# Patient Record
Sex: FEMALE | Race: WHITE | Employment: UNEMPLOYED | URBAN - NONMETROPOLITAN AREA
[De-identification: names, ages, dates, MRNs, and addresses within clinical notes are randomized per-mention and may not be internally consistent; named-entity substitution may affect disease eponyms.]

---

## 2017-07-19 ENCOUNTER — HOSPITAL ENCOUNTER (OUTPATIENT)
Age: 67
Setting detail: OBSERVATION
Discharge: HOME | End: 2017-07-20
Attending: EMERGENCY MEDICINE | Admitting: FAMILY MEDICINE
Payer: MEDICARE

## 2017-07-19 DIAGNOSIS — E16.2 HYPOGLYCEMIA: ICD-10-CM

## 2017-07-19 DIAGNOSIS — N28.9 ACUTE RENAL INSUFFICIENCY: Primary | ICD-10-CM

## 2017-07-19 LAB
ABSOLUTE EOS #: 0.1 K/UL (ref 0–0.4)
ABSOLUTE LYMPH #: 2.2 K/UL (ref 1–4.8)
ABSOLUTE MONO #: 1 K/UL (ref 0.1–1.2)
ANION GAP SERPL CALCULATED.3IONS-SCNC: 15 MMOL/L (ref 9–17)
BASOPHILS # BLD: 1 %
BASOPHILS ABSOLUTE: 0.1 K/UL (ref 0–0.2)
BUN BLDV-MCNC: 45 MG/DL (ref 8–23)
BUN/CREAT BLD: 24 (ref 9–20)
CALCIUM SERPL-MCNC: 8.5 MG/DL (ref 8.6–10.4)
CHLORIDE BLD-SCNC: 100 MMOL/L (ref 98–107)
CO2: 24 MMOL/L (ref 20–31)
CREAT SERPL-MCNC: 1.86 MG/DL (ref 0.5–0.9)
DIFFERENTIAL TYPE: ABNORMAL
EOSINOPHILS RELATIVE PERCENT: 1 %
GFR AFRICAN AMERICAN: 33 ML/MIN
GFR NON-AFRICAN AMERICAN: 27 ML/MIN
GFR SERPL CREATININE-BSD FRML MDRD: ABNORMAL ML/MIN/{1.73_M2}
GFR SERPL CREATININE-BSD FRML MDRD: ABNORMAL ML/MIN/{1.73_M2}
GLUCOSE BLD-MCNC: 105 MG/DL (ref 65–105)
GLUCOSE BLD-MCNC: 176 MG/DL (ref 70–99)
GLUCOSE BLD-MCNC: 90 MG/DL (ref 65–105)
HCT VFR BLD CALC: 31.3 % (ref 36–46)
HEMOGLOBIN: 10 G/DL (ref 12–16)
LYMPHOCYTES # BLD: 18 %
MCH RBC QN AUTO: 29.9 PG (ref 26–34)
MCHC RBC AUTO-ENTMCNC: 31.9 G/DL (ref 31–37)
MCV RBC AUTO: 93.7 FL (ref 80–100)
MONOCYTES # BLD: 9 %
PDW BLD-RTO: 16 % (ref 11–14.5)
PLATELET # BLD: 266 K/UL (ref 140–450)
PLATELET ESTIMATE: ABNORMAL
PMV BLD AUTO: 8 FL (ref 6–12)
POTASSIUM SERPL-SCNC: 3.9 MMOL/L (ref 3.7–5.3)
RBC # BLD: 3.34 M/UL (ref 4–5.2)
RBC # BLD: ABNORMAL 10*6/UL
SEG NEUTROPHILS: 71 %
SEGMENTED NEUTROPHILS ABSOLUTE COUNT: 8.3 K/UL (ref 1.8–7.7)
SODIUM BLD-SCNC: 139 MMOL/L (ref 135–144)
WBC # BLD: 11.7 K/UL (ref 3.5–11)
WBC # BLD: ABNORMAL 10*3/UL

## 2017-07-19 PROCEDURE — 99285 EMERGENCY DEPT VISIT HI MDM: CPT

## 2017-07-19 PROCEDURE — 80048 BASIC METABOLIC PNL TOTAL CA: CPT

## 2017-07-19 PROCEDURE — 2580000003 HC RX 258: Performed by: EMERGENCY MEDICINE

## 2017-07-19 PROCEDURE — 82947 ASSAY GLUCOSE BLOOD QUANT: CPT

## 2017-07-19 PROCEDURE — 96374 THER/PROPH/DIAG INJ IV PUSH: CPT

## 2017-07-19 PROCEDURE — 36415 COLL VENOUS BLD VENIPUNCTURE: CPT

## 2017-07-19 PROCEDURE — 83036 HEMOGLOBIN GLYCOSYLATED A1C: CPT

## 2017-07-19 PROCEDURE — 85025 COMPLETE CBC W/AUTO DIFF WBC: CPT

## 2017-07-19 RX ORDER — DEXTROSE MONOHYDRATE 25 G/50ML
25 INJECTION, SOLUTION INTRAVENOUS ONCE
Status: COMPLETED | OUTPATIENT
Start: 2017-07-19 | End: 2017-07-20

## 2017-07-19 RX ORDER — 0.9 % SODIUM CHLORIDE 0.9 %
1000 INTRAVENOUS SOLUTION INTRAVENOUS ONCE
Status: DISCONTINUED | OUTPATIENT
Start: 2017-07-19 | End: 2017-07-20 | Stop reason: HOSPADM

## 2017-07-19 RX ADMIN — DEXTROSE MONOHYDRATE 25 G: 25 INJECTION, SOLUTION INTRAVENOUS at 22:41

## 2017-07-19 ASSESSMENT — ENCOUNTER SYMPTOMS
ABDOMINAL DISTENTION: 0
EYE REDNESS: 0
FACIAL SWELLING: 0
SHORTNESS OF BREATH: 0
CHEST TIGHTNESS: 0
EYE DISCHARGE: 0
ABDOMINAL PAIN: 0

## 2017-07-20 VITALS
TEMPERATURE: 97.3 F | BODY MASS INDEX: 35.71 KG/M2 | SYSTOLIC BLOOD PRESSURE: 165 MMHG | OXYGEN SATURATION: 98 % | RESPIRATION RATE: 16 BRPM | WEIGHT: 222.22 LBS | DIASTOLIC BLOOD PRESSURE: 65 MMHG | HEART RATE: 60 BPM | HEIGHT: 66 IN

## 2017-07-20 LAB
ABSOLUTE EOS #: 0.1 K/UL (ref 0–0.4)
ABSOLUTE LYMPH #: 3.04 K/UL (ref 1–4.8)
ABSOLUTE MONO #: 0.7 K/UL (ref 0.1–1.2)
ANION GAP SERPL CALCULATED.3IONS-SCNC: 17 MMOL/L (ref 9–17)
BASOPHILS # BLD: 0 %
BASOPHILS ABSOLUTE: 0.04 K/UL (ref 0–0.2)
BUN BLDV-MCNC: 42 MG/DL (ref 8–23)
BUN/CREAT BLD: 27 (ref 9–20)
CALCIUM SERPL-MCNC: 8.6 MG/DL (ref 8.6–10.4)
CHLORIDE BLD-SCNC: 102 MMOL/L (ref 98–107)
CO2: 20 MMOL/L (ref 20–31)
CREAT SERPL-MCNC: 1.58 MG/DL (ref 0.5–0.9)
DIFFERENTIAL TYPE: ABNORMAL
EOSINOPHILS RELATIVE PERCENT: 1 %
ESTIMATED AVERAGE GLUCOSE: 220 MG/DL
GFR AFRICAN AMERICAN: 40 ML/MIN
GFR NON-AFRICAN AMERICAN: 33 ML/MIN
GFR SERPL CREATININE-BSD FRML MDRD: ABNORMAL ML/MIN/{1.73_M2}
GFR SERPL CREATININE-BSD FRML MDRD: ABNORMAL ML/MIN/{1.73_M2}
GLUCOSE BLD-MCNC: 116 MG/DL (ref 65–105)
GLUCOSE BLD-MCNC: 129 MG/DL (ref 65–105)
GLUCOSE BLD-MCNC: 218 MG/DL (ref 65–105)
GLUCOSE BLD-MCNC: 225 MG/DL (ref 65–105)
GLUCOSE BLD-MCNC: 228 MG/DL (ref 70–99)
GLUCOSE BLD-MCNC: 43 MG/DL (ref 65–105)
GLUCOSE BLD-MCNC: 78 MG/DL (ref 65–105)
HBA1C MFR BLD: 9.3 % (ref 4.8–5.9)
HCT VFR BLD CALC: 31.1 % (ref 36–46)
HEMOGLOBIN: 10.7 G/DL (ref 12–16)
LYMPHOCYTES # BLD: 30 %
MCH RBC QN AUTO: 31.8 PG (ref 26–34)
MCHC RBC AUTO-ENTMCNC: 34.6 G/DL (ref 31–37)
MCV RBC AUTO: 91.7 FL (ref 80–100)
MONOCYTES # BLD: 7 %
PDW BLD-RTO: 13.9 % (ref 11–14.5)
PLATELET # BLD: 244 K/UL (ref 140–450)
PLATELET ESTIMATE: ABNORMAL
PMV BLD AUTO: 7.6 FL (ref 6–12)
POTASSIUM SERPL-SCNC: 4.7 MMOL/L (ref 3.7–5.3)
RBC # BLD: 3.39 M/UL (ref 4–5.2)
RBC # BLD: ABNORMAL 10*6/UL
SEG NEUTROPHILS: 62 %
SEGMENTED NEUTROPHILS ABSOLUTE COUNT: 6.39 K/UL (ref 1.8–7.7)
SODIUM BLD-SCNC: 139 MMOL/L (ref 135–144)
WBC # BLD: 10.2 K/UL (ref 3.5–11)
WBC # BLD: ABNORMAL 10*3/UL

## 2017-07-20 PROCEDURE — 96372 THER/PROPH/DIAG INJ SC/IM: CPT

## 2017-07-20 PROCEDURE — G0378 HOSPITAL OBSERVATION PER HR: HCPCS

## 2017-07-20 PROCEDURE — 2580000003 HC RX 258

## 2017-07-20 PROCEDURE — 80048 BASIC METABOLIC PNL TOTAL CA: CPT

## 2017-07-20 PROCEDURE — 99220 PR INITIAL OBSERVATION CARE/DAY 70 MINUTES: CPT | Performed by: INTERNAL MEDICINE

## 2017-07-20 PROCEDURE — 94760 N-INVAS EAR/PLS OXIMETRY 1: CPT

## 2017-07-20 PROCEDURE — 82947 ASSAY GLUCOSE BLOOD QUANT: CPT

## 2017-07-20 PROCEDURE — 2580000003 HC RX 258: Performed by: EMERGENCY MEDICINE

## 2017-07-20 PROCEDURE — 6370000000 HC RX 637 (ALT 250 FOR IP): Performed by: INTERNAL MEDICINE

## 2017-07-20 PROCEDURE — 6370000000 HC RX 637 (ALT 250 FOR IP): Performed by: FAMILY MEDICINE

## 2017-07-20 PROCEDURE — 2580000003 HC RX 258: Performed by: FAMILY MEDICINE

## 2017-07-20 PROCEDURE — 85025 COMPLETE CBC W/AUTO DIFF WBC: CPT

## 2017-07-20 PROCEDURE — 36415 COLL VENOUS BLD VENIPUNCTURE: CPT

## 2017-07-20 PROCEDURE — 6360000002 HC RX W HCPCS: Performed by: FAMILY MEDICINE

## 2017-07-20 PROCEDURE — 96376 TX/PRO/DX INJ SAME DRUG ADON: CPT

## 2017-07-20 RX ORDER — CLOPIDOGREL BISULFATE 75 MG/1
75 TABLET ORAL DAILY
Status: DISCONTINUED | OUTPATIENT
Start: 2017-07-20 | End: 2017-07-20 | Stop reason: HOSPADM

## 2017-07-20 RX ORDER — ASPIRIN 81 MG/1
81 TABLET ORAL DAILY
Status: DISCONTINUED | OUTPATIENT
Start: 2017-07-20 | End: 2017-07-20 | Stop reason: HOSPADM

## 2017-07-20 RX ORDER — DEXTROSE MONOHYDRATE 25 G/50ML
12.5 INJECTION, SOLUTION INTRAVENOUS PRN
Status: DISCONTINUED | OUTPATIENT
Start: 2017-07-20 | End: 2017-07-20 | Stop reason: HOSPADM

## 2017-07-20 RX ORDER — LOSARTAN POTASSIUM 50 MG/1
100 TABLET ORAL DAILY
Status: DISCONTINUED | OUTPATIENT
Start: 2017-07-20 | End: 2017-07-20 | Stop reason: HOSPADM

## 2017-07-20 RX ORDER — LISINOPRIL 20 MG/1
40 TABLET ORAL DAILY
Status: DISCONTINUED | OUTPATIENT
Start: 2017-07-20 | End: 2017-07-20

## 2017-07-20 RX ORDER — MORPHINE SULFATE 4 MG/ML
4 INJECTION, SOLUTION INTRAMUSCULAR; INTRAVENOUS
Status: DISCONTINUED | OUTPATIENT
Start: 2017-07-20 | End: 2017-07-20 | Stop reason: HOSPADM

## 2017-07-20 RX ORDER — DEXTROSE MONOHYDRATE 50 MG/ML
INJECTION, SOLUTION INTRAVENOUS CONTINUOUS
Status: DISCONTINUED | OUTPATIENT
Start: 2017-07-20 | End: 2017-07-20 | Stop reason: HOSPADM

## 2017-07-20 RX ORDER — DEXTROSE MONOHYDRATE 50 MG/ML
INJECTION, SOLUTION INTRAVENOUS CONTINUOUS
Status: DISCONTINUED | OUTPATIENT
Start: 2017-07-20 | End: 2017-07-20

## 2017-07-20 RX ORDER — CARVEDILOL 12.5 MG/1
25 TABLET ORAL 2 TIMES DAILY WITH MEALS
Status: DISCONTINUED | OUTPATIENT
Start: 2017-07-20 | End: 2017-07-20 | Stop reason: HOSPADM

## 2017-07-20 RX ORDER — ONDANSETRON 2 MG/ML
4 INJECTION INTRAMUSCULAR; INTRAVENOUS EVERY 6 HOURS PRN
Status: DISCONTINUED | OUTPATIENT
Start: 2017-07-20 | End: 2017-07-20 | Stop reason: HOSPADM

## 2017-07-20 RX ORDER — DEXTROSE MONOHYDRATE 50 MG/ML
100 INJECTION, SOLUTION INTRAVENOUS PRN
Status: DISCONTINUED | OUTPATIENT
Start: 2017-07-20 | End: 2017-07-20 | Stop reason: HOSPADM

## 2017-07-20 RX ORDER — CARVEDILOL 25 MG/1
25 TABLET ORAL 2 TIMES DAILY WITH MEALS
Qty: 60 TABLET | Refills: 0
Start: 2017-07-20 | End: 2018-01-31 | Stop reason: SDUPTHER

## 2017-07-20 RX ORDER — FUROSEMIDE 20 MG/1
20 TABLET ORAL 2 TIMES DAILY
Status: DISCONTINUED | OUTPATIENT
Start: 2017-07-20 | End: 2017-07-20 | Stop reason: HOSPADM

## 2017-07-20 RX ORDER — MEMANTINE HYDROCHLORIDE 5 MG/1
10 TABLET ORAL 2 TIMES DAILY
Status: DISCONTINUED | OUTPATIENT
Start: 2017-07-20 | End: 2017-07-20 | Stop reason: HOSPADM

## 2017-07-20 RX ORDER — INSULIN GLARGINE 100 [IU]/ML
70 INJECTION, SOLUTION SUBCUTANEOUS NIGHTLY
Qty: 1 VIAL | Refills: 0
Start: 2017-07-20 | End: 2019-08-13 | Stop reason: SDUPTHER

## 2017-07-20 RX ORDER — DEXTROSE MONOHYDRATE 25 G/50ML
25 INJECTION, SOLUTION INTRAVENOUS ONCE
Status: DISCONTINUED | OUTPATIENT
Start: 2017-07-20 | End: 2017-07-20 | Stop reason: HOSPADM

## 2017-07-20 RX ORDER — ATORVASTATIN CALCIUM 40 MG/1
40 TABLET, FILM COATED ORAL DAILY
Status: DISCONTINUED | OUTPATIENT
Start: 2017-07-20 | End: 2017-07-20 | Stop reason: HOSPADM

## 2017-07-20 RX ORDER — ACETAMINOPHEN 325 MG/1
650 TABLET ORAL EVERY 4 HOURS PRN
Status: DISCONTINUED | OUTPATIENT
Start: 2017-07-20 | End: 2017-07-20 | Stop reason: HOSPADM

## 2017-07-20 RX ORDER — LOSARTAN POTASSIUM 100 MG/1
100 TABLET ORAL DAILY
COMMUNITY
End: 2017-12-15 | Stop reason: SDUPTHER

## 2017-07-20 RX ORDER — AMLODIPINE BESYLATE 5 MG/1
10 TABLET ORAL DAILY
Status: DISCONTINUED | OUTPATIENT
Start: 2017-07-20 | End: 2017-07-20

## 2017-07-20 RX ORDER — OXYBUTYNIN CHLORIDE 5 MG/1
10 TABLET, EXTENDED RELEASE ORAL DAILY
Status: DISCONTINUED | OUTPATIENT
Start: 2017-07-20 | End: 2017-07-20 | Stop reason: HOSPADM

## 2017-07-20 RX ORDER — GABAPENTIN 300 MG/1
300 CAPSULE ORAL 3 TIMES DAILY
Status: DISCONTINUED | OUTPATIENT
Start: 2017-07-20 | End: 2017-07-20 | Stop reason: HOSPADM

## 2017-07-20 RX ORDER — NICOTINE POLACRILEX 4 MG
15 LOZENGE BUCCAL PRN
Status: DISCONTINUED | OUTPATIENT
Start: 2017-07-20 | End: 2017-07-20 | Stop reason: HOSPADM

## 2017-07-20 RX ORDER — RIVASTIGMINE 9.5 MG/24H
1 PATCH, EXTENDED RELEASE TRANSDERMAL DAILY
Status: DISCONTINUED | OUTPATIENT
Start: 2017-07-20 | End: 2017-07-20 | Stop reason: HOSPADM

## 2017-07-20 RX ORDER — CITALOPRAM 20 MG/1
20 TABLET ORAL DAILY
COMMUNITY
End: 2018-01-31 | Stop reason: SDUPTHER

## 2017-07-20 RX ORDER — SODIUM CHLORIDE 0.9 % (FLUSH) 0.9 %
10 SYRINGE (ML) INJECTION EVERY 12 HOURS SCHEDULED
Status: DISCONTINUED | OUTPATIENT
Start: 2017-07-20 | End: 2017-07-20 | Stop reason: HOSPADM

## 2017-07-20 RX ORDER — DEXTROSE MONOHYDRATE 25 G/50ML
INJECTION, SOLUTION INTRAVENOUS
Status: COMPLETED
Start: 2017-07-20 | End: 2017-07-20

## 2017-07-20 RX ORDER — MORPHINE SULFATE 2 MG/ML
2 INJECTION, SOLUTION INTRAMUSCULAR; INTRAVENOUS
Status: DISCONTINUED | OUTPATIENT
Start: 2017-07-20 | End: 2017-07-20 | Stop reason: HOSPADM

## 2017-07-20 RX ORDER — SODIUM CHLORIDE 0.9 % (FLUSH) 0.9 %
10 SYRINGE (ML) INJECTION PRN
Status: DISCONTINUED | OUTPATIENT
Start: 2017-07-20 | End: 2017-07-20 | Stop reason: HOSPADM

## 2017-07-20 RX ADMIN — DEXTROSE MONOHYDRATE 25 G: 25 INJECTION, SOLUTION INTRAVENOUS at 00:15

## 2017-07-20 RX ADMIN — CARVEDILOL 25 MG: 12.5 TABLET, FILM COATED ORAL at 09:14

## 2017-07-20 RX ADMIN — GABAPENTIN 300 MG: 300 CAPSULE ORAL at 08:59

## 2017-07-20 RX ADMIN — CLOPIDOGREL 75 MG: 75 TABLET, FILM COATED ORAL at 09:00

## 2017-07-20 RX ADMIN — FUROSEMIDE 20 MG: 20 TABLET ORAL at 08:57

## 2017-07-20 RX ADMIN — INSULIN LISPRO 4 UNITS: 100 INJECTION, SOLUTION INTRAVENOUS; SUBCUTANEOUS at 09:15

## 2017-07-20 RX ADMIN — DEXTROSE MONOHYDRATE: 50 INJECTION, SOLUTION INTRAVENOUS at 01:17

## 2017-07-20 RX ADMIN — OXYBUTYNIN CHLORIDE 10 MG: 5 TABLET, EXTENDED RELEASE ORAL at 08:57

## 2017-07-20 RX ADMIN — ATORVASTATIN CALCIUM 40 MG: 40 TABLET, FILM COATED ORAL at 08:59

## 2017-07-20 RX ADMIN — MEMANTINE HYDROCHLORIDE 10 MG: 5 TABLET ORAL at 08:58

## 2017-07-20 RX ADMIN — Medication 10 ML: at 09:15

## 2017-07-20 RX ADMIN — LOSARTAN POTASSIUM 100 MG: 50 TABLET ORAL at 09:14

## 2017-07-20 RX ADMIN — ENOXAPARIN SODIUM 40 MG: 40 INJECTION SUBCUTANEOUS at 08:56

## 2017-07-20 RX ADMIN — DEXTROSE MONOHYDRATE: 50 INJECTION, SOLUTION INTRAVENOUS at 00:15

## 2017-07-20 RX ADMIN — MEMANTINE HYDROCHLORIDE 10 MG: 5 TABLET ORAL at 02:28

## 2017-07-20 RX ADMIN — ASPIRIN 81 MG: 81 TABLET, COATED ORAL at 09:01

## 2017-07-20 RX ADMIN — INSULIN LISPRO 2 UNITS: 100 INJECTION, SOLUTION INTRAVENOUS; SUBCUTANEOUS at 13:04

## 2017-12-11 DIAGNOSIS — I25.10 CORONARY ARTERY DISEASE INVOLVING NATIVE HEART WITHOUT ANGINA PECTORIS, UNSPECIFIED VESSEL OR LESION TYPE: Primary | ICD-10-CM

## 2017-12-12 RX ORDER — CLOPIDOGREL BISULFATE 75 MG/1
TABLET ORAL
Qty: 30 TABLET | Refills: 0 | Status: SHIPPED | OUTPATIENT
Start: 2017-12-12 | End: 2018-01-12 | Stop reason: SDUPTHER

## 2017-12-15 RX ORDER — LOSARTAN POTASSIUM 100 MG/1
100 TABLET ORAL DAILY
Qty: 30 TABLET | Refills: 0 | Status: SHIPPED | OUTPATIENT
Start: 2017-12-15 | End: 2018-01-15 | Stop reason: SDUPTHER

## 2017-12-15 NOTE — TELEPHONE ENCOUNTER
Last Appt:  Visit date not found  Next Appt:   1/5/2018  Med verified in [facility]

## 2018-01-12 DIAGNOSIS — I25.10 CORONARY ARTERY DISEASE INVOLVING NATIVE HEART WITHOUT ANGINA PECTORIS, UNSPECIFIED VESSEL OR LESION TYPE: ICD-10-CM

## 2018-01-15 DIAGNOSIS — I10 ESSENTIAL HYPERTENSION: Primary | ICD-10-CM

## 2018-01-15 RX ORDER — LOSARTAN POTASSIUM 100 MG/1
TABLET ORAL
Qty: 30 TABLET | Refills: 0 | Status: SHIPPED | OUTPATIENT
Start: 2018-01-15 | End: 2018-02-19 | Stop reason: SDUPTHER

## 2018-01-15 RX ORDER — CLOPIDOGREL BISULFATE 75 MG/1
TABLET ORAL
Qty: 30 TABLET | Refills: 0 | Status: SHIPPED | OUTPATIENT
Start: 2018-01-15 | End: 2018-01-31 | Stop reason: SDUPTHER

## 2018-01-15 NOTE — TELEPHONE ENCOUNTER
Last Appt:  Visit date not found  Next Appt:   1/31/2018  Med verified in [facility]

## 2018-01-22 RX ORDER — GABAPENTIN 300 MG/1
CAPSULE ORAL
Qty: 270 CAPSULE | Refills: 0 | OUTPATIENT
Start: 2018-01-22

## 2018-01-31 ENCOUNTER — OFFICE VISIT (OUTPATIENT)
Dept: FAMILY MEDICINE CLINIC | Age: 68
End: 2018-01-31
Payer: MEDICARE

## 2018-01-31 ENCOUNTER — HOSPITAL ENCOUNTER (OUTPATIENT)
Dept: LAB | Age: 68
Setting detail: SPECIMEN
Discharge: HOME | End: 2018-01-31
Payer: MEDICARE

## 2018-01-31 VITALS
SYSTOLIC BLOOD PRESSURE: 122 MMHG | HEART RATE: 80 BPM | DIASTOLIC BLOOD PRESSURE: 60 MMHG | BODY MASS INDEX: 34.07 KG/M2 | HEIGHT: 66 IN | WEIGHT: 212 LBS

## 2018-01-31 DIAGNOSIS — I10 ESSENTIAL HYPERTENSION: ICD-10-CM

## 2018-01-31 DIAGNOSIS — Z79.4 TYPE 2 DIABETES MELLITUS WITH HYPERGLYCEMIA, WITH LONG-TERM CURRENT USE OF INSULIN (HCC): ICD-10-CM

## 2018-01-31 DIAGNOSIS — Z11.59 NEED FOR HEPATITIS C SCREENING TEST: ICD-10-CM

## 2018-01-31 DIAGNOSIS — M1A.9XX0 CHRONIC GOUT WITHOUT TOPHUS, UNSPECIFIED CAUSE, UNSPECIFIED SITE: ICD-10-CM

## 2018-01-31 DIAGNOSIS — K21.9 GASTROESOPHAGEAL REFLUX DISEASE, ESOPHAGITIS PRESENCE NOT SPECIFIED: ICD-10-CM

## 2018-01-31 DIAGNOSIS — F03.90 DEMENTIA WITHOUT BEHAVIORAL DISTURBANCE, UNSPECIFIED DEMENTIA TYPE: ICD-10-CM

## 2018-01-31 DIAGNOSIS — Z23 NEEDS FLU SHOT: ICD-10-CM

## 2018-01-31 DIAGNOSIS — E78.5 HYPERLIPIDEMIA, UNSPECIFIED HYPERLIPIDEMIA TYPE: ICD-10-CM

## 2018-01-31 DIAGNOSIS — E11.65 TYPE 2 DIABETES MELLITUS WITH HYPERGLYCEMIA, WITH LONG-TERM CURRENT USE OF INSULIN (HCC): ICD-10-CM

## 2018-01-31 DIAGNOSIS — Z12.11 COLON CANCER SCREENING: ICD-10-CM

## 2018-01-31 DIAGNOSIS — I63.9 CEREBROVASCULAR ACCIDENT (CVA), UNSPECIFIED MECHANISM (HCC): ICD-10-CM

## 2018-01-31 DIAGNOSIS — Z78.0 POSTMENOPAUSAL: ICD-10-CM

## 2018-01-31 DIAGNOSIS — Z79.4 TYPE 2 DIABETES MELLITUS WITH HYPERGLYCEMIA, WITH LONG-TERM CURRENT USE OF INSULIN (HCC): Primary | ICD-10-CM

## 2018-01-31 DIAGNOSIS — E11.65 TYPE 2 DIABETES MELLITUS WITH HYPERGLYCEMIA, WITH LONG-TERM CURRENT USE OF INSULIN (HCC): Primary | ICD-10-CM

## 2018-01-31 DIAGNOSIS — G62.9 PERIPHERAL POLYNEUROPATHY: ICD-10-CM

## 2018-01-31 DIAGNOSIS — F41.9 ANXIETY AND DEPRESSION: ICD-10-CM

## 2018-01-31 DIAGNOSIS — I25.10 CORONARY ARTERY DISEASE INVOLVING NATIVE HEART WITHOUT ANGINA PECTORIS, UNSPECIFIED VESSEL OR LESION TYPE: ICD-10-CM

## 2018-01-31 DIAGNOSIS — Z12.31 SCREENING MAMMOGRAM, ENCOUNTER FOR: ICD-10-CM

## 2018-01-31 DIAGNOSIS — F32.A ANXIETY AND DEPRESSION: ICD-10-CM

## 2018-01-31 LAB
ABSOLUTE EOS #: 0.2 K/UL (ref 0–0.4)
ABSOLUTE IMMATURE GRANULOCYTE: ABNORMAL K/UL (ref 0–0.3)
ABSOLUTE LYMPH #: 2.5 K/UL (ref 1–4.8)
ABSOLUTE MONO #: 0.8 K/UL (ref 0.1–1.2)
ALBUMIN SERPL-MCNC: 4.1 G/DL (ref 3.5–5.2)
ALBUMIN/GLOBULIN RATIO: 1.4 (ref 1–2.5)
ALP BLD-CCNC: 95 U/L (ref 35–104)
ALT SERPL-CCNC: 17 U/L (ref 5–33)
ANION GAP SERPL CALCULATED.3IONS-SCNC: 8 MMOL/L (ref 9–17)
AST SERPL-CCNC: 20 U/L
BASOPHILS # BLD: 1 % (ref 0–1)
BASOPHILS ABSOLUTE: 0.1 K/UL (ref 0–0.2)
BILIRUB SERPL-MCNC: 0.22 MG/DL (ref 0.3–1.2)
BUN BLDV-MCNC: 25 MG/DL (ref 8–23)
BUN/CREAT BLD: 22 (ref 9–20)
CALCIUM SERPL-MCNC: 9.6 MG/DL (ref 8.6–10.4)
CHLORIDE BLD-SCNC: 102 MMOL/L (ref 98–107)
CHOLESTEROL/HDL RATIO: 3.7
CHOLESTEROL: 207 MG/DL
CO2: 32 MMOL/L (ref 20–31)
CREAT SERPL-MCNC: 1.12 MG/DL (ref 0.5–0.9)
CREATININE URINE: 113 MG/DL (ref 28–217)
DIFFERENTIAL TYPE: ABNORMAL
EOSINOPHILS RELATIVE PERCENT: 2 % (ref 1–7)
ESTIMATED AVERAGE GLUCOSE: 189 MG/DL
GFR AFRICAN AMERICAN: 59 ML/MIN
GFR NON-AFRICAN AMERICAN: 49 ML/MIN
GFR SERPL CREATININE-BSD FRML MDRD: ABNORMAL ML/MIN/{1.73_M2}
GFR SERPL CREATININE-BSD FRML MDRD: ABNORMAL ML/MIN/{1.73_M2}
GLUCOSE BLD-MCNC: 149 MG/DL (ref 70–99)
HBA1C MFR BLD: 8.2 % (ref 4.8–5.9)
HCT VFR BLD CALC: 33.5 % (ref 36–46)
HDLC SERPL-MCNC: 56 MG/DL
HEMOGLOBIN: 11.3 G/DL (ref 12–16)
HEPATITIS C ANTIBODY: NONREACTIVE
IMMATURE GRANULOCYTES: ABNORMAL %
LDL CHOLESTEROL: 118 MG/DL (ref 0–130)
LYMPHOCYTES # BLD: 30 % (ref 16–46)
MCH RBC QN AUTO: 30.5 PG (ref 26–34)
MCHC RBC AUTO-ENTMCNC: 33.7 G/DL (ref 31–37)
MCV RBC AUTO: 90.7 FL (ref 80–100)
MICROALBUMIN/CREAT 24H UR: 151 MG/L
MICROALBUMIN/CREAT UR-RTO: 134 MCG/MG CREAT
MONOCYTES # BLD: 9 % (ref 4–11)
NRBC AUTOMATED: ABNORMAL PER 100 WBC
PDW BLD-RTO: 13.8 % (ref 11–14.5)
PLATELET # BLD: 231 K/UL (ref 140–450)
PLATELET ESTIMATE: ABNORMAL
PMV BLD AUTO: 7.7 FL (ref 6–12)
POTASSIUM SERPL-SCNC: 5.4 MMOL/L (ref 3.7–5.3)
RBC # BLD: 3.69 M/UL (ref 4–5.2)
RBC # BLD: ABNORMAL 10*6/UL
SEG NEUTROPHILS: 58 % (ref 43–77)
SEGMENTED NEUTROPHILS ABSOLUTE COUNT: 4.8 K/UL (ref 1.8–7.7)
SODIUM BLD-SCNC: 142 MMOL/L (ref 135–144)
TOTAL PROTEIN: 7 G/DL (ref 6.4–8.3)
TRIGL SERPL-MCNC: 165 MG/DL
TSH SERPL DL<=0.05 MIU/L-ACNC: 1.2 MIU/L (ref 0.3–5)
URIC ACID: 5.8 MG/DL (ref 2.4–5.7)
VLDLC SERPL CALC-MCNC: ABNORMAL MG/DL (ref 1–30)
WBC # BLD: 8.2 K/UL (ref 3.5–11)
WBC # BLD: ABNORMAL 10*3/UL

## 2018-01-31 PROCEDURE — 4040F PNEUMOC VAC/ADMIN/RCVD: CPT | Performed by: PHYSICIAN ASSISTANT

## 2018-01-31 PROCEDURE — 82043 UR ALBUMIN QUANTITATIVE: CPT

## 2018-01-31 PROCEDURE — 80061 LIPID PANEL: CPT

## 2018-01-31 PROCEDURE — 84443 ASSAY THYROID STIM HORMONE: CPT

## 2018-01-31 PROCEDURE — G8427 DOCREV CUR MEDS BY ELIG CLIN: HCPCS | Performed by: PHYSICIAN ASSISTANT

## 2018-01-31 PROCEDURE — 3017F COLORECTAL CA SCREEN DOC REV: CPT | Performed by: PHYSICIAN ASSISTANT

## 2018-01-31 PROCEDURE — 3045F PR MOST RECENT HEMOGLOBIN A1C LEVEL 7.0-9.0%: CPT | Performed by: PHYSICIAN ASSISTANT

## 2018-01-31 PROCEDURE — G8484 FLU IMMUNIZE NO ADMIN: HCPCS | Performed by: PHYSICIAN ASSISTANT

## 2018-01-31 PROCEDURE — G0008 ADMIN INFLUENZA VIRUS VAC: HCPCS | Performed by: PHYSICIAN ASSISTANT

## 2018-01-31 PROCEDURE — 84550 ASSAY OF BLOOD/URIC ACID: CPT

## 2018-01-31 PROCEDURE — 3014F SCREEN MAMMO DOC REV: CPT | Performed by: PHYSICIAN ASSISTANT

## 2018-01-31 PROCEDURE — 83036 HEMOGLOBIN GLYCOSYLATED A1C: CPT

## 2018-01-31 PROCEDURE — 36415 COLL VENOUS BLD VENIPUNCTURE: CPT

## 2018-01-31 PROCEDURE — 99214 OFFICE O/P EST MOD 30 MIN: CPT | Performed by: PHYSICIAN ASSISTANT

## 2018-01-31 PROCEDURE — 85025 COMPLETE CBC W/AUTO DIFF WBC: CPT

## 2018-01-31 PROCEDURE — 1123F ACP DISCUSS/DSCN MKR DOCD: CPT | Performed by: PHYSICIAN ASSISTANT

## 2018-01-31 PROCEDURE — 86803 HEPATITIS C AB TEST: CPT

## 2018-01-31 PROCEDURE — 1090F PRES/ABSN URINE INCON ASSESS: CPT | Performed by: PHYSICIAN ASSISTANT

## 2018-01-31 PROCEDURE — G8400 PT W/DXA NO RESULTS DOC: HCPCS | Performed by: PHYSICIAN ASSISTANT

## 2018-01-31 PROCEDURE — 1036F TOBACCO NON-USER: CPT | Performed by: PHYSICIAN ASSISTANT

## 2018-01-31 PROCEDURE — 80053 COMPREHEN METABOLIC PANEL: CPT

## 2018-01-31 PROCEDURE — G8417 CALC BMI ABV UP PARAM F/U: HCPCS | Performed by: PHYSICIAN ASSISTANT

## 2018-01-31 PROCEDURE — 82570 ASSAY OF URINE CREATININE: CPT

## 2018-01-31 PROCEDURE — 90662 IIV NO PRSV INCREASED AG IM: CPT | Performed by: PHYSICIAN ASSISTANT

## 2018-01-31 PROCEDURE — G8598 ASA/ANTIPLAT THER USED: HCPCS | Performed by: PHYSICIAN ASSISTANT

## 2018-01-31 RX ORDER — MEMANTINE HYDROCHLORIDE 10 MG/1
10 TABLET ORAL 2 TIMES DAILY
Qty: 60 TABLET | Refills: 2 | Status: SHIPPED | OUTPATIENT
Start: 2018-01-31 | End: 2018-04-12 | Stop reason: SDUPTHER

## 2018-01-31 RX ORDER — NYSTATIN 100000 U/G
CREAM TOPICAL
Qty: 30 G | Refills: 1 | Status: SHIPPED | OUTPATIENT
Start: 2018-01-31 | End: 2021-03-10 | Stop reason: SDUPTHER

## 2018-01-31 RX ORDER — CARVEDILOL 12.5 MG/1
12.5 TABLET ORAL 2 TIMES DAILY WITH MEALS
Qty: 180 TABLET | Refills: 1 | Status: SHIPPED | OUTPATIENT
Start: 2018-01-31 | End: 2018-08-21 | Stop reason: SDUPTHER

## 2018-01-31 RX ORDER — RANITIDINE 150 MG/1
150 TABLET ORAL 2 TIMES DAILY
Qty: 180 TABLET | Refills: 1 | Status: SHIPPED | OUTPATIENT
Start: 2018-01-31 | End: 2018-08-21 | Stop reason: SDUPTHER

## 2018-01-31 RX ORDER — CITALOPRAM 20 MG/1
20 TABLET ORAL DAILY
Qty: 90 TABLET | Refills: 1 | Status: SHIPPED | OUTPATIENT
Start: 2018-01-31 | End: 2018-08-21 | Stop reason: SDUPTHER

## 2018-01-31 RX ORDER — GABAPENTIN 300 MG/1
300 CAPSULE ORAL 3 TIMES DAILY
Qty: 270 CAPSULE | Refills: 1 | Status: SHIPPED | OUTPATIENT
Start: 2018-01-31 | End: 2019-05-23 | Stop reason: SDUPTHER

## 2018-01-31 RX ORDER — ATORVASTATIN CALCIUM 40 MG/1
40 TABLET, FILM COATED ORAL DAILY
Qty: 90 TABLET | Refills: 1 | Status: SHIPPED | OUTPATIENT
Start: 2018-01-31 | End: 2018-08-21 | Stop reason: SDUPTHER

## 2018-01-31 RX ORDER — OXYBUTYNIN CHLORIDE 5 MG/1
5 TABLET ORAL 2 TIMES DAILY
Qty: 180 TABLET | Refills: 1 | Status: SHIPPED | OUTPATIENT
Start: 2018-01-31 | End: 2018-11-27 | Stop reason: SDUPTHER

## 2018-01-31 RX ORDER — LANCETS 30 GAUGE
EACH MISCELLANEOUS
Qty: 300 EACH | Refills: 3 | Status: SHIPPED | OUTPATIENT
Start: 2018-01-31 | End: 2020-04-15

## 2018-01-31 RX ORDER — FUROSEMIDE 20 MG/1
20 TABLET ORAL 2 TIMES DAILY
Qty: 180 TABLET | Refills: 1 | Status: SHIPPED | OUTPATIENT
Start: 2018-01-31 | End: 2018-11-27 | Stop reason: SDUPTHER

## 2018-01-31 RX ORDER — RIVASTIGMINE 9.5 MG/24H
PATCH, EXTENDED RELEASE TRANSDERMAL
Qty: 90 PATCH | Refills: 1 | Status: SHIPPED | OUTPATIENT
Start: 2018-01-31 | End: 2020-10-23 | Stop reason: SDUPTHER

## 2018-01-31 RX ORDER — CLOPIDOGREL BISULFATE 75 MG/1
TABLET ORAL
Qty: 90 TABLET | Refills: 1 | Status: SHIPPED | OUTPATIENT
Start: 2018-01-31 | End: 2018-08-21 | Stop reason: SDUPTHER

## 2018-01-31 RX ORDER — GLUCOSAMINE HCL/CHONDROITIN SU 500-400 MG
CAPSULE ORAL
Qty: 300 STRIP | Refills: 0 | Status: ON HOLD | OUTPATIENT
Start: 2018-01-31 | End: 2020-08-24 | Stop reason: HOSPADM

## 2018-01-31 ASSESSMENT — PATIENT HEALTH QUESTIONNAIRE - PHQ9
SUM OF ALL RESPONSES TO PHQ QUESTIONS 1-9: 0
2. FEELING DOWN, DEPRESSED OR HOPELESS: 0
SUM OF ALL RESPONSES TO PHQ9 QUESTIONS 1 & 2: 0
1. LITTLE INTEREST OR PLEASURE IN DOING THINGS: 0

## 2018-02-01 ENCOUNTER — TELEPHONE (OUTPATIENT)
Dept: FAMILY MEDICINE CLINIC | Age: 68
End: 2018-02-01

## 2018-02-01 NOTE — PROGRESS NOTES
Constitutional:  Alert and oriented x 3   HENT:  Normocephalic, Atraumatic, Bilateral external ears normal, Oropharynx moist, No oral exudates, Nose normal. Neck- Normal range of motion, No tenderness, Supple, No stridor. Eyes:  PERRL, Conjunctiva normal, No discharge. Respiratory:  Normal breath sounds, No respiratory distress, No wheezing, No chest tenderness. Cardiovascular:  Normal heart rate, Normal rhythm, No murmurs, No rubs, No gallops. GI:  Bowel sounds normal, Soft, No tenderness, No masses, No pulsatile masses. Musculoskeletal:  Intact distal pulses, No edema, No tenderness, No cyanosis, No clubbing. Good range of motion in all major joints. No tenderness to palpation or major deformities noted. Back- No tenderness. Integument:  Warm, Dry, No erythema, No rash. Sensation intact with monofilament testing at all sites bilaterally. Lymphatic:  No lymphadenopathy noted. Neurologic:  Alert & oriented x 3, Normal motor function, Normal sensory function, No focal deficits noted. Psychiatric:  Affect normal, Mood normal.     RESULTS  Ordered in this encounter. FINAL DIAGNOSIS AND ORDERS  1. Type 2 diabetes mellitus with hyperglycemia, with long-term current use of insulin ([facility])  Hemoglobin A1C    Microalbumin, Ur    HM DIABETES FOOT EXAM    Leoal Neri, [place], [facility] [place]    Ambulatory referral to Podiatry    nystatin (MYCOSTATIN) 556961 UNIT/GM cream    Lancets MISC    Glucose Blood (BLOOD GLUCOSE TEST STRIPS) STRP   2. Dementia without behavioral disturbance, unspecified dementia type  memantine (NAMENDA) 10 MG tablet    rivastigmine (EXELON) 9.5 AC/80ZC    Gutierrez Smart MD, Neurology [place]   3. Hyperlipidemia, unspecified hyperlipidemia type  Comprehensive Metabolic Panel    CBC Auto Differential    TSH With Reflex Ft4    Lipid Panel   4. Chronic gout without tophus, unspecified cause, unspecified site  Uric Acid   5.  Cerebrovascular accident (CVA), unspecified

## 2018-02-05 DIAGNOSIS — E11.3299 BACKGROUND DIABETIC RETINOPATHY (HCC): ICD-10-CM

## 2018-02-05 DIAGNOSIS — F41.9 ANXIETY AND DEPRESSION: ICD-10-CM

## 2018-02-05 DIAGNOSIS — R26.89 BALANCE PROBLEM: ICD-10-CM

## 2018-02-05 DIAGNOSIS — F32.A ANXIETY AND DEPRESSION: ICD-10-CM

## 2018-02-05 DIAGNOSIS — G62.9 PERIPHERAL POLYNEUROPATHY: ICD-10-CM

## 2018-02-05 DIAGNOSIS — M1A.9XX0 CHRONIC GOUT WITHOUT TOPHUS, UNSPECIFIED CAUSE, UNSPECIFIED SITE: Primary | ICD-10-CM

## 2018-02-05 RX ORDER — ALLOPURINOL 300 MG/1
300 TABLET ORAL DAILY
Qty: 30 TABLET | Refills: 3 | Status: SHIPPED | OUTPATIENT
Start: 2018-02-05 | End: 2018-07-24 | Stop reason: SDUPTHER

## 2018-02-07 ENCOUNTER — TELEPHONE (OUTPATIENT)
Dept: FAMILY MEDICINE CLINIC | Age: 68
End: 2018-02-07

## 2018-02-12 ENCOUNTER — TELEPHONE (OUTPATIENT)
Dept: FAMILY MEDICINE CLINIC | Age: 68
End: 2018-02-12

## 2018-02-19 DIAGNOSIS — I10 ESSENTIAL HYPERTENSION: ICD-10-CM

## 2018-02-20 RX ORDER — LOSARTAN POTASSIUM 100 MG/1
TABLET ORAL
Qty: 90 TABLET | Refills: 1 | Status: SHIPPED | OUTPATIENT
Start: 2018-02-20 | End: 2018-08-21 | Stop reason: SDUPTHER

## 2018-03-30 ENCOUNTER — HOSPITAL ENCOUNTER (OUTPATIENT)
Dept: LAB | Age: 68
Setting detail: SPECIMEN
Discharge: HOME | End: 2018-03-30
Payer: MEDICARE

## 2018-03-30 DIAGNOSIS — E11.65 TYPE 2 DIABETES MELLITUS WITH HYPERGLYCEMIA, WITH LONG-TERM CURRENT USE OF INSULIN (HCC): Primary | ICD-10-CM

## 2018-03-30 DIAGNOSIS — Z79.4 TYPE 2 DIABETES MELLITUS WITH HYPERGLYCEMIA, WITH LONG-TERM CURRENT USE OF INSULIN (HCC): ICD-10-CM

## 2018-03-30 DIAGNOSIS — Z79.4 TYPE 2 DIABETES MELLITUS WITH HYPERGLYCEMIA, WITH LONG-TERM CURRENT USE OF INSULIN (HCC): Primary | ICD-10-CM

## 2018-03-30 DIAGNOSIS — E11.65 TYPE 2 DIABETES MELLITUS WITH HYPERGLYCEMIA, WITH LONG-TERM CURRENT USE OF INSULIN (HCC): ICD-10-CM

## 2018-03-30 LAB
ANION GAP SERPL CALCULATED.3IONS-SCNC: 11 MMOL/L (ref 9–17)
BUN BLDV-MCNC: 31 MG/DL (ref 8–23)
BUN/CREAT BLD: 27 (ref 9–20)
CALCIUM SERPL-MCNC: 9.7 MG/DL (ref 8.6–10.4)
CHLORIDE BLD-SCNC: 96 MMOL/L (ref 98–107)
CO2: 31 MMOL/L (ref 20–31)
CREAT SERPL-MCNC: 1.15 MG/DL (ref 0.5–0.9)
ESTIMATED AVERAGE GLUCOSE: 137 MG/DL
GFR AFRICAN AMERICAN: 57 ML/MIN
GFR NON-AFRICAN AMERICAN: 47 ML/MIN
GFR SERPL CREATININE-BSD FRML MDRD: ABNORMAL ML/MIN/{1.73_M2}
GFR SERPL CREATININE-BSD FRML MDRD: ABNORMAL ML/MIN/{1.73_M2}
GLUCOSE BLD-MCNC: 95 MG/DL (ref 70–99)
HBA1C MFR BLD: 6.4 % (ref 4.8–5.9)
POTASSIUM SERPL-SCNC: 4.7 MMOL/L (ref 3.7–5.3)
SODIUM BLD-SCNC: 138 MMOL/L (ref 135–144)

## 2018-03-30 PROCEDURE — 36415 COLL VENOUS BLD VENIPUNCTURE: CPT

## 2018-03-30 PROCEDURE — 80048 BASIC METABOLIC PNL TOTAL CA: CPT

## 2018-03-30 PROCEDURE — 83036 HEMOGLOBIN GLYCOSYLATED A1C: CPT

## 2018-03-30 RX ORDER — BLOOD-GLUCOSE METER
1 KIT MISCELLANEOUS DAILY
Qty: 1 KIT | Refills: 0 | Status: SHIPPED | OUTPATIENT
Start: 2018-03-30 | End: 2018-08-21 | Stop reason: SDUPTHER

## 2018-04-12 DIAGNOSIS — F03.90 DEMENTIA WITHOUT BEHAVIORAL DISTURBANCE, UNSPECIFIED DEMENTIA TYPE: ICD-10-CM

## 2018-04-12 RX ORDER — MEMANTINE HYDROCHLORIDE 10 MG/1
TABLET ORAL
Qty: 180 TABLET | Refills: 0 | Status: SHIPPED | OUTPATIENT
Start: 2018-04-12 | End: 2018-11-01 | Stop reason: SDUPTHER

## 2018-05-07 ENCOUNTER — TELEPHONE (OUTPATIENT)
Dept: PRIMARY CARE CLINIC | Age: 68
End: 2018-05-07

## 2018-07-24 DIAGNOSIS — M1A.9XX0 CHRONIC GOUT WITHOUT TOPHUS, UNSPECIFIED CAUSE, UNSPECIFIED SITE: ICD-10-CM

## 2018-07-24 RX ORDER — ALLOPURINOL 300 MG/1
300 TABLET ORAL DAILY
Qty: 90 TABLET | Refills: 0 | Status: SHIPPED | OUTPATIENT
Start: 2018-07-24 | End: 2018-08-21 | Stop reason: SDUPTHER

## 2018-07-24 NOTE — TELEPHONE ENCOUNTER
Med verified in [facility]     Last OV in January    Patient cancelled last several appts

## 2018-08-15 RX ORDER — INSULIN GLARGINE 100 [IU]/ML
INJECTION, SOLUTION SUBCUTANEOUS
Qty: 120 ML | Refills: 0 | OUTPATIENT
Start: 2018-08-15

## 2018-08-21 ENCOUNTER — HOSPITAL ENCOUNTER (OUTPATIENT)
Dept: LAB | Age: 68
Setting detail: SPECIMEN
Discharge: HOME | End: 2018-08-21
Payer: MEDICARE

## 2018-08-21 ENCOUNTER — OFFICE VISIT (OUTPATIENT)
Dept: FAMILY MEDICINE CLINIC | Age: 68
End: 2018-08-21
Payer: MEDICARE

## 2018-08-21 VITALS
HEART RATE: 64 BPM | DIASTOLIC BLOOD PRESSURE: 72 MMHG | BODY MASS INDEX: 36.15 KG/M2 | SYSTOLIC BLOOD PRESSURE: 158 MMHG | OXYGEN SATURATION: 98 % | WEIGHT: 217 LBS | HEIGHT: 65 IN

## 2018-08-21 DIAGNOSIS — Z79.4 TYPE 2 DIABETES MELLITUS WITH HYPERGLYCEMIA, WITH LONG-TERM CURRENT USE OF INSULIN (HCC): ICD-10-CM

## 2018-08-21 DIAGNOSIS — E78.5 HYPERLIPIDEMIA, UNSPECIFIED HYPERLIPIDEMIA TYPE: ICD-10-CM

## 2018-08-21 DIAGNOSIS — E11.65 TYPE 2 DIABETES MELLITUS WITH HYPERGLYCEMIA, WITH LONG-TERM CURRENT USE OF INSULIN (HCC): ICD-10-CM

## 2018-08-21 DIAGNOSIS — M1A.9XX0 CHRONIC GOUT WITHOUT TOPHUS, UNSPECIFIED CAUSE, UNSPECIFIED SITE: ICD-10-CM

## 2018-08-21 DIAGNOSIS — Z00.00 ROUTINE GENERAL MEDICAL EXAMINATION AT A HEALTH CARE FACILITY: Primary | ICD-10-CM

## 2018-08-21 DIAGNOSIS — F41.9 ANXIETY AND DEPRESSION: ICD-10-CM

## 2018-08-21 DIAGNOSIS — Z91.199 NONCOMPLIANCE: ICD-10-CM

## 2018-08-21 DIAGNOSIS — Z12.11 ENCOUNTER FOR FIT (FECAL IMMUNOCHEMICAL TEST) SCREENING: ICD-10-CM

## 2018-08-21 DIAGNOSIS — K21.9 GASTROESOPHAGEAL REFLUX DISEASE, ESOPHAGITIS PRESENCE NOT SPECIFIED: ICD-10-CM

## 2018-08-21 DIAGNOSIS — I10 ESSENTIAL HYPERTENSION: ICD-10-CM

## 2018-08-21 DIAGNOSIS — Z23 NEED FOR 23-POLYVALENT PNEUMOCOCCAL POLYSACCHARIDE VACCINE: ICD-10-CM

## 2018-08-21 DIAGNOSIS — I25.10 CORONARY ARTERY DISEASE INVOLVING NATIVE HEART WITHOUT ANGINA PECTORIS, UNSPECIFIED VESSEL OR LESION TYPE: ICD-10-CM

## 2018-08-21 DIAGNOSIS — Z23 NEED FOR SHINGLES VACCINE: ICD-10-CM

## 2018-08-21 DIAGNOSIS — I63.9 CEREBROVASCULAR ACCIDENT (CVA), UNSPECIFIED MECHANISM (HCC): ICD-10-CM

## 2018-08-21 DIAGNOSIS — F32.A ANXIETY AND DEPRESSION: ICD-10-CM

## 2018-08-21 LAB
ALBUMIN SERPL-MCNC: 4.2 G/DL (ref 3.5–5.2)
ALBUMIN/GLOBULIN RATIO: 1.2 (ref 1–2.5)
ALP BLD-CCNC: 99 U/L (ref 35–104)
ALT SERPL-CCNC: 22 U/L (ref 5–33)
ANION GAP SERPL CALCULATED.3IONS-SCNC: 8 MMOL/L (ref 9–17)
AST SERPL-CCNC: 24 U/L
BILIRUB SERPL-MCNC: 0.31 MG/DL (ref 0.3–1.2)
BUN BLDV-MCNC: 27 MG/DL (ref 8–23)
BUN/CREAT BLD: 25 (ref 9–20)
CALCIUM SERPL-MCNC: 9.8 MG/DL (ref 8.6–10.4)
CHLORIDE BLD-SCNC: 100 MMOL/L (ref 98–107)
CHOLESTEROL/HDL RATIO: 2.5
CHOLESTEROL: 151 MG/DL
CO2: 32 MMOL/L (ref 20–31)
CREAT SERPL-MCNC: 1.09 MG/DL (ref 0.5–0.9)
ESTIMATED AVERAGE GLUCOSE: 189 MG/DL
GFR AFRICAN AMERICAN: >60 ML/MIN
GFR NON-AFRICAN AMERICAN: 50 ML/MIN
GFR SERPL CREATININE-BSD FRML MDRD: ABNORMAL ML/MIN/{1.73_M2}
GFR SERPL CREATININE-BSD FRML MDRD: ABNORMAL ML/MIN/{1.73_M2}
GLUCOSE BLD-MCNC: 72 MG/DL (ref 70–99)
HBA1C MFR BLD: 8.2 % (ref 4.8–5.9)
HDLC SERPL-MCNC: 61 MG/DL
LDL CHOLESTEROL: 71 MG/DL (ref 0–130)
POTASSIUM SERPL-SCNC: 4.6 MMOL/L (ref 3.7–5.3)
SODIUM BLD-SCNC: 140 MMOL/L (ref 135–144)
TOTAL PROTEIN: 7.6 G/DL (ref 6.4–8.3)
TRIGL SERPL-MCNC: 93 MG/DL
VLDLC SERPL CALC-MCNC: NORMAL MG/DL (ref 1–30)

## 2018-08-21 PROCEDURE — G8417 CALC BMI ABV UP PARAM F/U: HCPCS | Performed by: PHYSICIAN ASSISTANT

## 2018-08-21 PROCEDURE — 2022F DILAT RTA XM EVC RTNOPTHY: CPT | Performed by: PHYSICIAN ASSISTANT

## 2018-08-21 PROCEDURE — 83036 HEMOGLOBIN GLYCOSYLATED A1C: CPT

## 2018-08-21 PROCEDURE — G0438 PPPS, INITIAL VISIT: HCPCS | Performed by: PHYSICIAN ASSISTANT

## 2018-08-21 PROCEDURE — 80053 COMPREHEN METABOLIC PANEL: CPT

## 2018-08-21 PROCEDURE — G8427 DOCREV CUR MEDS BY ELIG CLIN: HCPCS | Performed by: PHYSICIAN ASSISTANT

## 2018-08-21 PROCEDURE — 3017F COLORECTAL CA SCREEN DOC REV: CPT | Performed by: PHYSICIAN ASSISTANT

## 2018-08-21 PROCEDURE — 1123F ACP DISCUSS/DSCN MKR DOCD: CPT | Performed by: PHYSICIAN ASSISTANT

## 2018-08-21 PROCEDURE — 36415 COLL VENOUS BLD VENIPUNCTURE: CPT

## 2018-08-21 PROCEDURE — 4040F PNEUMOC VAC/ADMIN/RCVD: CPT | Performed by: PHYSICIAN ASSISTANT

## 2018-08-21 PROCEDURE — G8400 PT W/DXA NO RESULTS DOC: HCPCS | Performed by: PHYSICIAN ASSISTANT

## 2018-08-21 PROCEDURE — 1036F TOBACCO NON-USER: CPT | Performed by: PHYSICIAN ASSISTANT

## 2018-08-21 PROCEDURE — 99214 OFFICE O/P EST MOD 30 MIN: CPT | Performed by: PHYSICIAN ASSISTANT

## 2018-08-21 PROCEDURE — 1090F PRES/ABSN URINE INCON ASSESS: CPT | Performed by: PHYSICIAN ASSISTANT

## 2018-08-21 PROCEDURE — 3045F PR MOST RECENT HEMOGLOBIN A1C LEVEL 7.0-9.0%: CPT | Performed by: PHYSICIAN ASSISTANT

## 2018-08-21 PROCEDURE — 80061 LIPID PANEL: CPT

## 2018-08-21 PROCEDURE — G8598 ASA/ANTIPLAT THER USED: HCPCS | Performed by: PHYSICIAN ASSISTANT

## 2018-08-21 PROCEDURE — 90732 PPSV23 VACC 2 YRS+ SUBQ/IM: CPT | Performed by: PHYSICIAN ASSISTANT

## 2018-08-21 PROCEDURE — G0009 ADMIN PNEUMOCOCCAL VACCINE: HCPCS | Performed by: PHYSICIAN ASSISTANT

## 2018-08-21 PROCEDURE — 1101F PT FALLS ASSESS-DOCD LE1/YR: CPT | Performed by: PHYSICIAN ASSISTANT

## 2018-08-21 RX ORDER — GLUCOSAMINE HCL/CHONDROITIN SU 500-400 MG
CAPSULE ORAL
Qty: 200 STRIP | Refills: 3 | Status: SHIPPED | OUTPATIENT
Start: 2018-08-21 | End: 2019-09-26 | Stop reason: SDUPTHER

## 2018-08-21 RX ORDER — ALLOPURINOL 300 MG/1
300 TABLET ORAL DAILY
Qty: 90 TABLET | Refills: 1 | Status: SHIPPED | OUTPATIENT
Start: 2018-08-21 | End: 2019-05-23 | Stop reason: SDUPTHER

## 2018-08-21 RX ORDER — LOSARTAN POTASSIUM 100 MG/1
100 TABLET ORAL DAILY
Qty: 90 TABLET | Refills: 1 | Status: SHIPPED | OUTPATIENT
Start: 2018-08-21 | End: 2019-08-05 | Stop reason: SDUPTHER

## 2018-08-21 RX ORDER — LANCETS 30 GAUGE
EACH MISCELLANEOUS
Qty: 200 EACH | Refills: 3 | Status: SHIPPED | OUTPATIENT
Start: 2018-08-21 | End: 2020-01-20 | Stop reason: SDUPTHER

## 2018-08-21 RX ORDER — ATORVASTATIN CALCIUM 40 MG/1
40 TABLET, FILM COATED ORAL DAILY
Qty: 90 TABLET | Refills: 1 | Status: SHIPPED | OUTPATIENT
Start: 2018-08-21 | End: 2019-08-29 | Stop reason: SDUPTHER

## 2018-08-21 RX ORDER — RANITIDINE 150 MG/1
150 TABLET ORAL 2 TIMES DAILY
Qty: 180 TABLET | Refills: 1 | Status: SHIPPED | OUTPATIENT
Start: 2018-08-21 | End: 2019-09-26 | Stop reason: SDUPTHER

## 2018-08-21 RX ORDER — CITALOPRAM 20 MG/1
20 TABLET ORAL DAILY
Qty: 90 TABLET | Refills: 1 | Status: SHIPPED | OUTPATIENT
Start: 2018-08-21 | End: 2019-08-05 | Stop reason: SDUPTHER

## 2018-08-21 RX ORDER — BLOOD-GLUCOSE METER
1 KIT MISCELLANEOUS 2 TIMES DAILY
Qty: 1 KIT | Refills: 0 | Status: SHIPPED | OUTPATIENT
Start: 2018-08-21 | End: 2019-11-22 | Stop reason: ALTCHOICE

## 2018-08-21 RX ORDER — CARVEDILOL 12.5 MG/1
12.5 TABLET ORAL 2 TIMES DAILY WITH MEALS
Qty: 180 TABLET | Refills: 1 | Status: SHIPPED | OUTPATIENT
Start: 2018-08-21 | End: 2019-08-05 | Stop reason: SDUPTHER

## 2018-08-21 RX ORDER — GABAPENTIN 300 MG/1
300 CAPSULE ORAL 3 TIMES DAILY
Status: ON HOLD | COMMUNITY
End: 2020-08-14 | Stop reason: HOSPADM

## 2018-08-21 RX ORDER — CLOPIDOGREL BISULFATE 75 MG/1
TABLET ORAL
Qty: 90 TABLET | Refills: 1 | Status: SHIPPED | OUTPATIENT
Start: 2018-08-21 | End: 2019-04-22 | Stop reason: SDUPTHER

## 2018-08-21 ASSESSMENT — PATIENT HEALTH QUESTIONNAIRE - PHQ9
SUM OF ALL RESPONSES TO PHQ QUESTIONS 1-9: 0
SUM OF ALL RESPONSES TO PHQ QUESTIONS 1-9: 0

## 2018-08-21 ASSESSMENT — LIFESTYLE VARIABLES: HOW OFTEN DO YOU HAVE A DRINK CONTAINING ALCOHOL: 0

## 2018-08-21 ASSESSMENT — ANXIETY QUESTIONNAIRES: GAD7 TOTAL SCORE: 0

## 2018-08-21 NOTE — PATIENT INSTRUCTIONS
Personalized Preventive Plan for Chan Husain - 8/21/2018  Medicare offers a range of preventive health benefits. Some of the tests and screenings are paid in full while other may be subject to a deductible, co-insurance, and/or copay. Some of these benefits include a comprehensive review of your medical history including lifestyle, illnesses that may run in your family, and various assessments and screenings as appropriate. After reviewing your medical record and screening and assessments performed today your provider may have ordered immunizations, labs, imaging, and/or referrals for you. A list of these orders (if applicable) as well as your Preventive Care list are included within your After Visit Summary for your review. Other Preventive Recommendations:    · A preventive eye exam performed by an eye specialist is recommended every 1-2 years to screen for glaucoma; cataracts, macular degeneration, and other eye disorders. · A preventive dental visit is recommended every 6 months. · Try to get at least 150 minutes of exercise per week or 10,000 steps per day on a pedometer . · Order or download the FREE \"Exercise & Physical Activity: Your Everyday Guide\" from [employer]. Call 2-670.697.6381 or search [employer] online. · You need 6868-5033 mg of calcium and 4869-7478 IU of vitamin D per day. It is possible to meet your calcium requirement with diet alone, but a vitamin D supplement is usually necessary to meet this goal.  · When exposed to the sun, use a sunscreen that protects against both UVA and UVB radiation with an SPF of 30 or greater. Reapply every 2 to 3 hours or after sweating, drying off with a towel, or swimming. · Always wear a seat belt when traveling in a car. Always wear a helmet when riding a bicycle or motorcycle. Heart-Healthy Diet   Sodium, Fat, and Cholesterol Controlled Diet       What Is a Heart Healthy Diet?    A heart-healthy example, this would mean 60 grams of fat or less per day. Saturated fat and trans fat in your diet raises your blood cholesterol the most, much more than dietary cholesterol does. For this reason, on a heart-healthy diet, less than 7% of your calories should come from saturated fat and ideally 0% from trans fat. On an 1800-calorie diet, this translates into less than 14 grams of saturated fat per day, leaving 46 grams of fat to come from mono- and polyunsaturated fats.    Food Choices on a Heart Healthy Diet   Food Category   Foods Recommended   Foods to Avoid   Grains   Breads and rolls without salted tops Most dry and cooked cereals Unsalted crackers and breadsticks Low-sodium or homemade breadcrumbs or stuffing All rice and pastas   Breads, rolls, and crackers with salted tops High-fat baked goods (eg, muffins, donuts, pastries) Quick breads, self-rising flour, and biscuit mixes Regular bread crumbs Instant hot cereals Commercially prepared rice, pasta, or stuffing mixes   Vegetables   Most fresh, frozen, and low-sodium canned vegetables Low-sodium and salt-free vegetable juices Canned vegetables if unsalted or rinsed   Regular canned vegetables and juices, including sauerkraut and pickled vegetables Frozen vegetables with sauces Commercially prepared potato and vegetable mixes   Fruits   Most fresh, frozen, and canned fruits All fruit juices   Fruits processed with salt or sodium   Milk   Nonfat or low-fat (1%) milk Nonfat or low-fat yogurt Cottage cheese, low-fat ricotta, cheeses labeled as low-fat and low-sodium   Whole milk Reduced-fat (2%) milk Malted and chocolate milk Full fat yogurt Most cheeses (unless low-fat and low salt) Buttermilk (no more than 1 cup per week)   Meats and Beans   Lean cuts of fresh or frozen beef, veal, lamb, or pork (look for the word loin) Fresh or frozen poultry without the skin Fresh or frozen fish and some shellfish Egg whites and egg substitutes (Limit whole eggs to three per week) Tofu Nuts or seeds (unsalted, dry-roasted), low-sodium peanut butter Dried peas, beans, and lentils   Any smoked, cured, salted, or canned meat, fish, or poultry (including chris, chipped beef, cold cuts, hot dogs, sausages, sardines, and anchovies) Poultry skins Breaded and/or fried fish or meats Canned peas, beans, and lentils Salted nuts   Fats and Oils   Olive oil and canola oil Low-sodium, low-fat salad dressings and mayonnaise   Butter, margarine, coconut and palm oils, chris fat   Snacks, Sweets, and Condiments   Low-sodium or unsalted versions of broths, soups, soy sauce, and condiments Pepper, herbs, and spices; vinegar, lemon, or lime juice Low-fat frozen desserts (yogurt, sherbet, fruit bars) Sugar, cocoa powder, honey, syrup, jam, and preserves Low-fat, trans-fat free cookies, cakes, and pies Nicko and animal crackers, fig bars, porter snaps   High-fat desserts Broth, soups, gravies, and sauces, made from instant mixes or other high-sodium ingredients Salted snack foods Canned olives Meat tenderizers, seasoning salt, and most flavored vinegars   Beverages   Low-sodium carbonated beverages Tea and coffee in moderation Soy milk   Commercially softened water   Suggestions   Make whole grains, fruits, and vegetables the base of your diet. Choose heart-healthy fats such as canola, olive, and flaxseed oil, and foods high in heart-healthy fats, such as nuts, seeds, soybeans, tofu, and fish. Eat fish at least twice per week; the fish highest in omega-3 fatty acids and lowest in mercury include salmon, herring, mackerel, sardines, and canned chunk light tuna. If you eat fish less than twice per week or have high triglycerides, talk to your doctor about taking fish oil supplements. Read food labels.    For products low in fat and cholesterol, look for fat free, low-fat, cholesterol free, saturated fat free, and trans fat freeAlso scan the Nutrition Facts Label, which lists saturated fat, trans fat, and cholesterol amounts. For products low in sodium, look for sodium free, very low sodium, low sodium, no added salt, and unsalted   Skip the salt when cooking or at the table; if food needs more flavor, get creative and try out different herbs and spices. Garlic and onion also add substantial flavor to foods. Trim any visible fat off meat and poultry before cooking, and drain the fat off after wright. Use cooking methods that require little or no added fat, such as grilling, boiling, baking, poaching, broiling, roasting, steaming, stir-frying, and sauting. Avoid fast food and convenience food. They tend to be high in saturated and trans fat and have a lot of added salt. Talk to a registered dietitian for individualized diet advice. Last Reviewed: March 2011 Mati Osorio MS, MPH, RD   Updated: 3/29/2011   ·        Advance Care Planning: Care Instructions  Your Care Instructions    It can be hard to live with an illness that cannot be cured. But if your health is getting worse, you may want to make decisions about end-of-life care. Planning for the end of your life does not mean that you are giving up. It is a way to make sure that your wishes are met. Clearly stating your wishes can make it easier for your loved ones. Making plans while you are still able may also ease your mind and make your final days less stressful and more meaningful. Follow-up care is a key part of your treatment and safety. Be sure to make and go to all appointments, and call your doctor if you are having problems. It's also a good idea to know your test results and keep a list of the medicines you take. What can you do to plan for the end of life? You can bring these issues up with your doctor. You do not need to wait until your doctor starts the conversation. You might start with \"I would not be willing to live with . Jhon Wolfe [place] Wolfe Jhon Wolfe \" When you complete this sentence it helps your doctor understand your wishes.   Talk openly and honestly with your doctor. This is the best way to understand the decisions you will need to make as your health changes. Know that you can always change your mind. Ask your doctor about commonly used life-support measures. These include tube feedings, breathing machines, and fluids given through a vein (IV). Understanding these treatments will help you decide whether you want them. You may choose to have these life-supporting treatments for a limited time. This allows a trial period to see whether they will help you. You may also decide that you want your doctor to take only certain measures to keep you alive. It is important to spell out these conditions so that your doctor and family understand them. Talk to your doctor about how long you are likely to live. He or she may be able to give you an idea of what usually happens with your specific illness. Think about preparing papers that state your wishes. This way there will not be any confusion about what you want. You can change your instructions at any time. Which papers should you prepare? Advance directives are legal papers that tell doctors how you want to be cared for at the end of your life. You do not need a  to write these papers. Ask your doctor or your state [facility] department for information on how to write your advance directives. They may have the forms for each of these types of papers. Make sure your doctor has a copy of these on file, and give a copy to a family member or close friend. Consider a do-not-resuscitate order (DNR). This order asks that no extra treatments be done if your heart stops or you stop breathing. Extra treatments may include cardiopulmonary resuscitation (CPR), electrical shock to restart your heart, or a machine to breathe for you. If you decide to have a DNR order, ask your doctor to explain and write it. Place the order in your home where everyone can easily see it. Consider a living will.  A living will explains your wishes about life support and other treatments at the end of your life if you become unable to speak for yourself. Living lawrence tell doctors to use or not use treatments that would keep you alive. You must have one or two witnesses or a notary present when you sign this form. Consider a durable power of  for health care. This allows you to name a person to make decisions about your care if you are not able to. Most people ask a close friend or family member. Talk to this person about the kinds of treatments you want and those that you do not want. Make sure this person understands your wishes. These legal papers are simple to change. Tell your doctor what you want to change, and ask him or her to make a note in your medical file. Give your family updated copies of the papers. Where can you learn more? Go to [email or web address] and sign in to your [employer] account. Enter P184 in the Reve[employer] box to learn more about \"Advance Care Planning: Care Instructions. \"     If you do not have an account, please click on the \"Sign Up Now\" link. Current as of: October 6, 2017  Content Version: 11.7  © 7160-0290 [employer], [employer]. Care instructions adapted under license by [facility] ([facility]). If you have questions about a medical condition or this instruction, always ask your healthcare professional. [place] [address] any warranty or liability for your use of this information. ·        Learning About Living Celeste Rosario  What is a living will? A living will is a legal form you use to write down the kind of care you want at the end of your life. It is used by the health professionals who will treat you if you aren't able to decide for yourself. If you put your wishes in writing, your loved ones and others will know what kind of care you want. They won't need to guess. This can ease your mind and be helpful to others.   A living will is not the same as an information. ·        Learning About Durable Power of  for Health Care  What is a durable power of  for health care? A durable power of  for health care is one type of the legal forms called advance directives. It lets you decide who you want to make treatment decisions for you if you cannot speak or decide for yourself. The person you choose is called your health care agent. Another type of advance directive is a living will. It lets you write down what kinds of treatment or life support you want or do not want. What should you think about when choosing a health care agent? Choose your health care agent carefully. This person may or may not be a family member. Talk to the person before you make your final decision. Make sure he or she is comfortable with this responsibility. It's a good idea to choose someone who:  Is at least 25years old. Knows you well and understands what makes life meaningful for you. Understands your Adventist and moral values. Will do what you want, not what he or she wants. Will be able to make difficult choices at a stressful time. Will be able to refuse or stop treatment, if that is what you would want, even if you could die. Will be firm and confident with health professionals if needed. Will ask questions to get necessary information. Lives near you or agrees to travel to you if needed. Your family may help you make medical decisions while you can still be part of that process. But it is important to choose one person to be your health care agent in case you are not able to make decisions for yourself. If you don't fill out the legal form and name a health care agent, the decisions your family can make may be limited. Who will make decisions for you if you do not have a health care agent? If you don't have a health care agent or a living will, your family members may disagree about your medical care.  And then some medical professionals who

## 2018-08-21 NOTE — PROGRESS NOTES
atorvastatin (LIPITOR) 40 MG tablet Take 1 tablet by mouth daily Yes ERINN Elizabeth   ranitidine (ZANTAC) 150 MG tablet Take 1 tablet by mouth 2 times daily Yes ERINN Elizabeth   aspirin 81 MG tablet Take 1 tablet by mouth daily Yes ERINN Elizabeth   oxybutynin (DITROPAN) 5 MG tablet Take 1 tablet by mouth 2 times daily Yes ERINN Elizabeth   Glucose Blood (BLOOD GLUCOSE TEST STRIPS) STRP truemetrix test strips check ac and HS Yes ERINN Elizabeth   insulin glargine (LANTUS) 100 UNIT/ML injection vial Inject 70 Units into the skin nightly  Patient taking differently: Inject 50 Units into the skin nightly  Yes Lurdes Sandifer   insulin lispro (HUMALOG) 100 UNIT/ML injection vial Inject 0-6 Units into the skin 3 times daily (with meals) Yes Lurdes Sandifer   insulin lispro (HUMALOG) 100 UNIT/ML injection vial Inject 0-3 Units into the skin nightly Yes [place] Sandifer   docusate sodium (COLACE) 100 MG capsule Take 100 mg by mouth 2 times daily. Yes Historical Provider, MD   oxybutynin (DITROPAN-XL) 10 MG CR tablet Take 10 mg by mouth daily. Yes Historical Provider, MD   memantine (NAMENDA) 10 MG tablet TAKE 1 TABLET BY MOUTH TWICE DAILY  ERINN Elizabeth   carvedilol (COREG) 12.5 MG tablet Take 1 tablet by mouth 2 times daily (with meals)  ERINN Elizabeth   furosemide (LASIX) 20 MG tablet Take 1 tablet by mouth 2 times daily  ERINN Elziabeth   gabapentin (NEURONTIN) 300 MG capsule Take 1 capsule by mouth 3 times daily for 90 days. ERINN Elizabeth   rivastigmine (EXELON) 9.5 MG/24HR APPLY 1 NEW PATCH EVERY 25 HOURS  Fabián Elizabeth   nystatin (MYCOSTATIN) 145903 UNIT/GM cream Apply topically 2 times daily.   ERINN Elizabeth   Lancets MISC Checks blood sugar ac and HS  Fabián Elizabeth       Past Medical History:   Diagnosis Date    Anxiety and depression     Background diabetic retinopathy(362.01) 2008    (Mild)    Balance problem     CAD (coronary artery disease)     (with coronary artery bypass graft x4).  Cancer of uterus [facility])     history of, (probable cure)    Chronic kidney disease     Chronic low back pain     CVA (cerebral vascular accident) ([address]     Dementia     (moderate)    Dry eye syndrome     GERD (gastroesophageal reflux disease)     Glaucoma suspect     Gout     Homonymous hemianopsia     (Right)    Hyperlipidemia     Hypertension     Keratitis     (secondary to dry eye syndrome).  Obesity     Peripheral polyneuropathy     (diabetic)    Pseudophakos     (Right Eye: 2012; Left Eye: 2012) - Dr. Luis Daniel Santos.  Stroke [facility])     (Multiple) MRI of brain 10/2008 shows multiple infarcts involving the temporal and parietal areas.  Trichiasis     (left eye)    Type 2 diabetes mellitus ([address]      Past Surgical History:   Procedure Laterality Date    CATARACT REMOVAL WITH IMPLANT  2012    (Right eye) - Dr. Luis Daniel Santos.  CATARACT REMOVAL WITH IMPLANT  2012    (Left eye) - Dr. Luis Daniel Santos.   SECTION      CHOLECYSTECTOMY      CORONARY ARTERY BYPASS GRAFT  12-    (x4) - LIMA-LAD, SVG-diagnoal 1, SVG-OM1, and SVG-PDA. Exploration of left radial. (Dr. Celia Khoury).  EYE SURGERY Left     as a child     GASTRIC BYPASS SURGERY      HYSTERECTOMY      (abdominal)  with left oophorectomy. Right ovary retained.  TONSILLECTOMY AND ADENOIDECTOMY         Family History   Problem Relation Age of Onset    Diabetes Mother     Cancer Mother     High Blood Pressure Mother    [facility] Stroke Mother     Kidney Disease Mother     Uterine Cancer Mother     Diabetes Father     Heart Disease Father     Glaucoma Father     Emphysema Father     Coronary Art Dis Other         All 4 siblings.  Stroke Other         1 sibling.  Lung Cancer Other         1 sibling - cause of death lung cancer.     Mental Retardation Other        CareTeam (Including outside providers/suppliers regularly involved in providing care):   Patient Care Team:  ERINN Tilley as PCP - General (Physician Assistant)    Wt Readings from Last 3 Encounters:   08/21/18 217 lb (98.4 kg)   01/31/18 212 lb (96.2 kg)   07/20/17 222 lb 3.6 oz (100.8 kg)     Vitals:    08/21/18 0822 08/21/18 0828   BP: (!) 180/80 (!) 184/80   Site: Right Arm Right Arm   Position: Sitting Sitting   Cuff Size: Large Adult Large Adult   Pulse: 64    SpO2: 98%    Weight: 217 lb (98.4 kg)    Height: 5' 5\" (1.651 m)      Body mass index is 36.11 kg/m². General Appearance: alert and oriented to person, place and time, well-developed and well-nourished, in no acute distress  Skin: warm and dry, no rash or erythema  Head: normocephalic and atraumatic  Eyes: pupils equal, round, and reactive to light  ENT: tympanic membrane, external ear and ear canal normal bilaterally, oropharynx clear and moist with normal mucous membranes  Neck: neck supple and non tender without mass   Pulmonary/Chest: clear to auscultation bilaterally- no wheezes, rales or rhonchi, normal air movement, no respiratory distress  Cardiovascular: normal rate and normal S1 and S2  Abdomen: soft, non-tender, non-distended, normal bowel sounds, no masses or organomegaly  Extremities: no cyanosis, no clubbing and monofilament sensory exam normal bilaterally with callous formation noted. Neurologic: gait and coordination normal and speech normal    Patient's complete Health Risk Assessment and screening values have been reviewed and are found in Flowsheets. The following problems were reviewed today and where indicated follow up appointments were made and/or referrals ordered.     Positive Risk Factor Screenings with Interventions:     General Health:  General  In the past 7 days, have you experienced any of the following?: None of These  Do you get the social and emotional support that you need?: (!) No  Do you have a Living Will?: (!) No  General Health Risk Interventions:  · No Living Will: additional information microalbuminuria test  01/31/2019    Lipid screen  01/31/2019    A1C test (Diabetic or Prediabetic)  03/30/2019    Potassium monitoring  03/30/2019    Creatinine monitoring  03/30/2019    DTaP/Tdap/Td vaccine (2 - Td) 09/04/2026    Hepatitis C screen  Completed     1. Routine general medical examination at a health care facility    2. Essential hypertension    3. Hyperlipidemia, unspecified hyperlipidemia type    4. Type 2 diabetes mellitus with hyperglycemia, with long-term current use of insulin (HCC)    5. Need for 23-polyvalent pneumococcal polysaccharide vaccine    6. Need for shingles vaccine    7. Chronic gout without tophus, unspecified cause, unspecified site    8. Cerebrovascular accident (CVA), unspecified mechanism ([facility] [address]    9. Coronary artery disease involving native heart without angina pectoris, unspecified vessel or lesion type    10. Anxiety and depression    11. Gastroesophageal reflux disease, esophagitis presence not specified    12. Encounter for FIT (fecal immunochemical test) screening    13. Noncompliance      Recommendations for Preventive Services Due: see orders and patient instructions/AVS.  . Recommended screening schedule for the next 5-10 years is provided to the patient in written form: see Patient Instructions/AVS.  Update laboratory studies today. Healthy diet and routine exercise. Medication compliance discussed in detail. Schedule appointments with cardiology and neurology. Pneumovax today. Chronic medications refilled. Follow-up in three months/sooner PRN.

## 2018-08-24 DIAGNOSIS — J30.9 ALLERGIC RHINITIS, UNSPECIFIED SEASONALITY, UNSPECIFIED TRIGGER: Primary | ICD-10-CM

## 2018-08-24 NOTE — TELEPHONE ENCOUNTER
Previous script was 07/10/2017.   Last Appt:  8/21/2018  Next Appt:   11/27/2018  Med verified in Epic

## 2018-08-26 RX ORDER — LORATADINE 10 MG/1
TABLET ORAL
Qty: 90 TABLET | Refills: 1 | Status: SHIPPED | OUTPATIENT
Start: 2018-08-26 | End: 2020-07-16 | Stop reason: SDUPTHER

## 2018-08-27 ENCOUNTER — TELEPHONE (OUTPATIENT)
Dept: FAMILY MEDICINE CLINIC | Age: 68
End: 2018-08-27

## 2018-08-28 ENCOUNTER — TELEPHONE (OUTPATIENT)
Dept: FAMILY MEDICINE CLINIC | Age: 68
End: 2018-08-28

## 2018-08-31 ENCOUNTER — TELEPHONE (OUTPATIENT)
Dept: PRIMARY CARE CLINIC | Age: 68
End: 2018-08-31

## 2018-09-19 ENCOUNTER — TELEPHONE (OUTPATIENT)
Dept: FAMILY MEDICINE CLINIC | Age: 68
End: 2018-09-19

## 2018-09-21 ENCOUNTER — TELEPHONE (OUTPATIENT)
Dept: PRIMARY CARE CLINIC | Age: 68
End: 2018-09-21

## 2018-09-21 NOTE — TELEPHONE ENCOUNTER
Spoke with patient and reminded of overdue mammogram, dexascan, and stool sample reminder, order extended.

## 2018-10-30 ENCOUNTER — TELEPHONE (OUTPATIENT)
Dept: FAMILY MEDICINE CLINIC | Age: 68
End: 2018-10-30

## 2018-11-01 DIAGNOSIS — F03.90 DEMENTIA WITHOUT BEHAVIORAL DISTURBANCE, UNSPECIFIED DEMENTIA TYPE: ICD-10-CM

## 2018-11-01 RX ORDER — MEMANTINE HYDROCHLORIDE 10 MG/1
TABLET ORAL
Qty: 180 TABLET | Refills: 0 | Status: SHIPPED | OUTPATIENT
Start: 2018-11-01 | End: 2019-09-03 | Stop reason: SDUPTHER

## 2018-11-27 ENCOUNTER — TELEPHONE (OUTPATIENT)
Dept: OCCUPATIONAL MEDICINE | Age: 68
End: 2018-11-27

## 2018-11-27 DIAGNOSIS — I25.10 CORONARY ARTERY DISEASE INVOLVING NATIVE HEART WITHOUT ANGINA PECTORIS, UNSPECIFIED VESSEL OR LESION TYPE: ICD-10-CM

## 2018-11-27 RX ORDER — FUROSEMIDE 20 MG/1
20 TABLET ORAL 2 TIMES DAILY
Qty: 180 TABLET | Refills: 0 | Status: SHIPPED | OUTPATIENT
Start: 2018-11-27 | End: 2019-08-05 | Stop reason: SDUPTHER

## 2018-11-27 RX ORDER — OXYBUTYNIN CHLORIDE 5 MG/1
5 TABLET ORAL 2 TIMES DAILY
Qty: 180 TABLET | Refills: 0 | Status: SHIPPED | OUTPATIENT
Start: 2018-11-27 | End: 2019-09-03 | Stop reason: SDUPTHER

## 2019-01-18 ENCOUNTER — TELEPHONE (OUTPATIENT)
Dept: PRIMARY CARE CLINIC | Age: 69
End: 2019-01-18

## 2019-02-28 ENCOUNTER — TELEPHONE (OUTPATIENT)
Dept: PRIMARY CARE CLINIC | Age: 69
End: 2019-02-28

## 2019-03-05 ENCOUNTER — TELEPHONE (OUTPATIENT)
Dept: PRIMARY CARE CLINIC | Age: 69
End: 2019-03-05

## 2019-04-05 ENCOUNTER — TELEPHONE (OUTPATIENT)
Dept: PRIMARY CARE CLINIC | Age: 69
End: 2019-04-05

## 2019-04-05 NOTE — TELEPHONE ENCOUNTER
Patient has not responded to phone calls or letter sent regarding overdue stool sample. Verbal ok to cancel orders per Beaulah Sheets.

## 2019-04-22 DIAGNOSIS — I63.9 CEREBROVASCULAR ACCIDENT (CVA), UNSPECIFIED MECHANISM (HCC): ICD-10-CM

## 2019-04-22 DIAGNOSIS — I25.10 CORONARY ARTERY DISEASE INVOLVING NATIVE HEART WITHOUT ANGINA PECTORIS, UNSPECIFIED VESSEL OR LESION TYPE: ICD-10-CM

## 2019-04-22 RX ORDER — CLOPIDOGREL BISULFATE 75 MG/1
TABLET ORAL
Qty: 90 TABLET | Refills: 0 | Status: SHIPPED | OUTPATIENT
Start: 2019-04-22 | End: 2019-08-29 | Stop reason: SDUPTHER

## 2019-05-23 DIAGNOSIS — M1A.9XX0 CHRONIC GOUT WITHOUT TOPHUS, UNSPECIFIED CAUSE, UNSPECIFIED SITE: ICD-10-CM

## 2019-05-23 DIAGNOSIS — G62.9 PERIPHERAL POLYNEUROPATHY: ICD-10-CM

## 2019-05-23 NOTE — TELEPHONE ENCOUNTER
Last Appt:  8/21/2018  Next Appt:   6/6/2019  Med verified in [address] 5/23/19      [place] verified 5/23/19  Gabapentin 300 Mg Capsule    Written: 01/31/2018  Filled: 09/27/2018  270/90

## 2019-05-24 RX ORDER — ALLOPURINOL 300 MG/1
TABLET ORAL
Qty: 90 TABLET | Refills: 0 | Status: SHIPPED | OUTPATIENT
Start: 2019-05-24 | End: 2019-08-29 | Stop reason: SDUPTHER

## 2019-05-24 RX ORDER — GABAPENTIN 300 MG/1
CAPSULE ORAL
Qty: 270 CAPSULE | Refills: 0 | Status: SHIPPED | OUTPATIENT
Start: 2019-05-24 | End: 2020-01-03 | Stop reason: SDUPTHER

## 2019-06-07 DIAGNOSIS — F32.A ANXIETY AND DEPRESSION: ICD-10-CM

## 2019-06-07 DIAGNOSIS — I63.9 CEREBROVASCULAR ACCIDENT (CVA), UNSPECIFIED MECHANISM (HCC): ICD-10-CM

## 2019-06-07 DIAGNOSIS — F03.90 DEMENTIA WITHOUT BEHAVIORAL DISTURBANCE, UNSPECIFIED DEMENTIA TYPE: ICD-10-CM

## 2019-06-07 DIAGNOSIS — K21.9 GASTROESOPHAGEAL REFLUX DISEASE, ESOPHAGITIS PRESENCE NOT SPECIFIED: ICD-10-CM

## 2019-06-07 DIAGNOSIS — F41.9 ANXIETY AND DEPRESSION: ICD-10-CM

## 2019-06-07 DIAGNOSIS — I25.10 CORONARY ARTERY DISEASE INVOLVING NATIVE HEART WITHOUT ANGINA PECTORIS, UNSPECIFIED VESSEL OR LESION TYPE: ICD-10-CM

## 2019-06-07 DIAGNOSIS — I10 ESSENTIAL HYPERTENSION: ICD-10-CM

## 2019-06-10 RX ORDER — OXYBUTYNIN CHLORIDE 5 MG/1
TABLET ORAL
Qty: 180 TABLET | Refills: 0 | OUTPATIENT
Start: 2019-06-10

## 2019-06-10 RX ORDER — RANITIDINE 150 MG/1
TABLET ORAL
Qty: 180 TABLET | Refills: 0 | OUTPATIENT
Start: 2019-06-10

## 2019-06-10 RX ORDER — FUROSEMIDE 20 MG/1
TABLET ORAL
Qty: 180 TABLET | Refills: 0 | OUTPATIENT
Start: 2019-06-10

## 2019-06-10 RX ORDER — MEMANTINE HYDROCHLORIDE 10 MG/1
TABLET ORAL
Qty: 180 TABLET | Refills: 0 | OUTPATIENT
Start: 2019-06-10

## 2019-06-10 RX ORDER — ASPIRIN 81 MG
TABLET, DELAYED RELEASE (ENTERIC COATED) ORAL
Qty: 90 TABLET | Refills: 0 | OUTPATIENT
Start: 2019-06-10

## 2019-06-10 RX ORDER — LOSARTAN POTASSIUM 100 MG/1
TABLET ORAL
Qty: 90 TABLET | Refills: 0 | OUTPATIENT
Start: 2019-06-10

## 2019-06-10 RX ORDER — MEMANTINE HYDROCHLORIDE 10 MG/1
TABLET ORAL
Qty: 60 TABLET | Refills: 0 | OUTPATIENT
Start: 2019-06-10

## 2019-06-10 RX ORDER — CITALOPRAM 20 MG/1
20 TABLET ORAL DAILY
Qty: 90 TABLET | Refills: 0 | OUTPATIENT
Start: 2019-06-10

## 2019-06-10 RX ORDER — ATORVASTATIN CALCIUM 40 MG/1
40 TABLET, FILM COATED ORAL DAILY
Qty: 90 TABLET | Refills: 0 | OUTPATIENT
Start: 2019-06-10

## 2019-08-05 DIAGNOSIS — F41.9 ANXIETY AND DEPRESSION: ICD-10-CM

## 2019-08-05 DIAGNOSIS — I10 ESSENTIAL HYPERTENSION: ICD-10-CM

## 2019-08-05 DIAGNOSIS — F32.A ANXIETY AND DEPRESSION: ICD-10-CM

## 2019-08-05 DIAGNOSIS — I25.10 CORONARY ARTERY DISEASE INVOLVING NATIVE HEART WITHOUT ANGINA PECTORIS, UNSPECIFIED VESSEL OR LESION TYPE: ICD-10-CM

## 2019-08-05 RX ORDER — LOSARTAN POTASSIUM 100 MG/1
100 TABLET ORAL DAILY
Qty: 30 TABLET | Refills: 0 | Status: SHIPPED | OUTPATIENT
Start: 2019-08-05 | End: 2019-08-29 | Stop reason: SDUPTHER

## 2019-08-05 RX ORDER — LOSARTAN POTASSIUM 100 MG/1
100 TABLET ORAL DAILY
Qty: 90 TABLET | Refills: 0 | OUTPATIENT
Start: 2019-08-05

## 2019-08-05 RX ORDER — FUROSEMIDE 20 MG/1
20 TABLET ORAL 2 TIMES DAILY
Qty: 30 TABLET | Refills: 0 | Status: SHIPPED | OUTPATIENT
Start: 2019-08-05 | End: 2019-08-31 | Stop reason: SDUPTHER

## 2019-08-05 RX ORDER — FUROSEMIDE 20 MG/1
TABLET ORAL
Qty: 180 TABLET | Refills: 0 | OUTPATIENT
Start: 2019-08-05

## 2019-08-05 RX ORDER — CITALOPRAM 20 MG/1
20 TABLET ORAL DAILY
Qty: 90 TABLET | Refills: 0 | OUTPATIENT
Start: 2019-08-05

## 2019-08-05 RX ORDER — CARVEDILOL 12.5 MG/1
12.5 TABLET ORAL 2 TIMES DAILY WITH MEALS
Qty: 60 TABLET | Refills: 0 | Status: SHIPPED | OUTPATIENT
Start: 2019-08-05 | End: 2019-08-29 | Stop reason: SDUPTHER

## 2019-08-05 RX ORDER — CITALOPRAM 20 MG/1
20 TABLET ORAL DAILY
Qty: 30 TABLET | Refills: 0 | Status: SHIPPED | OUTPATIENT
Start: 2019-08-05 | End: 2019-08-29 | Stop reason: SDUPTHER

## 2019-08-05 RX ORDER — CARVEDILOL 12.5 MG/1
TABLET ORAL
Qty: 180 TABLET | Refills: 0 | OUTPATIENT
Start: 2019-08-05

## 2019-08-13 DIAGNOSIS — Z79.4 TYPE 2 DIABETES MELLITUS WITH HYPERGLYCEMIA, WITH LONG-TERM CURRENT USE OF INSULIN (HCC): Primary | ICD-10-CM

## 2019-08-13 DIAGNOSIS — E11.65 TYPE 2 DIABETES MELLITUS WITH HYPERGLYCEMIA, WITH LONG-TERM CURRENT USE OF INSULIN (HCC): Primary | ICD-10-CM

## 2019-08-13 RX ORDER — IBUPROFEN 200 MG
1 TABLET ORAL 2 TIMES DAILY
Qty: 100 EACH | Refills: 0 | Status: SHIPPED | OUTPATIENT
Start: 2019-08-13 | End: 2019-09-26 | Stop reason: SDUPTHER

## 2019-08-13 RX ORDER — INSULIN GLARGINE 100 [IU]/ML
50 INJECTION, SOLUTION SUBCUTANEOUS NIGHTLY
Qty: 3 ML | Refills: 0 | Status: SHIPPED | OUTPATIENT
Start: 2019-08-13 | End: 2019-09-26 | Stop reason: SDUPTHER

## 2019-08-29 DIAGNOSIS — F41.9 ANXIETY AND DEPRESSION: ICD-10-CM

## 2019-08-29 DIAGNOSIS — M1A.9XX0 CHRONIC GOUT WITHOUT TOPHUS, UNSPECIFIED CAUSE, UNSPECIFIED SITE: ICD-10-CM

## 2019-08-29 DIAGNOSIS — F32.A ANXIETY AND DEPRESSION: ICD-10-CM

## 2019-08-29 DIAGNOSIS — I63.9 CEREBROVASCULAR ACCIDENT (CVA), UNSPECIFIED MECHANISM (HCC): ICD-10-CM

## 2019-08-29 DIAGNOSIS — I10 ESSENTIAL HYPERTENSION: ICD-10-CM

## 2019-08-29 DIAGNOSIS — I25.10 CORONARY ARTERY DISEASE INVOLVING NATIVE HEART WITHOUT ANGINA PECTORIS, UNSPECIFIED VESSEL OR LESION TYPE: ICD-10-CM

## 2019-08-29 RX ORDER — CARVEDILOL 12.5 MG/1
12.5 TABLET ORAL 2 TIMES DAILY WITH MEALS
Qty: 60 TABLET | Refills: 0 | Status: SHIPPED | OUTPATIENT
Start: 2019-08-29 | End: 2019-09-26 | Stop reason: SDUPTHER

## 2019-08-29 RX ORDER — ATORVASTATIN CALCIUM 40 MG/1
40 TABLET, FILM COATED ORAL DAILY
Qty: 90 TABLET | Refills: 0 | OUTPATIENT
Start: 2019-08-29

## 2019-08-29 RX ORDER — CITALOPRAM 20 MG/1
20 TABLET ORAL DAILY
Qty: 30 TABLET | Refills: 0 | Status: SHIPPED | OUTPATIENT
Start: 2019-08-29 | End: 2019-10-28 | Stop reason: SDUPTHER

## 2019-08-29 RX ORDER — CITALOPRAM 20 MG/1
20 TABLET ORAL DAILY
Qty: 90 TABLET | Refills: 0 | OUTPATIENT
Start: 2019-08-29

## 2019-08-29 RX ORDER — ALLOPURINOL 300 MG/1
TABLET ORAL
Qty: 30 TABLET | Refills: 0 | Status: SHIPPED | OUTPATIENT
Start: 2019-08-29 | End: 2020-01-03 | Stop reason: SDUPTHER

## 2019-08-29 RX ORDER — ATORVASTATIN CALCIUM 40 MG/1
40 TABLET, FILM COATED ORAL DAILY
Qty: 30 TABLET | Refills: 0 | Status: SHIPPED | OUTPATIENT
Start: 2019-08-29 | End: 2019-09-26 | Stop reason: SDUPTHER

## 2019-08-29 RX ORDER — LOSARTAN POTASSIUM 100 MG/1
100 TABLET ORAL DAILY
Qty: 30 TABLET | Refills: 0 | Status: SHIPPED | OUTPATIENT
Start: 2019-08-29 | End: 2019-09-26 | Stop reason: SDUPTHER

## 2019-08-29 RX ORDER — CLOPIDOGREL BISULFATE 75 MG/1
75 TABLET ORAL DAILY
Qty: 30 TABLET | Refills: 0 | Status: SHIPPED | OUTPATIENT
Start: 2019-08-29 | End: 2019-09-26 | Stop reason: SDUPTHER

## 2019-08-29 RX ORDER — CARVEDILOL 12.5 MG/1
TABLET ORAL
Qty: 180 TABLET | Refills: 0 | OUTPATIENT
Start: 2019-08-29

## 2019-08-29 RX ORDER — LOSARTAN POTASSIUM 100 MG/1
100 TABLET ORAL DAILY
Qty: 90 TABLET | Refills: 0 | OUTPATIENT
Start: 2019-08-29

## 2019-08-31 DIAGNOSIS — I25.10 CORONARY ARTERY DISEASE INVOLVING NATIVE HEART WITHOUT ANGINA PECTORIS, UNSPECIFIED VESSEL OR LESION TYPE: ICD-10-CM

## 2019-09-03 DIAGNOSIS — G62.9 PERIPHERAL POLYNEUROPATHY: ICD-10-CM

## 2019-09-03 DIAGNOSIS — F03.90 DEMENTIA WITHOUT BEHAVIORAL DISTURBANCE, UNSPECIFIED DEMENTIA TYPE: ICD-10-CM

## 2019-09-03 DIAGNOSIS — I25.10 CORONARY ARTERY DISEASE INVOLVING NATIVE HEART WITHOUT ANGINA PECTORIS, UNSPECIFIED VESSEL OR LESION TYPE: ICD-10-CM

## 2019-09-03 DIAGNOSIS — N32.81 OVERACTIVE BLADDER: Primary | ICD-10-CM

## 2019-09-03 DIAGNOSIS — I63.9 CEREBROVASCULAR ACCIDENT (CVA), UNSPECIFIED MECHANISM (HCC): ICD-10-CM

## 2019-09-03 RX ORDER — FUROSEMIDE 20 MG/1
TABLET ORAL
Qty: 60 TABLET | Refills: 0 | Status: SHIPPED | OUTPATIENT
Start: 2019-09-03 | End: 2019-11-29 | Stop reason: SDUPTHER

## 2019-09-03 RX ORDER — FUROSEMIDE 20 MG/1
TABLET ORAL
Qty: 180 TABLET | Refills: 0 | OUTPATIENT
Start: 2019-09-03

## 2019-09-04 RX ORDER — GABAPENTIN 300 MG/1
CAPSULE ORAL
Qty: 90 CAPSULE | Refills: 0 | OUTPATIENT
Start: 2019-09-04 | End: 2019-11-03

## 2019-09-04 RX ORDER — OXYBUTYNIN CHLORIDE 5 MG/1
TABLET ORAL
Qty: 60 TABLET | Refills: 0 | Status: SHIPPED | OUTPATIENT
Start: 2019-09-04 | End: 2019-09-26 | Stop reason: SDUPTHER

## 2019-09-04 RX ORDER — ATORVASTATIN CALCIUM 40 MG/1
40 TABLET, FILM COATED ORAL DAILY
Qty: 30 TABLET | Refills: 0 | OUTPATIENT
Start: 2019-09-04

## 2019-09-04 RX ORDER — MEMANTINE HYDROCHLORIDE 10 MG/1
TABLET ORAL
Qty: 60 TABLET | Refills: 0 | Status: SHIPPED | OUTPATIENT
Start: 2019-09-04 | End: 2019-09-26 | Stop reason: SDUPTHER

## 2019-09-26 ENCOUNTER — HOSPITAL ENCOUNTER (OUTPATIENT)
Dept: LAB | Age: 69
Discharge: HOME | End: 2019-09-26
Payer: MEDICARE

## 2019-09-26 ENCOUNTER — OFFICE VISIT (OUTPATIENT)
Dept: FAMILY MEDICINE CLINIC | Age: 69
End: 2019-09-26
Payer: MEDICARE

## 2019-09-26 VITALS
DIASTOLIC BLOOD PRESSURE: 60 MMHG | SYSTOLIC BLOOD PRESSURE: 138 MMHG | BODY MASS INDEX: 36.65 KG/M2 | WEIGHT: 220 LBS | HEIGHT: 65 IN | HEART RATE: 64 BPM

## 2019-09-26 DIAGNOSIS — Z00.00 ROUTINE GENERAL MEDICAL EXAMINATION AT A HEALTH CARE FACILITY: Primary | ICD-10-CM

## 2019-09-26 DIAGNOSIS — E11.42 TYPE 2 DIABETES MELLITUS WITH DIABETIC POLYNEUROPATHY, WITH LONG-TERM CURRENT USE OF INSULIN (HCC): ICD-10-CM

## 2019-09-26 DIAGNOSIS — M1A.9XX0 CHRONIC GOUT WITHOUT TOPHUS, UNSPECIFIED CAUSE, UNSPECIFIED SITE: ICD-10-CM

## 2019-09-26 DIAGNOSIS — Z78.0 MENOPAUSE: ICD-10-CM

## 2019-09-26 DIAGNOSIS — E11.65 TYPE 2 DIABETES MELLITUS WITH HYPERGLYCEMIA, WITH LONG-TERM CURRENT USE OF INSULIN (HCC): ICD-10-CM

## 2019-09-26 DIAGNOSIS — E78.5 HYPERLIPIDEMIA, UNSPECIFIED HYPERLIPIDEMIA TYPE: ICD-10-CM

## 2019-09-26 DIAGNOSIS — Z79.4 TYPE 2 DIABETES MELLITUS WITH HYPERGLYCEMIA, WITH LONG-TERM CURRENT USE OF INSULIN (HCC): ICD-10-CM

## 2019-09-26 DIAGNOSIS — I63.9 CEREBROVASCULAR ACCIDENT (CVA), UNSPECIFIED MECHANISM (HCC): ICD-10-CM

## 2019-09-26 DIAGNOSIS — F03.90 DEMENTIA WITHOUT BEHAVIORAL DISTURBANCE, UNSPECIFIED DEMENTIA TYPE: ICD-10-CM

## 2019-09-26 DIAGNOSIS — I25.10 CORONARY ARTERY DISEASE INVOLVING NATIVE HEART WITHOUT ANGINA PECTORIS, UNSPECIFIED VESSEL OR LESION TYPE: ICD-10-CM

## 2019-09-26 DIAGNOSIS — K21.9 GASTROESOPHAGEAL REFLUX DISEASE, ESOPHAGITIS PRESENCE NOT SPECIFIED: ICD-10-CM

## 2019-09-26 DIAGNOSIS — N32.81 OVERACTIVE BLADDER: ICD-10-CM

## 2019-09-26 DIAGNOSIS — Z79.4 TYPE 2 DIABETES MELLITUS WITH DIABETIC POLYNEUROPATHY, WITH LONG-TERM CURRENT USE OF INSULIN (HCC): ICD-10-CM

## 2019-09-26 DIAGNOSIS — Z12.31 SCREENING MAMMOGRAM, ENCOUNTER FOR: ICD-10-CM

## 2019-09-26 DIAGNOSIS — G62.9 PERIPHERAL POLYNEUROPATHY: ICD-10-CM

## 2019-09-26 DIAGNOSIS — I10 ESSENTIAL HYPERTENSION: ICD-10-CM

## 2019-09-26 DIAGNOSIS — Z12.11 ENCOUNTER FOR SCREENING FECAL OCCULT BLOOD TESTING: ICD-10-CM

## 2019-09-26 DIAGNOSIS — Z23 NEEDS FLU SHOT: ICD-10-CM

## 2019-09-26 DIAGNOSIS — Z23 NEED FOR PROPHYLACTIC VACCINATION AND INOCULATION AGAINST VARICELLA: ICD-10-CM

## 2019-09-26 DIAGNOSIS — F32.A DEPRESSION, UNSPECIFIED DEPRESSION TYPE: ICD-10-CM

## 2019-09-26 LAB
ALBUMIN SERPL-MCNC: 3.9 G/DL (ref 3.5–5.2)
ALBUMIN/GLOBULIN RATIO: 1.3 (ref 1–2.5)
ALP BLD-CCNC: 114 U/L (ref 35–104)
ALT SERPL-CCNC: 53 U/L (ref 5–33)
ANION GAP SERPL CALCULATED.3IONS-SCNC: 7 MMOL/L (ref 9–17)
AST SERPL-CCNC: 31 U/L
BILIRUB SERPL-MCNC: 0.38 MG/DL (ref 0.3–1.2)
BUN BLDV-MCNC: 27 MG/DL (ref 8–23)
BUN/CREAT BLD: 27 (ref 9–20)
CALCIUM SERPL-MCNC: 9.2 MG/DL (ref 8.6–10.4)
CHLORIDE BLD-SCNC: 101 MMOL/L (ref 98–107)
CHOLESTEROL/HDL RATIO: 2
CHOLESTEROL: 114 MG/DL
CO2: 30 MMOL/L (ref 20–31)
CREAT SERPL-MCNC: 1.01 MG/DL (ref 0.5–0.9)
CREATININE URINE: 136.9 MG/DL (ref 28–217)
ESTIMATED AVERAGE GLUCOSE: 258 MG/DL
GFR AFRICAN AMERICAN: >60 ML/MIN
GFR NON-AFRICAN AMERICAN: 55 ML/MIN
GFR SERPL CREATININE-BSD FRML MDRD: ABNORMAL ML/MIN/{1.73_M2}
GFR SERPL CREATININE-BSD FRML MDRD: ABNORMAL ML/MIN/{1.73_M2}
GLUCOSE BLD-MCNC: 65 MG/DL (ref 70–99)
HBA1C MFR BLD: 10.6 % (ref 4.8–5.9)
HDLC SERPL-MCNC: 57 MG/DL
LDL CHOLESTEROL: 47 MG/DL (ref 0–130)
MICROALBUMIN/CREAT 24H UR: 105 MG/L
MICROALBUMIN/CREAT UR-RTO: 77 MCG/MG CREAT
POTASSIUM SERPL-SCNC: 4.6 MMOL/L (ref 3.7–5.3)
SODIUM BLD-SCNC: 138 MMOL/L (ref 135–144)
TOTAL PROTEIN: 7 G/DL (ref 6.4–8.3)
TRIGL SERPL-MCNC: 49 MG/DL
URIC ACID: 3 MG/DL (ref 2.4–5.7)
VLDLC SERPL CALC-MCNC: NORMAL MG/DL (ref 1–30)

## 2019-09-26 PROCEDURE — 1123F ACP DISCUSS/DSCN MKR DOCD: CPT | Performed by: PHYSICIAN ASSISTANT

## 2019-09-26 PROCEDURE — G8598 ASA/ANTIPLAT THER USED: HCPCS | Performed by: PHYSICIAN ASSISTANT

## 2019-09-26 PROCEDURE — 4040F PNEUMOC VAC/ADMIN/RCVD: CPT | Performed by: PHYSICIAN ASSISTANT

## 2019-09-26 PROCEDURE — 36415 COLL VENOUS BLD VENIPUNCTURE: CPT

## 2019-09-26 PROCEDURE — 90653 IIV ADJUVANT VACCINE IM: CPT | Performed by: PHYSICIAN ASSISTANT

## 2019-09-26 PROCEDURE — 3017F COLORECTAL CA SCREEN DOC REV: CPT | Performed by: PHYSICIAN ASSISTANT

## 2019-09-26 PROCEDURE — G0008 ADMIN INFLUENZA VIRUS VAC: HCPCS | Performed by: PHYSICIAN ASSISTANT

## 2019-09-26 PROCEDURE — 1036F TOBACCO NON-USER: CPT | Performed by: PHYSICIAN ASSISTANT

## 2019-09-26 PROCEDURE — 82570 ASSAY OF URINE CREATININE: CPT

## 2019-09-26 PROCEDURE — G8427 DOCREV CUR MEDS BY ELIG CLIN: HCPCS | Performed by: PHYSICIAN ASSISTANT

## 2019-09-26 PROCEDURE — 80053 COMPREHEN METABOLIC PANEL: CPT

## 2019-09-26 PROCEDURE — 99214 OFFICE O/P EST MOD 30 MIN: CPT | Performed by: PHYSICIAN ASSISTANT

## 2019-09-26 PROCEDURE — G8417 CALC BMI ABV UP PARAM F/U: HCPCS | Performed by: PHYSICIAN ASSISTANT

## 2019-09-26 PROCEDURE — 83036 HEMOGLOBIN GLYCOSYLATED A1C: CPT

## 2019-09-26 PROCEDURE — 1090F PRES/ABSN URINE INCON ASSESS: CPT | Performed by: PHYSICIAN ASSISTANT

## 2019-09-26 PROCEDURE — 2022F DILAT RTA XM EVC RTNOPTHY: CPT | Performed by: PHYSICIAN ASSISTANT

## 2019-09-26 PROCEDURE — 3046F HEMOGLOBIN A1C LEVEL >9.0%: CPT | Performed by: PHYSICIAN ASSISTANT

## 2019-09-26 PROCEDURE — 84550 ASSAY OF BLOOD/URIC ACID: CPT

## 2019-09-26 PROCEDURE — 80061 LIPID PANEL: CPT

## 2019-09-26 PROCEDURE — 82043 UR ALBUMIN QUANTITATIVE: CPT

## 2019-09-26 PROCEDURE — G0439 PPPS, SUBSEQ VISIT: HCPCS | Performed by: PHYSICIAN ASSISTANT

## 2019-09-26 PROCEDURE — G8400 PT W/DXA NO RESULTS DOC: HCPCS | Performed by: PHYSICIAN ASSISTANT

## 2019-09-26 RX ORDER — RANITIDINE 150 MG/1
150 TABLET ORAL 2 TIMES DAILY
Qty: 180 TABLET | Refills: 1 | Status: SHIPPED | OUTPATIENT
Start: 2019-09-26 | End: 2020-03-05

## 2019-09-26 RX ORDER — IBUPROFEN 200 MG
1 TABLET ORAL 2 TIMES DAILY
Qty: 100 EACH | Refills: 0 | Status: SHIPPED | OUTPATIENT
Start: 2019-09-26 | End: 2020-01-03 | Stop reason: SDUPTHER

## 2019-09-26 RX ORDER — BLOOD-GLUCOSE METER
1 KIT MISCELLANEOUS DAILY
Qty: 1 KIT | Refills: 0 | Status: SHIPPED | OUTPATIENT
Start: 2019-09-26 | End: 2019-11-22

## 2019-09-26 RX ORDER — GABAPENTIN 300 MG/1
CAPSULE ORAL
Qty: 270 CAPSULE | Refills: 1 | Status: CANCELLED | OUTPATIENT
Start: 2019-09-26

## 2019-09-26 RX ORDER — LOSARTAN POTASSIUM 100 MG/1
100 TABLET ORAL DAILY
Qty: 90 TABLET | Refills: 1 | Status: SHIPPED | OUTPATIENT
Start: 2019-09-26 | End: 2020-01-03 | Stop reason: SDUPTHER

## 2019-09-26 RX ORDER — MEMANTINE HYDROCHLORIDE 10 MG/1
TABLET ORAL
Qty: 180 TABLET | Refills: 1 | Status: SHIPPED | OUTPATIENT
Start: 2019-09-26 | End: 2020-01-03 | Stop reason: SDUPTHER

## 2019-09-26 RX ORDER — INSULIN GLARGINE 100 [IU]/ML
50 INJECTION, SOLUTION SUBCUTANEOUS NIGHTLY
Qty: 15 ML | Refills: 3 | Status: SHIPPED | OUTPATIENT
Start: 2019-09-26 | End: 2020-01-03 | Stop reason: SDUPTHER

## 2019-09-26 RX ORDER — CLOPIDOGREL BISULFATE 75 MG/1
75 TABLET ORAL DAILY
Qty: 90 TABLET | Refills: 1 | Status: SHIPPED | OUTPATIENT
Start: 2019-09-26 | End: 2020-01-03 | Stop reason: SDUPTHER

## 2019-09-26 RX ORDER — ATORVASTATIN CALCIUM 40 MG/1
40 TABLET, FILM COATED ORAL DAILY
Qty: 90 TABLET | Refills: 1 | Status: SHIPPED | OUTPATIENT
Start: 2019-09-26 | End: 2020-01-03 | Stop reason: SDUPTHER

## 2019-09-26 RX ORDER — GLUCOSAMINE HCL/CHONDROITIN SU 500-400 MG
CAPSULE ORAL
Qty: 200 STRIP | Refills: 3 | Status: SHIPPED | OUTPATIENT
Start: 2019-09-26 | End: 2020-01-03 | Stop reason: SDUPTHER

## 2019-09-26 RX ORDER — CARVEDILOL 12.5 MG/1
12.5 TABLET ORAL 2 TIMES DAILY WITH MEALS
Qty: 180 TABLET | Refills: 1 | Status: SHIPPED | OUTPATIENT
Start: 2019-09-26 | End: 2020-01-03 | Stop reason: SDUPTHER

## 2019-09-26 RX ORDER — OXYBUTYNIN CHLORIDE 5 MG/1
TABLET ORAL
Qty: 180 TABLET | Refills: 1 | Status: SHIPPED | OUTPATIENT
Start: 2019-09-26 | End: 2020-01-03 | Stop reason: SDUPTHER

## 2019-09-26 ASSESSMENT — PATIENT HEALTH QUESTIONNAIRE - PHQ9
SUM OF ALL RESPONSES TO PHQ QUESTIONS 1-9: 0
SUM OF ALL RESPONSES TO PHQ QUESTIONS 1-9: 0

## 2019-09-26 ASSESSMENT — LIFESTYLE VARIABLES: HOW OFTEN DO YOU HAVE A DRINK CONTAINING ALCOHOL: 0

## 2019-09-26 NOTE — PATIENT INSTRUCTIONS
and cholesterol amounts. For products low in sodium, look for sodium free, very low sodium, low sodium, no added salt, and unsalted   Skip the salt when cooking or at the table; if food needs more flavor, get creative and try out different herbs and spices. Garlic and onion also add substantial flavor to foods. Trim any visible fat off meat and poultry before cooking, and drain the fat off after wright. Use cooking methods that require little or no added fat, such as grilling, boiling, baking, poaching, broiling, roasting, steaming, stir-frying, and sauting. Avoid fast food and convenience food. They tend to be high in saturated and trans fat and have a lot of added salt. Talk to a registered dietitian for individualized diet advice. Last Reviewed: March 2011 Lisa Pak MS, MPH, RD   Updated: 3/29/2011   ·     Keep Your Memory Sunita Lopez       Many factors can affect your ability to remembera hectic lifestyle, aging, stress, chronic disease, and certain medicines. But, there are steps you can take to sharpen your mind and help preserve your memory. Challenge Your Brain   Regularly challenging your mind may help keeps it in top shape. Good mental exercises include:   Crossword puzzlesUse a dictionary if you need it; you will learn more that way. Brainteasers Try some! Crafts, such as wood working and sewing   Hobbies, such as gardening and building model airplanes   SocializingVisit old friends or join groups to meet new ones. Reading   Learning a new language   Taking a class, whether it be art history or shauna chi   TravelingExperience the food, history, and culture of your destination   Learning to use a computer   Going to museums, the theater, or thought-provoking movies   Changing things in your daily life, such as reversing your pattern in the grocery store or brushing your teeth using your nondominant hand   Use Memory Aids   There is no need to remember every detail on your own.  These memory aids can help:   Calendars and day planners   Electronic organizers to store all sorts of helpful informationThese devices can \"beep\" to remind you of appointments. A book of days to record birthdays, anniversaries, and other occasions that occur on the same date every year   Detailed \"to-do\" lists and strategically placed sticky notes   Quick \"study\" sessionsBefore a gathering, review who will be there so their names will be fresh in your mind. Establish routinesFor example, keep your keys, wallet, and umbrella in the same place all the time or take medicine with your 8:00 AM glass of juice   Live a Healthy Life   Many actions that will keep your body strong will do the same for your mind. For example:   Talk to Your Doctor About Herbs and Supplements    Malnutrition and vitamin deficiencies can impair your mental function. For example, vitamin B12 deficiency can cause a range of symptoms, including confusion. But, what if your nutritional needs are being met? Can herbs and supplements still offer a benefit? Researchers have investigated a range of natural remedies, such as ginkgo , ginseng , and the supplement phosphatidylserine (PS). So far, though, the evidence is inconsistent as to whether these products can improve memory or thinking. If you are interested in taking herbs and supplements, talk to your doctor first because they may interact with other medicines that you are taking. Exercise Regularly    Among the many benefits of regular exercise are increased blood flow to the brain and decreased risk of certain diseases that can interfere with memory function. One study found that even moderate exercise has a beneficial effect. Examples of \"moderate\" exercise include:   Playing 18 holes of golf once a week, without a cart   Playing tennis twice a week   Walking one mile per day   Manage Stress    It can be tough to remember what is important when your mind is cluttered.  Make time for wood floors can be particularly slippery. Stairs should always have handrails and be carpeted or fitted with a non-skid tread. Is your telephone easily reachable. Is the cord safely tucked away? Room by Room   According to the Association of Aging, bathrooms and wes are the two most potentially hazardous rooms in your home. In the Kitchen    Be sure your stove is in proper working order and always make sure burners and the oven are off before you go out or go to sleep. Keep pots on the back burners, turn handles away from the front of the stove, and keep stove clean and free of grease build-up. Kitchen ventilation systems and range exhausts should be working properly. Keep flammable objects such as towels and pot holders away from the cooking area except when in use. Make sure kitchen curtains are tied back. Move cords and appliances away from the sink and hot surfaces. If extension cords are needed, install wiring guides so they do not hang over the sink, range, or working areas. Look for coffee pots, kettles and toaster ovens with automatic shut-offs. Keep a mop handy in the kitchen so you can wipe up spills instantly. You should also have a small fire extinguisher. Arrange your kitchen with frequently used items on lower shelves to avoid the need to stand on a stepstool to reach them. Make sure countertops are well-lit to avoid injuries while cutting and preparing food. In the Bathroom    Use a non-slip mat or decals in the tub and shower, since wet, soapy tile or porcelain surfaces are extremely slippery. Make sure bathroom rugs are non-skid or tape them firmly to the floor. Bathtubs should have at least one, preferably two, grab bars, firmly attached to structural supports in the wall. (Do not use built-in soap holders or glass shower doors as grab bars.)    Tub seats fitted with non-slip material on the legs allow you to wash sitting down.  For people with limited include tube feedings, breathing machines, and fluids given through a vein (IV). Understanding these treatments will help you decide whether you want them. You may choose to have these life-supporting treatments for a limited time. This allows a trial period to see whether they will help you. You may also decide that you want your doctor to take only certain measures to keep you alive. It is important to spell out these conditions so that your doctor and family understand them. Talk to your doctor about how long you are likely to live. He or she may be able to give you an idea of what usually happens with your specific illness. Think about preparing papers that state your wishes. This way there will not be any confusion about what you want. You can change your instructions at any time. Which papers should you prepare? Advance directives are legal papers that tell doctors how you want to be cared for at the end of your life. You do not need a  to write these papers. Ask your doctor or your state health department for information on how to write your advance directives. They may have the forms for each of these types of papers. Make sure your doctor has a copy of these on file, and give a copy to a family member or close friend. Consider a do-not-resuscitate order (DNR). This order asks that no extra treatments be done if your heart stops or you stop breathing. Extra treatments may include cardiopulmonary resuscitation (CPR), electrical shock to restart your heart, or a machine to breathe for you. If you decide to have a DNR order, ask your doctor to explain and write it. Place the order in your home where everyone can easily see it. Consider a living will. A living will explains your wishes about life support and other treatments at the end of your life if you become unable to speak for yourself. Living lawrence tell doctors to use or not use treatments that would keep you alive.  You must have one or two witnesses or a notary present when you sign this form. Consider a durable power of  for health care. This allows you to name a person to make decisions about your care if you are not able to. Most people ask a close friend or family member. Talk to this person about the kinds of treatments you want and those that you do not want. Make sure this person understands your wishes. These legal papers are simple to change. Tell your doctor what you want to change, and ask him or her to make a note in your medical file. Give your family updated copies of the papers. Where can you learn more? Go to [email or web address] org and sign in to your [employer] account. Enter P184 in the mInfo box to learn more about \"Advance Care Planning: Care Instructions. \"     If you do not have an account, please click on the \"Sign Up Now\" link. Current as of: April 1, 2019  Content Version: 12.1  © 5886-9644 [employer]. Care instructions adapted under license by [facility] ([facility]). If you have questions about a medical condition or this instruction, always ask your healthcare professional. [place] [address] any warranty or liability for your use of this information. ·        Learning About Living Paula Bedoya  What is a living will? A living will is a legal form you use to write down the kind of care you want at the end of your life. It is used by the health professionals who will treat you if you aren't able to decide for yourself. If you put your wishes in writing, your loved ones and others will know what kind of care you want. They won't need to guess. This can ease your mind and be helpful to others. A living will is not the same as an estate or property will. An estate will explains what you want to happen with your money and property after you die. Is a living will a legal document? A living will is a legal document. Each state has its own laws about living lawrence.

## 2019-09-26 NOTE — PROGRESS NOTES
Inject 0-6 Units into the skin 3 times daily (with meals) Yes Olivia Edmond   oxybutynin (DITROPAN) 5 MG tablet TAKE 1 TABLET BY MOUTH TWICE DAILY  ERINN Wheeler   furosemide (LASIX) 20 MG tablet TAKE 1 TABLET BY MOUTH TWICE DAILY  ERINN Wheeler   atorvastatin (LIPITOR) 40 MG tablet Take 1 tablet by mouth daily  ERINN Wheeler   carvedilol (COREG) 12.5 MG tablet Take 1 tablet by mouth 2 times daily (with meals)  ERINN Wheeler   citalopram (CELEXA) 20 MG tablet Take 1 tablet by mouth daily  ERINN Wheeler   gabapentin (NEURONTIN) 300 MG capsule TAKE ONE CAPSULE BY MOUTH THREE TIMES DAILY  ERINN Wheeler   ranitidine (ZANTAC) 150 MG tablet Take 1 tablet by mouth 2 times daily  Dudley Wheeler Habnilam Chamorro   aspirin 81 MG tablet Take 1 tablet by mouth daily  ERINN Wheeler   Diabetic Shoe MISC by Does not apply route  ERINN Wheeler   glucose monitoring kit (FREESTYLE) monitoring kit 1 kit by Does not apply route 2 times daily  Dudley Wheeler Habnilam Chamorro   blood glucose monitor strips Check bid  Dudley Wheeler Habnilam Chamorro   rivastigmine (EXELON) 9.5 MG/24HR APPLY 1 NEW PATCH EVERY [address] Habnilam Chamorro   nystatin (MYCOSTATIN) 902201 UNIT/GM cream Apply topically 2 times daily. Dudley Wheeler   Glucose Blood (BLOOD GLUCOSE TEST STRIPS) STRP truemetrix test strips check ac and HS  ERINN Wheeler   docusate sodium (COLACE) 100 MG capsule Take 100 mg by mouth 2 times daily. Historical Provider, MD   oxybutynin (DITROPAN-XL) 10 MG CR tablet Take 10 mg by mouth daily. Historical Provider, MD     Past Medical History:   Diagnosis Date    Anxiety and depression     Background diabetic retinopathy(362.01) 2008    (Mild)    Balance problem     CAD (coronary artery disease)     (with coronary artery bypass graft x4).     Cancer of uterus [facility])     history of, (probable cure)    Chronic kidney disease     Chronic low back pain     [facility]     Dementia     (moderate)  Dry eye syndrome     GERD (gastroesophageal reflux disease)     Glaucoma suspect     Gout     Homonymous hemianopsia     (Right)    Hyperlipidemia     Hypertension     Keratitis     (secondary to dry eye syndrome).  Obesity     Peripheral polyneuropathy     (diabetic)    Pseudophakos     (Right Eye: 2012; Left Eye: 2012) - Dr. Lu Chicas.  Stroke [facility])     (Multiple) MRI of brain 10/2008 shows multiple infarcts involving the temporal and parietal areas.  Trichiasis     (left eye)    Type 2 diabetes mellitus ([address]      Past Surgical History:   Procedure Laterality Date    CATARACT REMOVAL WITH IMPLANT  2012    (Right eye) - Dr. Lu Chicas.  CATARACT REMOVAL WITH IMPLANT  2012    (Left eye) - Dr. Lu Chicas.   SECTION      CHOLECYSTECTOMY      CORONARY ARTERY BYPASS GRAFT  12-    (x4) - LIMA-LAD, SVG-diagnoal 1, SVG-OM1, and SVG-PDA. Exploration of left radial. (Dr. Margo Link).  EYE SURGERY Left     as a child     GASTRIC BYPASS SURGERY      HYSTERECTOMY      (abdominal)  with left oophorectomy. Right ovary retained.  TONSILLECTOMY AND ADENOIDECTOMY       Family History   Problem Relation Age of Onset    Diabetes Mother     Cancer Mother     High Blood Pressure Mother    [facility] Stroke Mother     Kidney Disease Mother     Uterine Cancer Mother     Diabetes Father     Heart Disease Father     Glaucoma Father     Emphysema Father     Coronary Art Dis Other         All 4 siblings.  Stroke Other         1 sibling.  Lung Cancer Other         1 sibling - cause of death lung cancer.     Mental Retardation Other        CareTeam (Including outside providers/suppliers regularly involved in providing care):   Patient Care Team:  Fabián Su as PCP - General (Physician Assistant)  Fabián Su as PCP - [facility] Empaneled Provider    Wt Readings from Last 3 Encounters:   19 220 lb (99.8 kg)   18 217 lb (98.4 kg)

## 2019-09-27 ENCOUNTER — TELEPHONE (OUTPATIENT)
Dept: FAMILY MEDICINE CLINIC | Age: 69
End: 2019-09-27

## 2019-09-27 DIAGNOSIS — E11.65 TYPE 2 DIABETES MELLITUS WITH HYPERGLYCEMIA, WITH LONG-TERM CURRENT USE OF INSULIN (HCC): Primary | ICD-10-CM

## 2019-09-27 DIAGNOSIS — Z12.31 BREAST CANCER SCREENING BY MAMMOGRAM: Primary | ICD-10-CM

## 2019-09-27 DIAGNOSIS — Z79.4 TYPE 2 DIABETES MELLITUS WITH HYPERGLYCEMIA, WITH LONG-TERM CURRENT USE OF INSULIN (HCC): Primary | ICD-10-CM

## 2019-09-29 ASSESSMENT — ENCOUNTER SYMPTOMS
GASTROINTESTINAL NEGATIVE: 1
RESPIRATORY NEGATIVE: 1
HEARTBURN: 0

## 2019-09-29 NOTE — PROGRESS NOTES
Anxiety and depression     Background diabetic retinopathy(362.01)     (Mild)    Balance problem     CAD (coronary artery disease)     (with coronary artery bypass graft x4).  Cancer of uterus [facility])     history of, (probable cure)    Chronic kidney disease     Chronic low back pain     [facility]cere[facility] vascular accident) ([facility] [address]     Dementia     (moderate)    Dry eye syndrome     GERD (gastroesophageal reflux disease)     Glaucoma suspect     Gout     Homonymous hemianopsia     (Right)    Hyperlipidemia     Hypertension     Keratitis     (secondary to dry eye syndrome).  Obesity     Peripheral polyneuropathy     (diabetic)    Pseudophakos     (Right Eye: 2012; Left Eye: 2012) - Dr. Aminta John.  Stroke [facility])     (Multiple) MRI of brain 10/2008 shows multiple infarcts involving the temporal and parietal areas.  Trichiasis     (left eye)    Type 2 diabetes mellitus ([facility] [address]      Past Surgical History:   Procedure Laterality Date    CATARACT REMOVAL WITH IMPLANT  2012    (Right eye) - Dr. Aminta John.  CATARACT REMOVAL WITH IMPLANT  2012    (Left eye) - Dr. Aminta John.   SECTION      CHOLECYSTECTOMY      CORONARY ARTERY BYPASS GRAFT  12-    (x4) - LIMA-LAD, SVG-diagnoal 1, SVG-OM1, and SVG-PDA. Exploration of left radial. (Dr. Iris Watts).  EYE SURGERY Left     as a child     GASTRIC BYPASS SURGERY      HYSTERECTOMY      (abdominal)  with left oophorectomy. Right ovary retained.  TONSILLECTOMY AND ADENOIDECTOMY       Social History     Tobacco Use    Smoking status: Never Smoker    Smokeless tobacco: Never Used    Tobacco comment: never smoker eclamb rrt 17   Substance Use Topics    Alcohol use: No    Drug use: No       Objective:   Physical Exam   Constitutional: She is oriented to person, place, and time. She appears well-developed. HENT:   Head: Normocephalic.    Right Ear: External ear normal.   Left Ear: External ear normal.   Eyes: Pupils are equal, round, and reactive to light. Cardiovascular: Normal rate and regular rhythm. Pulmonary/Chest: Effort normal.   Abdominal: Soft. Feet:   Right Foot:   Protective Sensation: 10 sites tested. Skin Integrity: Positive for callus. Left Foot:   Protective Sensation: 10 sites tested. 3 sites sensed. Skin Integrity: Positive for callus. Neurological: She is alert and oriented to person, place, and time. Skin: Skin is warm. Assessment:      1. Routine general medical examination at a health care facility    2. Type 2 diabetes mellitus with hyperglycemia, with long-term current use of insulin (HCC)    3. Hyperlipidemia, unspecified hyperlipidemia type    4. Essential hypertension    5. Gastroesophageal reflux disease, esophagitis presence not specified    6. Coronary artery disease involving native heart without angina pectoris, unspecified vessel or lesion type    7. Cerebrovascular accident (CVA), unspecified mechanism ([address]    8. Dementia without behavioral disturbance, unspecified dementia type ([address]    9. Encounter for screening fecal occult blood testing    10. Screening mammogram, encounter for    11. Chronic gout without tophus, unspecified cause, unspecified site    12. Overactive bladder    13. Peripheral polyneuropathy    14. Menopause    15. Needs flu shot    16. Need for prophylactic vaccination and inoculation against varicella          Plan:      Update laboratory studies today. Medication compliance discussed in detail. Patient has not been checking her blood sugars stating apple juice was spilled on her glucometer. Discussed diabetic education. Healthy diet encouraged. Schedule diabetic eye examination. Continue to follow with neurology regarding dementia. Influenza vaccination today. Hemoccult testing ordered. Schedule mammogram.  Labs in three months. Follow-up in three months/sooner PRN.         ERINN Yoder

## 2019-10-24 DIAGNOSIS — E11.65 TYPE 2 DIABETES MELLITUS WITH HYPERGLYCEMIA, WITH LONG-TERM CURRENT USE OF INSULIN (HCC): ICD-10-CM

## 2019-10-24 DIAGNOSIS — Z79.4 TYPE 2 DIABETES MELLITUS WITH HYPERGLYCEMIA, WITH LONG-TERM CURRENT USE OF INSULIN (HCC): ICD-10-CM

## 2019-10-24 RX ORDER — IBUPROFEN 200 MG
TABLET ORAL
Qty: 100 EACH | Refills: 1 | Status: SHIPPED | OUTPATIENT
Start: 2019-10-24 | End: 2020-01-03 | Stop reason: SDUPTHER

## 2019-10-28 DIAGNOSIS — M1A.9XX0 CHRONIC GOUT WITHOUT TOPHUS, UNSPECIFIED CAUSE, UNSPECIFIED SITE: ICD-10-CM

## 2019-10-28 DIAGNOSIS — F41.9 ANXIETY AND DEPRESSION: ICD-10-CM

## 2019-10-28 DIAGNOSIS — F32.A ANXIETY AND DEPRESSION: ICD-10-CM

## 2019-10-28 RX ORDER — CITALOPRAM 20 MG/1
20 TABLET ORAL DAILY
Qty: 90 TABLET | Refills: 0 | Status: SHIPPED | OUTPATIENT
Start: 2019-10-28 | End: 2020-01-03 | Stop reason: SDUPTHER

## 2019-10-28 RX ORDER — ALLOPURINOL 300 MG/1
TABLET ORAL
Qty: 90 TABLET | Refills: 0 | Status: SHIPPED | OUTPATIENT
Start: 2019-10-28 | End: 2019-11-22 | Stop reason: SDUPTHER

## 2019-11-21 ENCOUNTER — HOSPITAL ENCOUNTER (OUTPATIENT)
Age: 69
Setting detail: SPECIMEN
Discharge: HOME | End: 2019-11-21
Payer: MEDICARE

## 2019-11-22 ENCOUNTER — OFFICE VISIT (OUTPATIENT)
Dept: PRIMARY CARE CLINIC | Age: 69
End: 2019-11-22
Payer: MEDICARE

## 2019-11-22 VITALS
TEMPERATURE: 97 F | OXYGEN SATURATION: 95 % | SYSTOLIC BLOOD PRESSURE: 138 MMHG | BODY MASS INDEX: 39.82 KG/M2 | WEIGHT: 239 LBS | HEART RATE: 64 BPM | HEIGHT: 65 IN | DIASTOLIC BLOOD PRESSURE: 74 MMHG

## 2019-11-22 DIAGNOSIS — Z12.11 ENCOUNTER FOR SCREENING FECAL OCCULT BLOOD TESTING: ICD-10-CM

## 2019-11-22 DIAGNOSIS — R05.9 COUGH: ICD-10-CM

## 2019-11-22 DIAGNOSIS — J01.00 ACUTE MAXILLARY SINUSITIS, RECURRENCE NOT SPECIFIED: Primary | ICD-10-CM

## 2019-11-22 LAB
DATE, STOOL #1: ABNORMAL
DATE, STOOL #2: ABNORMAL
DATE, STOOL #3: ABNORMAL
HEMOCCULT SP1 STL QL: POSITIVE
HEMOCCULT SP2 STL QL: ABNORMAL
HEMOCCULT SP3 STL QL: ABNORMAL
TIME, STOOL #1: ABNORMAL
TIME, STOOL #2: ABNORMAL
TIME, STOOL #3: ABNORMAL

## 2019-11-22 PROCEDURE — 4040F PNEUMOC VAC/ADMIN/RCVD: CPT | Performed by: PHYSICIAN ASSISTANT

## 2019-11-22 PROCEDURE — G8427 DOCREV CUR MEDS BY ELIG CLIN: HCPCS | Performed by: PHYSICIAN ASSISTANT

## 2019-11-22 PROCEDURE — G8598 ASA/ANTIPLAT THER USED: HCPCS | Performed by: PHYSICIAN ASSISTANT

## 2019-11-22 PROCEDURE — 1123F ACP DISCUSS/DSCN MKR DOCD: CPT | Performed by: PHYSICIAN ASSISTANT

## 2019-11-22 PROCEDURE — 82274 ASSAY TEST FOR BLOOD FECAL: CPT

## 2019-11-22 PROCEDURE — 1036F TOBACCO NON-USER: CPT | Performed by: PHYSICIAN ASSISTANT

## 2019-11-22 PROCEDURE — G8417 CALC BMI ABV UP PARAM F/U: HCPCS | Performed by: PHYSICIAN ASSISTANT

## 2019-11-22 PROCEDURE — 99213 OFFICE O/P EST LOW 20 MIN: CPT | Performed by: PHYSICIAN ASSISTANT

## 2019-11-22 PROCEDURE — G8482 FLU IMMUNIZE ORDER/ADMIN: HCPCS | Performed by: PHYSICIAN ASSISTANT

## 2019-11-22 PROCEDURE — G8400 PT W/DXA NO RESULTS DOC: HCPCS | Performed by: PHYSICIAN ASSISTANT

## 2019-11-22 PROCEDURE — 3017F COLORECTAL CA SCREEN DOC REV: CPT | Performed by: PHYSICIAN ASSISTANT

## 2019-11-22 PROCEDURE — 1090F PRES/ABSN URINE INCON ASSESS: CPT | Performed by: PHYSICIAN ASSISTANT

## 2019-11-22 RX ORDER — HYDROCHLOROTHIAZIDE 25 MG/1
25 TABLET ORAL EVERY MORNING
Qty: 30 TABLET | Refills: 1 | Status: SHIPPED | OUTPATIENT
Start: 2019-11-22 | End: 2019-12-03

## 2019-11-22 RX ORDER — CEFUROXIME AXETIL 250 MG/1
250 TABLET ORAL 2 TIMES DAILY
Qty: 20 TABLET | Refills: 0 | Status: SHIPPED | OUTPATIENT
Start: 2019-11-22 | End: 2019-12-02

## 2019-11-22 RX ORDER — BENZONATATE 200 MG/1
200 CAPSULE ORAL 3 TIMES DAILY PRN
Qty: 15 CAPSULE | Refills: 1 | Status: SHIPPED | OUTPATIENT
Start: 2019-11-22 | End: 2019-11-29

## 2019-11-22 ASSESSMENT — ENCOUNTER SYMPTOMS
RHINORRHEA: 1
COUGH: 1
SHORTNESS OF BREATH: 1

## 2019-11-25 ENCOUNTER — TELEPHONE (OUTPATIENT)
Dept: SURGERY | Age: 69
End: 2019-11-25

## 2019-11-25 ENCOUNTER — TELEPHONE (OUTPATIENT)
Dept: PRIMARY CARE CLINIC | Age: 69
End: 2019-11-25

## 2019-11-25 ENCOUNTER — TELEPHONE (OUTPATIENT)
Dept: FAMILY MEDICINE CLINIC | Age: 69
End: 2019-11-25

## 2019-11-25 DIAGNOSIS — R19.5 OCCULT BLOOD POSITIVE STOOL: Primary | ICD-10-CM

## 2019-11-29 ENCOUNTER — APPOINTMENT (OUTPATIENT)
Dept: GENERAL RADIOLOGY | Age: 69
End: 2019-11-29
Payer: MEDICARE

## 2019-11-29 ENCOUNTER — HOSPITAL ENCOUNTER (EMERGENCY)
Age: 69
Discharge: HOME | End: 2019-11-29
Attending: EMERGENCY MEDICINE
Payer: MEDICARE

## 2019-11-29 VITALS
WEIGHT: 239 LBS | RESPIRATION RATE: 12 BRPM | OXYGEN SATURATION: 92 % | TEMPERATURE: 98 F | SYSTOLIC BLOOD PRESSURE: 199 MMHG | HEART RATE: 69 BPM | DIASTOLIC BLOOD PRESSURE: 149 MMHG | BODY MASS INDEX: 39.77 KG/M2

## 2019-11-29 DIAGNOSIS — E16.2 HYPOGLYCEMIA: Primary | ICD-10-CM

## 2019-11-29 DIAGNOSIS — I50.23 ACUTE ON CHRONIC SYSTOLIC CONGESTIVE HEART FAILURE (HCC): ICD-10-CM

## 2019-11-29 LAB
ABSOLUTE EOS #: 0 K/UL (ref 0–0.4)
ABSOLUTE IMMATURE GRANULOCYTE: ABNORMAL K/UL (ref 0–0.3)
ABSOLUTE LYMPH #: 1.5 K/UL (ref 1–4.8)
ABSOLUTE MONO #: 0.8 K/UL (ref 0.1–1.2)
ALBUMIN SERPL-MCNC: 3.5 G/DL (ref 3.5–5.2)
ALBUMIN/GLOBULIN RATIO: 1 (ref 1–2.5)
ALP BLD-CCNC: 132 U/L (ref 35–104)
ALT SERPL-CCNC: 50 U/L (ref 5–33)
ANION GAP SERPL CALCULATED.3IONS-SCNC: 11 MMOL/L (ref 9–17)
AST SERPL-CCNC: 52 U/L
BASOPHILS # BLD: 1 % (ref 0–1)
BASOPHILS ABSOLUTE: 0.1 K/UL (ref 0–0.2)
BILIRUB SERPL-MCNC: 0.5 MG/DL (ref 0.3–1.2)
BUN BLDV-MCNC: 27 MG/DL (ref 8–23)
BUN/CREAT BLD: 25 (ref 9–20)
CALCIUM SERPL-MCNC: 9.1 MG/DL (ref 8.6–10.4)
CHLORIDE BLD-SCNC: 100 MMOL/L (ref 98–107)
CHP ED QC CHECK: NORMAL
CO2: 26 MMOL/L (ref 20–31)
CREAT SERPL-MCNC: 1.07 MG/DL (ref 0.5–0.9)
DIFFERENTIAL TYPE: ABNORMAL
EOSINOPHILS RELATIVE PERCENT: 1 % (ref 1–7)
GFR AFRICAN AMERICAN: >60 ML/MIN
GFR NON-AFRICAN AMERICAN: 51 ML/MIN
GFR SERPL CREATININE-BSD FRML MDRD: ABNORMAL ML/MIN/{1.73_M2}
GFR SERPL CREATININE-BSD FRML MDRD: ABNORMAL ML/MIN/{1.73_M2}
GLUCOSE BLD-MCNC: 122 MG/DL (ref 65–105)
GLUCOSE BLD-MCNC: 129 MG/DL (ref 70–99)
GLUCOSE BLD-MCNC: 44 MG/DL
GLUCOSE BLD-MCNC: 44 MG/DL (ref 65–105)
HCT VFR BLD CALC: 32.6 % (ref 36–46)
HEMOGLOBIN: 10.7 G/DL (ref 12–16)
IMMATURE GRANULOCYTES: ABNORMAL %
LYMPHOCYTES # BLD: 17 % (ref 16–46)
MCH RBC QN AUTO: 30.6 PG (ref 26–34)
MCHC RBC AUTO-ENTMCNC: 32.8 G/DL (ref 31–37)
MCV RBC AUTO: 93.3 FL (ref 80–100)
MONOCYTES # BLD: 9 % (ref 4–11)
NRBC AUTOMATED: ABNORMAL PER 100 WBC
PDW BLD-RTO: 15.4 % (ref 11–14.5)
PLATELET # BLD: 246 K/UL (ref 140–450)
PLATELET ESTIMATE: ABNORMAL
PMV BLD AUTO: 7.7 FL (ref 6–12)
POTASSIUM SERPL-SCNC: 4.1 MMOL/L (ref 3.7–5.3)
RBC # BLD: 3.5 M/UL (ref 4–5.2)
RBC # BLD: ABNORMAL 10*6/UL
SEG NEUTROPHILS: 72 % (ref 43–77)
SEGMENTED NEUTROPHILS ABSOLUTE COUNT: 6.2 K/UL (ref 1.8–7.7)
SODIUM BLD-SCNC: 137 MMOL/L (ref 135–144)
TOTAL PROTEIN: 6.9 G/DL (ref 6.4–8.3)
TROPONIN INTERP: ABNORMAL
TROPONIN T: ABNORMAL NG/ML
TROPONIN, HIGH SENSITIVITY: 20 NG/L (ref 0–14)
WBC # BLD: 8.5 K/UL (ref 3.5–11)
WBC # BLD: ABNORMAL 10*3/UL

## 2019-11-29 PROCEDURE — 82947 ASSAY GLUCOSE BLOOD QUANT: CPT

## 2019-11-29 PROCEDURE — 36415 COLL VENOUS BLD VENIPUNCTURE: CPT

## 2019-11-29 PROCEDURE — 80053 COMPREHEN METABOLIC PANEL: CPT

## 2019-11-29 PROCEDURE — 99285 EMERGENCY DEPT VISIT HI MDM: CPT

## 2019-11-29 PROCEDURE — 84484 ASSAY OF TROPONIN QUANT: CPT

## 2019-11-29 PROCEDURE — 71045 X-RAY EXAM CHEST 1 VIEW: CPT

## 2019-11-29 PROCEDURE — 93005 ELECTROCARDIOGRAM TRACING: CPT | Performed by: EMERGENCY MEDICINE

## 2019-11-29 PROCEDURE — 2580000003 HC RX 258: Performed by: EMERGENCY MEDICINE

## 2019-11-29 PROCEDURE — 96374 THER/PROPH/DIAG INJ IV PUSH: CPT

## 2019-11-29 PROCEDURE — 85025 COMPLETE CBC W/AUTO DIFF WBC: CPT

## 2019-11-29 RX ORDER — DEXTROSE MONOHYDRATE 25 G/50ML
25 INJECTION, SOLUTION INTRAVENOUS ONCE
Status: COMPLETED | OUTPATIENT
Start: 2019-11-29 | End: 2019-11-29

## 2019-11-29 RX ORDER — DEXTROSE AND SODIUM CHLORIDE 5; .45 G/100ML; G/100ML
INJECTION, SOLUTION INTRAVENOUS CONTINUOUS
Status: DISCONTINUED | OUTPATIENT
Start: 2019-11-29 | End: 2019-11-29 | Stop reason: HOSPADM

## 2019-11-29 RX ORDER — FUROSEMIDE 20 MG/1
20 TABLET ORAL DAILY
Qty: 3 TABLET | Refills: 0 | Status: SHIPPED | OUTPATIENT
Start: 2019-11-29 | End: 2019-12-03 | Stop reason: SDUPTHER

## 2019-11-29 RX ADMIN — DEXTROSE AND SODIUM CHLORIDE: 5; 450 INJECTION, SOLUTION INTRAVENOUS at 13:37

## 2019-11-29 RX ADMIN — DEXTROSE 50 % IN WATER (D50W) INTRAVENOUS SYRINGE 25 G: at 13:29

## 2019-11-30 LAB
EKG ATRIAL RATE: 67 BPM
EKG P AXIS: 95 DEGREES
EKG P-R INTERVAL: 218 MS
EKG Q-T INTERVAL: 500 MS
EKG QRS DURATION: 104 MS
EKG QTC CALCULATION (BAZETT): 528 MS
EKG R AXIS: 166 DEGREES
EKG T AXIS: 12 DEGREES
EKG VENTRICULAR RATE: 67 BPM

## 2019-12-02 RX ORDER — FUROSEMIDE 20 MG/1
20 TABLET ORAL DAILY
OUTPATIENT
Start: 2019-12-02

## 2019-12-03 RX ORDER — FUROSEMIDE 20 MG/1
20 TABLET ORAL DAILY
Qty: 30 TABLET | Refills: 1 | Status: SHIPPED | OUTPATIENT
Start: 2019-12-03 | End: 2020-01-03 | Stop reason: SDUPTHER

## 2019-12-04 ENCOUNTER — TELEPHONE (OUTPATIENT)
Dept: OBGYN | Age: 69
End: 2019-12-04

## 2020-01-03 ENCOUNTER — OFFICE VISIT (OUTPATIENT)
Dept: FAMILY MEDICINE CLINIC | Age: 70
End: 2020-01-03
Payer: MEDICARE

## 2020-01-03 ENCOUNTER — TELEPHONE (OUTPATIENT)
Dept: FAMILY MEDICINE CLINIC | Age: 70
End: 2020-01-03

## 2020-01-03 ENCOUNTER — HOSPITAL ENCOUNTER (OUTPATIENT)
Dept: LAB | Age: 70
Discharge: HOME | End: 2020-01-03
Payer: MEDICARE

## 2020-01-03 VITALS
SYSTOLIC BLOOD PRESSURE: 172 MMHG | HEART RATE: 68 BPM | WEIGHT: 202 LBS | BODY MASS INDEX: 33.66 KG/M2 | HEIGHT: 65 IN | DIASTOLIC BLOOD PRESSURE: 82 MMHG

## 2020-01-03 LAB
ANION GAP SERPL CALCULATED.3IONS-SCNC: 14 MMOL/L (ref 9–17)
BUN BLDV-MCNC: 12 MG/DL (ref 8–23)
BUN/CREAT BLD: 10 (ref 9–20)
CALCIUM SERPL-MCNC: 9.1 MG/DL (ref 8.6–10.4)
CHLORIDE BLD-SCNC: 92 MMOL/L (ref 98–107)
CO2: 32 MMOL/L (ref 20–31)
CREAT SERPL-MCNC: 1.2 MG/DL (ref 0.5–0.9)
ESTIMATED AVERAGE GLUCOSE: 229 MG/DL
GFR AFRICAN AMERICAN: 54 ML/MIN
GFR NON-AFRICAN AMERICAN: 45 ML/MIN
GFR SERPL CREATININE-BSD FRML MDRD: ABNORMAL ML/MIN/{1.73_M2}
GFR SERPL CREATININE-BSD FRML MDRD: ABNORMAL ML/MIN/{1.73_M2}
GLUCOSE BLD-MCNC: 373 MG/DL (ref 70–99)
HBA1C MFR BLD: 9.6 % (ref 4.8–5.9)
POTASSIUM SERPL-SCNC: 3.1 MMOL/L (ref 3.7–5.3)
SODIUM BLD-SCNC: 138 MMOL/L (ref 135–144)

## 2020-01-03 PROCEDURE — 80048 BASIC METABOLIC PNL TOTAL CA: CPT

## 2020-01-03 PROCEDURE — 36415 COLL VENOUS BLD VENIPUNCTURE: CPT

## 2020-01-03 PROCEDURE — 99214 OFFICE O/P EST MOD 30 MIN: CPT | Performed by: PHYSICIAN ASSISTANT

## 2020-01-03 PROCEDURE — 83036 HEMOGLOBIN GLYCOSYLATED A1C: CPT

## 2020-01-03 RX ORDER — GLUCOSAMINE HCL/CHONDROITIN SU 500-400 MG
CAPSULE ORAL
Qty: 200 STRIP | Refills: 3 | Status: SHIPPED | OUTPATIENT
Start: 2020-01-03 | End: 2020-04-27 | Stop reason: SDUPTHER

## 2020-01-03 RX ORDER — GABAPENTIN 300 MG/1
CAPSULE ORAL
Qty: 270 CAPSULE | Refills: 1 | Status: SHIPPED | OUTPATIENT
Start: 2020-01-03 | End: 2020-07-16 | Stop reason: SDUPTHER

## 2020-01-03 RX ORDER — CLOPIDOGREL BISULFATE 75 MG/1
75 TABLET ORAL DAILY
Qty: 90 TABLET | Refills: 1 | Status: SHIPPED | OUTPATIENT
Start: 2020-01-03 | End: 2020-07-16 | Stop reason: SDUPTHER

## 2020-01-03 RX ORDER — CARVEDILOL 25 MG/1
25 TABLET ORAL 2 TIMES DAILY WITH MEALS
Qty: 60 TABLET | Refills: 5 | Status: SHIPPED | OUTPATIENT
Start: 2020-01-03 | End: 2020-07-16 | Stop reason: SDUPTHER

## 2020-01-03 RX ORDER — ALLOPURINOL 300 MG/1
TABLET ORAL
Qty: 30 TABLET | Refills: 0 | Status: SHIPPED | OUTPATIENT
Start: 2020-01-03 | End: 2020-03-05

## 2020-01-03 RX ORDER — IBUPROFEN 200 MG
1 TABLET ORAL 2 TIMES DAILY
Qty: 100 EACH | Refills: 0 | Status: SHIPPED | OUTPATIENT
Start: 2020-01-03 | End: 2020-04-15

## 2020-01-03 RX ORDER — INSULIN GLARGINE 100 [IU]/ML
50 INJECTION, SOLUTION SUBCUTANEOUS NIGHTLY
Qty: 15 ML | Refills: 3 | Status: ON HOLD | OUTPATIENT
Start: 2020-01-03 | End: 2020-08-14 | Stop reason: HOSPADM

## 2020-01-03 RX ORDER — OXYBUTYNIN CHLORIDE 5 MG/1
TABLET ORAL
Qty: 180 TABLET | Refills: 1 | Status: SHIPPED | OUTPATIENT
Start: 2020-01-03 | End: 2020-07-16 | Stop reason: SDUPTHER

## 2020-01-03 RX ORDER — MEMANTINE HYDROCHLORIDE 10 MG/1
TABLET ORAL
Qty: 180 TABLET | Refills: 1 | Status: SHIPPED | OUTPATIENT
Start: 2020-01-03 | End: 2020-07-16 | Stop reason: SDUPTHER

## 2020-01-03 RX ORDER — IBUPROFEN 200 MG
TABLET ORAL
Qty: 100 EACH | Refills: 1 | Status: SHIPPED | OUTPATIENT
Start: 2020-01-03 | End: 2020-04-15

## 2020-01-03 RX ORDER — CITALOPRAM 20 MG/1
20 TABLET ORAL DAILY
Qty: 90 TABLET | Refills: 0 | Status: SHIPPED | OUTPATIENT
Start: 2020-01-03 | End: 2020-04-15 | Stop reason: SDUPTHER

## 2020-01-03 RX ORDER — LOSARTAN POTASSIUM 100 MG/1
100 TABLET ORAL DAILY
Qty: 90 TABLET | Refills: 1 | Status: SHIPPED | OUTPATIENT
Start: 2020-01-03 | End: 2020-04-15 | Stop reason: SDUPTHER

## 2020-01-03 RX ORDER — FUROSEMIDE 20 MG/1
20 TABLET ORAL DAILY
Qty: 90 TABLET | Refills: 1 | Status: SHIPPED | OUTPATIENT
Start: 2020-01-03 | End: 2020-07-16 | Stop reason: SDUPTHER

## 2020-01-03 RX ORDER — ATORVASTATIN CALCIUM 40 MG/1
40 TABLET, FILM COATED ORAL DAILY
Qty: 90 TABLET | Refills: 1 | Status: SHIPPED | OUTPATIENT
Start: 2020-01-03 | End: 2020-07-29

## 2020-01-03 ASSESSMENT — ENCOUNTER SYMPTOMS: BLURRED VISION: 1

## 2020-01-03 NOTE — PROGRESS NOTES
Subjective:      Patient ID: Ursula Armando is a 77 y.o. female. Diabetes   She presents for her follow-up diabetic visit. She has type 2 diabetes mellitus. Her disease course has been worsening. Associated symptoms include blurred vision and fatigue. Pertinent negatives for diabetes include no chest pain, no polydipsia, no polyphagia and no polyuria. Current diabetic treatment includes insulin injections. She is compliant with treatment some of the time. She is following a generally unhealthy diet. Her breakfast blood glucose range is generally >200 mg/dl. Hypertension   This is a chronic problem. The current episode started more than 1 year ago. Associated symptoms include blurred vision. Pertinent negatives include no chest pain, neck pain or palpitations. There are no known risk factors for coronary artery disease. Past treatments include angiotensin blockers. The current treatment provides significant improvement. There are no compliance problems. Coronary Artery Disease   Presents for follow-up visit. Pertinent negatives include no chest pain or palpitations. Compliance with diet is poor. Compliance with exercise is poor. Compliance with medications is poor. Mental Health Problem   The primary symptoms do not include dysphoric mood. The current episode started more than 1 month ago. This is a chronic problem. The onset of the illness is precipitated by emotional stress. The degree of incapacity that she is experiencing as a consequence of her illness is mild. Additional symptoms of the illness include fatigue. Additional symptoms of the illness do not include anhedonia or insomnia. She does not admit to suicidal ideas. She does not have a plan to commit suicide. Urinary Frequency    This is a chronic problem. The current episode started more than 1 year ago. Associated symptoms include frequency. Pertinent negatives include no urgency. Treatments tried: Ditropan. Patient has a history of CVA. She reports taking Plavix daily. She takes Namenda for dementia. Patient had positive hemoccult recently. She did not schedule her appointment with surgery. We discussed the importance of a colonoscopy in detail. Review of Systems   Constitutional: Positive for fatigue. Eyes: Positive for blurred vision. Cardiovascular: Negative for chest pain and palpitations. Endocrine: Negative for polydipsia, polyphagia and polyuria. Genitourinary: Positive for frequency. Negative for urgency. Musculoskeletal: Negative for neck pain. Psychiatric/Behavioral: Negative for dysphoric mood. The patient does not have insomnia. Past Medical History:   Diagnosis Date    Anxiety and depression     Background diabetic retinopathy(362.01)     (Mild)    Balance problem     CAD (coronary artery disease)     (with coronary artery bypass graft x4).  Cancer of uterus [facility])     history of, (probable cure)    Chronic kidney disease     Chronic low back pain     CVA (cerebral vascular accident) ([address]     Dementia ([address]     (moderate)    Dry eye syndrome     GERD (gastroesophageal reflux disease)     Glaucoma suspect     Gout     Homonymous hemianopsia     (Right)    Hyperlipidemia     Hypertension     Keratitis     (secondary to dry eye syndrome).  Obesity     Peripheral polyneuropathy     (diabetic)    Pseudophakos     (Right Eye: 2012; Left Eye: 2012) - Dr. Ishmael Davidson.  Stroke [facility])     (Multiple) MRI of brain 10/2008 shows multiple infarcts involving the temporal and parietal areas.  Trichiasis     (left eye)    Type 2 diabetes mellitus ([address]      Past Surgical History:   Procedure Laterality Date    CATARACT REMOVAL WITH IMPLANT  2012    (Right eye) - Dr. Ishmael Davidson.  CATARACT REMOVAL WITH IMPLANT  2012    (Left eye) - Dr. Ishmael Davidson.      SECTION      CHOLECYSTECTOMY      CORONARY ARTERY BYPASS GRAFT  12-    (x4) - LIMA-JONNY, mL/min/1.73sq m              GFR is a calculated value that has proven clinically to  be a more effective measure of   kidney function when reported with serum creatinine.  GFR Staging 01/03/2020 NOT REPORTED   Final    Hemoglobin A1C 01/03/2020 9.6* 4.8 - 5.9 % Final    Estimated Avg Glucose 01/03/2020 229  mg/dL Final       Assessment:      1. Type 2 diabetes mellitus with hyperglycemia, with long-term current use of insulin ([facility] [address]    2. Essential hypertension    3. Peripheral polyneuropathy    4. Coronary artery disease involving native heart without angina pectoris, unspecified vessel or lesion type    5. Anxiety and depression    6. Dementia without behavioral disturbance, unspecified dementia type ([facility] [address]    7. [facility]vascular accident ([facility] unspecified [facility]    8. Overactive bladder    9. Chronic gout without tophus, unspecified cause, unspecified site    10. Occult blood positive stool          Plan:      Update laboratory studies today. Discussed diabetic education with patient in detail. Increase Coreg 25 mg bid. Monitor blood pressures. Take medications consistently. Healthy diet and routine exercise. Schedule mammogram.  Schedule echocardiogram and cardiology appointment. General surgery evaluation to discuss colonoscopy. Follow-up in three months/sooner PRN.         ERINN Chavez

## 2020-01-03 NOTE — PROGRESS NOTES
Shingrix immunization given IM, R arm, as ordered. Patient tolerated it well, no questions re: VIS information. Patient supplied medication. See attached scan.

## 2020-01-20 RX ORDER — LANCETS 30 GAUGE
EACH MISCELLANEOUS
Qty: 200 EACH | Refills: 3 | Status: SHIPPED | OUTPATIENT
Start: 2020-01-20 | End: 2020-04-15

## 2020-02-03 ENCOUNTER — TELEPHONE (OUTPATIENT)
Dept: GENERAL RADIOLOGY | Age: 70
End: 2020-02-03

## 2020-02-13 RX ORDER — HYDROCHLOROTHIAZIDE 25 MG/1
25 TABLET ORAL EVERY MORNING
Qty: 30 TABLET | Refills: 1 | OUTPATIENT
Start: 2020-02-13

## 2020-03-05 ENCOUNTER — TELEPHONE (OUTPATIENT)
Dept: FAMILY MEDICINE CLINIC | Age: 70
End: 2020-03-05

## 2020-03-05 RX ORDER — OMEPRAZOLE 20 MG/1
20 CAPSULE, DELAYED RELEASE ORAL
Qty: 90 CAPSULE | Refills: 1 | Status: SHIPPED | OUTPATIENT
Start: 2020-03-05 | End: 2020-10-23 | Stop reason: SDUPTHER

## 2020-03-05 RX ORDER — ALLOPURINOL 300 MG/1
TABLET ORAL
Qty: 90 TABLET | Refills: 0 | Status: SHIPPED | OUTPATIENT
Start: 2020-03-05 | End: 2020-04-15 | Stop reason: SDUPTHER

## 2020-03-05 NOTE — TELEPHONE ENCOUNTER
Abbie calling stating pt's Zantac is being discontinued, do you want to prescribe something else, pt uses [facility], please advise at above number.

## 2020-04-15 ENCOUNTER — VIRTUAL VISIT (OUTPATIENT)
Dept: FAMILY MEDICINE CLINIC | Age: 70
End: 2020-04-15
Payer: MEDICARE

## 2020-04-15 VITALS
DIASTOLIC BLOOD PRESSURE: 88 MMHG | HEART RATE: 68 BPM | BODY MASS INDEX: 33.61 KG/M2 | HEIGHT: 65 IN | SYSTOLIC BLOOD PRESSURE: 215 MMHG

## 2020-04-15 PROCEDURE — 99214 OFFICE O/P EST MOD 30 MIN: CPT | Performed by: PHYSICIAN ASSISTANT

## 2020-04-15 RX ORDER — CITALOPRAM 20 MG/1
20 TABLET ORAL DAILY
Qty: 90 TABLET | Refills: 0 | Status: SHIPPED | OUTPATIENT
Start: 2020-04-15 | End: 2020-07-16 | Stop reason: SDUPTHER

## 2020-04-15 RX ORDER — DOCUSATE SODIUM 100 MG/1
100 CAPSULE, LIQUID FILLED ORAL 2 TIMES DAILY
Qty: 60 CAPSULE | Refills: 5 | Status: SHIPPED | OUTPATIENT
Start: 2020-04-15 | End: 2020-07-16 | Stop reason: SDUPTHER

## 2020-04-15 RX ORDER — ALLOPURINOL 300 MG/1
300 TABLET ORAL DAILY
Qty: 90 TABLET | Refills: 1 | Status: SHIPPED | OUTPATIENT
Start: 2020-04-15 | End: 2020-10-23 | Stop reason: SDUPTHER

## 2020-04-15 RX ORDER — LOSARTAN POTASSIUM 100 MG/1
100 TABLET ORAL DAILY
Qty: 90 TABLET | Refills: 1 | Status: ON HOLD | OUTPATIENT
Start: 2020-04-15 | End: 2020-08-24 | Stop reason: HOSPADM

## 2020-04-15 ASSESSMENT — PATIENT HEALTH QUESTIONNAIRE - PHQ9
2. FEELING DOWN, DEPRESSED OR HOPELESS: 0
SUM OF ALL RESPONSES TO PHQ QUESTIONS 1-9: 0
1. LITTLE INTEREST OR PLEASURE IN DOING THINGS: 0
SUM OF ALL RESPONSES TO PHQ9 QUESTIONS 1 & 2: 0
SUM OF ALL RESPONSES TO PHQ QUESTIONS 1-9: 0

## 2020-04-16 ENCOUNTER — TELEPHONE (OUTPATIENT)
Dept: FAMILY MEDICINE CLINIC | Age: 70
End: 2020-04-16

## 2020-04-16 ASSESSMENT — ENCOUNTER SYMPTOMS: RESPIRATORY NEGATIVE: 1

## 2020-04-16 NOTE — TELEPHONE ENCOUNTER
Abbie called /160 today. She said medicine that was given she is taking everyday. Explained to patient that she is missing doses as her script is over 90 days. Abbie requested pink pill she is taking and I told her that is not approiate. .Advised to take losartan everyday and call reading in am

## 2020-04-16 NOTE — PROGRESS NOTES
normal.          ASSESSMENT & PLAN  Sheldon Guevara was seen today for diabetes, hyperlipidemia, hypertension and cough. Diagnoses and all orders for this visit:    Type 2 diabetes mellitus with diabetic polyneuropathy, with long-term current use of insulin ([address]  -     Comprehensive Metabolic Panel; Future  -     Lipid Panel; Future  -     Hemoglobin A1C; Future    Essential hypertension  -     losartan (COZAAR) 100 MG tablet; Take 1 tablet by mouth daily  -     Comprehensive Metabolic Panel; Future  -     Lipid Panel; Future    Anxiety and depression  -     citalopram (CELEXA) 20 MG tablet; Take 1 tablet by mouth daily    Chronic gout without tophus, unspecified cause, unspecified site  -     allopurinol (ZYLOPRIM) 300 MG tablet; Take 1 tablet by mouth daily    Hyperlipidemia, unspecified hyperlipidemia type    Other orders  -     docusate sodium (COLACE) 100 MG capsule; Take 1 capsule by mouth 2 times daily    Medication compliance discussed in detail. Consider home health care to maintain compliance with medical treatments. Return in about 3 months (around 7/15/2020). Sheldon Guevara received counseling on the following healthy behaviors: nutrition and medication adherence  Reviewed prior labs and health maintenance. Continue current medications, diet and exercise. Discussed use, benefit, and side effects of prescribed medications. Barriers to medication compliance addressed. Patient given educational materials - see patient instructions. All patient questions answered. Patient voiced understanding. Rakel Cary is a 77 y.o. female being evaluated by a Virtual Visit (video visit) encounter to address concerns as mentioned above. A caregiver was present when appropriate. Due to this being a TeleHealth encounter (During HOYRP-96 public health emergency), evaluation of the following organ systems was limited: Vitals/Constitutional/EENT/Resp/CV/GI//MS/Neuro/Skin/Heme-Lymph-Imm.   Pursuant to the emergency

## 2020-04-17 NOTE — TELEPHONE ENCOUNTER
Abbie here in office. states Bp still up last night but has not taken today. Again ask for pink pill sates does not \" pee much with current water pill. Agrees to [facility] referall.

## 2020-04-20 ENCOUNTER — TELEPHONE (OUTPATIENT)
Dept: FAMILY MEDICINE CLINIC | Age: 70
End: 2020-04-20

## 2020-04-23 ENCOUNTER — TELEPHONE (OUTPATIENT)
Dept: CARDIOLOGY | Age: 70
End: 2020-04-23

## 2020-04-27 RX ORDER — GLUCOSAMINE HCL/CHONDROITIN SU 500-400 MG
CAPSULE ORAL
Qty: 200 STRIP | Refills: 3 | Status: SHIPPED | OUTPATIENT
Start: 2020-04-27 | End: 2020-05-08 | Stop reason: SDUPTHER

## 2020-05-06 RX ORDER — GLUCOSAMINE HCL/CHONDROITIN SU 500-400 MG
CAPSULE ORAL
Qty: 300 STRIP | Refills: 0 | OUTPATIENT
Start: 2020-05-06

## 2020-05-07 ENCOUNTER — TELEPHONE (OUTPATIENT)
Dept: FAMILY MEDICINE CLINIC | Age: 70
End: 2020-05-07

## 2020-05-07 NOTE — TELEPHONE ENCOUNTER
blood glucose monitor strips 200 strip 3 4/27/2020     Sig: Check bid    Sent to pharmacy as: Blood Glucose Test In Vitro Strip    E-Prescribing Status: Receipt confirmed by pharmacy (4/27/2020  1:25 PM EDT)

## 2020-05-08 RX ORDER — GLUCOSAMINE HCL/CHONDROITIN SU 500-400 MG
CAPSULE ORAL
Qty: 200 STRIP | Refills: 3 | Status: ON HOLD | OUTPATIENT
Start: 2020-05-08 | End: 2020-08-24 | Stop reason: HOSPADM

## 2020-05-15 ENCOUNTER — TELEPHONE (OUTPATIENT)
Dept: FAMILY MEDICINE CLINIC | Age: 70
End: 2020-05-15

## 2020-05-20 RX ORDER — BLOOD-GLUCOSE METER
KIT MISCELLANEOUS
Qty: 1 DEVICE | OUTPATIENT
Start: 2020-05-20

## 2020-06-01 RX ORDER — CLOPIDOGREL BISULFATE 75 MG/1
TABLET ORAL
Qty: 90 TABLET | Refills: 1 | OUTPATIENT
Start: 2020-06-01

## 2020-06-01 RX ORDER — OMEPRAZOLE 20 MG/1
CAPSULE, DELAYED RELEASE ORAL
Qty: 90 CAPSULE | Refills: 1 | OUTPATIENT
Start: 2020-06-01

## 2020-07-15 ENCOUNTER — TELEPHONE (OUTPATIENT)
Dept: FAMILY MEDICINE CLINIC | Age: 70
End: 2020-07-15

## 2020-07-15 NOTE — LETTER
[facility] A department of [place] [address]  Phone: [phone]  Fax: [phone]      July 15, 2020     [address] [place] [address] [address] [address]      Dear Vianca Sandoval:    I am writing you because I have been informed of your missed appointment 7/15/2020. We ask that you please call 24 hours in advance if you are unable to make your appointment so that we can give that time to another patient in need. We care about you and the management of your healthcare and want to make sure that you follow up as recommended. I have to also mention, that in an effort to provide quality care and timely appointments to all my patients, it is our policy that patients be discharged from the practice if they do not show for three scheduled appointments. You would be informed of this termination by mail, and would have 30 days from the date of discharge to locate another physician. We're sorry you were unable to keep your appointment and hope that you are doing well. We would like to continue treating your healthcare needs. Please call the office to let us know your plans for treatment and to reschedule your appointment.      Sincerely,        ERINN Ac/Sandra

## 2020-07-16 RX ORDER — OXYBUTYNIN CHLORIDE 5 MG/1
TABLET ORAL
Qty: 60 TABLET | Refills: 0 | Status: SHIPPED | OUTPATIENT
Start: 2020-07-16 | End: 2020-10-23 | Stop reason: SDUPTHER

## 2020-07-16 RX ORDER — ALLOPURINOL 300 MG/1
300 TABLET ORAL DAILY
Qty: 60 TABLET | Refills: 0 | OUTPATIENT
Start: 2020-07-16

## 2020-07-16 RX ORDER — LOSARTAN POTASSIUM 100 MG/1
100 TABLET ORAL DAILY
Qty: 60 TABLET | Refills: 0 | OUTPATIENT
Start: 2020-07-16

## 2020-07-16 RX ORDER — CITALOPRAM 20 MG/1
20 TABLET ORAL DAILY
Qty: 60 TABLET | Refills: 0 | Status: SHIPPED | OUTPATIENT
Start: 2020-07-16 | End: 2020-10-23 | Stop reason: SDUPTHER

## 2020-07-16 RX ORDER — CARVEDILOL 25 MG/1
25 TABLET ORAL 2 TIMES DAILY WITH MEALS
Qty: 120 TABLET | Refills: 0 | Status: ON HOLD | OUTPATIENT
Start: 2020-07-16 | End: 2020-08-14 | Stop reason: HOSPADM

## 2020-07-16 RX ORDER — DOCUSATE SODIUM 100 MG/1
100 CAPSULE, LIQUID FILLED ORAL 2 TIMES DAILY
Qty: 60 CAPSULE | Refills: 1 | Status: SHIPPED | OUTPATIENT
Start: 2020-07-16 | End: 2020-10-23 | Stop reason: SDUPTHER

## 2020-07-16 RX ORDER — GABAPENTIN 300 MG/1
CAPSULE ORAL
Qty: 90 CAPSULE | Refills: 1 | Status: ON HOLD | OUTPATIENT
Start: 2020-07-16 | End: 2020-08-14 | Stop reason: HOSPADM

## 2020-07-16 RX ORDER — OMEPRAZOLE 20 MG/1
20 CAPSULE, DELAYED RELEASE ORAL
Qty: 60 CAPSULE | Refills: 0 | OUTPATIENT
Start: 2020-07-16

## 2020-07-16 RX ORDER — FUROSEMIDE 20 MG/1
20 TABLET ORAL DAILY
Qty: 60 TABLET | Refills: 1 | Status: ON HOLD | OUTPATIENT
Start: 2020-07-16 | End: 2020-08-14 | Stop reason: HOSPADM

## 2020-07-16 RX ORDER — MEMANTINE HYDROCHLORIDE 10 MG/1
TABLET ORAL
Qty: 60 TABLET | Refills: 0 | Status: SHIPPED | OUTPATIENT
Start: 2020-07-16 | End: 2020-10-23 | Stop reason: SDUPTHER

## 2020-07-16 RX ORDER — CLOPIDOGREL BISULFATE 75 MG/1
75 TABLET ORAL DAILY
Qty: 60 TABLET | Refills: 0 | Status: ON HOLD | OUTPATIENT
Start: 2020-07-16 | End: 2020-08-14 | Stop reason: HOSPADM

## 2020-07-16 RX ORDER — LORATADINE 10 MG/1
TABLET ORAL
Qty: 60 TABLET | Refills: 0 | Status: SHIPPED | OUTPATIENT
Start: 2020-07-16 | End: 2020-11-05 | Stop reason: SDUPTHER

## 2020-07-16 RX ORDER — ASPIRIN 81 MG/1
81 TABLET ORAL DAILY
Qty: 60 TABLET | Refills: 0 | Status: ON HOLD | OUTPATIENT
Start: 2020-07-16 | End: 2020-08-14 | Stop reason: HOSPADM

## 2020-07-29 RX ORDER — ATORVASTATIN CALCIUM 40 MG/1
TABLET, FILM COATED ORAL
Qty: 30 TABLET | Refills: 0 | Status: SHIPPED | OUTPATIENT
Start: 2020-07-29 | End: 2020-10-23 | Stop reason: SDUPTHER

## 2020-08-03 RX ORDER — CITALOPRAM 20 MG/1
20 TABLET ORAL DAILY
Qty: 60 TABLET | Refills: 0 | OUTPATIENT
Start: 2020-08-03

## 2020-08-05 ENCOUNTER — TELEPHONE (OUTPATIENT)
Dept: FAMILY MEDICINE CLINIC | Age: 70
End: 2020-08-05

## 2020-08-05 NOTE — TELEPHONE ENCOUNTER
Abbie called requesting we have someone come to house to draw blood. She is worried about mom. Not getting out of bed and feet are swollen and has blisters on them . Blood sugar 120 refuses to have daughter take blood pressure. Daughter says she is seeing things. No Carrel says she always done that but daughter states  It is more than usual.Should we try to get home health in?  Nicki Carrel agrees

## 2020-08-07 ENCOUNTER — HOSPITAL ENCOUNTER (INPATIENT)
Age: 70
LOS: 7 days | Discharge: NURSING, REHAB OR LONG-TERM CARE | DRG: 270 | End: 2020-08-14
Attending: INTERNAL MEDICINE | Admitting: INTERNAL MEDICINE
Payer: MEDICARE

## 2020-08-07 ENCOUNTER — APPOINTMENT (OUTPATIENT)
Dept: GENERAL RADIOLOGY | Age: 70
End: 2020-08-07
Payer: MEDICARE

## 2020-08-07 ENCOUNTER — TELEPHONE (OUTPATIENT)
Dept: FAMILY MEDICINE CLINIC | Age: 70
End: 2020-08-07

## 2020-08-07 ENCOUNTER — APPOINTMENT (OUTPATIENT)
Dept: CT IMAGING | Age: 70
End: 2020-08-07
Payer: MEDICARE

## 2020-08-07 ENCOUNTER — HOSPITAL ENCOUNTER (EMERGENCY)
Age: 70
Discharge: ANOTHER HOSPITAL | End: 2020-08-07
Attending: EMERGENCY MEDICINE
Payer: MEDICARE

## 2020-08-07 VITALS
SYSTOLIC BLOOD PRESSURE: 182 MMHG | DIASTOLIC BLOOD PRESSURE: 76 MMHG | WEIGHT: 210 LBS | TEMPERATURE: 96.3 F | HEART RATE: 66 BPM | OXYGEN SATURATION: 95 % | BODY MASS INDEX: 34.95 KG/M2 | RESPIRATION RATE: 18 BRPM

## 2020-08-07 DIAGNOSIS — I25.10 CORONARY ARTERY DISEASE INVOLVING NATIVE HEART WITHOUT ANGINA PECTORIS, UNSPECIFIED VESSEL OR LESION TYPE: ICD-10-CM

## 2020-08-07 DIAGNOSIS — N17.9 ACUTE KIDNEY INJURY SUPERIMPOSED ON CKD (HCC): Primary | ICD-10-CM

## 2020-08-07 DIAGNOSIS — N18.9 ACUTE KIDNEY INJURY SUPERIMPOSED ON CKD (HCC): Primary | ICD-10-CM

## 2020-08-07 DIAGNOSIS — I63.9 CEREBROVASCULAR ACCIDENT (CVA), UNSPECIFIED MECHANISM (HCC): ICD-10-CM

## 2020-08-07 PROBLEM — I50.43 CHF (CONGESTIVE HEART FAILURE), NYHA CLASS I, ACUTE ON CHRONIC, COMBINED (HCC): Status: ACTIVE | Noted: 2020-08-07

## 2020-08-07 LAB
-: ABNORMAL
ABSOLUTE EOS #: 0.08 K/UL (ref 0–0.44)
ABSOLUTE IMMATURE GRANULOCYTE: 0.03 K/UL (ref 0–0.3)
ABSOLUTE LYMPH #: 1.35 K/UL (ref 1.1–3.7)
ABSOLUTE MONO #: 0.74 K/UL (ref 0.1–1.2)
ALBUMIN SERPL-MCNC: 2.2 G/DL (ref 3.5–5.2)
ALBUMIN/GLOBULIN RATIO: 0.5 (ref 1–2.5)
ALP BLD-CCNC: 141 U/L (ref 35–104)
ALT SERPL-CCNC: 7 U/L (ref 5–33)
AMORPHOUS: ABNORMAL
AMYLASE: 11 U/L (ref 28–100)
ANION GAP SERPL CALCULATED.3IONS-SCNC: 10 MMOL/L (ref 9–17)
AST SERPL-CCNC: 21 U/L
BACTERIA: ABNORMAL
BASOPHILS # BLD: 0 % (ref 0–2)
BASOPHILS ABSOLUTE: 0.03 K/UL (ref 0–0.2)
BILIRUB SERPL-MCNC: 0.98 MG/DL (ref 0.3–1.2)
BILIRUBIN DIRECT: 0.43 MG/DL
BILIRUBIN URINE: NEGATIVE
BILIRUBIN, INDIRECT: 0.55 MG/DL (ref 0–1)
BNP INTERPRETATION: ABNORMAL
BUN BLDV-MCNC: 20 MG/DL (ref 8–23)
BUN/CREAT BLD: 16 (ref 9–20)
CALCIUM SERPL-MCNC: 8.4 MG/DL (ref 8.6–10.4)
CASTS UA: ABNORMAL /LPF (ref 0–2)
CHLORIDE BLD-SCNC: 96 MMOL/L (ref 98–107)
CHP ED QC CHECK: NORMAL
CO2: 27 MMOL/L (ref 20–31)
COLOR: ABNORMAL
COMMENT UA: ABNORMAL
CREAT SERPL-MCNC: 1.28 MG/DL (ref 0.5–0.9)
CRYSTALS, UA: ABNORMAL /HPF
DIFFERENTIAL TYPE: ABNORMAL
EOSINOPHILS RELATIVE PERCENT: 1 % (ref 1–4)
EPITHELIAL CELLS UA: ABNORMAL /HPF (ref 0–5)
GFR AFRICAN AMERICAN: 50 ML/MIN
GFR NON-AFRICAN AMERICAN: 41 ML/MIN
GFR SERPL CREATININE-BSD FRML MDRD: ABNORMAL ML/MIN/{1.73_M2}
GFR SERPL CREATININE-BSD FRML MDRD: ABNORMAL ML/MIN/{1.73_M2}
GLOBULIN: 4.3 G/DL (ref 1.5–3.8)
GLUCOSE BLD-MCNC: 270 MG/DL (ref 65–105)
GLUCOSE BLD-MCNC: 366 MG/DL (ref 65–105)
GLUCOSE BLD-MCNC: 400 MG/DL (ref 70–99)
GLUCOSE URINE: ABNORMAL
HCT VFR BLD CALC: 35.3 % (ref 36.3–47.1)
HEMOGLOBIN: 11.5 G/DL (ref 11.9–15.1)
IMMATURE GRANULOCYTES: 0 %
KETONES, URINE: NEGATIVE
LACTIC ACID: 1.7 MMOL/L (ref 0.5–2.2)
LEUKOCYTE ESTERASE, URINE: NEGATIVE
LIPASE: 19 U/L (ref 13–60)
LYMPHOCYTES # BLD: 13 % (ref 24–43)
MCH RBC QN AUTO: 29 PG (ref 25.2–33.5)
MCHC RBC AUTO-ENTMCNC: 32.6 G/DL (ref 25.2–33.5)
MCV RBC AUTO: 89.1 FL (ref 82.6–102.9)
MONOCYTES # BLD: 7 % (ref 3–12)
MUCUS: ABNORMAL
MYOGLOBIN: 747 NG/ML (ref 25–58)
NITRITE, URINE: NEGATIVE
NRBC AUTOMATED: 0 PER 100 WBC
OTHER OBSERVATIONS UA: ABNORMAL
PDW BLD-RTO: 15.8 % (ref 11.8–14.4)
PH UA: 6.5 (ref 5–6)
PLATELET # BLD: 396 K/UL (ref 138–453)
PLATELET ESTIMATE: ABNORMAL
PMV BLD AUTO: 10.5 FL (ref 8.1–13.5)
POTASSIUM SERPL-SCNC: 3.8 MMOL/L (ref 3.7–5.3)
PRO-BNP: ABNORMAL PG/ML
PROTEIN UA: ABNORMAL
RBC # BLD: 3.96 M/UL (ref 3.95–5.11)
RBC # BLD: ABNORMAL 10*6/UL
RBC UA: ABNORMAL /HPF (ref 0–4)
RENAL EPITHELIAL, UA: ABNORMAL /HPF
SEG NEUTROPHILS: 79 % (ref 36–65)
SEGMENTED NEUTROPHILS ABSOLUTE COUNT: 7.82 K/UL (ref 1.5–8.1)
SODIUM BLD-SCNC: 133 MMOL/L (ref 135–144)
SPECIFIC GRAVITY UA: 1.02 (ref 1.01–1.02)
TOTAL CK: 413 U/L (ref 26–192)
TOTAL PROTEIN: 6.5 G/DL (ref 6.4–8.3)
TRICHOMONAS: ABNORMAL
TROPONIN INTERP: ABNORMAL
TROPONIN INTERP: ABNORMAL
TROPONIN T: ABNORMAL NG/ML
TROPONIN T: ABNORMAL NG/ML
TROPONIN, HIGH SENSITIVITY: 67 NG/L (ref 0–14)
TROPONIN, HIGH SENSITIVITY: 68 NG/L (ref 0–14)
TURBIDITY: ABNORMAL
URINE HGB: ABNORMAL
UROBILINOGEN, URINE: NORMAL
WBC # BLD: 10.1 K/UL (ref 3.5–11.3)
WBC # BLD: ABNORMAL 10*3/UL
WBC UA: >50 /HPF (ref 0–4)
YEAST: ABNORMAL

## 2020-08-07 PROCEDURE — 99223 1ST HOSP IP/OBS HIGH 75: CPT | Performed by: NURSE PRACTITIONER

## 2020-08-07 PROCEDURE — 96372 THER/PROPH/DIAG INJ SC/IM: CPT

## 2020-08-07 PROCEDURE — 99285 EMERGENCY DEPT VISIT HI MDM: CPT

## 2020-08-07 PROCEDURE — 72125 CT NECK SPINE W/O DYE: CPT

## 2020-08-07 PROCEDURE — 83880 ASSAY OF NATRIURETIC PEPTIDE: CPT

## 2020-08-07 PROCEDURE — 6360000002 HC RX W HCPCS: Performed by: CLINICAL NURSE SPECIALIST

## 2020-08-07 PROCEDURE — 2580000003 HC RX 258: Performed by: EMERGENCY MEDICINE

## 2020-08-07 PROCEDURE — 87040 BLOOD CULTURE FOR BACTERIA: CPT

## 2020-08-07 PROCEDURE — 81001 URINALYSIS AUTO W/SCOPE: CPT

## 2020-08-07 PROCEDURE — 84484 ASSAY OF TROPONIN QUANT: CPT

## 2020-08-07 PROCEDURE — 70450 CT HEAD/BRAIN W/O DYE: CPT

## 2020-08-07 PROCEDURE — 82947 ASSAY GLUCOSE BLOOD QUANT: CPT

## 2020-08-07 PROCEDURE — 87088 URINE BACTERIA CULTURE: CPT

## 2020-08-07 PROCEDURE — 80048 BASIC METABOLIC PNL TOTAL CA: CPT

## 2020-08-07 PROCEDURE — 87086 URINE CULTURE/COLONY COUNT: CPT

## 2020-08-07 PROCEDURE — 80076 HEPATIC FUNCTION PANEL: CPT

## 2020-08-07 PROCEDURE — 83874 ASSAY OF MYOGLOBIN: CPT

## 2020-08-07 PROCEDURE — 85025 COMPLETE CBC W/AUTO DIFF WBC: CPT

## 2020-08-07 PROCEDURE — 82150 ASSAY OF AMYLASE: CPT

## 2020-08-07 PROCEDURE — 6370000000 HC RX 637 (ALT 250 FOR IP): Performed by: EMERGENCY MEDICINE

## 2020-08-07 PROCEDURE — 6360000002 HC RX W HCPCS: Performed by: EMERGENCY MEDICINE

## 2020-08-07 PROCEDURE — 93005 ELECTROCARDIOGRAM TRACING: CPT | Performed by: EMERGENCY MEDICINE

## 2020-08-07 PROCEDURE — 96365 THER/PROPH/DIAG IV INF INIT: CPT

## 2020-08-07 PROCEDURE — 1200000000 HC SEMI PRIVATE

## 2020-08-07 PROCEDURE — 87186 SC STD MICRODIL/AGAR DIL: CPT

## 2020-08-07 PROCEDURE — 83690 ASSAY OF LIPASE: CPT

## 2020-08-07 PROCEDURE — 36415 COLL VENOUS BLD VENIPUNCTURE: CPT

## 2020-08-07 PROCEDURE — 83605 ASSAY OF LACTIC ACID: CPT

## 2020-08-07 PROCEDURE — 82550 ASSAY OF CK (CPK): CPT

## 2020-08-07 PROCEDURE — 71045 X-RAY EXAM CHEST 1 VIEW: CPT

## 2020-08-07 RX ORDER — MEMANTINE HYDROCHLORIDE 5 MG/1
10 TABLET ORAL 2 TIMES DAILY
Status: DISCONTINUED | OUTPATIENT
Start: 2020-08-07 | End: 2020-08-14 | Stop reason: HOSPADM

## 2020-08-07 RX ORDER — HEPARIN SODIUM 5000 [USP'U]/ML
5000 INJECTION, SOLUTION INTRAVENOUS; SUBCUTANEOUS EVERY 8 HOURS SCHEDULED
Status: DISCONTINUED | OUTPATIENT
Start: 2020-08-07 | End: 2020-08-14 | Stop reason: HOSPADM

## 2020-08-07 RX ORDER — RIVASTIGMINE 9.5 MG/24H
1 PATCH, EXTENDED RELEASE TRANSDERMAL DAILY
Status: DISCONTINUED | OUTPATIENT
Start: 2020-08-08 | End: 2020-08-14 | Stop reason: HOSPADM

## 2020-08-07 RX ORDER — ACETAMINOPHEN 650 MG/1
650 SUPPOSITORY RECTAL EVERY 6 HOURS PRN
Status: DISCONTINUED | OUTPATIENT
Start: 2020-08-07 | End: 2020-08-14 | Stop reason: HOSPADM

## 2020-08-07 RX ORDER — PANTOPRAZOLE SODIUM 40 MG/1
40 TABLET, DELAYED RELEASE ORAL
Status: DISCONTINUED | OUTPATIENT
Start: 2020-08-08 | End: 2020-08-14 | Stop reason: HOSPADM

## 2020-08-07 RX ORDER — PROMETHAZINE HYDROCHLORIDE 25 MG/1
12.5 TABLET ORAL EVERY 6 HOURS PRN
Status: DISCONTINUED | OUTPATIENT
Start: 2020-08-07 | End: 2020-08-14 | Stop reason: HOSPADM

## 2020-08-07 RX ORDER — ALLOPURINOL 100 MG/1
100 TABLET ORAL DAILY
Status: DISCONTINUED | OUTPATIENT
Start: 2020-08-08 | End: 2020-08-14 | Stop reason: HOSPADM

## 2020-08-07 RX ORDER — CETIRIZINE HYDROCHLORIDE 10 MG/1
5 TABLET ORAL DAILY
Status: DISCONTINUED | OUTPATIENT
Start: 2020-08-08 | End: 2020-08-14 | Stop reason: HOSPADM

## 2020-08-07 RX ORDER — SODIUM CHLORIDE 0.9 % (FLUSH) 0.9 %
10 SYRINGE (ML) INJECTION PRN
Status: DISCONTINUED | OUTPATIENT
Start: 2020-08-07 | End: 2020-08-14 | Stop reason: HOSPADM

## 2020-08-07 RX ORDER — INSULIN GLARGINE 100 [IU]/ML
50 INJECTION, SOLUTION SUBCUTANEOUS NIGHTLY
Status: DISCONTINUED | OUTPATIENT
Start: 2020-08-07 | End: 2020-08-08

## 2020-08-07 RX ORDER — ASPIRIN 81 MG/1
81 TABLET ORAL DAILY
Status: DISCONTINUED | OUTPATIENT
Start: 2020-08-08 | End: 2020-08-14 | Stop reason: HOSPADM

## 2020-08-07 RX ORDER — SODIUM CHLORIDE 0.9 % (FLUSH) 0.9 %
10 SYRINGE (ML) INJECTION EVERY 12 HOURS SCHEDULED
Status: DISCONTINUED | OUTPATIENT
Start: 2020-08-07 | End: 2020-08-14 | Stop reason: HOSPADM

## 2020-08-07 RX ORDER — POLYETHYLENE GLYCOL 3350 17 G/17G
17 POWDER, FOR SOLUTION ORAL DAILY PRN
Status: DISCONTINUED | OUTPATIENT
Start: 2020-08-07 | End: 2020-08-14 | Stop reason: HOSPADM

## 2020-08-07 RX ORDER — DOCUSATE SODIUM 100 MG/1
100 CAPSULE, LIQUID FILLED ORAL 2 TIMES DAILY
Status: DISCONTINUED | OUTPATIENT
Start: 2020-08-07 | End: 2020-08-14 | Stop reason: HOSPADM

## 2020-08-07 RX ORDER — ONDANSETRON 2 MG/ML
4 INJECTION INTRAMUSCULAR; INTRAVENOUS EVERY 6 HOURS PRN
Status: DISCONTINUED | OUTPATIENT
Start: 2020-08-07 | End: 2020-08-14 | Stop reason: HOSPADM

## 2020-08-07 RX ORDER — NICOTINE 21 MG/24HR
1 PATCH, TRANSDERMAL 24 HOURS TRANSDERMAL DAILY PRN
Status: DISCONTINUED | OUTPATIENT
Start: 2020-08-07 | End: 2020-08-14 | Stop reason: HOSPADM

## 2020-08-07 RX ORDER — ACETAMINOPHEN 325 MG/1
650 TABLET ORAL EVERY 6 HOURS PRN
Status: DISCONTINUED | OUTPATIENT
Start: 2020-08-07 | End: 2020-08-14 | Stop reason: HOSPADM

## 2020-08-07 RX ORDER — CITALOPRAM 20 MG/1
20 TABLET ORAL DAILY
Status: DISCONTINUED | OUTPATIENT
Start: 2020-08-08 | End: 2020-08-14 | Stop reason: HOSPADM

## 2020-08-07 RX ORDER — CLOPIDOGREL BISULFATE 75 MG/1
75 TABLET ORAL DAILY
Status: DISCONTINUED | OUTPATIENT
Start: 2020-08-08 | End: 2020-08-14 | Stop reason: HOSPADM

## 2020-08-07 RX ORDER — LOSARTAN POTASSIUM 50 MG/1
100 TABLET ORAL DAILY
Status: DISCONTINUED | OUTPATIENT
Start: 2020-08-08 | End: 2020-08-14 | Stop reason: HOSPADM

## 2020-08-07 RX ORDER — CARVEDILOL 25 MG/1
25 TABLET ORAL 2 TIMES DAILY WITH MEALS
Status: DISCONTINUED | OUTPATIENT
Start: 2020-08-08 | End: 2020-08-08

## 2020-08-07 RX ORDER — SODIUM CHLORIDE 9 MG/ML
INJECTION, SOLUTION INTRAVENOUS CONTINUOUS
Status: DISCONTINUED | OUTPATIENT
Start: 2020-08-07 | End: 2020-08-12

## 2020-08-07 RX ORDER — GABAPENTIN 100 MG/1
100 CAPSULE ORAL 3 TIMES DAILY
Status: DISCONTINUED | OUTPATIENT
Start: 2020-08-07 | End: 2020-08-14 | Stop reason: HOSPADM

## 2020-08-07 RX ORDER — NYSTATIN 100000 U/G
CREAM TOPICAL 2 TIMES DAILY
Status: DISCONTINUED | OUTPATIENT
Start: 2020-08-07 | End: 2020-08-14 | Stop reason: HOSPADM

## 2020-08-07 RX ORDER — OXYBUTYNIN CHLORIDE 5 MG/1
5 TABLET ORAL 2 TIMES DAILY
Status: DISCONTINUED | OUTPATIENT
Start: 2020-08-07 | End: 2020-08-14 | Stop reason: HOSPADM

## 2020-08-07 RX ADMIN — HEPARIN SODIUM 5000 UNITS: 5000 INJECTION INTRAVENOUS; SUBCUTANEOUS at 23:00

## 2020-08-07 RX ADMIN — INSULIN HUMAN 8 UNITS: 100 INJECTION, SOLUTION PARENTERAL at 17:28

## 2020-08-07 RX ADMIN — CEFTRIAXONE 1 G: 1 INJECTION, POWDER, FOR SOLUTION INTRAMUSCULAR; INTRAVENOUS at 17:59

## 2020-08-07 ASSESSMENT — PAIN SCALES - GENERAL: PAINLEVEL_OUTOF10: 0

## 2020-08-07 NOTE — TELEPHONE ENCOUNTER
Daughter here and states mom fell last night has gash on head. Also states has sore on leg that has \"puss and bugs coming out\". encouraged to call rescue squad when she gets home and agrees. Daughter states contouies to see things and is also incontinent.

## 2020-08-07 NOTE — ED PROVIDER NOTES
[facility] ED  [address]  [place] [address] [place]  Phone: [phone]  Mary      Pt Name: Benito Mosqueda  MRN: [id number]  Ifeoma 1950  Date of evaluation: 8/7/2020    CHIEF COMPLAINT       Chief Complaint   Patient presents with    Hyperglycemia    Other     pt fopund laying on ground and had been there since last night       [phone] Pierce Gibson is a 77 y.o. female who presents to the emergency department brought by ambulance after being found on the ground where she laid all night after falling. She says she tripped while trying to go to the bathroom. Her blood sugar was found to be greater than 400 and she was incontinent of urine. She denies any head injury presents with a c-collar and some facial swelling. She presents unkempt dirty with multiple wounds on her legs and maggots crawling on her along with bedbug bites. Discussion with daughter later in the emergency department she states that last night the patient fell but did not want to have the ambulance called so the daughter did not call. This afternoon her son told the landlord that they needed help because she still was not off of the floor so they called paramedics.     REVIEW OF SYSTEMS       Constitutional: No fevers or chills   HEENT: No sore throat, rhinorrhea, or earache   Eyes: No blurry vision or double vision no drainage   Cardiovascular: No chest pain or tachycardia   Respiratory: No wheezing or shortness of breath no cough   Gastrointestinal: No nausea, vomiting, diarrhea, constipation, or abdominal pain   : No hematuria or dysuria   Musculoskeletal: Positive bilateral lower extremity edema  Skin: Positive lower extremity stasis changes  Neurological: No focal neurologic complaints, paresthesias, weakness, or headache     PAST MEDICAL HISTORY    has a past medical history of Anxiety and depression, Background diabetic retinopathy(362.01), Balance problem, CAD (coronary artery disease), Cancer of uterus ([facility] Chronic kidney disease, Chronic low back pain, CVA (cerebral vascular accident) ([facility] [address] [facility] Dementia ([facility] [address] Dry eye syndrome, GERD (gastroesophageal reflux disease), Glaucoma suspect, Gout, Homonymous hemianopsia, Hyperlipidemia, Hypertension, Keratitis, Obesity, Peripheral polyneuropathy, Pseudophakos, Stroke ([facility] [address] [facility] Trichiasis, and Type 2 diabetes mellitus ([facility] [address] SURGICAL HISTORY      has a past surgical history that includes Gastric bypass surgery; Cataract removal with implant (2012); Cataract removal with implant (2012); Coronary artery bypass graft (12-);  section; Hysterectomy; Cholecystectomy; Tonsillectomy and adenoidectomy; and Eye surgery (Left). CURRENT MEDICATIONS       Previous Medications    ALLOPURINOL (ZYLOPRIM) 300 MG TABLET    Take 1 tablet by mouth daily    ASPIRIN 81 MG TABLET    Take 1 tablet by mouth daily    ASPIRIN EC 81 MG EC TABLET    Take 1 tablet by mouth daily    ATORVASTATIN (LIPITOR) 40 MG TABLET    TAKE 1 TABLET BY MOUTH EVERYDAY    BLOOD GLUCOSE MONITOR STRIPS    Check bid    CARVEDILOL (COREG) 25 MG TABLET    Take 1 tablet by mouth 2 times daily (with meals)    CITALOPRAM (CELEXA) 20 MG TABLET    Take 1 tablet by mouth daily    CLOPIDOGREL (PLAVIX) 75 MG TABLET    Take 1 tablet by mouth daily    COMPRESSION STOCKINGS MISC    by Does not apply route 20-30 mm Hg bilateral knee high    DIABETIC SHOE MISC    by Does not apply route    DOCUSATE SODIUM (COLACE) 100 MG CAPSULE    Take 1 capsule by mouth 2 times daily    FUROSEMIDE (LASIX) 20 MG TABLET    Take 1 tablet by mouth daily    GABAPENTIN (NEURONTIN) 300 MG CAPSULE    Take 300 mg by mouth 3 times daily. Lindsay Vega     GABAPENTIN (NEURONTIN) 300 MG CAPSULE    TAKE ONE CAPSULE BY MOUTH THREE TIMES DAILY    GLUCOSE BLOOD (BLOOD GLUCOSE TEST STRIPS) STRP    truemetrix test strips check ac and HS    INSULIN GLARGINE (LANTUS) 100 UNIT/ML INJECTION VIAL    Inject 50 Respiratory: Clear to auscultation bilaterally no wheezes, rhonchi, rales, no respiratory distress no tachypnea no retractions no hypoxia  Gastrointestinal: Soft, nontender, nondistended, positive bowel sounds. No rebound, rigidity, or guarding. Musculoskeletal: Bilateral lower extremity edema with multiple skin ulcers and erythema no asymmetry. Positive pedal edema no calf tenderness. Neurologic: Moving all 4 extremities  Skin: Warm and dry       DIFFERENTIAL DIAGNOSIS/ MDM:     Unkempt hyperglycemic. IV fluids and labs. CT head and cervical spine. DIAGNOSTIC RESULTS     EKG: All EKG's are interpreted by the Emergency Department Physician who either signs or Co-signs this chart in the absence of a cardiologist.    1555 sinus rhythm first-degree AV block rate 80    no acute ST or T wave changes.     Not indicated unless otherwise documented above    LABS:  Results for orders placed or performed during the hospital encounter of 08/07/20   CBC Auto Differential   Result Value Ref Range    WBC 10.1 3.5 - 11.3 k/uL    RBC 3.96 3.95 - 5.11 m/uL    Hemoglobin 11.5 (L) 11.9 - 15.1 g/dL    Hematocrit 35.3 (L) 36.3 - 47.1 %    MCV 89.1 82.6 - 102.9 fL    MCH 29.0 25.2 - 33.5 pg    MCHC 32.6 25.2 - 33.5 g/dL    RDW 15.8 (H) 11.8 - 14.4 %    Platelets 019 406 - 572 k/uL    MPV 10.5 8.1 - 13.5 fL    NRBC Automated 0.0 0.0 per 100 WBC    Differential Type NOT REPORTED     Seg Neutrophils 79 (H) 36 - 65 %    Lymphocytes 13 (L) 24 - 43 %    Monocytes 7 3 - 12 %    Eosinophils % 1 1 - 4 %    Basophils 0 0 - 2 %    Immature Granulocytes 0 0 %    Segs Absolute 7.82 1.50 - 8.10 k/uL    Absolute Lymph # 1.35 1.10 - 3.70 k/uL    Absolute Mono # 0.74 0.10 - 1.20 k/uL    Absolute Eos # 0.08 0.00 - 0.44 k/uL    Basophils Absolute 0.03 0.00 - 0.20 k/uL    Absolute Immature Granulocyte 0.03 0.00 - 0.30 k/uL    WBC Morphology NOT REPORTED     RBC Morphology ANISOCYTOSIS PRESENT     Platelet Estimate NOT REPORTED Basic Metabolic Panel   Result Value Ref Range    Glucose 400 (H) 70 - 99 mg/dL    BUN 20 8 - 23 mg/dL    CREATININE 1.28 (H) 0.50 - 0.90 mg/dL    Bun/Cre Ratio 16 9 - 20    Calcium 8.4 (L) 8.6 - 10.4 mg/dL    Sodium 133 (L) 135 - 144 mmol/L    Potassium 3.8 3.7 - 5.3 mmol/L    Chloride 96 (L) 98 - 107 mmol/L    CO2 27 20 - 31 mmol/L    Anion Gap 10 9 - 17 mmol/L    GFR Non-African American 41 (L) >60 mL/min    GFR African American 50 (L) >60 mL/min    GFR Comment          GFR Staging NOT REPORTED    Troponin   Result Value Ref Range    Troponin, High Sensitivity 68 (HH) 0 - 14 ng/L    Troponin T NOT REPORTED <0.03 ng/mL    Troponin Interp NOT REPORTED    CK   Result Value Ref Range    Total  (H) 26 - 192 U/L   Myoglobin, Serum   Result Value Ref Range    Myoglobin 747 (H) 25 - 58 ng/mL   Lactic Acid   Result Value Ref Range    Lactic Acid 1.7 0.5 - 2.2 mmol/L   Urinalysis Reflex to Culture    Specimen: Urine, straight catheter   Result Value Ref Range    Color, UA NOT REPORTED YELLOW    Turbidity UA NOT REPORTED CLEAR    Glucose, Ur 3+ (A) NEGATIVE    Bilirubin Urine NEGATIVE NEGATIVE    Ketones, Urine NEGATIVE NEGATIVE    Specific Gravity, UA 1.025 1.010 - 1.025    Urine Hgb 3+ (A) NEGATIVE    pH, UA 6.5 (H) 5.0 - 6.0    Protein, UA 2+ (A) NEGATIVE    Urobilinogen, Urine Normal Normal    Nitrite, Urine NEGATIVE NEGATIVE    Leukocyte Esterase, Urine NEGATIVE NEGATIVE    Urinalysis Comments NOT REPORTED    Brain Natriuretic Peptide   Result Value Ref Range    Pro-BNP 31,859 (H) <300 pg/mL    BNP Interpretation Pro-BNP Reference Range:    Amylase   Result Value Ref Range    Amylase 11 (L) 28 - 100 U/L   Lipase   Result Value Ref Range    Lipase 19 13 - 60 U/L   Hepatic Function Panel   Result Value Ref Range    Alb 2.2 (L) 3.5 - 5.2 g/dL    Alkaline Phosphatase 141 (H) 35 - 104 U/L    ALT 7 5 - 33 U/L    AST 21 <32 U/L    Total Bilirubin 0.98 0.3 - 1.2 mg/dL    Bilirubin, Direct 0.43 (H) <0.31 mg/dL Bilirubin, Indirect 0.55 0.00 - 1.00 mg/dL    Total Protein 6.5 6.4 - 8.3 g/dL    Globulin 4.3 (H) 1.5 - 3.8 g/dL    Albumin/Globulin Ratio 0.5 (L) 1.0 - 2.5   Microscopic Urinalysis   Result Value Ref Range    -          WBC, UA >50 0 - 4 /HPF    RBC, UA 25 TO 50 0 - 4 /HPF    Casts UA NOT REPORTED 0 - 2 /LPF    Crystals, UA NOT REPORTED None /HPF    Epithelial Cells UA 0 TO 4 0 - 5 /HPF    Renal Epithelial, UA NOT REPORTED 0 /HPF    Bacteria, UA 4+ (A) None    Mucus, UA NOT REPORTED None    Trichomonas, UA NOT REPORTED None    Amorphous, UA NOT REPORTED None    Other Observations UA Specimen Cultured (A) NOT REQ. Yeast, UA NOT REPORTED None   POCT Glucose   Result Value Ref Range    QC OK? ok    POC Glucose Fingerstick   Result Value Ref Range    POC Glucose 366 (H) 65 - 105 mg/dL   EKG 12 Lead   Result Value Ref Range    Ventricular Rate 80 BPM    Atrial Rate 80 BPM    P-R Interval 206 ms    QRS Duration 114 ms    Q-T Interval 450 ms    QTc Calculation (Bazett) 519 ms    P Axis 79 degrees    R Axis -78 degrees    T Axis 101 degrees       Not indicated unless otherwise documented above    RADIOLOGY:   I reviewed the radiologist interpretations:    XR CHEST PORTABLE   Final Result   Mild cardiomegaly and central vascular congestion. Basilar nodules are   present, denser than rib favoring granulomas or hamartomas. CT HEAD WO CONTRAST   Final Result   No acute intracranial abnormality. Left sided scalp edema/hematoma without underlying calvarial fracture. Peripheral mucosal thickening of the paranasal sinuses. Remote left PCA territory infarction. CT CERVICAL SPINE WO CONTRAST   Final Result   No acute abnormality of the cervical spine. Mild multilevel degenerative   changes. Interlobular septal thickening apices; correlate for edema or other disease. 4 cm left thyroid nodule. If not already assessed previously, nonemergent   follow-up ultrasound suggested. Not indicated unless otherwise documented above    EMERGENCY DEPARTMENT COURSE:     The patient was given the following medications:  Orders Placed This Encounter   Medications    insulin regular (HUMULIN R;NOVOLIN R) injection 8 Units    cefTRIAXone (ROCEPHIN) 1 g IVPB in 50 mL D5W minibag        Vitals:   -------------------------  BP (!) 209/95   Pulse 82   Temp 96.3 °F (35.7 °C) (Temporal)   Resp 20   Wt 210 lb (95.3 kg)   SpO2 95%   BMI 34.95 kg/m²     5:20 PM CT of the head and cervical spine negative for acute process. Chest x-ray no infiltrate. C-collar cleared. Positive UTI. Blood sugar elevated BUN and creatinine relatively normal.  BNP significantly elevated no chest pain or shortness of breath. She does have bilateral lower extremity stasis changes with erythema and multiple wounds on her legs. 5:40 PM Long discussion with the daughter regarding patient being transferred for cardiology and wound care etc.  Daughter is agreeable. Awaiting callback. 6 PM discussed with Pavan Yang for Dr. Linsey Benoit who agrees to admission awaiting bed assignment and transport. Also awaiting second troponin. I have reviewed the disposition diagnosis with the patient and or their family/guardian. I have answered their questions and given discharge instructions. They voiced understanding of these instructions and did not have any furtherquestions or complaints. CRITICAL CARE:    None    CONSULTS:    None    PROCEDURES:    None      OARRS Report if indicated             FINAL IMPRESSION      1. Fall at home, initial encounter    2. Hyperglycemia    3. Acute congestive heart failure, unspecified heart failure type ([facility] [address]          DISPOSITION/PLAN   DISPOSITION          CONDITION ON DISPOSITION: STABLE       PATIENT REFERRED TO:  No follow-up provider specified.     DISCHARGE MEDICATIONS:  New Prescriptions    No medications on file       (Please note that portions of thisnote were completed with a

## 2020-08-08 ENCOUNTER — APPOINTMENT (OUTPATIENT)
Dept: GENERAL RADIOLOGY | Age: 70
DRG: 270 | End: 2020-08-08
Attending: INTERNAL MEDICINE
Payer: MEDICARE

## 2020-08-08 PROBLEM — R77.8 ELEVATED TROPONIN: Status: ACTIVE | Noted: 2020-08-08

## 2020-08-08 PROBLEM — N30.00 ACUTE CYSTITIS WITHOUT HEMATURIA: Status: ACTIVE | Noted: 2020-08-08

## 2020-08-08 PROBLEM — B88.8 INFESTATION BY BED BUG: Status: ACTIVE | Noted: 2020-08-08

## 2020-08-08 PROBLEM — B87.9 INFESTATION BY MAGGOTS: Status: ACTIVE | Noted: 2020-08-08
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
## 2020-08-08 PROBLEM — N30.00 ACUTE CYSTITIS WITHOUT HEMATURIA: Status: RESOLVED | Noted: 2020-08-08 | Resolved: 2020-08-08

## 2020-08-08 PROBLEM — R79.89 ELEVATED TROPONIN: Status: ACTIVE | Noted: 2020-08-08

## 2020-08-08 PROBLEM — I89.0 LYMPHEDEMA OF BOTH LOWER EXTREMITIES: Status: ACTIVE | Noted: 2020-08-08

## 2020-08-08 LAB
-: ABNORMAL
AMORPHOUS: ABNORMAL
ANION GAP SERPL CALCULATED.3IONS-SCNC: 10 MMOL/L (ref 9–17)
ANION GAP SERPL CALCULATED.3IONS-SCNC: 12 MMOL/L (ref 9–17)
BACTERIA: ABNORMAL
BILIRUBIN URINE: ABNORMAL
BNP INTERPRETATION: ABNORMAL
BUN BLDV-MCNC: 18 MG/DL (ref 8–23)
BUN BLDV-MCNC: 22 MG/DL (ref 8–23)
BUN/CREAT BLD: ABNORMAL (ref 9–20)
BUN/CREAT BLD: ABNORMAL (ref 9–20)
CALCIUM SERPL-MCNC: 7.9 MG/DL (ref 8.6–10.4)
CALCIUM SERPL-MCNC: 8 MG/DL (ref 8.6–10.4)
CASTS UA: ABNORMAL /LPF (ref 0–8)
CHLORIDE BLD-SCNC: 101 MMOL/L (ref 98–107)
CHLORIDE BLD-SCNC: 95 MMOL/L (ref 98–107)
CHOLESTEROL/HDL RATIO: 2.4
CHOLESTEROL: 97 MG/DL
CO2: 25 MMOL/L (ref 20–31)
CO2: 26 MMOL/L (ref 20–31)
COLOR: ABNORMAL
COMMENT UA: ABNORMAL
CREAT SERPL-MCNC: 1.18 MG/DL (ref 0.5–0.9)
CREAT SERPL-MCNC: 1.55 MG/DL (ref 0.5–0.9)
CRYSTALS, UA: ABNORMAL /HPF
EPITHELIAL CELLS UA: ABNORMAL /HPF (ref 0–5)
ESTIMATED AVERAGE GLUCOSE: 263 MG/DL
GFR AFRICAN AMERICAN: 40 ML/MIN
GFR AFRICAN AMERICAN: 55 ML/MIN
GFR NON-AFRICAN AMERICAN: 33 ML/MIN
GFR NON-AFRICAN AMERICAN: 46 ML/MIN
GFR SERPL CREATININE-BSD FRML MDRD: ABNORMAL ML/MIN/{1.73_M2}
GLUCOSE BLD-MCNC: 101 MG/DL (ref 70–99)
GLUCOSE BLD-MCNC: 119 MG/DL (ref 65–105)
GLUCOSE BLD-MCNC: 177 MG/DL (ref 65–105)
GLUCOSE BLD-MCNC: 177 MG/DL (ref 70–99)
GLUCOSE BLD-MCNC: 51 MG/DL (ref 65–105)
GLUCOSE BLD-MCNC: 86 MG/DL (ref 65–105)
GLUCOSE BLD-MCNC: 87 MG/DL (ref 65–105)
GLUCOSE URINE: ABNORMAL
HBA1C MFR BLD: 10.8 % (ref 4–6)
HCT VFR BLD CALC: 31.6 % (ref 36.3–47.1)
HDLC SERPL-MCNC: 41 MG/DL
HEMOGLOBIN: 10.2 G/DL (ref 11.9–15.1)
KETONES, URINE: NEGATIVE
LDL CHOLESTEROL: 45 MG/DL (ref 0–130)
LEUKOCYTE ESTERASE, URINE: ABNORMAL
MAGNESIUM: 1.3 MG/DL (ref 1.6–2.6)
MCH RBC QN AUTO: 29.2 PG (ref 25.2–33.5)
MCHC RBC AUTO-ENTMCNC: 32.3 G/DL (ref 28.4–34.8)
MCV RBC AUTO: 90.5 FL (ref 82.6–102.9)
MUCUS: ABNORMAL
MYOGLOBIN: 299 NG/ML (ref 25–58)
NITRITE, URINE: POSITIVE
NRBC AUTOMATED: 0 PER 100 WBC
OTHER OBSERVATIONS UA: ABNORMAL
PDW BLD-RTO: 16.2 % (ref 11.8–14.4)
PH UA: 5.5 (ref 5–8)
PLATELET # BLD: 364 K/UL (ref 138–453)
PMV BLD AUTO: 10 FL (ref 8.1–13.5)
POTASSIUM SERPL-SCNC: 3.1 MMOL/L (ref 3.7–5.3)
POTASSIUM SERPL-SCNC: 3.9 MMOL/L (ref 3.7–5.3)
PRO-BNP: ABNORMAL PG/ML
PROTEIN UA: ABNORMAL
RBC # BLD: 3.49 M/UL (ref 3.95–5.11)
RBC UA: ABNORMAL /HPF (ref 0–4)
RENAL EPITHELIAL, UA: ABNORMAL /HPF
SODIUM BLD-SCNC: 132 MMOL/L (ref 135–144)
SODIUM BLD-SCNC: 137 MMOL/L (ref 135–144)
SPECIFIC GRAVITY UA: 1.03 (ref 1–1.03)
TOTAL CK: 213 U/L (ref 26–192)
TRICHOMONAS: ABNORMAL
TRIGL SERPL-MCNC: 57 MG/DL
TROPONIN INTERP: ABNORMAL
TROPONIN T: ABNORMAL NG/ML
TROPONIN, HIGH SENSITIVITY: 76 NG/L (ref 0–14)
TROPONIN, HIGH SENSITIVITY: 80 NG/L (ref 0–14)
TROPONIN, HIGH SENSITIVITY: 80 NG/L (ref 0–14)
TSH SERPL DL<=0.05 MIU/L-ACNC: 1.86 MIU/L (ref 0.3–5)
TURBIDITY: ABNORMAL
URINE HGB: ABNORMAL
UROBILINOGEN, URINE: NORMAL
VLDLC SERPL CALC-MCNC: NORMAL MG/DL (ref 1–30)
WBC # BLD: 8.9 K/UL (ref 3.5–11.3)
WBC UA: ABNORMAL /HPF (ref 0–5)
YEAST: ABNORMAL

## 2020-08-08 PROCEDURE — 87086 URINE CULTURE/COLONY COUNT: CPT

## 2020-08-08 PROCEDURE — 84484 ASSAY OF TROPONIN QUANT: CPT

## 2020-08-08 PROCEDURE — 83880 ASSAY OF NATRIURETIC PEPTIDE: CPT

## 2020-08-08 PROCEDURE — 6370000000 HC RX 637 (ALT 250 FOR IP): Performed by: INTERNAL MEDICINE

## 2020-08-08 PROCEDURE — 85027 COMPLETE CBC AUTOMATED: CPT

## 2020-08-08 PROCEDURE — 82550 ASSAY OF CK (CPK): CPT

## 2020-08-08 PROCEDURE — 6360000002 HC RX W HCPCS: Performed by: INTERNAL MEDICINE

## 2020-08-08 PROCEDURE — 81001 URINALYSIS AUTO W/SCOPE: CPT

## 2020-08-08 PROCEDURE — 2580000003 HC RX 258: Performed by: CLINICAL NURSE SPECIALIST

## 2020-08-08 PROCEDURE — 1200000000 HC SEMI PRIVATE

## 2020-08-08 PROCEDURE — 82947 ASSAY GLUCOSE BLOOD QUANT: CPT

## 2020-08-08 PROCEDURE — 83874 ASSAY OF MYOGLOBIN: CPT

## 2020-08-08 PROCEDURE — 83735 ASSAY OF MAGNESIUM: CPT

## 2020-08-08 PROCEDURE — 36415 COLL VENOUS BLD VENIPUNCTURE: CPT

## 2020-08-08 PROCEDURE — 84443 ASSAY THYROID STIM HORMONE: CPT

## 2020-08-08 PROCEDURE — 2580000003 HC RX 258: Performed by: INTERNAL MEDICINE

## 2020-08-08 PROCEDURE — 6370000000 HC RX 637 (ALT 250 FOR IP): Performed by: CLINICAL NURSE SPECIALIST

## 2020-08-08 PROCEDURE — 80048 BASIC METABOLIC PNL TOTAL CA: CPT

## 2020-08-08 PROCEDURE — 6360000002 HC RX W HCPCS: Performed by: STUDENT IN AN ORGANIZED HEALTH CARE EDUCATION/TRAINING PROGRAM

## 2020-08-08 PROCEDURE — 71045 X-RAY EXAM CHEST 1 VIEW: CPT

## 2020-08-08 PROCEDURE — 93005 ELECTROCARDIOGRAM TRACING: CPT | Performed by: EMERGENCY MEDICINE

## 2020-08-08 PROCEDURE — 99233 SBSQ HOSP IP/OBS HIGH 50: CPT | Performed by: INTERNAL MEDICINE

## 2020-08-08 PROCEDURE — 6360000002 HC RX W HCPCS: Performed by: CLINICAL NURSE SPECIALIST

## 2020-08-08 PROCEDURE — 83036 HEMOGLOBIN GLYCOSYLATED A1C: CPT

## 2020-08-08 PROCEDURE — 80061 LIPID PANEL: CPT

## 2020-08-08 RX ORDER — INSULIN GLARGINE 100 [IU]/ML
35 INJECTION, SOLUTION SUBCUTANEOUS NIGHTLY
Status: DISCONTINUED | OUTPATIENT
Start: 2020-08-08 | End: 2020-08-09

## 2020-08-08 RX ORDER — DEXTROSE MONOHYDRATE 50 MG/ML
100 INJECTION, SOLUTION INTRAVENOUS PRN
Status: DISCONTINUED | OUTPATIENT
Start: 2020-08-08 | End: 2020-08-14 | Stop reason: HOSPADM

## 2020-08-08 RX ORDER — NICOTINE POLACRILEX 4 MG
15 LOZENGE BUCCAL PRN
Status: DISCONTINUED | OUTPATIENT
Start: 2020-08-08 | End: 2020-08-14 | Stop reason: HOSPADM

## 2020-08-08 RX ORDER — FUROSEMIDE 10 MG/ML
40 INJECTION INTRAMUSCULAR; INTRAVENOUS ONCE
Status: COMPLETED | OUTPATIENT
Start: 2020-08-08 | End: 2020-08-08

## 2020-08-08 RX ORDER — DEXTROSE MONOHYDRATE 25 G/50ML
12.5 INJECTION, SOLUTION INTRAVENOUS PRN
Status: DISCONTINUED | OUTPATIENT
Start: 2020-08-08 | End: 2020-08-14 | Stop reason: HOSPADM

## 2020-08-08 RX ORDER — CIPROFLOXACIN 2 MG/ML
400 INJECTION, SOLUTION INTRAVENOUS EVERY 12 HOURS
Status: COMPLETED | OUTPATIENT
Start: 2020-08-08 | End: 2020-08-12

## 2020-08-08 RX ORDER — POTASSIUM CHLORIDE 7.45 MG/ML
10 INJECTION INTRAVENOUS PRN
Status: DISCONTINUED | OUTPATIENT
Start: 2020-08-08 | End: 2020-08-14 | Stop reason: HOSPADM

## 2020-08-08 RX ORDER — POTASSIUM CHLORIDE 20 MEQ/1
40 TABLET, EXTENDED RELEASE ORAL PRN
Status: DISCONTINUED | OUTPATIENT
Start: 2020-08-08 | End: 2020-08-14 | Stop reason: HOSPADM

## 2020-08-08 RX ADMIN — MEMANTINE HYDROCHLORIDE 10 MG: 5 TABLET, FILM COATED ORAL at 20:58

## 2020-08-08 RX ADMIN — NYSTATIN: 100000 CREAM TOPICAL at 09:56

## 2020-08-08 RX ADMIN — FUROSEMIDE 40 MG: 10 INJECTION, SOLUTION INTRAMUSCULAR; INTRAVENOUS at 14:38

## 2020-08-08 RX ADMIN — HEPARIN SODIUM 5000 UNITS: 5000 INJECTION INTRAVENOUS; SUBCUTANEOUS at 13:33

## 2020-08-08 RX ADMIN — Medication 10 ML: at 00:08

## 2020-08-08 RX ADMIN — HEPARIN SODIUM 5000 UNITS: 5000 INJECTION INTRAVENOUS; SUBCUTANEOUS at 21:00

## 2020-08-08 RX ADMIN — DOCUSATE SODIUM 100 MG: 100 CAPSULE, LIQUID FILLED ORAL at 08:17

## 2020-08-08 RX ADMIN — SODIUM CHLORIDE: 9 INJECTION, SOLUTION INTRAVENOUS at 17:24

## 2020-08-08 RX ADMIN — INSULIN LISPRO 3 UNITS: 100 INJECTION, SOLUTION INTRAVENOUS; SUBCUTANEOUS at 00:59

## 2020-08-08 RX ADMIN — GABAPENTIN 100 MG: 100 CAPSULE ORAL at 00:09

## 2020-08-08 RX ADMIN — MEMANTINE HYDROCHLORIDE 10 MG: 5 TABLET, FILM COATED ORAL at 08:17

## 2020-08-08 RX ADMIN — NYSTATIN: 100000 CREAM TOPICAL at 20:59

## 2020-08-08 RX ADMIN — MEMANTINE HYDROCHLORIDE 10 MG: 5 TABLET, FILM COATED ORAL at 00:09

## 2020-08-08 RX ADMIN — OXYBUTYNIN CHLORIDE 5 MG: 5 TABLET ORAL at 00:09

## 2020-08-08 RX ADMIN — CIPROFLOXACIN 400 MG: 2 INJECTION, SOLUTION INTRAVENOUS at 10:06

## 2020-08-08 RX ADMIN — INSULIN GLARGINE 35 UNITS: 100 INJECTION, SOLUTION SUBCUTANEOUS at 20:59

## 2020-08-08 RX ADMIN — POTASSIUM CHLORIDE 40 MEQ: 1500 TABLET, EXTENDED RELEASE ORAL at 13:33

## 2020-08-08 RX ADMIN — Medication 81 MG: at 09:02

## 2020-08-08 RX ADMIN — DOCUSATE SODIUM 100 MG: 100 CAPSULE, LIQUID FILLED ORAL at 00:09

## 2020-08-08 RX ADMIN — CIPROFLOXACIN 400 MG: 2 INJECTION, SOLUTION INTRAVENOUS at 20:58

## 2020-08-08 RX ADMIN — Medication 10 ML: at 20:58

## 2020-08-08 RX ADMIN — CLOPIDOGREL 75 MG: 75 TABLET, FILM COATED ORAL at 08:18

## 2020-08-08 RX ADMIN — CITALOPRAM 20 MG: 20 TABLET, FILM COATED ORAL at 08:18

## 2020-08-08 RX ADMIN — CARVEDILOL 25 MG: 25 TABLET, FILM COATED ORAL at 08:18

## 2020-08-08 RX ADMIN — GABAPENTIN 100 MG: 100 CAPSULE ORAL at 09:56

## 2020-08-08 RX ADMIN — CETIRIZINE HYDROCHLORIDE 5 MG: 10 TABLET ORAL at 08:18

## 2020-08-08 RX ADMIN — NYSTATIN: 100000 CREAM TOPICAL at 00:10

## 2020-08-08 RX ADMIN — Medication 10 ML: at 09:58

## 2020-08-08 RX ADMIN — OXYBUTYNIN CHLORIDE 5 MG: 5 TABLET ORAL at 20:58

## 2020-08-08 RX ADMIN — DOCUSATE SODIUM 100 MG: 100 CAPSULE, LIQUID FILLED ORAL at 20:58

## 2020-08-08 RX ADMIN — GABAPENTIN 100 MG: 100 CAPSULE ORAL at 13:33

## 2020-08-08 RX ADMIN — INSULIN LISPRO 1 UNITS: 100 INJECTION, SOLUTION INTRAVENOUS; SUBCUTANEOUS at 20:59

## 2020-08-08 RX ADMIN — LOSARTAN POTASSIUM 100 MG: 50 TABLET, FILM COATED ORAL at 08:17

## 2020-08-08 RX ADMIN — SODIUM CHLORIDE: 9 INJECTION, SOLUTION INTRAVENOUS at 00:19

## 2020-08-08 RX ADMIN — INSULIN GLARGINE 50 UNITS: 100 INJECTION, SOLUTION SUBCUTANEOUS at 00:59

## 2020-08-08 RX ADMIN — PANTOPRAZOLE SODIUM 40 MG: 40 TABLET, DELAYED RELEASE ORAL at 06:50

## 2020-08-08 RX ADMIN — ALLOPURINOL 100 MG: 100 TABLET ORAL at 08:18

## 2020-08-08 RX ADMIN — HEPARIN SODIUM 5000 UNITS: 5000 INJECTION INTRAVENOUS; SUBCUTANEOUS at 06:50

## 2020-08-08 RX ADMIN — OXYBUTYNIN CHLORIDE 5 MG: 5 TABLET ORAL at 08:17

## 2020-08-08 RX ADMIN — GABAPENTIN 100 MG: 100 CAPSULE ORAL at 20:58

## 2020-08-08 RX ADMIN — MAGNESIUM SULFATE HEPTAHYDRATE 3 G: 500 INJECTION, SOLUTION INTRAMUSCULAR; INTRAVENOUS at 08:57

## 2020-08-08 ASSESSMENT — ENCOUNTER SYMPTOMS
PHOTOPHOBIA: 0
TROUBLE SWALLOWING: 0
VOMITING: 0
CHEST TIGHTNESS: 0
SHORTNESS OF BREATH: 0
NAUSEA: 0
WHEEZING: 0
COUGH: 0
ABDOMINAL PAIN: 0
SORE THROAT: 0
DIARRHEA: 0
FACIAL SWELLING: 1
RHINORRHEA: 0

## 2020-08-08 ASSESSMENT — PAIN SCALES - GENERAL: PAINLEVEL_OUTOF10: 0

## 2020-08-08 NOTE — PLAN OF CARE
Nutrition Problem #1: Increased nutrient needs  Intervention: Food and/or Nutrient Delivery: Continue Current Diet, Start Oral Nutrition Supplement  Nutritional Goals: Intake of 50% or greater of meals/ONS

## 2020-08-08 NOTE — CONSULTS
ERINN Montgomery   insulin lispro (HUMALOG) 100 UNIT/ML injection vial Inject 0-3 Units into the skin nightly 7/16/20   ERINN Gallego   gabapentin (NEURONTIN) 300 MG capsule TAKE ONE CAPSULE BY MOUTH THREE TIMES DAILY 7/16/20 10/15/20  ERINN Gallego   furosemide (LASIX) 20 MG tablet Take 1 tablet by mouth daily 7/16/20   ERINN Gallego   memantine (NAMENDA) 10 MG tablet TAKE 1 TABLET BY MOUTH TWICE DAILY 7/16/20   ERINN Gallego   clopidogrel (PLAVIX) 75 MG tablet Take 1 tablet by mouth daily 7/16/20   ERINN Gallego   oxybutynin (DITROPAN) 5 MG tablet TAKE 1 TABLET BY MOUTH TWICE DAILY 7/16/20   ERINN Gallego   carvedilol (COREG) 25 MG tablet Take 1 tablet by mouth 2 times daily (with meals) 7/16/20   ERINN Gallego   loratadine (CLARITIN) 10 MG tablet TAKE 1 TABLET(10 MG) BY MOUTH DAILY 7/16/20   Fabián Gallego   aspirin EC 81 MG EC tablet Take 1 tablet by mouth daily 7/16/20   Fabián Gallego   blood glucose monitor strips Check bid 5/8/20   ERINN Gallego   allopurinol (ZYLOPRIM) 300 MG tablet Take 1 tablet by mouth daily 4/15/20   ERINN Gallego   losartan (COZAAR) 100 MG tablet Take 1 tablet by mouth daily 4/15/20   ERINN Gallego   omeprazole (PRILOSEC) 20 MG delayed release capsule Take 1 capsule by mouth every morning (before breakfast) 3/5/20   ERINN Gallego   insulin glargine (LANTUS) 100 UNIT/ML injection vial Inject 50 Units into the skin nightly 1/3/20   Fabián Gallego   aspirin 81 MG tablet Take 1 tablet by mouth daily 1/3/20   ERINN Gallego   Diabetic Shoe MISC by Does not apply route 9/26/19   ERINN Gallego   Compression Stockings MISC by Does not apply route 20-30 mm Hg bilateral knee high 9/26/19   ERINN Gallego   gabapentin (NEURONTIN) 300 MG capsule Take 300 mg by mouth 3 times daily. Carmen Garcia     Historical Provider, MD   rivastigmine (EXELON) 9.5 MG/24HR APPLY 1 NEW PATCH EVERY 24 HOURS  Patient not taking: Reported on 4/15/2020 1/31/18   ERINN Good   nystatin (MYCOSTATIN) 822298 UNIT/GM cream Apply topically 2 times daily. 1/31/18   ERINN Good   Glucose Blood (BLOOD GLUCOSE TEST STRIPS) STRP truemetrix test strips check ac and HS 1/31/18   ERINN Good       Current Medications: Scheduled Meds:   insulin glargine  35 Units Subcutaneous Nightly    ciprofloxacin  400 mg Intravenous Q12H    allopurinol  100 mg Oral Daily    aspirin  81 mg Oral Daily    insulin lispro  0-12 Units Subcutaneous TID WC    insulin lispro  0-6 Units Subcutaneous Nightly    citalopram  20 mg Oral Daily    clopidogrel  75 mg Oral Daily    docusate sodium  100 mg Oral BID    gabapentin  100 mg Oral TID    cetirizine  5 mg Oral Daily    losartan  100 mg Oral Daily    memantine  10 mg Oral BID    nystatin   Topical BID    pantoprazole  40 mg Oral QAM AC    oxybutynin  5 mg Oral BID    rivastigmine  1 patch Transdermal Daily    sodium chloride flush  10 mL Intravenous 2 times per day    heparin (porcine)  5,000 Units Subcutaneous 3 times per day     Continuous Infusions:   dextrose      sodium chloride 50 mL/hr at 08/08/20 1313     PRN Meds:.glucose, dextrose, glucagon (rDNA), dextrose, potassium chloride **OR** potassium alternative oral replacement **OR** potassium chloride, sodium chloride flush, acetaminophen **OR** acetaminophen, polyethylene glycol, promethazine **OR** ondansetron, nicotine, perflutren lipid microspheres     Allergies:  Bactrim [sulfamethoxazole-trimethoprim] and Amoxicillin-pot clavulanate    Social History:   reports that she has never smoked. She has never used smokeless tobacco. She reports that she does not drink alcohol or use drugs.      Family History: family history includes Cancer in her mother; Coronary Art Dis in an other family member; Diabetes in her father and mother; Emphysema in her father; Glaucoma in her father; Heart Disease in her father; High Blood Pressure in her mother; Kidney (cerebral vascular accident) ([facility] [address]    Type 2 diabetes mellitus with hyperglycemia, with long-term current use of insulin [facility]    CHF (congestive heart failure), NYHA class I, acute on chronic, combined ([facility]    Acute cystitis    Infestation by bed bug    Infestation by maggots    Fall    Elevated troponin    Lymphedema of both lower extremities           RECOMMENDATIONS:  CAD, CABG minimal elevated tropes, will see the trend  Continue heparin for 48 hours. CHF either systolic versus diastolic dysfunction, or compliance with medications  As patient was found covered with urine and feces maggots and bedbugs  Agree with diuretic  Hypertension on the lower side, will wait for the beta-blockers  History of fall we will get orthostatic   milliseconds please watch for any medications that can increase QTC watch electrolytes  Watch for any dysrhythmias on telemetry  We will get echocardiogram for LVEF      Discussed with  nursing.     Wily Joyce [facility]        [phone]

## 2020-08-08 NOTE — PLAN OF CARE
Problem: Falls - Risk of:  Goal: Will remain free from falls  Description: Will remain free from falls  Outcome: Ongoing  Goal: Absence of physical injury  Description: Absence of physical injury  Outcome: Ongoing     Problem: Skin Integrity:  Goal: Will show no infection signs and symptoms  Description: Will show no infection signs and symptoms  Outcome: Ongoing  Goal: Absence of new skin breakdown  Description: Absence of new skin breakdown  Outcome: Ongoing     Problem: Mental Status - Impaired:  Goal: Mental status will improve  Description: Mental status will improve  Outcome: Ongoing     Problem: Infection:  Goal: Will remain free from infection  Description: Will remain free from infection  Outcome: Ongoing     Problem: Safety:  Goal: Free from accidental physical injury  Description: Free from accidental physical injury  Outcome: Ongoing  Goal: Free from intentional harm  Description: Free from intentional harm  Outcome: Ongoing     Problem: Daily Care:  Goal: Daily care needs are met  Description: Daily care needs are met  Outcome: Ongoing     Problem: Pain:  Goal: Patient's pain/discomfort is manageable  Description: Patient's pain/discomfort is manageable  Outcome: Ongoing     Problem: Skin Integrity:  Goal: Skin integrity will stabilize  Description: Skin integrity will stabilize  Outcome: Ongoing     Problem: Discharge Planning:  Goal: Patients continuum of care needs are met  Description: Patients continuum of care needs are met  Outcome: Ongoing

## 2020-08-08 NOTE — PROGRESS NOTES
Tess Lopez 20    Progress Note    8/8/2020    1:00 PM    Name:   Lisa Son  MRN:     [id number]     Acct:      [de-identified]   Room:   2025/2025-01   Day:  1  Admit Date:  8/7/2020 10:29 PM    PCP:   ERINN Kaba  Code Status:  Full Code    Subjective:     C/C: Hyperglycemia, fall    Interval History Status: improved. Patient is sitting up in bed, drowsy. She is attempting to eat breakfast.  Low BS: 50 this morning. She is disheveled but alert and oriented x3. No complaints of pain or shortness of breath. She states that she is compliant at home with her medications and is supposed to wear a pump on her lower extremities for lymphedema. Brief History:     Per my NP;  Claudette Amabile is a 77 y.o. Non-/non  female who presents with No chief complaint on file. and is admitted to the hospital for the management of CHF (congestive heart failure), NYHA class I, acute on chronic, combined ([facility] [address]    This is a 77 yr old female with history of CAD s/p CABG, CKD, CVA, glaucoma, HTN, HLD, DMII, and poly neuropathy who presented to [facility] today via EMS. Patient lives with her daughter and grandson, reportedly fell in the early evening on 8/6 and refused to let her daughter call 911. EMS was finally called yesterday morning after patient was found by family and had laid on the ground overnight, unable to get up. On arrival to ER, patient covered in urine and feces, multiple wounds of various stages with maggot and bed bug infestation. Patient noted to be hyperglycemic with bs of 400, AMY with elevated CK and Myoglobin, pro-bnp> 31,000, elevated troponin, and UTI. CT Head and neck without any acute findings, there is evidence of remote left PCA infarct. Patient denies any LOC or injury/hitting head on falling. Reports she slipped on the floor when she is used to walking on carpet.   She denies any previous or associated dizziness or light headedness. Given the array of findings listed above, patient is transferred to our facility for further care and management.      Labs/Diagnostics: Na: 133, CRT: 1.28 (baseline: 1.0), glucose: 400, CK: 413, Myoglobin: 747, Pro-bnp: 31,859, Trop: 68--67 (baseline: 20s), HGB: 11.5     CXR: central venous congestions  CT Head/Neck: no acute injuries, remote left PCA infarct     On my evaluation, patient is in bed, appears comfortable, is oriented to self and place only, not time. Is generally a poor historian and unable to give specifics on medical history/medication regimen. Extensive wounds scattered across body including ABD, coccyx, and BLE. Fungal rash to bilateral groins, peritoneum, and under breast folds. BLE also noted 1-2+. Denies any CP, SOB, HA/dizziness, ABD pain, n/v/d, myalgias or cough, patient is afebrile with VSS. \"    Review of Systems:     Constitutional:  negative for chills, fevers, sweats  Respiratory:  negative for cough, dyspnea on exertion, shortness of breath, wheezing  Cardiovascular:  negative for chest pain, chest pressure/discomfort, lower extremity edema, palpitations  Gastrointestinal:  negative for abdominal pain, constipation, diarrhea, nausea, vomiting  Neurological:  negative for dizziness, headache    Medications: Allergies:     Allergies   Allergen Reactions    Bactrim [Sulfamethoxazole-Trimethoprim] Hives    Amoxicillin-Pot Clavulanate Diarrhea, Nausea Only and Nausea And Vomiting       Current Meds:   Scheduled Meds:    insulin glargine  35 Units Subcutaneous Nightly    ciprofloxacin  400 mg Intravenous Q12H    allopurinol  100 mg Oral Daily    aspirin  81 mg Oral Daily    insulin lispro  0-12 Units Subcutaneous TID WC    insulin lispro  0-6 Units Subcutaneous Nightly    carvedilol  25 mg Oral BID WC    citalopram  20 mg Oral Daily    clopidogrel  75 mg Oral Daily    docusate sodium  100 mg Oral BID    gabapentin  100 mg Oral TID  cetirizine  5 mg Oral Daily    losartan  100 mg Oral Daily    memantine  10 mg Oral BID    nystatin   Topical BID    pantoprazole  40 mg Oral QAM AC    oxybutynin  5 mg Oral BID    rivastigmine  1 patch Transdermal Daily    sodium chloride flush  10 mL Intravenous 2 times per day    heparin (porcine)  5,000 Units Subcutaneous 3 times per day     Continuous Infusions:    dextrose      sodium chloride 75 mL/hr at 08/08/20 0019     PRN Meds: glucose, dextrose, glucagon (rDNA), dextrose, sodium chloride flush, acetaminophen **OR** acetaminophen, polyethylene glycol, promethazine **OR** ondansetron, nicotine, perflutren lipid microspheres    Data:     Past Medical History:   has a past medical history of Anxiety and depression, Background diabetic retinopathy(362.01), Balance problem, CAD (coronary artery disease), Cancer of uterus ([facility] [address] Chronic kidney disease, Chronic low back pain, CVA (cerebral vascular accident) ([facility] [address] Dementia ([facility] [address] Dry eye syndrome, GERD (gastroesophageal reflux disease), Glaucoma suspect, Gout, Homonymous hemianopsia, Hyperlipidemia, Hypertension, Keratitis, Obesity, Peripheral polyneuropathy, Pseudophakos, Stroke ([facility] [address] Trichiasis, and Type 2 diabetes mellitus ([address] Social History:   reports that she has never smoked. She has never used smokeless tobacco. She reports that she does not drink alcohol or use drugs. Family History:   Family History   Problem Relation Age of Onset    Diabetes Mother     Cancer Mother     High Blood Pressure Mother    [facility] Stroke Mother     Kidney Disease Mother     Uterine Cancer Mother     Diabetes Father     Heart Disease Father     Glaucoma Father     Emphysema Father     Coronary Art Dis Other         All 4 siblings.  Stroke Other         1 sibling.  Lung Cancer Other         1 sibling - cause of death lung cancer.     Mental Retardation Other        Vitals:  BP (!) 103/52   Pulse 55   Temp 98.9 °F (37.2 °C) (Oral)   Resp 22 Wt 236 lb 12.8 oz (107.4 kg)   SpO2 95%   BMI 39.41 kg/m²   Temp (24hrs), Av.6 °F (36.4 °C), Min:96.3 °F (35.7 °C), Max:98.9 °F (37.2 °C)    Recent Labs     20  2346 20  0811 20  1032 20  1137   POCGLU 270* 51* 86 87       I/O (24Hr):     Intake/Output Summary (Last 24 hours) at 2020 1300  Last data filed at 2020 0648  Gross per 24 hour   Intake 540 ml   Output 300 ml   Net 240 ml       Labs:  Hematology:  Recent Labs     08/07/20  15520  0604   WBC 10.1 8.9   RBC 3.96 3.49*   HGB 11.5* 10.2*   HCT 35.3* 31.6*   MCV 89.1 90.5   MCH 29.0 29.2   MCHC 32.6 32.3   RDW 15.8* 16.2*    364   MPV 10.5 10.0     Chemistry:  Recent Labs     08/07/20  15520  1759 20  0604   *  --  137   K 3.8  --  3.1*   CL 96*  --  101   CO2 27  --  26   GLUCOSE 400*  --  101*   BUN 20  --  18   CREATININE 1.28*  --  1.18*   MG  --   --  1.3*   ANIONGAP 10  --  10   LABGLOM 41*  --  46*   GFRAA 50*  --  55*   CALCIUM 8.4*  --  8.0*   PROBNP 31,859*  --  28,916*   TROPHS 68* 67* 76*   CKTOTAL 413*  --  213*   MYOGLOBIN 747*  --  299*     Recent Labs     20  1552 20  1716 20  2346 20  0604 20  0811 20  1032 20  1137   PROT 6.5  --   --   --   --   --   --    LABALBU 2.2*  --   --   --   --   --   --    LABA1C  --   --   --  10.8*  --   --   --    TSH  --   --   --  1.86  --   --   --    AST 21  --   --   --   --   --   --    ALT 7  --   --   --   --   --   --    ALKPHOS 141*  --   --   --   --   --   --    BILITOT 0.98  --   --   --   --   --   --    BILIDIR 0.43*  --   --   --   --   --   --    AMYLASE 11*  --   --   --   --   --   --    LIPASE 19  --   --   --   --   --   --    CHOL  --   --   --  97  --   --   --    HDL  --   --   --  41  --   --   --    LDLCHOLESTEROL  --   --   --  45  --   --   --    CHOLHDLRATIO  --   --   --  2.4  --   --   --    TRIG  --   --   --  57  --   --   --    VLDL  --   --   --  NOT REPORTED  -- --   --    POCGLU  --  366* 270*  --  51* 86 87     ABG:No results found for: POCPH, PHART, PH, POCPCO2, YON9UGE, PCO2, POCPO2, PO2ART, PO2, POCHCO3, JUM0XVO, HCO3, NBEA, PBEA, BEART, BE, THGBART, THB, IYJ8GBD, IECN9SJM, Y2JNFCHO, O2SAT, FIO2  Lab Results   Component Value Date/Time    SPECIAL NOT REPORTED 09/13/2013 11:04 AM     Lab Results   Component Value Date/Time    CULTURE  09/13/2013 11:04 AM     [employer] [id number] [facility] 3 [phone]    CULTURE NO GROWTH 2 DAYS 09/13/2013 11:04 AM       Radiology:  Ct Head Wo Contrast    Result Date: 8/7/2020  No acute intracranial abnormality. Left sided scalp edema/hematoma without underlying calvarial fracture. Peripheral mucosal thickening of the paranasal sinuses. Remote left PCA territory infarction. Ct Cervical Spine Wo Contrast    Result Date: 8/7/2020  No acute abnormality of the cervical spine. Mild multilevel degenerative changes. Interlobular septal thickening apices; correlate for edema or other disease. 4 cm left thyroid nodule. If not already assessed previously, nonemergent follow-up ultrasound suggested. Xr Chest Portable    Result Date: 8/7/2020  Mild cardiomegaly and central vascular congestion. Basilar nodules are present, denser than rib favoring granulomas or hamartomas.        Physical Examination:        General appearance: Drowsy, cooperative and no distress  Mental Status:  oriented to person, place and time and flat affect  Lungs: Reduced BS bilaterally, normal effort, no wheezes/rales/rhonchi  Heart:  regular rate and rhythm, no murmur  Abdomen:  soft, nontender, obese, normal bowel sounds, no masses, hepatomegaly, splenomegaly  Extremities:  + edema bilateral LE, no tenderness in the calves  Skin:  + Chronic venous stasis skin changes bilateral LE and feet, thickened skin, several open wounds on bilateral feet no drainage    Assessment:        Hospital Problems           Last Modified POA    * (Principal) CHF (congestive heart failure), NYHA class I, acute on chronic, combined ([facility] [address] 8/8/2020 Yes    Hypertension 8/8/2020 Yes    Hyperlipidemia 8/8/2020 Yes    Obesity 8/8/2020 Yes    Acute kidney injury superimposed on CKD ([facility] [address] 8/8/2020 Yes    CAD (coronary artery disease) 8/8/2020 Yes    Overview Signed 2/27/2014 10:22 AM by Nate Johnston MD     (with coronary artery bypass graft x4). Type 2 diabetes mellitus with hyperglycemia, with long-term current use of insulin ([facility] 8/8/2020 Yes    Acute cystitis 8/8/2020 Yes    Infestation by bed bug 8/8/2020 Yes    Infestation by maggots 8/8/2020 Yes    Fall 8/8/2020 Yes    Elevated troponin 8/8/2020 Yes    Lymphedema of both lower extremities 8/8/2020 Yes          Plan:        1. Congestive heart failure-elevated BNP, gentle IV hydration given need for fluids for rhabdomyolysis. Await echocardiogram, Consult cardiology  2. Elevated troponin-check troponins every 6 hours, start aspirin, consult cardiology  3. Fall/acute rhabdomyolysis-CPK myoglobin slightly elevated, gentle IV hydration, monitor BUN/creatinine   4. DM2- decrease Lantus nightly, for hypoglycemia in the morning, SSI, check HbA1c  5. Acute UTI-start IV Cipro, check urine culture  6. AMY/CKD-monitor BUN/creatinine  7. Bilateral lymphedema- place Ace wrap, wound care for wounds  8. History of CAD  9. GI/DVT prophylaxis  10. PT/OT  11. Social work consult-likely needs SNF placement, patient needs assistance with medication compliance and strength training  12.   Hypomag- replace    Discussed with nurse    Erlinda Bradley MD  8/8/2020  1:00 PM

## 2020-08-08 NOTE — CARE COORDINATION
Case Management Initial Discharge Plan  Leanne Lopez,             Met with:patient to discuss discharge plans. Information verified: address, contacts, phone number, , insurance Yes    Emergency Contact/Next of Kin name & number: daughter Bharati Jolly [phone]    PCP: ERINN Mike  Date of last visit: call yesterday    Insurance Provider: [employer] medicare/medicaid     Discharge Planning    Living Arrangements:  [address] [place] [address] Avenue:  Children, Family Members    Home has 1 stories      Patient able to perform ADL's:Assisted    Current Services (outpatient & in home) none  DME equipment: none  DME provider: none  Pharmacy: [facility]     Receiving oral anticoagulation therapy? No          Potential Assistance Needed:  Jax Mathews        Prior SNF/Rehab Placement and Facility: none  Agreeable to SNF/Rehab: Yes  [place] of choice provided: yes, referral to [facility]      Evaluation: yes, neglect aps referral nurse said she came in with maggots in her wounds and bed bugs     Expected Discharge date:   20    Patient expects to be discharged to:   snf  Follow Up Appointment: Best Day/ Time:      Transportation provider: [facility]  Transportation arrangements needed for discharge: Yes    Readmission Risk              Risk of Unplanned Readmission:        19             Does patient have a readmission risk score greater than 14?: Yes  If yes, follow-up appointment must be made within 7 days of discharge.      Goals of Care: return to adl without shortness of breath     chf zone: already given    Discharge Plan: referral to [facility]           Electronically signed by Tristin Flower RN on 20 at 3:03 PM EDT

## 2020-08-08 NOTE — PROGRESS NOTES
Comprehensive Nutrition Assessment    Type and Reason for Visit:  Positive Nutrition Screen, Consult(wound/diet education)    Nutrition Recommendations/Plan:   -Continue with current diet, monitor intake/tolerance  -Will provide diet education as appropriate  -Add Glucerna ONS to meal trays    Nutrition Assessment:  Admitted with CHF, also s/p fall. Muliplte wounds noted (sacrum and legs). Discussed with RN-reports pt ate well today but has been lethargic and slightly confused. No significant wt changes per wt in chart. Malnutrition Assessment:  Malnutrition Status:  Insufficient data    Context:  Acute Illness     Findings of the 6 clinical characteristics of malnutrition:  Energy Intake:  Unable to assess  Weight Loss:  No significant weight loss     Body Fat Loss:  Unable to assess     Muscle Mass Loss:  Unable to assess    Fluid Accumulation:  Unable to assess     Strength:  Not Performed    Estimated Daily Nutrient Needs:  Energy (kcal):  0673-9707 kcal; Weight Used for Energy Requirements:  Current     Protein (g):  65-85 gm (1.2-1.5gm/kg);  Weight Used for Protein Requirements:  Ideal             Nutrition Related Findings:  meds reviewed      Wounds:  Open Wounds       Current Nutrition Therapies:    DIET LOW SODIUM 2 GM; Carb Control: 4 carb choices (60 gms)/meal    Anthropometric Measures:  · Height: 5' 5\" (165.1 cm)  · Current Body Weight: 236 lb 12.4 oz (107.4 kg)   · Ideal Body Weight: 125 lbs; % Ideal Body Weight 189.4 %   · BMI: 39.4   · BMI Categories: Obese Class 2 (BMI 35.0 -39.9)       Nutrition Diagnosis:   · Increased nutrient needs related to increase demand for energy/nutrients as evidenced by wounds      Nutrition Interventions:   Food and/or Nutrient Delivery:  Continue Current Diet, Start Oral Nutrition Supplement  Nutrition Education/Counseling:  No recommendation at this time(not appropriate at this tme)   Coordination of Nutrition Care:  Continued Inpatient Monitoring    Goals:  Intake of 50% or greater of meals/ONS       Nutrition Monitoring and Evaluation:   Behavioral-Environmental Outcomes:  Knowledge or Skill   Food/Nutrient Intake Outcomes:  Food and Nutrient Intake, Supplement Intake  Physical Signs/Symptoms Outcomes:  Meal Time Behavior, Skin, Nutrition Focused Physical Findings     Discharge Planning:     Too soon to determine     Electronically signed by Rasta Ibarra RD, LD on 8/8/20 at 3:02 PM EDT    Contact: [phone]

## 2020-08-08 NOTE — PROGRESS NOTES
Assessment completed and documented. Pt alert and oriented to self and place, not time. Upon assessment, various wounds to the coccyx, buttocks, bilateral legs, and bilateral feet. Laceration to nose bridge. Eyes present with tan discharge, left eye swollen and reddened. Multiple areas of non-blanchable patches. Legs wrapped with nonadherent gauze and Kerlix. Groin, abdominal pannus, and breasts very reddened, excoriated and swollen, nystatin cream applied. NP updated. Wound care eval placed.      Electronically signed by Vivienne Adan RN on 8/8/2020 at 7:10 AM

## 2020-08-08 NOTE — CARE COORDINATION
Met with pt after receiving a social work consult for her living conditions. Pt states that she lives with her daughter Rod Mccray and her grandson. She states that she performs all of her own personal care. Pt is agreeable to a SNF, CM made a referral to the [address] [place] [address]. Will make a referral to [facility] in [address] on Monday.

## 2020-08-08 NOTE — PROGRESS NOTES
Pt arrived to room 2025 via EMS from [facility]. Assessment and vitals completed and recorded. Call light within reach. Will continue to monitor.     Electronically signed by Belkis Hu RN on 8/8/2020 at 4:38 AM

## 2020-08-08 NOTE — PROGRESS NOTES
Primary notified of critical troponin of 76. Mg resulted at 1.3 and there is no replacement ordered. No new orders at this time. Information passed to day shift nurse.      Electronically signed by Vipin Yang RN on 8/8/2020 at 7:44 AM

## 2020-08-08 NOTE — PROGRESS NOTES
Physical Therapy   DATE: 2020    NAME: Franco Garza  MRN: [id number]   : 1950    Patient not seen this date for Physical Therapy due to:  [] Blood transfusion in progress  [] Hemodialysis  []  Patient Declined  [] Spine Precautions   [] Strict Bedrest  [] Surgery/ Procedure  [] Testing      [x] Other Hold PT eval for now due to low BP, lethargy. [] PT being discontinued at this time. Patient independent. No further needs. [] PT being discontinued at this time as the patient has been transferred to palliative care. No further needs.     Michael , PT

## 2020-08-09 LAB
CULTURE: NORMAL
EKG ATRIAL RATE: 80 BPM
EKG P AXIS: 79 DEGREES
EKG P-R INTERVAL: 206 MS
EKG Q-T INTERVAL: 450 MS
EKG QRS DURATION: 114 MS
EKG QTC CALCULATION (BAZETT): 519 MS
EKG R AXIS: -78 DEGREES
EKG T AXIS: 101 DEGREES
EKG VENTRICULAR RATE: 80 BPM
GLUCOSE BLD-MCNC: 136 MG/DL (ref 65–105)
GLUCOSE BLD-MCNC: 253 MG/DL (ref 65–105)
GLUCOSE BLD-MCNC: 265 MG/DL (ref 65–105)
GLUCOSE BLD-MCNC: 287 MG/DL (ref 65–105)
GLUCOSE BLD-MCNC: 37 MG/DL (ref 65–105)
GLUCOSE BLD-MCNC: 53 MG/DL (ref 65–105)
Lab: NORMAL
MAGNESIUM: 1.7 MG/DL (ref 1.6–2.6)
SPECIMEN DESCRIPTION: NORMAL
TROPONIN INTERP: ABNORMAL
TROPONIN T: ABNORMAL NG/ML
TROPONIN, HIGH SENSITIVITY: 57 NG/L (ref 0–14)
TROPONIN, HIGH SENSITIVITY: 62 NG/L (ref 0–14)
TROPONIN, HIGH SENSITIVITY: 78 NG/L (ref 0–14)

## 2020-08-09 PROCEDURE — 84484 ASSAY OF TROPONIN QUANT: CPT

## 2020-08-09 PROCEDURE — 6370000000 HC RX 637 (ALT 250 FOR IP): Performed by: CLINICAL NURSE SPECIALIST

## 2020-08-09 PROCEDURE — 6360000002 HC RX W HCPCS: Performed by: CLINICAL NURSE SPECIALIST

## 2020-08-09 PROCEDURE — 6360000002 HC RX W HCPCS: Performed by: INTERNAL MEDICINE

## 2020-08-09 PROCEDURE — 6370000000 HC RX 637 (ALT 250 FOR IP): Performed by: NURSE PRACTITIONER

## 2020-08-09 PROCEDURE — 82947 ASSAY GLUCOSE BLOOD QUANT: CPT

## 2020-08-09 PROCEDURE — 36415 COLL VENOUS BLD VENIPUNCTURE: CPT

## 2020-08-09 PROCEDURE — 2580000003 HC RX 258: Performed by: INTERNAL MEDICINE

## 2020-08-09 PROCEDURE — 1200000000 HC SEMI PRIVATE

## 2020-08-09 PROCEDURE — 6370000000 HC RX 637 (ALT 250 FOR IP): Performed by: INTERNAL MEDICINE

## 2020-08-09 PROCEDURE — 99232 SBSQ HOSP IP/OBS MODERATE 35: CPT | Performed by: INTERNAL MEDICINE

## 2020-08-09 PROCEDURE — 83735 ASSAY OF MAGNESIUM: CPT

## 2020-08-09 PROCEDURE — 6360000002 HC RX W HCPCS: Performed by: STUDENT IN AN ORGANIZED HEALTH CARE EDUCATION/TRAINING PROGRAM

## 2020-08-09 RX ORDER — FUROSEMIDE 10 MG/ML
40 INJECTION INTRAMUSCULAR; INTRAVENOUS ONCE
Status: COMPLETED | OUTPATIENT
Start: 2020-08-09 | End: 2020-08-09

## 2020-08-09 RX ORDER — TRAMADOL HYDROCHLORIDE 50 MG/1
25 TABLET ORAL ONCE
Status: COMPLETED | OUTPATIENT
Start: 2020-08-09 | End: 2020-08-09

## 2020-08-09 RX ORDER — INSULIN GLARGINE 100 [IU]/ML
20 INJECTION, SOLUTION SUBCUTANEOUS NIGHTLY
Status: DISCONTINUED | OUTPATIENT
Start: 2020-08-09 | End: 2020-08-14 | Stop reason: HOSPADM

## 2020-08-09 RX ADMIN — CETIRIZINE HYDROCHLORIDE 5 MG: 10 TABLET ORAL at 10:21

## 2020-08-09 RX ADMIN — CIPROFLOXACIN 400 MG: 2 INJECTION, SOLUTION INTRAVENOUS at 21:30

## 2020-08-09 RX ADMIN — DEXTROSE MONOHYDRATE 12.5 G: 25 INJECTION, SOLUTION INTRAVENOUS at 08:41

## 2020-08-09 RX ADMIN — Medication 81 MG: at 10:21

## 2020-08-09 RX ADMIN — MEMANTINE HYDROCHLORIDE 10 MG: 5 TABLET, FILM COATED ORAL at 10:21

## 2020-08-09 RX ADMIN — INSULIN LISPRO 3 UNITS: 100 INJECTION, SOLUTION INTRAVENOUS; SUBCUTANEOUS at 21:25

## 2020-08-09 RX ADMIN — OXYBUTYNIN CHLORIDE 5 MG: 5 TABLET ORAL at 20:05

## 2020-08-09 RX ADMIN — CIPROFLOXACIN 400 MG: 2 INJECTION, SOLUTION INTRAVENOUS at 12:54

## 2020-08-09 RX ADMIN — HEPARIN SODIUM 5000 UNITS: 5000 INJECTION INTRAVENOUS; SUBCUTANEOUS at 14:35

## 2020-08-09 RX ADMIN — PANTOPRAZOLE SODIUM 40 MG: 40 TABLET, DELAYED RELEASE ORAL at 10:21

## 2020-08-09 RX ADMIN — GABAPENTIN 100 MG: 100 CAPSULE ORAL at 14:35

## 2020-08-09 RX ADMIN — GABAPENTIN 100 MG: 100 CAPSULE ORAL at 20:05

## 2020-08-09 RX ADMIN — ALLOPURINOL 100 MG: 100 TABLET ORAL at 10:22

## 2020-08-09 RX ADMIN — DEXTROSE 15 G: 15 GEL ORAL at 08:30

## 2020-08-09 RX ADMIN — GABAPENTIN 100 MG: 100 CAPSULE ORAL at 10:20

## 2020-08-09 RX ADMIN — CLOPIDOGREL 75 MG: 75 TABLET, FILM COATED ORAL at 10:20

## 2020-08-09 RX ADMIN — HEPARIN SODIUM 5000 UNITS: 5000 INJECTION INTRAVENOUS; SUBCUTANEOUS at 21:24

## 2020-08-09 RX ADMIN — LOSARTAN POTASSIUM 100 MG: 50 TABLET, FILM COATED ORAL at 13:08

## 2020-08-09 RX ADMIN — NYSTATIN: 100000 CREAM TOPICAL at 13:06

## 2020-08-09 RX ADMIN — INSULIN LISPRO 6 UNITS: 100 INJECTION, SOLUTION INTRAVENOUS; SUBCUTANEOUS at 12:47

## 2020-08-09 RX ADMIN — CITALOPRAM 20 MG: 20 TABLET, FILM COATED ORAL at 10:29

## 2020-08-09 RX ADMIN — FUROSEMIDE 40 MG: 10 INJECTION, SOLUTION INTRAMUSCULAR; INTRAVENOUS at 11:30

## 2020-08-09 RX ADMIN — MEMANTINE HYDROCHLORIDE 10 MG: 5 TABLET, FILM COATED ORAL at 20:05

## 2020-08-09 RX ADMIN — TRAMADOL HYDROCHLORIDE 25 MG: 50 TABLET, FILM COATED ORAL at 19:57

## 2020-08-09 RX ADMIN — DOCUSATE SODIUM 100 MG: 100 CAPSULE, LIQUID FILLED ORAL at 10:21

## 2020-08-09 RX ADMIN — OXYBUTYNIN CHLORIDE 5 MG: 5 TABLET ORAL at 10:21

## 2020-08-09 RX ADMIN — INSULIN GLARGINE 20 UNITS: 100 INJECTION, SOLUTION SUBCUTANEOUS at 21:24

## 2020-08-09 RX ADMIN — INSULIN LISPRO 6 UNITS: 100 INJECTION, SOLUTION INTRAVENOUS; SUBCUTANEOUS at 17:29

## 2020-08-09 RX ADMIN — NYSTATIN: 100000 CREAM TOPICAL at 20:14

## 2020-08-09 RX ADMIN — HEPARIN SODIUM 5000 UNITS: 5000 INJECTION INTRAVENOUS; SUBCUTANEOUS at 10:22

## 2020-08-09 RX ADMIN — DOCUSATE SODIUM 100 MG: 100 CAPSULE, LIQUID FILLED ORAL at 20:06

## 2020-08-09 NOTE — PROGRESS NOTES
RN bladder scanned pt. (188 in bladder per scan)  RN changed pts. Brief, brief wet with lg amount. RN checked to see if rodriguez was secure, RN re inflated balloon, urine began to flow. RN gave pt. Bed bath and changed dressings.

## 2020-08-09 NOTE — PLAN OF CARE
Problem: Falls - Risk of:  Goal: Will remain free from falls  Description: Will remain free from falls  Outcome: Ongoing  Goal: Absence of physical injury  Description: Absence of physical injury  Outcome: Ongoing     Problem: Skin Integrity:  Goal: Will show no infection signs and symptoms  Description: Will show no infection signs and symptoms  Outcome: Ongoing  Goal: Absence of new skin breakdown  Description: Absence of new skin breakdown  Outcome: Ongoing     Problem: Mental Status - Impaired:  Goal: Mental status will improve  Description: Mental status will improve  Outcome: Ongoing     Problem: Infection:  Goal: Will remain free from infection  Description: Will remain free from infection  Outcome: Ongoing     Problem: Safety:  Goal: Free from accidental physical injury  Description: Free from accidental physical injury  Outcome: Ongoing  Goal: Free from intentional harm  Description: Free from intentional harm  Outcome: Ongoing     Problem: Daily Care:  Goal: Daily care needs are met  Description: Daily care needs are met  Outcome: Ongoing     Problem: Pain:  Goal: Patient's pain/discomfort is manageable  Description: Patient's pain/discomfort is manageable  Outcome: Ongoing     Problem: Skin Integrity:  Goal: Skin integrity will stabilize  Description: Skin integrity will stabilize  Outcome: Ongoing     Problem: Discharge Planning:  Goal: Patients continuum of care needs are met  Description: Patients continuum of care needs are met  Outcome: Ongoing     Problem: Nutrition  Goal: Optimal nutrition therapy  Outcome: Ongoing

## 2020-08-09 NOTE — PROGRESS NOTES
Tess Lopez 20    Progress Note    8/9/2020    7:59 AM    Name:   Bernadette Christianson  MRN:     [id number]     Acct:      [de-identified]   Room:   2019/2019-01  IP Day:  2  Admit Date:  8/7/2020 10:29 PM    PCP:   ERINN Hargrove  Code Status:  Full Code    Subjective:     C/C: Hyperglycemia, fall    Interval History Status: improved. Patient is sitting up in bed, drowsy. She denies any SOB or chest pain. Brief History:     Per my NP;  Ubaldo Deleon is a 77 y.o. Non-/non  female who presents with No chief complaint on file. and is admitted to the hospital for the management of CHF (congestive heart failure), NYHA class I, acute on chronic, combined ([facility] [address]    This is a 77 yr old female with history of CAD s/p CABG, CKD, CVA, glaucoma, HTN, HLD, DMII, and poly neuropathy who presented to [facility] today via EMS. Patient lives with her daughter and grandson, reportedly fell in the early evening on 8/6 and refused to let her daughter call 911. EMS was finally called yesterday morning after patient was found by family and had laid on the ground overnight, unable to get up. On arrival to ER, patient covered in urine and feces, multiple wounds of various stages with maggot and bed bug infestation. Patient noted to be hyperglycemic with bs of 400, AMY with elevated CK and Myoglobin, pro-bnp> 31,000, elevated troponin, and UTI. CT Head and neck without any acute findings, there is evidence of remote left PCA infarct. Patient denies any LOC or injury/hitting head on falling. Reports she slipped on the floor when she is used to walking on carpet. She denies any previous or associated dizziness or light headedness.   Given the array of findings listed above, patient is transferred to our facility for further care and management.      Labs/Diagnostics: Na: 133, CRT: 1.28 (baseline: 1.0), glucose: 400, CK: 413, Myoglobin: 747, Pro-bnp: 31,859, Trop: 68--67 (baseline: 20s), HGB: 11.5     CXR: central venous congestions  CT Head/Neck: no acute injuries, remote left PCA infarct     On my evaluation, patient is in bed, appears comfortable, is oriented to self and place only, not time. Is generally a poor historian and unable to give specifics on medical history/medication regimen. Extensive wounds scattered across body including ABD, coccyx, and BLE. Fungal rash to bilateral groins, peritoneum, and under breast folds. BLE also noted 1-2+. Denies any CP, SOB, HA/dizziness, ABD pain, n/v/d, myalgias or cough, patient is afebrile with VSS. \"    Review of Systems:     Constitutional:  negative for chills, fevers, sweats  Respiratory:  negative for cough, dyspnea on exertion, shortness of breath, wheezing  Cardiovascular:  negative for chest pain, chest pressure/discomfort, lower extremity edema, palpitations  Gastrointestinal:  negative for abdominal pain, constipation, diarrhea, nausea, vomiting  Neurological:  negative for dizziness, headache    Medications: Allergies:     Allergies   Allergen Reactions    Bactrim [Sulfamethoxazole-Trimethoprim] Hives    Amoxicillin-Pot Clavulanate Diarrhea, Nausea Only and Nausea And Vomiting       Current Meds:   Scheduled Meds:    insulin glargine  35 Units Subcutaneous Nightly    ciprofloxacin  400 mg Intravenous Q12H    allopurinol  100 mg Oral Daily    aspirin  81 mg Oral Daily    insulin lispro  0-12 Units Subcutaneous TID WC    insulin lispro  0-6 Units Subcutaneous Nightly    citalopram  20 mg Oral Daily    clopidogrel  75 mg Oral Daily    docusate sodium  100 mg Oral BID    gabapentin  100 mg Oral TID    cetirizine  5 mg Oral Daily    losartan  100 mg Oral Daily    memantine  10 mg Oral BID    nystatin   Topical BID    pantoprazole  40 mg Oral QAM AC    oxybutynin  5 mg Oral BID    rivastigmine  1 patch Transdermal Daily    sodium chloride flush  10 mL Intravenous 2 times per day    heparin (porcine)  5,000 Units Subcutaneous 3 times per day     Continuous Infusions:    dextrose      sodium chloride 50 mL/hr at 20 1724     PRN Meds: glucose, dextrose, glucagon (rDNA), dextrose, potassium chloride **OR** potassium alternative oral replacement **OR** potassium chloride, sodium chloride flush, acetaminophen **OR** acetaminophen, polyethylene glycol, promethazine **OR** ondansetron, nicotine, perflutren lipid microspheres    Data:     Past Medical History:   has a past medical history of Anxiety and depression, Background diabetic retinopathy(362.01), Balance problem, CAD (coronary artery disease), Cancer of uterus ([facility] [address] Chronic kidney disease, Chronic low back pain, CVA (cerebral vascular accident) ([facility] [address] Dementia ([facility] [address] Dry eye syndrome, GERD (gastroesophageal reflux disease), Glaucoma suspect, Gout, Homonymous hemianopsia, Hyperlipidemia, Hypertension, Keratitis, Obesity, Peripheral polyneuropathy, Pseudophakos, Stroke ([facility] [address] Trichiasis, and Type 2 diabetes mellitus ([facility] [address] Social History:   reports that she has never smoked. She has never used smokeless tobacco. She reports that she does not drink alcohol or use drugs. Family History:   Family History   Problem Relation Age of Onset    Diabetes Mother     Cancer Mother     High Blood Pressure Mother    Shahnaz Garcia Stroke Mother     Kidney Disease Mother     Uterine Cancer Mother     Diabetes Father     Heart Disease Father     Glaucoma Father     Emphysema Father     Coronary Art Dis Other         All 4 siblings.  Stroke Other         1 sibling.  Lung Cancer Other         1 sibling - cause of death lung cancer.     Mental Retardation Other        Vitals:  BP (!) 131/46   Pulse 67   Temp 100.1 °F (37.8 °C) (Oral)   Resp 20   Ht 5' 5\" (1.651 m)   Wt 236 lb 12.8 oz (107.4 kg)   SpO2 96%   BMI 39.41 kg/m²   Temp (24hrs), Av.8 °F (37.1 °C), Min:97.6 °F (36.4 °C), Max:100.1 °F (37.8 °C)    Recent Labs 08/08/20  1032 08/08/20  1137 08/08/20  1603 08/08/20  2048   POCGLU 86 87 119* 177*       I/O (24Hr): Intake/Output Summary (Last 24 hours) at 8/9/2020 0759  Last data filed at 8/8/2020 2235  Gross per 24 hour   Intake 1647.92 ml   Output 225 ml   Net 1422.92 ml       Labs:  Hematology:  Recent Labs     08/07/20  1552 08/08/20  0604   WBC 10.1 8.9   RBC 3.96 3.49*   HGB 11.5* 10.2*   HCT 35.3* 31.6*   MCV 89.1 90.5   MCH 29.0 29.2   MCHC 32.6 32.3   RDW 15.8* 16.2*    364   MPV 10.5 10.0     Chemistry:  Recent Labs     08/07/20  1552  08/08/20  0604 08/08/20  1543 08/08/20 2007 08/09/20  0334   *  --  137  --  132*  --    K 3.8  --  3.1*  --  3.9  --    CL 96*  --  101  --  95*  --    CO2 27  --  26  --  25  --    GLUCOSE 400*  --  101*  --  177*  --    BUN 20  --  18  --  22  --    CREATININE 1.28*  --  1.18*  --  1.55*  --    MG  --   --  1.3*  --   --  1.7   ANIONGAP 10  --  10  --  12  --    LABGLOM 41*  --  46*  --  33*  --    GFRAA 50*  --  55*  --  40*  --    CALCIUM 8.4*  --  8.0*  --  7.9*  --    PROBNP 31,859*  --  28,916*  --   --   --    TROPHS 68*   < > 76* 80* 80* 78*   CKTOTAL 413*  --  213*  --   --   --    MYOGLOBIN 747*  --  299*  --   --   --     < > = values in this interval not displayed.      Recent Labs     08/07/20  1552  08/07/20  2346 08/08/20  0604 08/08/20  0811 08/08/20  1032 08/08/20  1137 08/08/20  1603 08/08/20 2048   PROT 6.5  --   --   --   --   --   --   --   --    LABALBU 2.2*  --   --   --   --   --   --   --   --    LABA1C  --   --   --  10.8*  --   --   --   --   --    TSH  --   --   --  1.86  --   --   --   --   --    AST 21  --   --   --   --   --   --   --   --    ALT 7  --   --   --   --   --   --   --   --    ALKPHOS 141*  --   --   --   --   --   --   --   --    BILITOT 0.98  --   --   --   --   --   --   --   --    BILIDIR 0.43*  --   --   --   --   --   --   --   --    AMYLASE 11*  --   --   --   --   --   --   --   --    LIPASE 19  --   --   --   -- --   --   --   --    CHOL  --   --   --  97  --   --   --   --   --    HDL  --   --   --  41  --   --   --   --   --    LDLCHOLESTEROL  --   --   --  45  --   --   --   --   --    CHOLHDLRATIO  --   --   --  2.4  --   --   --   --   --    TRIG  --   --   --  57  --   --   --   --   --    VLDL  --   --   --  NOT REPORTED  --   --   --   --   --    POCGLU  --    < > 270*  --  51* 86 87 119* 177*    < > = values in this interval not displayed. ABG:No results found for: POCPH, PHART, PH, POCPCO2, NBE3LNH, PCO2, POCPO2, PO2ART, PO2, POCHCO3, HAH2HTT, HCO3, NBEA, PBEA, BEART, BE, THGBART, THB, KPN4GGA, CZIS8BYO, A0TOYGFR, O2SAT, FIO2  Lab Results   Component Value Date/Time    SPECIAL NOT REPORTED 08/07/2020 04:04 PM     Lab Results   Component Value Date/Time    CULTURE (A) 08/07/2020 04:04 PM     LACTOSE FERMENTING GRAM NEGATIVE RODS >[id number] CFU/ML       Radiology:  Ct Head Wo Contrast    Result Date: 8/7/2020  No acute intracranial abnormality. Left sided scalp edema/hematoma without underlying calvarial fracture. Peripheral mucosal thickening of the paranasal sinuses. Remote left PCA territory infarction. Ct Cervical Spine Wo Contrast    Result Date: 8/7/2020  No acute abnormality of the cervical spine. Mild multilevel degenerative changes. Interlobular septal thickening apices; correlate for edema or other disease. 4 cm left thyroid nodule. If not already assessed previously, nonemergent follow-up ultrasound suggested. Xr Chest Portable    Result Date: 8/7/2020  Mild cardiomegaly and central vascular congestion. Basilar nodules are present, denser than rib favoring granulomas or hamartomas.        Physical Examination:        General appearance: Drowsy, cooperative and no distress  Mental Status:  oriented to person, place and time and flat affect  Lungs: Reduced BS bilaterally, normal effort, no wheezes/rales/rhonchi  Heart:  regular rate and rhythm, no murmur  Abdomen:  soft, nontender, obese, normal bowel sounds, no masses, hepatomegaly, splenomegaly  Extremities:  + edema bilateral LE, no tenderness in the calves  Skin:  + Chronic venous stasis skin changes bilateral LE and feet, thickened skin, several open wounds on bilateral feet no drainage    Assessment:        Hospital Problems           Last Modified POA    * (Principal) CHF (congestive heart failure), NYHA class I, acute on chronic, combined ([facility] [address] 8/8/2020 Yes    Hypertension 8/8/2020 Yes    Hyperlipidemia 8/8/2020 Yes    Obesity 8/8/2020 Yes    Acute kidney injury superimposed on CKD ([facility] [address] 8/8/2020 Yes    CAD (coronary artery disease) 8/8/2020 Yes    Overview Signed 2/27/2014 10:22 AM by Darryl Worrell MD     (with coronary artery bypass graft x4). Type 2 diabetes mellitus with hyperglycemia, with long-term current use of insulin ([facility] [address] 8/8/2020 Yes    Acute cystitis 8/8/2020 Yes    Infestation by bed bug 8/8/2020 Yes    Infestation by maggots 8/8/2020 Yes    Fall 8/8/2020 Yes    Elevated troponin 8/8/2020 Yes    Lymphedema of both lower extremities 8/8/2020 Yes          Plan:        1. Congestive heart failure-elevated BNP, gentle IV hydration given need for fluids for rhabdomyolysis. Await echocardiogram, Consult cardiology  2. Elevated troponin-check troponins every 6 hours, start aspirin, consult cardiology  3. Fall/acute rhabdomyolysis-CPK myoglobin slightly elevated, gentle IV hydration, monitor BUN/creatinine   4. DM2- decreased Lantus nightly, for hypoglycemia in the morning, SSI, check HbA1c  5. Acute UTI-IV Cipro, check urine culture  6. AMY/CKD-monitor BUN/creatinine  7. Bilateral lymphedema- place Ace wrap, wound care for wounds  8. History of CAD  9. GI/DVT prophylaxis  10. PT/OT  11. Social work consult-likely needs SNF placement, patient needs assistance with medication compliance and strength training  12.   Hypomag- replace      Julia Carter MD  8/9/2020  7:59 AM

## 2020-08-09 NOTE — PROGRESS NOTES
NP contacted regarding pt only having 100ml out for day shift. Pt is drinking properly (about 500ml PO for day shift), fluids running at 50ml/hr, and was given one dose of IV lasix around 1430. NP states to flush rodriguez catheter with 50cc sterile water, order placed. Writer bladder scanned pt prior to flush, 0ml shown. Writer flushed rodriguez with no complications. 50ml in and right back out. Urine simone in color with sediment. 2200: NP updated with BMP results: Na 132, chloride 95, creatinine 1.55, calcium 7.9. Writer instructed to bladder scan in 2 hours. Gucci Torres RN given report on pt and will be taking over pt care. RN updated on rodriguez and output status and instructed to bladder scan pt around 0000 and update NP.      Electronically signed by Lori Villegas RN on 8/8/2020 at 11:24 PM

## 2020-08-09 NOTE — PROGRESS NOTES
Voicemail left for daughter to call unit for an update on pt's new room and room phone number.     Electronically signed by Jodi Mejia RN on 8/9/2020 at 12:20 AM

## 2020-08-09 NOTE — PROGRESS NOTES
[facility]   Progress Note                    Date:   2020  Patient name:  Leanne Lopez  Date of admission:  2020 10:29 PM  MRN:   [id number]  YOB: 1950  PCP:    ERINN Mike    Reason for Admission:  CHF (congestive heart failure), NYHA class I, acute on chronic, [facility] ([facility] [I50.43]    Subjective:     Clinical Changes / Abnormalities:    Patient seen and examined at bedside. No acute events overnight. No chest pain or shortness of breath. Breathing comfortably on room air. Hemodynamically stable. Systolic (72MED), CESAR:200 , Min:156 , ODE:842     Diastolic (56KFD), XLJ:26, Min:52, Max:73      No arrhythmias or PVC. No fever .  Temp (24hrs), Av.6 °F (37 °C), Min:97.7 °F (36.5 °C), Max:100.1 °F (37.8 °C)        Urine output in the last 24 hours:     Intake/Output Summary (Last 24 hours) at 2020 1523  Last data filed at 2020 1301  Gross per 24 hour   Intake 897.92 ml   Output 1425 ml   Net -527.08 ml       Medications:   Scheduled Meds:   insulin glargine  35 Units Subcutaneous Nightly    ciprofloxacin  400 mg Intravenous Q12H    allopurinol  100 mg Oral Daily    aspirin  81 mg Oral Daily    insulin lispro  0-12 Units Subcutaneous TID WC    insulin lispro  0-6 Units Subcutaneous Nightly    citalopram  20 mg Oral Daily    clopidogrel  75 mg Oral Daily    docusate sodium  100 mg Oral BID    gabapentin  100 mg Oral TID    cetirizine  5 mg Oral Daily    losartan  100 mg Oral Daily    memantine  10 mg Oral BID    nystatin   Topical BID    pantoprazole  40 mg Oral QAM AC    oxybutynin  5 mg Oral BID    rivastigmine  1 patch Transdermal Daily    sodium chloride flush  10 mL Intravenous 2 times per day    heparin (porcine)  5,000 Units Subcutaneous 3 times per day     Continuous Infusions:   dextrose      sodium chloride 50 mL/hr at 20 1724     CBC:   Recent Labs     20  1552 20  0604   WBC 10.1 8.9   HGB 11.5* 10.2*  364     BMP:    Recent Labs     08/07/20  1552 08/08/20  0604 08/08/20 2007   * 137 132*   K 3.8 3.1* 3.9   CL 96* 101 95*   CO2 27 26 25   BUN 20 18 22   CREATININE 1.28* 1.18* 1.55*   GLUCOSE 400* 101* 177*     Hepatic:   Recent Labs     08/07/20  1552   AST 21   ALT 7   BILITOT 0.98   ALKPHOS 141*     Troponin: No results for input(s): TROPONINI in the last 72 hours. Recent Labs     08/08/20  1543 08/08/20 2007 08/09/20  0334   TROPONINT NOT REPORTED NOT REPORTED NOT REPORTED     BNP:   Recent Labs     08/07/20  1552 08/08/20  0604   PROBNP 31,859* 28,916*      No results for input(s): BNP in the last 72 hours. Lipids:   Recent Labs     08/08/20  0604   CHOL 97   HDL 41     INR: No results for input(s): INR in the last 72 hours. Objective:   Vitals: BP (!) 160/73   Pulse 64   Temp 98.1 °F (36.7 °C) (Oral)   Resp 11   Ht 5' 5\" (1.651 m)   Wt 236 lb 12.8 oz (107.4 kg)   SpO2 95%   BMI 39.41 kg/m²    General appearance: Drowsy, cooperative and no distress  Mental Status:  oriented to person, place and time and flat affect  Lungs: Reduced BS bilaterally, normal effort, no wheezes/rales/rhonchi  Heart:  regular rate and rhythm, no murmur  Abdomen:  soft, nontender, obese, normal bowel sounds, no masses, hepatomegaly, splenomegaly  Extremities:  + edema bilateral LE, no tenderness in the calves  Skin:  + Chronic venous stasis skin changes bilateral LE and feet, thickened skin, several open wounds on bilateral feet no drainage      Assessment / Acute Cardiac Problems:   1. CAD s/p CABG  2. CHF Exacerbation  3. HTN  4. HLD  5. CVA  6. Obesity  7. Anxiety  8. Depression    Plan of Treatment:   1. Continue lasix 40 IV once daily  2. Monitor I & O's  3. K >4, Mg >2  4. Cardiac diet with 2 L fluid restriction and 2 gm Na restriction  5. Daily Weights  6. ECHO Ordered  7.  Continue ASA, Plavix, Losartan and Erick Hailstone, MD  Fellow, Jose Luis Patrick

## 2020-08-10 LAB
ANION GAP SERPL CALCULATED.3IONS-SCNC: 11 MMOL/L (ref 9–17)
BNP INTERPRETATION: ABNORMAL
BUN BLDV-MCNC: 25 MG/DL (ref 8–23)
BUN/CREAT BLD: ABNORMAL (ref 9–20)
CALCIUM SERPL-MCNC: 7.6 MG/DL (ref 8.6–10.4)
CHLORIDE BLD-SCNC: 94 MMOL/L (ref 98–107)
CO2: 25 MMOL/L (ref 20–31)
CREAT SERPL-MCNC: 1.48 MG/DL (ref 0.5–0.9)
CULTURE: ABNORMAL
EKG ATRIAL RATE: 53 BPM
EKG P AXIS: -21 DEGREES
EKG P-R INTERVAL: 264 MS
EKG Q-T INTERVAL: 528 MS
EKG QRS DURATION: 108 MS
EKG QTC CALCULATION (BAZETT): 495 MS
EKG T AXIS: 172 DEGREES
EKG VENTRICULAR RATE: 53 BPM
GFR AFRICAN AMERICAN: 43 ML/MIN
GFR NON-AFRICAN AMERICAN: 35 ML/MIN
GFR SERPL CREATININE-BSD FRML MDRD: ABNORMAL ML/MIN/{1.73_M2}
GFR SERPL CREATININE-BSD FRML MDRD: ABNORMAL ML/MIN/{1.73_M2}
GLUCOSE BLD-MCNC: 125 MG/DL (ref 65–105)
GLUCOSE BLD-MCNC: 151 MG/DL (ref 65–105)
GLUCOSE BLD-MCNC: 186 MG/DL (ref 70–99)
HCT VFR BLD CALC: 30.6 % (ref 36.3–47.1)
HEMOGLOBIN: 9.4 G/DL (ref 11.9–15.1)
Lab: ABNORMAL
MAGNESIUM: 1.6 MG/DL (ref 1.6–2.6)
MCH RBC QN AUTO: 29.2 PG (ref 25.2–33.5)
MCHC RBC AUTO-ENTMCNC: 30.7 G/DL (ref 28.4–34.8)
MCV RBC AUTO: 95 FL (ref 82.6–102.9)
NRBC AUTOMATED: 0 PER 100 WBC
PDW BLD-RTO: 16.7 % (ref 11.8–14.4)
PLATELET # BLD: 390 K/UL (ref 138–453)
PMV BLD AUTO: 10.4 FL (ref 8.1–13.5)
POTASSIUM SERPL-SCNC: 4.2 MMOL/L (ref 3.7–5.3)
PRO-BNP: ABNORMAL PG/ML
RBC # BLD: 3.22 M/UL (ref 3.95–5.11)
SODIUM BLD-SCNC: 130 MMOL/L (ref 135–144)
SPECIMEN DESCRIPTION: ABNORMAL
TROPONIN INTERP: ABNORMAL
TROPONIN T: ABNORMAL NG/ML
TROPONIN, HIGH SENSITIVITY: 59 NG/L (ref 0–14)
TROPONIN, HIGH SENSITIVITY: 61 NG/L (ref 0–14)
TROPONIN, HIGH SENSITIVITY: 61 NG/L (ref 0–14)
WBC # BLD: 9.8 K/UL (ref 3.5–11.3)

## 2020-08-10 PROCEDURE — 97535 SELF CARE MNGMENT TRAINING: CPT

## 2020-08-10 PROCEDURE — 6360000002 HC RX W HCPCS: Performed by: CLINICAL NURSE SPECIALIST

## 2020-08-10 PROCEDURE — 99213 OFFICE O/P EST LOW 20 MIN: CPT

## 2020-08-10 PROCEDURE — 36415 COLL VENOUS BLD VENIPUNCTURE: CPT

## 2020-08-10 PROCEDURE — 99232 SBSQ HOSP IP/OBS MODERATE 35: CPT | Performed by: INTERNAL MEDICINE

## 2020-08-10 PROCEDURE — 83036 HEMOGLOBIN GLYCOSYLATED A1C: CPT

## 2020-08-10 PROCEDURE — 84484 ASSAY OF TROPONIN QUANT: CPT

## 2020-08-10 PROCEDURE — 6370000000 HC RX 637 (ALT 250 FOR IP): Performed by: CLINICAL NURSE SPECIALIST

## 2020-08-10 PROCEDURE — 97162 PT EVAL MOD COMPLEX 30 MIN: CPT

## 2020-08-10 PROCEDURE — 94760 N-INVAS EAR/PLS OXIMETRY 1: CPT

## 2020-08-10 PROCEDURE — 93010 ELECTROCARDIOGRAM REPORT: CPT | Performed by: INTERNAL MEDICINE

## 2020-08-10 PROCEDURE — 85027 COMPLETE CBC AUTOMATED: CPT

## 2020-08-10 PROCEDURE — 83735 ASSAY OF MAGNESIUM: CPT

## 2020-08-10 PROCEDURE — 97166 OT EVAL MOD COMPLEX 45 MIN: CPT

## 2020-08-10 PROCEDURE — 97530 THERAPEUTIC ACTIVITIES: CPT

## 2020-08-10 PROCEDURE — 82947 ASSAY GLUCOSE BLOOD QUANT: CPT

## 2020-08-10 PROCEDURE — 6370000000 HC RX 637 (ALT 250 FOR IP): Performed by: NURSE PRACTITIONER

## 2020-08-10 PROCEDURE — 83880 ASSAY OF NATRIURETIC PEPTIDE: CPT

## 2020-08-10 PROCEDURE — 80048 BASIC METABOLIC PNL TOTAL CA: CPT

## 2020-08-10 PROCEDURE — 6360000002 HC RX W HCPCS: Performed by: INTERNAL MEDICINE

## 2020-08-10 PROCEDURE — 1200000000 HC SEMI PRIVATE

## 2020-08-10 PROCEDURE — 2580000003 HC RX 258: Performed by: CLINICAL NURSE SPECIALIST

## 2020-08-10 RX ORDER — CARVEDILOL 6.25 MG/1
6.25 TABLET ORAL 2 TIMES DAILY WITH MEALS
Status: DISCONTINUED | OUTPATIENT
Start: 2020-08-10 | End: 2020-08-11

## 2020-08-10 RX ORDER — ATORVASTATIN CALCIUM 40 MG/1
40 TABLET, FILM COATED ORAL NIGHTLY
Status: DISCONTINUED | OUTPATIENT
Start: 2020-08-10 | End: 2020-08-14 | Stop reason: HOSPADM

## 2020-08-10 RX ADMIN — CITALOPRAM 20 MG: 20 TABLET, FILM COATED ORAL at 09:30

## 2020-08-10 RX ADMIN — CLOPIDOGREL 75 MG: 75 TABLET, FILM COATED ORAL at 09:30

## 2020-08-10 RX ADMIN — GABAPENTIN 100 MG: 100 CAPSULE ORAL at 14:41

## 2020-08-10 RX ADMIN — CARVEDILOL 6.25 MG: 6.25 TABLET, FILM COATED ORAL at 18:30

## 2020-08-10 RX ADMIN — LOSARTAN POTASSIUM 100 MG: 50 TABLET, FILM COATED ORAL at 09:31

## 2020-08-10 RX ADMIN — ALLOPURINOL 100 MG: 100 TABLET ORAL at 09:31

## 2020-08-10 RX ADMIN — Medication 10 ML: at 20:48

## 2020-08-10 RX ADMIN — CIPROFLOXACIN 400 MG: 2 INJECTION, SOLUTION INTRAVENOUS at 20:47

## 2020-08-10 RX ADMIN — INSULIN LISPRO 3 UNITS: 100 INJECTION, SOLUTION INTRAVENOUS; SUBCUTANEOUS at 22:14

## 2020-08-10 RX ADMIN — NYSTATIN: 100000 CREAM TOPICAL at 22:13

## 2020-08-10 RX ADMIN — DOCUSATE SODIUM 100 MG: 100 CAPSULE, LIQUID FILLED ORAL at 20:47

## 2020-08-10 RX ADMIN — HEPARIN SODIUM 5000 UNITS: 5000 INJECTION INTRAVENOUS; SUBCUTANEOUS at 14:42

## 2020-08-10 RX ADMIN — PANTOPRAZOLE SODIUM 40 MG: 40 TABLET, DELAYED RELEASE ORAL at 05:24

## 2020-08-10 RX ADMIN — GABAPENTIN 100 MG: 100 CAPSULE ORAL at 09:29

## 2020-08-10 RX ADMIN — HEPARIN SODIUM 5000 UNITS: 5000 INJECTION INTRAVENOUS; SUBCUTANEOUS at 09:34

## 2020-08-10 RX ADMIN — CIPROFLOXACIN 400 MG: 2 INJECTION, SOLUTION INTRAVENOUS at 09:29

## 2020-08-10 RX ADMIN — DESMOPRESSIN ACETATE 40 MG: 0.2 TABLET ORAL at 21:00

## 2020-08-10 RX ADMIN — CETIRIZINE HYDROCHLORIDE 5 MG: 10 TABLET ORAL at 09:31

## 2020-08-10 RX ADMIN — Medication 81 MG: at 09:31

## 2020-08-10 RX ADMIN — GABAPENTIN 100 MG: 100 CAPSULE ORAL at 20:47

## 2020-08-10 RX ADMIN — DOCUSATE SODIUM 100 MG: 100 CAPSULE, LIQUID FILLED ORAL at 09:30

## 2020-08-10 RX ADMIN — MEMANTINE HYDROCHLORIDE 10 MG: 5 TABLET, FILM COATED ORAL at 20:47

## 2020-08-10 RX ADMIN — Medication 10 ML: at 09:29

## 2020-08-10 RX ADMIN — MEMANTINE HYDROCHLORIDE 10 MG: 5 TABLET, FILM COATED ORAL at 09:30

## 2020-08-10 RX ADMIN — HEPARIN SODIUM 5000 UNITS: 5000 INJECTION INTRAVENOUS; SUBCUTANEOUS at 22:14

## 2020-08-10 RX ADMIN — NYSTATIN: 100000 CREAM TOPICAL at 11:48

## 2020-08-10 RX ADMIN — OXYBUTYNIN CHLORIDE 5 MG: 5 TABLET ORAL at 20:48

## 2020-08-10 RX ADMIN — INSULIN LISPRO 2 UNITS: 100 INJECTION, SOLUTION INTRAVENOUS; SUBCUTANEOUS at 09:49

## 2020-08-10 RX ADMIN — INSULIN LISPRO 4 UNITS: 100 INJECTION, SOLUTION INTRAVENOUS; SUBCUTANEOUS at 17:29

## 2020-08-10 RX ADMIN — OXYBUTYNIN CHLORIDE 5 MG: 5 TABLET ORAL at 09:29

## 2020-08-10 ASSESSMENT — PAIN DESCRIPTION - ORIENTATION: ORIENTATION: RIGHT

## 2020-08-10 ASSESSMENT — PAIN DESCRIPTION - ONSET: ONSET: ON-GOING

## 2020-08-10 ASSESSMENT — PAIN - FUNCTIONAL ASSESSMENT: PAIN_FUNCTIONAL_ASSESSMENT: PREVENTS OR INTERFERES SOME ACTIVE ACTIVITIES AND ADLS

## 2020-08-10 NOTE — PROGRESS NOTES
Physical Therapy    Facility/Department: [facility] CAR 2  Initial Assessment    NAME: Chrissy Valencia  : 1950  MRN: [id number]    Date of Service: 8/10/2020  This is a 77 yr old female with history of CAD s/p CABG, CKD, CVA, glaucoma, HTN, HLD, DMII, and poly neuropathy who presented to [facility] [place] [facility] today via EMS. Patient lives with her daughter and grandson, reportedly fell in the early evening on  and refused to let her daughter call 911. EMS was finally called the day prior to pt's admission after patient was found by family and had laid on the ground overnight, unable to get up. On arrival to ER, patient covered in urine and feces, multiple wounds of various stages with maggot and bed bug infestation. Patient noted to be hyperglycemic with bs of 400, AMY with elevated CK and Myoglobin, pro-bnp> 31,000, elevated troponin, and UTI. CT Head and neck without any acute findings, there is evidence of remote left PCA infarct. Patient denies any LOC or injury/hitting head on falling. Reports she slipped on the floor when she is used to walking on carpet. She denies any previous or associated dizziness or light headedness. Given the array of findings listed above, patient is transferred to our facility for further care and management. Discharge Recommendations:  Further therapy recommended at discharge. PT Equipment Recommendations  Equipment Needed: (TBD)    Assessment    Pt cooperative, fearful of falling, moves very slowly, cautiously. She was able to take steps, but is unsafe using the rwalker, rocking it backwards towards her so that the wheels are off the floor. She states she doesn't use her walker at home for the same reason. Body structures, Functions, Activity limitations: Decreased functional mobility ; Decreased strength;Decreased cognition;Decreased endurance;Decreased sensation;Decreased balance;Decreased vision/visual deficit; Increased pain  Prognosis: Fair  Decision Making: Medium Complexity  PT Education: Goals;PT Role;Plan of Care;Transfer Training;General Safety;Orientation;Equipment; Adaptive Device Training;Gait Training;Functional Mobility Training  Barriers to Learning: cognition  REQUIRES PT FOLLOW UP: Yes  Activity Tolerance  Activity Tolerance: Patient limited by pain; Other  Activity Tolerance: pt very fearful of falling       Patient Diagnosis(es): There were no encounter diagnoses. has a past medical history of Anxiety and depression, Background diabetic retinopathy(362.01), Balance problem, CAD (coronary artery disease), Cancer of uterus ([address] Chronic kidney disease, Chronic low back pain, CVA (cerebral vascular accident) ([address] Dementia ([address] Dry eye syndrome, GERD (gastroesophageal reflux disease), Glaucoma suspect, Gout, Homonymous hemianopsia, Hyperlipidemia, Hypertension, Keratitis, Obesity, Peripheral polyneuropathy, Pseudophakos, Stroke ([address] Trichiasis, and Type 2 diabetes mellitus ([facility] has a past surgical history that includes Gastric bypass surgery; Cataract removal with implant (2012); Cataract removal with implant (2012); Coronary artery bypass graft (12-);  section; Hysterectomy; Cholecystectomy; Tonsillectomy and adenoidectomy; and Eye surgery (Left). Restrictions  Restrictions/Precautions  Restrictions/Precautions: General Precautions, Fall Risk, Contact Precautions, Up as Tolerated  Required Braces or Orthoses?: No  Vision/Hearing  Vision: Impaired  Vision Exceptions: (pt states \"I stopped wearing glasses 10 yrs ago.   I see OK\")  Hearing: Within functional limits     Subjective  General  Patient assessed for rehabilitation services?: Yes  Response To Previous Treatment: Not applicable  Family / Caregiver Present: No  Follows Commands: Impaired(occasionally needed visual/tactile cues to respond to instructions)  Pain Screening  Patient Currently in Pain: Yes  Pain Assessment  Pain Assessment: 0-10  Pain Level: 5  Patient's Stated Pain Goal: No pain  Pain Type: Acute pain  Pain Location: Foot  Pain Orientation: Right  Pain Descriptors: Aching; Sore  Pain Frequency: Continuous  Pain Onset: On-going  Clinical Progression: Not changed  Functional Pain Assessment: Prevents or interferes some active activities and ADLs  Vital Signs  Patient Currently in Pain: Yes  Pre Treatment Pain Screening  Intervention List: Patient able to continue with treatment    Orientation  Orientation  Overall Orientation Status: Impaired  Orientation Level: Oriented to person;Oriented to place; Disoriented to time;Disoriented to situation  Social/Functional History  Social/Functional History  Lives With: Family(dtr and grandson)  Type of Home: House  Home Layout: One level(there is a basement per pt, but she states the laundry is on the first floor; she normally does her own with her dtr helping prn)  Home Access: Stairs to enter without rails(pt unable to remember if there is a handrail)  Entrance Stairs - Number of Steps: 2  Home Equipment: Rolling walker  Receives Help From: Family  ADL Assistance: Independent  Ambulation Assistance: Independent(pt states she normally ambulates independently)  Transfer Assistance: Independent  Active : No(pt states she's never driven)  Mode of Transportation: Car, Family  Additional Comments: pt is an unreliable historian, unable to confirm info given    Objective     Observation/Palpation  Posture: Fair    PROM RLE (degrees)  RLE PROM: WFL  PROM LLE (degrees)  LLE PROM: WFL  PROM RUE (degrees)  RUE PROM: WFL  PROM LUE (degrees)  LUE PROM: WFL  Strength RLE  Strength RLE: Exception--hip grossly 2/5; knee 3/5; ankle 3/5  Strength LLE  Strength LLE: Exception--hip 3/5; knee 3/5; ankle 3/5  Strength RUE  Strength RUE: WFL  Strength LUE  Strength LUE: WFL  Tone RLE  RLE Tone: Normotonic  Tone LLE  LLE Tone: Normotonic  Motor Control  Gross Motor?: WFL  Sensation  Overall Sensation Status: Impaired  Bed mobility  Rolling to Left: Minimal assistance(pt is very slow and moves with difficulty)  Supine to Sit: Moderate assistance(pt is very slow and moves with difficulty)  Scooting: Minimal assistance  Transfers  Sit to Stand: Minimal Assistance(from bed)  Stand to sit: Minimal Assistance  Bed to Chair: Minimal assistance  Stand Pivot Transfers: Minimal Assistance  Comment: pt using rw, tends to rock it backwards towards herself  Ambulation  Ambulation?: Yes  Ambulation 1  Surface: level tile  Device: Rolling Walker  Assistance: Minimal assistance  Quality of Gait: pt takes very small steps, tends to rock the walker posteriorly  Gait Deviations: Slow Vicenta; Increased RACIEL; Decreased step length;Decreased step height;Decreased arm swing;Decreased head and trunk rotation; Shuffles  Distance: ~8 very small steps bed to chair  Stairs/Curb  Stairs?: No     Balance  Posture: Fair  Sitting - Static: Good  Sitting - Dynamic: Good  Standing - Static: Fair  Standing - Dynamic: Fair;-  Other exercises  Other exercises 1: AROM BUEs  Other exercises 2: AAROM BLEs     Plan   Plan  Times per week: 5-6 visits weekly  Times per day: Daily  Current Treatment Recommendations: Strengthening, ROM, Balance Training, Functional Mobility Training, Transfer Training, Endurance Training, Gait Training, Stair training, Pain Management, Home Exercise Program, Safety Education & Training, Patient/Caregiver Education & Training, Positioning  Safety Devices  Type of devices: Call light within reach, Chair alarm in place, Gait belt, Patient at risk for falls, Left in chair, Nurse notified  Restraints  Initially in place: No    AM-PAC Score  AM-PAC Inpatient Mobility Raw Score : 13 (08/10/20 1043)  AM-PAC Inpatient T-Scale Score : 36.74 (08/10/20 1043)  Mobility Inpatient CMS 0-100% Score: 64.91 (08/10/20 1043)  Mobility Inpatient CMS G-Code Modifier : CL (08/10/20 1043)          Goals  Short term goals  Time Frame for Short term goals: 12 visits  Short term goal 1: independent bed mobility  Short term goal 2: independent transfers  Short term goal 3: independent gait with appropriate device x 50'  Short term goal 4: stair ambulation x 2 steps without HR, min A+1  Patient Goals   Patient goals : return home       Therapy Time   Individual Concurrent Group Co-treatment   Time In 0945         Time Out [facility]                 Ricardo Amaral, [address]

## 2020-08-10 NOTE — PROGRESS NOTES
Assumed care of patient and received nursing report. Patient is sleeping and presents with no s./s of distress. Patient is on room air with normal respirations and is sinus rhythm on the tele monitor. Patient has a rodriguez in place. Bed in l/l position with call light within reach.

## 2020-08-10 NOTE — PLAN OF CARE
Problem: Skin Integrity:  Goal: Will show no infection signs and symptoms  Description: Will show no infection signs and symptoms  Outcome: Ongoing   Educated patient on s/s of infection and when to notify the doctor

## 2020-08-10 NOTE — PROGRESS NOTES
[facility]  Progress Note                   Date:   8/10/2020  Patient name: Lanie Anguiano  Date of admission:  8/7/2020 10:29 PM  MRN:   [id number]  YOB: 1950  PCP: ERINN Louis    Reason for Admission: CHF (congestive heart failure), NYHA class I, acute on chronic, combined ([facility] [I50.43]    Subjective:       Clinical Changes /Abnormalities:  Patient seen and examined sitting up in chair in room after discussion with RN. Denies chest pain or SOB. SR on tele  HR 67  Awaiting ECHO for further recommendations.       I/O -8003 yesterday    Review of Systems    Medications:   Scheduled Meds:   insulin glargine  20 Units Subcutaneous Nightly    ciprofloxacin  400 mg Intravenous Q12H    allopurinol  100 mg Oral Daily    aspirin  81 mg Oral Daily    insulin lispro  0-12 Units Subcutaneous TID WC    insulin lispro  0-6 Units Subcutaneous Nightly    citalopram  20 mg Oral Daily    clopidogrel  75 mg Oral Daily    docusate sodium  100 mg Oral BID    gabapentin  100 mg Oral TID    cetirizine  5 mg Oral Daily    losartan  100 mg Oral Daily    memantine  10 mg Oral BID    nystatin   Topical BID    pantoprazole  40 mg Oral QAM AC    oxybutynin  5 mg Oral BID    rivastigmine  1 patch Transdermal Daily    sodium chloride flush  10 mL Intravenous 2 times per day    heparin (porcine)  5,000 Units Subcutaneous 3 times per day     Continuous Infusions:   dextrose      sodium chloride 50 mL/hr at 08/08/20 1724     CBC:   Recent Labs     08/07/20  1552 08/08/20  0604 08/10/20  0455   WBC 10.1 8.9 9.8   HGB 11.5* 10.2* 9.4*    364 390     BMP:    Recent Labs     08/08/20  0604 08/08/20  2007 08/10/20  0455    132* 130*   K 3.1* 3.9 4.2    95* 94*   CO2 26 25 25   BUN 18 22 25*   CREATININE 1.18* 1.55* 1.48*   GLUCOSE 101* 177* 186*     Hepatic:  Recent Labs     08/07/20  1552   AST 21   ALT 7   BILITOT 0.98   ALKPHOS 141*     Troponin:   Recent Labs 08/09/20  2303 08/10/20  0455 08/10/20  0948   TROPHS 62* 61* 59*     BNP: No results for input(s): BNP in the last 72 hours. Lipids:   Recent Labs     08/08/20  0604   CHOL 97   HDL 41     INR: No results for input(s): INR in the last 72 hours. DIAGNOSTIC DATA  CABG 2005  (x4) - LIMA-LAD, SVG-diagnoal 1, SVG-OM1, and SVG-PDA. Exploration of left radial. (Dr. Kenisha Harper). Objective:   Vitals: /61   Pulse 63   Temp 98.4 °F (36.9 °C) (Oral)   Resp 12   Ht 5' 5\" (1.651 m)   Wt 236 lb 12.8 oz (107.4 kg)   SpO2 95%   BMI 39.41 kg/m²   General appearance: alert and cooperative with exam  HEENT: Head: Normocephalic, no lesions, without obvious abnormality. Neck:no JVD, trachea midline, no adenopathy  Lungs: Clear to auscultation  Heart: Regular rate and rhythm, s1/s2 auscultated, no murmurs SR on tele HR 67  Abdomen: soft, non-tender, bowel sounds active  Extremities:  Legs currently wrapped d/t wound care. Neurologic: not done        Assessment / Acute Cardiac Problems:   1. CAD s/p CABG  2. CHF Exacerbation ProBNP elevated  3. HTN  4. HLD  5. CVA  6. Obesity  7. Anxiety  8. Depression    Patient Active Problem List:     Dementia ([facility] [address]     Hypertension     Hyperlipidemia     Obesity     Gout     Peripheral polyneuropathy     Anxiety and depression     Acute kidney injury superimposed on CKD (HCC)     CAD (coronary artery disease)     Balance problem     CVA (cerebral vascular accident) ([facility] [address]     Type 2 diabetes mellitus with hyperglycemia, with long-term current use of insulin ([facility])     CHF (congestive heart failure), NYHA class I, acute on chronic, combined ([facility] [address]     Acute cystitis     Infestation by bed bug     Infestation by maggots     Fall     Elevated troponin     Lymphedema of both lower extremities      Plan of Treatment:   1. CAD s/p CABG 2005  Continue ASA and plavix. Will add statin and BB. Will hold ACE d/t elevated creatinine.   2. CHF exacerbation  Pro BNP 25,069  Awaiting ECHO results for further recommendations. 3. HTN Controlled. Continue ARB.   Will add BB  4. Keep K >4.0 and Mag >2.0    Electronically signed by SHIRA Yan NP on 8/10/2020 at 11:41 AM  [address]  [phone]

## 2020-08-10 NOTE — CARE COORDINATION
Care Transition  [address] at [facility] and ask if she could qualify for Medicaid. Macho Skinner 12 call from Elza Maria at [place] [facility] [place]. She will start precert.

## 2020-08-10 NOTE — CARE COORDINATION
Called [place] [employer] ([phone]) and spoke with Camila Query  Ref made  Will notify [employer] when pt is discharged - noted plans for [facility]

## 2020-08-10 NOTE — PROGRESS NOTES
Occupational Therapy   Occupational Therapy Initial Assessment  Date: 8/10/2020   Patient Name: Char Johnson  MRN: [id number]     : 1950    Date of Service: 8/10/2020    Discharge Recommendations:  Patient would benefit from continued therapy after discharge. Pt requires assistance for ADL performance and functional mobility. Pt not safe to return home at this time. OT Equipment Recommendations  Other: CTA    Assessment   Performance deficits / Impairments: Decreased functional mobility ; Decreased ADL status; Decreased endurance;Decreased high-level IADLs;Decreased safe awareness  Assessment: Pt would benefit from further skilled OT services to address decreased functional mobility, ADL performance, and endurance. Prognosis: Good  Decision Making: Medium Complexity  OT Education: OT Role;Plan of Care  REQUIRES OT FOLLOW UP: Yes  Activity Tolerance  Activity Tolerance: Patient Tolerated treatment well;Patient limited by fatigue  Safety Devices  Safety Devices in place: Yes  Type of devices: All fall risk precautions in place; Left in chair;Call light within reach;Nurse notified; Chair alarm in place  Restraints  Initially in place: No           Patient Diagnosis(es): There were no encounter diagnoses. has a past medical history of Anxiety and depression, Background diabetic retinopathy(362.01), Balance problem, CAD (coronary artery disease), Cancer of uterus ([address] Chronic kidney disease, Chronic low back pain, CVA (cerebral vascular accident) ([address] Dementia ([address] Dry eye syndrome, GERD (gastroesophageal reflux disease), Glaucoma suspect, Gout, Homonymous hemianopsia, Hyperlipidemia, Hypertension, Keratitis, Obesity, Peripheral polyneuropathy, Pseudophakos, Stroke ([address] Trichiasis, and Type 2 diabetes mellitus ([address] has a past surgical history that includes Gastric bypass surgery; Cataract removal with implant (2012); Cataract removal with implant (2012);  Coronary artery bypass graft (12-);  section; Hysterectomy; Cholecystectomy; Tonsillectomy and adenoidectomy; and Eye surgery (Left). Restrictions  Restrictions/Precautions  Restrictions/Precautions: General Precautions, Fall Risk, Contact Precautions  Required Braces or Orthoses?: No  Position Activity Restriction  Other position/activity restrictions: Up with assistance    Subjective   General  Patient assessed for rehabilitation services?: Yes  Family / Caregiver Present: No  Diagnosis: CHF  General Comment  Comments: RN ok'd for OT eval. Pt agreeable and pleasant throughout session. Patient Currently in Pain: Yes  Pain Assessment  Pain Assessment: 0-10  Pain Level: 0    Social/Functional History  Social/Functional History  Lives With: Family(daughter and grandson)  Type of Home: House  Home Layout: One level(laundry on main level)  Home Access: Stairs to enter without rails(Pt unable to remember if handrails are present to get inside home)  Entrance Stairs - Number of Steps: 2  Bathroom Shower/Tub: Tub/Shower unit  Bathroom Equipment: Shower chair  Home Equipment: Rolling walker(Pt reports having 2 RWs, one standard and one RW potentially, pt confused about the difference. Pt reports not using walkers much d/t walkers tipping when ambulating.)  Receives Help From: Family  ADL Assistance: Independent(pt reports daughter checks on her when showering for safety.)  Homemaking Assistance: Needs assistance  Ambulation Assistance: Independent  Transfer Assistance: Independent  Active : No  Patient's  Info: Pt reports grandson drives pt when necessary. Mode of Transportation: Family  Occupation: Retired  Leisure & Hobbies: Spending time with grandkids  Additional Comments: Pt questionable historian.        Objective   Vision: Within Functional Limits  Hearing: Exceptions to [facility]  Hearing Exceptions: Hard of hearing/hearing concerns(Pt reports some hearing problems, but has no hearing aids) Orientation  Overall Orientation Status: Within Functional Limits  Orientation Level: Oriented to place;Oriented to time;Oriented to person;Oriented to situation(Pt reported year as 2021, then corrected self and said 2020. Pt aware in hospital, however reported being at [facility] than [facility])  Observation/Palpation  Posture: Fair  Balance  Sitting Balance: Stand by assistance  Standing Balance: Maximum assistance  Standing Balance  Activity: Pt attempted standing chairside, however full extension not achieved during session with max A given. ADL  Feeding: Setup; Increased time to complete;Stand by assistance  Grooming: Setup; Increased time to complete;Stand by assistance  UE Bathing: Setup; Increased time to complete;Minimal assistance  LE Bathing: Setup; Increased time to complete;Maximum assistance  UE Dressing: Setup; Increased time to complete;Minimal assistance  LE Dressing: Setup; Increased time to complete;Maximum assistance  Toileting: Setup; Increased time to complete;Maximum assistance  Additional Comments: Pt sitting in recliner upon arrival and exit. Pt completed face washing, washing of UEs, and hair combing sitting in chair with SBA and with setup and increased time noted. Pt attempted to stand chairside with max A, however pt unable to fully extend LEs for standing with max verbal cues and physical assist.  Tone RUE  RUE Tone: Normotonic  Tone LUE  LUE Tone: Normotonic  Coordination  Movements Are Fluid And Coordinated: Yes     Bed mobility  Comment: Bed mobility NT this date, pt in chair upon arrival and exit. Transfers  Sit to stand: Maximum assistance;2 Person assistance(pt unable to complete full sit to stand transfer with max Ax1 this date.)     Cognition  Overall Cognitive Status: Exceptions  Arousal/Alertness: Delayed responses to stimuli  Following Commands: Follows multistep commands with increased time; Follows multistep commands with repitition  Memory: Decreased recall of biographical Information  Insights: Decreased awareness of deficits  Initiation: Requires cues for some  Sequencing: Requires cues for some        Sensation  Overall Sensation Status: Impaired(Pt reports having numbness in fingers for many years.)        LUE AROM (degrees)  LUE AROM : WFL  Left Hand AROM (degrees)  Left Hand AROM: WFL  RUE AROM (degrees)  RUE AROM : WFL  Right Hand AROM (degrees)  Right Hand AROM: WFL  LUE Strength  Gross LUE Strength: WFL  L Hand General: 5/5  RUE Strength  Gross RUE Strength: WFL  R Hand General: 5/5     Plan   Plan  Times per week: 3-5x/wk  Current Treatment Recommendations: Patient/Caregiver Education & Training, Endurance Training, Functional Mobility Training, Safety Education & Training, Self-Care / ADL    AM-PAC Score  AM-[facility] Inpatient Daily Activity Raw Score: 15 (08/10/20 1201)  AM-PAC Inpatient ADL T-Scale Score : 34.69 (08/10/20 1201)  ADL Inpatient CMS 0-100% Score: 56.46 (08/10/20 1201)  ADL Inpatient CMS G-Code Modifier : CK (08/10/20 1201)    Goals  Short term goals  Time Frame for Short term goals: Pt will, by discharge  Short term goal 1: demo UB ADLs with SBA  Short term goal 2: demo LB ADLs with min A  Short term goal 3: demo functional mobility/transfer with min A using LRD and good safety awareness  Short term goal 4: demo dynamic standing balance for 7 mins+ with mod A and using LRD during ADL activity  Short term goal 5: demo activity tolerance for 35 mins+ during ADL task       Therapy Time   Individual Concurrent Group Co-treatment   Time In 1103         Time Out 1137         Minutes 34         Timed Code Treatment Minutes: 2080 Len Tristan, OT/S

## 2020-08-10 NOTE — PROGRESS NOTES
[facility]  Nurse  Consult Note       NAME:  Milana Gibson RECORD NUMBER:  [id number]  AGE: 77 y.o. GENDER: female  : 1950  TODAY'S DATE:  8/10/2020    Subjective   Reason for [id number] Nurse Evaluation and Assessment:   Multiple wounds present on admission. Lower extremity venous disease with edema and venous ulcers bilaterally  Pressure injuries to the right hip and left ischium complicated bu widespread Incontinence Associated Dermatitis (IAD) to buttocks and posterior thighs. Evidence of brief injures to the posterior thighs. Black necrotic wound to the right plantar heel with maceration and loose wet callus surrounding the wound with undermining of the heel skin. Malodorous. Kenisha Washington is a 77 y.o. female referred by:   [x] Physician  [] Nursing  [] Other:     Wound Identification:  Wound Type: venous and pressure  Contributing Factors: edema, venous stasis, poor glucose control, chronic pressure, decreased mobility, shear force, obesity, decreased tissue oxygenation, malnutrition, incontinence of stool, incontinence of urine and poor hygiene          PAST MEDICAL HISTORY        Diagnosis Date    Anxiety and depression     Background diabetic retinopathy(362.01)     (Mild)    Balance problem     CAD (coronary artery disease)     (with coronary artery bypass graft x4).  Cancer of uterus [facility])     history of, (probable cure)    Chronic kidney disease     Chronic low back pain     CVA (cerebral vascular accident) ([address]     Dementia ([address]     (moderate)    Dry eye syndrome     GERD (gastroesophageal reflux disease)     Glaucoma suspect     Gout     Homonymous hemianopsia     (Right)    Hyperlipidemia     Hypertension     Keratitis     (secondary to dry eye syndrome).  Obesity     Peripheral polyneuropathy     (diabetic)    Pseudophakos     (Right Eye: 2012; Left Eye: 2012) - Dr. Phyllis Retana.     Stroke [facility])     (Multiple) MRI of brain 10/2008 shows multiple infarcts involving the temporal and parietal areas.  Trichiasis     (left eye)    Type 2 diabetes mellitus ([facility]        PAST SURGICAL HISTORY    Past Surgical History:   Procedure Laterality Date    CATARACT REMOVAL WITH IMPLANT  2012    (Right eye) - Dr. Mirta Wylie.  CATARACT REMOVAL WITH IMPLANT  2012    (Left eye) - Dr. Mirta Wylie.   SECTION      CHOLECYSTECTOMY      CORONARY ARTERY BYPASS GRAFT  12-    (x4) - LIMA-LAD, SVG-diagnoal 1, SVG-OM1, and SVG-PDA. Exploration of left radial. (Dr. Joo Goodman).  EYE SURGERY Left     as a child     GASTRIC BYPASS SURGERY      HYSTERECTOMY      (abdominal)  with left oophorectomy. Right ovary retained.  TONSILLECTOMY AND ADENOIDECTOMY         FAMILY HISTORY    Family History   Problem Relation Age of Onset    Diabetes Mother     Cancer Mother     High Blood Pressure Mother    [facility] Stroke Mother     Kidney Disease Mother     Uterine Cancer Mother     Diabetes Father     Heart Disease Father     Glaucoma Father     Emphysema Father     Coronary Art Dis Other         All 4 siblings.  Stroke Other         1 sibling.  Lung Cancer Other         1 sibling - cause of death lung cancer.  Mental Retardation Other        SOCIAL HISTORY    Social History     Tobacco Use    Smoking status: Never Smoker    Smokeless tobacco: Never Used    Tobacco comment: never smoker eclamb rrt 17   Substance Use Topics    Alcohol use: No    Drug use: No       ALLERGIES    Allergies   Allergen Reactions    Bactrim [Sulfamethoxazole-Trimethoprim] Hives    Amoxicillin-Pot Clavulanate Diarrhea, Nausea Only and Nausea And Vomiting       MEDICATIONS    No current facility-administered medications on file prior to encounter.       Current Outpatient Medications on File Prior to Encounter   Medication Sig Dispense Refill    atorvastatin (LIPITOR) 40 MG tablet TAKE 1 TABLET BY MOUTH EVERYDAY 30 tablet 0    docusate sodium (COLACE) 100 MG capsule Take 1 capsule by mouth 2 times daily 60 capsule 1    citalopram (CELEXA) 20 MG tablet Take 1 tablet by mouth daily 60 tablet 0    insulin lispro (HUMALOG) 100 UNIT/ML injection vial Inject 0-3 Units into the skin nightly 1 vial 0    gabapentin (NEURONTIN) 300 MG capsule TAKE ONE CAPSULE BY MOUTH THREE TIMES DAILY 90 capsule 1    furosemide (LASIX) 20 MG tablet Take 1 tablet by mouth daily 60 tablet 1    memantine (NAMENDA) 10 MG tablet TAKE 1 TABLET BY MOUTH TWICE DAILY 60 tablet 0    clopidogrel (PLAVIX) 75 MG tablet Take 1 tablet by mouth daily 60 tablet 0    oxybutynin (DITROPAN) 5 MG tablet TAKE 1 TABLET BY MOUTH TWICE DAILY 60 tablet 0    carvedilol (COREG) 25 MG tablet Take 1 tablet by mouth 2 times daily (with meals) 120 tablet 0    loratadine (CLARITIN) 10 MG tablet TAKE 1 TABLET(10 MG) BY MOUTH DAILY 60 tablet 0    aspirin EC 81 MG EC tablet Take 1 tablet by mouth daily 60 tablet 0    blood glucose monitor strips Check bid 200 strip 3    allopurinol (ZYLOPRIM) 300 MG tablet Take 1 tablet by mouth daily 90 tablet 1    losartan (COZAAR) 100 MG tablet Take 1 tablet by mouth daily 90 tablet 1    omeprazole (PRILOSEC) 20 MG delayed release capsule Take 1 capsule by mouth every morning (before breakfast) 90 capsule 1    insulin glargine (LANTUS) 100 UNIT/ML injection vial Inject 50 Units into the skin nightly 15 mL 3    aspirin 81 MG tablet Take 1 tablet by mouth daily 90 tablet 3    Diabetic Shoe MISC by Does not apply route 1 each 0    Compression Stockings MISC by Does not apply route 20-30 mm Hg bilateral knee high 5 each 0    gabapentin (NEURONTIN) 300 MG capsule Take 300 mg by mouth 3 times daily. Umesh Bell rivastigmine (EXELON) 9.5 MG/24HR APPLY 1 NEW PATCH EVERY 24 HOURS (Patient not taking: Reported on 4/15/2020) 90 patch 1    nystatin (MYCOSTATIN) 876523 UNIT/GM cream Apply topically 2 times daily.  30 g 1    Glucose Blood (BLOOD GLUCOSE TEST STRIPS) STRP truemetrix test strips check ac and  strip 0       Objective    BP (!) 137/50   Pulse 66   Temp 98 °F (36.7 °C) (Oral)   Resp 16   Ht 5' 5\" (1.651 m)   Wt 236 lb 12.8 oz (107.4 kg)   SpO2 95%   BMI 39.41 kg/m²     LABS:  WBC:    Lab Results   Component Value Date    WBC 9.8 08/10/2020     H/H:    Lab Results   Component Value Date    HGB 9.4 08/10/2020    HCT 30.6 08/10/2020     PTT:  No results found for: APTT, PTT[APTT}  PT/INR:  No results found for: PROTIME, INR  HgBA1c:    Lab Results   Component Value Date    LABA1C 10.8 08/08/2020       Assessment   Kory Risk Score: Kory Scale Score: 14    Patient Active Problem List   Diagnosis Code    Dementia ([facility] F03.90    Hypertension I10    Hyperlipidemia E78.5    Obesity E66.9    Gout M10.9    Peripheral polyneuropathy G62.9    Anxiety and depression F41.9, F32.9    Acute kidney injury superimposed on CKD ([facility]) N17.9, N18.9    CAD (coronary artery disease) I25.10    Balance problem R26.89    CVA (cerebral vascular accident) ([facility] I63.9    Type 2 diabetes mellitus with hyperglycemia, with long-term current use of insulin ([facility]) E11.65, Z79.4    CHF (congestive heart failure), NYHA class I, acute on chronic, combined ([facility] I50.43    Acute cystitis N30.00    Infestation by bed bug B88.8    Infestation by maggots B87.9    Fall W19. XXXA    Elevated troponin R79.89    Lymphedema of both lower extremities I89.0       Measurements:     08/10/20 1700   Wound 08/07/20 Buttocks Left;Right Incontinence Associated Dermatitis   Date First Assessed/Time First Assessed: 08/07/20 2330   Present on Hospital Admission: Yes  Location: Buttocks  Wound Location Orientation: Left;Right  Wound Description (Comments):  Incontinence Associated Dermatitis   Wound Image    Dressing/Treatment Moisture barrier  (zinc oxide paste)   Wound Cleansed Soap and water   Wound Assessment Non-blanchable erythema;Red;Denuded   Wound 08/07/20 Heel Right   Date First Assessed/Time First Assessed: 08/07/20 2330   Present on Hospital Admission: Yes  Primary Wound Type: Pressure Injury  Location: Heel  Wound Location Orientation: Right   Wound Image    Wound Pressure Unstageable   Dressing Status Changed   Dressing Changed Changed/New   Dressing/Treatment Moist to moist;Moisten with saline;ABD; Ace wrap  (off loading heel boot)   Wound Cleansed Rinsed/Irrigated with saline   Dressing Change Due 08/11/20   Wound Length (cm) 5.5 cm   Wound Width (cm) 6 cm   Wound Depth (cm) 0.4 cm   Wound Surface Area (cm^2) 33 cm^2   Wound Volume (cm^3) 13.2 cm^3   Wound Assessment Black; White;Yellow   Odor Strong   Reshma-wound Assessment Maceration  (loose skin medially)   Wound 08/07/20 Tibial Left multiple scattered wounds   Date First Assessed/Time First Assessed: 08/07/20 2330   Present on Hospital Admission: Yes  Primary Wound Type: Venous Ulcer  Location: Tibial  Wound Location Orientation: Left  Wound Description (Comments): multiple scattered wounds   Wound Image     Wound Venous   Dressing Changed Changed/New   Dressing/Treatment Alginate with Ag;ABD;Roll gauze; Ace wrap   Wound Cleansed Soap and water   Dressing Change Due 08/11/20   Wound Length (cm)   (multiple scattered wound)   Wound Assessment Red;Edema;Drainage   Drainage Amount Moderate   Drainage Description Serous   Reshma-wound Assessment Hyperpigmented;Edema   Non-staged Wound Description Partial thickness   Wound 08/07/20 Pretibial Right cluster of 2 wounds   Date First Assessed/Time First Assessed: 08/07/20 2330   Present on Hospital Admission: Yes  Location: Pretibial  Wound Location Orientation: Right  Wound Description (Comments): cluster of 2 wounds   Wound Image    Wound Venous   Dressing Changed Changed/New   Dressing/Treatment Alginate with Ag;ABD;Roll gauze; Ace wrap   Wound Cleansed Soap and water   Dressing Change Due 08/11/20   Wound Length (cm) 2.1 cm  (proximal wound: 0.6 x 0.4)   Wound Width (cm) 0.5 cm   Wound Surface Area (cm^2) 1.05 cm^2   Wound Assessment Red;Drainage   Drainage Amount Moderate   Drainage Description Serous   Reshma-wound Assessment Hyperpigmented;Edema   Wound 08/10/20 Ischium Left   Date First Assessed/Time First Assessed: 08/10/20 1700   Present on Hospital Admission: Yes  Primary Wound Type: Pressure Injury  Location: Ischium  Wound Location Orientation: Left   Wound Image    Wound Pressure Stage  3   Dressing/Treatment Moisture barrier  (zinc oxide)   Wound Cleansed Soap and water   Wound Length (cm) 1.7 cm   Wound Width (cm) 2 cm   Wound Surface Area (cm^2) 3.4 cm^2   Wound Assessment Pink;Yellow;Slough   Reshma-wound Assessment Denuded; Hyperpigmented  (IAD)   Non-staged Wound Description Full thickness   Wound 08/10/20 Hip Right Trochanter   Date First Assessed/Time First Assessed: 08/10/20 1700   Present on Hospital Admission: Yes  Primary Wound Type: Pressure Injury  Location: Hip  Wound Location Orientation: Right  Wound Description (Comments): Trochanter   Wound Image    Wound Deep tissue/Injury   Dressing Changed Changed/New   Dressing/Treatment Silicone border; Foam   Wound Cleansed Soap and water   Dressing Change Due 08/13/20   Wound Length (cm) 3.5 cm   Wound Width (cm) 3.5 cm   Wound Surface Area (cm^2) 12.25 cm^2   Wound Assessment Purple;Maroon;Yellow;Fibrin   Drainage Amount Small   Drainage Description Serosanguinous   Wound 08/10/20 Hip Right   Date First Assessed/Time First Assessed: 08/10/20 1700   Present on Hospital Admission: Yes  Primary Wound Type: Pressure Injury  Location: Hip  Wound Location Orientation: Right   Wound Image    Wound Pressure Stage  2   Dressing Changed Changed/New   Dressing/Treatment Silicone border; Foam   Wound Cleansed Soap and water   Wound Length (cm) 4 cm   Wound Width (cm) 5.5 cm   Wound Depth (cm) 0.1 cm   Wound Surface Area (cm^2) 22 cm^2   Wound Volume (cm^3) 2.2 cm^3   Wound Assessment Pink;Denuded     Hygiene provided with soap and water.   Zinc oxide ointment applied to buttocks and left ischial wound and bordered foam dressings used to the right hip. [employer] silver hydrofiber applied to open wounds to lower legs. Covered with ADB pads. Secured with roll gauze and ACE wrpas toes to knees. Moist saline gauze applied to the right heel pressure ulcer. Dr. Cooper Sneed to request Dakins solution for daily Moist to Moist dressing changes and suggest Podiatry consult for possible wound debridement.       Plan   Plan of Care: Wound 08/07/20 Buttocks Left;Right Incontinence Associated Dermatitis-Dressing/Treatment: Moisture barrier(zinc oxide paste)  Wound 08/07/20 Heel Right-Dressing/Treatment: Moist to moist, Moisten with saline, ABD, Ace wrap(off loading heel boot)  Wound 08/07/20 Tibial Left multiple scattered wounds-Dressing/Treatment: Alginate with Ag, ABD, Roll gauze, Ace wrap  Wound 08/07/20 Pretibial Right cluster of 2 wounds-Dressing/Treatment: Alginate with Ag, ABD, Roll gauze, Ace wrap  Wound 08/10/20 Ischium Left-Dressing/Treatment: Moisture barrier(zinc oxide)  Wound 08/10/20 Hip Right Trochanter-Dressing/Treatment: Silicone border, Foam  Wound 08/10/20 Hip Right-Dressing/Treatment: Silicone border, Foam    Specialty Bed Required : Yes   [] Low Air Loss   [x] Pressure Redistribution   Static air overlay  [] Fluid Immersion  [] Bariatric  [] Total Pressure Relief  [] Other:     Current Diet: DIET LOW SODIUM 2 GM; Carb Control: 4 carb choices (60 gms)/meal  Dietary Nutrition Supplements: Diabetic Oral Supplement    Discharge Plan:  Placement for patient upon discharge: skilled nursing    Patient appropriate for Outpatient [address]: Yes      Patient/Caregiver Teaching:  Level of patient/caregiver understanding able to:   [] Indicates understanding       [x] Needs reinforcement  [] Unsuccessful      [] Verbal Understanding  [] Demonstrated understanding       [] No evidence of learning  [] Refused teaching         [] N/A     NATIVIDAD

## 2020-08-11 ENCOUNTER — APPOINTMENT (OUTPATIENT)
Dept: GENERAL RADIOLOGY | Age: 70
DRG: 270 | End: 2020-08-11
Attending: INTERNAL MEDICINE
Payer: MEDICARE

## 2020-08-11 LAB
ANION GAP SERPL CALCULATED.3IONS-SCNC: 10 MMOL/L (ref 9–17)
BUN BLDV-MCNC: 24 MG/DL (ref 8–23)
BUN/CREAT BLD: ABNORMAL (ref 9–20)
C-REACTIVE PROTEIN: 32.6 MG/L (ref 0–5)
CALCIUM SERPL-MCNC: 7.7 MG/DL (ref 8.6–10.4)
CHLORIDE BLD-SCNC: 95 MMOL/L (ref 98–107)
CO2: 25 MMOL/L (ref 20–31)
CREAT SERPL-MCNC: 1.44 MG/DL (ref 0.5–0.9)
ESTIMATED AVERAGE GLUCOSE: 260 MG/DL
GFR AFRICAN AMERICAN: 44 ML/MIN
GFR NON-AFRICAN AMERICAN: 36 ML/MIN
GFR SERPL CREATININE-BSD FRML MDRD: ABNORMAL ML/MIN/{1.73_M2}
GFR SERPL CREATININE-BSD FRML MDRD: ABNORMAL ML/MIN/{1.73_M2}
GLUCOSE BLD-MCNC: 209 MG/DL (ref 65–105)
GLUCOSE BLD-MCNC: 234 MG/DL (ref 65–105)
GLUCOSE BLD-MCNC: 254 MG/DL (ref 65–105)
GLUCOSE BLD-MCNC: 259 MG/DL (ref 70–99)
GLUCOSE BLD-MCNC: 262 MG/DL (ref 65–105)
GLUCOSE BLD-MCNC: 263 MG/DL (ref 65–105)
GLUCOSE BLD-MCNC: 333 MG/DL (ref 65–105)
HBA1C MFR BLD: 10.7 % (ref 4–6)
LV EF: 33 %
LVEF MODALITY: NORMAL
POTASSIUM SERPL-SCNC: 4.5 MMOL/L (ref 3.7–5.3)
SEDIMENTATION RATE, ERYTHROCYTE: 59 MM (ref 0–30)
SODIUM BLD-SCNC: 130 MMOL/L (ref 135–144)

## 2020-08-11 PROCEDURE — 6370000000 HC RX 637 (ALT 250 FOR IP): Performed by: NURSE PRACTITIONER

## 2020-08-11 PROCEDURE — 6360000002 HC RX W HCPCS: Performed by: CLINICAL NURSE SPECIALIST

## 2020-08-11 PROCEDURE — 97535 SELF CARE MNGMENT TRAINING: CPT

## 2020-08-11 PROCEDURE — 6360000002 HC RX W HCPCS: Performed by: STUDENT IN AN ORGANIZED HEALTH CARE EDUCATION/TRAINING PROGRAM

## 2020-08-11 PROCEDURE — 6370000000 HC RX 637 (ALT 250 FOR IP): Performed by: CLINICAL NURSE SPECIALIST

## 2020-08-11 PROCEDURE — 85652 RBC SED RATE AUTOMATED: CPT

## 2020-08-11 PROCEDURE — 97116 GAIT TRAINING THERAPY: CPT

## 2020-08-11 PROCEDURE — 1200000000 HC SEMI PRIVATE

## 2020-08-11 PROCEDURE — 97530 THERAPEUTIC ACTIVITIES: CPT

## 2020-08-11 PROCEDURE — 93923 UPR/LXTR ART STDY 3+ LVLS: CPT

## 2020-08-11 PROCEDURE — 0HBMXZZ EXCISION OF RIGHT FOOT SKIN, EXTERNAL APPROACH: ICD-10-PCS | Performed by: PODIATRIST

## 2020-08-11 PROCEDURE — 80048 BASIC METABOLIC PNL TOTAL CA: CPT

## 2020-08-11 PROCEDURE — 99232 SBSQ HOSP IP/OBS MODERATE 35: CPT | Performed by: STUDENT IN AN ORGANIZED HEALTH CARE EDUCATION/TRAINING PROGRAM

## 2020-08-11 PROCEDURE — 93306 TTE W/DOPPLER COMPLETE: CPT

## 2020-08-11 PROCEDURE — 86140 C-REACTIVE PROTEIN: CPT

## 2020-08-11 PROCEDURE — 36415 COLL VENOUS BLD VENIPUNCTURE: CPT

## 2020-08-11 PROCEDURE — 6360000002 HC RX W HCPCS: Performed by: INTERNAL MEDICINE

## 2020-08-11 PROCEDURE — 2580000003 HC RX 258: Performed by: CLINICAL NURSE SPECIALIST

## 2020-08-11 PROCEDURE — 73630 X-RAY EXAM OF FOOT: CPT

## 2020-08-11 RX ORDER — CARVEDILOL 6.25 MG/1
6.25 TABLET ORAL ONCE
Status: COMPLETED | OUTPATIENT
Start: 2020-08-11 | End: 2020-08-11

## 2020-08-11 RX ORDER — CARVEDILOL 12.5 MG/1
12.5 TABLET ORAL 2 TIMES DAILY WITH MEALS
Status: DISCONTINUED | OUTPATIENT
Start: 2020-08-11 | End: 2020-08-14 | Stop reason: HOSPADM

## 2020-08-11 RX ADMIN — INSULIN LISPRO 4 UNITS: 100 INJECTION, SOLUTION INTRAVENOUS; SUBCUTANEOUS at 18:55

## 2020-08-11 RX ADMIN — GABAPENTIN 100 MG: 100 CAPSULE ORAL at 20:39

## 2020-08-11 RX ADMIN — MEMANTINE HYDROCHLORIDE 10 MG: 5 TABLET, FILM COATED ORAL at 09:11

## 2020-08-11 RX ADMIN — Medication 81 MG: at 09:12

## 2020-08-11 RX ADMIN — OXYBUTYNIN CHLORIDE 5 MG: 5 TABLET ORAL at 09:14

## 2020-08-11 RX ADMIN — INSULIN LISPRO 3 UNITS: 100 INJECTION, SOLUTION INTRAVENOUS; SUBCUTANEOUS at 21:05

## 2020-08-11 RX ADMIN — CARVEDILOL 6.25 MG: 6.25 TABLET, FILM COATED ORAL at 09:12

## 2020-08-11 RX ADMIN — DOCUSATE SODIUM 100 MG: 100 CAPSULE, LIQUID FILLED ORAL at 20:39

## 2020-08-11 RX ADMIN — HEPARIN SODIUM 5000 UNITS: 5000 INJECTION INTRAVENOUS; SUBCUTANEOUS at 06:31

## 2020-08-11 RX ADMIN — INSULIN LISPRO 8 UNITS: 100 INJECTION, SOLUTION INTRAVENOUS; SUBCUTANEOUS at 13:19

## 2020-08-11 RX ADMIN — DOCUSATE SODIUM 100 MG: 100 CAPSULE, LIQUID FILLED ORAL at 09:11

## 2020-08-11 RX ADMIN — HEPARIN SODIUM 5000 UNITS: 5000 INJECTION INTRAVENOUS; SUBCUTANEOUS at 21:05

## 2020-08-11 RX ADMIN — CLOPIDOGREL 75 MG: 75 TABLET, FILM COATED ORAL at 09:11

## 2020-08-11 RX ADMIN — CITALOPRAM 20 MG: 20 TABLET, FILM COATED ORAL at 09:11

## 2020-08-11 RX ADMIN — GABAPENTIN 100 MG: 100 CAPSULE ORAL at 15:26

## 2020-08-11 RX ADMIN — INSULIN LISPRO 6 UNITS: 100 INJECTION, SOLUTION INTRAVENOUS; SUBCUTANEOUS at 09:12

## 2020-08-11 RX ADMIN — Medication 10 ML: at 09:11

## 2020-08-11 RX ADMIN — ALLOPURINOL 100 MG: 100 TABLET ORAL at 09:12

## 2020-08-11 RX ADMIN — CARVEDILOL 6.25 MG: 6.25 TABLET, FILM COATED ORAL at 13:17

## 2020-08-11 RX ADMIN — CIPROFLOXACIN 400 MG: 2 INJECTION, SOLUTION INTRAVENOUS at 21:04

## 2020-08-11 RX ADMIN — GABAPENTIN 100 MG: 100 CAPSULE ORAL at 09:11

## 2020-08-11 RX ADMIN — CIPROFLOXACIN 400 MG: 2 INJECTION, SOLUTION INTRAVENOUS at 09:20

## 2020-08-11 RX ADMIN — PANTOPRAZOLE SODIUM 40 MG: 40 TABLET, DELAYED RELEASE ORAL at 06:33

## 2020-08-11 RX ADMIN — DESMOPRESSIN ACETATE 40 MG: 0.2 TABLET ORAL at 20:39

## 2020-08-11 RX ADMIN — NYSTATIN: 100000 CREAM TOPICAL at 09:12

## 2020-08-11 RX ADMIN — Medication 10 ML: at 20:39

## 2020-08-11 RX ADMIN — MEMANTINE HYDROCHLORIDE 10 MG: 5 TABLET, FILM COATED ORAL at 20:39

## 2020-08-11 RX ADMIN — HEPARIN SODIUM 5000 UNITS: 5000 INJECTION INTRAVENOUS; SUBCUTANEOUS at 15:26

## 2020-08-11 RX ADMIN — LOSARTAN POTASSIUM 100 MG: 50 TABLET, FILM COATED ORAL at 09:11

## 2020-08-11 RX ADMIN — CETIRIZINE HYDROCHLORIDE 5 MG: 10 TABLET ORAL at 09:12

## 2020-08-11 RX ADMIN — OXYBUTYNIN CHLORIDE 5 MG: 5 TABLET ORAL at 20:39

## 2020-08-11 RX ADMIN — CARVEDILOL 12.5 MG: 12.5 TABLET, FILM COATED ORAL at 18:56

## 2020-08-11 RX ADMIN — INSULIN GLARGINE 20 UNITS: 100 INJECTION, SOLUTION SUBCUTANEOUS at 21:04

## 2020-08-11 ASSESSMENT — ENCOUNTER SYMPTOMS
SHORTNESS OF BREATH: 0
COLOR CHANGE: 1
COUGH: 0
SORE THROAT: 0
DIARRHEA: 0
NAUSEA: 0
WHEEZING: 0
VOMITING: 0

## 2020-08-11 NOTE — CONSULTS
Consultation Note  Podiatric Medicine and Surgery     Subjective     Chief Complaint: Right heel wound    HPI:  Harleen Espinoza is a 77 y.o. female seen at [facility] for a heel wound on the right foot. The patient states the wound has been getting progressively worse with increasing drainage, edema, erythema and pain. The patient has multiple other lower extremity wounds. 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 Her last HgbA1c was 10.8% as of August 2020. The patient currently admitted for CHF exacerbation. The patient denies any n/v/f/c/SOB upon my examination. PCP is ERINN ORTEGA    ROS:   Review of Systems   Constitutional: Negative for chills, fatigue and fever. HENT: Negative for congestion and sore throat. Respiratory: Negative for cough, shortness of breath and wheezing. Cardiovascular: Negative for chest pain and leg swelling. Gastrointestinal: Negative for diarrhea, nausea and vomiting. Endocrine: Negative for cold intolerance and heat intolerance. Genitourinary: Negative for difficulty urinating and dysuria. Musculoskeletal: Positive for arthralgias, gait problem and myalgias. Negative for neck stiffness. Skin: Positive for color change and wound. Negative for rash. Neurological: Negative for dizziness, weakness, numbness and headaches. Psychiatric/Behavioral: Negative for agitation and behavioral problems.        Past Medical History   has a past medical history of Anxiety and depression, Background diabetic retinopathy(362.01), Balance problem, CAD (coronary artery disease), Cancer of uterus ([address] Chronic kidney disease, Chronic low back pain, CVA (cerebral vascular accident) ([address] Dementia ([address] Dry eye syndrome, GERD (gastroesophageal reflux disease), Glaucoma suspect, Gout, Homonymous hemianopsia, Hyperlipidemia, Hypertension, Keratitis, Obesity, Peripheral polyneuropathy, Pseudophakos, Stroke ([address] Trichiasis, and Type 2 diabetes mellitus ([facility] Past Surgical History   has a past surgical history that includes Gastric bypass surgery; Cataract removal with implant (2012); Cataract removal with implant (2012); Coronary artery bypass graft (12-);  section; Hysterectomy; Cholecystectomy; Tonsillectomy and adenoidectomy; and Eye surgery (Left). Medications  Prior to Admission medications    Medication Sig Start Date End Date Taking?  Authorizing Provider   atorvastatin (LIPITOR) 40 MG tablet TAKE 1 TABLET BY MOUTH EVERYDAY 20   ERINN Khan   docusate sodium (COLACE) 100 MG capsule Take 1 capsule by mouth 2 times daily 20   ERINN Khan   citalopram (CELEXA) 20 MG tablet Take 1 tablet by mouth daily 20   Dudley Khan   insulin lispro (HUMALOG) 100 UNIT/ML injection vial Inject 0-3 Units into the skin nightly 20   ERINN Khan   gabapentin (NEURONTIN) 300 MG capsule TAKE ONE CAPSULE BY MOUTH THREE TIMES DAILY 7/16/20 10/15/20  ERINN Khan   furosemide (LASIX) 20 MG tablet Take 1 tablet by mouth daily 20   ERINN Khan   memantine (NAMENDA) 10 MG tablet TAKE 1 TABLET BY MOUTH TWICE DAILY 20   ERINN Khan   clopidogrel (PLAVIX) 75 MG tablet Take 1 tablet by mouth daily 20   ERINN Khan   oxybutynin (DITROPAN) 5 MG tablet TAKE 1 TABLET BY MOUTH TWICE DAILY 20   ERINN Khan   carvedilol (COREG) 25 MG tablet Take 1 tablet by mouth 2 times daily (with meals) 20   ERINN Khan   loratadine (CLARITIN) 10 MG tablet TAKE 1 TABLET(10 MG) BY MOUTH DAILY 20   Dudley Khan   aspirin EC 81 MG EC tablet Take 1 tablet by mouth daily 20   Dudley Khan   blood glucose monitor strips Check bid 20   ERINN Khan   allopurinol (ZYLOPRIM) 300 MG tablet Take 1 tablet by mouth daily 4/15/20   ERINN Khan   losartan (COZAAR) 100 MG tablet Take 1 tablet by mouth daily 4/15/20   Tonia Winters, Dudley Chamorro omeprazole (PRILOSEC) 20 MG delayed release capsule Take 1 capsule by mouth every morning (before breakfast) 3/5/20   ERINN Gallego   insulin glargine (LANTUS) 100 UNIT/ML injection vial Inject 50 Units into the skin nightly 1/3/20   Shawna Marvin [place]   aspirin 81 MG tablet Take 1 tablet by mouth daily 1/3/20   ERINN Gallego   Diabetic Shoe MISC by Does not apply route 9/26/19   ERINN Gallego   Compression Stockings MISC by Does not apply route 20-30 mm Hg bilateral knee high 9/26/19   ERINN Gallego   gabapentin (NEURONTIN) 300 MG capsule Take 300 mg by mouth 3 times daily. Carmen Garcia Historical Provider, MD   rivastigmine (EXELON) 9.5 MG/24HR APPLY 1 NEW PATCH EVERY 24 HOURS  Patient not taking: Reported on 4/15/2020 1/31/18   ERINN Gallego   nystatin (MYCOSTATIN) 433867 UNIT/GM cream Apply topically 2 times daily.  1/31/18   ERINN Gallego   Glucose Blood (BLOOD GLUCOSE TEST STRIPS) STRP truemetrix test strips check ac and HS 1/31/18   ERINN Gallego    Scheduled Meds:   carvedilol  12.5 mg Oral BID WC    atorvastatin  40 mg Oral Nightly    Sodium Hypochlorite   Irrigation Daily    insulin glargine  20 Units Subcutaneous Nightly    ciprofloxacin  400 mg Intravenous Q12H    allopurinol  100 mg Oral Daily    aspirin  81 mg Oral Daily    insulin lispro  0-12 Units Subcutaneous TID WC    insulin lispro  0-6 Units Subcutaneous Nightly    citalopram  20 mg Oral Daily    clopidogrel  75 mg Oral Daily    docusate sodium  100 mg Oral BID    gabapentin  100 mg Oral TID    cetirizine  5 mg Oral Daily    losartan  100 mg Oral Daily    memantine  10 mg Oral BID    nystatin   Topical BID    pantoprazole  40 mg Oral QAM AC    oxybutynin  5 mg Oral BID    rivastigmine  1 patch Transdermal Daily    sodium chloride flush  10 mL Intravenous 2 times per day    heparin (porcine)  5,000 Units Subcutaneous 3 times per day     Continuous Infusions:   dextrose      sodium chloride 50 mL/hr at 20 1724     PRN Meds:.glucose, dextrose, glucagon (rDNA), dextrose, potassium chloride **OR** potassium alternative oral replacement **OR** potassium chloride, sodium chloride flush, acetaminophen **OR** acetaminophen, polyethylene glycol, promethazine **OR** ondansetron, nicotine, perflutren lipid microspheres    Allergies  is allergic to bactrim [sulfamethoxazole-trimethoprim] and amoxicillin-pot clavulanate. Family History  family history includes Cancer in her mother; Coronary Art Dis in an other family member; Diabetes in her father and mother; Emphysema in her father; Glaucoma in her father; Heart Disease in her father; High Blood Pressure in her mother; Kidney Disease in her mother; P[place]ual Shackleton in an other family member; Mental Retardation in an other family member; Stroke in her mother and another family member; Uterine Cancer in her mother. Social History   reports that she has never smoked. She has never used smokeless tobacco.   reports no history of alcohol use. reports no history of drug use. Objective     Vitals:  Patient Vitals for the past 8 hrs:   BP Temp Temp src Pulse Resp SpO2 Height Weight   20 1319 (!) 158/51 -- -- 60 17 99 % -- --   20 1022 -- -- -- -- -- -- 5' 5\" (1.651 m) --   20 0856 (!) 185/65 98.6 °F (37 °C) Oral 65 21 100 % -- --   20 0630 -- -- -- -- -- -- -- 235 lb 3.2 oz (106.7 kg)     Average, Min, and Max for last 24 hours Vitals:  TEMPERATURE:  Temp  Av.6 °F (37 °C)  Min: 98.6 °F (37 °C)  Max: 98.6 °F (37 °C)    RESPIRATIONS RANGE: Resp  Av  Min: 17  Max: 21    PULSE RANGE: Pulse  Av.5  Min: 60  Max: 65    BLOOD PRESSURE RANGE:  Systolic (31KWK), UER:717 , Min:158 , NCR:435   ; Diastolic (94CTT), HJS:62, Min:51, Max:65      PULSE OXIMETRY RANGE: SpO2  Av.5 %  Min: 99 %  Max: 100 %  I&O:  I/O last 3 completed shifts: In: 3578 [P.O.:720;  I.V.:2482]  Out: 1850 [Urine:1850]    CBC:  Recent Labs     08/10/20  0455 WBC 9.8   HGB 9.4*   HCT 30.6*           BMP:  Recent Labs     08/08/20  2007 08/10/20  0455 08/11/20  0511   * 130* 130*   K 3.9 4.2 4.5   CL 95* 94* 95*   CO2 25 25 25   BUN 22 25* 24*   CREATININE 1.55* 1.48* 1.44*   GLUCOSE 177* 186* 259*   CALCIUM 7.9* 7.6* 7.7*        Coags:  No results for input(s): APTT, PROT, INR in the last 72 hours. Lab Results   Component Value Date    LABA1C 10.8 (H) 08/08/2020     No results found for: SEDRATE  No results found for: CRP      Lower Extremity Physical Exam:  Vascular: DP and PT pulses are non Palpable . CFT <3 seconds to all digits. Hair growth is absent to the level of the digits. Moderate pitting edema noted to the bilateral lower extremities. Neuro: Saph/sural/SP/DP/plantar sensation diminished to light touch. Musculoskeletal: Muscle strength is 4/5 to all lower extremity muscle groups. Gross deformity is absent. Dermatologic: Full thickness ulcer #1 located to the right plantar heel and measures approximately 7.0cm x 6.0cm x 0.5cm. The wound base is granular with central area of fibro-necrotic tissue. serous drainage noted with mild associated mal odor. Erythema localized to the wound without streaking lymphangitis with minimal associated increase in warmth. Does not probe to bone, sinus track. Wound undermines approxmately 2cm circumferentially. No fluctuance, crepitus, or induration. Interdigital maceration absent. Multiple chronic appearing wounds to the left lower extremity as well as right posterior calf secondary to chronic lymphedema/venous stasis. Clinical:  See media panel. Imaging:   XR CHEST PORTABLE   Final Result   Increased bibasilar lung opacities which are nonspecific and may represent   atelectasis and pleural effusions versus pneumonia. XR FOOT RIGHT (MIN 3 VIEWS)    (Results Pending)       Cultures:  None    Assessment     Madi Novak is a 77 y.o. female with   1.  Unstagable pressure ulcer, right heel  2. Multiple lower extremities wounds  3. Chronic lower extremity edema  4. Type II DM with peripheral neuropathy  5. PAD  6. CHF    Principal Problem:    CHF (congestive heart failure), NYHA class I, acute on chronic, combined (HCC)  Active Problems:    Hypertension    Hyperlipidemia    Obesity    Acute kidney injury superimposed on CKD (HCC)    CAD (coronary artery disease)    Type 2 diabetes mellitus with hyperglycemia, with long-term current use of insulin ([facility])    Acute cystitis    Infestation by bed bug    Infestation by maggots    Fall    Elevated troponin    Lymphedema of both lower extremities  Resolved Problems:    * No resolved hospital problems. *        Plan     · Patient examined and evaluated at bedside with Dr. Carey De Dios. · Treatment options discussed in detail with the patient. · Plain film radiographs ordered. · NIVS ordered. · Follow up acute inflammatory labs. · Sharp excisional debridement performed of the right plantar heel wound down to and including dermis via scissors and pickup. No anesthesia required. Hemostasis obtained with direct pressure. Patient reports 0/10 post procedure pain. · Medical management per primary. · Dressing applied to Right foot: Betadine, Aquacell AG, DSD, ACE  · All other wounds managed per wound care team.  · Elevate heels off bed at all times with ROOKE/PRAFO boot to the right lower extremity. · NWB to Right heel, ok for forefoot touch for transfers. · Discussed with  Dr. Carey De Dios. Electronically signed by Marcus Hays DPM on 8/11/2020 at 1:43 PM     I performed a history and physical examination of the patient and discussed management with the patient and the resident. I reviewed the residents note and agree with the documented findings and plan of care. Any areas of disagreement are noted below. I was personally present for the key portions of any procedures. Additional findings are as noted below.     Christina Almaraz DPM 8/12/2020 at 9:47 PM

## 2020-08-11 NOTE — PROGRESS NOTES
[address] [facility]    Progress Note    8/11/2020    9:51 AM    Name:   Tommy Nash  MRN:     [id number]     Acct:      [de-identified]   Room:   2019/2019-01  IP Day:  4  Admit Date:  8/7/2020 10:29 PM    PCP:   ERINN Schmidt  Code Status:  Full Code    Subjective:     C/C: fall, hyperglycemia  Interval History Status: slightly improved     Patient seen and examined at bedside currently not complaining of anything, legs wrapped in Ace bandages. Patient is alert and oriented to person place and time. Echo is still pending upon evaluation of patient. Overall stable 158/51, saturating wellBlood pressure on 2 L nasal cannula. Glucose still slightly elevated. Hyponatremia hypochloremia BUN slightly trending up creatinine staying stable 1.44  Brief History:     Briefly this is a 58-year-old female who presented to [facility] ER via EMS after having a fall. Patient was found to have symptomatic UTI as well as elevated sugars up to 400 and.  proBNP elevation of 31,000. Patient is currently being treated with Cipro for her UTI. Hsitory of CABG in 2005. Review of Systems:     Constitutional:  negative for chills, fevers, sweats  Respiratory:  negative for cough, dyspnea on exertion, shortness of breath, wheezing  Cardiovascular:  negative for chest pain, chest pressure/discomfort, lower extremity edema, palpitations  Gastrointestinal:  negative for abdominal pain, constipation, diarrhea, nausea, vomiting  Neurological:  negative for dizziness, headache    Medications: Allergies:     Allergies   Allergen Reactions    Bactrim [Sulfamethoxazole-Trimethoprim] Hives    Amoxicillin-Pot Clavulanate Diarrhea, Nausea Only and Nausea And Vomiting       Current Meds:   Scheduled Meds:    atorvastatin  40 mg Oral Nightly    carvedilol  6.25 mg Oral BID WC    Sodium Hypochlorite   Irrigation Daily    insulin glargine  20 Units Subcutaneous Nightly    ciprofloxacin  400 mg Intravenous Q12H    allopurinol  100 mg Oral Daily    aspirin  81 mg Oral Daily    insulin lispro  0-12 Units Subcutaneous TID WC    insulin lispro  0-6 Units Subcutaneous Nightly    citalopram  20 mg Oral Daily    clopidogrel  75 mg Oral Daily    docusate sodium  100 mg Oral BID    gabapentin  100 mg Oral TID    cetirizine  5 mg Oral Daily    losartan  100 mg Oral Daily    memantine  10 mg Oral BID    nystatin   Topical BID    pantoprazole  40 mg Oral QAM AC    oxybutynin  5 mg Oral BID    rivastigmine  1 patch Transdermal Daily    sodium chloride flush  10 mL Intravenous 2 times per day    heparin (porcine)  5,000 Units Subcutaneous 3 times per day     Continuous Infusions:    dextrose      sodium chloride 50 mL/hr at 08/08/20 1724     PRN Meds: glucose, dextrose, glucagon (rDNA), dextrose, potassium chloride **OR** potassium alternative oral replacement **OR** potassium chloride, sodium chloride flush, acetaminophen **OR** acetaminophen, polyethylene glycol, promethazine **OR** ondansetron, nicotine, perflutren lipid microspheres    Data:     Past Medical History:   has a past medical history of Anxiety and depression, Background diabetic retinopathy(362.01), Balance problem, CAD (coronary artery disease), Cancer of uterus ([facility] Chronic kidney disease, Chronic low back pain, CVA (cerebral vascular accident) ([facility] Dementia ([facility] Dry eye syndrome, GERD (gastroesophageal reflux disease), Glaucoma suspect, Gout, Homonymous hemianopsia, Hyperlipidemia, Hypertension, Keratitis, Obesity, Peripheral polyneuropathy, Pseudophakos, Stroke ([facility] [address] Trichiasis, and Type 2 diabetes mellitus ([facility] [address] Social History:   reports that she has never smoked. She has never used smokeless tobacco. She reports that she does not drink alcohol or use drugs.      Family History:   Family History   Problem Relation Age of Onset    Diabetes Mother     Cancer Mother     High Blood Pressure Mother ABG:No results found for: POCPH, PHART, PH, POCPCO2, AQU3VFU, PCO2, POCPO2, PO2ART, PO2, POCHCO3, AJS4LMY, HCO3, NBEA, PBEA, BEART, BE, THGBART, THB, EWC0VMV, HMGP5CGV, K8ITSAZQ, O2SAT, FIO2  Lab Results   Component Value Date/Time    SPECIAL NOT REPORTED 08/08/2020 01:52 PM     Lab Results   Component Value Date/Time    CULTURE NO SIGNIFICANT GROWTH 08/08/2020 01:52 PM       Radiology:  Ct Head Wo Contrast    Result Date: 8/7/2020  No acute intracranial abnormality. Left sided scalp edema/hematoma without underlying calvarial fracture. Peripheral mucosal thickening of the paranasal sinuses. Remote left PCA territory infarction. Ct Cervical Spine Wo Contrast    Result Date: 8/7/2020  No acute abnormality of the cervical spine. Mild multilevel degenerative changes. Interlobular septal thickening apices; correlate for edema or other disease. 4 cm left thyroid nodule. If not already assessed previously, nonemergent follow-up ultrasound suggested. Xr Chest Portable    Result Date: 8/8/2020  Increased bibasilar lung opacities which are nonspecific and may represent atelectasis and pleural effusions versus pneumonia. Xr Chest Portable    Result Date: 8/7/2020  Mild cardiomegaly and central vascular congestion. Basilar nodules are present, denser than rib favoring granulomas or hamartomas.        Physical Examination:        General appearance:  alert, cooperative and no distress  Mental Status:  oriented to person, place and time and normal affect  Lungs:  clear to auscultation bilaterally, normal effort  Heart:  regular rate and rhythm, no murmur  Abdomen:  soft, nontender, nondistended, normal bowel sounds, no masses, hepatomegaly, splenomegaly  Extremities:  no edema, redness, tenderness in the calves  Skin:  no gross lesions, rashes, induration    Assessment:        Hospital Problems           Last Modified POA    * (Principal) CHF (congestive heart failure), NYHA class I, acute on chronic, combined ([facility] 8/8/2020 Yes    Hypertension 8/8/2020 Yes    Hyperlipidemia 8/8/2020 Yes    Obesity 8/8/2020 Yes    Acute kidney injury superimposed on CKD ([facility] 8/8/2020 Yes    CAD (coronary artery disease) 8/8/2020 Yes    Overview Signed 2/27/2014 10:22 AM by Evelin Rollins MD     (with coronary artery bypass graft x4). Type 2 diabetes mellitus with hyperglycemia, with long-term current use of insulin ([facility] 8/8/2020 Yes    Acute cystitis 8/8/2020 Yes    Infestation by bed bug 8/8/2020 Yes    Infestation by maggots 8/8/2020 Yes    Fall 8/8/2020 Yes    Elevated troponin 8/8/2020 Yes    Lymphedema of both lower extremities 8/8/2020 Yes          Plan:        1. Echo pending  2. Appreiciate cardiac recs  3. ASA 81, Lipitor 40, coreg incresad to 12.5, plabic 75  4. DM2 with neuropathy. Gabapentin, insulin 20 units ISS hypoglycemia protocol  5. Celexa continued  6. Continue Home meds of allopurinol and zyrtec  7. Symptomatic uti on cipro  8. Apprieviate podiatry input  9.  Continue with wound care      Dain Hamilton MD  8/11/2020  9:51 AM

## 2020-08-11 NOTE — PLAN OF CARE
Problem: Falls - Risk of:  Goal: Will remain free from falls  Description: Will remain free from falls  Outcome: Ongoing  Goal: Absence of physical injury  Description: Absence of physical injury  Outcome: Ongoing     Problem: Skin Integrity:  Goal: Will show no infection signs and symptoms  Description: Will show no infection signs and symptoms  Outcome: Ongoing  Goal: Absence of new skin breakdown  Description: Absence of new skin breakdown  Outcome: Ongoing     Problem: Mental Status - Impaired:  Goal: Mental status will improve  Description: Mental status will improve  Outcome: Ongoing     Problem: Infection:  Goal: Will remain free from infection  Description: Will remain free from infection  Outcome: Ongoing     Problem: Safety:  Goal: Free from accidental physical injury  Description: Free from accidental physical injury  Outcome: Ongoing  Goal: Free from intentional harm  Description: Free from intentional harm  Outcome: Ongoing     Problem: Daily Care:  Goal: Daily care needs are met  Description: Daily care needs are met  Outcome: Ongoing     Problem: Pain:  Goal: Patient's pain/discomfort is manageable  Description: Patient's pain/discomfort is manageable  Outcome: Ongoing  Goal: Pain level will decrease  Description: Pain level will decrease  Outcome: Ongoing  Goal: Control of acute pain  Description: Control of acute pain  Outcome: Ongoing  Goal: Control of chronic pain  Description: Control of chronic pain  Outcome: Ongoing     Problem: Skin Integrity:  Goal: Skin integrity will stabilize  Description: Skin integrity will stabilize  Outcome: Ongoing     Problem: Discharge Planning:  Goal: Patients continuum of care needs are met  Description: Patients continuum of care needs are met  Outcome: Ongoing     Problem: Nutrition  Goal: Optimal nutrition therapy  8/11/2020 1922 by Sara Caro RN  Outcome: Ongoing  8/11/2020 1027 by Juan Carlos Wheeler RD, LD  Outcome: Ongoing  Note: Nutrition Problem #1: Increased nutrient needs  Intervention: Food and/or Nutrient Delivery: Continue Current Diet, Continue Oral Nutrition Supplement  Nutritional Goals: Intake of 50% or greater of meals/ONS      Problem: Musculor/Skeletal Functional Status  Goal: Highest potential functional level  Outcome: Ongoing  Goal: Absence of falls  Outcome: Ongoing

## 2020-08-11 NOTE — PROGRESS NOTES
Received call from echo lab- informed that qualitative assessment of echo showed ~20% reduce in function.

## 2020-08-11 NOTE — PROGRESS NOTES
Occupational Therapy  Facility/Department: [facility] CAR 2  Daily Treatment Note  NAME: Radha Yang  : 1950  MRN: [id number]    Date of Service: 2020    Discharge Recommendations:  Patient would benefit from continued therapy after discharge       Assessment   Performance deficits / Impairments: Decreased functional mobility ; Decreased ADL status; Decreased endurance;Decreased high-level IADLs;Decreased safe awareness  Assessment: Pt would benefit from further skilled OT services to address decreased functional mobility, ADL performance, and endurance. Treatment Diagnosis: CHF  Prognosis: Good  REQUIRES OT FOLLOW UP: Yes  Activity Tolerance  Activity Tolerance: Patient Tolerated treatment well;Patient limited by fatigue  Safety Devices  Safety Devices in place: Yes  Type of devices: All fall risk precautions in place;Call light within reach; Chair alarm in place;Gait belt;Left in chair;Nurse notified         Patient Diagnosis(es): There were no encounter diagnoses. has a past medical history of Anxiety and depression, Background diabetic retinopathy(362.01), Balance problem, CAD (coronary artery disease), Cancer of uterus ([address] Chronic kidney disease, Chronic low back pain, CVA (cerebral vascular accident) ([address] Dementia ([address] Dry eye syndrome, GERD (gastroesophageal reflux disease), Glaucoma suspect, Gout, Homonymous hemianopsia, Hyperlipidemia, Hypertension, Keratitis, Obesity, Peripheral polyneuropathy, Pseudophakos, Stroke ([address] Trichiasis, and Type 2 diabetes mellitus ([address] has a past surgical history that includes Gastric bypass surgery; Cataract removal with implant (2012); Cataract removal with implant (2012); Coronary artery bypass graft (12-);  section; Hysterectomy; Cholecystectomy; Tonsillectomy and adenoidectomy; and Eye surgery (Left).     Restrictions  Restrictions/Precautions  Restrictions/Precautions: General Precautions, Fall Risk, Contact Precautions, Up as Tolerated  Required Braces or Orthoses?: No  Position Activity Restriction  Other position/activity restrictions: Up with assistance  Subjective   General  Patient assessed for rehabilitation services?: Yes  Family / Caregiver Present: No  Diagnosis: CHF  General Comment  Comments: RN ok'd for OT tx. Pt agreeable and pleasant throughout session. Vital Signs  Patient Currently in Pain: Denies      Objective    ADL  Feeding: Stand by assistance;Setup; Increased time to complete(pt able to feed self without difficulty)  Grooming: Stand by assistance;Minimal assistance;Setup; Increased time to complete(SBA-washed face, brushed teeth/hair, Min-wash/dry hair)  UE Bathing: Minimal assistance;Setup; Increased time to complete(would need assist w/back)  LE Bathing: Maximum assistance;Setup; Increased time to complete(would need assist w/lower legs, feet and bottom)  UE Dressing: Minimal assistance;Setup; Increased time to complete(w/gown)  LE Dressing: Maximum assistance;Setup; Increased time to complete(w/footies)  Toileting: Setup; Increased time to complete;Maximum assistance(rodriguez cath)      Pt in bed upon arrival. Pt states that \"the nurse washed me up\" but did not do grooming. Setup for grooming at tray table (see above for LOF). Needed gown changed d/t ties not on back. Donned footies for pt prior to getting oob. Pt completed bed mob, transfers and func mob w/RW w/chair to follow. Pt is unsteady but had no gross LOB. Pt needed rest breaks throughout tx d/t fatigue. Pt is motivated to get back to being independent. Retired to recliner, BLE elevated, call light and phone in reach. Maxi  lift pad placed under pt prior to getting pt up. RN notified. Balance  Sitting Balance: Stand by assistance  Standing Balance:  Moderate assistance  Standing Balance  Time: Pt stood for approx 10 min for transfers and func mob w/RW with chair follow  Comment: Unsteady, but no gross LOB  Functional Mobility  Functional - Mobility Device: Rolling Walker  Assist Level: Moderate assistance  Functional Mobility Comments: unsteady but no gross LOB  Bed mobility  Rolling to Right: Minimal assistance  Supine to Sit: Moderate assistance  Sit to Supine: Unable to assess(left up in chair)  Scooting: Minimal assistance  Transfers  Stand Step Transfers: Moderate assistance  Sit to stand:  Moderate assistance;2 Person assistance  Stand to sit: Moderate assistance;2 Person assistance     Plan   Plan  Times per week: 3-5x/wk  Current Treatment Recommendations: Patient/Caregiver Education & Training, Endurance Training, Functional Mobility Training, Safety Education & Training, Self-Care / ADL     Goals  Short term goals  Time Frame for Short term goals: Pt will, by discharge  Short term goal 1: demo UB ADLs with SBA  Short term goal 2: demo LB ADLs with min A  Short term goal 3: demo functional mobility/transfer with min A using LRD and good safety awareness  Short term goal 4: demo dynamic standing balance for 7 mins+ with mod A and using LRD during ADL activity  Short term goal 5: demo activity tolerance for 35 mins+ during ADL task     Therapy Time   Individual Concurrent Group Co-treatment   Time In  1500         Time Out 1600         Minutes 50 total tx time      10 total co-tx time           ERIKA DALEY/PERLA

## 2020-08-11 NOTE — PROGRESS NOTES
[facility]    Progress Note    8/10/2020    11:36 PM    Name:   Lexis Morrison  MRN:     [id number]     [place]:      [de-identified]   Room:   2019/2019-01  IP Day:  3  Admit Date:  8/7/2020 10:29 PM    PCP:   ERINN Alejandor  Code Status:  Full Code    Subjective:     C/C: Hyperglycemia, fall    Interval History Status: improved. Patient is sitting up in bed, drowsy. She denies any SOB or chest pain. Brief History:     Per my NP;  Graeme Blankenship is a 77 y.o. Non-/non  female who presents with No chief complaint on file. and is admitted to the hospital for the management of CHF (congestive heart failure), NYHA class I, acute on chronic, combined ([facility]    This is a 77 yr old female with history of CAD s/p CABG, CKD, CVA, glaucoma, HTN, HLD, DMII, and poly neuropathy who presented to [facility] today via EMS. Patient lives with her daughter and grandson, reportedly fell in the early evening on 8/6 and refused to let her daughter call 911. EMS was finally called yesterday morning after patient was found by family and had laid on the ground overnight, unable to get up. On arrival to ER, patient covered in urine and feces, multiple wounds of various stages with maggot and bed bug infestation. Patient noted to be hyperglycemic with bs of 400, AMY with elevated CK and Myoglobin, pro-bnp> 31,000, elevated troponin, and UTI. CT Head and neck without any acute findings, there is evidence of remote left PCA infarct. Patient denies any LOC or injury/hitting head on falling. Reports she slipped on the floor when she is used to walking on carpet. She denies any previous or associated dizziness or light headedness.   Given the array of findings listed above, patient is transferred to our facility for further care and management.      Labs/Diagnostics: Na: 133, CRT: 1.28 (baseline: 1.0), glucose: 400, CK: 413, Myoglobin: 747, Pro-bnp: 31,859, Trop: 68--67 (baseline: 20s), HGB: 11.5     CXR: central venous congestions  CT Head/Neck: no acute injuries, remote left PCA infarct     On my evaluation, patient is in bed, appears comfortable, is oriented to self and place only, not time. Is generally a poor historian and unable to give specifics on medical history/medication regimen. Extensive wounds scattered across body including ABD, coccyx, and BLE. Fungal rash to bilateral groins, peritoneum, and under breast folds. BLE also noted 1-2+. Denies any CP, SOB, HA/dizziness, ABD pain, n/v/d, myalgias or cough, patient is afebrile with VSS. \"    Review of Systems:     Constitutional:  negative for chills, fevers, sweats  Respiratory:  negative for cough, dyspnea on exertion, shortness of breath, wheezing  Cardiovascular:  negative for chest pain, chest pressure/discomfort, lower extremity edema, palpitations  Gastrointestinal:  negative for abdominal pain, constipation, diarrhea, nausea, vomiting  Neurological:  negative for dizziness, headache    Medications: Allergies:     Allergies   Allergen Reactions    Bactrim [Sulfamethoxazole-Trimethoprim] Hives    Amoxicillin-Pot Clavulanate Diarrhea, Nausea Only and Nausea And Vomiting       Current Meds:   Scheduled Meds:    atorvastatin  40 mg Oral Nightly    carvedilol  6.25 mg Oral BID WC    insulin glargine  20 Units Subcutaneous Nightly    ciprofloxacin  400 mg Intravenous Q12H    allopurinol  100 mg Oral Daily    aspirin  81 mg Oral Daily    insulin lispro  0-12 Units Subcutaneous TID WC    insulin lispro  0-6 Units Subcutaneous Nightly    citalopram  20 mg Oral Daily    clopidogrel  75 mg Oral Daily    docusate sodium  100 mg Oral BID    gabapentin  100 mg Oral TID    cetirizine  5 mg Oral Daily    losartan  100 mg Oral Daily    memantine  10 mg Oral BID    nystatin   Topical BID    pantoprazole  40 mg Oral QAM AC    oxybutynin  5 mg Oral BID    rivastigmine  1 patch Transdermal Daily    sodium chloride flush  10 mL Intravenous 2 times per day    heparin (porcine)  5,000 Units Subcutaneous 3 times per day     Continuous Infusions:    dextrose      sodium chloride 50 mL/hr at 20 1724     PRN Meds: glucose, dextrose, glucagon (rDNA), dextrose, potassium chloride **OR** potassium alternative oral replacement **OR** potassium chloride, sodium chloride flush, acetaminophen **OR** acetaminophen, polyethylene glycol, promethazine **OR** ondansetron, nicotine, perflutren lipid microspheres    Data:     Past Medical History:   has a past medical history of Anxiety and depression, Background diabetic retinopathy(362.01), Balance problem, CAD (coronary artery disease), Cancer of uterus ([facility] Chronic kidney disease, Chronic low back pain, CVA (cerebral vascular accident) ([facility] Dementia ([facility] Dry eye syndrome, GERD (gastroesophageal reflux disease), Glaucoma suspect, Gout, Homonymous hemianopsia, Hyperlipidemia, Hypertension, Keratitis, Obesity, Peripheral polyneuropathy, Pseudophakos, Stroke ([facility] Trichiasis, and Type 2 diabetes mellitus ([facility] Social History:   reports that she has never smoked. She has never used smokeless tobacco. She reports that she does not drink alcohol or use drugs. Family History:   Family History   Problem Relation Age of Onset    Diabetes Mother     Cancer Mother     High Blood Pressure Mother    Nicole Horn Stroke Mother     Kidney Disease Mother     Uterine Cancer Mother     Diabetes Father     Heart Disease Father     Glaucoma Father     Emphysema Father     Coronary Art Dis Other         All 4 siblings.  Stroke Other         1 sibling.  Lung Cancer Other         1 sibling - cause of death lung cancer.     Mental Retardation Other        Vitals:  BP (!) 137/50   Pulse 66   Temp 98 °F (36.7 °C) (Oral)   Resp 16   Ht 5' 5\" (1.651 m)   Wt 236 lb 12.8 oz (107.4 kg)   SpO2 95%   BMI 39.41 kg/m²   Temp (24hrs), Av.2 °F (36.8 °C), Min:98 °F (36.7 °C), Max:98.4 °F (36.9 °C)    Recent Labs     08/09/20  1704 08/09/20  2048 08/10/20  0751 08/10/20  1158   POCGLU 287* 253* 151* 125*       I/O (24Hr): Intake/Output Summary (Last 24 hours) at 8/10/2020 2336  Last data filed at 8/10/2020 1811  Gross per 24 hour   Intake 3202 ml   Output 1350 ml   Net 1852 ml       Labs:  Hematology:  Recent Labs     08/08/20  0604 08/10/20  0455   WBC 8.9 9.8   RBC 3.49* 3.22*   HGB 10.2* 9.4*   HCT 31.6* 30.6*   MCV 90.5 95.0   MCH 29.2 29.2   MCHC 32.3 30.7   RDW 16.2* 16.7*    390   MPV 10.0 10.4     Chemistry:  Recent Labs     08/08/20  0604 08/08/20 2007 08/09/20  0334  08/10/20  0455 08/10/20  0948 08/10/20  1538     --  132*  --   --  130*  --   --    K 3.1*  --  3.9  --   --  4.2  --   --      --  95*  --   --  94*  --   --    CO2 26  --  25  --   --  25  --   --    GLUCOSE 101*  --  177*  --   --  186*  --   --    BUN 18  --  22  --   --  25*  --   --    CREATININE 1.18*  --  1.55*  --   --  1.48*  --   --    MG 1.3*  --   --  1.7  --  1.6  --   --    ANIONGAP 10  --  12  --   --  11  --   --    LABGLOM 46*  --  33*  --   --  35*  --   --    GFRAA 55*  --  40*  --   --  43*  --   --    CALCIUM 8.0*  --  7.9*  --   --  7.6*  --   --    PROBNP 28,916*  --   --   --   --  25,069*  --   --    TROPHS 76*   < > 80* 78*   < > 61* 59* 61*   CKTOTAL 213*  --   --   --   --   --   --   --    MYOGLOBIN 299*  --   --   --   --   --   --   --     < > = values in this interval not displayed.      Recent Labs     08/08/20  0604  08/09/20  0853 08/09/20  1243 08/09/20  1704 08/09/20  2048 08/10/20  0751 08/10/20  1158   LABA1C 10.8*  --   --   --   --   --   --   --    TSH 1.86  --   --   --   --   --   --   --    CHOL 97  --   --   --   --   --   --   --    HDL 41  --   --   --   --   --   --   --    LDLCHOLESTEROL 45  --   --   --   --   --   --   --    CHOLHDLRATIO 2.4  --   --   --   --   --   --   --    TRIG 57  --   --   --   --   --   -- --    VLDL NOT REPORTED  --   --   --   --   --   --   --    POCGLU  --    < > 136* 265* 287* 253* 151* 125*    < > = values in this interval not displayed. ABG:No results found for: POCPH, PHART, PH, POCPCO2, FEV0TMP, PCO2, POCPO2, PO2ART, PO2, POCHCO3, RPH7PIH, HCO3, NBEA, PBEA, BEART, BE, THGBART, THB, YFD3LLX, QADG6LOS, N2ZHHZHU, O2SAT, FIO2  Lab Results   Component Value Date/Time    SPECIAL NOT REPORTED 08/08/2020 01:52 PM     Lab Results   Component Value Date/Time    CULTURE NO SIGNIFICANT GROWTH 08/08/2020 01:52 PM       Radiology:  Ct Head Wo Contrast    Result Date: 8/7/2020  No acute intracranial abnormality. Left sided scalp edema/hematoma without underlying calvarial fracture. Peripheral mucosal thickening of the paranasal sinuses. Remote left PCA territory infarction. Ct Cervical Spine Wo Contrast    Result Date: 8/7/2020  No acute abnormality of the cervical spine. Mild multilevel degenerative changes. Interlobular septal thickening apices; correlate for edema or other disease. 4 cm left thyroid nodule. If not already assessed previously, nonemergent follow-up ultrasound suggested. Xr Chest Portable    Result Date: 8/7/2020  Mild cardiomegaly and central vascular congestion. Basilar nodules are present, denser than rib favoring granulomas or hamartomas.        Physical Examination:        General appearance: Drowsy, cooperative and no distress  Mental Status:  oriented to person, place and time and flat affect  Lungs: Reduced BS bilaterally, normal effort, no wheezes/rales/rhonchi  Heart:  regular rate and rhythm, no murmur  Abdomen:  soft, nontender, obese, normal bowel sounds, no masses, hepatomegaly, splenomegaly  Extremities:  + edema bilateral LE, no tenderness in the calves  Skin:  + Chronic venous stasis skin changes bilateral LE and feet, thickened skin, several open wounds on bilateral feet no drainage    Assessment:        Hospital Problems           Last Modified POA    * (Principal) CHF (congestive heart failure), NYHA class I, acute on chronic, combined ([facility] 8/8/2020 Yes    Hypertension 8/8/2020 Yes    Hyperlipidemia 8/8/2020 Yes    Obesity 8/8/2020 Yes    Acute kidney injury superimposed on CKD ([facility] 8/8/2020 Yes    CAD (coronary artery disease) 8/8/2020 Yes    Overview Signed 2/27/2014 10:22 AM by Daniel Andres MD     (with coronary artery bypass graft x4). Type 2 diabetes mellitus with hyperglycemia, with long-term current use of insulin ([facility] 8/8/2020 Yes    Acute cystitis 8/8/2020 Yes    Infestation by bed bug 8/8/2020 Yes    Infestation by maggots 8/8/2020 Yes    Fall 8/8/2020 Yes    Elevated troponin 8/8/2020 Yes    Lymphedema of both lower extremities 8/8/2020 Yes          Plan:        1. Congestive heart failure-elevated BNP, gentle IV hydration given need for fluids for rhabdomyolysis. Await echocardiogram, Consult cardiology  2. Elevated troponin-check troponins every 6 hours, start aspirin, appreciate cardiology consult, f/u on Echo  3. Fall/acute rhabdomyolysis-CPK myoglobin slightly elevated, gentle IV hydration, monitor BUN/creatinine   4. DM2- decreased Lantus nightly, for hypoglycemia in the morning, SSI, check HbA1c  5. Acute UTI-IV Cipro, check urine culture  6. AMY/CKD-monitor BUN/creatinine  7. Bilateral lymphedema- place Ace wrap, wound care for wounds, Dakins recommended by nurse  8. History of CAD  9. GI/DVT prophylaxis  10. PT/OT  11. Social work consult-likely needs SNF placement, patient needs assistance with medication compliance and strength training  12.   Foot ulcer- consult [address] Anette Harry MD  8/10/2020  11:36 PM

## 2020-08-11 NOTE — CARE COORDINATION
Transitional planning-call from [facility] at [facility]-approved thru tomorrow 8-12 1025 talked with Dr. Elli Crawley on Echo and cardiology

## 2020-08-11 NOTE — PROGRESS NOTES
Occupational Therapy    Occupational Therapy Not Seen Note    DATE: 2020  Name: Vianca Zaidi  : 1950  MRN: [id number]    Patient not available for Occupational Therapy due to: Other: ECHO about to start and Podiatry came in to see pt.     Next Scheduled Treatment: Later this date or 20    Electronically signed by Liston Dance, COTA/L on 2020 at 1:55 PM

## 2020-08-11 NOTE — PROGRESS NOTES
Comprehensive Nutrition Assessment    Type and Reason for Visit:  Reassess    Nutrition Recommendations/Plan:   -Continue 4 CHO, 2 gm Na diet   -Continue glucerna supplements TID   -Will continue to monitor po intake, weights and wound healing     Nutrition Assessment:  Pt improving from a nutritional standpoint aeb pt reports that she has a pretty good appetite and is consuming % of her meals and and 100% of her glucerna supplements. No significant wt loss noted- wt has flux from 202-239 lbs over 9 mo per EMR. Pt noted w/ multiple wounds. Will continue nutritional supplements to aide in wound healing progression. Malnutrition Assessment:  Malnutrition Status:   At risk for malnutrition (Comment)    Context:  Acute Illness     Findings of the 6 clinical characteristics of malnutrition:  Energy Intake:  No significant decrease in energy intake  Weight Loss:  No significant weight loss     Body Fat Loss:  No significant body fat loss     Muscle Mass Loss:  No significant muscle mass loss    Fluid Accumulation:  1 - Mild Extremities   Strength:  Not Performed    Estimated Daily Nutrient Needs:  Energy (kcal):  1.2-1.3 ~> 5037-0607 kcals/d; Weight Used for Energy Requirements:  Current     Protein (g):  1.5 gm/kg ~> 85 gms/d; Weight Used for Protein Requirements:  Ideal          Nutrition Related Findings:  Na 130, Glucose 254      Wounds:  Pressure Ulcer, Unstageable, Stage II, Venous Stasis, Multiple, Open Wounds       Current Nutrition Therapies:    DIET LOW SODIUM 2 GM; Carb Control: 4 carb choices (60 gms)/meal  Dietary Nutrition Supplements: Diabetic Oral Supplement    Anthropometric Measures:  · Height: 5' 5\" (165.1 cm)  · Current Body Weight: 235 lb (106.6 kg)   · Admission Body Weight: 235 lb (106.6 kg)    · Usual Body Weight: (unknown per pt)     · Ideal Body Weight: 125 lbs; % Ideal Body Weight 188 %   · BMI: 39.1  · BMI Categories: Obese Class 2 (BMI 35.0 -39.9)       Nutrition Diagnosis: · Increased nutrient needs related to increase demand for energy/nutrients(wound healing) as evidenced by wounds(Need for ONS)      Nutrition Interventions:   Food and/or Nutrient Delivery:  Continue Current Diet, Continue Oral Nutrition Supplement  Nutrition Education/Counseling:  Education not indicated   Coordination of Nutrition Care:  Continued Inpatient Monitoring    Goals:  Intake of 50% or greater of meals/ONS     Achieved     Nutrition Monitoring and Evaluation:   Behavioral-Environmental Outcomes:  Knowledge or Skill   Food/Nutrient Intake Outcomes:  Food and Nutrient Intake, Supplement Intake  Physical Signs/Symptoms Outcomes:  Meal Time Behavior, Skin, Nutrition Focused Physical Findings     Discharge Planning:     Too soon to determine     Electronically signed by Ellen Grubbs RD, LD on 8/11/20 at 10:25 AM EDT    Contact: [phone]

## 2020-08-11 NOTE — PROGRESS NOTES
hours.  Troponin:   Recent Labs     08/10/20  0455 08/10/20  0948 08/10/20  1538   TROPHS 61* 59* 61*     BNP: No results for input(s): BNP in the last 72 hours. Lipids:   No results for input(s): CHOL, HDL in the last 72 hours. Invalid input(s): LDLCALCU  INR: No results for input(s): INR in the last 72 hours. DIAGNOSTIC DATA  CABG 2005  (x4) - LIMA-LAD, SVG-diagnoal 1, SVG-OM1, and SVG-PDA. Exploration of left radial. (Dr. Tianna Henson). Objective:   Vitals: BP (!) 185/65   Pulse 65   Temp 98.6 °F (37 °C) (Oral)   Resp 21   Ht 5' 5\" (1.651 m)   Wt 235 lb 3.2 oz (106.7 kg)   SpO2 100%   BMI 39.14 kg/m²   General appearance: alert and cooperative with exam  HEENT: Head: Normocephalic, no lesions, without obvious abnormality. Neck:no JVD, trachea midline, no adenopathy  Lungs: Clear to auscultation  Heart: Regular rate and rhythm, s1/s2 auscultated, no murmurs SR on tele HR 67  Abdomen: soft, non-tender, bowel sounds active  Extremities:  Legs currently wrapped d/t wound care. Neurologic: not done        Assessment / Acute Cardiac Problems:   1. CAD s/p CABG  2. CHF Exacerbation ProBNP elevated  3. HTN  4. HLD  5. CVA  6. Obesity  7. Anxiety  8. Depression    Patient Active Problem List:     Dementia ([facility]     Hypertension     Hyperlipidemia     Obesity     Gout     Peripheral polyneuropathy     Anxiety and depression     Acute kidney injury superimposed on CKD (HCC)     CAD (coronary artery disease)     Balance problem     CVA (cerebral vascular accident) ([facility]     Type 2 diabetes mellitus with hyperglycemia, with long-term current use of insulin ([facility])     CHF (congestive heart failure), NYHA class I, acute on chronic, combined ([facility]     Acute cystitis     Infestation by bed bug     Infestation by maggots     Fall     Elevated troponin     Lymphedema of both lower extremities      Plan of Treatment:   1. CAD s/p CABG 2005  Continue ASA, plavix, BB, ARB and statin.   2. CHF exacerbation Remains slightly fluid overload. Awaiting ECHO results for further recommendations. ECHO to be completed today   3. HTN Elevated this am  Will increase coreg. Continue ARB.    4. Keep K >4.0 and Mag >2.0      Electronically signed by SHIRA Cuevas CNP on 8/11/2020 at 11:46 AM  [address]  [phone]

## 2020-08-11 NOTE — DISCHARGE INSTR - COC
Continuity of Care Form    Patient Name: Jayjay Morales   :  1950  MRN:  [id number]    Admit date:  2020  Discharge date: 20    Code Status Order: Full Code   Advance Directives:     Admitting Physician:  Julia Carter MD  PCP: ERINN Rodgers    Discharging Nurse: VIA [facility]/Room#:   Discharging Unit Phone Number: [phone]    Emergency Contact:   Extended Emergency Contact Information  Primary Emergency Contact: Erendira Byrd  Address: [address] [place] [address] Phone: [phone]  Mobile Phone: [phone]  Relation: Child    Past Surgical History:  Past Surgical History:   Procedure Laterality Date    CATARACT REMOVAL WITH IMPLANT  2012    (Right eye) - Dr. Alyssa Alvarez.  CATARACT REMOVAL WITH IMPLANT  2012    (Left eye) - Dr. Alyssa Alvarez.   SECTION      CHOLECYSTECTOMY      CORONARY ARTERY BYPASS GRAFT  12-    (x4) - LIMA-LAD, SVG-diagnoal 1, SVG-OM1, and SVG-PDA. Exploration of left radial. (Dr. An Todd).  EYE SURGERY Left     as a child     GASTRIC BYPASS SURGERY      HYSTERECTOMY      (abdominal)  with left oophorectomy. Right ovary retained.     TONSILLECTOMY AND ADENOIDECTOMY         Immunization History:   Immunization History   Administered Date(s) Administered    Influenza Virus Vaccine 10/20/1998, 10/12/2013, 10/27/2014, 10/12/2016    Influenza, High Dose (Fluzone 65 yrs and older) 2018    Influenza, Triv, inactivated, subunit, adjuvanted, IM (Fluad 65 yrs and older) 2019    Pneumococcal Conjugate 13-valent (Wyikzvc32) 2017    Pneumococcal Polysaccharide (Rejhxlnyt39) 2004, 2018    Tdap (Boostrix, Adacel) 2016    Zoster Recombinant (Shingrix) 2020       Active Problems:  Patient Active Problem List   Diagnosis Code    Dementia ([facility] F03.90    Hypertension I10    Hyperlipidemia E78.5    Obesity E66.9    Gout M10.9    Peripheral polyneuropathy G62.9    Anxiety and depression F41.9, F32.9    Acute kidney injury superimposed on CKD (HCC) N17.9, N18.9    CAD (coronary artery disease) I25.10    Balance problem R26.89    CVA (cerebral vascular accident) ([facility] I63.9    Type 2 diabetes mellitus with hyperglycemia, with long-term current use of insulin ([facility]) E11.65, Z79.4    CHF (congestive heart failure), NYHA class I, acute on chronic, combined ([facility] I50.43    Acute cystitis N30.00    Infestation by bed bug B88.8    Infestation by maggots B87.9    Fall W19. Anjali Chime Elevated troponin R79.89    Lymphedema of both lower extremities I89.0       Isolation/Infection:   Isolation          No Isolation        Patient Infection Status     None to display          Nurse Assessment:  Last Vital Signs: BP (!) 185/65   Pulse 65   Temp 98.6 °F (37 °C) (Oral)   Resp 21   Ht 5' 5\" (1.651 m)   Wt 235 lb 3.2 oz (106.7 kg)   SpO2 100%   BMI 39.14 kg/m²     Last documented pain score (0-10 scale): Pain Level: 0  Last Weight:   Wt Readings from Last 1 Encounters:   08/11/20 235 lb 3.2 oz (106.7 kg)     Mental Status:  oriented, alert and coherent    IV Access:  - None    Nursing Mobility/ADLs:  Walking   Assisted  Transfer  Assisted  Bathing  Assisted  Dressing  Assisted  Toileting  Assisted  Feeding  Independent  Med Admin  Assisted  Med Delivery   whole    Wound Care Documentation and Therapy:  Wound 08/07/20 Buttocks Left;Right Incontinence Associated Dermatitis (Active)   Wound Image   08/10/20 1700   Dressing/Treatment Moisture barrier 08/10/20 1700   Wound Cleansed Soap and water 08/10/20 1700   Wound Assessment Non-blanchable erythema;Red;Denuded 08/10/20 1700   Drainage Amount Scant 08/10/20 1600   Drainage Description Tan 08/10/20 1600   Odor Strong 08/10/20 1600   Reshma-wound Assessment Swelling;Red;Painful;Fragile; Excoriated;Edema 08/10/20 1600   Culture Taken No 08/09/20 2000   Number of days: 3       Wound 08/07/20 Heel Right (Active)   Wound Image   08/10/20 1700   Wound Pressure Unstageable 08/10/20 1700   Dressing Status Changed 08/10/20 1700   Dressing Changed Changed/New 08/10/20 1700   Dressing/Treatment Moist to moist;Moisten with saline;ABD; Ace wrap 08/10/20 1700   Wound Cleansed Rinsed/Irrigated with saline 08/10/20 1700   Dressing Change Due 08/11/20 08/10/20 1700   Wound Length (cm) 5.5 cm 08/10/20 1700   Wound Width (cm) 6 cm 08/10/20 1700   Wound Depth (cm) 0.4 cm 08/10/20 1700   Wound Surface Area (cm^2) 33 cm^2 08/10/20 1700   Wound Volume (cm^3) 13.2 cm^3 08/10/20 1700   Wound Assessment Black; White;Yellow 08/10/20 1700   Odor Strong 08/10/20 1700   Reshma-wound Assessment Maceration 08/10/20 1700   Number of days: 3       Wound 08/07/20 Tibial Left multiple scattered wounds (Active)   Wound Image    08/10/20 1700   Wound Venous 08/10/20 1700   Dressing Status Intact;Dry 08/10/20 1600   Dressing Changed Changed/New 08/10/20 1700   Dressing/Treatment Alginate with Ag;ABD;Roll gauze; Ace wrap 08/10/20 1700   Wound Cleansed Soap and water 08/10/20 1700   Dressing Change Due 08/11/20 08/10/20 1700   Wound Assessment Red;Edema;Drainage 08/10/20 1700   Drainage Amount Moderate 08/10/20 1700   Drainage Description Serous 08/10/20 1700   Odor Strong 08/10/20 1600   Reshma-wound Assessment Hyperpigmented;Edema 08/10/20 1700   Non-staged Wound Description Partial thickness 08/10/20 1700   Number of days: 3       Wound 08/07/20 Pretibial Right cluster of 2 wounds (Active)   Wound Image   08/10/20 1700   Wound Venous 08/10/20 1700   Dressing Status Dry; Intact 08/10/20 1600   Dressing Changed Changed/New 08/10/20 1700   Dressing/Treatment Alginate with Ag;ABD;Roll gauze; Ace wrap 08/10/20 1700   Wound Cleansed Soap and water 08/10/20 1700   Dressing Change Due 08/11/20 08/10/20 1700   Wound Length (cm) 2.1 cm 08/10/20 1700   Wound Width (cm) 0.5 cm 08/10/20 1700   Wound Surface Area (cm^2) 1.05 cm^2 08/10/20 1700   Wound Assessment Red;Drainage 08/10/20 1700   Drainage Amount Moderate 08/10/20 1700   Drainage Description Serous 08/10/20 1700   Odor Strong 08/10/20 1600   Reshma-wound Assessment Hyperpigmented;Edema 08/10/20 1700   Number of days: 3       Wound 08/10/20 Ischium Left (Active)   Wound Image   08/10/20 1700   Wound Pressure Stage  3 08/10/20 1700   Dressing/Treatment Moisture barrier 08/10/20 1700   Wound Cleansed Soap and water 08/10/20 1700   Wound Length (cm) 1.7 cm 08/10/20 1700   Wound Width (cm) 2 cm 08/10/20 1700   Wound Surface Area (cm^2) 3.4 cm^2 08/10/20 1700   Wound Assessment Pink;Yellow;Slough 08/10/20 1700   Reshma-wound Assessment Denuded; Hyperpigmented 08/10/20 1700   Non-staged Wound Description Full thickness 08/10/20 1700   Number of days: 0       Wound 08/10/20 Hip Right Trochanter (Active)   Wound Image   08/10/20 1700   Wound Deep tissue/Injury 08/10/20 1700   Dressing Changed Changed/New 08/10/20 1700   Dressing/Treatment Silicone border; Foam 08/10/20 1700   Wound Cleansed Soap and water 08/10/20 1700   Dressing Change Due 08/13/20 08/10/20 1700   Wound Length (cm) 3.5 cm 08/10/20 1700   Wound Width (cm) 3.5 cm 08/10/20 1700   Wound Surface Area (cm^2) 12.25 cm^2 08/10/20 1700   Wound Assessment Purple;Maroon;Yellow;Fibrin 08/10/20 1700   Drainage Amount Small 08/10/20 1700   Drainage Description Serosanguinous 08/10/20 1700   Number of days: 0       Wound 08/10/20 Hip Right (Active)   Wound Image   08/10/20 1700   Wound Pressure Stage  2 08/10/20 1700   Dressing Changed Changed/New 08/10/20 1700   Dressing/Treatment Silicone border; Foam 08/10/20 1700   Wound Cleansed Soap and water 08/10/20 1700   Wound Length (cm) 4 cm 08/10/20 1700   Wound Width (cm) 5.5 cm 08/10/20 1700   Wound Depth (cm) 0.1 cm 08/10/20 1700   Wound Surface Area (cm^2) 22 cm^2 08/10/20 1700   Wound Volume (cm^3) 2.2 cm^3 08/10/20 1700   Wound Assessment Pink;Denuded 08/10/20 1700   Number of days: 0 Elimination:  Continence:   · Bowel: Yes  · Bladder: Yes  Urinary Catheter: Removal Date 8/14/20   Colostomy/Ileostomy/Ileal Conduit: No       Date of Last BM: ***    Intake/Output Summary (Last 24 hours) at 8/11/2020 1111  Last data filed at 8/11/2020 0640  Gross per 24 hour   Intake 3202 ml   Output 1850 ml   Net 1352 ml     I/O last 3 completed shifts: In: 3991 [P.O.:720; I.V.:2482]  Out: 1850 [Urine:1850]    Safety Concerns:     History of Falls (last 30 days)    Impairments/Disabilities:      None    Nutrition Therapy:  Current Nutrition Therapy:   - Oral Diet:  Carb Control 4 carbs/meal (1800kcals/day) and Cardiac    Routes of Feeding: Oral  Liquids: No Restrictions  Daily Fluid Restriction: no  Last Modified Barium Swallow with Video (Video Swallowing Test): not done    Treatments at the Time of Hospital Discharge:   Respiratory Treatments: ***  Oxygen Therapy:  is on oxygen at 2 L/min per nasal cannula. Ventilator:    - No ventilator support    Rehab Therapies: Physical Therapy and Occupational Therapy  Weight Bearing Status/Restrictions: No weight bearing restirctions  Other Medical Equipment (for information only, NOT a DME order): Other Treatments: RIGHT HEEL:  Dakins 0.125% moist to moist dressing daily. Offload heel in heel boot,    BILATERAL LOWER LEGS:  Opticell AG>ABD>Roll gauze>ACE wraps toes to knees. Wash legs with Remedy foam cleanser with dressing changes.  RIGHT HIP AND TROCHANTER PRESSURE ULCERS:  Bordered foam Meiplex    Patient's personal belongings (please select all that are sent with patient):  None    RN SIGNATURE:  Electronically signed by Hilda Mccarthy RN on 8/14/20 at 4:44 PM EDT    CASE MANAGEMENT/SOCIAL WORK SECTION    Inpatient Status Date: 8-7-2020    Readmission Risk Assessment Score:  Readmission Risk              Risk of Unplanned Readmission:        25           Discharging to Facility/ Agency   · Name: [facility]  · Address:  · Phone:  · Fax:    Dialysis Facility (if applicable)   · Name:  · Address:  · Dialysis Schedule:  · Phone:  · Fax:    / signature: Electronically signed by Angella Ribera RN on 8/14/20 at 3:50 PM EDT    PHYSICIAN SECTION    Prognosis: Fair    Condition at Discharge: Stable    Rehab Potential (if transferring to Rehab): Fair    Recommended Labs or Other Treatments After Discharge: BMP every Mon and Friday for 3 weeks fax to [phone], Continue wound care for heel wound. New prescription for plavix given, per Vascular ok to hold if needing any intervention    Physician Certification: I certify the above information and transfer of Benoit Cheung  is necessary for the continuing treatment of the diagnosis listed and that she requires [facility] for less 30 days.      Update Admission H&P: No change in H&P    PHYSICIAN SIGNATURE:  Electronically signed by Silvia Kovacs MD on 8/14/20 at 3:28 PM EDT

## 2020-08-11 NOTE — PROGRESS NOTES
Physical Therapy  Facility/Department: [facility] CAR 2  Daily Treatment Note  NAME: Lexis Morrison  : 1950  MRN: [id number]    Date of Service: 2020    Discharge Recommendations:    Further therapy recommended at discharge. PT Equipment Recommendations  Equipment Needed: (TBD)     Assessment     Pt cooperative, fearful of falling, moves very slowly, cautiously. She was able to take steps, using the rwalker, pt needs to find her balance first before taking steps. Pt feels her Left leg is her strongest one. She states she doesn't use her walker at home for the same reason. Body structures, Functions, Activity limitations: Decreased functional mobility ; Decreased strength;Decreased cognition;Decreased endurance;Decreased sensation;Decreased balance;Decreased vision/visual deficit; Increased pain  Prognosis: Fair  Decision Making: Medium Complexity  PT Education: Goals;PT Role;Plan of Care;Transfer Training;General Safety;Orientation;Equipment; Adaptive Device Training;Gait Training;Functional Mobility Training  Barriers to Learning: cognition  REQUIRES PT FOLLOW UP: Yes  Activity Tolerance  Activity Tolerance: Patient limited by pain; Other        Patient Diagnosis(es): There were no encounter diagnoses. has a past medical history of Anxiety and depression, Background diabetic retinopathy(362.01), Balance problem, CAD (coronary artery disease), Cancer of uterus ([address] Chronic kidney disease, Chronic low back pain, CVA (cerebral vascular accident) ([address] Dementia ([address] Dry eye syndrome, GERD (gastroesophageal reflux disease), Glaucoma suspect, Gout, Homonymous hemianopsia, Hyperlipidemia, Hypertension, Keratitis, Obesity, Peripheral polyneuropathy, Pseudophakos, Stroke ([address] Trichiasis, and Type 2 diabetes mellitus ([address] has a past surgical history that includes Gastric bypass surgery; Cataract removal with implant (2012); Cataract removal with implant (2012);  Coronary artery bypass graft (12-);  section; Hysterectomy; Cholecystectomy; Tonsillectomy and adenoidectomy; and Eye surgery (Left). Restrictions  Restrictions/Precautions  Restrictions/Precautions: General Precautions, Fall Risk, Contact Precautions  Required Braces or Orthoses?: No  Position Activity Restriction  Other position/activity restrictions: Up with assistance  Subjective   Pt sitting up in bed. Pt has no c/o pain. Orientation   WFL  Objective     Bed mobility  Rolling to Right: Minimal assistance(pt is very slow and moves with difficulty)  Supine to Sit: Moderate assistance(pt is very slow and moves with difficulty)  Scooting: Mod. assistance  Transfers  Sit to Stand: Mod. Assistance(from low chair)  Stand to sit: Minimal Assistance  Bed to Chair: Minimal assistance  Stand Pivot Transfers: Minimal Assistance  Comment: pt using rw, pt needs to find her balance before taking a step. Ambulation  Ambulation?: Yes  Ambulation 1  Surface: level tile  Device: Rolling Walker  Assistance: Minimal assistance  Quality of Gait: pt takes very small steps, tends to rock the walker posteriorly  Gait Deviations: Slow Vicenta; Increased RACIEL; Decreased step length;Decreased step height;Decreased arm swing;Decreased head and trunk rotation; Shuffles  Distance: 48' x 1 verbal cues for sequencing Leah Hals foot,L foot) chair to follow.   Stairs/Curb  Stairs?: No     Balance  Posture: Fair  Sitting - Static: Good  Sitting - Dynamic: Good  Standing - Static: Fair  Standing - Dynamic: Fair;-   Goals  Short term goals  Time Frame for Short term goals: 12 visits  Short term goal 1: independent bed mobility  Short term goal 2: independent transfers  Short term goal 3: independent gait with appropriate device x 50'  Short term goal 4: stair ambulation x 2 steps without HR, min A+1  Patient Goals   Patient goals : return home    Plan    Plan  Times per week: 5-6 visits weekly  Times per day: Daily  Current Treatment Recommendations: Strengthening, ROM, Balance Training, Functional Mobility Training, Transfer Training, Endurance Training, Gait Training, Stair training, Pain Management, Home Exercise Program, Safety Education & Training, Patient/Caregiver Education & Training, Positioning  Safety Devices  Type of devices: Call light within reach, Chair alarm in place, Gait belt, Patient at risk for falls, Left in alarmed  chair, Nurse notified  Restraints  Initially in place: No     Therapy Time   Individual Concurrent Group Co-treatment   Time In  345         Time Out  415         Minutes  [address] [place] [address], [place]

## 2020-08-12 LAB
ANION GAP SERPL CALCULATED.3IONS-SCNC: 10 MMOL/L (ref 9–17)
BNP INTERPRETATION: ABNORMAL
BUN BLDV-MCNC: 25 MG/DL (ref 8–23)
BUN/CREAT BLD: ABNORMAL (ref 9–20)
CALCIUM SERPL-MCNC: 8.2 MG/DL (ref 8.6–10.4)
CHLORIDE BLD-SCNC: 96 MMOL/L (ref 98–107)
CO2: 25 MMOL/L (ref 20–31)
CREAT SERPL-MCNC: 1.28 MG/DL (ref 0.5–0.9)
GFR AFRICAN AMERICAN: 50 ML/MIN
GFR NON-AFRICAN AMERICAN: 41 ML/MIN
GFR SERPL CREATININE-BSD FRML MDRD: ABNORMAL ML/MIN/{1.73_M2}
GFR SERPL CREATININE-BSD FRML MDRD: ABNORMAL ML/MIN/{1.73_M2}
GLUCOSE BLD-MCNC: 215 MG/DL (ref 65–105)
GLUCOSE BLD-MCNC: 222 MG/DL (ref 65–105)
GLUCOSE BLD-MCNC: 234 MG/DL (ref 70–99)
GLUCOSE BLD-MCNC: 251 MG/DL (ref 65–105)
GLUCOSE BLD-MCNC: 290 MG/DL (ref 65–105)
POTASSIUM SERPL-SCNC: 4.6 MMOL/L (ref 3.7–5.3)
PRO-BNP: ABNORMAL PG/ML
SODIUM BLD-SCNC: 131 MMOL/L (ref 135–144)

## 2020-08-12 PROCEDURE — 6360000002 HC RX W HCPCS: Performed by: CLINICAL NURSE SPECIALIST

## 2020-08-12 PROCEDURE — 1200000000 HC SEMI PRIVATE

## 2020-08-12 PROCEDURE — 6360000002 HC RX W HCPCS: Performed by: NURSE PRACTITIONER

## 2020-08-12 PROCEDURE — 82947 ASSAY GLUCOSE BLOOD QUANT: CPT

## 2020-08-12 PROCEDURE — 6370000000 HC RX 637 (ALT 250 FOR IP): Performed by: INTERNAL MEDICINE

## 2020-08-12 PROCEDURE — 6370000000 HC RX 637 (ALT 250 FOR IP): Performed by: STUDENT IN AN ORGANIZED HEALTH CARE EDUCATION/TRAINING PROGRAM

## 2020-08-12 PROCEDURE — 6370000000 HC RX 637 (ALT 250 FOR IP): Performed by: NURSE PRACTITIONER

## 2020-08-12 PROCEDURE — 6370000000 HC RX 637 (ALT 250 FOR IP): Performed by: CLINICAL NURSE SPECIALIST

## 2020-08-12 PROCEDURE — 99222 1ST HOSP IP/OBS MODERATE 55: CPT | Performed by: SURGERY

## 2020-08-12 PROCEDURE — 6360000002 HC RX W HCPCS: Performed by: STUDENT IN AN ORGANIZED HEALTH CARE EDUCATION/TRAINING PROGRAM

## 2020-08-12 PROCEDURE — 99232 SBSQ HOSP IP/OBS MODERATE 35: CPT | Performed by: STUDENT IN AN ORGANIZED HEALTH CARE EDUCATION/TRAINING PROGRAM

## 2020-08-12 PROCEDURE — 99212 OFFICE O/P EST SF 10 MIN: CPT

## 2020-08-12 PROCEDURE — 36415 COLL VENOUS BLD VENIPUNCTURE: CPT

## 2020-08-12 PROCEDURE — 80048 BASIC METABOLIC PNL TOTAL CA: CPT

## 2020-08-12 PROCEDURE — 2580000003 HC RX 258: Performed by: CLINICAL NURSE SPECIALIST

## 2020-08-12 PROCEDURE — 83880 ASSAY OF NATRIURETIC PEPTIDE: CPT

## 2020-08-12 RX ORDER — CLONIDINE HYDROCHLORIDE 0.1 MG/1
0.1 TABLET ORAL ONCE
Status: COMPLETED | OUTPATIENT
Start: 2020-08-12 | End: 2020-08-12

## 2020-08-12 RX ORDER — HYDRALAZINE HYDROCHLORIDE 20 MG/ML
5 INJECTION INTRAMUSCULAR; INTRAVENOUS ONCE
Status: COMPLETED | OUTPATIENT
Start: 2020-08-12 | End: 2020-08-12

## 2020-08-12 RX ADMIN — HEPARIN SODIUM 5000 UNITS: 5000 INJECTION INTRAVENOUS; SUBCUTANEOUS at 05:51

## 2020-08-12 RX ADMIN — INSULIN LISPRO 3 UNITS: 100 INJECTION, SOLUTION INTRAVENOUS; SUBCUTANEOUS at 21:47

## 2020-08-12 RX ADMIN — MEMANTINE HYDROCHLORIDE 10 MG: 5 TABLET, FILM COATED ORAL at 10:00

## 2020-08-12 RX ADMIN — CITALOPRAM 20 MG: 20 TABLET, FILM COATED ORAL at 10:00

## 2020-08-12 RX ADMIN — HYDRALAZINE HYDROCHLORIDE 5 MG: 20 INJECTION INTRAMUSCULAR; INTRAVENOUS at 19:21

## 2020-08-12 RX ADMIN — DAKIN'S SOLUTION 0.125% (QUARTER STRENGTH): 0.12 SOLUTION at 10:03

## 2020-08-12 RX ADMIN — LOSARTAN POTASSIUM 100 MG: 50 TABLET, FILM COATED ORAL at 09:58

## 2020-08-12 RX ADMIN — INSULIN LISPRO 4 UNITS: 100 INJECTION, SOLUTION INTRAVENOUS; SUBCUTANEOUS at 08:22

## 2020-08-12 RX ADMIN — HEPARIN SODIUM 5000 UNITS: 5000 INJECTION INTRAVENOUS; SUBCUTANEOUS at 16:43

## 2020-08-12 RX ADMIN — ALLOPURINOL 100 MG: 100 TABLET ORAL at 10:04

## 2020-08-12 RX ADMIN — Medication 81 MG: at 10:00

## 2020-08-12 RX ADMIN — CARVEDILOL 12.5 MG: 12.5 TABLET, FILM COATED ORAL at 16:42

## 2020-08-12 RX ADMIN — INSULIN LISPRO 6 UNITS: 100 INJECTION, SOLUTION INTRAVENOUS; SUBCUTANEOUS at 18:48

## 2020-08-12 RX ADMIN — NYSTATIN: 100000 CREAM TOPICAL at 22:03

## 2020-08-12 RX ADMIN — GABAPENTIN 100 MG: 100 CAPSULE ORAL at 16:42

## 2020-08-12 RX ADMIN — GABAPENTIN 100 MG: 100 CAPSULE ORAL at 21:46

## 2020-08-12 RX ADMIN — NYSTATIN: 100000 CREAM TOPICAL at 10:02

## 2020-08-12 RX ADMIN — OXYBUTYNIN CHLORIDE 5 MG: 5 TABLET ORAL at 10:00

## 2020-08-12 RX ADMIN — CLONIDINE HYDROCHLORIDE 0.1 MG: 0.1 TABLET ORAL at 22:20

## 2020-08-12 RX ADMIN — CIPROFLOXACIN 400 MG: 2 INJECTION, SOLUTION INTRAVENOUS at 21:52

## 2020-08-12 RX ADMIN — DOCUSATE SODIUM 100 MG: 100 CAPSULE, LIQUID FILLED ORAL at 21:46

## 2020-08-12 RX ADMIN — CLOPIDOGREL 75 MG: 75 TABLET, FILM COATED ORAL at 10:00

## 2020-08-12 RX ADMIN — PANTOPRAZOLE SODIUM 40 MG: 40 TABLET, DELAYED RELEASE ORAL at 08:22

## 2020-08-12 RX ADMIN — OXYBUTYNIN CHLORIDE 5 MG: 5 TABLET ORAL at 21:46

## 2020-08-12 RX ADMIN — INSULIN GLARGINE 20 UNITS: 100 INJECTION, SOLUTION SUBCUTANEOUS at 21:47

## 2020-08-12 RX ADMIN — MEMANTINE HYDROCHLORIDE 10 MG: 5 TABLET, FILM COATED ORAL at 21:46

## 2020-08-12 RX ADMIN — GABAPENTIN 100 MG: 100 CAPSULE ORAL at 10:01

## 2020-08-12 RX ADMIN — COLLAGENASE SANTYL: 250 OINTMENT TOPICAL at 16:22

## 2020-08-12 RX ADMIN — INSULIN LISPRO 4 UNITS: 100 INJECTION, SOLUTION INTRAVENOUS; SUBCUTANEOUS at 13:26

## 2020-08-12 RX ADMIN — CETIRIZINE HYDROCHLORIDE 5 MG: 10 TABLET ORAL at 09:59

## 2020-08-12 RX ADMIN — Medication 10 ML: at 21:47

## 2020-08-12 RX ADMIN — HEPARIN SODIUM 5000 UNITS: 5000 INJECTION INTRAVENOUS; SUBCUTANEOUS at 21:47

## 2020-08-12 RX ADMIN — CARVEDILOL 12.5 MG: 12.5 TABLET, FILM COATED ORAL at 08:22

## 2020-08-12 RX ADMIN — CIPROFLOXACIN 400 MG: 2 INJECTION, SOLUTION INTRAVENOUS at 09:58

## 2020-08-12 RX ADMIN — DESMOPRESSIN ACETATE 40 MG: 0.2 TABLET ORAL at 21:46

## 2020-08-12 RX ADMIN — DOCUSATE SODIUM 100 MG: 100 CAPSULE, LIQUID FILLED ORAL at 09:59

## 2020-08-12 ASSESSMENT — ENCOUNTER SYMPTOMS
COLOR CHANGE: 1
SORE THROAT: 0
SHORTNESS OF BREATH: 0
WHEEZING: 0
COUGH: 0
DIARRHEA: 0
NAUSEA: 0
VOMITING: 0

## 2020-08-12 ASSESSMENT — PAIN DESCRIPTION - PROGRESSION
CLINICAL_PROGRESSION: NOT CHANGED
CLINICAL_PROGRESSION: NOT CHANGED

## 2020-08-12 ASSESSMENT — PAIN SCALES - GENERAL: PAINLEVEL_OUTOF10: 0

## 2020-08-12 NOTE — CARE COORDINATION
Transitional planning- talked with patient-agreed to go to [facility] [place]. Talked with Dr. Lobo Cruz on Cardiology. Approved for placement thru 8-.

## 2020-08-12 NOTE — PROGRESS NOTES
Agency/Facility Name: [facility]  Spoke To: Indu  Outcome: Declined.     T[address] [place] [address]    Progress Note    8/12/2020    11:31 AM    Name:   Gopal Villafana  MRN:     [id number]     Acct:      [de-identified]   Room:   2019/2019-01  IP Day:  5  Admit Date:  8/7/2020 10:29 PM    PCP:   Thea Litten, PA  Code Status:  Full Code    Subjective:     C/C: hyperglycemia  Interval History Status: improved. Patient seen and examined at bedside. Feels better aside from heal pain after podiatry incision. Otherwise in good spirits Creatinine stable, slight hyponatremia. BUN stable at 25. Glucose improved control today. Hba1c 10.7. Brief History:     Briefly this is a 70-year-old female who presented to [facility] ER via EMS after having a fall. Patient was found to have symptomatic UTI as well as elevated sugars up to 400 and.  proBNP elevation of 31,000. Patient is currently being treated with Cipro for her UTI. Hsitory of CABG in 2005.        Review of Systems:     Constitutional:  negative for chills, fevers, sweats  Respiratory:  negative for cough, dyspnea on exertion, shortness of breath, wheezing  Cardiovascular:  negative for chest pain, chest pressure/discomfort, lower extremity edema, palpitations  Gastrointestinal:  negative for abdominal pain, constipation, diarrhea, nausea, vomiting  Neurological:  negative for dizziness, headache    Medications: Allergies:     Allergies   Allergen Reactions    Bactrim [Sulfamethoxazole-Trimethoprim] Hives    Amoxicillin-Pot Clavulanate Diarrhea, Nausea Only and Nausea And Vomiting       Current Meds:   Scheduled Meds:    collagenase   Topical Daily    carvedilol  12.5 mg Oral BID WC    atorvastatin  40 mg Oral Nightly    Sodium Hypochlorite   Irrigation Daily    insulin glargine  20 Units Subcutaneous Nightly    ciprofloxacin  400 mg Intravenous Q12H    allopurinol  100 mg Oral Daily    aspirin  81 mg Oral Daily    insulin lispro  0-12 Units Subcutaneous TID WC    siblings.  Stroke Other         1 sibling.  Lung Cancer Other         1 sibling - cause of death lung cancer.  Mental Retardation Other        Vitals:  BP (!) 155/51   Pulse 65   Temp 98.2 °F (36.8 °C) (Oral)   Resp 14   Ht 5' 5\" (1.651 m)   Wt 240 lb (108.9 kg)   SpO2 100%   BMI 39.94 kg/m²   Temp (24hrs), Av.4 °F (36.9 °C), Min:98.2 °F (36.8 °C), Max:98.6 °F (37 °C)    Recent Labs     20  1205 20  1709 20  2038 20  0821   POCGLU 333* 234* 262* 215*       I/O (24Hr): Intake/Output Summary (Last 24 hours) at 2020 1131  Last data filed at 2020 0556  Gross per 24 hour   Intake --   Output 500 ml   Net -500 ml       Labs:  Hematology:  Recent Labs     08/10/20  04520  0511   WBC 9.8  --    RBC 3.22*  --    HGB 9.4*  --    HCT 30.6*  --    MCV 95.0  --    MCH 29.2  --    MCHC 30.7  --    RDW 16.7*  --      --    MPV 10.4  --    SEDRATE  --  59*   CRP  --  32.6*     Chemistry:  Recent Labs     08/10/20  0455 08/10/20  0948 08/10/20  1538 20  0511 20  0515   *  --   --  130* 131*   K 4.2  --   --  4.5 4.6   CL 94*  --   --  95* 96*   CO2 25  --   --  25 25   GLUCOSE 186*  --   --  259* 234*   BUN 25*  --   --  24* 25*   CREATININE 1.48*  --   --  1.44* 1.28*   MG 1.6  --   --   --   --    ANIONGAP 11  --   --  10 10   LABGLOM 35*  --   --  36* 41*   GFRAA 43*  --   --  44* 50*   CALCIUM 7.6*  --   --  7.7* 8.2*   PROBNP 25,069*  --   --   --  23,730*   TROPHS 61* 59* 61*  --   --      Recent Labs     08/10/20  1538  08/10/20  2212 20  0853 20  1205 20  1709 20  2038 20  0821   LABA1C 10.7*  --   --   --   --   --   --   --    POCGLU  --    < > 263* 254* 333* 234* 262* 215*    < > = values in this interval not displayed.      ABG:No results found for: POCPH, PHART, PH, POCPCO2, MWX7CMD, PCO2, POCPO2, PO2ART, PO2, POCHCO3, YSV0RXA, HCO3, NBEA, PBEA, BEART, BE, THGBART, THB, RQK3FGC, IMLQ5HOC, P9MQCRXO, O2SAT, FIO2  Lab Results   Component Value Date/Time    SPECIAL NOT REPORTED 08/08/2020 01:52 PM     Lab Results   Component Value Date/Time    CULTURE NO SIGNIFICANT GROWTH 08/08/2020 01:52 PM       Radiology:  Raymond Push Foot Right (min 3 Views)    Result Date: 8/11/2020  Significant soft tissue swelling over the forefoot. Degenerative changes. No acute fracture, dislocation or cortical destruction. Soft tissue ulcer over the heel without underlying osseous abnormality. RECOMMENDATION: Radionuclide bone imaging or MRI imaging may be utilized for further assessment. Ct Head Wo Contrast    Result Date: 8/7/2020  No acute intracranial abnormality. Left sided scalp edema/hematoma without underlying calvarial fracture. Peripheral mucosal thickening of the paranasal sinuses. Remote left PCA territory infarction. Ct Cervical Spine Wo Contrast    Result Date: 8/7/2020  No acute abnormality of the cervical spine. Mild multilevel degenerative changes. Interlobular septal thickening apices; correlate for edema or other disease. 4 cm left thyroid nodule. If not already assessed previously, nonemergent follow-up ultrasound suggested. Xr Chest Portable    Result Date: 8/8/2020  Increased bibasilar lung opacities which are nonspecific and may represent atelectasis and pleural effusions versus pneumonia. Xr Chest Portable    Result Date: 8/7/2020  Mild cardiomegaly and central vascular congestion. Basilar nodules are present, denser than rib favoring granulomas or hamartomas.        Physical Examination:        General appearance:  alert, cooperative and no distress  Mental Status:  oriented to person, place and time and normal affect  Lungs:  clear to auscultation bilaterally, normal effort  Heart:  regular rate and rhythm, no murmur  Abdomen:  soft, nontender, nondistended, normal bowel sounds, no masses, hepatomegaly, splenomegaly  Extremities:  no edema, redness, tenderness in the calves  Skin:  no gross lesions, rashes, induration    Assessment:        Hospital Problems           Last Modified POA    * (Principal) CHF (congestive heart failure), NYHA class I, acute on chronic, combined ([facility] 8/8/2020 Yes    Hypertension 8/8/2020 Yes    Hyperlipidemia 8/8/2020 Yes    Obesity 8/8/2020 Yes    Acute kidney injury superimposed on CKD ([facility] 8/8/2020 Yes    CAD (coronary artery disease) 8/8/2020 Yes    Overview Signed 2/27/2014 10:22 AM by Haseeb Jolley MD     (with coronary artery bypass graft x4). Type 2 diabetes mellitus with hyperglycemia, with long-term current use of insulin ([facility] 8/8/2020 Yes    Acute cystitis 8/8/2020 Yes    Infestation by bed bug 8/8/2020 Yes    Infestation by maggots 8/8/2020 Yes    Fall 8/8/2020 Yes    Elevated troponin 8/8/2020 Yes    Lymphedema of both lower extremities 8/8/2020 Yes          Plan:        1. Plan for cardiac cath after clearance from nephrology  2. Gentle IV hydration  3. ASA 81, lipitor, coreg 12.5 plavix  4. DM2 with neuropathy. Gabapentin, ISS, Lantus 20 units  5. Celexa  6. Cipro for UTI  7.  Wound care, podiatry, and vascular recs appreciated      Luna Dubois MD  8/12/2020  11:31 AM

## 2020-08-12 NOTE — PROGRESS NOTES
[facility] Wound Ostomy  Nurse  Follow up  Note       NAME:  Milana Gibson RECORD NUMBER:  [id number]  AGE: 77 y.o. GENDER: female  : 1950  TODAY'S DATE:  2020    Subjective   Reason for [id number] Nurse Evaluation and Assessment:  Dressing charge and wound assessment to lower legs. Hygiene provided to lower legs with dressing change. Much improvement from admission. Wound re-dressed to the right heel using Santyl per Podiatry recommendation. BP (!) 155/51   Pulse 65   Temp 98.7 °F (37.1 °C) (Oral)   Resp 14   Ht 5' 5\" (1.651 m)   Wt 240 lb (108.9 kg)   SpO2 100%   BMI 39.94 kg/m²     LABS:  WBC:    Lab Results   Component Value Date    WBC 9.8 08/10/2020     H/H:    Lab Results   Component Value Date    HGB 9.4 08/10/2020    HCT 30.6 08/10/2020     PTT:  No results found for: APTT, PTT[APTT}  PT/INR:  No results found for: PROTIME, INR  HgBA1c:    Lab Results   Component Value Date    LABA1C 10.7 08/10/2020       Assessment   Kory Risk Score: Kory Scale Score: 14    Patient Active Problem List   Diagnosis Code    Dementia ([facility] F03.90    Hypertension I10    Hyperlipidemia E78.5    Obesity E66.9    Gout M10.9    Peripheral polyneuropathy G62.9    Anxiety and depression F41.9, F32.9    Acute kidney injury superimposed on CKD (HCC) N17.9, N18.9    CAD (coronary artery disease) I25.10    Balance problem R26.89    CVA (cerebral vascular accident) ([facility] I63.9    Type 2 diabetes mellitus with hyperglycemia, with long-term current use of insulin ([facility]) E11.65, Z79.4    CHF (congestive heart failure), NYHA class I, acute on chronic, combined ([facility] I50.43    Acute cystitis N30.00    Infestation by bed bug B88.8    Infestation by maggots B87.9    Fall W19. XXXA    Elevated troponin R79.89    Lymphedema of both lower extremities I89.0       Measurements:     20 1724   Wound 20 Heel Right   Date First Assessed/Time First Assessed: 20 0338   Present on Hospital Admission: Yes  Primary Wound Type: Pressure Injury  Location: Heel  Wound Location Orientation: Right   Wound Image    Wound Pressure Unstageable  (S/P debridement by Podiatry 8/11)   Offloading for Diabetic Foot Ulcers Offloading boot   Dressing Status Changed   Dressing Changed Changed/New   Dressing/Treatment Santyl ointment withMoist to moist saline;ABD;Roll gauze; Ace wrap   Wound Cleansed Rinsed/Irrigated with saline   Dressing Change Due 08/06/20   Wound Assessment Black;Pink;Yellow   Drainage Amount Moderate   Drainage Description Serosanguinous   Odor Strong   Reshma-wound Assessment Calloused; Maceration   Wound 08/07/20 Tibial Left multiple scattered wounds   Date First Assessed/Time First Assessed: 08/07/20 2330   Present on Hospital Admission: Yes  Primary Wound Type: Venous Ulcer  Location: Tibial  Wound Location Orientation: Left  Wound Description (Comments): multiple scattered wounds   Wound Image    Wound Venous   Dressing Changed Changed/New   Dressing/Treatment Alginate with Ag;ABD;Roll gauze; Ace wrap   Wound Cleansed Rinsed/Irrigated with saline; Soap and water   Dressing Change Due 08/13/20   Wound Assessment Clean;Red   Drainage Description Serosanguinous   Reshma-wound Assessment Hyperpigmented;Edema   Non-staged Wound Description Partial thickness   Wound 08/07/20 Pretibial Right cluster of 2 wounds   Date First Assessed/Time First Assessed: 08/07/20 2330   Present on Hospital Admission: Yes  Location: Pretibial  Wound Location Orientation: Right  Wound Description (Comments): cluster of 2 wounds   Wound Image     Wound Venous   Dressing Status Changed   Dressing Changed Changed/New   Dressing/Treatment Alginate with Ag;ABD;Roll gauze; Ace wrap   Wound Cleansed Rinsed/Irrigated with saline; Soap and water   Dressing Change Due 08/13/20   Wound Assessment Clean;Red   Drainage Description Serosanguinous   Reshma-wound Assessment Hyperpigmented;Edema         Plan   Plan of Care:   RIGHT HEEL: Follow Podiatry recommendations for Santyl. KEEP HEEL OFF-LOADED WITH BOOT    BILATERAL LOWER LEGS:  Silver hydrofiber to any open areas. Secure with roll gauze and ACE wraps toes to knees. Change dry layers every 2 days. Re-warp ACE daily for slippage.     Specialty Bed Required : Yes   [] Low Air Loss   [x] Pressure Redistribution  Static air overlay  [] Fluid Immersion  [] Bariatric  [] Total Pressure Relief  [] Other:     Current Diet: DIET LOW SODIUM 2 GM; Carb Control: 4 carb choices (60 gms)/meal  Dietary Nutrition Supplements: Diabetic Oral Supplement  Diet NPO, After Midnight       NATIVIDAD ARRINGTONN, RN, [employer]

## 2020-08-12 NOTE — CONSULTS
Division of Vascular Surgery        New Consult      Physician Requesting Consult:  Dr. Jennie Means    Reason for Consult:   PAD    Chief Complaint:      \"leg weakness, wound on my heel \"    History of Present Illness:      Lexis Morrison is a 77 y.o. woman with a history of CAD, CKD, diabetes , hyperlipidemia, CVA, CABG and peripheral polyneuropathy presents to [facility] ER after she had fallen at home and her daughter had called EMS. Patient was then transferred and admited to the hospital with a diagnosis of CHF and UTI. Patient has a right plantar heel wound that currently managed by podiatry. Pt alert oriented responding to questions appropriately. Pt stating that was up walking on her own till the last couple weeks and her legs were giving up on her and had multiple falls. States that the wound on her legs developed recently after she has been walking on a worn out slippers. Patient had an abnormal PVRs that was  ordered by podiatry and we were consulted for the management of PAD. Medical History:     Past Medical History:   Diagnosis Date    Anxiety and depression     Background diabetic retinopathy(362.01) 2008    (Mild)    Balance problem     CAD (coronary artery disease)     (with coronary artery bypass graft x4).  Cancer of uterus [facility])     history of, (probable cure)    Chronic kidney disease     Chronic low back pain     CVA (cerebral vascular accident) ([address]     Dementia ([address]     (moderate)    Dry eye syndrome     GERD (gastroesophageal reflux disease)     Glaucoma suspect 2005    Gout     Homonymous hemianopsia 2008    (Right)    Hyperlipidemia     Hypertension     Keratitis     (secondary to dry eye syndrome).  Obesity     Peripheral polyneuropathy     (diabetic)    Pseudophakos     (Right Eye: 05-; Left Eye: 04-) - Dr. Preethi Drake.  Stroke [facility])     (Multiple) MRI of brain 10/2008 shows multiple infarcts involving the temporal and parietal areas.  Trichiasis     (left eye)    Type 2 diabetes mellitus ([address] [facility]        Surgical History:     Past Surgical History:   Procedure Laterality Date    CATARACT REMOVAL WITH IMPLANT  2012    (Right eye) - Dr. Chris Root.  CATARACT REMOVAL WITH IMPLANT  2012    (Left eye) - Dr. Chris Root.   SECTION      CHOLECYSTECTOMY      CORONARY ARTERY BYPASS GRAFT  12-    (x4) - LIMA-LAD, SVG-diagnoal 1, SVG-OM1, and SVG-PDA. Exploration of left radial. (Dr. Balaji Pino).  EYE SURGERY Left     as a child     GASTRIC BYPASS SURGERY      HYSTERECTOMY      (abdominal)  with left oophorectomy. Right ovary retained.  TONSILLECTOMY AND ADENOIDECTOMY         Family History:     Family History   Problem Relation Age of Onset    Diabetes Mother     Cancer Mother     High Blood Pressure Mother    [employer] Stroke Mother     Kidney Disease Mother     Uterine Cancer Mother     Diabetes Father     Heart Disease Father     Glaucoma Father     Emphysema Father     Coronary Art Dis Other         All 4 siblings.  Stroke Other         1 sibling.  Lung Cancer Other         1 sibling - cause of death lung cancer.     Mental Retardation Other        Allergies:       Bactrim [sulfamethoxazole-trimethoprim] and Amoxicillin-pot clavulanate    Medications:      Current Facility-Administered Medications   Medication Dose Route Frequency Provider Last Rate Last Dose    collagenase ointment   Topical Daily Juana Gonzalez DPM        carvedilol (COREG) tablet 12.5 mg  12.5 mg Oral BID  SHIRA Ramires - CNP   12.5 mg at 20 8420    atorvastatin (LIPITOR) tablet 40 mg  40 mg Oral Nightly Neva Mistry APRN - NP   40 mg at 20    Sodium Hypochlorite (DAKINS) 0.125 % external solution   Irrigation Daily Josh Smith MD        insulin glargine (LANTUS) injection vial 20 Units  20 Units Subcutaneous Nightly Alan Crawley APRN - CNP   20 Units at 20    glucose (GLUTOSE) 40 % oral gel 15 g  15 g Oral PRN Trinidad Ching MD   15 g at 08/09/20 0830    dextrose 50 % IV solution  12.5 g Intravenous PRN Trinidad Ching MD   12.5 g at 08/09/20 0841    glucagon (rDNA) injection 1 mg  1 mg Intramuscular PRN Trinidad Ching MD        dextrose 5 % solution  100 mL/hr Intravenous PRN Trinidad Ching MD        ciprofloxacin (CIPRO) IVPB 400 mg  400 mg Intravenous Q12H Dain Hamilton MD   Stopped at 08/12/20 1040    potassium chloride (KLOR-CON M) extended release tablet 40 mEq  40 mEq Oral PRN Trinidad Ching MD   40 mEq at 08/08/20 1333    Or    potassium bicarb-citric acid (EFFER-K) effervescent tablet 40 mEq  40 mEq Oral PRN Trinidad Ching MD        Or    potassium chloride 10 mEq/100 mL IVPB (Peripheral Line)  10 mEq Intravenous PRN Trinidad Ching MD        allopurinol (ZYLOPRIM) tablet 100 mg  100 mg Oral Daily Soraida Beets, APRN - CNS   100 mg at 08/12/20 1004    aspirin EC tablet 81 mg  81 mg Oral Daily Soraida Beets, APRN - CNS   81 mg at 08/12/20 1000    insulin lispro (HUMALOG) injection vial 0-12 Units  0-12 Units Subcutaneous TID WC Soraida Beets, APRN - CNS   4 Units at 08/12/20 1326    insulin lispro (HUMALOG) injection vial 0-6 Units  0-6 Units Subcutaneous Nightly Soraida Beets, APRN - CNS   3 Units at 08/11/20 2105    citalopram (CELEXA) tablet 20 mg  20 mg Oral Daily Soraida Beets, APRN - CNS   20 mg at 08/12/20 1000    clopidogrel (PLAVIX) tablet 75 mg  75 mg Oral Daily Soraida Beets, APRN - CNS   75 mg at 08/12/20 1000    docusate sodium (COLACE) capsule 100 mg  100 mg Oral BID Soraida Beets, APRN - CNS   100 mg at 08/12/20 0544    gabapentin (NEURONTIN) capsule 100 mg  100 mg Oral TID Soraida Beets, APRN - CNS   100 mg at 08/12/20 1001    cetirizine (ZYRTEC) tablet 5 mg  5 mg Oral Daily Soraida Beets, APRN - CNS   5 mg at 08/12/20 0959    losartan (COZAAR) tablet 100 mg  100 mg Oral Daily Soraida Beets, APRN - CNS   100 mg at 08/12/20 0958    memantine (NAMENDA) tablet 10 mg  10 mg Oral BID Nacho Brewer, APRN - CNS   10 mg at 08/12/20 1000    nystatin (MYCOSTATIN) cream   Topical BID Nacho Brewer, APRN - CNS        pantoprazole (PROTONIX) tablet 40 mg  40 mg Oral QAM AC Nacho Brewer, APRN - CNS   40 mg at 08/12/20 4151    oxybutynin (DITROPAN) tablet 5 mg  5 mg Oral BID Nacho Brewer, APRN - CNS   5 mg at 08/12/20 1000    rivastigmine (EXELON) 9.5 MG/24HR 1 patch  1 patch Transdermal Daily Ancho Brewer, APRN - CNS   1 patch at 08/12/20 0359    sodium chloride flush 0.9 % injection 10 mL  10 mL Intravenous 2 times per day Nacho Brewer, APRN - CNS   10 mL at 08/11/20 2039    sodium chloride flush 0.9 % injection 10 mL  10 mL Intravenous PRN Nacho Brewer, APRN - CNS        acetaminophen (TYLENOL) tablet 650 mg  650 mg Oral Q6H PRN Nacho Brewer, APRN - CNS        Or    acetaminophen (TYLENOL) suppository 650 mg  650 mg Rectal Q6H PRN Nacho Brewer, APRN - CNS        polyethylene glycol (GLYCOLAX) packet 17 g  17 g Oral Daily PRN Nacho Brewer, APRN - CNS        promethazine (PHENERGAN) tablet 12.5 mg  12.5 mg Oral Q6H PRN Nacho Brewer, APRN - CNS        Or    ondansetron (ZOFRAN) injection 4 mg  4 mg Intravenous Q6H PRN [facility], APRN - CNS        nicotine (NICODERM CQ) 21 MG/24HR 1 patch  1 patch Transdermal Daily PRN Nacho Brewer, APRN - CNS        perflutren lipid microspheres (DEFINITY) injection 1.65 mg  1.5 mL Intravenous ONCE PRN Nacho Brewer, APRN - CNS        heparin (porcine) injection 5,000 Units  5,000 Units Subcutaneous 3 times per day Nacho Brewer, APRN - CNS   5,000 Units at 08/12/20 9599       Social History:     Tobacco:    reports that she has never smoked. She has never used smokeless tobacco.  Alcohol:      reports no history of alcohol use. Drug Use:  reports no history of drug use.       Review of Systems:     Review of Systems   Constitutional: Negative for chills, fatigue and fever. HENT: Negative for congestion and sore throat. Respiratory: Negative for cough, shortness of breath and wheezing. Cardiovascular: Positive for leg swelling. Negative for chest pain. Gastrointestinal: Negative for diarrhea, nausea and vomiting. Endocrine: Negative for cold intolerance and heat intolerance. Genitourinary: Negative for difficulty urinating and dysuria. Musculoskeletal: Positive for arthralgias, gait problem and myalgias. Negative for neck stiffness. Skin: Positive for color change and wound. Negative for rash. Neurological: Positive for weakness and numbness. Negative for dizziness and headaches. Psychiatric/Behavioral: Negative for agitation and behavioral problems. Physical Exam:     Vitals:  BP (!) 155/51   Pulse 65   Temp 98.2 °F (36.8 °C) (Oral)   Resp 14   Ht 5' 5\" (1.651 m)   Wt 240 lb (108.9 kg)   SpO2 100%   BMI 39.94 kg/m²     Physical Exam  Vitals signs and nursing note reviewed. Constitutional:       General: She is not in acute distress. Appearance: She is not ill-appearing. HENT:      Head: Normocephalic. Nose: No congestion or rhinorrhea. Eyes:      General:         Right eye: No discharge. Left eye: No discharge. Neck:      Musculoskeletal: No muscular tenderness. Cardiovascular:      Rate and Rhythm: Normal rate and regular rhythm. Pulses:           Dorsalis pedis pulses are detected w/ Doppler on the right side and detected w/ Doppler on the left side. Posterior tibial pulses are detected w/ Doppler on the left side. Pulmonary:      Effort: No respiratory distress. Abdominal:      Palpations: Abdomen is soft. Musculoskeletal:         General: Swelling present. Right lower leg: Edema present. Left lower leg: Edema present. Feet:    Feet:      Right foot:      Skin integrity: Skin breakdown present.       Comments: Rt plantar heel wound -Dressing dry and intact-managed per podiatry   Skin:     General: Skin is dry. Capillary Refill: Capillary refill takes 2 to 3 seconds. Findings: Wound present. Neurological:      Mental Status: She is alert and oriented to person, place, and time. Psychiatric:         Mood and Affect: Mood normal.            Imaging/Labs:     Lower Arterial Plethysmography, PVR Lower with PPG 8/11/2020    KEN:Right: 1.25. Left: 1.25. Assessment and Plan:     PAD/ Vascular insufficiency/nonhealing wound    1) Continue wound care per podiatry recommendation    2)   Glucose control per primary for wound healing    3) plan for Rt lower angiogram in am     4)Risk and benefit of the procedure explained. Consent obtained     Thank you kindly for the consult   Electronically signed by SHIRA Saul NP on 8/12/20 at 2:19 PM EDT      [address]  Office: [phone]  Cell: [phone]  Email: [email or web address]

## 2020-08-12 NOTE — PROGRESS NOTES
[facility]  Progress Note                   Date:   8/12/2020  Patient name: Tigist Bustillo  Date of admission:  8/7/2020 10:29 PM  MRN:   [id number]  YOB: 1950  PCP: ERINN Varela    Reason for Admission: CHF (congestive heart failure), NYHA class I, acute on chronic, combined ([facility] [I50.43]    Subjective:       Clinical Changes /Abnormalities:  Patient seen and examined in the room with RN. Pt remains somewhat drowsy but responsive and A&Ox3. Denies chest pain or SOB. SR 60s. No acute CV issues/concerns overnight    -1.1 L since admission    Review of Systems    Medications:   Scheduled Meds:   carvedilol  12.5 mg Oral BID WC    atorvastatin  40 mg Oral Nightly    Sodium Hypochlorite   Irrigation Daily    insulin glargine  20 Units Subcutaneous Nightly    ciprofloxacin  400 mg Intravenous Q12H    allopurinol  100 mg Oral Daily    aspirin  81 mg Oral Daily    insulin lispro  0-12 Units Subcutaneous TID WC    insulin lispro  0-6 Units Subcutaneous Nightly    citalopram  20 mg Oral Daily    clopidogrel  75 mg Oral Daily    docusate sodium  100 mg Oral BID    gabapentin  100 mg Oral TID    cetirizine  5 mg Oral Daily    losartan  100 mg Oral Daily    memantine  10 mg Oral BID    nystatin   Topical BID    pantoprazole  40 mg Oral QAM AC    oxybutynin  5 mg Oral BID    rivastigmine  1 patch Transdermal Daily    sodium chloride flush  10 mL Intravenous 2 times per day    heparin (porcine)  5,000 Units Subcutaneous 3 times per day     Continuous Infusions:   dextrose      sodium chloride 50 mL/hr at 08/08/20 1724     CBC:   Recent Labs     08/10/20  0455   WBC 9.8   HGB 9.4*        BMP:    Recent Labs     08/10/20  0455 08/11/20  0511   * 130*   K 4.2 4.5   CL 94* 95*   CO2 25 25   BUN 25* 24*   CREATININE 1.48* 1.44*   GLUCOSE 186* 259*     Hepatic:  No results for input(s): AST, ALT, ALB, BILITOT, ALKPHOS in the last 72 hours.   Troponin: Recent Labs     08/10/20  0455 08/10/20  0948 08/10/20  1538   TROPHS 61* 59* 61*     BNP: No results for input(s): BNP in the last 72 hours. Lipids:   No results for input(s): CHOL, HDL in the last 72 hours. Invalid input(s): LDLCALCU  INR: No results for input(s): INR in the last 72 hours. DIAGNOSTIC DATA  CABG 2005  (x4) - LIMA-LAD, SVG-diagnoal 1, SVG-OM1, and SVG-PDA. Exploration of left radial. (Dr. Austyn Medina). Objective:   Vitals: BP (!) 155/51   Pulse 65   Temp 98.2 °F (36.8 °C) (Oral)   Resp 14   Ht 5' 5\" (1.651 m)   Wt 240 lb (108.9 kg)   SpO2 100%   BMI 39.94 kg/m²   General appearance: alert and cooperative with exam  HEENT: Head: Normocephalic, no lesions, without obvious abnormality. Neck:no JVD, trachea midline, no adenopathy  Lungs: Diminished throughout with significant rhonchi b/l upper lobes. Fine rales noted LLL. On NC oxygen  Heart: Regular rate and rhythm, s1/s2 auscultated, no murmurs SR on tele HR 67  Abdomen: soft, non-tender, bowel sounds active, Obese  Extremities:  Legs currently wrapped and recently assessed by podiatry. Neurologic: not done    Echo 8/11/20  Summary  Global left ventricular systolic function is severely reduced. Estimated  ejection fraction is 30-35% . There is severe Hypokinesis of anterior, anteroseptal, inferoseptal and  anterolateral walls. [place] is severely hypokinetic. Grade II (moderate) left ventricular diastolic dysfunction. Right ventricular dilatation with reduced systolic function. Left atrium is mildly dilated. Moderate to severe tricuspid regurgitation. Moderate pulmonary hypertension. Estimated right ventricular systolic pressure is 01WCQI. No significant pericardial effusion is seen. Prior echo from [place] 2014   LVEF 55%  Trace MR, TR, and AR, Grade I DD    Assessment / Acute Cardiac Problems:   1. CAD s/p CABG 2007 @ [facility] per patient  2.  CHF Exacerbation, acute systolic & diastolic  3. HTN  4. AMY  5. HLD  6. CVA  7. Obesity  8. Anxiety  9. Depression    Patient Active Problem List:     Dementia ([facility]     Hypertension     Hyperlipidemia     Obesity     Gout     Peripheral polyneuropathy     Anxiety and depression     Acute kidney injury superimposed on CKD (HCC)     CAD (coronary artery disease)     Balance problem     CVA (cerebral vascular accident) ([facility]     Type 2 diabetes mellitus with hyperglycemia, with long-term current use of insulin ([facility])     CHF (congestive heart failure), NYHA class I, acute on chronic, combined ([facility]     Acute cystitis     Infestation by bed bug     Infestation by maggots     Fall     Elevated troponin     Lymphedema of both lower extremities      Plan of Treatment:   1. CAD s/p CABG 2005  Continue ASA, plavix, BB, ARB and statin. 2. CHF exacerbation - improving clinically. Continue BB & ARB. No diuretic at this time  3. Echo reviewed with Dr. Kim Chavez. Will need cardiac cath to evaluate ischemic cause of CMP. Discussed in detail with patient and she is agreeable. Will need nephrology  clearance    4. HTN- continue BB & ARB  5. Repeat BMP   6.  Keep K >4.0 and Mag >2.0      Electronically signed by SHIRA Pratt - CNP on 8/12/2020 at 9:09 [address] [facility].  [phone]

## 2020-08-12 NOTE — PLAN OF CARE
Problem: Falls - Risk of:  Goal: Will remain free from falls  Description: Will remain free from falls  Outcome: Ongoing  Goal: Absence of physical injury  Description: Absence of physical injury  Outcome: Ongoing     Problem: Skin Integrity:  Goal: Will show no infection signs and symptoms  Description: Will show no infection signs and symptoms  Outcome: Ongoing  Goal: Absence of new skin breakdown  Description: Absence of new skin breakdown  Outcome: Ongoing     Problem: Mental Status - Impaired:  Goal: Mental status will improve  Description: Mental status will improve  Outcome: Ongoing     Problem: Infection:  Goal: Will remain free from infection  Description: Will remain free from infection  Outcome: Ongoing     Problem: Safety:  Goal: Free from accidental physical injury  Description: Free from accidental physical injury  Outcome: Ongoing  Goal: Free from intentional harm  Description: Free from intentional harm  Outcome: Ongoing     Problem: Daily Care:  Goal: Daily care needs are met  Description: Daily care needs are met  Outcome: Ongoing     Problem: Pain:  Goal: Patient's pain/discomfort is manageable  Description: Patient's pain/discomfort is manageable  Outcome: Ongoing  Goal: Pain level will decrease  Description: Pain level will decrease  Outcome: Ongoing  Goal: Control of acute pain  Description: Control of acute pain  Outcome: Ongoing  Goal: Control of chronic pain  Description: Control of chronic pain  Outcome: Ongoing     Problem: Skin Integrity:  Goal: Skin integrity will stabilize  Description: Skin integrity will stabilize  Outcome: Ongoing     Problem: Discharge Planning:  Goal: Patients continuum of care needs are met  Description: Patients continuum of care needs are met  Outcome: Ongoing     Problem: Nutrition  Goal: Optimal nutrition therapy  Outcome: Ongoing     Problem: Musculor/Skeletal Functional Status  Goal: Highest potential functional level  Outcome: Ongoing  Goal: Absence of falls  Outcome: Ongoing

## 2020-08-12 NOTE — PROGRESS NOTES
Progress Note  Podiatric Medicine and Surgery     Subjective     CC: R heel wound    Patient seen and examined at bedside. No acute events overnight. Afebrile, vital signs stable   Pain controlled. Denies any n/v/f/c/SOB    HPI :  Harleen Espinoza is a 77 y.o. female seen at [facility]. [facility] for a heel wound on the right foot. The patient states the wound has been getting progressively worse with increasing drainage, edema, erythema and pain. The patient has multiple other lower extremity wounds secondary to chronic lymphedema. 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 Their last HgbA1c was 10.8% as of August 2020. The patient currently admitted for CHF exacerbation. The patient denies any n/v/f/c/SOB upon my examination.     PCP is ERINN ORTEGA    ROS: Denies N/V/F/C/SOB/CP. Otherwise negative except at stated in the HPI.      Medications:  Scheduled Meds:   carvedilol  12.5 mg Oral BID WC    atorvastatin  40 mg Oral Nightly    Sodium Hypochlorite   Irrigation Daily    insulin glargine  20 Units Subcutaneous Nightly    ciprofloxacin  400 mg Intravenous Q12H    allopurinol  100 mg Oral Daily    aspirin  81 mg Oral Daily    insulin lispro  0-12 Units Subcutaneous TID WC    insulin lispro  0-6 Units Subcutaneous Nightly    citalopram  20 mg Oral Daily    clopidogrel  75 mg Oral Daily    docusate sodium  100 mg Oral BID    gabapentin  100 mg Oral TID    cetirizine  5 mg Oral Daily    losartan  100 mg Oral Daily    memantine  10 mg Oral BID    nystatin   Topical BID    pantoprazole  40 mg Oral QAM AC    oxybutynin  5 mg Oral BID    rivastigmine  1 patch Transdermal Daily    sodium chloride flush  10 mL Intravenous 2 times per day    heparin (porcine)  5,000 Units Subcutaneous 3 times per day       Continuous Infusions:   dextrose      sodium chloride 50 mL/hr at 08/08/20 1724       PRN Meds:glucose, dextrose, glucagon (rDNA), dextrose, potassium chloride **OR** potassium alternative oral replacement **OR** potassium chloride, sodium chloride flush, acetaminophen **OR** acetaminophen, polyethylene glycol, promethazine **OR** ondansetron, nicotine, perflutren lipid microspheres    Objective     Vitals:  Patient Vitals for the past 8 hrs:   BP Temp Temp src Pulse Resp SpO2 Weight   20 0556 -- -- -- 65 14 100 % --   20 0555 -- -- -- -- -- -- 240 lb (108.9 kg)   20 0353 (!) 155/51 98.2 °F (36.8 °C) Oral 69 20 98 % --     Average, Min, and Max for last 24 hours Vitals:  TEMPERATURE:  Temp  Av.5 °F (36.9 °C)  Min: 98.2 °F (36.8 °C)  Max: 98.6 °F (37 °C)    RESPIRATIONS RANGE: Resp  Av.2  Min: 14  Max: 21    PULSE RANGE: Pulse  Av.2  Min: 60  Max: 69    BLOOD PRESSURE RANGE:  Systolic (84JPC), CJV:201 , Min:155 , HBY:393   ; Diastolic (18RJF), XNB:00, Min:51, Max:65      PULSE OXIMETRY RANGE: SpO2  Av.2 %  Min: 98 %  Max: 100 %    I/O last 3 completed shifts: In: 400 [P.O.:400]  Out: 2000 [Urine:2000]    CBC:  Recent Labs     08/10/20  0455 08/11/20  0511   WBC 9.8  --    HGB 9.4*  --    HCT 30.6*  --      --    CRP  --  32.6*        BMP:  Recent Labs     08/10/20  0455 08/11/20  05   * 130*   K 4.2 4.5   CL 94* 95*   CO2 25 25   BUN 25* 24*   CREATININE 1.48* 1.44*   GLUCOSE 186* 259*   CALCIUM 7.6* 7.7*        Coags:  No results for input(s): APTT, PROT, INR in the last 72 hours. Lab Results   Component Value Date    SEDRATE 59 (H) 2020     Recent Labs     20   CRP 32.6*       Lower Extremity Physical Exam:  Vascular: DP and PT pulses are non-Palpable . CFT <3 seconds to all digits. Hair growth is absent to the level of the digits. Moderate pitting edema noted to the bilateral lower extremities.       Neuro: Saph/sural/SP/DP/plantar sensation diminished to light touch.     Musculoskeletal: Muscle strength is 4/5 to all lower extremity muscle groups.  Gross deformity is absent.     Dermatologic: Full thickness ulcer #1 located to the right plantar heel and measures approximately 7.0cm x 6.0cm x 0.5cm. The wound base is fibro-necrotic centrally. Serous drainage noted with mild associated mal odor. Erythema localized to the wound without streaking lymphangitis with minimal associated increase in warmth. Does not probe to bone, sinus track, or undermine. No fluctuance, crepitus, or induration. Interdigital maceration absent.      Multiple chronic appearing wounds to the left lower extremity as well as right posterior calf secondary to chronic lymphedema/venous stasis.     Clinical:          Imaging:   VL LOWER EXTREMITY ARTERIAL SEGMENTAL PRESSURES W PPG   Final Result      XR FOOT RIGHT (MIN 3 VIEWS)   Final Result   Significant soft tissue swelling over the forefoot. Degenerative changes. No acute fracture, dislocation or cortical destruction. Soft tissue ulcer   over the heel without underlying osseous abnormality. RECOMMENDATION:   Radionuclide bone imaging or MRI imaging may be utilized for further   assessment. XR CHEST PORTABLE   Final Result   Increased bibasilar lung opacities which are nonspecific and may represent   atelectasis and pleural effusions versus pneumonia. NIVS: 8/11/2020  Non-diagnostic ABIs secondary to arteriosclerosis. Dampened PVRs at the level of the calf, ankle and toe on the right and left. Cultures: None    Assessment   Radha Yang is a 77 y.o. female with   1. Unstagable pressure ulcer, right heel  2. Multiple lower extremities wounds  3. Chronic lower extremity edema  4. Type II DM with secondary peripheral neuropathy  5. CHF  6.  PAD    Principal Problem:    CHF (congestive heart failure), NYHA class I, acute on chronic, combined ([facility])  Active Problems:    Hypertension    Hyperlipidemia    Obesity    Acute kidney injury superimposed on CKD ([facility])    CAD (coronary artery disease)    Type 2 diabetes mellitus with hyperglycemia, with long-term current use of insulin (HCC)    Acute cystitis    Infestation by bed bug    Infestation by maggots    Fall    Elevated troponin    Lymphedema of both lower extremities  Resolved Problems:    * No resolved hospital problems. *       Plan     · Patient examined and evaluated at bedside with Dr. Sandy Jacobs. · Treatment options discussed in detail with the patient. · Plain film radiographs reviewed in detail and discussed with patient. · NIVS ordered and non-diagnostic given arteriosclerosis with falsely elevated ABIs. Dampened PVRs suggestive of PAD. · Vascular surgery consulted. · Medical management per primary. · Dressing applied to Right foot: Betadine, Aquacell AG, DSD, ACE. · Santyl ordered  · All other wounds managed per wound care team.  · Elevate heels off bed at all times with ROOKE/PRAFO boot to the right lower extremity. · NWB to Right heel, ok for forefoot touch for transfers. · Discussed with  Dr. aSndy Jacobs. Electronically signed by Orin Clifton DPM on 8/12/2020 at 7:01 AM    I performed a history and physical examination of the patient and discussed management with the patient and the resident. I reviewed the residents note and agree with the documented findings and plan of care. Any areas of disagreement are noted below. I was personally present for the key portions of any procedures. Additional findings are as noted below.     Augusto Sheriff DPM 8/12/2020 at 9:48 PM

## 2020-08-13 ENCOUNTER — APPOINTMENT (OUTPATIENT)
Dept: CARDIAC CATH/INVASIVE PROCEDURES | Age: 70
DRG: 270 | End: 2020-08-13
Attending: INTERNAL MEDICINE
Payer: MEDICARE

## 2020-08-13 ENCOUNTER — APPOINTMENT (OUTPATIENT)
Dept: ULTRASOUND IMAGING | Age: 70
DRG: 270 | End: 2020-08-13
Attending: INTERNAL MEDICINE
Payer: MEDICARE

## 2020-08-13 LAB
ABSOLUTE EOS #: 0.11 K/UL (ref 0–0.44)
ABSOLUTE IMMATURE GRANULOCYTE: 0.06 K/UL (ref 0–0.3)
ABSOLUTE LYMPH #: 1.99 K/UL (ref 1.1–3.7)
ABSOLUTE MONO #: 0.87 K/UL (ref 0.1–1.2)
ACTIVATED CLOTTING TIME: 260 SEC (ref 79–149)
ANION GAP SERPL CALCULATED.3IONS-SCNC: 10 MMOL/L (ref 9–17)
BASOPHILS # BLD: 0 % (ref 0–2)
BASOPHILS ABSOLUTE: 0.04 K/UL (ref 0–0.2)
BUN BLDV-MCNC: 27 MG/DL (ref 8–23)
BUN/CREAT BLD: ABNORMAL (ref 9–20)
CALCIUM SERPL-MCNC: 8.2 MG/DL (ref 8.6–10.4)
CHLORIDE BLD-SCNC: 98 MMOL/L (ref 98–107)
CO2: 25 MMOL/L (ref 20–31)
COMPLEMENT C3: 114 MG/DL (ref 90–180)
COMPLEMENT C4: 39 MG/DL (ref 10–40)
CREAT SERPL-MCNC: 1.36 MG/DL (ref 0.5–0.9)
DIFFERENTIAL TYPE: ABNORMAL
EOSINOPHILS RELATIVE PERCENT: 1 % (ref 1–4)
FREE KAPPA/LAMBDA RATIO: 1.35 (ref 0.26–1.65)
GFR AFRICAN AMERICAN: 47 ML/MIN
GFR NON-AFRICAN AMERICAN: 39 ML/MIN
GFR SERPL CREATININE-BSD FRML MDRD: ABNORMAL ML/MIN/{1.73_M2}
GFR SERPL CREATININE-BSD FRML MDRD: ABNORMAL ML/MIN/{1.73_M2}
GLUCOSE BLD-MCNC: 116 MG/DL (ref 65–105)
GLUCOSE BLD-MCNC: 130 MG/DL (ref 65–105)
GLUCOSE BLD-MCNC: 185 MG/DL (ref 70–99)
GLUCOSE BLD-MCNC: 263 MG/DL (ref 65–105)
GLUCOSE BLD-MCNC: 99 MG/DL (ref 65–105)
HCT VFR BLD CALC: 29.6 % (ref 36.3–47.1)
HEMOGLOBIN: 8.8 G/DL (ref 11.9–15.1)
IMMATURE GRANULOCYTES: 1 %
KAPPA FREE LIGHT CHAINS QNT: 14.77 MG/DL (ref 0.37–1.94)
LAMBDA FREE LIGHT CHAINS QNT: 10.97 MG/DL (ref 0.57–2.63)
LYMPHOCYTES # BLD: 21 % (ref 24–43)
MCH RBC QN AUTO: 28.3 PG (ref 25.2–33.5)
MCHC RBC AUTO-ENTMCNC: 29.7 G/DL (ref 28.4–34.8)
MCV RBC AUTO: 95.2 FL (ref 82.6–102.9)
MONOCYTES # BLD: 9 % (ref 3–12)
NRBC AUTOMATED: 0 PER 100 WBC
PARTIAL THROMBOPLASTIN TIME: 27.2 SEC (ref 20.5–30.5)
PDW BLD-RTO: 16.2 % (ref 11.8–14.4)
PLATELET # BLD: 290 K/UL (ref 138–453)
PLATELET ESTIMATE: ABNORMAL
PMV BLD AUTO: 9.7 FL (ref 8.1–13.5)
POTASSIUM SERPL-SCNC: 4.9 MMOL/L (ref 3.7–5.3)
RBC # BLD: 3.11 M/UL (ref 3.95–5.11)
RBC # BLD: ABNORMAL 10*6/UL
SEG NEUTROPHILS: 68 % (ref 36–65)
SEGMENTED NEUTROPHILS ABSOLUTE COUNT: 6.36 K/UL (ref 1.5–8.1)
SODIUM BLD-SCNC: 133 MMOL/L (ref 135–144)
WBC # BLD: 9.4 K/UL (ref 3.5–11.3)
WBC # BLD: ABNORMAL 10*3/UL

## 2020-08-13 PROCEDURE — 86160 COMPLEMENT ANTIGEN: CPT

## 2020-08-13 PROCEDURE — 93459 L HRT ART/GRFT ANGIO: CPT | Performed by: INTERNAL MEDICINE

## 2020-08-13 PROCEDURE — 047R3ZZ DILATION OF RIGHT POSTERIOR TIBIAL ARTERY, PERCUTANEOUS APPROACH: ICD-10-PCS | Performed by: SURGERY

## 2020-08-13 PROCEDURE — C1887 CATHETER, GUIDING: HCPCS

## 2020-08-13 PROCEDURE — 85347 COAGULATION TIME ACTIVATED: CPT

## 2020-08-13 PROCEDURE — 84165 PROTEIN E-PHORESIS SERUM: CPT

## 2020-08-13 PROCEDURE — 6370000000 HC RX 637 (ALT 250 FOR IP): Performed by: CLINICAL NURSE SPECIALIST

## 2020-08-13 PROCEDURE — 6360000004 HC RX CONTRAST MEDICATION

## 2020-08-13 PROCEDURE — 99232 SBSQ HOSP IP/OBS MODERATE 35: CPT | Performed by: SURGERY

## 2020-08-13 PROCEDURE — 6360000002 HC RX W HCPCS: Performed by: INTERNAL MEDICINE

## 2020-08-13 PROCEDURE — 1200000000 HC SEMI PRIVATE

## 2020-08-13 PROCEDURE — 6360000002 HC RX W HCPCS: Performed by: STUDENT IN AN ORGANIZED HEALTH CARE EDUCATION/TRAINING PROGRAM

## 2020-08-13 PROCEDURE — 6370000000 HC RX 637 (ALT 250 FOR IP): Performed by: INTERNAL MEDICINE

## 2020-08-13 PROCEDURE — C1769 GUIDE WIRE: HCPCS

## 2020-08-13 PROCEDURE — 75710 ARTERY X-RAYS ARM/LEG: CPT | Performed by: SURGERY

## 2020-08-13 PROCEDURE — 83883 ASSAY NEPHELOMETRY NOT SPEC: CPT

## 2020-08-13 PROCEDURE — C1894 INTRO/SHEATH, NON-LASER: HCPCS

## 2020-08-13 PROCEDURE — 76770 US EXAM ABDO BACK WALL COMP: CPT

## 2020-08-13 PROCEDURE — C1725 CATH, TRANSLUMIN NON-LASER: HCPCS

## 2020-08-13 PROCEDURE — 2709999900 HC NON-CHARGEABLE SUPPLY

## 2020-08-13 PROCEDURE — 99222 1ST HOSP IP/OBS MODERATE 55: CPT | Performed by: INTERNAL MEDICINE

## 2020-08-13 PROCEDURE — 85025 COMPLETE CBC W/AUTO DIFF WBC: CPT

## 2020-08-13 PROCEDURE — 36247 INS CATH ABD/L-EXT ART 3RD: CPT | Performed by: SURGERY

## 2020-08-13 PROCEDURE — 04CR3ZZ EXTIRPATION OF MATTER FROM RIGHT POSTERIOR TIBIAL ARTERY, PERCUTANEOUS APPROACH: ICD-10-PCS | Performed by: SURGERY

## 2020-08-13 PROCEDURE — 37186 SEC ART THROMBECTOMY ADD-ON: CPT | Performed by: SURGERY

## 2020-08-13 PROCEDURE — B2131ZZ FLUOROSCOPY OF MULTIPLE CORONARY ARTERY BYPASS GRAFTS USING LOW OSMOLAR CONTRAST: ICD-10-PCS | Performed by: INTERNAL MEDICINE

## 2020-08-13 PROCEDURE — B2181ZZ FLUOROSCOPY OF LEFT INTERNAL MAMMARY BYPASS GRAFT USING LOW OSMOLAR CONTRAST: ICD-10-PCS | Performed by: INTERNAL MEDICINE

## 2020-08-13 PROCEDURE — 2500000003 HC RX 250 WO HCPCS

## 2020-08-13 PROCEDURE — 84156 ASSAY OF PROTEIN URINE: CPT

## 2020-08-13 PROCEDURE — 75625 CONTRAST EXAM ABDOMINL AORTA: CPT | Performed by: SURGERY

## 2020-08-13 PROCEDURE — 04CY3ZZ EXTIRPATION OF MATTER FROM LOWER ARTERY, PERCUTANEOUS APPROACH: ICD-10-PCS | Performed by: SURGERY

## 2020-08-13 PROCEDURE — 37228 PR REVSC OPN/PRQ TIB/PERO W/ANGIOPLASTY UNI: CPT | Performed by: SURGERY

## 2020-08-13 PROCEDURE — 83516 IMMUNOASSAY NONANTIBODY: CPT

## 2020-08-13 PROCEDURE — 37228 HC TIB PER TERRITORY PLASTY: CPT | Performed by: SURGERY

## 2020-08-13 PROCEDURE — 82947 ASSAY GLUCOSE BLOOD QUANT: CPT

## 2020-08-13 PROCEDURE — 82570 ASSAY OF URINE CREATININE: CPT

## 2020-08-13 PROCEDURE — 86334 IMMUNOFIX E-PHORESIS SERUM: CPT

## 2020-08-13 PROCEDURE — B2111ZZ FLUOROSCOPY OF MULTIPLE CORONARY ARTERIES USING LOW OSMOLAR CONTRAST: ICD-10-PCS | Performed by: INTERNAL MEDICINE

## 2020-08-13 PROCEDURE — 6360000002 HC RX W HCPCS

## 2020-08-13 PROCEDURE — 85730 THROMBOPLASTIN TIME PARTIAL: CPT

## 2020-08-13 PROCEDURE — 87205 SMEAR GRAM STAIN: CPT

## 2020-08-13 PROCEDURE — 6370000000 HC RX 637 (ALT 250 FOR IP): Performed by: NURSE PRACTITIONER

## 2020-08-13 PROCEDURE — 76937 US GUIDE VASCULAR ACCESS: CPT | Performed by: SURGERY

## 2020-08-13 PROCEDURE — 3E05317 INTRODUCTION OF OTHER THROMBOLYTIC INTO PERIPHERAL ARTERY, PERCUTANEOUS APPROACH: ICD-10-PCS | Performed by: SURGERY

## 2020-08-13 PROCEDURE — 84155 ASSAY OF PROTEIN SERUM: CPT

## 2020-08-13 PROCEDURE — B41D1ZZ FLUOROSCOPY OF AORTA AND BILATERAL LOWER EXTREMITY ARTERIES USING LOW OSMOLAR CONTRAST: ICD-10-PCS | Performed by: SURGERY

## 2020-08-13 PROCEDURE — 2580000003 HC RX 258: Performed by: INTERNAL MEDICINE

## 2020-08-13 PROCEDURE — 6360000002 HC RX W HCPCS: Performed by: CLINICAL NURSE SPECIALIST

## 2020-08-13 PROCEDURE — 4A023N7 MEASUREMENT OF CARDIAC SAMPLING AND PRESSURE, LEFT HEART, PERCUTANEOUS APPROACH: ICD-10-PCS | Performed by: INTERNAL MEDICINE

## 2020-08-13 PROCEDURE — 04CT3ZZ EXTIRPATION OF MATTER FROM RIGHT PERONEAL ARTERY, PERCUTANEOUS APPROACH: ICD-10-PCS | Performed by: SURGERY

## 2020-08-13 PROCEDURE — 80048 BASIC METABOLIC PNL TOTAL CA: CPT

## 2020-08-13 PROCEDURE — 37232 PR REVSC OPN/PRQ TIB/PERO W/ANGIOPLASTY UNI EA VSL: CPT | Performed by: SURGERY

## 2020-08-13 PROCEDURE — B2151ZZ FLUOROSCOPY OF LEFT HEART USING LOW OSMOLAR CONTRAST: ICD-10-PCS | Performed by: INTERNAL MEDICINE

## 2020-08-13 PROCEDURE — 99232 SBSQ HOSP IP/OBS MODERATE 35: CPT | Performed by: STUDENT IN AN ORGANIZED HEALTH CARE EDUCATION/TRAINING PROGRAM

## 2020-08-13 PROCEDURE — C1757 CATH, THROMBECTOMY/EMBOLECT: HCPCS

## 2020-08-13 PROCEDURE — 36415 COLL VENOUS BLD VENIPUNCTURE: CPT

## 2020-08-13 RX ORDER — HYDRALAZINE HYDROCHLORIDE 20 MG/ML
10 INJECTION INTRAMUSCULAR; INTRAVENOUS EVERY 6 HOURS PRN
Status: DISCONTINUED | OUTPATIENT
Start: 2020-08-13 | End: 2020-08-14 | Stop reason: HOSPADM

## 2020-08-13 RX ORDER — SODIUM CHLORIDE 9 MG/ML
INJECTION, SOLUTION INTRAVENOUS CONTINUOUS
Status: DISCONTINUED | OUTPATIENT
Start: 2020-08-13 | End: 2020-08-14

## 2020-08-13 RX ORDER — SODIUM CHLORIDE 0.9 % (FLUSH) 0.9 %
10 SYRINGE (ML) INJECTION EVERY 12 HOURS SCHEDULED
Status: DISCONTINUED | OUTPATIENT
Start: 2020-08-13 | End: 2020-08-14 | Stop reason: HOSPADM

## 2020-08-13 RX ORDER — CLONIDINE HYDROCHLORIDE 0.1 MG/1
0.1 TABLET ORAL 2 TIMES DAILY
Status: DISCONTINUED | OUTPATIENT
Start: 2020-08-13 | End: 2020-08-13

## 2020-08-13 RX ORDER — SODIUM CHLORIDE 0.9 % (FLUSH) 0.9 %
10 SYRINGE (ML) INJECTION PRN
Status: DISCONTINUED | OUTPATIENT
Start: 2020-08-13 | End: 2020-08-14 | Stop reason: HOSPADM

## 2020-08-13 RX ORDER — ACETYLCYSTEINE 200 MG/ML
600 SOLUTION ORAL; RESPIRATORY (INHALATION) 2 TIMES DAILY
Status: DISCONTINUED | OUTPATIENT
Start: 2020-08-13 | End: 2020-08-14 | Stop reason: HOSPADM

## 2020-08-13 RX ORDER — ACETAMINOPHEN 325 MG/1
650 TABLET ORAL EVERY 4 HOURS PRN
Status: DISCONTINUED | OUTPATIENT
Start: 2020-08-13 | End: 2020-08-14 | Stop reason: HOSPADM

## 2020-08-13 RX ORDER — CLONIDINE HYDROCHLORIDE 0.1 MG/1
0.1 TABLET ORAL 3 TIMES DAILY
Status: DISCONTINUED | OUTPATIENT
Start: 2020-08-13 | End: 2020-08-14 | Stop reason: HOSPADM

## 2020-08-13 RX ADMIN — DESMOPRESSIN ACETATE 40 MG: 0.2 TABLET ORAL at 20:16

## 2020-08-13 RX ADMIN — PANTOPRAZOLE SODIUM 40 MG: 40 TABLET, DELAYED RELEASE ORAL at 19:00

## 2020-08-13 RX ADMIN — CETIRIZINE HYDROCHLORIDE 5 MG: 10 TABLET ORAL at 19:00

## 2020-08-13 RX ADMIN — HEPARIN SODIUM 5000 UNITS: 5000 INJECTION INTRAVENOUS; SUBCUTANEOUS at 06:16

## 2020-08-13 RX ADMIN — HYDRALAZINE HYDROCHLORIDE 10 MG: 20 INJECTION INTRAMUSCULAR; INTRAVENOUS at 21:11

## 2020-08-13 RX ADMIN — CARVEDILOL 12.5 MG: 12.5 TABLET, FILM COATED ORAL at 19:03

## 2020-08-13 RX ADMIN — SODIUM CHLORIDE: 9 INJECTION, SOLUTION INTRAVENOUS at 18:59

## 2020-08-13 RX ADMIN — GABAPENTIN 100 MG: 100 CAPSULE ORAL at 19:03

## 2020-08-13 RX ADMIN — DOCUSATE SODIUM 100 MG: 100 CAPSULE, LIQUID FILLED ORAL at 19:00

## 2020-08-13 RX ADMIN — LOSARTAN POTASSIUM 100 MG: 50 TABLET, FILM COATED ORAL at 19:00

## 2020-08-13 RX ADMIN — ALLOPURINOL 100 MG: 100 TABLET ORAL at 19:00

## 2020-08-13 RX ADMIN — MEMANTINE HYDROCHLORIDE 10 MG: 5 TABLET, FILM COATED ORAL at 20:16

## 2020-08-13 RX ADMIN — MEMANTINE HYDROCHLORIDE 10 MG: 5 TABLET, FILM COATED ORAL at 19:00

## 2020-08-13 RX ADMIN — CLONIDINE HYDROCHLORIDE 0.1 MG: 0.1 TABLET ORAL at 18:59

## 2020-08-13 RX ADMIN — CLONIDINE HYDROCHLORIDE 0.1 MG: 0.1 TABLET ORAL at 20:16

## 2020-08-13 RX ADMIN — COLLAGENASE SANTYL: 250 OINTMENT TOPICAL at 10:10

## 2020-08-13 RX ADMIN — Medication 600 MG: at 22:07

## 2020-08-13 RX ADMIN — OXYBUTYNIN CHLORIDE 5 MG: 5 TABLET ORAL at 19:00

## 2020-08-13 RX ADMIN — CITALOPRAM 20 MG: 20 TABLET, FILM COATED ORAL at 19:00

## 2020-08-13 RX ADMIN — NYSTATIN: 100000 CREAM TOPICAL at 10:16

## 2020-08-13 NOTE — PROGRESS NOTES
Progress Note  Podiatric Medicine and Surgery     Subjective     CC: R heel wound    Patient seen and examined at bedside. Afebrile, hypertensive  Patient is very drowsy this morning  Denies any n/v/f/c/SOB  Plan for angio this AM    HPI :  Jayjay Morales is a 77 y.o. female seen at [facility]. [facility] for a heel wound on the right foot. The patient states the wound has been getting progressively worse with increasing drainage, edema, erythema and pain. The patient has multiple other lower extremity wounds secondary to chronic lymphedema. 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 Their last HgbA1c was 10.8% as of August 2020. The patient currently admitted for CHF exacerbation. The patient denies any n/v/f/c/SOB upon my examination.     PCP is ERINN ORTEGA    ROS: Denies N/V/F/C/SOB/CP. Otherwise negative except at stated in the HPI.      Medications:  Scheduled Meds:   cloNIDine  0.1 mg Oral BID    collagenase   Topical Daily    carvedilol  12.5 mg Oral BID WC    atorvastatin  40 mg Oral Nightly    Sodium Hypochlorite   Irrigation Daily    insulin glargine  20 Units Subcutaneous Nightly    allopurinol  100 mg Oral Daily    aspirin  81 mg Oral Daily    insulin lispro  0-12 Units Subcutaneous TID WC    insulin lispro  0-6 Units Subcutaneous Nightly    citalopram  20 mg Oral Daily    clopidogrel  75 mg Oral Daily    docusate sodium  100 mg Oral BID    gabapentin  100 mg Oral TID    cetirizine  5 mg Oral Daily    losartan  100 mg Oral Daily    memantine  10 mg Oral BID    nystatin   Topical BID    pantoprazole  40 mg Oral QAM AC    oxybutynin  5 mg Oral BID    rivastigmine  1 patch Transdermal Daily    sodium chloride flush  10 mL Intravenous 2 times per day    heparin (porcine)  5,000 Units Subcutaneous 3 times per day       Continuous Infusions:   dextrose         PRN Meds:glucose, dextrose, glucagon (rDNA), dextrose, potassium chloride **OR** potassium alternative oral replacement **OR** potassium chloride, sodium chloride flush, acetaminophen **OR** acetaminophen, polyethylene glycol, promethazine **OR** ondansetron, nicotine, perflutren lipid microspheres    Objective     Vitals:  Patient Vitals for the past 8 hrs:   BP Temp Temp src Pulse Resp SpO2 Weight   20 0851 (!) 151/54 99.1 °F (37.3 °C) Oral 64 15 98 % --   20 0600 -- -- -- -- -- -- 251 lb 1.6 oz (113.9 kg)     Average, Min, and Max for last 24 hours Vitals:  TEMPERATURE:  Temp  Av.6 °F (37 °C)  Min: 98 °F (36.7 °C)  Max: 99.1 °F (37.3 °C)    RESPIRATIONS RANGE: Resp  Av  Min: 15  Max: 27    PULSE RANGE: Pulse  Av.7  Min: 64  Max: 77    BLOOD PRESSURE RANGE:  Systolic (61VTS), TWA:180 , Min:151 , BIV:004   ; Diastolic (30GZD), CDK:35, Min:54, Max:76      PULSE OXIMETRY RANGE: SpO2  Av.7 %  Min: 94 %  Max: 98 %    I/O last 3 completed shifts: In: 6514 [P.O.:120; I.V.:1667]  Out: 1100 [Urine:1100]    CBC:  Recent Labs     20  0517   WBC  --  9.4   HGB  --  8.8*   HCT  --  29.6*   PLT  --  290   CRP 32.6*  --         BMP:  Recent Labs     20  0511 20  0515 20  0517   * 131* 133*   K 4.5 4.6 4.9   CL 95* 96* 98   CO2 25 25 25   BUN 24* 25* 27*   CREATININE 1.44* 1.28* 1.36*   GLUCOSE 259* 234* 185*   CALCIUM 7.7* 8.2* 8.2*        Coags:  No results for input(s): APTT, PROT, INR in the last 72 hours. Lab Results   Component Value Date    SEDRATE 59 (H) 2020     Recent Labs     20  0511   CRP 32.6*       Lower Extremity Physical Exam:  Vascular: DP and PT pulses are non-Palpable . CFT <3 seconds to all digits. Hair growth is absent to the level of the digits. Moderate pitting edema noted to the bilateral lower extremities.       Neuro: Saph/sural/SP/DP/plantar sensation diminished to light touch.     Musculoskeletal: Muscle strength is 4/5 to all lower extremity muscle groups.  Gross deformity is absent.     Dermatologic: Full thickness ulcer #1 located to the right plantar heel and measures approximately 7.0cm x 6.0cm x 0.5cm. The wound base is fibro-necrotic centrally. Minimal serous drainage noted with mild associated mal odor. Erythema localized to the wound without streaking lymphangitis with minimal associated increase in warmth. Does not probe to bone, sinus track, or undermine. No fluctuance, crepitus, or induration. Interdigital maceration absent.      Multiple chronic appearing wounds to the left lower extremity as well as right posterior calf secondary to chronic lymphedema/venous stasis.     Clinical:              Imaging:   VL LOWER EXTREMITY ARTERIAL SEGMENTAL PRESSURES W PPG   Final Result      XR FOOT RIGHT (MIN 3 VIEWS)   Final Result   Significant soft tissue swelling over the forefoot. Degenerative changes. No acute fracture, dislocation or cortical destruction. Soft tissue ulcer   over the heel without underlying osseous abnormality. RECOMMENDATION:   Radionuclide bone imaging or MRI imaging may be utilized for further   assessment. XR CHEST PORTABLE   Final Result   Increased bibasilar lung opacities which are nonspecific and may represent   atelectasis and pleural effusions versus pneumonia. NIVS: 8/11/2020  Non-diagnostic ABIs secondary to arteriosclerosis. Dampened PVRs at the level of the calf, ankle and toe on the right and left. Cultures: None    Assessment   Elodia Collado is a 77 y.o. female with   1. Unstagable pressure ulcer, right heel  2. Multiple lower extremities wounds  3. Chronic lower extremity edema  4. Type II DM with secondary peripheral neuropathy  5. CHF  6.  PAD    Principal Problem:    CHF (congestive heart failure), NYHA class I, acute on chronic, combined ([facility])  Active Problems:    Hypertension    Hyperlipidemia    Obesity    Acute kidney injury superimposed on CKD ([facility])    CAD (coronary artery disease)    Type 2 diabetes mellitus with hyperglycemia, with

## 2020-08-13 NOTE — OP NOTE
Operative Note      Patient: Char Johnson  YOB: 1950  MRN: [id number]    Date of Procedure: 8/13/2020    Pre-Op Diagnosis: PAD, non-healing right heel wound    Post-Op Diagnosis: Same       Procedure:  1) US guided vascular access of left common femoral artery  2) Aortogram  3) Right lower extremity angiogram  4) Selective 3rd order catheterization of posterior tibial and peroneal arteries  5) Angioplasty of posterior tibial and peroneal arteries    Surgeon: Dr. Perez Coop    Anesthesia: Local, IV sedation    Estimated Blood Loss (mL): 33 ml    Complications: None    Specimens: none    Implants: none      Drains: none    Radiographic Findings: Patent aorta, mild atherosclerotic disease, patent renal arteries bilaterally. Bilateral common, internal and external iliac arteries are patent. Right common femoral, profunda and superficial femoral arteries are patent. Popliteal is patent, Anterior tibial is main runoff to the foot crossing the ankle. Severe tibial disease with short segment occlusion of distal posterior tibial artery, reconstitution from collaterals off of the anterior tibial artery. Peroneal artery has multi segmental areas of stenosis. During angioplasty, thrombus formed and after giving tpa and performing suction thrombectomy, there is in line flow to her foot via the anterior and posterior tibial arteries, no flow limiting dissection, embolism or extravasation. Detailed Description of Procedure:     Mrs. Candelario Moy comes in to the cath lab for a right lower extremity angiogram, after appropriate timeout was performed the bilateral groin sites were prepped and draped in standard sterile fashion. I began by evaluating the left common femoral artery under ultrasound, it was patent and compressible. Local anesthesia was delivered and under ultrasound guidance the left common femoral artery was accessed with a micropuncture needle, permanent ultrasound images were saved.   The 4-Belizean micropuncture sheath was exchanged for a short 5-Omani sheath. RBI catheter was brought into the abdominal aorta over a wire and an aortogram was performed. The catheter was then brought up and over the aortic bifurcation and placed in the right common femoral artery. A right lower extremity angiogram was then completed. At this point I exchanged the 5-Icelandic sheath for a 6-Omani 45 cm Ancell sheath and parked it in the right common femoral artery. Using a glidewire and glidecath I selectively catheterize the popliteal artery and performed an angiogram to heladio out the tibial arteries. The posterior tibial artery was selected and then I exchanged to an 0-14 platform with a ChoicePT wire and 014 quick cross catheter, a 0-14 grand slam wire was also utilized. I was able to get thru the occluded segment of the distal posterior tibial artery and with the catheter in the medial plantar artery performed a hand injection angiogram to confirm intraluminal cannulation. Patient was given 10,000 units of heparin and allowed to circulate. Angioplasty was performed of the posterior tibial artery for 2 insufflations with a 2.5 x 220 mm [place] balloon for about 1 minute each. Repeat angiogram with the catheter in the distal popliteal artery revealed thrombosis of posterior tibial and peroneal arteries with filling defects suggestive of thrombus. Patient was given another 5000 units of heparin and with the catheter down into the posterior tibial artery I delivered 10 mg TPA slowly walking the catheter back into the popliteal artery as delivering the TPA. I also gave 100 mcg of nitro directly into the tibial arteries. Angiogram showed significant improvement in the flow, and the thrombus was better defined, stuck in the proximal peroneal and posterior tibial arteries with some up into the tibial peroneal trunk.       A 6-Omani Pronto thrombectomy catheter was then prepped and brought down into the peroneal artery, and thrombectomy performed of the peroneal artery and tibial peroneal trunk. Angioplasty was performed with a 2 x 150 mm [place] balloon for 2 insufflations. The Pronto Thrombectomy catheter was then brought into the posterior tibial artery and suction thrombectomy performed. Repeat angiogram showed improved flow down the peroneal arteries, however there continued to be short segment occlusion to the distal posterior tibial artery with reconstitution of the medial plantar artery. So the posterior tibial artery was selected again and the occlusion crossed. Angioplasty performed with the 2 mm balloon. Completion angiogram reveals inline flow now to the foot from the anterior and posterior tibial artery, improvement of flow to her heel. At this point I was satisfied with the intervention. The wires and catheter removed. The 6-Hungarian sheath was then brought back into the abdominal aorta. The cardiology team then took over to perform a cardiac catheterization. Upon completion the sheath was removed and manual pressure held for hemostasis. She tolerated the procedure well and was brought to her room for recovery.     Electronically signed by Jeremiah Loving MD on 8/13/2020 at 12:36 PM

## 2020-08-13 NOTE — PROGRESS NOTES
[facility]  Progress Note                   Date:   8/13/2020  Patient name: Vianca Zaidi  Date of admission:  8/7/2020 10:29 PM  MRN:   [id number]  YOB: 1950  PCP: ERINN Rodrigues    Reason for Admission: CHF (congestive heart failure), NYHA class I, acute on chronic, combined ([facility] [address] [I50.43]    Subjective:       Clinical Changes /Abnormalities:  Patient seen and examined in the room with RN. Pt remains somewhat drowsy but responsive and A&Ox3. NSR     -473m L since admission    Review of Systems    Medications:   Scheduled Meds:   collagenase   Topical Daily    carvedilol  12.5 mg Oral BID WC    atorvastatin  40 mg Oral Nightly    Sodium Hypochlorite   Irrigation Daily    insulin glargine  20 Units Subcutaneous Nightly    allopurinol  100 mg Oral Daily    aspirin  81 mg Oral Daily    insulin lispro  0-12 Units Subcutaneous TID WC    insulin lispro  0-6 Units Subcutaneous Nightly    citalopram  20 mg Oral Daily    clopidogrel  75 mg Oral Daily    docusate sodium  100 mg Oral BID    gabapentin  100 mg Oral TID    cetirizine  5 mg Oral Daily    losartan  100 mg Oral Daily    memantine  10 mg Oral BID    nystatin   Topical BID    pantoprazole  40 mg Oral QAM AC    oxybutynin  5 mg Oral BID    rivastigmine  1 patch Transdermal Daily    sodium chloride flush  10 mL Intravenous 2 times per day    heparin (porcine)  5,000 Units Subcutaneous 3 times per day     Continuous Infusions:   dextrose       CBC:   Recent Labs     08/13/20  0517   WBC 9.4   HGB 8.8*        BMP:    Recent Labs     08/11/20  0511 08/12/20  0515 08/13/20  0517   * 131* 133*   K 4.5 4.6 4.9   CL 95* 96* 98   CO2 25 25 25   BUN 24* 25* 27*   CREATININE 1.44* 1.28* 1.36*   GLUCOSE 259* 234* 185*     Hepatic:  No results for input(s): AST, ALT, ALB, BILITOT, ALKPHOS in the last 72 hours.   Troponin:   Recent Labs     08/10/20  0948 08/10/20  1538   TROPHS 59* 61*     BNP: No results for input(s): BNP in the last 72 hours. Lipids:   No results for input(s): CHOL, HDL in the last 72 hours. Invalid input(s): LDLCALCU  INR: No results for input(s): INR in the last 72 hours. DIAGNOSTIC DATA  CABG 2005  (x4) - LIMA-LAD, SVG-diagnoal 1, SVG-OM1, and SVG-PDA. Exploration of left radial. (Dr. Danilo Marie). Objective:   Vitals: BP (!) 151/54   Pulse 64   Temp 99.1 °F (37.3 °C) (Oral)   Resp 15   Ht 5' 5\" (1.651 m)   Wt 251 lb 1.6 oz (113.9 kg)   SpO2 98%   BMI 41.79 kg/m²   General appearance: alert and cooperative with exam  HEENT: Head: Normocephalic, no lesions, without obvious abnormality. Neck:no JVD, trachea midline, no adenopathy  Lungs: Diminished throughout with significant rhonchi b/l upper lobes. On NC oxygen  Heart: Regular rate and rhythm, s1/s2 auscultated, no murmurs SR on tele HR 67  Abdomen: soft, non-tender, bowel sounds active, Obese  Extremities:  Legs currently wrapped and recently assessed by podiatry. Neurologic: not done    Echo 8/11/20  Summary  Global left ventricular systolic function is severely reduced. Estimated  ejection fraction is 30-35% . There is severe Hypokinesis of anterior, anteroseptal, inferoseptal and  anterolateral walls. [place] is severely hypokinetic. Grade II (moderate) left ventricular diastolic dysfunction. Right ventricular dilatation with reduced systolic function. Left atrium is mildly dilated. Moderate to severe tricuspid regurgitation. Moderate pulmonary hypertension. Estimated right ventricular systolic pressure is [id number]. No significant pericardial effusion is seen. Prior echo from [place] 2014   LVEF 55%  Trace MR, TR, and AR, Grade I DD    Assessment / Acute Cardiac Problems:   1. CAD s/p CABG 2007 @ [facility] per patient  2. CHF Exacerbation, acute systolic & diastolic  3. HTN  4. AMY  5. HLD  6. CVA  7. Obesity  8. Anxiety  9.  Depression    Patient Active Problem List:     Dementia [facility])     Hypertension Hyperlipidemia     Obesity     Gout     Peripheral polyneuropathy     Anxiety and depression     Acute kidney injury superimposed on CKD (HCC)     CAD (coronary artery disease)     Balance problem     CVA (cerebral vascular accident) ([facility] [address]     Type 2 diabetes mellitus with hyperglycemia, with long-term current use of insulin (HCC)     CHF (congestive heart failure), NYHA class I, acute on chronic, combined ([facility] [address]     Acute cystitis     Infestation by bed bug     Infestation by maggots     Fall     Elevated troponin     Lymphedema of both lower extremities      Plan of Treatment:   1. CAD s/p CABG 2005  Continue ASA, plavix, BB, ARB and statin. 2. CHF exacerbation - improving clinically. Continue BB & ARB. No diuretic at this time  3. Echo reviewed with Dr. Simón Dee. Will need cardiac cath to evaluate ischemic cause of CMP. Discussed in detail with patient and she is agreeable. Pt having right leg angiongram today with Dr. Reilly Brian. Discussed with Cath lab and do both cases today. 4. HTN- Remains elevated. Will start on Clonidine. Continue BB & ARB  5. AMY stable. Continue fluids. Watch for fluid overload    6.  Keep K >4.0 and Mag >2.0      Electronically signed by SHIRA Vasquez CNP on 8/13/2020 at 9:19 AM  [address]  [phone]

## 2020-08-13 NOTE — CONSULTS
Renal Consult Note    Patient :  Soraida Phipps; 77 y.o. MRN# [id number]  Location:  2019/2019-01  Attending:  Dain Hamilton MD  Admit Date:  8/7/2020   Hospital Day: 6    Reason for Consult:     Asked by Dr Dain Hamilton MD to see for AMY/Elevated Creatinine. History Obtained From:     patient, non-family caregiver - nursing staff, electronic medical record    History of Present Illness:     Soraida Phipps; 77 y.o. female with past medical history as mentioned below. Known history of type 2 diabetes, hypertension, previous history of coronary artery disease having undergone coronary bypass graft, background diabetic retinopathy, chronic kidney disease stage III baseline 1.1-1.2 does not follow-up with nephrology. She also has chronic nonhealing ulcers on her feet likely from peripheral vascular disease. She presented to [facility] on the day of admission after having had a fall in the early evening a day before. She refused to call 911 at that stage. EMS was finally called and patient was found on the ground overnight unable to get up. She was covered in urine and feces. Multiple wounds of various stages were seen. She also noted to be hyperglycemic with a blood sugar of more than 400. It was unclear if she had had a mechanical fall or had loss consciousness. Creatinine was at baseline at that stage. Cardiology was then consulted. Echocardiogram was done which showed an ejection fraction of 30 to 35% with severe hypokinesis of the anterior and anteroseptal wall. Previous echo about 3 to 4 years earlier showed ejection fraction of 55%. Based on this it was recommended that she should undergo cardiac cathetrization. In addition she also has nonhealing ulcers and vascular recommended an angiogram.  Nephrology was consulted for clearance prior to cardiac cath and angiogram.  However before consult was done patient had completed her lower extremity angiogram and cardiac cath.   She underwent on file    Years of education: Not on file    Highest education level: Not on file   Occupational History    Not on file   Social Needs    Financial resource strain: Not on file    Food insecurity     Worry: Not on file     Inability: Not on file    Transportation needs     Medical: Not on file     Non-medical: Not on file   Tobacco Use    Smoking status: Never Smoker    Smokeless tobacco: Never Used    Tobacco comment: never smoker eclamb rrt 7/20/17   Substance and Sexual Activity    Alcohol use: No    Drug use: No    Sexual activity: Not on file   Lifestyle    Physical activity     Days per week: Not on file     Minutes per session: Not on file    Stress: Not on file   Relationships    Social connections     Talks on phone: Not on file     Gets together: Not on file     Attends Ca[employer]ism service: Not on file     Active member of club or organization: Not on file     Attends meetings of clubs or organizations: Not on file     Relationship status: Not on file    Intimate partner violence     Fear of current or ex partner: Not on file     Emotionally abused: Not on file     Physically abused: Not on file     Forced sexual activity: Not on file   Other Topics Concern    Not on file   Social History Narrative    Not on file       Family History:        Family History   Problem Relation Age of Onset    Diabetes Mother     Cancer Mother     High Blood Pressure Mother     Stroke Mother     Kidney Disease Mother     Uterine Cancer Mother     Diabetes Father     Heart Disease Father     Glaucoma Father     Emphysema Father     Coronary Art Dis Other         All 4 siblings.  Stroke Other         1 sibling.  Lung Cancer Other         1 sibling - cause of death lung cancer.     Mental Retardation Other        Outpatient Medications:     Medications Prior to Admission: atorvastatin (LIPITOR) 40 MG tablet, TAKE 1 TABLET BY MOUTH EVERYDAY  docusate sodium (COLACE) 100 MG capsule, Take 1 capsule insulin glargine  20 Units Subcutaneous Nightly    allopurinol  100 mg Oral Daily    aspirin  81 mg Oral Daily    insulin lispro  0-12 Units Subcutaneous TID WC    insulin lispro  0-6 Units Subcutaneous Nightly    citalopram  20 mg Oral Daily    clopidogrel  75 mg Oral Daily    docusate sodium  100 mg Oral BID    gabapentin  100 mg Oral TID    cetirizine  5 mg Oral Daily    losartan  100 mg Oral Daily    memantine  10 mg Oral BID    nystatin   Topical BID    pantoprazole  40 mg Oral QAM AC    oxybutynin  5 mg Oral BID    rivastigmine  1 patch Transdermal Daily    sodium chloride flush  10 mL Intravenous 2 times per day    heparin (porcine)  5,000 Units Subcutaneous 3 times per day     Continuous Infusions:    dextrose       PRN Meds:  sodium chloride flush, acetaminophen, glucose, dextrose, glucagon (rDNA), dextrose, potassium chloride **OR** potassium alternative oral replacement **OR** potassium chloride, sodium chloride flush, acetaminophen **OR** acetaminophen, polyethylene glycol, promethazine **OR** ondansetron, nicotine, perflutren lipid microspheres    Review of Systems:     Constitutional: No fever, no chills, no lethargy, no weakness. HEENT:  No headache, otalgia, itchy eyes, nasal discharge or sore throat. Cardiac:  No chest pain, dyspnea, orthopnea or PND. Chest:  No cough, phlegm or wheezing. Abdomen:  No abdominal pain, nausea or vomiting. Neuro:  No focal weakness, abnormal movements orseizure like activity. Skin:   No rashes, no itching. :   No hematuria, no pyuria, no dysuria, no flank pain. Extremities:  No swelling or joint pains. ROS was otherwise negative except as mentioned in the [address] Input/Output:       I/O last 3 completed shifts: In: 9933 [P.O.:120;  I.V.:1667]  Out: 1100 [Urine:1100]  Patient Vitals for the past 96 hrs (Last 3 readings):   Weight   08/13/20 0600 251 lb 1.6 oz (113.9 kg)   08/12/20 0555 240 lb (108.9 kg)   08/11/20 0630 235 lb 3.2 oz (106.7 kg)     Vital Signs:   Temperature:  Temp: 99.1 °F (37.3 °C)  TMax:   Temp (24hrs), Av.6 °F (37 °C), Min:98 °F (36.7 °C), Max:99.1 °F (37.3 °C)    Respirations:  Resp: 15  Pulse:   Pulse: 64  BP:    BP: (!) 151/54  BP Range: Systolic (68UWF), ZOQ:385 , Min:151 , XFZ:875       Diastolic (29JGH), FDP:06, Min:54, Max:76      Physical Examination:     General:  AAO x 3, speaking in full sentences, no accessory muscle use. HEENT: Atraumatic, normocephalic, no throat congestion, moist mucosa. Eyes:   Pupils equal, round and reactive to light, EOMI. Neck:   No JVD, no thyromegaly, no lymphadenopathy. Chest:  Bilateral vesicular breath sounds, no rales or wheezes. Cardiac:  S1 S2 RR, no murmurs, gallops or rubs, JVP not raised. Abdomen: Soft, non-tender, no masses or organomegaly, BS audible. :   No suprapubic or flank tenderness. Neuro:  AAO x 3, No FND. SKIN:  No rashes, good skin turgor. Extremities:  ++ edema, palpable peripheral pulses, no calf tenderness. Labs:       Recent Labs     20  0517   WBC 9.4   RBC 3.11*   HGB 8.8*   HCT 29.6*   MCV 95.2   MCH 28.3   MCHC 29.7   RDW 16.2*      MPV 9.7      BMP:   Recent Labs     20  0511 20  0515 20  0517   * 131* 133*   K 4.5 4.6 4.9   CL 95* 96* 98   CO2 25 25 25   BUN 24* 25* 27*   CREATININE 1.44* 1.28* 1.36*   GLUCOSE 259* 234* 185*   CALCIUM 7.7* 8.2* 8.2*      Phosphorus:   No results for input(s): PHOS in the last 72 hours. Magnesium:  No results for input(s): MG in the last 72 hours. Albumin:  No results for input(s): LABALBU in the last 72 hours.   BNP:    No results found for: BNP  JIMBO:    No results found for: JIMBO  SPEP:  Lab Results   Component Value Date    PROT 6.5 2020     UPEP:   No results found for: LABPE  C3:   No results found for: C3  C4:   No results found for: C4  MPO ANCA:   No results found for: MPO  PR3 ANCA:   No results found for: PR3  Anti-GBM:   No results found for: GBMABIGG  Hep BsAg:       No results found for: HEPBSAG  Hep C AB:          Lab Results   Component Value Date    HEPCAB NONREACTIVE 01/31/2018       Urinalysis/Chemistries:      Lab Results   Component Value Date    NITRU POSITIVE 08/08/2020    COLORU DARK YELLOW 08/08/2020    PHUR 5.5 08/08/2020    WBCUA 20 TO 50 08/08/2020    RBCUA 5 TO 10 08/08/2020    MUCUS NOT REPORTED 08/08/2020    TRICHOMONAS NOT REPORTED 08/08/2020    YEAST NOT REPORTED 08/08/2020    BACTERIA FEW 08/08/2020    SPECGRAV 1.026 08/08/2020    LEUKOCYTESUR MODERATE 08/08/2020    UROBILINOGEN Normal 08/08/2020    BILIRUBINUR NEGATIVE  Verified by ictotest. 08/08/2020    GLUCOSEU 1+ 08/08/2020    KETUA NEGATIVE 08/08/2020    AMORPHOUS NOT REPORTED 08/08/2020     Urine Sodium:   No results found for: LUKE  Urine Potassium:  No results found for: KUR  Urine Chloride:  No results found for: CLUR  Urine Osmolarity: No results found for: OSMOU  Urine Protein:   No results found for: TPU  Urine Creatinine:     Lab Results   Component Value Date    LABCREA 136.9 09/26/2019     UPC:     Urine Eosinophils:  No components found for: UEOS    Radiology:     CXR:     Assessment:     1. Chronic kidney disease stage III from diabetic nephrosclerosis baseline 1.2-1.3  2. 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 Ischemic cardiomyopathy ejection fraction 35%  4. Peripheral vascular disease with lower extremity ulcers  5. Status post carotid catheterization with no intervention  6. Status post lower extremity angiogram and angioplasty today 8/13/2020  7. Hypertension with poor control    Plan:   1. Continue normal saline 100-hour for 6 hours post cath  2.  Resume home medications  3. Will Check Renal Ultrasound to r/o element of obstruction and to assess the kidney size/echotexture. 4. Comprehensive urine testing including Urinalysis, Urine sodium, potassium, chloride, Urine protein and creatinine to quantify the proteinuria if any at all.  Will check urinary eosinophils as well. 5. Will Order serum and urine protein electrophoresis to r/o element to occult paraprotein disease. 6. Will order Hepatitis B and C, JIMBO, ANCA, Complement levels. 7, Mucomyst 600 twice a day for 4 doses  8. Watch for contrast nephropathy    Nutrition   Please ensure that patient is on a renal diet/TF. Avoid nephrotoxic drugs/contrast exposure. Thank you for the consultation. Please do not hesitate to contact us for any further questions/concerns. We will continue to follow along with you.

## 2020-08-13 NOTE — PROGRESS NOTES
Vascular Surgery Progress Note            PATIENT NAME: Soraida Phipps     TODAY'S DATE: 8/13/2020, 7:23 AM    SUBJECTIVE:    Pt seen and examined. No acute events overnight. Patient with no complaints of pain at present. Remains NPO. ACE wrap dressings intact to bilateral lower extremities. Soft boot to RLE. Patient aware of procedure being performed today. OBJECTIVE:   Vitals:  BP (!) 203/76   Pulse 77   Temp 98.7 °F (37.1 °C) (Oral)   Resp 27   Ht 5' 5\" (1.651 m)   Wt 251 lb 1.6 oz (113.9 kg)   SpO2 94%   BMI 41.79 kg/m²      INTAKE/OUTPUT:      Intake/Output Summary (Last 24 hours) at 8/13/2020 0723  Last data filed at 8/13/2020 9904  Gross per 24 hour   Intake 1787 ml   Output 1100 ml   Net 687 ml                 General: AOx3, NAD  Lungs: CTAB  Heart: S1S2  Abdomen: Soft, ND  Extremity: moves all extremities x4. ACE wraps to bilateral lower extremities. Soft heel boot to RLE.      Data:  CBC with Differential:    Lab Results   Component Value Date    WBC 9.4 08/13/2020    RBC 3.11 08/13/2020    HGB 8.8 08/13/2020    HCT 29.6 08/13/2020     08/13/2020    MCV 95.2 08/13/2020    MCH 28.3 08/13/2020    MCHC 29.7 08/13/2020    RDW 16.2 08/13/2020    LYMPHOPCT 21 08/13/2020    MONOPCT 9 08/13/2020    BASOPCT 0 08/13/2020    MONOSABS 0.87 08/13/2020    LYMPHSABS 1.99 08/13/2020    EOSABS 0.11 08/13/2020    BASOSABS 0.04 08/13/2020    DIFFTYPE NOT REPORTED 08/13/2020     BMP:    Lab Results   Component Value Date     08/13/2020    K 4.9 08/13/2020    CL 98 08/13/2020    CO2 25 08/13/2020    BUN 27 08/13/2020    LABALBU 2.2 08/07/2020    CREATININE 1.36 08/13/2020    CALCIUM 8.2 08/13/2020    GFRAA 47 08/13/2020    LABGLOM 39 08/13/2020    GLUCOSE 185 08/13/2020         ASSESSMENT   Patient Active Problem List   Diagnosis    D[facility] [address]    Hypertension    Hyperlipidemia    Obesity    Gout    Peripheral polyneuropathy    Anxiety and depression    [facility] injury superimposed on CKD [facility] [address]    CAD (coronary artery disease)    Balance problem    CVA (cerebral vascular accident) ([facility] [address]    Type 2 diabetes mellitus with hyperglycemia, with long-term current use of insulin ([facility] [address]    CHF (congestive heart failure), NYHA class I, acute on chronic, combined ([facility] [address]    Acute cystitis    Infestation by bed bug    Infestation by maggots    Fall    Elevated troponin    Lymphedema of both lower extremities       3 77year old female with PAD and vascular insufficiency with a non-healing RLE wound    PLAN    1. Right lower extremity angiogram today  2.  Continue wound care per podiatry        Electronically signed by Tip Mackenzie DO  on 8/13/2020 at 7:23 AM

## 2020-08-13 NOTE — PLAN OF CARE
Problem: Falls - Risk of:  Goal: Will remain free from falls  Description: Will remain free from falls  8/12/2020 2145 by Bhavani Matta RN  Outcome: Ongoing  8/12/2020 1132 by Audie Last RN  Outcome: Ongoing  Goal: Absence of physical injury  Description: Absence of physical injury  8/12/2020 2145 by Bhavani Matta RN  Outcome: Ongoing  8/12/2020 1132 by Audie Last RN  Outcome: Ongoing     Problem: Skin Integrity:  Goal: Will show no infection signs and symptoms  Description: Will show no infection signs and symptoms  8/12/2020 2145 by Bhavani Matta RN  Outcome: Ongoing  8/12/2020 1132 by Audie Last RN  Outcome: Ongoing  Goal: Absence of new skin breakdown  Description: Absence of new skin breakdown  8/12/2020 2145 by Bhavani Matta RN  Outcome: Ongoing  8/12/2020 1132 by Audie Last RN  Outcome: Ongoing     Problem: Mental Status - Impaired:  Goal: Mental status will improve  Description: Mental status will improve  8/12/2020 2145 by Bhavani Matta RN  Outcome: Ongoing  8/12/2020 1132 by Audie Last RN  Outcome: Ongoing     Problem: Infection:  Goal: Will remain free from infection  Description: Will remain free from infection  8/12/2020 2145 by Bhavani Matta RN  Outcome: Ongoing  8/12/2020 1132 by Audie Last RN  Outcome: Ongoing     Problem: Safety:  Goal: Free from accidental physical injury  Description: Free from accidental physical injury  8/12/2020 2145 by Bhavani Matta RN  Outcome: Ongoing  8/12/2020 1132 by Audie Last RN  Outcome: Ongoing  Goal: Free from intentional harm  Description: Free from intentional harm  8/12/2020 2145 by Bhavani Matta RN  Outcome: Ongoing  8/12/2020 1132 by Audie Last RN  Outcome: Ongoing     Problem: Daily Care:  Goal: Daily care needs are met  Description: Daily care needs are met  8/12/2020 2145 by Bhavani Matta RN  Outcome: Ongoing  8/12/2020 1132 by Audie Last RN  Outcome: Ongoing     Problem: Pain:  Goal: Patient's pain/discomfort is manageable  Description: Patient's pain/discomfort is manageable  8/12/2020 2145 by Diego Muhammad RN  Outcome: Ongoing  8/12/2020 1132 by Timbo Smallwood RN  Outcome: Ongoing  Goal: Pain level will decrease  Description: Pain level will decrease  8/12/2020 2145 by Diego Muhammad RN  Outcome: Ongoing  8/12/2020 1132 by Timbo Smallwood RN  Outcome: Ongoing  Goal: Control of acute pain  Description: Control of acute pain  8/12/2020 2145 by Diego Muhammad RN  Outcome: Ongoing  8/12/2020 1132 by Timbo Smallwood RN  Outcome: Ongoing  Goal: Control of chronic pain  Description: Control of chronic pain  8/12/2020 2145 by Diego Muhammad RN  Outcome: Ongoing  8/12/2020 1132 by Timbo Smallwood RN  Outcome: Ongoing     Problem: Skin Integrity:  Goal: Skin integrity will stabilize  Description: Skin integrity will stabilize  8/12/2020 2145 by Diego Muhammad RN  Outcome: Ongoing  8/12/2020 1132 by Timbo Smallwood RN  Outcome: Ongoing     Problem: Discharge Planning:  Goal: Patients continuum of care needs are met  Description: Patients continuum of care needs are met  8/12/2020 2145 by Diego Muhammad RN  Outcome: Ongoing  8/12/2020 1132 by Timbo Smallwood RN  Outcome: Ongoing     Problem: Nutrition  Goal: Optimal nutrition therapy  8/12/2020 2145 by Diego Muhammad RN  Outcome: Ongoing  8/12/2020 1132 by Timbo Smallwood RN  Outcome: Ongoing     Problem: Musculor/Skeletal Functional Status  Goal: Highest potential functional level  8/12/2020 2145 by Diego Muhammad RN  Outcome: Ongoing  8/12/2020 1132 by Timbo Smallwood RN  Outcome: Ongoing  Goal: Absence of falls  8/12/2020 2145 by Diego Muhammad RN  Outcome: Ongoing  8/12/2020 1132 by Timbo Smallwood RN  Outcome: Ongoing

## 2020-08-13 NOTE — PROGRESS NOTES
[facility]    Progress Note    8/13/2020    10:43 AM    Name:   Radha Yang  MRN:     [id number]     Acct:      [de-identified]   Room:   2019/2019-01  IP Day:  6  Admit Date:  8/7/2020 10:29 PM    PCP:   ERINN Webb  Code Status:  Full Code    Subjective:     C/C: hyperglycemia  Interval History Status: improved. Patient seen and examined at bedside. Tired but answering all questions appropriately. No compolaints about right heel wound today. Otherwise doing well. Plan for angio and cath today. Brief History:        Briefly this is a 41-year-old female who presented to [facility] via EMS after having a fall.  Patient was found to have symptomatic UTI as well as elevated sugars up to 400 and.  proBNP elevation of 31,000. Joseph Barkley is currently being treated with Cipro for her UTI. Hsitory of CABG in 2005.     Review of Systems:     Constitutional:  negative for chills, fevers, sweats  Respiratory:  negative for cough, dyspnea on exertion, shortness of breath, wheezing  Cardiovascular:  negative for chest pain, chest pressure/discomfort, lower extremity edema, palpitations  Gastrointestinal:  negative for abdominal pain, constipation, diarrhea, nausea, vomiting  Neurological:  negative for dizziness, headache    Medications: Allergies:     Allergies   Allergen Reactions    Bactrim [Sulfamethoxazole-Trimethoprim] Hives    Amoxicillin-Pot Clavulanate Diarrhea, Nausea Only and Nausea And Vomiting       Current Meds:   Scheduled Meds:    cloNIDine  0.1 mg Oral BID    collagenase   Topical Daily    carvedilol  12.5 mg Oral BID WC    atorvastatin  40 mg Oral Nightly    Sodium Hypochlorite   Irrigation Daily    insulin glargine  20 Units Subcutaneous Nightly    allopurinol  100 mg Oral Daily    aspirin  81 mg Oral Daily    insulin lispro  0-12 Units Subcutaneous TID WC    insulin lispro  0-6 Units Subcutaneous Nightly    Lung Cancer Other         1 sibling - cause of death lung cancer.  Mental Retardation Other        Vitals:  BP (!) 151/54   Pulse 64   Temp 99.1 °F (37.3 °C) (Oral)   Resp 15   Ht 5' 5\" (1.651 m)   Wt 251 lb 1.6 oz (113.9 kg)   SpO2 98%   BMI 41.79 kg/m²   Temp (24hrs), Av.6 °F (37 °C), Min:98 °F (36.7 °C), Max:99.1 °F (37.3 °C)    Recent Labs     20  1155 20  1846 20  2143 20  1001   POCGLU 222* 290* 263* 130*       I/O (24Hr): Intake/Output Summary (Last 24 hours) at 2020 1043  Last data filed at 2020 5056  Gross per 24 hour   Intake 1787 ml   Output 1100 ml   Net 687 ml       Labs:  Hematology:  Recent Labs     20  0520  0517   WBC  --  9.4   RBC  --  3.11*   HGB  --  8.8*   HCT  --  29.6*   MCV  --  95.2   MCH  --  28.3   MCHC  --  29.7   RDW  --  16.2*   PLT  --  290   MPV  --  9.7   SEDRATE 59*  --    CRP 32.6*  --      Chemistry:  Recent Labs     08/10/20  1538 20  0511 20  0515 20  0517   NA  --  130* 131* 133*   K  --  4.5 4.6 4.9   CL  --  95* 96* 98   CO2  --  25 25 25   GLUCOSE  --  259* 234* 185*   BUN  --  24* 25* 27*   CREATININE  --  1.44* 1.28* 1.36*   ANIONGAP  --  10 10 10   LABGLOM  --  36* 41* 39*   GFRAA  --  44* 50* 47*   CALCIUM  --  7.7* 8.2* 8.2*   PROBNP  --   --  23,730*  --    TROPHS 61*  --   --   --      Recent Labs     08/10/20  1538  20  0821 20  1143 20  1155 20  1846 20  2143 20  1001   LABA1C 10.7*  --   --   --   --   --   --   --    POCGLU  --    < > 215* 251* 222* 290* 263* 130*    < > = values in this interval not displayed.      ABG:No results found for: POCPH, PHART, PH, POCPCO2, QHK4UUE, PCO2, POCPO2, PO2ART, PO2, POCHCO3, ALI3HJG, HCO3, NBEA, PBEA, BEART, BE, THGBART, THB, FUC5ATA, AFVY5UES, F2ANMKAN, O2SAT, FIO2  Lab Results   Component Value Date/Time    SPECIAL NOT REPORTED 2020 01:52 PM     Lab Results   Component Value Date/Time    CULTURE NO class I, acute on chronic, combined ([facility] [address] 8/8/2020 Yes    Hypertension 8/8/2020 Yes    Hyperlipidemia 8/8/2020 Yes    Obesity 8/8/2020 Yes    Acute kidney injury superimposed on CKD ([facility] [address] 8/8/2020 Yes    CAD (coronary artery disease) 8/8/2020 Yes    Overview Signed 2/27/2014 10:22 AM by Nicole France MD     (with coronary artery bypass graft x4). Type 2 diabetes mellitus with hyperglycemia, with long-term current use of insulin ([facility] [address] 8/8/2020 Yes    Acute cystitis 8/8/2020 Yes    Infestation by bed bug 8/8/2020 Yes    Infestation by maggots 8/8/2020 Yes    Fall 8/8/2020 Yes    Elevated troponin 8/8/2020 Yes    Lymphedema of both lower extremities 8/8/2020 Yes    PAD (peripheral artery disease) ([facility] [address] 8/13/2020 Yes          Plan:        1. Plan for Angio and Cardiac catheterization  2. Gentle IV hydration. Appreciate nephrology recs  3. Mucomyst ordered per nephrology  4. ASA 81 mg, Lipitor 40, Coreg 12.5,   5. Completed 5 day course   6. Clonidine 0.1 was started  7.  Will try to keep patient euvolemic    Alida Ribera MD  8/13/2020  10:43 AM

## 2020-08-13 NOTE — PROGRESS NOTES
Physical Therapy  DATE: 2020    NAME: Madi Popr  MRN: [id number]   : 1950    Patient not seen this date for Physical Therapy due to:  [] Blood transfusion in progress  [] Hemodialysis  []  Patient Declined  [] Spine Precautions   [] Strict Bedrest  [] Procedure  -  Cath Lab. Will check on pt tomorrow. [] Testing      [] Other        [] PT being discontinued at this time. Patient independent. No further needs. [] PT being discontinued at this time as the patient has been transferred to palliative care. No further needs.     Karen Escobedo, PTA

## 2020-08-13 NOTE — OP NOTE
- Cherie Mash is a Vein graft that originates at the Aorta Left and attaches to       the 1st Diag. Patent with 0% stenosis        - Cherie Mash is a Vein graft that originates at the Aorta Left and attaches to       the 2nd Ob Sushma. Patent with 0% stenosis        - Cherie Mash is a Vein graft that originates at the Aorta Right and attaches       to the R PDA. 100% occluded at proximal anastomosis       Cardiac collaterals   +-----------------------------------------+------------+---------+---------+   ! Origin                                   ! Destination ! Flow     !Comments ! +-----------------------------------------+------------+---------+---------+   ! 3rd Ob Sushma                              !R PDA       !Moderate !         !   +-----------------------------------------+------------+---------+---------+      Coronary Tree        Dominance: Mixed     Estimated blood loss: 5 ml    Conclusions:   Multi-vessel Coronary Artery Disease. 3 out of 4 grafts are patent. Patent LIMA-LAD, SVG-Diagonal and SVG-OM. Occluded SVG to RCA with left to right collaterals. Borderline LV systolic function. Recommendations:  1. Medical Therapy. 2. Risk Factors Modification. 3. Post Cath Protocol    History and Risk Factors    [x] Hypertension     [] Family history of CAD  [x] Hyperlipidemia     [] Cerebrovascular Disease   [] Prior MI       [] Peripheral Vascular disease   [] Prior PCI              [] Diabetes Mellitus    [] Left Main PCI. [] Currently on Dialysis. [x] Prior CABG. [] Currently smoker. [] Cardiac Arrest outside of healthcare facility. [] Yes    [x] No        Witnessed     [] Yes   [] No     Arrest after arrival of EMS  [] Yes   [] No     [] Cardiac Arrest at other Facility. [] Yes   [x] No    Pre-Procedure Information. Heart Failure       [x] Yes    [x] No        Class  [] I      [] II  [x] III    [] IV.      New Diagnosis    [x] Yes  [] No    HF Type      [x] Systolic   [] Diastolic          []

## 2020-08-13 NOTE — ADT AUTH CERT
Heart Failure - Care Day 5 (8/11/2020) by Fadumo Dye RN         Review Status  Review Entered    Completed  8/12/2020 13:17        Criteria Review       Care Day: 5 Care Date: 8/11/2020 Level of Care:    Guideline Day 2    Clinical Status    (X) * Hemodynamic stability    ( ) * Mental status at baseline    ( ) * MI excluded    ( ) * Cardiac rate and rhythm acceptable    ( ) * Oxygenation at baseline or improved    ( ) * Pulmonary edema absent or improved    Routes    (X) Oral or parenteral medications    8/12/2020 1:17 PM EDT by Naima Denise      see attached for med list    Interventions    ( ) * Pulmonary catheter absent    (X) Possible electrolytes [J]    (X) Oxygen    8/12/2020 1:17 PM EDT by Naima Denise      2 L NC    Medications    (X) Neprilysin inhibitor with ARB, or ACE inhibitor or ARB    8/12/2020 1:17 PM EDT by Naima Denise      Cozaar 100 mg po qd    (X) Beta-blocker    8/12/2020 1:17 PM EDT by Naima Denise      Coreg 12.5 mg bid po    * Milestone    Additional Notes    8/11/2020       Internal Medicine    C/C: fall, hyperglycemia    Interval History Status: slightly improved         Patient seen and examined at bedside currently not complaining of anything, legs wrapped in Ace bandages.  Patient is alert and oriented to person place and time. Ene Book is still pending upon evaluation of patient.     Overall stable 158/51, saturating wellBlood pressure on 2 L nasal cannula.  Glucose still slightly elevated.  Hyponatremia hypochloremia BUN slightly trending up creatinine staying stable 1.44    Current Meds:        · atorvastatin  40 mg Oral Nightly    · carvedilol  6.25 mg Oral BID WC    · Sodium Hypochlorite   Irrigation Daily    · insulin glargine  20 Units Subcutaneous Nightly    · ciprofloxacin  400 mg Intravenous Q12H    · allopurinol  100 mg Oral Daily    · aspirin  81 mg Oral Daily    · insulin lispro  0-12 Units Subcutaneous TID WC    · insulin lispro  0-6 Units Subcutaneous Nightly    · citalopram  20 mg Oral Daily    · clopidogrel  75 mg Oral Daily    · docusate sodium  100 mg Oral BID    · gabapentin  100 mg Oral TID    · cetirizine  5 mg Oral Daily    · losartan  100 mg Oral Daily    · memantine  10 mg Oral BID    · nystatin   Topical BID    · pantoprazole  40 mg Oral QAM AC    · oxybutynin  5 mg Oral BID    · rivastigmine  1 patch Transdermal Daily    · sodium chloride flush  10 mL Intravenous 2 times per day    · heparin (porcine)  5,000 Units Subcutaneous 3 times per day          Continuous Infusions:        sodium chloride    50 mL/hr            Vitals:    BP (!) 185/65   Pulse 65   Temp 98.6 °F (37 °C) (Oral)   Resp 21   Ht 5' 5\" (1.651 m)   Wt 235 lb 3.2 oz (106.7 kg)   SpO2 100%   BMI 39.14 kg/m²    Temp (24hrs), Av.3 °F (36.8 °C), Min:98 °F (36.7 °C), Max:98.6 °F (37 °C)          Recent Labs      08/10/20    0751 08/10/20    1158 08/10/20    2212 20    0853    POCGLU 151* 125* 263* 254*            I/O (24Hr): Intake/Output Summary (Last 24 hours) at 2020 0951    Last data filed at 2020 0640       Gross per 24 hour    Intake 3202 ml    Output 1850 ml    Net 1352 ml          Plan:            1. Echo pending    2. Appreiciate cardiac recs    3. ASA 81, Lipitor 40, coreg incresad to 12.5, plabic 75    4. DM2 with neuropathy. Gabapentin, insulin 20 units ISS hypoglycemia protocol    5. Celexa continued    6. Continue Home meds of allopurinol and zyrtec    7. Symptomatic uti on cipro    8. Apprieviate podiatry input    9. Continue with wound care    10. Podiatry       Lower Extremity Physical Exam:    Vascular: DP and PT pulses are non Palpable . CFT <3 seconds to all digits.  Hair growth is absent to the level of the digits.  Moderate pitting edema noted to the bilateral lower extremities.           Neuro: Saph/sural/SP/DP/plantar sensation diminished to light touch.         Musculoskeletal: Muscle strength is 4/5 to all lower extremity muscle groups.  Gross deformity is absent.         Dermatologic: Full thickness ulcer #1 located to the right plantar heel and measures approximately 7.0cm x 6.0cm x 0.5cm. The wound base is fibro-necrotic. Periwound skin is macerated.  serous drainage noted with mild associated mal odor. Erythema localized to the wound without streaking lymphangitis with minimal associated increase in warmth. Does not probe to bone, sinus track, or undermine. No fluctuance, crepitus, or induration.   Interdigital maceration absent.         Multiple chronic appearing wounds to the left lower extremity as well as right posterior calf secondary to chronic lymphangitis/venous stasis.            Plan         ? Patient examined and evaluated at [address] [facility] [address] Ge Mention    ? Treatment options discussed in detail with the patient. ? Plain film radiographs ordered. ? NIVS ordered. ? Follow up acute inflammatory labs. ? Sharp excisional debridement performed of the right plantar heel wound down to and including dermis via scissors. 100% of wound debrided. No anesthesia required. Hemostasis obtained with direct pressure. Patient reports 0/10 post procedure pain. ? Medical management per primary.        ? Dressing applied to Right foot: Betadine, Aquacell AG, DSD, ACE    ? All other wounds managed per wound care team.    ? Elevate heels off bed at all times with ROOKE/PRAFO boot to the right lower extremity. ? NWB to Right heel, ok for forefoot touch for transfers. ?    Physical Therapy         Pt cooperative, fearful of falling, moves very slowly, cautiously.  She was able to take steps, using the rwalker, pt needs to find her balance first before taking steps. Pt feels her Left leg is her strongest one.  She states she doesn't use her walker at home for the same reason.           8/11/2020 05:11    Sodium: 130 (L)    Potassium: 4.5    Chloride: 95 (L)    CO2: 25    BUN: 24 (H)    Creatinine: 1.44 (H)    Bun/Cre Ratio: NOT REPORTED    Anion Gap: 10    GFR Non-: 36 (L)    GFR : 44 (L)    Glucose: 259 (H)    Calcium: 7.7 (L)    GFR Comment: Pend    GFR Staging: NOT REPORTED    CRP: 32.6 (H)    Sed Rate: 59 (H)       8/11/2020 08:53    POC Glucose: 254 (H)       8/11/2020 12:05    POC Glucose: 333 (H)       8/11/2020 13:51    ECHOCARDIOGRAM COMPLETE 2D W DOPPLER W COLOR: Rpt       8/11/2020 17:00    XR FOOT RIGHT (MIN 3 VIEWS): Rpt       8/11/2020 17:09    POC Glucose: 234 (H)       8/11/2020 18:36    VL LOWER EXTREMITY ARTERIAL SEGMENTAL PRESSURES W PPG: Rpt       8/11/2020 20:38    POC Glucose: 262 (H)

## 2020-08-13 NOTE — PROGRESS NOTES
Multiple attempts to remove gauze from right bleeding nostril. Still continues to have small amt. Of oozing. Will cont. To monitor.

## 2020-08-14 VITALS
TEMPERATURE: 95.8 F | HEART RATE: 63 BPM | SYSTOLIC BLOOD PRESSURE: 161 MMHG | RESPIRATION RATE: 15 BRPM | HEIGHT: 65 IN | OXYGEN SATURATION: 99 % | DIASTOLIC BLOOD PRESSURE: 45 MMHG | WEIGHT: 250 LBS | BODY MASS INDEX: 41.65 KG/M2

## 2020-08-14 LAB
ABSOLUTE EOS #: 0.15 K/UL (ref 0–0.44)
ABSOLUTE IMMATURE GRANULOCYTE: 0.04 K/UL (ref 0–0.3)
ABSOLUTE LYMPH #: 1.5 K/UL (ref 1.1–3.7)
ABSOLUTE MONO #: 0.79 K/UL (ref 0.1–1.2)
ALBUMIN (CALCULATED): 1.8 G/DL (ref 3.2–5.2)
ALBUMIN PERCENT: 35 % (ref 45–65)
ALPHA 1 PERCENT: 5 % (ref 3–6)
ALPHA 2 PERCENT: 15 % (ref 6–13)
ALPHA-1-GLOBULIN: 0.2 G/DL (ref 0.1–0.4)
ALPHA-2-GLOBULIN: 0.7 G/DL (ref 0.5–0.9)
ANION GAP SERPL CALCULATED.3IONS-SCNC: 12 MMOL/L (ref 9–17)
BASOPHILS # BLD: 1 % (ref 0–2)
BASOPHILS ABSOLUTE: 0.07 K/UL (ref 0–0.2)
BETA GLOBULIN: 0.9 G/DL (ref 0.5–1.1)
BETA PERCENT: 17 % (ref 11–19)
BUN BLDV-MCNC: 30 MG/DL (ref 8–23)
BUN/CREAT BLD: ABNORMAL (ref 9–20)
CALCIUM SERPL-MCNC: 8.1 MG/DL (ref 8.6–10.4)
CHLORIDE BLD-SCNC: 99 MMOL/L (ref 98–107)
CO2: 23 MMOL/L (ref 20–31)
CREAT SERPL-MCNC: 1.37 MG/DL (ref 0.5–0.9)
CREATININE URINE: 86.3 MG/DL (ref 28–217)
CULTURE: NORMAL
CULTURE: NORMAL
DIFFERENTIAL TYPE: ABNORMAL
EOSINOPHIL,URINE: NORMAL
EOSINOPHILS RELATIVE PERCENT: 2 % (ref 1–4)
GAMMA GLOBULIN %: 28 % (ref 9–20)
GAMMA GLOBULIN: 1.4 G/DL (ref 0.5–1.5)
GFR AFRICAN AMERICAN: 46 ML/MIN
GFR NON-AFRICAN AMERICAN: 38 ML/MIN
GFR SERPL CREATININE-BSD FRML MDRD: ABNORMAL ML/MIN/{1.73_M2}
GFR SERPL CREATININE-BSD FRML MDRD: ABNORMAL ML/MIN/{1.73_M2}
GLUCOSE BLD-MCNC: 118 MG/DL (ref 70–99)
GLUCOSE BLD-MCNC: 124 MG/DL (ref 65–105)
GLUCOSE BLD-MCNC: 263 MG/DL (ref 65–105)
HCT VFR BLD CALC: 27.9 % (ref 36.3–47.1)
HEMOGLOBIN: 8.2 G/DL (ref 11.9–15.1)
IMMATURE GRANULOCYTES: 1 %
LYMPHOCYTES # BLD: 19 % (ref 24–43)
Lab: NORMAL
Lab: NORMAL
MCH RBC QN AUTO: 28.5 PG (ref 25.2–33.5)
MCHC RBC AUTO-ENTMCNC: 29.4 G/DL (ref 28.4–34.8)
MCV RBC AUTO: 96.9 FL (ref 82.6–102.9)
MONOCYTES # BLD: 10 % (ref 3–12)
NRBC AUTOMATED: 0 PER 100 WBC
PATHOLOGIST: ABNORMAL
PATHOLOGIST: NORMAL
PDW BLD-RTO: 16.4 % (ref 11.8–14.4)
PLATELET # BLD: 283 K/UL (ref 138–453)
PLATELET ESTIMATE: ABNORMAL
PMV BLD AUTO: 10.3 FL (ref 8.1–13.5)
POTASSIUM SERPL-SCNC: 5 MMOL/L (ref 3.7–5.3)
PROTEIN ELECTROPHORESIS, SERUM: ABNORMAL
RBC # BLD: 2.88 M/UL (ref 3.95–5.11)
RBC # BLD: ABNORMAL 10*6/UL
SEG NEUTROPHILS: 67 % (ref 36–65)
SEGMENTED NEUTROPHILS ABSOLUTE COUNT: 5.38 K/UL (ref 1.5–8.1)
SERUM IFX INTERP: NORMAL
SODIUM BLD-SCNC: 134 MMOL/L (ref 135–144)
SPECIMEN DESCRIPTION: NORMAL
SPECIMEN DESCRIPTION: NORMAL
TOTAL PROT. SUM,%: 100 % (ref 98–102)
TOTAL PROT. SUM: 5 G/DL (ref 6.3–8.2)
TOTAL PROTEIN, URINE: 277 MG/DL
TOTAL PROTEIN: 5 G/DL (ref 6.4–8.3)
WBC # BLD: 7.9 K/UL (ref 3.5–11.3)
WBC # BLD: ABNORMAL 10*3/UL

## 2020-08-14 PROCEDURE — 6370000000 HC RX 637 (ALT 250 FOR IP): Performed by: INTERNAL MEDICINE

## 2020-08-14 PROCEDURE — 82570 ASSAY OF URINE CREATININE: CPT

## 2020-08-14 PROCEDURE — 84156 ASSAY OF PROTEIN URINE: CPT

## 2020-08-14 PROCEDURE — 85025 COMPLETE CBC W/AUTO DIFF WBC: CPT

## 2020-08-14 PROCEDURE — 87205 SMEAR GRAM STAIN: CPT

## 2020-08-14 PROCEDURE — 36415 COLL VENOUS BLD VENIPUNCTURE: CPT

## 2020-08-14 PROCEDURE — 99232 SBSQ HOSP IP/OBS MODERATE 35: CPT | Performed by: INTERNAL MEDICINE

## 2020-08-14 PROCEDURE — 99239 HOSP IP/OBS DSCHRG MGMT >30: CPT | Performed by: STUDENT IN AN ORGANIZED HEALTH CARE EDUCATION/TRAINING PROGRAM

## 2020-08-14 PROCEDURE — 97535 SELF CARE MNGMENT TRAINING: CPT

## 2020-08-14 PROCEDURE — 82947 ASSAY GLUCOSE BLOOD QUANT: CPT

## 2020-08-14 PROCEDURE — 6360000002 HC RX W HCPCS: Performed by: INTERNAL MEDICINE

## 2020-08-14 PROCEDURE — 80048 BASIC METABOLIC PNL TOTAL CA: CPT

## 2020-08-14 PROCEDURE — 99232 SBSQ HOSP IP/OBS MODERATE 35: CPT | Performed by: SURGERY

## 2020-08-14 RX ORDER — GABAPENTIN 100 MG/1
100 CAPSULE ORAL 3 TIMES DAILY
Qty: 45 CAPSULE | Refills: 0 | Status: ON HOLD | OUTPATIENT
Start: 2020-08-14 | End: 2020-08-24 | Stop reason: HOSPADM

## 2020-08-14 RX ORDER — INSULIN GLARGINE 100 [IU]/ML
20 INJECTION, SOLUTION SUBCUTANEOUS NIGHTLY
Qty: 1 VIAL | Refills: 3 | Status: SHIPPED | OUTPATIENT
Start: 2020-08-14 | End: 2020-10-23 | Stop reason: SDUPTHER

## 2020-08-14 RX ORDER — CLONIDINE HYDROCHLORIDE 0.1 MG/1
0.1 TABLET ORAL 3 TIMES DAILY
Qty: 90 TABLET | Refills: 0 | Status: ON HOLD | OUTPATIENT
Start: 2020-08-14 | End: 2020-08-24 | Stop reason: HOSPADM

## 2020-08-14 RX ORDER — CLOPIDOGREL BISULFATE 75 MG/1
75 TABLET ORAL DAILY
Qty: 30 TABLET | Refills: 3 | Status: SHIPPED | OUTPATIENT
Start: 2020-08-14 | End: 2020-10-23 | Stop reason: SDUPTHER

## 2020-08-14 RX ORDER — CARVEDILOL 12.5 MG/1
12.5 TABLET ORAL 2 TIMES DAILY WITH MEALS
Qty: 60 TABLET | Refills: 0 | Status: ON HOLD | OUTPATIENT
Start: 2020-08-14 | End: 2020-08-24 | Stop reason: HOSPADM

## 2020-08-14 RX ADMIN — CARVEDILOL 12.5 MG: 12.5 TABLET, FILM COATED ORAL at 10:48

## 2020-08-14 RX ADMIN — CLONIDINE HYDROCHLORIDE 0.1 MG: 0.1 TABLET ORAL at 15:47

## 2020-08-14 RX ADMIN — GABAPENTIN 100 MG: 100 CAPSULE ORAL at 15:47

## 2020-08-14 RX ADMIN — LOSARTAN POTASSIUM 100 MG: 50 TABLET, FILM COATED ORAL at 10:48

## 2020-08-14 RX ADMIN — Medication 600 MG: at 10:48

## 2020-08-14 RX ADMIN — Medication 81 MG: at 10:48

## 2020-08-14 RX ADMIN — NYSTATIN: 100000 CREAM TOPICAL at 10:53

## 2020-08-14 RX ADMIN — OXYBUTYNIN CHLORIDE 5 MG: 5 TABLET ORAL at 10:47

## 2020-08-14 RX ADMIN — CLOPIDOGREL 75 MG: 75 TABLET, FILM COATED ORAL at 10:47

## 2020-08-14 RX ADMIN — CETIRIZINE HYDROCHLORIDE 5 MG: 10 TABLET ORAL at 10:47

## 2020-08-14 RX ADMIN — DOCUSATE SODIUM 100 MG: 100 CAPSULE, LIQUID FILLED ORAL at 10:47

## 2020-08-14 RX ADMIN — GABAPENTIN 100 MG: 100 CAPSULE ORAL at 10:46

## 2020-08-14 RX ADMIN — HEPARIN SODIUM 5000 UNITS: 5000 INJECTION INTRAVENOUS; SUBCUTANEOUS at 06:24

## 2020-08-14 RX ADMIN — CITALOPRAM 20 MG: 20 TABLET, FILM COATED ORAL at 10:48

## 2020-08-14 RX ADMIN — COLLAGENASE SANTYL: 250 OINTMENT TOPICAL at 10:49

## 2020-08-14 RX ADMIN — PANTOPRAZOLE SODIUM 40 MG: 40 TABLET, DELAYED RELEASE ORAL at 10:46

## 2020-08-14 RX ADMIN — MEMANTINE HYDROCHLORIDE 10 MG: 5 TABLET, FILM COATED ORAL at 10:48

## 2020-08-14 RX ADMIN — CLONIDINE HYDROCHLORIDE 0.1 MG: 0.1 TABLET ORAL at 10:46

## 2020-08-14 RX ADMIN — HEPARIN SODIUM 5000 UNITS: 5000 INJECTION INTRAVENOUS; SUBCUTANEOUS at 15:48

## 2020-08-14 RX ADMIN — ALLOPURINOL 100 MG: 100 TABLET ORAL at 10:47

## 2020-08-14 NOTE — PROGRESS NOTES
Renal Progress Note    Patient :  Anna Reese; 77 y.o. MRN# [id number]  Location:  2019/2019-01  Attending:  Judie Saab MD  Admit Date:  8/7/2020   Hospital Day: 7      Subjective: Lynn still in place. Urine output good. Hemodynamically stable. Creatinine at 1.3 which is close to her baseline. Results of cardiac catheterization and lower extremity angiogram noted. Ultrasound scan could not visualize left kidney right appears to be normal.  Work-up so far so far shows normal complements normal kappa lambda free light chain ratio, proteinuria about 2.5 g.  Plan for discharge to [facility] today. No shortness of breath orthopnea. Oral intake is good. No chest pain dizziness lightheadedness falls. Cozaar continues. Blood pressures fluctuate although overall better.     Outpatient Medications:     Medications Prior to Admission: atorvastatin (LIPITOR) 40 MG tablet, TAKE 1 TABLET BY MOUTH EVERYDAY  docusate sodium (COLACE) 100 MG capsule, Take 1 capsule by mouth 2 times daily  citalopram (CELEXA) 20 MG tablet, Take 1 tablet by mouth daily  insulin lispro (HUMALOG) 100 UNIT/ML injection vial, Inject 0-3 Units into the skin nightly  gabapentin (NEURONTIN) 300 MG capsule, TAKE ONE CAPSULE BY MOUTH THREE TIMES DAILY  furosemide (LASIX) 20 MG tablet, Take 1 tablet by mouth daily  memantine (NAMENDA) 10 MG tablet, TAKE 1 TABLET BY MOUTH TWICE DAILY  clopidogrel (PLAVIX) 75 MG tablet, Take 1 tablet by mouth daily  oxybutynin (DITROPAN) 5 MG tablet, TAKE 1 TABLET BY MOUTH TWICE DAILY  carvedilol (COREG) 25 MG tablet, Take 1 tablet by mouth 2 times daily (with meals)  loratadine (CLARITIN) 10 MG tablet, TAKE 1 TABLET(10 MG) BY MOUTH DAILY  aspirin EC 81 MG EC tablet, Take 1 tablet by mouth daily  blood glucose monitor strips, Check bid  allopurinol (ZYLOPRIM) 300 MG tablet, Take 1 tablet by mouth daily  losartan (COZAAR) 100 MG tablet, Take 1 tablet by mouth daily  omeprazole (PRILOSEC) 20 MG delayed release capsule, Take 1 capsule by mouth every morning (before breakfast)  insulin glargine (LANTUS) 100 UNIT/ML injection vial, Inject 50 Units into the skin nightly  aspirin 81 MG tablet, Take 1 tablet by mouth daily  Diabetic Shoe MISC, by Does not apply route  Compression Stockings MISC, by Does not apply route 20-30 mm Hg bilateral knee high  gabapentin (NEURONTIN) 300 MG capsule, Take 300 mg by mouth 3 times daily. .  rivastigmine (EXELON) 9.5 MG/24HR, APPLY 1 NEW PATCH EVERY 24 HOURS (Patient not taking: Reported on 4/15/2020)  nystatin (MYCOSTATIN) 984056 UNIT/GM cream, Apply topically 2 times daily.   Glucose Blood (BLOOD GLUCOSE TEST STRIPS) STRP, truemetrix test strips check ac and HS    Current Medications:     Scheduled Meds:    sodium chloride flush  10 mL Intravenous 2 times per day    acetylcysteine  600 mg Oral BID    cloNIDine  0.1 mg Oral TID    collagenase   Topical Daily    carvedilol  12.5 mg Oral BID WC    atorvastatin  40 mg Oral Nightly    Sodium Hypochlorite   Irrigation Daily    insulin glargine  20 Units Subcutaneous Nightly    allopurinol  100 mg Oral Daily    aspirin  81 mg Oral Daily    insulin lispro  0-12 Units Subcutaneous TID WC    insulin lispro  0-6 Units Subcutaneous Nightly    citalopram  20 mg Oral Daily    clopidogrel  75 mg Oral Daily    docusate sodium  100 mg Oral BID    gabapentin  100 mg Oral TID    cetirizine  5 mg Oral Daily    losartan  100 mg Oral Daily    memantine  10 mg Oral BID    nystatin   Topical BID    pantoprazole  40 mg Oral QAM AC    oxybutynin  5 mg Oral BID    rivastigmine  1 patch Transdermal Daily    sodium chloride flush  10 mL Intravenous 2 times per day    heparin (porcine)  5,000 Units Subcutaneous 3 times per day     Continuous Infusions:    dextrose       PRN Meds:  sodium chloride flush, acetaminophen, hydrALAZINE, glucose, dextrose, glucagon (rDNA), dextrose, potassium chloride **OR** potassium alternative oral replacement **OR** potassium chloride, sodium chloride flush, acetaminophen **OR** acetaminophen, polyethylene glycol, promethazine **OR** ondansetron, nicotine, perflutren lipid microspheres    Input/Output:       I/O last 3 completed shifts: In: 4819 [P.O.:350; I.V.:724]  Out: 675 [Urine:675]. Patient Vitals for the past 96 hrs (Last 3 readings):   Weight   20 0600 250 lb (113.4 kg)   20 0600 251 lb 1.6 oz (113.9 kg)   20 0555 240 lb (108.9 kg)       Vital Signs:   Temperature:  Temp: 95.8 °F (35.4 °C)  TMax:   Temp (24hrs), Av.2 °F (36.2 °C), Min:95.8 °F (35.4 °C), Max:98.4 °F (36.9 °C)    Respirations:  Resp: 13  Pulse:   Pulse: 57  BP:    BP: (!) 158/49  BP Range: Systolic (18PEJ), EVX:487 , Min:131 , HX       Diastolic (37GVA), PTD:02, Min:49, Max:56      Physical Examination:     General:  AAO x 3, speaking in full sentences, no accessory muscle use. HEENT: Atraumatic, normocephalic, no throat congestion, moist mucosa. Eyes:   Pupils equal, round and reactive to light, EOMI. Neck:   No JVD, no thyromegaly, no lymphadenopathy. Chest:  Bilateral vesicular breath sounds, no rales or wheezes. Cardiac:  S1 S2 RR, no murmurs, gallops or rubs, JVP not raised. Abdomen: Soft, non-tender, no masses or organomegaly, BS audible. :   No suprapubic or flank tenderness. Neuro:  Awake, follows commands no deficits. SKIN:  No rashes, good skin turgor. Extremities:  + edema, absent pulses no calf tenderness.   Ulceration lower extremity which are bandaged    Labs:       Recent Labs     20  0517 20  1054   WBC 9.4 7.9   RBC 3.11* 2.88*   HGB 8.8* 8.2*   HCT 29.6* 27.9*   MCV 95.2 96.9   MCH 28.3 28.5   MCHC 29.7 29.4   RDW 16.2* 16.4*    283   MPV 9.7 10.3      BMP:   Recent Labs     20  0515 20  0517 20  1054   * 133* 134*   K 4.6 4.9 5.0   CL 96* 98 99   CO2 25 25 23   BUN 25* 27* 30*   CREATININE 1.28* 1.36* 1.37*   GLUCOSE 234* 185* 118*   CALCIUM 8.2* 8.2* 8.1*      Phosphorus:   No results for input(s): PHOS in the last 72 hours. Magnesium:  No results for input(s): MG in the last 72 hours. Albumin:  No results for input(s): LABALBU in the last 72 hours.   BNP:    No results found for: BNP  JIMBO:    No results found for: JIMBO  SPEP:  Lab Results   Component Value Date    PROT 5.0 08/13/2020    ALBCAL PENDING 08/13/2020    ALBPCT PENDING 08/13/2020    LABALPH PENDING 08/13/2020    LABALPH PENDING 08/13/2020    A1PCT PENDING 08/13/2020    A2PCT PENDING 08/13/2020    LABBETA PENDING 08/13/2020    BETAPCT PENDING 08/13/2020    GAMGLOB PENDING 08/13/2020    GGPCT PENDING 08/13/2020    PATH PENDING 08/13/2020     UPEP:   No results found for: LABPE  C3:     Lab Results   Component Value Date    C3 114 08/13/2020     C4:     Lab Results   Component Value Date    C4 39 08/13/2020     MPO ANCA:   No results found for: MPO  PR3 ANCA:   No results found for: PR3  Anti-GBM:   No results found for: GBMABIGG  Hep BsAg:       No results found for: HEPBSAG  Hep C AB:          Lab Results   Component Value Date    HEPCAB NONREACTIVE 01/31/2018       Urinalysis/Chemistries:      Lab Results   Component Value Date    NITRU POSITIVE 08/08/2020    COLORU DARK YELLOW 08/08/2020    PHUR 5.5 08/08/2020    WBCUA 20 TO 50 08/08/2020    RBCUA 5 TO 10 08/08/2020    MUCUS NOT REPORTED 08/08/2020    TRICHOMONAS NOT REPORTED 08/08/2020    YEAST NOT REPORTED 08/08/2020    BACTERIA FEW 08/08/2020    SPECGRAV 1.026 08/08/2020    LEUKOCYTESUR MODERATE 08/08/2020    UROBILINOGEN Normal 08/08/2020    BILIRUBINUR NEGATIVE  Verified by ictotest. 08/08/2020    GLUCOSEU 1+ 08/08/2020    KETUA NEGATIVE 08/08/2020    AMORPHOUS NOT REPORTED 08/08/2020     Urine Sodium:   No results found for: LUKE  Urine Potassium:  No results found for: KUR  Urine Chloride:  No results found for: CLUR  Urine Osmolarity: No results found for: OSMOU  Urine Protein:   No components found for: TOTALPROTEIN, URINE   Urine Creatinine:     Lab Results   Component Value Date    LABCREA 86.3 08/14/2020     Urine Eosinophils:  No components found for: UEOS    Radiology:     CXR:     Assessment:     1. Chronic kidney disease stage III from diabetic nephrosclerosis baseline 1.2-1.3, proteinuria about 2.5 g has not seen a nephrologist before: No evidence of contrast nephropathy  2. Ischemic cardiomyopathy ejection fraction 35%  3. Peripheral vascular disease with lower extremity ulcers status post lower extremity angiogram and angioplasty  4. Coronary artery disease previous history of coronary bypass graft, cardiac catheterization this admission showed patent grafts and collaterals no intervention done  5. Type 2 diabetes with nephropathy  6. hypertension with poor control    Plan:   1. Discontinue Lynn  2. Discontinue IV fluids  3. Continue home dose of Cozaar  4. Can resume home dose of Lasix  5.,  BMP Monday and Friday next week  6. Outpatient follow-up with nephrology in 3 to 4 weeks and [place]  7. Nephrology signing off please call for questions    Nutrition   Please ensure that patient is on a renal diet/TF. Avoid nephrotoxic drugs/contrast exposure. We will continue to follow along with you.

## 2020-08-14 NOTE — PROGRESS NOTES
Occupational Therapy  Fac[facility]Department[facility]  Daily Treatment Note  NAME: Carin Baez  : 1950  MRN: [id number]    Date of Service: 2020    Discharge Recommendations:  Patient would benefit from continued therapy after discharge     Copied from Internal Medicine: Carin Baez is a 77 y.o. Non-/non  female who presents with No chief complaint on file. and is admitted to the hospital for the management of CHF (congestive heart failure), NYHA class I, acute on chronic, combined ([facility] [address]    This is a 77 yr old female with history of CAD s/p CABG, CKD, CVA, glaucoma, HTN, HLD, DMII, and poly neuropathy who presented to [place] ER today via EMS. Patient lives with her daughter and grandson, reportedly fell in the early evening on  and refused to let her daughter call 911. EMS was finally called yesterday morning after patient was found by family and had laid on the ground overnight, unable to get up. On arrival to ER, patient covered in urine and feces, multiple wounds of various stages with maggot and bed bug infestation. Patient noted to be hyperglycemic with bs of 400, AMY with elevated CK and Myoglobin, pro-bnp> 31,000, elevated troponin, and UTI. CT Head and neck without any acute findings, there is evidence of remote left PCA infarct. Patient denies any LOC or injury/hitting head on falling. Reports she slipped on the floor when she is used to walking on carpet. She denies any previous or associated dizziness or light headedness. Given the array of findings listed above, patient is transferred to our facility for further care and management  Assessment   Performance deficits / Impairments: Decreased functional mobility ; Decreased ADL status; Decreased endurance;Decreased high-level IADLs;Decreased safe awareness  Assessment: Pt would benefit from further skilled OT services to address decreased functional mobility, ADL performance, and endurance.   Treatment Diagnosis: CHF  Prognosis: Fair  REQUIRES OT FOLLOW UP: Yes  Activity Tolerance  Activity Tolerance: Patient limited by fatigue  Safety Devices  Safety Devices in place: Yes  Type of devices: Call light within reach; Left in bed;Bed alarm in place  Restraints  Initially in place: No       Patient Diagnosis(es): The encounter diagnosis was Acute kidney injury superimposed on CKD ([facility] [address] has a past medical history of Anxiety and depression, Background diabetic retinopathy(362.01), Balance problem, CAD (coronary artery disease), Cancer of uterus ([address] Chronic kidney disease, Chronic low back pain, CVA (cerebral vascular accident) ([address] Dementia ([facility] [address] Dry eye syndrome, GERD (gastroesophageal reflux disease), Glaucoma suspect, Gout, Homonymous hemianopsia, Hyperlipidemia, Hypertension, Keratitis, Obesity, Peripheral polyneuropathy, Pseudophakos, Stroke ([facility] [address] Trichiasis, and Type 2 diabetes mellitus ([facility] [address] has a past surgical history that includes Gastric bypass surgery; Cataract removal with implant (2012); Cataract removal with implant (2012); Coronary artery bypass graft (12-);  section; Hysterectomy; Cholecystectomy; Tonsillectomy and adenoidectomy; and Eye surgery (Left). Restrictions  Restrictions/Precautions  Restrictions/Precautions: Fall Risk, Weight Bearing  Required Braces or Orthoses?: Yes  Lower Extremity Weight Bearing Restrictions  Partial Weight Bearing Percentage Or Pounds: NWB R heel, okay for forefoot touch for transfers per Dr Negrete Care from [address] or Orthoses  Right Lower Extremity Brace: Boot  RLE Brace Type: [id number]  Position Activity Restriction  Other position/activity restrictions: Up with assistance  Subjective   General  Patient assessed for rehabilitation services?: Yes  Family / Caregiver Present: No  Diagnosis: CHF  General Comment  Comments: RN ok'd for OT eval. Pt agreeable and pleasant throughout session.   Pain Assessment  Pain Assessment: 0-10  Pain Level: 0  Vital Signs  Patient Currently in Pain: Denies  Oxygen Therapy  O2 Device: Nasal cannula  O2 Flow Rate (L/min): 2 L/min   Orientation  Orientation  Overall Orientation Status: Impaired  Orientation Level: Oriented to place;Oriented to person;Disoriented to time  Objective    ADL  Feeding: Setup; Increased time to complete; Independent(pt is I for hand to mouth feeding. Pt with increased difficulty and requiring an overly extended period of time to complete cutting of sausage and pancakes)  Grooming: Minimal assistance;Setup; Increased time to complete  Additional Comments: Pt lying supine in bed upon arrival with breakfast tray in front of pt ar 11am in the morning. Offered to reheat tray and pt reports it was just reheated. Encouraged pt to sit eob to eat, with mod encouragement pt agreed. Pt mod a for supine to sit eob with cga for sitting balance. Pt requires an extended time to feed self and verbal cuing to continue with task. pt has a flat affect, reports having little sleep last night, not able to fall asleep until 4am due to lots of interuptions. Balance  Sitting Balance: Contact guard assistance  Standing Balance  Comment: Pt attempted to stand at EOB to side step to [facility]. Pt with increased difficulty standing following NWB R Heel. Pt with increased lethargy and difficulty following commands to follow weight bearing precautions, therefore, returned to EOB. Pt requires verbal cuing for follow through of all tasks and requires an extended time to complete all tasks     Cognition  Overall Cognitive Status: Exceptions  Arousal/Alertness: Delayed responses to stimuli  Following Commands: Follows multistep commands with increased time; Follows one step commands with increased time  Attention Span: Attends with cues to redirect  Safety Judgement: Decreased awareness of need for assistance;Decreased awareness of need for safety  Problem Solving: Decreased awareness of errors  Insights: Decreased awareness of deficits  Initiation: Requires cues for some  Sequencing: Requires cues for some      Plan   Plan  Times per week: 3-5x/wk  Current Treatment Recommendations: Patient/Caregiver Education & Training, Endurance Training, Functional Mobility Training, Safety Education & Training, Self-Care / ADL    Goals  Short term goals  Time Frame for Short term goals: Pt will, by discharge  Short term goal 1: demo UB ADLs with SBA  Short term goal 2: demo LB ADLs with min A  Short term goal 3: demo functional mobility/transfer with min A using LRD and good safety awareness  Short term goal 4: demo dynamic standing balance for 7 mins+ with mod A and using LRD during ADL activity  Short term goal 5: demo activity tolerance for 35 mins+ during ADL task       Therapy Time   Individual Concurrent Group Co-treatment   Time In 1108         Time Out 1150         Minutes 42         Timed Code Treatment Minutes: 1406 [facility], OTR/L

## 2020-08-14 NOTE — CARE COORDINATION
Transitional planning-talked with patient about going to [facility]-she stated she would go. Called [place] at [employer] of [address] [facility] she got auth for placment-good until 8- until 5PM. Still wants updated PT/OT notes. Called OT to see patient. If discharged this weekend call Janneth Mathew at [phone] Set up transportation with [facility] for 530PM. Notified daughter Rod Mccray, Notified Ayesha Covarrubias at [address] [facility].     [phone] faxed BRIELLE and TAISHA to [facility]

## 2020-08-14 NOTE — PROGRESS NOTES
times per day       Continuous Infusions:   sodium chloride 100 mL/hr at 20 1859    dextrose         PRN Meds:sodium chloride flush, acetaminophen, hydrALAZINE, glucose, dextrose, glucagon (rDNA), dextrose, potassium chloride **OR** potassium alternative oral replacement **OR** potassium chloride, sodium chloride flush, acetaminophen **OR** acetaminophen, polyethylene glycol, promethazine **OR** ondansetron, nicotine, perflutren lipid microspheres    Objective     Vitals:  Patient Vitals for the past 8 hrs:   BP Temp Temp src Weight   20 0831 (!) 158/49 95.8 °F (35.4 °C) Axillary --   20 0600 -- -- -- 250 lb (113.4 kg)     Average, Min, and Max for last 24 hours Vitals:  TEMPERATURE:  Temp  Av.2 °F (36.2 °C)  Min: 95.8 °F (35.4 °C)  Max: 98.4 °F (36.9 °C)    RESPIRATIONS RANGE: Resp  Av.3  Min: 5  Max: 19    PULSE RANGE: Pulse  Av.7  Min: 57  Max: 72    BLOOD PRESSURE RANGE:  Systolic (94TLG), PWT:290 , Min:131 , VXC:437   ; Diastolic (40XOJ), AAK:05, Min:49, Max:56      PULSE OXIMETRY RANGE: SpO2  Av.3 %  Min: 88 %  Max: 99 %    I/O last 3 completed shifts: In: 9032 [P.O.:350; I.V.:724]  Out: 675 [Urine:675]    CBC:  Recent Labs     20   WBC 9.4   HGB 8.8*   HCT 29.6*           BMP:  Recent Labs     2015 20   * 133*   K 4.6 4.9   CL 96* 98   CO2 25 25   BUN 25* 27*   CREATININE 1.28* 1.36*   GLUCOSE 234* 185*   CALCIUM 8.2* 8.2*        Coags:  Recent Labs     20   APTT 27.2   PROT 5.0*       Lab Results   Component Value Date    SEDRATE 59 (H) 2020     No results for input(s): CRP in the last 72 hours. Lower Extremity Physical Exam:  Vascular: DP and PT pulses are non-Palpable . CFT <3 seconds to all digits. Hair growth is absent to the level of the digits.   Moderate pitting edema noted to the bilateral lower extremities.       Neuro: Saph/sural/SP/DP/plantar sensation diminished to light touch.     Musculoskeletal: Muscle strength is 4/5 to all lower extremity muscle groups. Gross deformity is absent.     Dermatologic: Full thickness ulcer #1 located to the right plantar heel and measures approximately 7.0cm x 6.0cm x 0.5cm. The wound base is fibro-necrotic centrally. Minimal serous drainage noted with mild associated mal odor. Erythema localized to the wound without streaking lymphangitis with minimal associated increase in warmth. Does not probe to bone, sinus track, or undermine. No fluctuance, crepitus, or induration. Interdigital maceration absent.      Multiple chronic appearing wounds to the left lower extremity as well as right posterior calf secondary to chronic lymphedema/venous stasis.     Clinical:                  Imaging:   US RENAL COMPLETE   Final Result   Possible right nephrolithiasis. Otherwise grossly unremarkable appearance of the right kidney, difficult to   visualized sonographically. The left kidney is not visualized. Unable to visualize urinary bladder. VL LOWER EXTREMITY ARTERIAL SEGMENTAL PRESSURES W PPG   Final Result      XR FOOT RIGHT (MIN 3 VIEWS)   Final Result   Significant soft tissue swelling over the forefoot. Degenerative changes. No acute fracture, dislocation or cortical destruction. Soft tissue ulcer   over the heel without underlying osseous abnormality. RECOMMENDATION:   Radionuclide bone imaging or MRI imaging may be utilized for further   assessment. XR CHEST PORTABLE   Final Result   Increased bibasilar lung opacities which are nonspecific and may represent   atelectasis and pleural effusions versus pneumonia. NIVS: 8/11/2020  Non-diagnostic ABIs secondary to arteriosclerosis. Dampened PVRs at the level of the calf, ankle and toe on the right and left. Cultures: None    Assessment   Tommy Nash is a 77 y.o. female with   1. Unstagable pressure ulcer, right heel  2.  Multiple lower extremities

## 2020-08-14 NOTE — PROGRESS NOTES
Vascular Surgery Progress Note         PATIENT NAME: Severo Schaumann     TODAY'S DATE: 8/14/2020, 5:56 AM      SUBJECTIVE:    Pt seen and examined. Patient with some bleeding to left groin overnight. Pressure dressing applied and remains intact. No active bleeding, hematoma, or ecchymosis noted. Patient remains hemodynamically stable. She has no complaints this morning. OBJECTIVE:   Vitals:  BP (!) 131/55   Pulse 57   Temp 97.5 °F (36.4 °C) (Axillary)   Resp 13   Ht 5' 5\" (1.651 m)   Wt 251 lb 1.6 oz (113.9 kg)   SpO2 99%   BMI 41.79 kg/m²      INTAKE/OUTPUT:      Intake/Output Summary (Last 24 hours) at 8/14/2020 0556  Last data filed at 8/13/2020 1859  Gross per 24 hour   Intake 2861 ml   Output 1775 ml   Net 1086 ml                 GENERAL: AOx3, NAD  HEENT: EOMI bilaterally  CARDIAC: Regular rate and rhythm. ABDOMEN: Soft, ND  EXTREMITY: moves all extremities, ace wraps to bilateral lower extremities for chronic lymphedema. NEURO: Gross motor intact. INCISION: Dressing clean, dry, intact. ASSESSMENT   3 77year old female s/p aortogram, RLE angiogram, and angioplasty of posterior tibial and peroneal arteries. PLAN  1. Revascularization performed yesterday. Chronic wound care per podiatry.    Recommend plavix and asa for 3 months and then Asa lifelong  Vascular to sign off at this time, please call with any questions        Electronically signed by Mariam Terry DO  on 8/14/2020 at 5:56 AM

## 2020-08-14 NOTE — CARE COORDINATION
Discharge [facility] Case Management Department  Written by: Hannah Roberson RN    Patient Name: Richy Mckeon  Attending Provider: Dougie Bojorquez MD  Admit Date: 2020 10:29 PM  MRN: [id number]  Account: [de-identified]                     : 1950  Discharge Date: 2020      Disposition: SNF[facility]    Hannah Roberson RN

## 2020-08-14 NOTE — DISCHARGE SUMMARY
Tess Lopez [facility]    Discharge Summary     Patient ID: Lilliam Rome  :  1950   MRN: [id number]     ACCOUNT:  [de-identified]   Patient's PCP: ERINN Vivar  Admit Date: 2020   Discharge Date: 2020     Length of Stay: 7  Code Status:  Full Code  Admitting Physician: Ron Liriano MD  Discharge Physician: Aixa Thornton MD     Active Discharge Diagnoses:     Hospital Problem Lists:  Principal Problem:    CHF (congestive heart failure), NYHA class I, acute on chronic, combined ([address]  Active Problems:    Hypertension    Hyperlipidemia    Obesity    Acute kidney injury superimposed on CKD ([address]    CAD (coronary artery disease)    Type 2 diabetes mellitus with hyperglycemia, with long-term current use of insulin ([address]    Acute cystitis    Infestation by bed bug    Infestation by maggots    Fall    Elevated troponin    Lymphedema of both lower extremities    PAD (peripheral artery disease) ([address]  Resolved Problems:    * No resolved hospital problems. *      Admission Condition:  poor     Discharged Condition: fair    Hospital Stay:     Hospital Course:  Lilliam Rome is a 77 y.o. female who was admitted for the management of   CHF (congestive heart failure), NYHA class I, acute on chronic, combined ([address] , presented to ER with Hyperglycemia. Patient was found to have symptomatic UTI and elevated sugars as well as elevated proBNP. Patient was treated for the UTI for 5 days. Patient was also seen by podiatry due to right heel wound which subsequently was due to vascular disease and patient underwent angiogram as well as cardiac catheterization. Patient had angioplasty of the posterior tibial and peroneal arteries. Patient to continue on aspirin and Plavix. Per vascular surgery recommend Plavix at least for 3 months. Can discontinue if intervention is required.       Significant therapeutic interventions:   Cardiac catheterization 8/13/2020:  Conclusions:   Multi-vessel Coronary Artery Disease.   3 out of 4 grafts are patent.   Patent LIMA-LAD, SVG-Diagonal and SVG-OM.   Occluded SVG to RCA with left to right collaterals.   Borderline LV systolic function. Right lower extremity angiography:  1) US guided vascular access of left common femoral artery  2) Aortogram  3) Right lower extremity angiogram  4) Selective 3rd order catheterization of posterior tibial and peroneal arteries  5) Angioplasty of posterior tibial and peroneal arteries    Significant Diagnostic Studies:   Labs / Micro:  CBC:   Lab Results   Component Value Date    WBC 7.9 08/14/2020    RBC 2.88 08/14/2020    HGB 8.2 08/14/2020    HCT 27.9 08/14/2020    MCV 96.9 08/14/2020    MCH 28.5 08/14/2020    MCHC 29.4 08/14/2020    RDW 16.4 08/14/2020     08/14/2020     BMP:    Lab Results   Component Value Date    GLUCOSE 118 08/14/2020     08/14/2020    K 5.0 08/14/2020    CL 99 08/14/2020    CO2 23 08/14/2020    ANIONGAP 12 08/14/2020    BUN 30 08/14/2020    CREATININE 1.37 08/14/2020    BUNCRER NOT REPORTED 08/14/2020    CALCIUM 8.1 08/14/2020    LABGLOM 38 08/14/2020    GFRAA 46 08/14/2020    GFR      08/14/2020    GFR NOT REPORTED 08/14/2020        Radiology:  Di Curio Foot Right (min 3 Views)    Result Date: 8/11/2020  Significant soft tissue swelling over the forefoot. Degenerative changes. No acute fracture, dislocation or cortical destruction. Soft tissue ulcer over the heel without underlying osseous abnormality. RECOMMENDATION: Radionuclide bone imaging or MRI imaging may be utilized for further assessment. Us Renal Complete    Result Date: 8/13/2020  Possible right nephrolithiasis. Otherwise grossly unremarkable appearance of the right kidney, difficult to visualized sonographically. The left kidney is not visualized. Unable to visualize urinary bladder.      Xr Chest Portable    Result Date: 8/8/2020  Increased bibasilar lung opacities which are nonspecific and may represent atelectasis and pleural effusions versus pneumonia. Consultations:    Consults:     Final Specialist Recommendations/Findings:   IP CONSULT TO DIETITIAN  IP CONSULT TO SOCIAL WORK  IP CONSULT TO CARDIOLOGY  IP CONSULT TO SOCIAL WORK  IP CONSULT TO SOCIAL WORK  IP CONSULT TO PODIATRY  IP CONSULT TO NEPHROLOGY      The patient was seen and examined on day of discharge and this discharge summary is in conjunction with any daily progress note from day of discharge. Discharge plan:     Disposition: To a non-[facility] facility    Physician Follow Up:     Fabián Genao  [address] [facility]  [phone]    Schedule an appointment as soon as possible for a visit in 2 weeks      Daniele Quiñones, [facility]  [facility] [address]  [phone]      follow-up s/p angioplasty to right leg    Diallo Pham MD  [address] [place] [address]  [phone]    Schedule an appointment as soon as possible for a visit in 3 weeks  follow-up       Requiring Further Evaluation/Follow Up POST HOSPITALIZATION/Incidental Findings: Follow up with PCP, vascular surgeon, Podiatrist, and Nephrologist    Diet: cardiac diet    Activity: As tolerated    Instructions to Patient: Please continue plavix for at least 3 months    Discharge Medications:      Medication List      START taking these medications    cloNIDine 0.1 MG tablet  Commonly known as:  CATAPRES  Take 1 tablet by mouth 3 times daily     collagenase 250 UNIT/GM ointment  Apply topically daily. Start taking on:  August 15, 2020     Sodium Hypochlorite 0.125 % Soln  Commonly known as:  DAKINS  Irrigate with 473 mLs as directed daily  Start taking on:  August 15, 2020        CHANGE how you take these medications    aspirin 81 MG tablet  Take 1 tablet by mouth daily  What changed:  Another medication with the same name was removed.  Continue taking this medication, and follow the directions you see here. carvedilol 12.5 MG tablet  Commonly known as:  COREG  Take 1 tablet by mouth 2 times daily (with meals)  What changed:    · medication strength  · how much to take     gabapentin 100 MG capsule  Commonly known as:  NEURONTIN  Take 1 capsule by mouth 3 times daily for 15 days. What changed:    · medication strength  · how much to take  · Another medication with the same name was removed. Continue taking this medication, and follow the directions you see here. insulin glargine 100 UNIT/ML injection vial  Commonly known as:  Lantus  Inject 20 Units into the skin nightly  What changed:  how much to take        CONTINUE taking these medications    allopurinol 300 MG tablet  Commonly known as:  ZYLOPRIM  Take 1 tablet by mouth daily     atorvastatin 40 MG tablet  Commonly known as:  LIPITOR  TAKE 1 TABLET BY MOUTH EVERYDAY     * blood glucose test strips  truemetrix test strips check ac and HS     * blood glucose test strips  Check bid     citalopram 20 MG tablet  Commonly known as:  CELEXA  Take 1 tablet by mouth daily     clopidogrel 75 MG tablet  Commonly known as:  Plavix  Take 1 tablet by mouth daily     Compression Stockings Misc  by Does not apply route 20-30 mm Hg bilateral knee high     Diabetic Shoe Misc  by Does not apply route     docusate sodium 100 MG capsule  Commonly known as:  Colace  Take 1 capsule by mouth 2 times daily     loratadine 10 MG tablet  Commonly known as:  CLARITIN  TAKE 1 TABLET(10 MG) BY MOUTH DAILY     losartan 100 MG tablet  Commonly known as:  COZAAR  Take 1 tablet by mouth daily     memantine 10 MG tablet  Commonly known as:  NAMENDA  TAKE 1 TABLET BY MOUTH TWICE DAILY     nystatin 984070 UNIT/GM cream  Commonly known as:  MYCOSTATIN  Apply topically 2 times daily.      omeprazole 20 MG delayed release capsule  Commonly known as:  PRILOSEC  Take 1 capsule by mouth every morning (before breakfast)     oxybutynin 5 MG tablet  Commonly known as:  DITROPAN  TAKE 1 TABLET BY MOUTH TWICE DAILY     rivastigmine 9.5 MG/24HR  Commonly known as:  Exelon  APPLY 1 NEW PATCH EVERY 24 HOURS         * This list has 2 medication(s) that are the same as other medications prescribed for you. Read the directions carefully, and ask your doctor or other care provider to review them with you. STOP taking these medications    furosemide 20 MG tablet  Commonly known as:  Lasix     insulin lispro 100 UNIT/ML injection vial  Commonly known as:  HUMALOG           Where to Get Your Medications      These medications were sent to [facility] [address] [place] [address] [address]  [address]    Phone:  [phone]   · carvedilol 12.5 MG tablet  · cloNIDine 0.1 MG tablet  · clopidogrel 75 MG tablet  · collagenase 250 UNIT/GM ointment  · gabapentin 100 MG capsule  · insulin glargine 100 UNIT/ML injection vial  · Sodium Hypochlorite 0.125 % Soln         Discharge Procedure Orders   Basic Metabolic Panel   Standing Status: Standing Number of Occurrences: 3 Standing Exp. Date: 08/14/21   Order Comments: Mom and Friday. Fax to [phone]       Time Spent on discharge is  32 mins in patient examination, evaluation, counseling as well as medication reconciliation, prescriptions for required medications, discharge plan and follow up. Electronically signed by   Major Barahona MD  8/14/2020  5:52 PM      Thank you ERINN Dorado for the opportunity to be involved in this patient's care.

## 2020-08-14 NOTE — PROGRESS NOTES
Report given to Peg at [facility]. All questions answered. Callback number provided. Pt. To facility per transportation, pt. States she has no belongings here.

## 2020-08-14 NOTE — PROGRESS NOTES
Tess Lopez 20    Progress Note    8/14/2020    8:46 AM    Name:   Aki Esparza  MRN:     [id number]     Acct:      [de-identified]   Room:   2019/2019-01  IP Day:  7  Admit Date:  8/7/2020 10:29 PM    PCP:   ERINN Hilton  Code Status:  Full Code    Subjective:     C/C: hyperglycemia  Interval History Status: improved    Patient seen and examined. Patient seen and examined. Drowsy but follows commands. Has some pain due to changing of dressing on right heel. Patient underwent Angio and cardiac catheterization yesterday. Had angiplasty of posterior tibial and peroneal arteries. Brief History:     Briefly this is a 81-year-old female who presented to [facility] ER via EMS after having a fall.  Patient was found to have symptomatic UTI as well as elevated sugars up to 400 and.  proBNP elevation of 31,000. Stephanie Hudson is currently being treated with Cipro for her UTI. Hsitory of CABG in 2005.     Review of Systems:     Constitutional:  negative for chills, fevers, sweats  Respiratory:  negative for cough, dyspnea on exertion, shortness of breath, wheezing  Cardiovascular:  negative for chest pain, chest pressure/discomfort, lower extremity edema, palpitations  Gastrointestinal:  negative for abdominal pain, constipation, diarrhea, nausea, vomiting  Neurological:  negative for dizziness, headache    Medications: Allergies:     Allergies   Allergen Reactions    Bactrim [Sulfamethoxazole-Trimethoprim] Hives    Amoxicillin-Pot Clavulanate Diarrhea, Nausea Only and Nausea And Vomiting       Current Meds:   Scheduled Meds:    sodium chloride flush  10 mL Intravenous 2 times per day    acetylcysteine  600 mg Oral BID    cloNIDine  0.1 mg Oral TID    collagenase   Topical Daily    carvedilol  12.5 mg Oral BID WC    atorvastatin  40 mg Oral Nightly    Sodium Hypochlorite   Irrigation Daily    insulin glargine  20 Units Subcutaneous Nightly    allopurinol  100 mg Oral Daily    aspirin  81 mg Oral Daily    insulin lispro  0-12 Units Subcutaneous TID WC    insulin lispro  0-6 Units Subcutaneous Nightly    citalopram  20 mg Oral Daily    clopidogrel  75 mg Oral Daily    docusate sodium  100 mg Oral BID    gabapentin  100 mg Oral TID    cetirizine  5 mg Oral Daily    losartan  100 mg Oral Daily    memantine  10 mg Oral BID    nystatin   Topical BID    pantoprazole  40 mg Oral QAM AC    oxybutynin  5 mg Oral BID    rivastigmine  1 patch Transdermal Daily    sodium chloride flush  10 mL Intravenous 2 times per day    heparin (porcine)  5,000 Units Subcutaneous 3 times per day     Continuous Infusions:    sodium chloride 100 mL/hr at 08/13/20 1859    dextrose       PRN Meds: sodium chloride flush, acetaminophen, hydrALAZINE, glucose, dextrose, glucagon (rDNA), dextrose, potassium chloride **OR** potassium alternative oral replacement **OR** potassium chloride, sodium chloride flush, acetaminophen **OR** acetaminophen, polyethylene glycol, promethazine **OR** ondansetron, nicotine, perflutren lipid microspheres    Data:     Past Medical History:   has a past medical history of Anxiety and depression, Background diabetic retinopathy(362.01), Balance problem, CAD (coronary artery disease), Cancer of uterus ([facility] Chronic kidney disease, Chronic low back pain, CVA (cerebral vascular accident) ([facility] [address] Dementia ([facility] Dry eye syndrome, GERD (gastroesophageal reflux disease), Glaucoma suspect, Gout, Homonymous hemianopsia, Hyperlipidemia, Hypertension, Keratitis, Obesity, Peripheral polyneuropathy, Pseudophakos, Stroke ([facility] [address] Trichiasis, and Type 2 diabetes mellitus ([facility] Social History:   reports that she has never smoked. She has never used smokeless tobacco. She reports that she does not drink alcohol or use drugs.      Family History:   Family History   Problem Relation Age of Onset    Diabetes Mother     Cancer Mother     High Blood Pressure Mother     Stroke Mother     Kidney Disease Mother     Uterine Cancer Mother     Diabetes Father     Heart Disease Father     Glaucoma Father     Emphysema Father     Coronary Art Dis Other         All 4 siblings.  Stroke Other         1 sibling.  Lung Cancer Other         1 sibling - cause of death lung cancer.  Mental Retardation Other        Vitals:  BP (!) 158/49   Pulse 57   Temp 95.8 °F (35.4 °C) (Axillary)   Resp 13   Ht 5' 5\" (1.651 m)   Wt 250 lb (113.4 kg)   SpO2 99%   BMI 41.60 kg/m²   Temp (24hrs), Av.7 °F (36.5 °C), Min:95.8 °F (35.4 °C), Max:99.1 °F (37.3 °C)    Recent Labs     20  1001 20  1634 20  0754   POCGLU 130* 99 116* 124*       I/O (24Hr):     Intake/Output Summary (Last 24 hours) at 2020 0846  Last data filed at 2020 1859  Gross per 24 hour   Intake 1074 ml   Output 675 ml   Net 399 ml       Labs:  Hematology:  Recent Labs     20  0517   WBC 9.4   RBC 3.11*   HGB 8.8*   HCT 29.6*   MCV 95.2   MCH 28.3   MCHC 29.7   RDW 16.2*      MPV 9.7     Chemistry:  Recent Labs     20  0515 20  0517   * 133*   K 4.6 4.9   CL 96* 98   CO2 25 25   GLUCOSE 234* 185*   BUN 25* 27*   CREATININE 1.28* 1.36*   ANIONGAP 10 10   LABGLOM 41* 39*   GFRAA 50* 47*   CALCIUM 8.2* 8.2*   PROBNP 23,730*  --      Recent Labs     20  1846 20  2143 20  1001 20  1634 20  0754   PROT  --   --   --   --  5.0*  --   --    POCGLU 290* 263* 130* 99  --  116* 124*     ABG:No results found for: POCPH, PHART, PH, POCPCO2, NEW2BCU, PCO2, POCPO2, PO2ART, PO2, POCHCO3, YQQ3FLS, HCO3, NBEA, PBEA, BEART, BE, THGBART, THB, FMA1WYQ, SXYI0BAT, Y2ERHISY, O2SAT, FIO2  Lab Results   Component Value Date/Time    SPECIAL NOT REPORTED 2020 01:52 PM     Lab Results   Component Value Date/Time    CULTURE NO SIGNIFICANT GROWTH 2020 01:52 PM       Radiology:  Huy Hurley Foot Right (min 3 Views)    Result Date: 8/11/2020  Significant soft tissue swelling over the forefoot. Degenerative changes. No acute fracture, dislocation or cortical destruction. Soft tissue ulcer over the heel without underlying osseous abnormality. RECOMMENDATION: Radionuclide bone imaging or MRI imaging may be utilized for further assessment. Ct Head Wo Contrast    Result Date: 8/7/2020  No acute intracranial abnormality. Left sided scalp edema/hematoma without underlying calvarial fracture. Peripheral mucosal thickening of the paranasal sinuses. Remote left PCA territory infarction. Ct Cervical Spine Wo Contrast    Result Date: 8/7/2020  No acute abnormality of the cervical spine. Mild multilevel degenerative changes. Interlobular septal thickening apices; correlate for edema or other disease. 4 cm left thyroid nodule. If not already assessed previously, nonemergent follow-up ultrasound suggested. Us Renal Complete    Result Date: 8/13/2020  Possible right nephrolithiasis. Otherwise grossly unremarkable appearance of the right kidney, difficult to visualized sonographically. The left kidney is not visualized. Unable to visualize urinary bladder. Xr Chest Portable    Result Date: 8/8/2020  Increased bibasilar lung opacities which are nonspecific and may represent atelectasis and pleural effusions versus pneumonia. Xr Chest Portable    Result Date: 8/7/2020  Mild cardiomegaly and central vascular congestion. Basilar nodules are present, denser than rib favoring granulomas or hamartomas.        Physical Examination:        General appearance:  alert, cooperative and no distress  Mental Status:  oriented to person, place and time and normal affect  Lungs:  clear to auscultation bilaterally, normal effort  Heart:  regular rate and rhythm, no murmur  Abdomen:  soft, nontender, nondistended, normal bowel sounds, no masses, hepatomegaly, splenomegaly  Extremities:  no edema, redness, tenderness in the calves  Skin:  Chronic venous staisis, right heel looks dry and healing well    Assessment:        Hospital Problems           Last Modified POA    * (Principal) CHF (congestive heart failure), NYHA class I, acute on chronic, combined ([facility] 8/8/2020 Yes    Hypertension 8/8/2020 Yes    Hyperlipidemia 8/8/2020 Yes    Obesity 8/8/2020 Yes    Acute kidney injury superimposed on CKD ([facility] [address] 8/8/2020 Yes    CAD (coronary artery disease) 8/8/2020 Yes    Overview Signed 2/27/2014 10:22 AM by Jeet Reynolds MD     (with coronary artery bypass graft x4). Type 2 diabetes mellitus with hyperglycemia, with long-term current use of insulin ([facility] [address] 8/8/2020 Yes    Acute cystitis 8/8/2020 Yes    Infestation by bed bug 8/8/2020 Yes    Infestation by maggots 8/8/2020 Yes    Fall 8/8/2020 Yes    Elevated troponin 8/8/2020 Yes    Lymphedema of both lower extremities 8/8/2020 Yes    PAD (peripheral artery disease) ([facility] [address] 8/13/2020 Yes          Plan:        1. Follow up Renal function  2. ASA and plavix per vascular recommendations  3. Lipiutor coreg, and clionidine  4. Has not required insulin in the past 24 hours, suspect due to NPO, expect to increase as she feels better. 5. Monitor H/H after post angio and catheterization  6.  Will plan on discharging if Renal function and hemoglobin are stable and ok with other services    Irene Chavez MD  8/14/2020  8:46 AM

## 2020-08-14 NOTE — PROGRESS NOTES
Pt ptt resulted as 27.9, Car 1 called to pull art line at 2300. Car 1 nurse came up at aprox 2015 pulled art line from pt, pt began bleeding profusley even through applied preassure, at request of car 1 rn called car 1 charge to come assist as well as calling car 2 charge to room for assistance. Car 1 charge arrived at aprox 2322 took over holding preassure to site bleeding began to slow at 2337, continued to hold pressure till 2355 about 5 minutes after bleeding had stopped. Placed preassure dressing over site. Vitals where stable during entire event, vascular was notified of bleeding no new orders reccived. resident came up at about 0002 to asses pt site was clean and no bleeding was noted. Writer will stay with pt frequently checking site for development of hematoma, or resumption of bleeding.

## 2020-08-17 ENCOUNTER — TELEPHONE (OUTPATIENT)
Dept: VASCULAR SURGERY | Age: 70
End: 2020-08-17

## 2020-08-17 ENCOUNTER — TELEPHONE (OUTPATIENT)
Dept: INTERNAL MEDICINE | Age: 70
End: 2020-08-17

## 2020-08-17 ENCOUNTER — OUTSIDE SERVICES (OUTPATIENT)
Dept: INTERNAL MEDICINE | Age: 70
End: 2020-08-17
Payer: MEDICARE

## 2020-08-17 PROCEDURE — 99309 SBSQ NF CARE MODERATE MDM 30: CPT | Performed by: NURSE PRACTITIONER

## 2020-08-17 NOTE — PROGRESS NOTES
Lantus was changed to 20 units nightly remainder of medicines stayed the same. Due to inability to care for herself in the home setting she was sent to [facility] for rehabilitation. Vitals have remained stable. No further problems with bladder or bowel. Denies any pain. Is following with the wound care at [address] [facility]. Nursing staff deny any acute concerns at present patient denies any pain shortness of breath      Allergies   Allergen Reactions    Bactrim [Sulfamethoxazole-Trimethoprim] Hives    Amoxicillin-Pot Clavulanate Diarrhea, Nausea Only and Nausea And Vomiting       Past Medical History:   Diagnosis Date    Anxiety and depression     Background diabetic retinopathy(362.01)     (Mild)    Balance problem     CAD (coronary artery disease)     (with coronary artery bypass graft x4).  Cancer of uterus [facility])     history of, (probable cure)    Chronic kidney disease     Chronic low back pain     CVA (cerebral vascular accident) ([address]     Dementia ([address]     (moderate)    Dry eye syndrome     GERD (gastroesophageal reflux disease)     Glaucoma suspect     Gout     Homonymous hemianopsia     (Right)    Hyperlipidemia     Hypertension     Keratitis     (secondary to dry eye syndrome).  Obesity     Peripheral polyneuropathy     (diabetic)    Pseudophakos     (Right Eye: 2012; Left Eye: 2012) - Dr. Emani Hernandez.  Stroke [facility])     (Multiple) MRI of brain 10/2008 shows multiple infarcts involving the temporal and parietal areas.  Trichiasis     (left eye)    Type 2 diabetes mellitus ([address]        Past Surgical History:   Procedure Laterality Date    CATARACT REMOVAL WITH IMPLANT  2012    (Right eye) - Dr. Emani Hernandez.  CATARACT REMOVAL WITH IMPLANT  2012    (Left eye) - Dr. Emani Hernandez.   SECTION      CHOLECYSTECTOMY      CORONARY ARTERY BYPASS GRAFT  12-    (x4) - LIMA-LAD, SVG-diagnoal 1, SVG-OM1, and SVG-PDA. Exploration of left radial. (Dr. Enid Gonzalez).  EYE SURGERY Left     as a child     GASTRIC BYPASS SURGERY      HYSTERECTOMY      (abdominal)  with left oophorectomy. Right ovary retained.  TONSILLECTOMY AND ADENOIDECTOMY         Medications as per [facility] /reviewed     Social History     Socioeconomic History    Marital status:      Spouse name: Not on file    Number of children: Not on file    Years of education: Not on file    Highest education level: Not on file   Occupational History    Not on file   Social Needs    Financial resource strain: Not on file    Food insecurity     Worry: Not on file     Inability: Not on file    Transportation needs     Medical: Not on file     Non-medical: Not on file   Tobacco Use    Smoking status: Never Smoker    Smokeless tobacco: Never Used    Tobacco comment: never smoker eclamb rrt 7/20/17   Substance and Sexual Activity    Alcohol use: No    Drug use: No    Sexual activity: Not on file   Lifestyle    Physical activity     Days per week: Not on file     Minutes per session: Not on file    Stress: Not on file   Relationships    Social connections     Talks on phone: Not on file     Gets together: Not on file     Attends Synagogue service: Not on file     Active member of club or organization: Not on file     Attends meetings of clubs or organizations: Not on file     Relationship status: Not on file    Intimate partner violence     Fear of current or ex partner: Not on file     Emotionally abused: Not on file     Physically abused: Not on file     Forced sexual activity: Not on file   Other Topics Concern    Not on file   Social History Narrative    Not on file       Review of Systems   Constitutional: Negative for activity change, appetite change, chills, fatigue, fever and unexpected weight change.    HENT: Negative for congestion, dental problem, ear discharge, ear pain, facial swelling, hearing loss, postnasal drip, rhinorrhea, sinus pressure, sore throat and trouble swallowing. Eyes: Negative for pain and visual disturbance. Respiratory: Negative for cough, chest tightness, shortness of breath and wheezing. Cardiovascular: Negative for chest pain, palpitations and leg swelling. Gastrointestinal: Negative for abdominal pain, blood in stool, constipation, diarrhea, nausea and vomiting. Endocrine: Negative for cold intolerance, heat intolerance and polyuria. Genitourinary: Negative for difficulty urinating. Musculoskeletal: Negative for arthralgias, gait problem, myalgias, neck pain and neck stiffness. Skin: Negative for color change, rash and wound. Neurological: Negative for dizziness, tremors, seizures, weakness, light-headedness, numbness and headaches. Psychiatric/Behavioral: Negative for confusion and hallucinations. The patient is not nervous/anxious. Physical Exam  Vitals signs and nursing note reviewed. Constitutional:       General: She is not in acute distress. Appearance: Normal appearance. She is well-developed. She is not diaphoretic. HENT:      Head: Normocephalic and atraumatic. Right Ear: External ear normal.      Left Ear: External ear normal.   Eyes:      General:         Right eye: No discharge. Left eye: No discharge. Neck:      Trachea: No tracheal deviation. Cardiovascular:      Rate and Rhythm: Normal rate and regular rhythm. Pulses: Normal pulses. Heart sounds: Normal heart sounds. No murmur. No friction rub. No gallop. Pulmonary:      Effort: Pulmonary effort is normal. No respiratory distress. Breath sounds: Normal breath sounds. No stridor. No wheezing, rhonchi or rales. Chest:      Chest wall: No tenderness. Abdominal:      General: Bowel sounds are normal. There is no distension. Palpations: Abdomen is soft. Tenderness: There is no abdominal tenderness.       Comments: Morbidly obese   Musculoskeletal: General: No swelling. Right lower leg: Edema present. Left lower leg: Edema (+1 bilateral lower extremity edema) present. Skin:     General: Skin is warm and dry. Capillary Refill: Capillary refill takes less than 2 seconds. Coloration: Skin is not pale. Findings: No rash. Comments: Multiple wounds to bilateral shins right heel   Neurological:      General: No focal deficit present. Mental Status: She is alert. Mental status is at baseline. Cranial Nerves: No cranial nerve deficit. Sensory: No sensory deficit. Motor: Weakness present. Coordination: Coordination normal.      Gait: Gait abnormal.   Psychiatric:         Mood and Affect: Mood normal.         Behavior: Behavior normal.         Vital Signs: Temperature 96.9 °F, blood pressure 140/84, pulse 63, respirations 16, SPO2 96% on supplemental oxygen at 2 L    Assessment:  1. CHF (congestive heart failure), NYHA class I, acute on chronic, combined (HCC)  Stable at present, continue on Coreg, losartan. Daily weight    2. Morbidly obese ([address]  Work on diet, increase activity    3. Dementia without behavioral disturbance, unspecified dementia type (HCC)  Stable at present, continue on Exelon patch    4. Acute kidney injury superimposed on CKD [facility])  Serial lab follow-ups is scheduled for [facility] every Monday and Friday    5. Essential hypertension  Stable at present, continue to monitor closely    6. Hyperlipidemia, unspecified hyperlipidemia type  Stable on Lipitor    7. Type 2 diabetes mellitus with hyperglycemia, with long-term current use of insulin ([facility])  Continues on Lantus 20 units nightly. Continue to monitor blood sugars report with significant elevations    8. Balance problem  Work with PT OT    9. Fall, subsequent encounter  Continue to work with PT OT    10. Infestation by bed bug  11. Infestation by maggots  No further infestation noted on arrival to [facility] continue to monitor    12.  Lymphedema of both lower extremities  Recent angiogram, ongoing follow-up with vascular surgery      Plan:  As noted above. Hospital records reviewed  Follow up for routine visit. Call sooner with concerns prior.     Electronically signed by SHIRA Smith CNP on 8/17/2020 at 4:48 PM

## 2020-08-17 NOTE — TELEPHONE ENCOUNTER
----- Message from Marisol Ochoa MD sent at 8/14/2020  9:50 AM EDT -----  6 week follow up s/p angioplasty of right leg *No testing*

## 2020-08-18 PROBLEM — L89.610 PRESSURE INJURY OF RIGHT HEEL, UNSTAGEABLE (HCC): Status: ACTIVE | Noted: 2020-08-18

## 2020-08-18 LAB
ANCA MYELOPEROXIDASE: 14 AU/ML
ANCA PROTEINASE 3: 8 AU/ML

## 2020-08-18 ASSESSMENT — ENCOUNTER SYMPTOMS
EYE PAIN: 0
VOMITING: 0
NAUSEA: 0
WHEEZING: 0
SINUS PRESSURE: 0
SHORTNESS OF BREATH: 0
CHEST TIGHTNESS: 0
RHINORRHEA: 0
FACIAL SWELLING: 0
COLOR CHANGE: 0
ABDOMINAL PAIN: 0
DIARRHEA: 0
CONSTIPATION: 0
SORE THROAT: 0
COUGH: 0
TROUBLE SWALLOWING: 0
BLOOD IN STOOL: 0

## 2020-08-18 NOTE — ADT AUTH CERT
Heart Failure - Care Day 7 (8/13/2020) by Di Rasmussen RN         Review Status  Review Entered    Completed  8/17/2020 11:32        Criteria Review       Care Day: 7 Care Date: 8/13/2020 Level of Care:    Guideline Day 2    Clinical Status    ( ) * Hemodynamic stability    8/17/2020 11:32 AM EDT by Néstor Rajput      SpO2 88, 88, 89, 89  RR 7, 5, 10, 12, 10, 11  /54  149/56  154/55    ( ) * Mental status at baseline    ( ) * MI excluded    ( ) * Cardiac rate and rhythm acceptable    ( ) * Oxygenation at baseline or improved    ( ) * Pulmonary edema absent or improved    Routes    (X) Oral or parenteral medications    Interventions    ( ) * Pulmonary catheter absent    (X) Possible electrolytes [J]    (X) Oxygen    8/17/2020 11:32 AM EDT by Néstor Rajput      2 L    Medications    (X) Neprilysin inhibitor with ARB, or ACE inhibitor or ARB    8/17/2020 11:32 AM EDT by Néstor Rajput      cozaar 100 mg po qd    (X) Beta-blocker    8/17/2020 11:32 AM EDT by Néstor Rajput      Coreg 12.5 mg bid po    (X) Possible nitrates, digoxin, hydralazine    8/17/2020 11:32 AM EDT by Néstor Rajput      Apresoline 10 mg prn IV x 1    * Milestone    Additional Notes    8/13/2020    Cardiac Cath       Date:                            8/13/2020    Patient name:              Clare Swift    Date of admission:      8/7/2020 10:29 PM    MRN:                           [id number]    Date of Birth:               1950         CARDIAC CATHETERIZATION       Findings:         LMCA: Normal 0% stenosis. LAD: Chronic occlusion 100 % . with patent LIMA-LAD and SVG to [address] [place] [address] on Mid LAD: 100% stenosis.       Lesion on 1st Diag: Ostial.90% stenosis.       LCx: Single stenosis. with patent SVG-OM         Lesion on Mid CX: 90% stenosis.     RCA: Chronic occlusion 100 % . with left to right collaterals         Lesion on Dist RCA: 100% stenosis.     Graft Lesions         Lesion on Aorta Right to R PDA: Proximal anastomosis. 100% stenosis.     Cardiac Grafts        - Jessica Padillae is a CALI graft that originates at the LIMA and attaches to the       Mid LAD. Patent with 0% stenosis        - Jessica Padillae is a Vein graft that originates at the Aorta Left and attaches to       the 1st Diag. Patent with 0% stenosis        - Jessica Jensen is a Vein graft that originates at the Aorta Left and attaches to       the 2nd Ob Sushma. Patent with 0% stenosis        - Jessica Jensen is a Vein graft that originates at the Aorta Right and attaches       to the R PDA. 100% occluded at proximal anastomosis       Cardiac collaterals   +-----------------------------------------+------------+---------+---------+   ! Origin                                   ! Destination ! Flow     ! Comments ! +-----------------------------------------+------------+---------+---------+   ! [address]                              !C PDA       !Moderate !         !   +-----------------------------------------+------------+---------+---------+      Coronary Tree        Dominance: Mixed          Estimated blood loss: 5 ml         Conclusions:     Multi-vessel Coronary Artery Disease.     3 out of 4 grafts are patent.     Patent LIMA-LAD, SVG-Diagonal and SVG-OM.     Occluded SVG to RCA with left to right collaterals.     Borderline LV systolic function.         Recommendations:    1. Medical Therapy. 2. Risk Factors Modification.     3. Post Cath Protocol            Operative Note              Patient: Helen L Eligha Boast    Date of Birth: 1950    TMT: [id number]         Date of Procedure: 8/13/2020         Pre-Op Diagnosis: PAD, non-healing right heel wound         Post-Op Diagnosis: Same           Procedure:    1) US guided vascular access of left common femoral artery    2) Aortogram    3) Right lower extremity angiogram    4) Selective 3rd order catheterization of posterior tibial and peroneal arteries    5) Angioplasty of posterior tibial and peroneal arteries         Surgeon: Dr. Ezequiel Schroeder      Anesthesia: Local, IV sedation       Podiatry            CC: R heel wound         Patient seen and examined at bedside. Afebrile, hypertensive    Patient is very drowsy this morning    Denies any n/v/f/c/SOB    Plan for angio this AM       Assessment    Orin haji 77 y. o. female with    1. Unstagable pressure ulcer, right heel    2. Multiple lower extremities wounds    3. Chronic lower extremity edema    4. Type II DM with secondary peripheral neuropathy    5. CHF    6. 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 *            Plan         ? Patient examined and evaluated at [address] [facility] Dr. Gaby Moseley    ? Treatment options discussed in detail with the patient. ? Plain film radiographs reviewed in detail and discussed with patient. ? NIVS ordered and non-diagnostic given arteriosclerosis with falsely elevated ABIs. Dampened PVRs suggestive of PAD. o Vascular surgery: angio this morning. ? Medical management per primary.        ? Dressing applied to Right foot: santyl, DSD, ACE.    ? All other wounds managed per wound care team.    ? Further treatment pending vascular intervention    ? Elevate heels off bed at all times with ROOKE/PRAFO boot to the right lower extremity. ? NWB to Right heel, ok for forefoot touch for transfers.        Internal Medicine            C/C: hyperglycemia    Interval History Status: improved.       Patient seen and examined at bedside. Tired but answering all questions appropriately. No compolaints about right heel wound today. Otherwise doing well. Plan for angio and cath today. · cloNIDine  0.1 mg Oral BID    · collagenase   Topical Daily    · carvedilol  12.5 mg Oral BID WC    · atorvastatin  40 mg Oral Nightly    · Sodium Hypochlorite   Irrigation Daily    · insulin glargine  20 Units Subcutaneous Nightly    · allopurinol  100 mg Oral Daily    · aspirin  81 mg Oral Daily    · insulin lispro  0-12 Units Subcutaneous TID WC    · insulin lispro  0-6 Units Subcutaneous Nightly    · citalopram  20 mg Oral Daily    · clopidogrel  75 mg Oral Daily    · docusate sodium  100 mg Oral BID    · gabapentin  100 mg Oral TID    · cetirizine  5 mg Oral Daily    · losartan  100 mg Oral Daily    · memantine  10 mg Oral BID    · nystatin   Topical BID    · pantoprazole  40 mg Oral QAM AC    · oxybutynin  5 mg Oral BID    · rivastigmine  1 patch Transdermal Daily    · sodium chloride flush  10 mL Intravenous 2 times per day    · heparin (porcine)  5,000 Units Subcutaneous 3 times per day          Continuous Infusions:      ml/hr          Plan:            1. Plan for Angio and Cardiac catheterization    2. Gentle IV hydration. Appreciate nephrology recs    3. Mucomyst ordered per nephrology    4. ASA 81 mg, Lipitor 40, Coreg 12.5,    5. Completed 5 day course    6. Clonidine 0.1 was started    7. Will try to keep patient euvolemic    8. Vascular       SUBJECTIVE:      Pt seen and examined. No acute events overnight. Patient with no complaints of pain at present. Remains NPO. ACE wrap dressings intact to bilateral lower extremities. Soft boot to RLE. Patient aware of procedure being performed today.        PLAN         1. Right lower extremity angiogram today    2.  Continue wound care per podiatry               8/13/2020 05:17    Sodium: 133 (L)    Potassium: 4.9    Chloride: 98    CO2: 25    BUN: 27 (H) Creatinine: 1.36 (H)    Bun/Cre Ratio: NOT REPORTED    Anion Gap: 10    GFR Non-: 39 (L)    GFR : 47 (L)    Glucose: 185 (H)    Calcium: 8.2 (L)    GFR Comment: Pend    GFR Staging: NOT REPORTED    WBC: 9.4    RBC: 3.11 (L)    Hemoglobin Quant: 8.8 (L)    Hematocrit: 29.6 (L)    MCV: 95.2    MCH: 28.3    MCHC: 29.7    MPV: 9.7    RDW: 16.2 (H)    Platelet Count: 046    Platelet Estimate: NOT REPORTED    Absolute Mono #: 0.87    Eosinophils %: 1    Basophils Absolute: 0.04    Differential Type: NOT REPORTED    Seg Neutrophils: 68 (H)    Segs Absolute: 6.36    Lymphocytes: 21 (L)    Absolute Lymph #: 1.99    Monocytes: 9    Absolute Eos #: 0.11    Basophils: 0    Immature Granulocytes: 1 (H)    WBC Morphology: NOT REPORTED    RBC Morphology: ANISOCYTOSIS PRESENT    Absolute Immature Granulocyte: 0.06    NRBC Automated: 0.0       8/13/2020 10:01    POC Glucose: 130 (H)       8/13/2020 12:42    CATH LAB REPORT: [facility]       8/13/2020 12:43    DIAGNOSTIC CARDIAC CATH LAB PROCEDURE: Rpt       8/13/2020 13:11    Activated Clotting Time: 260 (H)       8/13/2020 13:29    CATH LAB REPORT: [facility]       8/13/2020 13:59    CATH LAB REPORT: [facility]       8/13/2020 16:34    POC Glucose: 99       8/13/2020 20:11    US RENAL COMPLETE: Rpt       8/13/2020 20:39    Total Protein: 5.0 (L)    Total Prot. Sum,%: 100    Total Prot. Sum: 5.0 (L)    Protein Electrophoresis, Serum: ALBUMIN IS DECREASED.  MAY BE OBSERVED WITH HEPATIC DISEASE, PROTEINURIA, MALNUTRITION, ACUTE PHA. ..     Albumin (calculated): 1.8 (L)    Albumin %: 35 (L)    PTT: 27.2    Alpha 1 %: 5    Alpha 2 %: 15 (H)    Alpha-1-Globulin: 0.2    Alpha-2-Globulin: 0.7    Beta Globulin: 0.9    Beta Percent: 17    Gamma Globulin: 1.4    Gamma Globulin %: 28 (H)    Kappa Free Light Chains QNT: 14.77 (H)    Lambda Free Light Chains QNT: 10.97 (H)    Free Kappa/Lambda Ratio: 1.35    KAPPA/LAMBDA QUANT FREE LIGHT CHAINS SERUM: Rpt (A)    Complement C3: 114    Complement C4: 44    Pathologist: ELECTRONICALLY SIGNED. Sary Siu M.D.       8/13/2020 20:39    Serum IFX Interp: IMMUNOFIXATION IS NEGATIVE FOR MONOCLONAL IMMUNOGLOBULIN. IMMUNOFIXATION SERUM PROFILE: Rpt    Pathologist: ELECTRONICALLY SIGNED.  Sary Siu M.D.       8/13/2020 20:49    POC Glucose: 116 (H)

## 2020-08-19 ENCOUNTER — HOSPITAL ENCOUNTER (INPATIENT)
Age: 70
LOS: 5 days | Discharge: HOME | DRG: 682 | End: 2020-08-24
Attending: INTERNAL MEDICINE | Admitting: INTERNAL MEDICINE
Payer: MEDICARE

## 2020-08-19 ENCOUNTER — APPOINTMENT (OUTPATIENT)
Dept: GENERAL RADIOLOGY | Age: 70
End: 2020-08-19
Payer: MEDICARE

## 2020-08-19 ENCOUNTER — HOSPITAL ENCOUNTER (EMERGENCY)
Age: 70
Discharge: ANOTHER HOSPITAL | End: 2020-08-19
Attending: EMERGENCY MEDICINE
Payer: MEDICARE

## 2020-08-19 ENCOUNTER — OUTSIDE SERVICES (OUTPATIENT)
Dept: INTERNAL MEDICINE | Age: 70
End: 2020-08-19
Payer: MEDICARE

## 2020-08-19 ENCOUNTER — TELEPHONE (OUTPATIENT)
Dept: OTHER | Facility: CLINIC | Age: 70
End: 2020-08-19

## 2020-08-19 VITALS
HEIGHT: 69 IN | OXYGEN SATURATION: 100 % | SYSTOLIC BLOOD PRESSURE: 97 MMHG | RESPIRATION RATE: 18 BRPM | WEIGHT: 252 LBS | TEMPERATURE: 97.3 F | HEART RATE: 38 BPM | DIASTOLIC BLOOD PRESSURE: 54 MMHG | BODY MASS INDEX: 37.33 KG/M2

## 2020-08-19 PROBLEM — E87.5 HYPERKALEMIA: Status: ACTIVE | Noted: 2020-08-19

## 2020-08-19 PROBLEM — I44.0 AV BLOCK, 1ST DEGREE: Status: ACTIVE | Noted: 2020-08-19

## 2020-08-19 PROBLEM — R00.1 BRADYCARDIA: Status: ACTIVE | Noted: 2020-08-19

## 2020-08-19 PROBLEM — N17.9 ACUTE KIDNEY INJURY (HCC): Status: ACTIVE | Noted: 2020-08-19

## 2020-08-19 LAB
ABSOLUTE EOS #: 0.21 K/UL (ref 0–0.44)
ABSOLUTE IMMATURE GRANULOCYTE: 0.04 K/UL (ref 0–0.3)
ABSOLUTE LYMPH #: 1.45 K/UL (ref 1.1–3.7)
ABSOLUTE MONO #: 0.49 K/UL (ref 0.1–1.2)
ALBUMIN SERPL-MCNC: 2.4 G/DL (ref 3.5–5.2)
ALBUMIN/GLOBULIN RATIO: 0.6 (ref 1–2.5)
ALP BLD-CCNC: 139 U/L (ref 35–104)
ALT SERPL-CCNC: 8 U/L (ref 5–33)
ANION GAP SERPL CALCULATED.3IONS-SCNC: 12 MMOL/L (ref 9–17)
ANION GAP SERPL CALCULATED.3IONS-SCNC: 9 MMOL/L (ref 9–17)
AST SERPL-CCNC: 11 U/L
BASOPHILS # BLD: 1 % (ref 0–2)
BASOPHILS ABSOLUTE: 0.05 K/UL (ref 0–0.2)
BILIRUB SERPL-MCNC: 0.18 MG/DL (ref 0.3–1.2)
BNP INTERPRETATION: ABNORMAL
BUN BLDV-MCNC: 45 MG/DL (ref 8–23)
BUN BLDV-MCNC: 45 MG/DL (ref 8–23)
BUN/CREAT BLD: 12 (ref 9–20)
BUN/CREAT BLD: ABNORMAL (ref 9–20)
CALCIUM IONIZED: 0.93 MMOL/L (ref 1.13–1.33)
CALCIUM SERPL-MCNC: 8.1 MG/DL (ref 8.6–10.4)
CALCIUM SERPL-MCNC: 8.3 MG/DL (ref 8.6–10.4)
CHLORIDE BLD-SCNC: 100 MMOL/L (ref 98–107)
CHLORIDE BLD-SCNC: 98 MMOL/L (ref 98–107)
CO2: 23 MMOL/L (ref 20–31)
CO2: 25 MMOL/L (ref 20–31)
CREAT SERPL-MCNC: 3.4 MG/DL (ref 0.5–0.9)
CREAT SERPL-MCNC: 3.74 MG/DL (ref 0.5–0.9)
DIFFERENTIAL TYPE: ABNORMAL
DIRECT EXAM: NORMAL
EKG ATRIAL RATE: 36 BPM
EKG ATRIAL RATE: 86 BPM
EKG P AXIS: 74 DEGREES
EKG P AXIS: 95 DEGREES
EKG P-R INTERVAL: 202 MS
EKG P-R INTERVAL: 288 MS
EKG Q-T INTERVAL: 420 MS
EKG Q-T INTERVAL: 562 MS
EKG QRS DURATION: 104 MS
EKG QRS DURATION: 112 MS
EKG QTC CALCULATION (BAZETT): 434 MS
EKG QTC CALCULATION (BAZETT): 502 MS
EKG R AXIS: 62 DEGREES
EKG R AXIS: 92 DEGREES
EKG T AXIS: 93 DEGREES
EKG T AXIS: 97 DEGREES
EKG VENTRICULAR RATE: 36 BPM
EKG VENTRICULAR RATE: 86 BPM
EOSINOPHILS RELATIVE PERCENT: 3 % (ref 1–4)
GFR AFRICAN AMERICAN: 15 ML/MIN
GFR AFRICAN AMERICAN: 16 ML/MIN
GFR NON-AFRICAN AMERICAN: 12 ML/MIN
GFR NON-AFRICAN AMERICAN: 13 ML/MIN
GFR SERPL CREATININE-BSD FRML MDRD: ABNORMAL ML/MIN/{1.73_M2}
GLUCOSE BLD-MCNC: 107 MG/DL (ref 65–105)
GLUCOSE BLD-MCNC: 108 MG/DL (ref 65–105)
GLUCOSE BLD-MCNC: 117 MG/DL (ref 65–105)
GLUCOSE BLD-MCNC: 121 MG/DL (ref 70–99)
GLUCOSE BLD-MCNC: 163 MG/DL (ref 70–99)
GLUCOSE BLD-MCNC: 89 MG/DL (ref 65–105)
GLUCOSE BLD-MCNC: 94 MG/DL (ref 65–105)
HCT VFR BLD CALC: 26.1 % (ref 36.3–47.1)
HEMOGLOBIN: 8.1 G/DL (ref 11.9–15.1)
IMMATURE GRANULOCYTES: 1 %
INR BLD: 1.1
LACTIC ACID, WHOLE BLOOD: 1.5 MMOL/L (ref 0.7–2.1)
LACTIC ACID: NORMAL MMOL/L
LIPASE: 20 U/L (ref 13–60)
LYMPHOCYTES # BLD: 17 % (ref 24–43)
Lab: NORMAL
MAGNESIUM: 1.9 MG/DL (ref 1.6–2.6)
MCH RBC QN AUTO: 28.6 PG (ref 25.2–33.5)
MCHC RBC AUTO-ENTMCNC: 31 G/DL (ref 25.2–33.5)
MCV RBC AUTO: 92.2 FL (ref 82.6–102.9)
MONOCYTES # BLD: 6 % (ref 3–12)
MYOGLOBIN: 79 NG/ML (ref 25–58)
NRBC AUTOMATED: 0 PER 100 WBC
PDW BLD-RTO: 16.4 % (ref 11.8–14.4)
PHOSPHORUS: 5.8 MG/DL (ref 2.6–4.5)
PLATELET # BLD: 333 K/UL (ref 138–453)
PLATELET ESTIMATE: ABNORMAL
PMV BLD AUTO: 9.3 FL (ref 8.1–13.5)
POTASSIUM SERPL-SCNC: 5.9 MMOL/L (ref 3.7–5.3)
POTASSIUM SERPL-SCNC: 6.1 MMOL/L (ref 3.7–5.3)
PRO-BNP: ABNORMAL PG/ML
PROTHROMBIN TIME: 13.3 SEC (ref 11.5–14.2)
RBC # BLD: 2.83 M/UL (ref 3.95–5.11)
RBC # BLD: ABNORMAL 10*6/UL
SEG NEUTROPHILS: 72 % (ref 36–65)
SEGMENTED NEUTROPHILS ABSOLUTE COUNT: 6.11 K/UL (ref 1.5–8.1)
SODIUM BLD-SCNC: 133 MMOL/L (ref 135–144)
SODIUM BLD-SCNC: 134 MMOL/L (ref 135–144)
SPECIMEN DESCRIPTION: NORMAL
TOTAL CK: 27 U/L (ref 26–192)
TOTAL PROTEIN: 6.5 G/DL (ref 6.4–8.3)
TROPONIN INTERP: ABNORMAL
TROPONIN INTERP: ABNORMAL
TROPONIN T: ABNORMAL NG/ML
TROPONIN T: ABNORMAL NG/ML
TROPONIN, HIGH SENSITIVITY: 73 NG/L (ref 0–14)
TROPONIN, HIGH SENSITIVITY: 86 NG/L (ref 0–14)
WBC # BLD: 8.4 K/UL (ref 3.5–11.3)
WBC # BLD: ABNORMAL 10*3/UL

## 2020-08-19 PROCEDURE — 36415 COLL VENOUS BLD VENIPUNCTURE: CPT

## 2020-08-19 PROCEDURE — 2000000000 HC ICU R&B

## 2020-08-19 PROCEDURE — 83605 ASSAY OF LACTIC ACID: CPT

## 2020-08-19 PROCEDURE — 99285 EMERGENCY DEPT VISIT HI MDM: CPT

## 2020-08-19 PROCEDURE — 83880 ASSAY OF NATRIURETIC PEPTIDE: CPT

## 2020-08-19 PROCEDURE — 96374 THER/PROPH/DIAG INJ IV PUSH: CPT

## 2020-08-19 PROCEDURE — 6360000002 HC RX W HCPCS: Performed by: STUDENT IN AN ORGANIZED HEALTH CARE EDUCATION/TRAINING PROGRAM

## 2020-08-19 PROCEDURE — 80053 COMPREHEN METABOLIC PANEL: CPT

## 2020-08-19 PROCEDURE — 6370000000 HC RX 637 (ALT 250 FOR IP): Performed by: STUDENT IN AN ORGANIZED HEALTH CARE EDUCATION/TRAINING PROGRAM

## 2020-08-19 PROCEDURE — 93005 ELECTROCARDIOGRAM TRACING: CPT | Performed by: EMERGENCY MEDICINE

## 2020-08-19 PROCEDURE — 82947 ASSAY GLUCOSE BLOOD QUANT: CPT

## 2020-08-19 PROCEDURE — 82550 ASSAY OF CK (CPK): CPT

## 2020-08-19 PROCEDURE — 2580000003 HC RX 258: Performed by: EMERGENCY MEDICINE

## 2020-08-19 PROCEDURE — 83874 ASSAY OF MYOGLOBIN: CPT

## 2020-08-19 PROCEDURE — 93005 ELECTROCARDIOGRAM TRACING: CPT | Performed by: STUDENT IN AN ORGANIZED HEALTH CARE EDUCATION/TRAINING PROGRAM

## 2020-08-19 PROCEDURE — 83690 ASSAY OF LIPASE: CPT

## 2020-08-19 PROCEDURE — 85610 PROTHROMBIN TIME: CPT

## 2020-08-19 PROCEDURE — 6360000002 HC RX W HCPCS: Performed by: EMERGENCY MEDICINE

## 2020-08-19 PROCEDURE — 80048 BASIC METABOLIC PNL TOTAL CA: CPT

## 2020-08-19 PROCEDURE — 83735 ASSAY OF MAGNESIUM: CPT

## 2020-08-19 PROCEDURE — 84484 ASSAY OF TROPONIN QUANT: CPT

## 2020-08-19 PROCEDURE — 71045 X-RAY EXAM CHEST 1 VIEW: CPT

## 2020-08-19 PROCEDURE — 96375 TX/PRO/DX INJ NEW DRUG ADDON: CPT

## 2020-08-19 PROCEDURE — 84300 ASSAY OF URINE SODIUM: CPT

## 2020-08-19 PROCEDURE — 84100 ASSAY OF PHOSPHORUS: CPT

## 2020-08-19 PROCEDURE — 99309 SBSQ NF CARE MODERATE MDM 30: CPT | Performed by: NURSE PRACTITIONER

## 2020-08-19 PROCEDURE — 87641 MR-STAPH DNA AMP PROBE: CPT

## 2020-08-19 PROCEDURE — 2580000003 HC RX 258

## 2020-08-19 PROCEDURE — 82330 ASSAY OF CALCIUM: CPT

## 2020-08-19 PROCEDURE — 6360000002 HC RX W HCPCS

## 2020-08-19 PROCEDURE — 6370000000 HC RX 637 (ALT 250 FOR IP): Performed by: EMERGENCY MEDICINE

## 2020-08-19 PROCEDURE — 85025 COMPLETE CBC W/AUTO DIFF WBC: CPT

## 2020-08-19 RX ORDER — 0.9 % SODIUM CHLORIDE 0.9 %
1000 INTRAVENOUS SOLUTION INTRAVENOUS ONCE
Status: COMPLETED | OUTPATIENT
Start: 2020-08-19 | End: 2020-08-19

## 2020-08-19 RX ORDER — ACETAMINOPHEN 650 MG/1
650 SUPPOSITORY RECTAL EVERY 6 HOURS PRN
Status: DISCONTINUED | OUTPATIENT
Start: 2020-08-19 | End: 2020-08-24 | Stop reason: HOSPADM

## 2020-08-19 RX ORDER — CALCIUM GLUCONATE 94 MG/ML
1 INJECTION, SOLUTION INTRAVENOUS ONCE
Status: COMPLETED | OUTPATIENT
Start: 2020-08-19 | End: 2020-08-19

## 2020-08-19 RX ORDER — DEXTROSE MONOHYDRATE 50 MG/ML
100 INJECTION, SOLUTION INTRAVENOUS PRN
Status: DISCONTINUED | OUTPATIENT
Start: 2020-08-19 | End: 2020-08-24 | Stop reason: HOSPADM

## 2020-08-19 RX ORDER — DEXTROSE MONOHYDRATE 25 G/50ML
25 INJECTION, SOLUTION INTRAVENOUS ONCE
Status: COMPLETED | OUTPATIENT
Start: 2020-08-19 | End: 2020-08-19

## 2020-08-19 RX ORDER — INSULIN GLARGINE 100 [IU]/ML
20 INJECTION, SOLUTION SUBCUTANEOUS NIGHTLY
Status: DISCONTINUED | OUTPATIENT
Start: 2020-08-19 | End: 2020-08-21

## 2020-08-19 RX ORDER — SODIUM CHLORIDE 0.9 % (FLUSH) 0.9 %
10 SYRINGE (ML) INJECTION EVERY 12 HOURS SCHEDULED
Status: DISCONTINUED | OUTPATIENT
Start: 2020-08-19 | End: 2020-08-24 | Stop reason: HOSPADM

## 2020-08-19 RX ORDER — RIVASTIGMINE 9.5 MG/24H
1 PATCH, EXTENDED RELEASE TRANSDERMAL DAILY
Status: DISCONTINUED | OUTPATIENT
Start: 2020-08-19 | End: 2020-08-24 | Stop reason: HOSPADM

## 2020-08-19 RX ORDER — ACETAMINOPHEN 325 MG/1
650 TABLET ORAL EVERY 6 HOURS PRN
Status: DISCONTINUED | OUTPATIENT
Start: 2020-08-19 | End: 2020-08-24 | Stop reason: HOSPADM

## 2020-08-19 RX ORDER — NICOTINE POLACRILEX 4 MG
15 LOZENGE BUCCAL PRN
Status: DISCONTINUED | OUTPATIENT
Start: 2020-08-19 | End: 2020-08-24 | Stop reason: HOSPADM

## 2020-08-19 RX ORDER — ONDANSETRON 2 MG/ML
4 INJECTION INTRAMUSCULAR; INTRAVENOUS EVERY 6 HOURS PRN
Status: DISCONTINUED | OUTPATIENT
Start: 2020-08-19 | End: 2020-08-24 | Stop reason: HOSPADM

## 2020-08-19 RX ORDER — ASPIRIN 81 MG/1
81 TABLET, CHEWABLE ORAL DAILY
Status: DISCONTINUED | OUTPATIENT
Start: 2020-08-19 | End: 2020-08-24 | Stop reason: HOSPADM

## 2020-08-19 RX ORDER — SODIUM CHLORIDE 0.9 % (FLUSH) 0.9 %
10 SYRINGE (ML) INJECTION PRN
Status: DISCONTINUED | OUTPATIENT
Start: 2020-08-19 | End: 2020-08-24 | Stop reason: HOSPADM

## 2020-08-19 RX ORDER — DEXTROSE MONOHYDRATE 25 G/50ML
12.5 INJECTION, SOLUTION INTRAVENOUS PRN
Status: DISCONTINUED | OUTPATIENT
Start: 2020-08-19 | End: 2020-08-24 | Stop reason: HOSPADM

## 2020-08-19 RX ORDER — POLYETHYLENE GLYCOL 3350 17 G/17G
17 POWDER, FOR SOLUTION ORAL DAILY PRN
Status: DISCONTINUED | OUTPATIENT
Start: 2020-08-19 | End: 2020-08-24 | Stop reason: HOSPADM

## 2020-08-19 RX ORDER — CLOPIDOGREL BISULFATE 75 MG/1
75 TABLET ORAL DAILY
Status: DISCONTINUED | OUTPATIENT
Start: 2020-08-19 | End: 2020-08-24 | Stop reason: HOSPADM

## 2020-08-19 RX ORDER — ATORVASTATIN CALCIUM 40 MG/1
40 TABLET, FILM COATED ORAL NIGHTLY
Status: DISCONTINUED | OUTPATIENT
Start: 2020-08-19 | End: 2020-08-24 | Stop reason: HOSPADM

## 2020-08-19 RX ORDER — OXYBUTYNIN CHLORIDE 5 MG/1
5 TABLET ORAL 2 TIMES DAILY
Status: DISCONTINUED | OUTPATIENT
Start: 2020-08-19 | End: 2020-08-24 | Stop reason: HOSPADM

## 2020-08-19 RX ORDER — HEPARIN SODIUM 5000 [USP'U]/ML
5000 INJECTION, SOLUTION INTRAVENOUS; SUBCUTANEOUS EVERY 8 HOURS SCHEDULED
Status: DISCONTINUED | OUTPATIENT
Start: 2020-08-19 | End: 2020-08-24 | Stop reason: HOSPADM

## 2020-08-19 RX ORDER — DEXTROSE MONOHYDRATE 25 G/50ML
INJECTION, SOLUTION INTRAVENOUS
Status: COMPLETED
Start: 2020-08-19 | End: 2020-08-19

## 2020-08-19 RX ORDER — ATROPINE SULFATE 0.1 MG/ML
0.5 INJECTION INTRAVENOUS ONCE
Status: COMPLETED | OUTPATIENT
Start: 2020-08-19 | End: 2020-08-19

## 2020-08-19 RX ORDER — PROMETHAZINE HYDROCHLORIDE 25 MG/1
12.5 TABLET ORAL EVERY 6 HOURS PRN
Status: DISCONTINUED | OUTPATIENT
Start: 2020-08-19 | End: 2020-08-24 | Stop reason: HOSPADM

## 2020-08-19 RX ORDER — ATROPINE SULFATE 0.1 MG/ML
INJECTION INTRAVENOUS
Status: COMPLETED
Start: 2020-08-19 | End: 2020-08-19

## 2020-08-19 RX ORDER — CITALOPRAM 20 MG/1
20 TABLET ORAL DAILY
Status: DISCONTINUED | OUTPATIENT
Start: 2020-08-19 | End: 2020-08-24 | Stop reason: HOSPADM

## 2020-08-19 RX ORDER — FUROSEMIDE 10 MG/ML
20 INJECTION INTRAMUSCULAR; INTRAVENOUS ONCE
Status: COMPLETED | OUTPATIENT
Start: 2020-08-19 | End: 2020-08-19

## 2020-08-19 RX ADMIN — INSULIN HUMAN 10 UNITS: 100 INJECTION, SOLUTION PARENTERAL at 10:27

## 2020-08-19 RX ADMIN — CALCIUM GLUCONATE 1 G: 98 INJECTION, SOLUTION INTRAVENOUS at 10:24

## 2020-08-19 RX ADMIN — OXYBUTYNIN CHLORIDE 5 MG: 5 TABLET ORAL at 20:44

## 2020-08-19 RX ADMIN — DESMOPRESSIN ACETATE 40 MG: 0.2 TABLET ORAL at 20:44

## 2020-08-19 RX ADMIN — ATROPINE SULFATE 0.5 MG: 0.1 INJECTION INTRAVENOUS at 14:32

## 2020-08-19 RX ADMIN — COLLAGENASE SANTYL: 250 OINTMENT TOPICAL at 20:44

## 2020-08-19 RX ADMIN — CLOPIDOGREL 75 MG: 75 TABLET, FILM COATED ORAL at 20:44

## 2020-08-19 RX ADMIN — HEPARIN SODIUM 5000 UNITS: 5000 INJECTION INTRAVENOUS; SUBCUTANEOUS at 20:44

## 2020-08-19 RX ADMIN — DEXTROSE MONOHYDRATE 25 ML: 25 INJECTION, SOLUTION INTRAVENOUS at 15:38

## 2020-08-19 RX ADMIN — SODIUM CHLORIDE 1000 ML: 9 INJECTION, SOLUTION INTRAVENOUS at 10:23

## 2020-08-19 RX ADMIN — FUROSEMIDE 20 MG: 10 INJECTION, SOLUTION INTRAMUSCULAR; INTRAVENOUS at 20:44

## 2020-08-19 RX ADMIN — ATROPINE SULFATE 0.5 MG: 0.1 INJECTION, SOLUTION INTRAVENOUS at 14:32

## 2020-08-19 RX ADMIN — DEXTROSE MONOHYDRATE 25 G: 25 INJECTION, SOLUTION INTRAVENOUS at 10:23

## 2020-08-19 RX ADMIN — CITALOPRAM 20 MG: 20 TABLET, FILM COATED ORAL at 20:44

## 2020-08-19 ASSESSMENT — ENCOUNTER SYMPTOMS
COUGH: 0
SORE THROAT: 0
EYE PAIN: 0
CHEST TIGHTNESS: 0
WHEEZING: 0
DIARRHEA: 0
BACK PAIN: 0
EYE PAIN: 0
NAUSEA: 0
FACIAL SWELLING: 0
RHINORRHEA: 0
SHORTNESS OF BREATH: 0
SINUS PRESSURE: 0
SHORTNESS OF BREATH: 0
ABDOMINAL PAIN: 0
NAUSEA: 0
VOMITING: 0
TROUBLE SWALLOWING: 0
BLOOD IN STOOL: 0
ABDOMINAL PAIN: 0
CONSTIPATION: 0
VOMITING: 0
COLOR CHANGE: 0
DIARRHEA: 0
CONSTIPATION: 0
COUGH: 0
BLOOD IN STOOL: 0

## 2020-08-19 ASSESSMENT — PAIN SCALES - GENERAL
PAINLEVEL_OUTOF10: 0
PAINLEVEL_OUTOF10: 0

## 2020-08-19 NOTE — PROGRESS NOTES
08/19/20  Lavada Plaster  1950    Patient Resident of [facility]    Chief Complaint  1. Acute kidney injury ([address]    2. CHF (congestive heart failure), NYHA class I, acute on chronic, combined ([address]    3. Dementia without behavioral disturbance, unspecified dementia type ([address]    4. Essential hypertension        HPI:  81-year-old patient with recent hospitalization for acute on chronic kidney disease, CHF, cystitis being seen today for abnormal labs at [facility]. Labs completed on 8/17/2020 faxed to wrong office and have not been addressed. Creatinine noted to be 3.5 with a BUN of 43 and GFR of 16. Hospital records reviewed at discharge patient creat was running about 1.5. Patient denies any headache blurred vision double vision. States she pees all the time denies any dysuria. Mildly elevated blood pressure otherwise vitals have remained stable. She continues on Lantus for her diabetes mellitus. No hypoglycemic episodes. She is on her clonidine losartan and Coreg for her hypertension. No chest pain shortness of breath dizziness or lightheadedness. Allergies   Allergen Reactions    Bactrim [Sulfamethoxazole-Trimethoprim] Hives    Amoxicillin-Pot Clavulanate Diarrhea, Nausea Only and Nausea And Vomiting       Past Medical History:   Diagnosis Date    Anxiety and depression     Background diabetic retinopathy(362.01) 2008    (Mild)    Balance problem     CAD (coronary artery disease)     (with coronary artery bypass graft x4).  Cancer of uterus [facility])     history of, (probable cure)    Chronic kidney disease     Chronic low back pain     CVA (cerebral vascular accident) ([address]     Dementia ([address]     (moderate)    Dry eye syndrome     GERD (gastroesophageal reflux disease)     Glaucoma suspect 2005    Gout     Homonymous hemianopsia 2008    (Right)    Hyperlipidemia     Hypertension     Keratitis     (secondary to dry eye syndrome).     Obesity     Peripheral polyneuropathy (diabetic)    Pseudophakos     (Right Eye: 2012; Left Eye: 2012) - Dr. Mellissa Godfrey.  Stroke [facility])     (Multiple) MRI of brain 10/2008 shows multiple infarcts involving the temporal and parietal areas.  Trichiasis     (left eye)    Type 2 diabetes mellitus ([facility] [address]        Past Surgical History:   Procedure Laterality Date    CATARACT REMOVAL WITH IMPLANT  2012    (Right eye) - Dr. Mellissa Godfrey.  CATARACT REMOVAL WITH IMPLANT  2012    (Left eye) - Dr. Mellissa Godfrey.   SECTION      CHOLECYSTECTOMY      CORONARY ARTERY BYPASS GRAFT  12-    (x4) - LIMA-LAD, SVG-diagnoal 1, SVG-OM1, and SVG-PDA. Exploration of left radial. (Dr. Roberta Jeffers).  EYE SURGERY Left     as a child     GASTRIC BYPASS SURGERY      HYSTERECTOMY      (abdominal)  with left oophorectomy. Right ovary retained.     TONSILLECTOMY AND ADENOIDECTOMY         [facility]     Social History     Socioeconomic History    Marital status:      Spouse name: Not on file    Number of children: Not on file    Years of education: Not on file    Highest education level: Not on file   Occupational History    Not on file   Social Needs    Financial resource strain: Not on file    Food insecurity     Worry: Not on file     Inability: Not on file    Transportation needs     Medical: Not on file     Non-medical: Not on file   Tobacco Use    Smoking status: Never Smoker    Smokeless tobacco: Never Used    Tobacco comment: never smoker eclamb rrt 17   Substance and Sexual Activity    Alcohol use: No    Drug use: No    Sexual activity: Not on file   Lifestyle    Physical activity     Days per week: Not on file     Minutes per session: Not on file    Stress: Not on file   Relationships    Social connections     Talks on phone: Not on file     Gets together: Not on file     Attends Shinto service: Not on file     Active member of club or organization: Not on file     Attends meetings of clubs or organizations: Not on file     Relationship status: Not on file    Intimate partner violence     Fear of current or ex partner: Not on file     Emotionally abused: Not on file     Physically abused: Not on file     Forced sexual activity: Not on file   Other Topics Concern    Not on file   Social History Narrative    Not on file       Review of Systems   Constitutional: Negative for activity change, appetite change, chills, fatigue, fever and unexpected weight change. HENT: Negative for congestion, dental problem, ear discharge, ear pain, facial swelling, hearing loss, postnasal drip, rhinorrhea, sinus pressure, sore throat and trouble swallowing. Eyes: Negative for pain and visual disturbance. Respiratory: Negative for cough, chest tightness, shortness of breath and wheezing. Cardiovascular: Negative for chest pain, palpitations and leg swelling. Gastrointestinal: Negative for abdominal pain, blood in stool, constipation, diarrhea, nausea and vomiting. Endocrine: Negative for cold intolerance, heat intolerance and polyuria. Genitourinary: Negative for difficulty urinating. Musculoskeletal: Negative for arthralgias, gait problem, myalgias, neck pain and neck stiffness. Skin: Negative for color change, rash and wound. Neurological: Negative for dizziness, tremors, seizures, weakness, light-headedness, numbness and headaches. Psychiatric/Behavioral: Negative for confusion and hallucinations. The patient is not nervous/anxious. Physical Exam  Vitals signs and nursing note reviewed. Constitutional:       General: She is not in acute distress. Appearance: Normal appearance. She is well-developed. She is not diaphoretic. HENT:      Head: Normocephalic and atraumatic. Right Ear: External ear normal.      Left Ear: External ear normal.   Eyes:      General:         Right eye: No discharge. Left eye: No discharge.    Neck: Trachea: No tracheal deviation. Cardiovascular:      Rate and Rhythm: Normal rate and regular rhythm. Pulses: Normal pulses. Heart sounds: Normal heart sounds. No murmur. No friction rub. No gallop. Pulmonary:      Effort: Pulmonary effort is normal. No respiratory distress. Breath sounds: Normal breath sounds. No stridor. No wheezing, rhonchi or rales. Chest:      Chest wall: No tenderness. Abdominal:      General: Bowel sounds are normal. There is no distension. Palpations: Abdomen is soft. Tenderness: There is no abdominal tenderness. Comments: Morbidly obese   Musculoskeletal:         General: No swelling. Skin:     General: Skin is warm and dry. Capillary Refill: Capillary refill takes less than 2 seconds. Coloration: Skin is not pale. Findings: No rash. Comments: Curlex wraps to bilateral lower extremities for wounds   Neurological:      General: No focal deficit present. Mental Status: She is alert. Mental status is at baseline. Cranial Nerves: No cranial nerve deficit. Sensory: No sensory deficit. Coordination: Coordination normal.   Psychiatric:         Mood and Affect: Mood normal.         Behavior: Behavior normal.         Vital Signs: Temperature 96.9 °F, blood pressure 150/84, pulse 81, respirations 16, SPO2 98% on room air    Assessment:  1. Acute kidney injury ([address]  With creatinine 35 and BUN 43 we will send patient into ER for probable admission of acute on chronic kidney disease. This is noted to be significant change from previous hospitalization. 2. CHF (congestive heart failure), NYHA class I, acute on chronic, combined ([address]  Stable at present. No signs of significant fluid overload    3. Dementia without behavioral disturbance, unspecified dementia type (HCC)  Mentation stable    4. Essential hypertension  Blood pressure mildly elevated.   Will be reviewed in hospital      Plan:  Patient sent into the ER for probable admission for acute kidney failure on chronic kidney disease    Electronically signed by SHIRA Hagan CNP on 8/19/2020 at 3:48 PM

## 2020-08-19 NOTE — FLOWSHEET NOTE
Assessment: Patient is minimally responsive. Patient is a nursing facility resident but had not executed power of . She does not appear to be competent at this time. Daughter is concerned with financial problems and able to pay bills with everything in her mothers name.  advised daughter to consult her bank and an  to attempt to resolve. Asked social work to check with her and introduced them. Intervention: Engaged in conversation. Patient expressed appreciation for visit and offer of continued prayer. Plan: Chaplains are available on site or on call 24/7 for spiritual and emotional support. 08/19/20 1235   Encounter Summary   Services provided to: Patient and family together   Referral/Consult From: [facility] Family members   Continue Visiting   (8/19/20)   Complexity of Encounter High   Length of Encounter 45 minutes   Spiritual/[employer]   Type Spiritual support   Assessment Approachable   Intervention Active listening;Explored feelings, thoughts, concerns   Outcome Expressed gratitude;Engaged in conversation; Less anxious, less agitated;Venting emotion        08/19/20 1235   Encounter Summary   Services provided to: Patient and family together   Referral/Consult From: [facility] Family members   Continue Visiting   (8/19/20)   Complexity of Encounter High   Length of Encounter 45 minutes   Spiritual/[employer]ist   Type Spiritual support   Assessment Approachable   Intervention Active listening;Explored feelings, thoughts, concerns   Outcome Expressed gratitude;Engaged in conversation; Less anxious, less agitated;Venting emotion

## 2020-08-19 NOTE — ED PROVIDER NOTES
CVA (cerebral vascular accident) ([facility] Dementia ([facility] Dry eye syndrome, GERD (gastroesophageal reflux disease), Glaucoma suspect, Gout, Homonymous hemianopsia, Hyperlipidemia, Hypertension, Keratitis, Obesity, Peripheral polyneuropathy, Pseudophakos, Stroke ([facility] [facility] Trichiasis, and Type 2 diabetes mellitus ([facility] [address] SURGICAL HISTORY      has a past surgical history that includes Gastric bypass surgery; Cataract removal with implant (2012); Cataract removal with implant (2012); Coronary artery bypass graft (12-);  section; Hysterectomy; Cholecystectomy; Tonsillectomy and adenoidectomy; and Eye surgery (Left). CURRENT MEDICATIONS       Discharge Medication List as of 2020  4:24 PM      CONTINUE these medications which have NOT CHANGED    Details   carvedilol (COREG) 12.5 MG tablet Take 1 tablet by mouth 2 times daily (with meals), Disp-60 tablet,R-0Normal      cloNIDine (CATAPRES) 0.1 MG tablet Take 1 tablet by mouth 3 times daily, Disp-90 tablet,R-0Normal      collagenase 250 UNIT/GM ointment Apply topically daily. , Disp-1 Tube,R-0, Normal      gabapentin (NEURONTIN) 100 MG capsule Take 1 capsule by mouth 3 times daily for 15 days. , Disp-45 capsule,R-0Normal      insulin glargine (LANTUS) 100 UNIT/ML injection vial Inject 20 Units into the skin nightly, Disp-1 vial,R-3Normal      Sodium Hypochlorite (DAKINS) 0.125 % SOLN Irrigate with 473 mLs as directed daily, Disp-1 Bottle,R-0Normal      clopidogrel (PLAVIX) 75 MG tablet Take 1 tablet by mouth daily, Disp-30 tablet,R-3Normal      atorvastatin (LIPITOR) 40 MG tablet TAKE 1 TABLET BY MOUTH EVERYDAY, Disp-30 tablet,R-0Normal      docusate sodium (COLACE) 100 MG capsule Take 1 capsule by mouth 2 times daily, Disp-60 capsule,R-1Normal      citalopram (CELEXA) 20 MG tablet Take 1 tablet by mouth daily, Disp-60 tablet,R-0Normal      memantine (NAMENDA) 10 MG tablet TAKE 1 TABLET BY MOUTH TWICE DAILY, Disp-60 tablet,R-0Normal oxybutynin (DITROPAN) 5 MG tablet TAKE 1 TABLET BY MOUTH TWICE DAILY, Disp-60 tablet,R-0Normal      loratadine (CLARITIN) 10 MG tablet TAKE 1 TABLET(10 MG) BY MOUTH DAILY, Disp-60 tablet,R-0Normal      !! blood glucose monitor strips Check bid, Disp-200 strip, R-3, Normal      allopurinol (ZYLOPRIM) 300 MG tablet Take 1 tablet by mouth daily, Disp-90 tablet, R-1Normal      losartan (COZAAR) 100 MG tablet Take 1 tablet by mouth daily, Disp-90 tablet, R-1Normal      omeprazole (PRILOSEC) 20 MG delayed release capsule Take 1 capsule by mouth every morning (before breakfast), Disp-90 capsule, R-1Normal      aspirin 81 MG tablet Take 1 tablet by mouth daily, Disp-90 tablet, R-3Normal      Diabetic Shoe MISC Starting Thu 2019, Disp-1 each, R-0, Print      Compression Stockings MISC Starting Thu 2019, Disp-5 each, R-0, Skisn35-74 mm Hg bilateral knee high      rivastigmine (EXELON) 9.5 MG/24HR APPLY 1 NEW PATCH EVERY 24 HOURS, Disp-90 patch, R-1Normal      nystatin (MYCOSTATIN) 560244 UNIT/GM cream Apply topically 2 times daily. , Disp-30 g, R-1, Normal      !! Glucose Blood (BLOOD GLUCOSE TEST STRIPS) STRP truemetrix test strips check ac and HS, Disp-300 strip, R-0Normal       !! - Potential duplicate medications found. Please discuss with provider. ALLERGIES     is allergic to bactrim [sulfamethoxazole-trimethoprim]; clonidine derivatives; and amoxicillin-pot clavulanate. FAMILY HISTORY     She indicated that her mother is . She indicated that her father is .      family history includes Cancer in her mother; Coronary Art Dis in an other family member; Diabetes in her father and mother; Emphysema in her father; Glaucoma in her father; Heart Disease in her father; High Blood Pressure in her mother; Kidney Disease in her mother; Linda Isela in an other family member; Mental Retardation in an other family member; Stroke in her mother and another family member; Uterine Cancer in her mother. SOCIAL HISTORY      reports that she has never smoked. She has never used smokeless tobacco. She reports that she does not drink alcohol or use drugs. PHYSICAL EXAM     INITIAL VITALS:  height is 5' 9\" (1.753 m) and weight is 114.3 kg (252 lb). Her tympanic temperature is 97.3 °F (36.3 °C). Her blood pressure is 97/54 (abnormal) and her pulse is 38 (abnormal). Her respiration is 18 and oxygen saturation is 100%. Physical Exam  Constitutional:       Appearance: She is well-developed. HENT:      Head: Normocephalic and atraumatic. Mouth/Throat:      Mouth: Mucous membranes are dry. Eyes:      Conjunctiva/sclera: Conjunctivae normal.      Pupils: Pupils are equal, round, and reactive to light. Neck:      Musculoskeletal: Normal range of motion. Cardiovascular:      Rate and Rhythm: Normal rate and regular rhythm. Heart sounds: Murmur present. Pulmonary:      Effort: Pulmonary effort is normal.      Breath sounds: Normal breath sounds. Abdominal:      General: Bowel sounds are normal.      Palpations: Abdomen is soft. Musculoskeletal: Normal range of motion. General: No tenderness. Right lower leg: Edema present. Left lower leg: Edema present. Comments: Patient has bilateral lower extremity swelling with redness peeling to the skin from the midcalf down bilaterally   Skin:     General: Skin is warm and dry. Neurological:      Mental Status: She is alert and oriented to person, place, and time.    Psychiatric:         Behavior: Behavior normal.           DIFFERENTIAL DIAGNOSIS/ MDM:     New onset kidney failure will do work-up    DIAGNOSTIC RESULTS     EKG: All EKG's are interpreted by the Emergency Department Physician who either signs or Co-signs this chart in the absence of a cardiologist.    Sinus rhythm rate 86 bpm [place] interval is 288 ms, giving a first-degree AV block QRS durations 112 ms, QT is 420 QRS axis is 92 degrees there is no acute ST or T wave changes seen she does have poor R wave progression across precordium suggestive of an old anterior lateral MI    RADIOLOGY:   I directly visualized the following  images and reviewed the radiologist interpretations:       [address]         8/19/2020 9:27 am         COMPARISON:    Chest radiograph performed 08/08/2020.         HISTORY:    ORDERING SYSTEM PROVIDED HISTORY: Weakness    TECHNOLOGIST PROVIDED HISTORY:    Weakness    Reason for Exam: Weakness.  Patient denies chest complaints. Acuity: Acute    Type of Exam: Initial         FINDINGS:    There is bilateral congestion with underlying chronic pulmonary change.     There are bibasilar effusions. [place] is no pneumothorax. [place] is a stable    right midlung nodule laterally.  The heart is enlarged.  The upper abdomen is    unremarkable.  The extrathoracic soft tissues are unremarkable.              Impression    Cardiomegaly and chronic pulmonary change with bilateral congestion and    bibasilar effusions.         Stable right midlung nodule.                 ED BEDSIDE ULTRASOUND:       LABS:  Labs Reviewed   CBC WITH AUTO DIFFERENTIAL - Abnormal; Notable for the following components:       Result Value    RBC 2.83 (*)     Hemoglobin 8.1 (*)     Hematocrit 26.1 (*)     RDW 16.4 (*)     Seg Neutrophils 72 (*)     Lymphocytes 17 (*)     Immature Granulocytes 1 (*)     All other components within normal limits   COMPREHENSIVE METABOLIC PANEL - Abnormal; Notable for the following components:    Glucose 163 (*)     BUN 45 (*)     CREATININE 3.74 (*)     Calcium 8.3 (*)     Sodium 134 (*)     Potassium 6.1 (*)     Alkaline Phosphatase 139 (*)     Total Bilirubin 0.18 (*)     Alb 2.4 (*)     Albumin/Globulin Ratio 0.6 (*)     GFR Non- 12 (*)     GFR  15 (*)     All other components within normal limits   MYOGLOBIN, SERUM - Abnormal; Notable for the following components:    Myoglobin 79 (*)     All other components within normal limits   TROPONIN - Abnormal; Notable for the following components:    Troponin, High Sensitivity 86 (*)     All other components within normal limits   POC GLUCOSE FINGERSTICK - Abnormal; Notable for the following components:    POC Glucose 117 (*)     All other components within normal limits   POC GLUCOSE FINGERSTICK - Abnormal; Notable for the following components:    POC Glucose 107 (*)     All other components within normal limits   LIPASE   PROTIME-INR   POC GLUCOSE FINGERSTICK           EMERGENCY DEPARTMENT COURSE:   Vitals:    Vitals:    08/19/20 1500 08/19/20 1515 08/19/20 1529 08/19/20 1531   BP: (!) 115/53 (!) 110/55 (!) 106/52 (!) 97/54   Pulse: (!) 36 (!) 39 (!) 45 (!) 38   Resp:       Temp:       TempSrc:       SpO2: 100% 100% 100% 100%   Weight:       Height:         -------------------------  BP: (!) 97/54, Temp: 97.3 °F (36.3 °C), Pulse: (!) 38, Resp: 18        Re-evaluation Notes    On reevaluation we had a change of rhythm going from sinus to a marked sinus bradycardia Masood EKG shows sinus bradycardia 36 bpm MS interval 202 ms QRS durations 104 ms QT corrected 434 ms axis is 62 there is no acute ST or T wave changes. CRITICAL CARE:   Patient was treated for hyperkalemia with insulin glucose and calcium gluconate as well as fluids, before this was given she did have change to bradycardia as noted she maintained that she was feeling fine had no chest pain or shortness of breath her blood pressure with exception of 1 lower at 90 were in the 359 range systolic. CONSULTS: Dr. Fatimah Barba was consulted his group will follow at [facility] he agreed with transferring. I discussed the case with Dr. Irwin Waters intensivist at [facility] he agreed except the patient in transfer      PROCEDURES:  None    FINAL IMPRESSION      1. Acute kidney injury (nontraumatic) ([facility] [address]    2.  Bradycardia          DISPOSITION/PLAN   DISPOSITION transferred    Condition on Disposition    Stable    PATIENT REFERRED TO:  No follow-up provider specified. DISCHARGE MEDICATIONS:  Discharge Medication List as of 8/19/2020  4:24 PM          (Please note that portions of this note were completed with a voice recognition program.  Efforts were made to edit the dictations but occasionally words are mis-transcribed.)    Swanson MD, F.A.A.E.M.   Attending Emergency Physician                          Marietta Mistry MD  08/23/20 7894

## 2020-08-19 NOTE — ED TRIAGE NOTES
Pt presents to the ED by EMS, sent by [facility]. Pt NP wants pt seen due to increase of BUN and creatine. Pt denies any pain.

## 2020-08-19 NOTE — TELEPHONE ENCOUNTER
Writer contacted Dr. Marsha Neves,  ED provider to inform of 30 day readmission risk. ED provider informed writer of readmission.

## 2020-08-19 NOTE — ED NOTES
Spoke with Lisa Ramirez at the [employer] and updated her on pt condition and being transferred to [address] [place] [address] CORNEL Macias RN  08/19/20 6234

## 2020-08-19 NOTE — ED NOTES
Pt with a long pause and then became bradycardic again. Pt was moving around in bed and states that she feels fine. BP stable.       Robel Kwan RN  08/19/20 8531

## 2020-08-20 ENCOUNTER — TELEPHONE (OUTPATIENT)
Dept: PODIATRY | Age: 70
End: 2020-08-20

## 2020-08-20 ENCOUNTER — APPOINTMENT (OUTPATIENT)
Dept: GENERAL RADIOLOGY | Age: 70
DRG: 682 | End: 2020-08-20
Attending: INTERNAL MEDICINE
Payer: MEDICARE

## 2020-08-20 PROBLEM — T07.XXXA WOUNDS, MULTIPLE: Status: ACTIVE | Noted: 2020-08-20

## 2020-08-20 PROBLEM — S31.809A BUTTOCK WOUND: Status: ACTIVE | Noted: 2020-08-20

## 2020-08-20 LAB
-: ABNORMAL
ABSOLUTE EOS #: 0.2 K/UL (ref 0–0.44)
ABSOLUTE IMMATURE GRANULOCYTE: <0.03 K/UL (ref 0–0.3)
ABSOLUTE LYMPH #: 2.24 K/UL (ref 1.1–3.7)
ABSOLUTE MONO #: 0.58 K/UL (ref 0.1–1.2)
ALBUMIN SERPL-MCNC: 2 G/DL (ref 3.5–5.2)
ALBUMIN/GLOBULIN RATIO: 0.5 (ref 1–2.5)
ALP BLD-CCNC: 112 U/L (ref 35–104)
ALT SERPL-CCNC: 7 U/L (ref 5–33)
AMORPHOUS: ABNORMAL
ANION GAP SERPL CALCULATED.3IONS-SCNC: 12 MMOL/L (ref 9–17)
AST SERPL-CCNC: 10 U/L
BACTERIA: ABNORMAL
BASOPHILS # BLD: 1 % (ref 0–2)
BASOPHILS ABSOLUTE: 0.09 K/UL (ref 0–0.2)
BILIRUB SERPL-MCNC: 0.22 MG/DL (ref 0.3–1.2)
BILIRUBIN DIRECT: 0.1 MG/DL
BILIRUBIN URINE: NEGATIVE
BILIRUBIN, INDIRECT: 0.12 MG/DL (ref 0–1)
BUN BLDV-MCNC: 42 MG/DL (ref 8–23)
BUN/CREAT BLD: ABNORMAL (ref 9–20)
CALCIUM IONIZED: 1.12 MMOL/L (ref 1.13–1.33)
CALCIUM SERPL-MCNC: 8.4 MG/DL (ref 8.6–10.4)
CASTS UA: ABNORMAL /LPF (ref 0–8)
CHLORIDE BLD-SCNC: 99 MMOL/L (ref 98–107)
CO2: 23 MMOL/L (ref 20–31)
COLOR: YELLOW
CREAT SERPL-MCNC: 3.34 MG/DL (ref 0.5–0.9)
CREATININE URINE: 18.4 MG/DL (ref 28–217)
CRYSTALS, UA: ABNORMAL /HPF
DIFFERENTIAL TYPE: ABNORMAL
EKG ATRIAL RATE: 45 BPM
EKG P AXIS: 73 DEGREES
EKG P-R INTERVAL: 218 MS
EKG Q-T INTERVAL: 552 MS
EKG QRS DURATION: 102 MS
EKG QTC CALCULATION (BAZETT): 477 MS
EKG R AXIS: 90 DEGREES
EKG T AXIS: 80 DEGREES
EKG VENTRICULAR RATE: 45 BPM
EOSINOPHILS RELATIVE PERCENT: 3 % (ref 1–4)
EPITHELIAL CELLS UA: ABNORMAL /HPF (ref 0–5)
GFR AFRICAN AMERICAN: 17 ML/MIN
GFR NON-AFRICAN AMERICAN: 14 ML/MIN
GFR SERPL CREATININE-BSD FRML MDRD: ABNORMAL ML/MIN/{1.73_M2}
GFR SERPL CREATININE-BSD FRML MDRD: ABNORMAL ML/MIN/{1.73_M2}
GLOBULIN: ABNORMAL G/DL (ref 1.5–3.8)
GLUCOSE BLD-MCNC: 104 MG/DL (ref 70–99)
GLUCOSE BLD-MCNC: 234 MG/DL (ref 65–105)
GLUCOSE BLD-MCNC: 89 MG/DL (ref 65–105)
GLUCOSE BLD-MCNC: 90 MG/DL (ref 65–105)
GLUCOSE URINE: NEGATIVE
HCT VFR BLD CALC: 26.9 % (ref 36.3–47.1)
HEMOGLOBIN: 7.9 G/DL (ref 11.9–15.1)
IMMATURE GRANULOCYTES: 0 %
KETONES, URINE: NEGATIVE
LEUKOCYTE ESTERASE, URINE: NEGATIVE
LYMPHOCYTES # BLD: 31 % (ref 24–43)
MAGNESIUM: 1.9 MG/DL (ref 1.6–2.6)
MCH RBC QN AUTO: 28.3 PG (ref 25.2–33.5)
MCHC RBC AUTO-ENTMCNC: 29.4 G/DL (ref 28.4–34.8)
MCV RBC AUTO: 96.4 FL (ref 82.6–102.9)
MONOCYTES # BLD: 8 % (ref 3–12)
MUCUS: ABNORMAL
NITRITE, URINE: NEGATIVE
NRBC AUTOMATED: 0 PER 100 WBC
OTHER OBSERVATIONS UA: ABNORMAL
PDW BLD-RTO: 16.5 % (ref 11.8–14.4)
PH UA: 6.5 (ref 5–8)
PHOSPHORUS: 5.5 MG/DL (ref 2.6–4.5)
PLATELET # BLD: 353 K/UL (ref 138–453)
PLATELET ESTIMATE: ABNORMAL
PMV BLD AUTO: 9.8 FL (ref 8.1–13.5)
POTASSIUM SERPL-SCNC: 5.1 MMOL/L (ref 3.7–5.3)
POTASSIUM SERPL-SCNC: 5.1 MMOL/L (ref 3.7–5.3)
POTASSIUM SERPL-SCNC: 5.3 MMOL/L (ref 3.7–5.3)
PROTEIN UA: ABNORMAL
RBC # BLD: 2.79 M/UL (ref 3.95–5.11)
RBC # BLD: ABNORMAL 10*6/UL
RBC UA: ABNORMAL /HPF (ref 0–4)
RENAL EPITHELIAL, UA: ABNORMAL /HPF
SEG NEUTROPHILS: 57 % (ref 36–65)
SEGMENTED NEUTROPHILS ABSOLUTE COUNT: 4.15 K/UL (ref 1.5–8.1)
SODIUM BLD-SCNC: 134 MMOL/L (ref 135–144)
SODIUM,UR: 88 MMOL/L
SPECIFIC GRAVITY UA: 1.01 (ref 1–1.03)
TOTAL PROTEIN, URINE: 51 MG/DL
TOTAL PROTEIN: 5.8 G/DL (ref 6.4–8.3)
TRICHOMONAS: ABNORMAL
TURBIDITY: CLEAR
URINE HGB: NEGATIVE
URINE TOTAL PROTEIN CREATININE RATIO: 2.77 (ref 0–0.2)
UROBILINOGEN, URINE: NORMAL
WBC # BLD: 7.3 K/UL (ref 3.5–11.3)
WBC # BLD: ABNORMAL 10*3/UL
WBC UA: ABNORMAL /HPF (ref 0–5)
YEAST: ABNORMAL

## 2020-08-20 PROCEDURE — 71045 X-RAY EXAM CHEST 1 VIEW: CPT

## 2020-08-20 PROCEDURE — 84132 ASSAY OF SERUM POTASSIUM: CPT

## 2020-08-20 PROCEDURE — 6360000002 HC RX W HCPCS: Performed by: STUDENT IN AN ORGANIZED HEALTH CARE EDUCATION/TRAINING PROGRAM

## 2020-08-20 PROCEDURE — 6370000000 HC RX 637 (ALT 250 FOR IP): Performed by: STUDENT IN AN ORGANIZED HEALTH CARE EDUCATION/TRAINING PROGRAM

## 2020-08-20 PROCEDURE — 99222 1ST HOSP IP/OBS MODERATE 55: CPT | Performed by: INTERNAL MEDICINE

## 2020-08-20 PROCEDURE — 82947 ASSAY GLUCOSE BLOOD QUANT: CPT

## 2020-08-20 PROCEDURE — 87086 URINE CULTURE/COLONY COUNT: CPT

## 2020-08-20 PROCEDURE — 99212 OFFICE O/P EST SF 10 MIN: CPT

## 2020-08-20 PROCEDURE — 99291 CRITICAL CARE FIRST HOUR: CPT | Performed by: INTERNAL MEDICINE

## 2020-08-20 PROCEDURE — 93005 ELECTROCARDIOGRAM TRACING: CPT | Performed by: STUDENT IN AN ORGANIZED HEALTH CARE EDUCATION/TRAINING PROGRAM

## 2020-08-20 PROCEDURE — 82330 ASSAY OF CALCIUM: CPT

## 2020-08-20 PROCEDURE — 80076 HEPATIC FUNCTION PANEL: CPT

## 2020-08-20 PROCEDURE — 2060000000 HC ICU INTERMEDIATE R&B

## 2020-08-20 PROCEDURE — 2580000003 HC RX 258: Performed by: STUDENT IN AN ORGANIZED HEALTH CARE EDUCATION/TRAINING PROGRAM

## 2020-08-20 PROCEDURE — 76937 US GUIDE VASCULAR ACCESS: CPT

## 2020-08-20 PROCEDURE — 83735 ASSAY OF MAGNESIUM: CPT

## 2020-08-20 PROCEDURE — 93010 ELECTROCARDIOGRAM REPORT: CPT | Performed by: INTERNAL MEDICINE

## 2020-08-20 PROCEDURE — 81001 URINALYSIS AUTO W/SCOPE: CPT

## 2020-08-20 PROCEDURE — 85025 COMPLETE CBC W/AUTO DIFF WBC: CPT

## 2020-08-20 PROCEDURE — 36415 COLL VENOUS BLD VENIPUNCTURE: CPT

## 2020-08-20 PROCEDURE — 84156 ASSAY OF PROTEIN URINE: CPT

## 2020-08-20 PROCEDURE — 80048 BASIC METABOLIC PNL TOTAL CA: CPT

## 2020-08-20 PROCEDURE — 82570 ASSAY OF URINE CREATININE: CPT

## 2020-08-20 PROCEDURE — 84100 ASSAY OF PHOSPHORUS: CPT

## 2020-08-20 RX ORDER — DEXTROSE MONOHYDRATE 25 G/50ML
12.5 INJECTION, SOLUTION INTRAVENOUS PRN
Status: DISCONTINUED | OUTPATIENT
Start: 2020-08-20 | End: 2020-08-20

## 2020-08-20 RX ORDER — SODIUM POLYSTYRENE SULFONATE 15 G/60ML
15 SUSPENSION ORAL; RECTAL ONCE
Status: COMPLETED | OUTPATIENT
Start: 2020-08-20 | End: 2020-08-20

## 2020-08-20 RX ORDER — CALCIUM GLUCONATE 20 MG/ML
2 INJECTION, SOLUTION INTRAVENOUS ONCE
Status: COMPLETED | OUTPATIENT
Start: 2020-08-20 | End: 2020-08-20

## 2020-08-20 RX ORDER — MINERAL OIL/I-PROP MYR/WATER
LOTION (ML) TOPICAL 2 TIMES DAILY PRN
Status: DISCONTINUED | OUTPATIENT
Start: 2020-08-20 | End: 2020-08-24 | Stop reason: HOSPADM

## 2020-08-20 RX ORDER — FUROSEMIDE 10 MG/ML
20 INJECTION INTRAMUSCULAR; INTRAVENOUS ONCE
Status: COMPLETED | OUTPATIENT
Start: 2020-08-20 | End: 2020-08-20

## 2020-08-20 RX ORDER — DEXTROSE MONOHYDRATE 25 G/50ML
12.5 INJECTION, SOLUTION INTRAVENOUS ONCE
Status: COMPLETED | OUTPATIENT
Start: 2020-08-20 | End: 2020-08-20

## 2020-08-20 RX ORDER — PANTOPRAZOLE SODIUM 40 MG/1
40 TABLET, DELAYED RELEASE ORAL
Status: DISCONTINUED | OUTPATIENT
Start: 2020-08-20 | End: 2020-08-24 | Stop reason: HOSPADM

## 2020-08-20 RX ADMIN — HEPARIN SODIUM 5000 UNITS: 5000 INJECTION INTRAVENOUS; SUBCUTANEOUS at 06:27

## 2020-08-20 RX ADMIN — OXYBUTYNIN CHLORIDE 5 MG: 5 TABLET ORAL at 23:07

## 2020-08-20 RX ADMIN — INSULIN LISPRO 1 UNITS: 100 INJECTION, SOLUTION INTRAVENOUS; SUBCUTANEOUS at 17:50

## 2020-08-20 RX ADMIN — HEPARIN SODIUM 5000 UNITS: 5000 INJECTION INTRAVENOUS; SUBCUTANEOUS at 16:01

## 2020-08-20 RX ADMIN — CALCIUM GLUCONATE 2 G: 20 INJECTION, SOLUTION INTRAVENOUS at 01:27

## 2020-08-20 RX ADMIN — CITALOPRAM 20 MG: 20 TABLET, FILM COATED ORAL at 10:44

## 2020-08-20 RX ADMIN — DESMOPRESSIN ACETATE 40 MG: 0.2 TABLET ORAL at 23:07

## 2020-08-20 RX ADMIN — OXYBUTYNIN CHLORIDE 5 MG: 5 TABLET ORAL at 10:44

## 2020-08-20 RX ADMIN — INSULIN GLARGINE 20 UNITS: 100 INJECTION, SOLUTION SUBCUTANEOUS at 23:12

## 2020-08-20 RX ADMIN — FUROSEMIDE 20 MG: 10 INJECTION, SOLUTION INTRAMUSCULAR; INTRAVENOUS at 06:27

## 2020-08-20 RX ADMIN — SODIUM CHLORIDE, PRESERVATIVE FREE 10 ML: 5 INJECTION INTRAVENOUS at 10:45

## 2020-08-20 RX ADMIN — COLLAGENASE SANTYL: 250 OINTMENT TOPICAL at 11:40

## 2020-08-20 RX ADMIN — SODIUM POLYSTYRENE SULFONATE 15 G: 15 SUSPENSION ORAL; RECTAL at 01:27

## 2020-08-20 RX ADMIN — SODIUM CHLORIDE, PRESERVATIVE FREE 10 ML: 5 INJECTION INTRAVENOUS at 23:08

## 2020-08-20 RX ADMIN — CLOPIDOGREL 75 MG: 75 TABLET, FILM COATED ORAL at 09:50

## 2020-08-20 RX ADMIN — ASPIRIN 81 MG: 81 TABLET, CHEWABLE ORAL at 09:50

## 2020-08-20 RX ADMIN — DEXTROSE MONOHYDRATE 12.5 G: 25 INJECTION, SOLUTION INTRAVENOUS at 01:50

## 2020-08-20 RX ADMIN — HEPARIN SODIUM 5000 UNITS: 5000 INJECTION INTRAVENOUS; SUBCUTANEOUS at 23:07

## 2020-08-20 RX ADMIN — INSULIN HUMAN 10 UNITS: 100 INJECTION, SOLUTION PARENTERAL at 01:27

## 2020-08-20 RX ADMIN — ASPIRIN 81 MG: 81 TABLET, CHEWABLE ORAL at 01:27

## 2020-08-20 RX ADMIN — INSULIN LISPRO 1 UNITS: 100 INJECTION, SOLUTION INTRAVENOUS; SUBCUTANEOUS at 23:07

## 2020-08-20 RX ADMIN — PANTOPRAZOLE SODIUM 40 MG: 40 TABLET, DELAYED RELEASE ORAL at 10:45

## 2020-08-20 ASSESSMENT — PAIN SCALES - GENERAL
PAINLEVEL_OUTOF10: 0

## 2020-08-20 NOTE — PROGRESS NOTES
CRITICAL CARE SENIOR RESIDENT NOTE    Patient is a 31-year-old female  PMH: CAD s/p CABG. ICM Echo 8/11/2020 EF 30 to 35%, G2 LV DD, pulmonary hypertension, tricuspid regurgitation  Hypertension, obesity, diabetes mellitus, bilateral lower extremity lymphedema, PAD, CKD with baseline creatinine 1.2    Patient had recent admission for hyperglycemia, E. coli UTI and right heel wound. She had angioplasty of posterior tibial and peroneal arteries by vascular and cardiac cath done which showed 3 out of 4 patent CABG grafts. She was resumed on Plavix and aspirin. Lasix was discontinued on discharge to [place]    Patient was sent to [place] ER after she was found to have bradycardia. Patient was asymptomatic. No fevers. She was able to eat and drink well. No altered mentation  She was found to have sinus bradycardia with first-degree heart block  Potassium 6.1 for which she received insulin dextrose and calcium gluconate. Creatinine 3.74. WBC 8.4  Chest x-ray shows bilateral pulmonary vascular congestion      Assessment and Plan:    Principal Problem:    Bradycardia  Active Problems:    Hypertension    Morbidly obese ([facility])    Acute kidney injury superimposed on CKD ([address]    CAD (coronary artery disease)    Type 2 diabetes mellitus with hyperglycemia, with long-term current use of insulin ([facility])    CHF (congestive heart failure), NYHA class I, acute on chronic, combined ([address]    Lymphedema of both lower extremities    Pressure injury of right heel, unstageable ([facility])    Hyperkalemia    AV block, 1st degree  Resolved Problems:    * No resolved hospital problems. *      - Patient alert and oriented. Asymptomatic. Start p.o. aspirin, statin and Plavix  - Monitor HR currently in 50s sinus bradycardia with first-degree heart block on EKG  - Pacers and atropine bedside. Hold beta-blockers. Cardiology informed  - Follow repeat BMP and urine labs. Will correct hyperkalemia if persistent  - Lasix 20 mg IV once.   External catheter. Monitor strict I/O's.   Nephrology consulted  - Continue Lantus 20 units nightly and low-dose ISS      Alvina Burgos MD  PGY-3 Internal Medicine Resident / [facility] [address]  8/19/2020 8:25 PM

## 2020-08-20 NOTE — PROGRESS NOTES
Critical Care Team - Daily Progress Note      Date and time: 2020 8:29 AM  Patient's name:  [id number] [facility] Record Number: [id number]  Patient's account/billing number: [de-identified]  Patient's YOB: 1950  Age: 77 y.o. Date of Admission: 2020  5:08 PM  Length of stay during current admission: 1      Primary Care Physician: Jennifer Marie [place]  ICU Attending Physician: Dr. Juan Whittington    Code Status: Full Code    Reason for ICU admission: No chief complaint on file.         SUBJECTIVE:     OVERNIGHT EVENTS:       Margy cute events overnight  She received another dose of Insulin dextrose and calcium for K 5.9 overnight  UO 1550 mL since admission with Lasix    Hemodynamically stable  HR in 50s, sinus guevara with 1st degree AV block  Saturating well on room air  No symptoms  Tolerating diet  Self voiding     AWAKE & FOLLOWING COMMANDS:  [] No   [x] Yes    CURRENT VENTILATION STATUS:     [] Ventilator  [] BIPAP  [x] Nasal Cannula [] Room Air      VASOPRESSORS:  [x] No    [] Yes    If yes -   [] Levophed       [] Dopamine     [] Vasopressin       [] Dobutamine  [] Phenylephrine         [] Epinephrine    CENTRAL LINES:     [x] No   [] Yes   (Date of Insertion:   )           If yes -     [] Right IJ     [] Left IJ [] Right Femoral [] Left Femoral                   [] Right Subclavian [] Left Subclavian       BROWNE'S CATHETER:   [x] No   [] Yes  (Date of Insertion:   )     URINE OUTPUT:            [x] Good   [] Low              [] Anuric      OBJECTIVE:     VITAL SIGNS:  BP (!) 150/50   Pulse 55   Temp 97.7 °F (36.5 °C) (Oral)   Resp 12   Ht 5' 9\" (1.753 m)   Wt 254 lb 3.1 oz (115.3 kg)   SpO2 94%   BMI 37.54 kg/m²   Tmax over 24 hours:  Temp (24hrs), Av.5 °F (36.4 °C), Min:97.3 °F (36.3 °C), Max:97.7 °F (36.5 °C)      Patient Vitals for the past 8 hrs:   BP Temp Temp src Pulse Resp SpO2 Weight   20 0600 (!) 150/50 -- -- 55 12 94 % 254 lb 3.1 oz (115.3 kg)   20 0500 (!) 154/54 -- -- 55 22 -- --   08/20/20 0400 (!) 144/44 97.7 °F (36.5 °C) Oral 51 15 98 % --   08/20/20 0300 (!) 124/43 -- -- (!) 49 8 98 % --   08/20/20 0200 (!) 145/76 -- -- 55 11 97 % --   08/20/20 0100 (!) 151/49 -- -- 53 9 97 % --         Intake/Output Summary (Last 24 hours) at 8/20/2020 0829  Last data filed at 8/20/2020 0813  Gross per 24 hour   Intake --   Output 2560 ml   Net -2560 ml     Date 08/20/20 0000 - 08/20/20 2359   Shift 3230-1192 5686-9461 8533-7162 24 Hour Total   INTAKE   Shift Total(mL/kg)       OUTPUT   Urine(mL/kg/hr) 1600(1.7) 360  1960   Shift Total(mL/kg) 1600(13.9) 360(3.1)  1960(17)   Weight (kg) 115.3 115.3 115.3 115.3     Wt Readings from Last 3 Encounters:   08/20/20 254 lb 3.1 oz (115.3 kg)   08/19/20 252 lb (114.3 kg)   08/14/20 250 lb (113.4 kg)     Body mass index is 37.54 kg/m². PHYSICAL EXAM:  Constitutional: Appears well, no distress. Comfortable. Obese  EENT: PERRLA, EOMI, sclera clear, anicteric, oropharynx clear  Neck: Supple, no JVD  Respiratory: bilateral air entry equal. No wheezes. Mild basal crackles present  Cardiovascular: Sinus bradycardia. No murmurs. S1 and S2 normal  Abdomen: soft, nontender, nondistended  NEUROLOGIC: Awake, alert, oriented to name, place and time. No gross sensory or motor deficit  Extremities:  B/l lower extremities with chronic lymphedematous changes.  Multiple wounds on right heel, b/l shin, b/l hip and buttocks      MEDICATIONS:  Scheduled Meds:   aspirin  81 mg Oral Daily    atorvastatin  40 mg Oral Nightly    citalopram  20 mg Oral Daily    clopidogrel  75 mg Oral Daily    collagenase   Topical Daily    insulin glargine  20 Units Subcutaneous Nightly    oxybutynin  5 mg Oral BID    rivastigmine  1 patch Transdermal Daily    sodium chloride flush  10 mL Intravenous 2 times per day    heparin (porcine)  5,000 Units Subcutaneous 3 times per day    insulin lispro  0-6 Units Subcutaneous TID     insulin lispro  0-3 Units Subcutaneous Nightly     Continuous Infusions:   dextrose       PRN Meds:   sodium chloride flush, 10 mL, PRN  acetaminophen, 650 mg, Q6H PRN    Or  acetaminophen, 650 mg, Q6H PRN  polyethylene glycol, 17 g, Daily PRN  promethazine, 12.5 mg, Q6H PRN    Or  ondansetron, 4 mg, Q6H PRN  glucose, 15 g, PRN  dextrose, 12.5 g, PRN  glucagon (rDNA), 1 mg, PRN  dextrose, 100 mL/hr, PRN        SUPPORT DEVICES: [] Ventilator [] BIPAP  [] Nasal Cannula [x] Room Air    DATA:  Complete Blood Count:   Recent Labs     08/19/20  0921 08/20/20 0422   WBC 8.4 7.3   HGB 8.1* 7.9*   MCV 92.2 96.4    353   RBC 2.83* 2.79*   HCT 26.1* 26.9*   MCH 28.6 28.3   MCHC 31.0 29.4   RDW 16.4* 16.5*   MPV 9.3 9.8        PT/INR:    Lab Results   Component Value Date    PROTIME 13.3 08/19/2020    INR 1.1 08/19/2020     PTT:    Lab Results   Component Value Date    APTT 27.2 08/13/2020       Basal Metabolic Profile:   Recent Labs     08/19/20  0921 08/19/20 2208 08/20/20  0422   * 133* 134*   K 6.1* 5.9* 5.1   BUN 45* 45* 42*   CREATININE 3.74* 3.40* 3.34*    98 99   CO2 25 23 23      Magnesium:   Lab Results   Component Value Date    MG 1.9 08/20/2020    MG 1.9 08/19/2020    MG 1.6 08/10/2020     Phosphorus:   Lab Results   Component Value Date    PHOS 5.5 08/20/2020    PHOS 5.8 08/19/2020     S. Calcium:  Recent Labs     08/20/20 0422   CALCIUM 8.4*     S. Ionized Calcium:No results for input(s): IONCA in the last 72 hours.       Urinalysis:   Lab Results   Component Value Date    NITRU POSITIVE 08/08/2020    COLORU DARK YELLOW 08/08/2020    PHUR 5.5 08/08/2020    WBCUA 20 TO 50 08/08/2020    RBCUA 5 TO 10 08/08/2020    MUCUS NOT REPORTED 08/08/2020    TRICHOMONAS NOT REPORTED 08/08/2020    YEAST NOT REPORTED 08/08/2020    BACTERIA FEW 08/08/2020    SPECGRAV 1.026 08/08/2020    LEUKOCYTESUR MODERATE 08/08/2020    UROBILINOGEN Normal 08/08/2020    BILIRUBINUR NEGATIVE  Verified by ictotest. 08/08/2020    GLUCOSEU 1+ 08/08/2020    [address] NEGATIVE 08/08/2020    AMORPHOUS NOT REPORTED 08/08/2020       CARDIAC ENZYMES: No results for input(s): CKMB, CKMBINDEX, TROPONINI in the last 72 hours. Invalid input(s): CKTOTAL;3  BNP: No results for input(s): BNP in the last 72 hours. LFTS  Recent Labs     08/19/20  0921 08/20/20  0422   ALKPHOS 139* 112*   ALT 8 7   AST 11 10   BILITOT 0.18* 0.22*   BILIDIR  --  0.10   LABALBU 2.4* 2.0*       AMYLASE/LIPASE/AMMONIA  Recent Labs     08/19/20  0921   LIPASE 20       Last 3 Blood Glucose:   Recent Labs     08/19/20  0921 08/19/20  2208 08/20/20  0422   GLUCOSE 163* 121* 104*      HgBA1c:    Lab Results   Component Value Date    LABA1C 10.7 08/10/2020         TSH:    Lab Results   Component Value Date    TSH 1.86 08/08/2020     ANEMIA STUDIES  No results for input(s): LABIRON, TIBC, FERRITIN, [id number], FOLATE, OCCULTBLD in the last 72 hours. Cultures during this admission:     Blood cultures:                 [] None drawn      [] Negative             []  Positive (Details:  )  Urine Culture:                   [] None drawn      [] Negative             []  Positive (Details:  )  Sputum Culture:               [] None drawn       [] Negative             []  Positive (Details:  )   Endotracheal aspirate:     [] None drawn       [] Negative             []  Positive (Details:  )     Chest Xray (8/20/2020):  B/L vascular congestion and b/l mild pleural effusions    ASSESSMENT:     Principal Problem:    Bradycardia  Active Problems:    Hypertension    Morbidly obese ([facility])    Acute kidney injury superimposed on CKD ([facility])    CAD (coronary artery disease)    Type 2 diabetes mellitus with hyperglycemia, with long-term current use of insulin ([facility])    CHF (congestive heart failure), NYHA class I, acute on chronic, combined ([facility] [address]    Lymphedema of both lower extremities    Pressure injury of right heel, unstageable ([facility])    Hyperkalemia    AV block, 1st degree    Wounds, multiple    Buttock wound  Resolved Problems: * No resolved hospital problems. *      PLAN:     1. Asymptomatic Bradycardia with 1st Degree AV Block. Initial sinus pauses on EKG  Likely secondary to AMY and HyperK. No pressors required. BP stable and asymptomatic  Cardiology consulted    2. AMY on CKD. Likely Cardiorenal. Improving. Baseline  Received 40 mg Lasix IV since admission. UOP 1550 mL. Pt will likely need more diuresis  Nephrology consulted. Lasix as per nephro and cardiology  Follow urine labs. Monitor strict U/Os and follow BUN/creat    3. Hyperkalemia. Resolved. Received Insulin dextrose and calcium x 2. Monitor K Q8H  4. 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 Pulmonary Congestion on CXR. BB and ACEI on hold. Will need diuresis     5. Hypertension. Home meds held due to Bradycardia. PRN Hydralazine if BP > 160  6. B/L Chronic Lymphedema with multiple wounds. Wound care. Monitor off antibiotics  7. PAD s/p Angioplasty to posterior tibial and peroneal arteries. H/O CAD s/p CABG in 2007. Continue ASA/Plavix and Lipitor  8. H/O Recent UTI. Follow UA and Urine culture. Will start antibiotics if needed  9. Type-2 Diabetes Mellitus. Continue Lantus 20U HS and low dose ISS. POC glucose checks  10. Dementia. Continue Rivastigmine. Cardiac and carb control diet. Fluid restriction 1500 mL/day. Low salt < 2g/d  NPO now, can start diet if no plan from cardiology  DVT prophylaxis - Heparin  Protonix for GERD  External urinary catheter      Hollie Irizarry M.D.              Department of Internal Medicine/ Critical care  [address] [facility] ([place]             8/20/2020, 8:29 AM

## 2020-08-20 NOTE — PLAN OF CARE
Problem: Skin Integrity:  Goal: Will show no infection signs and symptoms  Description: Will show no infection signs and symptoms  8/20/2020 0556 by Lucie Escobedo RN  Outcome: Ongoing  Goal: Absence of new skin breakdown  Description: Absence of new skin breakdown  8/20/2020 0556 by Lucie Escobedo RN  Outcome: Ongoing     Problem: Falls - Risk of:  Goal: Will remain free from falls  Description: Will remain free from falls  8/20/2020 0556 by Lucie Escobedo RN  Outcome: Ongoing  Goal: Absence of physical injury  Description: Absence of physical injury  8/20/2020 0556 by Lucie Escobedo RN  Outcome: Ongoing

## 2020-08-20 NOTE — PROGRESS NOTES
Critical care team - Resident sign-out to medicine service      Date and time: 8/20/2020 5:43 PM  Patient's name:  Yann Swift Record Number: [id number]  Patient's account/billing number: [de-identified]  Patient's YOB: 1950  Age: 77 y.o. Date of Admission: 8/19/2020  5:08 PM  Length of stay during current admission: 1    Primary Care Physician: ERINN Perry    Code Status: Full Code    Mode of physician to physician communication:        [x] Via telephone   [] In person     Date and time of sign-out: 8/20/2020 5:43 PM    Accepting Internal Medicine\" intermed NP bull     Accepting Medicine team: intermed    Accepting team's attending: Ritu Riggins  Patient's current ICU Bed: 121    Patient's assigned bed on floor: 94 20 56        [x] Med-Surg Monitored [] Step-down       [] Psychiatry ICU       [] Psych floor     Reason for ICU admission:     No chief complaint on file. bradycardia, hyperkalemia    ICU course summary:     Patient was admitted to the medical ICU after being transferred from [facility] where she was found to have bradycardia patient is a resident of [facility] presented the emergency department there with acute renal failure and hyperkalemia as well as said bradycardia she was given insulin and calcium gluconate for hyperkalemia and transferred to [facility] for further management. At outside emergency department patient was found to be infested with maggots the lower limbs  Patient was seen and evaluated cardiology service. She was diuresed with Lasix. Patient's Coreg and clonidine have been stopped as well as her ACE inhibitor. Patient will be evaluated by the electrophysiology service for evaluation of PPM  Chronic right heel unstagable pressure injury, left ischium, right trochanter and right hip pressure injuries; bilateral lower extremity venous stasis with ulcers to the left lower leg.  Seen and evaluated wound ostomy nurse  Procedures during patient's ICU stay: none    Current Vitals:     BP (!) 155/48   Pulse 64   Temp 98.8 °F (37.1 °C) (Oral)   Resp 13   Ht 5' 9\" (1.753 m)   Wt 254 lb 3.1 oz (115.3 kg)   SpO2 99%   BMI 37.54 kg/m²         Consults:     IP CONSULT TO CARDIOLOGY  IP CONSULT TO NEPHROLOGY  IP CONSULT TO IV TEAM    Assessment:     Patient Active Problem List    Diagnosis Date Noted    Wounds, multiple 08/20/2020    Buttock wound 08/20/2020    Acute kidney injury ([address] 08/19/2020    Bradycardia 08/19/2020    Hyperkalemia 08/19/2020    AV block, 1st degree 08/19/2020    Pressure injury of right heel, unstageable ([address] 08/18/2020    PAD (peripheral artery disease) ([address]     Acute cystitis 08/08/2020    Infestation by bed bug 08/08/2020    Infestation by maggots 08/08/2020    Fall 08/08/2020    Elevated troponin 08/08/2020    Lymphedema of both lower extremities 08/08/2020    CHF (congestive heart failure), NYHA class I, acute on chronic, combined ([address] 08/07/2020    CVA (cerebral vascular accident) ([address]     Type 2 diabetes mellitus with hyperglycemia, with long-term current use of insulin ([address]     Dementia ([address]     Hypertension     Hyperlipidemia     Morbidly obese ([address]     Gout     Peripheral polyneuropathy     Anxiety and depression     Acute kidney injury superimposed on CKD ([address]     CAD (coronary artery disease)     Balance problem        Recommended Follow-up:     1. Follow-up EP recommendations  2. Follow-up wound ostomy recommendations  3. Continue to hold clonidine and beta-blockers per cardiology      Above mentioned assessment and plan was discussed by me with the admitting medicine resident. The medicine team assigned to the patient by medicine admitting resident will be following up the patient from now onwards on the floor.        [address] [place] [address], MS, RD  PGY-2 Emergency Medicine  Critical Care

## 2020-08-20 NOTE — TELEPHONE ENCOUNTER
----- Message from Jamison Rinaldi DPM sent at 8/14/2020  7:33 PM EDT -----  Regarding: [facility] schedule her for 8/21  Please call her SNF for an appointment on 8/21.  [id number] [address]

## 2020-08-20 NOTE — FLOWSHEET NOTE
Multiple family members have called, they have been updated and also had long conversations with David Arango, the son has been informed of plans to transfer to [facility] [address]

## 2020-08-20 NOTE — CONSULTS
Nephrology Consult Note    Reason for Consult: Hyperkalemia and acute renal failure  Requesting Physician: Dr. Ricardo Chan  Chief Complaint: Sent to ER due to bradycardia  History Obtained From: Chart review  History of Present Illness: This is a 77 y.o. female who has a past medical history significant for longstanding type 2 diabetes, longstanding hypertension, previous history of coronary artery disease and underwent coronary artery bypass surgery. Patient also has a history of diabetic retinopathy and chronic kidney disease with a baseline creatinine of 1.1 to 1.2 mg/dL. Patient was seen by my partner  on her previous admission. Her renal work-up shows, negative immunofixation with free light chain ratio, negative ANCA profile, her complements level was within normal limits. Patient has near nephrotic range proteinuria. And her renal ultrasound shows left kidney 10.5 cm and they had a difficulty in visualizing left kidney. Patient was at [address] where it was noticed that patient is significantly bradycardic. She was sent to local ER. Patient was having very long pauses and her heart rate was in 30s. Her potassium was also 6.1 with a creatinine of 3.7 mg/dL. Patient received insulin glucose and bicarb. Her chest x-ray shows mild pulmonary congestion. She was started on IV fluids and she was transferred to [facility] for further management. Her medication at nursing home include Coreg 12.5 mg twice a day, clonidine 0.1 mg 3 times a day with losartan 100 mg once a day. In addition to her other diabetic and anticholesterol medication. Her beta-blocker was put on hold. With conservative management her heart rate and hyperkalemia both improved. Her creatinine is also trending down. The pattern of her creatinine is as follows:  Results for Mikki Henson (MRN [id number]) as of 8/20/2020 11:49   Ref.  Range 8/13/2020 05:17 8/14/2020 10:54 8/19/2020 09:21 2020 22:08 2020 04:22   Creatinine Latest Ref Range: 0.50 - 0.90 mg/dL 1.36 (H) 1.37 (H) 3.74 (H) 3.40 (H) 3.34 (H)     Patient is making decent amount of urine and close to 1200 mL since admission. Her beta-blocker and clonidine is on hold. Patient is saturating fairly well by nasal cannula. Past Medical History:        Diagnosis Date    Anxiety and depression     Background diabetic retinopathy(362.01)     (Mild)    Balance problem     CAD (coronary artery disease)     (with coronary artery bypass graft x4).  Cancer of uterus [facility])     history of, (probable cure)    Chronic kidney disease     Chronic low back pain     CVA (cerebral vascular accident) ([address]     Dementia ([address]     (moderate)    Dry eye syndrome     GERD (gastroesophageal reflux disease)     Glaucoma suspect     Gout     Homonymous hemianopsia     (Right)    Hyperlipidemia     Hypertension     Keratitis     (secondary to dry eye syndrome).  Obesity     Peripheral polyneuropathy     (diabetic)    Pseudophakos     (Right Eye: 2012; Left Eye: 2012) - Dr. Milla Velázquez.  Stroke [facility])     (Multiple) MRI of brain 10/2008 shows multiple infarcts involving the temporal and parietal areas.  Trichiasis     (left eye)    Type 2 diabetes mellitus ([address]        Past Surgical History:        Procedure Laterality Date    CATARACT REMOVAL WITH IMPLANT  2012    (Right eye) - Dr. Milla Velázquez.  CATARACT REMOVAL WITH IMPLANT  2012    (Left eye) - Dr. Milla Velázquez.   SECTION      CHOLECYSTECTOMY      CORONARY ARTERY BYPASS GRAFT  12-    (x4) - LIMA-LAD, SVG-diagnoal 1, SVG-OM1, and SVG-PDA. Exploration of left radial. (Dr. [facility]).  EYE SURGERY Left     as a child     GASTRIC BYPASS SURGERY      HYSTERECTOMY      (abdominal)  with left oophorectomy. Right ovary retained.     TONSILLECTOMY AND ADENOIDECTOMY         Current Medications:    pantoprazole (PROTONIX) tablet 40 mg, QAM AC  hydrocerin (EUCERIN) lotion, BID PRN  aspirin chewable tablet 81 mg, Daily  atorvastatin (LIPITOR) tablet 40 mg, Nightly  citalopram (CELEXA) tablet 20 mg, Daily  clopidogrel (PLAVIX) tablet 75 mg, Daily  collagenase ointment, Daily  insulin glargine (LANTUS) injection vial 20 Units, Nightly  oxybutynin (DITROPAN) tablet 5 mg, BID  rivastigmine (EXELON) 9.5 MG/24HR 1 patch, Daily  sodium chloride flush 0.9 % injection 10 mL, 2 times per day  sodium chloride flush 0.9 % injection 10 mL, PRN  acetaminophen (TYLENOL) tablet 650 mg, Q6H PRN    Or  acetaminophen (TYLENOL) suppository 650 mg, Q6H PRN  polyethylene glycol (GLYCOLAX) packet 17 g, Daily PRN  promethazine (PHENERGAN) tablet 12.5 mg, Q6H PRN    Or  ondansetron (ZOFRAN) injection 4 mg, Q6H PRN  heparin (porcine) injection 5,000 Units, 3 times per day  glucose (GLUTOSE) 40 % oral gel 15 g, PRN  dextrose 50 % IV solution, PRN  glucagon (rDNA) injection 1 mg, PRN  dextrose 5 % solution, PRN  insulin lispro (HUMALOG) injection vial 0-6 Units, TID WC  insulin lispro (HUMALOG) injection vial 0-3 Units, Nightly        Allergies:  Bactrim [sulfamethoxazole-trimethoprim] and Amoxicillin-pot clavulanate    Social History:   Social History     Socioeconomic History    Marital status:      Spouse name: Not on file    Number of children: Not on file    Years of education: Not on file    Highest education level: Not on file   Occupational History    Not on file   Social Needs    Financial resource strain: Not on file    Food insecurity     Worry: Not on file     Inability: Not on file    Transportation needs     Medical: Not on file     Non-medical: Not on file   Tobacco Use    Smoking status: Never Smoker    Smokeless tobacco: Never Used    Tobacco comment: never smoker eclam rrt 7/20/17   Substance and Sexual Activity    Alcohol use: No    Drug use: No    Sexual activity: Not on file   Lifestyle    Physical activity     Days per week: Not on file     Minutes per session: Not on file    Stress: Not on file   Relationships    Social connections     Talks on phone: Not on file     Gets together: Not on file     Attends Mu-ism service: Not on file     Active member of club or organization: Not on file     Attends meetings of clubs or organizations: Not on file     Relationship status: Not on file    Intimate partner violence     Fear of current or ex partner: Not on file     Emotionally abused: Not on file     Physically abused: Not on file     Forced sexual activity: Not on file   Other Topics Concern    Not on file   Social History Narrative    Not on file       Family History:   Family History   Problem Relation Age of Onset    Diabetes Mother     Cancer Mother     High Blood Pressure Mother     Stroke Mother     Kidney Disease Mother     Uterine Cancer Mother     Diabetes Father     Heart Disease Father     Glaucoma Father     Emphysema Father     Coronary Art Dis Other         All 4 siblings.  Stroke Other         1 sibling.  Lung Cancer Other         1 sibling - cause of death lung cancer.  Mental Retardation Other        Review of Systems:    Constitutional: Patient is slightly confused. She denies any fever before coming to the hospital  HEENT:  No headache or blurring of vision  Cardiac:  No chest pain or dyspnea  Chest:   No cough or wheezing. Abdomen:  Denies any abdominal pain. Neuro:  No focal weakness, abnormal movements orseizure like activity. Skin:   No rashes, no itching. :   No hematuria, no pyuria, no dysuria, no flank pain. Extremities:  Chronic leg swelling.       Objective:  CURRENT TEMPERATURE:  Temp: 97.5 °F (36.4 °C)  MAXIMUM TEMPERATURE OVER 24HRS:  Temp (24hrs), Av.5 °F (36.4 °C), Min:97.5 °F (36.4 °C), Max:97.7 °F (36.5 °C)    CURRENT RESPIRATORY RATE:  Resp: 12  CURRENT PULSE:  Pulse: 55  CURRENT BLOOD PRESSURE:  BP: (!) 150/50  24HR BLOOD PRESSURE RANGE:  Systolic (77STL), GAMGLOB 1.4 08/13/2020    GGPCT 28 08/13/2020    PATH ELECTRONICALLY SIGNED. Mraianela Martínez M.D. 08/13/2020    PATH ELECTRONICALLY SIGNED. Marianela Martínez M.D. 08/13/2020     UPEP: No results found for: TPU     C3:   Lab Results   Component Value Date    C3 114 08/13/2020     C4:   Lab Results   Component Value Date    C4 39 08/13/2020     MPO ANCA:    Lab Results   Component Value Date    MPO 14 08/13/2020    . PR3 ANCA:    Lab Results   Component Value Date    PR3 8 08/13/2020     Urine Sodium:    Lab Results   Component Value Date    LUKE 88 08/19/2020      Lab Results   Component Value Date    LABCREA 86.3 08/14/2020   Urinalysis:  U/A:   Lab Results   Component Value Date    NITRU NEGATIVE 08/20/2020    COLORU YELLOW 08/20/2020    PHUR 6.5 08/20/2020    WBCUA None 08/20/2020    RBCUA 2 TO 5 08/20/2020    MUCUS NOT REPORTED 08/20/2020    TRICHOMONAS NOT REPORTED 08/20/2020    YEAST NOT REPORTED 08/20/2020    BACTERIA NOT REPORTED 08/20/2020    SPECGRAV 1.008 08/20/2020    LEUKOCYTESUR NEGATIVE 08/20/2020    UROBILINOGEN Normal 08/20/2020    BILIRUBINUR NEGATIVE 08/20/2020    GLUCOSEU NEGATIVE 08/20/2020    KETUA NEGATIVE 08/20/2020    AMORPHOUS NOT REPORTED 08/20/2020         Radiology:  Reviewed as available. Assessment:  1. Acute kidney injury most likely due to prerenal azotemia and hypoperfusion state caused by severe bradycardia leading to ATN. With correction of heart rate her renal functions are slowly improving  2. Hyperkalemia due to acute renal failure further complicated by the use of losartan. 3.  Chronic kidney disease stage III baseline creatinine of 1.1 to 1.2 mg/dL  4. Cardiomyopathy and will be needing a cardiac catheterization in the future as per cardiology note  5. Mild CHF  6. Mild hyponatremia  7. Nephrotic syndrome most likely due to diabetic renal disease  Plan:  1. Agree to hold losartan, clonidine and Coreg  2.   Avoid Kayexalate and if needed for hyperkalemia consider to use Lokelma  3. Check potassium every 8 hours x3  4. BMP and CBC in a.m.  5.  Thank you very much and will follow with  Thank you for the consultation. Please do not hesitate to call with questions.   This note is created with the assistance of a speech-recognition program. While intending to generate a document that actually reflects the content of the visit, no guarantees can be provided that every mistake has been identified and corrected by editing  Electronically signed by Maira River MD on 8/20/2020 at 12:05 PM      Electronically signed by Maira River MD on 8/20/2020 at 11:50 AM

## 2020-08-20 NOTE — PROGRESS NOTES
[address] Wound Ostomy  Nurse  Consult Note       NAME:  Milana Gibson RECORD NUMBER:  [id number]  AGE: 77 y.o. GENDER: female  : 1950  TODAY'S DATE:  2020    Subjective   Reason for [address] Nurse Evaluation and Assessment: Chronic right heel unstagable pressure injury, left ischium, right trochanter and right hip pressure injuries; bilateral lower extremity venous stasis with ulcers to the left lower leg. Known from recent admission last week. Right heel was debrided by Podiatry service and Jenni initated. Vascular consulted for angioplasty to the right tibial artery       Raymond Finch is a 77 y.o. female referred by:   [x] Physician  [] Nursing  [] Other:         PAST MEDICAL HISTORY        Diagnosis Date    Anxiety and depression     Background diabetic retinopathy(362.01)     (Mild)    Balance problem     CAD (coronary artery disease)     (with coronary artery bypass graft x4).  Cancer of uterus [facility])     history of, (probable cure)    Chronic kidney disease     Chronic low back pain     CVA (cerebral vascular accident) ([address]     Dementia ([address]     (moderate)    Dry eye syndrome     GERD (gastroesophageal reflux disease)     Glaucoma suspect     Gout     Homonymous hemianopsia     (Right)    Hyperlipidemia     Hypertension     Keratitis     (secondary to dry eye syndrome).  Obesity     Peripheral polyneuropathy     (diabetic)    Pseudophakos     (Right Eye: 2012; Left Eye: 2012) - Dr. Shaina Chun.  Stroke [facility])     (Multiple) MRI of brain 10/2008 shows multiple infarcts involving the temporal and parietal areas.  Trichiasis     (left eye)    Type 2 diabetes mellitus ([address]        PAST SURGICAL HISTORY    Past Surgical History:   Procedure Laterality Date    CATARACT REMOVAL WITH IMPLANT  2012    (Right eye) - Dr. Shaina Chun.  CATARACT REMOVAL WITH IMPLANT  2012    (Left eye) - Dr. Shaina Chun.    [address] [place] [address] (DAKINS) 0.125 % SOLN Irrigate with 473 mLs as directed daily 1 Bottle 0    clopidogrel (PLAVIX) 75 MG tablet Take 1 tablet by mouth daily 30 tablet 3    atorvastatin (LIPITOR) 40 MG tablet TAKE 1 TABLET BY MOUTH EVERYDAY 30 tablet 0    docusate sodium (COLACE) 100 MG capsule Take 1 capsule by mouth 2 times daily 60 capsule 1    citalopram (CELEXA) 20 MG tablet Take 1 tablet by mouth daily 60 tablet 0    memantine (NAMENDA) 10 MG tablet TAKE 1 TABLET BY MOUTH TWICE DAILY 60 tablet 0    oxybutynin (DITROPAN) 5 MG tablet TAKE 1 TABLET BY MOUTH TWICE DAILY 60 tablet 0    loratadine (CLARITIN) 10 MG tablet TAKE 1 TABLET(10 MG) BY MOUTH DAILY 60 tablet 0    blood glucose monitor strips Check bid 200 strip 3    allopurinol (ZYLOPRIM) 300 MG tablet Take 1 tablet by mouth daily 90 tablet 1    losartan (COZAAR) 100 MG tablet Take 1 tablet by mouth daily 90 tablet 1    omeprazole (PRILOSEC) 20 MG delayed release capsule Take 1 capsule by mouth every morning (before breakfast) 90 capsule 1    aspirin 81 MG tablet Take 1 tablet by mouth daily 90 tablet 3    Diabetic Shoe MISC by Does not apply route 1 each 0    Compression Stockings MISC by Does not apply route 20-30 mm Hg bilateral knee high 5 each 0    rivastigmine (EXELON) 9.5 MG/24HR APPLY 1 NEW PATCH EVERY 24 HOURS (Patient not taking: Reported on 4/15/2020) 90 patch 1    nystatin (MYCOSTATIN) 434262 UNIT/GM cream Apply topically 2 times daily.  30 g 1    Glucose Blood (BLOOD GLUCOSE TEST STRIPS) STRP truemetrix test strips check ac and  strip 0       Objective    BP (!) 150/50   Pulse 55   Temp 97.9 °F (36.6 °C) (Axillary)   Resp 12   Ht 5' 9\" (1.753 m)   Wt 254 lb 3.1 oz (115.3 kg)   SpO2 94%   BMI 37.54 kg/m²     LABS:  WBC:    Lab Results   Component Value Date    WBC 7.3 08/20/2020     H/H:    Lab Results   Component Value Date    HGB 7.9 08/20/2020    HCT 26.9 08/20/2020     PTT:    Lab Results   Component Value Date    APTT 27.2 08/13/2020   [APTT}  PT/INR:    Lab Results   Component Value Date    PROTIME 13.3 08/19/2020    INR 1.1 08/19/2020     HgBA1c:    Lab Results   Component Value Date    LABA1C 10.7 08/10/2020       Assessment   Kory Risk Score: Kory Scale Score: 15    Patient Active Problem List   Diagnosis Code    Dementia ([facility] F03.90    Hypertension I10    Hyperlipidemia E78.5    Morbidly obese ([facility] E66.01    Gout M10.9    Peripheral polyneuropathy G62.9    Anxiety and depression F41.9, F32.9    Acute kidney injury superimposed on CKD ([facility]) N17.9, N18.9    CAD (coronary artery disease) I25.10    Balance problem R26.89    CVA (cerebral vascular accident) ([facility] I63.9    Type 2 diabetes mellitus with hyperglycemia, with long-term current use of insulin ([facility]) E11.65, Z79.4    CHF (congestive heart failure), NYHA class I, acute on chronic, combined ([facility] I50.43    Acute cystitis N30.00    Infestation by bed bug B88.8    Infestation by maggots B87.9    Fall W19. XXXA    Elevated troponin R79.89    Lymphedema of both lower extremities I89.0    PAD (peripheral artery disease) ([facility]) I73.9    Pressure injury of right heel, unstageable ([facility]) L89.610    Acute kidney injury ([facility]) N17.9    Bradycardia R00.1    Hyperkalemia E87.5    AV block, 1st degree I44.0    Wounds, multiple T07. Garnett Needle    Buttock wound S31.809A       Measurements:     08/20/20 1147   Wound 08/07/20 Heel Right   Date First Assessed/Time First Assessed: 08/07/20 2330   Present on Hospital Admission: Yes  Primary Wound Type: Pressure Injury  Location: Heel  Wound Location Orientation: Right   Wound Image    Wound Pressure Unstageable   Dressing Changed Changed/New   Dressing/Treatment Pharmaceutical agent (see MAR); Moist to moist;Moisten with saline;ABD;Roll gauze; Ace wrap   Wound Cleansed Rinsed/Irrigated with saline   Dressing Change Due 08/21/20   Wound Length (cm) 5 cm   Wound Width (cm) 5 cm   Wound Surface Area (cm^2) 25 cm^2   Change in Wound Size % (l*w) 24.24   Wound Assessment Dry;Black; Pink   Drainage Amount Moderate   Drainage Description Serosanguinous   Reshma-wound Assessment Calloused   Wound 08/10/20 Hip Right   Date First Assessed/Time First Assessed: 08/10/20 1700   Present on Hospital Admission: Yes  Primary Wound Type: Pressure Injury  Location: Hip  Wound Location Orientation: Right   Wound Image    Wound Pressure Stage  2   Wound Cleansed Rinsed/Irrigated with saline   Dressing Change Due 08/22/20   Wound Length (cm) 4 cm   Wound Width (cm) 4 cm   Wound Depth (cm) 0.1 cm   Wound Surface Area (cm^2) 16 cm^2   Change in Wound Size % (l*w) 27.27   Wound Volume (cm^3) 1.6 cm^3   Wound Healing % 27   Wound Assessment Fragile;Pink;Drainage   Drainage Description Serous   Reshma-wound Assessment Edema   Wound 08/07/20 Tibial Left multiple scattered wounds   Date First Assessed/Time First Assessed: 08/07/20 2330   Present on Hospital Admission: Yes  Primary Wound Type: Venous Ulcer  Location: Tibial  Wound Location Orientation: Left  Wound Description (Comments): multiple scattered wounds   Wound Image     Wound Venous   Dressing Status Changed   Dressing Changed Changed/New   Dressing/Treatment Non adherent;ABD;Roll gauze; Ace wrap   Wound Cleansed Soap and water   Dressing Change Due 08/21/20   Wound Assessment Pink;Drainage   Drainage Amount Moderate   Drainage Description Serous   Reshma-wound Assessment Edema; Hyperpigmented   Non-staged Wound Description Partial thickness   Wound 08/10/20 Ischium Left   Date First Assessed/Time First Assessed: 08/10/20 1700   Present on Hospital Admission: Yes  Primary Wound Type: Pressure Injury  Location: Ischium  Wound Location Orientation: Left   Wound Image    Wound Pressure Stage  3   Dressing Changed Changed/New   Dressing/Treatment Silicone border; Foam   Wound Cleansed Soap and water   Dressing Change Due 08/23/20   Wound Length (cm) 1 cm   Wound Width (cm) 1 cm   Wound Depth (cm) 0.2 cm   Wound Surface Area (cm^2) 1 cm^2   Change in Wound Size % (l*w) 70.59   Wound Volume (cm^3) 0.2 cm^3   Wound Assessment Pink;Yellow   Drainage Amount Moderate   Drainage Description Serosanguinous   Reshma-wound Assessment Intact   Wound 08/10/20 Hip Right Trochanter; CLUSTER OF WOUNDS   Date First Assessed/Time First Assessed: 08/10/20 1700   Present on Hospital Admission: Yes  Primary Wound Type: Pressure Injury  Location: Hip  Wound Location Orientation: Right  Wound Description (Comments): Trochanter; CLUSTER OF WOUNDS   Wound Image    Wound Deep tissue/Injury   Dressing Changed Changed/New   Dressing/Treatment Honey gel/honey paste; Hydrating gel;Silicone border; Foam   Wound Cleansed Rinsed/Irrigated with saline   Dressing Change Due 08/22/20   Wound Length (cm) 3 cm   Wound Width (cm) 6 cm   Wound Surface Area (cm^2) 18 cm^2   Change in Wound Size % (l*w) -46.94   Wound Assessment Yellow;Fibrin;Purple;Maroon; Intact   Reshma-wound Assessment Intact;Edema       Response to treatment:  Well tolerated by patient. Plan   Plan of Care:   LOWER LEGS:  Provide hygiene daily with Remedy foam cleanser and apply Eucerin lotion. Use Adaptic to superficial wounds on the left lower leg,ABD, roll gauze and secure with ACE toes to knees. LEFT ISCHIUM / BUTTOCK:  Silicone bordered foam. Change every 72 hours,    RIGHT TROCHANTER:  Medihoney hydrogel and foam drresing, change every 48 hours    Specialty Bed Required : Yes   [] Low Air Loss   [x] Pressure Redistribution  [] Fluid Immersion  [] Bariatric  [] Total Pressure Relief  [] Other:     Current Diet: DIET CARDIAC; Carb Control: 3 carb choices (45 gms)/meal; Low Sodium (2 GM);  Daily Fluid Restriction: 1500 ml     NATIVIDAD HE, RN, [employer]

## 2020-08-20 NOTE — H&P
Critical Care - History and Physical Examination    Patient's name:  Karmen Drummond Record Number: [id number]  Patient's account/billing number: [de-identified]  Patient's YOB: 1950  Age: 77 y.o. Date of Admission: 8/19/2020  5:08 PM  Reason of ICU admission:   Date of History and Physical Examination: 8/19/2020      Primary Care Physician: ERINN Carroll  Attending Physician:    Code Status: Full Code    Chief complaint:  Patient was referred to the hospital with Asymptomatic bradycardia    Reason for ICU admission:   Acute on chronic renal failure with Hyperkalemia and EKG changes   Pauses on EKG from [facility]    History Of Present Illness:   Daniel Spear is a 77 years lady with background history of congestive heart failure, chronic kidney disease, Type 2 diabetes Mellitus, peripheral vascular disease and wounds on bilateral lower limbs was found by the staff at [address] [place] [address] to have bradycardia and was sent to the emergency department for that. She was  found to have  acute on chronic renal failure and hyperkalemia. Patient was given insulin and calcium gluconate for hyperkalemia. Patient reports that she is passing more urine recently with no burning or pain. Patient was admitted to the [facility] recently with unwitnessed fall when she was  found on floor soiled in urine and feces. She had acutely deranged renal functions at that time with raised CK and myoglobins. Patient was treated with IV fluids and antibiotics as she also had wounds on the lower limbs with maggots infestations. Past Medical History:        Diagnosis Date    Anxiety and depression     Background diabetic retinopathy(362.01) 2008    (Mild)    Balance problem     CAD (coronary artery disease)     (with coronary artery bypass graft x4).     [facility] uterus [facility])     history of, (probable cure)    Chronic kidney disease     Chronic low back pain     [facility]) [facility]     Dementia ([facility]     (moderate)    Dry eye syndrome     GERD (gastroesophageal reflux disease)     Glaucoma suspect     Gout     Homonymous hemianopsia     (Right)    Hyperlipidemia     Hypertension     Keratitis     (secondary to dry eye syndrome).  Obesity     Peripheral polyneuropathy     (diabetic)    Pseudophakos     (Right Eye: 2012; Left Eye: 2012) - Dr. Geeta Bazan.  Stroke [facility])     (Multiple) MRI of brain 10/2008 shows multiple infarcts involving the temporal and parietal areas.  Trichiasis     (left eye)    Type 2 diabetes mellitus ([facility]        Past Surgical History:        Procedure Laterality Date    CATARACT REMOVAL WITH IMPLANT  2012    (Right eye) - Dr. Geeta Bazan.  CATARACT REMOVAL WITH IMPLANT  2012    (Left eye) - Dr. Geeta Bazan.   SECTION      CHOLECYSTECTOMY      CORONARY ARTERY BYPASS GRAFT  12-    (x4) - LIMA-LAD, SVG-diagnoal 1, SVG-OM1, and SVG-PDA. Exploration of left radial. (Dr. Corey Olivera).  EYE SURGERY Left     as a child     GASTRIC BYPASS SURGERY      HYSTERECTOMY      (abdominal)  with left oophorectomy. Right ovary retained.  TONSILLECTOMY AND ADENOIDECTOMY         Allergies: Allergies   Allergen Reactions    Bactrim [Sulfamethoxazole-Trimethoprim] Hives    Amoxicillin-Pot Clavulanate Diarrhea, Nausea Only and Nausea And Vomiting         Home Medications:   Prior to Admission medications    Medication Sig Start Date End Date Taking? Authorizing Provider   carvedilol (COREG) 12.5 MG tablet Take 1 tablet by mouth 2 times daily (with meals) 20  Cristin Haskins MD   cloNIDine (CATAPRES) 0.1 MG tablet Take 1 tablet by mouth 3 times daily 20  Cristin Haskins MD   collagenase 250 UNIT/GM ointment Apply topically daily. 8/15/20 8/24/20  Cristin Haskins MD   gabapentin (NEURONTIN) 100 MG capsule Take 1 capsule by mouth 3 times daily for 15 days. 8/14/20 8/29/20  Titus Watkins MD   insulin glargine (LANTUS) 100 UNIT/ML injection vial Inject 20 Units into the skin nightly 8/14/20   Titus Watkins MD   Sodium Hypochlorite (DAKINS) 0.125 % SOLN Irrigate with 473 mLs as directed daily 8/15/20   Titus Watkins MD   clopidogrel (PLAVIX) 75 MG tablet Take 1 tablet by mouth daily 8/14/20   Titus Watkins MD   atorvastatin (LIPITOR) 40 MG tablet TAKE 1 TABLET BY MOUTH EVERYDAY 7/29/20   ERINN Fam   docusate sodium (COLACE) 100 MG capsule Take 1 capsule by mouth 2 times daily 7/16/20   ERINN Fam   citalopram (CELEXA) 20 MG tablet Take 1 tablet by mouth daily 7/16/20   ERINN Fam   memantine (NAMENDA) 10 MG tablet TAKE 1 TABLET BY MOUTH TWICE DAILY 7/16/20   ERINN Fam   oxybutynin (DITROPAN) 5 MG tablet TAKE 1 TABLET BY MOUTH TWICE DAILY 7/16/20   ERINN Fam   loratadine (CLARITIN) 10 MG tablet TAKE 1 TABLET(10 MG) BY MOUTH DAILY 7/16/20   Tavo [place]   blood glucose monitor strips Check bid 5/8/20   ERINN Fam   allopurinol (ZYLOPRIM) 300 MG tablet Take 1 tablet by mouth daily 4/15/20   ERINN Fam   losartan (COZAAR) 100 MG tablet Take 1 tablet by mouth daily 4/15/20   ERINN Fam   omeprazole (PRILOSEC) 20 MG delayed release capsule Take 1 capsule by mouth every morning (before breakfast) 3/5/20   ERINN Fam   aspirin 81 MG tablet Take 1 tablet by mouth daily 1/3/20   ERINN Fam   Diabetic Shoe MISC by Does not apply route 9/26/19   ERINN Fam   Compression Stockings MISC by Does not apply route 20-30 mm Hg bilateral knee high 9/26/19   ERINN Fam   rivastigmine (EXELON) 9.5 MG/24HR APPLY 1 NEW PATCH EVERY 24 HOURS  Patient not taking: Reported on 4/15/2020 1/31/18   ERINN Fam   nystatin (MYCOSTATIN) 761990 UNIT/GM cream Apply topically 2 times daily.  1/31/18   ERINN Fam   Glucose Blood (BLOOD GLUCOSE TEST STRIPS) STRP anicteric, oropharynx clear, no lesions, neck supple with midline trachea. Neck: Supple, symmetrical, trachea midline, no adenopathy, thyroid symmetric, no jvd skin normal  Respiratory: clear to auscultation, no wheezes or rales and unlabored breathing. No intercostal tenderness  Cardiovascular: regular rate and rhythm, normal S1, S2, no murmur noted and 2+ pulses throughout  Abdomen: soft, nontender, nondistended, no masses or organomegaly  Neurological:  Extremities:  Both lower limbs are swollen with bilateral erythema and scaling and wound dressing     Laboratory findings:-    CBC:   Recent Labs     08/19/20 0921   WBC 8.4   HGB 8.1*        BMP:    Recent Labs     08/19/20 0921   *   K 6.1*      CO2 25   BUN 45*   CREATININE 3.74*   GLUCOSE 163*     S. Calcium:  Recent Labs     08/19/20 0921   CALCIUM 8.3*     S. Ionized Calcium:No results for input(s): IONCA in the last 72 hours. S. Magnesium:No results for input(s): MG in the last 72 hours. S. Phosphorus:No results for input(s): PHOS in the last 72 hours. S. Glucose:  Recent Labs     08/19/20  1530 08/19/20  1827 08/19/20  2046   POCGLU 89 94 108*     Glycosylated hemoglobin A1C: No results for input(s): LABA1C in the last 72 hours. INR:   Recent Labs     08/19/20 0921   INR 1.1     Hepatic functions:   Recent Labs     08/19/20 0921   ALKPHOS 139*   ALT 8   AST 11   PROT 6.5   BILITOT 0.18*   LABALBU 2.4*     Pancreatic functions:  Recent Labs     08/19/20 1947   LACTA NOT REPORTED     S. Lactic Acid:   Recent Labs     08/19/20 1947   LACTA NOT REPORTED     Cardiac enzymes:No results for input(s): CKTOTAL, CKMB, CKMBINDEX, TROPONINI in the last 72 hours. BNP:No results for input(s): BNP in the last 72 hours. Lipid profile: No results for input(s): CHOL, TRIG, HDL, LDLCALC in the last 72 hours.     Invalid input(s): LDL  Blood Gases: No results found for: PH, PCO2, PO2, HCO3, O2SAT  Thyroid functions:   Lab Results   Component (congestive heart failure), NYHA class I, acute on chronic, combined ([facility]    Acute cystitis    Infestation by bed bug    Infestation by maggots    Fall    Elevated troponin    Lymphedema of both lower extremities    PAD (peripheral artery disease) (HCC)    Pressure injury of right heel, unstageable (HCC)    Acute kidney injury (HCC)    Bradycardia    Hyperkalemia    AV block, 1st degree         Additional assessment:  ·       Plan: 1. Acute On Chronic Renal Failure:  Patient have raised creatinine above the baseline. Nephrology consult, renal ultrasound. Lasix 20 mg Stat. Strict I/O Record. 2.Hyperkalemia:      Patient have acute on chronic renal failure       Potasium was 6.1      Already got the insulin and calcium gluconate for it      Continuous telemetry      Monitor K 6 hourly     3. Bradycardia:     Patient have bradycardia with first degree heart block on EKG     Continuous telemetry      troponins     Cardiology consult      4. Congestive Heart Failure Acute On Chronic:       NYHA Class1 heart failure acute on chronic combined       Intake/ out put record       Continue lasix 20 mg       Continue clopidogril       Echo.            5. Type 2 Diabetes Mellitus with Hyperglycemia With Long Term Current Use of Insulin:        Continue insulin glargine , lispro injections        Monitor blood glucose level        Hypoglycemia protocol in case of low blood sugars        HBA1C                      DVT Prophylaxis with heparin and ECP cuffs     Diet( cardiac and diabetic)     NPO from midnight     Wound dressing             Chencho Yanes MD   Internal Medicine - PGY-1  Intensive Care Unit  8/19/2020, 9:22 PM

## 2020-08-20 NOTE — CONSULTS
Attestation signed by      Attending Physician Statement:    I have discussed the care of  Daniel Spear , including pertinent history and exam findings, with the Cardiology fellow/resident. I have seen and examined the patient and the key elements of all parts of the encounter have been performed by me. I agree with the assessment, plan and orders as documented by the fellow/resident, after I modified exam findings and plan of treatments, and the final version is my approved version of the assessment. Additional Comments: Patient presented to [facility] with weakness from SNF and transferred to [facility] for bradycardia, sinus pause up to 5. 4seconds. Coreg and clonidine stopped. ACE also stopped. She is also on celexa, Namenda and exelon which were held. Currently in SR with rate of 60. K was 5.9 and now normal. Will get EP consult for evaluation of MD Jennifer Rankin [facility] Card[facility]    Consult / H&P               Today's Date: 8/20/2020  Patient Name: Daniel Spear  Date of admission: 8/19/2020  5:08 PM  Patient's age: 77 y. o., 1950  Admission Dx: Acute kidney injury ([facility] [address] [N17.9]    Reason for Consult:  Cardiac evaluation    Requesting Physician: Loi Soriano DO    CHIEF COMPLAINT: Weakness, bradycardia    History Obtained From:  patient, electronic medical record    HISTORY OF PRESENT ILLNESS:      The patient is a 77 y.o.  female who is admitted to the hospital as a transfer from SNF because of bradycardia. Patient has known history of congestive heart failure, chronic kidney disease and type 2 diabetes. She was found to be bradycardic in the SNF and that is why transferred to the hospital.  Patient was seen to have acute on chronic renal failure with elevated potassium of 6.1, baseline creatinine of 1.2, elevated up to 3.74.     Patient has poor living conditions, she was recently admitted to [address] [place] [address] with unwitnessed fall with soiling in urine and feces, wound the lower limbs covered with maggots. She was discharged to a SNF from [facility].    History of coronary artery disease status post CABG, recent heart cath 2020 showed 3 out of 4 grafts patent, occluded SVG to RCA with left-to-right collaterals. History of PAD with recent angiogram, on aspirin Plavix at home. Patient is on Coreg 12.5 twice daily at home, also seem to be on clonidine 0.1, 3 times daily Cozaar 100. She is diabetic on insulin. Past Medical History:   has a past medical history of Anxiety and depression, Background diabetic retinopathy(362.01), Balance problem, CAD (coronary artery disease), Cancer of uterus ([address] Chronic kidney disease, Chronic low back pain, CVA (cerebral vascular accident) ([address] Dementia ([address] Dry eye syndrome, GERD (gastroesophageal reflux disease), Glaucoma suspect, Gout, Homonymous hemianopsia, Hyperlipidemia, Hypertension, Keratitis, Obesity, Peripheral polyneuropathy, Pseudophakos, Stroke ([address] Trichiasis, and Type 2 diabetes mellitus ([address] Past Surgical History:   has a past surgical history that includes Gastric bypass surgery; Cataract removal with implant (2012); Cataract removal with implant (2012); Coronary artery bypass graft (12-);  section; Hysterectomy; Cholecystectomy; Tonsillectomy and adenoidectomy; and Eye surgery (Left). Home Medications:    Prior to Admission medications    Medication Sig Start Date End Date Taking? Authorizing Provider   carvedilol (COREG) 12.5 MG tablet Take 1 tablet by mouth 2 times daily (with meals) 20  Oliver Schilder, MD   cloNIDine (CATAPRES) 0.1 MG tablet Take 1 tablet by mouth 3 times daily 20  Oliver Schilder, MD   collagenase 250 UNIT/GM ointment Apply topically daily. 8/15/20 8/24/20  Oliver Schilder, MD   gabapentin (NEURONTIN) 100 MG capsule Take 1 capsule by mouth 3 times daily for 15 days.  20  Zainulabedin HS 1/31/18   [facility]      Current Facility-Administered Medications: pantoprazole (PROTONIX) tablet 40 mg, 40 mg, Oral, QAM AC  aspirin chewable tablet 81 mg, 81 mg, Oral, Daily  atorvastatin (LIPITOR) tablet 40 mg, 40 mg, Oral, Nightly  citalopram (CELEXA) tablet 20 mg, 20 mg, Oral, Daily  clopidogrel (PLAVIX) tablet 75 mg, 75 mg, Oral, Daily  collagenase ointment, , Topical, Daily  insulin glargine (LANTUS) injection vial 20 Units, 20 Units, Subcutaneous, Nightly  oxybutynin (DITROPAN) tablet 5 mg, 5 mg, Oral, BID  rivastigmine (EXELON) 9.5 MG/24HR 1 patch, 1 patch, Transdermal, Daily  sodium chloride flush 0.9 % injection 10 mL, 10 mL, Intravenous, 2 times per day  sodium chloride flush 0.9 % injection 10 mL, 10 mL, Intravenous, PRN  acetaminophen (TYLENOL) tablet 650 mg, 650 mg, Oral, Q6H PRN **OR** acetaminophen (TYLENOL) suppository 650 mg, 650 mg, Rectal, Q6H PRN  polyethylene glycol (GLYCOLAX) packet 17 g, 17 g, Oral, Daily PRN  promethazine (PHENERGAN) tablet 12.5 mg, 12.5 mg, Oral, Q6H PRN **OR** ondansetron (ZOFRAN) injection 4 mg, 4 mg, Intravenous, Q6H PRN  heparin (porcine) injection 5,000 Units, 5,000 Units, Subcutaneous, 3 times per day  glucose (GLUTOSE) 40 % oral gel 15 g, 15 g, Oral, PRN  dextrose 50 % IV solution, 12.5 g, Intravenous, PRN  glucagon (rDNA) injection 1 mg, 1 mg, Intramuscular, PRN  dextrose 5 % solution, 100 mL/hr, Intravenous, PRN  insulin lispro (HUMALOG) injection vial 0-6 Units, 0-6 Units, Subcutaneous, TID WC  insulin lispro (HUMALOG) injection vial 0-3 Units, 0-3 Units, Subcutaneous, Nightly    Allergies:  Bactrim [sulfamethoxazole-trimethoprim] and Amoxicillin-pot clavulanate    Social History:   reports that she has never smoked. She has never used smokeless tobacco. She reports that she does not drink alcohol or use drugs.      Family History: family history includes Cancer in her mother; Coronary Art Dis in an other family member; Diabetes in her father and mother; Emphysema in her father; Glaucoma in her father; Heart Disease in her father; High Blood Pressure in her mother; Kidney Disease in her mother; [place] Cross in an other family member; Mental Retardation in an other family member; Stroke in her mother and another family member; Uterine Cancer in her mother. No h/o sudden cardiac death. No for premature CAD    REVIEW OF SYSTEMS:    · Constitutional: there has been no unanticipated weight loss. There's been No change in energy level, No change in activity level. · Eyes: No visual changes or diplopia. No scleral icterus. · ENT: No Headaches  · Cardiovascular:+cardiac history  · Respiratory: No previous pulmonary problems, No cough  · Gastrointestinal: No abdominal pain. No change in bowel or bladder habits. · Genitourinary: No dysuria, trouble voiding, or hematuria. · Musculoskeletal:  No gait disturbance, No weakness or joint complaints. · Integumentary: No rash or pruritis. · Neurological: No headache, diplopia, change in muscle strength, numbness or tingling. No change in gait, balance, coordination, mood, affect, memory, mentation, behavior. · Psychiatric: No anxiety, or depression. · Endocrine: No temperature intolerance. No excessive thirst, fluid intake, or urination. No tremor. · Hematologic/Lymphatic: No abnormal bruising or bleeding, blood clots or swollen lymph nodes. · Allergic/Immunologic: No nasal congestion or hives. PHYSICAL EXAM:      BP (!) 150/50   Pulse 55   Temp 97.7 °F (36.5 °C) (Oral)   Resp 12   Ht 5' 9\" (1.753 m)   Wt 254 lb 3.1 oz (115.3 kg)   SpO2 94%   BMI 37.54 kg/m²    Constitutional and General Appearance: alert, cooperative, no distress and appears stated age  HEENT: PERRL, no cervical lymphadenopathy. No masses palpable. Normal oral mucosa  Respiratory:  · Normal excursion and expansion without use of accessory muscles  · Resp Auscultation: Good respiratory effort. No for increased work of breathing.  On auscultation: clear to auscultation bilaterally  Cardiovascular:  · The apical impulse is not displaced  · Heart tones are crisp and normal. regular S1 and S2.  · Jugular venous pulsation Normal  · The carotid upstroke is normal in amplitude and contour without delay or bruit  · Peripheral pulses are symmetrical and full   Abdomen:   · No masses or tenderness  · Bowel sounds present  Extremities:  ·  lower extremity edema with chronic changes  Neurological:  · Alert and oriented. · Moves all extremities well  · No abnormalities of mood, affect, memory, mentation, or behavior are noted    DATA:    Diagnostics:      Cardiac catheterization 8/13/2020:  Conclusions:   Multi-vessel Coronary Artery Disease.   3 out of 4 grafts are patent.   Patent LIMA-LAD, SVG-Diagonal and SVG-OM.   Occluded SVG to RCA with left to right collaterals.   Borderline LV systolic function.     Right lower extremity angiography:  1) US guided vascular access of left common femoral artery  2) Aortogram  3) Right lower extremity angiogram  4) Selective 3rd order catheterization of posterior tibial and peroneal arteries  5) Angioplasty of posterior tibial and peroneal arteries     ECHO 8/11/2020: EF 30-35%, apex is severely hpokinetic, grade II DD, moderate-severe TR, moderate PHTN, RVSP is 60 mmHg.      CABG 2007: [email or web address]]    Labs:     CBC:   Recent Labs     08/19/20 0921 08/20/20 0422   WBC 8.4 7.3   HGB 8.1* 7.9*   HCT 26.1* 26.9*    353     BMP:   Recent Labs     08/19/20 2208 08/20/20 0422   * 134*   K 5.9* 5.1   CO2 23 23   BUN 45* 42*   CREATININE 3.40* 3.34*   LABGLOM 13* 14*   GLUCOSE 121* 104*     BNP: No results for input(s): BNP in the last 72 hours. PT/INR:   Recent Labs     08/19/20 0921   PROTIME 13.3   INR 1.1     APTT:No results for input(s): APTT in the last 72 hours.   CARDIAC ENZYMES:  Recent Labs     08/19/20 2208   CKTOTAL 27     FASTING LIPID PANEL:  Lab Results   Component Value Date    HDL 41 08/08/2020 TRIG 57 08/08/2020     LIVER PROFILE:  Recent Labs     08/19/20  0921 08/20/20  0422   AST 11 10   ALT 8 7   LABALBU 2.4* 2.0*       IMPRESSION:      Bradycardia with first-degree AV block, sinus pause  AMY on CKD, cardiorenal  History of CAD status post CABG in 2005, recent cath 8/13  Ischemic cardiomyopathy with recent echo showing EF of 30 to 35%  Hypertension  Obesity  Diabetes mellitus, on insulin  Bilateral lower extremity lymphedema  Peripheral arterial disease with recent angiogram  Pulmonary hypertension with RVSP of 60  Severe tricuspid regurgitation  Anemia    RECOMMENDATIONS:-  -Continue aspirin and Plavix  -Continue to watch bradycardia, asymptomatic with first-degree AV block, hold beta-blocker until heart rate stabilizes  - Continue to treat AMY and hyperkalemia  - patient does have sinus pause on admission , around 3 sec but completely asymptomatic, will watch for now. Discussed with patient and Nurse.     Electronically signed by Alka Stock MD on 8/20/2020 at 8:54 [address] [place] [address] [facility]      [phone]

## 2020-08-20 NOTE — PROGRESS NOTES
Comprehensive Nutrition Assessment    Type and Reason for Visit:  Initial, Positive Nutrition Screen(wounds)    Nutrition Recommendations/Plan: Continue current diet, Start ONS to help meet protein needs for healing. Will monitor PO intake/adequacy. Nutrition Assessment:  Pt admitted with bradycardia and AMY on CKD. Pt with h/o DM, CHF,CAD,CKD. Pt with multiple wounds present upon admission. Unable to obtain nutrition/wt hx from patient at time of visit- wound RN with pt. Noted pt has been NPO, but diet just advanced to cardiac/carb control. Meds/labs reviewed. Malnutrition Assessment:  Malnutrition Status:  Insufficient data    Context:  Chronic Illness     Findings of the 6 clinical characteristics of malnutrition:  Energy Intake:  Unable to assess  Weight Loss:  Unable to assess     Body Fat Loss:  Unable to assess     Muscle Mass Loss:  Unable to assess    Fluid Accumulation:  Unable to assess     Strength:  Not Performed    Estimated Daily Nutrient Needs:  Energy (kcal):  2100 kcal/day; Weight Used for Energy Requirements:  Current     Protein (g):  100-130 g pro/day; Weight Used for Protein Requirements:  Ideal(1.5-2)          Wounds:  Pressure Ulcer(right hip, right heel, right and left buttocks. Venous Stasis wounds on  bilateral LE        Current Nutrition Therapies:    DIET CARDIAC; Carb Control: 3 carb choices (45 gms)/meal; Low Sodium (2 GM);  Daily Fluid Restriction: 1500 ml    Anthropometric Measures:  · Height: 5' 9\" (175.3 cm)  · Current Body Weight: 254 lb 3.1 oz (115.3 kg)   · Ideal Body Weight: 145 lbs; % Ideal Body Weight  175%   · BMI: 37.5  · BMI Categories: Obese Class 2 (BMI 35.0 -39.9)       Nutrition Diagnosis:   · Increased protein needs related to healing as evidenced by multiple wounds    Nutrition Interventions:   Food and/or Nutrient Delivery:  Continue Current Diet, Start Oral Nutrition Supplement  Nutrition Education/Counseling:  No recommendation at this time Coordination of Nutrition Care:  Continued Inpatient Monitoring    Goals:  meet % of estimated nutrition needs       Nutrition Monitoring and Evaluation:   Food/Nutrient Intake Outcomes:  Food and Nutrient Intake, Supplement Intake  Physical Signs/Symptoms Outcomes:  Biochemical Data, Nutrition Focused Physical Findings, Skin, Weight, Fluid Status or Edema         Electronically signed by Arik Estrada RD, LD on 8/20/20 at 2:47 PM EDT    Contact: [phone]

## 2020-08-20 NOTE — PLAN OF CARE
Problem: Skin Integrity:  Goal: Will show no infection signs and symptoms  Description: Will show no infection signs and symptoms  8/20/2020 0556 by Lydia Li RN  Outcome: Ongoing  Goal: Absence of new skin breakdown  Description: Absence of new skin breakdown  8/20/2020 0556 by Lydia Li RN  Outcome: Ongoing     Problem: Falls - Risk of:  Goal: Will remain free from falls  Description: Will remain free from falls  8/20/2020 0556 by Lydia Li RN  Outcome: Ongoing  Goal: Absence of physical injury  Description: Absence of physical injury  8/20/2020 0556 by Lydia Li RN  Outcome: Ongoing

## 2020-08-20 NOTE — PROGRESS NOTES
Joel Albrecht, RCPPatient Assessment complete. Acute kidney injury ([facility] [address] [N17.9] . Vitals:    08/19/20 1900   BP: (!) 166/50   Pulse: (!) 44   Resp: 12   Temp:    SpO2: 100%   . Patients home meds are   Prior to Admission medications    Medication Sig Start Date End Date Taking? Authorizing Provider   carvedilol (COREG) 12.5 MG tablet Take 1 tablet by mouth 2 times daily (with meals) 8/14/20 9/13/20  Niles Alvarez MD   cloNIDine (CATAPRES) 0.1 MG tablet Take 1 tablet by mouth 3 times daily 8/14/20 9/13/20  Niles Alvarez MD   collagenase 250 UNIT/GM ointment Apply topically daily. 8/15/20 8/24/20  Niles Alvarez MD   gabapentin (NEURONTIN) 100 MG capsule Take 1 capsule by mouth 3 times daily for 15 days.  8/14/20 8/29/20  Niles Alvarez MD   insulin glargine (LANTUS) 100 UNIT/ML injection vial Inject 20 Units into the skin nightly 8/14/20   Niles Alvarez MD   Sodium Hypochlorite (DAKINS) 0.125 % SOLN Irrigate with 473 mLs as directed daily 8/15/20   Niles Alvarez MD   clopidogrel (PLAVIX) 75 MG tablet Take 1 tablet by mouth daily 8/14/20   Niles Alvarez MD   atorvastatin (LIPITOR) 40 MG tablet TAKE 1 TABLET BY MOUTH EVERYDAY 7/29/20   ERINN Rich   docusate sodium (COLACE) 100 MG capsule Take 1 capsule by mouth 2 times daily 7/16/20   ERINN Rich   citalopram (CELEXA) 20 MG tablet Take 1 tablet by mouth daily 7/16/20   ERINN Rich   memantine (NAMENDA) 10 MG tablet TAKE 1 TABLET BY MOUTH TWICE DAILY 7/16/20   ERINN Rich   oxybutynin (DITROPAN) 5 MG tablet TAKE 1 TABLET BY MOUTH TWICE DAILY 7/16/20   ERINN Rich   loratadine (CLARITIN) 10 MG tablet TAKE 1 TABLET(10 MG) BY MOUTH DAILY 7/16/20   You Ivan [place]   blood glucose monitor strips Check bid 5/8/20   ERINN Rich   allopurinol (ZYLOPRIM) 300 MG tablet Take 1 tablet by mouth daily 4/15/20   ERINN Rich   losartan (COZAAR) 100 MG tablet Take 1 tablet by mouth daily 4/15/20   ERINN Tucker   omeprazole (PRILOSEC) 20 MG delayed release capsule Take 1 capsule by mouth every morning (before breakfast) 3/5/20   Fabián Tucker   aspirin 81 MG tablet Take 1 tablet by mouth daily 1/3/20   ERINN Tucker   Diabetic Shoe MISC by Does not apply route 9/26/19   ERINN Tucker   Compression Stockings MISC by Does not apply route 20-30 mm Hg bilateral knee high 9/26/19   ERINN Tucker   rivastigmine (EXELON) 9.5 MG/24HR APPLY 1 NEW PATCH EVERY 24 HOURS  Patient not taking: Reported on 4/15/2020 1/31/18   ERINN Tucker   nystatin (MYCOSTATIN) 573991 UNIT/GM cream Apply topically 2 times daily. 1/31/18   ERINN Tucker   Glucose Blood (BLOOD GLUCOSE TEST STRIPS) STRP truemetrix test strips check ac and HS 1/31/18   Fabián Tucker   . Assessment: Patient is resting comfortably on 1L NC. Patient does not have a pulmonary history and does not take any pulmonary medications at home. Will try to wean oxygen off as tolerated.          RR 11  Breath Sounds: clear      · Bronchodilator assessment at level  1  · Hyperinflation assessment at level   · Secretion Management assessment at level    ·   · [x]    Bronchodilator Assessment  BRONCHODILATOR ASSESSMENT SCORE  Score 0 1 2 3 4 5   Breath Sounds   []  Patient Baseline [x]  No Wheeze good aeration []  Faint, scattered wheezing, good aeration []  Expiratory Wheezing and or moderately diminished []  Insp/Exp wheeze and/or very diminished []  Insp/Exp and/ or marked distress   Respiratory Rate   []  Patient Baseline [x]  Less than 20 []  Less than 20 []  20-25 []  Greater than 25 []  Greater than 25   Peak flow % of Pred or PB [x]  NA   []  Greater than 90%  []  81-90% []  71-80% []  Less than or equal to 70%  or unable to perform []  Unable due to Respiratory Distress   Dyspnea re []  Patient Baseline [x]  No SOB []  No SOB []  SOB on exertion []  SOB min activity []  At rest/acute   e FEV% Predicted [x]  NA []  Above 69%  []  Unable []  Above 60-69%  []  Unable []  Above 50-59%  []  Unable []  Above 35-49%  []  Unable []  Less than 35%  []  Unable                 []  Hyperinflation Assessment  Score 1 2 3   CXR and Breath Sounds   []  Clear []  No atelectasis  Basilar aeration []  Atelectasis or absent basilar breath sounds   Incentive Spirometry Volume  (Per IBW)   []  Greater than or equal to 15ml/Kg []  less than 15ml/Kg []  less than 15ml/Kg   Surgery within last 2 weeks []  None or general   []  Abdominal or thoracic surgery  []  Abdominal or thoracic   Chronic Pulmonary Historyre []  No []  Yes []  Yes     []  Secretion Management Assessment  Score 1 2 3   Bilateral Breath Sounds   []  Occasional Rhonchi []  Scattered Rhonchi []  Course Rhonchi and/or poor aeration   Sputum    []  Small amount of thin secretions []  Moderate amount of viscous secretions []  Copius, Viscious Yellow/ Secretions   CXR as reported by physician []  clear  []  Unavailable []  Infiltrates and/or consolidation  []  Unavailable []  Mucus Plugging and or lobar consolidation  []  Unavailable   Cough []  Strong, productive cough []  Weak productive cough []  No cough or weak non-productive cough   Alkamatias Mullen  9:21 PM                            FEMALE                                  MALE                            FEV1 Predicted Normal Values                        FEV1 Predicted Normal Values          Age                                     Height in Feet and Inches       Age                                     Height in Feet and Inches       4' 11\" 5' 1\" 5' 3\" 5' 5\" 5' 7\" 5' 9\" 5' 11\" 6' 1\"  4' 11\" 5' 1\" 5' 3\" 5' 5\" 5' 7\" 5' 9\" 5' 11\" 6' 1\"   42 - 45 2.49 2.66 2.84 3.03 3.22 3.42 3.62 3.83 42 - 45 2.82 3.03 3.26 3.49 3.72 3.96 4.22 4.47   46 - 49 2.40 2.57 2.76 2.94 3.14 3.33 3.54 3.75 46 - 49 2.70 2.92 3.14 3.37 3.61 3.85 4.10 4.36   50 - 53 2.31 2.48 2.66 2.85 3.04 3.24 3.45 3.66 50 - 53 2.58 2.80 3.02 3.25 3.49 3.73 3.98 4.24 54 - 57 2.21 2.38 2.57 2.75 2.95 3.14 3.35 3.56 54 - 57 2.46 2.67 2.89 3.12 3.36 3.60 3.85 4.11   58 - 61 2.10 2.28 2.46 2.65 2.84 3.04 3.24 3.45 58 - 61 2.32 2.54 2.76 2.99 3.23 3.47 3.72 3.98   62 - 65 1.99 2.17 2.35 2.54 2.73 2.93 3.13 3.34 62 - 65 2.19 2.40 2.62 2.85 3.09 3.33 3.58 3.84   66 - 69 1.88 2.05 2.23 2.42 2.61 2.81 3.02 3.23 66 - 69 2.04 2.26 2.48 2.71 2.95 3.19 3.44 3.70   70+ 1.82 1.99 2.17 2.36 2.55 2.75 2.95 3.16 70+ 1.97 2.19 2.41 2.64 2.87 3.12 3.37 3.62             Predicted Peak Expiratory Flow Rate                                       Height (in)  Female       Height (in) Male           Age 64 63 56 61 58 73 78 74 Age            21 344 357 372 387 402 417 432 446  60 62 64 66 68 70 72 74 76   25 337 352 366 381 396 411 426 441 25 447 476 505 533 [phone] 677   30 329 344 359 374 389 404 419 434 30 437 466 494 523 552 580 609 638 667   35 322 337 351 366 381 396 411 426 35 426 455 484 512 541 570 598 627 657   40 314 329 344 359 374 389 404 419 40 416 445 473 502 531 559 588 617 647   45 307 322 336 351 366 381 396 411 45 405 434 463 491 520 549 577 606 636   50 299 314 329 344 359 374 389 404 50 395 424 452 481 510 538 567 596 625   55 292 307 321 336 351 366 381 396 55 384 413 442 470 499 528 556 585 615   60 284 299 314 329 344 359 374 389 60 374 403 431 460 489 517 546 575 605   65 277 292 306 321 336 351 366 381 65 363 392 421 449 478 507 535 564 594   70 269 284 299 314 329 344 359 374 70 353 382 410 439 468 496 525 554 583   75 261 274 289 305 319 334 348 364 75 344 372 400 429 458 487 515 544 573   80 253 266 282 296 312 327 342 356 80 335 362 390 419 448 476 [phone]

## 2020-08-20 NOTE — PROGRESS NOTES
[facility]  Documentation Note                Admission Dx: Acute kidney injury ([facility] [address] [N17.9]    Past Medical History:   has a past medical history of Anxiety and depression, Background diabetic retinopathy(362.01), Balance problem, CAD (coronary artery disease), Cancer of uterus ([facility] [address] Chronic kidney disease, Chronic low back pain, CVA (cerebral vascular accident) ([facility] [address] Dementia ([facility] [address] Dry eye syndrome, GERD (gastroesophageal reflux disease), Glaucoma suspect, Gout, Homonymous hemianopsia, Hyperlipidemia, Hypertension, Keratitis, Obesity, Peripheral polyneuropathy, Pseudophakos, Stroke ([facility] [address] Trichiasis, and Type 2 diabetes mellitus ([facility] [address] Previous Testing:     ECHO 8/11/2020: EF 30-35%, apex is severely hpokinetic, grade II DD, moderate-severe TR, moderate PHTN, RVSP is 60 mmHg. CABG 2007: [facility]    Previous office/hospital visit:   Yemi Llamas NP 8/13/2020:   1. CAD s/p CABG 2007 @ [facility] per patient  2. CHF Exacerbation, acute systolic & diastolic  3. HTN  4. AMY  5. HLD  6. CVA  7. Obesity  8. Anxiety  9. Depression    Plan --  1. CAD s/p CABG 2005  Continue ASA, plavix, BB, ARB and statin. 2. CHF exacerbation - improving clinically. Continue BB & ARB. No diuretic at this time  3. Echo reviewed with Dr. Samra Prado. Will need cardiac cath to evaluate ischemic cause of CMP. Discussed in detail with patient and she is agreeable. Pt having right leg angiongram today with Dr. Jo Villarreal. Discussed with Cath lab and do both cases today. 4. HTN- Remains elevated. Will start on Clonidine. Continue BB & ARB  5. AMY stable. Continue fluids. Watch for fluid overload    6.  Keep K >4.0 and Mag >2.0    Manju Alamo [facility]

## 2020-08-21 LAB
ABSOLUTE EOS #: 0.19 K/UL (ref 0–0.44)
ABSOLUTE IMMATURE GRANULOCYTE: <0.03 K/UL (ref 0–0.3)
ABSOLUTE LYMPH #: 2.55 K/UL (ref 1.1–3.7)
ABSOLUTE MONO #: 0.59 K/UL (ref 0.1–1.2)
ANION GAP SERPL CALCULATED.3IONS-SCNC: 14 MMOL/L (ref 9–17)
BASOPHILS # BLD: 1 % (ref 0–2)
BASOPHILS ABSOLUTE: 0.05 K/UL (ref 0–0.2)
BUN BLDV-MCNC: 37 MG/DL (ref 8–23)
BUN/CREAT BLD: ABNORMAL (ref 9–20)
CALCIUM IONIZED: 1.05 MMOL/L (ref 1.13–1.33)
CALCIUM SERPL-MCNC: 8.2 MG/DL (ref 8.6–10.4)
CHLORIDE BLD-SCNC: 99 MMOL/L (ref 98–107)
CO2: 24 MMOL/L (ref 20–31)
CREAT SERPL-MCNC: 2.77 MG/DL (ref 0.5–0.9)
CULTURE: NORMAL
DIFFERENTIAL TYPE: ABNORMAL
EKG ATRIAL RATE: 60 BPM
EKG P AXIS: 80 DEGREES
EKG P-R INTERVAL: 190 MS
EKG Q-T INTERVAL: 480 MS
EKG QRS DURATION: 102 MS
EKG QTC CALCULATION (BAZETT): 480 MS
EKG R AXIS: 83 DEGREES
EKG T AXIS: 117 DEGREES
EKG VENTRICULAR RATE: 60 BPM
EOSINOPHILS RELATIVE PERCENT: 2 % (ref 1–4)
ESTIMATED AVERAGE GLUCOSE: 258 MG/DL
GFR AFRICAN AMERICAN: 21 ML/MIN
GFR NON-AFRICAN AMERICAN: 17 ML/MIN
GFR SERPL CREATININE-BSD FRML MDRD: ABNORMAL ML/MIN/{1.73_M2}
GFR SERPL CREATININE-BSD FRML MDRD: ABNORMAL ML/MIN/{1.73_M2}
GLUCOSE BLD-MCNC: 147 MG/DL (ref 65–105)
GLUCOSE BLD-MCNC: 165 MG/DL (ref 65–105)
GLUCOSE BLD-MCNC: 178 MG/DL (ref 65–105)
GLUCOSE BLD-MCNC: 189 MG/DL (ref 70–99)
GLUCOSE BLD-MCNC: 92 MG/DL (ref 65–105)
HBA1C MFR BLD: 10.6 % (ref 4–6)
HCT VFR BLD CALC: 26.5 % (ref 36.3–47.1)
HEMOGLOBIN: 7.9 G/DL (ref 11.9–15.1)
IMMATURE GRANULOCYTES: 0 %
LYMPHOCYTES # BLD: 32 % (ref 24–43)
Lab: NORMAL
MAGNESIUM: 1.6 MG/DL (ref 1.6–2.6)
MCH RBC QN AUTO: 29 PG (ref 25.2–33.5)
MCHC RBC AUTO-ENTMCNC: 29.8 G/DL (ref 28.4–34.8)
MCV RBC AUTO: 97.4 FL (ref 82.6–102.9)
MONOCYTES # BLD: 7 % (ref 3–12)
NRBC AUTOMATED: 0 PER 100 WBC
PDW BLD-RTO: 16.6 % (ref 11.8–14.4)
PHOSPHORUS: 4.4 MG/DL (ref 2.6–4.5)
PLATELET # BLD: 387 K/UL (ref 138–453)
PLATELET ESTIMATE: ABNORMAL
PMV BLD AUTO: 9.7 FL (ref 8.1–13.5)
POTASSIUM SERPL-SCNC: 4.2 MMOL/L (ref 3.7–5.3)
POTASSIUM SERPL-SCNC: 4.3 MMOL/L (ref 3.7–5.3)
POTASSIUM SERPL-SCNC: 4.6 MMOL/L (ref 3.7–5.3)
RBC # BLD: 2.72 M/UL (ref 3.95–5.11)
RBC # BLD: ABNORMAL 10*6/UL
SARS-COV-2, PCR: NORMAL
SARS-COV-2, RAPID: NOT DETECTED
SARS-COV-2: NORMAL
SEG NEUTROPHILS: 58 % (ref 36–65)
SEGMENTED NEUTROPHILS ABSOLUTE COUNT: 4.61 K/UL (ref 1.5–8.1)
SODIUM BLD-SCNC: 137 MMOL/L (ref 135–144)
SOURCE: NORMAL
SPECIMEN DESCRIPTION: NORMAL
WBC # BLD: 8 K/UL (ref 3.5–11.3)
WBC # BLD: ABNORMAL 10*3/UL

## 2020-08-21 PROCEDURE — 6370000000 HC RX 637 (ALT 250 FOR IP): Performed by: INTERNAL MEDICINE

## 2020-08-21 PROCEDURE — U0002 COVID-19 LAB TEST NON-CDC: HCPCS

## 2020-08-21 PROCEDURE — 36415 COLL VENOUS BLD VENIPUNCTURE: CPT

## 2020-08-21 PROCEDURE — 6360000002 HC RX W HCPCS: Performed by: STUDENT IN AN ORGANIZED HEALTH CARE EDUCATION/TRAINING PROGRAM

## 2020-08-21 PROCEDURE — 82947 ASSAY GLUCOSE BLOOD QUANT: CPT

## 2020-08-21 PROCEDURE — 99232 SBSQ HOSP IP/OBS MODERATE 35: CPT | Performed by: INTERNAL MEDICINE

## 2020-08-21 PROCEDURE — 6370000000 HC RX 637 (ALT 250 FOR IP): Performed by: STUDENT IN AN ORGANIZED HEALTH CARE EDUCATION/TRAINING PROGRAM

## 2020-08-21 PROCEDURE — 97535 SELF CARE MNGMENT TRAINING: CPT

## 2020-08-21 PROCEDURE — 97166 OT EVAL MOD COMPLEX 45 MIN: CPT

## 2020-08-21 PROCEDURE — 2060000000 HC ICU INTERMEDIATE R&B

## 2020-08-21 PROCEDURE — 83735 ASSAY OF MAGNESIUM: CPT

## 2020-08-21 PROCEDURE — 6370000000 HC RX 637 (ALT 250 FOR IP): Performed by: NURSE PRACTITIONER

## 2020-08-21 PROCEDURE — 99223 1ST HOSP IP/OBS HIGH 75: CPT | Performed by: INTERNAL MEDICINE

## 2020-08-21 PROCEDURE — 84132 ASSAY OF SERUM POTASSIUM: CPT

## 2020-08-21 PROCEDURE — 2580000003 HC RX 258: Performed by: STUDENT IN AN ORGANIZED HEALTH CARE EDUCATION/TRAINING PROGRAM

## 2020-08-21 PROCEDURE — 82330 ASSAY OF CALCIUM: CPT

## 2020-08-21 PROCEDURE — 84100 ASSAY OF PHOSPHORUS: CPT

## 2020-08-21 PROCEDURE — 85025 COMPLETE CBC W/AUTO DIFF WBC: CPT

## 2020-08-21 PROCEDURE — 83036 HEMOGLOBIN GLYCOSYLATED A1C: CPT

## 2020-08-21 PROCEDURE — 97162 PT EVAL MOD COMPLEX 30 MIN: CPT

## 2020-08-21 PROCEDURE — 80048 BASIC METABOLIC PNL TOTAL CA: CPT

## 2020-08-21 PROCEDURE — 97530 THERAPEUTIC ACTIVITIES: CPT

## 2020-08-21 RX ORDER — LOSARTAN POTASSIUM 50 MG/1
100 TABLET ORAL DAILY
Status: DISCONTINUED | OUTPATIENT
Start: 2020-08-21 | End: 2020-08-22

## 2020-08-21 RX ORDER — BUMETANIDE 1 MG/1
1 TABLET ORAL DAILY
Status: DISCONTINUED | OUTPATIENT
Start: 2020-08-21 | End: 2020-08-24 | Stop reason: HOSPADM

## 2020-08-21 RX ORDER — AMLODIPINE BESYLATE 5 MG/1
5 TABLET ORAL DAILY
Status: DISCONTINUED | OUTPATIENT
Start: 2020-08-21 | End: 2020-08-22

## 2020-08-21 RX ORDER — INSULIN GLARGINE 100 [IU]/ML
15 INJECTION, SOLUTION SUBCUTANEOUS 2 TIMES DAILY
Status: DISCONTINUED | OUTPATIENT
Start: 2020-08-21 | End: 2020-08-24 | Stop reason: HOSPADM

## 2020-08-21 RX ORDER — MAGNESIUM SULFATE 1 G/100ML
1 INJECTION INTRAVENOUS
Status: COMPLETED | OUTPATIENT
Start: 2020-08-21 | End: 2020-08-21

## 2020-08-21 RX ADMIN — INSULIN LISPRO 1 UNITS: 100 INJECTION, SOLUTION INTRAVENOUS; SUBCUTANEOUS at 10:12

## 2020-08-21 RX ADMIN — LOSARTAN POTASSIUM 100 MG: 50 TABLET ORAL at 03:34

## 2020-08-21 RX ADMIN — COLLAGENASE SANTYL: 250 OINTMENT TOPICAL at 16:32

## 2020-08-21 RX ADMIN — DESMOPRESSIN ACETATE 40 MG: 0.2 TABLET ORAL at 21:02

## 2020-08-21 RX ADMIN — ASPIRIN 81 MG: 81 TABLET, CHEWABLE ORAL at 09:33

## 2020-08-21 RX ADMIN — PANTOPRAZOLE SODIUM 40 MG: 40 TABLET, DELAYED RELEASE ORAL at 09:33

## 2020-08-21 RX ADMIN — MAGNESIUM SULFATE HEPTAHYDRATE 1 G: 1 INJECTION, SOLUTION INTRAVENOUS at 11:39

## 2020-08-21 RX ADMIN — INSULIN LISPRO 3 UNITS: 100 INJECTION, SOLUTION INTRAVENOUS; SUBCUTANEOUS at 18:04

## 2020-08-21 RX ADMIN — INSULIN GLARGINE 15 UNITS: 100 INJECTION, SOLUTION SUBCUTANEOUS at 10:10

## 2020-08-21 RX ADMIN — INSULIN LISPRO 3 UNITS: 100 INJECTION, SOLUTION INTRAVENOUS; SUBCUTANEOUS at 12:54

## 2020-08-21 RX ADMIN — HEPARIN SODIUM 5000 UNITS: 5000 INJECTION INTRAVENOUS; SUBCUTANEOUS at 14:41

## 2020-08-21 RX ADMIN — OXYBUTYNIN CHLORIDE 5 MG: 5 TABLET ORAL at 09:33

## 2020-08-21 RX ADMIN — CLOPIDOGREL 75 MG: 75 TABLET, FILM COATED ORAL at 09:33

## 2020-08-21 RX ADMIN — MAGNESIUM SULFATE HEPTAHYDRATE 1 G: 1 INJECTION, SOLUTION INTRAVENOUS at 10:10

## 2020-08-21 RX ADMIN — BUMETANIDE 1 MG: 1 TABLET ORAL at 18:04

## 2020-08-21 RX ADMIN — HEPARIN SODIUM 5000 UNITS: 5000 INJECTION INTRAVENOUS; SUBCUTANEOUS at 21:02

## 2020-08-21 RX ADMIN — CITALOPRAM 20 MG: 20 TABLET, FILM COATED ORAL at 09:33

## 2020-08-21 RX ADMIN — SODIUM CHLORIDE, PRESERVATIVE FREE 10 ML: 5 INJECTION INTRAVENOUS at 21:03

## 2020-08-21 RX ADMIN — OXYBUTYNIN CHLORIDE 5 MG: 5 TABLET ORAL at 21:02

## 2020-08-21 RX ADMIN — AMLODIPINE BESYLATE 5 MG: 5 TABLET ORAL at 12:51

## 2020-08-21 RX ADMIN — INSULIN GLARGINE 15 UNITS: 100 INJECTION, SOLUTION SUBCUTANEOUS at 21:02

## 2020-08-21 RX ADMIN — SODIUM CHLORIDE, PRESERVATIVE FREE 10 ML: 5 INJECTION INTRAVENOUS at 09:34

## 2020-08-21 ASSESSMENT — PAIN SCALES - GENERAL: PAINLEVEL_OUTOF10: 0

## 2020-08-21 NOTE — PROGRESS NOTES
Hyperlipidemia, Hypertension, Keratitis, Obesity, Peripheral polyneuropathy, Pseudophakos, Stroke ([facility] Trichiasis, and Type 2 diabetes mellitus ([facility] has a past surgical history that includes Gastric bypass surgery; Cataract removal with implant (2012); Cataract removal with implant (2012); Coronary artery bypass graft (12-);  section; Hysterectomy; Cholecystectomy; Tonsillectomy and adenoidectomy; and Eye surgery (Left). Restrictions  Restrictions/Precautions  Restrictions/Precautions: Fall Risk  Required Braces or Orthoses?: No  Position Activity Restriction  Other position/activity restrictions: up w/ A; BLE in ace wraps d/t wounds    Subjective   General  Patient assessed for rehabilitation services?: Yes  Family / Caregiver Present: No  General Comment  Comments: RN ok'd pt for OT/PT eval this date. Pt agreeable to session and cooperative throughout  Patient Currently in Pain: Denies  Vital Signs  Patient Currently in Pain: Denies  Oxygen Therapy  O2 Device: Nasal cannula  O2 Flow Rate (L/min): 3 L/min  Patient Observation  Observations: Pt desats to ~86% when O2 is removed     Social/Functional History  Social/Functional History  Type of Home: Facility  Home Equipment: Rolling walker  ADL Assistance: Needs assistance  Homemaking Responsibilities: No  Ambulation Assistance: Needs assistance  Transfer Assistance: Needs assistance  Active : No  Patient's  Info: Pt reports grandson drives pt when necessary. Occupation: On disability  Additional Comments: Pt has been in the hospital or SNF for \"months\"       Objective   Vision: Within Functional Limits  Hearing: Within functional limits         Balance  Sitting Balance: Contact guard assistance  Standing Balance: Minimal assistance  Functional Mobility  Functional - Mobility Device: Rolling Walker  Activity: Other  Assist Level:  Moderate assistance  Functional Mobility Comments: Pt completed functional mobility from bed > chair with Mod A and RW use; pt with 1 posterior LOB this date requiring Mod A to correct     ADL  Feeding: Independent;Setup  Grooming: Independent;Setup  UE Bathing: Minimal assistance;Setup; Increased time to complete  LE Bathing: Maximum assistance;Setup; Increased time to complete  UE Dressing: Minimal assistance;Setup; Increased time to complete(Min a to doff dirty and don clean gown)  LE Dressing: Maximum assistance;Setup; Increased time to complete(Max a to don socks)  Toileting: Maximum assistance;Setup; Increased time to complete  Tone RUE  RUE Tone: Normotonic  Tone LUE  LUE Tone: Normotonic  Coordination  Movements Are Fluid And Coordinated: Yes    Bed mobility  Supine to Sit: Minimal assistance  Scooting: Minimal assistance  Comment: Pt retired to chair at end of session  Transfers  Sit to stand: Minimal assistance  Stand to sit: Minimal assistance  Transfer Comments: with RW    Cognition  Overall Cognitive Status: Exceptions  Arousal/Alertness: Delayed responses to stimuli  Following Commands: Follows one step commands with increased time; Follows one step commands with repetition  Safety Judgement: Decreased awareness of need for assistance;Decreased awareness of need for safety  Problem Solving: Assistance required to correct errors made;Assistance required to identify errors made  Insights: Decreased awareness of deficits  Initiation: Requires cues for some  Sequencing: Requires cues for some       Sensation  Overall Sensation Status: Impaired(Decreased BLE)        LUE AROM (degrees)  LUE AROM : WFL  Left Hand AROM (degrees)  Left Hand AROM: WFL  RUE AROM (degrees)  RUE AROM : WFL  Right Hand AROM (degrees)  Right Hand AROM: WFL  LUE Strength  Gross LUE Strength: WFL  L Hand General: 4/5  RUE Strength  Gross RUE Strength: WFL  R Hand General: 4/5                   Plan   Plan  Times per week: 3-5 x/wk  Current Treatment Recommendations: Balance Training, Functional Mobility Training, Endurance Training, Cognitive Reorientation, Safety Education & Training, Patient/Caregiver Education & Training, Equipment Evaluation, Education, & procurement, Self-Care / ADL    AM-PAC Score        AM-PAC Inpatient Daily Activity Raw Score: 17 (08/21/20 1548)  AM-PAC Inpatient ADL T-Scale Score : 37.26 (08/21/20 1548)  ADL Inpatient CMS 0-100% Score: 50.11 (08/21/20 1548)  ADL Inpatient CMS G-Code Modifier : CK (08/21/20 1548)    Goals  Short term goals  Time Frame for Short term goals: By discharge, pt will:  Short term goal 1: Demo CGA for functional transfers and functional mobility with use of LRD  Short term goal 2: Demo SBA with setup for UB ADLs and grooming tasks  Short term goal 3: Demo Min A with use of AE PRN and setup for LB ADLs and toileting tasks  Short term goal 4: Demo 10+ minutes dynamic standing task to increase safety and independence with ADL performance  Short term goal 5: Demo I with V/D 2+ EC/WS techniques to incorporate throughout daily routine       Therapy Time   Individual Concurrent Group Co-treatment   Time In 1108         Time Out 1134         Minutes 26         Timed Code Treatment Minutes: 8 Minutes       Sridhar Shafer OTR/L

## 2020-08-21 NOTE — PROGRESS NOTES
Physical Therapy    Facility/Department: [facility] NEURO  Initial Assessment    NAME: Mahendra Carvajal  : 1950  MRN: [id number]    Date of Service: 2020  History Of Present Illness:   Mahendra Carvajal is a 77 years lady with background history of congestive heart failure, chronic kidney disease, Type 2 diabetes Mellitus, peripheral vascular disease and wounds on bilateral lower limbs was found by the staff at [address] [place] [address] to have bradycardia and was sent to the emergency department for that. She was  found to have  acute on chronic renal failure and hyperkalemia. Patient was given insulin and calcium gluconate for hyperkalemia. Patient reports that she is passing more urine recently with no burning or pain. Patient was admitted to the [facility] recently with unwitnessed fall when she was  found on floor soiled in urine and feces. She had acutely deranged renal functions at that time with raised CK and myoglobins. Patient was treated with IV fluids and antibiotics as she also had wounds on the lower limbs with maggots infestations  Discharge Recommendations:  Patient would benefit from continued therapy after discharge   PT Equipment Recommendations  Equipment Needed: No    Assessment   Body structures, Functions, Activity limitations: Decreased functional mobility ; Decreased balance;Decreased endurance;Decreased strength  Assessment: Pt required Mod A 1-2 for mobility. Pt would benefit from continued PT following discharge. Prognosis: Good  Decision Making: Medium Complexity  PT Education: Plan of Care;General Safety  REQUIRES PT FOLLOW UP: Yes  Activity Tolerance  Activity Tolerance: Patient limited by fatigue;Patient limited by endurance       Patient Diagnosis(es): There were no encounter diagnoses.      has a past medical history of Anxiety and depression, Background diabetic retinopathy(362.01), Balance problem, CAD (coronary artery disease), Cancer of uterus ([facility] Chronic kidney disease, for assistance    Objective          AROM RLE (degrees)  RLE AROM: WFL  AROM LLE (degrees)  LLE AROM : WFL  AROM RUE (degrees)  RUE AROM : WFL  AROM LUE (degrees)  LUE AROM : WFL  Strength Other  Other: Pt moves all extremities antigravity. Motor Control  Gross Motor?: WFL  Sensation  Overall Sensation Status: Impaired(Decreased BLE)  Bed mobility  Rolling to Right: Minimal assistance  Supine to Sit: Minimal assistance  Transfers  Sit to Stand: Moderate Assistance;2 Person Assistance  Stand to sit: Moderate Assistance;2 Person Assistance  Ambulation  Ambulation?: Yes  Ambulation 1  Surface: level tile  Device: Rolling Walker  Assistance: Moderate assistance  Gait Deviations: Slow Vicenta  Distance: Pt took a few steps from bed to chair. In chair: Ankle pumps x 20 reps, SLR x 5 reps. Balance  Sitting - Static: Good  Sitting - Dynamic: Fair;+  Standing - Static: Fair;+  Standing - Dynamic: Fair  Comments: Standing blance with rolling walker        Plan   Plan  Times per week: 5-6x/week  Current Treatment Recommendations: Strengthening, Transfer Training, Endurance Training, Gait Training, Functional Mobility Training  Safety Devices  Type of devices: Call light within reach, Nurse notified, Left in chair, Chair alarm in place    AM-PAC Score     AM-PAC Inpatient Mobility without Stair Climbing Raw Score : 12 (08/21/20 1211)  AM-PAC Inpatient without Stair Climbing T-Scale Score : 37.26 (08/21/20 1211)  Mobility Inpatient CMS 0-100% Score: 63.03 (08/21/20 1211)  Mobility Inpatient without Stair CMS G-Code Modifier : CL (08/21/20 1211)       Goals  Short term goals  Time Frame for Short term goals: 14 visits  Short term goal 1: Pt to perform bed mobility SBA  Short term goal 2: Pt to transfer CGA  Short term goal 3: Pt to ambulate with rolling walker 100 ft  Short term goal 4: Pt to tolerate 30 minutes of activity to improve strength and endurance. Patient Goals   Patient goals : Get stronger.  Be able to go home Therapy Time   Individual Concurrent Group Co-treatment   Time In 1110         Time Out 1134         Minutes 24         Timed Code Treatment Minutes: 2612 [facility]  MEREDITH IVORY

## 2020-08-21 NOTE — FLOWSHEET NOTE
08/21/20 1355   Encounter Summary   Services provided to: Patient   Support System Children;Family members   Place of [address] [facility] [place]   Contact Evangelical No   Continue Visiting   (8/21/20)   Complexity of Encounter Low   Length of Encounter 15 minutes   Spiritual Assessment Completed Yes   Routine   Type Initial   Assessment Approachable;Passive;Coping   Intervention Active listening;Explored feelings, thoughts, concerns;Nurtured hope;Prayer;Sustaining presence/ Ministry of presence; Discussed belief system/Judaism practices/abbi   Outcome Acceptance;Comfort;Expressed gratitude

## 2020-08-21 NOTE — PROGRESS NOTES
Mucosa renal Progress Note    Patient :  Ayesha Benitez; 77 y.o. MRN# [id number]  Location:  3998/5479-81  Attending:  Pancho Wagner MD  Admit Date:  8/19/2020   Hospital Day: 2      Subjective:     Patient was seen and examined. No new issues reported overnight. Serum creatinine improved to 2.77 mg/DL today. Electrolytes okay. Made about 3.7 L of urine in the last 24 hours. No indication for PPM per EP. Outpatient Medications:     Medications Prior to Admission: carvedilol (COREG) 12.5 MG tablet, Take 1 tablet by mouth 2 times daily (with meals)  cloNIDine (CATAPRES) 0.1 MG tablet, Take 1 tablet by mouth 3 times daily  collagenase 250 UNIT/GM ointment, Apply topically daily. gabapentin (NEURONTIN) 100 MG capsule, Take 1 capsule by mouth 3 times daily for 15 days.   insulin glargine (LANTUS) 100 UNIT/ML injection vial, Inject 20 Units into the skin nightly  Sodium Hypochlorite (DAKINS) 0.125 % SOLN, Irrigate with 473 mLs as directed daily  clopidogrel (PLAVIX) 75 MG tablet, Take 1 tablet by mouth daily  atorvastatin (LIPITOR) 40 MG tablet, TAKE 1 TABLET BY MOUTH EVERYDAY  docusate sodium (COLACE) 100 MG capsule, Take 1 capsule by mouth 2 times daily  citalopram (CELEXA) 20 MG tablet, Take 1 tablet by mouth daily  memantine (NAMENDA) 10 MG tablet, TAKE 1 TABLET BY MOUTH TWICE DAILY  oxybutynin (DITROPAN) 5 MG tablet, TAKE 1 TABLET BY MOUTH TWICE DAILY  loratadine (CLARITIN) 10 MG tablet, TAKE 1 TABLET(10 MG) BY MOUTH DAILY  blood glucose monitor strips, Check bid  allopurinol (ZYLOPRIM) 300 MG tablet, Take 1 tablet by mouth daily  losartan (COZAAR) 100 MG tablet, Take 1 tablet by mouth daily  omeprazole (PRILOSEC) 20 MG delayed release capsule, Take 1 capsule by mouth every morning (before breakfast)  aspirin 81 MG tablet, Take 1 tablet by mouth daily  Diabetic Shoe MISC, by Does not apply route  Compression Stockings MISC, by Does not apply route 20-30 mm Hg bilateral knee high  rivastigmine (EXELON) 9.5 MG/24HR, APPLY 1 NEW PATCH EVERY 24 HOURS (Patient not taking: Reported on 4/15/2020)  nystatin (MYCOSTATIN) 111271 UNIT/GM cream, Apply topically 2 times daily. Glucose Blood (BLOOD GLUCOSE TEST STRIPS) STRP, truemetrix test strips check ac and HS    Current Medications:     Scheduled Meds:    losartan  100 mg Oral Daily    insulin lispro  0-18 Units Subcutaneous TID WC    insulin lispro  0-9 Units Subcutaneous Nightly    insulin glargine  15 Units Subcutaneous BID    amLODIPine  5 mg Oral Daily    pantoprazole  40 mg Oral QAM AC    aspirin  81 mg Oral Daily    atorvastatin  40 mg Oral Nightly    citalopram  20 mg Oral Daily    clopidogrel  75 mg Oral Daily    collagenase   Topical Daily    oxybutynin  5 mg Oral BID    rivastigmine  1 patch Transdermal Daily    sodium chloride flush  10 mL Intravenous 2 times per day    heparin (porcine)  5,000 Units Subcutaneous 3 times per day    insulin lispro  0-6 Units Subcutaneous TID WC     Continuous Infusions:    dextrose       PRN Meds:  hydrocerin, sodium chloride flush, acetaminophen **OR** acetaminophen, polyethylene glycol, promethazine **OR** ondansetron, glucose, dextrose, glucagon (rDNA), dextrose    Input/Output:       I/O last 3 completed shifts: In: 840 [P.O.:840]  Out: 3510 [Urine:3510]. Patient Vitals for the past 96 hrs (Last 3 readings):   Weight   20 0600 254 lb 3.1 oz (115.3 kg)   20 1843 253 lb 15.5 oz (115.2 kg)       Vital Signs:   Temperature:  Temp: 97.5 °F (36.4 °C)  TMax:   Temp (24hrs), Av.3 °F (36.8 °C), Min:97.5 °F (36.4 °C), Max:98.8 °F (37.1 °C)    Respirations:  Resp: 13  Pulse:   Pulse: 60  BP:    BP: (!) 165/56  BP Range: Systolic (56CDS), YQW:723 , Min:155 , LQJ:556       Diastolic (10XYH), GFF:56, Min:48, Max:70      Physical Examination:     General:  AAO x 3, speaking in full sentences, no accessory muscle use. HEENT: Atraumatic, normocephalic, no throat congestion, moist mucosa.   Eyes:   Pupils Component Value Date    C4 39 08/13/2020     MPO ANCA:     Lab Results   Component Value Date    MPO 14 08/13/2020     PR3 ANCA:     Lab Results   Component Value Date    PR3 8 08/13/2020     Hep C AB:          Lab Results   Component Value Date    HEPCAB NONREACTIVE 01/31/2018       Urinalysis/Chemistries:      Lab Results   Component Value Date    NITRU NEGATIVE 08/20/2020    COLORU YELLOW 08/20/2020    PHUR 6.5 08/20/2020    WBCUA None 08/20/2020    RBCUA 2 TO 5 08/20/2020    MUCUS NOT REPORTED 08/20/2020    TRICHOMONAS NOT REPORTED 08/20/2020    YEAST NOT REPORTED 08/20/2020    BACTERIA NOT REPORTED 08/20/2020    SPECGRAV 1.008 08/20/2020    LEUKOCYTESUR NEGATIVE 08/20/2020    UROBILINOGEN Normal 08/20/2020    BILIRUBINUR NEGATIVE 08/20/2020    GLUCOSEU NEGATIVE 08/20/2020    KETUA NEGATIVE 08/20/2020    AMORPHOUS NOT REPORTED 08/20/2020     Urine Sodium:     Lab Results   Component Value Date    LUKE 88 08/19/2020      Urine Creatinine:     Lab Results   Component Value Date    LABCREA 18.4 08/20/2020     Radiology:     Reviewed. Assessment:     1. Acute kidney injury most likely due to prerenal azotemia and hypoperfusion state caused by severe bradycardia leading to ATN. With correction of heart rate her renal functions are slowly improving. 2.  Hyperkalemia due to acute renal failure further complicated by the use of losartan. Now improved. 3.  Chronic kidney disease stage III baseline creatinine of 1.1 to 1.2 mg/dL  4. Sinus bradycardia -no indication for PPM per EP. 5.  Cardiomyopathy with EF of 30 to 35% and grade 2 diastolic dysfunction per 2D echo done on 8/11/2020.  6.  Mild CHF  7. Mild hyponatremia improved. 8. Non-nephrotic proteinuria most likely due to diabetic renal disease    Plan:   1. Cozaar has been started by primary, continue monitor potassium currently it seems to be stable. 2.  Bumex 1mg daily. 3.  Monitor strict I's and O's and renal function.   4.  BMP in a.m.  5. Will follow. Patient's nurse was updated. Nutrition   Please ensure that patient is on a renal diet/TF. Avoid nephrotoxic drugs/contrast exposure. We will continue to follow along with you. Pedro Cantu MD  [facility]     This note is created with the assistance of a speech-recognition program. While intending to generate a document that actually reflects the content of the visit, no guarantees can be provided that every mistake has been identified and corrected by editing.

## 2020-08-21 NOTE — CONSULTS
ERINN Jamil   rivastigmine (EXELON) 9.5 MG/24HR APPLY 1 NEW PATCH EVERY 24 HOURS  Patient not taking: Reported on 4/15/2020 1/31/18   ERINN Lr   nystatin (MYCOSTATIN) 101719 UNIT/GM cream Apply topically 2 times daily. 1/31/18   ERINN Lr   Glucose Blood (BLOOD GLUCOSE TEST STRIPS) STRP truemetrix test strips check ac and HS 1/31/18   ERINN Lr       Allergies:  Bactrim [sulfamethoxazole-trimethoprim]; Clonidine derivatives; and Amoxicillin-pot clavulanate    Social History:   reports that she has never smoked. She has never used smokeless tobacco. She reports that she does not drink alcohol or use drugs. Family History:   Positive for early CAD    REVIEW OF SYSTEMS:    · Constitutional: there has been no unanticipated weight loss. There's been No change in energy level, No change in activity level. · Eyes: No visual changes or diplopia. No scleral icterus. · ENT: No Headaches, hearing loss or vertigo. No mouth sores or sore throat. · Cardiovascular: No problem  · Respiratory: No previous reported problems  · Gastrointestinal: No abdominal pain, appetite loss, blood in stools. No change in bowel or bladder habits. · Genitourinary: No dysuria, trouble voiding, or hematuria. · Musculoskeletal:  No gait disturbance, No weakness or joint complaints. · Integumentary: No rash or pruritis. · Neurological: No headache, diplopia, change in muscle strength, numbness or tingling. No change in gait, balance, coordination, mood, affect, memory, mentation, behavior. · Psychiatric: No anxiety, or depression. · Endocrine: No temperature intolerance. No excessive thirst, fluid intake, or urination. No tremor. · Hematologic/Lymphatic: No abnormal bruising or bleeding, blood clots or swollen lymph nodes. · Allergic/Immunologic: No nasal congestion or hives.     PHYSICAL EXAM:    Physical Examination:    BP (!) 181/69   Pulse 66   Temp 98.3 °F (36.8 °C)   Resp 14   Ht 5' 9\" (1.753 m) SVG-OM. Occluded SVG to RCA with left to right collaterals. Borderline LV systolic function. Labs:     CBC:   Recent Labs     08/20/20  0422 08/21/20  0355   WBC 7.3 8.0   HGB 7.9* 7.9*   HCT 26.9* 26.5*    387     BMP:   Recent Labs     08/20/20  0422  08/20/20  2141 08/21/20  0355   *  --   --  137   K 5.1   < > 5.3 4.6   CO2 23  --   --  24   BUN 42*  --   --  37*   CREATININE 3.34*  --   --  2.77*   LABGLOM 14*  --   --  17*   GLUCOSE 104*  --   --  189*    < > = values in this interval not displayed. BNP: No results for input(s): BNP in the last 72 hours. PT/INR:   Recent Labs     08/19/20  0921   PROTIME 13.3   INR 1.1     APTT:No results for input(s): APTT in the last 72 hours. CARDIAC ENZYMES:  Recent Labs     08/19/20  2208   CKTOTAL 27     FASTING LIPID PANEL:  Lab Results   Component Value Date    HDL 41 08/08/2020    TRIG 57 08/08/2020     LIVER PROFILE:  Recent Labs     08/19/20  0921 08/20/20  0422   AST 11 10   ALT 8 7   LABALBU 2.4* 2.0*         IMPRESSION:    1. Sinus bradycardia and pauses, now resolved, with likely underlying sinus node dysfunction aggravated by hyperkalemia, clonidine and beta blocker  2. Stable CAD, h/o CABG 2007 with 3/4 bypass grafts patent on catheterization 8/13/20  3. Cardiomyopathy, LVEF 30-35%  4. Essential HTN  5. Type II DM   6. AMY with underlying CKD, improving. 7. Chronic overweight  8.  Failure-to-care    Patient Active Problem List   Diagnosis    Dementia ([facility]    Hypertension    Hyperlipidemia    Morbidly obese ([facility]    Gout    Peripheral polyneuropathy    Anxiety and depression    Acute kidney injury superimposed on CKD ([facility]    CAD (coronary artery disease)    Balance problem    CVA (cerebral vascular accident) ([facility]    Type 2 diabetes mellitus with hyperglycemia, with long-term current use of insulin ([facility]    CHF (congestive heart failure), NYHA class I, acute on chronic, combined [facility]    Acute cystitis    Infestation by bed bug    Infestation by maggots    Fall    Elevated troponin    Lymphedema of both lower extremities    PAD (peripheral artery disease) (HCC)    Pressure injury of right heel, unstageable (HCC)    Acute kidney injury (HCC)    Bradycardia    Hyperkalemia    AV block, 1st degree    Wounds, multiple    Buttock wound       RECOMMENDATIONS:  1. No indication for permanent pacing at the present time. 2. Can carefully re-introduce carvedilol and discontinue clonidine, using amlodipine and/or hydralazine for additional BP control. 3. Continue following on telemetry. Discussed with patient and nursing.     Electronically signed by Martha Turner MD on 8/21/2020 at 7:48 AM.  [facility] cardiology Consultant

## 2020-08-21 NOTE — H&P
[facility]    HISTORY AND PHYSICAL EXAMINATION            Date:   8/21/2020  Patient name:  Celia Vásquez  Date of admission:  8/19/2020  5:08 PM  MRN:   [id number]  Account:  [de-identified]  YOB: 1950  PCP:    ERINN Mueller  Room:   9719/9437-53  Code Status:    Full Code    Chief Complaint:     Bradycardia    History Obtained From:     Electronic medical record    History of Present Illness:     Celia Vásquez is a 77 y.o. Female has been transferred out of ICU with following information;    Patient was admitted to the medical ICU after being transferred from [facility] [place] where she was found to have bradycardia patient is a resident of [address] [place] [address] presented the emergency department there with acute renal failure and hyperkalemia as well as said bradycardia she was given insulin and calcium gluconate for hyperkalemia and transferred to [facility] for further management. At outside emergency department patient was found to be infested with maggots the lower limbs  Patient was seen and evaluated cardiology service. She was diuresed with Lasix. Patient's Coreg and clonidine have been stopped as well as her ACE inhibitor. Patient will be evaluated by the electrophysiology service for evaluation of PPM  Chronic right heel unstagable pressure injury, left ischium, right trochanter and right hip pressure injuries; bilateral lower extremity venous stasis with ulcers to the left lower leg. Seen and evaluated wound ostomy nurse    Today's creatinine 2.77  Magnesium 1.6  Glucose 189-234  Blood pressure 181/69, heart rate 66/min  Past Medical History:     Past Medical History:   Diagnosis Date    Anxiety and depression     Background diabetic retinopathy(362.01) 2008    (Mild)    Balance problem     CAD (coronary artery disease)     (with coronary artery bypass graft x4).     Cancer of uterus [facility])     history of, (probable cure)    Chronic kidney disease     Chronic low back pain     [facility]     Dementia ([address]     (moderate)    Dry eye syndrome     GERD (gastroesophageal reflux disease)     Glaucoma suspect     Gout     Homonymous hemianopsia     (Right)    Hyperlipidemia     Hypertension     Keratitis     (secondary to dry eye syndrome).  Obesity     Peripheral polyneuropathy     (diabetic)    Pseudophakos     (Right Eye: 2012; Left Eye: 2012) - Dr. Sean Marcus.  Stroke [facility])     (Multiple) MRI of brain 10/2008 shows multiple infarcts involving the temporal and parietal areas.  Trichiasis     (left eye)    Type 2 diabetes mellitus ([facility]         Past Surgical History:     Past Surgical History:   Procedure Laterality Date    CATARACT REMOVAL WITH IMPLANT  2012    (Right eye) - Dr. Sean Marcus.  CATARACT REMOVAL WITH IMPLANT  2012    (Left eye) - Dr. Sean Marcus.   SECTION      CHOLECYSTECTOMY      CORONARY ARTERY BYPASS GRAFT  12-    (x4) - LIMA-LAD, SVG-diagnoal 1, SVG-OM1, and SVG-PDA. Exploration of left radial. (Dr. Judge Garcia).  EYE SURGERY Left     as a child     GASTRIC BYPASS SURGERY      HYSTERECTOMY      (abdominal)  with left oophorectomy. Right ovary retained.  TONSILLECTOMY AND ADENOIDECTOMY          Medications Prior to Admission:     Prior to Admission medications    Medication Sig Start Date End Date Taking? Authorizing Provider   carvedilol (COREG) 12.5 MG tablet Take 1 tablet by mouth 2 times daily (with meals) 20  Rosie Bedolla MD   cloNIDine (CATAPRES) 0.1 MG tablet Take 1 tablet by mouth 3 times daily 20  Rosie Bedolla MD   collagenase 250 UNIT/GM ointment Apply topically daily. 8/15/20 8/24/20  Rosei Bedolla MD   gabapentin (NEURONTIN) 100 MG capsule Take 1 capsule by mouth 3 times daily for 15 days.  20  Rosie Bedolla MD   insulin glargine (LANTUS) 100 UNIT/ML injection vial Inject 20 Units into the skin nightly 8/14/20   Waldo Wylie MD   Sodium Hypochlorite (DAKINS) 0.125 % SOLN Irrigate with 473 mLs as directed daily 8/15/20   Waldo Wylie MD   clopidogrel (PLAVIX) 75 MG tablet Take 1 tablet by mouth daily 8/14/20   Waldo Wylie MD   atorvastatin (LIPITOR) 40 MG tablet TAKE 1 TABLET BY MOUTH EVERYDAY 7/29/20   ERINN Bello   docusate sodium (COLACE) 100 MG capsule Take 1 capsule by mouth 2 times daily 7/16/20   ERINN Bello   citalopram (CELEXA) 20 MG tablet Take 1 tablet by mouth daily 7/16/20   ERINN Bello   memantine (NAMENDA) 10 MG tablet TAKE 1 TABLET BY MOUTH TWICE DAILY 7/16/20   ERINN Bello   oxybutynin (DITROPAN) 5 MG tablet TAKE 1 TABLET BY MOUTH TWICE DAILY 7/16/20   ERINN Bello   loratadine (CLARITIN) 10 MG tablet TAKE 1 TABLET(10 MG) BY MOUTH DAILY 7/16/20   Shakeel Nava [place]   blood glucose monitor strips Check bid 5/8/20   ERINN Bello   allopurinol (ZYLOPRIM) 300 MG tablet Take 1 tablet by mouth daily 4/15/20   ERINN Bello   losartan (COZAAR) 100 MG tablet Take 1 tablet by mouth daily 4/15/20   ERINN Bello   omeprazole (PRILOSEC) 20 MG delayed release capsule Take 1 capsule by mouth every morning (before breakfast) 3/5/20   ERINN Bello   aspirin 81 MG tablet Take 1 tablet by mouth daily 1/3/20   ERINN Bello   Diabetic Shoe MISC by Does not apply route 9/26/19   ERINN Bello   Compression Stockings MISC by Does not apply route 20-30 mm Hg bilateral knee high 9/26/19   ERINN Bello   rivastigmine (EXELON) 9.5 MG/24HR APPLY 1 NEW PATCH EVERY 24 HOURS  Patient not taking: Reported on 4/15/2020 1/31/18   ERINN Bello   nystatin (MYCOSTATIN) 505357 UNIT/GM cream Apply topically 2 times daily.  1/31/18   ERINN Bello   Glucose Blood (BLOOD GLUCOSE TEST STRIPS) STRP truemetrix test strips check ac and HS 1/31/18   Fabián Bello Allergies:     Bactrim [sulfamethoxazole-trimethoprim]; Clonidine derivatives; and Amoxicillin-pot clavulanate    Social History:     Tobacco:    reports that she has never smoked. She has never used smokeless tobacco.  Alcohol:      reports no history of alcohol use. Drug Use:  reports no history of drug use. Family History:     Family History   Problem Relation Age of Onset    Diabetes Mother     Cancer Mother     High Blood Pressure Mother    Camille Dodd Stroke Mother     Kidney Disease Mother     Uterine Cancer Mother     Diabetes Father     Heart Disease Father     Glaucoma Father     Emphysema Father     Coronary Art Dis Other         All 4 siblings.  Stroke Other         1 sibling.  Lung Cancer Other         1 sibling - cause of death lung cancer.  Mental Retardation Other        Review of Systems:   Not very reliable historian  Positive and Negative as described in HPI.     CONSTITUTIONAL:  negative for fevers, chills, sweats, + fatigue, denies weight loss  HEENT:  negative for vision, hearing changes, runny nose, throat pain  RESPIRATORY:  negative for shortness of breath, cough, congestion, wheezing  CARDIOVASCULAR:  negative for chest pain, palpitations  GASTROINTESTINAL:  negative for nausea, vomiting, diarrhea, constipation, change in bowel habits, abdominal pain   GENITOURINARY:  negative for difficulty of urination, burning with urination, frequency   INTEGUMENT: + Wounds both lower extremities  HEMATOLOGIC/LYMPHATIC:  negative for swelling/edema   ALLERGIC/IMMUNOLOGIC:  negative for urticaria , itching  ENDOCRINE:  negative increase in drinking, increase in urination, hot or cold intolerance  MUSCULOSKELETAL:  negative joint pains, muscle aches, swelling of joints  NEUROLOGICAL:  negative for headaches, dizziness, lightheadedness, numbness, pain, tingling extremities  BEHAVIOR/PSYCH:  negative for depression, anxiety    Physical Exam:   BP (!) 181/69   Pulse 66   Temp 98.3 °F (36.8 °C)   Resp 14   Ht 5' 9\" (1.753 m)   Wt 254 lb 3.1 oz (115.3 kg)   SpO2 100%   BMI 37.54 kg/m²   Temp (24hrs), Av.2 °F (36.8 °C), Min:97.5 °F (36.4 °C), Max:98.8 °F (37.1 °C)    Recent Labs     20  2046 20  0754 20  1151 20  2304   POCGLU 108* 90 89 234*       Intake/Output Summary (Last 24 hours) at 2020 0843  Last data filed at 2020 7954  Gross per 24 hour   Intake 760 ml   Output 2735 ml   Net -1975 ml       General Appearance: alert, well appearing, and in no acute distress  Mental status: oriented to person, place, and time  Head: normocephalic, atraumatic  Eye: no icterus, redness, pupils equal and reactive, extraocular eye movements intact, conjunctiva clear  Ear: normal external ear, no discharge, hearing intact  Nose: no drainage noted  Mouth: mucous membranes moist  Neck: supple, no carotid bruits, thyroid not palpable  Lungs: Bilateral equal air entry, clear to ausculation, no wheezing, rales or rhonchi, normal effort  Cardiovascular: normal rate, regular rhythm,+ murmur, no gallop, rub  Abdomen: Soft, nontender, nondistended, normal bowel sounds, no hepatomegaly or splenomegaly  Neurologic: There are no new focal motor or sensory deficits, normal muscle tone and bulk, no abnormal sensation, normal speech, cranial nerves II through XII grossly intact  Skin: No gross lesions, rashes, bruising or bleeding on exposed skin area  Extremities: + Dressing of both lower extremity wounds   psych: normal affect    Investigations:      Laboratory Testing:  Recent Results (from the past 24 hour(s))   EKG 12 Lead    Collection Time: 20  9:54 AM   Result Value Ref Range    Ventricular Rate 60 BPM    Atrial Rate 60 BPM    P-R Interval 190 ms    QRS Duration 102 ms    Q-T Interval 480 ms    QTc Calculation (Bazett) 480 ms    P Axis 80 degrees    R Axis 83 degrees    T Axis 117 degrees   POC Glucose Fingerstick    Collection Time: 20 11:51 AM   Result Value Ref Range POC Glucose 89 65 - 105 mg/dL   POTASSIUM    Collection Time: 08/20/20 12:29 PM   Result Value Ref Range    Potassium 5.1 3.7 - 5.3 mmol/L   POTASSIUM    Collection Time: 08/20/20  9:41 PM   Result Value Ref Range    Potassium 5.3 3.7 - 5.3 mmol/L   POC Glucose Fingerstick    Collection Time: 08/20/20 11:04 PM   Result Value Ref Range    POC Glucose 234 (H) 65 - 105 mg/dL   COVID-19    Collection Time: 08/21/20  3:32 AM    Specimen: Other   Result Value Ref Range    SARS-CoV-2          SARS-CoV-2, Rapid Not Detected Not Detected    Source . NASOPHARYNGEAL SWAB     SARS-CoV-2, PCR         Basic Metabolic Panel w/ Reflex to MG    Collection Time: 08/21/20  3:55 AM   Result Value Ref Range    Glucose 189 (H) 70 - 99 mg/dL    BUN 37 (H) 8 - 23 mg/dL    CREATININE 2.77 (H) 0.50 - 0.90 mg/dL    Bun/Cre Ratio NOT REPORTED 9 - 20    Calcium 8.2 (L) 8.6 - 10.4 mg/dL    Sodium 137 135 - 144 mmol/L    Potassium 4.6 3.7 - 5.3 mmol/L    Chloride 99 98 - 107 mmol/L    CO2 24 20 - 31 mmol/L    Anion Gap 14 9 - 17 mmol/L    GFR Non-African American 17 (L) >60 mL/min    GFR  21 (L) >60 mL/min    GFR Comment          GFR Staging NOT REPORTED    CBC auto differential    Collection Time: 08/21/20  3:55 AM   Result Value Ref Range    WBC 8.0 3.5 - 11.3 k/uL    RBC 2.72 (L) 3.95 - 5.11 m/uL    Hemoglobin 7.9 (L) 11.9 - 15.1 g/dL    Hematocrit 26.5 (L) 36.3 - 47.1 %    MCV 97.4 82.6 - 102.9 fL    MCH 29.0 25.2 - 33.5 pg    MCHC 29.8 28.4 - 34.8 g/dL    RDW 16.6 (H) 11.8 - 14.4 %    Platelets 567 482 - 249 k/uL    MPV 9.7 8.1 - 13.5 fL    NRBC Automated 0.0 0.0 per 100 WBC    Differential Type NOT REPORTED     WBC Morphology NOT REPORTED     RBC Morphology ANISOCYTOSIS PRESENT     Platelet Estimate NOT REPORTED     Seg Neutrophils 58 36 - 65 %    Lymphocytes 32 24 - 43 %    Monocytes 7 3 - 12 %    Eosinophils % 2 1 - 4 %    Basophils 1 0 - 2 %    Immature Granulocytes 0 0 %    Segs Absolute 4.61 1.50 - 8.10 k/uL Absolute Lymph # 2.55 1.10 - 3.70 k/uL    Absolute Mono # 0.59 0.10 - 1.20 k/uL    Absolute Eos # 0.19 0.00 - 0.44 k/uL    Basophils Absolute 0.05 0.00 - 0.20 k/uL    Absolute Immature Granulocyte <0.03 0.00 - 0.30 k/uL   Ionized Calcium    Collection Time: 08/21/20  3:55 AM   Result Value Ref Range    Calcium, Ion 1.05 (L) 1.13 - 1.33 mmol/L   Phosphorus    Collection Time: 08/21/20  3:55 AM   Result Value Ref Range    Phosphorus 4.4 2.6 - 4.5 mg/dL   Magnesium    Collection Time: 08/21/20  3:55 AM   Result Value Ref Range    Magnesium 1.6 1.6 - 2.6 mg/dL       Imaging/Diagnostics:  Xr Chest Portable    Result Date: 8/20/2020  Findings of mild interstitial edema with basilar subsegmental atelectasis and small pleural effusions again demonstrated without significant change. No new airspace disease identified. Unchanged nodule in the right lower lung field since at least 2019, presumably a calcified hamartoma or granuloma. Xr Chest Portable    Result Date: 8/19/2020  Cardiomegaly and chronic pulmonary change with bilateral congestion and bibasilar effusions. Stable right midlung nodule. Assessment :      Hospital Problems           Last Modified POA    * (Principal) Bradycardia 8/19/2020 Yes    Hypertension 8/19/2020 Yes    Morbidly obese ([address] 8/19/2020 Yes    Acute kidney injury superimposed on CKD ([address] 8/19/2020 Yes    CAD (coronary artery disease) 8/19/2020 Yes    Overview Signed 2/27/2014 10:22 AM by Jeronimo Thompson MD     (with coronary artery bypass graft x4).          Type 2 diabetes mellitus with hyperglycemia, with long-term current use of insulin ([address] 8/19/2020 Yes    CHF (congestive heart failure), NYHA class I, acute on chronic, combined ([address] 8/19/2020 Yes    Lymphedema of both lower extremities 8/19/2020 Yes    Pressure injury of right heel, unstageable ([address] 8/19/2020 Yes    Hyperkalemia 8/19/2020 Yes    AV block, 1st degree 8/19/2020 Yes    Wounds, multiple 8/20/2020 Yes    Buttock wound 8/20/2020 Yes      History of maggots on wounds both lower extremities    Plan:     Patient status inpatient in the Med/Surge on 5 cc    1. Waiting for precertification for going to [facility]  2. PT OT  3. Start Norvasc 5 mg daily for blood pressure control  4. Change Lantus insulin to 15 mg mg twice daily and check A1c  5. Change insulin sliding scale to high intensity  6. Replace electrolytes as needed  7. EP not planning any intervention, recommended to restart blood pressure medicines, carvedilol, Norvasc slowly and avoid clonidine    Consultations:   IP CONSULT TO CARDIOLOGY  IP CONSULT TO NEPHROLOGY  IP CONSULT TO IV TEAM     Patient is admitted as inpatient status because of co-morbidities listed above, severity of signs and symptoms as outlined, requirement for current medical therapies and most importantly because of direct risk to patient if care not provided in a hospital setting. Expected length of stay > 48 hours.     [address] MD  8/21/2020  8:43 AM    Copy sent to ERINN Conley

## 2020-08-21 NOTE — PROGRESS NOTES
Physician Progress Note      Kenisha Mac  CSN #:                  [id number]  :                       1950  ADMIT DATE:       2020 5:08 PM  [address] [place] [facility] DATE:  RESPONDING  PROVIDER #:        Franci LARKIN DO          QUERY TEXT:    Patient admitted with bradycardia and froy . Noted documentation of acute on   chronic combined chf per H&P and chronic combined chf per progress note on   . If possible, please document in progress notes and discharge summary if   you are evaluating and /or treating any of the following: The medical record reflects the following:  Risk Factors: hx of chf, ckd, htn  Clinical Indicators: per H&P acute on chronic combined chf documented per   progress note on  chronic combined chf is documented. cxr on admission   states stable cardiomegaly with bilateral congestion and bibasilar effusions. bnp on admission was 16,178  Treatment: iv Lasix times one dose  Options provided:  -- Acute on chronic combined chf  -- chronic combined chf  -- Other - I will add my own diagnosis  -- Disagree - Not applicable / Not valid  -- Disagree - Clinically unable to determine / Unknown  -- Refer to Clinical Documentation Reviewer    PROVIDER RESPONSE TEXT:    After study, this patient has acute on chronic combined chf .    Query created by:  Ariel Christianson on 2020 10:34 AM      Electronically signed by:  Katie Rayo DO 2020 8:25 PM

## 2020-08-21 NOTE — PLAN OF CARE
The patient is alert and orientated. She denies any needs or complaints. She has bilateral leg wounds which are covered and have secure ace wraps. She is able to feed herself and use the call light appropriately while sitting in the chair. She has a flat affect when communicating with her but she does participate when asking for details. She keeps eyes closed the majority of the time but will arouse easily when approached. Call light in reach, chair alarm on, and frequent rounds to keep the patient safe from falls.

## 2020-08-22 LAB
ABSOLUTE EOS #: 0.24 K/UL (ref 0–0.44)
ABSOLUTE IMMATURE GRANULOCYTE: 0.03 K/UL (ref 0–0.3)
ABSOLUTE LYMPH #: 2.41 K/UL (ref 1.1–3.7)
ABSOLUTE MONO #: 0.65 K/UL (ref 0.1–1.2)
ANION GAP SERPL CALCULATED.3IONS-SCNC: 9 MMOL/L (ref 9–17)
BASOPHILS # BLD: 1 % (ref 0–2)
BASOPHILS ABSOLUTE: 0.07 K/UL (ref 0–0.2)
BUN BLDV-MCNC: 28 MG/DL (ref 8–23)
BUN/CREAT BLD: ABNORMAL (ref 9–20)
CALCIUM IONIZED: 1.09 MMOL/L (ref 1.13–1.33)
CALCIUM SERPL-MCNC: 8.1 MG/DL (ref 8.6–10.4)
CHLORIDE BLD-SCNC: 100 MMOL/L (ref 98–107)
CO2: 29 MMOL/L (ref 20–31)
CREAT SERPL-MCNC: 2.07 MG/DL (ref 0.5–0.9)
DIFFERENTIAL TYPE: ABNORMAL
EOSINOPHILS RELATIVE PERCENT: 2 % (ref 1–4)
GFR AFRICAN AMERICAN: 29 ML/MIN
GFR NON-AFRICAN AMERICAN: 24 ML/MIN
GFR SERPL CREATININE-BSD FRML MDRD: ABNORMAL ML/MIN/{1.73_M2}
GFR SERPL CREATININE-BSD FRML MDRD: ABNORMAL ML/MIN/{1.73_M2}
GLUCOSE BLD-MCNC: 122 MG/DL (ref 65–105)
GLUCOSE BLD-MCNC: 173 MG/DL (ref 65–105)
GLUCOSE BLD-MCNC: 178 MG/DL (ref 65–105)
GLUCOSE BLD-MCNC: 179 MG/DL (ref 65–105)
GLUCOSE BLD-MCNC: 73 MG/DL (ref 65–105)
GLUCOSE BLD-MCNC: 97 MG/DL (ref 70–99)
HCT VFR BLD CALC: 27.3 % (ref 36.3–47.1)
HEMOGLOBIN: 8.2 G/DL (ref 11.9–15.1)
IMMATURE GRANULOCYTES: 0 %
LYMPHOCYTES # BLD: 24 % (ref 24–43)
MAGNESIUM: 1.5 MG/DL (ref 1.6–2.6)
MCH RBC QN AUTO: 28.5 PG (ref 25.2–33.5)
MCHC RBC AUTO-ENTMCNC: 30 G/DL (ref 28.4–34.8)
MCV RBC AUTO: 94.8 FL (ref 82.6–102.9)
MONOCYTES # BLD: 7 % (ref 3–12)
NRBC AUTOMATED: 0 PER 100 WBC
PDW BLD-RTO: 16.1 % (ref 11.8–14.4)
PHOSPHORUS: 3.3 MG/DL (ref 2.6–4.5)
PLATELET # BLD: 437 K/UL (ref 138–453)
PLATELET ESTIMATE: ABNORMAL
PMV BLD AUTO: 9.6 FL (ref 8.1–13.5)
POTASSIUM SERPL-SCNC: 3.9 MMOL/L (ref 3.7–5.3)
POTASSIUM SERPL-SCNC: 3.9 MMOL/L (ref 3.7–5.3)
POTASSIUM SERPL-SCNC: 4.1 MMOL/L (ref 3.7–5.3)
RBC # BLD: 2.88 M/UL (ref 3.95–5.11)
RBC # BLD: ABNORMAL 10*6/UL
SEG NEUTROPHILS: 66 % (ref 36–65)
SEGMENTED NEUTROPHILS ABSOLUTE COUNT: 6.59 K/UL (ref 1.5–8.1)
SODIUM BLD-SCNC: 138 MMOL/L (ref 135–144)
WBC # BLD: 10 K/UL (ref 3.5–11.3)
WBC # BLD: ABNORMAL 10*3/UL

## 2020-08-22 PROCEDURE — 6370000000 HC RX 637 (ALT 250 FOR IP): Performed by: STUDENT IN AN ORGANIZED HEALTH CARE EDUCATION/TRAINING PROGRAM

## 2020-08-22 PROCEDURE — 6360000002 HC RX W HCPCS: Performed by: INTERNAL MEDICINE

## 2020-08-22 PROCEDURE — 6370000000 HC RX 637 (ALT 250 FOR IP): Performed by: INTERNAL MEDICINE

## 2020-08-22 PROCEDURE — 82330 ASSAY OF CALCIUM: CPT

## 2020-08-22 PROCEDURE — 85025 COMPLETE CBC W/AUTO DIFF WBC: CPT

## 2020-08-22 PROCEDURE — 84100 ASSAY OF PHOSPHORUS: CPT

## 2020-08-22 PROCEDURE — 2060000000 HC ICU INTERMEDIATE R&B

## 2020-08-22 PROCEDURE — 2580000003 HC RX 258: Performed by: STUDENT IN AN ORGANIZED HEALTH CARE EDUCATION/TRAINING PROGRAM

## 2020-08-22 PROCEDURE — 99232 SBSQ HOSP IP/OBS MODERATE 35: CPT | Performed by: INTERNAL MEDICINE

## 2020-08-22 PROCEDURE — 6360000002 HC RX W HCPCS: Performed by: STUDENT IN AN ORGANIZED HEALTH CARE EDUCATION/TRAINING PROGRAM

## 2020-08-22 PROCEDURE — 83735 ASSAY OF MAGNESIUM: CPT

## 2020-08-22 PROCEDURE — 82947 ASSAY GLUCOSE BLOOD QUANT: CPT

## 2020-08-22 PROCEDURE — 80048 BASIC METABOLIC PNL TOTAL CA: CPT

## 2020-08-22 PROCEDURE — 36415 COLL VENOUS BLD VENIPUNCTURE: CPT

## 2020-08-22 PROCEDURE — 2580000003 HC RX 258: Performed by: INTERNAL MEDICINE

## 2020-08-22 PROCEDURE — 84132 ASSAY OF SERUM POTASSIUM: CPT

## 2020-08-22 RX ORDER — AMLODIPINE BESYLATE 10 MG/1
10 TABLET ORAL DAILY
Status: DISCONTINUED | OUTPATIENT
Start: 2020-08-23 | End: 2020-08-24 | Stop reason: HOSPADM

## 2020-08-22 RX ADMIN — INSULIN LISPRO 3 UNITS: 100 INJECTION, SOLUTION INTRAVENOUS; SUBCUTANEOUS at 18:35

## 2020-08-22 RX ADMIN — SODIUM CHLORIDE, PRESERVATIVE FREE 10 ML: 5 INJECTION INTRAVENOUS at 21:14

## 2020-08-22 RX ADMIN — SODIUM CHLORIDE, PRESERVATIVE FREE 10 ML: 5 INJECTION INTRAVENOUS at 09:39

## 2020-08-22 RX ADMIN — COLLAGENASE SANTYL: 250 OINTMENT TOPICAL at 09:30

## 2020-08-22 RX ADMIN — OXYBUTYNIN CHLORIDE 5 MG: 5 TABLET ORAL at 21:14

## 2020-08-22 RX ADMIN — ASPIRIN 81 MG: 81 TABLET, CHEWABLE ORAL at 09:30

## 2020-08-22 RX ADMIN — PANTOPRAZOLE SODIUM 40 MG: 40 TABLET, DELAYED RELEASE ORAL at 09:29

## 2020-08-22 RX ADMIN — CITALOPRAM 20 MG: 20 TABLET, FILM COATED ORAL at 09:29

## 2020-08-22 RX ADMIN — OXYBUTYNIN CHLORIDE 5 MG: 5 TABLET ORAL at 09:29

## 2020-08-22 RX ADMIN — AMLODIPINE BESYLATE 5 MG: 5 TABLET ORAL at 09:30

## 2020-08-22 RX ADMIN — CLOPIDOGREL 75 MG: 75 TABLET, FILM COATED ORAL at 09:29

## 2020-08-22 RX ADMIN — INSULIN GLARGINE 15 UNITS: 100 INJECTION, SOLUTION SUBCUTANEOUS at 21:14

## 2020-08-22 RX ADMIN — HEPARIN SODIUM 5000 UNITS: 5000 INJECTION INTRAVENOUS; SUBCUTANEOUS at 14:41

## 2020-08-22 RX ADMIN — BUMETANIDE 1 MG: 1 TABLET ORAL at 09:29

## 2020-08-22 RX ADMIN — INSULIN GLARGINE 15 UNITS: 100 INJECTION, SOLUTION SUBCUTANEOUS at 09:31

## 2020-08-22 RX ADMIN — DESMOPRESSIN ACETATE 40 MG: 0.2 TABLET ORAL at 21:14

## 2020-08-22 NOTE — PROGRESS NOTES
[facility]    Progress Note    8/22/2020    8:58 AM    Name:   Aparna Eli  MRN:     [id number]     Acct:      [de-identified]   Room:   [address] [place] [address] Day:  3  Admit Date:  8/19/2020  5:08 PM    PCP:   ERINN Haji  Code Status:  Full Code    Subjective:     C/C: Bradycardia    Interval History Status:   States she is feeling better  Current heart rate 74, blood pressure 172/69  Creatinine has improved to 2.07, potassium 4.1, glucose 122  Brief History:   Aparna Eli is a 77 y.o.  Female has been transferred out of ICU with following information;     Patient was admitted to the medical ICU after being transferred from [facility] [place] where she was found to have bradycardia patient is a resident of [address] [place] [address] presented the emergency department there with acute renal failure and hyperkalemia as well as said bradycardia she was given insulin and calcium gluconate for hyperkalemia and transferred to [facility] for further [address] outside emergency department patient was found to be infested with maggots the lower limbs  Patient was seen and evaluated cardiology service.  She was diuresed with Lasix. Patient's Coreg and clonidine have been stopped as well as her ACE inhibitor.  Patient will be evaluated by the electrophysiology service for evaluation of PPM  Chronic right heel unstagable pressure injury, left ischium, right trochanter and right hip pressure injuries; bilateral lower extremity venous stasis with ulcers to the left lower leg. Seen and evaluated wound ostomy nurse     Today's creatinine 2.77  Magnesium 1.6  Glucose 189-234  Blood pressure 181/69, heart rate 66/min      Review of Systems:     Constitutional:  negative for chills, fevers, sweats,+ fatigue  Respiratory:  negative for cough, dyspnea on exertion, shortness of breath, wheezing  Cardiovascular:  negative for chest pain, chest pressure/discomfort, lower she has never smoked. She has never used smokeless tobacco. She reports that she does not drink alcohol or use drugs. Family History:   Family History   Problem Relation Age of Onset    Diabetes Mother     Cancer Mother     High Blood Pressure Mother    Srikanth Ni Stroke Mother     Kidney Disease Mother     Uterine Cancer Mother     Diabetes Father     Heart Disease Father     Glaucoma Father     Emphysema Father     Coronary Art Dis Other         All 4 siblings.  Stroke Other         1 sibling.  Lung Cancer Other         1 sibling - cause of death lung cancer.  Mental Retardation Other        Vitals:  BP (!) 172/69   Pulse 74   Temp 98.8 °F (37.1 °C) (Oral)   Resp 11   Ht 5' 9\" (1.753 m)   Wt 254 lb 3.1 oz (115.3 kg)   SpO2 99%   BMI 37.54 kg/m²   Temp (24hrs), Av.1 °F (36.7 °C), Min:97.5 °F (36.4 °C), Max:98.8 °F (37.1 °C)    Recent Labs     20  1252 20  1804 20  1952 20  0757   POCGLU 178* 165* 92 73       I/O (24Hr):     Intake/Output Summary (Last 24 hours) at 2020 0858  Last data filed at 2020 0507  Gross per 24 hour   Intake 480 ml   Output 4300 ml   Net -3820 ml       Labs:  Hematology:  Recent Labs     20  0921 20  0422 20  0355 20  0440   WBC 8.4 7.3 8.0 10.0   RBC 2.83* 2.79* 2.72* 2.88*   HGB 8.1* 7.9* 7.9* 8.2*   HCT 26.1* 26.9* 26.5* 27.3*   MCV 92.2 96.4 97.4 94.8   MCH 28.6 28.3 29.0 28.5   MCHC 31.0 29.4 29.8 30.0   RDW 16.4* 16.5* 16.6* 16.1*    353 387 437   MPV 9.3 9.8 9.7 9.6   INR 1.1  --   --   --      Chemistry:  Recent Labs     20  0921  08/19/20  0422  20  0355 20  1104 20  0440   *  --   --   --  133* 134*  --  137  --   --  138   K 6.1*  --   --   --  5.9* 5.1   < > 4.6 4.3 4.2 3.9     --   --   --  98 99  --  99  --   --  100   CO2 25  --   --   --  23 23  --  24  --   --  29   GLUCOSE 163*  --   --   --  121* 104*  -- [id number], O2SAT, FIO2  Lab Results   Component Value Date/Time    SPECIAL NOT REPORTED 08/20/2020 08:18 AM     Lab Results   Component Value Date/Time    CULTURE NO SIGNIFICANT GROWTH 08/20/2020 08:18 AM       Radiology:  Debby Rosado Chest Portable    Result Date: 8/20/2020  Findings of mild interstitial edema with basilar subsegmental atelectasis and small pleural effusions again demonstrated without significant change. No new airspace disease identified. Unchanged nodule in the right lower lung field since at least 2019, presumably a calcified hamartoma or granuloma. Xr Chest Portable    Result Date: 8/19/2020  Cardiomegaly and chronic pulmonary change with bilateral congestion and bibasilar effusions. Stable right midlung nodule. Physical Examination:        General appearance:  alert, cooperative and no distress  Mental Status:  oriented to person, place and time and normal affect  Lungs:  clear to auscultation bilaterally, normal effort  Heart:  regular rate and rhythm,+ murmur  Abdomen:  soft, nontender, nondistended, normal bowel sounds, no masses, hepatomegaly, splenomegaly  Extremities:  + Dressing of both lower extremity wounds  Skin:  no gross lesions, rashes, induration    Assessment:        Hospital Problems           Last Modified POA    * (Principal) Bradycardia 8/19/2020 Yes    Hypertension 8/19/2020 Yes    Morbidly obese ([address] 8/19/2020 Yes    Acute kidney injury superimposed on CKD ([address] 8/19/2020 Yes    CAD (coronary artery disease) 8/19/2020 Yes    Overview Signed 2/27/2014 10:22 AM by Wen Kan MD     (with coronary artery bypass graft x4).          Type 2 diabetes mellitus with hyperglycemia, with long-term current use of insulin ([address] 8/19/2020 Yes    CHF (congestive heart failure), NYHA class I, acute on chronic, combined ([address] 8/19/2020 Yes    Lymphedema of both lower extremities 8/19/2020 Yes    Pressure injury of right heel, unstageable ([address] 8/19/2020 Yes    Hyperkalemia 8/19/2020 Yes    AV block, 1st degree 8/19/2020 Yes    Wounds, multiple 8/20/2020 Yes    Buttock wound 8/20/2020 Yes          Plan:        1. Increase Norvasc to 10 mg daily  2. Coreg and clonidine were stopped due to bradycardia  3. ACE/ARB were stopped due to renal dysfunction- do not start for now  4. Continue diabetes control as already planned  5.  Waiting for precertification for going to [facility] E Mac Reyes MD  8/22/2020  8:58 AM

## 2020-08-22 NOTE — PROGRESS NOTES
Renal Progress Note    Patient :  Chantal Llamas; 77 y.o. MRN# [id number]  [facility]  Attending:  Jeffery Gale MD  Admit Date:  2020   Hospital Day: 3    Subjective   Patient was seen and examined. She is alert and oriented although lethargic. No acute events overnight per RN  Urine output through external catheter has been about 4 L over the past 24 hours. Creatinine is down to 2.07 from 2.77 yesterday. Electrolytes are within normal limits other than magnesium at 1.5.   Vital signs stable     Objective     VS: BP (!) 165/56   Pulse 74   Temp 98.1 °F (36.7 °C) (Oral)   Resp 14   Ht 5' 9\" (1.753 m)   Wt 254 lb 3.1 oz (115.3 kg)   SpO2 100%   BMI 37.54 kg/m²   MAXIMUM TEMPERATURE OVER 24HRS:  Temp (24hrs), Av.3 °F (36.8 °C), Min:98 °F (36.7 °C), Max:98.8 °F (37.1 °C)    24HR BLOOD PRESSURE RANGE:  Systolic (70LFW), SQD:452 , Min:165 , DAB:718   ; Diastolic (55EQA), QJT:06, Min:53, Max:69    24HR INTAKE/OUTPUT:      Intake/Output Summary (Last 24 hours) at 2020 1406  Last data filed at 2020 1030  Gross per 24 hour   Intake --   Output 3900 ml   Net -3900 ml     WEIGHT :  Patient Vitals for the past 96 hrs (Last 3 readings):   Weight   20 0600 254 lb 3.1 oz (115.3 kg)   20 1843 253 lb 15.5 oz (115.2 kg)       Current Medications:     Scheduled Meds:    [START ON 2020] amLODIPine  10 mg Oral Daily    insulin lispro  0-18 Units Subcutaneous TID WC    insulin lispro  0-9 Units Subcutaneous Nightly    insulin glargine  15 Units Subcutaneous BID    bumetanide  1 mg Oral Daily    pantoprazole  40 mg Oral QAM AC    aspirin  81 mg Oral Daily    atorvastatin  40 mg Oral Nightly    citalopram  20 mg Oral Daily    clopidogrel  75 mg Oral Daily    collagenase   Topical Daily    oxybutynin  5 mg Oral BID    rivastigmine  1 patch Transdermal Daily    sodium chloride flush  10 mL Intravenous 2 times per day    heparin (porcine)  5,000 Units Subcutaneous 3 times per day    insulin lispro  0-6 Units Subcutaneous TID WC     Continuous Infusions:    dextrose         Physical Examination:     General:  AAO x 3, speaking in full sentences, no accessory muscle use. Chest:   Bilateral vesicular breath sounds, no rales or wheezes. Cardiac:  S1 S2 RR, no murmurs, gallops or rubs, JVP not raised. Abdomen: Soft, non-tender, non distended, BS audible. SKIN:  Wounds to BLE covered with dressings  Extremities:  2+ edema BLE, no clubbing, No cyanosis  Neuro:  AAO x 3, No FND. PERRLA  :                  No suprapubic or flank tenderness. Labs:       Recent Labs     08/20/20 0422 08/21/20 0355 08/22/20 0440   WBC 7.3 8.0 10.0   RBC 2.79* 2.72* 2.88*   HGB 7.9* 7.9* 8.2*   HCT 26.9* 26.5* 27.3*   MCV 96.4 97.4 94.8   MCH 28.3 29.0 28.5   MCHC 29.4 29.8 30.0   RDW 16.5* 16.6* 16.1*    387 437   MPV 9.8 9.7 9.6      BMP:   Recent Labs     08/20/20 0422 08/21/20 0355 08/21/20 2052 08/22/20 0440 08/22/20  1227   *  --  137  --   --  138  --    K 5.1   < > 4.6   < > 4.2 3.9 4.1   CL 99  --  99  --   --  100  --    CO2 23  --  24  --   --  29  --    BUN 42*  --  37*  --   --  28*  --    CREATININE 3.34*  --  2.77*  --   --  2.07*  --    GLUCOSE 104*  --  189*  --   --  97  --    CALCIUM 8.4*  --  8.2*  --   --  8.1*  --     < > = values in this interval not displayed.       Phosphorus:     Recent Labs     08/20/20 0422 08/21/20 0355 08/22/20 0440   PHOS 5.5* 4.4 3.3     Magnesium:    Recent Labs     08/20/20 0422 08/21/20 0355 08/22/20  0440   MG 1.9 1.6 1.5*     Albumin:    Recent Labs     08/20/20  0422   LABALBU 2.0*     BNP:    No results found for: BNP  PTH:   No results found for: IPTH  Blood cultures:  No results found for: [facility]    Urinalysis/Chemistries:      Lab Results   Component Value Date    NITRU NEGATIVE 08/20/2020    COLORU YELLOW 08/20/2020    PHUR 6.5 08/20/2020    WBCUA None 08/20/2020    RBCUA 2 TO 5 08/20/2020    MUCUS NOT REPORTED 08/20/2020    TRICHOMONAS NOT REPORTED 08/20/2020    YEAST NOT REPORTED 08/20/2020    BACTERIA NOT REPORTED 08/20/2020    SPECGRAV 1.008 08/20/2020    LEUKOCYTESUR NEGATIVE 08/20/2020    UROBILINOGEN Normal 08/20/2020    BILIRUBINUR NEGATIVE 08/20/2020    GLUCOSEU NEGATIVE 08/20/2020    KETUA NEGATIVE 08/20/2020    AMORPHOUS NOT REPORTED 08/20/2020       Radiology:     Reviewed as available    Assessment:     1. Acute kidney injury most likely due to prerenal azotemia and hypoperfusion state caused by severe bradycardia leading to ATN.  With correction of heart rate her renal functions are slowly improving. 2. Hyperkalemia-resolved  3. Chronic kidney disease stage III baseline creatinine of 1.1 to 1.2 mg/dL  4. Diastolic dysfunction with EF 30-35%  5. Sinus bradycardia -no indication for PPM per EP  6. Hypomagnesemia  Plan:   1. Continue Bumex 1 mg oral daily  2. Strict Input and Output, Daily weigh and document in the chart. 3. BMP daily  4. Pre-certification for SNF pending  5. Magnesium 3 grams IV  6. No need for renal replacement therapy     Nutrition   Renal Diet/TF    Thank you. Please call with any questions. Lindsey Cisse. Manav SILVA CNP    [facility]. Attending Physician Statement  I have discussed the care of Daniel Spear, including pertinent history and exam findings with the CNP. I have reviewed the key elements of all parts of the encounter with the CNP. I have seen and examined the patient with the CNP. I agree with the assessment and plan and status of the problem list as documented.      Michaelle Becerra MD  Nephrology Attending Physician  [facility]

## 2020-08-22 NOTE — PLAN OF CARE
Skin assessed for breakdown and redness, patient turned regularly, and heels elevated off of bed on pillows. Pt assessed as a fall risk this shift. Remains free from falls and accidental injury. Fall precautions in place, including falling star sign and fall risk band on pt. Floor free from obstacles, and bed is locked and in lowest position. Adequate lighting provided. Pt encouraged to call before getting out of bed for any need. Bed alarm activated. Will continue to monitor needs during hourly rounding, and reinforce education on use of call light.

## 2020-08-23 PROBLEM — L89.212: Status: ACTIVE | Noted: 2020-08-23

## 2020-08-23 LAB
ABSOLUTE EOS #: 0.28 K/UL (ref 0–0.44)
ABSOLUTE IMMATURE GRANULOCYTE: 0.03 K/UL (ref 0–0.3)
ABSOLUTE LYMPH #: 2.24 K/UL (ref 1.1–3.7)
ABSOLUTE MONO #: 0.59 K/UL (ref 0.1–1.2)
ANION GAP SERPL CALCULATED.3IONS-SCNC: 9 MMOL/L (ref 9–17)
BASOPHILS # BLD: 1 % (ref 0–2)
BASOPHILS ABSOLUTE: 0.07 K/UL (ref 0–0.2)
BUN BLDV-MCNC: 20 MG/DL (ref 8–23)
BUN/CREAT BLD: ABNORMAL (ref 9–20)
CALCIUM IONIZED: 1.1 MMOL/L (ref 1.13–1.33)
CALCIUM SERPL-MCNC: 8 MG/DL (ref 8.6–10.4)
CHLORIDE BLD-SCNC: 99 MMOL/L (ref 98–107)
CO2: 29 MMOL/L (ref 20–31)
CREAT SERPL-MCNC: 1.5 MG/DL (ref 0.5–0.9)
DIFFERENTIAL TYPE: ABNORMAL
EOSINOPHILS RELATIVE PERCENT: 4 % (ref 1–4)
GFR AFRICAN AMERICAN: 42 ML/MIN
GFR NON-AFRICAN AMERICAN: 35 ML/MIN
GFR SERPL CREATININE-BSD FRML MDRD: ABNORMAL ML/MIN/{1.73_M2}
GFR SERPL CREATININE-BSD FRML MDRD: ABNORMAL ML/MIN/{1.73_M2}
GLUCOSE BLD-MCNC: 106 MG/DL (ref 70–99)
GLUCOSE BLD-MCNC: 120 MG/DL (ref 65–105)
GLUCOSE BLD-MCNC: 92 MG/DL (ref 65–105)
HCT VFR BLD CALC: 27.9 % (ref 36.3–47.1)
HEMOGLOBIN: 8.3 G/DL (ref 11.9–15.1)
IMMATURE GRANULOCYTES: 0 %
LYMPHOCYTES # BLD: 28 % (ref 24–43)
MAGNESIUM: 1.8 MG/DL (ref 1.6–2.6)
MCH RBC QN AUTO: 28.4 PG (ref 25.2–33.5)
MCHC RBC AUTO-ENTMCNC: 29.7 G/DL (ref 28.4–34.8)
MCV RBC AUTO: 95.5 FL (ref 82.6–102.9)
MONOCYTES # BLD: 7 % (ref 3–12)
NRBC AUTOMATED: 0 PER 100 WBC
PDW BLD-RTO: 15.9 % (ref 11.8–14.4)
PHOSPHORUS: 2.3 MG/DL (ref 2.6–4.5)
PLATELET # BLD: 458 K/UL (ref 138–453)
PLATELET ESTIMATE: ABNORMAL
PMV BLD AUTO: 9.4 FL (ref 8.1–13.5)
POTASSIUM SERPL-SCNC: 3.3 MMOL/L (ref 3.7–5.3)
POTASSIUM SERPL-SCNC: 3.8 MMOL/L (ref 3.7–5.3)
POTASSIUM SERPL-SCNC: 4.1 MMOL/L (ref 3.7–5.3)
RBC # BLD: 2.92 M/UL (ref 3.95–5.11)
RBC # BLD: ABNORMAL 10*6/UL
SEG NEUTROPHILS: 60 % (ref 36–65)
SEGMENTED NEUTROPHILS ABSOLUTE COUNT: 4.79 K/UL (ref 1.5–8.1)
SODIUM BLD-SCNC: 137 MMOL/L (ref 135–144)
WBC # BLD: 8 K/UL (ref 3.5–11.3)
WBC # BLD: ABNORMAL 10*3/UL

## 2020-08-23 PROCEDURE — 6370000000 HC RX 637 (ALT 250 FOR IP): Performed by: NURSE PRACTITIONER

## 2020-08-23 PROCEDURE — 80048 BASIC METABOLIC PNL TOTAL CA: CPT

## 2020-08-23 PROCEDURE — 97110 THERAPEUTIC EXERCISES: CPT

## 2020-08-23 PROCEDURE — 6370000000 HC RX 637 (ALT 250 FOR IP): Performed by: STUDENT IN AN ORGANIZED HEALTH CARE EDUCATION/TRAINING PROGRAM

## 2020-08-23 PROCEDURE — 6370000000 HC RX 637 (ALT 250 FOR IP): Performed by: INTERNAL MEDICINE

## 2020-08-23 PROCEDURE — 99232 SBSQ HOSP IP/OBS MODERATE 35: CPT | Performed by: INTERNAL MEDICINE

## 2020-08-23 PROCEDURE — 84100 ASSAY OF PHOSPHORUS: CPT

## 2020-08-23 PROCEDURE — 36415 COLL VENOUS BLD VENIPUNCTURE: CPT

## 2020-08-23 PROCEDURE — 82330 ASSAY OF CALCIUM: CPT

## 2020-08-23 PROCEDURE — 6360000002 HC RX W HCPCS: Performed by: STUDENT IN AN ORGANIZED HEALTH CARE EDUCATION/TRAINING PROGRAM

## 2020-08-23 PROCEDURE — 82947 ASSAY GLUCOSE BLOOD QUANT: CPT

## 2020-08-23 PROCEDURE — 97530 THERAPEUTIC ACTIVITIES: CPT

## 2020-08-23 PROCEDURE — 83735 ASSAY OF MAGNESIUM: CPT

## 2020-08-23 PROCEDURE — 99231 SBSQ HOSP IP/OBS SF/LOW 25: CPT | Performed by: INTERNAL MEDICINE

## 2020-08-23 PROCEDURE — 85025 COMPLETE CBC W/AUTO DIFF WBC: CPT

## 2020-08-23 PROCEDURE — 2580000003 HC RX 258: Performed by: STUDENT IN AN ORGANIZED HEALTH CARE EDUCATION/TRAINING PROGRAM

## 2020-08-23 PROCEDURE — 2060000000 HC ICU INTERMEDIATE R&B

## 2020-08-23 PROCEDURE — 84132 ASSAY OF SERUM POTASSIUM: CPT

## 2020-08-23 RX ORDER — HYDRALAZINE HYDROCHLORIDE 10 MG/1
10 TABLET, FILM COATED ORAL ONCE
Status: COMPLETED | OUTPATIENT
Start: 2020-08-23 | End: 2020-08-23

## 2020-08-23 RX ORDER — HYDRALAZINE HYDROCHLORIDE 25 MG/1
25 TABLET, FILM COATED ORAL EVERY 8 HOURS SCHEDULED
Status: DISCONTINUED | OUTPATIENT
Start: 2020-08-23 | End: 2020-08-24

## 2020-08-23 RX ORDER — MAGNESIUM SULFATE 1 G/100ML
1 INJECTION INTRAVENOUS
Status: COMPLETED | OUTPATIENT
Start: 2020-08-23 | End: 2020-08-23

## 2020-08-23 RX ORDER — POTASSIUM CHLORIDE 20 MEQ/1
40 TABLET, EXTENDED RELEASE ORAL ONCE
Status: COMPLETED | OUTPATIENT
Start: 2020-08-23 | End: 2020-08-23

## 2020-08-23 RX ADMIN — OXYBUTYNIN CHLORIDE 5 MG: 5 TABLET ORAL at 20:00

## 2020-08-23 RX ADMIN — PANTOPRAZOLE SODIUM 40 MG: 40 TABLET, DELAYED RELEASE ORAL at 09:06

## 2020-08-23 RX ADMIN — HYDRALAZINE HYDROCHLORIDE 25 MG: 25 TABLET, FILM COATED ORAL at 11:16

## 2020-08-23 RX ADMIN — CITALOPRAM 20 MG: 20 TABLET, FILM COATED ORAL at 09:07

## 2020-08-23 RX ADMIN — HEPARIN SODIUM 5000 UNITS: 5000 INJECTION INTRAVENOUS; SUBCUTANEOUS at 07:30

## 2020-08-23 RX ADMIN — SODIUM CHLORIDE, PRESERVATIVE FREE 10 ML: 5 INJECTION INTRAVENOUS at 20:00

## 2020-08-23 RX ADMIN — DESMOPRESSIN ACETATE 40 MG: 0.2 TABLET ORAL at 20:00

## 2020-08-23 RX ADMIN — AMLODIPINE BESYLATE 10 MG: 10 TABLET ORAL at 09:07

## 2020-08-23 RX ADMIN — OXYBUTYNIN CHLORIDE 5 MG: 5 TABLET ORAL at 09:06

## 2020-08-23 RX ADMIN — MAGNESIUM SULFATE HEPTAHYDRATE 1 G: 1 INJECTION, SOLUTION INTRAVENOUS at 14:05

## 2020-08-23 RX ADMIN — HEPARIN SODIUM 5000 UNITS: 5000 INJECTION INTRAVENOUS; SUBCUTANEOUS at 23:38

## 2020-08-23 RX ADMIN — HEPARIN SODIUM 5000 UNITS: 5000 INJECTION INTRAVENOUS; SUBCUTANEOUS at 14:13

## 2020-08-23 RX ADMIN — SODIUM CHLORIDE, PRESERVATIVE FREE 10 ML: 5 INJECTION INTRAVENOUS at 09:06

## 2020-08-23 RX ADMIN — ASPIRIN 81 MG: 81 TABLET, CHEWABLE ORAL at 09:07

## 2020-08-23 RX ADMIN — POTASSIUM CHLORIDE 40 MEQ: 1500 TABLET, EXTENDED RELEASE ORAL at 12:11

## 2020-08-23 RX ADMIN — CLOPIDOGREL 75 MG: 75 TABLET, FILM COATED ORAL at 09:07

## 2020-08-23 RX ADMIN — HYDRALAZINE HYDROCHLORIDE 25 MG: 25 TABLET, FILM COATED ORAL at 14:09

## 2020-08-23 RX ADMIN — HYDRALAZINE HYDROCHLORIDE 10 MG: 10 TABLET, FILM COATED ORAL at 02:21

## 2020-08-23 RX ADMIN — MAGNESIUM SULFATE HEPTAHYDRATE 1 G: 1 INJECTION, SOLUTION INTRAVENOUS at 12:12

## 2020-08-23 RX ADMIN — INSULIN GLARGINE 15 UNITS: 100 INJECTION, SOLUTION SUBCUTANEOUS at 09:07

## 2020-08-23 RX ADMIN — HYDRALAZINE HYDROCHLORIDE 25 MG: 25 TABLET, FILM COATED ORAL at 23:54

## 2020-08-23 RX ADMIN — BUMETANIDE 1 MG: 1 TABLET ORAL at 09:07

## 2020-08-23 ASSESSMENT — PAIN DESCRIPTION - PROGRESSION
CLINICAL_PROGRESSION: NOT CHANGED

## 2020-08-23 ASSESSMENT — PAIN SCALES - GENERAL: PAINLEVEL_OUTOF10: 0

## 2020-08-23 NOTE — PROGRESS NOTES
Renal Progress Note    Patient :  Juan Hicks; 77 y.o. MRN# [id number]  Location:    Attending:  Madie Mancera MD  Admit Date:  2020   Hospital Day: 4    Subjective   Patient was seen and examined. Upon examination she was sitting up in a chair eating and much more alert than yesterday. She denies any SOB, pain, nausea, or any other acute issues. Lynn catheter has been removed and she has been saturating her briefs per RN. Lower limb edema is less than yesterday. Creatinine continues to improve at 1.50 today. She is receiving electrolyte replacement for her potassium and magnesium. No acute events overnight.   Vital signs stable     Objective     VS: BP (!) 173/64   Pulse 77   Temp 98.6 °F (37 °C) (Oral)   Resp 15   Ht 5' 9\" (1.753 m)   Wt 235 lb 7.2 oz (106.8 kg)   SpO2 99%   BMI 34.77 kg/m²   MAXIMUM TEMPERATURE OVER 24HRS:  Temp (24hrs), Av.2 °F (36.8 °C), Min:97.7 °F (36.5 °C), Max:98.9 °F (37.2 °C)    24HR BLOOD PRESSURE RANGE:  Systolic (82MGD), WKY:374 , Min:161 , KIQ:470   ; Diastolic (66NFE), ELIAS:22, Min:55, Max:78    24HR INTAKE/OUTPUT:      Intake/Output Summary (Last 24 hours) at 2020 1257  Last data filed at 2020 0630  Gross per 24 hour   Intake 120 ml   Output 300 ml   Net -180 ml     WEIGHT :  Patient Vitals for the past 96 hrs (Last 3 readings):   Weight   20 0715 235 lb 7.2 oz (106.8 kg)   20 0600 254 lb 3.1 oz (115.3 kg)   20 1843 253 lb 15.5 oz (115.2 kg)       Current Medications:     Scheduled Meds:    hydrALAZINE  25 mg Oral 3 times per day    magnesium sulfate  1 g Intravenous Q1H    amLODIPine  10 mg Oral Daily    magnesium sulfate  3 g Intravenous Once    insulin lispro  0-18 Units Subcutaneous TID WC    insulin lispro  0-9 Units Subcutaneous Nightly    insulin glargine  15 Units Subcutaneous BID    bumetanide  1 mg Oral Daily    pantoprazole  40 mg Oral QAM AC    aspirin  81 mg Oral Daily    atorvastatin  40 mg Oral Nightly    citalopram  20 mg Oral Daily    clopidogrel  75 mg Oral Daily    collagenase   Topical Daily    oxybutynin  5 mg Oral BID    rivastigmine  1 patch Transdermal Daily    sodium chloride flush  10 mL Intravenous 2 times per day    heparin (porcine)  5,000 Units Subcutaneous 3 times per day     Continuous Infusions:    dextrose         Physical Examination:     General:  AAO x 3, speaking in full sentences, no accessory muscle use. Chest:   Bilateral vesicular breath sounds, no rales or wheezes. Cardiac:  S1 S2 RR, no murmurs, gallops or rubs, JVP not raised. Abdomen: Obese, Soft, non-tender, non distended, BS audible. SKIN:  Wounds to BLE covered with dressings  Extremities:  2+ edema BLE, no clubbing, No cyanosis  Neuro:  AAO x 3, No FND. :                  No suprapubic or flank tenderness. Good urine output    Labs:       Recent Labs     08/21/20 0355 08/22/20 0440 08/23/20 0352   WBC 8.0 10.0 8.0   RBC 2.72* 2.88* 2.92*   HGB 7.9* 8.2* 8.3*   HCT 26.5* 27.3* 27.9*   MCV 97.4 94.8 95.5   MCH 29.0 28.5 28.4   MCHC 29.8 30.0 29.7   RDW 16.6* 16.1* 15.9*    437 458*   MPV 9.7 9.6 9.4      BMP:   Recent Labs     08/21/20 0355 08/22/20 0440 08/22/20  1227 08/22/20 2015 08/23/20 0352     --  138  --   --  137   K 4.6   < > 3.9 4.1 3.9 3.3*   CL 99  --  100  --   --  99   CO2 24  --  29  --   --  29   BUN 37*  --  28*  --   --  20   CREATININE 2.77*  --  2.07*  --   --  1.50*   GLUCOSE 189*  --  97  --   --  106*   CALCIUM 8.2*  --  8.1*  --   --  8.0*    < > = values in this interval not displayed. Phosphorus:     Recent Labs     08/21/20  0355 08/22/20  0440 08/23/20  0352   PHOS 4.4 3.3 2.3*     Magnesium:    Recent Labs     08/21/20  0355 08/22/20  0440 08/23/20  0352   MG 1.6 1.5* 1.8     Albumin:  No results for input(s): LABALBU in the last 72 hours.   BNP:    No results found for: BNP  PTH:   No results found for: IPTH  Blood cultures:  No results found for: BC    Urinalysis/Chemistries:      Lab Results   Component Value Date    NITRU NEGATIVE 08/20/2020    COLORU YELLOW 08/20/2020    PHUR 6.5 08/20/2020    WBCUA None 08/20/2020    RBCUA 2 TO 5 08/20/2020    MUCUS NOT REPORTED 08/20/2020    TRICHOMONAS NOT REPORTED 08/20/2020    YEAST NOT REPORTED 08/20/2020    BACTERIA NOT REPORTED 08/20/2020    SPECGRAV 1.008 08/20/2020    LEUKOCYTESUR NEGATIVE 08/20/2020    UROBILINOGEN Normal 08/20/2020    BILIRUBINUR NEGATIVE 08/20/2020    GLUCOSEU NEGATIVE 08/20/2020    KETUA NEGATIVE 08/20/2020    AMORPHOUS NOT REPORTED 08/20/2020       Radiology:     Reviewed as available    Assessment:     1. Acute kidney injury most likely due to prerenal azotemia and hypoperfusion state caused by severe        bradycardia leading to ATN.  With correction of heart rate her renal function is nicely improving. Creatinine today is 1.5 mg/dl  2. Hypokalemia-replaced  3. Chronic kidney disease stage III baseline creatinine of 1.1 to 1.2 mg/dL  4. Diastolic dysfunction with EF 30-35%  5. Sinus bradycardia -no indication for PPM per EP  6. Hypomagnesemia-replaced       Plan:   1. Continue Bumex 1 mg oral daily-will discuss with Dr. Julian Shrestha  2. Strict Input and Output, Daily weigh and document in the chart. 3. BMP daily  4. Pre-certification for SNF pending  5. Continue monitoring electrolytes and replace per protocol  6. No need for renal replacement therapy        Nutrition   Renal Diet/TF    Thank you. Please call with any questions. Anamaria Pereira. Manav SILVA CNP    [facility]     Attending Physician Statement  I have discussed the care of Noa Disla, including pertinent history and exam findings with the CNP. I have reviewed the key elements of all parts of the encounter with the CNP. I have seen and examined the patient with the CNP. I agree with the assessment and plan and status of the problem list as documented.      Aroldo Lou MD  Nephrology Attending Physician  [facility]

## 2020-08-23 NOTE — PROGRESS NOTES
[facility]   Progress Note                 Date:   8/23/2020  Patient name:  Vivian Brown  Date of admission:  8/19/2020  5:08 PM  MRN:   [id number]  YOB: 1950  PCP:    ERINN Mckay    Reason for Admission: Acute kidney injury ([facility] [address] [N17.9]      Subjective:       Clinical Changes / Abnormalities:     Patient seen and examined. No acute events overnight. Hypertensive. No bradycardia episodes. No CP/SOB. I/O last 3 completed shifts: In: 120 [P.O.:120]  Out: 1100 [Urine:1100]  No intake/output data recorded. Medications:   Scheduled Meds:    hydrALAZINE  25 mg Oral 3 times per day    amLODIPine  10 mg Oral Daily    insulin lispro  0-18 Units Subcutaneous TID WC    insulin lispro  0-9 Units Subcutaneous Nightly    insulin glargine  15 Units Subcutaneous BID    bumetanide  1 mg Oral Daily    pantoprazole  40 mg Oral QAM AC    aspirin  81 mg Oral Daily    atorvastatin  40 mg Oral Nightly    citalopram  20 mg Oral Daily    clopidogrel  75 mg Oral Daily    collagenase   Topical Daily    oxybutynin  5 mg Oral BID    rivastigmine  1 patch Transdermal Daily    sodium chloride flush  10 mL Intravenous 2 times per day    heparin (porcine)  5,000 Units Subcutaneous 3 times per day     Continuous Infusions:    dextrose       CBC:   Recent Labs     08/21/20  0355 08/22/20  0440 08/23/20  0352   WBC 8.0 10.0 8.0   HGB 7.9* 8.2* 8.3*    437 458*     BMP:    Recent Labs     08/21/20  0355  08/22/20  0440 08/22/20  1227 08/22/20 2015 08/23/20  0352     --  138  --   --  137   K 4.6   < > 3.9 4.1 3.9 3.3*   CL 99  --  100  --   --  99   CO2 24  --  29  --   --  29   BUN 37*  --  28*  --   --  20   CREATININE 2.77*  --  2.07*  --   --  1.50*   GLUCOSE 189*  --  97  --   --  106*    < > = values in this interval not displayed. Hepatic: No results for input(s): AST, ALT, ALB, BILITOT, ALKPHOS in the last 72 hours.   Troponin: No results for input(s): TROPHS in the last 72 hours. BNP: No results for input(s): BNP in the last 72 hours. Lipids: No results for input(s): CHOL, HDL in the last 72 hours. Invalid input(s): LDLCALCU  INR: No results for input(s): INR in the last 72 hours. Objective:   Vitals: BP (!) 173/64   Pulse 74   Temp 98.1 °F (36.7 °C) (Oral)   Resp 14   Ht 5' 9\" (1.753 m)   Wt 235 lb 7.2 oz (106.8 kg)   SpO2 99%   BMI 34.77 kg/m²   General appearance: Alert. No acute distress. HEENT: Head: Normal, normocephalic, atraumatic. Neck: no JVD, trachea midline  Lungs: Clear to auscultation bilaterally, no wheeze or crackles  Heart: Regular rate and rhythm, S1, S2 normal, no significant murmur  Abdomen: Soft, non-tender  Extremities: No edema  Neurologic: No focal neurologic deficits    EKG: Sinus rhythm, 60 bpm;  msec; old anterior infarction and non-specific ST and T wave abnormality     2D ECHO ( 8/11/20)  Global left ventricular systolic function is severely reduced. Estimated  ejection fraction is 30-35% . There is severe Hypokinesis of anterior, anteroseptal, inferoseptal and  anterolateral walls. [place] is severely hypokinetic. Grade II (moderate) left ventricular diastolic dysfunction. Right ventricular dilatation with reduced systolic function. Left atrium is mildly dilated. Moderate to severe tricuspid regurgitation. Moderate pulmonary hypertension. Estimated right ventricular systolic pressure is 15KLWG. No significant pericardial effusion is seen.     CARDIAC CATHETERIZATION ( 8/13/20)  Multi-vessel Coronary Artery Disease.   3 out of 4 grafts are patent.   Patent LIMA-LAD, SVG-Diagonal and SVG-OM.   Occluded SVG to RCA with left to right collaterals.   Borderline LV systolic function. Assessment:   1. Sinus bradycardia and pauses - resolved  2. Stable CAD, h/o CABG 2007 with 3/4 bypass grafts patent on catheterization 8/13/20  3. Cardiomyopathy, LVEF 30-35%  4. Essential HTN - uncontrolled  5.  Type

## 2020-08-23 NOTE — PROGRESS NOTES
Physician Progress Note      Estrellita Crowe  CSN #:                  [id number]  :                       1950  ADMIT DATE:       2020 5:08 PM  [address] [place] DATE:  RESPONDING  PROVIDER #:        Katelyn Almodovar MD          QUERY TEXT:    Patient admitted with AMY  Per wound care notes , noted to also have rt hip   pressure ulcer. If possible, please document in progress notes and discharge   summary the type of ulcer, site of the ulcer and POA status of the ulcer: The medical record reflects the following:  Risk Factors: age,  Clinical Indicators: per progress note buttock wound documented. per wound   care consult note rt hip pressure ulcer stage 2 documented as present on   admission. Treatment: wound care, monitoring  Options provided:  -- Decubitus Pressure Ulcer to rt hip  POA  -- Decubitus Pressure Ulcer to rt hip not POA  -- Other - I will add my own diagnosis  -- Disagree - Not applicable / Not valid  -- Disagree - Clinically unable to determine / Unknown  -- Refer to Clinical Documentation Reviewer    PROVIDER RESPONSE TEXT:    Entered in problem list?Right hip trochanteric region decub stage II    Query created by:  Jamison Elizabeth on 2020 11:22 AM      Electronically signed by:  Katelyn Almodovar MD 2020 2:20 PM

## 2020-08-23 NOTE — PROGRESS NOTES
Physical Therapy  Facility/Department: [facility] CAR 2  Daily Treatment Note  NAME: Julissa Rocha  : 1950  MRN: [id number]    Date of Service: 2020    Discharge Recommendations:  Further therapy recommended at discharge. PT Equipment Recommendations  Other: RW, pt may have access to one    Assessment   Body structures, Functions, Activity limitations: Decreased functional mobility ; Decreased balance;Decreased endurance;Decreased strength  Assessment: SBA for bed mobility. Nora for sit<>stand with RW. Pt stood for several minutes to get changed and cleaned up. Pt AMB between her bed, chair, and toilet, with a RW and CGA. Pt performed BLE Ex's, in her chair. Pt would benefit from continued PT following discharge. Prognosis: Good  PT Education: Goals;PT Role;Plan of Care;General Safety;Gait Training;Home Exercise Program;Injury Prevention;Pressure Relief; Functional Mobility Training;Transfer Training; Adaptive Device Training  Patient Education: safe use of RW  REQUIRES PT FOLLOW UP: Yes  Activity Tolerance  Activity Tolerance: Patient limited by fatigue;Patient limited by endurance     Patient Diagnosis(es): There were no encounter diagnoses. has a past medical history of Anxiety and depression, Background diabetic retinopathy(362.01), Balance problem, CAD (coronary artery disease), Cancer of uterus ([address] Chronic kidney disease, Chronic low back pain, CVA (cerebral vascular accident) ([address] Dementia ([address] Dry eye syndrome, GERD (gastroesophageal reflux disease), Glaucoma suspect, Gout, Homonymous hemianopsia, Hyperlipidemia, Hypertension, Keratitis, Obesity, Peripheral polyneuropathy, Pseudophakos, Stroke ([address] Trichiasis, and Type 2 diabetes mellitus ([address] has a past surgical history that includes Gastric bypass surgery; Cataract removal with implant (2012); Cataract removal with implant (2012); Coronary artery bypass graft (12-);  section; Hysterectomy;  Cholecystectomy; devices: Call light within reach, Nurse notified, Left in chair, All fall risk precautions in place, Gait belt, Patient at risk for falls     Therapy Time   Individual Concurrent Group Co-treatment   Time In 1104         Time Out 1154         Minutes 50         Timed Code Treatment Minutes: Pierce Wang PTA

## 2020-08-23 NOTE — PROGRESS NOTES
pressure/discomfort, lower extremity edema, palpitations  Gastrointestinal:  negative for abdominal pain, constipation, diarrhea, nausea, vomiting  Neurological:  negative for dizziness, headache    Medications: Allergies:     Allergies   Allergen Reactions    Bactrim [Sulfamethoxazole-Trimethoprim] Hives    Clonidine Derivatives     Amoxicillin-Pot Clavulanate Diarrhea, Nausea Only and Nausea And Vomiting       Current Meds:   Scheduled Meds:    hydrALAZINE  25 mg Oral 3 times per day    magnesium sulfate  1 g Intravenous Q1H    amLODIPine  10 mg Oral Daily    magnesium sulfate  3 g Intravenous Once    insulin lispro  0-18 Units Subcutaneous TID WC    insulin lispro  0-9 Units Subcutaneous Nightly    insulin glargine  15 Units Subcutaneous BID    bumetanide  1 mg Oral Daily    pantoprazole  40 mg Oral QAM AC    aspirin  81 mg Oral Daily    atorvastatin  40 mg Oral Nightly    citalopram  20 mg Oral Daily    clopidogrel  75 mg Oral Daily    collagenase   Topical Daily    oxybutynin  5 mg Oral BID    rivastigmine  1 patch Transdermal Daily    sodium chloride flush  10 mL Intravenous 2 times per day    heparin (porcine)  5,000 Units Subcutaneous 3 times per day     Continuous Infusions:    dextrose       PRN Meds: sodium phosphate IVPB **OR** sodium phosphate IVPB, hydrocerin, sodium chloride flush, acetaminophen **OR** acetaminophen, polyethylene glycol, promethazine **OR** ondansetron, glucose, dextrose, glucagon (rDNA), dextrose    Data:     Past Medical History:   has a past medical history of Anxiety and depression, Background diabetic retinopathy(362.01), Balance problem, CAD (coronary artery disease), Cancer of uterus ([facility] Chronic kidney disease, Chronic low back pain, CVA (cerebral vascular accident) ([facility] Dry eye syndrome, GERD (gastroesophageal reflux disease), Glaucoma suspect, Gout, Homonymous hemianopsia, Hyperlipidemia, Hypertension, Keratitis, Obesity, Peripheral polyneuropathy, Pseudophakos, Stroke ([facility] Trichiasis, and Type 2 diabetes mellitus ([facility] Social History:   reports that she has never smoked. She has never used smokeless tobacco. She reports that she does not drink alcohol or use drugs. Family History:   Family History   Problem Relation Age of Onset    Diabetes Mother     Cancer Mother     High Blood Pressure Mother    [facility] Stroke Mother     Kidney Disease Mother     Uterine Cancer Mother     Diabetes Father     Heart Disease Father     Glaucoma Father     Emphysema Father     Coronary Art Dis Other         All 4 siblings.  Stroke Other         1 sibling.  Lung Cancer Other         1 sibling - cause of death lung cancer.  Mental Retardation Other        Vitals:  BP (!) 156/52   Pulse 77   Temp 98.6 °F (37 °C) (Oral)   Resp 15   Ht 5' 9\" (1.753 m)   Wt 235 lb 7.2 oz (106.8 kg)   SpO2 99%   BMI 34.77 kg/m²   Temp (24hrs), Av.2 °F (36.8 °C), Min:97.7 °F (36.5 °C), Max:98.9 °F (37.2 °C)    Recent Labs     20  1755 20  2128 20  2212 20  1210   POCGLU 179* 173* 178* 92       I/O (24Hr):     Intake/Output Summary (Last 24 hours) at 2020 1417  Last data filed at 2020 0630  Gross per 24 hour   Intake 120 ml   Output 300 ml   Net -180 ml       Labs:  Hematology:  Recent Labs     20  0355 20  0440 20  0352   WBC 8.0 10.0 8.0   RBC 2.72* 2.88* 2.92*   HGB 7.9* 8.2* 8.3*   HCT 26.5* 27.3* 27.9*   MCV 97.4 94.8 95.5   MCH 29.0 28.5 28.4   MCHC 29.8 30.0 29.7   RDW 16.6* 16.1* 15.9*    437 458*   MPV 9.7 9.6 9.4     Chemistry:  Recent Labs     20  0355  20  0440 20  1227 20  0352     --  138  --   --  137   K 4.6   < > 3.9 4.1 3.9 3.3*   CL 99  --  100  --   --  99   CO2 24  --  29  --   --  29   GLUCOSE 189*  --  97  --   --  106*   BUN 37*  --  28*  --   --  20   CREATININE 2.77*  --  2.07*  --   --  1.50*   MG 1.6  --  1.5*  -- --  1.8   ANIONGAP 14  --  9  --   --  9   LABGLOM 17*  --  24*  --   --  35*   GFRAA 21*  --  29*  --   --  42*   CALCIUM 8.2*  --  8.1*  --   --  8.0*   CAION 1.05*  --  1.09*  --   --  1.10*   PHOS 4.4  --  3.3  --   --  2.3*    < > = values in this interval not displayed. Recent Labs     08/21/20  0355  08/22/20  0757 08/22/20  1137 08/22/20  1755 08/22/20  2128 08/22/20  2212 08/23/20  1210   LABA1C 10.6*  --   --   --   --   --   --   --    POCGLU  --    < > 73 122* 179* 173* 178* 92    < > = values in this interval not displayed. ABG:No results found for: POCPH, PHART, PH, POCPCO2, ZXX7YUE, PCO2, POCPO2, PO2ART, PO2, POCHCO3, FZP9JWV, HCO3, NBEA, PBEA, BEART, BE, THGBART, THB, TIJ4FVY, HJII0NFB, M9VGBAUY, O2SAT, FIO2  Lab Results   Component Value Date/Time    SPECIAL NOT REPORTED 08/20/2020 08:18 AM     Lab Results   Component Value Date/Time    CULTURE NO SIGNIFICANT GROWTH 08/20/2020 08:18 AM       Radiology:  [facility] Chest Portable    Result Date: 8/20/2020  Findings of mild interstitial edema with basilar subsegmental atelectasis and small pleural effusions again demonstrated without significant change. No new airspace disease identified. Unchanged nodule in the right lower lung field since at least 2019, presumably a calcified hamartoma or granuloma. Xr Chest Portable    Result Date: 8/19/2020  Cardiomegaly and chronic pulmonary change with bilateral congestion and bibasilar effusions. Stable right midlung nodule.        Physical Examination:        General appearance:  alert, cooperative and no distress  Mental Status:  oriented to person, place and time and normal affect  Lungs:  clear to auscultation bilaterally, normal effort  Heart:  regular rate and rhythm,+ murmur  Abdomen:  soft, nontender, nondistended, normal bowel sounds, no masses, hepatomegaly, splenomegaly  Extremities:  + Dressing of both lower extremity wounds,+ unstageable ulcer right heel  Skin:  + Decub stage II right right

## 2020-08-23 NOTE — PLAN OF CARE
Problem: Skin Integrity:  Goal: Will show no infection signs and symptoms  Description: Will show no infection signs and symptoms  Outcome: Ongoing  Goal: Absence of new skin breakdown  Description: Absence of new skin breakdown  Outcome: Ongoing     Problem: Falls - Risk of:  Goal: Will remain free from falls  Description: Will remain free from falls  Outcome: Ongoing  Goal: Absence of physical injury  Description: Absence of physical injury  Outcome: Ongoing     Problem: Nutrition  Goal: Optimal nutrition therapy  Description: Nutrition Problem #1: (increased protein needs)  Intervention: Food and/or Nutrient Delivery: Continue Current Diet, Start Oral Nutrition Supplement  Nutritional Goals: meet % of estimated nutrition needs     Outcome: Ongoing

## 2020-08-24 VITALS
SYSTOLIC BLOOD PRESSURE: 161 MMHG | OXYGEN SATURATION: 94 % | HEIGHT: 69 IN | DIASTOLIC BLOOD PRESSURE: 74 MMHG | TEMPERATURE: 98.4 F | HEART RATE: 85 BPM | RESPIRATION RATE: 17 BRPM | WEIGHT: 227.29 LBS | BODY MASS INDEX: 33.67 KG/M2

## 2020-08-24 LAB
ABSOLUTE EOS #: 0.22 K/UL (ref 0–0.44)
ABSOLUTE IMMATURE GRANULOCYTE: <0.03 K/UL (ref 0–0.3)
ABSOLUTE LYMPH #: 2.23 K/UL (ref 1.1–3.7)
ABSOLUTE MONO #: 0.62 K/UL (ref 0.1–1.2)
ANION GAP SERPL CALCULATED.3IONS-SCNC: 9 MMOL/L (ref 9–17)
BASOPHILS # BLD: 1 % (ref 0–2)
BASOPHILS ABSOLUTE: 0.06 K/UL (ref 0–0.2)
BUN BLDV-MCNC: 15 MG/DL (ref 8–23)
BUN/CREAT BLD: ABNORMAL (ref 9–20)
CALCIUM IONIZED: 1.06 MMOL/L (ref 1.13–1.33)
CALCIUM SERPL-MCNC: 8.4 MG/DL (ref 8.6–10.4)
CHLORIDE BLD-SCNC: 101 MMOL/L (ref 98–107)
CO2: 29 MMOL/L (ref 20–31)
CREAT SERPL-MCNC: 1.2 MG/DL (ref 0.5–0.9)
DIFFERENTIAL TYPE: ABNORMAL
EOSINOPHILS RELATIVE PERCENT: 3 % (ref 1–4)
GFR AFRICAN AMERICAN: 54 ML/MIN
GFR NON-AFRICAN AMERICAN: 45 ML/MIN
GFR SERPL CREATININE-BSD FRML MDRD: ABNORMAL ML/MIN/{1.73_M2}
GFR SERPL CREATININE-BSD FRML MDRD: ABNORMAL ML/MIN/{1.73_M2}
GLUCOSE BLD-MCNC: 102 MG/DL (ref 65–105)
GLUCOSE BLD-MCNC: 134 MG/DL (ref 65–105)
GLUCOSE BLD-MCNC: 187 MG/DL (ref 65–105)
GLUCOSE BLD-MCNC: 93 MG/DL (ref 65–105)
GLUCOSE BLD-MCNC: 93 MG/DL (ref 70–99)
HCT VFR BLD CALC: 28.6 % (ref 36.3–47.1)
HEMOGLOBIN: 8.7 G/DL (ref 11.9–15.1)
IMMATURE GRANULOCYTES: 0 %
LYMPHOCYTES # BLD: 34 % (ref 24–43)
MAGNESIUM: 1.9 MG/DL (ref 1.6–2.6)
MCH RBC QN AUTO: 29.2 PG (ref 25.2–33.5)
MCHC RBC AUTO-ENTMCNC: 30.4 G/DL (ref 28.4–34.8)
MCV RBC AUTO: 96 FL (ref 82.6–102.9)
MONOCYTES # BLD: 10 % (ref 3–12)
NRBC AUTOMATED: 0 PER 100 WBC
PDW BLD-RTO: 16.1 % (ref 11.8–14.4)
PHOSPHORUS: 2.7 MG/DL (ref 2.6–4.5)
PLATELET # BLD: 447 K/UL (ref 138–453)
PLATELET ESTIMATE: ABNORMAL
PMV BLD AUTO: 9.5 FL (ref 8.1–13.5)
POTASSIUM SERPL-SCNC: 3.7 MMOL/L (ref 3.7–5.3)
POTASSIUM SERPL-SCNC: 3.8 MMOL/L (ref 3.7–5.3)
RBC # BLD: 2.98 M/UL (ref 3.95–5.11)
RBC # BLD: ABNORMAL 10*6/UL
SEG NEUTROPHILS: 52 % (ref 36–65)
SEGMENTED NEUTROPHILS ABSOLUTE COUNT: 3.37 K/UL (ref 1.5–8.1)
SODIUM BLD-SCNC: 139 MMOL/L (ref 135–144)
WBC # BLD: 6.5 K/UL (ref 3.5–11.3)
WBC # BLD: ABNORMAL 10*3/UL

## 2020-08-24 PROCEDURE — 6370000000 HC RX 637 (ALT 250 FOR IP): Performed by: INTERNAL MEDICINE

## 2020-08-24 PROCEDURE — 6370000000 HC RX 637 (ALT 250 FOR IP): Performed by: STUDENT IN AN ORGANIZED HEALTH CARE EDUCATION/TRAINING PROGRAM

## 2020-08-24 PROCEDURE — 6370000000 HC RX 637 (ALT 250 FOR IP): Performed by: NURSE PRACTITIONER

## 2020-08-24 PROCEDURE — 6360000002 HC RX W HCPCS: Performed by: STUDENT IN AN ORGANIZED HEALTH CARE EDUCATION/TRAINING PROGRAM

## 2020-08-24 PROCEDURE — 82947 ASSAY GLUCOSE BLOOD QUANT: CPT

## 2020-08-24 PROCEDURE — 2580000003 HC RX 258: Performed by: STUDENT IN AN ORGANIZED HEALTH CARE EDUCATION/TRAINING PROGRAM

## 2020-08-24 PROCEDURE — 84132 ASSAY OF SERUM POTASSIUM: CPT

## 2020-08-24 PROCEDURE — 99232 SBSQ HOSP IP/OBS MODERATE 35: CPT | Performed by: INTERNAL MEDICINE

## 2020-08-24 PROCEDURE — 84100 ASSAY OF PHOSPHORUS: CPT

## 2020-08-24 PROCEDURE — 99239 HOSP IP/OBS DSCHRG MGMT >30: CPT | Performed by: INTERNAL MEDICINE

## 2020-08-24 PROCEDURE — 80048 BASIC METABOLIC PNL TOTAL CA: CPT

## 2020-08-24 PROCEDURE — 83735 ASSAY OF MAGNESIUM: CPT

## 2020-08-24 PROCEDURE — 85025 COMPLETE CBC W/AUTO DIFF WBC: CPT

## 2020-08-24 PROCEDURE — 97110 THERAPEUTIC EXERCISES: CPT

## 2020-08-24 PROCEDURE — 36415 COLL VENOUS BLD VENIPUNCTURE: CPT

## 2020-08-24 PROCEDURE — 82330 ASSAY OF CALCIUM: CPT

## 2020-08-24 PROCEDURE — 97116 GAIT TRAINING THERAPY: CPT

## 2020-08-24 PROCEDURE — 97535 SELF CARE MNGMENT TRAINING: CPT

## 2020-08-24 RX ORDER — HYDRALAZINE HYDROCHLORIDE 50 MG/1
50 TABLET, FILM COATED ORAL EVERY 8 HOURS SCHEDULED
Qty: 90 TABLET | Refills: 3 | Status: SHIPPED | OUTPATIENT
Start: 2020-08-24 | End: 2020-09-15

## 2020-08-24 RX ORDER — LOSARTAN POTASSIUM 25 MG/1
25 TABLET ORAL DAILY
Qty: 30 TABLET | Refills: 3 | Status: SHIPPED | OUTPATIENT
Start: 2020-08-24 | End: 2020-09-15

## 2020-08-24 RX ORDER — HYDRALAZINE HYDROCHLORIDE 50 MG/1
50 TABLET, FILM COATED ORAL EVERY 8 HOURS SCHEDULED
Status: DISCONTINUED | OUTPATIENT
Start: 2020-08-24 | End: 2020-08-24 | Stop reason: HOSPADM

## 2020-08-24 RX ORDER — LOSARTAN POTASSIUM 25 MG/1
25 TABLET ORAL DAILY
Status: DISCONTINUED | OUTPATIENT
Start: 2020-08-24 | End: 2020-08-24 | Stop reason: HOSPADM

## 2020-08-24 RX ORDER — BUMETANIDE 1 MG/1
1 TABLET ORAL DAILY
Qty: 30 TABLET | Refills: 3 | Status: SHIPPED | OUTPATIENT
Start: 2020-08-25 | End: 2020-10-23 | Stop reason: SDUPTHER

## 2020-08-24 RX ORDER — AMLODIPINE BESYLATE 10 MG/1
10 TABLET ORAL DAILY
Qty: 30 TABLET | Refills: 3 | Status: SHIPPED | OUTPATIENT
Start: 2020-08-25 | End: 2020-10-23 | Stop reason: SDUPTHER

## 2020-08-24 RX ADMIN — INSULIN GLARGINE 15 UNITS: 100 INJECTION, SOLUTION SUBCUTANEOUS at 08:51

## 2020-08-24 RX ADMIN — SODIUM CHLORIDE, PRESERVATIVE FREE 10 ML: 5 INJECTION INTRAVENOUS at 08:36

## 2020-08-24 RX ADMIN — CITALOPRAM 20 MG: 20 TABLET, FILM COATED ORAL at 08:35

## 2020-08-24 RX ADMIN — CLOPIDOGREL 75 MG: 75 TABLET, FILM COATED ORAL at 08:35

## 2020-08-24 RX ADMIN — BUMETANIDE 1 MG: 1 TABLET ORAL at 08:35

## 2020-08-24 RX ADMIN — LOSARTAN POTASSIUM 25 MG: 25 TABLET, FILM COATED ORAL at 17:05

## 2020-08-24 RX ADMIN — HYDRALAZINE HYDROCHLORIDE 50 MG: 50 TABLET, FILM COATED ORAL at 17:05

## 2020-08-24 RX ADMIN — AMLODIPINE BESYLATE 10 MG: 10 TABLET ORAL at 08:35

## 2020-08-24 RX ADMIN — INSULIN LISPRO 3 UNITS: 100 INJECTION, SOLUTION INTRAVENOUS; SUBCUTANEOUS at 17:06

## 2020-08-24 RX ADMIN — ASPIRIN 81 MG: 81 TABLET, CHEWABLE ORAL at 08:35

## 2020-08-24 RX ADMIN — PANTOPRAZOLE SODIUM 40 MG: 40 TABLET, DELAYED RELEASE ORAL at 06:28

## 2020-08-24 RX ADMIN — HYDRALAZINE HYDROCHLORIDE 25 MG: 25 TABLET, FILM COATED ORAL at 06:28

## 2020-08-24 RX ADMIN — OXYBUTYNIN CHLORIDE 5 MG: 5 TABLET ORAL at 08:35

## 2020-08-24 RX ADMIN — COLLAGENASE SANTYL: 250 OINTMENT TOPICAL at 09:00

## 2020-08-24 RX ADMIN — HEPARIN SODIUM 5000 UNITS: 5000 INJECTION INTRAVENOUS; SUBCUTANEOUS at 06:28

## 2020-08-24 ASSESSMENT — PAIN SCALES - GENERAL: PAINLEVEL_OUTOF10: 0

## 2020-08-24 NOTE — PROGRESS NOTES
Physical Therapy  Facility/Department: [facility] CAR 2  Daily Treatment Note  NAME: Flex Ponce  : 1950  MRN: [id number]    Date of Service: 2020    Discharge Recommendations:  Further therapy recommended at discharge. PT Equipment Recommendations  Other: RW, pt may have access to one    Assessment     Body structures, Functions, Activity limitations: Decreased functional mobility ; Decreased balance;Decreased endurance;Decreased strength  Assessment: CGA for sit<>stand with RW. Pt ' x 1, with a RW and CGA. Pt performed BLE Ex's, in her chair. Pt would benefit from continued PT following discharge. Prognosis: Good  PT Education: Goals;PT Role;Plan of Care;General Safety;Gait Training;Home Exercise Program;Injury Prevention;Pressure Relief; Functional Mobility Training;Transfer Training; Adaptive Device Training  Patient Education: safe use of RW  REQUIRES PT FOLLOW UP: Yes  Activity Tolerance  Activity Tolerance: Patient limited by fatigue;Patient limited by endurance            Patient Diagnosis(es): There were no encounter diagnoses. has a past medical history of Anxiety and depression, Background diabetic retinopathy(362.01), Balance problem, CAD (coronary artery disease), Cancer of uterus ([address] Chronic kidney disease, Chronic low back pain, CVA (cerebral vascular accident) ([address] Dementia ([address] Dry eye syndrome, GERD (gastroesophageal reflux disease), Glaucoma suspect, Gout, Homonymous hemianopsia, Hyperlipidemia, Hypertension, Keratitis, Obesity, Peripheral polyneuropathy, Pseudophakos, Stroke ([address] Trichiasis, and Type 2 diabetes mellitus ([address] has a past surgical history that includes Gastric bypass surgery; Cataract removal with implant (2012); Cataract removal with implant (2012); Coronary artery bypass graft (12-);  section; Hysterectomy; Cholecystectomy;  Tonsillectomy and adenoidectomy; and Eye surgery

## 2020-08-24 NOTE — PROGRESS NOTES
Comprehensive Nutrition Assessment    Type and Reason for Visit:  Reassess    Nutrition Recommendations/Plan: Will increase carbs to 4 per meal and continue Low kelly, high protein supplements on trays. Nutrition Assessment:  Pt is consuming more than 75% of food provided. Glu  She states she likes the supplements and requests Chocolate flavor. Malnutrition Assessment:  Malnutrition Status:  Insufficient data    Context:  Chronic Illness       Estimated Daily Nutrient Needs:  Energy (kcal):  2100 kcal/day; Weight Used for Energy Requirements:  Current     Protein (g):  100-130 g pro/day; Weight Used for Protein Requirements:  Ideal(1.5-2)          Nutrition Related Findings:  Obese      Wounds:  Pressure Ulcer, Venous Stasis(right hip, right heel, right and left buttocks, bilateral LE (venous))       Current Nutrition Therapies:    Dietary Nutrition Supplements: Low Calorie High Protein Supplement  DIET CARDIAC; Carb Control: 4 carb choices (60 gms)/meal; Low Sodium (2 GM); Daily Fluid Restriction: 1500 ml    Anthropometric Measures:  · Height: 5' 9\" (175.3 cm)  · Current Body Weight: 227 lb (103 kg)   · Ideal Body Weight: 145 lbs;   · BMI: 33.5  · BMI Categories: Obese Class 2 (BMI 35.0 -39.9)       Nutrition Diagnosis:   · (increased protein needs) related to (healing) as evidenced by (multiple wounds)    Nutrition Interventions:   Food and/or Nutrient Delivery:  Modify Current Diet, Continue Oral Nutrition Supplement  Nutrition Education/Counseling:  No recommendation at this time   Coordination of Nutrition Care:  Continued Inpatient Monitoring    Goals:  meet % of estimated nutrition needs       Nutrition Monitoring and Evaluation:   Food/Nutrient Intake Outcomes:  Food and Nutrient Intake, Supplement Intake  Physical Signs/Symptoms Outcomes:  Biochemical Data, Skin, GI Status     Discharge Planning:     Too soon to determine     Electronically signed by Mita Mcmahan RD, LD on 8/24/20 at 1:41 PM EDT    Contact: [phone]

## 2020-08-24 NOTE — DISCHARGE SUMMARY
T[facility]    Discharge Summary     Patient ID: Mahendra Carvajal  :  1950   MRN: [id number]     ACCOUNT:  [de-identified]   Patient's PCP: ERINN Mcmahon  Admit Date: 2020   Discharge Date: 2020    Length of Stay: 5  Code Status:  Full Code  Admitting Physician: Parag Gomez MD  Discharge Physician: Parag Gomez MD     Active Discharge Diagnoses:     Hospital Problem Lists:  Principal Problem:    Bradycardia  Active Problems:    Hypertension    Morbidly obese ([facility]    Acute kidney injury superimposed on CKD ([facility]    CAD (coronary artery disease)    Type 2 diabetes mellitus with hyperglycemia, with long-term current use of insulin (HCC)    CHF (congestive heart failure), NYHA class I, acute on chronic, combined ([facility]    Lymphedema of both lower extremities    Pressure injury of right heel, unstageable ([facility])    Hyperkalemia    AV block, 1st degree    Wounds, multiple    Buttock wound    Decubitus ulcer of trochanteric region of right hip, stage 2 ([facility]  Resolved Problems:    * No resolved hospital problems.  *      Admission Condition:  poor     Discharged Condition: stable    Hospital Stay:     Hospital Course:    Jodie Workman a 77 y.o. Female has been transferred out of ICU with following information;     Patient was admitted to the medical ICU after being transferred from [place] where she was found to have bradycardia patient is a resident of [address] [place] [address] presented the emergency department there with acute renal failure and hyperkalemia as well as said bradycardia she was given insulin and calcium gluconate for hyperkalemia and transferred to [facility] for further [address] [place] [address] outside emergency department patient was found to be infested with maggots the lower limbs  Patient was seen and evaluated cardiology service.  She was diuresed with Lasix. Patient's Coreg and clonidine have been stopped as well as her ACE inhibitor.  Patient will be evaluated by the electrophysiology service for evaluation of PPM  Chronic right heel unstagable pressure injury, left ischium, right trochanter and right hip pressure injuries; bilateral lower extremity venous stasis with ulcers to the left lower leg. Seen and evaluated wound ostomy nurse     Today's creatinine 2.77  Magnesium 1.6  Glucose 189-234  Blood pressure 181/69, heart rate 66/min    8/24/2020  States she is feeling better  Current heart rate 77, blood pressure 168/72  Creatinine has improved to 1.50, potassium 3.3, glucose   Phosphorus 2.7  Plan:         1. Continue Norvasc to 10 mg daily, hydralazine , Cozaar 25 mg daily has been restarted by nephrology  2. Coreg and clonidine were stopped due to bradycardia  3. Continue diabetes control as already planned  4. Wound care as before  5.  Got precertified for going to [facility]      Significant therapeutic interventions: See above notes    Significant Diagnostic Studies:   Labs / Micro:  CBC:   Lab Results   Component Value Date    WBC 6.5 08/24/2020    RBC 2.98 08/24/2020    HGB 8.7 08/24/2020    HCT 28.6 08/24/2020    MCV 96.0 08/24/2020    MCH 29.2 08/24/2020    MCHC 30.4 08/24/2020    RDW 16.1 08/24/2020     08/24/2020     BMP:    Lab Results   Component Value Date    GLUCOSE 93 08/24/2020     08/24/2020    K 3.8 08/24/2020     08/24/2020    CO2 29 08/24/2020    ANIONGAP 9 08/24/2020    BUN 15 08/24/2020    CREATININE 1.20 08/24/2020    BUNCRER NOT REPORTED 08/24/2020    CALCIUM 8.4 08/24/2020    LABGLOM 45 08/24/2020    GFRAA 54 08/24/2020    GFR      08/24/2020    GFR NOT REPORTED 08/24/2020     HFP:    Lab Results   Component Value Date    PROT 5.8 08/20/2020     CMP:    Lab Results   Component Value Date    GLUCOSE 93 08/24/2020     08/24/2020    K 3.8 08/24/2020     08/24/2020    CO2 29 08/24/2020    BUN 15 08/24/2020    CREATININE 1.20 08/24/2020    ANIONGAP 9 08/24/2020    ALKPHOS Follow Up:     ERINN Tucker  [address], [facility] [address] [place]  [phone]    On 9/15/2020  9:30 with DR. Garret Winchester 104        Requiring Further Evaluation/Follow Up POST HOSPITALIZATION/Incidental Findings: Follow-up with cardiology and nephrology as scheduled    Diet: cardiac diet and diabetic diet    Activity: As tolerated    Instructions to Patient: Take blood pressure medicines as directed    Discharge Medications:      Medication List      START taking these medications    amLODIPine 10 MG tablet  Commonly known as:  NORVASC  Take 1 tablet by mouth daily  Start taking on:  August 25, 2020     bumetanide 1 MG tablet  Commonly known as:  BUMEX  Take 1 tablet by mouth daily  Start taking on:  August 25, 2020     hydrALAZINE 50 MG tablet  Commonly known as:  APRESOLINE  Take 1 tablet by mouth every 8 hours        CHANGE how you take these medications    losartan 25 MG tablet  Commonly known as:  COZAAR  Take 1 tablet by mouth daily  What changed:    · medication strength  · how much to take        CONTINUE taking these medications    allopurinol 300 MG tablet  Commonly known as:  ZYLOPRIM  Take 1 tablet by mouth daily     aspirin 81 MG tablet  Take 1 tablet by mouth daily     atorvastatin 40 MG tablet  Commonly known as:  LIPITOR  TAKE 1 TABLET BY MOUTH EVERYDAY     citalopram 20 MG tablet  Commonly known as:  CELEXA  Take 1 tablet by mouth daily     clopidogrel 75 MG tablet  Commonly known as:  Plavix  Take 1 tablet by mouth daily     collagenase 250 UNIT/GM ointment  Apply topically daily.      Compression Stockings Misc  by Does not apply route 20-30 mm Hg bilateral knee high     Diabetic Shoe Misc  by Does not apply route     docusate sodium 100 MG capsule  Commonly known as:  Colace  Take 1 capsule by mouth 2 times daily     insulin glargine 100 UNIT/ML injection vial  Commonly known as:  Lantus  Inject 20 Units into the skin nightly     loratadine 10 MG tablet  Commonly known as:  CLARITIN  TAKE 1 TABLET(10 MG) BY MOUTH DAILY     memantine 10 MG tablet  Commonly known as:  NAMENDA  TAKE 1 TABLET BY MOUTH TWICE DAILY     nystatin 825418 UNIT/GM cream  Commonly known as:  MYCOSTATIN  Apply topically 2 times daily. omeprazole 20 MG delayed release capsule  Commonly known as:  PRILOSEC  Take 1 capsule by mouth every morning (before breakfast)     oxybutynin 5 MG tablet  Commonly known as:  DITROPAN  TAKE 1 TABLET BY MOUTH TWICE DAILY     rivastigmine 9.5 MG/24HR  Commonly known as:  Exelon  APPLY 1 NEW PATCH EVERY 24 HOURS     Sodium Hypochlorite 0.125 % Soln  Commonly known as:  DAKINS  Irrigate with 473 mLs as directed daily        STOP taking these medications    blood glucose test strips     carvedilol 12.5 MG tablet  Commonly known as:  COREG     cloNIDine 0.1 MG tablet  Commonly known as:  CATAPRES     gabapentin 100 MG capsule  Commonly known as:  NEURONTIN           Where to Get Your Medications      These medications were sent to [place] [address]    Phone:  [phone]   · amLODIPine 10 MG tablet  · bumetanide 1 MG tablet  · hydrALAZINE 50 MG tablet  · losartan 25 MG tablet         No discharge procedures on file. Time Spent on discharge is  35 mins in patient examination, evaluation, counseling as well as medication reconciliation, prescriptions for required medications, discharge plan and follow up. Electronically signed by   Loly William MD  8/24/2020  5:18 PM      Thank you ERINN Flores for the opportunity to be involved in this patient's care.

## 2020-08-24 NOTE — PROGRESS NOTES
input(s): CHOL, HDL in the last 72 hours. Invalid input(s): LDLCALCU  INR: No results for input(s): INR in the last 72 hours. EKG: Sinus rhythm, 60 bpm;  msec; old anterior infarction and non-specific ST and T wave abnormality     2D ECHO ( 8/11/20)  Global left ventricular systolic function is severely reduced. Estimated  ejection fraction is 30-35% . There is severe Hypokinesis of anterior, anteroseptal, inferoseptal and  anterolateral walls. Logan is severely hypokinetic. Grade II (moderate) left ventricular diastolic dysfunction. Right ventricular dilatation with reduced systolic function. Left atrium is mildly dilated. Moderate to severe tricuspid regurgitation. Moderate pulmonary hypertension. Estimated right ventricular systolic pressure is 23UCME. No significant pericardial effusion is seen.     CARDIAC CATHETERIZATION ( 8/13/20)  Multi-vessel Coronary Artery Disease.   3 out of 4 grafts are patent.   Patent LIMA-LAD, SVG-Diagonal and SVG-OM.   Occluded SVG to RCA with left to right collaterals.   Borderline LV systolic function. Objective:   Vitals: BP (!) 168/72   Pulse 77   Temp 98.6 °F (37 °C) (Oral)   Resp 20   Ht 5' 9\" (1.753 m)   Wt 227 lb 4.7 oz (103.1 kg)   SpO2 100%   BMI 33.57 kg/m²   General appearance: alert and cooperative with exam  HEENT: Head: Normocephalic, no lesions, without obvious abnormality. Neck: no JVD, trachea midline, no adenopathy  Lungs: Clear to auscultation  Heart: Regular rate and rhythm, s1/s2 auscultated, no murmurs  Abdomen: soft, non-tender, bowel sounds active  Extremities: no edema  Neurologic: not done        Assessment / Acute Cardiac Problems:   1. Sinus bradycardia and pauses - resolved  2. Stable CAD, h/o CABG 2007 with 3/4 bypass grafts patent on catheterization 8/13/20  3. Cardiomyopathy, LVEF 30-35%  4. Essential HTN - uncontrolled  5. Type II DM   6. AMY with underlying CKD, improving.     Patient Active Problem List:     Dementia ([facility]     Hypertension     Hyperlipidemia     Morbidly obese ([facility])     Gout     Peripheral polyneuropathy     Anxiety and depression     Acute kidney injury superimposed on CKD ([facility]     CAD (coronary artery disease)     Balance problem     CVA (cerebral vascular accident) ([facility]     Type 2 diabetes mellitus with hyperglycemia, with long-term current use of insulin ([facility])     CHF (congestive heart failure), NYHA class I, acute on chronic, combined ([facility]     Acute cystitis     Infestation by bed bug     Infestation by maggots     Fall     Elevated troponin     Lymphedema of both lower extremities     PAD (peripheral artery disease) ([facility])     Pressure injury of right heel, unstageable ([facility]     Acute kidney injury ([facility]     Bradycardia     Hyperkalemia     AV block, 1st degree     Wounds, multiple     Buttock wound     Decubitus ulcer of trochanteric region of right hip, stage 2 ([facility]      Plan of Treatment:   1. HTN. Remains elevated. Continue CCB and hydralazine. Will increase to 50mg TID   2. Bradycardia with pauses. Resolved. Evaluated by EP and no indication for PPM at this time. 3. Keep K > 4 and Mag > 2   4. Cardiomyopathy. Stable. NO BB due to bradycardia, no ace due to CKD. Diuretics per Nephrology recs. 5. CAD, stable. - Continue ASA, statin, CCB. SL nitro PRN   6.  Will plan to follow up as outpt in 2 weeks     Electronically signed by SHIRA Weathers CNP on 8/24/2020 at 10:41 AM  [address]  [phone]

## 2020-08-24 NOTE — PROGRESS NOTES
Renal Progress Note    Patient :  Flex Ponce; 77 y.o. MRN# [id number]  Location:  2007/2007-01  Attending:  Rose Porter MD  Admit Date:  8/19/2020   Hospital Day: 5      Subjective:     Oral intake good. Urine output good. Lynn catheter has been removed. Patient is incontinent however wet briefs noted. Weights are stable. No shortness of breath orthopnea. Possible discharge today. Blood pressures running slightly high. Heart rates are now more stable. It is assumed that her bradycardia was related to hyperkalemia, aggravating sinus node dysfunction. No cardiac intervention performed this admission. Patient is good spirits overall. Creatinine down to 1.2 which is close to her baseline. ARB is on hold. Patient does have proteinuria about 2.8-3.0 g also has cardiomyopathy with an ejection fraction of 30%. Since Cozaar was discontinued patient is requiring potassium supplements. She was 100 mg of Cozaar prior to admission. Outpatient Medications:     Medications Prior to Admission: carvedilol (COREG) 12.5 MG tablet, Take 1 tablet by mouth 2 times daily (with meals)  cloNIDine (CATAPRES) 0.1 MG tablet, Take 1 tablet by mouth 3 times daily  collagenase 250 UNIT/GM ointment, Apply topically daily. gabapentin (NEURONTIN) 100 MG capsule, Take 1 capsule by mouth 3 times daily for 15 days.   insulin glargine (LANTUS) 100 UNIT/ML injection vial, Inject 20 Units into the skin nightly  Sodium Hypochlorite (DAKINS) 0.125 % SOLN, Irrigate with 473 mLs as directed daily  clopidogrel (PLAVIX) 75 MG tablet, Take 1 tablet by mouth daily  atorvastatin (LIPITOR) 40 MG tablet, TAKE 1 TABLET BY MOUTH EVERYDAY  docusate sodium (COLACE) 100 MG capsule, Take 1 capsule by mouth 2 times daily  citalopram (CELEXA) 20 MG tablet, Take 1 tablet by mouth daily  memantine (NAMENDA) 10 MG tablet, TAKE 1 TABLET BY MOUTH TWICE DAILY  oxybutynin (DITROPAN) 5 MG tablet, TAKE 1 TABLET BY MOUTH TWICE DAILY  loratadine (CLARITIN) Vitals for the past 96 hrs (Last 3 readings):   Weight   20 0600 227 lb 4.7 oz (103.1 kg)   20 0715 235 lb 7.2 oz (106.8 kg)       Vital Signs:   Temperature:  Temp: 98.6 °F (37 °C)  TMax:   Temp (24hrs), Av.7 °F (37.1 °C), Min:98.4 °F (36.9 °C), Max:99 °F (37.2 °C)    Respirations:  Resp: 20  Pulse:   Pulse: 77  BP:    BP: (!) 168/72  BP Range: Systolic (23LOV), GXE:896 , Min:148 , ZLO:906       Diastolic (18MDV), UDH:08, Min:52, Max:72      Physical Examination:     General:  AAO x 3, speaking in full sentences, no accessory muscle use. HEENT: Atraumatic, normocephalic, no throat congestion, moist mucosa. Eyes:   Pupils equal, round and reactive to light, EOMI. Neck:   No JVD, no thyromegaly, no lymphadenopathy. Chest:  Bilateral vesicular breath sounds, occasional basal crackles cardiac:  S1 S2 RR, no murmurs, gallops or rubs, JVP not raised. Abdomen: Soft, non-tender, no masses or organomegaly, BS audible. :   No suprapubic or flank tenderness. Neuro:  AAO x 3, No FND. SKIN:  No rashes, good skin turgor. Extremities:  1+ edema, palpable peripheral pulses, no calf tenderness. Labs:       Recent Labs     20  0440 20  0352 20  0547   WBC 10.0 8.0 6.5   RBC 2.88* 2.92* 2.98*   HGB 8.2* 8.3* 8.7*   HCT 27.3* 27.9* 28.6*   MCV 94.8 95.5 96.0   MCH 28.5 28.4 29.2   MCHC 30.0 29.7 30.4   RDW 16.1* 15.9* 16.1*    458* 447   MPV 9.6 9.4 9.5      BMP:   Recent Labs     20  0440  20  03520  1419 20  0547     --  137  --   --  139   K 3.9   < > 3.3* 3.8 4.1 3.7     --  99  --   --  101   CO2 29  --  29  --   --  29   BUN 28*  --  20  --   --  15   CREATININE 2.07*  --  1.50*  --   --  1.20*   GLUCOSE 97  --  106*  --   --  93   CALCIUM 8.1*  --  8.0*  --   --  8.4*    < > = values in this interval not displayed.       Phosphorus:     Recent Labs     20  0440 20  0547   PHOS 3.3 2.3* 2.7

## 2020-08-24 NOTE — CARE COORDINATION
Spoke with Mojgan Hernández at the [address] [facility], precert is anticipated today    3067 Precert obtained, BERTHA, discharge order, med reconcilliation all completed, (BERTHA, MAR, face sheet, HENs) all completed and included in pack for discharge. RN to call report.  SNF notified of transport time along with patient and family

## 2020-08-24 NOTE — PROGRESS NOTES
Occupational Therapy  Facility/Department: [facility] CAR 2  Daily Treatment Note  NAME: Chantal Llamas  : 1950  MRN: [id number]    Date of Service: 2020    Discharge Recommendations:  Patient would benefit from continued therapy after discharge   Ed on OT services, transfer safety, walker safety/technique, ADLs, bed mob, fall prevention tips, DME/AE- good return       Assessment   Performance deficits / Impairments: Decreased functional mobility ; Decreased ADL status; Decreased strength;Decreased endurance;Decreased balance;Decreased high-level IADLs  Prognosis: Good  REQUIRES OT FOLLOW UP: Yes  Activity Tolerance  Activity Tolerance: Patient Tolerated treatment well  Safety Devices  Safety Devices in place: Yes  Type of devices: All fall risk precautions in place;Call light within reach; Left in chair;Nurse notified         Patient Diagnosis(es): There were no encounter diagnoses. has a past medical history of Anxiety and depression, Background diabetic retinopathy(362.01), Balance problem, CAD (coronary artery disease), Cancer of uterus ([address] Chronic kidney disease, Chronic low back pain, CVA (cerebral vascular accident) ([address] Dementia ([address] Dry eye syndrome, GERD (gastroesophageal reflux disease), Glaucoma suspect, Gout, Homonymous hemianopsia, Hyperlipidemia, Hypertension, Keratitis, Obesity, Peripheral polyneuropathy, Pseudophakos, Stroke ([address] Trichiasis, and Type 2 diabetes mellitus ([address] has a past surgical history that includes Gastric bypass surgery; Cataract removal with implant (2012); Cataract removal with implant (2012); Coronary artery bypass graft (12-);  section; Hysterectomy; Cholecystectomy; Tonsillectomy and adenoidectomy; and Eye surgery (Left).     Restrictions  Restrictions/Precautions  Restrictions/Precautions: Fall Risk  Required Braces or Orthoses?: No  Position Activity Restriction  Other position/activity restrictions: up w/ A; BLE in ace wraps d/t wounds  Subjective   General  Patient assessed for rehabilitation services?: Yes  Family / Caregiver Present: No  \  Vital Signs  Patient Currently in Pain: No   Orientation  Orientation  Overall Orientation Status: Within Normal Limits  Objective    Pt in bed upon arrival sleeping. Pt motivated to go to the bathroom and complete ADLs. Pt receptive to ed and retired to recliner at end of session to begin self feeding task. ADL  Feeding: Setup;Minimal assistance; Increased time to complete(seated in recliner)  Grooming: Setup;Supervision; Increased time to complete  UE Bathing: Setup;Minimal assistance  LE Bathing: Setup;Minimal assistance  UE Dressing: Setup;Minimal assistance  LE Dressing: Setup;Maximum assistance  Toileting: Setup; Moderate assistance;Maximum assistance; Increased time to complete  Additional Comments: Pt completed ADLs seated in recliner. Balance  Sitting Balance: Independent(seated on EOB and recliner, SBA seated on toilet)  Standing Balance  Time: ~ 8-10 min  Activity: brief mgt, backside/darron care mgt, stood at sink to wash hands(bed < bathroom<sink<recliner w/ SW)  Bed mobility  Rolling to Right: Stand by assistance  Supine to Sit: Minimal assistance  Scooting: Minimal assistance  Comment: HOB elevated and pt used bed rails   Transfers  Stand Step Transfers: Contact guard assistance(w/ SW)  Sit to stand: Minimal assistance; Moderate assistance  Stand to sit: Minimal assistance  Attendance  Participation: Active participation         Plan   Plan  Times per week: 3-5 x/wk  Current Treatment Recommendations: Balance Training, Functional Mobility Training, Endurance Training, Cognitive Reorientation, Safety Education & Training, Patient/Caregiver Education & Training, Equipment Evaluation, Education, & procurement, Self-Care / ADL    Goals  Short term goals  Time Frame for Short term goals: By discharge, pt will:  Short term goal 1: Demo CGA for functional transfers and functional mobility with use of LRD  Short term goal 2: Demo SBA with setup for UB ADLs and grooming tasks  Short term goal 3: Demo Min A with use of AE PRN and setup for LB ADLs and toileting tasks  Short term goal 4: Demo 10+ minutes dynamic standing task to increase safety and independence with ADL performance  Short term goal 5: Demo I with V/D 2+ EC/WS techniques to incorporate throughout daily routine       Therapy Time   Individual Concurrent Group Co-treatment   Time In  8:17         Time Out  9:15         Minutes  58              Time code min: [address] [facility], JAMES/L

## 2020-08-24 NOTE — PROGRESS NOTES
[address] [address]    Progress Note    8/24/2020    9:29 AM    Name:   Terrell Ricci  MRN:     [id number]     [place]:      [de-identified]   Room:   2007/2007-01  IP Day:  5  Admit Date:  8/19/2020  5:08 PM    PCP:   ERINN Stokes  Code Status:  Full Code    Subjective:     C/C: Bradycardia    Interval History Status:   States she is feeling better  Current heart rate 77, blood pressure 168/72  Creatinine has improved to 1.50, potassium 3.3, glucose   Phosphorus 2.7  Brief History:   Terrell Ricci is a 77 y.o.  Female has been transferred out of ICU with following information;     Patient was admitted to the medical ICU after being transferred from [place] where she was found to have bradycardia patient is a resident of [address] [place] [address] presented the emergency department there with acute renal failure and hyperkalemia as well as said bradycardia she was given insulin and calcium gluconate for hyperkalemia and transferred to [facility] for further [facility] outside emergency department patient was found to be infested with maggots the lower limbs  Patient was seen and evaluated cardiology service.  She was diuresed with Lasix. Patient's Coreg and clonidine have been stopped as well as her ACE inhibitor.  Patient will be evaluated by the electrophysiology service for evaluation of PPM  Chronic right heel unstagable pressure injury, left ischium, right trochanter and right hip pressure injuries; bilateral lower extremity venous stasis with ulcers to the left lower leg. Seen and evaluated wound ostomy nurse     Today's creatinine 2.77  Magnesium 1.6  Glucose 189-234  Blood pressure 181/69, heart rate 66/min      Review of Systems:     Constitutional:  negative for chills, fevers, sweats,+ fatigue  Respiratory:  negative for cough, dyspnea on exertion, shortness of breath, wheezing  Cardiovascular:  negative for chest pain, chest pressure/discomfort, lower extremity edema, palpitations  Gastrointestinal:  negative for abdominal pain, constipation, diarrhea, nausea, vomiting  Neurological:  negative for dizziness, headache    Medications: Allergies:     Allergies   Allergen Reactions    Bactrim [Sulfamethoxazole-Trimethoprim] Hives    Clonidine Derivatives     Amoxicillin-Pot Clavulanate Diarrhea, Nausea Only and Nausea And Vomiting       Current Meds:   Scheduled Meds:    hydrALAZINE  25 mg Oral 3 times per day    amLODIPine  10 mg Oral Daily    magnesium sulfate  3 g Intravenous Once    insulin lispro  0-18 Units Subcutaneous TID WC    insulin lispro  0-9 Units Subcutaneous Nightly    insulin glargine  15 Units Subcutaneous BID    bumetanide  1 mg Oral Daily    pantoprazole  40 mg Oral QAM AC    aspirin  81 mg Oral Daily    atorvastatin  40 mg Oral Nightly    citalopram  20 mg Oral Daily    clopidogrel  75 mg Oral Daily    collagenase   Topical Daily    oxybutynin  5 mg Oral BID    rivastigmine  1 patch Transdermal Daily    sodium chloride flush  10 mL Intravenous 2 times per day    heparin (porcine)  5,000 Units Subcutaneous 3 times per day     Continuous Infusions:    dextrose       PRN Meds: sodium phosphate IVPB **OR** sodium phosphate IVPB, hydrocerin, sodium chloride flush, acetaminophen **OR** acetaminophen, polyethylene glycol, promethazine **OR** ondansetron, glucose, dextrose, glucagon (rDNA), dextrose    Data:     Past Medical History:   has a past medical history of Anxiety and depression, Background diabetic retinopathy(362.01), Balance problem, CAD (coronary artery disease), Cancer of uterus ([facility] Chronic kidney disease, Chronic low back pain, CVA (cerebral vascular accident) ([facility] Dementia [facility] Dry eye syndrome, GERD (gastroesophageal reflux disease), Glaucoma suspect, Gout, Homonymous hemianopsia, Hyperlipidemia, Hypertension, Keratitis, Obesity, Peripheral polyneuropathy, Pseudophakos, Stroke ([facility] Trichiasis, and Type 2 diabetes mellitus ([facility] Social History:   reports that she has never smoked. She has never used smokeless tobacco. She reports that she does not drink alcohol or use drugs. Family History:   Family History   Problem Relation Age of Onset    Diabetes Mother     Cancer Mother     High Blood Pressure Mother    Adonay Filter Stroke Mother     Kidney Disease Mother     Uterine Cancer Mother     Diabetes Father     Heart Disease Father     Glaucoma Father     Emphysema Father     Coronary Art Dis Other         All 4 siblings.  Stroke Other         1 sibling.  Lung Cancer Other         1 sibling - cause of death lung cancer.  Mental Retardation Other        Vitals:  BP (!) 168/72   Pulse 77   Temp 98.6 °F (37 °C) (Oral)   Resp 20   Ht 5' 9\" (1.753 m)   Wt 227 lb 4.7 oz (103.1 kg)   SpO2 100%   BMI 33.57 kg/m²   Temp (24hrs), Av.7 °F (37.1 °C), Min:98.4 °F (36.9 °C), Max:99 °F (37.2 °C)    Recent Labs     20  1210 20  1248 20  0828   POCGLU 92 102 120* 93       I/O (24Hr):     Intake/Output Summary (Last 24 hours) at 2020 0929  Last data filed at 2020 1930  Gross per 24 hour   Intake 700 ml   Output --   Net 700 ml       Labs:  Hematology:  Recent Labs     20  0440 20  0352 20  0547   WBC 10.0 8.0 6.5   RBC 2.88* 2.92* 2.98*   HGB 8.2* 8.3* 8.7*   HCT 27.3* 27.9* 28.6*   MCV 94.8 95.5 96.0   MCH 28.5 28.4 29.2   MCHC 30.0 29.7 30.4   RDW 16.1* 15.9* 16.1*    458* 447   MPV 9.6 9.4 9.5     Chemistry:  Recent Labs     20  0440  20  0352 20  1419 20  0547     --  137  --   --  139   K 3.9   < > 3.3* 3.8 4.1 3.7     --  99  --   --  101   CO2 29  --  29  --   --  29   GLUCOSE 97  --  106*  --   --  93   BUN 28*  --  20  --   --  15   CREATININE 2.07*  --  1.50*  --   --  1.20*   MG 1.5*  --  1.8  --   --  1.9   ANIONGAP 9  --  9  --   --  9   LABGLOM 24* --  35*  --   --  45*   GFRAA 29*  --  42*  --   --  54*   CALCIUM 8.1*  --  8.0*  --   --  8.4*   CAION 1.09*  --  1.10*  --   --  1.06*   PHOS 3.3  --  2.3*  --   --  2.7    < > = values in this interval not displayed. Recent Labs     08/22/20 2128 08/22/20  2212 08/23/20  1210 08/23/20  1248 08/23/20  1955 08/24/20  0828   POCGLU 173* 178* 92 102 120* 93     ABG:No results found for: POCPH, PHART, PH, POCPCO2, UMD5FPC, PCO2, POCPO2, PO2ART, PO2, POCHCO3, IUJ7OEC, HCO3, NBEA, PBEA, BEART, BE, THGBART, THB, HRR2WEE, LUER5VQK, I6WUKLXJ, O2SAT, FIO2  Lab Results   Component Value Date/Time    SPECIAL NOT REPORTED 08/20/2020 08:18 AM     Lab Results   Component Value Date/Time    CULTURE NO SIGNIFICANT GROWTH 08/20/2020 08:18 AM       Radiology:  Zajai Bundy Chest Portable    Result Date: 8/20/2020  Findings of mild interstitial edema with basilar subsegmental atelectasis and small pleural effusions again demonstrated without significant change. No new airspace disease identified. Unchanged nodule in the right lower lung field since at least 2019, presumably a calcified hamartoma or granuloma. Xr Chest Portable    Result Date: 8/19/2020  Cardiomegaly and chronic pulmonary change with bilateral congestion and bibasilar effusions. Stable right midlung nodule.        Physical Examination:        General appearance:  alert, cooperative and no distress  Mental Status:  oriented to person, place and time and normal affect  Lungs:  clear to auscultation bilaterally, normal effort  Heart:  regular rate and rhythm,+ murmur  Abdomen:  soft, nontender, nondistended, normal bowel sounds, no masses, hepatomegaly, splenomegaly  Extremities:  + Dressing of both lower extremity wounds,+ unstageable ulcer right heel  Skin:  + Decub stage II right right hip trochanteric region    Assessment:        Hospital Problems           Last Modified POA    * (Principal) Bradycardia 8/19/2020 Yes    Hypertension 8/19/2020 Yes    Morbidly obese ([address] 8/19/2020 Yes    Acute kidney injury superimposed on CKD ([address] 8/19/2020 Yes    CAD (coronary artery disease) 8/19/2020 Yes    Overview Signed 2/27/2014 10:22 AM by Fatuma Posey MD     (with coronary artery bypass graft x4). Type 2 diabetes mellitus with hyperglycemia, with long-term current use of insulin ([address] 8/19/2020 Yes    CHF (congestive heart failure), NYHA class I, acute on chronic, combined ([address] 8/19/2020 Yes    Lymphedema of both lower extremities 8/19/2020 Yes    Pressure injury of right heel, unstageable ([address] 8/19/2020 Yes    Hyperkalemia 8/19/2020 Yes    AV block, 1st degree 8/19/2020 Yes    Wounds, multiple 8/20/2020 Yes    Buttock wound 8/20/2020 Yes    Decubitus ulcer of trochanteric region of right hip, stage 2 ([address] 8/23/2020 Yes          Plan:        1. Continue Norvasc to 10 mg daily, hydralazine , Cozaar 25 mg daily has been restarted by nephrology  2. Coreg and clonidine were stopped due to bradycardia  3. Continue diabetes control as already planned  4. Wound care as before  5.  Got precertified for going to [facility]    Izabella Gallardo MD  8/24/2020  9:29 AM

## 2020-08-24 NOTE — ADT AUTH CERT
Renal Failure, Acute - Care Day 3 (8/21/2020) by Colin Rivera RN         Review Status  Review Entered    Completed  8/22/2020 07:15        Criteria Review       Care Day: 3 Care Date: 8/21/2020 Level of Care:    Guideline Day 2    Level Of Care    (X) Floor    8/22/2020 7:15 AM EDT by [employer]      acute    Clinical Status    ( ) * Electrolyte abnormalities absent or improved    ( ) * Acid-base abnormalities absent or improved    (X) * Hypotension absent    8/22/2020 7:15 AM EDT by [employer]      bp 172/58    Activity    (X) Activity as tolerated    8/22/2020 7:15 AM EDT by [employer]      up with assist    Routes    (X) Parenteral or oral hydration    8/22/2020 7:15 AM EDT by [employer]      mag 1 gm iv x2    (X) Renal diet as tolerated    8/22/2020 7:15 AM EDT by [employer]      carb control diet  low na  1500 ml fr    Interventions    (X) Monitor electrolytes, renal function tests, acid-base, and volume status    8/22/2020 7:15 AM EDT by [facility]      labs daily    * Milestone    Additional Notes    8/21/20       EP    Acute kidney injury; bradycardia    Floriana.Kil y.o woman with h/o HTN, DM, CKD, CAD and CABG, admitted from [place] [facility] with bradycardia, AMY and failure-to-care.  Her serum creatinine was elevated to 3.74 mg/dl from a baseline of 1.3, with serum K+ 6.1 mEq/L.  She initially demonstrated bradycardia and sinus pauses up to 4.5 seconds, responding to correction of hyperkalemia and holding of her clonidine and carvedilol.  EKG yesterday showed stable sinus rhythm at 60 bpm.       Respiratory:    · Normal excursion and expansion without use of accessory muscles    · Resp Auscultation: Good respiratory effort. No for increased work of breathing.  On auscultation: clear to auscultation bilaterally    Cardiovascular:    · The apical impulse is not displaced    · Heart tones are crisp and normal. regular S1 and S2. Murmurs: 1/6 systolic murmur at LLSB    · Jugular venous pulsation Normal    · The carotid upstroke is normal in amplitude and contour without delay or bruit    · Peripheral pulses are symmetrical and full       IMPRESSION:      1. Sinus bradycardia and pauses, now resolved, with likely underlying sinus node dysfunction aggravated by hyperkalemia, clonidine and beta blocker    2. Stable CAD, h/o CABG 2007 with 3/4 bypass grafts patent on catheterization 8/13/20    3. Cardiomyopathy, LVEF 30-35%    4. Essential HTN    5. Type II DM    6. AMY with underlying CKD, improving. 7. Chronic overweight    8. Failure-to-care       RECOMMENDATIONS:    1. No indication for permanent pacing at the present time. 2. Can carefully re-introduce carvedilol and discontinue clonidine, using amlodipine and/or hydralazine for additional BP control. 3. Continue following on telemetry.        Medicine    transferred from [place] where she was found to have bradycardia patient is a resident of [facility] [address] presented the emergency department there with acute renal failure and hyperkalemia as well as said bradycardia she was given insulin and calcium gluconate for hyperkalemia and transferred to [facility] for further [address] [facility] outside emergency department patient was found to be infested with maggots the lower limbs    Patient was seen and evaluated cardiology service.  She was diuresed with Lasix. Patient's Coreg and clonidine have been stopped as well as her ACE inhibitor.  Patient will be evaluated by the electrophysiology service for evaluation of PPM    Chronic right heel unstagable pressure injury, left ischium, right trochanter and right hip pressure injuries; bilateral lower extremity venous stasis with ulcers to the left lower leg. Seen and evaluated wound ostomy nurse       Today's creatinine 2.77    Magnesium 1.6    Glucose 189-234    Blood pressure 181/69, heart rate 66/min       General Appearance: alert, well appearing, and in no acute distress    Mental status: oriented to person, place, and time    Head: normocephalic, atraumatic    Eye: no icterus, redness, pupils equal and reactive, extraocular eye movements intact, conjunctiva clear    Ear: normal external ear, no discharge, hearing intact    Nose: no drainage noted    Mouth: mucous membranes moist    Neck: supple, no carotid bruits, thyroid not palpable    Lungs: Bilateral equal air entry, clear to ausculation, no wheezing, rales or rhonchi, normal effort    Cardiovascular: normal rate, regular rhythm,+ murmur, no gallop, rub    Abdomen: Soft, nontender, nondistended, normal bowel sounds, no hepatomegaly or splenomegaly    Neurologic: There are no new focal motor or sensory deficits, normal muscle tone and bulk, no abnormal sensation, normal speech, cranial nerves II through XII grossly intact    Skin: No gross lesions, rashes, bruising or bleeding on exposed skin area    Extremities: + Dressing of both lower extremity wounds    psych: normal affect       1. PT OT    2. Start Norvasc 5 mg daily for blood pressure control    3. Change Lantus insulin to 15 mg mg twice daily and check A1c    4. Change insulin sliding scale to high intensity    5. Replace electrolytes as needed    6. EP not planning any intervention, recommended to restart blood pressure medicines, carvedilol, Norvasc slowly and avoid clonidine       PT    Body structures, Functions, Activity limitations: Decreased functional mobility ; Decreased balance;Decreased endurance;Decreased strength    Assessment: Pt required Mod A 1-2 for mobility. Pt would benefit from continued PT following discharge. Prognosis: Good    Decision Making: Medium Complexity       Nephrology    No new issues reported overnight. Serum creatinine improved to 2.77 mg/DL today.  Electrolytes okay. Made about 3.7 L of urine in the last 24 hours.         No indication for PPM per EP. Chest:              Bilateral vesicular breath sounds, no rales or wheezes.     Cardiac:           Nasal cannula       BUN: 37 (H)    Creatinine: 2.77 (H)    Calcium, Ion: 1.05 (L)    GFR Non-: 17 (L)    GFR African American: 21 (L)    Magnesium: 1.6    Glucose: 189 (H)    Calcium: 8.2 (L)    Phosphorus: 4.4    Hemoglobin A1C: 10.6 (H)    eAG (mg/dL): 258    WBC: 8.0    RBC: 2.72 (L)    Hemoglobin Quant: 7.9 (L)    Hematocrit: 26.5 (L)    RDW: 16.6 (H)       Labs daily, fsbs 4xd, protein supplements daily, bipap, daiy wt, hob 30, elevate lower extremitys, I and o q8h, epc, telemetry, up with assist, vs, wound dressing: bilat lower extremitys    Norvasc 5 mg daily, asa 81 mg daily, lipitor 40 mg daily, bumex 1 mg daily, celexa daily  plavix 75 mg daily, collagenase oint daily, heparin 5000units 3xd, lantus 15 units 2xd, humalog ss x2, cozaar 100mg daily, ditropan 5mg daily, protonix 40 mg daily, exelon patch daily,       Dc plan   snf

## 2020-08-26 NOTE — ADT AUTH CERT
Renal Failure, Acute - Care Day 5 (8/23/2020) by Reyes Merry, RN         Review Status  Review Entered    Completed  8/26/2020 07:35        Criteria Review       Care Day: 5 Care Date: 8/23/2020 Level of Care:    Guideline Day 2    Level Of Care    (X) Floor    8/26/2020 7:35 AM EDT by Chantal Anderson      acute    Clinical Status    ( ) * Electrolyte abnormalities absent or improved    ( ) * Acid-base abnormalities absent or improved    (X) * Hypotension absent    8/26/2020 7:35 AM EDT by Chantal Anderson      bp 196/78    Activity    (X) Activity as tolerated    8/26/2020 7:35 AM EDT by Chantal Anderson      up with assist    Routes    (X) Parenteral or oral hydration    8/26/2020 7:35 AM EDT by Chantal Anderson      po    (X) Parenteral or oral medications    8/26/2020 7:35 AM EDT by Chantal Anderson      mag 1 gm iv x2    (X) Renal diet as tolerated    8/26/2020 7:35 AM EDT by Chantal Anderson      cardiac / Derl Rushing control    Interventions    (X) Monitor electrolytes, renal function tests, acid-base, and volume status    8/26/2020 7:35 AM EDT by Chantal Anderson      labs    * Milestone    Additional Notes    8/23/20       Medicine    Current heart rate 74, blood pressure 156/52    Creatinine has improved to 1.50, potassium 3.3, glucose 92    Phosphorus 2.3       General appearance:  alert, cooperative and no distress    Mental Status:  oriented to person, place and time and normal affect    Lungs:  clear to auscultation bilaterally, normal effort    Heart:  regular rate and rhythm,+ murmur    Abdomen:  soft, nontender, nondistended, normal bowel sounds, no masses, hepatomegaly, splenomegaly    Extremities:  + Dressing of both lower extremity wounds,+ unstageable ulcer right heel    Skin:  + Decub stage II right right hip trochanteric region       1. Continue Norvasc to 10 mg daily, hydralazine oral has been added overnight    2. Coreg and clonidine were stopped due to bradycardia    3.  ACE/ARB were stopped due to renal wheezes. Cardiac:           S1 S2 RR, no murmurs, gallops or rubs, JVP not raised. Abdomen:        Obese, Soft, non-tender, non distended, BS audible. SKIN:               Wounds to BLE covered with dressings    Extremities:     2+ edema BLE, no clubbing, No cyanosis    Neuro:             AAO x 3, No FND. :                  No suprapubic or flank tenderness. Good urine output       1.  Acute kidney injury most likely due to prerenal azotemia and hypoperfusion state caused by severe        bradycardia leading to ATN.  With correction of heart rate her renal function is nicely improving.  Creatinine today is 1.5 mg/dl    2. Hypokalemia-replaced    3. Chronic kidney disease stage III baseline creatinine of 1.1 to 1.2 mg/dL    4. Diastolic dysfunction with EF 30-35%    5. Sinus bradycardia -no indication for PPM per EP    6. Hypomagnesemia-replaced              Plan:     Continue Bumex 1 mg oral daily-will discuss with Dr. Marcus Song . BMP daily    Strict Input and Output, Daily weigh and document in the chart     Continue monitoring electrolytes and replace per protocol    . No need for renal replacement therapy       97.7 (36.5) 16 84 196/78 - Semi fowlers - 2 L 97% Nasal cannula                Potassium: 3.3 (L)    Chloride: 99    CO2: 29    BUN: 20    Creatinine: 1.50 (H)    Anion Gap: 9    Calcium, Ion: 1.10 (L)    GFR Non-: 35 (L)    GFR : 42 (L)    Magnesium: 1.8    Glucose: 106 (H)    Calcium: 8.0 (L)    Phosphorus: 2.3 (L)    WBC: 8.0    RBC: 2.92 (L)    Hemoglobin Quant: 8.3 (L)    Hematocrit: 27.9 (L)    RDW: 15.9 (H)    Platelet Count: 409 (H)       Fsbs 4xd, pt/ot, elevate lower extremities, dressing changes, I and o, epc, vs, telemetry, sp02    Exelon patch daily, klor con  40meq, protonix 40 mg daily, ditropan 2xd, apresoline 10 mg x1, apresoline 25mg 3xd, heparin 5000units sc 3xd, plavix 75 mg daily, celexa daily, bumex 1 mg daily, lipitor 40 mg daily, asa 81 mg daily, norvasc 10 mg daily       Dc plan  snf

## 2020-08-27 ENCOUNTER — TELEPHONE (OUTPATIENT)
Dept: INTERNAL MEDICINE | Age: 70
End: 2020-08-27

## 2020-09-04 ENCOUNTER — OUTSIDE SERVICES (OUTPATIENT)
Dept: INTERNAL MEDICINE | Age: 70
End: 2020-09-04
Payer: MEDICARE

## 2020-09-04 PROCEDURE — 99308 SBSQ NF CARE LOW MDM 20: CPT | Performed by: NURSE PRACTITIONER

## 2020-09-04 NOTE — PROGRESS NOTES
09/1/2020  Chandan Marie  1950    Patient Resident of [facility]    Chief Complaint  1. Type 2 diabetes mellitus with hyperglycemia, with long-term current use of insulin ([facility] [address]    2. Essential hypertension    3. Dementia without behavioral disturbance, unspecified dementia type ([facility] [address]    4. CKD (chronic kidney disease) stage 3, GFR 30-59 ml/min ([facility])        HPI:  67-year-old patient being seen for increased blood pressures and blood sugars patient recent hospitalization with acute on chronic kidney disease creat up to 3.5. Blood pressure meds adjusted. Currently on hydralazine 25 mg every 8 hours Norvasc 10 mg daily and Bumex 1 mg daily. Also recently started on losartan 50 mg daily she denies any chest pain lightheadedness dizziness. Blood pressures consistently running over 155. Of most concern to patient is her blood sugars have been high. States she has not been getting any insulin here but long-acting at home she was on a sliding scale of NovoLog. Blood sugars consistently ranging from 200-300. Allergies   Allergen Reactions    Bactrim [Sulfamethoxazole-Trimethoprim] Hives    Clonidine Derivatives     Amoxicillin-Pot Clavulanate Diarrhea, Nausea Only and Nausea And Vomiting       Past Medical History:   Diagnosis Date    Anxiety and depression     Background diabetic retinopathy(362.01) 2008    (Mild)    Balance problem     CAD (coronary artery disease)     (with coronary artery bypass graft x4).  Cancer of uterus [facility])     history of, (probable cure)    Chronic kidney disease     Chronic low back pain     CVA (cerebral vascular accident) ([address]     Dementia ([address]     (moderate)    Dry eye syndrome     GERD (gastroesophageal reflux disease)     Glaucoma suspect 2005    Gout     Homonymous hemianopsia 2008    (Right)    Hyperlipidemia     Hypertension     Keratitis     (secondary to dry eye syndrome).     Obesity     Peripheral polyneuropathy     (diabetic)    Pseudophakos     (Right Eye: 2012; Left Eye: 2012) - Dr. Lisa Escobedo.  Stroke [facility])     (Multiple) MRI of brain 10/2008 shows multiple infarcts involving the temporal and parietal areas.  Trichiasis     (left eye)    Type 2 diabetes mellitus ([facility] [address]        Past Surgical History:   Procedure Laterality Date    CATARACT REMOVAL WITH IMPLANT  2012    (Right eye) - Dr. Lisa Escobedo.  CATARACT REMOVAL WITH IMPLANT  2012    (Left eye) - Dr. Lisa Escobedo.   SECTION      CHOLECYSTECTOMY      CORONARY ARTERY BYPASS GRAFT  12-    (x4) - LIMA-LAD, SVG-diagnoal 1, SVG-OM1, and SVG-PDA. Exploration of left radial. (Dr. Cristin Plummer).  EYE SURGERY Left     as a child     GASTRIC BYPASS SURGERY      HYSTERECTOMY      (abdominal)  with left oophorectomy. Right ovary retained.     TONSILLECTOMY AND ADENOIDECTOMY         [facility]     Social History     Socioeconomic History    Marital status:      Spouse name: Not on file    Number of children: Not on file    Years of education: Not on file    Highest education level: Not on file   Occupational History    Not on file   Social Needs    Financial resource strain: Not on file    Food insecurity     Worry: Not on file     Inability: Not on file    Transportation needs     Medical: Not on file     Non-medical: Not on file   Tobacco Use    Smoking status: Never Smoker    Smokeless tobacco: Never Used    Tobacco comment: never smoker eclamb rrt 17   Substance and Sexual Activity    Alcohol use: No    Drug use: No    Sexual activity: Not on file   Lifestyle    Physical activity     Days per week: Not on file     Minutes per session: Not on file    Stress: Not on file   Relationships    Social connections     Talks on phone: Not on file     Gets together: Not on file     Attends [employer] service: Not on file     Active member of club or organization: Not on file     Attends Neurological:      General: No focal deficit present. Mental Status: She is alert. Mental status is at baseline. Cranial Nerves: No cranial nerve deficit. Sensory: No sensory deficit. Coordination: Coordination normal.   Psychiatric:         Mood and Affect: Mood normal.         Behavior: Behavior normal.         Vital Signs: Temperature 98.4 °F, blood pressure 162/76, pulse 74, respirations 18, SPO2 95% on room air    Assessment:  1. Type 2 diabetes mellitus with hyperglycemia, with long-term current use of insulin ([facility])  We will have them start her back on her long-acting insulin at home along with her sliding scale report blood sugars in 5 days sooner with significant elevations    2. Essential hypertension  We will have him increase her hydralazine to 50 3 times daily report in 5 days sooner with concerns prior also with the recent addition of the losartan that we will watch her closely along with her kidney functions to make sure no reoccurrence of acute kidney injury    3. Dementia without behavioral disturbance, unspecified dementia type ([facility])  Stable, no significant confusion on exam today    4. CKD (chronic kidney disease) stage 3, GFR 30-59 ml/min ([facility])  Avoid nephrotoxic drugs, monitor BMP closely      Plan:  As noted above. Follow up for routine visit. Call sooner with concerns prior.     Electronically signed by SHIRA Logan CNP on 9/4/2020 at 9:36 AM
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
## 2020-09-07 PROBLEM — R79.89 ELEVATED TROPONIN: Status: RESOLVED | Noted: 2020-08-08 | Resolved: 2020-09-07

## 2020-09-07 PROBLEM — R77.8 ELEVATED TROPONIN: Status: RESOLVED | Noted: 2020-08-08 | Resolved: 2020-09-07

## 2020-09-15 ENCOUNTER — OFFICE VISIT (OUTPATIENT)
Dept: CARDIOLOGY | Age: 70
End: 2020-09-15
Payer: MEDICARE

## 2020-09-15 ENCOUNTER — OFFICE VISIT (OUTPATIENT)
Dept: NEPHROLOGY | Age: 70
End: 2020-09-15
Payer: MEDICARE

## 2020-09-15 ENCOUNTER — HOSPITAL ENCOUNTER (OUTPATIENT)
Dept: INTERVENTIONAL RADIOLOGY/VASCULAR | Age: 70
Discharge: HOME | End: 2020-09-17
Payer: MEDICARE

## 2020-09-15 VITALS
HEIGHT: 69 IN | BODY MASS INDEX: 31.55 KG/M2 | TEMPERATURE: 97.3 F | WEIGHT: 213 LBS | SYSTOLIC BLOOD PRESSURE: 152 MMHG | DIASTOLIC BLOOD PRESSURE: 68 MMHG | OXYGEN SATURATION: 97 % | HEART RATE: 88 BPM

## 2020-09-15 VITALS
WEIGHT: 227 LBS | HEIGHT: 69 IN | HEART RATE: 90 BPM | BODY MASS INDEX: 33.62 KG/M2 | DIASTOLIC BLOOD PRESSURE: 70 MMHG | SYSTOLIC BLOOD PRESSURE: 172 MMHG

## 2020-09-15 PROCEDURE — 99213 OFFICE O/P EST LOW 20 MIN: CPT | Performed by: INTERNAL MEDICINE

## 2020-09-15 PROCEDURE — 99214 OFFICE O/P EST MOD 30 MIN: CPT | Performed by: INTERNAL MEDICINE

## 2020-09-15 PROCEDURE — 93005 ELECTROCARDIOGRAM TRACING: CPT | Performed by: INTERNAL MEDICINE

## 2020-09-15 PROCEDURE — 93880 EXTRACRANIAL BILAT STUDY: CPT

## 2020-09-15 PROCEDURE — 99214 OFFICE O/P EST MOD 30 MIN: CPT

## 2020-09-15 PROCEDURE — 99306 1ST NF CARE HIGH MDM 50: CPT | Performed by: INTERNAL MEDICINE

## 2020-09-15 PROCEDURE — 93010 ELECTROCARDIOGRAM REPORT: CPT | Performed by: INTERNAL MEDICINE

## 2020-09-15 RX ORDER — HYDRALAZINE HYDROCHLORIDE 50 MG/1
75 TABLET, FILM COATED ORAL EVERY 8 HOURS SCHEDULED
Qty: 135 TABLET | Refills: 3 | Status: SHIPPED | OUTPATIENT
Start: 2020-09-15 | End: 2020-10-23 | Stop reason: SDUPTHER

## 2020-09-15 RX ORDER — LOSARTAN POTASSIUM 50 MG/1
50 TABLET ORAL DAILY
COMMUNITY
End: 2020-10-23 | Stop reason: SDUPTHER

## 2020-09-15 NOTE — PROGRESS NOTES
Nephrology Progress Note    Alvarezrafa Soares, [address] [facility]      Chief Complaint   Patient presents with    Follow-up     1 month follow up from hospital for acute kidney injury     Patient comes back in 1 month return visit after an acute kidney injury episode with creatinine peaking at 3.74 on 8/19/2020. Previous to that in early August 2020 the patient had an E. coli UTI. It is noteworthy when the patient came in on 8/19/2020 the patient had bradycardia and an the acute kidney injury was thought prerenal secondary to the bradycardia. The patient did have a serum protein immunofixation that was negative for monoclonal immunoglobulin on that admission. Creatinine continued to improve and was down to 1.2 mg/dL on 8/24/2020, the day of discharge. The patient went to [address] [place] [address] for rehabilitation. She cannot walk at this time but is getting rehab. The patient did have significant anemia during the admission with last hemoglobin of 8.7. She also has essential hypertension and went to see cardiology this morning and had her hydralazine increased to 75 mg oral every 8 hours by Dr. Jerry Sorto. Also, the patient has scheduled carotid ultrasound and transthoracic echocardiogram in about 3 months. Renal ultrasound during the last hospital admission showed a right kidney 10.5 cm with left kidney unable to be visualized. There was also noted the patient had a cardiac catheterization in early August 2020 that showed no significant disease needing treatment. She does have a history of prior CABG. She also has a history of diabetes mellitus with retinopathy and significant proteinuria as a result.     Thus, her current problems include chronic kidney disease stage III secondary to diabetic nephropathy with proteinuria, essential hypertension, anemia in stage III chronic kidney disease, hyperuricemia with a history of gout and some chronic lymphedema for which she uses wraps and pumps when she is at home. She states she is using some supplemental oxygen by nasal cannula while she does her rehab and also utilizes it at nighttime as well. No current chest pain or shortness of breath or nausea or vomiting today in the office. No dysuria urgency or frequency or flank pain or suprapubic discomfort. Patient does not have any significant history of heavy or prolonged NSAID use and there is no history of OTC herbal medications . No history of recurrent kidney stones although did have an E. coli UTI in early August 2020. No unusual skin rashes . No tea coloured urine she does note a good response urine output-wise to her oral Bumex.     Patient Active Problem List   Diagnosis    Dementia ([facility]    Hypertension    Hyperlipidemia    Morbidly obese ([facility]    Gout    Peripheral polyneuropathy    Anxiety and depression    Acute kidney injury superimposed on CKD ([facility]    CAD (coronary artery disease)    Balance problem    CVA (cerebral vascular accident) ([facility]    Type 2 diabetes mellitus with hyperglycemia, with long-term current use of insulin ([facility]    CHF (congestive heart failure), NYHA class I, acute on chronic, combined ([facility]    Acute cystitis    Infestation by bed bug    Infestation by maggots    Lymphedema of both lower extremities    PAD (peripheral artery disease) (HCC)    Pressure injury of right heel, unstageable (HCC)    Acute kidney injury ([facility]    Bradycardia    Hyperkalemia    AV block, 1st degree    Wounds, multiple    Buttock wound    Decubitus ulcer of trochanteric region of right hip, stage 2 ([facility])       CURRENT MEDICATIONS      Current Outpatient Medications   Medication Sig Dispense Refill    losartan (COZAAR) 50 MG tablet Take 50 mg by mouth daily      hydrALAZINE (APRESOLINE) 50 MG tablet Take 1.5 tablets by mouth every 8 hours 135 tablet 3    amLODIPine (NORVASC) 10 MG tablet Take 1 tablet by mouth daily 30 tablet 3    bumetanide (BUMEX) 1 wraps  ROS was otherwise negative except as mentioned in the [address] OBJECTIVE      Vitals:    09/15/20 1021   BP: (!) 152/68   Pulse: 88   Temp: 97.3 °F (36.3 °C)   SpO2: 97%     Wt Readings from Last 2 Encounters:   09/15/20 213 lb (96.6 kg)   09/15/20 227 lb (103 kg)     Body mass index is 31.45 kg/m². PHYSICAL EXAM      GENERAL APPEARANCE:Awake, alert, in no acute distress  SKIN: warm and dry, no rash or erythema  EYES: conjunctivae pale and sclera anicteric  ENT: no thrush, mildly dry mucous membranes  NECK: No accessory muscle use, midline trachea  PULMONARY: clear to auscultation bilaterally and no wheezing, rales or rhonchi heard. No pleural rub. CARDIOVASCULAR: Regular rate and rhythm with positive S1 and S2 and no rubs or gallops.   Patient does have somewhat bounding pulses radially  ABDOMEN: soft nontender, bowel sounds, present, not distended, no ascites   EXTREMITIES: Severe, chronic lymphedema with wraps in place and some chronic cellulitic change over the distal lower extremities  LABS    CBC:   Lab Results   Component Value Date    WBC 6.5 08/24/2020    RBC 2.98 08/24/2020    HGB 8.7 08/24/2020    HCT 28.6 08/24/2020    MCV 96.0 08/24/2020    MCH 29.2 08/24/2020    MCHC 30.4 08/24/2020    RDW 16.1 08/24/2020     08/24/2020    MPV 9.5 08/24/2020      BMP:   Lab Results   Component Value Date     08/24/2020    K 3.8 08/24/2020     08/24/2020    CO2 29 08/24/2020    BUN 15 08/24/2020    CREATININE 1.20 08/24/2020    GLUCOSE 93 08/24/2020    CALCIUM 8.4 08/24/2020      PHOSPHORUS:    Lab Results   Component Value Date    PHOS 2.7 08/24/2020     MAGNESIUM:   Lab Results   Component Value Date    MG 1.9 08/24/2020     ALBUMIN:   Lab Results   Component Value Date    LABALBU 2.0 08/20/2020     IRON:  No results found for: IRON  IRON SATURATION:  No results found for: LABIRON  TIBC:  No results found for: TIBC  FERRITIN:  No results found for: FERRITIN  JIMBO: No results found for: JIMBO SPEP:   Lab Results   Component Value Date    PROT 5.8 08/20/2020    ALBCAL 1.8 08/13/2020    ALBPCT 35 08/13/2020    LABALPH 0.2 08/13/2020    LABALPH 0.7 08/13/2020    A1PCT 5 08/13/2020    A2PCT 15 08/13/2020    LABBETA 0.9 08/13/2020    BETAPCT 17 08/13/2020    GAMGLOB 1.4 08/13/2020    GGPCT 28 08/13/2020    PATH ELECTRONICALLY SIGNED. Sav Harris M.D. 08/13/2020    PATH ELECTRONICALLY SIGNED. Sav Harris M.D. 08/13/2020     UPEP: No results found for: TPU   C3:   Lab Results   Component Value Date    C3 114 08/13/2020     C4:   Lab Results   Component Value Date    C4 39 08/13/2020     MPO ANCA:   Lab Results   Component Value Date    MPO 14 08/13/2020    .   PR3 ANCA:    Lab Results   Component Value Date    PR3 8 08/13/2020     PTH: No results found for: PTH  VIT D3,25 HYDROXY: No results found for: VITD3                           URINALYSIS/URINE CHEMISTRIES    URINE SODIUM:    Lab Results   Component Value Date    LUKE 88 08/19/2020      URINE CREATININE:    Lab Results   Component Value Date    LABCREA 18.4 08/20/2020     URINE PROTEIN:  No results found for: TPU  URINALYSIS:  U/A:   Lab Results   Component Value Date    NITRU NEGATIVE 08/20/2020    COLORU YELLOW 08/20/2020    PHUR 6.5 08/20/2020    WBCUA None 08/20/2020    RBCUA 2 TO 5 08/20/2020    MUCUS NOT REPORTED 08/20/2020    TRICHOMONAS NOT REPORTED 08/20/2020    YEAST NOT REPORTED 08/20/2020    BACTERIA NOT REPORTED 08/20/2020    SPECGRAV 1.008 08/20/2020    LEUKOCYTESUR NEGATIVE 08/20/2020    UROBILINOGEN Normal 08/20/2020    BILIRUBINUR NEGATIVE 08/20/2020    GLUCOSEU NEGATIVE 08/20/2020    KETUA NEGATIVE 08/20/2020    AMORPHOUS NOT REPORTED 08/20/2020             RADIOLOGY      Results for orders placed during the hospital encounter of 08/07/20   US RENAL COMPLETE    Narrative EXAMINATION:  RETROPERITONEAL ULTRASOUND OF THE KIDNEYS AND URINARY BLADDER    8/13/2020    COMPARISON:  None    HISTORY:  ORDERING SYSTEM PROVIDED HISTORY: increased creatinine  TECHNOLOGIST PROVIDED HISTORY:  increased creatinine    FINDINGS:    Kidneys: The right kidney measures 10.5 cm in length. The left kidney cannot be  visualized. Right kidney is difficult to visualize, grossly normal cortical echogenicity. No gross evidence of hydronephrosis. Echogenic foci suggesting intrarenal  stones. Possible right pleural effusion. Bladder:    Not imaged      Impression Possible right nephrolithiasis. Otherwise grossly unremarkable appearance of the right kidney, difficult to  visualized sonographically. The left kidney is not visualized. Unable to visualize urinary bladder. ASSESSMENT     1. Chronic Kidney Disease : Stage III likely secondary to diabetic nephropathy  2. Hypertension with suboptimal control with cardiology increasing her hydralazine this morning to 75 mg oral every 8 hours  BP Readings from Last 3 Encounters:   09/15/20 (!) 152/68   09/15/20 (!) 172/70   08/24/20 (!) 161/74    :  3. Hyperuricemia with a history of gouty arthritis  4. Anemia of chronic kidney disease stage III with last hemoglobin during the hospitalization of 8.7 from August 2020  5. Chronic lymphedema  6. Diabetic nephropathy with proteinuria with a history of diabetic retinopathy, associated with type 2 diabetes mellitus  7. Recent acute kidney injury, likely prerenal in nature, with a negative serum protein immunofixation noted on the last hospitalization       PLAN     1. No other changes in medications today  2. Random urinalysis with microscopy and random urine protein to creatinine ratio  3. CBC, BMP, phosphorus, intact PTH, albumin and uric acid level prior to the next visit  4. Return to see me in the office in 2 months     Patient is to avoid NSAIDS. Please do not hesitate to call with questions.     This note is created with the assistance of a speech-recognition program. While intending to generate a document that actually reflects the content of the visit, no guarantees can be provided that every mistake has been identified and corrected by editing.     Electronically signed by Layne Heller MD on 9/15/2020 at 10:31 AM

## 2020-09-15 NOTE — PROGRESS NOTES
Today's Date: 9/15/2020  Patient's Name: Mirta Lo  Patient's age: 77 y. o., 1950    Subjective:  Mitra Lo  is here for follow up. She was recently hospitalized at [facility] and now in nursing home. No chest pain, no dyspnea, no PND, no syncope or pre-syncope, no orthopnea. Past Medical History:   has a past medical history of Anxiety and depression, Background diabetic retinopathy(362.01), Balance problem, CAD (coronary artery disease), Cancer of uterus ([address] Chronic kidney disease, Chronic low back pain, CVA (cerebral vascular accident) ([address] Dementia ([address] Dry eye syndrome, GERD (gastroesophageal reflux disease), Glaucoma suspect, Gout, Homonymous hemianopsia, Hyperlipidemia, Hypertension, Keratitis, Obesity, Peripheral polyneuropathy, Pseudophakos, Stroke ([address] Trichiasis, and Type 2 diabetes mellitus ([address] Past Surgical History:   has a past surgical history that includes Gastric bypass surgery; Cataract removal with implant (2012); Cataract removal with implant (2012); Coronary artery bypass graft (12-);  section; Hysterectomy; Cholecystectomy; Tonsillectomy and adenoidectomy; and Eye surgery (Left). Home Medications:  Prior to Admission medications    Medication Sig Start Date End Date Taking?  Authorizing Provider   hydrALAZINE (APRESOLINE) 50 MG tablet Take 1 tablet by mouth every 8 hours 20   Amberly Hatch MD   losartan (COZAAR) 25 MG tablet Take 1 tablet by mouth daily 20   Amberly Hatch MD   amLODIPine (NORVASC) 10 MG tablet Take 1 tablet by mouth daily 20   Amberly Hatch MD   bumetanide (BUMEX) 1 MG tablet Take 1 tablet by mouth daily 20   Amberly Hatch MD   insulin glargine (LANTUS) 100 UNIT/ML injection vial Inject 20 Units into the skin nightly 20   Arabella Calvert MD   Sodium Hypochlorite (DAKINS) 0.125 % SOLN Irrigate with 473 mLs as directed daily 8/15/20   Arabella Calvert MD   clopidogrel her mother; Kidney Disease in her mother; Bennie Countess in an other family member; Mental Retardation in an other family member; Stroke in her mother and another family member; Uterine Cancer in her mother. No h/o sudden cardiac death. No for premature CAD    REVIEW OF SYSTEMS:    · Constitutional: there has been no unanticipated weight loss. There's been No change in energy level, No change in activity level. · Eyes: No visual changes or diplopia. No scleral icterus. · ENT: No Headaches, hearing loss or vertigo. No mouth sores or sore throat. · Cardiovascular: see above  · Respiratory: see above  · Gastrointestinal: No abdominal pain, appetite loss, blood in stools. · Genitourinary: No dysuria, trouble voiding, or hematuria. · Musculoskeletal:  No gait disturbance, No weakness or joint complaints. · Integumentary: No rash or pruritis. · Neurological: No headache or diplopia. No tingling  · Psychiatric: No anxiety, or depression. · Endocrine: No temperature intolerance. · Hematologic/Lymphatic: No abnormal bruising or bleeding, blood clots or swollen lymph nodes. · Allergic/Immunologic: No nasal congestion or hives. PHYSICAL EXAM:      Ht 5' 9\" (1.753 m)   Wt 227 lb (103 kg)   BMI 33.52 kg/m²    Vitals:    09/15/20 0956   BP: (!) 172/70   Pulse:        Constitutional and General Appearance: alert, cooperative, no distress and appears stated age  HEENT: PERRL, no cervical lymphadenopathy. No masses palpable. Normal oral mucosa  Respiratory:  · Normal excursion and expansion without use of accessory muscles  · Resp Auscultation: Good respiratory effort. No for increased work of breathing. On auscultation: clear to auscultation bilaterally  Cardiovascular:  · Regular S1 and S2. 3/6 systolic murmur  · Jugular venous pulsation Normal  · Carotid bruit noted  Abdomen:   · soft  · Bowel sounds present  Extremities:  ·  ACE wraps both lower ext  Neurological:  · Alert and oriented.     Cardiac Data:  EKG: SR, first degree AV block, Poor R wave progression    Labs:     CBC: No results for input(s): WBC, HGB, HCT, PLT in the last 72 hours. BMP: No results for input(s): NA, K, CO2, BUN, CREATININE, LABGLOM, GLUCOSE in the last 72 hours. PT/INR: No results for input(s): PROTIME, INR in the last 72 hours. FASTING LIPID PANEL:  Lab Results   Component Value Date    HDL 41 08/08/2020    TRIG 57 08/08/2020     LIVER PROFILE:No results for input(s): AST, ALT, LABALBU in the last 72 hours. 2D ECHO ( 8/11/20)  Global left ventricular systolic function is severely reduced. Estimated  ejection fraction is 30-35% . There is severe Hypokinesis of anterior, anteroseptal, inferoseptal and  anterolateral walls. [place] is severely hypokinetic. Grade II (moderate) left ventricular diastolic dysfunction. Right ventricular dilatation with reduced systolic function. Left atrium is mildly dilated. Moderate to severe tricuspid regurgitation. Moderate pulmonary hypertension. Estimated right ventricular systolic pressure is 40SZXB. No significant pericardial effusion is seen. Cardiac cath 8/13/2020  Conclusions:   Multi-vessel Coronary Artery Disease.   3 out of 4 grafts are patent.   Patent LIMA-LAD, SVG-Diagonal and SVG-OM.   Occluded SVG to RCA with left to right collaterals.   Borderline LV systolic function.     Recommendations:  1. Medical Therapy. 2. Risk Factors Modification. 3. Post Cath Protocol    Assessment and plan:    -Sinus bradycardia with pauses. Metoprolol and clonidine stopped. EP did not think PPM is warranted. Avoid BB and centrally acting CCB. -CAD- 3/4 bypass graft patent on cardiac cath 8/13/2020. Continue ASA  -ICM. Not on BB due to pauses/bradycardia. On ARB  -HTN- increase hydralazine to 75mg po TID. -Obesity - encouraged diet, exercise, and discussed weight loss extensively. -Hyperlipidemia- continue statin.   -CVA- on ASA and plavix  -Dementia- on memantine  -CKD  -Carotid bruits- obtain carotid u/s  -Repeat TTE in 3 months for ICD evaluation. -RTC 6 months.     Lovett Collet, [id number] [facility]           [phone]

## 2020-09-29 ENCOUNTER — OUTSIDE SERVICES (OUTPATIENT)
Dept: INTERNAL MEDICINE | Age: 70
End: 2020-09-29
Payer: MEDICARE

## 2020-09-29 NOTE — PROGRESS NOTES
[Sulfamethoxazole-Trimethoprim] Hives    Clonidine Derivatives     Amoxicillin-Pot Clavulanate Diarrhea, Nausea Only and Nausea And Vomiting       MEDICATIONS: As noted on the [facility] MAR, referenced and incorporated herein. PAST MEDICAL HISTORY:   Past Medical History:   Diagnosis Date    Anxiety and depression     Background diabetic retinopathy(362.01)     (Mild)    Balance problem     CAD (coronary artery disease)     (with coronary artery bypass graft x4).  Cancer of uterus [facility])     history of, (probable cure)    Chronic kidney disease     Chronic low back pain     CVA (cerebral vascular accident) ([address]     Dementia ([address]     (moderate)    Dry eye syndrome     GERD (gastroesophageal reflux disease)     Glaucoma suspect     Gout     Homonymous hemianopsia     (Right)    Hyperlipidemia     Hypertension     Keratitis     (secondary to dry eye syndrome).  Obesity     Peripheral polyneuropathy     (diabetic)    Pseudophakos     (Right Eye: 2012; Left Eye: 2012) - Dr. Adam Hopkins.  Stroke [facility])     (Multiple) MRI of brain 10/2008 shows multiple infarcts involving the temporal and parietal areas.  Trichiasis     (left eye)    Type 2 diabetes mellitus ([address]        PAST SURGICAL HISTORY:   Past Surgical History:   Procedure Laterality Date    CATARACT REMOVAL WITH IMPLANT  2012    (Right eye) - Dr. Adam Hopkins.  CATARACT REMOVAL WITH IMPLANT  2012    (Left eye) - Dr. Adam Hopkins.   SECTION      CHOLECYSTECTOMY      CORONARY ARTERY BYPASS GRAFT  12-    (x4) - LIMA-LAD, SVG-diagnoal 1, SVG-OM1, and SVG-PDA. Exploration of left radial. (Dr. Barrie Brunner).  EYE SURGERY Left     as a child     GASTRIC BYPASS SURGERY      HYSTERECTOMY      (abdominal)  with left oophorectomy. Right ovary retained.     TONSILLECTOMY AND ADENOIDECTOMY         SOCIAL HISTORY:     Tobacco:   Social History     Tobacco Use   Smoking Status Never Smoker   Smokeless Tobacco Never Used   Tobacco Comment    never smoker eclamb rrt 7/20/17     Alcohol:   Social History     Substance and Sexual Activity   Alcohol Use No     Drugs:   Social History     Substance and Sexual Activity   Drug Use No       FAMILY HISTORY: family history includes Cancer in her mother; Coronary Art Dis in an other family member; Diabetes in her father and mother; Emphysema in her father; Glaucoma in her father; Heart Disease in her father; High Blood Pressure in her mother; Kidney Disease in her mother; Rico Nones in an other family member; Mental Retardation in an other family member; Stroke in her mother and another family member; Uterine Cancer in her mother. REVIEW OF SYSTEMS:     Please see history of chief complaint above; otherwise no new problems with respect to General, HEENT, Cardiovascular, Respiratory, Gastrointestinal, Genitourinary, Endocrinologic, Musculoskeletal, or Neuropsychiatric complaints. PHYSICAL EXAMINATION:    Due to this being a telehealth visit, the physical examination was performed by the nurse at the facility. Vitals: Temp: 96.9 deg F. Pulse: 80. Resp: 16. BP: 149/67. General: This is a 77 y.o.  female who is alert and oriented to person, place and time. She appears to be her stated age and does not appear to be in any acute distress. Skin: Skin color, texture, turgor normal. No rashes or lesions. HEENT/Neck: Head: Normal, normocephalic, atraumatic. Eye: Normal external eye, conjunctiva, lids cornea, LETY. Ears: Normal TM's bilaterally. Normal auditory canals and external ears. Non-tender. Nose: Normal external nose, mucus membranes and septum. Pharynx: Dental Hygiene adequate. Normal buccal mucosa. Normal pharynx. Neck / Thyroid: Supple, no masses, nodes, nodules or enlargement. Chest: Rises and falls symmetrically. No use of accessory muscles. No retractions.   Lungs: Normal - CTA without rales, rhonchi, or wheezing. Heart: regular rate and rhythm, S1, S2 normal, no murmur, click, rub or gallop No S3 or S4. Abdomen: Obese soft, non-distended, non-tender, normal active bowel sounds, no masses palpated and no hepatosplenomegaly  Extremities: Strength is 4/5 bilaterally. Radial pulses are +2/4 bilaterally. Dorsalis pedis pulses are +2/4 bilaterally. There is no clubbing or cyanosis in any of the extremities, but there was a grade 1+ and pitting edema bilaterally in the lower extremities. There was a right heel wound present, covered with a bandage. Neurologic: Deep tendon reflexes are 2+/4+ bilaterally. cranial nerves II-XII are grossly intact    ASSESSMENT:     Diagnosis Orders   1. Chronic combined systolic and diastolic CHF (congestive heart failure) ([facility]     2. Infestation by bed bug     3. Infestation by maggots     4. Uncontrolled type 2 diabetes mellitus with stage 3 chronic kidney disease, with long-term current use of insulin ([address]     5. Essential hypertension     6. Mixed hyperlipidemia     7. CKD (chronic kidney disease) stage 3, GFR 30-59 ml/min     8. Dementia without behavioral disturbance, unspecified dementia type ([address]     9. Decubitus ulcer of trochanteric region of right hip, stage 2 ([address]     10. Class 1 obesity with serious comorbidity and body mass index (BMI) of 31.0 to 31.9 in adult, unspecified obesity type           PLAN:    1. Continue current treatment  2. Nursing home record reviewed and updates summarized and entered into electronic record  3. Nursing home blood sugar logs and diabetic medication administration reviewed. No changes  4. See nursing home orders and MAR.       Pursuant to the emergency declaration under the [facility] waiver authority and the [employer] Act, this TelehHealth visit was conducted, with patient's consent, to reduce the patient's risk of exposure to COVID-19 and provide continuity of care for an established patient. Services were provided through a video synchronous discussion virtually (using [email or web address] me) to substitute for in-person clinic visit. The originating site was the nursing home and the distant site was the provider's home. Patient's identity was verified via name and date of birth.          Electronically signed by Gisella Mattson DO on 9/29/2020 at 1:18 AM  Internal Medicine

## 2020-10-05 ENCOUNTER — OFFICE VISIT (OUTPATIENT)
Dept: VASCULAR SURGERY | Age: 70
End: 2020-10-05
Payer: MEDICARE

## 2020-10-05 VITALS
BODY MASS INDEX: 34.37 KG/M2 | WEIGHT: 219 LBS | HEART RATE: 73 BPM | OXYGEN SATURATION: 96 % | TEMPERATURE: 97.2 F | DIASTOLIC BLOOD PRESSURE: 64 MMHG | HEIGHT: 67 IN | RESPIRATION RATE: 16 BRPM | SYSTOLIC BLOOD PRESSURE: 176 MMHG

## 2020-10-05 PROCEDURE — 99214 OFFICE O/P EST MOD 30 MIN: CPT | Performed by: SURGERY

## 2020-10-06 ASSESSMENT — ENCOUNTER SYMPTOMS
ALLERGIC/IMMUNOLOGIC NEGATIVE: 1
CHEST TIGHTNESS: 0
ABDOMINAL PAIN: 0
COLOR CHANGE: 0
SHORTNESS OF BREATH: 0

## 2020-10-06 NOTE — PROGRESS NOTES
Division of Vascular Surgery        Follow Up      RLE angiogram, angioplasty posterior tibial and peroneal arteries (2020)    Chief Complaint:      PAD, right heel wound    History of Present Illness:      Iveth Barba is a 77 y.o. woman who presents for follow up after undergoing percutaneous RLE revascularization for non-healing heel wound. The wound overall is getting much smaller and almost completely healed. She has chronic swelling and lymphedema as well which she manages with ACE wraps and does own lymphedema pumps which she would like to start using again. Medical History:     Past Medical History:   Diagnosis Date    Anxiety and depression     Background diabetic retinopathy(362.01)     (Mild)    Balance problem     CAD (coronary artery disease)     (with coronary artery bypass graft x4).  Cancer of uterus [facility])     history of, (probable cure)    Chronic kidney disease     Chronic low back pain     CVA (cerebral vascular accident) ([address]     Dementia ([address]     (moderate)    Dry eye syndrome     GERD (gastroesophageal reflux disease)     Glaucoma suspect     Gout     Homonymous hemianopsia     (Right)    Hyperlipidemia     Hypertension     Keratitis     (secondary to dry eye syndrome).  Obesity     Peripheral polyneuropathy     (diabetic)    Pseudophakos     (Right Eye: 2012; Left Eye: 2012) - Dr. Tammy Turner.  Stroke [facility])     (Multiple) MRI of brain 10/2008 shows multiple infarcts involving the temporal and parietal areas.  Trichiasis     (left eye)    Type 2 diabetes mellitus ([address]        Surgical History:     Past Surgical History:   Procedure Laterality Date    CATARACT REMOVAL WITH IMPLANT  2012    (Right eye) - Dr. Tammy Turner.  CATARACT REMOVAL WITH IMPLANT  2012    (Left eye) - Dr. Tammy Turner.      SECTION      CHOLECYSTECTOMY      CORONARY ARTERY BYPASS GRAFT  12-    (x4) - LIMA-LAD, SVG-diagnoal DAILY 60 tablet 0    allopurinol (ZYLOPRIM) 300 MG tablet Take 1 tablet by mouth daily 90 tablet 1    omeprazole (PRILOSEC) 20 MG delayed release capsule Take 1 capsule by mouth every morning (before breakfast) 90 capsule 1    aspirin 81 MG tablet Take 1 tablet by mouth daily 90 tablet 3    Diabetic Shoe MISC by Does not apply route 1 each 0    Compression Stockings MISC by Does not apply route 20-30 mm Hg bilateral knee high 5 each 0    rivastigmine (EXELON) 9.5 MG/24HR APPLY 1 NEW PATCH EVERY 24 HOURS 90 patch 1    nystatin (MYCOSTATIN) 710502 UNIT/GM cream Apply topically 2 times daily. 30 g 1     No current facility-administered medications for this visit. Social History:     Tobacco:    reports that she has never smoked. She has never used smokeless tobacco.  Alcohol:      reports no history of alcohol use. Drug Use:  reports no history of drug use. Review of Systems:     Review of Systems   Constitutional: Negative for chills and fever. HENT: Negative for congestion. Eyes: Negative for visual disturbance. Respiratory: Negative for chest tightness and shortness of breath. Cardiovascular: Positive for leg swelling. Negative for chest pain. Gastrointestinal: Negative for abdominal pain. Endocrine: Negative. Genitourinary: Negative. Musculoskeletal: Positive for arthralgias. Skin: Positive for wound. Negative for color change. Allergic/Immunologic: Negative. Neurological: Positive for numbness. Negative for facial asymmetry, speech difficulty and weakness. Hematological: Negative. Psychiatric/Behavioral: Negative. Physical Exam:     Vitals:  BP (!) 176/64 (Site: Left Upper Arm, Position: Sitting, Cuff Size: Large Adult)   Pulse 73   Temp 97.2 °F (36.2 °C) (Temporal)   Resp 16   Ht 5' 7\" (1.702 m)   Wt 219 lb (99.3 kg)   SpO2 96%   BMI 34.30 kg/m²     Physical Exam  Constitutional:       Appearance: She is well-developed and well-groomed.    Eyes: Extraocular Movements: Extraocular movements intact. Conjunctiva/sclera: Conjunctivae normal.   Neck:      Musculoskeletal: Full passive range of motion without pain. Vascular: No carotid bruit. Cardiovascular:      Rate and Rhythm: Normal rate and regular rhythm. Pulses:           Radial pulses are 2+ on the right side and 2+ on the left side. Dorsalis pedis pulses are 2+ on the right side. Posterior tibial pulses are 1+ on the right side. Pulmonary:      Effort: Pulmonary effort is normal. No respiratory distress. Abdominal:      Palpations: Abdomen is soft. Tenderness: There is no abdominal tenderness. Musculoskeletal:      Right lower leg: She exhibits swelling. She exhibits no tenderness. Edema present. Left lower leg: She exhibits swelling. She exhibits no tenderness. Edema present. Comments: Chronic venous stasis skin changes to lower extremities, lymphedema   Feet:      Right foot:      Skin integrity: Ulcer present. Comments: Right heel wound nearly completely healed  Skin:     General: Skin is warm. Capillary Refill: Capillary refill takes less than 2 seconds. Neurological:      Mental Status: She is alert and oriented to person, place, and time. GCS: GCS eye subscore is 4. GCS verbal subscore is 5. GCS motor subscore is 6. Sensory: Sensation is intact. Motor: Motor function is intact. Psychiatric:         Mood and Affect: Mood normal.         Speech: Speech normal.         Behavior: Behavior normal.         Thought Content:  Thought content normal.       August 2020               Today        Imaging/Labs:     None performed     Assessment and Plan:     Diabetic foot ulcer, PAD, s/p RLE angiogram and angioplasty of tibial vessels, chronic venous insufficiency and lymphedema   · Contiue daily dressing changes and washing of feet  · ACE wraps or compression stockings along with using her lymphedema pumps to help maintain and reduce overall swelling  · Follow up as needed    Electronically signed by Fransisca Andrews MD on 10/6/20 at 3:07 PM EDT      [address]: [phone]  C: [phone]  Email: [email or web address]

## 2020-10-19 ENCOUNTER — OUTSIDE SERVICES (OUTPATIENT)
Dept: INTERNAL MEDICINE | Age: 70
End: 2020-10-19
Payer: MEDICARE

## 2020-10-19 PROCEDURE — 99308 SBSQ NF CARE LOW MDM 20: CPT | Performed by: NURSE PRACTITIONER

## 2020-10-19 ASSESSMENT — ENCOUNTER SYMPTOMS: SHORTNESS OF BREATH: 0

## 2020-10-19 NOTE — PROGRESS NOTES
10/19/20  Layla Henry  1950    Patient Resident of [facility]    Chief Complaint  1. Chronic combined systolic and diastolic CHF (congestive heart failure) ([address]    2. Essential hypertension    3. Stage 3 chronic kidney disease, unspecified whether stage 3a or 3b CKD    4. Dementia without behavioral disturbance, unspecified dementia type (HCC)        HPI:  70-year-old patient with history of CHF hypertension chronic kidney disease, hypertension, hyperlipidemia being seen for follow-up from yesterday. Was on call yesterday nursing staff: States patient was short of breath increased swelling and increased weight. Additional 2 mg of Bumex was given yesterday. Patient state less short of breath today. No wheezing. No cough. No orthopnea. Weights continue to be elevated. States urinating well. Continues to have dyspnea on exertion and swelling to bilateral lower extremities. Allergies   Allergen Reactions    Bactrim [Sulfamethoxazole-Trimethoprim] Hives    Clonidine Derivatives     Amoxicillin-Pot Clavulanate Diarrhea, Nausea Only and Nausea And Vomiting       Past Medical History:   Diagnosis Date    Anxiety and depression     Background diabetic retinopathy(362.01) 2008    (Mild)    Balance problem     CAD (coronary artery disease)     (with coronary artery bypass graft x4).  Cancer of uterus [facility])     history of, (probable cure)    Chronic kidney disease     Chronic low back pain     CVA (cerebral vascular accident) ([address]     Dementia ([address]     (moderate)    Dry eye syndrome     GERD (gastroesophageal reflux disease)     Glaucoma suspect 2005    Gout     Homonymous hemianopsia 2008    (Right)    Hyperlipidemia     Hypertension     Keratitis     (secondary to dry eye syndrome).  Obesity     Peripheral polyneuropathy     (diabetic)    Pseudophakos     (Right Eye: 05-; Left Eye: 04-) - Dr. Car Shafer.     Stroke [facility])     (Multiple) MRI of brain 10/2008 shows multiple infarcts involving the temporal and parietal areas.  Trichiasis     (left eye)    Type 2 diabetes mellitus ([facility]        Past Surgical History:   Procedure Laterality Date    CATARACT REMOVAL WITH IMPLANT  2012    (Right eye) - Dr. Canelo Malone.  CATARACT REMOVAL WITH IMPLANT  2012    (Left eye) - Dr. Canelo Malone.   SECTION      CHOLECYSTECTOMY      CORONARY ARTERY BYPASS GRAFT  12-    (x4) - LIMA-LAD, SVG-diagnoal 1, SVG-OM1, and SVG-PDA. Exploration of left radial. (Dr. Macarena Judd).  EYE SURGERY Left     as a child     GASTRIC BYPASS SURGERY      HYSTERECTOMY      (abdominal)  with left oophorectomy. Right ovary retained.     TONSILLECTOMY AND ADENOIDECTOMY         [facility]     Social History     Socioeconomic History    Marital status:      Spouse name: Not on file    Number of children: Not on file    Years of education: Not on file    Highest education level: Not on file   Occupational History    Not on file   Social Needs    Financial resource strain: Not on file    Food insecurity     Worry: Not on file     Inability: Not on file    Transportation needs     Medical: Not on file     Non-medical: Not on file   Tobacco Use    Smoking status: Never Smoker    Smokeless tobacco: Never Used    Tobacco comment: never smoker eclamb rrt 17   Substance and Sexual Activity    Alcohol use: No    Drug use: No    Sexual activity: Not on file   Lifestyle    Physical activity     Days per week: Not on file     Minutes per session: Not on file    Stress: Not on file   Relationships    Social connections     Talks on phone: Not on file     Gets together: Not on file     Attends Confucianist service: Not on file     Active member of club or organization: Not on file     Attends meetings of clubs or organizations: Not on file     Relationship status: Not on file    Intimate partner violence     Fear of current or ex partner: Not on file     Emotionally abused: Not on file     Physically abused: Not on file     Forced sexual activity: Not on file   Other Topics Concern    Not on file   Social History Narrative    Not on file       Review of Systems   Constitutional: Positive for activity change. Respiratory: Negative for shortness of breath (+CUTLER ). Cardiovascular: Positive for leg swelling. Musculoskeletal: Positive for arthralgias. Neurological: Positive for weakness. All other systems reviewed and are negative. Physical Exam  Vitals signs and nursing note reviewed. Constitutional:       General: She is not in acute distress. Appearance: Normal appearance. She is well-developed. She is not diaphoretic. HENT:      Head: Normocephalic and atraumatic. Right Ear: External ear normal.      Left Ear: External ear normal.   Eyes:      General:         Right eye: No discharge. Left eye: No discharge. Neck:      Trachea: No tracheal deviation. Cardiovascular:      Rate and Rhythm: Normal rate and regular rhythm. Pulses: Normal pulses. Heart sounds: Normal heart sounds. No murmur. No friction rub. No gallop. Pulmonary:      Effort: Pulmonary effort is normal. No respiratory distress. Breath sounds: Normal breath sounds. No stridor. No wheezing, rhonchi or rales. Chest:      Chest wall: No tenderness. Abdominal:      General: Bowel sounds are normal. There is no distension. Palpations: Abdomen is soft. Tenderness: There is no abdominal tenderness. Comments: Obese   Musculoskeletal:         General: No swelling. Right lower leg: Edema present. Left lower leg: Edema (+2 bilat lower extremity edema) present. Skin:     General: Skin is warm and dry. Capillary Refill: Capillary refill takes less than 2 seconds. Coloration: Skin is not pale. Findings: No rash. Neurological:      General: No focal deficit present. Mental Status: She is alert. Mental status is at baseline. Cranial Nerves: No cranial nerve deficit. Sensory: No sensory deficit. Coordination: Coordination normal.   Psychiatric:         Mood and Affect: Mood normal.         Behavior: Behavior normal.         Vital Signs: Temperature 97.8 °F, blood pressure 162/74, pulse 76, respirations 18, SPO2 93% on room air    Assessment:  1. Chronic combined systolic and diastolic CHF (congestive heart failure) (HCC)  Increase Bumex to 1 mg twice a day for the next 4 days. Monitor weight daily, diet    2. Essential hypertension  Mildly elevated today, increase Bumex, continue to monitor blood pressure    3. Stage 3 chronic kidney disease, unspecified whether stage 3a or 3b CKD  Labs stable at present, continue to monitor    4. Dementia without behavioral disturbance, unspecified dementia type (HCC)  Stable at present, no significant behavioral outbursts      Plan:  As noted above. Follow up for routine visit. Call sooner with concerns prior.     Electronically signed by SHIRA Herrera CNP on 10/19/2020 at 1:35 PM

## 2020-10-21 ENCOUNTER — TELEPHONE (OUTPATIENT)
Dept: INTERNAL MEDICINE | Age: 70
End: 2020-10-21

## 2020-10-23 ENCOUNTER — TELEPHONE (OUTPATIENT)
Dept: INTERNAL MEDICINE | Age: 70
End: 2020-10-23

## 2020-10-23 ENCOUNTER — TELEPHONE (OUTPATIENT)
Dept: FAMILY MEDICINE CLINIC | Age: 70
End: 2020-10-23

## 2020-10-23 RX ORDER — HYDRALAZINE HYDROCHLORIDE 50 MG/1
75 TABLET, FILM COATED ORAL EVERY 8 HOURS SCHEDULED
Qty: 90 TABLET | Refills: 0 | Status: SHIPPED | OUTPATIENT
Start: 2020-10-23 | End: 2021-03-10 | Stop reason: SDUPTHER

## 2020-10-23 RX ORDER — CLOPIDOGREL BISULFATE 75 MG/1
75 TABLET ORAL DAILY
Qty: 30 TABLET | Refills: 0 | Status: SHIPPED | OUTPATIENT
Start: 2020-10-23 | End: 2021-03-10 | Stop reason: SDUPTHER

## 2020-10-23 RX ORDER — CITALOPRAM 20 MG/1
20 TABLET ORAL DAILY
Qty: 30 TABLET | Refills: 0 | Status: SHIPPED | OUTPATIENT
Start: 2020-10-23 | End: 2021-03-10 | Stop reason: SDUPTHER

## 2020-10-23 RX ORDER — DOCUSATE SODIUM 100 MG/1
100 CAPSULE, LIQUID FILLED ORAL 2 TIMES DAILY
Qty: 60 CAPSULE | Refills: 0 | Status: SHIPPED | OUTPATIENT
Start: 2020-10-23 | End: 2020-11-25 | Stop reason: ALTCHOICE

## 2020-10-23 RX ORDER — RIVASTIGMINE 9.5 MG/24H
PATCH, EXTENDED RELEASE TRANSDERMAL
Qty: 30 PATCH | Refills: 0 | Status: SHIPPED | OUTPATIENT
Start: 2020-10-23 | End: 2021-03-10 | Stop reason: SDUPTHER

## 2020-10-23 RX ORDER — ATORVASTATIN CALCIUM 40 MG/1
TABLET, FILM COATED ORAL
Qty: 30 TABLET | Refills: 0 | Status: SHIPPED | OUTPATIENT
Start: 2020-10-23 | End: 2021-03-10 | Stop reason: SDUPTHER

## 2020-10-23 RX ORDER — ALLOPURINOL 300 MG/1
300 TABLET ORAL DAILY
Qty: 30 TABLET | Refills: 0 | Status: ON HOLD | OUTPATIENT
Start: 2020-10-23 | End: 2020-12-04 | Stop reason: ALTCHOICE

## 2020-10-23 RX ORDER — LOSARTAN POTASSIUM 50 MG/1
50 TABLET ORAL DAILY
Qty: 30 TABLET | Refills: 0 | Status: SHIPPED | OUTPATIENT
Start: 2020-10-23 | End: 2020-11-22 | Stop reason: SDUPTHER

## 2020-10-23 RX ORDER — OMEPRAZOLE 20 MG/1
20 CAPSULE, DELAYED RELEASE ORAL
Qty: 30 CAPSULE | Refills: 0 | Status: ON HOLD | OUTPATIENT
Start: 2020-10-23 | End: 2021-01-11 | Stop reason: HOSPADM

## 2020-10-23 RX ORDER — AMLODIPINE BESYLATE 10 MG/1
10 TABLET ORAL DAILY
Qty: 30 TABLET | Refills: 0 | Status: ON HOLD | OUTPATIENT
Start: 2020-10-23 | End: 2020-12-04 | Stop reason: ALTCHOICE

## 2020-10-23 RX ORDER — OXYBUTYNIN CHLORIDE 5 MG/1
TABLET ORAL
Qty: 60 TABLET | Refills: 0 | Status: SHIPPED | OUTPATIENT
Start: 2020-10-23 | End: 2020-11-22 | Stop reason: SDUPTHER

## 2020-10-23 RX ORDER — BUMETANIDE 1 MG/1
1 TABLET ORAL DAILY
Qty: 30 TABLET | Refills: 0 | Status: ON HOLD | OUTPATIENT
Start: 2020-10-23 | End: 2020-12-23 | Stop reason: HOSPADM

## 2020-10-23 RX ORDER — MEMANTINE HYDROCHLORIDE 10 MG/1
TABLET ORAL
Qty: 60 TABLET | Refills: 0 | Status: SHIPPED | OUTPATIENT
Start: 2020-10-23 | End: 2021-01-01

## 2020-10-23 RX ORDER — INSULIN GLARGINE 100 [IU]/ML
20 INJECTION, SOLUTION SUBCUTANEOUS NIGHTLY
Qty: 1 VIAL | Refills: 0 | Status: ON HOLD | OUTPATIENT
Start: 2020-10-23 | End: 2021-01-11 | Stop reason: HOSPADM

## 2020-10-23 NOTE — TELEPHONE ENCOUNTER
Ramiro requesting a refill of the below medication which has been pended for you:     Requested Prescriptions     Pending Prescriptions Disp Refills    allopurinol (ZYLOPRIM) 300 MG tablet 30 tablet 0     Sig: Take 1 tablet by mouth daily    amLODIPine (NORVASC) 10 MG tablet 30 tablet 0     Sig: Take 1 tablet by mouth daily    atorvastatin (LIPITOR) 40 MG tablet 30 tablet 0     Sig: TAKE 1 TABLET BY MOUTH EVERYDAY    bumetanide (BUMEX) 1 MG tablet 30 tablet 0     Sig: Take 1 tablet by mouth daily    citalopram (CELEXA) 20 MG tablet 30 tablet 0     Sig: Take 1 tablet by mouth daily    clopidogrel (PLAVIX) 75 MG tablet 30 tablet 0     Sig: Take 1 tablet by mouth daily    docusate sodium (COLACE) 100 MG capsule 60 capsule 0     Sig: Take 1 capsule by mouth 2 times daily    insulin lispro (HUMALOG) 100 UNIT/ML injection vial 3 vial 0     Sig: Use 4 times a day per sliding scale    hydrALAZINE (APRESOLINE) 50 MG tablet 90 tablet 0     Sig: Take 1.5 tablets by mouth every 8 hours    insulin glargine (LANTUS) 100 UNIT/ML injection vial 1 vial 0     Sig: Inject 20 Units into the skin nightly    losartan (COZAAR) 50 MG tablet 30 tablet 0     Sig: Take 1 tablet by mouth daily    memantine (NAMENDA) 10 MG tablet 60 tablet 0     Sig: TAKE 1 TABLET BY MOUTH TWICE DAILY    omeprazole (PRILOSEC) 20 MG delayed release capsule 30 capsule 0     Sig: Take 1 capsule by mouth every morning (before breakfast)    oxybutynin (DITROPAN) 5 MG tablet 60 tablet 0     Sig: TAKE 1 TABLET BY MOUTH TWICE DAILY    rivastigmine (EXELON) 9.5 MG/24HR 30 patch 0     Sig: APPLY 1 NEW PATCH EVERY 24 HOURS       Last Appointment Date: Visit date not found  Next Appointment Date: Visit date not found    Allergies   Allergen Reactions    Bactrim [Sulfamethoxazole-Trimethoprim] Hives    Clonidine Derivatives     Amoxicillin-Pot Clavulanate Diarrhea, Nausea Only and Nausea And Vomiting

## 2020-10-25 PROCEDURE — 99309 SBSQ NF CARE MODERATE MDM 30: CPT | Performed by: INTERNAL MEDICINE

## 2020-10-27 ENCOUNTER — TELEPHONE (OUTPATIENT)
Dept: INTERNAL MEDICINE | Age: 70
End: 2020-10-27

## 2020-10-29 ENCOUNTER — OUTSIDE SERVICES (OUTPATIENT)
Dept: INTERNAL MEDICINE | Age: 70
End: 2020-10-29
Payer: MEDICARE

## 2020-10-29 PROCEDURE — 99315 NF DSCHRG MGMT 30 MIN/LESS: CPT | Performed by: NURSE PRACTITIONER

## 2020-10-29 ASSESSMENT — ENCOUNTER SYMPTOMS
RHINORRHEA: 0
COLOR CHANGE: 0
SHORTNESS OF BREATH: 0
TROUBLE SWALLOWING: 0
SINUS PRESSURE: 0
SORE THROAT: 0
CONSTIPATION: 0
COUGH: 0
ABDOMINAL PAIN: 0
VOMITING: 0
FACIAL SWELLING: 0
EYE PAIN: 0
WHEEZING: 0
BLOOD IN STOOL: 0
CHEST TIGHTNESS: 0
DIARRHEA: 0
NAUSEA: 0

## 2020-10-29 NOTE — PROGRESS NOTES
10/29/20  Francisco Javier Cox Branson  1950    Patient Resident of [facility]    Chief Complaint  1. Dementia without behavioral disturbance, unspecified dementia type ([facility]    2. Chronic combined systolic and diastolic CHF (congestive heart failure) ([facility]    3. Essential hypertension    4. Stage 3 chronic kidney disease, unspecified whether stage 3a or 3b CKD    5. Uncontrolled type 2 diabetes mellitus with stage 3 chronic kidney disease, with long-term current use of insulin ([facility]    6. Multiple pulmonary nodules    7. Anxiety and depression    8. Coronary artery disease involving native heart without angina pectoris, unspecified vessel or lesion type    9. Morbidly obese ([facility]    10. Peripheral polyneuropathy        HPI:  71-year-old patient with above chronic health conditions being seen for discharge needs from [facility]. Patient initially admitted to [address] [place] [address] after a short hospitalization at [facility] 8/7/2020 through 8/14/2020 for acute CHF, hypertension, uncontrolled diabetes mellitus, acute cystitis, infestation by bedbugs and maggots in wounds to lower extremities with lymphedema to lower extremities. Patient has been working with physical therapy and gaining strength. Recommended long-term care however family insisting they bring her home. Wounds healing to lower extremities. No further evidence of infestation to lower extremities. Blood sugars have been more stable. Denies any lightheadedness. No dizziness no chest discomfort. Is noted she had one episode of rehospitalization for acute on chronic kidney disease. Most recent labs have been stable. Has been eating and drinking without difficulty. Denies any concerns at present. Nursing staff state she needs walker prescription which she has demonstrated uses a file here at the facility otherwise to decline any further needs at present.     Allergies   Allergen Reactions    Bactrim [Sulfamethoxazole-Trimethoprim] Hives    Clonidine Derivatives     Amoxicillin-Pot Clavulanate Diarrhea, Nausea Only and Nausea And Vomiting       Past Medical History:   Diagnosis Date    Anxiety and depression     Background diabetic retinopathy(362.01)     (Mild)    Balance problem     CAD (coronary artery disease)     (with coronary artery bypass graft x4).  Cancer of uterus [facility])     history of, (probable cure)    Chronic kidney disease     Chronic low back pain     CVA (cerebral vascular accident) ([address]     Dementia ([address]     (moderate)    Dry eye syndrome     GERD (gastroesophageal reflux disease)     Glaucoma suspect     Gout     Homonymous hemianopsia     (Right)    Hyperlipidemia     Hypertension     Keratitis     (secondary to dry eye syndrome).  Obesity     Peripheral polyneuropathy     (diabetic)    Pseudophakos     (Right Eye: 2012; Left Eye: 2012) - Dr. Candida Coats.  Stroke [facility])     (Multiple) MRI of brain 10/2008 shows multiple infarcts involving the temporal and parietal areas.  Trichiasis     (left eye)    Type 2 diabetes mellitus ([address]        Past Surgical History:   Procedure Laterality Date    CATARACT REMOVAL WITH IMPLANT  2012    (Right eye) - Dr. Candida Coats.  CATARACT REMOVAL WITH IMPLANT  2012    (Left eye) - Dr. Candida Coats.   SECTION      CHOLECYSTECTOMY      CORONARY ARTERY BYPASS GRAFT  12-    (x4) - LIMA-LAD, SVG-diagnoal 1, SVG-OM1, and SVG-PDA. Exploration of left radial. (Dr. Jorge Luis Gutierrez).  EYE SURGERY Left     as a child     GASTRIC BYPASS SURGERY      HYSTERECTOMY      (abdominal)  with left oophorectomy. Right ovary retained.     TONSILLECTOMY AND ADENOIDECTOMY         Medications as per [facility] Chart /reviewed     Social History     Socioeconomic History    Marital status:      Spouse name: Not on file    Number of children: Not on file    Years of education: Not on file    Highest education level: difficulty urinating. Musculoskeletal: Negative for arthralgias, gait problem, myalgias, neck pain and neck stiffness. Skin: Negative for color change, rash and wound. Neurological: Negative for dizziness, tremors, seizures, weakness, light-headedness, numbness and headaches. Psychiatric/Behavioral: Negative for confusion and hallucinations. The patient is not nervous/anxious. Physical Exam  Vitals signs and nursing note reviewed. Constitutional:       General: She is not in acute distress. Appearance: Normal appearance. She is well-developed. She is not diaphoretic. HENT:      Head: Normocephalic and atraumatic. Right Ear: External ear normal.      Left Ear: External ear normal.   Eyes:      General:         Right eye: No discharge. Left eye: No discharge. Neck:      Trachea: No tracheal deviation. Cardiovascular:      Rate and Rhythm: Normal rate and regular rhythm. Pulses: Normal pulses. Heart sounds: Normal heart sounds. No murmur. No friction rub. No gallop. Pulmonary:      Effort: Pulmonary effort is normal. No respiratory distress. Breath sounds: Normal breath sounds. No stridor. No wheezing, rhonchi or rales. Chest:      Chest wall: No tenderness. Abdominal:      General: Bowel sounds are normal. There is no distension. Palpations: Abdomen is soft. Tenderness: There is no abdominal tenderness. Comments: Morbidly obese   Musculoskeletal:         General: No swelling. Skin:     General: Skin is warm and dry. Capillary Refill: Capillary refill takes less than 2 seconds. Coloration: Skin is not pale. Findings: No rash. Neurological:      General: No focal deficit present. Mental Status: She is alert. Mental status is at baseline. Cranial Nerves: No cranial nerve deficit. Sensory: No sensory deficit.       Coordination: Coordination normal.   Psychiatric:         Mood and Affect: Mood normal. concerns prior.     Electronically signed by SHIRA Dixon CNP on 10/29/2020 at 10:41 AM

## 2020-11-02 ENCOUNTER — TELEPHONE (OUTPATIENT)
Dept: FAMILY MEDICINE CLINIC | Age: 70
End: 2020-11-02

## 2020-11-02 NOTE — TELEPHONE ENCOUNTER
[facility] Transitions Initial Follow Up Call    Outreach made within 2 business days of discharge: Yes    Patient: Mumtaz Winter Patient : 1950   MRN: [id number]  Reason for Admission: CHF Diabetes  Discharge Date: 20       Spoke with: Abbie  Discharge department/facility: [facility] Interactive Patient Contact:  Was patient able to fill all prescriptions: Yes  Was patient instructed to bring all medications to the follow-up visit: Yes  Is patient taking all medications as directed in the discharge summary?  Yes  Does patient understand their discharge instructions: Yes  Does patient have questions or concerns that need addressed prior to 7-14 day follow up office visit: yes - states dyspnea at night    Scheduled appointment with PCP within 7-14 days    Follow Up  Future Appointments   Date Time Provider Jennifer Hoff   2020  3:30 PM [facility] CT ROOM [facility] CT SCAN [facility] [place]   2020 10:00 AM Fabián Purcell [facility]   12/15/2020 10:00 AM SCHEDULE, [facility] CARDIOLOGY [facility] [place]   12/15/2020 11:10 AM Aris Martinez MD [facility]   3/16/2021 10:30 AM [facility]       Charlie Toro LPN

## 2020-11-02 NOTE — TELEPHONE ENCOUNTER
[facility] called to inform [facility] that patient wants Oxygen at night because she feels SOB. Per Christina Donohue with [facility] Oxygen levels were fine in Nursing home and is running 94-97 when she has been checking it. She is aware this can be discussed at ov.

## 2020-11-03 ENCOUNTER — TELEPHONE (OUTPATIENT)
Dept: FAMILY MEDICINE CLINIC | Age: 70
End: 2020-11-03

## 2020-11-04 ENCOUNTER — HOSPITAL ENCOUNTER (OUTPATIENT)
Dept: LAB | Age: 70
Discharge: HOME | End: 2020-11-04
Payer: MEDICARE

## 2020-11-04 ENCOUNTER — OFFICE VISIT (OUTPATIENT)
Dept: FAMILY MEDICINE CLINIC | Age: 70
End: 2020-11-04
Payer: MEDICARE

## 2020-11-04 ENCOUNTER — HOSPITAL ENCOUNTER (OUTPATIENT)
Dept: CT IMAGING | Age: 70
Discharge: HOME | End: 2020-11-06
Payer: MEDICARE

## 2020-11-04 VITALS
WEIGHT: 238 LBS | BODY MASS INDEX: 37.28 KG/M2 | DIASTOLIC BLOOD PRESSURE: 78 MMHG | OXYGEN SATURATION: 94 % | SYSTOLIC BLOOD PRESSURE: 144 MMHG | HEART RATE: 88 BPM

## 2020-11-04 LAB
ABSOLUTE EOS #: 0.05 K/UL (ref 0–0.44)
ABSOLUTE IMMATURE GRANULOCYTE: 0.04 K/UL (ref 0–0.3)
ABSOLUTE LYMPH #: 1.06 K/UL (ref 1.1–3.7)
ABSOLUTE MONO #: 0.63 K/UL (ref 0.1–1.2)
ALBUMIN SERPL-MCNC: 3.8 G/DL (ref 3.5–5.2)
ALBUMIN/GLOBULIN RATIO: 1 (ref 1–2.5)
ALP BLD-CCNC: 154 U/L (ref 35–104)
ALT SERPL-CCNC: 29 U/L (ref 5–33)
ANION GAP SERPL CALCULATED.3IONS-SCNC: 13 MMOL/L (ref 9–17)
AST SERPL-CCNC: 21 U/L
BASOPHILS # BLD: 1 % (ref 0–2)
BASOPHILS ABSOLUTE: 0.04 K/UL (ref 0–0.2)
BILIRUB SERPL-MCNC: 0.55 MG/DL (ref 0.3–1.2)
BUN BLDV-MCNC: 25 MG/DL (ref 8–23)
BUN/CREAT BLD: 19 (ref 9–20)
CALCIUM SERPL-MCNC: 9.6 MG/DL (ref 8.6–10.4)
CHLORIDE BLD-SCNC: 101 MMOL/L (ref 98–107)
CHOLESTEROL/HDL RATIO: 1.8
CHOLESTEROL: 104 MG/DL
CO2: 24 MMOL/L (ref 20–31)
CREAT SERPL-MCNC: 1.3 MG/DL (ref 0.5–0.9)
DIFFERENTIAL TYPE: ABNORMAL
EOSINOPHILS RELATIVE PERCENT: 1 % (ref 1–4)
ESTIMATED AVERAGE GLUCOSE: 146 MG/DL
GFR AFRICAN AMERICAN: 49 ML/MIN
GFR NON-AFRICAN AMERICAN: 41 ML/MIN
GFR SERPL CREATININE-BSD FRML MDRD: ABNORMAL ML/MIN/{1.73_M2}
GFR SERPL CREATININE-BSD FRML MDRD: ABNORMAL ML/MIN/{1.73_M2}
GLUCOSE BLD-MCNC: 194 MG/DL (ref 70–99)
HBA1C MFR BLD: 6.7 % (ref 4.8–5.9)
HCT VFR BLD CALC: 24.5 % (ref 36.3–47.1)
HDLC SERPL-MCNC: 59 MG/DL
HEMOGLOBIN: 7.5 G/DL (ref 11.9–15.1)
IMMATURE GRANULOCYTES: 1 %
LDL CHOLESTEROL: 34 MG/DL (ref 0–130)
LYMPHOCYTES # BLD: 12 % (ref 24–43)
MCH RBC QN AUTO: 29.9 PG (ref 25.2–33.5)
MCHC RBC AUTO-ENTMCNC: 30.6 G/DL (ref 25.2–33.5)
MCV RBC AUTO: 97.6 FL (ref 82.6–102.9)
MONOCYTES # BLD: 7 % (ref 3–12)
NRBC AUTOMATED: 0 PER 100 WBC
PDW BLD-RTO: 16.8 % (ref 11.8–14.4)
PLATELET # BLD: 259 K/UL (ref 138–453)
PLATELET ESTIMATE: ABNORMAL
PMV BLD AUTO: 9.5 FL (ref 8.1–13.5)
POTASSIUM SERPL-SCNC: 3.8 MMOL/L (ref 3.7–5.3)
RBC # BLD: 2.51 M/UL (ref 3.95–5.11)
RBC # BLD: ABNORMAL 10*6/UL
SEG NEUTROPHILS: 78 % (ref 36–65)
SEGMENTED NEUTROPHILS ABSOLUTE COUNT: 6.92 K/UL (ref 1.5–8.1)
SODIUM BLD-SCNC: 138 MMOL/L (ref 135–144)
TOTAL PROTEIN: 7.5 G/DL (ref 6.4–8.3)
TRIGL SERPL-MCNC: 55 MG/DL
VLDLC SERPL CALC-MCNC: NORMAL MG/DL (ref 1–30)
WBC # BLD: 8.7 K/UL (ref 3.5–11.3)
WBC # BLD: ABNORMAL 10*3/UL

## 2020-11-04 PROCEDURE — 36415 COLL VENOUS BLD VENIPUNCTURE: CPT

## 2020-11-04 PROCEDURE — 83036 HEMOGLOBIN GLYCOSYLATED A1C: CPT

## 2020-11-04 PROCEDURE — 80053 COMPREHEN METABOLIC PANEL: CPT

## 2020-11-04 PROCEDURE — 1111F DSCHRG MED/CURRENT MED MERGE: CPT | Performed by: PHYSICIAN ASSISTANT

## 2020-11-04 PROCEDURE — 80061 LIPID PANEL: CPT

## 2020-11-04 PROCEDURE — 85025 COMPLETE CBC W/AUTO DIFF WBC: CPT

## 2020-11-04 PROCEDURE — 71250 CT THORAX DX C-: CPT

## 2020-11-04 PROCEDURE — 99495 TRANSJ CARE MGMT MOD F2F 14D: CPT | Performed by: PHYSICIAN ASSISTANT

## 2020-11-04 RX ORDER — PEN NEEDLE, DIABETIC 31 GX5/16"
NEEDLE, DISPOSABLE MISCELLANEOUS
Qty: 300 EACH | Refills: 0 | Status: SHIPPED | OUTPATIENT
Start: 2020-11-04

## 2020-11-04 RX ORDER — LANCETS 30 GAUGE
EACH MISCELLANEOUS
Qty: 300 EACH | Refills: 3 | Status: ON HOLD | OUTPATIENT
Start: 2020-11-04 | End: 2022-01-01

## 2020-11-04 RX ORDER — GLUCOSAMINE HCL/CHONDROITIN SU 500-400 MG
CAPSULE ORAL
Qty: 200 STRIP | Refills: 3 | Status: ON HOLD | OUTPATIENT
Start: 2020-11-04 | End: 2022-01-01

## 2020-11-04 NOTE — PROGRESS NOTES
Post-Discharge Transitional Care Management Services or Hospital Follow Up    Oralia Ruvalcaba   YOB: 1950    Date of Office Visit:  11/4/2020  Date of Hospital Admission: 8/19/2020  Date of Hospital Discharge: 8/24/2020  Date of ECF Discharge: 10/31/2020     Care management risk score Rising risk (score 2-5) and Complex Care (Scores >=6): 11     Non face to face  following discharge, date last encounter closed (first attempt may have been earlier): 11/2/2020 12:41 PM     Call initiated 2 business days of discharge: Yes    Patient Active Problem List   Diagnosis    Dementia ([address]    Hypertension    Hyperlipidemia    Morbidly obese ([address]    Gout    Peripheral polyneuropathy    Anxiety and depression    Acute kidney injury superimposed on CKD ([address]    CAD (coronary artery disease)    Balance problem    CVA (cerebral vascular accident) ([address]    Type 2 diabetes mellitus with hyperglycemia, with long-term current use of insulin ([address]    CHF (congestive heart failure), NYHA class I, acute on chronic, combined ([address]    Acute cystitis    Infestation by bed bug    Infestation by maggots    Lymphedema of both lower extremities    PAD (peripheral artery disease) ([address]    Pressure injury of right heel, unstageable ([address]    Acute kidney injury ([address]    Bradycardia    Hyperkalemia    AV block, 1st degree    Wounds, multiple    Buttock wound    Decubitus ulcer of trochanteric region of right hip, stage 2 (HCC)       Allergies   Allergen Reactions    Bactrim [Sulfamethoxazole-Trimethoprim] Hives    Clonidine Derivatives     Amoxicillin-Pot Clavulanate Diarrhea, Nausea Only and Nausea And Vomiting       Medications listed as ordered at the time of discharge from hospital:  Reviewed     Medications marked \"taking\" at this time  Outpatient Medications Marked as Taking for the 11/4/20 encounter (Office Visit) with ERINN Swenson   Medication Sig Dispense Refill    allopurinol (Jackquline Days) 300 MG tablet Take 1 tablet by mouth daily 30 tablet 0    amLODIPine (NORVASC) 10 MG tablet Take 1 tablet by mouth daily 30 tablet 0    atorvastatin (LIPITOR) 40 MG tablet TAKE 1 TABLET BY MOUTH EVERYDAY 30 tablet 0    bumetanide (BUMEX) 1 MG tablet Take 1 tablet by mouth daily 30 tablet 0    citalopram (CELEXA) 20 MG tablet Take 1 tablet by mouth daily 30 tablet 0    clopidogrel (PLAVIX) 75 MG tablet Take 1 tablet by mouth daily 30 tablet 0    docusate sodium (COLACE) 100 MG capsule Take 1 capsule by mouth 2 times daily 60 capsule 0    insulin lispro (HUMALOG) 100 UNIT/ML injection vial Use 4 times a day per sliding scale 3 vial 0    hydrALAZINE (APRESOLINE) 50 MG tablet Take 1.5 tablets by mouth every 8 hours 90 tablet 0    insulin glargine (LANTUS) 100 UNIT/ML injection vial Inject 20 Units into the skin nightly 1 vial 0    losartan (COZAAR) 50 MG tablet Take 1 tablet by mouth daily 30 tablet 0    memantine (NAMENDA) 10 MG tablet TAKE 1 TABLET BY MOUTH TWICE DAILY 60 tablet 0    omeprazole (PRILOSEC) 20 MG delayed release capsule Take 1 capsule by mouth every morning (before breakfast) 30 capsule 0    oxybutynin (DITROPAN) 5 MG tablet TAKE 1 TABLET BY MOUTH TWICE DAILY 60 tablet 0    rivastigmine (EXELON) 9.5 MG/24HR APPLY 1 NEW PATCH EVERY 24 HOURS 30 patch 0    Diabetic Shoe MISC by Does not apply route 1 each 0    Compression Stockings MISC by Does not apply route 20-30 mm Hg bilateral knee high 5 each 0        Medications patient taking as of now reconciled against medications ordered at time of hospital discharge: Yes    Chief Complaint   Patient presents with    Follow-Up from [address] [place] [address]       Patient is seen in the office accompanied by her daughter and grandson for ECF follow-up and home health face to face evaluation. Patient discharged from [address] 10/31/2020.   Patient and her family are currently residing in a hotel after eviction from their apartment. Daughter reports she is finding new low income housing. Siena Corral has a history of noncompliance with medical treatment. Patient was admitted for nonhealing ulcer secondary to PAD and acute kidney injury. Patient has self-adjusted insulin dosages since discharge home. Patient expresses concerns of hypoglycemia. Of note, family is eating fast food or frozen meals cooked in the microwave of their hotel room. Patient has follow-up visits scheduled with cardiology and nephrology. Patient has a history of lymphedema and wraps legs with Ace bandages. She also has a lymphedema pump for use. She expresses concerns of dyspnea. She was on oxygen initially during her ECF stay but was not discharged with oxygen therapy. Inpatient course: Discharge summary reviewed- see chart. Interval history/Current status: reviewed    Vitals:    11/04/20 1007 11/04/20 1024   BP: (!) 150/60 (!) 154/74   Site: Left Upper Arm Left Upper Arm   Position: Sitting Sitting   Cuff Size: Large Adult Large Adult   Pulse: 88    SpO2: 94%    Weight: 238 lb (108 kg)      Body mass index is 37.28 kg/m². Wt Readings from Last 3 Encounters:   11/04/20 238 lb (108 kg)   10/05/20 219 lb (99.3 kg)   09/15/20 213 lb (96.6 kg)     BP Readings from Last 3 Encounters:   11/04/20 (!) 144/78   10/05/20 (!) 176/64   09/15/20 (!) 152/68       Review of Systems   Constitutional: Negative. HENT: Negative. Respiratory: Positive for shortness of breath. Cardiovascular: Positive for leg swelling. Physical Exam  HENT:      Head: Normocephalic. Right Ear: Tympanic membrane normal.      Left Ear: Tympanic membrane normal.      Mouth/Throat:      Mouth: Mucous membranes are moist.   Eyes:      Pupils: Pupils are equal, round, and reactive to light. Cardiovascular:      Rate and Rhythm: Normal rate. Pulmonary:      Effort: Pulmonary effort is normal.   Musculoskeletal:      Right lower leg: Edema present.       Left lower leg: Edema present. Comments: Ace bandages bilaterally   Neurological:      Mental Status: She is alert. Psychiatric:         Mood and Affect: Mood normal.               Assessment/Plan:  1. Type 2 diabetes mellitus with hyperglycemia, with long-term current use of insulin (HCC)  - [place] DISCHARGE MEDS RECONCILED W/ CURRENT OUTPATIENT MED LIST  - Comprehensive Metabolic Panel; Future  - CBC Auto Differential; Future  - Hemoglobin A1C; Future  - Lipid Panel; Future  - Lancets MISC; Checks blood sugar ac and HS  Dispense: 300 each; Refill: 3  - blood glucose monitor strips; Test 3  times a day & as needed for symptoms of irregular blood glucose. Dispense sufficient amount for indicated testing frequency plus additional to accommodate PRN testing needs. Dispense: 200 strip; Refill: 3  - Alcohol Swabs (ALCOHOL PREP) PADS; Checks blood sugar 3 times a day  Dispense: 300 each; Refill: 0    2. Acute kidney injury ([facility]  - [place] DISCHARGE MEDS RECONCILED W/ CURRENT OUTPATIENT MED LIST  - Comprehensive Metabolic Panel; Future  - CBC Auto Differential; Future  - Hemoglobin A1C; Future  - Lipid Panel; Future    3. Coronary artery disease involving native heart without angina pectoris, unspecified vessel or lesion type  - [place] DISCHARGE MEDS RECONCILED W/ CURRENT OUTPATIENT MED LIST  - Comprehensive Metabolic Panel; Future  - CBC Auto Differential; Future  - Hemoglobin A1C; Future  - Lipid Panel; Future    4. Dementia without behavioral disturbance, unspecified dementia type ([facility]    - FL DISCHARGE MEDS RECONCILED W/ CURRENT OUTPATIENT MED LIST  - Comprehensive Metabolic Panel; Future  - CBC Auto Differential; Future  - Hemoglobin A1C; Future  - Lipid Panel; Future    5. Hypoxia    - OXYGEN; Oxgen at 2 liters per nasal cannula  Dispense: 1 Device; Refill: 0    6. At high risk for falls  On the basis of positive falls risk screening, assessment and plan is as follows: Patient has PT ordered through home health.   - [place] DISCHARGE MEDS RECONCILED W/ CURRENT OUTPATIENT MED LIST  - Comprehensive Metabolic Panel; Future  - CBC Auto Differential; Future  - Hemoglobin A1C; Future  - Lipid Panel; Future    7.  Pressure injury of right heel, unstageable ([facility]        Medical Decision Making: moderate complexity

## 2020-11-04 NOTE — PROGRESS NOTES
5Patient O at room air. Walked 5 minutes and O2 87% very short of breath.  O2 applied at 2 liters and oxygen level now 94%

## 2020-11-04 NOTE — PATIENT INSTRUCTIONS
Patient Education        Preventing Falls: Care Instructions  Your Care Instructions     Getting around your home safely can be a challenge if you have injuries or health problems that make it easy for you to fall. Loose rugs and furniture in walkways are among the dangers for many older people who have problems walking or who have poor eyesight. People who have conditions such as arthritis, osteoporosis, or dementia also have to be careful not to fall. You can make your home safer with a few simple measures. Follow-up care is a key part of your treatment and safety. Be sure to make and go to all appointments, and call your doctor if you are having problems. It's also a good idea to know your test results and keep a list of the medicines you take. How can you care for yourself at home? Taking care of yourself  · You may get dizzy if you do not drink enough water. To prevent dehydration, drink plenty of fluids, enough so that your urine is light yellow or clear like water. Choose water and other caffeine-free clear liquids. If you have kidney, heart, or liver disease and have to limit fluids, talk with your doctor before you increase the amount of fluids you drink. · Exercise regularly to improve your strength, muscle tone, and balance. Walk if you can. Swimming may be a good choice if you cannot walk easily. · Have your vision and hearing checked each year or any time you notice a change. If you have trouble seeing and hearing, you might not be able to avoid objects and could lose your balance. · Know the side effects of the medicines you take. Ask your doctor or pharmacist whether the medicines you take can affect your balance. Sleeping pills or sedatives can affect your balance. · Limit the amount of alcohol you drink. Alcohol can impair your balance and other senses. · Ask your doctor whether calluses or corns on your feet need to be removed.  If you wear loose-fitting shoes because of calluses or corns, you can lose your balance and fall. · Talk to your doctor if you have numbness in your feet. Preventing falls at home  · Remove raised doorway thresholds, throw rugs, and clutter. Repair loose carpet or raised areas in the floor. · Move furniture and electrical cords to keep them out of walking paths. · Use nonskid floor wax, and wipe up spills right away, especially on ceramic tile floors. · If you use a walker or cane, put rubber tips on it. If you use crutches, clean the bottoms of them regularly with an abrasive pad, such as steel wool. · Keep your house well lit, especially Kahlil Redo, and outside walkways. Use night-lights in areas such as hallways and bathrooms. Add extra light switches or use remote switches (such as switches that go on or off when you clap your hands) to make it easier to turn lights on if you have to get up during the night. · Install sturdy handrails on stairways. · Move items in your cabinets so that the things you use a lot are on the lower shelves (about waist level). · Keep a cordless phone and a flashlight with new batteries by your bed. If possible, put a phone in each of the main rooms of your house, or carry a cell phone in case you fall and cannot reach a phone. Or, you can wear a device around your neck or wrist. You push a button that sends a signal for help. · Wear low-heeled shoes that fit well and give your feet good support. Use footwear with nonskid soles. Check the heels and soles of your shoes for wear. Repair or replace worn heels or soles. · Do not wear socks without shoes on wood floors. · Walk on the grass when the sidewalks are slippery. If you live in an area that gets snow and ice in the winter, sprinkle salt on slippery steps and sidewalks. Preventing falls in the bath  · Install grab bars and nonskid mats inside and outside your shower or tub and near the toilet and sinks. · Use shower chairs and bath benches.   · Use a hand-held shower head that will allow you to sit while showering. · Get into a tub or shower by putting the weaker leg in first. Get out of a tub or shower with your strong side first.  · Repair loose toilet seats and consider installing a raised toilet seat to make getting on and off the toilet easier. · Keep your bathroom door unlocked while you are in the shower. Where can you learn more? Go to https://[employer]. org and sign in to your [employer] account. Enter [phone] in the [employer] box to learn more about \"Preventing Falls: Care Instructions. \"     If you do not have an account, please click on the \"Sign Up Now\" link. Current as of: April 15, 2020               Content Version: 12.6  © 0817-1491 [employer]. Care instructions adapted under license by [facility] ([facility]). If you have questions about a medical condition or this instruction, always ask your healthcare professional. [address] any warranty or liability for your use of this information.

## 2020-11-05 RX ORDER — LORATADINE 10 MG/1
TABLET ORAL
Qty: 90 TABLET | Refills: 3 | Status: ON HOLD | OUTPATIENT
Start: 2020-11-05 | End: 2022-01-01

## 2020-11-05 RX ORDER — ASPIRIN 81 MG/1
81 TABLET ORAL DAILY
Qty: 90 TABLET | Refills: 3 | Status: ON HOLD | OUTPATIENT
Start: 2020-11-05 | End: 2020-12-04

## 2020-11-05 ASSESSMENT — ENCOUNTER SYMPTOMS: SHORTNESS OF BREATH: 1

## 2020-11-09 ENCOUNTER — TELEPHONE (OUTPATIENT)
Dept: FAMILY MEDICINE CLINIC | Age: 70
End: 2020-11-09

## 2020-11-09 NOTE — TELEPHONE ENCOUNTER
----- Message from Cody [place] sent at 11/6/2020  5:22 PM EST -----  Repeat CBC early next week (home health nurse to draw) and forward results to nephrology.

## 2020-11-09 NOTE — TELEPHONE ENCOUNTER
----- Message from Chela Dailey [place] sent at 11/6/2020  5:22 PM EST -----  Repeat CBC early next week (home health nurse to draw) and forward results to nephrology.

## 2020-11-10 DIAGNOSIS — D50.9 IRON DEFICIENCY ANEMIA, UNSPECIFIED IRON DEFICIENCY ANEMIA TYPE: ICD-10-CM

## 2020-11-10 LAB
BASOPHILS ABSOLUTE: 0.04 /ΜL
BASOPHILS RELATIVE PERCENT: 0.6 %
EOSINOPHILS ABSOLUTE: 0.09 /ΜL
EOSINOPHILS RELATIVE PERCENT: 1.4 %
HCT VFR BLD CALC: 23.5 % (ref 36–46)
HEMOGLOBIN: 7.6 G/DL (ref 12–16)
LYMPHOCYTES ABSOLUTE: 0.99 /ΜL
LYMPHOCYTES RELATIVE PERCENT: 15.4 %
MCH RBC QN AUTO: 30.3 PG
MCHC RBC AUTO-ENTMCNC: 32.3 G/DL
MCV RBC AUTO: 93.6 FL
MONOCYTES ABSOLUTE: 0.41 /ΜL
MONOCYTES RELATIVE PERCENT: 6.4 %
NEUTROPHILS ABSOLUTE: 4.86 /ΜL
NEUTROPHILS RELATIVE PERCENT: 75.9 %
PDW BLD-RTO: ABNORMAL %
PLATELET # BLD: 255 K/ΜL
PMV BLD AUTO: 9.8 FL
RBC # BLD: 2.51 10^6/ΜL
WBC # BLD: 6.4 10^3/ML

## 2020-11-20 ENCOUNTER — OUTSIDE SERVICES (OUTPATIENT)
Dept: INTERNAL MEDICINE | Age: 70
End: 2020-11-20
Payer: MEDICARE

## 2020-11-20 NOTE — PROGRESS NOTES
DR. Thakkar [address] VISIT    DATE OF SERVICE: 10/25/20    NURSING HOME: [facility]    CHIEF COMPLAINT/HISTORY OF CHIEF COMPLAINT: This patient is being seen for ongoing evaluation and management of her congestive heart failure, diabetes mellitus type 2, hypertension, hyperlipidemia, chronic kidney disease, dementia, and obesity. She reports being short of breath all the time. Otherwise, she is doing well with physical therapy. There are no other complaints. ALLERGIES:   Allergies   Allergen Reactions    Bactrim [Sulfamethoxazole-Trimethoprim] Hives    Clonidine Derivatives     Amoxicillin-Pot Clavulanate Diarrhea, Nausea Only and Nausea And Vomiting       MEDICATIONS: As noted on the [facility] MAR, referenced and incorporated herein. PAST MEDICAL HISTORY:   Past Medical History:   Diagnosis Date    Anxiety and depression     Background diabetic retinopathy(362.01) 2008    (Mild)    Balance problem     CAD (coronary artery disease)     (with coronary artery bypass graft x4).  Cancer of uterus [facility])     history of, (probable cure)    Chronic kidney disease     Chronic low back pain     CVA (cerebral vascular accident) ([address]     Dementia ([address]     (moderate)    Dry eye syndrome     GERD (gastroesophageal reflux disease)     Glaucoma suspect 2005    Gout     Homonymous hemianopsia 2008    (Right)    Hyperlipidemia     Hypertension     Keratitis     (secondary to dry eye syndrome).  Obesity     Peripheral polyneuropathy     (diabetic)    Pseudophakos     (Right Eye: 05-; Left Eye: 04-) - Dr. Ishmael Rene.  Stroke [facility])     (Multiple) MRI of brain 10/2008 shows multiple infarcts involving the temporal and parietal areas.     Trichiasis 2010    (left eye)    Type 2 diabetes mellitus ([address]        PAST SURGICAL HISTORY:   Past Surgical History:   Procedure Laterality Date    CATARACT REMOVAL WITH IMPLANT  05-    (Right eye) - Dr. Alicia Hooper.  CATARACT REMOVAL WITH IMPLANT  2012    (Left eye) - Dr. Alicia Hooper.   SECTION      CHOLECYSTECTOMY      CORONARY ARTERY BYPASS GRAFT  12-    (x4) - LIMA-LAD, SVG-diagnoal 1, SVG-OM1, and SVG-PDA. Exploration of left radial. (Dr. Kandace Camara).  EYE SURGERY Left     as a child     GASTRIC BYPASS SURGERY      HYSTERECTOMY      (abdominal)  with left oophorectomy. Right ovary retained.  TONSILLECTOMY AND ADENOIDECTOMY         SOCIAL HISTORY:     Tobacco:   Social History     Tobacco Use   Smoking Status Never Smoker   Smokeless Tobacco Never Used   Tobacco Comment    never smoker eclamb rrt 18     Alcohol:   Social History     Substance and Sexual Activity   Alcohol Use No     Drugs:   Social History     Substance and Sexual Activity   Drug Use No       FAMILY HISTORY: family history includes Cancer in her mother; Coronary Art Dis in an other family member; Diabetes in her father and mother; Emphysema in her father; Glaucoma in her father; Heart Disease in her father; High Blood Pressure in her mother; Kidney Disease in her mother; Berneda Civatte in an other family member; Mental Retardation in an other family member; Stroke in her mother and another family member; Uterine Cancer in her mother. REVIEW OF SYSTEMS:     Please see history of chief complaint above; otherwise no new problems with respect to General, HEENT, Cardiovascular, Respiratory, Gastrointestinal, Genitourinary, Endocrinologic, Musculoskeletal, or Neuropsychiatric complaints. PHYSICAL EXAMINATION:    Due to this being a telehealth visit, the physical examination was performed by the nurse at the facility. Vitals: Temp: 97.8 deg F. Pulse: 80. Resp: 18. BP: 165/86. General: A 77 y.o.  female. Alert and oriented to person, place and time. She  does not appear to be in any acute distress. Skin: Skin color, texture, turgor normal. No rashes or lesions.   HEENT/Neck: Essentially unremarkable  Lungs: diminished breath sound in the bases bilaterally  Heart: regular rate and rhythm, S1, S2 normal, no murmur, click, rub or gallop No S3 or S4. Abdomen: Obese soft, non-distended, non-tender, normal active bowel sounds, no masses palpated and no hepatosplenomegaly  Extremities: No clubbing, cyanosis, or edema in any of the extremities. Neurologic: cranial nerves II-XII are grossly intact    ASSESSMENT:     Diagnosis Orders   1. Shortness of breath     2. Chronic combined systolic and diastolic CHF (congestive heart failure) ([address]     3. Uncontrolled type 2 diabetes mellitus with stage 3 chronic kidney disease, with long-term current use of insulin ([address]     4. Essential hypertension     5. Mixed hyperlipidemia     6. Stage 3 chronic kidney disease, unspecified whether stage 3a or 3b CKD     7. Dementia without behavioral disturbance, unspecified dementia type ([address]     8. Anxiety and depression     9. Class 2 severe obesity with serious comorbidity and body mass index (BMI) of 37.0 to 37.9 in adult, unspecified obesity type ([address]           PLAN:    1. Continue current treatment  2. Nursing home record reviewed and updates summarized and entered into electronic record  3. Nursing home blood sugar logs and diabetic medication administration reviewed. No changes. 4. Will order a chest x-ray for the shortness of breath  5. See nursing home orders and MAR. Pursuant to the emergency declaration under the [address] [place] [address] and the [employer] and [employer] Act, this TeleHealth visit was conducted, with patient's consent, to reduce the patient's risk of exposure to COVID-19 and provide continuity of care for an established patient. Services were provided through a video synchronous discussion virtually (using [email or web address]. me) to substitute for in-person clinic visit.  The originating site was the nursing home and the distant site was the provider's home. Patient's identity was verified via name and date of birth.         Electronically signed by Ulisses Villanueva DO on 11/20/2020 at [facility]  Internal Medicine

## 2020-11-20 NOTE — TELEPHONE ENCOUNTER
Abbie called requesting a refill for mother  Looks like patient is in nursing home.   Requested Prescriptions     Pending Prescriptions Disp Refills    oxybutynin (DITROPAN) 5 MG tablet 60 tablet 0     Sig: TAKE 1 TABLET BY MOUTH TWICE DAILY    losartan (COZAAR) 50 MG tablet 30 tablet 0     Sig: Take 1 tablet by mouth daily     Last Appt:  11/4/2020 (hosp f/u)  Next Appt:   12/30/2020 (Hanna Clamp)  Med verified in [facility] [address]    Dr. Nicola Moura saw in Nursing home on 11/20/20

## 2020-11-22 RX ORDER — LOSARTAN POTASSIUM 50 MG/1
50 TABLET ORAL DAILY
Qty: 30 TABLET | Refills: 5 | Status: ON HOLD | OUTPATIENT
Start: 2020-11-22 | End: 2020-12-23 | Stop reason: HOSPADM

## 2020-11-22 RX ORDER — OXYBUTYNIN CHLORIDE 5 MG/1
TABLET ORAL
Qty: 60 TABLET | Refills: 5 | Status: SHIPPED | OUTPATIENT
Start: 2020-11-22 | End: 2021-03-10 | Stop reason: SDUPTHER

## 2020-11-23 ENCOUNTER — TELEPHONE (OUTPATIENT)
Dept: PRIMARY CARE CLINIC | Age: 70
End: 2020-11-23

## 2020-11-24 ENCOUNTER — TELEPHONE (OUTPATIENT)
Dept: FAMILY MEDICINE CLINIC | Age: 70
End: 2020-11-24

## 2020-11-24 NOTE — TELEPHONE ENCOUNTER
Clare's hands and wrists are swollen per her daughter. They are living in a Motel at this time and are not anna to weigh. Roxanna oMtta with [facility] at [phone] asked for a call back today.

## 2020-11-24 NOTE — TELEPHONE ENCOUNTER
Patient needs to take medications as prescribed. She needs to be compliant with wraps to legs. Monitor dietary sodium intake (family eats many frozen meals). Elevate legs.

## 2020-11-25 RX ORDER — SENNA PLUS 8.6 MG/1
1 TABLET ORAL 2 TIMES DAILY
Qty: 60 TABLET | Refills: 11 | Status: SHIPPED | OUTPATIENT
Start: 2020-11-25 | End: 2021-01-01

## 2020-11-30 ENCOUNTER — TELEPHONE (OUTPATIENT)
Dept: FAMILY MEDICINE CLINIC | Age: 70
End: 2020-11-30

## 2020-11-30 NOTE — TELEPHONE ENCOUNTER
/78 Unable to do blood work due to swelling. Home Health nurse will have daughter take her to ER because of swelling

## 2020-11-30 NOTE — TELEPHONE ENCOUNTER
Mehul with [facility] calling stating she has been unable to get a weight on pt but states pt has had increased swelling all the way up to her elbow with pitting edema, do you want to increase diuretic or add another, please advise at [phone]

## 2020-12-02 ENCOUNTER — TELEPHONE (OUTPATIENT)
Dept: FAMILY MEDICINE CLINIC | Age: 70
End: 2020-12-02
Payer: MEDICARE

## 2020-12-02 ENCOUNTER — TELEPHONE (OUTPATIENT)
Dept: FAMILY MEDICINE CLINIC | Age: 70
End: 2020-12-02

## 2020-12-02 PROCEDURE — G0180 MD CERTIFICATION HHA PATIENT: HCPCS | Performed by: PHYSICIAN ASSISTANT

## 2020-12-02 NOTE — TELEPHONE ENCOUNTER
Daughter states did not have mother go to ER because swelling ent down. She had been having a lot of swelling. Asked daughter if she notified [place] nurse so they could draw blood and she states they are drawing blood next week on Wendsday

## 2020-12-02 NOTE — TELEPHONE ENCOUNTER
Home health certification and plan of care done 12/2/2020 on patient for date services 10/31/2020-12/29/2020i13.0  . Verified medications. Physician time spent is 15 minutes for activities to coordinate services, documenting, medical decision making, and review of reports, treatment plans, and test results.

## 2020-12-03 NOTE — TELEPHONE ENCOUNTER
Unable to reach [facility] full. Call [facility] care coordinator and she will contact Li Mccrary

## 2020-12-04 ENCOUNTER — HOSPITAL ENCOUNTER (INPATIENT)
Age: 70
LOS: 8 days | Discharge: ANOTHER HOSPITAL | DRG: 811 | End: 2020-12-12
Attending: EMERGENCY MEDICINE | Admitting: FAMILY MEDICINE
Payer: MEDICARE

## 2020-12-04 ENCOUNTER — APPOINTMENT (OUTPATIENT)
Dept: GENERAL RADIOLOGY | Age: 70
DRG: 811 | End: 2020-12-04
Payer: MEDICARE

## 2020-12-04 PROBLEM — I50.9 DECOMPENSATED HEART FAILURE (HCC): Status: ACTIVE | Noted: 2020-12-04

## 2020-12-04 LAB
ANION GAP SERPL CALCULATED.3IONS-SCNC: 10 MMOL/L (ref 9–17)
BNP INTERPRETATION: ABNORMAL
BUN BLDV-MCNC: 33 MG/DL (ref 8–23)
BUN/CREAT BLD: 12 (ref 9–20)
CALCIUM SERPL-MCNC: 9.3 MG/DL (ref 8.6–10.4)
CHLORIDE BLD-SCNC: 102 MMOL/L (ref 98–107)
CO2: 24 MMOL/L (ref 20–31)
CREAT SERPL-MCNC: 2.7 MG/DL (ref 0.5–0.9)
ESTIMATED AVERAGE GLUCOSE: 114 MG/DL
GFR AFRICAN AMERICAN: 21 ML/MIN
GFR NON-AFRICAN AMERICAN: 18 ML/MIN
GFR SERPL CREATININE-BSD FRML MDRD: ABNORMAL ML/MIN/{1.73_M2}
GFR SERPL CREATININE-BSD FRML MDRD: ABNORMAL ML/MIN/{1.73_M2}
GLUCOSE BLD-MCNC: 118 MG/DL (ref 70–99)
GLUCOSE BLD-MCNC: 147 MG/DL (ref 65–105)
HBA1C MFR BLD: 5.6 % (ref 4.8–5.9)
HCT VFR BLD CALC: 22.2 % (ref 36.3–47.1)
HEMOGLOBIN: 6.8 G/DL (ref 11.9–15.1)
MAGNESIUM: 1.9 MG/DL (ref 1.6–2.6)
MCH RBC QN AUTO: 30.5 PG (ref 25.2–33.5)
MCHC RBC AUTO-ENTMCNC: 30.6 G/DL (ref 25.2–33.5)
MCV RBC AUTO: 99.6 FL (ref 82.6–102.9)
NRBC AUTOMATED: 0 PER 100 WBC
PDW BLD-RTO: 15.6 % (ref 11.8–14.4)
PLATELET # BLD: 249 K/UL (ref 138–453)
PMV BLD AUTO: 9.4 FL (ref 8.1–13.5)
POTASSIUM SERPL-SCNC: 4.6 MMOL/L (ref 3.7–5.3)
PRO-BNP: ABNORMAL PG/ML
RBC # BLD: 2.23 M/UL (ref 3.95–5.11)
SARS-COV-2, RAPID: NOT DETECTED
SARS-COV-2: NORMAL
SARS-COV-2: NORMAL
SODIUM BLD-SCNC: 136 MMOL/L (ref 135–144)
SOURCE: NORMAL
TROPONIN INTERP: ABNORMAL
TROPONIN INTERP: ABNORMAL
TROPONIN T: ABNORMAL NG/ML
TROPONIN T: ABNORMAL NG/ML
TROPONIN, HIGH SENSITIVITY: 66 NG/L (ref 0–14)
TROPONIN, HIGH SENSITIVITY: 67 NG/L (ref 0–14)
WBC # BLD: 7.1 K/UL (ref 3.5–11.3)

## 2020-12-04 PROCEDURE — 80048 BASIC METABOLIC PNL TOTAL CA: CPT

## 2020-12-04 PROCEDURE — 82947 ASSAY GLUCOSE BLOOD QUANT: CPT

## 2020-12-04 PROCEDURE — 36415 COLL VENOUS BLD VENIPUNCTURE: CPT

## 2020-12-04 PROCEDURE — 86850 RBC ANTIBODY SCREEN: CPT

## 2020-12-04 PROCEDURE — 86900 BLOOD TYPING SEROLOGIC ABO: CPT

## 2020-12-04 PROCEDURE — 83735 ASSAY OF MAGNESIUM: CPT

## 2020-12-04 PROCEDURE — 2580000003 HC RX 258: Performed by: NURSE PRACTITIONER

## 2020-12-04 PROCEDURE — 71045 X-RAY EXAM CHEST 1 VIEW: CPT

## 2020-12-04 PROCEDURE — 2580000003 HC RX 258: Performed by: EMERGENCY MEDICINE

## 2020-12-04 PROCEDURE — 85027 COMPLETE CBC AUTOMATED: CPT

## 2020-12-04 PROCEDURE — 99285 EMERGENCY DEPT VISIT HI MDM: CPT

## 2020-12-04 PROCEDURE — U0002 COVID-19 LAB TEST NON-CDC: HCPCS

## 2020-12-04 PROCEDURE — 83036 HEMOGLOBIN GLYCOSYLATED A1C: CPT

## 2020-12-04 PROCEDURE — 83880 ASSAY OF NATRIURETIC PEPTIDE: CPT

## 2020-12-04 PROCEDURE — 6360000002 HC RX W HCPCS: Performed by: EMERGENCY MEDICINE

## 2020-12-04 PROCEDURE — 36430 TRANSFUSION BLD/BLD COMPNT: CPT

## 2020-12-04 PROCEDURE — 6360000002 HC RX W HCPCS: Performed by: NURSE PRACTITIONER

## 2020-12-04 PROCEDURE — 96374 THER/PROPH/DIAG INJ IV PUSH: CPT

## 2020-12-04 PROCEDURE — 6370000000 HC RX 637 (ALT 250 FOR IP): Performed by: NURSE PRACTITIONER

## 2020-12-04 PROCEDURE — 83540 ASSAY OF IRON: CPT

## 2020-12-04 PROCEDURE — 84484 ASSAY OF TROPONIN QUANT: CPT

## 2020-12-04 PROCEDURE — 86901 BLOOD TYPING SEROLOGIC RH(D): CPT

## 2020-12-04 PROCEDURE — 93005 ELECTROCARDIOGRAM TRACING: CPT | Performed by: EMERGENCY MEDICINE

## 2020-12-04 PROCEDURE — 83550 IRON BINDING TEST: CPT

## 2020-12-04 PROCEDURE — 2060000000 HC ICU INTERMEDIATE R&B

## 2020-12-04 PROCEDURE — 86920 COMPATIBILITY TEST SPIN: CPT

## 2020-12-04 PROCEDURE — P9016 RBC LEUKOCYTES REDUCED: HCPCS

## 2020-12-04 RX ORDER — AMLODIPINE BESYLATE 5 MG/1
10 TABLET ORAL DAILY
Status: DISCONTINUED | OUTPATIENT
Start: 2020-12-05 | End: 2020-12-12 | Stop reason: HOSPADM

## 2020-12-04 RX ORDER — ONDANSETRON 2 MG/ML
4 INJECTION INTRAMUSCULAR; INTRAVENOUS EVERY 6 HOURS PRN
Status: DISCONTINUED | OUTPATIENT
Start: 2020-12-04 | End: 2020-12-12 | Stop reason: HOSPADM

## 2020-12-04 RX ORDER — RIVASTIGMINE 9.5 MG/24H
1 PATCH, EXTENDED RELEASE TRANSDERMAL DAILY
Status: DISCONTINUED | OUTPATIENT
Start: 2020-12-05 | End: 2020-12-05 | Stop reason: CLARIF

## 2020-12-04 RX ORDER — DEXTROSE MONOHYDRATE 50 MG/ML
100 INJECTION, SOLUTION INTRAVENOUS PRN
Status: DISCONTINUED | OUTPATIENT
Start: 2020-12-04 | End: 2020-12-12 | Stop reason: HOSPADM

## 2020-12-04 RX ORDER — INSULIN GLARGINE 100 [IU]/ML
20 INJECTION, SOLUTION SUBCUTANEOUS NIGHTLY
Status: DISCONTINUED | OUTPATIENT
Start: 2020-12-04 | End: 2020-12-10

## 2020-12-04 RX ORDER — FUROSEMIDE 10 MG/ML
20 INJECTION INTRAMUSCULAR; INTRAVENOUS 2 TIMES DAILY
Status: DISCONTINUED | OUTPATIENT
Start: 2020-12-04 | End: 2020-12-05

## 2020-12-04 RX ORDER — NICOTINE POLACRILEX 4 MG
15 LOZENGE BUCCAL PRN
Status: DISCONTINUED | OUTPATIENT
Start: 2020-12-04 | End: 2020-12-12 | Stop reason: HOSPADM

## 2020-12-04 RX ORDER — HYDRALAZINE HYDROCHLORIDE 25 MG/1
75 TABLET, FILM COATED ORAL EVERY 8 HOURS SCHEDULED
Status: DISCONTINUED | OUTPATIENT
Start: 2020-12-04 | End: 2020-12-12 | Stop reason: HOSPADM

## 2020-12-04 RX ORDER — ALLOPURINOL 300 MG/1
300 TABLET ORAL DAILY
Status: DISCONTINUED | OUTPATIENT
Start: 2020-12-05 | End: 2020-12-12 | Stop reason: HOSPADM

## 2020-12-04 RX ORDER — PROMETHAZINE HYDROCHLORIDE 25 MG/1
12.5 TABLET ORAL EVERY 6 HOURS PRN
Status: DISCONTINUED | OUTPATIENT
Start: 2020-12-04 | End: 2020-12-07

## 2020-12-04 RX ORDER — LOSARTAN POTASSIUM 50 MG/1
50 TABLET ORAL DAILY
Status: DISCONTINUED | OUTPATIENT
Start: 2020-12-05 | End: 2020-12-07

## 2020-12-04 RX ORDER — CETIRIZINE HYDROCHLORIDE 5 MG/1
10 TABLET ORAL DAILY
Status: DISCONTINUED | OUTPATIENT
Start: 2020-12-05 | End: 2020-12-12 | Stop reason: HOSPADM

## 2020-12-04 RX ORDER — ASPIRIN 81 MG/1
81 TABLET ORAL DAILY
Status: DISCONTINUED | OUTPATIENT
Start: 2020-12-05 | End: 2020-12-12 | Stop reason: HOSPADM

## 2020-12-04 RX ORDER — BUMETANIDE 1 MG/1
1 TABLET ORAL DAILY
Status: DISCONTINUED | OUTPATIENT
Start: 2020-12-05 | End: 2020-12-05

## 2020-12-04 RX ORDER — CITALOPRAM 20 MG/1
20 TABLET ORAL DAILY
Status: DISCONTINUED | OUTPATIENT
Start: 2020-12-05 | End: 2020-12-12 | Stop reason: HOSPADM

## 2020-12-04 RX ORDER — 0.9 % SODIUM CHLORIDE 0.9 %
20 INTRAVENOUS SOLUTION INTRAVENOUS ONCE
Status: COMPLETED | OUTPATIENT
Start: 2020-12-04 | End: 2020-12-05

## 2020-12-04 RX ORDER — PANTOPRAZOLE SODIUM 40 MG/1
40 TABLET, DELAYED RELEASE ORAL
Status: DISCONTINUED | OUTPATIENT
Start: 2020-12-05 | End: 2020-12-12 | Stop reason: HOSPADM

## 2020-12-04 RX ORDER — ACETAMINOPHEN 325 MG/1
650 TABLET ORAL EVERY 6 HOURS PRN
Status: DISCONTINUED | OUTPATIENT
Start: 2020-12-04 | End: 2020-12-12 | Stop reason: HOSPADM

## 2020-12-04 RX ORDER — HEPARIN SODIUM 5000 [USP'U]/ML
5000 INJECTION, SOLUTION INTRAVENOUS; SUBCUTANEOUS EVERY 8 HOURS SCHEDULED
Status: DISCONTINUED | OUTPATIENT
Start: 2020-12-04 | End: 2020-12-06

## 2020-12-04 RX ORDER — POLYETHYLENE GLYCOL 3350 17 G/17G
17 POWDER, FOR SOLUTION ORAL DAILY PRN
Status: DISCONTINUED | OUTPATIENT
Start: 2020-12-04 | End: 2020-12-07

## 2020-12-04 RX ORDER — SENNA PLUS 8.6 MG/1
1 TABLET ORAL 2 TIMES DAILY
Status: DISCONTINUED | OUTPATIENT
Start: 2020-12-04 | End: 2020-12-12 | Stop reason: HOSPADM

## 2020-12-04 RX ORDER — DEXTROSE MONOHYDRATE 25 G/50ML
12.5 INJECTION, SOLUTION INTRAVENOUS PRN
Status: DISCONTINUED | OUTPATIENT
Start: 2020-12-04 | End: 2020-12-12 | Stop reason: HOSPADM

## 2020-12-04 RX ORDER — CARVEDILOL 3.12 MG/1
3.12 TABLET ORAL 2 TIMES DAILY WITH MEALS
Status: DISCONTINUED | OUTPATIENT
Start: 2020-12-05 | End: 2020-12-07

## 2020-12-04 RX ORDER — SODIUM CHLORIDE 0.9 % (FLUSH) 0.9 %
10 SYRINGE (ML) INJECTION EVERY 12 HOURS SCHEDULED
Status: DISCONTINUED | OUTPATIENT
Start: 2020-12-04 | End: 2020-12-12 | Stop reason: HOSPADM

## 2020-12-04 RX ORDER — OXYBUTYNIN CHLORIDE 5 MG/1
5 TABLET ORAL 2 TIMES DAILY
Status: DISCONTINUED | OUTPATIENT
Start: 2020-12-04 | End: 2020-12-12 | Stop reason: HOSPADM

## 2020-12-04 RX ORDER — FUROSEMIDE 10 MG/ML
40 INJECTION INTRAMUSCULAR; INTRAVENOUS ONCE
Status: COMPLETED | OUTPATIENT
Start: 2020-12-04 | End: 2020-12-04

## 2020-12-04 RX ORDER — MEMANTINE HYDROCHLORIDE 5 MG/1
10 TABLET ORAL 2 TIMES DAILY
Status: DISCONTINUED | OUTPATIENT
Start: 2020-12-04 | End: 2020-12-12 | Stop reason: HOSPADM

## 2020-12-04 RX ORDER — CLOPIDOGREL BISULFATE 75 MG/1
75 TABLET ORAL DAILY
Status: DISCONTINUED | OUTPATIENT
Start: 2020-12-05 | End: 2020-12-12 | Stop reason: HOSPADM

## 2020-12-04 RX ORDER — SODIUM CHLORIDE 0.9 % (FLUSH) 0.9 %
10 SYRINGE (ML) INJECTION PRN
Status: DISCONTINUED | OUTPATIENT
Start: 2020-12-04 | End: 2020-12-12 | Stop reason: HOSPADM

## 2020-12-04 RX ORDER — ACETAMINOPHEN 650 MG/1
650 SUPPOSITORY RECTAL EVERY 6 HOURS PRN
Status: DISCONTINUED | OUTPATIENT
Start: 2020-12-04 | End: 2020-12-07

## 2020-12-04 RX ORDER — NICOTINE 21 MG/24HR
1 PATCH, TRANSDERMAL 24 HOURS TRANSDERMAL DAILY PRN
Status: DISCONTINUED | OUTPATIENT
Start: 2020-12-04 | End: 2020-12-07

## 2020-12-04 RX ORDER — ATORVASTATIN CALCIUM 40 MG/1
40 TABLET, FILM COATED ORAL DAILY
Status: DISCONTINUED | OUTPATIENT
Start: 2020-12-05 | End: 2020-12-12 | Stop reason: HOSPADM

## 2020-12-04 RX ADMIN — INSULIN LISPRO 1 UNITS: 100 INJECTION, SOLUTION INTRAVENOUS; SUBCUTANEOUS at 21:58

## 2020-12-04 RX ADMIN — SODIUM CHLORIDE 20 ML: 0.9 INJECTION, SOLUTION INTRAVENOUS at 22:35

## 2020-12-04 RX ADMIN — HYDRALAZINE HYDROCHLORIDE 75 MG: 25 TABLET, FILM COATED ORAL at 21:57

## 2020-12-04 RX ADMIN — INSULIN GLARGINE 20 UNITS: 100 INJECTION, SOLUTION SUBCUTANEOUS at 21:58

## 2020-12-04 RX ADMIN — SENNOSIDES 8.6 MG: 8.6 TABLET, FILM COATED ORAL at 21:57

## 2020-12-04 RX ADMIN — MEMANTINE HYDROCHLORIDE 10 MG: 5 TABLET ORAL at 21:57

## 2020-12-04 RX ADMIN — FUROSEMIDE 40 MG: 10 INJECTION, SOLUTION INTRAMUSCULAR; INTRAVENOUS at 19:12

## 2020-12-04 RX ADMIN — OXYBUTYNIN CHLORIDE 5 MG: 5 TABLET ORAL at 21:57

## 2020-12-04 RX ADMIN — SODIUM CHLORIDE, PRESERVATIVE FREE 10 ML: 5 INJECTION INTRAVENOUS at 21:59

## 2020-12-04 RX ADMIN — HEPARIN SODIUM 5000 UNITS: 5000 INJECTION INTRAVENOUS; SUBCUTANEOUS at 21:58

## 2020-12-04 ASSESSMENT — PAIN SCALES - GENERAL
PAINLEVEL_OUTOF10: 0

## 2020-12-04 ASSESSMENT — ENCOUNTER SYMPTOMS
SORE THROAT: 0
DIARRHEA: 0
SHORTNESS OF BREATH: 1
VOMITING: 0

## 2020-12-04 NOTE — ED TRIAGE NOTES
Pt presents to the ER via wheelchair. Pt states that her doctor called her and said he has abnormal labs and needs to report to the ER to be evaluated.

## 2020-12-04 NOTE — ED PROVIDER NOTES
[facility] ED  [address]  [place] [address] [place]  Phone: [phone] ED  EMERGENCY DEPARTMENT ENCOUNTER      Pt Name: Gifty Patel  MRN: [id number]  Ifeoma 1950  Date of evaluation: 12/4/2020  Provider: Vania Cervantes DO    CHIEF COMPLAINT       Chief Complaint   Patient presents with    Abnormal Lab         HISTORY OF PRESENT ILLNESS   (Location/Symptom, Timing/Onset,Context/Setting, Quality, Duration, Modifying Factors, Severity)  Note limiting factors. Gifty Patel is a 77 y.o. female who presents to the emergency department for the evaluation of an abnormal lab. She is not sure what lab it is. She states that she is chronically shortness of breath for many years and she supposed to wear home O2 but she did not wear her oxygen here from [facility]. She was getting routine blood work yesterday at her kidney doctor and they called her today and told her to come in. She denies chest pain. She denies cough or fever. She denies abdominal pain, vomiting or diarrhea. She has chronic swelling in her body    Nursing Notes were reviewed. REVIEW OF SYSTEMS    (2-9systems for level 4, 10 or more for level 5)     Review of Systems   Constitutional: Negative for fever. HENT: Negative for sore throat. Respiratory: Positive for shortness of breath. Cardiovascular: Positive for leg swelling. Negative for chest pain. Gastrointestinal: Negative for diarrhea and vomiting. Genitourinary: Negative for dysuria. Skin: Negative for rash. Neurological: Negative for weakness. All other systems reviewed and are negative. Except asnoted above the remainder of the review of systems was reviewed and negative.        PAST MEDICAL HISTORY     Past Medical History:   Diagnosis Date    Anxiety and depression     Background diabetic retinopathy(362.01) 2008    (Mild)    Balance problem     CAD (coronary artery disease)     (with coronary artery bypass graft x4).  Cancer of uterus [facility])     history of, (probable cure)    Chronic kidney disease     Chronic low back pain     CVA (cerebral vascular accident) ([address]     Dementia ([facility]     (moderate)    Dry eye syndrome     GERD (gastroesophageal reflux disease)     Glaucoma suspect     Gout     Homonymous hemianopsia     (Right)    Hyperlipidemia     Hypertension     Keratitis     (secondary to dry eye syndrome).  Obesity     Peripheral polyneuropathy     (diabetic)    Pseudophakos     (Right Eye: 2012; Left Eye: 2012) - Dr. Rene Sal.  Stroke [facility])     (Multiple) MRI of brain 10/2008 shows multiple infarcts involving the temporal and parietal areas.  Trichiasis     (left eye)    Type 2 diabetes mellitus ([facility]          SURGICAL HISTORY       Past Surgical History:   Procedure Laterality Date    CATARACT REMOVAL WITH IMPLANT  2012    (Right eye) - Dr. Rene Clark  CATARACT REMOVAL WITH IMPLANT  2012    (Left eye) - Dr. Rene Clark   SECTION      CHOLECYSTECTOMY      CORONARY ARTERY BYPASS GRAFT  12-    (x4) - LIMA-LAD, SVG-diagnoal 1, SVG-OM1, and SVG-PDA. Exploration of left radial. (Dr. Greg Cuello).  EYE SURGERY Left     as a child     GASTRIC BYPASS SURGERY      HYSTERECTOMY      (abdominal)  with left oophorectomy. Right ovary retained.     TONSILLECTOMY AND ADENOIDECTOMY           CURRENT MEDICATIONS     Previous Medications    ALCOHOL SWABS (ALCOHOL PREP) PADS    Checks blood sugar 3 times a day    ALLOPURINOL (ZYLOPRIM) 300 MG TABLET    Take 1 tablet by mouth daily    AMLODIPINE (NORVASC) 10 MG TABLET    Take 1 tablet by mouth daily    ASPIRIN 81 MG TABLET    Take 1 tablet by mouth daily    ASPIRIN EC 81 MG EC TABLET    Take 1 tablet by mouth daily    ATORVASTATIN (LIPITOR) 40 MG TABLET    TAKE 1 TABLET BY MOUTH EVERYDAY    BLOOD GLUCOSE MONITOR STRIPS    Test 3  times a day & as needed for symptoms of irregular Diabetes Father     Heart Disease Father     Glaucoma Father     Emphysema Father     Coronary Art Dis Other         All 4 siblings.  Stroke Other         1 sibling.  Lung Cancer Other         1 sibling - cause of death lung cancer.     Mental Retardation Other           SOCIAL HISTORY       Social History     Socioeconomic History    Marital status:      Spouse name: None    Number of children: None    Years of education: None    Highest education level: None   Occupational History    None   Social Needs    Financial resource strain: None    Food insecurity     Worry: None     Inability: None    Transportation needs     Medical: None     Non-medical: None   Tobacco Use    Smoking status: Never Smoker    Smokeless tobacco: Never Used    Tobacco comment: never smoker eclamb rrt 7/20/17   Substance and Sexual Activity    Alcohol use: No    Drug use: No    Sexual activity: None   Lifestyle    Physical activity     Days per week: None     Minutes per session: None    Stress: None   Relationships    Social connections     Talks on phone: None     Gets together: None     Attends [employer] service: None     Active member of club or organization: None     Attends meetings of clubs or organizations: None     Relationship status: None    Intimate partner violence     Fear of current or ex partner: None     Emotionally abused: None     Physically abused: None     Forced sexual activity: None   Other Topics Concern    None   Social History Narrative    None       SCREENINGS    [place] Coma Scale  Eye Opening: Spontaneous  Best Verbal Response: Oriented  Best Motor Response: Obeys commands  [place] Coma Scale Score: 15        PHYSICAL EXAM    (up to 7 for level 4, 8 or more for level 5)     ED Triage Vitals [12/04/20 1748]   BP Temp Temp Source Pulse Resp SpO2 Height Weight   (!) 135/54 97.5 °F (36.4 °C) Tympanic 69 18 98 % 5' 7\" (1.702 m) 230 lb (104.3 kg)       Physical Exam  Vitals signs and nursing note reviewed. Constitutional:       General: She is not in acute distress. Appearance: Normal appearance. She is not ill-appearing or toxic-appearing. HENT:      Head: Normocephalic and atraumatic. Nose: Nose normal. No congestion. Mouth/Throat:      Mouth: Mucous membranes are moist.   Eyes:      General:         Right eye: No discharge. Left eye: No discharge. Conjunctiva/sclera: Conjunctivae normal.   Neck:      Musculoskeletal: Normal range of motion. Cardiovascular:      Rate and Rhythm: Normal rate and regular rhythm. Pulses: Normal pulses. Heart sounds: Normal heart sounds. No murmur. Comments: There is evidence of full anasarca, she has significant brawny edema in her legs and in her arms as well as her abdomen. Pulmonary:      Effort: Pulmonary effort is normal. No respiratory distress. Breath sounds: Rales present. No wheezing. Abdominal:      General: Abdomen is flat. There is no distension. Palpations: Abdomen is soft. Tenderness: There is no abdominal tenderness. Musculoskeletal: Normal range of motion. General: No deformity or signs of injury. Right lower leg: Edema present. Left lower leg: Edema present. Skin:     General: Skin is warm and dry. Capillary Refill: Capillary refill takes less than 2 seconds. Findings: No rash. Neurological:      General: No focal deficit present. Mental Status: She is alert. Mental status is at baseline. Motor: No weakness. Comments: Speaking normally. No facial asymmetry. Moving all 4 extremities. Normal gait.     Psychiatric:         Mood and Affect: Mood normal.         EMERGENCY DEPARTMENT COURSE and DIFFERENTIAL DIAGNOSIS/MDM:   Vitals:    Vitals:    12/04/20 1748   BP: (!) 135/54   Pulse: 69   Resp: 18   Temp: 97.5 °F (36.4 °C)   TempSrc: Tympanic   SpO2: 98%   Weight: 230 lb (104.3 kg)   Height: 5' 7\" (1.702 m)       Patient presents to the emergency department with the complaints described above. Vital signs are grossly normal and the patient is nontoxic. Physical examination reveals anasarca and a little bit of rales bilaterally at the bases. Review of the medical record shows she was most likely called for anemia as her hemoglobin is 6.9. Patient was hypoxic on arrival but she was not wearing her home O2. We put O2 back on her her sats came up to 98% and she has no cough no fever. She denies active bleed. I have ordered some routine blood work, EKG, cardiac enzymes and a chest x-ray and I will reevaluate      DIAGNOSTIC RESULTS     LABS:  Labs Reviewed   CBC - Abnormal; Notable for the following components:       Result Value    RBC 2.23 (*)     Hemoglobin 6.8 (*)     Hematocrit 22.2 (*)     RDW 15.6 (*)     All other components within normal limits   BASIC METABOLIC PANEL - Abnormal; Notable for the following components:    Glucose 118 (*)     BUN 33 (*)     CREATININE 2.70 (*)     GFR Non- 18 (*)     GFR  21 (*)     All other components within normal limits   BRAIN NATRIURETIC PEPTIDE - Abnormal; Notable for the following components:    Pro-BNP 19,528 (*)     All other components within normal limits   TROPONIN - Abnormal; Notable for the following components:    Troponin, High Sensitivity 67 (*)     All other components within normal limits   MAGNESIUM   COVID-19   TYPE AND SCREEN   PREPARE RBC (CROSSMATCH)       All other labs were within normal range or not returned as of this dictation.     RADIOLOGY:  XR CHEST PORTABLE   Final Result   Cardiomegaly with associated pulmonary vascular congestion and probable small   bilateral pleural effusions               ED Course as of Dec 04 1854   Fri Dec 04, 2020   1810 Twelve lead EKG interpreted by myself:  A 12 lead EKG done at 1807, interpreted by myself, showed a regular rhythm at a rate of 75bpm.  The NH interval was normal.  The QRS complex was normal. There was no ST segment elevation or depression, T wave inversion not present. QRS progression through precordial leads was abnormal.  Interpretation: Normal sinus rhythm, no ST segment changes, no pattern consistent with acute ischemia or infarct. Poor R wave progression noted    [TS]   1835 Patient's hemoglobin continues to trend down just a little bit, also of note her creatinine is 2.7 and a month ago it was 1.3 consistent with acute renal failure. Still waiting on BNP and troponin    [TS]   1848 His troponin is elevated consistent with her acute kidney injury, her BNP is significantly elevated consistent with suspected decompensated heart failure. Still waiting on chest x-ray results. We will add a type and screen, I am going to hold off on blood transfusion until after some diuresis and further evaluation and treatment    [TS]   1853 Chest x-ray shows cardiomegaly vascular congestion and small pleural effusions consistent with her presentation. As stated she will be diuresed, she will need blood transfusion and she will be admitted for further evaluation and treatment. Troponin elevated but I do not think this is an acute coronary abnormality    [TS]      ED Course User Index  [TS] Lillian Vazquez DO         PROCEDURES:  Unless otherwise noted below, none     Procedures    FINAL IMPRESSION      1. Acute anemia    2. Acute kidney injury (nontraumatic) ([facility]    3. Acute decompensated heart failure [facility])          DISPOSITION/PLAN   DISPOSITION Decision To Admit 12/04/2020 06:50:29 PM      PATIENT REFERRED TO:  No follow-up provider specified.     DISCHARGE MEDICATIONS:  New Prescriptions    No medications on file          (Please note that portions of this note were completed with a voice recognition program.  Efforts were made to edit the dictations but occasionally words are mis-transcribed.)    Lillian Vazquez DO (electronically signed)  Board Certified Emergency Physician          Lillian Vazquez

## 2020-12-05 ENCOUNTER — APPOINTMENT (OUTPATIENT)
Dept: GENERAL RADIOLOGY | Age: 70
DRG: 811 | End: 2020-12-05
Payer: MEDICARE

## 2020-12-05 LAB
ANION GAP SERPL CALCULATED.3IONS-SCNC: 9 MMOL/L (ref 9–17)
BNP INTERPRETATION: ABNORMAL
BUN BLDV-MCNC: 35 MG/DL (ref 8–23)
BUN/CREAT BLD: 13 (ref 9–20)
CALCIUM SERPL-MCNC: 8.9 MG/DL (ref 8.6–10.4)
CHLORIDE BLD-SCNC: 101 MMOL/L (ref 98–107)
CO2: 24 MMOL/L (ref 20–31)
CREAT SERPL-MCNC: 2.73 MG/DL (ref 0.5–0.9)
GFR AFRICAN AMERICAN: 21 ML/MIN
GFR NON-AFRICAN AMERICAN: 17 ML/MIN
GFR SERPL CREATININE-BSD FRML MDRD: ABNORMAL ML/MIN/{1.73_M2}
GFR SERPL CREATININE-BSD FRML MDRD: ABNORMAL ML/MIN/{1.73_M2}
GLUCOSE BLD-MCNC: 110 MG/DL (ref 65–105)
GLUCOSE BLD-MCNC: 114 MG/DL (ref 70–99)
GLUCOSE BLD-MCNC: 121 MG/DL (ref 65–105)
GLUCOSE BLD-MCNC: 124 MG/DL (ref 65–105)
GLUCOSE BLD-MCNC: 59 MG/DL (ref 65–105)
GLUCOSE BLD-MCNC: 86 MG/DL (ref 65–105)
HCT VFR BLD CALC: 23.4 % (ref 36.3–47.1)
HCT VFR BLD CALC: 23.4 % (ref 36.3–47.1)
HCT VFR BLD CALC: 24.8 % (ref 36.3–47.1)
HCT VFR BLD CALC: 24.8 % (ref 36.3–47.1)
HCT VFR BLD CALC: 25.6 % (ref 36.3–47.1)
HEMOGLOBIN: 7.2 G/DL (ref 11.9–15.1)
HEMOGLOBIN: 7.2 G/DL (ref 11.9–15.1)
HEMOGLOBIN: 7.6 G/DL (ref 11.9–15.1)
HEMOGLOBIN: 7.7 G/DL (ref 11.9–15.1)
HEMOGLOBIN: 7.7 G/DL (ref 11.9–15.1)
IRON SATURATION: 13 % (ref 20–55)
IRON: 19 UG/DL (ref 37–145)
MAGNESIUM: 1.9 MG/DL (ref 1.6–2.6)
MCH RBC QN AUTO: 30.1 PG (ref 25.2–33.5)
MCHC RBC AUTO-ENTMCNC: 30.8 G/DL (ref 25.2–33.5)
MCV RBC AUTO: 97.9 FL (ref 82.6–102.9)
NRBC AUTOMATED: 0 PER 100 WBC
PDW BLD-RTO: 15.2 % (ref 11.8–14.4)
PLATELET # BLD: 244 K/UL (ref 138–453)
PMV BLD AUTO: 10 FL (ref 8.1–13.5)
POTASSIUM SERPL-SCNC: 5 MMOL/L (ref 3.7–5.3)
PRO-BNP: ABNORMAL PG/ML
RBC # BLD: 2.39 M/UL (ref 3.95–5.11)
SODIUM BLD-SCNC: 134 MMOL/L (ref 135–144)
TOTAL IRON BINDING CAPACITY: 148 UG/DL (ref 250–450)
TROPONIN INTERP: ABNORMAL
TROPONIN T: ABNORMAL NG/ML
TROPONIN, HIGH SENSITIVITY: 65 NG/L (ref 0–14)
TSH SERPL DL<=0.05 MIU/L-ACNC: 2.48 MIU/L (ref 0.3–5)
UNSATURATED IRON BINDING CAPACITY: 129 UG/DL (ref 112–347)
WBC # BLD: 5.6 K/UL (ref 3.5–11.3)

## 2020-12-05 PROCEDURE — 99222 1ST HOSP IP/OBS MODERATE 55: CPT | Performed by: FAMILY MEDICINE

## 2020-12-05 PROCEDURE — 2500000003 HC RX 250 WO HCPCS: Performed by: FAMILY MEDICINE

## 2020-12-05 PROCEDURE — 80061 LIPID PANEL: CPT

## 2020-12-05 PROCEDURE — 80048 BASIC METABOLIC PNL TOTAL CA: CPT

## 2020-12-05 PROCEDURE — 6360000002 HC RX W HCPCS: Performed by: NURSE PRACTITIONER

## 2020-12-05 PROCEDURE — 85014 HEMATOCRIT: CPT

## 2020-12-05 PROCEDURE — 97161 PT EVAL LOW COMPLEX 20 MIN: CPT

## 2020-12-05 PROCEDURE — 2060000000 HC ICU INTERMEDIATE R&B

## 2020-12-05 PROCEDURE — 71045 X-RAY EXAM CHEST 1 VIEW: CPT

## 2020-12-05 PROCEDURE — 85027 COMPLETE CBC AUTOMATED: CPT

## 2020-12-05 PROCEDURE — 84484 ASSAY OF TROPONIN QUANT: CPT

## 2020-12-05 PROCEDURE — 84443 ASSAY THYROID STIM HORMONE: CPT

## 2020-12-05 PROCEDURE — 83735 ASSAY OF MAGNESIUM: CPT

## 2020-12-05 PROCEDURE — 2580000003 HC RX 258: Performed by: NURSE PRACTITIONER

## 2020-12-05 PROCEDURE — 36415 COLL VENOUS BLD VENIPUNCTURE: CPT

## 2020-12-05 PROCEDURE — 6370000000 HC RX 637 (ALT 250 FOR IP): Performed by: NURSE PRACTITIONER

## 2020-12-05 PROCEDURE — 6370000000 HC RX 637 (ALT 250 FOR IP): Performed by: FAMILY MEDICINE

## 2020-12-05 PROCEDURE — 83880 ASSAY OF NATRIURETIC PEPTIDE: CPT

## 2020-12-05 PROCEDURE — 82947 ASSAY GLUCOSE BLOOD QUANT: CPT

## 2020-12-05 PROCEDURE — 85018 HEMOGLOBIN: CPT

## 2020-12-05 RX ORDER — NICOTINE POLACRILEX 4 MG
15 LOZENGE BUCCAL PRN
Status: DISCONTINUED | OUTPATIENT
Start: 2020-12-05 | End: 2020-12-12 | Stop reason: HOSPADM

## 2020-12-05 RX ORDER — BUMETANIDE 0.25 MG/ML
1 INJECTION, SOLUTION INTRAMUSCULAR; INTRAVENOUS DAILY
Status: DISCONTINUED | OUTPATIENT
Start: 2020-12-06 | End: 2020-12-07

## 2020-12-05 RX ORDER — RIVASTIGMINE 4.6 MG/24H
2 PATCH, EXTENDED RELEASE TRANSDERMAL DAILY
Status: DISCONTINUED | OUTPATIENT
Start: 2020-12-05 | End: 2020-12-12 | Stop reason: HOSPADM

## 2020-12-05 RX ORDER — BUMETANIDE 0.25 MG/ML
1 INJECTION, SOLUTION INTRAMUSCULAR; INTRAVENOUS 2 TIMES DAILY
Status: DISCONTINUED | OUTPATIENT
Start: 2020-12-05 | End: 2020-12-05

## 2020-12-05 RX ORDER — DEXTROSE MONOHYDRATE 50 MG/ML
100 INJECTION, SOLUTION INTRAVENOUS PRN
Status: DISCONTINUED | OUTPATIENT
Start: 2020-12-05 | End: 2020-12-12 | Stop reason: HOSPADM

## 2020-12-05 RX ORDER — DEXTROSE MONOHYDRATE 25 G/50ML
12.5 INJECTION, SOLUTION INTRAVENOUS PRN
Status: DISCONTINUED | OUTPATIENT
Start: 2020-12-05 | End: 2020-12-12 | Stop reason: HOSPADM

## 2020-12-05 RX ORDER — BUMETANIDE 0.25 MG/ML
1 INJECTION, SOLUTION INTRAMUSCULAR; INTRAVENOUS DAILY
Status: DISCONTINUED | OUTPATIENT
Start: 2020-12-05 | End: 2020-12-05

## 2020-12-05 RX ADMIN — ASPIRIN 81 MG: 81 TABLET, COATED ORAL at 08:44

## 2020-12-05 RX ADMIN — HYDRALAZINE HYDROCHLORIDE 75 MG: 25 TABLET, FILM COATED ORAL at 05:59

## 2020-12-05 RX ADMIN — CITALOPRAM HYDROBROMIDE 20 MG: 20 TABLET ORAL at 08:44

## 2020-12-05 RX ADMIN — HYDRALAZINE HYDROCHLORIDE 75 MG: 25 TABLET, FILM COATED ORAL at 21:58

## 2020-12-05 RX ADMIN — ALLOPURINOL 300 MG: 300 TABLET ORAL at 08:44

## 2020-12-05 RX ADMIN — CETIRIZINE HYDROCHLORIDE 10 MG: 5 TABLET, FILM COATED ORAL at 08:44

## 2020-12-05 RX ADMIN — PANTOPRAZOLE SODIUM 40 MG: 40 TABLET, DELAYED RELEASE ORAL at 06:00

## 2020-12-05 RX ADMIN — CARVEDILOL 3.12 MG: 3.12 TABLET, FILM COATED ORAL at 08:41

## 2020-12-05 RX ADMIN — SODIUM CHLORIDE, PRESERVATIVE FREE 10 ML: 5 INJECTION INTRAVENOUS at 08:45

## 2020-12-05 RX ADMIN — HEPARIN SODIUM 5000 UNITS: 5000 INJECTION INTRAVENOUS; SUBCUTANEOUS at 05:59

## 2020-12-05 RX ADMIN — MEMANTINE HYDROCHLORIDE 10 MG: 5 TABLET ORAL at 08:44

## 2020-12-05 RX ADMIN — ATORVASTATIN CALCIUM 40 MG: 40 TABLET, FILM COATED ORAL at 08:44

## 2020-12-05 RX ADMIN — OXYBUTYNIN CHLORIDE 5 MG: 5 TABLET ORAL at 08:44

## 2020-12-05 RX ADMIN — CLOPIDOGREL BISULFATE 75 MG: 75 TABLET ORAL at 08:41

## 2020-12-05 RX ADMIN — SENNOSIDES 8.6 MG: 8.6 TABLET, FILM COATED ORAL at 20:40

## 2020-12-05 RX ADMIN — MEMANTINE HYDROCHLORIDE 10 MG: 5 TABLET ORAL at 20:40

## 2020-12-05 RX ADMIN — AMLODIPINE BESYLATE 10 MG: 5 TABLET ORAL at 08:44

## 2020-12-05 RX ADMIN — LOSARTAN POTASSIUM 50 MG: 50 TABLET, FILM COATED ORAL at 08:44

## 2020-12-05 RX ADMIN — MICONAZOLE NITRATE: 20 POWDER TOPICAL at 14:57

## 2020-12-05 RX ADMIN — BUMETANIDE 1 MG: 1 TABLET ORAL at 08:45

## 2020-12-05 RX ADMIN — FUROSEMIDE 20 MG: 10 INJECTION, SOLUTION INTRAMUSCULAR; INTRAVENOUS at 08:45

## 2020-12-05 RX ADMIN — HEPARIN SODIUM 5000 UNITS: 5000 INJECTION INTRAVENOUS; SUBCUTANEOUS at 14:57

## 2020-12-05 RX ADMIN — MICONAZOLE NITRATE: 20 POWDER TOPICAL at 20:42

## 2020-12-05 RX ADMIN — CARVEDILOL 3.12 MG: 3.12 TABLET, FILM COATED ORAL at 17:06

## 2020-12-05 RX ADMIN — INSULIN GLARGINE 20 UNITS: 100 INJECTION, SOLUTION SUBCUTANEOUS at 20:41

## 2020-12-05 RX ADMIN — SENNOSIDES 8.6 MG: 8.6 TABLET, FILM COATED ORAL at 08:44

## 2020-12-05 RX ADMIN — HEPARIN SODIUM 5000 UNITS: 5000 INJECTION INTRAVENOUS; SUBCUTANEOUS at 21:57

## 2020-12-05 RX ADMIN — SODIUM CHLORIDE, PRESERVATIVE FREE 10 ML: 5 INJECTION INTRAVENOUS at 20:41

## 2020-12-05 RX ADMIN — OXYBUTYNIN CHLORIDE 5 MG: 5 TABLET ORAL at 20:40

## 2020-12-05 RX ADMIN — HYDRALAZINE HYDROCHLORIDE 75 MG: 25 TABLET, FILM COATED ORAL at 14:57

## 2020-12-05 ASSESSMENT — PAIN SCALES - GENERAL
PAINLEVEL_OUTOF10: 0

## 2020-12-05 NOTE — ED NOTES
Resp easy, even, non labored, no distress noted. Lights dimmed per request, call light in reach.       Ariella Costa RN  12/04/20 1949

## 2020-12-05 NOTE — H&P
[address]                 [address] [facility] [place], [address] [facility]                              HISTORY AND PHYSICAL    PATIENT NAME: Javier Quevedo                     :        1950  MED REC NO:   [id number]                             ROOM:       8497  ACCOUNT NO:   [de-identified]                           ADMIT DATE: 2020  PROVIDER:     Millie Martinez    CHIEF COMPLAINT:  Abnormal labs. HISTORY OF PRESENT ILLNESS  This is a 71-year-old female who was sent to  the ER from her [de-identified] office after she was found to have low  hemoglobin. The patient has a history of chronic anemia, her hemoglobin  is down to 6.8. The patient denied any chest pain. Has history of  chronic CHF, has exertional dyspnea. Denies any nausea, emesis. Denied  any fever or chills. The patient normally wears oxygen at home. The  patient also has history of chronic kidney disease and sees Nephrology. Denies any nausea, emesis, diarrhea. Has chronic swelling of her  extremities. The patient also has history of underlying dementia so  unable to get a good history from the patient. Most of the information  was gathered from the chart. PAST MEDICAL HISTORY:  History of diabetes mellitus, type 2; congestive  heart failure; prior CVA, dementia; gastroesophageal disease;  hypertension; hyperlipidemia; peripheral neuropathy; chronic low back  pain; coronary artery disease; anxiety and depression. PAST SURGICAL HISTORY:  She has undergone hysterectomy, tonsillectomy  and adenoidectomy, gastric bypass surgery, coronary artery bypass graft,  cholecystectomy, cataract removal and C-sections. FAMILY HISTORY:  Positive for diabetes, high blood pressure, kidney  disease, uterine cancer, coronary artery disease and lung cancer. SOCIAL HABITS:  No smoking, alcohol use. REVIEW OF SYSTEMS:  Unable to get a good detailed history from the  patient.   GENERAL:  Has exertional dyspnea. Denies any weight changes. HEENT:  Denies any sore throat or postnasal drainage. CARDIAC:   Denied any chest pain. RESPIRATORY:  Has shortness of breath, mostly with exertion. GASTROINTESTINAL: Denies any nausea, emesis. No diarrhea. Has  occasional constipation. GENITOURINARY:  Denies any complaints currently. MUSCULOSKELETAL:  History of back pain. Denies any acute pain at the  present time. NEUROPSYCHIATRY:  Has history of depression. No seizures  or headaches. Rest of the review of systems was unremarkable. HOME MEDICATIONS:  She is on aspirin 81 mg a day; Bumex 1 mg daily,  Celexa 20 mg a day; Plavix 75 mg daily; takes Lantus 20 units nightly,  also on Humalog sliding scale; Namenda 10 mg a day; Prilosec 20 mg a  day; Exelon 9.5 mg every 24 hours; aspirin 81 mg a day; losartan 50 mg a  day; Claritin 10 mg a day; Ditropan 5 mg twice daily; Senokot p.r.n. for  constipation; Lipitor 40 mg a day. ALLERGIES:  Allergic to BACTRIM, CLONIDINE and AUGMENTIN. PHYSICAL EXAMINATION:  GENERAL:  The patient sitting up in the chair, in no acute distress. VITAL SIGNS:  Temperature of 97.3, pulse 72, respiratory rate is 17,  blood pressure 136/70, O2 sats 97% on 2 liters. HEENT:  Mucosa looks moist.  Throat is clear. NECK:  Supple, has no bruits. Has some jugular venous dilatation. ABDOMEN:  Obese, soft, nontender. EXTREMITIES:  She has got bilateral lower extremity edema, also has  edema in the upper extremities. SKIN:  Some chronic skin changes. NEURO:  No focal neurological deficits. ANCILLARY STUDIES/LABS:  White count of 7.1 and hemoglobin 6.8,  hematocrit 22.2 and platelet count 996. Magnesium 1.9, proBNP was  19,610, troponin stably elevated at 65-67 range, potassium of 4.6,  sodium of 136. Creatinine of 2.70, BUN of 33. ASSESSMENT:  1. Anemia. 2.  Acute on chronic combined congestive heart failure. 3.  Acute on chronic kidney disease.   4.  Diabetes mellitus, type 2.  5. Dementia. 6.  Flat elevation of troponin. PLAN:  Patient admitted for transfusion, she received 1 unit of blood  yesterday. She is also on IV diuretic therapy for her CHF. Will get an  echocardiogram done when available on Monday. The patient's prior  echocardiogram showed an EF 30 to 35% with a grade 2 diastolic  dysfunction. She will be on DVT prophylaxis while here. Also continue  with the home medications. She will be on Lantus along with insulin  sliding scale coverage for her diabetes. Expected length of stay is 2  to 3 nights. Also check stool for hemoccult and continue to monitor  Labs. cardiology consult when available.         Emma Nielson    D: 12/05/2020 12:43:04       T: 12/05/2020 12:55:19     [email or web address]  Job#: [id number]     Doc#: [id number]    CC:

## 2020-12-05 NOTE — FLOWSHEET NOTE
Received results of troponin of 65. Notified CNP Kelley Ped of critical value. No new orders received.

## 2020-12-05 NOTE — FLOWSHEET NOTE
Patient's blood sugar this am is 59. Patient alert/oriented and asymptomatic. Patient was given a snack at hs, but patient did not eat it. Patient currently eating peanut butter crackers and drinking OJ. Notified TASHA Ta of blood sugar results and intervention. Will recheck patient shortly.

## 2020-12-05 NOTE — PROGRESS NOTES
Physical Therapy    Facility/Department: [facility]  PROGRESSIVE CARE  Initial Assessment    NAME: Lalita Wan  : 1950  MRN: [id number]    Date of Service: 2020    Discharge Recommendations:  Continue to assess pending progress, Home with Home health PT        Assessment   Body structures, Functions, Activity limitations: Decreased functional mobility ; Decreased ADL status; Decreased strength;Decreased ROM; Increased pain;Decreased posture;Decreased balance  Prognosis: Good  Decision Making: Low Complexity  REQUIRES PT FOLLOW UP: Yes  Activity Tolerance  Activity Tolerance: Patient Tolerated treatment well       Patient Diagnosis(es): The primary encounter diagnosis was Acute anemia. Diagnoses of Acute kidney injury (nontraumatic) ([address] and Acute decompensated heart failure ([address] were also pertinent to this visit. has a past medical history of Anxiety and depression, Background diabetic retinopathy(362.01), Balance problem, CAD (coronary artery disease), Cancer of uterus ([address] Chronic kidney disease, Chronic low back pain, CVA (cerebral vascular accident) ([address] Dementia ([address] Dry eye syndrome, GERD (gastroesophageal reflux disease), Glaucoma suspect, Gout, Homonymous hemianopsia, Hyperlipidemia, Hypertension, Keratitis, Obesity, Peripheral polyneuropathy, Pseudophakos, Stroke ([address] Trichiasis, and Type 2 diabetes mellitus ([address] has a past surgical history that includes Gastric bypass surgery; Cataract removal with implant (2012); Cataract removal with implant (2012); Coronary artery bypass graft (12-);  section; Hysterectomy; Cholecystectomy; Tonsillectomy and adenoidectomy; and Eye surgery (Left).     Restrictions     Vision/Hearing  Vision: Within Functional Limits  Hearing: Within functional limits     Subjective  General  Chart Reviewed: Yes  Patient assessed for rehabilitation services?: Yes          Orientation  Orientation  Overall Orientation Status: Within mod I  Patient Goals   Patient goals : Return Home       Therapy Time   Individual Concurrent Group Co-treatment   Time In 0930         Time Out 0945         Minutes [id number] [address] [place] [address]

## 2020-12-06 LAB
ABSOLUTE EOS #: 0.13 K/UL (ref 0–0.44)
ABSOLUTE IMMATURE GRANULOCYTE: <0.03 K/UL (ref 0–0.3)
ABSOLUTE LYMPH #: 1.43 K/UL (ref 1.1–3.7)
ABSOLUTE MONO #: 0.47 K/UL (ref 0.1–1.2)
ANION GAP SERPL CALCULATED.3IONS-SCNC: 7 MMOL/L (ref 9–17)
BASOPHILS # BLD: 1 % (ref 0–2)
BASOPHILS ABSOLUTE: 0.05 K/UL (ref 0–0.2)
BUN BLDV-MCNC: 38 MG/DL (ref 8–23)
BUN/CREAT BLD: 13 (ref 9–20)
CALCIUM SERPL-MCNC: 8.7 MG/DL (ref 8.6–10.4)
CHLORIDE BLD-SCNC: 104 MMOL/L (ref 98–107)
CO2: 26 MMOL/L (ref 20–31)
CREAT SERPL-MCNC: 2.89 MG/DL (ref 0.5–0.9)
DATE, STOOL #1: ABNORMAL
DATE, STOOL #2: ABNORMAL
DATE, STOOL #3: ABNORMAL
DIFFERENTIAL TYPE: ABNORMAL
EOSINOPHILS RELATIVE PERCENT: 3 % (ref 1–4)
GFR AFRICAN AMERICAN: 20 ML/MIN
GFR NON-AFRICAN AMERICAN: 16 ML/MIN
GFR SERPL CREATININE-BSD FRML MDRD: ABNORMAL ML/MIN/{1.73_M2}
GFR SERPL CREATININE-BSD FRML MDRD: ABNORMAL ML/MIN/{1.73_M2}
GLUCOSE BLD-MCNC: 108 MG/DL (ref 65–105)
GLUCOSE BLD-MCNC: 109 MG/DL (ref 65–105)
GLUCOSE BLD-MCNC: 126 MG/DL (ref 65–105)
GLUCOSE BLD-MCNC: 90 MG/DL (ref 70–99)
GLUCOSE BLD-MCNC: 94 MG/DL (ref 65–105)
GLUCOSE BLD-MCNC: 99 MG/DL (ref 65–105)
HCT VFR BLD CALC: 23.5 % (ref 36.3–47.1)
HCT VFR BLD CALC: 23.6 % (ref 36.3–47.1)
HCT VFR BLD CALC: 23.8 % (ref 36.3–47.1)
HCT VFR BLD CALC: 27.3 % (ref 36.3–47.1)
HCT VFR BLD CALC: 27.9 % (ref 36.3–47.1)
HEMOCCULT SP1 STL QL: POSITIVE
HEMOCCULT SP2 STL QL: ABNORMAL
HEMOCCULT SP3 STL QL: ABNORMAL
HEMOGLOBIN: 7.2 G/DL (ref 11.9–15.1)
HEMOGLOBIN: 7.2 G/DL (ref 11.9–15.1)
HEMOGLOBIN: 7.3 G/DL (ref 11.9–15.1)
HEMOGLOBIN: 8.3 G/DL (ref 11.9–15.1)
HEMOGLOBIN: 8.4 G/DL (ref 11.9–15.1)
IMMATURE GRANULOCYTES: 0 %
LYMPHOCYTES # BLD: 28 % (ref 24–43)
MCH RBC QN AUTO: 30.4 PG (ref 25.2–33.5)
MCHC RBC AUTO-ENTMCNC: 30.6 G/DL (ref 25.2–33.5)
MCV RBC AUTO: 99.2 FL (ref 82.6–102.9)
MONOCYTES # BLD: 9 % (ref 3–12)
NRBC AUTOMATED: 0 PER 100 WBC
PDW BLD-RTO: 15.2 % (ref 11.8–14.4)
PLATELET # BLD: 219 K/UL (ref 138–453)
PLATELET ESTIMATE: ABNORMAL
PMV BLD AUTO: 9.3 FL (ref 8.1–13.5)
POTASSIUM SERPL-SCNC: 4.9 MMOL/L (ref 3.7–5.3)
RBC # BLD: 2.37 M/UL (ref 3.95–5.11)
RBC # BLD: ABNORMAL 10*6/UL
SEG NEUTROPHILS: 59 % (ref 36–65)
SEGMENTED NEUTROPHILS ABSOLUTE COUNT: 3.09 K/UL (ref 1.5–8.1)
SODIUM BLD-SCNC: 137 MMOL/L (ref 135–144)
TIME, STOOL #1: ABNORMAL
TIME, STOOL #2: ABNORMAL
TIME, STOOL #3: ABNORMAL
WBC # BLD: 5.2 K/UL (ref 3.5–11.3)
WBC # BLD: ABNORMAL 10*3/UL

## 2020-12-06 PROCEDURE — 6370000000 HC RX 637 (ALT 250 FOR IP): Performed by: FAMILY MEDICINE

## 2020-12-06 PROCEDURE — 94760 N-INVAS EAR/PLS OXIMETRY 1: CPT

## 2020-12-06 PROCEDURE — 97110 THERAPEUTIC EXERCISES: CPT

## 2020-12-06 PROCEDURE — 85014 HEMATOCRIT: CPT

## 2020-12-06 PROCEDURE — 2580000003 HC RX 258: Performed by: NURSE PRACTITIONER

## 2020-12-06 PROCEDURE — 82947 ASSAY GLUCOSE BLOOD QUANT: CPT

## 2020-12-06 PROCEDURE — 36430 TRANSFUSION BLD/BLD COMPNT: CPT

## 2020-12-06 PROCEDURE — 85018 HEMOGLOBIN: CPT

## 2020-12-06 PROCEDURE — 6360000002 HC RX W HCPCS: Performed by: NURSE PRACTITIONER

## 2020-12-06 PROCEDURE — 6370000000 HC RX 637 (ALT 250 FOR IP): Performed by: NURSE PRACTITIONER

## 2020-12-06 PROCEDURE — 82274 ASSAY TEST FOR BLOOD FECAL: CPT

## 2020-12-06 PROCEDURE — 2500000003 HC RX 250 WO HCPCS: Performed by: FAMILY MEDICINE

## 2020-12-06 PROCEDURE — 2060000000 HC ICU INTERMEDIATE R&B

## 2020-12-06 PROCEDURE — 80048 BASIC METABOLIC PNL TOTAL CA: CPT

## 2020-12-06 PROCEDURE — 85025 COMPLETE CBC W/AUTO DIFF WBC: CPT

## 2020-12-06 PROCEDURE — P9016 RBC LEUKOCYTES REDUCED: HCPCS

## 2020-12-06 PROCEDURE — 36415 COLL VENOUS BLD VENIPUNCTURE: CPT

## 2020-12-06 PROCEDURE — 97116 GAIT TRAINING THERAPY: CPT

## 2020-12-06 PROCEDURE — 2580000003 HC RX 258: Performed by: FAMILY MEDICINE

## 2020-12-06 RX ORDER — FLUTICASONE PROPIONATE 50 MCG
1 SPRAY, SUSPENSION (ML) NASAL DAILY
Status: DISCONTINUED | OUTPATIENT
Start: 2020-12-06 | End: 2020-12-12 | Stop reason: HOSPADM

## 2020-12-06 RX ORDER — 0.9 % SODIUM CHLORIDE 0.9 %
20 INTRAVENOUS SOLUTION INTRAVENOUS ONCE
Status: COMPLETED | OUTPATIENT
Start: 2020-12-06 | End: 2020-12-06

## 2020-12-06 RX ORDER — FERROUS SULFATE 325(65) MG
325 TABLET ORAL 2 TIMES DAILY WITH MEALS
Status: DISCONTINUED | OUTPATIENT
Start: 2020-12-06 | End: 2020-12-12 | Stop reason: HOSPADM

## 2020-12-06 RX ADMIN — MEMANTINE HYDROCHLORIDE 10 MG: 5 TABLET ORAL at 22:35

## 2020-12-06 RX ADMIN — CITALOPRAM HYDROBROMIDE 20 MG: 20 TABLET ORAL at 09:12

## 2020-12-06 RX ADMIN — MICONAZOLE NITRATE: 20 POWDER TOPICAL at 22:36

## 2020-12-06 RX ADMIN — SODIUM CHLORIDE, PRESERVATIVE FREE 10 ML: 5 INJECTION INTRAVENOUS at 09:11

## 2020-12-06 RX ADMIN — HEPARIN SODIUM 5000 UNITS: 5000 INJECTION INTRAVENOUS; SUBCUTANEOUS at 13:48

## 2020-12-06 RX ADMIN — LOSARTAN POTASSIUM 50 MG: 50 TABLET, FILM COATED ORAL at 08:43

## 2020-12-06 RX ADMIN — SENNOSIDES 8.6 MG: 8.6 TABLET, FILM COATED ORAL at 08:44

## 2020-12-06 RX ADMIN — ASPIRIN 81 MG: 81 TABLET, COATED ORAL at 08:43

## 2020-12-06 RX ADMIN — FERROUS SULFATE TAB 325 MG (65 MG ELEMENTAL FE) 325 MG: 325 (65 FE) TAB at 17:49

## 2020-12-06 RX ADMIN — HEPARIN SODIUM 5000 UNITS: 5000 INJECTION INTRAVENOUS; SUBCUTANEOUS at 06:17

## 2020-12-06 RX ADMIN — PANTOPRAZOLE SODIUM 40 MG: 40 TABLET, DELAYED RELEASE ORAL at 06:17

## 2020-12-06 RX ADMIN — BUMETANIDE 1 MG: 0.25 INJECTION INTRAMUSCULAR; INTRAVENOUS at 09:12

## 2020-12-06 RX ADMIN — ALLOPURINOL 300 MG: 300 TABLET ORAL at 08:44

## 2020-12-06 RX ADMIN — AMLODIPINE BESYLATE 10 MG: 5 TABLET ORAL at 08:43

## 2020-12-06 RX ADMIN — CARVEDILOL 3.12 MG: 3.12 TABLET, FILM COATED ORAL at 17:49

## 2020-12-06 RX ADMIN — MEMANTINE HYDROCHLORIDE 10 MG: 5 TABLET ORAL at 08:43

## 2020-12-06 RX ADMIN — ATORVASTATIN CALCIUM 40 MG: 40 TABLET, FILM COATED ORAL at 08:43

## 2020-12-06 RX ADMIN — FLUTICASONE PROPIONATE 1 SPRAY: 50 SPRAY, METERED NASAL at 15:06

## 2020-12-06 RX ADMIN — CETIRIZINE HYDROCHLORIDE 10 MG: 5 TABLET, FILM COATED ORAL at 08:43

## 2020-12-06 RX ADMIN — HYDRALAZINE HYDROCHLORIDE 75 MG: 25 TABLET, FILM COATED ORAL at 13:47

## 2020-12-06 RX ADMIN — HYDRALAZINE HYDROCHLORIDE 75 MG: 25 TABLET, FILM COATED ORAL at 06:17

## 2020-12-06 RX ADMIN — OXYBUTYNIN CHLORIDE 5 MG: 5 TABLET ORAL at 08:44

## 2020-12-06 RX ADMIN — SODIUM CHLORIDE 20 ML: 9 INJECTION, SOLUTION INTRAVENOUS at 12:50

## 2020-12-06 RX ADMIN — CARVEDILOL 3.12 MG: 3.12 TABLET, FILM COATED ORAL at 08:44

## 2020-12-06 RX ADMIN — SODIUM CHLORIDE, PRESERVATIVE FREE 10 ML: 5 INJECTION INTRAVENOUS at 22:36

## 2020-12-06 RX ADMIN — MICONAZOLE NITRATE: 20 POWDER TOPICAL at 08:44

## 2020-12-06 RX ADMIN — OXYBUTYNIN CHLORIDE 5 MG: 5 TABLET ORAL at 22:35

## 2020-12-06 RX ADMIN — FERROUS SULFATE TAB 325 MG (65 MG ELEMENTAL FE) 325 MG: 325 (65 FE) TAB at 13:47

## 2020-12-06 RX ADMIN — CLOPIDOGREL BISULFATE 75 MG: 75 TABLET ORAL at 08:43

## 2020-12-06 RX ADMIN — SENNOSIDES 8.6 MG: 8.6 TABLET, FILM COATED ORAL at 22:35

## 2020-12-06 ASSESSMENT — PAIN SCALES - GENERAL
PAINLEVEL_OUTOF10: 0

## 2020-12-06 NOTE — PROGRESS NOTES
Hospitalist Progress Note  12/6/2020 9:56 AM  Subjective:   Admit Date: 12/4/2020  PCP: ERINN Jose    Interval History: Patient with anemia requiring transfusion ,chronic combined CHF,CKD . hGB at 7.2 this morning. Has shortness of breath. No chest pain. Diet: DIET CARB CONTROL;  Medications:   Scheduled Meds:   sodium chloride  20 mL Intravenous Once    ferrous sulfate  325 mg Oral BID WC    rivastigmine  2 patch Transdermal Daily    bumetanide  1 mg Intravenous Daily    miconazole   Topical BID    allopurinol  300 mg Oral Daily    amLODIPine  10 mg Oral Daily    aspirin  81 mg Oral Daily    atorvastatin  40 mg Oral Daily    citalopram  20 mg Oral Daily    clopidogrel  75 mg Oral Daily    hydrALAZINE  75 mg Oral 3 times per day    insulin glargine  20 Units Subcutaneous Nightly    cetirizine  10 mg Oral Daily    losartan  50 mg Oral Daily    memantine  10 mg Oral BID    pantoprazole  40 mg Oral QAM AC    oxybutynin  5 mg Oral BID    senna  1 tablet Oral BID    sodium chloride flush  10 mL Intravenous 2 times per day    carvedilol  3.125 mg Oral BID     heparin (porcine)  5,000 Units Subcutaneous 3 times per day    insulin lispro  0-6 Units Subcutaneous TID     insulin lispro  0-3 Units Subcutaneous Nightly     Continuous Infusions:   dextrose      dextrose         Patient's current medications documented, reviewed, and updated. CBC:   Recent Labs     12/04/20  1807  12/05/20  0533  12/05/20  1727 12/05/20  2334 12/06/20  0636   WBC 7.1  --  5.6  --   --   --  5.2   HGB 6.8*   < > 7.2*   < > 7.7* 7.2* 7.2*     --  244  --   --   --  219    < > = values in this interval not displayed.      BMP:    Recent Labs     12/04/20  1807 12/05/20  1727 12/06/20  0636    134* 137   K 4.6 5.0 4.9    101 104   CO2 24 24 26   BUN 33* 35* 38*   CREATININE 2.70* 2.73* 2.89*   GLUCOSE 118* 114* 90     Hepatic: No results for input(s): AST, ALT, ALB, BILITOT, ALKPHOS in the last 72 hours. Troponin: No results for input(s): TROPONINI in the last 72 hours. BNP: No results for input(s): BNP in the last 72 hours. Lipids: No results for input(s): CHOL, HDL in the last 72 hours. Invalid input(s): LDLCALCU  INR: No results for input(s): INR in the last 72 hours. Objective:   Vitals: BP (!) 127/54   Pulse 61   Temp 97.7 °F (36.5 °C) (Oral)   Resp 18   Ht 5' 7\" (1.702 m)   Wt 274 lb 12.8 oz (124.6 kg)   SpO2 96%   BMI 43.04 kg/m²   General appearance: alert and cooperative with exam  HEENT: Eye: Normal external eye, conjunctiva, lids cornea, LETY. Neck: no adenopathy, no carotid bruit, no JVD, supple, symmetrical, trachea midline and thyroid not enlarged, symmetric, no tenderness/mass/nodules  Lungs: diminished breath sounds bibasilar  Heart: regular rate and rhythm, S1, S2 normal, no murmur, click, rub or gallop  Abdomen: soft, non-tender; bowel sounds normal; no masses,  no organomegaly  Extremities: edema bilateral lower extremities in wraps,also puffiness in arms. No calf tenderness  Neurologic:No focal deficits    Assessment and Plan:   1. Anemia of  Chronic disease. 2. Acute on Chronic CHF combined. 3. CKD. 4. Dementia    Patient will benefit from additional unit of PRBC due to CHF and CAD. Cut back on IV diuretics due to upward trending CR. Start on oral iron supplements. Cardiolgy consult in am.    Patient Active Problem List:     Dementia ([address]     Hypertension     Hyperlipidemia     Class 2 severe obesity with serious comorbidity and body mass index (BMI) of 37.0 to 37.9 in [facility])     Gout     Peripheral polyneuropathy     Anxiety and depression     Acute kidney injury superimposed on CKD ([address]     Balance problem     CVA (cerebral vascular accident) ([address]     Type 2 diabetes mellitus with hyperglycemia, with long-term current use of insulin (HCC)     CHF (congestive heart failure), NYHA class I, acute on chronic, combined ([address]     Acute cystitis Infestation by bed bug     Infestation by maggots     Lymphedema of both lower extremities     PAD (peripheral artery disease) (HCC)     Pressure injury of right heel, unstageable (HCC)     Acute kidney injury (HCC)     Bradycardia     Hyperkalemia     AV block, 1st degree     Wounds, multiple     Buttock wound     Decubitus ulcer of trochanteric region of right hip, stage 2 ([address]     Decompensated heart failure ([address]     Acute anemia      Abebe Waterman MD  [facility]

## 2020-12-06 NOTE — PROGRESS NOTES
Hemoglobin and Hematocrit lab draw times were adjusted so that next lab draw is 1 hour post blood transfusion at 1600. H&H will be drawn every 6 hours from there on out. Dr. Mindy Chambers notified.

## 2020-12-06 NOTE — PROGRESS NOTES
Physical Therapy  Facility/Department: [address] [facility] CARE  Daily Treatment Note  NAME: Lalita Wan  : 1950  MRN: [id number]    Date of Service: 2020    Discharge Recommendations:  Home with Home health PT        Assessment   Body structures, Functions, Activity limitations: Decreased functional mobility ; Decreased strength;Decreased endurance;Decreased balance  Prognosis: Excellent  REQUIRES PT FOLLOW UP: No  Activity Tolerance  Activity Tolerance: Patient Tolerated treatment well;Patient limited by endurance     Patient Diagnosis(es): The primary encounter diagnosis was Acute anemia. Diagnoses of Acute kidney injury (nontraumatic) ([address] and Acute decompensated heart failure ([address] were also pertinent to this visit. has a past medical history of Anxiety and depression, Background diabetic retinopathy(362.01), Balance problem, CAD (coronary artery disease), Cancer of uterus ([address] Chronic kidney disease, Chronic low back pain, CVA (cerebral vascular accident) ([address] Dementia ([address] Dry eye syndrome, GERD (gastroesophageal reflux disease), Glaucoma suspect, Gout, Homonymous hemianopsia, Hyperlipidemia, Hypertension, Keratitis, Obesity, Peripheral polyneuropathy, Pseudophakos, Stroke ([address] Trichiasis, and Type 2 diabetes mellitus ([address] has a past surgical history that includes Gastric bypass surgery; Cataract removal with implant (2012); Cataract removal with implant (2012); Coronary artery bypass graft (12-);  section; Hysterectomy; Cholecystectomy; Tonsillectomy and adenoidectomy; and Eye surgery (Left). Restrictions     Subjective   General  Chart Reviewed: Yes  Response To Previous Treatment: Patient with no complaints from previous session. Family / Caregiver Present: No  Subjective  Subjective: Pt found supine in bed upon arrival. Agreeable to therapy.   Pain Screening  Patient Currently in Pain: No  Pain Assessment  Pain Assessment: 0-10  Pain Level: 0  Patient's Stated Pain Goal: No pain  Vital Signs  Patient Currently in Pain: No  Oxygen Therapy  O2 Device: Nasal cannula  Skin Protection for O2 Device: Yes  O2 Flow Rate (L/min): 2 L/min       Orientation  Orientation  Overall Orientation Status: Within Normal Limits  Cognition      Objective   Bed mobility  Bridging: Unable to assess  Rolling to Left: Minimal assistance  Rolling to Right: Minimal assistance  Supine to Sit: Minimal assistance  Sit to Supine: Minimal assistance  Scooting: Minimal assistance  Transfers  Sit to Stand: Minimal Assistance  Stand to sit: Minimal Assistance  Bed to Chair: Unable to assess  Stand Pivot Transfers: Unable to assess  Squat Pivot Transfers: Unable to assess  Lateral Transfers: Unable to assess  Car Transfer: Unable to assess  Ambulation  Ambulation?: Yes  WB Status: WBAT  More Ambulation?: No  Ambulation 1  Surface: level tile  Device: Rolling Walker  Assistance: Contact guard assistance  Quality of Gait: Pt exhibits slow and steady gait with hunched posture present.   Gait Deviations: Slow Vicenta;Decreased step length;Decreased step height  Distance: 50'x1  Stairs/Curb  Stairs?: No  Neuromuscular Education  NDT Treatment: Gait ;Sitting;Standing  Balance  Posture: Fair  Sitting - Static: Good  Sitting - Dynamic: Good  Standing - Static: Good  Standing - Dynamic: Fair  Exercises  Straight Leg Raise: x10  Quad Sets: x15  Heelslides: x10  Gluteal Sets: x15  Hip Abduction: x15  Knee Long Arc Quad: x15  Ankle Pumps: x20  Comments: hip add x15  Other exercises  Other exercises?: No                        G-Code     OutComes Score                                                     AM-PAC Score             Goals  Short term goals  Time Frame for Short term goals: LOS  Short term goal 1: Bed mobility with mod I  Short term goal 2: Transfers with Mod I  Short term goal 3: Amb x 50ft with RW and mod I  Patient Goals   Patient goals : Return Home    Plan    Plan  Times per day: Daily  Current Treatment Recommendations: Strengthening, Balance Training, Functional Mobility Training, Transfer Training, Gait Training, Endurance Training  Safety Devices  Type of devices:  All fall risk precautions in place, Call light within reach, Gait belt, Patient at risk for falls, Left in chair, Nurse notified, Chair alarm in place, Bed alarm in place     Therapy Time   Individual Concurrent Group Co-treatment   Time In 0744         Time Out 0807         Minutes 23         Timed Code Treatment Minutes: [address], PTA

## 2020-12-06 NOTE — PLAN OF CARE
Problem: Falls - Risk of:  Goal: Will remain free from falls  Description: Will remain free from falls  12/5/2020 2055 by Jeanie Miller RN  Outcome: Met This Shift  12/5/2020 0851 by Shelby Siddiqui RN  Outcome: Ongoing  Goal: Absence of physical injury  Description: Absence of physical injury  12/5/2020 2055 by Jeanie Miller RN  Outcome: Met This Shift  12/5/2020 0851 by Shelby Siddiqui RN  Outcome: Ongoing     Problem: Skin Integrity:  Goal: Will show no infection signs and symptoms  Description: Will show no infection signs and symptoms  12/5/2020 2055 by Jeanie Miller RN  Outcome: Met This Shift  12/5/2020 0851 by Shelby Siddiqui RN  Outcome: Ongoing  Goal: Absence of new skin breakdown  Description: Absence of new skin breakdown  12/5/2020 2055 by Jeanie Miller RN  Outcome: Met This Shift  12/5/2020 0851 by Shelby Siddiqui RN  Outcome: Ongoing     Problem: OXYGENATION/RESPIRATORY FUNCTION  Goal: Patient will maintain patent airway  12/5/2020 2055 by Jeanie Miller RN  Outcome: Met This Shift  12/5/2020 0851 by Shelby Siddiqui RN  Outcome: Ongoing  Goal: Patient will achieve/maintain normal respiratory rate/effort  Description: Respiratory rate and effort will be within normal limits for the patient  12/5/2020 2055 by Jeanie Miller RN  Outcome: Met This Shift  12/5/2020 0851 by Shelby Siddiqui RN  Outcome: Ongoing     Problem: HEMODYNAMIC STATUS  Goal: Patient has stable vital signs and fluid balance  12/5/2020 2055 by Jeanie Miller RN  Outcome: Ongoing  12/5/2020 0851 by Shelby Siddiqui RN  Outcome: Ongoing     Problem: FLUID AND ELECTROLYTE IMBALANCE  Goal: Fluid and electrolyte balance are achieved/maintained  12/5/2020 2055 by Jeanie Miller RN  Outcome: Ongoing  12/5/2020 0851 by Shelby Siddiqui RN  Outcome: Ongoing     Problem: ACTIVITY INTOLERANCE/IMPAIRED MOBILITY  Goal: Mobility/activity is maintained at optimum level for patient  12/5/2020 2055 by Jeanie Miller RN  Outcome: Met This Shift  12/5/2020 0851 by Edson Quinones, RN  Outcome: Ongoing

## 2020-12-07 ENCOUNTER — APPOINTMENT (OUTPATIENT)
Dept: GENERAL RADIOLOGY | Age: 70
DRG: 811 | End: 2020-12-07
Payer: MEDICARE

## 2020-12-07 LAB
-: ABNORMAL
ABSOLUTE EOS #: 0.13 K/UL (ref 0–0.44)
ABSOLUTE IMMATURE GRANULOCYTE: <0.03 K/UL (ref 0–0.3)
ABSOLUTE LYMPH #: 0.74 K/UL (ref 1.1–3.7)
ABSOLUTE MONO #: 0.36 K/UL (ref 0.1–1.2)
AMORPHOUS: ABNORMAL
ANION GAP SERPL CALCULATED.3IONS-SCNC: 9 MMOL/L (ref 9–17)
BACTERIA: ABNORMAL
BASOPHILS # BLD: 1 % (ref 0–2)
BASOPHILS ABSOLUTE: 0.04 K/UL (ref 0–0.2)
BILIRUBIN URINE: NEGATIVE
BNP INTERPRETATION: ABNORMAL
BUN BLDV-MCNC: 41 MG/DL (ref 8–23)
BUN/CREAT BLD: 13 (ref 9–20)
CALCIUM SERPL-MCNC: 8.6 MG/DL (ref 8.6–10.4)
CASTS UA: ABNORMAL /LPF (ref 0–2)
CHLORIDE BLD-SCNC: 103 MMOL/L (ref 98–107)
CHOLESTEROL/HDL RATIO: 1.8
CHOLESTEROL: 86 MG/DL
CO2: 23 MMOL/L (ref 20–31)
COLOR: ABNORMAL
COMMENT UA: ABNORMAL
CREAT SERPL-MCNC: 3.18 MG/DL (ref 0.5–0.9)
CRYSTALS, UA: ABNORMAL /HPF
DIFFERENTIAL TYPE: ABNORMAL
EKG ATRIAL RATE: 75 BPM
EKG P AXIS: 91 DEGREES
EKG P-R INTERVAL: 206 MS
EKG Q-T INTERVAL: 432 MS
EKG QRS DURATION: 88 MS
EKG QTC CALCULATION (BAZETT): 482 MS
EKG R AXIS: 113 DEGREES
EKG T AXIS: 118 DEGREES
EKG VENTRICULAR RATE: 75 BPM
EOSINOPHILS RELATIVE PERCENT: 2 % (ref 1–4)
EPITHELIAL CELLS UA: ABNORMAL /HPF (ref 0–5)
GFR AFRICAN AMERICAN: 18 ML/MIN
GFR NON-AFRICAN AMERICAN: 15 ML/MIN
GFR SERPL CREATININE-BSD FRML MDRD: ABNORMAL ML/MIN/{1.73_M2}
GFR SERPL CREATININE-BSD FRML MDRD: ABNORMAL ML/MIN/{1.73_M2}
GLUCOSE BLD-MCNC: 153 MG/DL (ref 65–105)
GLUCOSE BLD-MCNC: 178 MG/DL (ref 65–105)
GLUCOSE BLD-MCNC: 189 MG/DL (ref 70–99)
GLUCOSE URINE: NEGATIVE
HCT VFR BLD CALC: 25.6 % (ref 36.3–47.1)
HCT VFR BLD CALC: 26.1 % (ref 36.3–47.1)
HCT VFR BLD CALC: 26.2 % (ref 36.3–47.1)
HDLC SERPL-MCNC: 48 MG/DL
HEMOGLOBIN: 7.9 G/DL (ref 11.9–15.1)
HEMOGLOBIN: 7.9 G/DL (ref 11.9–15.1)
HEMOGLOBIN: 8 G/DL (ref 11.9–15.1)
IMMATURE GRANULOCYTES: 0 %
KETONES, URINE: ABNORMAL
LDL CHOLESTEROL: 22 MG/DL (ref 0–130)
LEUKOCYTE ESTERASE, URINE: NEGATIVE
LV EF: 48 %
LVEF MODALITY: NORMAL
LYMPHOCYTES # BLD: 14 % (ref 24–43)
MCH RBC QN AUTO: 29.9 PG (ref 25.2–33.5)
MCHC RBC AUTO-ENTMCNC: 30.3 G/DL (ref 25.2–33.5)
MCV RBC AUTO: 98.9 FL (ref 82.6–102.9)
MONOCYTES # BLD: 7 % (ref 3–12)
MUCUS: ABNORMAL
NITRITE, URINE: NEGATIVE
NRBC AUTOMATED: 0 PER 100 WBC
OTHER OBSERVATIONS UA: ABNORMAL
PDW BLD-RTO: 15.9 % (ref 11.8–14.4)
PH UA: 5 (ref 5–6)
PLATELET # BLD: 232 K/UL (ref 138–453)
PLATELET ESTIMATE: ABNORMAL
PMV BLD AUTO: 10 FL (ref 8.1–13.5)
POTASSIUM SERPL-SCNC: 5.3 MMOL/L (ref 3.7–5.3)
PRO-BNP: ABNORMAL PG/ML
PROTEIN UA: ABNORMAL
RBC # BLD: 2.64 M/UL (ref 3.95–5.11)
RBC # BLD: ABNORMAL 10*6/UL
RBC UA: ABNORMAL /HPF (ref 0–4)
RENAL EPITHELIAL, UA: ABNORMAL /HPF
SEG NEUTROPHILS: 76 % (ref 36–65)
SEGMENTED NEUTROPHILS ABSOLUTE COUNT: 4.08 K/UL (ref 1.5–8.1)
SODIUM BLD-SCNC: 135 MMOL/L (ref 135–144)
SPECIFIC GRAVITY UA: 1.03 (ref 1.01–1.02)
TRICHOMONAS: ABNORMAL
TRIGL SERPL-MCNC: 79 MG/DL
TURBIDITY: ABNORMAL
URINE HGB: ABNORMAL
UROBILINOGEN, URINE: NORMAL
VLDLC SERPL CALC-MCNC: NORMAL MG/DL (ref 1–30)
WBC # BLD: 5.4 K/UL (ref 3.5–11.3)
WBC # BLD: ABNORMAL 10*3/UL
WBC UA: ABNORMAL /HPF (ref 0–4)
YEAST: ABNORMAL

## 2020-12-07 PROCEDURE — 93306 TTE W/DOPPLER COMPLETE: CPT

## 2020-12-07 PROCEDURE — 83880 ASSAY OF NATRIURETIC PEPTIDE: CPT

## 2020-12-07 PROCEDURE — 36415 COLL VENOUS BLD VENIPUNCTURE: CPT

## 2020-12-07 PROCEDURE — 99222 1ST HOSP IP/OBS MODERATE 55: CPT | Performed by: INTERNAL MEDICINE

## 2020-12-07 PROCEDURE — 2580000003 HC RX 258: Performed by: INTERNAL MEDICINE

## 2020-12-07 PROCEDURE — 85025 COMPLETE CBC W/AUTO DIFF WBC: CPT

## 2020-12-07 PROCEDURE — 97110 THERAPEUTIC EXERCISES: CPT

## 2020-12-07 PROCEDURE — 6370000000 HC RX 637 (ALT 250 FOR IP): Performed by: FAMILY MEDICINE

## 2020-12-07 PROCEDURE — 6370000000 HC RX 637 (ALT 250 FOR IP): Performed by: NURSE PRACTITIONER

## 2020-12-07 PROCEDURE — 2580000003 HC RX 258: Performed by: NURSE PRACTITIONER

## 2020-12-07 PROCEDURE — 80048 BASIC METABOLIC PNL TOTAL CA: CPT

## 2020-12-07 PROCEDURE — 80061 LIPID PANEL: CPT

## 2020-12-07 PROCEDURE — 81001 URINALYSIS AUTO W/SCOPE: CPT

## 2020-12-07 PROCEDURE — 71045 X-RAY EXAM CHEST 1 VIEW: CPT

## 2020-12-07 PROCEDURE — 97165 OT EVAL LOW COMPLEX 30 MIN: CPT | Performed by: OCCUPATIONAL THERAPIST

## 2020-12-07 PROCEDURE — 99232 SBSQ HOSP IP/OBS MODERATE 35: CPT | Performed by: INTERNAL MEDICINE

## 2020-12-07 PROCEDURE — 85014 HEMATOCRIT: CPT

## 2020-12-07 PROCEDURE — 2500000003 HC RX 250 WO HCPCS: Performed by: FAMILY MEDICINE

## 2020-12-07 PROCEDURE — 97116 GAIT TRAINING THERAPY: CPT

## 2020-12-07 PROCEDURE — 85018 HEMOGLOBIN: CPT

## 2020-12-07 PROCEDURE — 82962 GLUCOSE BLOOD TEST: CPT

## 2020-12-07 PROCEDURE — 2060000000 HC ICU INTERMEDIATE R&B

## 2020-12-07 PROCEDURE — 82947 ASSAY GLUCOSE BLOOD QUANT: CPT

## 2020-12-07 RX ORDER — BUMETANIDE 1 MG/1
1 TABLET ORAL DAILY
Status: DISCONTINUED | OUTPATIENT
Start: 2020-12-07 | End: 2020-12-12 | Stop reason: HOSPADM

## 2020-12-07 RX ORDER — SODIUM CHLORIDE 9 MG/ML
INJECTION, SOLUTION INTRAVENOUS CONTINUOUS
Status: DISCONTINUED | OUTPATIENT
Start: 2020-12-07 | End: 2020-12-12 | Stop reason: HOSPADM

## 2020-12-07 RX ADMIN — SODIUM CHLORIDE, PRESERVATIVE FREE 10 ML: 5 INJECTION INTRAVENOUS at 22:01

## 2020-12-07 RX ADMIN — INSULIN LISPRO 1 UNITS: 100 INJECTION, SOLUTION INTRAVENOUS; SUBCUTANEOUS at 21:49

## 2020-12-07 RX ADMIN — AMLODIPINE BESYLATE 10 MG: 5 TABLET ORAL at 08:50

## 2020-12-07 RX ADMIN — HYDRALAZINE HYDROCHLORIDE 75 MG: 25 TABLET, FILM COATED ORAL at 13:56

## 2020-12-07 RX ADMIN — BUMETANIDE 1 MG: 0.25 INJECTION INTRAMUSCULAR; INTRAVENOUS at 08:50

## 2020-12-07 RX ADMIN — SODIUM CHLORIDE: 9 INJECTION, SOLUTION INTRAVENOUS at 11:23

## 2020-12-07 RX ADMIN — SODIUM CHLORIDE: 9 INJECTION, SOLUTION INTRAVENOUS at 19:46

## 2020-12-07 RX ADMIN — MEMANTINE HYDROCHLORIDE 10 MG: 5 TABLET ORAL at 08:50

## 2020-12-07 RX ADMIN — OXYBUTYNIN CHLORIDE 5 MG: 5 TABLET ORAL at 08:50

## 2020-12-07 RX ADMIN — SODIUM CHLORIDE, PRESERVATIVE FREE 10 ML: 5 INJECTION INTRAVENOUS at 08:50

## 2020-12-07 RX ADMIN — FLUTICASONE PROPIONATE 1 SPRAY: 50 SPRAY, METERED NASAL at 09:00

## 2020-12-07 RX ADMIN — INSULIN LISPRO 1 UNITS: 100 INJECTION, SOLUTION INTRAVENOUS; SUBCUTANEOUS at 17:50

## 2020-12-07 RX ADMIN — OXYBUTYNIN CHLORIDE 5 MG: 5 TABLET ORAL at 21:49

## 2020-12-07 RX ADMIN — FERROUS SULFATE TAB 325 MG (65 MG ELEMENTAL FE) 325 MG: 325 (65 FE) TAB at 17:51

## 2020-12-07 RX ADMIN — CARVEDILOL 3.12 MG: 3.12 TABLET, FILM COATED ORAL at 08:50

## 2020-12-07 RX ADMIN — LOSARTAN POTASSIUM 50 MG: 50 TABLET, FILM COATED ORAL at 08:50

## 2020-12-07 RX ADMIN — SENNOSIDES 8.6 MG: 8.6 TABLET, FILM COATED ORAL at 21:49

## 2020-12-07 RX ADMIN — MEMANTINE HYDROCHLORIDE 10 MG: 5 TABLET ORAL at 21:49

## 2020-12-07 RX ADMIN — MICONAZOLE NITRATE: 20 POWDER TOPICAL at 21:54

## 2020-12-07 RX ADMIN — MICONAZOLE NITRATE: 20 POWDER TOPICAL at 09:00

## 2020-12-07 ASSESSMENT — PAIN SCALES - GENERAL
PAINLEVEL_OUTOF10: 0

## 2020-12-07 NOTE — PROGRESS NOTES
Hospitalist Progress Note    Patient:  Francisco Javier Newton     YOB: 1950    MRN: [id number]   Admit date: 12/4/2020     Acct: [de-identified]     PCP: ERINN Yeboah    CC--Interval History:   CHF--acute-on-chronic--systolic    Type 2 NSTEMI---due to anemia--CHF--CKD---seen by Cardiology---see notes    AMY at admission--poor output--currently at a Stage 4-5 level----typically Stage 3    ASCVD---CABG    Anemia ---> GI bleed----2 units PRBCs-----likely slow blood loss---seen by General Surgery---planned colonoscopy---12.9.2020    BLE edema---weeping---currently with dressing and ACE wraps BLE    See below     All other ROS negative except noted in HPI    Diet:  DIET CARB CONTROL; Carb Control: 4 carb choices (60 gms)/meal    Medications:  Scheduled Meds:   [Held by provider] bumetanide  1 mg Oral Daily    ferrous sulfate  325 mg Oral BID WC    fluticasone  1 spray Each Nostril Daily    rivastigmine  2 patch Transdermal Daily    miconazole   Topical BID    allopurinol  300 mg Oral Daily    amLODIPine  10 mg Oral Daily    aspirin  81 mg Oral Daily    atorvastatin  40 mg Oral Daily    citalopram  20 mg Oral Daily    clopidogrel  75 mg Oral Daily    hydrALAZINE  75 mg Oral 3 times per day    insulin glargine  20 Units Subcutaneous Nightly    cetirizine  10 mg Oral Daily    memantine  10 mg Oral BID    pantoprazole  40 mg Oral QAM AC    oxybutynin  5 mg Oral BID    senna  1 tablet Oral BID    sodium chloride flush  10 mL Intravenous 2 times per day    insulin lispro  0-6 Units Subcutaneous TID WC    insulin lispro  0-3 Units Subcutaneous Nightly     Continuous Infusions:   sodium chloride 125 mL/hr at 12/07/20 1123    dextrose      dextrose       PRN Meds:glucose, dextrose, glucagon (rDNA), dextrose, glucose, dextrose, glucagon (rDNA), dextrose, sodium chloride flush, acetaminophen **OR** [DISCONTINUED] acetaminophen, [DISCONTINUED] promethazine **OR** ondansetron    Objective:  Labs:  CBC with Differential:    Lab Results   Component Value Date    WBC 5.4 12/07/2020    RBC 2.64 12/07/2020    HGB 8.0 12/07/2020    HCT 26.2 12/07/2020     12/07/2020    MCV 98.9 12/07/2020    MCH 29.9 12/07/2020    MCHC 30.3 12/07/2020    RDW 15.9 12/07/2020    LYMPHOPCT 14 12/07/2020    LYMPHOPCT 15.4 11/10/2020    MONOPCT 7 12/07/2020    MONOPCT 6.4 11/10/2020    EOSPCT 1.4 11/10/2020    BASOPCT 1 12/07/2020    BASOPCT 0.6 11/10/2020    MONOSABS 0.36 12/07/2020    MONOSABS 0.41 11/10/2020    LYMPHSABS 0.74 12/07/2020    LYMPHSABS 0.99 11/10/2020    EOSABS 0.13 12/07/2020    EOSABS 0.09 11/10/2020    BASOSABS 0.04 12/07/2020    DIFFTYPE NOT REPORTED 12/07/2020     BMP:    Lab Results   Component Value Date     12/07/2020    K 5.3 12/07/2020     12/07/2020    CO2 23 12/07/2020    BUN 41 12/07/2020    LABALBU 3.8 11/04/2020    CREATININE 3.18 12/07/2020    CALCIUM 8.6 12/07/2020    GFRAA 18 12/07/2020    LABGLOM 15 12/07/2020    GLUCOSE 189 12/07/2020           Physical Exam:  Vitals: BP (!) 133/54   Pulse 60   Temp 96.7 °F (35.9 °C) (Tympanic)   Resp 14   Ht 5' 7\" (1.702 m)   Wt 276 lb (125.2 kg)   SpO2 97%   BMI 43.23 kg/m²   24 hour intake/output:    Intake/Output Summary (Last 24 hours) at 12/7/2020 1252  Last data filed at 12/7/2020 0512  Gross per 24 hour   Intake 954.66 ml   Output 480 ml   Net 474.66 ml     Last 3 weights: Wt Readings from Last 3 Encounters:   12/07/20 276 lb (125.2 kg)   11/04/20 238 lb (108 kg)   10/05/20 219 lb (99.3 kg)     HEENT: Normocephalic and Atraumatic  Neck: Supple, No Masses, Tenderness, Nodularity and No Lymphadenopathy  Chest/Lungs: Rales Present and Distant Breath Sounds  Cardiac: Regular Rate and Rhythm without Rubs, Clicks, Gallops, or Murmurs  GI/Abdomen:  Bowel Sounds Present and Soft, Non-tender, without Guarding or Rebound Tenderness  : Not examined  EXT/Skin: BLE---ACE wraps----, No Cyanosis, No Clubbing and Edema Present  Neuro:

## 2020-12-07 NOTE — PROGRESS NOTES
SW attempted to meet with patient to complete Psychosocial Assessment. Patient unavailable at this time. SW discussed with nurses. No  needed at this time.       Electronically signed by JEFFREY Goncalves on 12/7/2020 at 3:24 PM

## 2020-12-07 NOTE — FLOWSHEET NOTE
rounding on PCU. Assessment: Patient is resting in chair but wakes for conversation. No visitors present. Intervention: Engaged in conversation. Patient expressed appreciation for visit and offer of continued prayer. Plan: Chaplains are available on site or on call 24/7 for spiritual and emotional support.      12/07/20 1134   Encounter Summary   Services provided to: Patient   Referral/Consult From: [facility]   Continue Visiting   (12/7/20)   Complexity of Encounter Moderate   Length of Encounter 15 minutes   Spiritual/Protestant   Type Spiritual support   Assessment Approachable   Intervention Sustaining presence/ Ministry of presence   Outcome Expressed gratitude;Engaged in conversation

## 2020-12-07 NOTE — PROGRESS NOTES
Occupational Therapy   Occupational Therapy Initial Assessment  Date: 2020   Patient Name: Markus Jacques  MRN: [id number]     : 1950    Date of Service: 2020    Discharge Recommendations:  Continue to assess pending progress, Home with Home health OT       Assessment   Performance deficits / Impairments: Decreased functional mobility ; Decreased ADL status; Decreased endurance;Decreased balance;Decreased safe awareness;Decreased high-level IADLs  Treatment Diagnosis: general weakness  Prognosis: Fair  Decision Making: Low Complexity  REQUIRES OT FOLLOW UP: Yes  Safety Devices  Safety Devices in place: Yes  Type of devices: Call light within reach; Left in chair;Chair alarm in place;Nurse notified           Patient Diagnosis(es): The primary encounter diagnosis was Acute anemia. Diagnoses of Acute kidney injury (nontraumatic) ([address] and Acute decompensated heart failure ([address] were also pertinent to this visit. has a past medical history of Anxiety and depression, Background diabetic retinopathy(362.01), Balance problem, CAD (coronary artery disease), Cancer of uterus ([address] Chronic kidney disease, Chronic low back pain, CVA (cerebral vascular accident) ([address] Dementia ([address] Dry eye syndrome, GERD (gastroesophageal reflux disease), Glaucoma suspect, Gout, Homonymous hemianopsia, Hyperlipidemia, Hypertension, Keratitis, Obesity, Peripheral polyneuropathy, Pseudophakos, Stroke ([address] Trichiasis, and Type 2 diabetes mellitus ([address] has a past surgical history that includes Gastric bypass surgery; Cataract removal with implant (2012); Cataract removal with implant (2012); Coronary artery bypass graft (12-);  section; Hysterectomy; Cholecystectomy; Tonsillectomy and adenoidectomy; and Eye surgery (Left).     Treatment Diagnosis: general weakness      Restrictions  Restrictions/Precautions  Restrictions/Precautions: General Precautions    Subjective   General  Chart Reviewed: Yes  Patient assessed for rehabilitation services?: Yes  Family / Caregiver Present: No  Referring Practitioner: KELLY Ronquillo  Diagnosis: decompensated heart failure  Subjective  Subjective: Patient rec'd in bed, pleasant and cooperative 77 yr old female with abnormal labs  Patient Currently in Pain: Denies  Pain Assessment  Pain Assessment: 0-10  Pain Level: 0  Patient's Stated Pain Goal: No pain  Vital Signs  Patient Currently in Pain: Denies     Social/Functional History  Social/Functional History  Lives With: Family  Type of Home: House  Home Layout: One level  Home Equipment: Rolling walker, Oxygen  Receives Help From: Family  ADL Assistance: Independent  Homemaking Assistance: Independent  Ambulation Assistance: Independent  Transfer Assistance: Independent  Active : No  Patient's  Info: Pt reports grandson drives pt when necessary. Objective   Vision: Within Functional Limits  Hearing: Within functional limits    Orientation  Overall Orientation Status: Within Functional Limits        ADL  LE Dressing: Maximum assistance  Toileting: Moderate assistance        Bed mobility  Supine to Sit: Minimal assistance  Scooting: Minimal assistance  Transfers  Sit to stand: Minimal assistance  Stand to sit: Minimal assistance     Cognition  Overall Cognitive Status: WFL  Following Commands:  Follows multistep commands with increased time  Safety Judgement: Decreased awareness of need for assistance  Insights: Decreased awareness of deficits        Plan   Plan  Times per week: 1-2    Goals  Short term goals  Time Frame for Short term goals: duration of hospital stay  Short term goal 1: Patient to complete toilet transfer and toileting tasks with SBA using a/e as needed  Short term goal 2: Patient to complete UB/LB ADL tasks with CGA using a/e as needed  Short term goal 3: Patient education energy conservation/work simplification for increased ease ADL tasks       Therapy Time   Individual Concurrent Group

## 2020-12-07 NOTE — PROGRESS NOTES
Physical Therapy  Facility/Department: [address] [facility] CARE  Daily Treatment Note  NAME: Abigail Lo  : 1950  MRN: [id number]    Date of Service: 2020    Discharge Recommendations:  Subacute/Skilled Nursing Facility        Assessment   Body structures, Functions, Activity limitations: Decreased functional mobility ; Decreased strength;Decreased endurance;Decreased balance  Prognosis: Excellent  REQUIRES PT FOLLOW UP: No  Activity Tolerance  Activity Tolerance: Patient Tolerated treatment well;Patient limited by endurance     Patient Diagnosis(es): The primary encounter diagnosis was Acute anemia. Diagnoses of Acute kidney injury (nontraumatic) ([address] and Acute decompensated heart failure ([address] were also pertinent to this visit. has a past medical history of Anxiety and depression, Background diabetic retinopathy(362.01), Balance problem, CAD (coronary artery disease), Cancer of uterus ([address] Chronic kidney disease, Chronic low back pain, CVA (cerebral vascular accident) ([address] Dementia ([address] Dry eye syndrome, GERD (gastroesophageal reflux disease), Glaucoma suspect, Gout, Homonymous hemianopsia, Hyperlipidemia, Hypertension, Keratitis, Obesity, Peripheral polyneuropathy, Pseudophakos, Stroke ([address] Trichiasis, and Type 2 diabetes mellitus ([address] has a past surgical history that includes Gastric bypass surgery; Cataract removal with implant (2012); Cataract removal with implant (2012); Coronary artery bypass graft (12-);  section; Hysterectomy; Cholecystectomy; Tonsillectomy and adenoidectomy; and Eye surgery (Left). Restrictions     Subjective   General  Chart Reviewed: Yes  Response To Previous Treatment: Patient with no complaints from previous session. Family / Caregiver Present: No  Subjective  Subjective: Pt found supine in bed upon arrival. Agreeable to therapy.   Pain Screening  Patient Currently in Pain: No  Pain Assessment  Pain Assessment: 0-10  Pain Level: 0  Patient's Stated Pain Goal: No pain  Vital Signs  Patient Currently in Pain: No  Oxygen Therapy  O2 Device: Nasal cannula  Skin Protection for O2 Device: Yes  O2 Flow Rate (L/min): 2 L/min       Orientation  Orientation  Overall Orientation Status: Within Normal Limits  Cognition      Objective   Bed mobility  Bridging: Unable to assess  Rolling to Left: Minimal assistance  Rolling to Right: Minimal assistance  Supine to Sit: Minimal assistance  Sit to Supine: Minimal assistance  Scooting: Minimal assistance  Transfers  Sit to Stand: Contact guard assistance  Stand to sit: Contact guard assistance  Bed to Chair: Unable to assess  Stand Pivot Transfers: Unable to assess  Squat Pivot Transfers: Unable to assess  Lateral Transfers: Unable to assess  Car Transfer: Unable to assess  Ambulation  Ambulation?: Yes  WB Status: WBAT  More Ambulation?: No  Ambulation 1  Surface: level tile  Device: Rolling Walker  Assistance: Contact guard assistance  Quality of Gait: Pt exhibits slow and steady gait with symmetry present on B sides. Hunched posture present.   Gait Deviations: Slow Vicenta;Decreased step length;Decreased step height  Distance: 50'x2  Stairs/Curb  Stairs?: No  Neuromuscular Education  NDT Treatment: Gait ;Sitting;Standing  Balance  Posture: Fair  Sitting - Static: Good  Sitting - Dynamic: Good  Standing - Static: Good  Standing - Dynamic: Fair  Exercises  Straight Leg Raise: x10  Quad Sets: x15  Heelslides: x15  Gluteal Sets: x15  Hip Flexion: x10  Hip Abduction: x15  Knee Long Arc Quad: x15  Ankle Pumps: x20  Comments: hip add x15  Other exercises  Other exercises?: No                        G-Code     OutComes Score                                                     AM-PAC Score             Goals  Short term goals  Time Frame for Short term goals: LOS  Short term goal 1: Bed mobility with mod I  Short term goal 2: Transfers with Mod I  Short term goal 3: Amb x 50ft with RW and mod I  Patient Goals Patient goals : Return Home    Plan    Plan  Times per day: Daily  Current Treatment Recommendations: Strengthening, Balance Training, Functional Mobility Training, Transfer Training, Gait Training, Endurance Training  Safety Devices  Type of devices:  All fall risk precautions in place, Call light within reach, Chair alarm in place, Bed alarm in place, Gait belt, Patient at risk for falls, Left in chair, Nurse notified     Therapy Time   Individual Concurrent Group Co-treatment   Time In 0846         Time Out 0913         Minutes 27         Timed Code Treatment Minutes: [address], PTA

## 2020-12-07 NOTE — PROGRESS NOTES
SW attempted to meet with patient to complete Psychosocial Assessment. Patient unavailable at this time. SW will continue to monitor needs and assist as appropriate.        Electronically signed by JEFFREY Sky on 12/7/2020 at 11:17 AM

## 2020-12-07 NOTE — PROGRESS NOTES
Physician Progress Note      Maurisio Bonilla  CSN #:                  [id number]  :                       1950  ADMIT DATE:       2020 5:42 PM  [address] DATE:  RESPONDING  PROVIDER #:        Cheyanne AYERS          QUERY TEXT:    Patient admitted with anemia with documented CKD. If possible, please   document in progress notes and discharge summary if you are evaluating and/or   treating any of the following: The medical record reflects the following:  Risk Factors: HTN, DM  Clinical Indicators: BUN/CR/GFR: 33/2.7/18 on , 15/1.2/45 on 2020 and   25/1.30/41 on 2020  Treatment: monitoring BMP, IVFs started on   Options provided:  -- CKD stage 3 GFR 30-59  -- CKD Stage 3a GFR 45-59  -- CKD Stage 3b GFR 30-44  -- Other - I will add my own diagnosis  -- Disagree - Not applicable / Not valid  -- Disagree - Clinically unable to determine / Unknown  -- Refer to Clinical Documentation Reviewer    PROVIDER RESPONSE TEXT:    CKD--Stage 4    Query created by: Rhoda Good on 2020 11:59 AM      QUERY TEXT:    Pt admitted with anemia, documented as anemia of chronic disease on . Giorgi Tan  If   possible, please document in progress notes and discharge summary the etiology   of the anemia:    The medical record reflects the following:  Risk Factors: CKD, hx of anemia, on ASA and Plavix  Clinical Indicators: H/H 6.8/22.2 on  from 7.6/23.5 on 11/10;   iron   19, iron saturation 13, TIBC 148, Stool positive for occult blood  Treatment: admitted for transfusion RBCs, frequent monitoring H/H, General   surgery consult for \"GI bleed\"  Options provided:  -- Anemia of chronic disease due to CKD  -- Anemia due to iron deficiency  -- Anemia due to chronic blood loss  -- Other - I will add my own diagnosis  -- Disagree - Not applicable / Not valid  -- Disagree - Clinically unable to determine / Unknown  -- Refer to Clinical Documentation Reviewer    PROVIDER RESPONSE TEXT:    This patient has anemia due to chronic blood loss.     Query created by: Sena Sim on 12/7/2020 12:10 PM      Electronically signed by:  Cheyanne Phillip 12/7/2020 12:51 PM

## 2020-12-07 NOTE — CONSULTS
includes Gastric bypass surgery; Cataract removal with implant (2012); Cataract removal with implant (2012); Coronary artery bypass graft (12-);  section; Hysterectomy; Cholecystectomy; Tonsillectomy and adenoidectomy; and Eye surgery (Left). Home Medications:    Prior to Admission medications    Medication Sig Start Date End Date Taking?  Authorizing Provider   senna (SENOKOT) 8.6 MG tablet Take 1 tablet by mouth 2 times daily 20 Yes ERINN Alcantar   oxybutynin (DITROPAN) 5 MG tablet TAKE 1 TABLET BY MOUTH TWICE DAILY 20  Yes Fabián Alcantar   losartan (COZAAR) 50 MG tablet Take 1 tablet by mouth daily 20  Yes ERINN Alcantar   loratadine (CLARITIN) 10 MG tablet TAKE 1 TABLET(10 MG) BY MOUTH DAILY 20  Yes ERINN Alcantar   atorvastatin (LIPITOR) 40 MG tablet TAKE 1 TABLET BY MOUTH EVERYDAY 10/23/20  Yes Stevie Mckinney DO   bumetanide (BUMEX) 1 MG tablet Take 1 tablet by mouth daily 10/23/20  Yes Stevie Mckinney DO   citalopram (CELEXA) 20 MG tablet Take 1 tablet by mouth daily 10/23/20  Yes Stevie Mckinney DO   clopidogrel (PLAVIX) 75 MG tablet Take 1 tablet by mouth daily 10/23/20  Yes Stevie Mckinney DO   insulin lispro (HUMALOG) 100 UNIT/ML injection vial Use 4 times a day per sliding scale 10/23/20  Yes Stevie Mckinney DO   hydrALAZINE (APRESOLINE) 50 MG tablet Take 1.5 tablets by mouth every 8 hours 10/23/20  Yes Stevie Mckinney DO   insulin glargine (LANTUS) 100 UNIT/ML injection vial Inject 20 Units into the skin nightly 10/23/20  Yes Stevie Mckinney DO   memantine (NAMENDA) 10 MG tablet TAKE 1 TABLET BY MOUTH TWICE DAILY 10/23/20  Yes Stevie Mckinney DO   omeprazole (PRILOSEC) 20 MG delayed release capsule Take 1 capsule by mouth every morning (before breakfast) 10/23/20  Yes Stevie Mckinney DO   rivastigmine (EXELON) 9.5 MG/24HR APPLY 1 NEW PATCH EVERY 24 HOURS 10/23/20  Yes Stevie Mckinney DO   aspirin 81 MG tablet Take 1 tablet by mouth daily 1/3/20  Yes Fabián Dao   OXYGEN Oxgen at 2 liters per nasal cannula 11/5/20   Fabián Dao   Lancets MISC Checks blood sugar ac and HS 11/4/20   Fabián Dao   blood glucose monitor strips Test 3  times a day & as needed for symptoms of irregular blood glucose. Dispense sufficient amount for indicated testing frequency plus additional to accommodate PRN testing needs. 11/4/20   ERINN Dao   Alcohol Swabs (ALCOHOL PREP) PADS Checks blood sugar 3 times a day 11/4/20   Meryl Carlisle [address] Devices MISC Standard walker with wheels    Diagnosis dementia, CHF 10/29/20   SHIRA Chan - CNP   Diabetic Shoe MISC by Does not apply route 9/26/19   ERINN Dao   Compression Stockings MISC by Does not apply route 20-30 mm Hg bilateral knee high 9/26/19   ERINN Dao   nystatin (MYCOSTATIN) 481827 UNIT/GM cream Apply topically 2 times daily. 1/31/18   ERINN Dao       Allergies:  Bactrim [sulfamethoxazole-trimethoprim]; Clonidine derivatives; and Amoxicillin-pot clavulanate    Social History:   reports that she has never smoked. She has never used smokeless tobacco. She reports that she does not drink alcohol or use drugs. Family History:   Neg for early CAD    REVIEW OF SYSTEMS:    · Constitutional: there has been no unanticipated weight loss. There's been No change in energy level, No change in activity level. · Eyes: No visual changes or diplopia. No scleral icterus. · ENT: No Headaches, hearing loss or vertigo. No mouth sores or sore throat. · Cardiovascular: As HPI  · Respiratory: As HPI  · Gastrointestinal: No abdominal pain, appetite loss, blood in stools. No change in bowel or bladder habits. · Genitourinary: No dysuria, trouble voiding, or hematuria. · Musculoskeletal:  No gait disturbance, No weakness or joint complaints.   · Integumentary: No rash or pruritis. · Neurological: No headache, diplopia, change in muscle strength, numbness or tingling. No change in gait, balance, coordination, mood, affect, memory, mentation, behavior. · Psychiatric: No anxiety, or depression. · Endocrine: No temperature intolerance. No excessive thirst, fluid intake, or urination. No tremor. · Hematologic/Lymphatic: No abnormal bruising or bleeding, blood clots or swollen lymph nodes. · Allergic/Immunologic: No nasal congestion or hives. PHYSICAL EXAM:    Physical Examination:    BP (!) 133/54   Pulse 60   Temp 96.7 °F (35.9 °C) (Tympanic)   Resp 14   Ht 5' 7\" (1.702 m)   Wt 276 lb (125.2 kg)   SpO2 97%   BMI 43.23 kg/m²    Constitutional and General Appearance: alert, cooperative, no distress and appears stated age  HEENT: PERRL, no cervical lymphadenopathy. No masses palpable. Normal oral mucosa  Respiratory:  · Normal excursion and expansion without use of accessory muscles  · Resp Auscultation: Good respiratory effort. No for increased work of breathing. On auscultation: decreased, crackles bibasilar  Cardiovascular:  · The apical impulse is not displaced  · Heart tones are crisp and normal. regular S1 and S2. Murmurs: none  · Jugular venous pulsation Normal  · The carotid upstroke is normal in amplitude and contour without delay or bruit  · Peripheral pulses are symmetrical and full   Abdomen:  · No masses or tenderness  · Bowel sounds present  Extremities:  ·  No Cyanosis or Clubbing  ·  Lower extremity edema: 1+   ·  Skin: Warm and dry  Neurological:  · Alert and oriented.   · Moves all extremities well  · No abnormalities of mood, affect, memory, mentation, or behavior are noted    DATA:    Diagnostics:      EKG:   Results for orders placed or performed during the hospital encounter of 12/04/20   EKG 12 Lead   Result Value Ref Range    Ventricular Rate 75 BPM    Atrial Rate 75 BPM    P-R Interval 206 ms    QRS Duration 88 ms    Q-T Interval 432 ms    QTc Calculation (Bazett) 482 ms    P Axis 91 degrees    R Axis 113 degrees    T Axis 118 degrees    Narrative     Suspect arm lead reversal, interpretation assumes no reversal  Normal sinus rhythm  Low voltage QRS  Anterolateral infarct  Abnormal ECG       Echo:  Results for orders placed or performed during the hospital encounter of 08/07/20   ECHO Complete 2D W Doppler W Color  Global left ventricular systolic function is severely reduced. Estimated  ejection fraction is 30-35% . There is severe Hypokinesis of anterior, anteroseptal, inferoseptal and  anterolateral walls. [place] is severely hypokinetic. Grade II (moderate) left ventricular diastolic dysfunction. Right ventricular dilatation with reduced systolic function. Left atrium is mildly dilated. Moderate to severe tricuspid regurgitation. Moderate pulmonary hypertension. Estimated right ventricular systolic pressure is 50BTEG. No significant pericardial effusion is seen. LAST CATH:   Results for orders placed or performed during the hospital encounter of 08/07/20   Diagnostic Cardiac Cath Lab Procedure   Multi-vessel Coronary Artery Disease. 3 out of 4 grafts are patent. Patent LIMA-LAD, SVG-Diagonal and SVG-OM. Occluded SVG to RCA with left to right collaterals. Borderline LV systolic function. Labs:     CBC:   Recent Labs     12/06/20  0636  12/06/20  2152 12/07/20  0420   WBC 5.2  --   --  5.4   HGB 7.2*   < > 8.3* 7.9*   HCT 23.5*   < > 27.3* 26.1*     --   --  232    < > = values in this interval not displayed. BMP:   Recent Labs     12/06/20  0636 12/07/20  0420    135   K 4.9 5.3   CO2 26 23   BUN 38* 41*   CREATININE 2.89* 3.18*   LABGLOM 16* 15*   GLUCOSE 90 189*     BNP: No results for input(s): BNP in the last 72 hours. PT/INR: No results for input(s): PROTIME, INR in the last 72 hours. APTT:No results for input(s): APTT in the last 72 hours.   CARDIAC ENZYMES:  Recent Labs     12/04/20  1807 12/04/20 2107 12/05/20  0003   TROPHS 67* 66* 65*     FASTING LIPID PANEL:  Lab Results   Component Value Date    HDL 59 11/04/2020    TRIG 55 11/04/2020     LIVER PROFILE:No results for input(s): AST, ALT, LABALBU in the last 72 hours.       IMPRESSION:    Patient Active Problem List   Diagnosis    Dementia ([facility]    Hypertension    Hyperlipidemia    Class 2 severe obesity with serious comorbidity and body mass index (BMI) of 37.0 to 37.9 in adult [facility])    Gout    Peripheral polyneuropathy    Anxiety and depression    Acute kidney injury superimposed on CKD ([address]    Balance problem    CVA (cerebral vascular accident) ([facility]    Type 2 diabetes mellitus with hyperglycemia, with long-term current use of insulin ([facility]    CHF (congestive heart failure), NYHA class I, acute on chronic, combined ([facility]    Acute cystitis    Infestation by bed bug    Infestation by maggots    Lymphedema of both lower extremities    PAD (peripheral artery disease) ([facility]    Pressure injury of right heel, unstageable (HCC)    Acute kidney injury ([address]    Bradycardia    Hyperkalemia    AV block, 1st degree    Wounds, multiple    Buttock wound    Decubitus ulcer of trochanteric region of right hip, stage 2 (HCC)    Decompensated heart failure ([address]    Acute anemia     - Acute on chronic sys HF, HFrEF, Ischemic CM- resolving  - Anemia  - NSTEMI type 2 due to elevated trop from CHF/CKD  - AMY on CKD  - CAD s/p CABG with 3/4 patent grafts on cath 8/20  - Sinus [place] in past, improved off BB / clonidine  - HTN  - Obesity  - DL  - CVA  - Dementia    RECOMMENDATIONS:  - Anemia to be addressed by primary team  - continue ASA/Plavix if able  - stop coreg due to bradycardia almost necessitating PPM  - stop losartan due to AMY/CKD  - continue Hydral/statin  - change bumex to po  - currently on IVF given AMY by primary team- recommend being quite cautious   - Echo is ordered, if LVEF < 35%, would benefit from AICD for primary prevention once anemia is addressed    Discussed with patient and nursing. Thank you for allowing me to participate in the care of this patient, please do not hesitate to call if you have any questions. Harrison Casper DO, [facility], [address], [address] [facility]  [facility]. [facility]  [phone]

## 2020-12-08 ENCOUNTER — APPOINTMENT (OUTPATIENT)
Dept: GENERAL RADIOLOGY | Age: 70
DRG: 811 | End: 2020-12-08
Payer: MEDICARE

## 2020-12-08 ENCOUNTER — APPOINTMENT (OUTPATIENT)
Dept: ULTRASOUND IMAGING | Age: 70
DRG: 811 | End: 2020-12-08
Payer: MEDICARE

## 2020-12-08 LAB
ABO/RH: NORMAL
ABSOLUTE EOS #: 0.06 K/UL (ref 0–0.44)
ABSOLUTE IMMATURE GRANULOCYTE: <0.03 K/UL (ref 0–0.3)
ABSOLUTE LYMPH #: 0.82 K/UL (ref 1.1–3.7)
ABSOLUTE MONO #: 0.58 K/UL (ref 0.1–1.2)
ANION GAP SERPL CALCULATED.3IONS-SCNC: 9 MMOL/L (ref 9–17)
ANTIBODY SCREEN: NEGATIVE
ARM BAND NUMBER: NORMAL
BASOPHILS # BLD: 1 % (ref 0–2)
BASOPHILS ABSOLUTE: 0.04 K/UL (ref 0–0.2)
BLD PROD TYP BPU: NORMAL
BLD PROD TYP BPU: NORMAL
BUN BLDV-MCNC: 47 MG/DL (ref 8–23)
BUN/CREAT BLD: 14 (ref 9–20)
CALCIUM SERPL-MCNC: 8.8 MG/DL (ref 8.6–10.4)
CHLORIDE BLD-SCNC: 104 MMOL/L (ref 98–107)
CO2: 24 MMOL/L (ref 20–31)
CREAT SERPL-MCNC: 3.34 MG/DL (ref 0.5–0.9)
CROSSMATCH RESULT: NORMAL
CROSSMATCH RESULT: NORMAL
DIFFERENTIAL TYPE: ABNORMAL
DISPENSE STATUS BLOOD BANK: NORMAL
DISPENSE STATUS BLOOD BANK: NORMAL
EOSINOPHILS RELATIVE PERCENT: 1 % (ref 1–4)
EXPIRATION DATE: NORMAL
GFR AFRICAN AMERICAN: 17 ML/MIN
GFR NON-AFRICAN AMERICAN: 14 ML/MIN
GFR SERPL CREATININE-BSD FRML MDRD: ABNORMAL ML/MIN/{1.73_M2}
GFR SERPL CREATININE-BSD FRML MDRD: ABNORMAL ML/MIN/{1.73_M2}
GLUCOSE BLD-MCNC: 111 MG/DL (ref 65–105)
GLUCOSE BLD-MCNC: 134 MG/DL (ref 65–105)
GLUCOSE BLD-MCNC: 150 MG/DL (ref 70–100)
GLUCOSE BLD-MCNC: 185 MG/DL (ref 70–99)
GLUCOSE BLD-MCNC: 200 MG/DL (ref 65–105)
GLUCOSE BLD-MCNC: 229 MG/DL (ref 65–105)
HCT VFR BLD CALC: 26.7 % (ref 36.3–47.1)
HEMOGLOBIN: 8.1 G/DL (ref 11.9–15.1)
IMMATURE GRANULOCYTES: 0 %
LYMPHOCYTES # BLD: 12 % (ref 24–43)
MCH RBC QN AUTO: 30 PG (ref 25.2–33.5)
MCHC RBC AUTO-ENTMCNC: 30.3 G/DL (ref 25.2–33.5)
MCV RBC AUTO: 98.9 FL (ref 82.6–102.9)
MONOCYTES # BLD: 9 % (ref 3–12)
NRBC AUTOMATED: 0 PER 100 WBC
PDW BLD-RTO: 15.8 % (ref 11.8–14.4)
PLATELET # BLD: 243 K/UL (ref 138–453)
PLATELET ESTIMATE: ABNORMAL
PMV BLD AUTO: 10.1 FL (ref 8.1–13.5)
POTASSIUM SERPL-SCNC: 5.4 MMOL/L (ref 3.7–5.3)
RBC # BLD: 2.7 M/UL (ref 3.95–5.11)
RBC # BLD: ABNORMAL 10*6/UL
SEG NEUTROPHILS: 77 % (ref 36–65)
SEGMENTED NEUTROPHILS ABSOLUTE COUNT: 5.13 K/UL (ref 1.5–8.1)
SODIUM BLD-SCNC: 137 MMOL/L (ref 135–144)
TRANSFUSION STATUS: NORMAL
TRANSFUSION STATUS: NORMAL
UNIT DIVISION: 0
UNIT DIVISION: 0
UNIT NUMBER: NORMAL
UNIT NUMBER: NORMAL
WBC # BLD: 6.7 K/UL (ref 3.5–11.3)
WBC # BLD: ABNORMAL 10*3/UL

## 2020-12-08 PROCEDURE — 71045 X-RAY EXAM CHEST 1 VIEW: CPT

## 2020-12-08 PROCEDURE — 94761 N-INVAS EAR/PLS OXIMETRY MLT: CPT

## 2020-12-08 PROCEDURE — 76775 US EXAM ABDO BACK WALL LIM: CPT

## 2020-12-08 PROCEDURE — 6370000000 HC RX 637 (ALT 250 FOR IP)

## 2020-12-08 PROCEDURE — 2700000000 HC OXYGEN THERAPY PER DAY

## 2020-12-08 PROCEDURE — 82947 ASSAY GLUCOSE BLOOD QUANT: CPT

## 2020-12-08 PROCEDURE — 6370000000 HC RX 637 (ALT 250 FOR IP): Performed by: NURSE PRACTITIONER

## 2020-12-08 PROCEDURE — 2060000000 HC ICU INTERMEDIATE R&B

## 2020-12-08 PROCEDURE — 97116 GAIT TRAINING THERAPY: CPT

## 2020-12-08 PROCEDURE — 99222 1ST HOSP IP/OBS MODERATE 55: CPT | Performed by: SURGERY

## 2020-12-08 PROCEDURE — 6370000000 HC RX 637 (ALT 250 FOR IP): Performed by: INTERNAL MEDICINE

## 2020-12-08 PROCEDURE — 99232 SBSQ HOSP IP/OBS MODERATE 35: CPT | Performed by: INTERNAL MEDICINE

## 2020-12-08 PROCEDURE — 36415 COLL VENOUS BLD VENIPUNCTURE: CPT

## 2020-12-08 PROCEDURE — 80048 BASIC METABOLIC PNL TOTAL CA: CPT

## 2020-12-08 PROCEDURE — 2580000003 HC RX 258: Performed by: INTERNAL MEDICINE

## 2020-12-08 PROCEDURE — 85025 COMPLETE CBC W/AUTO DIFF WBC: CPT

## 2020-12-08 PROCEDURE — 6370000000 HC RX 637 (ALT 250 FOR IP): Performed by: FAMILY MEDICINE

## 2020-12-08 PROCEDURE — 97110 THERAPEUTIC EXERCISES: CPT

## 2020-12-08 RX ORDER — SODIUM POLYSTYRENE SULFONATE 15 G/60ML
SUSPENSION ORAL; RECTAL
Status: COMPLETED
Start: 2020-12-08 | End: 2020-12-08

## 2020-12-08 RX ORDER — POLYETHYLENE GLYCOL 3350, SODIUM CHLORIDE, SODIUM BICARBONATE, POTASSIUM CHLORIDE 420; 11.2; 5.72; 1.48 G/4L; G/4L; G/4L; G/4L
2000 POWDER, FOR SOLUTION ORAL ONCE
Status: DISCONTINUED | OUTPATIENT
Start: 2020-12-08 | End: 2020-12-08

## 2020-12-08 RX ORDER — SODIUM POLYSTYRENE SULFONATE 15 G/60ML
30 SUSPENSION ORAL; RECTAL ONCE
Status: COMPLETED | OUTPATIENT
Start: 2020-12-08 | End: 2020-12-08

## 2020-12-08 RX ADMIN — OXYBUTYNIN CHLORIDE 5 MG: 5 TABLET ORAL at 21:24

## 2020-12-08 RX ADMIN — MEMANTINE HYDROCHLORIDE 10 MG: 5 TABLET ORAL at 21:23

## 2020-12-08 RX ADMIN — MEMANTINE HYDROCHLORIDE 10 MG: 5 TABLET ORAL at 10:19

## 2020-12-08 RX ADMIN — HYDRALAZINE HYDROCHLORIDE 75 MG: 25 TABLET, FILM COATED ORAL at 13:48

## 2020-12-08 RX ADMIN — ATORVASTATIN CALCIUM 40 MG: 40 TABLET, FILM COATED ORAL at 10:18

## 2020-12-08 RX ADMIN — OXYBUTYNIN CHLORIDE 5 MG: 5 TABLET ORAL at 10:18

## 2020-12-08 RX ADMIN — CETIRIZINE HYDROCHLORIDE 10 MG: 5 TABLET, FILM COATED ORAL at 10:16

## 2020-12-08 RX ADMIN — ASPIRIN 81 MG: 81 TABLET, COATED ORAL at 10:19

## 2020-12-08 RX ADMIN — HYDRALAZINE HYDROCHLORIDE 75 MG: 25 TABLET, FILM COATED ORAL at 21:23

## 2020-12-08 RX ADMIN — SODIUM POLYSTYRENE SULFONATE 30 G: 15 SUSPENSION ORAL; RECTAL at 10:16

## 2020-12-08 RX ADMIN — PANTOPRAZOLE SODIUM 40 MG: 40 TABLET, DELAYED RELEASE ORAL at 06:05

## 2020-12-08 RX ADMIN — AMLODIPINE BESYLATE 10 MG: 5 TABLET ORAL at 10:18

## 2020-12-08 RX ADMIN — FERROUS SULFATE TAB 325 MG (65 MG ELEMENTAL FE) 325 MG: 325 (65 FE) TAB at 10:17

## 2020-12-08 RX ADMIN — INSULIN LISPRO 1 UNITS: 100 INJECTION, SOLUTION INTRAVENOUS; SUBCUTANEOUS at 10:23

## 2020-12-08 RX ADMIN — CLOPIDOGREL BISULFATE 75 MG: 75 TABLET ORAL at 10:18

## 2020-12-08 RX ADMIN — SODIUM POLYSTYRENE SULFONATE: 15 SUSPENSION ORAL; RECTAL at 13:49

## 2020-12-08 RX ADMIN — SODIUM CHLORIDE: 9 INJECTION, SOLUTION INTRAVENOUS at 13:52

## 2020-12-08 RX ADMIN — HYDRALAZINE HYDROCHLORIDE 75 MG: 25 TABLET, FILM COATED ORAL at 06:14

## 2020-12-08 RX ADMIN — ALLOPURINOL 300 MG: 300 TABLET ORAL at 09:00

## 2020-12-08 RX ADMIN — MICONAZOLE NITRATE: 20 POWDER TOPICAL at 21:26

## 2020-12-08 RX ADMIN — INSULIN LISPRO 5 UNITS: 100 INJECTION, SOLUTION INTRAVENOUS; SUBCUTANEOUS at 10:22

## 2020-12-08 RX ADMIN — SENNOSIDES 8.6 MG: 8.6 TABLET, FILM COATED ORAL at 10:18

## 2020-12-08 RX ADMIN — INSULIN LISPRO 5 UNITS: 100 INJECTION, SOLUTION INTRAVENOUS; SUBCUTANEOUS at 17:16

## 2020-12-08 RX ADMIN — FERROUS SULFATE TAB 325 MG (65 MG ELEMENTAL FE) 325 MG: 325 (65 FE) TAB at 17:16

## 2020-12-08 RX ADMIN — CITALOPRAM HYDROBROMIDE 20 MG: 20 TABLET ORAL at 10:18

## 2020-12-08 RX ADMIN — FLUTICASONE PROPIONATE 1 SPRAY: 50 SPRAY, METERED NASAL at 10:16

## 2020-12-08 RX ADMIN — MICONAZOLE NITRATE: 20 POWDER TOPICAL at 10:16

## 2020-12-08 RX ADMIN — INSULIN LISPRO 2 UNITS: 100 INJECTION, SOLUTION INTRAVENOUS; SUBCUTANEOUS at 13:55

## 2020-12-08 RX ADMIN — INSULIN LISPRO 5 UNITS: 100 INJECTION, SOLUTION INTRAVENOUS; SUBCUTANEOUS at 13:55

## 2020-12-08 ASSESSMENT — PAIN SCALES - GENERAL
PAINLEVEL_OUTOF10: 0

## 2020-12-08 NOTE — PROGRESS NOTES
acetaminophen, [DISCONTINUED] promethazine **OR** ondansetron    Objective:  Labs:  CBC with Differential:    Lab Results   Component Value Date    WBC 6.7 12/08/2020    RBC 2.70 12/08/2020    HGB 8.1 12/08/2020    HCT 26.7 12/08/2020     12/08/2020    MCV 98.9 12/08/2020    MCH 30.0 12/08/2020    MCHC 30.3 12/08/2020    RDW 15.8 12/08/2020    LYMPHOPCT 12 12/08/2020    LYMPHOPCT 15.4 11/10/2020    MONOPCT 9 12/08/2020    MONOPCT 6.4 11/10/2020    EOSPCT 1.4 11/10/2020    BASOPCT 1 12/08/2020    BASOPCT 0.6 11/10/2020    MONOSABS 0.58 12/08/2020    MONOSABS 0.41 11/10/2020    LYMPHSABS 0.82 12/08/2020    LYMPHSABS 0.99 11/10/2020    EOSABS 0.06 12/08/2020    EOSABS 0.09 11/10/2020    BASOSABS 0.04 12/08/2020    DIFFTYPE NOT REPORTED 12/08/2020     BMP:    Lab Results   Component Value Date     12/08/2020    K 5.4 12/08/2020     12/08/2020    CO2 24 12/08/2020    BUN 47 12/08/2020    LABALBU 3.8 11/04/2020    CREATININE 3.34 12/08/2020    CALCIUM 8.8 12/08/2020    GFRAA 17 12/08/2020    LABGLOM 14 12/08/2020    GLUCOSE 185 12/08/2020           Physical Exam:  Vitals: BP (!) 140/45   Pulse 55   Temp 97.1 °F (36.2 °C) (Tympanic)   Resp 10   Ht 5' 7\" (1.702 m)   Wt 280 lb 14.4 oz (127.4 kg)   SpO2 95%   BMI 44.00 kg/m²   24 hour intake/output:    Intake/Output Summary (Last 24 hours) at 12/8/2020 0927  Last data filed at 12/8/2020 0355  Gross per 24 hour   Intake --   Output 175 ml   Net -175 ml     Last 3 weights: Wt Readings from Last 3 Encounters:   12/08/20 280 lb 14.4 oz (127.4 kg)   11/04/20 238 lb (108 kg)   10/05/20 219 lb (99.3 kg)     HEENT: Normocephalic and Atraumatic  Neck: Supple, No Masses, Tenderness, Nodularity and No Lymphadenopathy  Chest/Lungs: Distant Breath Sounds  Cardiac: Regular Rate and Rhythm  GI/Abdomen:  Bowel Sounds Present and Soft, Non-tender, without Guarding or Rebound Tenderness  : Not examined  EXT/Skin: No Cyanosis, No Clubbing and Edema Present  Neuro: infarcts--temporal-parietal           CVA--history           Homonymous hemianopsia           Balance problems  Dementia  Depression--anxiety   Hyperkalemia   PMH:   uterine carcinoma, dry eye syndrome, glaucoma, keratitis,              gout, left eye 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 CHF--stable  2. AMY---holding Bumex----cont'd IVF---do not stop despite BLE edema  3. Hyperkalemia---Kayexelate  4. Anemia---GIB---for EGD---colonoscopy--2020--HERMILO Jose  5. DM2---Lantus 20----add log 5-5-5  6.    See orders    Electronically signed by Kaya Fleming on 2020 at 9:27 AM    Hospitalist

## 2020-12-08 NOTE — PROGRESS NOTES
SW attempted to meet with patient to complete Psychosocial Assessment. Patient with doctors and will have tests. SW will continue to monitor needs and assist as appropriate.          Electronically signed by JEFFREY Allen on 12/8/2020 at 10:48 AM

## 2020-12-08 NOTE — PROGRESS NOTES
Physical Therapy  Facility/Department: [facility]  PROGRESSIVE CARE  Daily Treatment Note  NAME: Anastacio Das  : 1950  MRN: [id number]    Date of Service: 2020    Discharge Recommendations:  Home with Home health PT        Assessment   Body structures, Functions, Activity limitations: Decreased functional mobility ; Decreased strength;Decreased endurance;Decreased balance  Prognosis: Good  REQUIRES PT FOLLOW UP: Yes  Activity Tolerance  Activity Tolerance: Patient Tolerated treatment well;Patient limited by endurance; Patient limited by fatigue     Patient Diagnosis(es): The primary encounter diagnosis was Acute anemia. Diagnoses of Acute kidney injury (nontraumatic) ([address] and Acute decompensated heart failure ([address] were also pertinent to this visit. has a past medical history of Anxiety and depression, Background diabetic retinopathy(362.01), Balance problem, CAD (coronary artery disease), Cancer of uterus ([address] Chronic kidney disease, Chronic low back pain, CVA (cerebral vascular accident) ([address] Dementia ([address] Dry eye syndrome, GERD (gastroesophageal reflux disease), Glaucoma suspect, Gout, Homonymous hemianopsia, Hyperlipidemia, Hypertension, Keratitis, Obesity, Peripheral polyneuropathy, Pseudophakos, Stroke ([address] Trichiasis, and Type 2 diabetes mellitus ([address] has a past surgical history that includes Gastric bypass surgery; Cataract removal with implant (2012); Cataract removal with implant (2012); Coronary artery bypass graft (12-);  section; Hysterectomy; Cholecystectomy; Tonsillectomy and adenoidectomy; and Eye surgery (Left). Restrictions  Restrictions/Precautions  Restrictions/Precautions: General Precautions  Subjective   General  Chart Reviewed: Yes  Response To Previous Treatment: Patient with no complaints from previous session. Family / Caregiver Present: No  Subjective  Subjective: Pt found supine in bed upon arrival. Agreeable to therapy.   Pain Screening  Patient Currently in Pain: No  Pain Assessment  Pain Assessment: 0-10  Pain Level: 0  Patient's Stated Pain Goal: No pain  Vital Signs  Patient Currently in Pain: No  Oxygen Therapy  O2 Device: Nasal cannula  Skin Protection for O2 Device: Yes  O2 Flow Rate (L/min): 2 L/min       Orientation  Orientation  Overall Orientation Status: Within Normal Limits  Cognition      Objective   Bed mobility  Bridging: Unable to assess  Rolling to Left: Stand by assistance  Rolling to Right: Stand by assistance  Supine to Sit: Stand by assistance  Sit to Supine: Stand by assistance  Scooting: Contact guard assistance  Transfers  Sit to Stand: Contact guard assistance  Stand to sit: Contact guard assistance  Bed to Chair: Unable to assess  Stand Pivot Transfers: Unable to assess  Squat Pivot Transfers: Unable to assess  Lateral Transfers: Unable to assess  Car Transfer: Unable to assess  Comment: Showed immediate fatigue upon standing. Ambulation  Ambulation?: Yes  WB Status: WBAT  More Ambulation?: No  Ambulation 1  Surface: level tile  Device: Rolling Walker  Other Apparatus: O2  Assistance: Contact guard assistance  Quality of Gait: Pt exhibits slow and steady gait with hunched posture present.   Gait Deviations: Slow [place];Decreased step length;Decreased step height  Distance: 50'x2  Stairs/Curb  Stairs?: No  Neuromuscular Education  NDT Treatment: Gait ;Sitting;Standing  Balance  Posture: Fair  Sitting - Static: Good  Sitting - Dynamic: Good  Standing - Static: Fair  Standing - Dynamic: Fair  Exercises  Straight Leg Raise: x15  Quad Sets: x15  Heelslides: x15  Gluteal Sets: x20  Hip Flexion: x15  Hip Abduction: x15  Knee Long Arc Quad: x15  Ankle Pumps: x20  Comments: hip add x15  Other exercises  Other exercises?: No                        G-Code     OutComes Score                                                     AM-PAC Score             Goals  Short term goals  Time Frame for Short term goals: LOS  Short term goal 1: Bed mobility with mod I  Short term goal 2: Transfers with Mod I  Short term goal 3: Amb x 50ft with RW and mod I  Patient Goals   Patient goals : Return Home    Plan    Plan  Times per day: Daily  Current Treatment Recommendations: Strengthening, Balance Training, Functional Mobility Training, Transfer Training, Gait Training, Endurance Training  Safety Devices  Type of devices:  All fall risk precautions in place, Bed alarm in place, Call light within reach, Chair alarm in place, Gait belt, Patient at risk for falls, Left in chair, Nurse notified     Therapy Time   Individual Concurrent Group Co-treatment   Time In 0839         Time Out 0906         Minutes 27         Timed Code Treatment Minutes: [facility]

## 2020-12-08 NOTE — CONSULTS
Lee Roldan is a 77 y.o. female          CC:    . Abnormal Lab      HISTORY OF PRESENT ILLNESS:    Pt is 76 yo female, admitted from PCP office after finding a low Hb. She has a hx of chronic anemia. She was fairly asymptomatic, no SOB no CP. She did have hx of CHF and CUTLER and CRF with some underlying dementia. Hb on admission was 6.8. has hx of Gastric bypass. No hx of previous CS. Surgery consultl obtained. Review of Systems:    General:  Fever: Negative  Weight Change:Negative  Night Sweats: Negative    Eye:  Blurry Vision:Negative  Double Vision: Negative    Ent:  Headaches: Negative  Sore throat: Negative    Allergy/Immunology:  Hives: Negative  Latex allergy: Negative    Hematology/Lymphatic:  Bleeding Problems: Negative  Blood Clots: Negative  Swollen Lymph Nodes: Negative    Lungs:  Cough: Negative  SOB: Negative  Wheezing:Negative    Cardiovascular:  Chest Pain: Negative  Palpitations:Negative    GI:   Decreased Appetite: Negative  Heartburn: Negative  Dysphagia: Negative  Nausea/Vomiting: Negative  Abdominal Pain: Negative  Change in Bowels:Negative  Constipation: Negative  Diarrhea: Negative  Rectal Bleeding: Negative    :   Dysuria: Negative  Increase Urinary Frequency/Urgency: Negative    Neuro:  Seizures: Negative  Confusion: Negative        PAST MEDICAL HISTORY:        Family History   Problem Relation Age of Onset    Diabetes Mother     Cancer Mother     High Blood Pressure Mother    Gloriajean Seeds Stroke Mother     Kidney Disease Mother     Uterine Cancer Mother     Diabetes Father     Heart Disease Father     Glaucoma Father     Emphysema Father     Coronary Art Dis Other         All 4 siblings.  Stroke Other         1 sibling.  Lung Cancer Other         1 sibling - cause of death lung cancer.     Mental Retardation Other      Social History     Socioeconomic History    Marital status:      Spouse name: Not on file    Number of children: Not on file    Years of education: Not on file    Highest education level: Not on file   Occupational History    Not on file   Social Needs    Financial resource strain: Not on file    Food insecurity     Worry: Not on file     Inability: Not on file    Transportation needs     Medical: Not on file     Non-medical: Not on file   Tobacco Use    Smoking status: Never Smoker    Smokeless tobacco: Never Used    Tobacco comment: never smoker eclamb rrt 17   Substance and Sexual Activity    Alcohol use: No    Drug use: No    Sexual activity: Not on file   Lifestyle    Physical activity     Days per week: Not on file     Minutes per session: Not on file    Stress: Not on file   Relationships    Social connections     Talks on phone: Not on file     Gets together: Not on file     Attends Orthodox service: Not on file     Active member of club or organization: Not on file     Attends meetings of clubs or organizations: Not on file     Relationship status: Not on file    Intimate partner violence     Fear of current or ex partner: Not on file     Emotionally abused: Not on file     Physically abused: Not on file     Forced sexual activity: Not on file   Other Topics Concern    Not on file   Social History Narrative    Not on file     Past Surgical History:   Procedure Laterality Date    CATARACT REMOVAL WITH IMPLANT  2012    (Right eye) - Dr. Mario Alberto Norton.  CATARACT REMOVAL WITH IMPLANT  2012    (Left eye) - Dr. Mario Alberto Norton.   SECTION      CHOLECYSTECTOMY      CORONARY ARTERY BYPASS GRAFT  12-    (x4) - LIMA-LAD, SVG-diagnoal 1, SVG-OM1, and SVG-PDA. Exploration of left radial. (Dr. Asha Lee).  EYE SURGERY Left     as a child     GASTRIC BYPASS SURGERY      HYSTERECTOMY      (abdominal)  with left oophorectomy. Right ovary retained.     TONSILLECTOMY AND ADENOIDECTOMY       Past Medical History:   Diagnosis Date    Anxiety and depression     Background diabetic retinopathy(362.01)  (Mild)    Balance problem     CAD (coronary artery disease)     (with coronary artery bypass graft x4).  Cancer of uterus [facility])     history of, (probable cure)    Chronic kidney disease     Chronic low back pain     [facility]     Dementia ([facility]     (moderate)    Dry eye syndrome     GERD (gastroesophageal reflux disease)     Glaucoma suspect 2005    Gout     Homonymous hemianopsia 2008    (Right)    Hyperlipidemia     Hypertension     Keratitis     (secondary to dry eye syndrome).  Obesity     Peripheral polyneuropathy     (diabetic)    Pseudophakos     (Right Eye: 05-; Left Eye: 04-) - Dr. Telly Negrete.  Stroke [facility])     (Multiple) MRI of brain 10/2008 shows multiple infarcts involving the temporal and parietal areas.  Trichiasis 2010    (left eye)    Type 2 diabetes mellitus ([facility]      No current facility-administered medications on file prior to encounter.       Current Outpatient Medications on File Prior to Encounter   Medication Sig Dispense Refill    senna (SENOKOT) 8.6 MG tablet Take 1 tablet by mouth 2 times daily 60 tablet 11    oxybutynin (DITROPAN) 5 MG tablet TAKE 1 TABLET BY MOUTH TWICE DAILY 60 tablet 5    losartan (COZAAR) 50 MG tablet Take 1 tablet by mouth daily 30 tablet 5    loratadine (CLARITIN) 10 MG tablet TAKE 1 TABLET(10 MG) BY MOUTH DAILY 90 tablet 3    atorvastatin (LIPITOR) 40 MG tablet TAKE 1 TABLET BY MOUTH EVERYDAY 30 tablet 0    bumetanide (BUMEX) 1 MG tablet Take 1 tablet by mouth daily 30 tablet 0    citalopram (CELEXA) 20 MG tablet Take 1 tablet by mouth daily 30 tablet 0    clopidogrel (PLAVIX) 75 MG tablet Take 1 tablet by mouth daily 30 tablet 0    insulin lispro (HUMALOG) 100 UNIT/ML injection vial Use 4 times a day per sliding scale 3 vial 0    hydrALAZINE (APRESOLINE) 50 MG tablet Take 1.5 tablets by mouth every 8 hours 90 tablet 0    insulin glargine (LANTUS) 100 UNIT/ML injection vial Inject 20 Units into the skin nightly 1 vial 0    memantine (NAMENDA) 10 MG tablet TAKE 1 TABLET BY MOUTH TWICE DAILY 60 tablet 0    omeprazole (PRILOSEC) 20 MG delayed release capsule Take 1 capsule by mouth every morning (before breakfast) 30 capsule 0    rivastigmine (EXELON) 9.5 MG/24HR APPLY 1 NEW PATCH EVERY 24 HOURS 30 patch 0    aspirin 81 MG tablet Take 1 tablet by mouth daily 90 tablet 3    OXYGEN Oxgen at 2 liters per nasal cannula 1 Device 0    Lancets MISC Checks blood sugar ac and  each 3    blood glucose monitor strips Test 3  times a day & as needed for symptoms of irregular blood glucose. Dispense sufficient amount for indicated testing frequency plus additional to accommodate PRN testing needs. 200 strip 3    Alcohol Swabs (ALCOHOL PREP) PADS Checks blood sugar 3 times a day 300 each 0    Misc. Devices MISC Standard walker with wheels    Diagnosis dementia, CHF 1 Device 0    Diabetic Shoe MISC by Does not apply route 1 each 0    Compression Stockings MISC by Does not apply route 20-30 mm Hg bilateral knee high 5 each 0    nystatin (MYCOSTATIN) 135499 UNIT/GM cream Apply topically 2 times daily. 30 g 1     Allergies as of 12/04/2020 - Review Complete 12/04/2020   Allergen Reaction Noted    Bactrim [sulfamethoxazole-trimethoprim] Hives 09/12/2013    Clonidine derivatives  08/20/2020    Amoxicillin-pot clavulanate Diarrhea, Nausea Only, and Nausea And Vomiting 10/14/2016       PHYSICAL EXAM:    Blood pressure 137/62, pulse 65, temperature 97.4 °F (36.3 °C), temperature source Axillary, resp. rate 10, height 5' 7\" (1.702 m), weight 280 lb 14.4 oz (127.4 kg), SpO2 99 %. Gen:  A and O x 3, NAD, well nourished  Eyes:  Sclera non icterus, PERRL  Lungs:  CTA, symmetrical  Chest:  RRR, no murmurs  Abd:  Soft, NT, ND, no HSM, no bruits      ASSESS MENT:    1.  GI blood loss with anemia. Probably from UGI bleed, or slow bleed . She has been hemodynamically stable.   Will probably need EGD and

## 2020-12-08 NOTE — PROGRESS NOTES
SW attempted to meet with patient to complete a Psychosocial Assessment. Patient restless and uncomfortable at this time. SW will continue to monitor needs and assist as appropriate.       Electronically signed by JEFFREY Peacock on 12/8/2020 at 3:46 PM

## 2020-12-08 NOTE — PROGRESS NOTES
Occupational Therapy    Attempted OT treatment at time of arrival, patient still finishing lunch. Patient requested not to complete therapy at this time, OT to attempt again later in day time permitting.     ERIKA Bernard/PERLA

## 2020-12-08 NOTE — PROGRESS NOTES
SW attempted to meet with patient to complete a Psychosocial Assessment. Patient resting at this time. SW will continue to monitor needs and assist as appropriate.        Electronically signed by JEFFREY Peacock on 12/8/2020 at 10:32 AM

## 2020-12-09 ENCOUNTER — APPOINTMENT (OUTPATIENT)
Dept: GENERAL RADIOLOGY | Age: 70
DRG: 811 | End: 2020-12-09
Payer: MEDICARE

## 2020-12-09 LAB
ABSOLUTE EOS #: 0.06 K/UL (ref 0–0.44)
ABSOLUTE IMMATURE GRANULOCYTE: <0.03 K/UL (ref 0–0.3)
ABSOLUTE LYMPH #: 1.05 K/UL (ref 1.1–3.7)
ABSOLUTE MONO #: 0.58 K/UL (ref 0.1–1.2)
ANION GAP SERPL CALCULATED.3IONS-SCNC: 10 MMOL/L (ref 9–17)
ANION GAP SERPL CALCULATED.3IONS-SCNC: 11 MMOL/L (ref 9–17)
BASOPHILS # BLD: 1 % (ref 0–2)
BASOPHILS ABSOLUTE: 0.03 K/UL (ref 0–0.2)
BUN BLDV-MCNC: 48 MG/DL (ref 8–23)
BUN BLDV-MCNC: 49 MG/DL (ref 8–23)
BUN/CREAT BLD: 14 (ref 9–20)
BUN/CREAT BLD: 15 (ref 9–20)
CALCIUM SERPL-MCNC: 8.7 MG/DL (ref 8.6–10.4)
CALCIUM SERPL-MCNC: 8.8 MG/DL (ref 8.6–10.4)
CHLORIDE BLD-SCNC: 104 MMOL/L (ref 98–107)
CHLORIDE BLD-SCNC: 104 MMOL/L (ref 98–107)
CO2: 24 MMOL/L (ref 20–31)
CO2: 25 MMOL/L (ref 20–31)
CREAT SERPL-MCNC: 3.25 MG/DL (ref 0.5–0.9)
CREAT SERPL-MCNC: 3.38 MG/DL (ref 0.5–0.9)
DIFFERENTIAL TYPE: ABNORMAL
EOSINOPHILS RELATIVE PERCENT: 1 % (ref 1–4)
GFR AFRICAN AMERICAN: 16 ML/MIN
GFR AFRICAN AMERICAN: 17 ML/MIN
GFR NON-AFRICAN AMERICAN: 14 ML/MIN
GFR NON-AFRICAN AMERICAN: 14 ML/MIN
GFR SERPL CREATININE-BSD FRML MDRD: ABNORMAL ML/MIN/{1.73_M2}
GLUCOSE BLD-MCNC: 106 MG/DL (ref 65–105)
GLUCOSE BLD-MCNC: 115 MG/DL (ref 70–99)
GLUCOSE BLD-MCNC: 135 MG/DL (ref 65–105)
GLUCOSE BLD-MCNC: 159 MG/DL (ref 70–99)
HCT VFR BLD CALC: 25.6 % (ref 36.3–47.1)
HEMOGLOBIN: 7.7 G/DL (ref 11.9–15.1)
IMMATURE GRANULOCYTES: 0 %
LYMPHOCYTES # BLD: 16 % (ref 24–43)
MCH RBC QN AUTO: 29.7 PG (ref 25.2–33.5)
MCHC RBC AUTO-ENTMCNC: 30.1 G/DL (ref 25.2–33.5)
MCV RBC AUTO: 98.8 FL (ref 82.6–102.9)
MONOCYTES # BLD: 9 % (ref 3–12)
NRBC AUTOMATED: 0 PER 100 WBC
PDW BLD-RTO: 15.6 % (ref 11.8–14.4)
PLATELET # BLD: 245 K/UL (ref 138–453)
PLATELET ESTIMATE: ABNORMAL
PMV BLD AUTO: 9.7 FL (ref 8.1–13.5)
POTASSIUM SERPL-SCNC: 4.5 MMOL/L (ref 3.7–5.3)
POTASSIUM SERPL-SCNC: 4.6 MMOL/L (ref 3.7–5.3)
RBC # BLD: 2.59 M/UL (ref 3.95–5.11)
RBC # BLD: ABNORMAL 10*6/UL
SEG NEUTROPHILS: 73 % (ref 36–65)
SEGMENTED NEUTROPHILS ABSOLUTE COUNT: 4.82 K/UL (ref 1.5–8.1)
SODIUM BLD-SCNC: 139 MMOL/L (ref 135–144)
SODIUM BLD-SCNC: 139 MMOL/L (ref 135–144)
WBC # BLD: 6.6 K/UL (ref 3.5–11.3)
WBC # BLD: ABNORMAL 10*3/UL

## 2020-12-09 PROCEDURE — 6370000000 HC RX 637 (ALT 250 FOR IP): Performed by: INTERNAL MEDICINE

## 2020-12-09 PROCEDURE — 2500000003 HC RX 250 WO HCPCS: Performed by: INTERNAL MEDICINE

## 2020-12-09 PROCEDURE — 36415 COLL VENOUS BLD VENIPUNCTURE: CPT

## 2020-12-09 PROCEDURE — 80048 BASIC METABOLIC PNL TOTAL CA: CPT

## 2020-12-09 PROCEDURE — 6370000000 HC RX 637 (ALT 250 FOR IP): Performed by: NURSE PRACTITIONER

## 2020-12-09 PROCEDURE — 2060000000 HC ICU INTERMEDIATE R&B

## 2020-12-09 PROCEDURE — 71045 X-RAY EXAM CHEST 1 VIEW: CPT

## 2020-12-09 PROCEDURE — 82947 ASSAY GLUCOSE BLOOD QUANT: CPT

## 2020-12-09 PROCEDURE — 97535 SELF CARE MNGMENT TRAINING: CPT

## 2020-12-09 PROCEDURE — 99232 SBSQ HOSP IP/OBS MODERATE 35: CPT | Performed by: INTERNAL MEDICINE

## 2020-12-09 PROCEDURE — 97110 THERAPEUTIC EXERCISES: CPT | Performed by: PHYSICAL THERAPY ASSISTANT

## 2020-12-09 PROCEDURE — 2580000003 HC RX 258: Performed by: INTERNAL MEDICINE

## 2020-12-09 PROCEDURE — 6370000000 HC RX 637 (ALT 250 FOR IP): Performed by: FAMILY MEDICINE

## 2020-12-09 PROCEDURE — 97116 GAIT TRAINING THERAPY: CPT | Performed by: PHYSICAL THERAPY ASSISTANT

## 2020-12-09 PROCEDURE — 85025 COMPLETE CBC W/AUTO DIFF WBC: CPT

## 2020-12-09 PROCEDURE — 97530 THERAPEUTIC ACTIVITIES: CPT

## 2020-12-09 RX ORDER — BUMETANIDE 0.25 MG/ML
2 INJECTION, SOLUTION INTRAMUSCULAR; INTRAVENOUS ONCE
Status: COMPLETED | OUTPATIENT
Start: 2020-12-09 | End: 2020-12-09

## 2020-12-09 RX ADMIN — CETIRIZINE HYDROCHLORIDE 10 MG: 5 TABLET, FILM COATED ORAL at 09:28

## 2020-12-09 RX ADMIN — INSULIN LISPRO 1 UNITS: 100 INJECTION, SOLUTION INTRAVENOUS; SUBCUTANEOUS at 12:01

## 2020-12-09 RX ADMIN — ASPIRIN 81 MG: 81 TABLET, COATED ORAL at 09:28

## 2020-12-09 RX ADMIN — ALLOPURINOL 300 MG: 300 TABLET ORAL at 09:28

## 2020-12-09 RX ADMIN — OXYBUTYNIN CHLORIDE 5 MG: 5 TABLET ORAL at 19:56

## 2020-12-09 RX ADMIN — AMLODIPINE BESYLATE 10 MG: 5 TABLET ORAL at 09:28

## 2020-12-09 RX ADMIN — ATORVASTATIN CALCIUM 40 MG: 40 TABLET, FILM COATED ORAL at 09:28

## 2020-12-09 RX ADMIN — SODIUM CHLORIDE: 9 INJECTION, SOLUTION INTRAVENOUS at 19:56

## 2020-12-09 RX ADMIN — CITALOPRAM HYDROBROMIDE 20 MG: 20 TABLET ORAL at 09:28

## 2020-12-09 RX ADMIN — MEMANTINE HYDROCHLORIDE 10 MG: 5 TABLET ORAL at 09:28

## 2020-12-09 RX ADMIN — PANTOPRAZOLE SODIUM 40 MG: 40 TABLET, DELAYED RELEASE ORAL at 05:37

## 2020-12-09 RX ADMIN — SODIUM CHLORIDE: 9 INJECTION, SOLUTION INTRAVENOUS at 05:38

## 2020-12-09 RX ADMIN — FLUTICASONE PROPIONATE 1 SPRAY: 50 SPRAY, METERED NASAL at 09:27

## 2020-12-09 RX ADMIN — CLOPIDOGREL BISULFATE 75 MG: 75 TABLET ORAL at 09:28

## 2020-12-09 RX ADMIN — FERROUS SULFATE TAB 325 MG (65 MG ELEMENTAL FE) 325 MG: 325 (65 FE) TAB at 14:04

## 2020-12-09 RX ADMIN — HYDRALAZINE HYDROCHLORIDE 75 MG: 25 TABLET, FILM COATED ORAL at 14:04

## 2020-12-09 RX ADMIN — SODIUM CHLORIDE: 9 INJECTION, SOLUTION INTRAVENOUS at 12:02

## 2020-12-09 RX ADMIN — INSULIN LISPRO 5 UNITS: 100 INJECTION, SOLUTION INTRAVENOUS; SUBCUTANEOUS at 12:01

## 2020-12-09 RX ADMIN — FERROUS SULFATE TAB 325 MG (65 MG ELEMENTAL FE) 325 MG: 325 (65 FE) TAB at 17:16

## 2020-12-09 RX ADMIN — INSULIN LISPRO 5 UNITS: 100 INJECTION, SOLUTION INTRAVENOUS; SUBCUTANEOUS at 17:15

## 2020-12-09 RX ADMIN — OXYBUTYNIN CHLORIDE 5 MG: 5 TABLET ORAL at 09:28

## 2020-12-09 RX ADMIN — MICONAZOLE NITRATE: 20 POWDER TOPICAL at 19:57

## 2020-12-09 RX ADMIN — MICONAZOLE NITRATE: 20 POWDER TOPICAL at 09:27

## 2020-12-09 RX ADMIN — MEMANTINE HYDROCHLORIDE 10 MG: 5 TABLET ORAL at 19:56

## 2020-12-09 RX ADMIN — BUMETANIDE 2 MG: 0.25 INJECTION INTRAMUSCULAR; INTRAVENOUS at 15:25

## 2020-12-09 RX ADMIN — HYDRALAZINE HYDROCHLORIDE 75 MG: 25 TABLET, FILM COATED ORAL at 05:37

## 2020-12-09 RX ADMIN — SENNOSIDES 8.6 MG: 8.6 TABLET, FILM COATED ORAL at 09:28

## 2020-12-09 ASSESSMENT — PAIN SCALES - GENERAL
PAINLEVEL_OUTOF10: 0

## 2020-12-09 NOTE — PROGRESS NOTES
SW discussed with patient to assess needs. Patient eating at this time. Patient reports no needs a this time.     Electronically signed by JEFFREY Ibarra on 12/9/2020 at 11:53 AM

## 2020-12-09 NOTE — PROGRESS NOTES
Physical Therapy  Facility/Department: [facility]  PROGRESSIVE CARE  Daily Treatment Note  NAME: Josie Albarran  : 1950  MRN: [id number]    Date of Service: 2020    Discharge Recommendations:  Home with Home health PT        Assessment   Body structures, Functions, Activity limitations: Decreased functional mobility ; Decreased strength;Decreased endurance;Decreased balance  Prognosis: Good  Decision Making: Low Complexity  REQUIRES PT FOLLOW UP: Yes  Activity Tolerance  Activity Tolerance: Patient Tolerated treatment well;Patient limited by endurance; Patient limited by fatigue     Patient Diagnosis(es): The primary encounter diagnosis was Acute anemia. Diagnoses of Acute kidney injury (nontraumatic) ([address] and Acute decompensated heart failure ([address] were also pertinent to this visit. has a past medical history of Anxiety and depression, Background diabetic retinopathy(362.01), Balance problem, CAD (coronary artery disease), Cancer of uterus ([address] Chronic kidney disease, Chronic low back pain, CVA (cerebral vascular accident) ([address] Dementia ([address] Dry eye syndrome, GERD (gastroesophageal reflux disease), Glaucoma suspect, Gout, Homonymous hemianopsia, Hyperlipidemia, Hypertension, Keratitis, Obesity, Peripheral polyneuropathy, Pseudophakos, Stroke ([address] Trichiasis, and Type 2 diabetes mellitus ([address] has a past surgical history that includes Gastric bypass surgery; Cataract removal with implant (2012); Cataract removal with implant (2012); Coronary artery bypass graft (12-);  section; Hysterectomy; Cholecystectomy; Tonsillectomy and adenoidectomy; and Eye surgery (Left). Restrictions  Restrictions/Precautions  Restrictions/Precautions: General Precautions  Subjective   General  Chart Reviewed: No  Response To Previous Treatment: Patient with no complaints from previous session.   Family / Caregiver Present: No  Subjective  Subjective: Pt found supine in bed upon arrival. Agreeable to therap. Pt. states having no pain at initiation of session. Pain Screening  Patient Currently in Pain: No  Pain Assessment  Pain Assessment: 0-10  Pain Level: 0  Patient's Stated Pain Goal: No pain  Vital Signs  Patient Currently in Pain: No  Oxygen Therapy  Pulse Oximeter Device Mode: Intermittent  Pulse Oximeter Device Location: Right;Finger  O2 Device: Nasal cannula  O2 Flow Rate (L/min): 4 L/min       Orientation  Orientation  Overall Orientation Status: Within Normal Limits  Cognition      Objective   Bed mobility  Supine to Sit: Minimal assistance  Scooting: Contact guard assistance  Transfers  Sit to Stand: Contact guard assistance  Stand to sit: Contact guard assistance  Ambulation  Ambulation?: Yes  WB Status: WBAT  More Ambulation?: No  Ambulation 1  Surface: level tile  Device: Rolling Walker  Other Apparatus: O2  Assistance: Contact guard assistance  Quality of Gait: Pt exhibits slow and steady gait with hunched posture present. Gait Deviations: Slow Vicenta;Decreased step length;Decreased step height  Distance: 5' to bedside chair  Comments: Patient declined further ambulation. Stairs/Curb  Stairs?: No  Neuromuscular Education  NDT Treatment: Gait ;Sitting;Standing  Balance  Posture: Fair  Sitting - Static: Good  Sitting - Dynamic: Good  Standing - Static: Fair  Standing - Dynamic: Fair  Exercises  Quad Sets: x15  Heelslides: x15  Gluteal Sets: x20  Hip Abduction: 10x with manual resistance. Knee Long Arc Quad: x15  Ankle Pumps: x20  Comments: hip add x10, hamstring culr x10, both with manual resistance.   Other exercises  Other exercises?: No                        G-Code     OutComes Score                                                     AM-PAC Score             Goals  Short term goals  Time Frame for Short term goals: LOS  Short term goal 1: Bed mobility with mod I  Short term goal 2: Transfers with Mod I  Short term goal 3: Amb x 50ft with RW and mod I  Patient Goals   Patient goals : Return [place]  Times per day: Daily  Current Treatment Recommendations: Strengthening, Balance Training, Functional Mobility Training, Transfer Training, Gait Training, Endurance Training  Safety Devices  Type of devices:  All fall risk precautions in place, Call light within reach, Chair alarm in place, Gait belt, Patient at risk for falls, Left in chair, Nurse notified  Restraints  Initially in place: No     Therapy Time   Individual Concurrent Group Co-treatment   Time In 0905         Time Out 0930         Minutes [address] [place], [place]

## 2020-12-09 NOTE — PROGRESS NOTES
SW attempted to meet with patient to complete Psychosocial Assessment. Patient resting at this time. SW will continue to monitor needs and assist as appropriate.       Electronically signed by JEFFREY Allen on 12/9/2020 at 11:17 AM

## 2020-12-09 NOTE — PROGRESS NOTES
Occupational Therapy  Facility/Department: [facility]  PROGRESSIVE CARE  Daily Treatment Note  NAME: Xander Del Angel  : 1950  MRN: [id number]    Date of Service: 2020    Discharge Recommendations:  Continue to assess pending progress, Home with Home health OT       Assessment   Performance deficits / Impairments: Decreased functional mobility ; Decreased ADL status; Decreased endurance;Decreased balance;Decreased safe awareness;Decreased high-level IADLs  Treatment Diagnosis: general weakness  Prognosis: Fair  OT Education: Energy Conservation  Patient Education: Patient education regarding energy conservation/work simplification techniques in order to increase funcitonal performance; informational handout provided [facility] patient verbalizing some understanding  REQUIRES OT FOLLOW UP: Yes  Activity Tolerance  Activity Tolerance: Patient limited by fatigue;Treatment limited secondary to decreased cognition  Safety Devices  Safety Devices in place: Yes  Type of devices: Call light within reach; Left in chair;Nurse notified         Patient Diagnosis(es): The primary encounter diagnosis was Acute anemia. Diagnoses of Acute kidney injury (nontraumatic) ([address] and Acute decompensated heart failure ([address] were also pertinent to this visit. has a past medical history of Anxiety and depression, Background diabetic retinopathy(362.01), Balance problem, CAD (coronary artery disease), Cancer of uterus ([address] Chronic kidney disease, Chronic low back pain, CVA (cerebral vascular accident) ([address] Dementia ([address] Dry eye syndrome, GERD (gastroesophageal reflux disease), Glaucoma suspect, Gout, Homonymous hemianopsia, Hyperlipidemia, Hypertension, Keratitis, Obesity, Peripheral polyneuropathy, Pseudophakos, Stroke ([address] Trichiasis, and Type 2 diabetes mellitus ([address] has a past surgical history that includes Gastric bypass surgery; Cataract removal with implant (2012); Cataract removal with implant (2012);  Coronary artery bypass graft (12-);  section; Hysterectomy; Cholecystectomy; Tonsillectomy and adenoidectomy; and Eye surgery (Left). Restrictions  Restrictions/Precautions  Restrictions/Precautions: General Precautions     Subjective   General  Chart Reviewed: Yes  Patient assessed for rehabilitation services?: Yes  Family / Caregiver Present: No  Referring Practitioner: Mayuri Carrington  Diagnosis: decompensated heart failure  Subjective  Subjective: Patient rec'd in recliner finishing PT treatment, cooperative 77 yr old female with abnormal labs  General Comment  Comments: Patient reporting mild fatigue at start of treatment  Vital Signs  Patient Currently in Pain: No  Oxygen Therapy  SpO2: 95 %  Pulse Oximeter Device Location: Right;Finger  O2 Device: Nasal cannula  O2 Flow Rate (L/min): 4 L/min     Orientation  Orientation  Overall Orientation Status: Within Functional Limits     Objective    ADL  UE Dressing:  Moderate assistance  LE Dressing: Maximum assistance  Toileting: Unable to assess(comment)(Patient unable to perform sit-to-stand transfer with 1 person assist; toileting not completed)        Toilet Transfers  Toilet Transfer: Unable to assess     Transfers  Sit to stand: Unable to assess  Stand to sit: Unable to assess        Plan   Plan  Times per week: 1-2  Current Treatment Recommendations: Self-Care / ADL, Safety Education & Training, Patient/Caregiver Education & Training    Goals  Short term goals  Time Frame for Short term goals: duration of hospital stay  Short term goal 1: Patient to complete toilet transfer and toileting tasks with SBA using a/e as needed  Short term goal 2: Patient to complete UB/LB ADL tasks with CGA using a/e as needed  Short term goal 3: Patient education energy conservation/work simplification for increased ease ADL tasks - GOAL MET       Therapy Time   Individual Concurrent Group Co-treatment   Time In 938         Time Out 1001         Minutes 23         Timed Code Treatment Minutes: [address], [place]

## 2020-12-09 NOTE — PROGRESS NOTES
Comprehensive Nutrition Assessment    Type and Reason for Visit:  Initial, Consult([facility])    Nutrition Recommendations/Plan: Recommend 5 carbs per tray and continue 2gNa diet    Nutrition Assessment:  Pt admited due to anemia. [facility] diet education handouts provided at bedside as pt was sleeping. Po intake has been variable (50% x 1 meal). Pt would benefit from liberalized diet. Malnutrition Assessment:  Malnutrition Status: At risk for malnutrition (Comment)    Context:  Chronic Illness     Findings of the 6 clinical characteristics of malnutrition:  Energy Intake:  Mild decrease in energy intake (Comment)  Weight Loss:  No significant weight loss     Body Fat Loss:  No significant body fat loss     Muscle Mass Loss:  No significant muscle mass loss    Fluid Accumulation:  1 - Mild Generalized, Extremities   Strength:  Not Performed    Estimated Daily Nutrient Needs:  Energy (kcal):  1700 kcal- 28 kcal/kg;  Weight Used for Energy Requirements:  Ideal     Protein (g):  1.2g/kg=75 g protein or more; Weight Used for Protein Requirements:  Ideal          Nutrition Related Findings:  Labs (12/9) K 4.6, BUN/Cr 48/3.38, Glu 111-229, Meds: Reviewed, BM 12/8      Wounds:  None       Current Nutrition Therapies:    Renal 4 carb diet 2g Na    Anthropometric Measures:  · Height: 5' 7\" (170.2 cm)  · Current Body Weight: 281 lb (127.5 kg)   · Usual Body Weight: 227 lb (103 kg)(8/20)     · Ideal Body Weight: 135 lbs;BMI: 44  · BMI Categories: Obese Class 3 (BMI 40.0 or greater)       Nutrition Diagnosis:   · Inadequate oral intake related to lack or limited access to food as evidenced by intake 26-50%, intake 51-75%    Nutrition Interventions:   Food and/or Nutrient Delivery:  Modify Current Diet  Nutrition Education/Counseling:  Education initiated   Coordination of Nutrition Care:  Continue to monitor while inpatient    Goals:  po intake greater than 75%       Nutrition Monitoring and Evaluation:   Food/Nutrient Intake

## 2020-12-09 NOTE — PLAN OF CARE
Nutrition Problem #1: Inadequate oral intake  Intervention: Food and/or Nutrient Delivery: Modify Current Diet  Nutritional Goals: po intake greater than 75%

## 2020-12-10 ENCOUNTER — APPOINTMENT (OUTPATIENT)
Dept: GENERAL RADIOLOGY | Age: 70
DRG: 811 | End: 2020-12-10
Payer: MEDICARE

## 2020-12-10 LAB
ABSOLUTE EOS #: 0.1 K/UL (ref 0–0.44)
ABSOLUTE IMMATURE GRANULOCYTE: <0.03 K/UL (ref 0–0.3)
ABSOLUTE LYMPH #: 1.21 K/UL (ref 1.1–3.7)
ABSOLUTE MONO #: 0.6 K/UL (ref 0.1–1.2)
ANION GAP SERPL CALCULATED.3IONS-SCNC: 8 MMOL/L (ref 9–17)
BASOPHILS # BLD: 1 % (ref 0–2)
BASOPHILS ABSOLUTE: 0.05 K/UL (ref 0–0.2)
BUN BLDV-MCNC: 45 MG/DL (ref 8–23)
BUN/CREAT BLD: 14 (ref 9–20)
CALCIUM SERPL-MCNC: 8.7 MG/DL (ref 8.6–10.4)
CHLORIDE BLD-SCNC: 108 MMOL/L (ref 98–107)
CO2: 23 MMOL/L (ref 20–31)
CREAT SERPL-MCNC: 3.19 MG/DL (ref 0.5–0.9)
DIFFERENTIAL TYPE: ABNORMAL
EOSINOPHILS RELATIVE PERCENT: 2 % (ref 1–4)
GFR AFRICAN AMERICAN: 18 ML/MIN
GFR NON-AFRICAN AMERICAN: 14 ML/MIN
GFR SERPL CREATININE-BSD FRML MDRD: ABNORMAL ML/MIN/{1.73_M2}
GFR SERPL CREATININE-BSD FRML MDRD: ABNORMAL ML/MIN/{1.73_M2}
GLUCOSE BLD-MCNC: 104 MG/DL (ref 70–99)
GLUCOSE BLD-MCNC: 139 MG/DL (ref 65–105)
GLUCOSE BLD-MCNC: 183 MG/DL (ref 65–105)
GLUCOSE BLD-MCNC: 189 MG/DL (ref 65–105)
HCT VFR BLD CALC: 26 % (ref 36.3–47.1)
HEMOGLOBIN: 7.7 G/DL (ref 11.9–15.1)
IMMATURE GRANULOCYTES: 0 %
LYMPHOCYTES # BLD: 20 % (ref 24–43)
MCH RBC QN AUTO: 30 PG (ref 25.2–33.5)
MCHC RBC AUTO-ENTMCNC: 29.6 G/DL (ref 25.2–33.5)
MCV RBC AUTO: 101.2 FL (ref 82.6–102.9)
MONOCYTES # BLD: 10 % (ref 3–12)
NRBC AUTOMATED: 0.3 PER 100 WBC
PDW BLD-RTO: 15.9 % (ref 11.8–14.4)
PLATELET # BLD: 241 K/UL (ref 138–453)
PLATELET ESTIMATE: ABNORMAL
PMV BLD AUTO: 10 FL (ref 8.1–13.5)
POTASSIUM SERPL-SCNC: 4.4 MMOL/L (ref 3.7–5.3)
RBC # BLD: 2.57 M/UL (ref 3.95–5.11)
RBC # BLD: ABNORMAL 10*6/UL
SEG NEUTROPHILS: 67 % (ref 36–65)
SEGMENTED NEUTROPHILS ABSOLUTE COUNT: 4.09 K/UL (ref 1.5–8.1)
SODIUM BLD-SCNC: 139 MMOL/L (ref 135–144)
WBC # BLD: 6.1 K/UL (ref 3.5–11.3)
WBC # BLD: ABNORMAL 10*3/UL

## 2020-12-10 PROCEDURE — 2500000003 HC RX 250 WO HCPCS: Performed by: INTERNAL MEDICINE

## 2020-12-10 PROCEDURE — 97116 GAIT TRAINING THERAPY: CPT

## 2020-12-10 PROCEDURE — 6370000000 HC RX 637 (ALT 250 FOR IP): Performed by: FAMILY MEDICINE

## 2020-12-10 PROCEDURE — 80048 BASIC METABOLIC PNL TOTAL CA: CPT

## 2020-12-10 PROCEDURE — 2580000003 HC RX 258: Performed by: INTERNAL MEDICINE

## 2020-12-10 PROCEDURE — 6370000000 HC RX 637 (ALT 250 FOR IP): Performed by: NURSE PRACTITIONER

## 2020-12-10 PROCEDURE — 97535 SELF CARE MNGMENT TRAINING: CPT

## 2020-12-10 PROCEDURE — 94761 N-INVAS EAR/PLS OXIMETRY MLT: CPT

## 2020-12-10 PROCEDURE — 71045 X-RAY EXAM CHEST 1 VIEW: CPT

## 2020-12-10 PROCEDURE — 82947 ASSAY GLUCOSE BLOOD QUANT: CPT

## 2020-12-10 PROCEDURE — 36415 COLL VENOUS BLD VENIPUNCTURE: CPT

## 2020-12-10 PROCEDURE — 99232 SBSQ HOSP IP/OBS MODERATE 35: CPT | Performed by: INTERNAL MEDICINE

## 2020-12-10 PROCEDURE — 97110 THERAPEUTIC EXERCISES: CPT

## 2020-12-10 PROCEDURE — 85025 COMPLETE CBC W/AUTO DIFF WBC: CPT

## 2020-12-10 PROCEDURE — 2700000000 HC OXYGEN THERAPY PER DAY

## 2020-12-10 PROCEDURE — 2060000000 HC ICU INTERMEDIATE R&B

## 2020-12-10 RX ORDER — BUMETANIDE 0.25 MG/ML
2 INJECTION, SOLUTION INTRAMUSCULAR; INTRAVENOUS ONCE
Status: COMPLETED | OUTPATIENT
Start: 2020-12-10 | End: 2020-12-10

## 2020-12-10 RX ADMIN — BUMETANIDE 2 MG: 0.25 INJECTION INTRAMUSCULAR; INTRAVENOUS at 12:00

## 2020-12-10 RX ADMIN — SENNOSIDES 8.6 MG: 8.6 TABLET, FILM COATED ORAL at 08:42

## 2020-12-10 RX ADMIN — INSULIN LISPRO 1 UNITS: 100 INJECTION, SOLUTION INTRAVENOUS; SUBCUTANEOUS at 21:46

## 2020-12-10 RX ADMIN — HYDRALAZINE HYDROCHLORIDE 75 MG: 25 TABLET, FILM COATED ORAL at 14:33

## 2020-12-10 RX ADMIN — MICONAZOLE NITRATE: 20 POWDER TOPICAL at 21:46

## 2020-12-10 RX ADMIN — HYDRALAZINE HYDROCHLORIDE 75 MG: 25 TABLET, FILM COATED ORAL at 05:28

## 2020-12-10 RX ADMIN — FERROUS SULFATE TAB 325 MG (65 MG ELEMENTAL FE) 325 MG: 325 (65 FE) TAB at 08:42

## 2020-12-10 RX ADMIN — ATORVASTATIN CALCIUM 40 MG: 40 TABLET, FILM COATED ORAL at 08:42

## 2020-12-10 RX ADMIN — HYDRALAZINE HYDROCHLORIDE 75 MG: 25 TABLET, FILM COATED ORAL at 21:46

## 2020-12-10 RX ADMIN — MEMANTINE HYDROCHLORIDE 10 MG: 5 TABLET ORAL at 21:46

## 2020-12-10 RX ADMIN — PANTOPRAZOLE SODIUM 40 MG: 40 TABLET, DELAYED RELEASE ORAL at 05:31

## 2020-12-10 RX ADMIN — MICONAZOLE NITRATE: 20 POWDER TOPICAL at 08:43

## 2020-12-10 RX ADMIN — SODIUM CHLORIDE: 9 INJECTION, SOLUTION INTRAVENOUS at 18:44

## 2020-12-10 RX ADMIN — MEMANTINE HYDROCHLORIDE 10 MG: 5 TABLET ORAL at 08:43

## 2020-12-10 RX ADMIN — ASPIRIN 81 MG: 81 TABLET, COATED ORAL at 08:43

## 2020-12-10 RX ADMIN — INSULIN LISPRO 1 UNITS: 100 INJECTION, SOLUTION INTRAVENOUS; SUBCUTANEOUS at 17:11

## 2020-12-10 RX ADMIN — HYDRALAZINE HYDROCHLORIDE 75 MG: 25 TABLET, FILM COATED ORAL at 00:29

## 2020-12-10 RX ADMIN — FLUTICASONE PROPIONATE 1 SPRAY: 50 SPRAY, METERED NASAL at 08:43

## 2020-12-10 RX ADMIN — CITALOPRAM HYDROBROMIDE 20 MG: 20 TABLET ORAL at 08:43

## 2020-12-10 RX ADMIN — ALLOPURINOL 300 MG: 300 TABLET ORAL at 08:43

## 2020-12-10 RX ADMIN — OXYBUTYNIN CHLORIDE 5 MG: 5 TABLET ORAL at 08:43

## 2020-12-10 RX ADMIN — FERROUS SULFATE TAB 325 MG (65 MG ELEMENTAL FE) 325 MG: 325 (65 FE) TAB at 17:10

## 2020-12-10 RX ADMIN — SODIUM CHLORIDE: 9 INJECTION, SOLUTION INTRAVENOUS at 11:43

## 2020-12-10 RX ADMIN — AMLODIPINE BESYLATE 10 MG: 5 TABLET ORAL at 08:42

## 2020-12-10 RX ADMIN — CLOPIDOGREL BISULFATE 75 MG: 75 TABLET ORAL at 08:43

## 2020-12-10 RX ADMIN — CETIRIZINE HYDROCHLORIDE 10 MG: 5 TABLET, FILM COATED ORAL at 08:42

## 2020-12-10 RX ADMIN — OXYBUTYNIN CHLORIDE 5 MG: 5 TABLET ORAL at 21:46

## 2020-12-10 RX ADMIN — SENNOSIDES 8.6 MG: 8.6 TABLET, FILM COATED ORAL at 21:46

## 2020-12-10 ASSESSMENT — PAIN SCALES - GENERAL: PAINLEVEL_OUTOF10: 0

## 2020-12-10 NOTE — PROGRESS NOTES
Hospitalist Progress Note    Patient:  Yolanda Scott     YOB: 1950    MRN: [id number]   Admit date: 2020     Acct: [de-identified]     PCP: Lella Apgar, PA    CC--Interval History:   CHF--acute-on-chronic systolic------AMY----balancing between urine output albeit poor and volume overload--rec'd Bumex and hydration----lungs clear to date despite peripheral fluid in extremities    See note below     All other ROS negative except noted in HPI    Diet:  DIET GENERAL; Carb Control: 5 carb choices (75 gms)/meal; Low Sodium (2 GM)    Medications:  Scheduled Meds:   insulin lispro  5 Units Subcutaneous TID WC    [Held by provider] bumetanide  1 mg Oral Daily    ferrous sulfate  325 mg Oral BID WC    fluticasone  1 spray Each Nostril Daily    rivastigmine  2 patch Transdermal Daily    miconazole   Topical BID    allopurinol  300 mg Oral Daily    amLODIPine  10 mg Oral Daily    aspirin  81 mg Oral Daily    atorvastatin  40 mg Oral Daily    citalopram  20 mg Oral Daily    clopidogrel  75 mg Oral Daily    hydrALAZINE  75 mg Oral 3 times per day    insulin glargine  20 Units Subcutaneous Nightly    cetirizine  10 mg Oral Daily    memantine  10 mg Oral BID    pantoprazole  40 mg Oral QAM AC    oxybutynin  5 mg Oral BID    senna  1 tablet Oral BID    sodium chloride flush  10 mL Intravenous 2 times per day    insulin lispro  0-6 Units Subcutaneous TID WC    insulin lispro  0-3 Units Subcutaneous Nightly     Continuous Infusions:   sodium chloride 125 mL/hr at 20    dextrose      dextrose       PRN Meds:glucose, dextrose, glucagon (rDNA), dextrose, glucose, dextrose, glucagon (rDNA), dextrose, sodium chloride flush, acetaminophen **OR** [DISCONTINUED] acetaminophen, [DISCONTINUED] promethazine **OR** ondansetron    Objective:  Labs:  CBC with Differential:    Lab Results   Component Value Date    WBC 6.1 12/10/2020    RBC 2.57 12/10/2020    HGB 7.7 12/10/2020    HCT 26.0 12/10/2020     12/10/2020    .2 12/10/2020    MCH 30.0 12/10/2020    MCHC 29.6 12/10/2020    RDW 15.9 12/10/2020    LYMPHOPCT 20 12/10/2020    LYMPHOPCT 15.4 11/10/2020    MONOPCT 10 12/10/2020    MONOPCT 6.4 11/10/2020    EOSPCT 1.4 11/10/2020    BASOPCT 1 12/10/2020    BASOPCT 0.6 11/10/2020    MONOSABS 0.60 12/10/2020    MONOSABS 0.41 11/10/2020    LYMPHSABS 1.21 12/10/2020    LYMPHSABS 0.99 11/10/2020    EOSABS 0.10 12/10/2020    EOSABS 0.09 11/10/2020    BASOSABS 0.05 12/10/2020    DIFFTYPE NOT REPORTED 12/10/2020     BMP:    Lab Results   Component Value Date     12/10/2020    K 4.4 12/10/2020     12/10/2020    CO2 23 12/10/2020    BUN 45 12/10/2020    LABALBU 3.8 11/04/2020    CREATININE 3.19 12/10/2020    CALCIUM 8.7 12/10/2020    GFRAA 18 12/10/2020    LABGLOM 14 12/10/2020    GLUCOSE 104 12/10/2020           Physical Exam:  Vitals: BP (!) 122/52   Pulse 71   Temp 97.6 °F (36.4 °C) (Oral)   Resp 14   Ht 5' 7\" (1.702 m)   Wt 292 lb 12.8 oz (132.8 kg)   SpO2 99%   BMI 45.86 kg/m²   24 hour intake/output:    Intake/Output Summary (Last 24 hours) at 12/10/2020 0905  Last data filed at 12/10/2020 0500  Gross per 24 hour   Intake 3314 ml   Output 425 ml   Net 2889 ml     Last 3 weights: Wt Readings from Last 3 Encounters:   12/10/20 292 lb 12.8 oz (132.8 kg)   11/04/20 238 lb (108 kg)   10/05/20 219 lb (99.3 kg)     HEENT: Normocephalic and Atraumatic  Neck: Supple, No Masses, Tenderness, Nodularity and No Lymphadenopathy  Chest/Lungs: Clear to Auscultation without Rales, Rhonchi, or Wheezes  Cardiac: Regular Rate and Rhythm  GI/Abdomen:  Bowel Sounds Present and Soft, Non-tender, without Guarding or Rebound Tenderness  : Not examined  EXT/Skin: No Cyanosis, No Clubbing and Edema Present  Neuro: Alert and Oriented and No Localizing Signs/Symptoms      Assessment:    Active Problems:    Decompensated heart failure (HCC)    Acute anemia  Resolved Problems:    * No resolved hospital problems.  *    KELLEN CHOUNohemi Sharp   Kayla Holder, PAC--FP; [facility] Cardiology---TCC;  Edi Montes De Oca, ]  FULL CODE      ASPIRIN--PLAVIX    SUPPLEMENTAL OXYGEN  Covid-19--negative----12.4.2020    CHF---acute-on-chronic systolic BE---12.2.1196         CXR---12.8.2020---cardiomegaly--mild bilateral perihilar opacities---small bibasilar effusions---NACs         2D ECHO---12.7.2020---LA severely dilated---normal LV dimeter--mild reduced LVSF---                                    septal WMA---leftward compression IVS D-sign---RA dilatation--RV                                    dilatation---reduced RVSF---MAC---PARISH but opens well---trace AI---                                    mild TR---RVSP ~ 35 mm Hg--left pleural effusion---normal aortic root                                    dimension 3.3 cm--IVC not visualized--Grade I mild DD--LVEF ~ 45-50%         BLE--edema  Sinus bradycardia with pauses  CUTLER  Anemia---see below   GI bleed--12.4.2020---likely anemia of chronic GI blood loss--Hemocult--[+]          Planned EGD---colonoscopy---12.9.2020---Jose          2 UNITS PRBCs----12.6.2020  AMY---12.5.2020---Stage 4-5            DUS---kidneys---12.8.2020----normal appearance--right 9.8 cm---left 12.9 cm                                         ASCVD         CABG x 4--LIMA-LAD---SVG-D----SVG-OM1----SVG-PDA----12.15.2005         Cardiac catheterization---8.13.2020---patent         1st degree AVB  Hypertension   Diabetes Mellitus Type 2          Diabetic retinopathy          Peripheral polyneuropathy  Gait-balance instability  Chronic kidney disease--Stage 3  Hypertension  Hyperlipidemia  GERD  Obesity---morbid   Cerebrovascular disease           MRI--2008--multiple infarcts--temporal-parietal           CVA--history           Homonymous hemianopsia           Balance problems  Dementia  Depression--anxiety   Hyperkalemia   PMH:   uterine carcinoma, dry eye syndrome, glaucoma, keratitis,              gout, left eye trichiasis--, hirsutism, hypoglycemia,              non-compliance with medical regimen, bed-bugs--maggots,               chronic pain--low back pain, buttock wound--decubitus right hip  PSH:    , hysterectomy---LSO---right ovary preserved, gastric bypass,              cholecystectomy, T&A, left eye---childhood, cataract extraction---IO--    Allergies:         Bactrim DS---sulfa---hives, clonidine  Intolerances:   Augmentin--nausea---vomiting---diarrhea    Plan:    1. CHF---acute-on-chronic systolic ----> Bumex 2 mg IV  2. AMY--patient offered transfer to Nephrology, but declines at this time---if required would accept dialysis   3.   See orders     Electronically signed by Lake Means on 12/10/2020 at [address] [place] [address]

## 2020-12-10 NOTE — PROGRESS NOTES
Occupational Therapy  Facility/Department: [facility]  PROGRESSIVE CARE  Daily Treatment Note  NAME: Yolanda Scott  : 1950  MRN: [id number]    Date of Service: 12/10/2020    Discharge Recommendations:  Continue to assess pending progress, Home with Home health OT, ECF with OT     Assessment   Performance deficits / Impairments: Decreased functional mobility ; Decreased ADL status; Decreased endurance;Decreased balance;Decreased safe awareness;Decreased high-level IADLs  Treatment Diagnosis: general weakness  Prognosis: Fair  REQUIRES OT FOLLOW UP: Yes  Safety Devices  Safety Devices in place: Yes  Type of devices: Call light within reach; Left in chair;Nurse notified         Patient Diagnosis(es): The primary encounter diagnosis was Acute anemia. Diagnoses of Acute kidney injury (nontraumatic) ([address] and Acute decompensated heart failure ([address] were also pertinent to this visit. has a past medical history of Anxiety and depression, Background diabetic retinopathy(362.01), Balance problem, CAD (coronary artery disease), Cancer of uterus ([address] Chronic kidney disease, Chronic low back pain, CVA (cerebral vascular accident) ([address] Dementia ([address] Dry eye syndrome, GERD (gastroesophageal reflux disease), Glaucoma suspect, Gout, Homonymous hemianopsia, Hyperlipidemia, Hypertension, Keratitis, Obesity, Peripheral polyneuropathy, Pseudophakos, Stroke ([address] Trichiasis, and Type 2 diabetes mellitus ([address] has a past surgical history that includes Gastric bypass surgery; Cataract removal with implant (2012); Cataract removal with implant (2012); Coronary artery bypass graft (12-);  section; Hysterectomy; Cholecystectomy; Tonsillectomy and adenoidectomy; and Eye surgery (Left).     Restrictions  Restrictions/Precautions  Restrictions/Precautions: General Precautions  Subjective   General  Chart Reviewed: Yes  Patient assessed for rehabilitation services?: Yes  Family / Caregiver Present: No  Referring Practitioner: KELLY Castañeda  Diagnosis: decompensated heart failure  Subjective  Subjective: Patient rec'd in recliner, cooperative 77 yr old female with abnormal labs  General Comment  Comments: Patient reporting mild fatigue at start of treatment  Vital Signs  Patient Currently in Pain: Denies     Objective    ADL  UE Bathing: Minimal assistance(P required VC's for all UE bathing tasks and Min A to wash back)  LE Bathing: Maximum assistance(Pt unable to reach legs for LE bathing)  UE Dressing: Moderate assistance  LE Dressing: Maximum assistance  Additional Comments: Sponge bath completed with patient seated in recliner this date. 2 person assist required for sit to stand transfer for personal hygiene of darron area  Transfers  Sit to stand: 2 Person assistance  Cognition  Overall Cognitive Status: WFL  Following Commands:  Follows multistep commands with increased time  Safety Judgement: Decreased awareness of need for assistance    Plan   Plan  Times per week: 1-2  Current Treatment Recommendations: Self-Care / ADL, Safety Education & Training, Patient/Caregiver Education & Training    Goals  Short term goals  Time Frame for Short term goals: duration of hospital stay  Short term goal 1: Patient to complete toilet transfer and toileting tasks with SBA using a/e as needed  Short term goal 2: Patient to complete UB/LB ADL tasks with CGA using a/e as needed  Short term goal 3: Patient education energy conservation/work simplification for increased ease ADL tasks - GOAL MET       Therapy Time   Individual Concurrent Group Co-treatment   Time In 1355         Time Out 1420         Minutes 25         Timed Code Treatment Minutes: Humberto 45, OT

## 2020-12-10 NOTE — PROGRESS NOTES
Physical Therapy  Facility/Department: [id number]  Daily Treatment Note  NAME: Yolanda Scott  : 1950  MRN: [id number]    Date of Service: 12/10/2020    Discharge Recommendations:  Continue to assess pending progress        Assessment   Body structures, Functions, Activity limitations: Decreased functional mobility ; Decreased strength;Decreased endurance;Decreased balance  Prognosis: Fair;Good  REQUIRES PT FOLLOW UP: Yes  Activity Tolerance  Activity Tolerance: Patient limited by fatigue;Patient limited by endurance     Patient Diagnosis(es): The primary encounter diagnosis was Acute anemia. Diagnoses of Acute kidney injury (nontraumatic) ([address] and Acute decompensated heart failure ([address] were also pertinent to this visit. has a past medical history of Anxiety and depression, Background diabetic retinopathy(362.01), Balance problem, CAD (coronary artery disease), Cancer of uterus ([address] Chronic kidney disease, Chronic low back pain, CVA (cerebral vascular accident) ([address] Dementia ([address] Dry eye syndrome, GERD (gastroesophageal reflux disease), Glaucoma suspect, Gout, Homonymous hemianopsia, Hyperlipidemia, Hypertension, Keratitis, Obesity, Peripheral polyneuropathy, Pseudophakos, Stroke ([address] Trichiasis, and Type 2 diabetes mellitus ([address] has a past surgical history that includes Gastric bypass surgery; Cataract removal with implant (2012); Cataract removal with implant (2012); Coronary artery bypass graft (12-);  section; Hysterectomy; Cholecystectomy; Tonsillectomy and adenoidectomy; and Eye surgery (Left). Restrictions  Restrictions/Precautions  Restrictions/Precautions: General Precautions  Subjective   General  Chart Reviewed: Yes  Response To Previous Treatment: Patient with no complaints from previous session. Family / Caregiver Present: No  Subjective  Subjective: Pt found supine in bed upon arrival. Agreeable to therapy.   Pain Screening  Patient Currently in Pain: No  Pain Assessment  Pain Assessment: 0-10  Pain Level: 0  Patient's Stated Pain Goal: No pain  Vital Signs  Patient Currently in Pain: No  Oxygen Therapy  O2 Device: Nasal cannula  Skin Protection for O2 Device: Yes  O2 Flow Rate (L/min): 2 L/min       Orientation  Orientation  Overall Orientation Status: Within Normal Limits  Cognition      Objective   Bed mobility  Bridging: Unable to assess  Rolling to Left: Contact guard assistance  Rolling to Right: Contact guard assistance  Supine to Sit: Contact guard assistance  Sit to Supine: Contact guard assistance  Scooting: Contact guard assistance  Transfers  Sit to Stand: Minimal Assistance  Stand to sit: Minimal Assistance  Bed to Chair: Unable to assess  Stand Pivot Transfers: Unable to assess  Squat Pivot Transfers: Unable to assess  Lateral Transfers: Unable to assess  Car Transfer: Unable to assess  Ambulation  Ambulation?: Yes  WB Status: WBAT  More Ambulation?: No  Ambulation 1  Surface: level tile  Device: Rolling Walker  Other Apparatus: O2  Assistance: Contact guard assistance  Quality of Gait: Pt exhibits slow and steady gait. Quickly fatigued upon standing.   Gait Deviations: Slow Vicenta;Decreased step length;Decreased step height  Distance: 50'x1  Stairs/Curb  Stairs?: No  Neuromuscular Education  NDT Treatment: Gait ;Sitting;Standing  Balance  Posture: Fair  Sitting - Static: Good  Sitting - Dynamic: Good  Standing - Static: Fair  Standing - Dynamic: Fair  Exercises  Straight Leg Raise: x10  Quad Sets: x15  Heelslides: x15  Gluteal Sets: x15  Hip Flexion: x10  Hip Abduction: x15  Knee Long Arc Quad: x15  Ankle Pumps: x20  Comments: hip add x15  Other exercises  Other exercises?: No                        G-Code     OutComes Score                                                     AM-PAC Score             Goals  Short term goals  Time Frame for Short term goals: LOS  Short term goal 1: Bed mobility with mod I  Short term goal 2:

## 2020-12-10 NOTE — PROGRESS NOTES
12/09/2020    RBC 2.59 12/09/2020    HGB 7.7 12/09/2020    HCT 25.6 12/09/2020     12/09/2020    MCV 98.8 12/09/2020    MCH 29.7 12/09/2020    MCHC 30.1 12/09/2020    RDW 15.6 12/09/2020    LYMPHOPCT 16 12/09/2020    LYMPHOPCT 15.4 11/10/2020    MONOPCT 9 12/09/2020    MONOPCT 6.4 11/10/2020    EOSPCT 1.4 11/10/2020    BASOPCT 1 12/09/2020    BASOPCT 0.6 11/10/2020    MONOSABS 0.58 12/09/2020    MONOSABS 0.41 11/10/2020    LYMPHSABS 1.05 12/09/2020    LYMPHSABS 0.99 11/10/2020    EOSABS 0.06 12/09/2020    EOSABS 0.09 11/10/2020    BASOSABS 0.03 12/09/2020    DIFFTYPE NOT REPORTED 12/09/2020     BMP:    Lab Results   Component Value Date     12/09/2020    K 4.5 12/09/2020     12/09/2020    CO2 25 12/09/2020    BUN 49 12/09/2020    LABALBU 3.8 11/04/2020    CREATININE 3.25 12/09/2020    CALCIUM 8.8 12/09/2020    GFRAA 17 12/09/2020    LABGLOM 14 12/09/2020    GLUCOSE 159 12/09/2020           Physical Exam:  Vitals: BP (!) 157/69   Pulse 70   Temp 96.8 °F (36 °C) (Tympanic)   Resp 15   Ht 5' 7\" (1.702 m)   Wt 281 lb 6.4 oz (127.6 kg)   SpO2 98%   BMI 44.07 kg/m²   24 hour intake/output:    Intake/Output Summary (Last 24 hours) at 12/9/2020 2034  Last data filed at 12/9/2020 1847  Gross per 24 hour   Intake 3404 ml   Output 280 ml   Net 3124 ml     Last 3 weights: Wt Readings from Last 3 Encounters:   12/09/20 281 lb 6.4 oz (127.6 kg)   11/04/20 238 lb (108 kg)   10/05/20 219 lb (99.3 kg)     HEENT: Normocephalic and Atraumatic  Neck: Supple, No Masses, Tenderness, Nodularity and No Lymphadenopathy  Chest/Lungs: Clear to Auscultation without Rales, Rhonchi, or Wheezes and Distant Breath Sounds  Cardiac: Regular Rate and Rhythm  GI/Abdomen:  Bowel Sounds Present and Soft, Non-tender, without Guarding or Rebound Tenderness  : Not examined  EXT/Skin: No Cyanosis, No Clubbing and Edema Present  Neuro: alert------ and No Localizing Signs/Symptoms      Assessment:    Active Problems: Decompensated heart failure (HCC)    Acute anemia  Resolved Problems:    * No resolved hospital problems.  *      KELLEN CHOU [facility], PAC--FP; DC Cardiology---TCC;  Satya Hartmann, ]  FULL CODE      ASPIRIN--PLAVIX    SUPPLEMENTAL OXYGEN  Covid-19--negative----12.4.2020    CHF---acute-on-chronic systolic HK---75.8.8759         CXR---12.8.2020---cardiomegaly--mild bilateral perihilar opacities---small bibasilar effusions---NACs         2D ECHO---12.7.2020---LA severely dilated---normal LV dimeter--mild reduced LVSF---                                    septal WMA---leftward compression IVS D-sign---RA dilatation--RV                                    dilatation---reduced RVSF---MAC---PARISH but opens well---trace AI---                                    mild TR---RVSP ~ 35 mm Hg--left pleural effusion---normal aortic root                                    dimension 3.3 cm--IVC not visualized--Grade I mild DD--LVEF ~ 45-50%         BLE--edema  Sinus bradycardia with pauses  CUTLER  Anemia---see below   GI bleed--12.4.2020---likely anemia of chronic GI blood loss--Hemocult--[+]          Planned EGD---colonoscopy---12.9.2020---Jose          2 UNITS PRBCs----12.6.2020  AMY---12.5.2020---Stage 4-5            DUS---kidneys---12.8.2020----normal appearance--right 9.8 cm---left 12.9 cm                                         ASCVD         CABG x 4--LIMA-LAD---SVG-D----SVG-OM1----SVG-PDA----12.15.2005         Cardiac catheterization---8.13.2020---patent         1st degree AVB  Hypertension   Diabetes Mellitus Type 2          Diabetic retinopathy          Peripheral polyneuropathy  Gait-balance instability  Chronic kidney disease--Stage 3  Hypertension  Hyperlipidemia  GERD  Obesity---morbid   Cerebrovascular disease           MRI--2008--multiple infarcts--temporal-parietal           CVA--history           Homonymous hemianopsia           Balance problems  Dementia  Depression--anxiety   Hyperkalemia   PMH:

## 2020-12-10 NOTE — PROGRESS NOTES
Physician Progress Note      Dominique Orozco  [facility] #:                  [id number]  :                       1950  ADMIT DATE:       2020 5:42 PM  [address] [place] DATE:  RESPONDING  PROVIDER #:        Ko AYERS          QUERY TEXT:    Patient admitted with anemia and CHF exacerbation. Noted documentation of   AMY, baseline CKD 3 as well as CKD 4-5 in progress notes throughout record. If possible, please document in progress notes and discharge summary if you   are evaluating and /or treating any of the following:     The medical record reflects the following:  Risk Factors: HTN, DM, CHF with iv diuretic administration for treatment of   exacerbation  Clinical Indicators: Urine output 0.2 ml/kg/hr or less for multiple days;    BUN/CR/GFR:  25/1.30/41 on 2020,  33/2.7/18 on , 48/3.38/14 on ;     medicine progress note:  AMY at admission--poor output--currently at a   Stage 4-5 level----typically Stage 3  Treatment: monitoring urine output, daily BMP, IVFs, Bumex held then restarted  Options provided:  -- AMY on underlying CKD stage 3 confirmed and CKD stage 4-5 ruled out  -- AMY on CKD stage 4 confirmed and stage CKD 5 ruled out  -- Progressive of CKD to stage 4, AMY ruled out after study  -- Progressive of CKD to stage 5, AMY ruled out after study  -- Other - I will add my own diagnosis  -- Disagree - Not applicable / Not valid  -- Disagree - Clinically unable to determine / Unknown  -- Refer to Clinical Documentation Reviewer    PROVIDER RESPONSE TEXT:    CKD now Stage 5    Query created by: Madelaine Carbone on 12/10/2020 12:16 PM      Electronically signed by:  Avelino Shaikh 12/10/2020 1:51 PM

## 2020-12-10 NOTE — PROGRESS NOTES
Physical Therapy    Attempted to see patient this date. Patient relates she would like to rest since she just had breakfast. Willing to attempt later this date. Will attempt later this date.      Bonnie Lr PTA

## 2020-12-11 ENCOUNTER — APPOINTMENT (OUTPATIENT)
Dept: GENERAL RADIOLOGY | Age: 70
DRG: 811 | End: 2020-12-11
Payer: MEDICARE

## 2020-12-11 VITALS
TEMPERATURE: 97.4 F | BODY MASS INDEX: 45.99 KG/M2 | SYSTOLIC BLOOD PRESSURE: 177 MMHG | RESPIRATION RATE: 16 BRPM | HEART RATE: 71 BPM | OXYGEN SATURATION: 94 % | HEIGHT: 67 IN | DIASTOLIC BLOOD PRESSURE: 70 MMHG | WEIGHT: 293 LBS

## 2020-12-11 PROBLEM — N17.9 ACUTE RENAL FAILURE (ARF) (HCC): Status: ACTIVE | Noted: 2020-12-11

## 2020-12-11 LAB
ABSOLUTE EOS #: 0.06 K/UL (ref 0–0.44)
ABSOLUTE IMMATURE GRANULOCYTE: <0.03 K/UL (ref 0–0.3)
ABSOLUTE LYMPH #: 0.73 K/UL (ref 1.1–3.7)
ABSOLUTE MONO #: 0.65 K/UL (ref 0.1–1.2)
ANION GAP SERPL CALCULATED.3IONS-SCNC: 10 MMOL/L (ref 9–17)
BASOPHILS # BLD: 0 % (ref 0–2)
BASOPHILS ABSOLUTE: 0.03 K/UL (ref 0–0.2)
BUN BLDV-MCNC: 49 MG/DL (ref 8–23)
BUN/CREAT BLD: 15 (ref 9–20)
CALCIUM SERPL-MCNC: 8.8 MG/DL (ref 8.6–10.4)
CHLORIDE BLD-SCNC: 105 MMOL/L (ref 98–107)
CO2: 24 MMOL/L (ref 20–31)
CREAT SERPL-MCNC: 3.24 MG/DL (ref 0.5–0.9)
DIFFERENTIAL TYPE: ABNORMAL
EOSINOPHILS RELATIVE PERCENT: 1 % (ref 1–4)
GFR AFRICAN AMERICAN: 17 ML/MIN
GFR NON-AFRICAN AMERICAN: 14 ML/MIN
GFR SERPL CREATININE-BSD FRML MDRD: ABNORMAL ML/MIN/{1.73_M2}
GFR SERPL CREATININE-BSD FRML MDRD: ABNORMAL ML/MIN/{1.73_M2}
GLUCOSE BLD-MCNC: 149 MG/DL (ref 65–105)
GLUCOSE BLD-MCNC: 150 MG/DL (ref 65–105)
GLUCOSE BLD-MCNC: 158 MG/DL (ref 65–105)
GLUCOSE BLD-MCNC: 161 MG/DL (ref 65–105)
GLUCOSE BLD-MCNC: 166 MG/DL (ref 65–105)
GLUCOSE BLD-MCNC: 176 MG/DL (ref 65–105)
GLUCOSE BLD-MCNC: 177 MG/DL (ref 70–99)
HCT VFR BLD CALC: 26.5 % (ref 36.3–47.1)
HEMOGLOBIN: 7.9 G/DL (ref 11.9–15.1)
IMMATURE GRANULOCYTES: 0 %
LYMPHOCYTES # BLD: 11 % (ref 24–43)
MCH RBC QN AUTO: 30.2 PG (ref 25.2–33.5)
MCHC RBC AUTO-ENTMCNC: 29.8 G/DL (ref 25.2–33.5)
MCV RBC AUTO: 101.1 FL (ref 82.6–102.9)
MONOCYTES # BLD: 10 % (ref 3–12)
NRBC AUTOMATED: 0.3 PER 100 WBC
PDW BLD-RTO: 15.9 % (ref 11.8–14.4)
PLATELET # BLD: 236 K/UL (ref 138–453)
PLATELET ESTIMATE: ABNORMAL
PMV BLD AUTO: 10 FL (ref 8.1–13.5)
POTASSIUM SERPL-SCNC: 4.8 MMOL/L (ref 3.7–5.3)
RBC # BLD: 2.62 M/UL (ref 3.95–5.11)
RBC # BLD: ABNORMAL 10*6/UL
SEG NEUTROPHILS: 78 % (ref 36–65)
SEGMENTED NEUTROPHILS ABSOLUTE COUNT: 5.34 K/UL (ref 1.5–8.1)
SODIUM BLD-SCNC: 139 MMOL/L (ref 135–144)
WBC # BLD: 6.8 K/UL (ref 3.5–11.3)
WBC # BLD: ABNORMAL 10*3/UL

## 2020-12-11 PROCEDURE — 2580000003 HC RX 258: Performed by: INTERNAL MEDICINE

## 2020-12-11 PROCEDURE — 36415 COLL VENOUS BLD VENIPUNCTURE: CPT

## 2020-12-11 PROCEDURE — 97116 GAIT TRAINING THERAPY: CPT | Performed by: PHYSICAL THERAPIST

## 2020-12-11 PROCEDURE — 2500000003 HC RX 250 WO HCPCS: Performed by: INTERNAL MEDICINE

## 2020-12-11 PROCEDURE — 85025 COMPLETE CBC W/AUTO DIFF WBC: CPT

## 2020-12-11 PROCEDURE — 82947 ASSAY GLUCOSE BLOOD QUANT: CPT

## 2020-12-11 PROCEDURE — 6370000000 HC RX 637 (ALT 250 FOR IP): Performed by: FAMILY MEDICINE

## 2020-12-11 PROCEDURE — 80048 BASIC METABOLIC PNL TOTAL CA: CPT

## 2020-12-11 PROCEDURE — 6370000000 HC RX 637 (ALT 250 FOR IP): Performed by: NURSE PRACTITIONER

## 2020-12-11 PROCEDURE — 94760 N-INVAS EAR/PLS OXIMETRY 1: CPT

## 2020-12-11 PROCEDURE — 2060000000 HC ICU INTERMEDIATE R&B

## 2020-12-11 PROCEDURE — 71045 X-RAY EXAM CHEST 1 VIEW: CPT

## 2020-12-11 PROCEDURE — 94761 N-INVAS EAR/PLS OXIMETRY MLT: CPT

## 2020-12-11 PROCEDURE — 99232 SBSQ HOSP IP/OBS MODERATE 35: CPT | Performed by: INTERNAL MEDICINE

## 2020-12-11 PROCEDURE — 6370000000 HC RX 637 (ALT 250 FOR IP): Performed by: INTERNAL MEDICINE

## 2020-12-11 PROCEDURE — 2700000000 HC OXYGEN THERAPY PER DAY

## 2020-12-11 RX ORDER — BUMETANIDE 0.25 MG/ML
2 INJECTION, SOLUTION INTRAMUSCULAR; INTRAVENOUS ONCE
Status: COMPLETED | OUTPATIENT
Start: 2020-12-11 | End: 2020-12-11

## 2020-12-11 RX ORDER — INSULIN GLARGINE 100 [IU]/ML
5 INJECTION, SOLUTION SUBCUTANEOUS DAILY
Status: DISCONTINUED | OUTPATIENT
Start: 2020-12-11 | End: 2020-12-12 | Stop reason: HOSPADM

## 2020-12-11 RX ADMIN — FLUTICASONE PROPIONATE 1 SPRAY: 50 SPRAY, METERED NASAL at 10:27

## 2020-12-11 RX ADMIN — ATORVASTATIN CALCIUM 40 MG: 40 TABLET, FILM COATED ORAL at 10:28

## 2020-12-11 RX ADMIN — SODIUM CHLORIDE: 9 INJECTION, SOLUTION INTRAVENOUS at 10:51

## 2020-12-11 RX ADMIN — CETIRIZINE HYDROCHLORIDE 10 MG: 5 TABLET, FILM COATED ORAL at 10:28

## 2020-12-11 RX ADMIN — MICONAZOLE NITRATE: 20 POWDER TOPICAL at 10:27

## 2020-12-11 RX ADMIN — INSULIN LISPRO 1 UNITS: 100 INJECTION, SOLUTION INTRAVENOUS; SUBCUTANEOUS at 21:24

## 2020-12-11 RX ADMIN — PANTOPRAZOLE SODIUM 40 MG: 40 TABLET, DELAYED RELEASE ORAL at 06:16

## 2020-12-11 RX ADMIN — MEMANTINE HYDROCHLORIDE 10 MG: 5 TABLET ORAL at 10:28

## 2020-12-11 RX ADMIN — INSULIN LISPRO 1 UNITS: 100 INJECTION, SOLUTION INTRAVENOUS; SUBCUTANEOUS at 13:22

## 2020-12-11 RX ADMIN — ASPIRIN 81 MG: 81 TABLET, COATED ORAL at 10:28

## 2020-12-11 RX ADMIN — SENNOSIDES 8.6 MG: 8.6 TABLET, FILM COATED ORAL at 21:24

## 2020-12-11 RX ADMIN — BUMETANIDE 2 MG: 0.25 INJECTION INTRAMUSCULAR; INTRAVENOUS at 10:51

## 2020-12-11 RX ADMIN — FERROUS SULFATE TAB 325 MG (65 MG ELEMENTAL FE) 325 MG: 325 (65 FE) TAB at 10:28

## 2020-12-11 RX ADMIN — MICONAZOLE NITRATE: 20 POWDER TOPICAL at 21:27

## 2020-12-11 RX ADMIN — AMLODIPINE BESYLATE 10 MG: 5 TABLET ORAL at 10:27

## 2020-12-11 RX ADMIN — SENNOSIDES 8.6 MG: 8.6 TABLET, FILM COATED ORAL at 10:28

## 2020-12-11 RX ADMIN — OXYBUTYNIN CHLORIDE 5 MG: 5 TABLET ORAL at 10:28

## 2020-12-11 RX ADMIN — ALLOPURINOL 300 MG: 300 TABLET ORAL at 10:28

## 2020-12-11 RX ADMIN — MEMANTINE HYDROCHLORIDE 10 MG: 5 TABLET ORAL at 21:24

## 2020-12-11 RX ADMIN — CLOPIDOGREL BISULFATE 75 MG: 75 TABLET ORAL at 10:28

## 2020-12-11 RX ADMIN — HYDRALAZINE HYDROCHLORIDE 75 MG: 25 TABLET, FILM COATED ORAL at 13:22

## 2020-12-11 RX ADMIN — HYDRALAZINE HYDROCHLORIDE 75 MG: 25 TABLET, FILM COATED ORAL at 21:24

## 2020-12-11 RX ADMIN — INSULIN GLARGINE 5 UNITS: 100 INJECTION, SOLUTION SUBCUTANEOUS at 10:26

## 2020-12-11 RX ADMIN — HYDRALAZINE HYDROCHLORIDE 75 MG: 25 TABLET, FILM COATED ORAL at 06:16

## 2020-12-11 RX ADMIN — OXYBUTYNIN CHLORIDE 5 MG: 5 TABLET ORAL at 21:24

## 2020-12-11 RX ADMIN — INSULIN LISPRO 1 UNITS: 100 INJECTION, SOLUTION INTRAVENOUS; SUBCUTANEOUS at 10:25

## 2020-12-11 RX ADMIN — FERROUS SULFATE TAB 325 MG (65 MG ELEMENTAL FE) 325 MG: 325 (65 FE) TAB at 17:44

## 2020-12-11 RX ADMIN — SODIUM CHLORIDE: 9 INJECTION, SOLUTION INTRAVENOUS at 18:52

## 2020-12-11 RX ADMIN — INSULIN LISPRO 1 UNITS: 100 INJECTION, SOLUTION INTRAVENOUS; SUBCUTANEOUS at 17:45

## 2020-12-11 RX ADMIN — CITALOPRAM HYDROBROMIDE 20 MG: 20 TABLET ORAL at 10:28

## 2020-12-11 ASSESSMENT — PAIN SCALES - GENERAL: PAINLEVEL_OUTOF10: 0

## 2020-12-11 NOTE — PROGRESS NOTES
Hospitalist Progress Note    Patient:  Renato Abreu     YOB: 1950    MRN: [id number]   Admit date: 12/4/2020     Acct: [de-identified]     PCP: ERINN Lowery    CC--Interval History:   CHF---acute-on-chronic systolic CHF    AMY--now into situation where cannot diurese without further decline in kidney function and further IVF --> accumulation of fluids---output over the last 24 hours ~ 500 mL----patient will accept dialysis if required--need nephrology intervention at a level of care not offered at this facility---arrangements being made to transfer the patient to [facility] for nephrology intervention---Dr. Sarah Taylor, Hospitalist, will be the accepting physician.     All other ROS negative except noted in HPI    Diet:  DIET GENERAL; Carb Control: 5 carb choices (75 gms)/meal; Low Sodium (2 GM)    Medications:  Scheduled Meds:   insulin glargine  5 Units Subcutaneous Daily    [Held by provider] bumetanide  1 mg Oral Daily    ferrous sulfate  325 mg Oral BID WC    fluticasone  1 spray Each Nostril Daily    rivastigmine  2 patch Transdermal Daily    miconazole   Topical BID    allopurinol  300 mg Oral Daily    amLODIPine  10 mg Oral Daily    aspirin  81 mg Oral Daily    atorvastatin  40 mg Oral Daily    citalopram  20 mg Oral Daily    clopidogrel  75 mg Oral Daily    hydrALAZINE  75 mg Oral 3 times per day    cetirizine  10 mg Oral Daily    memantine  10 mg Oral BID    pantoprazole  40 mg Oral QAM AC    oxybutynin  5 mg Oral BID    senna  1 tablet Oral BID    sodium chloride flush  10 mL Intravenous 2 times per day    insulin lispro  0-6 Units Subcutaneous TID WC    insulin lispro  0-3 Units Subcutaneous Nightly     Continuous Infusions:   sodium chloride 125 mL/hr at 12/10/20 1844    dextrose      dextrose       PRN Meds:glucose, dextrose, glucagon (rDNA), dextrose, glucose, dextrose, glucagon (rDNA), dextrose, sodium chloride flush, acetaminophen **OR** [DISCONTINUED] acetaminophen, [DISCONTINUED] promethazine **OR** ondansetron    Objective:  Labs:  CBC with Differential:    Lab Results   Component Value Date    WBC 6.8 12/11/2020    RBC 2.62 12/11/2020    HGB 7.9 12/11/2020    HCT 26.5 12/11/2020     12/11/2020    .1 12/11/2020    MCH 30.2 12/11/2020    MCHC 29.8 12/11/2020    RDW 15.9 12/11/2020    LYMPHOPCT 11 12/11/2020    LYMPHOPCT 15.4 11/10/2020    MONOPCT 10 12/11/2020    MONOPCT 6.4 11/10/2020    EOSPCT 1.4 11/10/2020    BASOPCT 0 12/11/2020    BASOPCT 0.6 11/10/2020    MONOSABS 0.65 12/11/2020    MONOSABS 0.41 11/10/2020    LYMPHSABS 0.73 12/11/2020    LYMPHSABS 0.99 11/10/2020    EOSABS 0.06 12/11/2020    EOSABS 0.09 11/10/2020    BASOSABS 0.03 12/11/2020    DIFFTYPE NOT REPORTED 12/11/2020     BMP:    Lab Results   Component Value Date     12/11/2020    K 4.8 12/11/2020     12/11/2020    CO2 24 12/11/2020    BUN 49 12/11/2020    LABALBU 3.8 11/04/2020    CREATININE 3.24 12/11/2020    CALCIUM 8.8 12/11/2020    GFRAA 17 12/11/2020    LABGLOM 14 12/11/2020    GLUCOSE 177 12/11/2020           Physical Exam:  Vitals: BP (!) 142/59   Pulse 56   Temp 97.8 °F (36.6 °C) (Tympanic)   Resp 12   Ht 5' 7\" (1.702 m)   Wt 299 lb 3.2 oz (135.7 kg)   SpO2 94%   BMI 46.86 kg/m²   24 hour intake/output:    Intake/Output Summary (Last 24 hours) at 12/11/2020 0901  Last data filed at 12/11/2020 0500  Gross per 24 hour   Intake 3218 ml   Output 425 ml   Net 2793 ml     Last 3 weights: Wt Readings from Last 3 Encounters:   12/11/20 299 lb 3.2 oz (135.7 kg)   11/04/20 238 lb (108 kg)   10/05/20 219 lb (99.3 kg)     HEENT: Normocephalic and Atraumatic  Neck: Supple, No Masses, Tenderness, Nodularity and No Lymphadenopathy  Chest/Lungs: Clear to Auscultation without Rales, Rhonchi, or Wheezes  Cardiac: Regular Rate and Rhythm  GI/Abdomen:  Bowel Sounds Present and Soft, Non-tender, without Guarding or Rebound Tenderness  : Not examined  EXT/Skin: No Cyanosis, No Clubbing and Edema Present--flaking and scaling  Neuro: Alert and Oriented and No Localizing Signs/Symptoms      Assessment:    Active Problems:    Decompensated heart failure (HCC)    Acute anemia  Resolved Problems:    * No resolved hospital problems.  *    KELLEN CHOU [facility], PAC--FP; [facility]---TCC;  Satya Hartmann, ]  FULL CODE      ASPIRIN--PLAVIX    SUPPLEMENTAL OXYGEN  Covid-19--negative----12.4.2020    CHF---acute-on-chronic systolic PU---[id number]         CXR---12.11.2020--pulmonary edema--small right pleural effusion         CXR---12.8.2020---cardiomegaly--mild bilateral perihilar opacities---small bibasilar effusions---NACs         2D ECHO---12.7.2020---LA severely dilated---normal LV dimeter--mild reduced LVSF---                                    septal WMA---leftward compression IVS D-sign---RA dilatation--RV                                    dilatation---reduced RVSF---MAC---PARISH but opens well---trace AI---                                    mild TR---RVSP ~ 35 mm Hg--left pleural effusion---normal aortic root                                    dimension 3.3 cm--IVC not visualized--Grade I mild DD--LVEF ~ 45-50%         BLE--edema  Sinus bradycardia with pauses  CUTLER  Anemia---see below   GI bleed--12.4.2020---likely anemia of chronic GI blood loss--Hemocult--[+]          Planned EGD---colonoscopy---12.9.2020---Jose          2 UNITS PRBCs----12.6.2020  AMY---12.5.2020---Stage 4-5            DUS---kidneys---12.8.2020----normal appearance--right 9.8 cm---left 12.9 cm                                         ASCVD         CABG x 4--LIMA-LAD---SVG-D----SVG-OM1----SVG-PDA----12.15.2005         Cardiac catheterization---8.13.2020---patent         1st degree AVB  Hypertension   Diabetes Mellitus Type 2          Diabetic retinopathy          Peripheral polyneuropathy  Gait-balance instability  Chronic kidney disease--Stage 3  Hypertension  Hyperlipidemia  GERD  Obesity---morbid   Cerebrovascular disease           MRI----multiple infarcts--temporal-parietal           CVA--history           Homonymous hemianopsia           Balance problems  Dementia  Depression--anxiety   Hyperkalemia   PMH:   uterine carcinoma, dry eye syndrome, glaucoma, keratitis,              gout, left eye trichiasis--, hirsutism, hypoglycemia,              non-compliance with medical regimen, bed-bugs--maggots,               chronic pain--low back pain, buttock wound--decubitus right hip  PSH:    , hysterectomy---LSO---right ovary preserved, gastric bypass,              cholecystectomy, T&A, left eye---childhood, cataract extraction---IO--    Allergies:         Bactrim DS---sulfa---hives, clonidine  Intolerances:   Augmentin--nausea---vomiting---diarrhea          Plan:  1. To [facility]---for hospitalist--nephrology services  2. See orders  3. Follow up [address], [facility]--FP  4.   GI bleed---stable--may ultimately require endoscopy by Dr. Shanell Gramajo, [address] [place] [address], as required  5.    Follow up [facility]--TCC  6.   See orders     Electronically signed by Shae Adame on 2020 at 9:01 AM    Hospitalist

## 2020-12-11 NOTE — PROGRESS NOTES
Occupational Therapy    Patient declined OT treatment at time of arrival, reported fatigue and wanting to do therapy later. OT to attempt treatment later in day time permitting.     ERIKA Lopez/PERLA

## 2020-12-11 NOTE — PROGRESS NOTES
Physical Therapy        Attempted to see patient this AM, patient refused treatment and wishes to do therapy later in day. Will attempt to see patient this afternoon 12/11. Signed.     Chelsy Cox, PTA

## 2020-12-11 NOTE — PROGRESS NOTES
Physical Therapy  Facility/Department: [facility]  PROGRESSIVE CARE  Daily Treatment Note  NAME: Courtney Moss  : 1950  MRN: [id number]    Date of Service: 2020    Discharge Recommendations:  Continue to assess pending progress        Assessment   Body structures, Functions, Activity limitations: Decreased balance;Decreased endurance  Prognosis: Fair  REQUIRES PT FOLLOW UP: Yes  Activity Tolerance  Activity Tolerance: Patient limited by fatigue     Patient Diagnosis(es): The primary encounter diagnosis was Acute anemia. Diagnoses of Acute kidney injury (nontraumatic) ([address] and Acute decompensated heart failure ([address] were also pertinent to this visit. has a past medical history of Anxiety and depression, Background diabetic retinopathy(362.01), Balance problem, CAD (coronary artery disease), Cancer of uterus ([address] Chronic kidney disease, Chronic low back pain, CVA (cerebral vascular accident) ([address] Dementia ([address] Dry eye syndrome, GERD (gastroesophageal reflux disease), Glaucoma suspect, Gout, Homonymous hemianopsia, Hyperlipidemia, Hypertension, Keratitis, Obesity, Peripheral polyneuropathy, Pseudophakos, Stroke ([address] Trichiasis, and Type 2 diabetes mellitus ([address] has a past surgical history that includes Gastric bypass surgery; Cataract removal with implant (2012); Cataract removal with implant (2012); Coronary artery bypass graft (12-);  section; Hysterectomy; Cholecystectomy; Tonsillectomy and adenoidectomy; and Eye surgery (Left).     Restrictions  Restrictions/Precautions  Restrictions/Precautions: General Precautions  Subjective   General  Chart Reviewed: Yes  Response To Previous Treatment: Not applicable          Orientation  Orientation  Overall Orientation Status: Impaired  Cognition      Objective   Bed mobility  Rolling to Left: Minimal assistance  Supine to Sit: Moderate assistance  Transfers  Sit to Stand: Minimal Assistance  Stand to sit: Moderate Assistance  Ambulation  Ambulation?: Yes  Ambulation 1  Device: Rolling Walker  Assistance: Contact guard assistance  Distance: 5 ft to chair     Balance  Sitting - Static: Fair  Sitting - Dynamic: Fair  Standing - Static: Fair  Standing - Dynamic: Poor                           G-Code     OutComes Score                                                     AM-PAC Score             Goals  Short term goals  Time Frame for Short term goals: LOS  Short term goal 1: Bed mobility with mod I  Short term goal 2: Transfers with Mod I  Short term goal 3: Amb x 50ft with RW and mod I  Patient Goals   Patient goals : Return Home    Plan    Plan  Times per day: Daily  Current Treatment Recommendations: Strengthening, Balance Training, Functional Mobility Training, Transfer Training, Gait Training, Endurance Training  Safety Devices  Type of devices: Chair alarm in place, Call light within reach  Restraints  Initially in place: No     Therapy Time   Individual Concurrent Group Co-treatment   Time In 1350         Time Out 1410         Minutes 20                 Yina Del Castillo PT

## 2020-12-12 ENCOUNTER — APPOINTMENT (OUTPATIENT)
Dept: GENERAL RADIOLOGY | Age: 70
DRG: 682 | End: 2020-12-12
Attending: INTERNAL MEDICINE
Payer: MEDICARE

## 2020-12-12 ENCOUNTER — HOSPITAL ENCOUNTER (INPATIENT)
Age: 70
LOS: 11 days | Discharge: NURSING, REHAB OR LONG-TERM CARE | DRG: 682 | End: 2020-12-23
Attending: INTERNAL MEDICINE | Admitting: FAMILY MEDICINE
Payer: MEDICARE

## 2020-12-12 PROBLEM — I50.33 ACUTE ON CHRONIC DIASTOLIC HEART FAILURE (HCC): Status: ACTIVE | Noted: 2020-08-07

## 2020-12-12 PROBLEM — R60.1 ANASARCA: Status: ACTIVE | Noted: 2020-12-12

## 2020-12-12 LAB
ALBUMIN SERPL-MCNC: 3.3 G/DL (ref 3.5–5.2)
ALBUMIN/GLOBULIN RATIO: 1 (ref 1–2.5)
ALP BLD-CCNC: 129 U/L (ref 35–104)
ALT SERPL-CCNC: 7 U/L (ref 5–33)
ANION GAP SERPL CALCULATED.3IONS-SCNC: 12 MMOL/L (ref 9–17)
AST SERPL-CCNC: 16 U/L
BILIRUB SERPL-MCNC: 0.37 MG/DL (ref 0.3–1.2)
BNP INTERPRETATION: ABNORMAL
BUN BLDV-MCNC: 49 MG/DL (ref 8–23)
BUN/CREAT BLD: ABNORMAL (ref 9–20)
CALCIUM SERPL-MCNC: 8.6 MG/DL (ref 8.6–10.4)
CHLORIDE BLD-SCNC: 106 MMOL/L (ref 98–107)
CO2: 19 MMOL/L (ref 20–31)
CREAT SERPL-MCNC: 3.16 MG/DL (ref 0.5–0.9)
DATE, STOOL #1: NORMAL
DATE, STOOL #2: NORMAL
DATE, STOOL #3: NORMAL
FERRITIN: 123 UG/L (ref 13–150)
GFR AFRICAN AMERICAN: 18 ML/MIN
GFR NON-AFRICAN AMERICAN: 15 ML/MIN
GFR SERPL CREATININE-BSD FRML MDRD: ABNORMAL ML/MIN/{1.73_M2}
GFR SERPL CREATININE-BSD FRML MDRD: ABNORMAL ML/MIN/{1.73_M2}
GLUCOSE BLD-MCNC: 134 MG/DL (ref 65–105)
GLUCOSE BLD-MCNC: 151 MG/DL (ref 65–105)
GLUCOSE BLD-MCNC: 164 MG/DL (ref 65–105)
GLUCOSE BLD-MCNC: 166 MG/DL (ref 70–99)
HCT VFR BLD CALC: 27.8 % (ref 36.3–47.1)
HEMOCCULT SP1 STL QL: NEGATIVE
HEMOCCULT SP2 STL QL: NORMAL
HEMOCCULT SP3 STL QL: NORMAL
HEMOGLOBIN: 8.1 G/DL (ref 11.9–15.1)
INR BLD: 1
IRON SATURATION: 30 % (ref 20–55)
IRON: 50 UG/DL (ref 37–145)
MAGNESIUM: 2 MG/DL (ref 1.6–2.6)
MCH RBC QN AUTO: 29.8 PG (ref 25.2–33.5)
MCHC RBC AUTO-ENTMCNC: 29.1 G/DL (ref 28.4–34.8)
MCV RBC AUTO: 102.2 FL (ref 82.6–102.9)
NRBC AUTOMATED: 0 PER 100 WBC
PDW BLD-RTO: 16 % (ref 11.8–14.4)
PLATELET # BLD: 242 K/UL (ref 138–453)
PMV BLD AUTO: 10.2 FL (ref 8.1–13.5)
POTASSIUM SERPL-SCNC: 4.7 MMOL/L (ref 3.7–5.3)
PRO-BNP: ABNORMAL PG/ML
PROTHROMBIN TIME: 10.6 SEC (ref 9–12)
RBC # BLD: 2.72 M/UL (ref 3.95–5.11)
SODIUM BLD-SCNC: 137 MMOL/L (ref 135–144)
TIME, STOOL #1: NORMAL
TIME, STOOL #2: NORMAL
TIME, STOOL #3: NORMAL
TOTAL IRON BINDING CAPACITY: 167 UG/DL (ref 250–450)
TOTAL PROTEIN: 6.7 G/DL (ref 6.4–8.3)
UNSATURATED IRON BINDING CAPACITY: 117 UG/DL (ref 112–347)
WBC # BLD: 7.1 K/UL (ref 3.5–11.3)

## 2020-12-12 PROCEDURE — 99223 1ST HOSP IP/OBS HIGH 75: CPT | Performed by: NURSE PRACTITIONER

## 2020-12-12 PROCEDURE — 1200000000 HC SEMI PRIVATE

## 2020-12-12 PROCEDURE — 71045 X-RAY EXAM CHEST 1 VIEW: CPT

## 2020-12-12 PROCEDURE — 82728 ASSAY OF FERRITIN: CPT

## 2020-12-12 PROCEDURE — 85610 PROTHROMBIN TIME: CPT

## 2020-12-12 PROCEDURE — 6370000000 HC RX 637 (ALT 250 FOR IP): Performed by: NURSE PRACTITIONER

## 2020-12-12 PROCEDURE — 83735 ASSAY OF MAGNESIUM: CPT

## 2020-12-12 PROCEDURE — 85027 COMPLETE CBC AUTOMATED: CPT

## 2020-12-12 PROCEDURE — 36415 COLL VENOUS BLD VENIPUNCTURE: CPT

## 2020-12-12 PROCEDURE — G0328 FECAL BLOOD SCRN IMMUNOASSAY: HCPCS

## 2020-12-12 PROCEDURE — 83880 ASSAY OF NATRIURETIC PEPTIDE: CPT

## 2020-12-12 PROCEDURE — 83550 IRON BINDING TEST: CPT

## 2020-12-12 PROCEDURE — 6360000002 HC RX W HCPCS: Performed by: NURSE PRACTITIONER

## 2020-12-12 PROCEDURE — 2500000003 HC RX 250 WO HCPCS: Performed by: NURSE PRACTITIONER

## 2020-12-12 PROCEDURE — 6360000002 HC RX W HCPCS: Performed by: INTERNAL MEDICINE

## 2020-12-12 PROCEDURE — 83540 ASSAY OF IRON: CPT

## 2020-12-12 PROCEDURE — 82947 ASSAY GLUCOSE BLOOD QUANT: CPT

## 2020-12-12 PROCEDURE — 80053 COMPREHEN METABOLIC PANEL: CPT

## 2020-12-12 PROCEDURE — 2580000003 HC RX 258: Performed by: NURSE PRACTITIONER

## 2020-12-12 PROCEDURE — 99222 1ST HOSP IP/OBS MODERATE 55: CPT | Performed by: INTERNAL MEDICINE

## 2020-12-12 RX ORDER — FUROSEMIDE 10 MG/ML
40 INJECTION INTRAMUSCULAR; INTRAVENOUS 2 TIMES DAILY
Status: DISCONTINUED | OUTPATIENT
Start: 2020-12-12 | End: 2020-12-12

## 2020-12-12 RX ORDER — SODIUM POLYSTYRENE SULFONATE 15 G/60ML
30 SUSPENSION ORAL; RECTAL
Status: ACTIVE | OUTPATIENT
Start: 2020-12-12 | End: 2020-12-12

## 2020-12-12 RX ORDER — SODIUM CHLORIDE 0.9 % (FLUSH) 0.9 %
10 SYRINGE (ML) INJECTION PRN
Status: DISCONTINUED | OUTPATIENT
Start: 2020-12-12 | End: 2020-12-23 | Stop reason: HOSPADM

## 2020-12-12 RX ORDER — SODIUM POLYSTYRENE SULFONATE 15 G/60ML
15 SUSPENSION ORAL; RECTAL
Status: DISPENSED | OUTPATIENT
Start: 2020-12-12 | End: 2020-12-12

## 2020-12-12 RX ORDER — PANTOPRAZOLE SODIUM 40 MG/1
40 TABLET, DELAYED RELEASE ORAL
Status: DISCONTINUED | OUTPATIENT
Start: 2020-12-12 | End: 2020-12-13

## 2020-12-12 RX ORDER — SENNA PLUS 8.6 MG/1
1 TABLET ORAL 2 TIMES DAILY
Status: DISCONTINUED | OUTPATIENT
Start: 2020-12-12 | End: 2020-12-13

## 2020-12-12 RX ORDER — NICOTINE 21 MG/24HR
1 PATCH, TRANSDERMAL 24 HOURS TRANSDERMAL DAILY PRN
Status: DISCONTINUED | OUTPATIENT
Start: 2020-12-12 | End: 2020-12-23 | Stop reason: HOSPADM

## 2020-12-12 RX ORDER — ACETAMINOPHEN 325 MG/1
650 TABLET ORAL EVERY 6 HOURS PRN
Status: DISCONTINUED | OUTPATIENT
Start: 2020-12-12 | End: 2020-12-23 | Stop reason: HOSPADM

## 2020-12-12 RX ORDER — RIVASTIGMINE 9.5 MG/24H
1 PATCH, EXTENDED RELEASE TRANSDERMAL DAILY
Status: DISCONTINUED | OUTPATIENT
Start: 2020-12-13 | End: 2020-12-23 | Stop reason: HOSPADM

## 2020-12-12 RX ORDER — CLOPIDOGREL BISULFATE 75 MG/1
75 TABLET ORAL DAILY
Status: DISCONTINUED | OUTPATIENT
Start: 2020-12-12 | End: 2020-12-23 | Stop reason: HOSPADM

## 2020-12-12 RX ORDER — SODIUM CHLORIDE 9 MG/ML
INJECTION, SOLUTION INTRAVENOUS CONTINUOUS
Status: DISCONTINUED | OUTPATIENT
Start: 2020-12-12 | End: 2020-12-13

## 2020-12-12 RX ORDER — ALBUTEROL SULFATE 2.5 MG/3ML
2.5 SOLUTION RESPIRATORY (INHALATION) EVERY 6 HOURS PRN
Status: DISCONTINUED | OUTPATIENT
Start: 2020-12-12 | End: 2020-12-23 | Stop reason: HOSPADM

## 2020-12-12 RX ORDER — INSULIN GLARGINE 100 [IU]/ML
20 INJECTION, SOLUTION SUBCUTANEOUS NIGHTLY
Status: DISCONTINUED | OUTPATIENT
Start: 2020-12-12 | End: 2020-12-23 | Stop reason: HOSPADM

## 2020-12-12 RX ORDER — HEPARIN SODIUM 5000 [USP'U]/ML
5000 INJECTION, SOLUTION INTRAVENOUS; SUBCUTANEOUS EVERY 8 HOURS SCHEDULED
Status: DISCONTINUED | OUTPATIENT
Start: 2020-12-12 | End: 2020-12-23 | Stop reason: HOSPADM

## 2020-12-12 RX ORDER — SODIUM CHLORIDE 0.9 % (FLUSH) 0.9 %
10 SYRINGE (ML) INJECTION EVERY 12 HOURS SCHEDULED
Status: DISCONTINUED | OUTPATIENT
Start: 2020-12-12 | End: 2020-12-23 | Stop reason: HOSPADM

## 2020-12-12 RX ORDER — ONDANSETRON 2 MG/ML
4 INJECTION INTRAMUSCULAR; INTRAVENOUS EVERY 6 HOURS PRN
Status: DISCONTINUED | OUTPATIENT
Start: 2020-12-12 | End: 2020-12-23 | Stop reason: HOSPADM

## 2020-12-12 RX ORDER — ACETAMINOPHEN 650 MG/1
650 SUPPOSITORY RECTAL EVERY 6 HOURS PRN
Status: DISCONTINUED | OUTPATIENT
Start: 2020-12-12 | End: 2020-12-23 | Stop reason: HOSPADM

## 2020-12-12 RX ORDER — FUROSEMIDE 10 MG/ML
40 INJECTION INTRAMUSCULAR; INTRAVENOUS DAILY
Status: DISCONTINUED | OUTPATIENT
Start: 2020-12-13 | End: 2020-12-13

## 2020-12-12 RX ORDER — FUROSEMIDE 10 MG/ML
40 INJECTION INTRAMUSCULAR; INTRAVENOUS ONCE
Status: COMPLETED | OUTPATIENT
Start: 2020-12-12 | End: 2020-12-12

## 2020-12-12 RX ORDER — CITALOPRAM 20 MG/1
20 TABLET ORAL DAILY
Status: DISCONTINUED | OUTPATIENT
Start: 2020-12-12 | End: 2020-12-23 | Stop reason: HOSPADM

## 2020-12-12 RX ORDER — ATORVASTATIN CALCIUM 40 MG/1
40 TABLET, FILM COATED ORAL DAILY
Status: DISCONTINUED | OUTPATIENT
Start: 2020-12-12 | End: 2020-12-23 | Stop reason: HOSPADM

## 2020-12-12 RX ORDER — PROMETHAZINE HYDROCHLORIDE 12.5 MG/1
12.5 TABLET ORAL EVERY 6 HOURS PRN
Status: DISCONTINUED | OUTPATIENT
Start: 2020-12-12 | End: 2020-12-23 | Stop reason: HOSPADM

## 2020-12-12 RX ADMIN — INSULIN LISPRO 2 UNITS: 100 INJECTION, SOLUTION INTRAVENOUS; SUBCUTANEOUS at 13:22

## 2020-12-12 RX ADMIN — INSULIN LISPRO 2 UNITS: 100 INJECTION, SOLUTION INTRAVENOUS; SUBCUTANEOUS at 17:50

## 2020-12-12 RX ADMIN — PANTOPRAZOLE SODIUM 40 MG: 40 TABLET, DELAYED RELEASE ORAL at 10:24

## 2020-12-12 RX ADMIN — HEPARIN SODIUM 5000 UNITS: 5000 INJECTION INTRAVENOUS; SUBCUTANEOUS at 16:22

## 2020-12-12 RX ADMIN — SODIUM CHLORIDE, PRESERVATIVE FREE 10 ML: 5 INJECTION INTRAVENOUS at 10:27

## 2020-12-12 RX ADMIN — CITALOPRAM 20 MG: 20 TABLET, FILM COATED ORAL at 10:24

## 2020-12-12 RX ADMIN — DESMOPRESSIN ACETATE 40 MG: 0.2 TABLET ORAL at 10:24

## 2020-12-12 RX ADMIN — SODIUM CHLORIDE, PRESERVATIVE FREE 10 ML: 5 INJECTION INTRAVENOUS at 21:38

## 2020-12-12 RX ADMIN — ANTI-FUNGAL POWDER MICONAZOLE NITRATE TALC FREE: 1.42 POWDER TOPICAL at 21:39

## 2020-12-12 RX ADMIN — CLOPIDOGREL 75 MG: 75 TABLET, FILM COATED ORAL at 10:24

## 2020-12-12 RX ADMIN — Medication: at 17:50

## 2020-12-12 RX ADMIN — ANTI-FUNGAL POWDER MICONAZOLE NITRATE TALC FREE: 1.42 POWDER TOPICAL at 10:24

## 2020-12-12 RX ADMIN — HEPARIN SODIUM 5000 UNITS: 5000 INJECTION INTRAVENOUS; SUBCUTANEOUS at 21:37

## 2020-12-12 RX ADMIN — FUROSEMIDE 40 MG: 10 INJECTION, SOLUTION INTRAMUSCULAR; INTRAVENOUS at 10:24

## 2020-12-12 RX ADMIN — DARBEPOETIN ALFA 60 MCG: 60 INJECTION, SOLUTION INTRAVENOUS; SUBCUTANEOUS at 17:50

## 2020-12-12 ASSESSMENT — ENCOUNTER SYMPTOMS
BLOOD IN STOOL: 0
PHOTOPHOBIA: 0
VOMITING: 0
COLOR CHANGE: 0
TROUBLE SWALLOWING: 0
COUGH: 0
WHEEZING: 0
SHORTNESS OF BREATH: 1
SORE THROAT: 0
DIARRHEA: 0
NAUSEA: 0
CHEST TIGHTNESS: 0
ABDOMINAL PAIN: 0

## 2020-12-12 ASSESSMENT — PAIN SCALES - GENERAL: PAINLEVEL_OUTOF10: 0

## 2020-12-12 NOTE — H&P
[facility]  Office: [address], DO, Adriana Ty, DO, Alicia Last, DO, Aroldo Silva Blood, DO, Dominick Isabel MD, Kalyn Wheeler MD, Queen Elian MD, Malinda Short MD, José Antonio Nunez MD, Kadie Garcia MD, Maria T Stone MD, Mari Matta MD, [facility] Thien, Fatou Barrera DO, Karina Hawkins MD, Ab Coker MD, Maureen Graves DO, Cj Grace MD,  Mynor Horvath DO, Jenny Bacon MD, Lori Hallman MD, Steve Guzmán, [facility], [facility] Adelina, CNP, Coco Dickinson, CNP, Francisco Vazquez, CNS, Melissa Robertson, CNP, Karen Franks, CNP, Melissa Carrion, CNP, Sosa Corea, CNP, Misael Shin, CNP, Bess Minaya PA-C, Payal Moreira, [facility], Hill Bennett, CNP, Leslye Vergara, CNP, Mariajose Vaughan, [facility], Justin Shaikh, CNP, Gael Mcclendon, [facility]   [address] [facility]    HISTORY AND PHYSICAL EXAMINATION            Date:   12/12/2020  Patient name:  Niru Salinas  Date of admission:  12/12/2020  4:17 AM  MRN:   [id number]  Account:  [de-identified]  YOB: 1950  PCP:    ERINN Dong  Room:   [facility]/9242-  Code Status:    Full Code    Chief Complaint:     Dyspnea, BLE edema    History Obtained From:     patient, electronic medical record    History of Present Illness:     Niru Salinas is a 77 y.o. Non-/non  female who presents with No chief complaint on file. and is admitted to the hospital for the management of Acute renal failure (ARF) ([facility] [address] This is a 77 female with diabetes, Diastolic heart Failure with home oxygen dependence at 2L, CKD (follows with OP nephrology), HTN, HLD, CAD s/p 3/4 CABG, GERD, CVA (parietal and temporal infarcts), iron deficient anemia, dementia, and GERD who presents initially to [facility] for 'abnormal labs'. Patient had routine labs done via PCP and patient noted to have HGB: 6.8. She was sent directly to the ER. She reports worsening fatigue, shortness of breath, and increased LE edema over the last month. Denies any CP, ABD pain, n/v/d, hematemesis/melanotic stools/ or hematochezia. On presentation, significant labs noted as: pro-bnp: 18, 609, CRT: 2.7, Na: 136, K: 4.6, HGB: 4.8, Iron: 19, TIBC: 148, Iron Saturation: 13, TSH: 2.48, Trop: 67-->66-->65 and CXR: with findings suggestive of vascular congestion. Renal US: normal sonographic findings. Patient was given 2 units PRBC with improvement in HGB: 7.9. She was also treated with IV fluids and Diuretic therapy in regards to AMY and CHF exacerbation. Cardiology was consulted given elevated troponins and evidence of CHF exacerbation. She was receiving Bumex 2mg daily per her home dose, however, renal function has continued to worsen and patient has had drop in urine output--100ml UO over the last 12 hours. Additionally, patient has been having dark black stools with concerns for slow GI bleed. Patient is transferred to our facility for further management and nephrology consult. 2D ECHO (12/8/2020)  Normal left ventricular diameter. Left ventricular systolic function is mildly reduced with LVEF 45-50%. Abnormal septal wall motion. Left ventricular ejection fraction 50 %. Grade I (mild) left ventricular diastolic dysfunction. Leftward compression of inter-ventricular septum (\"D-sign\") indicating RV pressure/volume overload. Left atrium is severely dilated. Right atrial dilatation. Right ventricular dilatation with reduced systolic function. Aortic valve is sclerotic but opens well. Trace aortic insufficiency. Mitral annular calcification. Mild tricuspid regurgitation. Estimated right ventricular systolic pressure is 35 mmHg. Left pleural effusion. On my evaluation, patient resting in bed with NC oxygen in place at 2L NC. She is ill appearing, but non-toxic. Generalized anasarca with 2-3+ pitting edema noted to ABD, bilateral upper, and bilateral lower extremities. Noted to have conversational dyspnea, along with orthopnea, but no significant respiratory distress Denies CP, ABD pain, n/v/d, body aches, fevers, chills/cough. Denies dizziness/light headedness, numbness or tingling. Past Medical History:     Past Medical History:   Diagnosis Date    Anxiety and depression     Background diabetic retinopathy(362.01) 2008    (Mild)    Balance problem     CAD (coronary artery disease)     (with coronary artery bypass graft x4).  Cancer of uterus [facility])     history of, (probable cure)    Chronic kidney disease     Chronic low back pain     CVA (cerebral vascular accident) ([address]     Dementia ([facility] [address]     (moderate)    Dry eye syndrome     GERD (gastroesophageal reflux disease)     Glaucoma suspect 2005    Gout     Homonymous hemianopsia 2008    (Right)    Hyperlipidemia     Hypertension     Keratitis     (secondary to dry eye syndrome).  Obesity     Peripheral polyneuropathy     (diabetic)    Pseudophakos     (Right Eye: 05-; Left Eye: 04-) - Dr. Fran Butts.  Stroke [facility])     (Multiple) MRI of brain 10/2008 shows multiple infarcts involving the temporal and parietal areas.  Trichiasis 2010    (left eye)    Type 2 diabetes mellitus ([address]         Past Surgical History:     Past Surgical History:   Procedure Laterality Date    CATARACT REMOVAL WITH IMPLANT  05-    (Right eye) - Dr. Fran Butts.  CATARACT REMOVAL WITH IMPLANT  04-    (Left eye) - Dr. Fran Butts.    [address] [place] [address]  CHOLECYSTECTOMY      CORONARY ARTERY BYPASS GRAFT  12-    (x4) - LIMA-LAD, SVG-diagnoal 1, SVG-OM1, and SVG-PDA. Exploration of left radial. (Dr. Shirley Holley).  EYE SURGERY Left     as a child     GASTRIC BYPASS SURGERY      HYSTERECTOMY      (abdominal)  with left oophorectomy. Right ovary retained.  TONSILLECTOMY AND ADENOIDECTOMY          Medications Prior to Admission:     Prior to Admission medications    Medication Sig Start Date End Date Taking? Authorizing Provider   senna (SENOKOT) 8.6 MG tablet Take 1 tablet by mouth 2 times daily 11/25/20 11/25/21  ERINN Camargo   oxybutynin (DITROPAN) 5 MG tablet TAKE 1 TABLET BY MOUTH TWICE DAILY 11/22/20   ERINN Camargo   losartan (COZAAR) 50 MG tablet Take 1 tablet by mouth daily 11/22/20   Esteban Henson [place]   OXYGEN Oxgen at 2 liters per nasal cannula 11/5/20   ERINN Camargo   loratadine (CLARITIN) 10 MG tablet TAKE 1 TABLET(10 MG) BY MOUTH DAILY 11/5/20   ERINN Camargo   Lancets MISC Checks blood sugar ac and HS 11/4/20   Esteban Natividad [place]   blood glucose monitor strips Test 3  times a day & as needed for symptoms of irregular blood glucose. Dispense sufficient amount for indicated testing frequency plus additional to accommodate PRN testing needs. 11/4/20   ERINN Camargo   Alcohol Swabs (ALCOHOL PREP) PADS Checks blood sugar 3 times a day 11/4/20   Esteban Henson, [address]  [employer] [facility] Standard walker with wheels    Diagnosis dementia, CHF 10/29/20   Carolyne Boucher APRN - CNP   atorvastatin (LIPITOR) 40 MG tablet TAKE 1 TABLET BY MOUTH EVERYDAY 10/23/20   Steviegilbert Mckinney, DO   bumetanide (BUMEX) 1 MG tablet Take 1 tablet by mouth daily 10/23/20   Ara Mckinney, DO   citalopram (CELEXA) 20 MG tablet Take 1 tablet by mouth daily 10/23/20   Ara Mckinney, DO   clopidogrel (PLAVIX) 75 MG tablet Take 1 tablet by mouth daily 10/23/20   Barbara Mead, DO insulin lispro (HUMALOG) 100 UNIT/ML injection vial Use 4 times a day per sliding scale 10/23/20   Stevie Mckinney,    hydrALAZINE (APRESOLINE) 50 MG tablet Take 1.5 tablets by mouth every 8 hours 10/23/20   Stevie Mckinney,    insulin glargine (LANTUS) 100 UNIT/ML injection vial Inject 20 Units into the skin nightly 10/23/20   Stevie Mckinney, DO   memantine (NAMENDA) 10 MG tablet TAKE 1 TABLET BY MOUTH TWICE DAILY 10/23/20   Stevie Mckinney, DO   omeprazole (PRILOSEC) 20 MG delayed release capsule Take 1 capsule by mouth every morning (before breakfast) 10/23/20   Stevie Mckinney, DO   rivastigmine (EXELON) 9.5 MG/24HR APPLY 1 NEW PATCH EVERY 24 HOURS 10/23/20   Joshua Mckinney, DO   aspirin 81 MG tablet Take 1 tablet by mouth daily 1/3/20   ERINN Randle   Diabetic Shoe MISC by Does not apply route 9/26/19   ERINN Randle   Compression Stockings MISC by Does not apply route 20-30 mm Hg bilateral knee high 9/26/19   Yuli Tapia PA   nystatin (MYCOSTATIN) 885919 UNIT/GM cream Apply topically 2 times daily. 1/31/18   ERINN Randle        Allergies:     Bactrim [sulfamethoxazole-trimethoprim], Clonidine derivatives, and Amoxicillin-pot clavulanate    Social History:     Tobacco:    reports that she has never smoked. She has never used smokeless tobacco.  Alcohol:      reports no history of alcohol use. Drug Use:  reports no history of drug use. Family History:     Family History   Problem Relation Age of Onset    Diabetes Mother     Cancer Mother     High Blood Pressure Mother    [facility] Stroke Mother     Kidney Disease Mother     Uterine Cancer Mother     Diabetes Father     Heart Disease Father     Glaucoma Father     Emphysema Father     Coronary Art Dis Other         All 4 siblings.  Stroke Other         1 sibling.  Lung Cancer Other         1 sibling - cause of death lung cancer.     Mental Retardation Other Review of Systems:     Positive and Negative as described in HPI. Review of Systems   Constitutional: Positive for activity change. Negative for diaphoresis and fever. HENT: Negative for sore throat and trouble swallowing. Eyes: Negative for photophobia and visual disturbance. Respiratory: Positive for shortness of breath. Negative for cough, chest tightness and wheezing. Cardiovascular: Positive for leg swelling. Negative for chest pain and palpitations. Gastrointestinal: Negative for abdominal pain, blood in stool, diarrhea, nausea and vomiting. Endocrine: Negative for polyphagia and polyuria. Genitourinary: Positive for decreased urine volume and difficulty urinating. Negative for hematuria. Musculoskeletal: Negative for neck pain and neck stiffness. Skin: Negative for color change, rash and wound. Neurological: Negative for dizziness, tremors, weakness, light-headedness and headaches. Psychiatric/Behavioral: Negative for agitation, behavioral problems and confusion. The patient is not nervous/anxious. Physical Exam:   BP (!) 159/58   Pulse 68   Temp 97.6 °F (36.4 °C) (Oral)   Resp 18   Ht 5' 7\" (1.702 m)   Wt 299 lb 2.6 oz (135.7 kg)   SpO2 97%   BMI 46.86 kg/m²   Temp (24hrs), Av.6 °F (36.4 °C), Min:97.4 °F (36.3 °C), Max:97.9 °F (36.6 °C)    Recent Labs     12/10/20  2030 20  1110 20  1623 20  2034   POCGLU 189* 176* 158* 166*     No intake or output data in the 24 hours ending 20 0508    Physical Exam  Vitals signs and nursing note reviewed. Constitutional:       General: She is not in acute distress. Appearance: She is obese. She is ill-appearing. She is not toxic-appearing or diaphoretic. HENT:      Head: Normocephalic and atraumatic. Right Ear: External ear normal.      Left Ear: External ear normal.      Nose: Nose normal. No congestion or rhinorrhea. Mouth/Throat:      Mouth: Mucous membranes are dry. Hematocrit 26.5 (L) 36.3 - 47.1 %    .1 82.6 - 102.9 fL    MCH 30.2 25.2 - 33.5 pg    MCHC 29.8 25.2 - 33.5 g/dL    RDW 15.9 (H) 11.8 - 14.4 %    Platelets 933 090 - 811 k/uL    MPV 10.0 8.1 - 13.5 fL    NRBC Automated 0.3 (H) 0.0 per 100 WBC    Differential Type NOT REPORTED     Seg Neutrophils 78 (H) 36 - 65 %    Lymphocytes 11 (L) 24 - 43 %    Monocytes 10 3 - 12 %    Eosinophils % 1 1 - 4 %    Basophils 0 0 - 2 %    Immature Granulocytes 0 0 %    Segs Absolute 5.34 1.50 - 8.10 k/uL    Absolute Lymph # 0.73 (L) 1.10 - 3.70 k/uL    Absolute Mono # 0.65 0.10 - 1.20 k/uL    Absolute Eos # 0.06 0.00 - 0.44 k/uL    Basophils Absolute 0.03 0.00 - 0.20 k/uL    Absolute Immature Granulocyte <0.03 0.00 - 0.30 k/uL    WBC Morphology NOT REPORTED     RBC Morphology ANISOCYTOSIS PRESENT     Platelet Estimate NOT REPORTED    Basic Metabolic Panel    Collection Time: 12/11/20  7:29 AM   Result Value Ref Range    Glucose 177 (H) 70 - 99 mg/dL    BUN 49 (H) 8 - 23 mg/dL    CREATININE 3.24 (H) 0.50 - 0.90 mg/dL    Bun/Cre Ratio 15 9 - 20    Calcium 8.8 8.6 - 10.4 mg/dL    Sodium 139 135 - 144 mmol/L    Potassium 4.8 3.7 - 5.3 mmol/L    Chloride 105 98 - 107 mmol/L    CO2 24 20 - 31 mmol/L    Anion Gap 10 9 - 17 mmol/L    GFR Non-African American 14 (L) >60 mL/min    GFR  17 (L) >60 mL/min    GFR Comment          GFR Staging NOT REPORTED    POC Glucose Fingerstick    Collection Time: 12/11/20 11:10 AM   Result Value Ref Range    POC Glucose 176 (H) 65 - 105 mg/dL   POC Glucose Fingerstick    Collection Time: 12/11/20  4:23 PM   Result Value Ref Range    POC Glucose 158 (H) 65 - 105 mg/dL   POC Glucose Fingerstick    Collection Time: 12/11/20  8:34 PM   Result Value Ref Range    POC Glucose 166 (H) 65 - 105 mg/dL       Imaging/Diagnostics:  Us Renal Limited    Result Date: 12/8/2020  Normal sonographic appearance of the kidneys.      Xr Chest Portable    Result Date: 12/11/2020 No significant interval change with findings consistent with pulmonary edema and small right pleural effusion. Xr Chest Portable    Result Date: 12/10/2020  Persistent but improving interstitial edema. Small right pleural effusion. Xr Chest Portable    Result Date: 12/9/2020  Continued right lower lung field airspace opacity and possible right pleural effusion. Findings otherwise unchanged. Xr Chest Portable    Result Date: 12/8/2020  Cardiomegaly with mild bilateral perihilar opacities and small bibasilar effusions. Similar exam compared to prior. Xr Chest Portable    Result Date: 12/7/2020  Cardiomegaly and pulmonary vascular congestion. Moderate right and small left pleural effusions. Findings are overall stable. Xr Chest Portable    Result Date: 12/5/2020  No significant change in chest findings compared to the December 4, 2020 study. Assessment :      Hospital Problems           Last Modified POA    * (Principal) Acute renal failure (ARF) ([address] 12/12/2020 Yes    Dementia ([address] 12/12/2020 Yes    Overview Signed 2/27/2014 10:21 AM by Za Quiroz MD     (moderate)         Hypertension 12/12/2020 Yes    Hyperlipidemia 12/12/2020 Yes    CVA (cerebral vascular accident) ([address] 12/12/2020 Yes    Type 2 diabetes mellitus with hyperglycemia, with long-term current use of insulin ([address] 12/12/2020 Yes    Acute on chronic diastolic heart failure ([address] 12/12/2020 Yes    Anasarca 12/12/2020 Yes          Plan:     Patient status inpatient in the Med/Surge    Plan:   1. Acute on Chronic Combined Heart Failure: recent ECHO with EF: 45%,continue diuresis and maintain rodriguez catheter, strict I&O and daily weights along with fluid restriction, continue supplemental oxygen to maintain at baseline requirements  2.  AMY on CKD: will consult nephrology given worsening renal function and poor urine output, (baseline CRT: 1.4), give one dose of lasix this am and defer further diuresis to nephrology 3. Anemia: trend CBC, send stool for occult blood, transfuse for HGB<7 or symptomatic anemia  4. CAD: no acute chest pain, troponins likely elevated secondary to #1 and #2   5. Diabetes: continue SSI for glycemic control and monitor for s/s hypo/hyper glycemia  6. Dementia: at baseline, no behavioral disturbance or agitation  7. HLD: on statin therapy  8. Hx: CVA (parietal and temporal infarcts), no deficits, on asa and Plavix  9. GERD: continue home dose PPI  10. Daily labs, continue select home medications, cardiac/diabetic diet as tolerated  11. DVT/PPI prophylaxis: Heparin SC and Protonix      Consultations:   IP CONSULT TO NEPHROLOGY    Patient is admitted as inpatient status because of co-morbidities listed above, severity of signs and symptoms as outlined, requirement for current medical therapies and most importantly because of direct risk to patient if care not provided in a hospital setting. Expected length of stay > 48 hours.     SHIRA Hugo NP  12/12/2020  5:08 AM    Copy sent to ERINN Newman

## 2020-12-12 NOTE — PROGRESS NOTES
Updated patient and family on transfer to [address] [place] [address].  Waiting for transport to give ETA

## 2020-12-12 NOTE — CONSULTS
Nephrology Consult Note    Reason for Consult:  ARF  Requesting Physician:  Dr. Leeanne Oropeza    Chief Complaint: Bilateral lower extremity edema with dyspnea. History Obtained From:  patient, electronic medical record    History of Present Illness: This is a 77 y.o. female who presented to [place] as a transfer from [facility] for evaluation of dyspnea and lower extremity edema and acute kidney injury. Patient has a history of chronic kidney disease established under Dr. Alistair Vallecillo. According to him she has chronic kidney disease stage IIIb baseline creatinine 1.51.7, renal function fluctuates based on volume status, has had several episodes of acute tubular necrosis with creatinine has gone up to as high as 3.9. Also has known history of hypertension, hyperlipidemia, CAD status post CABG, GERD, CVA parietal temporal infarct, iron deficiency anemia, dementia, GERD who presented to [facility] ER for abnormal labs. Labs were ordered by her PCP and was reported to have a hemoglobin of 6.8 was sent directly to the ER she had reported worsening fatigue and shortness of breath with increased leg edema over the last month. Initial lab work showed proBNP of 18,609, creatinine of 2.7, sodium 136, potassium 4.6 hemoglobin 4.8, and iron of 19 TIBC of 148 iron saturation of 13 TSH 2.48 troponins 67-60 6-65 with a checks x-ray showing severe vascular congestion. Ultrasound was normal.  Patient was given 2 units of packed red blood cells with improvement in her hemoglobin of to 7.9. She was also treated with IV fluids and diuretic therapy with regards to AMY and CHF exacerbation cardiology was consulted given her tropes and evidence of CHF exacerbation. Was given 2 mg of IV Bumex daily per home med dose however renal function is continued to worsen patient had a drop in urine output suggest 100 mL of urine output over the last 12 hours. Additionally she stated she had been having some black dark tarry stools with concerns for lower GI bleed. Patient established with my partner Dr. Jhonathan Garcia. Last seen in the office in September 2020. She had a E. coli UTI in August 2020 and her creatinine peaked at 3.74 during that time. Patient also had bradycardia and it was thought that it was prerenal secondary to bradycardia her AMY. Serum protein immunofixation was negative for monoclonal immunoglobulin on admission. Creatinine improved was down to 1.20 on day of discharge. Day of discharge was 8/24/2020. He also had anemia that time 8.7. She does have chronic kidney disease III secondary to diabetic nephropathy and proteinuria essential hypertension anemia stage III chronic kidney disease and hyperuricemia with a history of gout and some chronic lymphedema for which she uses wraps. Review her baseline creatinine was 1.3 prior to admission in November 2020. Since admission December 2020 it has  peaked at 3.3 and is currently at one 3.1. Patient seen and evaluated at bedside. All medications reviewed. Prior notes reviewed. No acute events overnight. Vital signs stable. Most recent lab work reviewed. Creatinine down to 3.1. BUN 49. CO2 19. Sodium normal potassium normal.  Albumin 3.3 alkaline phosphatase 129 glucose 166 hemoglobin 8.1. Pt denies any hx of heavy or prolonged NSAID use. There is no history of blood or bone marrow disorders. There is no hx of jaundice or hepatitis or sexually transmitted disease. Pt has no hx of collagen vascular disease or vasculitis. No history of dysuria or frequency. No recent procedures involving IV contrast. There is no hx of paraprotein disease. Pt denies any hx of recurrent UTI , incontinence or recurrent nephrolithiasis. Medication review shows use of ace/arb, diuretic. Past Medical History:        Diagnosis Date    Anxiety and depression     Background diabetic retinopathy(362.01)     (Mild)    Balance problem     CAD (coronary artery disease)     (with coronary artery bypass graft x4).  Cancer of uterus [facility])     history of, (probable cure)    Chronic kidney disease     Chronic low back pain     CVA (cerebral vascular accident) ([address]     Dementia ([address]     (moderate)    Dry eye syndrome     GERD (gastroesophageal reflux disease)     Glaucoma suspect     Gout     Homonymous hemianopsia     (Right)    Hyperlipidemia     Hypertension     Keratitis     (secondary to dry eye syndrome).  Obesity     Peripheral polyneuropathy     (diabetic)    Pseudophakos     (Right Eye: 2012; Left Eye: 2012) - Dr. Fran Butts.  Stroke [facility])     (Multiple) MRI of brain 10/2008 shows multiple infarcts involving the temporal and parietal areas.  Trichiasis     (left eye)    Type 2 diabetes mellitus ([address]        Past Surgical History:        Procedure Laterality Date    CATARACT REMOVAL WITH IMPLANT  2012    (Right eye) - Dr. Fran Butts.  CATARACT REMOVAL WITH IMPLANT  2012    (Left eye) - Dr. Fran Butts.      SECTION      CHOLECYSTECTOMY  CORONARY ARTERY BYPASS GRAFT  12-    (x4) - LIMA-LAD, SVG-diagnoal 1, SVG-OM1, and SVG-PDA. Exploration of left radial. (Dr. Leopoldo Necessary).  EYE SURGERY Left     as a child     GASTRIC BYPASS SURGERY      HYSTERECTOMY      (abdominal)  with left oophorectomy. Right ovary retained.     TONSILLECTOMY AND ADENOIDECTOMY         Current Medications:        [Held by provider] 0.9 % sodium chloride infusion, Continuous      sodium chloride flush 0.9 % injection 10 mL, 2 times per day      sodium chloride flush 0.9 % injection 10 mL, PRN      nicotine (NICODERM CQ) 21 MG/24HR 1 patch, Daily PRN      promethazine (PHENERGAN) tablet 12.5 mg, Q6H PRN    Or      ondansetron (ZOFRAN) injection 4 mg, Q6H PRN      acetaminophen (TYLENOL) tablet 650 mg, Q6H PRN    Or      acetaminophen (TYLENOL) suppository 650 mg, Q6H PRN      sodium polystyrene (KAYEXALATE) 15 GM/60ML suspension 15 g, Once PRN      sodium polystyrene (KAYEXALATE) 15 GM/60ML suspension 30 g, Once PRN      albuterol (PROVENTIL) nebulizer solution 2.5 mg, Q6H PRN      miconazole (MICOTIN) 2 % powder, BID      heparin (porcine) injection 5,000 Units, 3 times per day      atorvastatin (LIPITOR) tablet 40 mg, Daily      citalopram (CELEXA) tablet 20 mg, Daily      clopidogrel (PLAVIX) tablet 75 mg, Daily      insulin glargine (LANTUS) injection vial 20 Units, Nightly      pantoprazole (PROTONIX) tablet 40 mg, QAM AC      [START ON 12/13/2020] rivastigmine (EXELON) 9.5 MG/24HR 1 patch, Daily      senna (SENOKOT) tablet 8.6 mg, BID      furosemide (LASIX) injection 40 mg, Once      insulin lispro (HUMALOG) injection vial 0-12 Units, TID WC      insulin lispro (HUMALOG) injection vial 0-6 Units, Nightly        Allergies:  Bactrim [sulfamethoxazole-trimethoprim], Clonidine derivatives, and Amoxicillin-pot clavulanate    Social History:   Social History     Socioeconomic History    Marital status:      Spouse name: Not on file Cardiac:  No chest pain, + dyspnea, + orthopnea, no PND. Chest:              + cough,+phlegm + wheezing. Abdomen:  No abdominal pain, nausea or vomiting. Neuro:  No focal weakness, abnormal movements orseizure like activity. Skin:   No rashes, no itching. :   No hematuria, no pyuria, no dysuria, no flank pain. Extremities:  + swelling or joint pains. Objective:  CURRENT TEMPERATURE:  Temp: 97.6 °F (36.4 °C)  MAXIMUM TEMPERATURE OVER 24HRS:  Temp (24hrs), Av.6 °F (36.4 °C), Min:97.4 °F (36.3 °C), Max:97.9 °F (36.6 °C)    CURRENT RESPIRATORY RATE:  Resp: 18  CURRENT PULSE:  Pulse: 68  CURRENT BLOOD PRESSURE:  BP: (!) 159/58  24HR BLOOD PRESSURE RANGE:  Systolic (41DYA), LKA:533 , Min:152 , JDC:270   ; Diastolic (94KUX), NCU:61, Min:49, Max:70    24HR INTAKE/OUTPUT:  No intake or output data in the 24 hours ending 20 0852  Patient Vitals for the past 96 hrs (Last 3 readings):   Weight   20 0130 299 lb 2.6 oz (135.7 kg)     Physical Exam:  General appearance: Awake, alert, in no acute distress, dry mucous membranes, slurred speech, cognition appears to be compromised  Skin: warm and dry, no rash or erythema  Eyes: conjunctivae normal and sclera anicteric  ENT: :no thrush no pharyngeal congestion    Neck: supple, + JVD  Pulmonary: no wheezing or rhonchi. No rales heard.   Cardiovascular: Normal S1 & S2,  No S3 or  S4, no Pericardial Rub no Murmur   Abdomen: soft nontender, bowel sounds present, no organomegaly,  no ascites  Extremities:no cyanosis, clubbing or chronic lymphedema 2+ lower extremity edema  Labs:   CBC:  Recent Labs     12/10/20  0435 20  0729 20  0606   WBC 6.1 6.8 7.1   RBC 2.57* 2.62* 2.72*   HGB 7.7* 7.9* 8.1*   HCT 26.0* 26.5* 27.8*   .2 101.1 102.2   MCH 30.0 30.2 29.8   MCHC 29.6 29.8 29.1   RDW 15.9* 15.9* 16.0*    236 242   MPV 10.0 10.0 10.2      BMP:   Recent Labs     12/10/20  0435 20  0729 20  0606    139 137 K 4.4 4.8 4.7   * 105 106   CO2 23 24 19*   BUN 45* 49* 49*   CREATININE 3.19* 3.24* 3.16*   GLUCOSE 104* 177* 166*   CALCIUM 8.7 8.8 8.6      Magnesium:   Recent Labs     12/12/20  0606   MG 2.0     Albumin:   Recent Labs     12/12/20  0606   LABALBU 3.3*       IRON:    Lab Results   Component Value Date    IRON 19 12/04/2020     TIBC:    Lab Results   Component Value Date    TIBC 148 12/04/2020     FERRITIN:    Lab Results   Component Value Date    FERRITIN 64 12/03/2020     SPEP:   Lab Results   Component Value Date    PROT 6.7 12/12/2020    ALBCAL 1.8 08/13/2020    ALBPCT 35 08/13/2020    LABALPH 0.2 08/13/2020    LABALPH 0.7 08/13/2020    A1PCT 5 08/13/2020    A2PCT 15 08/13/2020    LABBETA 0.9 08/13/2020    BETAPCT 17 08/13/2020    GAMGLOB 1.4 08/13/2020    GGPCT 28 08/13/2020    PATH ELECTRONICALLY SIGNED. Kenji Aburto M.D. 08/13/2020    PATH ELECTRONICALLY SIGNED. Kenji Aburto M.D. 08/13/2020       C3:   Lab Results   Component Value Date    C3 114 08/13/2020     C4:   Lab Results   Component Value Date    C4 39 08/13/2020     MPO ANCA:    Lab Results   Component Value Date    MPO 14 08/13/2020    .   PR3 ANCA:    Lab Results   Component Value Date    PR3 8 08/13/2020     Urine Sodium:    Lab Results   Component Value Date    LUKE 88 08/19/2020      Urine Creatinine:    Lab Results   Component Value Date    LABCREA 18.4 08/20/2020     Urine Eosinophils:   Lab Results   Component Value Date    UREO NONE SEEN 08/14/2020     Urinalysis:  U/A:   Lab Results   Component Value Date    NITRU NEGATIVE 12/07/2020    COLORU NOT REPORTED 12/07/2020    PHUR 5.0 12/07/2020    WBCUA 0 TO 4 12/07/2020    RBCUA 0 TO 4 12/07/2020    MUCUS NOT REPORTED 12/07/2020    TRICHOMONAS NOT REPORTED 12/07/2020    YEAST NOT REPORTED 12/07/2020    BACTERIA TRACE 12/07/2020    SPECGRAV 1.030 12/07/2020    LEUKOCYTESUR NEGATIVE 12/07/2020    UROBILINOGEN Normal 12/07/2020    BILIRUBINUR NEGATIVE 12/07/2020 GLUCOSEU NEGATIVE 12/07/2020    KETUA TRACE 12/07/2020    AMORPHOUS NOT REPORTED 12/07/2020         Radiology:  Reviewed as available. Assessment:  1. AMY -secondary to ischemic ATN from low flow related to drop in hemoglobin and hypoxia, baseline 1.61.7 which went up to 3.4. Hemoglobin was 6.8 on presentation to [facility] few days back. .  Now creatinine 3. 1. Has had Lasix IV 40 mg x 1 today. Was given 2 mg of Bumex in [place] ER. No output noted at this time. Urine output not being recorded accurately. Has had similar episodes of ATN in the past  2. CKD stage III: Secondary to diabetic nephropathy and possibly hypertension nephrosclerosis. Baseline 1.51.7, proteinuria about 2 to 3 g follows up with [facility]    3. HYPERTENSION - controlled continue meds. 4. CARIOMYOPATHY AND CHF - cardiology consulted  5. Hyperuricemia with hx of gouty arthritis -   6. Chronic lymphedema of lower extremities -  7. Predominantly right heart failure as evidenced by clinical presentation and echocardiogram findings       Plan:  1. Agree with dose of Lasix 40 mg IV daily   2. Replace circulating volume start half saline with 2075 bicarb at 50 r  3. Check iron studies and start Aranesp  3. Strict I's and O's and daily weights, Place [place]  4. Avoid all nephrotoxic agents including IV contrast if possible. 5.  Renal diet  6. Labs in a.m.  7. Following     Thank you for the consultation. Please do not hesitate to call with questions. Electronically signed by Segundo Mobley CNP, APRN - CNP on 12/12/2020 at 8:52 AM    Chart reviewed, known history of CKD stage IIIb from diabetic nephrosclerosis presented to [facility] with low hemoglobin of 6.8. Thereafter was transfused, also was found to have lower extremity edema was diuresed. Creatinine on presentation was 2.7 which went up all the way to 3.4 patient was then transferred to [facility] for further management. On examination oral mucous membranes dry, 3+ lower extremity edema  Impression acute kidney injury secondary to ATN from low hemoglobin  Chronic kidney disease stage IIIb4 from diabetic nephrosclerosis proteinuria about 2.5 g  Plan  Start half saline with 75 bicarb 50 over an hour to maintain circulating volume  Start Lasix 40 IV daily  Place Ylnn  Check iron studies  Start Aranesp  Will follow  Attending Physician Statement  I have discussed the care of Colton Dance, including pertinent history and exam findings with the resident/fellow. I have reviewed the key elements of all parts of the encounter with the resident/fellow. I have seen and examined the patient with the resident/fellow. I agree with the assessment and plan and status of the problem list as documented.       .  Electronically signed by Vitor Stewart MD on 12/12/2020 at 4:14 PM

## 2020-12-12 NOTE — PROGRESS NOTES
Pharmacy Note     Renal Dose Adjustment    Josie Albarran is a 77 y.o. female. Pharmacist assessment of renally cleared medications. Recent Labs     12/10/20  0435 12/11/20  0729   BUN 45* 49*       Recent Labs     12/10/20  0435 12/11/20  0729   CREATININE 3.19* 3.24*       Estimated Creatinine Clearance: 24 mL/min (A) (based on SCr of 3.24 mg/dL (H)). Estimated CrCl using Ideal Body Weight: 16 mL/min (based on IBW 61.6 kg)    Height:   Ht Readings from Last 1 Encounters:   12/12/20 5' 7\" (1.702 m)     Weight:  Wt Readings from Last 1 Encounters:   12/12/20 299 lb 2.6 oz (135.7 kg)       The following medication dose has been adjusted based upon renal function per P&T Guidelines:             Lovenox dose adjusted from 40 mg sc daily to 30 mg sc daily. Taisha Maguire Pharm. D.    12/12/2020  5:11 AM

## 2020-12-12 NOTE — FLOWSHEET NOTE
Assessment: Gifty Patel is a 77 y.o. complains of diabetes and heart Failure with home oxygen dependence. Pt not feeling very well. Intervention:  was rounding on floor. Pt was open to spiritual care.  was bedside support offering compassionate care.  nurtured hope. Outcome:  are available 24/7 via [facility].       12/12/20 0544   Encounter Summary   Services provided to: Patient   Referral/Consult From: [facility]   Support System Children   Continue Visiting   (12/11/20)   Complexity of Encounter Moderate   Length of Encounter 15 minutes   Spiritual Assessment Completed Yes   Spiritual/Mormon   Type Spiritual support   Assessment Approachable   Intervention Prayer   Outcome Expressed gratitude   Advance Directives (For Healthcare)   Healthcare Directive No, patient does not have an advance directive for healthcare treatment   Information on Healthcare Directives Requested No   Patient Requests Assistance No   Values / Beliefs   Do you have any ethnic, cultural, sacramental, or spiritual Protestant needs you would like us to be aware of while you are in the hospital? No

## 2020-12-12 NOTE — PROGRESS NOTES
Pt picked up by transport.  Report called to [address] [facility] [address]. Vs

## 2020-12-12 NOTE — CONSULTS
[facility]    Consult / H&P               Today's Date: 2020  Patient Name: Renato Abreu  Date of admission: 2020  4:17 AM  Patient's age: 77 y. o., 1950  Admission Dx: Acute renal failure (ARF) ([facility] [N17.9]    Reason for Consult:  Cardiac evaluation    Requesting Physician: Urvashi Krishna DO    CHIEF COMPLAINT:  SOB    History Obtained From:  patient    HISTORY OF PRESENT ILLNESS:    52-year-old female with past medical history of CAD s/p CABG, hypertension, diabetes was transferred from [facility]. Patient was sent to [facility] ER from the clinic. Had abnormal labs. Creatinine was elevated from the baseline. The chest x-ray done which showed vascular congestion. Patient was transferred here for further management. Patient has dementia. Unable to answer all questions this morning. Bilateral upper and lower extremity swelling. According to the chart swelling is sent over last 1 week. Past Medical History:   has a past medical history of Anxiety and depression, Background diabetic retinopathy(362.01), Balance problem, CAD (coronary artery disease), Cancer of uterus ([address] Chronic kidney disease, Chronic low back pain, CVA (cerebral vascular accident) ([address] Dementia ([address] Dry eye syndrome, GERD (gastroesophageal reflux disease), Glaucoma suspect, Gout, Homonymous hemianopsia, Hyperlipidemia, Hypertension, Keratitis, Obesity, Peripheral polyneuropathy, Pseudophakos, Stroke ([address] Trichiasis, and Type 2 diabetes mellitus ([address] Past Surgical History:   has a past surgical history that includes Gastric bypass surgery; Cataract removal with implant (2012); Cataract removal with implant (2012); Coronary artery bypass graft (12-);  section; Hysterectomy; Cholecystectomy; Tonsillectomy and adenoidectomy; and Eye surgery (Left).      Home Medications:    Prior to Admission medications Medication Sig Start Date End Date Taking? Authorizing Provider   senna (SENOKOT) 8.6 MG tablet Take 1 tablet by mouth 2 times daily 11/25/20 11/25/21  ERINN Matute   oxybutynin (DITROPAN) 5 MG tablet TAKE 1 TABLET BY MOUTH TWICE DAILY 11/22/20   ERINN Matute   losartan (COZAAR) 50 MG tablet Take 1 tablet by mouth daily 11/22/20   Fabián Matute   OXYGEN Oxgen at 2 liters per nasal cannula 11/5/20   ERINN Matute   loratadine (CLARITIN) 10 MG tablet TAKE 1 TABLET(10 MG) BY MOUTH DAILY 11/5/20   ERINN Matute   Lancets MISC Checks blood sugar ac and HS 11/4/20   Fabián Matute   blood glucose monitor strips Test 3  times a day & as needed for symptoms of irregular blood glucose. Dispense sufficient amount for indicated testing frequency plus additional to accommodate PRN testing needs. 11/4/20   ERINN Matute   Alcohol Swabs (ALCOHOL PREP) PADS Checks blood sugar 3 times a day 11/4/20   Sheila Knox [address]  [employer] [facility] Standard walker with wheels    Diagnosis dementia, CHF 10/29/20   SHIRA Gibbons - CNP   atorvastatin (LIPITOR) 40 MG tablet TAKE 1 TABLET BY MOUTH EVERYDAY 10/23/20   Stevie Mckinney, DO   bumetanide (BUMEX) 1 MG tablet Take 1 tablet by mouth daily 10/23/20   Any Mckinney DO   citalopram (CELEXA) 20 MG tablet Take 1 tablet by mouth daily 10/23/20   Any Mckinney, DO   clopidogrel (PLAVIX) 75 MG tablet Take 1 tablet by mouth daily 10/23/20   Any Mckinney DO   insulin lispro (HUMALOG) 100 UNIT/ML injection vial Use 4 times a day per sliding scale 10/23/20   Stevie Mckinney DO   hydrALAZINE (APRESOLINE) 50 MG tablet Take 1.5 tablets by mouth every 8 hours 10/23/20   Stevie Mckinney,    insulin glargine (LANTUS) 100 UNIT/ML injection vial Inject 20 Units into the skin nightly 10/23/20   Leafy Scales, DO memantine (NAMENDA) 10 MG tablet TAKE 1 TABLET BY MOUTH TWICE DAILY 10/23/20   Stevie Mckinney, DO   omeprazole (PRILOSEC) 20 MG delayed release capsule Take 1 capsule by mouth every morning (before breakfast) 10/23/20   Stevie Mckinney, DO   rivastigmine (EXELON) 9.5 MG/24HR APPLY 1 NEW PATCH EVERY 24 HOURS 10/23/20   Any Webb Hank, DO   aspirin 81 MG tablet Take 1 tablet by mouth daily 1/3/20   ERINN Matute   Diabetic Shoe MISC by Does not apply route 9/26/19   ERINN Matute   Compression Stockings MISC by Does not apply route 20-30 mm Hg bilateral knee high 9/26/19   ERINN Matute   nystatin (MYCOSTATIN) 848577 UNIT/GM cream Apply topically 2 times daily.  1/31/18   ERINN Matute      Current Facility-Administered Medications: [Held by provider] 0.9 % sodium chloride infusion, , Intravenous, Continuous  sodium chloride flush 0.9 % injection 10 mL, 10 mL, Intravenous, 2 times per day  sodium chloride flush 0.9 % injection 10 mL, 10 mL, Intravenous, PRN  nicotine (NICODERM CQ) 21 MG/24HR 1 patch, 1 patch, Transdermal, Daily PRN  promethazine (PHENERGAN) tablet 12.5 mg, 12.5 mg, Oral, Q6H PRN **OR** ondansetron (ZOFRAN) injection 4 mg, 4 mg, Intravenous, Q6H PRN  acetaminophen (TYLENOL) tablet 650 mg, 650 mg, Oral, Q6H PRN **OR** acetaminophen (TYLENOL) suppository 650 mg, 650 mg, Rectal, Q6H PRN  sodium polystyrene (KAYEXALATE) 15 GM/60ML suspension 15 g, 15 g, Oral, Once PRN  sodium polystyrene (KAYEXALATE) 15 GM/60ML suspension 30 g, 30 g, Oral, Once PRN  albuterol (PROVENTIL) nebulizer solution 2.5 mg, 2.5 mg, Nebulization, Q6H PRN  miconazole (MICOTIN) 2 % powder, , Topical, BID  heparin (porcine) injection 5,000 Units, 5,000 Units, Subcutaneous, 3 times per day  atorvastatin (LIPITOR) tablet 40 mg, 40 mg, Oral, Daily  citalopram (CELEXA) tablet 20 mg, 20 mg, Oral, Daily  clopidogrel (PLAVIX) tablet 75 mg, 75 mg, Oral, Daily insulin glargine (LANTUS) injection vial 20 Units, 20 Units, Subcutaneous, Nightly  pantoprazole (PROTONIX) tablet 40 mg, 40 mg, Oral, QAM AC  [START ON 12/13/2020] rivastigmine (EXELON) 9.5 MG/24HR 1 patch, 1 patch, Transdermal, Daily  senna (SENOKOT) tablet 8.6 mg, 1 tablet, Oral, BID  insulin lispro (HUMALOG) injection vial 0-12 Units, 0-12 Units, Subcutaneous, TID WC  insulin lispro (HUMALOG) injection vial 0-6 Units, 0-6 Units, Subcutaneous, Nightly  furosemide (LASIX) injection 40 mg, 40 mg, Intravenous, BID    Allergies:  Bactrim [sulfamethoxazole-trimethoprim], Clonidine derivatives, and Amoxicillin-pot clavulanate    Social History:   reports that she has never smoked. She has never used smokeless tobacco. She reports that she does not drink alcohol or use drugs. Family History: family history includes Cancer in her mother; Coronary Art Dis in an other family member; Diabetes in her father and mother; Emphysema in her father; Glaucoma in her father; Heart Disease in her father; High Blood Pressure in her mother; Kidney Disease in her mother; Lavetta Rancher in an other family member; Mental Retardation in an other family member; Stroke in her mother and another family member; Uterine Cancer in her mother. No h/o sudden cardiac death. No for premature CAD    REVIEW OF SYSTEMS:    Unable to obtain as patient is encephalopathic     PHYSICAL EXAM:      BP (!) 170/65   Pulse 67   Temp 97.7 °F (36.5 °C) (Oral)   Resp 18   Ht 5' 7\" (1.702 m)   Wt 299 lb 2.6 oz (135.7 kg)   SpO2 95%   BMI 46.86 kg/m²    Constitutional and General Appearance: alert, cooperative, no distress and appears stated age  HEENT: PERRL, no cervical lymphadenopathy. No masses palpable. Normal oral mucosa  Respiratory:  · Normal excursion and expansion without use of accessory muscles  · Resp Auscultation: Good respiratory effort. No for increased work of breathing.  On auscultation: clear to auscultation bilaterally  Cardiovascular:

## 2020-12-12 NOTE — CARE COORDINATION
Case Management Initial Discharge [facility],             Met with:patient and called dtrNallely,to discuss discharge plans. Information verified: address, contacts, phone number, , insurance Yes    Emergency Contact/Next of Kin name & number: Gerda Acosta - dtr, [phone]    PCP: ERINN Zamora  Date of last visit: Nov 3    Insurance Provider: Jag Lopez/Vickie    Discharge Planning    Living Arrangements: Pt currently lives in a motel ([facility] in [place] with her dtr and RENETTA Nix [address]: family       Home has 0 stories  0 stairs to climb to get into front door, stairs to climb to reach second floor  Location of bedroom/bathroom in home     Patient able to perform ADL's:Assisted    Current Services (outpatient & in home) [facility]. thru [facility])  DME equipment: uses walker, O2  DME provider: [address] [place] [address]    Receiving oral anticoagulation therapy? Yes    If indicated:   Physician managing anticoagulation treatment:   Where does patient obtain lab work for ATC treatment? Potential Assistance Needed: HC vs SNF      Patient agreeable to home care: Yes, [facility] [address] of choice provided:  yes    Prior SNF/Rehab Placement and Facility: [address] [place] [address] in Oct for 3 weeks  Agreeable to SNF/Rehab: Yes  [place] of choice provided: yes     Evaluation: yes (APS ref was made in Aug)    Expected Discharge date:       Patient expects to be discharged to: [facility] vs home with [facility].    Follow Up Appointment: Best Day/ Time:      Transportation provider:   Transportation arrangements needed for discharge: Yes    Readmission Risk              Risk of Unplanned Readmission:        30             Does patient have a readmission risk score greater than 14?: Yes  If yes, follow-up appointment must be made within 7 days of discharge.      Goals of Care: Discharge Plan: Pt unsure if she wants to return home or go to [address] d[address], Riaz Player - she had writer speak with their friend, Azael Osborne stated she is in the process of moving pt/dtr/grson out of the motel to her home at [address] [place] [address] this weekend  Denita Pride stated she and her husb can help take care of pt    SW to f/u with APS to see if still an open case    Will see how pt progresses with PT/OT      Electronically signed by Aldo Moss, [address] on 12/12/20 at 11:21 AM EST

## 2020-12-13 ENCOUNTER — APPOINTMENT (OUTPATIENT)
Dept: CT IMAGING | Age: 70
DRG: 682 | End: 2020-12-13
Attending: INTERNAL MEDICINE
Payer: MEDICARE

## 2020-12-13 LAB
-: ABNORMAL
ABSOLUTE EOS #: 0.08 K/UL (ref 0–0.44)
ABSOLUTE IMMATURE GRANULOCYTE: <0.03 K/UL (ref 0–0.3)
ABSOLUTE LYMPH #: 0.84 K/UL (ref 1.1–3.7)
ABSOLUTE MONO #: 0.52 K/UL (ref 0.1–1.2)
ALBUMIN SERPL-MCNC: 3.1 G/DL (ref 3.5–5.2)
ALLEN TEST: POSITIVE
AMMONIA: 52 UMOL/L (ref 11–51)
AMORPHOUS: ABNORMAL
ANION GAP SERPL CALCULATED.3IONS-SCNC: 14 MMOL/L (ref 9–17)
BACTERIA: ABNORMAL
BASOPHILS # BLD: 1 % (ref 0–2)
BASOPHILS ABSOLUTE: 0.04 K/UL (ref 0–0.2)
BILIRUBIN URINE: NEGATIVE
BLOOD BANK SPECIMEN: NORMAL
BUN BLDV-MCNC: 50 MG/DL (ref 8–23)
BUN/CREAT BLD: ABNORMAL (ref 9–20)
CALCIUM SERPL-MCNC: 8.6 MG/DL (ref 8.6–10.4)
CASTS UA: ABNORMAL /LPF (ref 0–2)
CASTS UA: ABNORMAL /LPF (ref 0–2)
CHLORIDE BLD-SCNC: 107 MMOL/L (ref 98–107)
CO2: 20 MMOL/L (ref 20–31)
COLOR: YELLOW
CREAT SERPL-MCNC: 2.81 MG/DL (ref 0.5–0.9)
CRYSTALS, UA: ABNORMAL /HPF
DIFFERENTIAL TYPE: ABNORMAL
EOSINOPHILS RELATIVE PERCENT: 1 % (ref 1–4)
EPITHELIAL CELLS UA: ABNORMAL /HPF (ref 0–5)
FIO2: 2
GFR AFRICAN AMERICAN: 20 ML/MIN
GFR NON-AFRICAN AMERICAN: 17 ML/MIN
GFR SERPL CREATININE-BSD FRML MDRD: ABNORMAL ML/MIN/{1.73_M2}
GFR SERPL CREATININE-BSD FRML MDRD: ABNORMAL ML/MIN/{1.73_M2}
GLUCOSE BLD-MCNC: 121 MG/DL (ref 65–105)
GLUCOSE BLD-MCNC: 124 MG/DL (ref 65–105)
GLUCOSE BLD-MCNC: 127 MG/DL (ref 65–105)
GLUCOSE BLD-MCNC: 128 MG/DL (ref 65–105)
GLUCOSE BLD-MCNC: 130 MG/DL (ref 70–99)
GLUCOSE URINE: NEGATIVE
HCT VFR BLD CALC: 25.8 % (ref 36.3–47.1)
HEMOGLOBIN: 7.7 G/DL (ref 11.9–15.1)
IMMATURE GRANULOCYTES: 0 %
KETONES, URINE: NEGATIVE
LACTIC ACID, SEPSIS WHOLE BLOOD: 0.6 MMOL/L (ref 0.5–1.9)
LACTIC ACID, SEPSIS: NORMAL MMOL/L (ref 0.5–1.9)
LEGIONELLA PNEUMOPHILIA AG, URINE: NEGATIVE
LEUKOCYTE ESTERASE, URINE: ABNORMAL
LYMPHOCYTES # BLD: 13 % (ref 24–43)
MAGNESIUM: 2 MG/DL (ref 1.6–2.6)
MCH RBC QN AUTO: 30.4 PG (ref 25.2–33.5)
MCHC RBC AUTO-ENTMCNC: 29.8 G/DL (ref 28.4–34.8)
MCV RBC AUTO: 102 FL (ref 82.6–102.9)
MODE: ABNORMAL
MONOCYTES # BLD: 8 % (ref 3–12)
MUCUS: ABNORMAL
NEGATIVE BASE EXCESS, ART: 1 (ref 0–2)
NITRITE, URINE: NEGATIVE
NRBC AUTOMATED: 0 PER 100 WBC
O2 DEVICE/FLOW/%: ABNORMAL
OTHER OBSERVATIONS UA: ABNORMAL
PATIENT TEMP: ABNORMAL
PDW BLD-RTO: 16.2 % (ref 11.8–14.4)
PH UA: 5 (ref 5–8)
PHOSPHORUS: 4.7 MG/DL (ref 2.6–4.5)
PLATELET # BLD: 231 K/UL (ref 138–453)
PLATELET ESTIMATE: ABNORMAL
PMV BLD AUTO: 10.4 FL (ref 8.1–13.5)
POC HCO3: 25.8 MMOL/L (ref 21–28)
POC O2 SATURATION: 92 % (ref 94–98)
POC PCO2 TEMP: ABNORMAL MM HG
POC PCO2: 54.5 MM HG (ref 35–48)
POC PH TEMP: ABNORMAL
POC PH: 7.28 (ref 7.35–7.45)
POC PO2 TEMP: ABNORMAL MM HG
POC PO2: 71.6 MM HG (ref 83–108)
POSITIVE BASE EXCESS, ART: ABNORMAL (ref 0–3)
POTASSIUM SERPL-SCNC: 4.5 MMOL/L (ref 3.7–5.3)
PROTEIN UA: ABNORMAL
RBC # BLD: 2.53 M/UL (ref 3.95–5.11)
RBC # BLD: ABNORMAL 10*6/UL
RBC UA: ABNORMAL /HPF (ref 0–2)
RENAL EPITHELIAL, UA: ABNORMAL /HPF
SAMPLE SITE: ABNORMAL
SARS-COV-2, RAPID: NOT DETECTED
SARS-COV-2: NORMAL
SARS-COV-2: NORMAL
SEG NEUTROPHILS: 77 % (ref 36–65)
SEGMENTED NEUTROPHILS ABSOLUTE COUNT: 5.09 K/UL (ref 1.5–8.1)
SODIUM BLD-SCNC: 141 MMOL/L (ref 135–144)
SOURCE: NORMAL
SOURCE: NORMAL
SPECIFIC GRAVITY UA: 1.01 (ref 1–1.03)
STREP PNEUMONIAE ANTIGEN: NEGATIVE
TCO2 (CALC), ART: 28 MMOL/L (ref 22–29)
THYROXINE, FREE: 1.09 NG/DL (ref 0.93–1.7)
TRICHOMONAS: ABNORMAL
TSH SERPL DL<=0.05 MIU/L-ACNC: 3.2 MIU/L (ref 0.3–5)
TURBIDITY: ABNORMAL
URINE HGB: ABNORMAL
UROBILINOGEN, URINE: NORMAL
WBC # BLD: 6.6 K/UL (ref 3.5–11.3)
WBC # BLD: ABNORMAL 10*3/UL
WBC UA: ABNORMAL /HPF (ref 0–5)
YEAST: ABNORMAL

## 2020-12-13 PROCEDURE — 1200000000 HC SEMI PRIVATE

## 2020-12-13 PROCEDURE — 87205 SMEAR GRAM STAIN: CPT

## 2020-12-13 PROCEDURE — 99232 SBSQ HOSP IP/OBS MODERATE 35: CPT | Performed by: INTERNAL MEDICINE

## 2020-12-13 PROCEDURE — 82803 BLOOD GASES ANY COMBINATION: CPT

## 2020-12-13 PROCEDURE — 85025 COMPLETE CBC W/AUTO DIFF WBC: CPT

## 2020-12-13 PROCEDURE — 83605 ASSAY OF LACTIC ACID: CPT

## 2020-12-13 PROCEDURE — U0002 COVID-19 LAB TEST NON-CDC: HCPCS

## 2020-12-13 PROCEDURE — 36600 WITHDRAWAL OF ARTERIAL BLOOD: CPT

## 2020-12-13 PROCEDURE — 87150 DNA/RNA AMPLIFIED PROBE: CPT

## 2020-12-13 PROCEDURE — U0003 INFECTIOUS AGENT DETECTION BY NUCLEIC ACID (DNA OR RNA); SEVERE ACUTE RESPIRATORY SYNDROME CORONAVIRUS 2 (SARS-COV-2) (CORONAVIRUS DISEASE [COVID-19]), AMPLIFIED PROBE TECHNIQUE, MAKING USE OF HIGH THROUGHPUT TECHNOLOGIES AS DESCRIBED BY CMS-2020-01-R: HCPCS

## 2020-12-13 PROCEDURE — 87899 AGENT NOS ASSAY W/OPTIC: CPT

## 2020-12-13 PROCEDURE — 6370000000 HC RX 637 (ALT 250 FOR IP): Performed by: INTERNAL MEDICINE

## 2020-12-13 PROCEDURE — 81001 URINALYSIS AUTO W/SCOPE: CPT

## 2020-12-13 PROCEDURE — 84443 ASSAY THYROID STIM HORMONE: CPT

## 2020-12-13 PROCEDURE — 80069 RENAL FUNCTION PANEL: CPT

## 2020-12-13 PROCEDURE — 82140 ASSAY OF AMMONIA: CPT

## 2020-12-13 PROCEDURE — 83735 ASSAY OF MAGNESIUM: CPT

## 2020-12-13 PROCEDURE — 6370000000 HC RX 637 (ALT 250 FOR IP): Performed by: NURSE PRACTITIONER

## 2020-12-13 PROCEDURE — 99221 1ST HOSP IP/OBS SF/LOW 40: CPT | Performed by: INTERNAL MEDICINE

## 2020-12-13 PROCEDURE — 36415 COLL VENOUS BLD VENIPUNCTURE: CPT

## 2020-12-13 PROCEDURE — 87077 CULTURE AEROBIC IDENTIFY: CPT

## 2020-12-13 PROCEDURE — 86901 BLOOD TYPING SEROLOGIC RH(D): CPT

## 2020-12-13 PROCEDURE — 94660 CPAP INITIATION&MGMT: CPT

## 2020-12-13 PROCEDURE — 86850 RBC ANTIBODY SCREEN: CPT

## 2020-12-13 PROCEDURE — 86920 COMPATIBILITY TEST SPIN: CPT

## 2020-12-13 PROCEDURE — 71250 CT THORAX DX C-: CPT

## 2020-12-13 PROCEDURE — 87040 BLOOD CULTURE FOR BACTERIA: CPT

## 2020-12-13 PROCEDURE — 2700000000 HC OXYGEN THERAPY PER DAY

## 2020-12-13 PROCEDURE — 84439 ASSAY OF FREE THYROXINE: CPT

## 2020-12-13 PROCEDURE — 82947 ASSAY GLUCOSE BLOOD QUANT: CPT

## 2020-12-13 PROCEDURE — 94760 N-INVAS EAR/PLS OXIMETRY 1: CPT

## 2020-12-13 PROCEDURE — 86900 BLOOD TYPING SEROLOGIC ABO: CPT

## 2020-12-13 PROCEDURE — 70450 CT HEAD/BRAIN W/O DYE: CPT

## 2020-12-13 PROCEDURE — 87186 SC STD MICRODIL/AGAR DIL: CPT

## 2020-12-13 PROCEDURE — 6360000002 HC RX W HCPCS: Performed by: INTERNAL MEDICINE

## 2020-12-13 PROCEDURE — 6360000002 HC RX W HCPCS: Performed by: NURSE PRACTITIONER

## 2020-12-13 PROCEDURE — 87449 NOS EACH ORGANISM AG IA: CPT

## 2020-12-13 RX ORDER — FUROSEMIDE 10 MG/ML
40 INJECTION INTRAMUSCULAR; INTRAVENOUS 2 TIMES DAILY
Status: DISCONTINUED | OUTPATIENT
Start: 2020-12-13 | End: 2020-12-16

## 2020-12-13 RX ORDER — CARVEDILOL 6.25 MG/1
6.25 TABLET ORAL 2 TIMES DAILY WITH MEALS
Status: DISCONTINUED | OUTPATIENT
Start: 2020-12-13 | End: 2020-12-14

## 2020-12-13 RX ORDER — 0.9 % SODIUM CHLORIDE 0.9 %
20 INTRAVENOUS SOLUTION INTRAVENOUS ONCE
Status: DISCONTINUED | OUTPATIENT
Start: 2020-12-13 | End: 2020-12-13

## 2020-12-13 RX ORDER — PANTOPRAZOLE SODIUM 40 MG/1
40 TABLET, DELAYED RELEASE ORAL
Status: DISCONTINUED | OUTPATIENT
Start: 2020-12-14 | End: 2020-12-15

## 2020-12-13 RX ADMIN — CITALOPRAM 20 MG: 20 TABLET, FILM COATED ORAL at 09:04

## 2020-12-13 RX ADMIN — FUROSEMIDE 40 MG: 10 INJECTION, SOLUTION INTRAMUSCULAR; INTRAVENOUS at 08:58

## 2020-12-13 RX ADMIN — HEPARIN SODIUM 5000 UNITS: 5000 INJECTION INTRAVENOUS; SUBCUTANEOUS at 21:27

## 2020-12-13 RX ADMIN — POLYETHYLENE GLYCOL 3350, SODIUM SULFATE ANHYDROUS, SODIUM BICARBONATE, SODIUM CHLORIDE, POTASSIUM CHLORIDE 4000 ML: 236; 22.74; 6.74; 5.86; 2.97 POWDER, FOR SOLUTION ORAL at 19:43

## 2020-12-13 RX ADMIN — CARVEDILOL 6.25 MG: 6.25 TABLET, FILM COATED ORAL at 12:47

## 2020-12-13 RX ADMIN — FUROSEMIDE 40 MG: 10 INJECTION, SOLUTION INTRAMUSCULAR; INTRAVENOUS at 19:43

## 2020-12-13 RX ADMIN — CARVEDILOL 6.25 MG: 6.25 TABLET, FILM COATED ORAL at 19:43

## 2020-12-13 RX ADMIN — DESMOPRESSIN ACETATE 40 MG: 0.2 TABLET ORAL at 09:04

## 2020-12-13 RX ADMIN — PANTOPRAZOLE SODIUM 40 MG: 40 TABLET, DELAYED RELEASE ORAL at 05:50

## 2020-12-13 RX ADMIN — ANTI-FUNGAL POWDER MICONAZOLE NITRATE TALC FREE: 1.42 POWDER TOPICAL at 21:27

## 2020-12-13 RX ADMIN — BISACODYL 20 MG: 5 TABLET, COATED ORAL at 19:43

## 2020-12-13 RX ADMIN — CLOPIDOGREL 75 MG: 75 TABLET, FILM COATED ORAL at 09:04

## 2020-12-13 RX ADMIN — HEPARIN SODIUM 5000 UNITS: 5000 INJECTION INTRAVENOUS; SUBCUTANEOUS at 05:50

## 2020-12-13 RX ADMIN — ANTI-FUNGAL POWDER MICONAZOLE NITRATE TALC FREE: 1.42 POWDER TOPICAL at 09:07

## 2020-12-13 ASSESSMENT — PAIN SCALES - GENERAL: PAINLEVEL_OUTOF10: 0

## 2020-12-13 NOTE — CARE COORDINATION
Received call back from Arnold Bean, on call RN for [facility] [employer] as dtr stated they were coming to the home prior to admission  Per Arnold Bean, she cannot find pt in the system but suggested to call the local office on Mon as she cannot see everything in the system  Will need to f/u with [facility] [employer] [facility] office) in am to verify    [employer] call again from [address] she found pt in their system but they have not seen her since Aug as pt went into the hospital and was supposed to contact them when she got out but they never heard from her  Pt will need a new ref if she goes home

## 2020-12-13 NOTE — PROGRESS NOTES
 furosemide  40 mg Intravenous Daily     Continuous Infusions:    [Held by provider] sodium chloride      IV infusion builder 50 mL/hr at 12/12/20 1750       Physical Examination     General appearance: Awake, alert, in no acute distress, dry mucous membranes, slurred speech, cognition appears to be compromised  Skin: warm and dry, no rash or erythema  Eyes: conjunctivae normal and sclera anicteric  ENT: :no thrush no pharyngeal congestion    Neck: supple, + JVD  Pulmonary: no wheezing or rhonchi. No rales heard.   Cardiovascular: Normal S1 & S2,  No S3 or  S4, no Pericardial Rub no Murmur   Abdomen: soft nontender, bowel sounds present, no organomegaly,  no ascites  Extremities:no cyanosis, clubbing or chronic lymphedema 2+ lower extremity edema    Labs      Recent Labs     12/11/20  0729 12/12/20  0606   WBC 6.8 7.1   RBC 2.62* 2.72*   HGB 7.9* 8.1*   HCT 26.5* 27.8*   .1 102.2   MCH 30.2 29.8   MCHC 29.8 29.1   RDW 15.9* 16.0*    242   MPV 10.0 10.2      BMP:   Recent Labs     12/11/20  0729 12/12/20  0606    137   K 4.8 4.7    106   CO2 24 19*   BUN 49* 49*   CREATININE 3.24* 3.16*   GLUCOSE 177* 166*   CALCIUM 8.8 8.6      Magnesium:    Recent Labs     12/12/20  0606   MG 2.0     Albumin:    Recent Labs     12/12/20  0606   LABALBU 3.3*     Urinalysis/Chemistries      Lab Results   Component Value Date    NITRU NEGATIVE 12/07/2020    COLORU NOT REPORTED 12/07/2020    PHUR 5.0 12/07/2020    WBCUA 0 TO 4 12/07/2020    RBCUA 0 TO 4 12/07/2020    MUCUS NOT REPORTED 12/07/2020    TRICHOMONAS NOT REPORTED 12/07/2020    YEAST NOT REPORTED 12/07/2020    BACTERIA TRACE 12/07/2020    SPECGRAV 1.030 12/07/2020    LEUKOCYTESUR NEGATIVE 12/07/2020    UROBILINOGEN Normal 12/07/2020    BILIRUBINUR NEGATIVE 12/07/2020    GLUCOSEU NEGATIVE 12/07/2020    KETUA TRACE 12/07/2020    AMORPHOUS NOT REPORTED 12/07/2020       Radiology    CXR:     Assessment Since her admission she is been placed on IV fluids also on intravenous Lasix, urine output has improved, creatinine is down to 2.8. Plan for EGD and colonoscopy tomorrow. Plan  1. Continue half saline with 75 of bicarb at 75 an hour  2. Change Lasix to 40 IV twice a day  3. Continue Aranesp  4. Await GI work-up  5.   Follow renal function closely

## 2020-12-13 NOTE — PROGRESS NOTES
[facility]   Progress Note                   Date:   12/13/2020  Patient name: Colton Dance  Date of admission:  12/12/2020  4:17 AM  MRN:   [id number]  YOB: 1950  PCP: ERINN Christianson    Reason for Admission: Acute renal failure (ARF) ([facility] [N17.9]    Subjective:       Clinical Changes / Abnormalities: Pt seen and examined in the room. She denies any CP or SOB. Legs remain swollen. BP elevated today       Medications:   Scheduled Meds:   sodium chloride flush  10 mL Intravenous 2 times per day    miconazole   Topical BID    heparin (porcine)  5,000 Units Subcutaneous 3 times per day    atorvastatin  40 mg Oral Daily    citalopram  20 mg Oral Daily    clopidogrel  75 mg Oral Daily    insulin glargine  20 Units Subcutaneous Nightly    pantoprazole  40 mg Oral QAM AC    rivastigmine  1 patch Transdermal Daily    senna  1 tablet Oral BID    insulin lispro  0-12 Units Subcutaneous TID WC    insulin lispro  0-6 Units Subcutaneous Nightly    darbepoetin hung-polysorbate  60 mcg Subcutaneous Weekly    furosemide  40 mg Intravenous Daily     Continuous Infusions:   [Held by provider] sodium chloride      IV infusion builder 50 mL/hr at 12/12/20 1750     CBC:   Recent Labs     12/11/20  0729 12/12/20  0606   WBC 6.8 7.1   HGB 7.9* 8.1*    242     BMP:    Recent Labs     12/11/20  0729 12/12/20  0606    137   K 4.8 4.7    106   CO2 24 19*   BUN 49* 49*   CREATININE 3.24* 3.16*   GLUCOSE 177* 166*     Hepatic:   Recent Labs     12/12/20  0606   AST 16   ALT 7   BILITOT 0.37   ALKPHOS 129*     Troponin: No results for input(s): TROPHS in the last 72 hours. BNP: No results for input(s): BNP in the last 72 hours. Lipids: No results for input(s): CHOL, HDL in the last 72 hours.     Invalid input(s): LDLCALCU  INR:   Recent Labs     12/12/20  0606   INR 1.0       Objective: Vitals: BP (!) 148/58   Pulse 77   Temp 98.4 °F (36.9 °C) (Oral)   Resp 18   Ht 5' 7\" (1.702 m)   Wt 289 lb 14.4 oz (131.5 kg)   SpO2 95%   BMI 45.40 kg/m²   General appearance: alert and cooperative with exam  HEENT: Head: Normocephalic, no lesions, without obvious abnormality. Neck: no JVD, trachea midline, no adenopathy  Lungs: diminished  to auscultation  Heart: Regular rate and rhythm, s1/s2 auscultated, no murmurs  Abdomen: soft, non-tender, bowel sounds active  Extremities: +2 edema, ACE wraps on bilateral legs   Neurologic: not done        Assessment / Acute Cardiac Problems:   1. Acute on chronic CHF   2. Anasarca  3. AMY on CKD   4. DM   5. HTN   6. Ho CABG   7. Anemia     Patient Active Problem List:     Dementia ([address]     Hypertension     Hyperlipidemia     Class 2 severe obesity with serious comorbidity and body mass index (BMI) of 37.0 to 37.9 in [facility])     Gout     Peripheral polyneuropathy     Anxiety and depression     Acute kidney injury superimposed on CKD ([address]     Balance problem     CVA (cerebral vascular accident) ([address]     Type 2 diabetes mellitus with hyperglycemia, with long-term current use of insulin (HCC)     Acute on chronic diastolic heart failure ([address]     Acute cystitis     Infestation by bed bug     Infestation by maggots     Lymphedema of both lower extremities     PAD (peripheral artery disease) (HCC)     Pressure injury of right heel, unstageable ([address]     Acute kidney injury ([address]     Bradycardia     Hyperkalemia     AV block, 1st degree     Wounds, multiple     Buttock wound     Decubitus ulcer of trochanteric region of right hip, stage 2 (HCC)     Decompensated heart failure (HCC)     Acute anemia     Acute renal failure (ARF) ([address]     Anasarca      Plan of Treatment:   1. Acute on chronic chf. No need for repeat ECHO. Diastolic CHF. Appreciate Nephrology assistance with diuretics. Miguel add coreg.   No ACE due to CKD 2. CAD stable. Hx of CABG. Continue statin, BB, plavix. 3. Anemia per primary. Recommend HGB > 9   4.  Keep K> 4 and Mag > 2     Electronically signed by SHIRA Pang CNP on 12/13/2020 at 10:00 AM  [address]  [phone]

## 2020-12-13 NOTE — CONSULTS
GI CONSULTATION      REASON FOR CONSULT: Anemia    REQUESTING PHYSICIAN:  Mahin Grigsby DO    HISTORY OF PRESENT ILLNESS:    The patient is a 77 y.o. female who presents with abnormal labs. She was found to have low hemoglobin and acute on chronic renal failure for which she was transferred from [facility].  She denies any recent change in bowel habits, black stools or abdominal pain. She is morbidly obese. She has prior history of uterine cancer for which she had hysterectomy in the past.  Her mother also had uterine cancer. She denies any family history of colon cancer. Her last colonoscopy was over 10 years ago. She denies any nonsteroidal use. Medical History:   Past Medical History:   Diagnosis Date    Anxiety and depression     Background diabetic retinopathy(362.01) 2008    (Mild)    Balance problem     CAD (coronary artery disease)     (with coronary artery bypass graft x4).  Cancer of uterus [facility])     history of, (probable cure)    Chronic kidney disease     Chronic low back pain     CVA (cerebral vascular accident) ([address]     Dementia ([address]     (moderate)    Dry eye syndrome     GERD (gastroesophageal reflux disease)     Glaucoma suspect 2005    Gout     Homonymous hemianopsia 2008    (Right)    Hyperlipidemia     Hypertension     Keratitis     (secondary to dry eye syndrome).  Obesity     Peripheral polyneuropathy     (diabetic)    Pseudophakos     (Right Eye: 05-; Left Eye: 04-) - Dr. Lj Bonner.  Stroke [facility])     (Multiple) MRI of brain 10/2008 shows multiple infarcts involving the temporal and parietal areas.  Trichiasis 2010    (left eye)    Type 2 diabetes mellitus ([address]        SURGICAL HISTORY:  Past Surgical History:   Procedure Laterality Date    CATARACT REMOVAL WITH IMPLANT  05-    (Right eye) - Dr. Lj Bonner. clopidogrel (PLAVIX) 75 MG tablet Take 1 tablet by mouth daily 10/23/20   Stevie Mckinney, DO   insulin lispro (HUMALOG) 100 UNIT/ML injection vial Use 4 times a day per sliding scale 10/23/20   Stevie Mckinney, DO   hydrALAZINE (APRESOLINE) 50 MG tablet Take 1.5 tablets by mouth every 8 hours 10/23/20   Stevie Mckineny, DO   insulin glargine (LANTUS) 100 UNIT/ML injection vial Inject 20 Units into the skin nightly 10/23/20   Stevie Mckinney, DO   memantine (NAMENDA) 10 MG tablet TAKE 1 TABLET BY MOUTH TWICE DAILY 10/23/20   Stevie Mckinney, DO   omeprazole (PRILOSEC) 20 MG delayed release capsule Take 1 capsule by mouth every morning (before breakfast) 10/23/20   Stevie Mckinney, DO   rivastigmine (EXELON) 9.5 MG/24HR APPLY 1 NEW PATCH EVERY 24 HOURS 10/23/20   Mackenzie Mckinney, DO   aspirin 81 MG tablet Take 1 tablet by mouth daily 1/3/20   ERINN Luna   Diabetic Shoe MISC by Does not apply route 9/26/19   ERINN Luna   Compression Stockings MISC by Does not apply route 20-30 mm Hg bilateral knee high 9/26/19   ERINN Luna   nystatin (MYCOSTATIN) 258886 UNIT/GM cream Apply topically 2 times daily.  1/31/18   ERINN Luna     Scheduled Meds:   carvedilol  6.25 mg Oral BID WC    bisacodyl  20 mg Oral Once    polyethylene glycol  4,000 mL Oral Once    sodium chloride flush  10 mL Intravenous 2 times per day    miconazole   Topical BID    heparin (porcine)  5,000 Units Subcutaneous 3 times per day    atorvastatin  40 mg Oral Daily    citalopram  20 mg Oral Daily    clopidogrel  75 mg Oral Daily    insulin glargine  20 Units Subcutaneous Nightly    pantoprazole  40 mg Oral QAM AC    rivastigmine  1 patch Transdermal Daily    insulin lispro  0-12 Units Subcutaneous TID WC    insulin lispro  0-6 Units Subcutaneous Nightly    darbepoetin hung-polysorbate  60 mcg Subcutaneous Weekly    furosemide  40 mg Intravenous Daily Continuous Infusions:   [Held by provider] sodium chloride      IV infusion builder 50 mL/hr at 12/12/20 1750     PRN Meds:sodium chloride flush, nicotine, promethazine **OR** ondansetron, acetaminophen **OR** acetaminophen, albuterol    Allergies   Allergen Reactions    Bactrim [Sulfamethoxazole-Trimethoprim] Hives    Clonidine Derivatives     Amoxicillin-Pot Clavulanate Diarrhea, Nausea Only and Nausea And Vomiting       SOCIAL HISTORY:   Social History     Socioeconomic History    Marital status:      Spouse name: Not on file    Number of children: Not on file    Years of education: Not on file    Highest education level: Not on file   Occupational History    Not on file   Social Needs    Financial resource strain: Not on file    Food insecurity     Worry: Not on file     Inability: Not on file    Transportation needs     Medical: Not on file     Non-medical: Not on file   Tobacco Use    Smoking status: Never Smoker    Smokeless tobacco: Never Used    Tobacco comment: never smoker eclamb rrt 7/20/17   Substance and Sexual Activity    Alcohol use: No    Drug use: No    Sexual activity: Not on file   Lifestyle    Physical activity     Days per week: Not on file     Minutes per session: Not on file    Stress: Not on file   Relationships    Social connections     Talks on phone: Not on file     Gets together: Not on file     Attends [employer] service: Not on file     Active member of club or organization: Not on file     Attends meetings of clubs or organizations: Not on file     Relationship status: Not on file    Intimate partner violence     Fear of current or ex partner: Not on file     Emotionally abused: Not on file     Physically abused: Not on file     Forced sexual activity: Not on file   Other Topics Concern    Not on file   Social History Narrative    Not on file       FAMILY HISTORY:   Family History   Problem Relation Age of Onset    Diabetes Mother     Cancer Mother  High Blood Pressure Mother     Stroke Mother     Kidney Disease Mother     Uterine Cancer Mother     Diabetes Father     Heart Disease Father     Glaucoma Father     Emphysema Father     Coronary Art Dis Other         All 4 siblings.  Stroke Other         1 sibling.  Lung Cancer Other         1 sibling - cause of death lung cancer.  Mental Retardation Other        REVIEW OF SYSTEMS:  10 out of 14 systems were reviewed and otherwise negative per patient recall. PHYSICAL EXAM:    BP (!) 148/58   Pulse 77   Temp 98.4 °F (36.9 °C) (Oral)   Resp 18   Ht 5' 7\" (1.702 m)   Wt 289 lb 14.4 oz (131.5 kg)   SpO2 95%   BMI 45.40 kg/m²     General:  Alert and oriented x 3. No acute distress. Nontoxic in appearance. Skin:    Rash is not appreciated. Easy bruising is not appreciated. There are no spider angioma on the anterior chest wall. There are no tattoos. There is no jaundice       HEENT:  Sclera are anicteric and conjunctiva are normal.  Hearing is adequate. Oropharynx moist without thrush. Oral ulcers are not seen. Neck is supple without masses. There is no supraclavicular wasting    Heart:     Regular rate and rhythm. There are no murmurs. PMI is nondisplaced. Lungs:    Clear to auscultation, with no rales, wheezes, or rhonchi. Unlabored breathing pattern with adequate aeration. Abdomen: Bowel sounds are normal.  The abdomen is soft and non distended. There is no tenderness, guarding, rebound, or rigidity. There are no masses, hepatosplenomegaly, or hernias. Peritoneal signs are not appreciated. Extemities: There is no clubbing or edema. Pulses are palpable.      Lab and Imaging Review   Recent Labs     12/11/20  0729 12/12/20  0606 12/13/20  1054   WBC 6.8 7.1 6.6   HGB 7.9* 8.1* 7.7*   .1 102.2 102.0    242 231   INR  --  1.0  --     137 141   K 4.8 4.7 4.5    106 107   CO2 24 19* 20 BUN 49* 49* 50*   CREATININE 3.24* 3.16* 2.81*   GLUCOSE 177* 166* 130*   CALCIUM 8.8 8.6 8.6   PROT  --  6.7  --    LABALBU  --  3.3* 3.1*   AST  --  16  --    ALT  --  7  --    ALKPHOS  --  129*  --    BILITOT  --  0.37  --    MG  --  2.0 2.0     Recent Labs     12/12/20  0606   INR 1.0   PROTIME 10.6       IMPRESSION:  1. Anemia likely multifactorial  2. Acute renal failure on underlying chronic kidney disease stage III  3. Morbid obesity with Body mass index is 45.4 kg/m². 4.  Acute on chronic diastolic heart failure      RECOMMENDATIONS:    I offered her upper endoscopy and colonoscopy for further work-up of anemia. The iron studies from December 4 showed low percent saturation suggestive of component of iron deficiency. Will order bowel prep and proceed with EGD colonoscopy tomorrow if she takes a bowel prep. Discussed with the nursing staff at bedside.     Electronically signed by Natalie Nj MD  on 12/13/2020 at 3:13 PM

## 2020-12-13 NOTE — PROGRESS NOTES
[facility]  Office: [address], DO, Maame Roel, DO, Tyegirish Clayton, DO, Giulia Rodriguez Blood, DO, Valiant Sicard, MD, Prasanth Flor MD, Phillip Vogel MD, Venkata Baez MD, Una Webb MD, Jessica Hartley MD, Ama Gutierrez MD, Juan Lemos MD, Lopez Kiser MD, Norma Ramesh, DO, Nadia Andres MD, Jose Luis Garcia MD, Angela Moe DO, Maru Anderson MD,  Sherly Klein DO, Dolores Elaine MD, Brandie Morin MD, Iker Rome, [facility], [facility], [facility], Yasmine Garduno, [facility], Kaden Oropeza, CNS, Marylou Connor, CNP, Daily Nevarez, CNP, Orin Dixon, CNP, [place] Real, CNP, Mario Alberto Carbone, CNP, Carlos Zacarias PA-C, Sherly Buchanan, [facility], Deangelo Chowdhury, CNP, Edwin Wright, CNP, Funmi Payne, CNP, Ngozi Melton, CNP, Brandon Fitzpatrick, CNP         [facility]   [address] [facility]    Progress Note    12/13/2020    6:18 PM    Name:   Juliet Stevens  MRN:     [id number]     Acct:      [de-identified]   Room:   [facility]0326-02   Day:  1  Admit Date:  12/12/2020  4:17 AM    PCP:   ERINN Ruth  Code Status:  Full Code    Subjective:     C/C:Acute renal failure   Interval History Status: not changed. Patient seen and examined at bedside. Patient still fatigued and weak. Urine output 600 cc over the last 24 hours. Creatinine slightly improved down to 2.81 from 3.16. Hemoglobin remained stable. Spoke with daughter who states that patient has been very weak for several months now. Review of Systems:     Constitutional:  negative for chills, fevers, sweats admits to fatigue  Respiratory:  negative for cough, dyspnea on exertion, shortness of breath, wheezing  Cardiovascular:  negative for chest pain, chest pressure/discomfort, lower extremity edema, palpitations  Gastrointestinal: Admits abdominal distention  Neurological:  negative for dizziness, headache    Medications: Allergies:     Allergies   Allergen Reactions  Bactrim [Sulfamethoxazole-Trimethoprim] Hives    Clonidine Derivatives     Amoxicillin-Pot Clavulanate Diarrhea, Nausea Only and Nausea And Vomiting       Current Meds:   Scheduled Meds:    carvedilol  6.25 mg Oral BID WC    bisacodyl  20 mg Oral Once    polyethylene glycol  4,000 mL Oral Once    [START ON 12/14/2020] pantoprazole  40 mg Oral BID AC    furosemide  40 mg Intravenous BID    sodium chloride flush  10 mL Intravenous 2 times per day    miconazole   Topical BID    heparin (porcine)  5,000 Units Subcutaneous 3 times per day    atorvastatin  40 mg Oral Daily    citalopram  20 mg Oral Daily    clopidogrel  75 mg Oral Daily    insulin glargine  20 Units Subcutaneous Nightly    rivastigmine  1 patch Transdermal Daily    insulin lispro  0-12 Units Subcutaneous TID WC    insulin lispro  0-6 Units Subcutaneous Nightly    darbepoetin hung-polysorbate  60 mcg Subcutaneous Weekly     Continuous Infusions:    IV infusion builder 50 mL/hr at 12/12/20 1750     PRN Meds: sodium chloride flush, nicotine, promethazine **OR** ondansetron, acetaminophen **OR** acetaminophen, albuterol    Data:     Past Medical History:   has a past medical history of Anxiety and depression, Background diabetic retinopathy(362.01), Balance problem, CAD (coronary artery disease), Cancer of uterus ([facility] Chronic kidney disease, Chronic low back pain, CVA (cerebral vascular accident) ([facility] [facility] Dementia ([facility] [facility] Dry eye syndrome, GERD (gastroesophageal reflux disease), Glaucoma suspect, Gout, Homonymous hemianopsia, Hyperlipidemia, Hypertension, Keratitis, Obesity, Peripheral polyneuropathy, Pseudophakos, Stroke ([facility] Trichiasis, and Type 2 diabetes mellitus ([facility] [facility] Social History:   reports that she has never smoked. She has never used smokeless tobacco. She reports that she does not drink alcohol or use drugs.      Family History:   Family History   Problem Relation Age of Onset    Diabetes Mother     Cancer Mother ALT 7  --   --   --   --   --   --   --   --    ALKPHOS 129*  --   --   --   --   --   --   --   --    BILITOT 0.37  --   --   --   --   --   --   --   --    AMMONIA  --   --   --   --   --   --   --   --  52*   POCGLU  --  164* 151* 134* 127*  --  121* 128*  --      ABG:No results found for: POCPH, PHART, PH, POCPCO2, BRL8NML, PCO2, POCPO2, PO2ART, PO2, POCHCO3, DKP7FSG, HCO3, NBEA, PBEA, BEART, BE, THGBART, THB, VAT8LIJ, ORFP6PAL, I2OBGMEQ, O2SAT, FIO2  Lab Results   Component Value Date/Time    SPECIAL NOT REPORTED 08/20/2020 08:18 AM     Lab Results   Component Value Date/Time    CULTURE NO SIGNIFICANT GROWTH 08/20/2020 08:18 AM       Radiology:  Us Renal Limited    Result Date: 12/8/2020  Normal sonographic appearance of the kidneys. Xr Chest Portable    Result Date: 12/12/2020  Pulmonary vascular congestion and possible small right pleural effusion. Resolving pulmonary edema. Xr Chest Portable    Result Date: 12/11/2020  No significant interval change with findings consistent with pulmonary edema and small right pleural effusion. Xr Chest Portable    Result Date: 12/10/2020  Persistent but improving interstitial edema. Small right pleural effusion. Xr Chest Portable    Result Date: 12/9/2020  Continued right lower lung field airspace opacity and possible right pleural effusion. Findings otherwise unchanged. Xr Chest Portable    Result Date: 12/8/2020  Cardiomegaly with mild bilateral perihilar opacities and small bibasilar effusions. Similar exam compared to prior. Xr Chest Portable    Result Date: 12/7/2020  Cardiomegaly and pulmonary vascular congestion. Moderate right and small left pleural effusions. Findings are overall stable. Physical Examination:        General appearance: Morbidly obese, uncomfortable appearing, in bed, appears tired.   Mental Status:  oriented to person, place and time and normal affect  Lungs:  clear to auscultation bilaterally, normal effort Heart:  regular rate and rhythm, no murmur  Abdomen: Diffuse anasarca, obese abdomen  Extremities:  no edema, redness, tenderness in the calves  Skin:  no gross lesions, rashes, induration    Assessment:        Hospital Problems           Last Modified POA    * (Principal) Acute renal failure (ARF) ([address] 12/12/2020 Yes    Dementia ([address] 12/12/2020 Yes    Overview Signed 2/27/2014 10:21 AM by Callie Stone MD     (moderate)         Hypertension 12/12/2020 Yes    Hyperlipidemia 12/12/2020 Yes    CVA (cerebral vascular accident) ([address] 12/12/2020 Yes    Type 2 diabetes mellitus with hyperglycemia, with long-term current use of insulin ([address] 12/12/2020 Yes    Acute on chronic diastolic heart failure ([address] 12/12/2020 Yes    Anasarca 12/12/2020 Yes          Plan:        1. You can injury likely secondary to ATN. Continue half saline with 75 of bicarb at 75 an hour. Continue Lasix to 40 mg IV twice daily. Continue Aranesp. 2. Neurology consulted regarding anemia. This is likely iron deficiency anemia however plan for colonoscopy if patient can tolerate prep  3. Patient with morbid obesity, continue nocturnal CPAP  4. Continue treatment for acute heart failure exacerbation. 5. PT OT  6. DVT prophylaxis  7. Check CT head  8.  Altered mental status work-up pending    Bebe Vogel DO  12/13/2020  6:18 PM

## 2020-12-14 ENCOUNTER — ANESTHESIA (OUTPATIENT)
Dept: ENDOSCOPY | Age: 70
DRG: 682 | End: 2020-12-14
Payer: MEDICARE

## 2020-12-14 ENCOUNTER — ANESTHESIA EVENT (OUTPATIENT)
Dept: ENDOSCOPY | Age: 70
DRG: 682 | End: 2020-12-14
Payer: MEDICARE

## 2020-12-14 VITALS
RESPIRATION RATE: 12 BRPM | SYSTOLIC BLOOD PRESSURE: 167 MMHG | OXYGEN SATURATION: 93 % | DIASTOLIC BLOOD PRESSURE: 71 MMHG

## 2020-12-14 LAB
ABSOLUTE EOS #: 0.15 K/UL (ref 0–0.44)
ABSOLUTE IMMATURE GRANULOCYTE: <0.03 K/UL (ref 0–0.3)
ABSOLUTE LYMPH #: 0.97 K/UL (ref 1.1–3.7)
ABSOLUTE MONO #: 0.48 K/UL (ref 0.1–1.2)
ALBUMIN SERPL-MCNC: 3.1 G/DL (ref 3.5–5.2)
ANION GAP SERPL CALCULATED.3IONS-SCNC: 13 MMOL/L (ref 9–17)
BASOPHILS # BLD: 1 % (ref 0–2)
BASOPHILS ABSOLUTE: 0.05 K/UL (ref 0–0.2)
BUN BLDV-MCNC: 50 MG/DL (ref 8–23)
BUN/CREAT BLD: ABNORMAL (ref 9–20)
CALCIUM SERPL-MCNC: 8.9 MG/DL (ref 8.6–10.4)
CHLORIDE BLD-SCNC: 105 MMOL/L (ref 98–107)
CO2: 21 MMOL/L (ref 20–31)
CREAT SERPL-MCNC: 2.52 MG/DL (ref 0.5–0.9)
DIFFERENTIAL TYPE: ABNORMAL
EOSINOPHILS RELATIVE PERCENT: 3 % (ref 1–4)
GFR AFRICAN AMERICAN: 23 ML/MIN
GFR NON-AFRICAN AMERICAN: 19 ML/MIN
GFR SERPL CREATININE-BSD FRML MDRD: ABNORMAL ML/MIN/{1.73_M2}
GFR SERPL CREATININE-BSD FRML MDRD: ABNORMAL ML/MIN/{1.73_M2}
GLUCOSE BLD-MCNC: 127 MG/DL (ref 65–105)
GLUCOSE BLD-MCNC: 131 MG/DL (ref 70–99)
GLUCOSE BLD-MCNC: 135 MG/DL (ref 65–105)
GLUCOSE BLD-MCNC: 155 MG/DL (ref 65–105)
GLUCOSE BLD-MCNC: 201 MG/DL (ref 65–105)
GLUCOSE BLD-MCNC: 206 MG/DL (ref 65–105)
HCT VFR BLD CALC: 27 % (ref 36.3–47.1)
HEMOGLOBIN: 8 G/DL (ref 11.9–15.1)
IMMATURE GRANULOCYTES: 0 %
LYMPHOCYTES # BLD: 16 % (ref 24–43)
MAGNESIUM: 2 MG/DL (ref 1.6–2.6)
MCH RBC QN AUTO: 30.7 PG (ref 25.2–33.5)
MCHC RBC AUTO-ENTMCNC: 29.6 G/DL (ref 28.4–34.8)
MCV RBC AUTO: 103.4 FL (ref 82.6–102.9)
MONOCYTES # BLD: 8 % (ref 3–12)
NRBC AUTOMATED: 0 PER 100 WBC
PDW BLD-RTO: 16.3 % (ref 11.8–14.4)
PHOSPHORUS: 4.4 MG/DL (ref 2.6–4.5)
PLATELET # BLD: 231 K/UL (ref 138–453)
PLATELET ESTIMATE: ABNORMAL
PMV BLD AUTO: 10.3 FL (ref 8.1–13.5)
POTASSIUM SERPL-SCNC: 4.3 MMOL/L (ref 3.7–5.3)
RBC # BLD: 2.61 M/UL (ref 3.95–5.11)
RBC # BLD: ABNORMAL 10*6/UL
SEG NEUTROPHILS: 72 % (ref 36–65)
SEGMENTED NEUTROPHILS ABSOLUTE COUNT: 4.35 K/UL (ref 1.5–8.1)
SODIUM BLD-SCNC: 139 MMOL/L (ref 135–144)
WBC # BLD: 6 K/UL (ref 3.5–11.3)
WBC # BLD: ABNORMAL 10*3/UL

## 2020-12-14 PROCEDURE — 94660 CPAP INITIATION&MGMT: CPT

## 2020-12-14 PROCEDURE — 6360000002 HC RX W HCPCS: Performed by: INTERNAL MEDICINE

## 2020-12-14 PROCEDURE — 6370000000 HC RX 637 (ALT 250 FOR IP): Performed by: NURSE PRACTITIONER

## 2020-12-14 PROCEDURE — 6370000000 HC RX 637 (ALT 250 FOR IP): Performed by: INTERNAL MEDICINE

## 2020-12-14 PROCEDURE — 2709999900 HC NON-CHARGEABLE SUPPLY: Performed by: INTERNAL MEDICINE

## 2020-12-14 PROCEDURE — 7100000010 HC PHASE II RECOVERY - FIRST 15 MIN: Performed by: INTERNAL MEDICINE

## 2020-12-14 PROCEDURE — 1200000000 HC SEMI PRIVATE

## 2020-12-14 PROCEDURE — 3609012400 HC EGD TRANSORAL BIOPSY SINGLE/MULTIPLE: Performed by: INTERNAL MEDICINE

## 2020-12-14 PROCEDURE — 6360000002 HC RX W HCPCS: Performed by: NURSE ANESTHETIST, CERTIFIED REGISTERED

## 2020-12-14 PROCEDURE — 36415 COLL VENOUS BLD VENIPUNCTURE: CPT

## 2020-12-14 PROCEDURE — 3700000000 HC ANESTHESIA ATTENDED CARE: Performed by: INTERNAL MEDICINE

## 2020-12-14 PROCEDURE — 2500000003 HC RX 250 WO HCPCS: Performed by: NURSE PRACTITIONER

## 2020-12-14 PROCEDURE — 83735 ASSAY OF MAGNESIUM: CPT

## 2020-12-14 PROCEDURE — 85025 COMPLETE CBC W/AUTO DIFF WBC: CPT

## 2020-12-14 PROCEDURE — 0DB68ZX EXCISION OF STOMACH, VIA NATURAL OR ARTIFICIAL OPENING ENDOSCOPIC, DIAGNOSTIC: ICD-10-PCS | Performed by: INTERNAL MEDICINE

## 2020-12-14 PROCEDURE — 6360000002 HC RX W HCPCS: Performed by: NURSE PRACTITIONER

## 2020-12-14 PROCEDURE — 80069 RENAL FUNCTION PANEL: CPT

## 2020-12-14 PROCEDURE — 43239 EGD BIOPSY SINGLE/MULTIPLE: CPT | Performed by: INTERNAL MEDICINE

## 2020-12-14 PROCEDURE — 2500000003 HC RX 250 WO HCPCS: Performed by: NURSE ANESTHETIST, CERTIFIED REGISTERED

## 2020-12-14 PROCEDURE — 2580000003 HC RX 258: Performed by: NURSE ANESTHETIST, CERTIFIED REGISTERED

## 2020-12-14 PROCEDURE — 82947 ASSAY GLUCOSE BLOOD QUANT: CPT

## 2020-12-14 PROCEDURE — 88305 TISSUE EXAM BY PATHOLOGIST: CPT

## 2020-12-14 PROCEDURE — 88342 IMHCHEM/IMCYTCHM 1ST ANTB: CPT

## 2020-12-14 PROCEDURE — 7100000011 HC PHASE II RECOVERY - ADDTL 15 MIN: Performed by: INTERNAL MEDICINE

## 2020-12-14 PROCEDURE — 2580000003 HC RX 258: Performed by: NURSE PRACTITIONER

## 2020-12-14 PROCEDURE — 99232 SBSQ HOSP IP/OBS MODERATE 35: CPT | Performed by: INTERNAL MEDICINE

## 2020-12-14 RX ORDER — SODIUM CHLORIDE 9 MG/ML
INJECTION, SOLUTION INTRAVENOUS CONTINUOUS PRN
Status: DISCONTINUED | OUTPATIENT
Start: 2020-12-14 | End: 2020-12-14 | Stop reason: SDUPTHER

## 2020-12-14 RX ORDER — CARVEDILOL 12.5 MG/1
12.5 TABLET ORAL 2 TIMES DAILY WITH MEALS
Status: DISCONTINUED | OUTPATIENT
Start: 2020-12-14 | End: 2020-12-23 | Stop reason: HOSPADM

## 2020-12-14 RX ORDER — CARVEDILOL 6.25 MG/1
6.25 TABLET ORAL ONCE
Status: COMPLETED | OUTPATIENT
Start: 2020-12-14 | End: 2020-12-14

## 2020-12-14 RX ORDER — PROPOFOL 10 MG/ML
INJECTION, EMULSION INTRAVENOUS PRN
Status: DISCONTINUED | OUTPATIENT
Start: 2020-12-14 | End: 2020-12-14 | Stop reason: SDUPTHER

## 2020-12-14 RX ORDER — LIDOCAINE HYDROCHLORIDE 10 MG/ML
INJECTION, SOLUTION EPIDURAL; INFILTRATION; INTRACAUDAL; PERINEURAL PRN
Status: DISCONTINUED | OUTPATIENT
Start: 2020-12-14 | End: 2020-12-14 | Stop reason: SDUPTHER

## 2020-12-14 RX ADMIN — PROPOFOL 80 MG: 10 INJECTION, EMULSION INTRAVENOUS at 09:48

## 2020-12-14 RX ADMIN — HEPARIN SODIUM 5000 UNITS: 5000 INJECTION INTRAVENOUS; SUBCUTANEOUS at 17:34

## 2020-12-14 RX ADMIN — ANTI-FUNGAL POWDER MICONAZOLE NITRATE TALC FREE: 1.42 POWDER TOPICAL at 08:30

## 2020-12-14 RX ADMIN — INSULIN LISPRO 4 UNITS: 100 INJECTION, SOLUTION INTRAVENOUS; SUBCUTANEOUS at 17:32

## 2020-12-14 RX ADMIN — HEPARIN SODIUM 5000 UNITS: 5000 INJECTION INTRAVENOUS; SUBCUTANEOUS at 06:37

## 2020-12-14 RX ADMIN — Medication: at 08:28

## 2020-12-14 RX ADMIN — LIDOCAINE HYDROCHLORIDE 50 MG: 10 INJECTION, SOLUTION EPIDURAL; INFILTRATION; INTRACAUDAL; PERINEURAL at 09:48

## 2020-12-14 RX ADMIN — DESMOPRESSIN ACETATE 40 MG: 0.2 TABLET ORAL at 08:27

## 2020-12-14 RX ADMIN — CARVEDILOL 12.5 MG: 12.5 TABLET, FILM COATED ORAL at 17:34

## 2020-12-14 RX ADMIN — FUROSEMIDE 40 MG: 10 INJECTION, SOLUTION INTRAMUSCULAR; INTRAVENOUS at 08:27

## 2020-12-14 RX ADMIN — CARVEDILOL 6.25 MG: 6.25 TABLET, FILM COATED ORAL at 13:03

## 2020-12-14 RX ADMIN — CITALOPRAM 20 MG: 20 TABLET, FILM COATED ORAL at 08:27

## 2020-12-14 RX ADMIN — ANTI-FUNGAL POWDER MICONAZOLE NITRATE TALC FREE: 1.42 POWDER TOPICAL at 21:16

## 2020-12-14 RX ADMIN — PANTOPRAZOLE SODIUM 40 MG: 40 TABLET, DELAYED RELEASE ORAL at 08:26

## 2020-12-14 RX ADMIN — INSULIN GLARGINE 20 UNITS: 100 INJECTION, SOLUTION SUBCUTANEOUS at 21:10

## 2020-12-14 RX ADMIN — FUROSEMIDE 40 MG: 10 INJECTION, SOLUTION INTRAMUSCULAR; INTRAVENOUS at 17:35

## 2020-12-14 RX ADMIN — PANTOPRAZOLE SODIUM 40 MG: 40 TABLET, DELAYED RELEASE ORAL at 17:34

## 2020-12-14 RX ADMIN — HEPARIN SODIUM 5000 UNITS: 5000 INJECTION INTRAVENOUS; SUBCUTANEOUS at 21:10

## 2020-12-14 RX ADMIN — CLOPIDOGREL 75 MG: 75 TABLET, FILM COATED ORAL at 08:27

## 2020-12-14 RX ADMIN — SODIUM CHLORIDE: 9 INJECTION, SOLUTION INTRAVENOUS at 09:01

## 2020-12-14 RX ADMIN — POLYETHYLENE GLYCOL 3350, SODIUM SULFATE ANHYDROUS, SODIUM BICARBONATE, SODIUM CHLORIDE, POTASSIUM CHLORIDE 4000 ML: 236; 22.74; 6.74; 5.86; 2.97 POWDER, FOR SOLUTION ORAL at 17:34

## 2020-12-14 RX ADMIN — CARVEDILOL 6.25 MG: 6.25 TABLET, FILM COATED ORAL at 08:27

## 2020-12-14 ASSESSMENT — PAIN SCALES - GENERAL
PAINLEVEL_OUTOF10: 0

## 2020-12-14 ASSESSMENT — ENCOUNTER SYMPTOMS
DYSPNEA ACTIVITY LEVEL: NO INTERVAL CHANGE
SHORTNESS OF BREATH: 1

## 2020-12-14 ASSESSMENT — PAIN - FUNCTIONAL ASSESSMENT: PAIN_FUNCTIONAL_ASSESSMENT: 0-10

## 2020-12-14 NOTE — CARE COORDINATION
Call to daughter, Ama Gutierrez, and her friend, Abdirizak Berry. They stated if pt is safe to go home, they would like to bring her home and continue with [address]. they are there to help her. If she is too weak and needs further therapy, they are agreeable to [address] [facility]. Will follow for needs    1151 received call from [facility], [place] [facility] [place]. They are able to accept pt when she is medically ready. precert is currently waived for insurance    1213 call to [address] at [phone]. They are not currently following pt. Referral sent to [phone] received call from [address] [facility].  Pt has been accepted

## 2020-12-14 NOTE — OP NOTE
EGD      Patient:   Josie Albarran    :    1950    Facility:   [address] [place] [address]   Referring/PCP: Fabián Walls    Procedure:   Esophagogastroduodenoscopy with biopsy  Date:     2020   Endoscopist:  Claudio Watts MD, [facility]    Indication:   Unexplained iron deficiency anaemia, weight loss    Postprocedure diagnosis:   Gastritis    Anesthesia:  MAC    Complications: None    EBL: None from the procedure    Specimen: Gastric biopsies-sent to the lab    Description of Procedure:  Informed consent was obtained from the patient after explanation of the procedure including indications, description of the procedure,  benefits and possible risks and complications of the procedure, and alternatives. Questions were answered. The patient's history was reviewed and a directed physical examination was performed prior to the procedure. Patient was monitored throughout the procedure with pulse oximetry and periodic assessment of vital signs. Patient was sedated as noted above. With the patient in the left lateral decubitus position, the Olympus videoendoscope was placed in the patient's mouth and under direct visualization passed into the esophagus. Visualization of the esophagus, stomach, and duodenum was performed during both introduction and withdrawal of the endoscope and retroflexed view of the proximal stomach was obtained. The scope was passed to the 2nd portion of the duodenum. The patient tolerated the procedure well and was taken to the recovery area in good condition. Findings[de-identified]   Esophagus:  The esophagus was normal Stomach: The stomach had small sliding hiatal hernia. The stomach also had moderate gastritis throughout the stomach that was by gastric erythema which was patchy and chronic appearing without any active bleeding. The rest of the stomach was otherwise normal.  The antrum and body of the stomach was biopsied with cold biopsy forceps to rule out H. pylori infection. Duodenum: normal    Recommendations: -Continue acid suppression with PPI once daily for 8 weeks. , -Await pathology. Schedule for colonoscopy tomorrow for further work-up of weight loss and anemia.     Electronically signed by Adele Sepulveda MD, FACG  on 12/14/2020 at 9:53 AM

## 2020-12-14 NOTE — PROGRESS NOTES
[facility]  Office: [address], DO, Nely Rosales, DO, Larry Cleaning, DO, Calli Alva Blood, DO, Sheldon Londono MD, Benjamín Noel MD, Jerica Dong MD, Veronika Truong MD, Kaushal Cordoba MD, Beryle Marseille, MD, Ciro Pallas, MD, Festus Lawrence MD, Lopez Gibson MD, Champ Sosa, DO, Sandra Rojo MD, Bharti Du MD, Ngozi Nava, DO, Miguel Mac MD,  Charles Fitzgerald, DO, Marcelle Ricardo MD, Amena Bravo MD, Allie El, [facility], [facility], [facility], Virgilio Jovel, CNP, Panfilo Rogers, CNS, Will Hensley, CNP, Lali Corbett, CNP, Adele Marquez, CNP, Tolu Sun, CNP, Mark Reddy, CNP, Kristian Hutchins PA-C, Mortimer Guardian, [facility], Fernando Berry, CNP, Lavonne Frankel, CNP, Matteo Hart, CNP, Tammy Best, CNP, Va Lara, [facility]   [address] [facility]    Progress Note    12/14/2020    2:03 PM    Name:   David Jaramillo  MRN:     [id number]     Acct:      [de-identified]   Room:   [address] [place] [address] Day:  2  Admit Date:  12/12/2020  4:17 AM    PCP:   ERINN Brown  Code Status:  Full Code    Subjective:     C/C: Acute renal failure  Interval History Status: improved. Patient seen and examined today. Denies any chest pain, but admits to shortness of breath which she says is chronic for her. She denies any nausea, vomiting, diarrhea. Creatinine continue to improve and is down to 2.5 today. Hemoglobin has been stable, 8 currently.     Brief History: This is a 62-year-old female who initially presented to outside hospital with anemia, hemoglobin of 6.8. She was given hemoglobin and developed lower extremity edema and was subsequently diuresed. She was transferred here for acute renal failure. She was evaluated by nephrology who reviewed her symptoms to AT due to intravascular depletion. She was started on half bicarb drip and IV Lasix. Her creatinine is slowly improving now. Patient was evaluated by GI for her anemia. Review of Systems:     Constitutional:  negative for chills, fevers, sweats  Respiratory: Admits to shortness of breath which she says is chronic for her   cardiovascular:  negative for chest pain, chest pressure/discomfort, admits to lower extremity edema  Gastrointestinal:  negative for abdominal pain, constipation, diarrhea, nausea, vomiting  Neurological:  negative for dizziness, headache    Medications: Allergies:     Allergies   Allergen Reactions    Bactrim [Sulfamethoxazole-Trimethoprim] Hives    Clonidine Derivatives     Amoxicillin-Pot Clavulanate Diarrhea, Nausea Only and Nausea And Vomiting       Current Meds:   Scheduled Meds:    polyethylene glycol  4,000 mL Oral Once    carvedilol  12.5 mg Oral BID WC    pantoprazole  40 mg Oral BID AC    furosemide  40 mg Intravenous BID    sodium chloride flush  10 mL Intravenous 2 times per day    miconazole   Topical BID    heparin (porcine)  5,000 Units Subcutaneous 3 times per day    atorvastatin  40 mg Oral Daily    citalopram  20 mg Oral Daily    clopidogrel  75 mg Oral Daily    insulin glargine  20 Units Subcutaneous Nightly    rivastigmine  1 patch Transdermal Daily    insulin lispro  0-12 Units Subcutaneous TID     insulin lispro  0-6 Units Subcutaneous Nightly    darbepoetin hung-polysorbate  60 mcg Subcutaneous Weekly     Continuous Infusions:    IV infusion builder 75 mL/hr at 12/14/20 6464 PRN Meds: sodium chloride flush, nicotine, promethazine **OR** ondansetron, acetaminophen **OR** acetaminophen, albuterol    Data:     Past Medical History:   has a past medical history of Anxiety and depression, Background diabetic retinopathy(362.01), Balance problem, CAD (coronary artery disease), Cancer of uterus ([facility] [address] Chronic kidney disease, Chronic low back pain, CVA (cerebral vascular accident) ([facility] [address] D[facility] Dry eye syndrome, GERD (gastroesophageal reflux disease), Glaucoma suspect, Gout, Homonymous hemianopsia, Hyperlipidemia, Hypertension, Keratitis, Obesity, Peripheral polyneuropathy, Pseudophakos, Stroke ([facility] [address] Trichiasis, and Type 2 diabetes mellitus ([facility] Social History:   reports that she has never smoked. She has never used smokeless tobacco. She reports that she does not drink alcohol or use drugs. Family History:   Family History   Problem Relation Age of Onset    Diabetes Mother     Cancer Mother     High Blood Pressure Mother    Misty Solum Stroke Mother     Kidney Disease Mother     Uterine Cancer Mother     Diabetes Father     Heart Disease Father     Glaucoma Father     Emphysema Father     Coronary Art Dis Other         All 4 siblings.  Stroke Other         1 sibling.  Lung Cancer Other         1 sibling - cause of death lung cancer.  Mental Retardation Other        Vitals:  BP (!) 136/56   Pulse 87   Temp 98.2 °F (36.8 °C) (Oral)   Resp 16   Ht 5' 7\" (1.702 m)   Wt (!) 300 lb 0.7 oz (136.1 kg)   SpO2 98%   BMI 46.99 kg/m²   Temp (24hrs), Av.7 °F (36.5 °C), Min:97.3 °F (36.3 °C), Max:98.2 °F (36.8 °C)    Recent Labs     20  1622 20  2121 20  0757 20  1133   POCGLU 128* 124* 135* 127*       I/O (24Hr):     Intake/Output Summary (Last 24 hours) at 2020 1403  Last data filed at 2020 1217  Gross per 24 hour   Intake 100 ml   Output 3300 ml   Net -3200 ml       Labs:  Hematology:  Recent Labs     20  0606 20 1054 12/14/20  0551   WBC 7.1 6.6 6.0   RBC 2.72* 2.53* 2.61*   HGB 8.1* 7.7* 8.0*   HCT 27.8* 25.8* 27.0*   .2 102.0 103.4*   MCH 29.8 30.4 30.7   MCHC 29.1 29.8 29.6   RDW 16.0* 16.2* 16.3*    231 231   MPV 10.2 10.4 10.3   INR 1.0  --   --      Chemistry:  Recent Labs     12/12/20  0606 12/13/20  1054 12/14/20  0551    141 139   K 4.7 4.5 4.3    107 105   CO2 19* 20 21   GLUCOSE 166* 130* 131*   BUN 49* 50* 50*   CREATININE 3.16* 2.81* 2.52*   MG 2.0 2.0 2.0   ANIONGAP 12 14 13   LABGLOM 15* 17* 19*   GFRAA 18* 20* 23*   CALCIUM 8.6 8.6 8.9   PHOS  --  4.7* 4.4   PROBNP 28,699*  --   --      Recent Labs     12/12/20  0606 12/12/20  0606 12/13/20  0752 12/13/20  1054 12/13/20  1159 12/13/20  1622 12/13/20  1717 12/13/20  2121 12/14/20  0551 12/14/20  0757 12/14/20  1133   PROT 6.7  --   --   --   --   --   --   --   --   --   --    LABALBU 3.3*  --   --  3.1*  --   --   --   --  3.1*  --   --    TSH  --   --   --   --   --   --  3.20  --   --   --   --    AST 16  --   --   --   --   --   --   --   --   --   --    ALT 7  --   --   --   --   --   --   --   --   --   --    ALKPHOS 129*  --   --   --   --   --   --   --   --   --   --    BILITOT 0.37  --   --   --   --   --   --   --   --   --   --    AMMONIA  --   --   --   --   --   --  52*  --   --   --   --    POCGLU  --    < > 127*  --  121* 128*  --  124*  --  135* 127*    < > = values in this interval not displayed.      ABG:  Lab Results   Component Value Date    POCPH 7.284 12/13/2020    POCPCO2 54.5 12/13/2020    POCPO2 71.6 12/13/2020    POCHCO3 25.8 12/13/2020    NBEA 1 12/13/2020    PBEA NOT REPORTED 12/13/2020    FQV7LDP 28 12/13/2020    HVBT9RMW 92 12/13/2020    FIO2 2.0 12/13/2020     Lab Results   Component Value Date/Time    SPECIAL R HAND 2ML 12/13/2020 05:30 PM    SPECIAL L HAND 4ML 12/13/2020 05:30 PM     Lab Results   Component Value Date/Time    CULTURE NO GROWTH 10 HOURS 12/13/2020 05:30 PM CULTURE NO GROWTH 10 HOURS 12/13/2020 05:30 PM       Radiology:  Ct Head Wo Contrast    Result Date: 12/13/2020  No acute intracranial abnormality. Old infarct left posterior cerebral artery territory. Ct Chest Wo Contrast    Result Date: 12/13/2020  Bibasilar airspace disease and effusions coronary artery disease and cardiomegaly. Us Renal Limited    Result Date: 12/8/2020  Normal sonographic appearance of the kidneys. Xr Chest Portable    Result Date: 12/12/2020  Pulmonary vascular congestion and possible small right pleural effusion. Resolving pulmonary edema. Xr Chest Portable    Result Date: 12/11/2020  No significant interval change with findings consistent with pulmonary edema and small right pleural effusion. Xr Chest Portable    Result Date: 12/10/2020  Persistent but improving interstitial edema. Small right pleural effusion. Xr Chest Portable    Result Date: 12/9/2020  Continued right lower lung field airspace opacity and possible right pleural effusion. Findings otherwise unchanged. Xr Chest Portable    Result Date: 12/8/2020  Cardiomegaly with mild bilateral perihilar opacities and small bibasilar effusions. Similar exam compared to prior. Physical Examination:        General appearance:  alert, cooperative, appears uncomfortable, anasarca noted  Mental Status:  oriented to person, place and time and normal affect  Lungs: Manage breath sounds at the bases with no rhonchi or rales  Heart:  regular rate and rhythm, no murmur  Abdomen: Distended, edematous, pitting edema.   Nontender to palpation  Extremities: Bilateral Ace wraps on legs, 2+ pitting edema noted  Skin:  no gross lesions, rashes, induration    Assessment:        Hospital Problems           Last Modified POA    * (Principal) Acute renal failure (ARF) ([address] 12/12/2020 Yes    Dementia ([address] 12/12/2020 Yes    Overview Signed 2/27/2014 10:21 AM by Ernesto Hodges MD     (moderate) Hypertension 12/12/2020 Yes    Hyperlipidemia 12/12/2020 Yes    CVA (cerebral vascular accident) ([facility] 12/12/2020 Yes    Type 2 diabetes mellitus with hyperglycemia, with long-term current use of insulin ([facility] 12/12/2020 Yes    Acute on chronic diastolic heart failure ([facility] 12/12/2020 Yes    Anasarca 12/12/2020 Yes          Plan:        1. Acute kidney injury on chronic kidney disease: Secondary to ischemic ATN. Patient has CKD III baseline  2. Acute on chronic exacerbation of heart failure with preserved ejection fraction  3. RV heart failure: see echocardiogram  4. Normocytic normochromic anemia: Likely anemia of chronic inflammation+/-iron deficiency anemia  5. Essential hypertension  6. Diffuse anasarca  7. Obesity hypoventilation syndrome  8. Suspected obstructive sleep apnea  9. Morbid obesity with a BMI of 46.9  10. Insulin-dependent diabetes mellitus     · Continue half bicarb drip and Lasix per nephrology recommendations. Creatinine slowly improving. Nephrotoxic agents. Hold ACE inhibitor  · No need for repeat echo, patient known to have chronic diastolic dysfunction. Continue diuretics per nephrology recommendations  · Hemoglobin stable, 8.0 today. EGD ordered and pending. Patient could not tolerate bowel prep  · Continue Protonix 40 mg twice daily  · Patient needs nocturnal CPAP for suspected obstructive sleep apnea and obesity hypoventilation syndrome  · Continue current antihypertensive regimen  · Lantus 20 units nightly with bolus insulin. · Recommend weight loss  · Patient with diffuse anasarca, spoke with daughter who states that this is chronic for her.   · Continue statin for hyperlipidemia    Jd Franks DO  12/14/2020  2:03 PM

## 2020-12-14 NOTE — ANESTHESIA POSTPROCEDURE EVALUATION
Department of Anesthesiology  Postprocedure Note    Patient: Valerie Acuna  MRN: [id number]  YOB: 1950  Date of evaluation: 12/14/2020  Time:  10:56 AM     Procedure Summary     Date: 12/14/20 Room / Location: [address]    Anesthesia Start: 4272 Anesthesia Stop: 3276    Procedure: EGD DIAGNOSTIC ONLY (N/A ) Diagnosis: (anemia)    Surgeons: Kristal Rdz MD Responsible Provider: Mauricio Tapia MD    Anesthesia Type: MAC ASA Status: 4          Anesthesia Type: MAC    Yovanny Phase I: Yovanny Score: 8    Yovanny Phase II: Yovanny Score: 9    Last vitals: Reviewed and per EMR flowsheets.        Anesthesia Post Evaluation    Patient location during evaluation: PACU  Patient participation: complete - patient participated  Level of consciousness: awake and alert  Pain score: 0  Airway patency: patent  Nausea & Vomiting: no nausea and no vomiting  Complications: no  Cardiovascular status: hemodynamically stable  Respiratory status: acceptable  Hydration status: euvolemic

## 2020-12-14 NOTE — PROGRESS NOTES
Nephrology Progress Note      SUBJECTIVE      The patient is seen and examined. EGD showed gastritis. She is to have prep later today for colonoscopy tomorrow. Creatinine is slightly better. Her maintenance IV fluids are reviewed. She is on bicarbonate-containing IV fluids. Labs today show sodium 139 with potassium 4.3, chloride 105 and CO2 21 with BUN 50 and creatinine 2.5 with phosphorus 4.4 and calcium 8.9. CBC shows white blood cells 6 hemoglobin 8 platelets 631, with albumin 3.1. She denies any chest pain or shortness of breath or nausea or vomiting.       OBJECTIVE      CURRENT TEMPERATURE:  Temp: 98.2 °F (36.8 °C)  MAXIMUM TEMPERATURE OVER 24HRS:  Temp (24hrs), Av.7 °F (36.5 °C), Min:97.3 °F (36.3 °C), Max:98.2 °F (36.8 °C)    CURRENT RESPIRATORY RATE:  Resp: 16  CURRENT PULSE:  Pulse: 87  CURRENT BLOOD PRESSURE:  BP: (!) 136/56  24HR BLOOD PRESSURE RANGE:  Systolic (46UGL), MLA:325 , Min:136 , WR   ; Diastolic (65ZLU), IXZ:42, Min:56, Max:91    24HR INTAKE/OUTPUT:      Intake/Output Summary (Last 24 hours) at 2020 1613  Last data filed at 2020 1217  Gross per 24 hour   Intake 100 ml   Output 3300 ml   Net -3200 ml       PHYSICAL EXAM      GENERAL APPEARANCE:Awake, alert, in no acute distress  SKIN: warm and dry, no rash or erythema  EYES: conjunctivae pale and sclera anicteric  ENT: no thrush, relatively moist mucus membranes  NECK:  No apparent JVD  PULMONARY: a few faint basilar crackles with chest x-ray showing resolving pulmonary vascular congestion from 2020  CARDIOVASCULAR: regular rate and rhythm with positive S1 and S2  ABDOMEN: obese and soft and not tender and not distended with active bowel sounds  EXTREMITIES: mild peripheral edema and no cyanosis    CURRENT MEDICATIONS          polyethylene glycol (GoLYTELY) solution 4,000 mL, Once      carvedilol (COREG) tablet 12.5 mg, BID WC      pantoprazole (PROTONIX) tablet 40 mg, BID AC   furosemide (LASIX) injection 40 mg, BID      sodium chloride flush 0.9 % injection 10 mL, 2 times per day      sodium chloride flush 0.9 % injection 10 mL, PRN      nicotine (NICODERM CQ) 21 MG/24HR 1 patch, Daily PRN      promethazine (PHENERGAN) tablet 12.5 mg, Q6H PRN    Or      ondansetron (ZOFRAN) injection 4 mg, Q6H PRN      acetaminophen (TYLENOL) tablet 650 mg, Q6H PRN    Or      acetaminophen (TYLENOL) suppository 650 mg, Q6H PRN      albuterol (PROVENTIL) nebulizer solution 2.5 mg, Q6H PRN      miconazole (MICOTIN) 2 % powder, BID      heparin (porcine) injection 5,000 Units, 3 times per day      atorvastatin (LIPITOR) tablet 40 mg, Daily      citalopram (CELEXA) tablet 20 mg, Daily      clopidogrel (PLAVIX) tablet 75 mg, Daily      insulin glargine (LANTUS) injection vial 20 Units, Nightly      rivastigmine (EXELON) 9.5 MG/24HR 1 patch, Daily      insulin lispro (HUMALOG) injection vial 0-12 Units, TID WC      insulin lispro (HUMALOG) injection vial 0-6 Units, Nightly      sodium bicarbonate 75 mEq in sodium chloride 0.45 % 1,000 mL infusion, Continuous      darbepoetin hung-polysorbate (ARANESP) injection 60 mcg, Weekly          LABS      CBC:   Recent Labs     12/12/20  0606 12/13/20  1054 12/14/20  0551   WBC 7.1 6.6 6.0   RBC 2.72* 2.53* 2.61*   HGB 8.1* 7.7* 8.0*   HCT 27.8* 25.8* 27.0*   .2 102.0 103.4*   MCH 29.8 30.4 30.7   MCHC 29.1 29.8 29.6   RDW 16.0* 16.2* 16.3*    231 231   MPV 10.2 10.4 10.3      BMP:   Recent Labs     12/12/20  0606 12/13/20  1054 12/14/20  0551    141 139   K 4.7 4.5 4.3    107 105   CO2 19* 20 21   BUN 49* 50* 50*   CREATININE 3.16* 2.81* 2.52*   GLUCOSE 166* 130* 131*   CALCIUM 8.6 8.6 8.9      BNP:No results found for: BNP  PHOSPHORUS:    Recent Labs     12/13/20  1054 12/14/20  0551   PHOS 4.7* 4.4     MAGNESIUM:   Recent Labs     12/12/20  0606 12/13/20  1054 12/14/20  0551   MG 2.0 2.0 2.0     ALBUMIN:   Recent Labs 12/12/20  0606 12/13/20  1054 12/14/20  0551   LABALBU 3.3* 3.1* 3.1*     IRON:    Lab Results   Component Value Date    IRON 50 12/12/2020     IRON SATURATION:    Lab Results   Component Value Date    LABIRON 30 12/12/2020     TIBC:    Lab Results   Component Value Date    TIBC 167 12/12/2020     FERRITIN:    Lab Results   Component Value Date    FERRITIN 123 12/12/2020     JIMBO: No results found for: JIMBO    SPEP:   Lab Results   Component Value Date    PROT 6.7 12/12/2020    ALBCAL 1.8 08/13/2020    ALBPCT 35 08/13/2020    LABALPH 0.2 08/13/2020    LABALPH 0.7 08/13/2020    A1PCT 5 08/13/2020    A2PCT 15 08/13/2020    LABBETA 0.9 08/13/2020    BETAPCT 17 08/13/2020    GAMGLOB 1.4 08/13/2020    GGPCT 28 08/13/2020    PATH ELECTRONICALLY SIGNED. Dana Tripathi M.D. 08/13/2020    PATH ELECTRONICALLY SIGNED. Dana Tripathi M.D. 08/13/2020     UPEP: No results found for: TPU   HEPBSAG:No results found for: HEPBSAG  HEPCAB:  Lab Results   Component Value Date    HEPCAB NONREACTIVE 01/31/2018     C3:   Lab Results   Component Value Date    C3 114 08/13/2020     C4:   Lab Results   Component Value Date    C4 39 08/13/2020     MPO ANCA:   Lab Results   Component Value Date    MPO 14 08/13/2020    .   PR3 ANCA:    Lab Results   Component Value Date    PR3 8 08/13/2020     URINE SODIUM:    Lab Results   Component Value Date    LUKE 88 08/19/2020      URINE POTASSIUM:  No results found for: KUR  URINE CHLORIDE:  No components found for: CLU  URINE PH:  No components found for: PO4U  URINE OSMOLARITY:  No results found for: OSMOU  URINE CREATININE:    Lab Results   Component Value Date    LABCREA 18.4 08/20/2020     URINE EOSINOPHILS: No components found for: EOSU  URINE PROTEIN:  No results found for: TPU  URINALYSIS:  U/A:   Lab Results   Component Value Date    NITRU NEGATIVE 12/13/2020    COLORU YELLOW 12/13/2020    PHUR 5.0 12/13/2020    WBCUA 50  12/13/2020    RBCUA 20 TO 50 12/13/2020

## 2020-12-14 NOTE — PROGRESS NOTES
Right arm swollen, \"tight\" to the touch. Scratch to her posterior forearm continues to seep serosanguinous fluid.

## 2020-12-14 NOTE — PLAN OF CARE
NON INVASIVE VENTILATION  PROVIDE OPTIMAL VENTILATION/ACCEPTABLE SP02  ASSESSMENT SKIN INTEGRITY  PATIENT EDUCATION AS NEEDED  BIPAP AS NEEDED

## 2020-12-14 NOTE — DISCHARGE SUMMARY
[address]                 [address] [place] [address], [address] [place] [address]                               DISCHARGE SUMMARY    PATIENT NAME: Conor Baca                     :        1950  MED REC NO:   [id number]                             ROOM:       [id number]  ACCOUNT NO:   [de-identified]                           ADMIT DATE: 2020  PROVIDER:     José Miguel Velasco MD                  DISCHARGE DATE:  2020    Date of discharge/transfer to [address] [place] [address] on 2020, Dr. Fredy Thompson accepting. PERSONAL CARE PROVIDER:  Charleen Starkey PA-C, [facility], [facility]. The patient has been seen by [facility], [facility] and [facility], General Surgery. DIAGNOSES:  1. Congestive heart failure, acute on chronic systolic heart failure,  . The patient's imaging studies variously demonstrating  cardiomegaly, mild pulmonary perihilar opacities, small bilateral  effusions. Two-D echo, 2020, LA severely dilated, normal LV  diameter, mildly reduced left ventricular systolic function, septal wall  motion abnormality, leftward compression IVS \"D sign\", RA dilatation, RV  dilatation, reduced right ventricular systolic function, MAC, PARISH that  opens well, trace AI, mild TR, RVSP 35 mmHg, left pleural effusion,  normal aortic root dimension 3.3 cm, IVC not visualized, grade 1 mild  diastolic dysfunction, LVEF 45% to 50%. 2.  Bilateral lower extremity edema. 3.  Sinus bradycardia with pauses. 4.  Dyspnea on exertion. 5.  Anemia, GI bleed, 2020, likely anemia of chronic GI blood loss  together with renal disease. The patient was to have undergone EGD,  colonoscopy, however, remained stable from a hemoglobin standpoint and  received 2 units of PRBCs on 2020. Acute kidney injury,  2020, stage IV-V.   Doppler ultrasound of the kidneys, 2020, normal appearance, right 9.4 cm, left 12.9 cm.  6.  Coronary artery disease, CABG x4, LIMA LAD, SVG D1, SVG OM1, SVG  PDA, 12/05/2005. 7.  First-degree AV block intermittently. 8.  Hypertension. 9.  Diabetes mellitus, type 2.  10.  Gait and balance instability. 11.  Hyperlipidemia, GERD. 12.  Morbid obesity. 13.  Cerebrovascular disease, CVA history with gait and balance  insufficiency together with homonymous hemianopsia. MRI of 2008,  demonstrating multiple infarctions in the temporoparietal region. 14.  Dementia. 15.  Depression, anxiety. 16.  Hyperkalemia, corrected. Other medical problems set forth in the progress note of 12/11/2020,  incorporated for reference herein. PART 2:  HOSPITAL COURSE:  The patient presented, 12/04/2020, congestive heart  failure, acute-on-chronic systolic. The patient was given diuretics;  unfortunately, the patient's kidney function did not sustain this, the  patient's creatinine levels jumping into the 3.3 range with decreased  urinary output. Despite getting fluids and diuretics, the patient's  urinary output was very low. The patient unfortunately into that  situation in which she requires fluids yet cannot get rid of the fluid,  hence the patient is being transferred to [address] [place] [address] for further evaluation and treatment where  Nephrology is available, a discipline not available at [facility]. The patient has had bradycardia with pauses. This has been seen by  Cardiology during the course of hospitalization, adjustment of  medications. The patient had also presented on 12/04/2020 with GI  bleeding, Hemoccult positive. The patient received 2-unit PRBC  transfusion on 12/06/2020. Hemoglobin has remained in the 7, 7, 7, 9  range since that time. At some point, the patient will require  endoscopy.   The patient was seen by Dr. Kandace Martinez, General Surgery, this hospitalization such that she will be seen in the outpatient setting for  further evaluation and treatment. This is most likely due to a chronic  GI blood loss together with the patient's renal disease. The patient has known coronary artery disease, no complaints of chest  pain; hypertension, blood pressure 142/59. The patient had initially  hyperkalemia, this actually has resolved. The patient has known  cerebrovascular disease and dementia. In the last 24 hours, the patient had approximately 425 out with  approximately 1500 mL in for I and O. We eliminated any medications  previously which may have contributed to underlying disease. Unfortunately, circumstance in which the patient's renal failure is such  that she can now no longer balance her fluids. LABORATORY DATA:  Around the time of discharge/transfer; white cell  count 6.8, hemoglobin 7.9, hematocrit 26.5 and platelets 513,925. Sodium 139; potassium 4.8; chloride 105; CO2 of 24; BUN 49; creatinine  3.24, the travis in the last 48 hours was 3.19. GFR of 14, calcium 8.8,  glucose level 104 to 189 depending upon the patient's ingestion  schedule. DISCHARGE INSTRUCTIONS/FOLLOWUP:  Discharged, 12/12/2020. DIET:  Cardiac, diabetic. ACTIVITY:  As tolerated. CONDITION AT DISCHARGE:  Poor, requiring of Nephrology intervention. MEDICATIONS:  Lantus insulin 5 units subcutaneously daily together with  correction dosing; Flonase nasal spray; ferrous sulfate 325 mg twice a  day; Micatin powder, affective areas twice daily; allopurinol 300 mg  daily; amlodipine (Norvasc) 10 mg p.o. daily; aspirin 81 mg p.o. daily;  atorvastatin (Lipitor) 40 mg p.o. daily. Celexa (citalopram) 20 mg p.o.  daily; clopidogrel (Plavix) 75 mg p.o. daily; cetirizine (Zyrtec) 10 mg  p.o. daily; Exelon 4.6 mg per 24-hour patch, two patches transdermally,  change daily. Pantoprazole (Protonix) 40 mg daily.   Hydralazine (Apresoline) 75 mg three times a day. Namenda 10 mg twice daily. Oxybutynin (Ditropan) 5 mg twice daily, Senokot 1 twice daily. Follow up with the patient's personal physician upon discharge to [facility], [address] [place] [address]; Kyra Carver PA-C, [facility]. [address]  Cardiology, [address] [place] [address] [facility]. Dr. Juliet Aponte, General Surgery, [facility]. Follow up with  Nephrology. The patient is specifically advised that she would accept dialysis if  required. Any aspect of the patient's care not discussed in the chart and/or  dictation will be addressed and treated as an outpatient. The patient's medications have been reviewed including, but not limited  to, pre-hospital, hospital and discharge medications. The patient  and/or the patient's personal representatives were specifically advised  the only medications to be taken are those set forth in the discharge  orders and no other medications should be taken. Any prior medications  not on the discharge orders are specifically discontinued.       Mattie Oropeza MD    D: 12/12/2020 9:40:23       T: 12/12/2020 9:52:41     [email or web address]  Job#: [id number]     Doc#: [id number]  PART 2: D: 12/12/2020 9:43:19       T: 12/12/2020 12:33:18      [email or web address]  Job#: [id number]     Doc#: [id number]

## 2020-12-14 NOTE — PROGRESS NOTES
Patient instructed on bowel prep for colonoscopy and first glass given she said;  \"I told them I a, not going to be able to drink kall of this. I encouraged her to do little at a time. Will continue to encourage and educate  the importance.

## 2020-12-14 NOTE — ANESTHESIA PRE PROCEDURE
Department of Anesthesiology  Preprocedure Note       Name:  Bridger Walker   Age:  77 y.o.  :  1950                                          MRN:  [id number]         Date:  2020      Surgeon: Irving Simon):  Saranya Stokes MD    Procedure: Procedure(s):  EGD DIAGNOSTIC ONLY  COLONOSCOPY DIAGNOSTIC    Medications prior to admission:   Prior to Admission medications    Medication Sig Start Date End Date Taking? Authorizing Provider   senna (SENOKOT) 8.6 MG tablet Take 1 tablet by mouth 2 times daily 20  [facility] [place]   oxybutynin (DITROPAN) 5 MG tablet TAKE 1 TABLET BY MOUTH TWICE DAILY 20   [facility]   losartan (COZAAR) 50 MG tablet Take 1 tablet by mouth daily 20   [place], [place]   OXYGEN Oxgen at 2 liters per nasal cannula 20   [facility]   loratadine (CLARITIN) 10 MG tablet TAKE 1 TABLET(10 MG) BY MOUTH DAILY 20   [facility]   Lancets MISC Checks blood sugar ac and HS 20   [place], [place]   blood glucose monitor strips Test 3  times a day & as needed for symptoms of irregular blood glucose. Dispense sufficient amount for indicated testing frequency plus additional to accommodate PRN testing needs. 20   [facility]   Alcohol Swabs (ALCOHOL PREP) PADS Checks blood sugar 3 times a day 20   [facility], [address]  [employer] Standard walker with wheels    Diagnosis dementia, CHF 10/29/20   Ben Batters, APRN - CNP   atorvastatin (LIPITOR) 40 MG tablet TAKE 1 TABLET BY MOUTH EVERYDAY 10/23/20   Steviegilbert Mckinney, DO   bumetanide (BUMEX) 1 MG tablet Take 1 tablet by mouth daily 10/23/20   No Mckinney, DO   citalopram (CELEXA) 20 MG tablet Take 1 tablet by mouth daily 10/23/20   No Mckinney, DO   clopidogrel (PLAVIX) 75 MG tablet Take 1 tablet by mouth daily 10/23/20   Kaden Casanova, DO  promethazine (PHENERGAN) tablet 12.5 mg  12.5 mg Oral Q6H PRN SHIRA Yip CNP        Or    ondansetron (ZOFRAN) injection 4 mg  4 mg Intravenous Q6H PRN SHIRA Yip - CNP        acetaminophen (TYLENOL) tablet 650 mg  650 mg Oral Q6H PRN SHIRA Yip - CNP        Or    acetaminophen (TYLENOL) suppository 650 mg  650 mg Rectal Q6H PRN SHIRA Yip - CNP        albuterol (PROVENTIL) nebulizer solution 2.5 mg  2.5 mg Nebulization Q6H PRN SHIRA Yip - CNP        miconazole (MICOTIN) 2 % powder   Topical BID SHIRA Yip - CNP   Given at 12/14/20 0830    heparin (porcine) injection 5,000 Units  5,000 Units Subcutaneous 3 times per day SHIRA Sampson NP   5,000 Units at 12/14/20 5563    atorvastatin (LIPITOR) tablet 40 mg  40 mg Oral Daily Yumiko Grimm APRN - NP   40 mg at 12/14/20 0827    citalopram (CELEXA) tablet 20 mg  20 mg Oral Daily SHIRA Sampson - NP   20 mg at 12/14/20 0827    clopidogrel (PLAVIX) tablet 75 mg  75 mg Oral Daily SHIRA Sampson - NP   75 mg at 12/14/20 0827    insulin glargine (LANTUS) injection vial 20 Units  20 Units Subcutaneous Nightly SHIRA Sampsno - CURRY        rivastigmine (EXELON) 9.5 MG/24HR 1 patch  1 patch Transdermal Daily SHIRA Sampson NP   1 patch at 12/13/20 0904    insulin lispro (HUMALOG) injection vial 0-12 Units  0-12 Units Subcutaneous TID WC SHIRA Sampson NP   2 Units at 12/12/20 1750    insulin lispro (HUMALOG) injection vial 0-6 Units  0-6 Units Subcutaneous Nightly SHIRA Sampson - NP        sodium bicarbonate 75 mEq in sodium chloride 0.45 % 1,000 mL infusion   Intravenous Continuous SHIRA Carcamo CNP 75 mL/hr at 12/14/20 0828 New Bag at 12/14/20 0855    darbepoetin hung-polysorbate (ARANESP) injection 60 mcg  60 mcg Subcutaneous Weekly Vitor Stewart MD   60 mcg at 12/12/20 6100       Allergies:     Allergies   Allergen Reactions  Bactrim [Sulfamethoxazole-Trimethoprim] Hives    Clonidine Derivatives     Amoxicillin-Pot Clavulanate Diarrhea, Nausea Only and Nausea And Vomiting       Problem List:    Patient Active Problem List   Diagnosis Code    Dementia ([facility] F03.90    Hypertension I10    Hyperlipidemia E78.5    Class 2 severe obesity with serious comorbidity and body mass index (BMI) of 37.0 to 37.9 in adult ([facility]) E66.01, Z68.37    Gout M10.9    Peripheral polyneuropathy G62.9    Anxiety and depression F41.9, F32.9    Acute kidney injury superimposed on CKD ([facility]) N17.9, N18.9    Balance problem R26.89    CVA (cerebral vascular accident) ([facility] I63.9    Type 2 diabetes mellitus with hyperglycemia, with long-term current use of insulin ([facility]) E11.65, Z79.4    Acute on chronic diastolic heart failure ([facility]) I50.33    Acute cystitis N30.00    Infestation by bed bug B88.8    Infestation by maggots B87.9    Lymphedema of both lower extremities I89.0    PAD (peripheral artery disease) ([facility]) I73.9    Pressure injury of right heel, unstageable ([facility]) L89.610    Acute kidney injury ([facility] N17.9    Bradycardia R00.1    Hyperkalemia E87.5    AV block, 1st degree I44.0    Wounds, multiple T07. XXXA    Buttock wound S31.809A    Decubitus ulcer of trochanteric region of right hip, stage 2 ([facility]) I03.755    Decompensated heart failure ([facility]) I50.9    Acute anemia D64.9    Acute renal failure (ARF) ([facility]) N17.9    Anasarca R60.1       Past Medical History:        Diagnosis Date    Anxiety and depression     Background diabetic retinopathy(362.01) 2008    (Mild)    Balance problem     CAD (coronary artery disease)     (with coronary artery bypass graft x4).     Cancer of uterus ([facility]     history of, (probable cure)    Chronic kidney disease     Chronic low back pain     CVA (cerebral vascular accident) ([facility]     Dementia ([facility]     (moderate)    Dry eye syndrome     GERD (gastroesophageal reflux disease)  Glaucoma suspect     Gout     Homonymous hemianopsia     (Right)    Hyperlipidemia     Hypertension     Keratitis     (secondary to dry eye syndrome).  Obesity     Peripheral polyneuropathy     (diabetic)    Pseudophakos     (Right Eye: 2012; Left Eye: 2012) - Dr. Cristela Celis.  Stroke [facility])     (Multiple) MRI of brain 10/2008 shows multiple infarcts involving the temporal and parietal areas.  Trichiasis     (left eye)    Type 2 diabetes mellitus ([address]        Past Surgical History:        Procedure Laterality Date    CATARACT REMOVAL WITH IMPLANT  2012    (Right eye) - Dr. Cristela Celis.  CATARACT REMOVAL WITH IMPLANT  2012    (Left eye) - Dr. Cristela Celis.   SECTION      CHOLECYSTECTOMY      CORONARY ARTERY BYPASS GRAFT  12-    (x4) - LIMA-LAD, SVG-diagnoal 1, SVG-OM1, and SVG-PDA. Exploration of left radial. (Dr. Andressa Joy).  EYE SURGERY Left     as a child     GASTRIC BYPASS SURGERY      HYSTERECTOMY      (abdominal)  with left oophorectomy. Right ovary retained.  TONSILLECTOMY AND ADENOIDECTOMY         Social History:    Social History     Tobacco Use    Smoking status: Never Smoker    Smokeless tobacco: Never Used    Tobacco comment: never smoker eclamb rrt 17   Substance Use Topics    Alcohol use:  No                                Counseling given: Not Answered  Comment: never smoker eclamb rrt 17      Vital Signs (Current):   Vitals:    20 2310 20 0345 20 0600 20 0909   BP:    139/83   Pulse:    84   Resp: 16 16  22   Temp:    36.4 °C (97.5 °F)   TempSrc:       SpO2:    97%   Weight:   (!) 300 lb 0.7 oz (136.1 kg)    Height:                                                  BP Readings from Last 3 Encounters:   20 139/83   20 (!) 177/70   20 (!) 144/78       NPO Status:                                                                                 BMI:

## 2020-12-14 NOTE — PROGRESS NOTES
[facility]   Progress Note                   Date:   12/14/2020  Patient name: Anastacio Das  Date of admission:  12/12/2020  4:17 AM  MRN:   [id number]  YOB: 1950  PCP: ERINN Lance    Reason for Admission: Acute renal failure (ARF) ([facility] [N17.9]    Subjective:       Clinical Changes / Abnormalities: Pt seen and examined in the room. She denies any CP or SOB. Continues to have edema. EGD today. -2.3 L  Since admission   Medications:   Scheduled Meds:   carvedilol  6.25 mg Oral BID WC    pantoprazole  40 mg Oral BID AC    furosemide  40 mg Intravenous BID    sodium chloride flush  10 mL Intravenous 2 times per day    miconazole   Topical BID    heparin (porcine)  5,000 Units Subcutaneous 3 times per day    atorvastatin  40 mg Oral Daily    citalopram  20 mg Oral Daily    clopidogrel  75 mg Oral Daily    insulin glargine  20 Units Subcutaneous Nightly    rivastigmine  1 patch Transdermal Daily    insulin lispro  0-12 Units Subcutaneous TID WC    insulin lispro  0-6 Units Subcutaneous Nightly    darbepoetin hung-polysorbate  60 mcg Subcutaneous Weekly     Continuous Infusions:   IV infusion builder 75 mL/hr at 12/14/20 0828     CBC:   Recent Labs     12/12/20  0606 12/13/20  1054 12/14/20  0551   WBC 7.1 6.6 6.0   HGB 8.1* 7.7* 8.0*    231 231     BMP:    Recent Labs     12/12/20  0606 12/13/20  1054 12/14/20  0551    141 139   K 4.7 4.5 4.3    107 105   CO2 19* 20 21   BUN 49* 50* 50*   CREATININE 3.16* 2.81* 2.52*   GLUCOSE 166* 130* 131*     Hepatic:   Recent Labs     12/12/20  0606   AST 16   ALT 7   BILITOT 0.37   ALKPHOS 129*     Troponin: No results for input(s): TROPHS in the last 72 hours. BNP: No results for input(s): BNP in the last 72 hours. Lipids: No results for input(s): CHOL, HDL in the last 72 hours.     Invalid input(s): LDLCALCU  INR:   Recent Labs     12/12/20  0606   INR 1.0       Objective: Vitals: BP (!) 156/69   Pulse 78   Temp 97.6 °F (36.4 °C) (Oral)   Resp 16   Ht 5' 7\" (1.702 m)   Wt (!) 300 lb 0.7 oz (136.1 kg)   SpO2 94%   BMI 46.99 kg/m²   General appearance: alert and cooperative with exam  HEENT: Head: Normocephalic, no lesions, without obvious abnormality. Neck: no JVD, trachea midline, no adenopathy  Lungs: diminished  to auscultation  Heart: Regular rate and rhythm, s1/s2 auscultated, no murmurs  Abdomen: soft, non-tender, bowel sounds active  Extremities: +2 edema, ACE wraps on bilateral legs   Neurologic: not done        Assessment / Acute Cardiac Problems:   1. Acute on chronic CHF   2. Anasarca  3. AMY on CKD   4. DM   5. HTN   6. Ho CABG   7.  Anemia     Patient Active Problem List:     Dementia ([address]     Hypertension     Hyperlipidemia     Class 2 severe obesity with serious comorbidity and body mass index (BMI) of 37.0 to 37.9 in [facility])     Gout     Peripheral polyneuropathy     Anxiety and depression     Acute kidney injury superimposed on CKD ([address]     Balance problem     CVA (cerebral vascular accident) ([address]     Type 2 diabetes mellitus with hyperglycemia, with long-term current use of insulin (HCC)     Acute on chronic diastolic heart failure ([address]     Acute cystitis     Infestation by bed bug     Infestation by maggots     Lymphedema of both lower extremities     PAD (peripheral artery disease) (HCC)     Pressure injury of right heel, unstageable ([address]     Acute kidney injury ([address]     Bradycardia     Hyperkalemia     AV block, 1st degree     Wounds, multiple     Buttock wound     Decubitus ulcer of trochanteric region of right hip, stage 2 (HCC)     Decompensated heart failure (HCC)     Acute anemia     Acute renal failure (ARF) ([address]     Anasarca      Plan of Treatment: 1. Acute on chronic chf. No need for repeat ECHO. Diastolic CHF. Appreciate Nephrology assistance with diuretics- IV lasix BID curerntly. Continue coreg. No ACE due to CKD. CKD improving. 2. CAD stable. Hx of CABG. Continue statin, BB, plavix. 3. Anemia per primary. Recommend HGB > 9. GI workup today. 4. Keep K> 4 and Mag > 2   5. HTN- Remains elevated.   Will increase coreg to 12.5mg BID       Electronically signed by SHIRA Mullins CNP on 12/14/2020 at 8:50 [address] [facility].  [phone]

## 2020-12-14 NOTE — DISCHARGE SUMMARY
[address]                 [address] [place] [address], [address] [place] [address]                               DISCHARGE SUMMARY    PATIENT NAME: Paty Malave                     :        1950  MED REC NO:   [id number]                             ROOM:       1715  ACCOUNT NO:   [de-identified]                           ADMIT DATE: 2020  PROVIDER:     Sid Ruby. Jennifer Case MD                  DISCHARGE DATE:  2020    HOSPITAL COURSE:  The patient presented, 2020, congestive heart  failure, acute-on-chronic systolic. The patient was given diuretics;  unfortunately, the patient's kidney function did not sustain this, the  patient's creatinine levels jumping into the 3.3 range with decreased  urinary output. Despite getting fluids and diuretics, the patient's  urinary output was very low. patient is being transferred to [facility] for further evaluation and treatment where  Nephrology is available, a discipline not available at [facility]. The patient has had bradycardia with pauses. This has been seen by  Cardiology during the course of hospitalization, adjustment of  medications. The patient had also presented on 2020 with GI  bleeding, Hemoccult positive. The patient received 2-unit PRBC  transfusion on 2020. Hemoglobin has remained in the 7, 7, 7, 9  range since that time. At some point, the patient will require  endoscopy. The patient was seen by Dr. Michelle Andujar, General Surgery, this  hospitalization such that she will be seen in the outpatient setting for  further evaluation and treatment. This is most likely due to a chronic  GI blood loss together with the patient's renal disease. The patient has known coronary artery disease, no complaints of chest  pain;     hypertension, blood pressure 142/59. initially  hyperkalemia, this actually has resolved.       The patient has known cerebrovascular disease and dementia. In the last 24 hours, the patient had approximately 425 out with  approximately 1500 mL in for I and O. We eliminated any medications  previously which may have contributed to underlying disease. Unfortunately, circumstance in which the patient's renal failure is such  that she can now no longer balance her fluids. LABORATORY DATA:  Around the time of discharge/transfer; white cell  count 6.8, hemoglobin 7.9, hematocrit 26.5 and platelets 312,095. Sodium 139; potassium 4.8; chloride 105; CO2 of 24; BUN 49; creatinine  3.24, the travis in the last 48 hours was 3.19. GFR of 14, calcium 8.8,  glucose level 104 to 189 depending upon the patient's ingestion  schedule. DISCHARGE INSTRUCTIONS/FOLLOWUP:  Discharged, 12/12/2020. DIET:  Cardiac, diabetic. ACTIVITY:  As tolerated. CONDITION AT DISCHARGE:  Poor, requiring of Nephrology intervention. MEDICATIONS:  Lantus insulin 5 units subcutaneously daily together with  correction dosing; Flonase nasal spray; ferrous sulfate 325 mg twice a  day; Micatin powder, affective areas twice daily; allopurinol 300 mg  daily; amlodipine (Norvasc) 10 mg p.o. daily; aspirin 81 mg p.o. daily;  atorvastatin (Lipitor) 40 mg p.o. daily. Celexa (citalopram) 20 mg p.o.  daily; clopidogrel (Plavix) 75 mg p.o. daily; cetirizine (Zyrtec) 10 mg  p.o. daily; Exelon 4.6 mg per 24-hour patch, two patches transdermally,  change daily. Pantoprazole (Protonix) 40 mg daily. Hydralazine  (Apresoline) 75 mg three times a day. Namenda 10 mg twice daily. Oxybutynin (Ditropan) 5 mg twice daily, Senokot 1 twice daily. Follow up with the patient's personal physician upon discharge to [facility]; Neptali Lu PA-C, [facility]. [address] Cardiology, [facility]. Dr. Elia Reddy, General Surgery, [facility]. Follow up with  Nephrology. The patient is specifically advised that she would accept dialysis if  required. Any aspect of the patient's care not discussed in the chart and/or  dictation will be addressed and treated as an outpatient. The patient's medications have been reviewed including, but not limited  to, pre-hospital, hospital and discharge medications. The patient  and/or the patient's personal representatives were specifically advised  the only medications to be taken are those set forth in the discharge  orders and no other medications should be taken. Any prior medications  not on the discharge orders are specifically discontinued.         Miah Adorno MD    D: 12/12/2020 9:43:19       T: 12/12/2020 12:33:18     [email or web address]  Job#: [id number]     Doc#: [id number]

## 2020-12-15 PROBLEM — R78.81 GRAM-NEGATIVE BACTEREMIA: Status: ACTIVE | Noted: 2020-12-15

## 2020-12-15 LAB
ABSOLUTE EOS #: 0 K/UL (ref 0–0.4)
ABSOLUTE IMMATURE GRANULOCYTE: 0 K/UL (ref 0–0.3)
ABSOLUTE LYMPH #: 0.23 K/UL (ref 1–4.8)
ABSOLUTE MONO #: 0.31 K/UL (ref 0.1–0.8)
ALBUMIN SERPL-MCNC: 3 G/DL (ref 3.5–5.2)
ALBUMIN SERPL-MCNC: 3 G/DL (ref 3.5–5.2)
ALBUMIN/GLOBULIN RATIO: 1 (ref 1–2.5)
ALP BLD-CCNC: 103 U/L (ref 35–104)
ALT SERPL-CCNC: 8 U/L (ref 5–33)
ANION GAP SERPL CALCULATED.3IONS-SCNC: 9 MMOL/L (ref 9–17)
AST SERPL-CCNC: 16 U/L
BASOPHILS # BLD: 1 % (ref 0–2)
BASOPHILS ABSOLUTE: 0.08 K/UL (ref 0–0.2)
BILIRUB SERPL-MCNC: 0.4 MG/DL (ref 0.3–1.2)
BILIRUBIN DIRECT: 0.23 MG/DL
BILIRUBIN, INDIRECT: 0.17 MG/DL (ref 0–1)
BUN BLDV-MCNC: 47 MG/DL (ref 8–23)
BUN/CREAT BLD: ABNORMAL (ref 9–20)
CALCIUM SERPL-MCNC: 8.3 MG/DL (ref 8.6–10.4)
CHLORIDE BLD-SCNC: 108 MMOL/L (ref 98–107)
CO2: 26 MMOL/L (ref 20–31)
CREAT SERPL-MCNC: 2.37 MG/DL (ref 0.5–0.9)
DIFFERENTIAL TYPE: ABNORMAL
EOSINOPHILS RELATIVE PERCENT: 0 % (ref 1–4)
GFR AFRICAN AMERICAN: 25 ML/MIN
GFR NON-AFRICAN AMERICAN: 20 ML/MIN
GFR SERPL CREATININE-BSD FRML MDRD: ABNORMAL ML/MIN/{1.73_M2}
GFR SERPL CREATININE-BSD FRML MDRD: ABNORMAL ML/MIN/{1.73_M2}
GLOBULIN: ABNORMAL G/DL (ref 1.5–3.8)
GLUCOSE BLD-MCNC: 124 MG/DL (ref 65–105)
GLUCOSE BLD-MCNC: 135 MG/DL (ref 65–105)
GLUCOSE BLD-MCNC: 138 MG/DL (ref 65–105)
GLUCOSE BLD-MCNC: 145 MG/DL (ref 65–105)
GLUCOSE BLD-MCNC: 148 MG/DL (ref 70–99)
HCT VFR BLD CALC: 25.7 % (ref 36.3–47.1)
HEMOGLOBIN: 7.6 G/DL (ref 11.9–15.1)
IMMATURE GRANULOCYTES: 0 %
LYMPHOCYTES # BLD: 3 % (ref 24–44)
MAGNESIUM: 1.8 MG/DL (ref 1.6–2.6)
MCH RBC QN AUTO: 30 PG (ref 25.2–33.5)
MCHC RBC AUTO-ENTMCNC: 29.6 G/DL (ref 28.4–34.8)
MCV RBC AUTO: 101.6 FL (ref 82.6–102.9)
MONOCYTES # BLD: 4 % (ref 1–7)
MORPHOLOGY: ABNORMAL
NRBC AUTOMATED: 0 PER 100 WBC
OSMOLALITY URINE: 351 MOSM/KG (ref 80–1300)
PDW BLD-RTO: 15.9 % (ref 11.8–14.4)
PHOSPHORUS: 4.2 MG/DL (ref 2.6–4.5)
PLATELET # BLD: 197 K/UL (ref 138–453)
PLATELET ESTIMATE: ABNORMAL
PMV BLD AUTO: 10.4 FL (ref 8.1–13.5)
POTASSIUM SERPL-SCNC: 3.9 MMOL/L (ref 3.7–5.3)
RBC # BLD: 2.53 M/UL (ref 3.95–5.11)
RBC # BLD: ABNORMAL 10*6/UL
SEG NEUTROPHILS: 92 % (ref 36–66)
SEGMENTED NEUTROPHILS ABSOLUTE COUNT: 7.08 K/UL (ref 1.8–7.7)
SODIUM BLD-SCNC: 143 MMOL/L (ref 135–144)
TOTAL PROTEIN: 6.1 G/DL (ref 6.4–8.3)
WBC # BLD: 7.7 K/UL (ref 3.5–11.3)
WBC # BLD: ABNORMAL 10*3/UL

## 2020-12-15 PROCEDURE — 93005 ELECTROCARDIOGRAM TRACING: CPT | Performed by: INTERNAL MEDICINE

## 2020-12-15 PROCEDURE — 6360000002 HC RX W HCPCS: Performed by: INTERNAL MEDICINE

## 2020-12-15 PROCEDURE — 80076 HEPATIC FUNCTION PANEL: CPT

## 2020-12-15 PROCEDURE — 36415 COLL VENOUS BLD VENIPUNCTURE: CPT

## 2020-12-15 PROCEDURE — 99232 SBSQ HOSP IP/OBS MODERATE 35: CPT | Performed by: INTERNAL MEDICINE

## 2020-12-15 PROCEDURE — 84100 ASSAY OF PHOSPHORUS: CPT

## 2020-12-15 PROCEDURE — 83935 ASSAY OF URINE OSMOLALITY: CPT

## 2020-12-15 PROCEDURE — 6370000000 HC RX 637 (ALT 250 FOR IP): Performed by: NURSE PRACTITIONER

## 2020-12-15 PROCEDURE — 1200000000 HC SEMI PRIVATE

## 2020-12-15 PROCEDURE — 87040 BLOOD CULTURE FOR BACTERIA: CPT

## 2020-12-15 PROCEDURE — 80069 RENAL FUNCTION PANEL: CPT

## 2020-12-15 PROCEDURE — 85025 COMPLETE CBC W/AUTO DIFF WBC: CPT

## 2020-12-15 PROCEDURE — 87205 SMEAR GRAM STAIN: CPT

## 2020-12-15 PROCEDURE — 6370000000 HC RX 637 (ALT 250 FOR IP): Performed by: INTERNAL MEDICINE

## 2020-12-15 PROCEDURE — 87150 DNA/RNA AMPLIFIED PROBE: CPT

## 2020-12-15 PROCEDURE — 84300 ASSAY OF URINE SODIUM: CPT

## 2020-12-15 PROCEDURE — 2580000003 HC RX 258: Performed by: INTERNAL MEDICINE

## 2020-12-15 PROCEDURE — 87186 SC STD MICRODIL/AGAR DIL: CPT

## 2020-12-15 PROCEDURE — 83735 ASSAY OF MAGNESIUM: CPT

## 2020-12-15 PROCEDURE — 82248 BILIRUBIN DIRECT: CPT

## 2020-12-15 PROCEDURE — 80053 COMPREHEN METABOLIC PANEL: CPT

## 2020-12-15 PROCEDURE — 82947 ASSAY GLUCOSE BLOOD QUANT: CPT

## 2020-12-15 RX ORDER — PANTOPRAZOLE SODIUM 40 MG/1
40 TABLET, DELAYED RELEASE ORAL
Status: DISCONTINUED | OUTPATIENT
Start: 2020-12-16 | End: 2020-12-23 | Stop reason: HOSPADM

## 2020-12-15 RX ADMIN — ANTI-FUNGAL POWDER MICONAZOLE NITRATE TALC FREE: 1.42 POWDER TOPICAL at 09:19

## 2020-12-15 RX ADMIN — HEPARIN SODIUM 5000 UNITS: 5000 INJECTION INTRAVENOUS; SUBCUTANEOUS at 15:13

## 2020-12-15 RX ADMIN — HEPARIN SODIUM 5000 UNITS: 5000 INJECTION INTRAVENOUS; SUBCUTANEOUS at 20:33

## 2020-12-15 RX ADMIN — HEPARIN SODIUM 5000 UNITS: 5000 INJECTION INTRAVENOUS; SUBCUTANEOUS at 06:41

## 2020-12-15 RX ADMIN — CEFEPIME HYDROCHLORIDE 2 G: 2 INJECTION, POWDER, FOR SOLUTION INTRAVENOUS at 17:43

## 2020-12-15 RX ADMIN — ANTI-FUNGAL POWDER MICONAZOLE NITRATE TALC FREE: 1.42 POWDER TOPICAL at 20:33

## 2020-12-15 RX ADMIN — INSULIN LISPRO 2 UNITS: 100 INJECTION, SOLUTION INTRAVENOUS; SUBCUTANEOUS at 09:31

## 2020-12-15 RX ADMIN — CITALOPRAM 20 MG: 20 TABLET, FILM COATED ORAL at 09:18

## 2020-12-15 RX ADMIN — CLOPIDOGREL 75 MG: 75 TABLET, FILM COATED ORAL at 09:18

## 2020-12-15 RX ADMIN — FUROSEMIDE 40 MG: 10 INJECTION, SOLUTION INTRAMUSCULAR; INTRAVENOUS at 09:18

## 2020-12-15 RX ADMIN — CARVEDILOL 12.5 MG: 12.5 TABLET, FILM COATED ORAL at 09:18

## 2020-12-15 RX ADMIN — PANTOPRAZOLE SODIUM 40 MG: 40 TABLET, DELAYED RELEASE ORAL at 06:48

## 2020-12-15 RX ADMIN — FUROSEMIDE 40 MG: 10 INJECTION, SOLUTION INTRAMUSCULAR; INTRAVENOUS at 18:24

## 2020-12-15 RX ADMIN — DESMOPRESSIN ACETATE 40 MG: 0.2 TABLET ORAL at 09:18

## 2020-12-15 RX ADMIN — INSULIN GLARGINE 20 UNITS: 100 INJECTION, SOLUTION SUBCUTANEOUS at 20:33

## 2020-12-15 NOTE — PROGRESS NOTES
[facility]   Progress Note                   Date:   12/15/2020  Patient name: Corinne Philip  Date of admission:  12/12/2020  4:17 AM  MRN:   [id number]  YOB: 1950  PCP: ERINN Zamora    Reason for Admission: Acute renal failure (ARF[facility] ([facility] [N17.9]    Subjective:       Clinical Changes / Abnormalities: Pt seen and examined in the room. She denies any CP or SOB. Swelling worse today. On 02 per NC. Sodium bicarb running.       -3.3 L  Since admission   Medications:   Scheduled Meds:   carvedilol  12.5 mg Oral BID WC    pantoprazole  40 mg Oral BID AC    furosemide  40 mg Intravenous BID    sodium chloride flush  10 mL Intravenous 2 times per day    miconazole   Topical BID    heparin (porcine)  5,000 Units Subcutaneous 3 times per day    atorvastatin  40 mg Oral Daily    citalopram  20 mg Oral Daily    clopidogrel  75 mg Oral Daily    insulin glargine  20 Units Subcutaneous Nightly    rivastigmine  1 patch Transdermal Daily    insulin lispro  0-12 Units Subcutaneous TID WC    insulin lispro  0-6 Units Subcutaneous Nightly    darbepoetin hung-polysorbate  60 mcg Subcutaneous Weekly     Continuous Infusions:   IV infusion builder 75 mL/hr at 12/14/20 0828     CBC:   Recent Labs     12/13/20  1054 12/14/20  0551   WBC 6.6 6.0   HGB 7.7* 8.0*    231     BMP:    Recent Labs     12/13/20  1054 12/14/20  0551    139   K 4.5 4.3    105   CO2 20 21   BUN 50* 50*   CREATININE 2.81* 2.52*   GLUCOSE 130* 131*     Hepatic:   No results for input(s): AST, ALT, ALB, BILITOT, ALKPHOS in the last 72 hours. Troponin: No results for input(s): TROPHS in the last 72 hours. BNP: No results for input(s): BNP in the last 72 hours. Lipids: No results for input(s): CHOL, HDL in the last 72 hours. Invalid input(s): LDLCALCU  INR:   No results for input(s): INR in the last 72 hours.     Objective: Vitals: BP (!) 147/67   Pulse 71   Temp 98.4 °F (36.9 °C) (Oral)   Resp 20   Ht 5' 7\" (1.702 m)   Wt 296 lb 8.3 oz (134.5 kg)   SpO2 99%   BMI 46.44 kg/m²   General appearance: alert and cooperative with exam  HEENT: Head: Normocephalic, no lesions, without obvious abnormality. Neck: no JVD, trachea midline, no adenopathy  Lungs: diminished with fine rales in bases to auscultation, on 02 per NC   Heart: Regular rate and rhythm, s1/s2 auscultated, no murmurs  Abdomen: soft, non-tender, bowel sounds active  Extremities: +2 edema, ACE wraps on bilateral legs, Upper extremities + 2, weaping edema   Neurologic: not done        Assessment / Acute Cardiac Problems:   1. Acute on chronic CHF   2. Anasarca  3. AMY on CKD   4. DM   5. HTN   6. Ho CABG   7.  Anemia     Patient Active Problem List:     Dementia ([address]     Hypertension     Hyperlipidemia     Class 2 severe obesity with serious comorbidity and body mass index (BMI) of 37.0 to 37.9 in [facility])     Gout     Peripheral polyneuropathy     Anxiety and depression     Acute kidney injury superimposed on CKD ([address]     Balance problem     CVA (cerebral vascular accident) ([address]     Type 2 diabetes mellitus with hyperglycemia, with long-term current use of insulin (HCC)     Acute on chronic diastolic heart failure ([address]     Acute cystitis     Infestation by bed bug     Infestation by maggots     Lymphedema of both lower extremities     PAD (peripheral artery disease) (HCC)     Pressure injury of right heel, unstageable ([address]     Acute kidney injury ([address]     Bradycardia     Hyperkalemia     AV block, 1st degree     Wounds, multiple     Buttock wound     Decubitus ulcer of trochanteric region of right hip, stage 2 (HCC)     Decompensated heart failure (HCC)     Acute anemia     Acute renal failure (ARF) ([address]     Anasarca      Plan of Treatment: 1. Acute on chronic chf. No need for repeat ECHO. Diastolic CHF. Appreciate Nephrology assistance with diuretics- IV lasix BID curerntly. Continue coreg. No ACE due to CKD. CKD improving. 2. CAD stable. Hx of CABG. Continue statin, BB, plavix. 3. Anemia per primary/GI. Recommend HGB > 9  4. Keep K> 4 and Mag > 2   5. HTN-  Improving.  Continue BB       Electronically signed by SHIRA Rubio - CNP on 12/15/2020 at 8:46 AM  [address]  [phone]

## 2020-12-15 NOTE — PROGRESS NOTES
PATIENT REFUSES TO WEAR BIPAP     [x] Risks and benefits explained to patient   [x] Patient refuses to wear Bipap stating she feels sick  [x] Patient verbalizes understanding of information presented.

## 2020-12-15 NOTE — PROGRESS NOTES
NEPHROLOGY PROGRESS NOTE      SUBJECTIVE     Hospitalized with exertional shortness of breath/lower worsening lower extremity swelling and noted to be having anemia along with decompensated congestive heart failure and acute kidney injury. Currently receiving IV Lasix 40 twice daily. Urine output decent. Also on IV fluids. Bicarbonate 26. Present lower extremity swelling. EGD findings noted. Renal function continues to improve. OBJECTIVE     Vitals:    12/14/20 1627 12/14/20 2000 12/15/20 0529 12/15/20 0738   BP: (!) 150/66 122/60  (!) 147/67   Pulse: 70 66  71   Resp: 18 18  20   Temp: 98.2 °F (36.8 °C) 98.4 °F (36.9 °C)     TempSrc: Oral Oral     SpO2: 98% 98%  99%   Weight:   296 lb 8.3 oz (134.5 kg)    Height:         24HR INTAKE/OUTPUT:      Intake/Output Summary (Last 24 hours) at 12/15/2020 1311  Last data filed at 12/15/2020 1238  Gross per 24 hour   Intake 450 ml   Output 825 ml   Net -375 ml       General appearance:Awake, alert, in no acute distress  HEENT: PERRLA  Respiratory::vesicular breath sounds, presently  Cardiovascular:S1 S2 normal,no gallop or organic murmur. Abdomen:Non tender/non distended. Bowel sounds present  Extremities: No Cyanosis or Clubbing, present lower extremity edema  Neurological:Alert and oriented. No abnormalities of mood, affect, memory, mentation, or behavior are noted      MEDICATIONS     Scheduled Meds:    [START ON 12/16/2020] pantoprazole  40 mg Oral QAM AC    carvedilol  12.5 mg Oral BID WC    furosemide  40 mg Intravenous BID    sodium chloride flush  10 mL Intravenous 2 times per day    miconazole   Topical BID    heparin (porcine)  5,000 Units Subcutaneous 3 times per day    atorvastatin  40 mg Oral Daily    citalopram  20 mg Oral Daily    clopidogrel  75 mg Oral Daily    insulin glargine  20 Units Subcutaneous Nightly    rivastigmine  1 patch Transdermal Daily    insulin lispro  0-12 Units Subcutaneous TID WC  insulin lispro  0-6 Units Subcutaneous Nightly    darbepoetin hung-polysorbate  60 mcg Subcutaneous Weekly     Continuous Infusions:   PRN Meds:  sodium chloride flush, nicotine, promethazine **OR** ondansetron, acetaminophen **OR** acetaminophen, albuterol  Home Meds:                Medications Prior to Admission: aspirin 81 MG tablet, Take 1 tablet by mouth daily  senna (SENOKOT) 8.6 MG tablet, Take 1 tablet by mouth 2 times daily  oxybutynin (DITROPAN) 5 MG tablet, TAKE 1 TABLET BY MOUTH TWICE DAILY  losartan (COZAAR) 50 MG tablet, Take 1 tablet by mouth daily  OXYGEN, Oxgen at 2 liters per nasal cannula  loratadine (CLARITIN) 10 MG tablet, TAKE 1 TABLET(10 MG) BY MOUTH DAILY  Lancets MISC, Checks blood sugar ac and HS  blood glucose monitor strips, Test 3  times a day & as needed for symptoms of irregular blood glucose. Dispense sufficient amount for indicated testing frequency plus additional to accommodate PRN testing needs. Alcohol Swabs (ALCOHOL PREP) PADS, Checks blood sugar 3 times a day  Misc.  Devices MISC, Standard walker with wheels  Diagnosis dementia, CHF  atorvastatin (LIPITOR) 40 MG tablet, TAKE 1 TABLET BY MOUTH EVERYDAY  bumetanide (BUMEX) 1 MG tablet, Take 1 tablet by mouth daily  citalopram (CELEXA) 20 MG tablet, Take 1 tablet by mouth daily  clopidogrel (PLAVIX) 75 MG tablet, Take 1 tablet by mouth daily  insulin lispro (HUMALOG) 100 UNIT/ML injection vial, Use 4 times a day per sliding scale  hydrALAZINE (APRESOLINE) 50 MG tablet, Take 1.5 tablets by mouth every 8 hours  insulin glargine (LANTUS) 100 UNIT/ML injection vial, Inject 20 Units into the skin nightly  memantine (NAMENDA) 10 MG tablet, TAKE 1 TABLET BY MOUTH TWICE DAILY  omeprazole (PRILOSEC) 20 MG delayed release capsule, Take 1 capsule by mouth every morning (before breakfast)  rivastigmine (EXELON) 9.5 MG/24HR, APPLY 1 NEW PATCH EVERY 24 HOURS  Diabetic Shoe MISC, by Does not apply route Compression Stockings MISC, by Does not apply route 20-30 mm Hg bilateral knee high  nystatin (MYCOSTATIN) 079711 UNIT/GM cream, Apply topically 2 times daily. INVESTIGATIONS     Last 3 CMP:    Recent Labs     12/13/20  1054 12/14/20  0551 12/15/20  1103    139 143   K 4.5 4.3 3.9    105 108*   CO2 20 21 26   BUN 50* 50* 47*   CREATININE 2.81* 2.52* 2.37*   CALCIUM 8.6 8.9 8.3*   LABALBU 3.1* 3.1* 3.0*       Last 3 CBC:  Recent Labs     12/13/20  1054 12/14/20  0551 12/15/20  1103   WBC 6.6 6.0 7.7   RBC 2.53* 2.61* 2.53*   HGB 7.7* 8.0* 7.6*   HCT 25.8* 27.0* 25.7*   .0 103.4* 101.6   MCH 30.4 30.7 30.0   MCHC 29.8 29.6 29.6   RDW 16.2* 16.3* 15.9*    231 197   MPV 10.4 10.3 10.4       ASSESSMENT     1. Acute kidney injury nonoliguric secondary to prerenal injury from reduced E ABV from anemia/decompensated LVDD/cor pulmonale - improving  2. Chronic kidney disease stage III secondary diabetic ischemic nephrosclerosis with baseline creatinine 1.2-1.4. Follows up with Dr. Luzma Rivera. 3.  Nonnephrotic range proteinuria from diabetes  4. Anemia status post EGD which showed mild gastritis. Also on JOBY  5. Decompensated left ventricular diastolic/cor pulmonale  6. History of CABG  7. History of type 2 diabetes    PLAN     1. Discontinue IV fluids  2. Continue IV Lasix today  3. Continue to hold angiotensin receptor blockers  4.   We will follow    Please do not hesitate to call with questions    This note is created with the assistance of a speech-recognition program. While intending to generate a document that actually reflects the content of the visit, no guarantees can be provided that every mistake has been identified and corrected by editing    Jessica Moyer MD, MRCP Colletta Kingfisher), FACP   12/15/2020 1:11 PM  [facility]

## 2020-12-15 NOTE — PROGRESS NOTES
This is a 26-year-old female who initially presented to outside hospital with anemia, hemoglobin of 6.8. She was given hemoglobin and developed lower extremity edema and was subsequently diuresed. She was transferred here for acute renal failure. She was evaluated by nephrology who reviewed her symptoms to [facility] due to intravascular depletion. She was started on half bicarb drip and IV Lasix. Her creatinine is slowly improving now. Patient was evaluated by GI for her anemia: EGD shows mild gastritis. Blood culture: 12/15 positive for gram negative rods in one culture. Repeat cultures, start cefepime. Review of Systems:     Constitutional:  negative for chills, fevers, sweats  Respiratory: Admits to shortness of breath which she says is chronic for her   cardiovascular:  negative for chest pain, chest pressure/discomfort, admits to lower extremity edema  Gastrointestinal:  negative for abdominal pain, constipation, diarrhea, nausea, vomiting  Neurological:  negative for dizziness, headache    Medications: Allergies:     Allergies   Allergen Reactions    Bactrim [Sulfamethoxazole-Trimethoprim] Hives    Clonidine Derivatives     Amoxicillin-Pot Clavulanate Diarrhea, Nausea Only and Nausea And Vomiting       Current Meds:   Scheduled Meds:    [START ON 12/16/2020] pantoprazole  40 mg Oral QAM AC    cefepime  2 g Intravenous Q12H    carvedilol  12.5 mg Oral BID WC    furosemide  40 mg Intravenous BID    sodium chloride flush  10 mL Intravenous 2 times per day    miconazole   Topical BID    heparin (porcine)  5,000 Units Subcutaneous 3 times per day    atorvastatin  40 mg Oral Daily    citalopram  20 mg Oral Daily    clopidogrel  75 mg Oral Daily    insulin glargine  20 Units Subcutaneous Nightly    rivastigmine  1 patch Transdermal Daily    insulin lispro  0-12 Units Subcutaneous TID WC    insulin lispro  0-6 Units Subcutaneous Nightly  darbepoetin hung-polysorbate  60 mcg Subcutaneous Weekly     Continuous Infusions:     PRN Meds: sodium chloride flush, nicotine, promethazine **OR** ondansetron, acetaminophen **OR** acetaminophen, albuterol    Data:     Past Medical History:   has a past medical history of Anxiety and depression, Background diabetic retinopathy(362.01), Balance problem, CAD (coronary artery disease), Cancer of uterus ([facility] [address] Chronic kidney disease, Chronic low back pain, CVA (cerebral vascular accident) ([facility] [address] Dementia ([facility] [address] Dry eye syndrome, GERD (gastroesophageal reflux disease), Glaucoma suspect, Gout, Homonymous hemianopsia, Hyperlipidemia, Hypertension, Keratitis, Obesity, Peripheral polyneuropathy, Pseudophakos, Stroke ([facility] [address] Trichiasis, and Type 2 diabetes mellitus ([facility] [address] Social History:   reports that she has never smoked. She has never used smokeless tobacco. She reports that she does not drink alcohol or use drugs. Family History:   Family History   Problem Relation Age of Onset    Diabetes Mother     Cancer Mother     High Blood Pressure Mother    Blanka Stabs Stroke Mother     Kidney Disease Mother     Uterine Cancer Mother     Diabetes Father     Heart Disease Father     Glaucoma Father     Emphysema Father     Coronary Art Dis Other         All 4 siblings.  Stroke Other         1 sibling.  Lung Cancer Other         1 sibling - cause of death lung cancer.  Mental Retardation Other        Vitals:  BP (!) 147/67   Pulse 71   Temp 98.4 °F (36.9 °C) (Oral)   Resp 20   Ht 5' 7\" (1.702 m)   Wt 296 lb 8.3 oz (134.5 kg)   SpO2 99%   BMI 46.44 kg/m²   Temp (24hrs), Av.3 °F (36.8 °C), Min:98.2 °F (36.8 °C), Max:98.4 °F (36.9 °C)    Recent Labs     20  1609 205 12/15/20  0747 12/15/20  1107   POCGLU 201* 206* 145* 135*       I/O (24Hr):     Intake/Output Summary (Last 24 hours) at 12/15/2020 1454  Last data filed at 12/15/2020 1238  Gross per 24 hour   Intake 450 ml   Output 825 ml Net -375 ml       Labs:  Hematology:  Recent Labs     12/13/20  1054 12/14/20  0551 12/15/20  1103   WBC 6.6 6.0 7.7   RBC 2.53* 2.61* 2.53*   HGB 7.7* 8.0* 7.6*   HCT 25.8* 27.0* 25.7*   .0 103.4* 101.6   MCH 30.4 30.7 30.0   MCHC 29.8 29.6 29.6   RDW 16.2* 16.3* 15.9*    231 197   MPV 10.4 10.3 10.4     Chemistry:  Recent Labs     12/13/20  1054 12/14/20  0551 12/15/20  1103    139 143   K 4.5 4.3 3.9    105 108*   CO2 20 21 26   GLUCOSE 130* 131* 148*   BUN 50* 50* 47*   CREATININE 2.81* 2.52* 2.37*   MG 2.0 2.0 1.8   ANIONGAP 14 13 9   LABGLOM 17* 19* 20*   GFRAA 20* 23* 25*   CALCIUM 8.6 8.9 8.3*   PHOS 4.7* 4.4 4.2     Recent Labs     12/13/20  1717 12/13/20  1717 12/14/20  0551 12/14/20  0757 12/14/20  1133 12/14/20  1609 12/14/20  2055 12/15/20  0747 12/15/20  1103 12/15/20  1107 12/15/20  1215   PROT  --   --   --   --   --   --   --   --   --   --  6.1*   LABALBU  --   --  3.1*  --   --   --   --   --  3.0*  --  3.0*   TSH 3.20  --   --   --   --   --   --   --   --   --   --    AST  --   --   --   --   --   --   --   --   --   --  16   ALT  --   --   --   --   --   --   --   --   --   --  8   ALKPHOS  --   --   --   --   --   --   --   --   --   --  103   BILITOT  --   --   --   --   --   --   --   --   --   --  0.40   BILIDIR  --   --   --   --   --   --   --   --   --   --  0.23   AMMONIA 52*  --   --   --   --   --   --   --   --   --   --    POCGLU  --    < >  --  135* 127* 201* 206* 145*  --  135*  --     < > = values in this interval not displayed.      ABG:  Lab Results   Component Value Date    POCPH 7.284 12/13/2020    POCPCO2 54.5 12/13/2020    POCPO2 71.6 12/13/2020    POCHCO3 25.8 12/13/2020    NBEA 1 12/13/2020    PBEA NOT REPORTED 12/13/2020    KYU8KEF 28 12/13/2020    YHQN7YFW 92 12/13/2020    FIO2 2.0 12/13/2020     Lab Results   Component Value Date/Time    SPECIAL R HAND 2ML 12/13/2020 05:30 PM    SPECIAL L HAND 4ML 12/13/2020 05:30 PM     Lab Results Heart:  regular rate and rhythm, no murmur  Abdomen: Distended, edematous, pitting edema. Nontender to palpation  Extremities: Bilateral Ace wraps on legs, 2+ pitting edema noted  Skin:  no gross lesions, rashes, induration    Assessment:        Hospital Problems           Last Modified POA    * (Principal) Gram-negative bacteremia 12/15/2020 Yes    Dementia ([address] 12/12/2020 Yes    Overview Signed 2/27/2014 10:21 AM by Rodrigue Denny MD     (moderate)         Hypertension 12/12/2020 Yes    Hyperlipidemia 12/12/2020 Yes    CVA (cerebral vascular accident) ([address] 12/12/2020 Yes    Type 2 diabetes mellitus with hyperglycemia, with long-term current use of insulin ([address] 12/12/2020 Yes    Acute on chronic diastolic heart failure ([address] 12/12/2020 Yes    Acute renal failure (ARF) ([address] 12/15/2020 Yes    Anasarca 12/12/2020 Yes          Plan:        1. Acute kidney injury on chronic kidney disease: Secondary to ischemic ATN. Patient has CKD III baseline  2. Gram negative  Bacteremia: cultures 1/2 positive for gram negative rods. Likely urinary source. 3. Acute on chronic exacerbation of heart failure with preserved ejection fraction  4. RV heart failure: see echocardiogram  5. Normocytic normochromic anemia: Likely anemia of chronic inflammation and blood loss anemia   6. Essential hypertension  7. Diffuse anasarca  8. Obesity hypoventilation syndrome  9. Suspected obstructive sleep apnea  10. Morbid obesity with a BMI of 46.9  11. Insulin-dependent diabetes mellitus   12. Proteinuria: myeloma work up negative in past. Could be 2/2 DMII and Hypertensive nephrosclerosis    · 1 out of 2 cultures positive for gram negative rods. Repeat cultures, start cefepime 12/15  · Discontinue IVF, lasix per nephrology recommendations. ·  Creatinine slowly improving. Nephrotoxic agents.   Hold ACE inhibitor · No need for repeat echo, patient known to have chronic diastolic dysfunction. Continue diuretics per nephrology recommendations  · Hemoglobin decreased to 7.6. EGD showing mild gastritis. · Continue Protonix 40 mg daily  · Patient needs nocturnal CPAP for suspected obstructive sleep apnea and obesity hypoventilation syndrome  · Continue current antihypertensive regimen  · Lantus 20 units nightly with bolus insulin. · Recommend weight loss  · Patient with diffuse anasarca, spoke with daughter who states that this is chronic for her.   · Continue statin for hyperlipidemia    Isabella Stearns DO  12/15/2020  2:54 PM

## 2020-12-15 NOTE — PLAN OF CARE
Plan was for colonoscopy yesterday. Patient refused bowel prep x 2 days. EGD 12/14/2020 showed gastritis     Patient can follow up with GI in O/P setting for anemia    GI will sign off. Mini Alba, APRN - CNP

## 2020-12-16 ENCOUNTER — APPOINTMENT (OUTPATIENT)
Dept: GENERAL RADIOLOGY | Age: 70
DRG: 682 | End: 2020-12-16
Attending: INTERNAL MEDICINE
Payer: MEDICARE

## 2020-12-16 LAB
ABSOLUTE EOS #: 0.07 K/UL (ref 0–0.44)
ABSOLUTE IMMATURE GRANULOCYTE: 0.03 K/UL (ref 0–0.3)
ABSOLUTE LYMPH #: 0.77 K/UL (ref 1.1–3.7)
ABSOLUTE MONO #: 0.84 K/UL (ref 0.1–1.2)
ALBUMIN SERPL-MCNC: 2.9 G/DL (ref 3.5–5.2)
ANION GAP SERPL CALCULATED.3IONS-SCNC: 7 MMOL/L (ref 9–17)
BASOPHILS # BLD: 0 % (ref 0–2)
BASOPHILS ABSOLUTE: <0.03 K/UL (ref 0–0.2)
BUN BLDV-MCNC: 49 MG/DL (ref 8–23)
BUN/CREAT BLD: ABNORMAL (ref 9–20)
CALCIUM SERPL-MCNC: 8.6 MG/DL (ref 8.6–10.4)
CHLORIDE BLD-SCNC: 108 MMOL/L (ref 98–107)
CO2: 26 MMOL/L (ref 20–31)
CREAT SERPL-MCNC: 2.33 MG/DL (ref 0.5–0.9)
DIFFERENTIAL TYPE: ABNORMAL
EKG ATRIAL RATE: 125 BPM
EKG ATRIAL RATE: 192 BPM
EKG Q-T INTERVAL: 350 MS
EKG Q-T INTERVAL: 398 MS
EKG QRS DURATION: 102 MS
EKG QRS DURATION: 84 MS
EKG QTC CALCULATION (BAZETT): 366 MS
EKG QTC CALCULATION (BAZETT): 417 MS
EKG R AXIS: 71 DEGREES
EKG R AXIS: 92 DEGREES
EKG T AXIS: 130 DEGREES
EKG T AXIS: 134 DEGREES
EKG VENTRICULAR RATE: 66 BPM
EKG VENTRICULAR RATE: 66 BPM
EOSINOPHILS RELATIVE PERCENT: 1 % (ref 1–4)
GFR AFRICAN AMERICAN: 25 ML/MIN
GFR NON-AFRICAN AMERICAN: 21 ML/MIN
GFR SERPL CREATININE-BSD FRML MDRD: ABNORMAL ML/MIN/{1.73_M2}
GFR SERPL CREATININE-BSD FRML MDRD: ABNORMAL ML/MIN/{1.73_M2}
GLUCOSE BLD-MCNC: 104 MG/DL (ref 65–105)
GLUCOSE BLD-MCNC: 55 MG/DL (ref 65–105)
GLUCOSE BLD-MCNC: 58 MG/DL (ref 65–105)
GLUCOSE BLD-MCNC: 63 MG/DL (ref 65–105)
GLUCOSE BLD-MCNC: 69 MG/DL (ref 65–105)
GLUCOSE BLD-MCNC: 81 MG/DL (ref 65–105)
GLUCOSE BLD-MCNC: 81 MG/DL (ref 70–99)
GLUCOSE BLD-MCNC: 84 MG/DL (ref 65–105)
GLUCOSE BLD-MCNC: 86 MG/DL (ref 65–105)
GLUCOSE BLD-MCNC: 94 MG/DL (ref 65–105)
HCT VFR BLD CALC: 26.7 % (ref 36.3–47.1)
HEMOGLOBIN: 7.9 G/DL (ref 11.9–15.1)
IMMATURE GRANULOCYTES: 0 %
LYMPHOCYTES # BLD: 9 % (ref 24–43)
MAGNESIUM: 1.8 MG/DL (ref 1.6–2.6)
MCH RBC QN AUTO: 30.3 PG (ref 25.2–33.5)
MCHC RBC AUTO-ENTMCNC: 29.6 G/DL (ref 28.4–34.8)
MCV RBC AUTO: 102.3 FL (ref 82.6–102.9)
MONOCYTES # BLD: 10 % (ref 3–12)
NRBC AUTOMATED: 0 PER 100 WBC
PDW BLD-RTO: 16.1 % (ref 11.8–14.4)
PHOSPHORUS: 4 MG/DL (ref 2.6–4.5)
PLATELET # BLD: 208 K/UL (ref 138–453)
PLATELET ESTIMATE: ABNORMAL
PMV BLD AUTO: 10.4 FL (ref 8.1–13.5)
POTASSIUM SERPL-SCNC: 4 MMOL/L (ref 3.7–5.3)
RBC # BLD: 2.61 M/UL (ref 3.95–5.11)
RBC # BLD: ABNORMAL 10*6/UL
SEG NEUTROPHILS: 79 % (ref 36–65)
SEGMENTED NEUTROPHILS ABSOLUTE COUNT: 6.61 K/UL (ref 1.5–8.1)
SODIUM BLD-SCNC: 141 MMOL/L (ref 135–144)
SODIUM,UR: <20 MMOL/L
SURGICAL PATHOLOGY REPORT: NORMAL
WBC # BLD: 8.3 K/UL (ref 3.5–11.3)
WBC # BLD: ABNORMAL 10*3/UL

## 2020-12-16 PROCEDURE — 6370000000 HC RX 637 (ALT 250 FOR IP): Performed by: INTERNAL MEDICINE

## 2020-12-16 PROCEDURE — 93010 ELECTROCARDIOGRAM REPORT: CPT | Performed by: INTERNAL MEDICINE

## 2020-12-16 PROCEDURE — 94640 AIRWAY INHALATION TREATMENT: CPT

## 2020-12-16 PROCEDURE — 1200000000 HC SEMI PRIVATE

## 2020-12-16 PROCEDURE — 2700000000 HC OXYGEN THERAPY PER DAY

## 2020-12-16 PROCEDURE — 2580000003 HC RX 258: Performed by: INTERNAL MEDICINE

## 2020-12-16 PROCEDURE — 83735 ASSAY OF MAGNESIUM: CPT

## 2020-12-16 PROCEDURE — 6360000002 HC RX W HCPCS: Performed by: INTERNAL MEDICINE

## 2020-12-16 PROCEDURE — 99233 SBSQ HOSP IP/OBS HIGH 50: CPT | Performed by: INTERNAL MEDICINE

## 2020-12-16 PROCEDURE — 85025 COMPLETE CBC W/AUTO DIFF WBC: CPT

## 2020-12-16 PROCEDURE — 80069 RENAL FUNCTION PANEL: CPT

## 2020-12-16 PROCEDURE — 6360000002 HC RX W HCPCS: Performed by: STUDENT IN AN ORGANIZED HEALTH CARE EDUCATION/TRAINING PROGRAM

## 2020-12-16 PROCEDURE — 99232 SBSQ HOSP IP/OBS MODERATE 35: CPT | Performed by: INTERNAL MEDICINE

## 2020-12-16 PROCEDURE — 71045 X-RAY EXAM CHEST 1 VIEW: CPT

## 2020-12-16 PROCEDURE — 82947 ASSAY GLUCOSE BLOOD QUANT: CPT

## 2020-12-16 PROCEDURE — 36415 COLL VENOUS BLD VENIPUNCTURE: CPT

## 2020-12-16 PROCEDURE — 6370000000 HC RX 637 (ALT 250 FOR IP): Performed by: NURSE PRACTITIONER

## 2020-12-16 RX ORDER — FUROSEMIDE 10 MG/ML
80 INJECTION INTRAMUSCULAR; INTRAVENOUS DAILY
Status: DISCONTINUED | OUTPATIENT
Start: 2020-12-16 | End: 2020-12-17

## 2020-12-16 RX ORDER — FUROSEMIDE 10 MG/ML
80 INJECTION INTRAMUSCULAR; INTRAVENOUS ONCE
Status: COMPLETED | OUTPATIENT
Start: 2020-12-16 | End: 2020-12-16

## 2020-12-16 RX ORDER — FUROSEMIDE 10 MG/ML
80 INJECTION INTRAMUSCULAR; INTRAVENOUS 2 TIMES DAILY
Status: DISCONTINUED | OUTPATIENT
Start: 2020-12-16 | End: 2020-12-16

## 2020-12-16 RX ADMIN — ANTI-FUNGAL POWDER MICONAZOLE NITRATE TALC FREE: 1.42 POWDER TOPICAL at 19:59

## 2020-12-16 RX ADMIN — CEFEPIME HYDROCHLORIDE 2 G: 2 INJECTION, POWDER, FOR SOLUTION INTRAVENOUS at 03:21

## 2020-12-16 RX ADMIN — CLOPIDOGREL 75 MG: 75 TABLET, FILM COATED ORAL at 08:31

## 2020-12-16 RX ADMIN — SODIUM CHLORIDE, PRESERVATIVE FREE 10 ML: 5 INJECTION INTRAVENOUS at 19:59

## 2020-12-16 RX ADMIN — HEPARIN SODIUM 5000 UNITS: 5000 INJECTION INTRAVENOUS; SUBCUTANEOUS at 05:45

## 2020-12-16 RX ADMIN — FUROSEMIDE 80 MG: 10 INJECTION, SOLUTION INTRAMUSCULAR; INTRAVENOUS at 19:59

## 2020-12-16 RX ADMIN — DESMOPRESSIN ACETATE 40 MG: 0.2 TABLET ORAL at 08:31

## 2020-12-16 RX ADMIN — FUROSEMIDE 80 MG: 10 INJECTION, SOLUTION INTRAMUSCULAR; INTRAVENOUS at 13:26

## 2020-12-16 RX ADMIN — ALBUTEROL SULFATE 2.5 MG: 2.5 SOLUTION RESPIRATORY (INHALATION) at 20:58

## 2020-12-16 RX ADMIN — CITALOPRAM 20 MG: 20 TABLET, FILM COATED ORAL at 08:31

## 2020-12-16 RX ADMIN — HEPARIN SODIUM 5000 UNITS: 5000 INJECTION INTRAVENOUS; SUBCUTANEOUS at 20:00

## 2020-12-16 RX ADMIN — CARVEDILOL 12.5 MG: 12.5 TABLET, FILM COATED ORAL at 08:31

## 2020-12-16 RX ADMIN — HEPARIN SODIUM 5000 UNITS: 5000 INJECTION INTRAVENOUS; SUBCUTANEOUS at 13:28

## 2020-12-16 RX ADMIN — CARVEDILOL 12.5 MG: 12.5 TABLET, FILM COATED ORAL at 17:07

## 2020-12-16 RX ADMIN — FUROSEMIDE 40 MG: 10 INJECTION, SOLUTION INTRAMUSCULAR; INTRAVENOUS at 08:30

## 2020-12-16 RX ADMIN — ALBUTEROL SULFATE 2.5 MG: 2.5 SOLUTION RESPIRATORY (INHALATION) at 08:46

## 2020-12-16 RX ADMIN — ANTI-FUNGAL POWDER MICONAZOLE NITRATE TALC FREE: 1.42 POWDER TOPICAL at 08:31

## 2020-12-16 RX ADMIN — SODIUM CHLORIDE, PRESERVATIVE FREE 10 ML: 5 INJECTION INTRAVENOUS at 08:30

## 2020-12-16 RX ADMIN — CEFEPIME HYDROCHLORIDE 2 G: 2 INJECTION, POWDER, FOR SOLUTION INTRAVENOUS at 16:09

## 2020-12-16 RX ADMIN — PANTOPRAZOLE SODIUM 40 MG: 40 TABLET, DELAYED RELEASE ORAL at 08:30

## 2020-12-16 NOTE — PROGRESS NOTES
NEPHROLOGY PROGRESS NOTE      SUBJECTIVE     Hospitalized with exertional shortness of breath/lower worsening lower extremity swelling and noted to be having anemia along with decompensated congestive heart failure and acute kidney injury. Currently receiving IV Lasix 40 twice daily. Urine output suboptimal . Renal  Function plateau. Present lower extremity swelling. EGD findings noted. OBJECTIVE     Vitals:    12/15/20 1737 12/15/20 2000 12/16/20 0600 12/16/20 0836   BP: 133/61 136/68  (!) 170/67   Pulse: 64 66  69   Resp: 16 16 18   Temp: 97.6 °F (36.4 °C) 97.6 °F (36.4 °C)  98 °F (36.7 °C)   TempSrc:  Oral  Oral   SpO2: 96%   98%   Weight:   297 lb 13.5 oz (135.1 kg)    Height:         24HR INTAKE/OUTPUT:      Intake/Output Summary (Last 24 hours) at 12/16/2020 1128  Last data filed at 12/16/2020 0429  Gross per 24 hour   Intake 75 ml   Output 395 ml   Net -320 ml       General appearance:Awake, alert,  HEENT: PERRLA  Respiratory::vesicular breath sounds,   Cardiovascular:S1 S2 normal,no gallop or organic murmur. Abdomen:Non tender/non distended. Bowel sounds present  Extremities: No Cyanosis or Clubbing, present lower extremity edema  Neurological:Alert and oriented. No abnormalities of mood, affect, memory, mentation, or behavior are noted      MEDICATIONS     Scheduled Meds:    furosemide  80 mg Intravenous BID    pantoprazole  40 mg Oral QAM AC    cefepime  2 g Intravenous Q12H    carvedilol  12.5 mg Oral BID WC    sodium chloride flush  10 mL Intravenous 2 times per day    miconazole   Topical BID    heparin (porcine)  5,000 Units Subcutaneous 3 times per day    atorvastatin  40 mg Oral Daily    citalopram  20 mg Oral Daily    clopidogrel  75 mg Oral Daily    insulin glargine  20 Units Subcutaneous Nightly    rivastigmine  1 patch Transdermal Daily    insulin lispro  0-12 Units Subcutaneous TID WC    insulin lispro  0-6 Units Subcutaneous Nightly  darbepoetin hung-polysorbate  60 mcg Subcutaneous Weekly     Continuous Infusions:   PRN Meds:  sodium chloride flush, nicotine, promethazine **OR** ondansetron, acetaminophen **OR** acetaminophen, albuterol  Home Meds:                Medications Prior to Admission: aspirin 81 MG tablet, Take 1 tablet by mouth daily  senna (SENOKOT) 8.6 MG tablet, Take 1 tablet by mouth 2 times daily  oxybutynin (DITROPAN) 5 MG tablet, TAKE 1 TABLET BY MOUTH TWICE DAILY  losartan (COZAAR) 50 MG tablet, Take 1 tablet by mouth daily  OXYGEN, Oxgen at 2 liters per nasal cannula  loratadine (CLARITIN) 10 MG tablet, TAKE 1 TABLET(10 MG) BY MOUTH DAILY  Lancets MISC, Checks blood sugar ac and HS  blood glucose monitor strips, Test 3  times a day & as needed for symptoms of irregular blood glucose. Dispense sufficient amount for indicated testing frequency plus additional to accommodate PRN testing needs. Alcohol Swabs (ALCOHOL PREP) PADS, Checks blood sugar 3 times a day  Misc.  Devices MISC, Standard walker with wheels  Diagnosis dementia, CHF  atorvastatin (LIPITOR) 40 MG tablet, TAKE 1 TABLET BY MOUTH EVERYDAY  bumetanide (BUMEX) 1 MG tablet, Take 1 tablet by mouth daily  citalopram (CELEXA) 20 MG tablet, Take 1 tablet by mouth daily  clopidogrel (PLAVIX) 75 MG tablet, Take 1 tablet by mouth daily  insulin lispro (HUMALOG) 100 UNIT/ML injection vial, Use 4 times a day per sliding scale  hydrALAZINE (APRESOLINE) 50 MG tablet, Take 1.5 tablets by mouth every 8 hours  insulin glargine (LANTUS) 100 UNIT/ML injection vial, Inject 20 Units into the skin nightly  memantine (NAMENDA) 10 MG tablet, TAKE 1 TABLET BY MOUTH TWICE DAILY  omeprazole (PRILOSEC) 20 MG delayed release capsule, Take 1 capsule by mouth every morning (before breakfast)  rivastigmine (EXELON) 9.5 MG/24HR, APPLY 1 NEW PATCH EVERY 24 HOURS  Diabetic Shoe MISC, by Does not apply route  Compression Stockings MISC, by Does not apply route 20-30 mm Hg bilateral knee high nystatin (MYCOSTATIN) 647114 UNIT/GM cream, Apply topically 2 times daily. INVESTIGATIONS     Last 3 CMP:    Recent Labs     12/14/20  0551 12/15/20  1103 12/15/20  1215 12/16/20  0706    143  --  141   K 4.3 3.9  --  4.0    108*  --  108*   CO2 21 26  --  26   BUN 50* 47*  --  49*   CREATININE 2.52* 2.37*  --  2.33*   CALCIUM 8.9 8.3*  --  8.6   PROT  --   --  6.1*  --    LABALBU 3.1* 3.0* 3.0* 2.9*   BILITOT  --   --  0.40  --    ALKPHOS  --   --  103  --    AST  --   --  16  --    ALT  --   --  8  --        Last 3 CBC:  Recent Labs     12/14/20  0551 12/15/20  1103 12/16/20  0706   WBC 6.0 7.7 8.3   RBC 2.61* 2.53* 2.61*   HGB 8.0* 7.6* 7.9*   HCT 27.0* 25.7* 26.7*   .4* 101.6 102.3   MCH 30.7 30.0 30.3   MCHC 29.6 29.6 29.6   RDW 16.3* 15.9* 16.1*    197 208   MPV 10.3 10.4 10.4       ASSESSMENT     1. Acute kidney injury nonoliguric secondary to prerenal injury from reduced E ABV from anemia/decompensated LVDD/cor pulmonale - improving  2. Chronic kidney disease stage III secondary diabetic ischemic nephrosclerosis with baseline creatinine 1.2-1.4. Follows up with Dr. Angel Lim. 3.  Nonnephrotic range proteinuria from diabetes  4. Anemia status post EGD which showed mild gastritis. Also on JOBY  5. Decompensated left ventricular diastolic/cor pulmonale  6. History of CABG  7. History of type 2 diabetes    PLAN     1. CXR  2. Increase Lasix   3. Continue to hold angiotensin receptor blockers  4.   We will follow    Please do not hesitate to call with questions    This note is created with the assistance of a speech-recognition program. While intending to generate a document that actually reflects the content of the visit, no guarantees can be provided that every mistake has been identified and corrected by editing    Nathan Pritchard MD, MRCP Keegan [place]), [facility]   12/16/2020 11:28 AM  [facility]

## 2020-12-16 NOTE — PROGRESS NOTES
[facility]   Progress Note                   Date:   12/16/2020  Patient name: Juliet Stevens  Date of admission:  12/12/2020  4:17 AM  MRN:   [id number]  YOB: 1950  PCP: ERINN Ruth    Reason for Admission: Acute renal failure (ARF) ([facility] [address] [N17.9]    Subjective:       Clinical Changes / Abnormalities: Pt seen and examined in the room. She denies any CP or SOB. Ongoing swelling today. On 02 per NC.       -3.688L  Since admission   Medications:   Scheduled Meds:   pantoprazole  40 mg Oral QAM AC    cefepime  2 g Intravenous Q12H    carvedilol  12.5 mg Oral BID WC    furosemide  40 mg Intravenous BID    sodium chloride flush  10 mL Intravenous 2 times per day    miconazole   Topical BID    heparin (porcine)  5,000 Units Subcutaneous 3 times per day    atorvastatin  40 mg Oral Daily    citalopram  20 mg Oral Daily    clopidogrel  75 mg Oral Daily    insulin glargine  20 Units Subcutaneous Nightly    rivastigmine  1 patch Transdermal Daily    insulin lispro  0-12 Units Subcutaneous TID WC    insulin lispro  0-6 Units Subcutaneous Nightly    darbepoetin hung-polysorbate  60 mcg Subcutaneous Weekly     Continuous Infusions:    CBC:   Recent Labs     12/14/20  0551 12/15/20  1103 12/16/20  0706   WBC 6.0 7.7 8.3   HGB 8.0* 7.6* 7.9*    197 208     BMP:    Recent Labs     12/14/20  0551 12/15/20  1103 12/16/20  0706    143 141   K 4.3 3.9 4.0    108* 108*   CO2 21 26 26   BUN 50* 47* 49*   CREATININE 2.52* 2.37* 2.33*   GLUCOSE 131* 148* 81     Hepatic:   Recent Labs     12/15/20  1215   AST 16   ALT 8   BILITOT 0.40   ALKPHOS 103     Troponin: No results for input(s): TROPHS in the last 72 hours. BNP: No results for input(s): BNP in the last 72 hours. Lipids: No results for input(s): CHOL, HDL in the last 72 hours. Invalid input(s): LDLCALCU  INR:   No results for input(s): INR in the last 72 hours.     Objective: Vitals: BP (!) 170/67   Pulse 69   Temp 98 °F (36.7 °C) (Oral)   Resp 18   Ht 5' 7\" (1.702 m)   Wt 297 lb 13.5 oz (135.1 kg)   SpO2 98%   BMI 46.65 kg/m²   General appearance: alert and cooperative with exam  HEENT: Head: Normocephalic, no lesions, without obvious abnormality. Neck: no JVD, trachea midline, no adenopathy  Lungs: diminished with fine rales in bases to auscultation, on 02 per NC   Heart: Regular rate and rhythm, s1/s2 auscultated, no murmurs  Abdomen: soft, non-tender, bowel sounds active  Extremities: +2 edema, ACE wraps on bilateral legs, Upper extremities + 2, weaping edema   Neurologic: not done        Assessment / Acute Cardiac Problems:   1. Acute on chronic CHF   2. Anasarca  3. AMY on CKD   4. DM   5. HTN   6. Ho CABG   7.  Anemia     Patient Active Problem List:     Dementia ([address]     Hypertension     Hyperlipidemia     Class 2 severe obesity with serious comorbidity and body mass index (BMI) of 37.0 to 37.9 in adult [facility])     Gout     Peripheral polyneuropathy     Anxiety and depression     Acute kidney injury superimposed on CKD ([address]     Balance problem     CVA (cerebral vascular accident) ([address]     Type 2 diabetes mellitus with hyperglycemia, with long-term current use of insulin (HCC)     Acute on chronic diastolic heart failure ([address]     Acute cystitis     Infestation by bed bug     Infestation by maggots     Lymphedema of both lower extremities     PAD (peripheral artery disease) (HCC)     Pressure injury of right heel, unstageable ([address]     Acute kidney injury ([address]     Bradycardia     Hyperkalemia     AV block, 1st degree     Wounds, multiple     Buttock wound     Decubitus ulcer of trochanteric region of right hip, stage 2 (HCC)     Decompensated heart failure (HCC)     Acute anemia     Acute renal failure (ARF) ([address]     Anasarca      Plan of Treatment: 1. Acute on chronic chf. No need for repeat ECHO. Diastolic CHF. Appreciate Nephrology assistance with diuretics. Recs to continue IV lasix and hold ACE. 2. CAD stable. Hx of CABG. Continue statin, BB, plavix. 3. Anemia per primary/GI. Recommend HGB > 9  4. Keep K> 4 and Mag > 2   5. HTN-  Elevated this am prior to medications. Repeat /93.   Continue BB       Electronically signed by SHIRA Sal NP on 12/16/2020 at 10:53 [facility].  [phone]

## 2020-12-16 NOTE — PROGRESS NOTES
[facility]  Office: [address], DO, Justinanita Webb, DO, Дмитрий [place], DO, Alcira Rod Blood, DO, Baldev Brownlee MD, Salvador Chapin MD, Gaye Galindo MD, Moses Stone MD, Abel Stanley MD, Kit Friend MD, Obey Hyatt MD, Henrique Loving MD, Lopez Jo MD, Altagracia Burleson DO, Destinee Mott MD, Justino Finney MD, Any Chatterjee DO, Tammy Chapman MD,  Donta Prater DO, Corina Ramirez MD, Joon Trinh MD, Bear Cueva, [facility], [facility], [facility], Danilo Clayton, CNP, Mahsa Crenshaw, CNS, Champ Rudd, CNP, Gina Apodaca, CNP, Talon Hawthorne, CNP, Gely Santana, CNP, Rmau Hankins, CNP, Patsy Pascual PA-C, Mariela Thurman, [facility], Megan Sheehan, CNP, Lieutenant Cummings, CNP, Jennifer Baldwin, CNP, Joaquim Newton, CNP, Kirsty Villatoro, [address]    Progress Note    12/16/2020    12:40 PM    Name:   Adrienne James  MRN:     [id number]     Acct:      [de-identified]   Room:   [facility]0326-02   Day:  4  Admit Date:  12/12/2020  4:17 AM    PCP:   ERINN Ruffin  Code Status:  Full Code    Subjective:     C/C: AMY  Interval History Status: improved. No issues overnight. Continue to be alert. Vital stable. Limited ambulation. Brief History: This is a 73-year-old female who initially presented to outside hospital with anemia, hemoglobin of 6.8. She was given hemoglobin and developed lower extremity edema and was subsequently diuresed. She was transferred here for acute renal failure. She was evaluated by nephrology who reviewed her symptoms to AT due to intravascular depletion. She was started on half bicarb drip and IV Lasix. Her creatinine is slowly improving now. Patient was evaluated by GI for her anemia: EGD shows mild gastritis.     Blood culture: 12/15 positive for gram negative rods in one culture.  Repeat cultures, start cefepime.       Review of Systems: Past Medical History:   has a past medical history of Anxiety and depression, Background diabetic retinopathy(362.01), Balance problem, CAD (coronary artery disease), Cancer of uterus ([facility] Chronic kidney disease, Chronic low back pain, CVA (cerebral vascular accident) ([facility] Dry eye syndrome, GERD (gastroesophageal reflux disease), Glaucoma suspect, Gout, Homonymous hemianopsia, Hyperlipidemia, Hypertension, Keratitis, Obesity, Peripheral polyneuropathy, Pseudophakos, Stroke ([facility] Trichiasis, and Type 2 diabetes mellitus ([facility] Social History:   reports that she has never smoked. She has never used smokeless tobacco. She reports that she does not drink alcohol or use drugs. Family History:   Family History   Problem Relation Age of Onset    Diabetes Mother     Cancer Mother     High Blood Pressure Mother    Dossie Consuelo Stroke Mother     Kidney Disease Mother     Uterine Cancer Mother     Diabetes Father     Heart Disease Father     Glaucoma Father     Emphysema Father     Coronary Art Dis Other         All 4 siblings.  Stroke Other         1 sibling.  Lung Cancer Other         1 sibling - cause of death lung cancer.  Mental Retardation Other        Vitals:  BP (!) 122/93   Pulse 60   Temp 98.4 °F (36.9 °C) (Oral)   Resp 16   Ht 5' 7\" (1.702 m)   Wt 297 lb 13.5 oz (135.1 kg)   SpO2 95%   BMI 46.65 kg/m²   Temp (24hrs), Av.8 °F (36.6 °C), Min:97.6 °F (36.4 °C), Max:98.4 °F (36.9 °C)    Recent Labs     12/15/20  2154 20  0756 20  0827 20  1111   POCGLU 124* 63* 81 94       I/O (24Hr):     Intake/Output Summary (Last 24 hours) at 2020 1240  Last data filed at 2020 0429  Gross per 24 hour   Intake 75 ml   Output 170 ml   Net -95 ml       Labs:  Hematology:  Recent Labs     20  0551 12/15/20  1103 20  0706   WBC 6.0 7.7 8.3   RBC 2.61* 2.53* 2.61*   HGB 8.0* 7.6* 7.9*   HCT 27.0* 25.7* 26.7*   .4* 101.6 102.3 MCH 30.7 30.0 30.3   MCHC 29.6 29.6 29.6   RDW 16.3* 15.9* 16.1*    197 208   MPV 10.3 10.4 10.4     Chemistry:  Recent Labs     12/14/20  0551 12/15/20  1103 12/16/20  0706    143 141   K 4.3 3.9 4.0    108* 108*   CO2 21 26 26   GLUCOSE 131* 148* 81   BUN 50* 47* 49*   CREATININE 2.52* 2.37* 2.33*   MG 2.0 1.8 1.8   ANIONGAP 13 9 7*   LABGLOM 19* 20* 21*   GFRAA 23* 25* 25*   CALCIUM 8.9 8.3* 8.6   PHOS 4.4 4.2 4.0     Recent Labs     12/13/20 1717 12/13/20  1717 12/15/20  1103 12/15/20  1107 12/15/20  1215 12/15/20  1623 12/15/20  2154 12/16/20  0706 12/16/20  0756 12/16/20  0827 12/16/20  1111   PROT  --   --   --   --  6.1*  --   --   --   --   --   --    LABALBU  --    < > 3.0*  --  3.0*  --   --  2.9*  --   --   --    TSH 3.20  --   --   --   --   --   --   --   --   --   --    AST  --   --   --   --  16  --   --   --   --   --   --    ALT  --   --   --   --  8  --   --   --   --   --   --    ALKPHOS  --   --   --   --  103  --   --   --   --   --   --    BILITOT  --   --   --   --  0.40  --   --   --   --   --   --    BILIDIR  --   --   --   --  0.23  --   --   --   --   --   --    AMMONIA 52*  --   --   --   --   --   --   --   --   --   --    POCGLU  --    < >  --  135*  --  138* 124*  --  63* 81 94    < > = values in this interval not displayed.        Physical Examination:        General appearance:  alert, cooperative and no distress  Mental Status:  oriented to person, place and time and normal affect  Lungs:  clear to auscultation bilaterally, normal effort  Heart:  regular rate and rhythm, no murmur  Abdomen:  soft, nontender, nondistended, normal bowel sounds, no masses, hepatomegaly, splenomegaly  Extremities:  no edema, redness, tenderness in the calves  Skin:  no gross lesions, rashes, induration    Assessment:        Hospital Problems           Last Modified POA    * (Principal) Gram-negative bacteremia 12/15/2020 Yes    Dementia ([facility] 12/12/2020 Yes Overview Signed 2/27/2014 10:21 AM by Blas Boyd MD     (moderate)         Hypertension 12/12/2020 Yes    Hyperlipidemia 12/12/2020 Yes    CVA (cerebral vascular accident) ([facility] 12/12/2020 Yes    Type 2 diabetes mellitus with hyperglycemia, with long-term current use of insulin ([facility] 12/12/2020 Yes    Acute on chronic diastolic heart failure ([facility] 12/12/2020 Yes    Acute renal failure (ARF) ([facility] 12/15/2020 Yes    Anasarca 12/12/2020 Yes          Plan:      1. Acute kidney injury on CKD stage 3. Improving. Continue to hold ACEI:   2. Gram negative  Bacteremia: cultures 1/2 positive for gram negative rods. Likely urinary source. Continue Cefepime. Obtain ID input regarding antibiotic use. 3. Acute on chronic exacerbation of diastolic congective heart failure. Continue Lasix 80 mg BID  4. Normocytic normochromic anemia: Likely anemia of chronic inflammation and blood loss anemia   5. Essential hypertension  6. Obesity hypoventilation syndrome with obstructive sleep apnea. Use CPAP as needed  7. Morbid obesity with a BMI of 46.9  8. Insulin-dependent diabetes mellitus. Lantus 20 units nightly with bolus insulin.   9. Transition planning -     Mbonu Holli Shone, MD  12/16/2020  12:40 PM

## 2020-12-16 NOTE — ADT AUTH CERT
Heart Failure - Care Day 8 (12/11/2020) by Wojciech Juarez RN       Review Entered Review Status   12/16/2020 14:55 Completed      Criteria Review      Care Day: 8 Care Date: 12/11/2020 Level of Care: Intermediate Care    Guideline Day 3    Clinical Status    (X) * Hemodynamic stability    12/16/2020 2:55 PM EST by Kahlil Sebastian      71  177/70    (X) * Tachypnea absent    12/16/2020 2:55 PM EST by Kahlil Sebastian      16    (X) * Oxygenation at baseline or acceptable for next level of care    12/16/2020 2:55 PM EST by Kahlil Sebastian      94 %  2l    (X) * Dyspnea at baseline or acceptable for next level of care    12/16/2020 2:55 PM EST by Sheryle Holster accept    (X) * Cardiac rate and rhythm acceptable    12/16/2020 2:55 PM EST by Kahlil Sebastian      accept    (X) * Pulmonary edema absent or acceptable for next level of care    12/16/2020 2:55 PM EST by Kahlil Sebastian      accept    ( ) * Peripheral or sacral edema absent or acceptable for next level of care    (X) * Mental status at baseline    12/16/2020 2:55 PM EST by Kahlil Sebastian      baseline    ( ) * Volume status acceptable on oral medication    ( ) * Renal function at baseline or acceptable for next level of care    (X) * Electrolyte abnormalities absent or acceptable for next level of care    12/16/2020 2:55 PM EST by Kahlil Sebastian      absent    ( ) * Precipitating factors absent or controlled    ( ) * Discharge plans and education understood    Activity    ( ) * Ambulatory or acceptable for next level of care    Routes    (X) * Oral hydration, medications, and diet    12/16/2020 2:55 PM EST by Kahlil Sebastian      yes    Interventions    ( ) * Oxygen absent    Medications    (X) Diuretics    12/16/2020 2:55 PM EST by Kahlil Sebastian      iv bumex 2 mg x 1    * Milestone   Additional Notes   12-11-20      Alert       97.4  71  16  177/70   94 %  2 L       Bun/creat- 49/3.24, h/h-7.9/26.5      iv bumex 2 mg x 1, ivf 125 hr Per phy- HEENT: Normocephalic and Atraumatic   Neck: Supple, No Masses, Tenderness, Nodularity and No Lymphadenopathy   Chest/Lungs: Clear to Auscultation without Rales, Rhonchi, or Wheezes   Cardiac: Regular Rate and Rhythm   GI/Abdomen: Bowel Sounds Present and Soft, Non-tender, without Guarding or Rebound Tenderness   : Not examined   EXT/Skin: No Cyanosis, No Clubbing and Edema Present--flaking and scaling   Neuro: Alert and Oriented and No Localizing Signs/Symptoms           Assessment:       Active Problems:     Decompensated heart failure (HCC)     Acute anemia   Resolved Problems:     * No resolved hospital problems.  *   Plan:   1.   [place] [facility] [place]   2.   See orders   3.   Follow up [address] [facility]--[facility]   4.   GI bleed---stable--may ultimately require endoscopy by Dr. Kirsten Curran, [address] [place] [address], as required   5.   Follow up [facility]--[facility]   6.   See orders          Dc plan transfer to [address] [place] [address] Day 7 (12/10/2020) by Scott Albrecht RN       Review Entered Review Status   12/16/2020 14:53 Completed      Criteria Review      Care Day: 7 Care Date: 12/10/2020 Level of Care: Intermediate Care    Guideline Day 3    Clinical Status    (X) * Hemodynamic stability    12/16/2020 2:53 PM EST by Katelyn Bhandari      71  122/52    (X) * Tachypnea absent    12/16/2020 2:53 PM EST by Katelyn Bhandari      14    (X) * Oxygenation at baseline or acceptable for next level of care    12/16/2020 2:53 PM EST by Katelyn Bhandari      99 %  4 L    (X) * Dyspnea at baseline or acceptable for next level of care    12/16/2020 2:53 PM EST by Katelyn Bhandari      accept    (X) * Cardiac rate and rhythm acceptable    12/16/2020 2:53 PM EST by Katelyn Bhandari      accept    (X) * Pulmonary edema absent or acceptable for next level of care    12/16/2020 2:53 PM EST by Radha Clemons accept ( ) * Peripheral or sacral edema absent or acceptable for next level of care    (X) * Mental status at baseline    12/16/2020 2:53 PM EST by Callie Pastel      baseline    ( ) * Volume status acceptable on oral medication    ( ) * Renal function at baseline or acceptable for next level of care    (X) * Electrolyte abnormalities absent or acceptable for next level of care    12/16/2020 2:53 PM EST by Callie Pastel      absent    ( ) * Precipitating factors absent or controlled    ( ) * Discharge plans and education understood    Activity    ( ) * Ambulatory or acceptable for next level of care    Routes    (X) * Oral hydration, medications, and diet    12/16/2020 2:53 PM EST by Callie Pastel      yes    Interventions    ( ) * Oxygen absent    Medications    (X) Diuretics    12/16/2020 2:53 PM EST by Callie Pastel      iv bumex 2 mg x 1    * Milestone   Additional Notes   12-10-20      Alert    CHF--acute-on-chronic systolic------AMY----balancing between urine output albeit poor and volume overload--rec'd Bumex and hydration----lungs clear to date despite peripheral fluid in extremities      97.6  71  14  122/52   99 % 4 L       Bun/creat- 45/3.19, 707/26.0, anion gap- 8      iv bumex 2 mg x 1, ivf 125 hr       Per phy- HEENT: Normocephalic and Atraumatic   Neck: Supple, No Masses, Tenderness, Nodularity and No Lymphadenopathy   Chest/Lungs: Clear to Auscultation without Rales, Rhonchi, or Wheezes   Cardiac: Regular Rate and Rhythm   GI/Abdomen: Bowel Sounds Present and Soft, Non-tender, without Guarding or Rebound Tenderness   : Not examined   EXT/Skin: No Cyanosis, No Clubbing and Edema Present   Neuro: Alert and Oriented and No Localizing Signs/Symptoms           Assessment:       Active Problems:     Decompensated heart failure (HCC)     Acute anemia   Resolved Problems:     * No resolved hospital problems.  *   Plan:       1.  CHF---acute-on-chronic systolic ----> Bumex 2 mg IV 2.  AMY--patient offered transfer to Nephrology, but declines at this time---if required would accept dialysis    3.  See orders       Heart Failure - Care Day 6 (12/9/2020) by Scott Albrecht RN       Review Entered Review Status   12/16/2020 14:53 Completed      Criteria Review      Care Day: 6 Care Date: 12/9/2020 Level of Care: Intermediate Care    Guideline Day 3    Clinical Status    (X) * Hemodynamic stability    12/16/2020 2:53 PM EST by Katelyn Bhandari      70   157/69    (X) * Tachypnea absent    12/16/2020 2:53 PM EST by Katelyn Bhandari      15    (X) * Oxygenation at baseline or acceptable for next level of care    12/16/2020 2:53 PM EST by Katelyn Bhandari      95 % 4 l    (X) * Dyspnea at baseline or acceptable for next level of care    12/16/2020 2:53 PM EST by Katelyn Bhandari      accept    (X) * Cardiac rate and rhythm acceptable    12/16/2020 2:53 PM EST by Katelyn Bhandari      accept    (X) * Pulmonary edema absent or acceptable for next level of care    12/16/2020 2:53 PM EST by Katelyn Bhandari      accept    ( ) * Peripheral or sacral edema absent or acceptable for next level of care    (X) * Mental status at baseline    ( ) * Volume status acceptable on oral medication    ( ) * Renal function at baseline or acceptable for next level of care    (X) * Electrolyte abnormalities absent or acceptable for next level of care    12/16/2020 2:53 PM EST by Katelyn Bhandari      absent    ( ) * Precipitating factors absent or controlled    ( ) * Discharge plans and education understood    Activity    ( ) * Ambulatory or acceptable for next level of care    Routes    (X) * Oral hydration, medications, and diet    12/16/2020 2:53 PM EST by Katelyn Bhandari      yes    Interventions    ( ) * Oxygen absent    Medications    (X) Diuretics    12/16/2020 2:53 PM EST by Katelyn Bhandari      iv bumex 2 mg x 1    * Milestone   Additional Notes   12-9-20      Alert CC--Interval History:   CHF---with CKD--4-5---receiving IVF--poor urine output---BUN--creatinine remain stable---lungs remarkably clear---peripheral edema present       GI bleed--and kidney disease----anemia-----hemoglobin stable at 7.7---therefore, no scopes at this time      96.8  70 15   157/69   95 %  4 L      Bun/creat-48/3.38, h/h-7.7/25.6       iv bumex 2 mg x 1, ivf 125 hr       Per phy-HEENT: Normocephalic and Atraumatic   Neck: Supple, No Masses, Tenderness, Nodularity and No Lymphadenopathy   Chest/Lungs: Clear to Auscultation without Rales, Rhonchi, or Wheezes and Distant Breath Sounds   Cardiac: Regular Rate and Rhythm   GI/Abdomen: Bowel Sounds Present and Soft, Non-tender, without Guarding or Rebound Tenderness   : Not examined   EXT/Skin: No Cyanosis, No Clubbing and Edema Present   Neuro: alert------ and No Localizing Signs/Symptoms           Assessment:       Active Problems:     Decompensated heart failure (HCC)     Acute anemia   Resolved Problems:     * No resolved hospital problems. *    Plan:   1.  IGR-0-8--still with minimal output----has received IVF and getting Bumex 2 mg IV today--if no improvement then may require Nephrology   2.   CHF---see above    3.  See orders       Heart Failure - Care Day 5 (12/8/2020) by Fabricio Perez RN       Review Entered Review Status   12/16/2020 14:50 Completed      Criteria Review      Care Day: 5 Care Date: 12/8/2020 Level of Care: Intermediate Care    Guideline Day 3    Clinical Status    (X) * Hemodynamic stability    12/16/2020 2:50 PM EST by Lady Ortiz      65   137/62    (X) * Tachypnea absent    12/16/2020 2:50 PM EST by Lady Ortiz      10    (X) * Oxygenation at baseline or acceptable for next level of care    12/16/2020 2:50 PM EST by Lady Ortiz      99 %  2 L    (X) * Dyspnea at baseline or acceptable for next level of care    12/16/2020 2:50 PM EST by Lady Ortiz      accept    (X) * Cardiac rate and rhythm acceptable 12/16/2020 2:50 PM EST by Maribel Zhang      accept    (X) * Pulmonary edema absent or acceptable for next level of care    12/16/2020 2:50 PM EST by Maribel Zhang      accept    ( ) * Peripheral or sacral edema absent or acceptable for next level of care    (X) * Mental status at baseline    12/16/2020 2:50 PM EST by Maribel Zhang      yes    ( ) * Volume status acceptable on oral medication    ( ) * Renal function at baseline or acceptable for next level of care    ( ) * Electrolyte abnormalities absent or acceptable for next level of care    ( ) * Precipitating factors absent or controlled    ( ) * Discharge plans and education understood    Activity    ( ) * Ambulatory or acceptable for next level of care    Routes    (X) * Oral hydration, medications, and diet    12/16/2020 2:50 PM EST by Maribel Zhang      yes    Interventions    ( ) * Oxygen absent    * Milestone   Additional Notes   12-8-20      Alert   CHF---due to kidney function holding Bumex today and cont'd IVF---has BLE edema, but lung function continues to be adequate----priority at this time is to salvage kidneys       Anemia--hemoglobin remains stable----for EGD---colonoscopy---12.9.2020 by Dr. Asuncion Britt       K = 5.4  ---Kayexelate      97.4  65  10  137/62   99 %  2L       Bun/creat- 47/3.347, k-5.4, h/h-8.1/26.7      ivf 125 hr , po kayexalate 15 g x 1  and 30 g x 1       Consult surg - ASSESS MENT:       1.  GI blood loss with anemia.  Probably from UGI bleed, or slow bleed .  She has been hemodynamically stable.  Will probably need EGD and CS.  However .  Pt still low UO and generalized edema and Cr increasing.       Will hold off on CS.  May consider EGD if continues with dropping Hb and melenotic stools.  Or else we will wait until a little more stable with UO and Cr.     PLAN:    1.  Will follow with you   Plan:   1.   CHF--stable   2.   AMY---holding Bumex----cont'd IVF---do not stop despite BLE edema   3.   Hyperkalemia---Kayexelate 4.   Anemia---GIB---for EGD---colonoscopy--12.9.2020--HERMILO Jose   5.   DM2---Lantus 20----add log 5-5-5   6.   See orders      Per phy- HEENT: Normocephalic and Atraumatic   Neck: Supple, No Masses, Tenderness, Nodularity and No Lymphadenopathy   Chest/Lungs: Distant Breath Sounds   Cardiac: Regular Rate and Rhythm   GI/Abdomen: Bowel Sounds Present and Soft, Non-tender, without Guarding or Rebound Tenderness   : Not examined   EXT/Skin: No Cyanosis, No Clubbing and Edema Present   Neuro: alert--slow mentation--generalized weakness---known gait-balance instability-----otherwise--- and No Localizing Signs/Symptoms    Assessment:    Active Problems:     Decompensated heart failure (HCC)     Acute anemia   Resolved Problems:     * No resolved hospital problems.  *          Heart Failure - Care Day 4 (12/7/2020) by Robel Siu RN       Review Entered Review Status   12/16/2020 14:50 Completed      Criteria Review      Care Day: 4 Care Date: 12/7/2020 Level of Care: Intermediate Care    Guideline Day 3    Clinical Status    (X) * Hemodynamic stability    12/16/2020 2:50 PM EST by Van Vleck Prier      60   133/54    (X) * Tachypnea absent    12/16/2020 2:50 PM EST by Van Vleck Prier      14    (X) * Oxygenation at baseline or acceptable for next level of care    12/16/2020 2:50 PM EST by Van Vleck Prier      97 %  2 L    (X) * Dyspnea at baseline or acceptable for next level of care    12/16/2020 2:50 PM EST by Rory Hero accept    (X) * Cardiac rate and rhythm acceptable    12/16/2020 2:50 PM EST by Victor Manuel Prier      accept    (X) * Pulmonary edema absent or acceptable for next level of care    12/16/2020 2:50 PM EST by Victor Manuel Prier      absent    ( ) * Peripheral or sacral edema absent or acceptable for next level of care    (X) * Mental status at baseline    12/16/2020 2:50 PM EST by Victor Manuel Prier      alert    ( ) * Volume status acceptable on oral medication ( ) * Renal function at baseline or acceptable for next level of care    (X) * Electrolyte abnormalities absent or acceptable for next level of care    12/16/2020 2:50 PM EST by Lady Ortiz      absent    ( ) * Precipitating factors absent or controlled    ( ) * Discharge plans and education understood    Activity    ( ) * Ambulatory or acceptable for next level of care    Routes    (X) * Oral hydration, medications, and diet    12/16/2020 2:50 PM EST by Lady Ortiz      yes    Interventions    ( ) * Oxygen absent    Medications    (X) Diuretics    12/16/2020 2:50 PM EST by Lady Ortiz      iv bumex 1 mg qd    * Milestone   Additional Notes   12-7-20      Alert      CHF--acute-on-chronic--systolic       Type 2 NSTEMI---due to anemia--CHF--CKD---seen by Cardiology---see notes       AMY at admission--poor output--currently at a Stage 4-5 level----typically Stage 3       ASCVD---CABG       Anemia ---> GI bleed----2 units PRBCs-----likely slow blood loss---seen by General Surgery---planned colonoscopy---12.9.2020       BLE edema---weeping---currently with dressing and ACE wraps BLE      96.7  30  14 133/54    97 % 2 L       Bun/creat- 41/3.18, [id number], h/h-7.9/26. 1        Ivf 125 hr , iv bumex 1 mg x 1       Per phy- HEENT: Normocephalic and Atraumatic   Neck: Supple, No Masses, Tenderness, Nodularity and No Lymphadenopathy   Chest/Lungs: Rales Present and Distant Breath Sounds   Cardiac: Regular Rate and Rhythm without Rubs, Clicks, Gallops, or Murmurs   GI/Abdomen:  Bowel Sounds Present and Soft, Non-tender, without Guarding or Rebound Tenderness   : Not examined   EXT/Skin: BLE---ACE wraps----, No Cyanosis, No Clubbing and Edema Present   Neuro: alert---mentation slow---hearing impairment--dementia = baseline    HEENT: Normocephalic and Atraumatic   Neck: Supple, No Masses, Tenderness, Nodularity and No Lymphadenopathy   Chest/Lungs: Rales Present and Distant Breath Sounds Cardiac: Regular Rate and Rhythm without Rubs, Clicks, Gallops, or Murmurs   GI/Abdomen: Bowel Sounds Present and Soft, Non-tender, without Guarding or Rebound Tenderness   : Not examined   EXT/Skin: BLE---ACE wraps----, No Cyanosis, No Clubbing and Edema Present   Neuro: alert---mentation slow---hearing impairment--dementia = baseline           Assessment:       Active Problems:     Decompensated heart failure (HCC)     Acute anemia   Resolved Problems:     * No resolved hospital problems. *                   Plan:   1.   CHF---Type 2 NSTEMI---seen by Cardiology---Bumex IV ---> PO----daily weight---IO   2.  AMY---IVF gentle hydration   3.  DM-2---diabetic regimen--orders   4.  Anemia----hemoglobin fairly stable after transfusion---seen by Dr. Ernie Gallagher, ---planned endoscopy---12.9.2020   5.  BLE edema--local wound care---ACE wraps   6.  See orders       Consult cardio- - Acute on chronic sys HF, HFrEF, Ischemic CM- resolving   - Anemia   - NSTEMI type 2 due to elevated trop from CHF/CKD   - AMY on CKD   - CAD s/p CABG with 3/4 patent grafts on cath 8/20   - Sinus [place] in past, improved off BB / clonidine   - HTN   - Obesity   - DL   - CVA   - Dementia       RECOMMENDATIONS:   - Anemia to be addressed by primary team   - continue ASA/Plavix if able   - stop coreg due to bradycardia almost necessitating PPM   - stop losartan due to AMY/CKD   - continue Hydral/statin   - change bumex to po   - currently on IVF given AMY by primary team- recommend being quite cautious    - Echo is ordered, if LVEF < 35%, would benefit from AICD for primary prevention once anemia is addressed

## 2020-12-17 LAB
ABO/RH: NORMAL
ABSOLUTE EOS #: 0.07 K/UL (ref 0–0.44)
ABSOLUTE IMMATURE GRANULOCYTE: <0.03 K/UL (ref 0–0.3)
ABSOLUTE LYMPH #: 1.02 K/UL (ref 1.1–3.7)
ABSOLUTE MONO #: 0.45 K/UL (ref 0.1–1.2)
ALBUMIN SERPL-MCNC: 3.1 G/DL (ref 3.5–5.2)
ANION GAP SERPL CALCULATED.3IONS-SCNC: 12 MMOL/L (ref 9–17)
ANTIBODY SCREEN: NEGATIVE
ARM BAND NUMBER: NORMAL
BASOPHILS # BLD: 0 % (ref 0–2)
BASOPHILS ABSOLUTE: <0.03 K/UL (ref 0–0.2)
BLD PROD TYP BPU: NORMAL
BUN BLDV-MCNC: 53 MG/DL (ref 8–23)
BUN/CREAT BLD: ABNORMAL (ref 9–20)
CALCIUM SERPL-MCNC: 8.8 MG/DL (ref 8.6–10.4)
CHLORIDE BLD-SCNC: 106 MMOL/L (ref 98–107)
CO2: 24 MMOL/L (ref 20–31)
CREAT SERPL-MCNC: 2.49 MG/DL (ref 0.5–0.9)
CROSSMATCH RESULT: NORMAL
DIFFERENTIAL TYPE: ABNORMAL
DISPENSE STATUS BLOOD BANK: NORMAL
EOSINOPHILS RELATIVE PERCENT: 1 % (ref 1–4)
EXPIRATION DATE: NORMAL
GFR AFRICAN AMERICAN: 23 ML/MIN
GFR NON-AFRICAN AMERICAN: 19 ML/MIN
GFR SERPL CREATININE-BSD FRML MDRD: ABNORMAL ML/MIN/{1.73_M2}
GFR SERPL CREATININE-BSD FRML MDRD: ABNORMAL ML/MIN/{1.73_M2}
GLUCOSE BLD-MCNC: 117 MG/DL (ref 65–105)
GLUCOSE BLD-MCNC: 123 MG/DL (ref 70–99)
GLUCOSE BLD-MCNC: 144 MG/DL (ref 65–105)
GLUCOSE BLD-MCNC: 146 MG/DL (ref 65–105)
GLUCOSE BLD-MCNC: 148 MG/DL (ref 65–105)
HCT VFR BLD CALC: 27.4 % (ref 36.3–47.1)
HEMOGLOBIN: 8.1 G/DL (ref 11.9–15.1)
IMMATURE GRANULOCYTES: 0 %
LYMPHOCYTES # BLD: 17 % (ref 24–43)
MAGNESIUM: 1.9 MG/DL (ref 1.6–2.6)
MCH RBC QN AUTO: 30 PG (ref 25.2–33.5)
MCHC RBC AUTO-ENTMCNC: 29.6 G/DL (ref 28.4–34.8)
MCV RBC AUTO: 101.5 FL (ref 82.6–102.9)
MONOCYTES # BLD: 8 % (ref 3–12)
NRBC AUTOMATED: 0.3 PER 100 WBC
PDW BLD-RTO: 16.2 % (ref 11.8–14.4)
PHOSPHORUS: 4.3 MG/DL (ref 2.6–4.5)
PLATELET # BLD: 190 K/UL (ref 138–453)
PLATELET ESTIMATE: ABNORMAL
PMV BLD AUTO: 10.4 FL (ref 8.1–13.5)
POTASSIUM SERPL-SCNC: 4.5 MMOL/L (ref 3.7–5.3)
RBC # BLD: 2.7 M/UL (ref 3.95–5.11)
RBC # BLD: ABNORMAL 10*6/UL
SEG NEUTROPHILS: 74 % (ref 36–65)
SEGMENTED NEUTROPHILS ABSOLUTE COUNT: 4.36 K/UL (ref 1.5–8.1)
SODIUM BLD-SCNC: 142 MMOL/L (ref 135–144)
TRANSFUSION STATUS: NORMAL
UNIT DIVISION: 0
UNIT NUMBER: NORMAL
WBC # BLD: 5.9 K/UL (ref 3.5–11.3)
WBC # BLD: ABNORMAL 10*3/UL

## 2020-12-17 PROCEDURE — 6360000002 HC RX W HCPCS: Performed by: STUDENT IN AN ORGANIZED HEALTH CARE EDUCATION/TRAINING PROGRAM

## 2020-12-17 PROCEDURE — 76937 US GUIDE VASCULAR ACCESS: CPT

## 2020-12-17 PROCEDURE — 6360000002 HC RX W HCPCS: Performed by: INTERNAL MEDICINE

## 2020-12-17 PROCEDURE — 80069 RENAL FUNCTION PANEL: CPT

## 2020-12-17 PROCEDURE — 2580000003 HC RX 258: Performed by: INTERNAL MEDICINE

## 2020-12-17 PROCEDURE — 36415 COLL VENOUS BLD VENIPUNCTURE: CPT

## 2020-12-17 PROCEDURE — 6370000000 HC RX 637 (ALT 250 FOR IP): Performed by: INTERNAL MEDICINE

## 2020-12-17 PROCEDURE — 6370000000 HC RX 637 (ALT 250 FOR IP): Performed by: NURSE PRACTITIONER

## 2020-12-17 PROCEDURE — 83735 ASSAY OF MAGNESIUM: CPT

## 2020-12-17 PROCEDURE — 99232 SBSQ HOSP IP/OBS MODERATE 35: CPT | Performed by: INTERNAL MEDICINE

## 2020-12-17 PROCEDURE — 2060000000 HC ICU INTERMEDIATE R&B

## 2020-12-17 PROCEDURE — 99233 SBSQ HOSP IP/OBS HIGH 50: CPT | Performed by: HOSPITALIST

## 2020-12-17 PROCEDURE — 82947 ASSAY GLUCOSE BLOOD QUANT: CPT

## 2020-12-17 PROCEDURE — 85025 COMPLETE CBC W/AUTO DIFF WBC: CPT

## 2020-12-17 PROCEDURE — 2580000003 HC RX 258: Performed by: STUDENT IN AN ORGANIZED HEALTH CARE EDUCATION/TRAINING PROGRAM

## 2020-12-17 RX ADMIN — SODIUM CHLORIDE, PRESERVATIVE FREE 10 ML: 5 INJECTION INTRAVENOUS at 22:31

## 2020-12-17 RX ADMIN — FUROSEMIDE 30 MG/HR: 10 INJECTION, SOLUTION INTRAMUSCULAR; INTRAVENOUS at 18:50

## 2020-12-17 RX ADMIN — CEFEPIME HYDROCHLORIDE 2 G: 2 INJECTION, POWDER, FOR SOLUTION INTRAVENOUS at 04:32

## 2020-12-17 RX ADMIN — HEPARIN SODIUM 5000 UNITS: 5000 INJECTION INTRAVENOUS; SUBCUTANEOUS at 21:36

## 2020-12-17 RX ADMIN — FUROSEMIDE 10 MG/HR: 10 INJECTION, SOLUTION INTRAMUSCULAR; INTRAVENOUS at 12:59

## 2020-12-17 RX ADMIN — ANTI-FUNGAL POWDER MICONAZOLE NITRATE TALC FREE: 1.42 POWDER TOPICAL at 22:30

## 2020-12-17 RX ADMIN — ANTI-FUNGAL POWDER MICONAZOLE NITRATE TALC FREE: 1.42 POWDER TOPICAL at 09:01

## 2020-12-17 RX ADMIN — INSULIN LISPRO 2 UNITS: 100 INJECTION, SOLUTION INTRAVENOUS; SUBCUTANEOUS at 17:27

## 2020-12-17 RX ADMIN — PANTOPRAZOLE SODIUM 40 MG: 40 TABLET, DELAYED RELEASE ORAL at 05:58

## 2020-12-17 RX ADMIN — HEPARIN SODIUM 5000 UNITS: 5000 INJECTION INTRAVENOUS; SUBCUTANEOUS at 05:58

## 2020-12-17 RX ADMIN — CARVEDILOL 12.5 MG: 12.5 TABLET, FILM COATED ORAL at 09:00

## 2020-12-17 RX ADMIN — CLOPIDOGREL 75 MG: 75 TABLET, FILM COATED ORAL at 10:13

## 2020-12-17 RX ADMIN — CARVEDILOL 12.5 MG: 12.5 TABLET, FILM COATED ORAL at 17:09

## 2020-12-17 RX ADMIN — FUROSEMIDE 80 MG: 10 INJECTION, SOLUTION INTRAMUSCULAR; INTRAVENOUS at 09:00

## 2020-12-17 RX ADMIN — INSULIN LISPRO 1 UNITS: 100 INJECTION, SOLUTION INTRAVENOUS; SUBCUTANEOUS at 21:29

## 2020-12-17 RX ADMIN — CEFEPIME HYDROCHLORIDE 2 G: 2 INJECTION, POWDER, FOR SOLUTION INTRAVENOUS at 17:09

## 2020-12-17 RX ADMIN — CITALOPRAM 20 MG: 20 TABLET, FILM COATED ORAL at 09:00

## 2020-12-17 RX ADMIN — DESMOPRESSIN ACETATE 40 MG: 0.2 TABLET ORAL at 09:00

## 2020-12-17 RX ADMIN — INSULIN GLARGINE 20 UNITS: 100 INJECTION, SOLUTION SUBCUTANEOUS at 21:29

## 2020-12-17 RX ADMIN — SODIUM CHLORIDE, PRESERVATIVE FREE 10 ML: 5 INJECTION INTRAVENOUS at 08:59

## 2020-12-17 RX ADMIN — HEPARIN SODIUM 5000 UNITS: 5000 INJECTION INTRAVENOUS; SUBCUTANEOUS at 14:30

## 2020-12-17 ASSESSMENT — PAIN SCALES - GENERAL
PAINLEVEL_OUTOF10: 0
PAINLEVEL_OUTOF10: 0

## 2020-12-17 NOTE — PROGRESS NOTES
PROVIDE ADEQUATE OXYGENATION WITH ACCEPTABLE SP02/ABG'S    [x]  IDENTIFY APPROPRIATE OXYGEN THERAPY  [x]   MONITOR SP02/ABG'S AS NEEDED   [x]   PATIENT EDUCATION AS NEEDED    BRONCHOSPASM/BRONCHOCONSTRICTION     [x]         IMPROVE AERATION/BREATH SOUNDS  [x]   ADMINISTER BRONCHODILATOR THERAPY AS APPROPRIATE  [x]   ASSESS BREATH SOUNDS  []   IMPLEMENT AEROSOL/MDI PROTOCOL  [x]   PATIENT EDUCATION AS NEEDED    [x]  NON INVASIVE VENTILATION  [x]  PROVIDE OPTIMAL VENTILATION/ACCEPTABLE SP02  []  IMPLEMENT NON INVASIVE VENTILATION PROTOCOL  [x]  ASSESSMENT SKIN INTEGRITY  [x]  PATIENT EDUCATION AS NEEDED  [x]  BIPAP AS NEEDED

## 2020-12-17 NOTE — PROGRESS NOTES
[facility]   Progress Note                   Date:   12/17/2020  Patient name: Elizabeth Martinez  Date of admission:  12/12/2020  4:17 AM  MRN:   [id number]  YOB: 1950  PCP: ERINN La    Reason for Admission: Acute renal failure (ARF) ([facility] [N17.9]    Subjective:       Clinical Changes / Abnormalities: Pt seen and examined in the room. She denies any CP or SOB. Labs, vitals, & tele reviewed. -3.9L  Since admission   Medications:   Scheduled Meds:   furosemide  80 mg Intravenous Daily    pantoprazole  40 mg Oral QAM AC    cefepime  2 g Intravenous Q12H    carvedilol  12.5 mg Oral BID WC    sodium chloride flush  10 mL Intravenous 2 times per day    miconazole   Topical BID    heparin (porcine)  5,000 Units Subcutaneous 3 times per day    atorvastatin  40 mg Oral Daily    citalopram  20 mg Oral Daily    clopidogrel  75 mg Oral Daily    insulin glargine  20 Units Subcutaneous Nightly    rivastigmine  1 patch Transdermal Daily    insulin lispro  0-12 Units Subcutaneous TID WC    insulin lispro  0-6 Units Subcutaneous Nightly    darbepoetin hung-polysorbate  60 mcg Subcutaneous Weekly     Continuous Infusions:    CBC:   Recent Labs     12/15/20  1103 12/16/20  0706 12/17/20  0604   WBC 7.7 8.3 5.9   HGB 7.6* 7.9* 8.1*    208 190     BMP:    Recent Labs     12/15/20  1103 12/16/20  0706 12/17/20  0604    141 142   K 3.9 4.0 4.5   * 108* 106   CO2 26 26 24   BUN 47* 49* 53*   CREATININE 2.37* 2.33* 2.49*   GLUCOSE 148* 81 123*     Hepatic:   Recent Labs     12/15/20  1215   AST 16   ALT 8   BILITOT 0.40   ALKPHOS 103     Troponin: No results for input(s): TROPHS in the last 72 hours. BNP: No results for input(s): BNP in the last 72 hours. Lipids: No results for input(s): CHOL, HDL in the last 72 hours. Invalid input(s): LDLCALCU  INR:   No results for input(s): INR in the last 72 hours.     Objective: Vitals: BP (!) 149/64   Pulse 71   Temp 97.5 °F (36.4 °C) (Oral)   Resp 18   Ht 5' 7\" (1.702 m)   Wt (!) 302 lb 4 oz (137.1 kg)   SpO2 98%   BMI 47.34 kg/m²   General appearance: alert and cooperative with exam  HEENT: Head: Normocephalic, no lesions, without obvious abnormality. Neck: no JVD, trachea midline, no adenopathy  Lungs: diminished with fine rales in bilateral bases to auscultation, on 02 per NC . No distress  Heart: Regular rate and rhythm, s1/s2 auscultated, no murmurs, SR 70s  Abdomen: soft, non-tender, bowel sounds active  Extremities: +2/3 edema, ACE wraps on bilateral legs, Upper extremities + 2, weaping edema   Neurologic: not done        Assessment / Acute Cardiac Problems:   1. Acute on chronic CHF   2. Anasarca  3. AMY on CKD   4. DM   5. HTN   6. Ho CABG   7.  Anemia     Patient Active Problem List:     Dementia ([address]     Hypertension     Hyperlipidemia     Class 2 severe obesity with serious comorbidity and body mass index (BMI) of 37.0 to 37.9 in adult [facility])     Gout     Peripheral polyneuropathy     Anxiety and depression     Acute kidney injury superimposed on CKD ([address]     Balance problem     CVA (cerebral vascular accident) ([address]     Type 2 diabetes mellitus with hyperglycemia, with long-term current use of insulin (HCC)     Acute on chronic diastolic heart failure ([address]     Acute cystitis     Infestation by bed bug     Infestation by maggots     Lymphedema of both lower extremities     PAD (peripheral artery disease) ([facility])     Pressure injury of right heel, unstageable ([address]     Acute kidney injury ([address]     Bradycardia     Hyperkalemia     AV block, 1st degree     Wounds, multiple     Buttock wound     Decubitus ulcer of trochanteric region of right hip, stage 2 (HCC)     Decompensated heart failure (HCC)     Acute anemia     Acute renal failure (ARF) ([address]     Anasarca      Plan of Treatment:

## 2020-12-17 NOTE — PROGRESS NOTES
[unfilled]    [facility]    Progress Note    12/17/2020    4:34 PM    Name:   Lalita Wan  MRN:     [id number]     Acct:      [de-identified]   Room:   [address] [place] [address] Day:  5  Admit Date:  12/12/2020  4:17 AM    PCP:   ERINN Zamarripa  Code Status:  Full Code    Subjective:     C/C: No chief complaint on file. Patient Active Problem List   Diagnosis    Dementia ([facility]    Hypertension    Hyperlipidemia    Class 2 severe obesity with serious comorbidity and body mass index (BMI) of 37.0 to 37.9 in [facility])    Gout    Peripheral polyneuropathy    Anxiety and depression    Acute kidney injury superimposed on CKD ([facility]    Balance problem    CVA (cerebral vascular accident) ([facility]    Type 2 diabetes mellitus with hyperglycemia, with long-term current use of insulin (HCC)    Acute on chronic diastolic heart failure (HCC)    Acute cystitis    Infestation by bed bug    Infestation by maggots    Lymphedema of both lower extremities    [facility]per[facility] artery disease) ([facility]    Pressure injury of right heel, unstageable (HCC)    Acute kidney injury ([facility]    Bradycardia    Hyperkalemia    AV block, 1st degree    Wounds, multiple    Buttock wound    Decubitus ulcer of trochanteric region of right hip, stage 2 (HCC)    Decompensated heart failure (HCC)    Acute anemia    Acute renal failure (ARF) ([facility])    Anasarca    Gram-negative bacteremia    Stage 3 chronic kidney disease     Interval History Status: not changed. Patient was seen and examined at bedside. Patient has advanced dementia and is unable to give me any history. ROS is significantly limited. Patient remains to have significant edema and worsening renal function. Nephrology consult appreciated.     Brief History:     Per prior record: This is a 58-year-old female who initially presented to outside hospital with anemia, hemoglobin of 6.8.  She was given hemoglobin and developed lower extremity edema and was subsequently diuresed.  She was transferred here for acute renal failure.  She was evaluated by nephrology who reviewed her symptoms to AT due to intravascular depletion.  She was started on half bicarb drip and IV Lasix.  Her creatinine is slowly improving now.  Patient was evaluated by GI for her anemia: EGD shows mild gastritis.     Blood culture: 12/15 positive for gram negative rods in one culture. Repeat cultures, start cefepime. Review of Systems:     Unable to get meaningful history due to advanced dementia. Medications: Allergies:     Allergies   Allergen Reactions    Bactrim [Sulfamethoxazole-Trimethoprim] Hives    Clonidine Derivatives     Amoxicillin-Pot Clavulanate Diarrhea, Nausea Only and Nausea And Vomiting       Current Meds:   Scheduled Meds:    pantoprazole  40 mg Oral QAM AC    cefepime  2 g Intravenous Q12H    carvedilol  12.5 mg Oral BID WC    sodium chloride flush  10 mL Intravenous 2 times per day    miconazole   Topical BID    heparin (porcine)  5,000 Units Subcutaneous 3 times per day    atorvastatin  40 mg Oral Daily    citalopram  20 mg Oral Daily    clopidogrel  75 mg Oral Daily    insulin glargine  20 Units Subcutaneous Nightly    rivastigmine  1 patch Transdermal Daily    insulin lispro  0-12 Units Subcutaneous TID WC    insulin lispro  0-6 Units Subcutaneous Nightly    darbepoetin hung-polysorbate  60 mcg Subcutaneous Weekly     Continuous Infusions:    furosemide (LASIX) 1mg/ml infusion 15 mg/hr (12/17/20 1442)     PRN Meds: sodium chloride flush, nicotine, promethazine **OR** ondansetron, acetaminophen **OR** acetaminophen, albuterol    Data: Past Medical History:   has a past medical history of Anxiety and depression, Background diabetic retinopathy(362.01), Balance problem, CAD (coronary artery disease), Cancer of uterus ([facility] Chronic kidney disease, Chronic low back pain, CVA (cerebral vascular accident) ([facility] Dry eye syndrome, GERD (gastroesophageal reflux disease), Glaucoma suspect, Gout, Homonymous hemianopsia, Hyperlipidemia, Hypertension, Keratitis, Obesity, Peripheral polyneuropathy, Pseudophakos, Stroke ([facility] Trichiasis, and Type 2 diabetes mellitus ([facility] Social History:   reports that she has never smoked. She has never used smokeless tobacco. She reports that she does not drink alcohol or use drugs. Family History:   Family History   Problem Relation Age of Onset    Diabetes Mother     Cancer Mother     High Blood Pressure Mother    Serjio Fuller Stroke Mother     Kidney Disease Mother     Uterine Cancer Mother     Diabetes Father     Heart Disease Father     Glaucoma Father     Emphysema Father     Coronary Art Dis Other         All 4 siblings.  Stroke Other         1 sibling.  Lung Cancer Other         1 sibling - cause of death lung cancer.  Mental Retardation Other        Vitals:  BP (!) 148/64   Pulse 64   Temp 97.7 °F (36.5 °C) (Oral)   Resp 12   Ht 5' 7\" (1.702 m)   Wt (!) 302 lb 4 oz (137.1 kg)   SpO2 96%   BMI 47.34 kg/m²   Temp (24hrs), Av.5 °F (36.4 °C), Min:97.4 °F (36.3 °C), Max:97.7 °F (36.5 °C)    Recent Labs     20  1758 20  1928 20  0806 20  1157   POCGLU 84 104 117* 144*       I/O (24Hr):     Intake/Output Summary (Last 24 hours) at 2020 1634  Last data filed at 2020 1300  Gross per 24 hour   Intake    Output 300 ml   Net -300 ml       Labs:  Hematology:  Recent Labs     12/15/20  1103 20  0706 20  0604   WBC 7.7 8.3 5.9   RBC 2.53* 2.61* 2.70*   HGB 7.6* 7.9* 8.1*   HCT 25.7* 26.7* 27.4*   .6 102.3 101.5 MCH 30.0 30.3 30.0   MCHC 29.6 29.6 29.6   RDW 15.9* 16.1* 16.2*    208 190   MPV 10.4 10.4 10.4     Chemistry:  Recent Labs     12/15/20  1103 12/16/20  0706 12/17/20  0604    141 142   K 3.9 4.0 4.5   * 108* 106   CO2 26 26 24   GLUCOSE 148* 81 123*   BUN 47* 49* 53*   CREATININE 2.37* 2.33* 2.49*   MG 1.8 1.8 1.9   ANIONGAP 9 7* 12   LABGLOM 20* 21* 19*   GFRAA 25* 25* 23*   CALCIUM 8.3* 8.6 8.8   PHOS 4.2 4.0 4.3     Recent Labs     12/15/20  1215 12/15/20  1215 12/16/20  0706 12/16/20  0706 12/16/20  1613 12/16/20  1647 12/16/20  1758 12/16/20  1928 12/17/20  0604 12/17/20  0806 12/17/20  1157   PROT 6.1*  --   --   --   --   --   --   --   --   --   --    LABALBU 3.0*  --  2.9*  --   --   --   --   --  3.1*  --   --    AST 16  --   --   --   --   --   --   --   --   --   --    ALT 8  --   --   --   --   --   --   --   --   --   --    ALKPHOS 103  --   --   --   --   --   --   --   --   --   --    BILITOT 0.40  --   --   --   --   --   --   --   --   --   --    BILIDIR 0.23  --   --   --   --   --   --   --   --   --   --    POCGLU  --    < >  --    < > 69 86 84 104  --  117* 144*    < > = values in this interval not displayed.      ABG:  Lab Results   Component Value Date    POCPH 7.284 12/13/2020    POCPCO2 54.5 12/13/2020    POCPO2 71.6 12/13/2020    POCHCO3 25.8 12/13/2020    NBEA 1 12/13/2020    PBEA NOT REPORTED 12/13/2020    CBN5BXT 28 12/13/2020    RIXW1PJD 92 12/13/2020    FIO2 2.0 12/13/2020     Lab Results   Component Value Date/Time    SPECIAL L HAND 10 CC 12/15/2020 05:22 PM     Lab Results   Component Value Date/Time    CULTURE (A) 12/15/2020 05:22 PM     POSITIVE Blood Culture Results called to and read back by: CORNEL GILL ON 12/16/20 AT 0925    CULTURE  12/15/2020 05:22 PM     DIRECT GRAM STAIN FROM BOTTLE: GRAM POSITIVE COCCI IN CLUSTERS    CULTURE (A) 12/15/2020 05:22 PM     Staphylococcus aureus Detected:  mecA/C and MREJ Gene Detected- Methicillin Resistant Organism Methodology- Polymerase Chain Reaction (PCR)      CULTURE STAPHYLOCOCCUS AUREUS (A) 12/15/2020 05:22 PM       Radiology:  Ct Head Wo Contrast    Result Date: 12/13/2020  No acute intracranial abnormality. Old infarct left posterior cerebral artery territory. Ct Chest Wo Contrast    Result Date: 12/13/2020  Bibasilar airspace disease and effusions coronary artery disease and cardiomegaly. Xr Chest Portable    Result Date: 12/16/2020  Stable pulmonary vascular congestion     Xr Chest Portable    Result Date: 12/12/2020  Pulmonary vascular congestion and possible small right pleural effusion. Resolving pulmonary edema. Xr Chest Portable    Result Date: 12/11/2020  No significant interval change with findings consistent with pulmonary edema and small right pleural effusion. Physical Examination:        General appearance:  alert, cooperative and no distress  Mental Status: Demented  Lungs:  clear to auscultation bilaterally, normal effort  Heart:  regular rate and rhythm, no murmur  Abdomen:  soft, nontender, nondistended, normal bowel sounds, no masses, hepatomegaly, splenomegaly  Extremities: Significant edema in all extremities.   Skin:  no gross lesions, rashes, induration    Assessment:        Hospital Problems           Last Modified POA    * (Principal) Gram-negative bacteremia 12/15/2020 Yes    Dementia ([address] 12/12/2020 Yes    Overview Signed 2/27/2014 10:21 AM by Dylan Hathaway MD     (moderate)         Hypertension 12/12/2020 Yes    Hyperlipidemia 12/12/2020 Yes    CVA (cerebral vascular accident) ([address] 12/12/2020 Yes    Type 2 diabetes mellitus with hyperglycemia, with long-term current use of insulin ([address] 12/12/2020 Yes    Acute on chronic diastolic heart failure ([address] 12/12/2020 Yes    Acute renal failure (ARF) ([address] 12/15/2020 Yes    Anasarca 12/12/2020 Yes    Stage 3 chronic kidney disease 12/17/2020 Yes          Plan: 1. Acute kidney injury on CKD stage III -patient's renal function is worsening today. Nephrology is switching her IV Lasix push to Lasix drip. 2. Gram-negative bacteremia -continue cefepime. Infectious disease on board. 3. Acute on chronic exacerbation of diastolic congestive heart failure. -Patient on Lasix drip. Continue to monitor. Continue other home medications. 4. Essential hypertension -stable  5. Obesity hypoventilation syndrome with obstructive sleep apnea -patient on CPAP as needed. 6. Morbid obesity  7. Insulin-dependent diabetes mellitus -continue current regimen of insulin. 8. Normocytic normochromic anemia -likely anemia of chronic disease and mild blood loss anemia.       Arabella Harding,   12/17/2020  4:34 PM

## 2020-12-17 NOTE — PROGRESS NOTES
NEPHROLOGY PROGRESS NOTE      SUBJECTIVE     Hospitalized with exertional shortness of breath/lower worsening lower extremity swelling and noted to be having anemia along with decompensated congestive heart failure and acute kidney injury. Patient seen. Chart reviewed. She continues to be short of breath and uses BiPAP nightly. Urine output was at 100 mL's during the night and 300 mL in the early morning. She continues to have the Lynn catheter. She has bacteremia. On antibiotic therapy with cefepime. Weeping wounds on the extremities noted. OBJECTIVE     Vitals:    12/16/20 1800 12/16/20 1923 12/17/20 0600 12/17/20 0804   BP: 134/62 (!) 135/50  (!) 149/64   Pulse: 63 62  71   Resp:  16  18   Temp:  97.4 °F (36.3 °C)  97.5 °F (36.4 °C)   TempSrc:    Oral   SpO2:  100%  98%   Weight:   (!) 302 lb 4 oz (137.1 kg)    Height:         24HR INTAKE/OUTPUT:      Intake/Output Summary (Last 24 hours) at 12/17/2020 1127  Last data filed at 12/17/2020 0607  Gross per 24 hour   Intake    Output 300 ml   Net -300 ml       General appearance:Awake, alert,  HEENT: PERRLA  Respiratory[de-identified] Shortness of breath. Bilateral wheezes and basilar rales. Cardiovascular:S1 S2 normal,no gallop or organic murmur. Abdomen:Non tender/non distended. Bowel sounds present  Extremities: No Cyanosis or Clubbing, present lower extremity edema  Neurological:Alert and oriented. No abnormalities of mood, affect, memory, mentation, or behavior are noted      MEDICATIONS     Scheduled Meds:    pantoprazole  40 mg Oral QAM AC    cefepime  2 g Intravenous Q12H    carvedilol  12.5 mg Oral BID WC    sodium chloride flush  10 mL Intravenous 2 times per day    miconazole   Topical BID    heparin (porcine)  5,000 Units Subcutaneous 3 times per day    atorvastatin  40 mg Oral Daily    citalopram  20 mg Oral Daily    clopidogrel  75 mg Oral Daily    insulin glargine  20 Units Subcutaneous Nightly    rivastigmine  1 patch Transdermal Daily  insulin lispro  0-12 Units Subcutaneous TID     insulin lispro  0-6 Units Subcutaneous Nightly    darbepoetin hung-polysorbate  60 mcg Subcutaneous Weekly     Continuous Infusions:    furosemide (LASIX) 1mg/ml infusion       PRN Meds:  sodium chloride flush, nicotine, promethazine **OR** ondansetron, acetaminophen **OR** acetaminophen, albuterol  Home Meds:                Medications Prior to Admission: aspirin 81 MG tablet, Take 1 tablet by mouth daily  senna (SENOKOT) 8.6 MG tablet, Take 1 tablet by mouth 2 times daily  oxybutynin (DITROPAN) 5 MG tablet, TAKE 1 TABLET BY MOUTH TWICE DAILY  losartan (COZAAR) 50 MG tablet, Take 1 tablet by mouth daily  OXYGEN, Oxgen at 2 liters per nasal cannula  loratadine (CLARITIN) 10 MG tablet, TAKE 1 TABLET(10 MG) BY MOUTH DAILY  Lancets MISC, Checks blood sugar ac and HS  blood glucose monitor strips, Test 3  times a day & as needed for symptoms of irregular blood glucose. Dispense sufficient amount for indicated testing frequency plus additional to accommodate PRN testing needs. Alcohol Swabs (ALCOHOL PREP) PADS, Checks blood sugar 3 times a day  Misc.  Devices MISC, Standard walker with wheels  Diagnosis dementia, CHF  atorvastatin (LIPITOR) 40 MG tablet, TAKE 1 TABLET BY MOUTH EVERYDAY  bumetanide (BUMEX) 1 MG tablet, Take 1 tablet by mouth daily  citalopram (CELEXA) 20 MG tablet, Take 1 tablet by mouth daily  clopidogrel (PLAVIX) 75 MG tablet, Take 1 tablet by mouth daily  insulin lispro (HUMALOG) 100 UNIT/ML injection vial, Use 4 times a day per sliding scale  hydrALAZINE (APRESOLINE) 50 MG tablet, Take 1.5 tablets by mouth every 8 hours  insulin glargine (LANTUS) 100 UNIT/ML injection vial, Inject 20 Units into the skin nightly  memantine (NAMENDA) 10 MG tablet, TAKE 1 TABLET BY MOUTH TWICE DAILY  omeprazole (PRILOSEC) 20 MG delayed release capsule, Take 1 capsule by mouth every morning (before breakfast) rivastigmine (EXELON) 9.5 MG/24HR, APPLY 1 NEW PATCH EVERY 24 HOURS  Diabetic Shoe MISC, by Does not apply route  Compression Stockings MISC, by Does not apply route 20-30 mm Hg bilateral knee high  nystatin (MYCOSTATIN) 058496 UNIT/GM cream, Apply topically 2 times daily. INVESTIGATIONS     Last 3 CMP:    Recent Labs     12/15/20  1103 12/15/20  1215 12/16/20  0706 12/17/20  0604     --  141 142   K 3.9  --  4.0 4.5   *  --  108* 106   CO2 26  --  26 24   BUN 47*  --  49* 53*   CREATININE 2.37*  --  2.33* 2.49*   CALCIUM 8.3*  --  8.6 8.8   PROT  --  6.1*  --   --    LABALBU 3.0* 3.0* 2.9* 3.1*   BILITOT  --  0.40  --   --    ALKPHOS  --  103  --   --    AST  --  16  --   --    ALT  --  8  --   --        Last 3 CBC:  Recent Labs     12/15/20  1103 12/16/20  0706 12/17/20  0604   WBC 7.7 8.3 5.9   RBC 2.53* 2.61* 2.70*   HGB 7.6* 7.9* 8.1*   HCT 25.7* 26.7* 27.4*   .6 102.3 101.5   MCH 30.0 30.3 30.0   MCHC 29.6 29.6 29.6   RDW 15.9* 16.1* 16.2*    208 190   MPV 10.4 10.4 10.4       ASSESSMENT     1. Acute kidney injury nonoliguric secondary to prerenal injury from reduced E ABV from anemia/decompensated LVDD/cor pulmonale -persisting. 2.  Chronic kidney disease stage III secondary diabetic ischemic nephrosclerosis with baseline creatinine 1.2-1.4. Follows up with Dr. Kash Teixeira. 3.  Nonnephrotic range proteinuria from diabetes  4. Anemia status post EGD which showed mild gastritis. Also on JOBY  5. Decompensated left ventricular diastolic/cor pulmonale  6. History of CABG  7. History of type 2 diabetes    PLAN     1. Change Lasix IV to Lasix drip 10 mg/h. Titrate every 6 hours to maximum dose of 40 mg/h to target output of  100-150 cc/h urine output. 2.  Strict intake and output. Daily weights. 3.  We will follow. 4.  Recommend wound care for weeping wounds.    Please do not hesitate to call with questions This note is created with the assistance of a speech-recognition program. While intending to generate a document that actually reflects the content of the visit, no guarantees can be provided that every mistake has been identified and corrected by editing    Mauro Echevarria M.D. Internal Medicine Resident , PGY-3  [address] [place] [address]  12/17/20 11:27 AM    Attending Physician Statement  I have discussed the care of Governor Woodard, including pertinent history and exam findings,  with the Fellow/Residentt. I have reviewed the key elements of all parts of the encounter with the Fellow/ Resident. I agree with the assessment, plan and orders as documented by the resident.     Nathan Pritchard MD, MRCTARAN [facility], [facility]   12/17/2020 11:32 AM    Nephrology [address] [place] [address]

## 2020-12-17 NOTE — PLAN OF CARE
Problem: Skin Integrity:  Goal: Will show no infection signs and symptoms  Description: Will show no infection signs and symptoms  Outcome: Ongoing     Problem: Skin Integrity:  Goal: Absence of new skin breakdown  Description: Absence of new skin breakdown  Outcome: Ongoing     Problem: Falls - Risk of:  Goal: Will remain free from falls  Description: Will remain free from falls  Outcome: Ongoing     Problem: Falls - Risk of:  Goal: Absence of physical injury  Description: Absence of physical injury  Outcome: Ongoing     Problem: Tissue Perfusion - Renal, Altered:  Goal: Electrolytes within specified parameters  Description: Electrolytes within specified parameters  Outcome: Ongoing     Problem: Tissue Perfusion - Renal, Altered:  Goal: Urine creatinine clearance will be within specified parameters  Description: Urine creatinine clearance will be within specified parameters  Outcome: Ongoing     Problem: Tissue Perfusion - Renal, Altered:  Goal: Serum creatinine will be within specified parameters  Description: Serum creatinine will be within specified parameters  Outcome: Ongoing   Electronically signed by Inga Parra RN on 12/17/2020 at 2:06 PM

## 2020-12-18 PROBLEM — R78.81 MRSA BACTEREMIA: Status: ACTIVE | Noted: 2020-12-18

## 2020-12-18 PROBLEM — B95.62 MRSA BACTEREMIA: Status: ACTIVE | Noted: 2020-12-18

## 2020-12-18 LAB
ABSOLUTE EOS #: 0.11 K/UL (ref 0–0.44)
ABSOLUTE IMMATURE GRANULOCYTE: <0.03 K/UL (ref 0–0.3)
ABSOLUTE LYMPH #: 0.79 K/UL (ref 1.1–3.7)
ABSOLUTE MONO #: 0.56 K/UL (ref 0.1–1.2)
ALBUMIN SERPL-MCNC: 2.9 G/DL (ref 3.5–5.2)
ANION GAP SERPL CALCULATED.3IONS-SCNC: 11 MMOL/L (ref 9–17)
BASOPHILS # BLD: 1 % (ref 0–2)
BASOPHILS ABSOLUTE: 0.03 K/UL (ref 0–0.2)
BUN BLDV-MCNC: 55 MG/DL (ref 8–23)
BUN/CREAT BLD: ABNORMAL (ref 9–20)
CALCIUM SERPL-MCNC: 8.7 MG/DL (ref 8.6–10.4)
CHLORIDE BLD-SCNC: 104 MMOL/L (ref 98–107)
CO2: 26 MMOL/L (ref 20–31)
CREAT SERPL-MCNC: 2.51 MG/DL (ref 0.5–0.9)
CULTURE: ABNORMAL
DIFFERENTIAL TYPE: ABNORMAL
EOSINOPHILS RELATIVE PERCENT: 2 % (ref 1–4)
GFR AFRICAN AMERICAN: 23 ML/MIN
GFR NON-AFRICAN AMERICAN: 19 ML/MIN
GFR SERPL CREATININE-BSD FRML MDRD: ABNORMAL ML/MIN/{1.73_M2}
GFR SERPL CREATININE-BSD FRML MDRD: ABNORMAL ML/MIN/{1.73_M2}
GLUCOSE BLD-MCNC: 128 MG/DL (ref 65–105)
GLUCOSE BLD-MCNC: 131 MG/DL (ref 65–105)
GLUCOSE BLD-MCNC: 137 MG/DL (ref 65–105)
GLUCOSE BLD-MCNC: 145 MG/DL (ref 70–99)
GLUCOSE BLD-MCNC: 161 MG/DL (ref 65–105)
GLUCOSE BLD-MCNC: 162 MG/DL (ref 65–105)
GLUCOSE BLD-MCNC: 193 MG/DL (ref 65–105)
HCT VFR BLD CALC: 25.8 % (ref 36.3–47.1)
HEMOGLOBIN: 7.6 G/DL (ref 11.9–15.1)
IMMATURE GRANULOCYTES: 0 %
LYMPHOCYTES # BLD: 14 % (ref 24–43)
Lab: ABNORMAL
MAGNESIUM: 1.9 MG/DL (ref 1.6–2.6)
MCH RBC QN AUTO: 29.7 PG (ref 25.2–33.5)
MCHC RBC AUTO-ENTMCNC: 29.5 G/DL (ref 28.4–34.8)
MCV RBC AUTO: 100.8 FL (ref 82.6–102.9)
MICROBIOLOGY SEND OUT REPORT: NORMAL
MONOCYTES # BLD: 10 % (ref 3–12)
NRBC AUTOMATED: 0 PER 100 WBC
PDW BLD-RTO: 16.1 % (ref 11.8–14.4)
PHOSPHORUS: 4.3 MG/DL (ref 2.6–4.5)
PLATELET # BLD: 178 K/UL (ref 138–453)
PLATELET ESTIMATE: ABNORMAL
PMV BLD AUTO: 10.8 FL (ref 8.1–13.5)
POTASSIUM SERPL-SCNC: 4.2 MMOL/L (ref 3.7–5.3)
RBC # BLD: 2.56 M/UL (ref 3.95–5.11)
RBC # BLD: ABNORMAL 10*6/UL
SEG NEUTROPHILS: 74 % (ref 36–65)
SEGMENTED NEUTROPHILS ABSOLUTE COUNT: 4.28 K/UL (ref 1.5–8.1)
SODIUM BLD-SCNC: 141 MMOL/L (ref 135–144)
SPECIMEN DESCRIPTION: ABNORMAL
TEST NAME: NORMAL
WBC # BLD: 5.8 K/UL (ref 3.5–11.3)
WBC # BLD: ABNORMAL 10*3/UL

## 2020-12-18 PROCEDURE — 87186 SC STD MICRODIL/AGAR DIL: CPT

## 2020-12-18 PROCEDURE — 82947 ASSAY GLUCOSE BLOOD QUANT: CPT

## 2020-12-18 PROCEDURE — 6370000000 HC RX 637 (ALT 250 FOR IP): Performed by: NURSE PRACTITIONER

## 2020-12-18 PROCEDURE — 80069 RENAL FUNCTION PANEL: CPT

## 2020-12-18 PROCEDURE — 36415 COLL VENOUS BLD VENIPUNCTURE: CPT

## 2020-12-18 PROCEDURE — 2580000003 HC RX 258: Performed by: INTERNAL MEDICINE

## 2020-12-18 PROCEDURE — 85025 COMPLETE CBC W/AUTO DIFF WBC: CPT

## 2020-12-18 PROCEDURE — 6360000002 HC RX W HCPCS: Performed by: INTERNAL MEDICINE

## 2020-12-18 PROCEDURE — 87040 BLOOD CULTURE FOR BACTERIA: CPT

## 2020-12-18 PROCEDURE — 6370000000 HC RX 637 (ALT 250 FOR IP): Performed by: INTERNAL MEDICINE

## 2020-12-18 PROCEDURE — 94761 N-INVAS EAR/PLS OXIMETRY MLT: CPT

## 2020-12-18 PROCEDURE — 99232 SBSQ HOSP IP/OBS MODERATE 35: CPT | Performed by: HOSPITALIST

## 2020-12-18 PROCEDURE — 2060000000 HC ICU INTERMEDIATE R&B

## 2020-12-18 PROCEDURE — 6360000002 HC RX W HCPCS: Performed by: STUDENT IN AN ORGANIZED HEALTH CARE EDUCATION/TRAINING PROGRAM

## 2020-12-18 PROCEDURE — 83735 ASSAY OF MAGNESIUM: CPT

## 2020-12-18 PROCEDURE — 87077 CULTURE AEROBIC IDENTIFY: CPT

## 2020-12-18 PROCEDURE — 99232 SBSQ HOSP IP/OBS MODERATE 35: CPT | Performed by: INTERNAL MEDICINE

## 2020-12-18 PROCEDURE — 99222 1ST HOSP IP/OBS MODERATE 55: CPT | Performed by: INTERNAL MEDICINE

## 2020-12-18 PROCEDURE — 87150 DNA/RNA AMPLIFIED PROBE: CPT

## 2020-12-18 PROCEDURE — 2700000000 HC OXYGEN THERAPY PER DAY

## 2020-12-18 PROCEDURE — 87205 SMEAR GRAM STAIN: CPT

## 2020-12-18 PROCEDURE — 87184 SC STD DISK METHOD PER PLATE: CPT

## 2020-12-18 PROCEDURE — 2580000003 HC RX 258: Performed by: STUDENT IN AN ORGANIZED HEALTH CARE EDUCATION/TRAINING PROGRAM

## 2020-12-18 RX ORDER — CHLOROTHIAZIDE SODIUM 500 MG/1
500 INJECTION INTRAVENOUS ONCE
Status: COMPLETED | OUTPATIENT
Start: 2020-12-18 | End: 2020-12-18

## 2020-12-18 RX ORDER — NICOTINE POLACRILEX 4 MG
15 LOZENGE BUCCAL PRN
Status: DISCONTINUED | OUTPATIENT
Start: 2020-12-18 | End: 2020-12-23 | Stop reason: HOSPADM

## 2020-12-18 RX ORDER — DEXTROSE MONOHYDRATE 50 MG/ML
100 INJECTION, SOLUTION INTRAVENOUS PRN
Status: DISCONTINUED | OUTPATIENT
Start: 2020-12-18 | End: 2020-12-23 | Stop reason: HOSPADM

## 2020-12-18 RX ORDER — DEXTROSE MONOHYDRATE 25 G/50ML
12.5 INJECTION, SOLUTION INTRAVENOUS PRN
Status: DISCONTINUED | OUTPATIENT
Start: 2020-12-18 | End: 2020-12-23 | Stop reason: HOSPADM

## 2020-12-18 RX ADMIN — INSULIN GLARGINE 20 UNITS: 100 INJECTION, SOLUTION SUBCUTANEOUS at 20:06

## 2020-12-18 RX ADMIN — FUROSEMIDE 40 MG/HR: 10 INJECTION, SOLUTION INTRAMUSCULAR; INTRAVENOUS at 09:58

## 2020-12-18 RX ADMIN — CEFEPIME HYDROCHLORIDE 2 G: 2 INJECTION, POWDER, FOR SOLUTION INTRAVENOUS at 15:40

## 2020-12-18 RX ADMIN — ANTI-FUNGAL POWDER MICONAZOLE NITRATE TALC FREE: 1.42 POWDER TOPICAL at 20:06

## 2020-12-18 RX ADMIN — FUROSEMIDE 40 MG/HR: 10 INJECTION, SOLUTION INTRAMUSCULAR; INTRAVENOUS at 01:44

## 2020-12-18 RX ADMIN — INSULIN LISPRO 2 UNITS: 100 INJECTION, SOLUTION INTRAVENOUS; SUBCUTANEOUS at 11:31

## 2020-12-18 RX ADMIN — CARVEDILOL 12.5 MG: 12.5 TABLET, FILM COATED ORAL at 18:08

## 2020-12-18 RX ADMIN — INSULIN LISPRO 1 UNITS: 100 INJECTION, SOLUTION INTRAVENOUS; SUBCUTANEOUS at 20:06

## 2020-12-18 RX ADMIN — INSULIN LISPRO 2 UNITS: 100 INJECTION, SOLUTION INTRAVENOUS; SUBCUTANEOUS at 16:58

## 2020-12-18 RX ADMIN — DESMOPRESSIN ACETATE 40 MG: 0.2 TABLET ORAL at 08:44

## 2020-12-18 RX ADMIN — CEFTAROLINE FOSAMIL 400 MG: 400 POWDER, FOR SOLUTION INTRAVENOUS at 18:19

## 2020-12-18 RX ADMIN — CEFEPIME HYDROCHLORIDE 2 G: 2 INJECTION, POWDER, FOR SOLUTION INTRAVENOUS at 03:31

## 2020-12-18 RX ADMIN — FUROSEMIDE 40 MG/HR: 10 INJECTION, SOLUTION INTRAMUSCULAR; INTRAVENOUS at 14:57

## 2020-12-18 RX ADMIN — CITALOPRAM 20 MG: 20 TABLET, FILM COATED ORAL at 08:44

## 2020-12-18 RX ADMIN — PANTOPRAZOLE SODIUM 40 MG: 40 TABLET, DELAYED RELEASE ORAL at 05:58

## 2020-12-18 RX ADMIN — FUROSEMIDE 40 MG/HR: 10 INJECTION, SOLUTION INTRAMUSCULAR; INTRAVENOUS at 21:12

## 2020-12-18 RX ADMIN — FUROSEMIDE 10 MG/HR: 10 INJECTION, SOLUTION INTRAMUSCULAR; INTRAVENOUS at 18:19

## 2020-12-18 RX ADMIN — CLOPIDOGREL 75 MG: 75 TABLET, FILM COATED ORAL at 08:44

## 2020-12-18 RX ADMIN — HEPARIN SODIUM 5000 UNITS: 5000 INJECTION INTRAVENOUS; SUBCUTANEOUS at 21:14

## 2020-12-18 RX ADMIN — HEPARIN SODIUM 5000 UNITS: 5000 INJECTION INTRAVENOUS; SUBCUTANEOUS at 05:58

## 2020-12-18 RX ADMIN — CHLOROTHIAZIDE SODIUM 500 MG: 500 INJECTION, POWDER, LYOPHILIZED, FOR SOLUTION INTRAVENOUS at 16:58

## 2020-12-18 RX ADMIN — FUROSEMIDE 40 MG/HR: 10 INJECTION, SOLUTION INTRAMUSCULAR; INTRAVENOUS at 07:35

## 2020-12-18 RX ADMIN — FUROSEMIDE 40 MG/HR: 10 INJECTION, SOLUTION INTRAMUSCULAR; INTRAVENOUS at 04:35

## 2020-12-18 RX ADMIN — FUROSEMIDE 10 MG/HR: 10 INJECTION, SOLUTION INTRAMUSCULAR; INTRAVENOUS at 12:33

## 2020-12-18 RX ADMIN — ANTI-FUNGAL POWDER MICONAZOLE NITRATE TALC FREE: 1.42 POWDER TOPICAL at 09:23

## 2020-12-18 ASSESSMENT — ENCOUNTER SYMPTOMS
APNEA: 0
COLOR CHANGE: 0
BACK PAIN: 0
ABDOMINAL PAIN: 0

## 2020-12-18 NOTE — PLAN OF CARE
Nutrition Problem #1: Inadequate oral intake  Intervention: Food and/or Nutrient Delivery: Continue Current Diet, Start Oral Nutrition Supplement  Nutritional Goals: PO intake to meet % of estimated nutrition needs

## 2020-12-18 NOTE — PROGRESS NOTES
Pharmacy Note  Vancomycin Consult    Oralia Ruvalcaba is a 72 y.o. female started on Vancomycin for MRSA bacteremia; consult received from Dr. Juanita Grigsby to manage therapy. Also receiving the following antibiotics: cefepime.     Patient Active Problem List   Diagnosis    Dementia ([facility]    Hypertension    Hyperlipidemia    Class 2 severe obesity with serious comorbidity and body mass index (BMI) of 37.0 to 37.9 in [facility])    Gout    Peripheral polyneuropathy    Anxiety and depression    Acute kidney injury superimposed on CKD ([facility]    Balance problem    CVA (cerebral vascular accident) ([facility]    Type 2 diabetes mellitus with hyperglycemia, with long-term current use of insulin ([facility])    Acute on chronic diastolic heart failure ([facility]    Acute cystitis    Infestation by bed bug    Infestation by maggots    Lymphedema of both lower extremities    PAD (peripheral artery disease) ([facility])    Pressure injury of right heel, unstageable ([facility]    [facility]    Bradycardia    Hyperkalemia    AV block, 1st degree    Wounds, multiple    Buttock wound    Decubitus ulcer of trochanteric region of right hip, stage 2 ([facility])    Decompensated heart failure ([facility])    Acute anemia    Acute renal failure (ARF) ([facility])    Anasarca    Gram-negative bacteremia    Stage 3 chronic kidney disease    MRSA bacteremia       Allergies:  Bactrim [sulfamethoxazole-trimethoprim], Clonidine derivatives, and Amoxicillin-pot clavulanate     Temp max: 98.8    Recent Labs     12/17/20  0604 12/18/20  0523   BUN 53* 55*       Recent Labs     12/17/20  0604 12/18/20  0523   CREATININE 2.49* 2.51*       Recent Labs     12/17/20  0604 12/18/20  0523   WBC 5.9 5.8         Intake/Output Summary (Last 24 hours) at 12/18/2020 1539  Last data filed at 12/18/2020 1336  Gross per 24 hour   Intake 780 ml   Output 1545 ml   Net -765 ml       Culture Date      Source                       Results  12/15               blood      MRSA Ht Readings from Last 1 Encounters:   12/18/20 5' 7\" (1.702 m)        Wt Readings from Last 1 Encounters:   12/18/20 (!) 304 lb 3.8 oz (138 kg)         Body mass index is 47.65 kg/m². Estimated Creatinine Clearance: 31 mL/min (A) (based on SCr of 2.51 mg/dL (H)). Goal Trough Level: 14-17 mcg/mL    Assessment/Plan:  Will initiate Vancomycin with a one time loading dose of 2500 mg x1, followed by dosing by levels. Timing of trough level will be determined based on culture results, renal function, and clinical response. Thank you for the consult. Will continue to follow.     Susy Merlin PharmD, BCPS 12/18/2020 3:41 PM

## 2020-12-18 NOTE — CARE COORDINATION
Message left for Shiva Brody at [facility] to give her an udpate on pt's status    1505 call to Shiva Brody. Updated her that pt is on lasix gtt. She stated precert was started today and bed is available. She doesn't think they need acceptance of precert for admission. If pt needs admitted over weekend, call her on her cell phone [phone].  PS to Dr Nicole Mcdonnell requesting PT/OT consult

## 2020-12-18 NOTE — CONSULTS
[facility] -   Infectious diseases evaluation  admission date 12/12/2020    reason for consultation:   MRSA sepsis    Impression :   Current:  · MRSA septicemia 12/15  · sphingomonas septicemia 12/13  · DM  · dementia  · CAD_ CABG  · ATN  · Anasarca  · Lymphedema both lower extremities  · AV block first-degree  · Iron deficiency anemia, severe gastritis on EGD 12/14-colonoscopy patient refused  · Bilateral pleural effusions with underlying atelectasis, right> left  ·   Discussion / summary of stay / plan of care   ·   Recommendations   · ceftaroline in 400 mg q 12  · Repeat daily x 3 till neg  · Echo shows no vegetation  · Get TSH and T4    Infection Control Recommendations   · Dexter Precautions  · Contact Isolation   Antimicrobial Stewardship Recommendations   · Simplification of therapy  · Targeted therapy    Coordination ofOutpatient Care:   · Estimated Length of IV antimicrobials:  · Patient will need Midline / picc Catheter Insertion:   · Patient will need SNF:  · Patient will need outpatient wound care:     History of Present Illness:   Initial history:  Abigail Lo is a 72y.o.-year-old female who presented to another hospital because of increasing fatigue, with increasing lower extremity edema ongoing for about a month with shortness of breath. Patient was found to have be iron deficiency anemia, was diuresed but kidney function kept getting worse, renal ultrasound was normal, patient was transferred to [facility] to be seen by nephrology. Was a concern also of dark stools and possible GI bleed  The patient had a 2D echo on 12/8 showing no vegetation  But she was with anasarca upon admission with elevated creatinine, denying any fevers.   Admitted to [facility] 12/12    Blood culture 12/13 1 out of 2 showed sphingomonas  BC 12/15 MRSA x 2, vanco wen 2  Patient had no fever at admission, just had a low-grade on 12/18 Cardiovascular: Positive for leg swelling. Negative for chest pain. Gastrointestinal: Negative for abdominal pain. Endocrine: Negative for polyuria. Genitourinary: Negative for dysuria. Musculoskeletal: Negative for back pain. Skin: Negative for color change. Allergic/Immunologic: Negative for immunocompromised state. Neurological: Negative for dizziness. Hematological: Bruises/bleeds easily. Psychiatric/Behavioral: Negative for agitation. Physical Examination :       Physical Exam  Constitutional:       General: She is not in acute distress. Appearance: Normal appearance. HENT:      Head: Normocephalic and atraumatic. Nose: Nose normal. No congestion. Mouth/Throat:      Mouth: Mucous membranes are moist.   Eyes:      General: No scleral icterus. Conjunctiva/sclera: Conjunctivae normal.   Neck:      Musculoskeletal: Neck supple. No neck rigidity. Cardiovascular:      Rate and Rhythm: Normal rate and regular rhythm. Heart sounds: Normal heart sounds. No murmur. Pulmonary:      Effort: No respiratory distress. Breath sounds: Normal breath sounds. Abdominal:      General: There is no distension. Palpations: Abdomen is soft. Tenderness: There is no abdominal tenderness. Genitourinary:     Comments: No rodriguez  Musculoskeletal:         General: No swelling (anasarca) or deformity. Right lower leg: Edema present. Left lower leg: Edema present. Skin:     General: Skin is dry. Coloration: Skin is not jaundiced. Neurological:      General: No focal deficit present. Mental Status: She is alert. She is disoriented. Psychiatric:      Comments: Calm demented         Past Medical History:     Past Medical History:   Diagnosis Date    Anxiety and depression     Background diabetic retinopathy(362.01) 2008    (Mild)    Balance problem     CAD (coronary artery disease)     (with coronary artery bypass graft x4).  Cancer of uterus [facility])     history of, (probable cure)    Chronic kidney disease     Chronic low back pain     [facility]     Dementia ([facility]     (moderate)    Dry eye syndrome     GERD (gastroesophageal reflux disease)     Glaucoma suspect     Gout     Homonymous hemianopsia     (Right)    Hyperlipidemia     Hypertension     Keratitis     (secondary to dry eye syndrome).  Obesity     Peripheral polyneuropathy     (diabetic)    Pseudophakos     (Right Eye: 2012; Left Eye: 2012) - Dr. Jose Hoffman.  Stroke [facility])     (Multiple) MRI of brain 10/2008 shows multiple infarcts involving the temporal and parietal areas.  Trichiasis     (left eye)    Type 2 diabetes mellitus ([facility]        Past Surgical  History:     Past Surgical History:   Procedure Laterality Date    CATARACT REMOVAL WITH IMPLANT  2012    (Right eye) - Dr. Jose Hoffman.  CATARACT REMOVAL WITH IMPLANT  2012    (Left eye) - Dr. Jose Hoffman.   SECTION      CHOLECYSTECTOMY      CORONARY ARTERY BYPASS GRAFT  12-    (x4) - LIMA-LAD, SVG-diagnoal 1, SVG-OM1, and SVG-PDA. Exploration of left radial. (Dr. Jeff Carballo).  EYE SURGERY Left     as a child     GASTRIC BYPASS SURGERY      HYSTERECTOMY      (abdominal)  with left oophorectomy. Right ovary retained.     TONSILLECTOMY AND ADENOIDECTOMY      UPPER GASTROINTESTINAL ENDOSCOPY  2020    EGD BIOPSY performed by Emmanuel Salas MD at [facility]       Medications:      ceftaroline fosamil (TEFLARO) IVPB  400 mg Intravenous Q12H    pantoprazole  40 mg Oral QAM AC    carvedilol  12.5 mg Oral BID WC    sodium chloride flush  10 mL Intravenous 2 times per day    miconazole   Topical BID    heparin (porcine)  5,000 Units Subcutaneous 3 times per day    atorvastatin  40 mg Oral Daily    citalopram  20 mg Oral Daily    clopidogrel  75 mg Oral Daily  insulin glargine  20 Units Subcutaneous Nightly    rivastigmine  1 patch Transdermal Daily    insulin lispro  0-12 Units Subcutaneous TID WC    insulin lispro  0-6 Units Subcutaneous Nightly    darbepoetin hung-polysorbate  60 mcg Subcutaneous Weekly       Social History:     Social History     Socioeconomic History    Marital status:      Spouse name: Not on file    Number of children: Not on file    Years of education: Not on file    Highest education level: Not on file   Occupational History    Not on file   Social Needs    Financial resource strain: Not on file    Food insecurity     Worry: Not on file     Inability: Not on file    Transportation needs     Medical: Not on file     Non-medical: Not on file   Tobacco Use    Smoking status: Never Smoker    Smokeless tobacco: Never Used    Tobacco comment: never smoker eclamb rrt 7/20/17   Substance and Sexual Activity    Alcohol use: No    Drug use: No    Sexual activity: Not on file   Lifestyle    Physical activity     Days per week: Not on file     Minutes per session: Not on file    Stress: Not on file   Relationships    Social connections     Talks on phone: Not on file     Gets together: Not on file     Attends [employer] service: Not on file     Active member of club or organization: Not on file     Attends meetings of clubs or organizations: Not on file     Relationship status: Not on file    Intimate partner violence     Fear of current or ex partner: Not on file     Emotionally abused: Not on file     Physically abused: Not on file     Forced sexual activity: Not on file   Other Topics Concern    Not on file   Social History Narrative    Not on file       Family History:     Family History   Problem Relation Age of Onset    Diabetes Mother     Cancer Mother     High Blood Pressure Mother     Stroke Mother     Kidney Disease Mother     Uterine Cancer Mother     Diabetes Father     Heart Disease Father

## 2020-12-18 NOTE — PROGRESS NOTES
@[email or web address] [email or web address]@    [facility]    Progress Note    12/18/2020    3:16 PM    Name:   Lili Iglesias  MRN:     [id number]     Acct:      [de-identified]   Room:   [facility] Day:  6  Admit Date:  12/12/2020  4:17 AM    PCP:   ERINN Davis  Code Status:  Full Code    Subjective:     C/C: No chief complaint on file. Patient Active Problem List   Diagnosis    D[facility]    Hypertension    Hyperlipidemia    Class 2 severe obesity with serious comorbidity and body mass index (BMI) of 37.0 to 37.9 in [facility])    Gout    Peripheral polyneuropathy    Anxiety and depression    Acute kidney injury superimposed on CKD ([facility]    Balance problem    CVA (cerebral vascular accident) ([facility]    Type 2 diabetes mellitus with hyperglycemia, with long-term current use of insulin ([facility])    Acute on chronic diastolic heart failure ([facility])    Acute cystitis    Infestation by bed bug    Infestation by maggots    Lymphedema of both lower extremities    PAD (peripheral artery disease) ([facility])    Pressure injury of right heel, unstageable ([facility])    Acute kidney injury ([facility]    Bradycardia    Hyperkalemia    AV block, 1st degree    Wounds, multiple    Buttock wound    Decubitus ulcer of trochanteric region of right hip, stage 2 (HCC)    Decompensated heart failure ([facility])    Acute anemia    Acute renal failure (ARF) ([facility])    Anasarca    Gram-negative bacteremia    Stage 3 chronic kidney disease    MRSA bacteremia     Interval History Status: not changed. Patient was seen and examined at bedside. No acute overnight event. Patient continues to feel weak. Unable to communicate efficiently due to advanced dementia. Still has significant edema all over.     Brief History:     Per prior record:    Past Medical History:   has a past medical history of Anxiety and depression, Background diabetic retinopathy(362.01), Balance problem, CAD (coronary artery disease), Cancer of uterus ([facility] Chronic kidney disease, Chronic low back pain, CVA (cerebral vascular accident) ([facility] Dry eye syndrome, GERD (gastroesophageal reflux disease), Glaucoma suspect, Gout, Homonymous hemianopsia, Hyperlipidemia, Hypertension, Keratitis, Obesity, Peripheral polyneuropathy, Pseudophakos, Stroke ([facility] Trichiasis, and Type 2 diabetes mellitus ([facility] Social History:   reports that she has never smoked. She has never used smokeless tobacco. She reports that she does not drink alcohol or use drugs. Family History:   Family History   Problem Relation Age of Onset    Diabetes Mother     Cancer Mother     High Blood Pressure Mother    Hue Kang Stroke Mother     Kidney Disease Mother     Uterine Cancer Mother     Diabetes Father     Heart Disease Father     Glaucoma Father     Emphysema Father     Coronary Art Dis Other         All 4 siblings.  Stroke Other         1 sibling.  Lung Cancer Other         1 sibling - cause of death lung cancer.  Mental Retardation Other        Vitals:  BP (!) 161/47   Pulse 57   Temp 98.8 °F (37.1 °C) (Temporal)   Resp 11   Ht 5' 7\" (1.702 m)   Wt (!) 304 lb 3.8 oz (138 kg)   SpO2 97%   BMI 47.65 kg/m²   Temp (24hrs), Av.6 °F (36.4 °C), Min:96.8 °F (36 °C), Max:98.8 °F (37.1 °C)    Recent Labs     20  2354 20  0440 20  0647 20  1125   POCGLU 137* 131* 128* 162*       I/O (24Hr):     Intake/Output Summary (Last 24 hours) at 2020 1516  Last data filed at 2020 1336  Gross per 24 hour   Intake 780 ml   Output 1545 ml   Net -765 ml       Labs:  Hematology:  Recent Labs     20  0706 20  0604 20  0523   WBC 8.3 5.9 5.8   RBC 2.61* 2.70* 2.56*   HGB 7.9* 8.1* 7.6*   HCT 26.7* 27.4* 25.8*   .3 101.5 100.8 MCH 30.3 30.0 29.7   MCHC 29.6 29.6 29.5   RDW 16.1* 16.2* 16.1*    190 178   MPV 10.4 10.4 10.8     Chemistry:  Recent Labs     12/16/20  0706 12/17/20  0604 12/18/20  0523    142 141   K 4.0 4.5 4.2   * 106 104   CO2 26 24 26   GLUCOSE 81 123* 145*   BUN 49* 53* 55*   CREATININE 2.33* 2.49* 2.51*   MG 1.8 1.9 1.9   ANIONGAP 7* 12 11   LABGLOM 21* 19* 19*   GFRAA 25* 23* 23*   CALCIUM 8.6 8.8 8.7   PHOS 4.0 4.3 4.3     Recent Labs     12/16/20  0706 12/16/20  0706 12/17/20  0604 12/17/20  0604 12/17/20  1700 12/17/20  2032 12/17/20  2354 12/18/20  0440 12/18/20  0523 12/18/20  0647 12/18/20  1125   LABALBU 2.9*  --  3.1*  --   --   --   --   --  2.9*  --   --    POCGLU  --    < >  --    < > 146* 148* 137* 131*  --  128* 162*    < > = values in this interval not displayed. ABG:  Lab Results   Component Value Date    POCPH 7.284 12/13/2020    POCPCO2 54.5 12/13/2020    POCPO2 71.6 12/13/2020    POCHCO3 25.8 12/13/2020    NBEA 1 12/13/2020    PBEA NOT REPORTED 12/13/2020    XNT2HCY 28 12/13/2020    XCIW4XHB 92 12/13/2020    FIO2 2.0 12/13/2020     Lab Results   Component Value Date/Time    SPECIAL L HAND 10 CC 12/15/2020 05:22 PM     Lab Results   Component Value Date/Time    CULTURE (A) 12/15/2020 05:22 PM     POSITIVE Blood Culture Results called to and read back by: CORNEL GILL ON 12/16/20 AT 0925    CULTURE  12/15/2020 05:22 PM     DIRECT GRAM STAIN FROM BOTTLE: GRAM POSITIVE COCCI IN CLUSTERS    CULTURE (A) 12/15/2020 05:22 PM     Staphylococcus aureus Detected:  mecA/C and MREJ Gene Detected- Methicillin Resistant Organism  Methodology- Polymerase Chain Reaction (PCR)      CULTURE METHICILLIN RESISTANT STAPHYLOCOCCUS AUREUS (A) 12/15/2020 05:22 PM       Radiology:  Ct Head Wo Contrast    Result Date: 12/13/2020  No acute intracranial abnormality. Old infarct left posterior cerebral artery territory.      Ct Chest Wo Contrast    Result Date: 12/13/2020 4. Acute on chronic exacerbation of diastolic congestive heart failure. -Patient on Lasix drip. Continue to monitor. Continue other home medications. 5. Essential hypertension -stable  6. Obesity hypoventilation syndrome with obstructive sleep apnea -patient on CPAP as needed. 7. Morbid obesity  8. Insulin-dependent diabetes mellitus -continue current regimen of insulin.   9. Normocytic normochromic anemia -likely anemia of chronic disease and mild blood loss anemia.       Ajay Tavarez DO  12/18/2020  3:16 PM

## 2020-12-18 NOTE — PROGRESS NOTES
Renal Progress Note    Patient :  Benjamin Gabriel; 72 y.o. MRN# [id number]  Location:  [facility]/2644-76  Attending:  Tim Barahona DO  Admit Date:  12/12/2020   Hospital Day: 6      Subjective:     Oral intake good. On Lasix infusion, urine output about 50ml an hour. No shortness of breath orthopnea. Lower extremity edema persist.  Blood cultures now 2 out of 2 positive for MRSA. Antibiotics been switched. Creatinine still in the 2.5 range. Expected to worsen further. Hemodynamically stable. Hemoglobin stable. EGD results noted. Outpatient Medications:     Medications Prior to Admission: aspirin 81 MG tablet, Take 1 tablet by mouth daily  senna (SENOKOT) 8.6 MG tablet, Take 1 tablet by mouth 2 times daily  oxybutynin (DITROPAN) 5 MG tablet, TAKE 1 TABLET BY MOUTH TWICE DAILY  losartan (COZAAR) 50 MG tablet, Take 1 tablet by mouth daily  OXYGEN, Oxgen at 2 liters per nasal cannula  loratadine (CLARITIN) 10 MG tablet, TAKE 1 TABLET(10 MG) BY MOUTH DAILY  Lancets MISC, Checks blood sugar ac and HS  blood glucose monitor strips, Test 3  times a day & as needed for symptoms of irregular blood glucose. Dispense sufficient amount for indicated testing frequency plus additional to accommodate PRN testing needs. Alcohol Swabs (ALCOHOL PREP) PADS, Checks blood sugar 3 times a day  Misc.  Devices MISC, Standard walker with wheels  Diagnosis dementia, CHF  atorvastatin (LIPITOR) 40 MG tablet, TAKE 1 TABLET BY MOUTH EVERYDAY  bumetanide (BUMEX) 1 MG tablet, Take 1 tablet by mouth daily  citalopram (CELEXA) 20 MG tablet, Take 1 tablet by mouth daily  clopidogrel (PLAVIX) 75 MG tablet, Take 1 tablet by mouth daily  insulin lispro (HUMALOG) 100 UNIT/ML injection vial, Use 4 times a day per sliding scale  hydrALAZINE (APRESOLINE) 50 MG tablet, Take 1.5 tablets by mouth every 8 hours  insulin glargine (LANTUS) 100 UNIT/ML injection vial, Inject 20 Units into the skin nightly memantine (NAMENDA) 10 MG tablet, TAKE 1 TABLET BY MOUTH TWICE DAILY  omeprazole (PRILOSEC) 20 MG delayed release capsule, Take 1 capsule by mouth every morning (before breakfast)  rivastigmine (EXELON) 9.5 MG/24HR, APPLY 1 NEW PATCH EVERY 24 HOURS  Diabetic Shoe MISC, by Does not apply route  Compression Stockings MISC, by Does not apply route 20-30 mm Hg bilateral knee high  nystatin (MYCOSTATIN) 535200 UNIT/GM cream, Apply topically 2 times daily. Current Medications:     Scheduled Meds:    pantoprazole  40 mg Oral QAM AC    cefepime  2 g Intravenous Q12H    carvedilol  12.5 mg Oral BID WC    sodium chloride flush  10 mL Intravenous 2 times per day    miconazole   Topical BID    heparin (porcine)  5,000 Units Subcutaneous 3 times per day    atorvastatin  40 mg Oral Daily    citalopram  20 mg Oral Daily    clopidogrel  75 mg Oral Daily    insulin glargine  20 Units Subcutaneous Nightly    rivastigmine  1 patch Transdermal Daily    insulin lispro  0-12 Units Subcutaneous TID WC    insulin lispro  0-6 Units Subcutaneous Nightly    darbepoetin hung-polysorbate  60 mcg Subcutaneous Weekly     Continuous Infusions:    furosemide (LASIX) 1mg/ml infusion 40 mg/hr (20 1457)     PRN Meds:  sodium chloride flush, nicotine, promethazine **OR** ondansetron, acetaminophen **OR** acetaminophen, albuterol    Input/Output:       I/O last 3 completed shifts: In: 80 [P.O.:250; I.V.:480; IV Piggyback:50]  Out: 8868 [Urine:1545].       Patient Vitals for the past 96 hrs (Last 3 readings):   Weight   20 0330 (!) 304 lb 3.8 oz (138 kg)   20 0600 (!) 302 lb 4 oz (137.1 kg)   20 0600 297 lb 13.5 oz (135.1 kg)       Vital Signs:   Temperature:  Temp: 97.5 °F (36.4 °C)  TMax:   Temp (24hrs), Av.6 °F (36.4 °C), Min:96.8 °F (36 °C), Max:98.8 °F (37.1 °C)    Respirations:  Resp: 14  Pulse:   Pulse: 57  BP:    BP: (!) 153/67  BP Range: Systolic (16TDO), AZS:658 , Min:139 , Max:167 Diastolic (56VLA), [facility]:92, Min:47, Max:81      Physical Examination:     General:  Awake, lethargic no accessory muscle use. HEENT: Atraumatic, normocephalic, no throat congestion, dry mucous membranes  Eyes:   Pupils equal, round and reactive to light, EOMI. Neck:   No JVD, no thyromegaly, no lymphadenopathy. Chest:  Bilateral vesicular breath sounds, no rales or wheezes. Cardiac:  S1 S2 RR, no murmurs, gallops or rubs, JVP not raised. Abdomen: Soft, non-tender, no masses or organomegaly, BS audible. :   No suprapubic or flank tenderness. Neuro:  AAO x 3, No FND. SKIN:  No rashes, good skin turgor. Extremities:  2+ edema, absent peripheral pulses, no calf tenderness.     Labs:       Recent Labs     12/16/20  0706 12/17/20  0604 12/18/20  0523   WBC 8.3 5.9 5.8   RBC 2.61* 2.70* 2.56*   HGB 7.9* 8.1* 7.6*   HCT 26.7* 27.4* 25.8*   .3 101.5 100.8   MCH 30.3 30.0 29.7   MCHC 29.6 29.6 29.5   RDW 16.1* 16.2* 16.1*    190 178   MPV 10.4 10.4 10.8      BMP:   Recent Labs     12/16/20  0706 12/17/20  0604 12/18/20  0523    142 141   K 4.0 4.5 4.2   * 106 104   CO2 26 24 26   BUN 49* 53* 55*   CREATININE 2.33* 2.49* 2.51*   GLUCOSE 81 123* 145*   CALCIUM 8.6 8.8 8.7      Phosphorus:     Recent Labs     12/16/20  0706 12/17/20  0604 12/18/20  0523   PHOS 4.0 4.3 4.3     Magnesium:    Recent Labs     12/16/20  0706 12/17/20  0604 12/18/20  0523   MG 1.8 1.9 1.9     Albumin:    Recent Labs     12/16/20  0706 12/17/20  0604 12/18/20  0523   LABALBU 2.9* 3.1* 2.9*     BNP:    No results found for: BNP  JIMBO:    No results found for: JIMBO  SPEP:  Lab Results   Component Value Date    PROT 6.1 12/15/2020    ALBCAL 1.8 08/13/2020    ALBPCT 35 08/13/2020    LABALPH 0.2 08/13/2020    LABALPH 0.7 08/13/2020    A1PCT 5 08/13/2020    A2PCT 15 08/13/2020    LABBETA 0.9 08/13/2020    BETAPCT 17 08/13/2020    GAMGLOB 1.4 08/13/2020    GGPCT 28 08/13/2020 PATH ELECTRONICALLY SIGNED. Skinny Wise M.D. 08/13/2020    PATH ELECTRONICALLY SIGNED.  Skinny Wise M.D. 08/13/2020     UPEP:   No results found for: LABPE  C3:     Lab Results   Component Value Date    C3 114 08/13/2020     C4:     Lab Results   Component Value Date    C4 39 08/13/2020     MPO ANCA:     Lab Results   Component Value Date    MPO 14 08/13/2020     PR3 ANCA:     Lab Results   Component Value Date    PR3 8 08/13/2020     Anti-GBM:   No results found for: GBMABIGG  Hep BsAg:       No results found for: HEPBSAG  Hep C AB:          Lab Results   Component Value Date    HEPCAB NONREACTIVE 01/31/2018       Urinalysis/Chemistries:      Lab Results   Component Value Date    NITRU NEGATIVE 12/13/2020    COLORU YELLOW 12/13/2020    PHUR 5.0 12/13/2020    WBCUA 50  12/13/2020    RBCUA 20 TO 50 12/13/2020    MUCUS 1+ 12/13/2020    TRICHOMONAS NOT REPORTED 12/13/2020    YEAST NOT REPORTED 12/13/2020    BACTERIA MANY 12/13/2020    SPECGRAV 1.011 12/13/2020    LEUKOCYTESUR MODERATE 12/13/2020    UROBILINOGEN Normal 12/13/2020    BILIRUBINUR NEGATIVE 12/13/2020    GLUCOSEU NEGATIVE 12/13/2020    KETUA NEGATIVE 12/13/2020    AMORPHOUS NOT REPORTED 12/13/2020     Urine Sodium:     Lab Results   Component Value Date    LUKE <20 12/15/2020     Urine Potassium:  No results found for: KUR  Urine Chloride:  No results found for: CLUR  Urine Osmolarity:   Lab Results   Component Value Date    OSMOU 351 12/15/2020     Urine Protein:   No components found for: TOTALPROTEIN, URINE   Urine Creatinine:     Lab Results   Component Value Date    LABCREA 18.4 08/20/2020     Urine Eosinophils:  No components found for: UEOS    Radiology:     CXR:     Assessment: 1. Acute Kidney Injury: Secondary to ischemic ATN from low blood flow related to severe anemia, drop in hemoglobin down to 6, was resolving urine output improved now developing second hit from MRSA bacteremia, urine output has declined, creatinine in the 2.5 range, baseline 1.51.7  2. MRSA bacteremia source likely skin   3. Chronic kidney disease stage III secondary to diabetic nephrosclerosis, proteinuria about 2.7 g  4. Anemia of chronic disease and GI blood loss  5. Chronic lower extremity edema from lymphedema      Plan:   1. Continue Lasix 40 mg an hour, keep urine output 5200 mils an hour  2.  1 dose Diuril  3. Await ID consult for antibiotic choice, vancomycin is likely to cause toxicity and further worsening kidney function  4. Follow renal function closely  5. Start Aranesp 60 mcg weekly    Nutrition   Please ensure that patient is on a renal diet/TF. Avoid nephrotoxic drugs/contrast exposure. We will continue to follow along with you.

## 2020-12-18 NOTE — PROGRESS NOTES
Comprehensive Nutrition Assessment    Type and Reason for Visit:  Initial, RD Nutrition Re-Screen/LOS    Nutrition Recommendations/Plan: Continue current Cardiac Diet as tolerated. Start low calorie high protein oral nutrition supplement (Ensure High Protein) 2x/d. Monitor/encourage intakes. Nutrition Assessment:  Patient seen for length of stay. Patient's PO intake has been variable at 51-75%. Per EHR, patient has had a significant weight gain x past 30 and 90 days. This could potentially be related to fluid accumulation. Will add an oral nutrition supplemet to aid in meeting estimated nutrition needs. Malnutrition Assessment:  Malnutrition Status: At risk for malnutrition   Context:  Acute Illness     Findings of the 6 clinical characteristics of malnutrition:  Energy Intake:  Mild decrease in energy intake  Weight Loss:  No significant weight loss     Body Fat Loss:  No significant body fat loss     Muscle Mass Loss:  No significant muscle mass loss    Fluid Accumulation:  7 - Moderate- Extremities, Generalized   Strength:  Not Performed    Estimated Daily Nutrient Needs:  Energy (kcal):  0233-7840 kcal/d (16-17 kcal/kg CBW); Weight Used for Energy Requirements: Current(138 kg)     Protein (g):   g protein/d (1.5-2.0 g/kg IBW); Weight Used for Protein Requirements:  Ideal(61 kg)          Nutrition Related Findings:  Labs reviewed: Hgb 7.6 (L). Meds reviewed: Lantus, Humalog, IV Lasix. Abdominal distention. Last BM 12/15. +2 pitting generalized/BUE/BLE, +2 facial/sacral edema. Wounds:  None       Current Nutrition Therapies:    DIET CARDIAC;     Anthropometric Measures:  · Height: 5' 7\" (170.2 cm)  · Current Body Weight: 304 lb 3.8 oz (138 kg)   · Admission Body Weight: 299 lb 2.6 oz (135.7 kg)    · Usual Body Weight: 227 lb (103 kg)(9/15/2020)     · Ideal Body Weight: 135 lbs; % Ideal Body Weight 225.4 %   · BMI: 47.6  · Adjusted Body Weight:  No Adjustment · BMI Categories: Obese Class 3 (BMI 40.0 or greater)       Nutrition Diagnosis:   · Inadequate oral intake related to current medical condition as evidenced by intake 51-75%    Nutrition Interventions:   Food and/or Nutrient Delivery:  Continue Current Diet, Start Oral Nutrition Supplement  Nutrition Education/Counseling:  No recommendation at this time   Coordination of Nutrition Care:  Continue to monitor while inpatient    Goals:  PO intake to meet % of estimated nutrition needs       Nutrition Monitoring and Evaluation:   Behavioral-Environmental Outcomes:  None Identified   Food/Nutrient Intake Outcomes:  Food and Nutrient Intake, Supplement Intake  Physical Signs/Symptoms Outcomes:  Biochemical Data, Fluid Status or Edema, Nutrition Focused Physical Findings, Skin, Weight     Discharge Planning:     Too soon to determine     Electronically signed by Zulay Castellanos RD, LD on 12/18/20 at 2:26 PM EST    Contact: [phone]

## 2020-12-18 NOTE — PROGRESS NOTES
[facility]   Progress Note                   Date:   12/18/2020  Patient name: David Jaramillo  Date of admission:  12/12/2020  4:17 AM  MRN:   [id number]  YOB: 1950  PCP: ERINN Brown    Reason for Admission: Acute renal failure (ARF) ([facility] [address] [N17.9]    Subjective:       Clinical Changes / Abnormalities: Pt seen and examined in the room. She denies any CP or SOB. Labs, vitals, & tele reviewed. Ongoing edema noted. On Lasix drip. I/O Yesterday -425 / since admission -4403   -3.9L  Since admission   Medications:   Scheduled Meds:   pantoprazole  40 mg Oral QAM AC    cefepime  2 g Intravenous Q12H    carvedilol  12.5 mg Oral BID WC    sodium chloride flush  10 mL Intravenous 2 times per day    miconazole   Topical BID    heparin (porcine)  5,000 Units Subcutaneous 3 times per day    atorvastatin  40 mg Oral Daily    citalopram  20 mg Oral Daily    clopidogrel  75 mg Oral Daily    insulin glargine  20 Units Subcutaneous Nightly    rivastigmine  1 patch Transdermal Daily    insulin lispro  0-12 Units Subcutaneous TID WC    insulin lispro  0-6 Units Subcutaneous Nightly    darbepoetin hung-polysorbate  60 mcg Subcutaneous Weekly     Continuous Infusions:   furosemide (LASIX) 1mg/ml infusion 40 mg/hr (12/18/20 0958)     CBC:   Recent Labs     12/16/20  0706 12/17/20  0604 12/18/20  0523   WBC 8.3 5.9 5.8   HGB 7.9* 8.1* 7.6*    190 178     BMP:    Recent Labs     12/16/20  0706 12/17/20  0604 12/18/20  0523    142 141   K 4.0 4.5 4.2   * 106 104   CO2 26 24 26   BUN 49* 53* 55*   CREATININE 2.33* 2.49* 2.51*   GLUCOSE 81 123* 145*     Hepatic:   Recent Labs     12/15/20  1215   AST 16   ALT 8   BILITOT 0.40   ALKPHOS 103     Troponin: No results for input(s): TROPHS in the last 72 hours. BNP: No results for input(s): BNP in the last 72 hours. Lipids: No results for input(s): CHOL, HDL in the last 72 hours.     Invalid input(s): LDLCALCU  INR: No results for input(s): INR in the last 72 hours. Objective:   Vitals: BP (!) 147/59   Pulse 56   Temp 97.4 °F (36.3 °C) (Temporal)   Resp 11   Ht 5' 7\" (1.702 m)   Wt (!) 304 lb 3.8 oz (138 kg)   SpO2 98%   BMI 47.65 kg/m²   General appearance: alert and cooperative with exam  HEENT: Head: Normocephalic, no lesions, without obvious abnormality. Neck: no JVD, trachea midline, no adenopathy  Lungs: diminished with fine rales in bilateral bases to auscultation, on 02 per NC . No distress  Heart: Regular rate and rhythm, s1/s2 auscultated, no murmurs, SR 70s  Abdomen: soft, non-tender, bowel sounds active  Extremities: +2/3 edema bilateral legs, Upper extremities + 2  Neurologic: not done        Assessment / Acute Cardiac Problems:   1. Acute on chronic CHF   2. Anasarca  3. AMY on CKD   4. DM   5. HTN   6. Ho CABG   7.  Anemia     Patient Active Problem List:     Dementia ([address]     Hypertension     Hyperlipidemia     Class 2 severe obesity with serious comorbidity and body mass index (BMI) of 37.0 to 37.9 in adult [facility])     Gout     Peripheral polyneuropathy     Anxiety and depression     Acute kidney injury superimposed on CKD ([address]     Balance problem     CVA (cerebral vascular accident) ([address]     Type 2 diabetes mellitus with hyperglycemia, with long-term current use of insulin (HCC)     Acute on chronic diastolic heart failure ([address]     Acute cystitis     Infestation by bed bug     Infestation by maggots     Lymphedema of both lower extremities     PAD (peripheral artery disease) (HCC)     Pressure injury of right heel, unstageable ([address]     Acute kidney injury ([address]     Bradycardia     Hyperkalemia     AV block, 1st degree     Wounds, multiple     Buttock wound     Decubitus ulcer of trochanteric region of right hip, stage 2 (HCC)     Decompensated heart failure (HCC)     Acute anemia     Acute renal failure (ARF) ([address]     Anasarca      Plan of Treatment: 1. Acute on chronic diastolic chf. Appreciate Nephrology assistance with diuretics. Currently on lasix drip and hold ACE. Strict I/O  2. CAD stable. Hx of CABG. Continue statin, BB, plavix. 3. AMY/CKD managed by nephrology  4. Anemia per primary/GI. Recommend HGB > 9  5.  Keep K> 4 and Mag > 2  Stable        Electronically signed by SHIRA Estrada NP on 12/18/2020 at 10:08 AM  [address]  [phone]

## 2020-12-19 LAB
-: NORMAL
ABSOLUTE EOS #: 0.14 K/UL (ref 0–0.44)
ABSOLUTE IMMATURE GRANULOCYTE: <0.03 K/UL (ref 0–0.3)
ABSOLUTE LYMPH #: 0.91 K/UL (ref 1.1–3.7)
ABSOLUTE MONO #: 0.55 K/UL (ref 0.1–1.2)
ALBUMIN SERPL-MCNC: 2.7 G/DL (ref 3.5–5.2)
ALBUMIN SERPL-MCNC: 2.8 G/DL (ref 3.5–5.2)
ANION GAP SERPL CALCULATED.3IONS-SCNC: 9 MMOL/L (ref 9–17)
ANION GAP SERPL CALCULATED.3IONS-SCNC: 9 MMOL/L (ref 9–17)
BASOPHILS # BLD: 1 % (ref 0–2)
BASOPHILS ABSOLUTE: 0.04 K/UL (ref 0–0.2)
BUN BLDV-MCNC: 54 MG/DL (ref 8–23)
BUN BLDV-MCNC: 54 MG/DL (ref 8–23)
BUN/CREAT BLD: ABNORMAL (ref 9–20)
BUN/CREAT BLD: ABNORMAL (ref 9–20)
CALCIUM SERPL-MCNC: 8.4 MG/DL (ref 8.6–10.4)
CALCIUM SERPL-MCNC: ABNORMAL MG/DL (ref 8.6–10.4)
CHLORIDE BLD-SCNC: 103 MMOL/L (ref 98–107)
CHLORIDE BLD-SCNC: 105 MMOL/L (ref 98–107)
CO2: 26 MMOL/L (ref 20–31)
CO2: 27 MMOL/L (ref 20–31)
CREAT SERPL-MCNC: 2.22 MG/DL (ref 0.5–0.9)
CREAT SERPL-MCNC: 2.38 MG/DL (ref 0.5–0.9)
CULTURE: NORMAL
DIFFERENTIAL TYPE: ABNORMAL
EOSINOPHILS RELATIVE PERCENT: 2 % (ref 1–4)
GFR AFRICAN AMERICAN: 24 ML/MIN
GFR AFRICAN AMERICAN: 27 ML/MIN
GFR NON-AFRICAN AMERICAN: 20 ML/MIN
GFR NON-AFRICAN AMERICAN: 22 ML/MIN
GFR SERPL CREATININE-BSD FRML MDRD: ABNORMAL ML/MIN/{1.73_M2}
GLUCOSE BLD-MCNC: 125 MG/DL (ref 65–105)
GLUCOSE BLD-MCNC: 137 MG/DL (ref 65–105)
GLUCOSE BLD-MCNC: 155 MG/DL (ref 70–99)
GLUCOSE BLD-MCNC: 156 MG/DL (ref 65–105)
GLUCOSE BLD-MCNC: 165 MG/DL (ref 65–105)
GLUCOSE BLD-MCNC: 178 MG/DL (ref 70–99)
HCT VFR BLD CALC: 26.4 % (ref 36.3–47.1)
HEMOGLOBIN: 7.7 G/DL (ref 11.9–15.1)
IMMATURE GRANULOCYTES: 0 %
LYMPHOCYTES # BLD: 16 % (ref 24–43)
Lab: NORMAL
MAGNESIUM: 1.8 MG/DL (ref 1.6–2.6)
MCH RBC QN AUTO: 30 PG (ref 25.2–33.5)
MCHC RBC AUTO-ENTMCNC: 29.2 G/DL (ref 28.4–34.8)
MCV RBC AUTO: 102.7 FL (ref 82.6–102.9)
MONOCYTES # BLD: 9 % (ref 3–12)
NRBC AUTOMATED: 0 PER 100 WBC
PDW BLD-RTO: 16.3 % (ref 11.8–14.4)
PHOSPHORUS: 4 MG/DL (ref 2.6–4.5)
PHOSPHORUS: 4 MG/DL (ref 2.6–4.5)
PLATELET # BLD: 191 K/UL (ref 138–453)
PLATELET ESTIMATE: ABNORMAL
PMV BLD AUTO: 10.8 FL (ref 8.1–13.5)
POTASSIUM SERPL-SCNC: 4.3 MMOL/L (ref 3.7–5.3)
POTASSIUM SERPL-SCNC: ABNORMAL MMOL/L (ref 3.7–5.3)
RBC # BLD: 2.57 M/UL (ref 3.95–5.11)
RBC # BLD: ABNORMAL 10*6/UL
REASON FOR REJECTION: NORMAL
SEG NEUTROPHILS: 72 % (ref 36–65)
SEGMENTED NEUTROPHILS ABSOLUTE COUNT: 4.22 K/UL (ref 1.5–8.1)
SODIUM BLD-SCNC: 139 MMOL/L (ref 135–144)
SODIUM BLD-SCNC: 140 MMOL/L (ref 135–144)
SPECIMEN DESCRIPTION: NORMAL
WBC # BLD: 5.9 K/UL (ref 3.5–11.3)
WBC # BLD: ABNORMAL 10*3/UL
ZZ NTE CLEAN UP: ORDERED TEST: NORMAL
ZZ NTE WITH NAME CLEAN UP: SPECIMEN SOURCE: NORMAL

## 2020-12-19 PROCEDURE — 94761 N-INVAS EAR/PLS OXIMETRY MLT: CPT

## 2020-12-19 PROCEDURE — 80069 RENAL FUNCTION PANEL: CPT

## 2020-12-19 PROCEDURE — 82947 ASSAY GLUCOSE BLOOD QUANT: CPT

## 2020-12-19 PROCEDURE — 6370000000 HC RX 637 (ALT 250 FOR IP): Performed by: NURSE PRACTITIONER

## 2020-12-19 PROCEDURE — 2580000003 HC RX 258: Performed by: INTERNAL MEDICINE

## 2020-12-19 PROCEDURE — 83735 ASSAY OF MAGNESIUM: CPT

## 2020-12-19 PROCEDURE — 6360000002 HC RX W HCPCS: Performed by: NURSE PRACTITIONER

## 2020-12-19 PROCEDURE — 6360000002 HC RX W HCPCS: Performed by: INTERNAL MEDICINE

## 2020-12-19 PROCEDURE — 97166 OT EVAL MOD COMPLEX 45 MIN: CPT

## 2020-12-19 PROCEDURE — 87205 SMEAR GRAM STAIN: CPT

## 2020-12-19 PROCEDURE — 87040 BLOOD CULTURE FOR BACTERIA: CPT

## 2020-12-19 PROCEDURE — 6370000000 HC RX 637 (ALT 250 FOR IP): Performed by: INTERNAL MEDICINE

## 2020-12-19 PROCEDURE — 99232 SBSQ HOSP IP/OBS MODERATE 35: CPT | Performed by: HOSPITALIST

## 2020-12-19 PROCEDURE — 97162 PT EVAL MOD COMPLEX 30 MIN: CPT

## 2020-12-19 PROCEDURE — 99232 SBSQ HOSP IP/OBS MODERATE 35: CPT | Performed by: INTERNAL MEDICINE

## 2020-12-19 PROCEDURE — 2700000000 HC OXYGEN THERAPY PER DAY

## 2020-12-19 PROCEDURE — 97535 SELF CARE MNGMENT TRAINING: CPT

## 2020-12-19 PROCEDURE — 2580000003 HC RX 258: Performed by: NURSE PRACTITIONER

## 2020-12-19 PROCEDURE — 85025 COMPLETE CBC W/AUTO DIFF WBC: CPT

## 2020-12-19 PROCEDURE — 87077 CULTURE AEROBIC IDENTIFY: CPT

## 2020-12-19 PROCEDURE — 2060000000 HC ICU INTERMEDIATE R&B

## 2020-12-19 PROCEDURE — 97530 THERAPEUTIC ACTIVITIES: CPT

## 2020-12-19 PROCEDURE — 36415 COLL VENOUS BLD VENIPUNCTURE: CPT

## 2020-12-19 PROCEDURE — 87186 SC STD MICRODIL/AGAR DIL: CPT

## 2020-12-19 PROCEDURE — 87150 DNA/RNA AMPLIFIED PROBE: CPT

## 2020-12-19 RX ADMIN — CEFTAROLINE FOSAMIL 400 MG: 400 POWDER, FOR SOLUTION INTRAVENOUS at 05:04

## 2020-12-19 RX ADMIN — CARVEDILOL 12.5 MG: 12.5 TABLET, FILM COATED ORAL at 09:27

## 2020-12-19 RX ADMIN — FUROSEMIDE 10 MG/HR: 10 INJECTION, SOLUTION INTRAMUSCULAR; INTRAVENOUS at 22:51

## 2020-12-19 RX ADMIN — INSULIN LISPRO 2 UNITS: 100 INJECTION, SOLUTION INTRAVENOUS; SUBCUTANEOUS at 11:25

## 2020-12-19 RX ADMIN — ANTI-FUNGAL POWDER MICONAZOLE NITRATE TALC FREE: 1.42 POWDER TOPICAL at 09:27

## 2020-12-19 RX ADMIN — CITALOPRAM 20 MG: 20 TABLET, FILM COATED ORAL at 09:27

## 2020-12-19 RX ADMIN — FUROSEMIDE 35 MG/HR: 10 INJECTION, SOLUTION INTRAMUSCULAR; INTRAVENOUS at 05:05

## 2020-12-19 RX ADMIN — HEPARIN SODIUM 5000 UNITS: 5000 INJECTION INTRAVENOUS; SUBCUTANEOUS at 06:13

## 2020-12-19 RX ADMIN — FUROSEMIDE 40 MG/HR: 10 INJECTION, SOLUTION INTRAMUSCULAR; INTRAVENOUS at 02:21

## 2020-12-19 RX ADMIN — DARBEPOETIN ALFA 60 MCG: 60 INJECTION, SOLUTION INTRAVENOUS; SUBCUTANEOUS at 09:27

## 2020-12-19 RX ADMIN — HEPARIN SODIUM 5000 UNITS: 5000 INJECTION INTRAVENOUS; SUBCUTANEOUS at 21:52

## 2020-12-19 RX ADMIN — CLOPIDOGREL 75 MG: 75 TABLET, FILM COATED ORAL at 09:27

## 2020-12-19 RX ADMIN — PANTOPRAZOLE SODIUM 40 MG: 40 TABLET, DELAYED RELEASE ORAL at 06:14

## 2020-12-19 RX ADMIN — CARVEDILOL 12.5 MG: 12.5 TABLET, FILM COATED ORAL at 18:32

## 2020-12-19 RX ADMIN — CEFTAROLINE FOSAMIL 400 MG: 400 POWDER, FOR SOLUTION INTRAVENOUS at 18:31

## 2020-12-19 RX ADMIN — DESMOPRESSIN ACETATE 40 MG: 0.2 TABLET ORAL at 09:27

## 2020-12-19 RX ADMIN — INSULIN GLARGINE 20 UNITS: 100 INJECTION, SOLUTION SUBCUTANEOUS at 20:42

## 2020-12-19 RX ADMIN — ANTI-FUNGAL POWDER MICONAZOLE NITRATE TALC FREE: 1.42 POWDER TOPICAL at 20:44

## 2020-12-19 RX ADMIN — FUROSEMIDE 30 MG/HR: 10 INJECTION, SOLUTION INTRAMUSCULAR; INTRAVENOUS at 15:51

## 2020-12-19 RX ADMIN — SODIUM CHLORIDE, PRESERVATIVE FREE 10 ML: 5 INJECTION INTRAVENOUS at 20:44

## 2020-12-19 RX ADMIN — INSULIN LISPRO 1 UNITS: 100 INJECTION, SOLUTION INTRAVENOUS; SUBCUTANEOUS at 20:42

## 2020-12-19 RX ADMIN — HEPARIN SODIUM 5000 UNITS: 5000 INJECTION INTRAVENOUS; SUBCUTANEOUS at 14:49

## 2020-12-19 RX ADMIN — FUROSEMIDE 40 MG/HR: 10 INJECTION, SOLUTION INTRAMUSCULAR; INTRAVENOUS at 00:07

## 2020-12-19 RX ADMIN — FUROSEMIDE 10 MG/HR: 10 INJECTION, SOLUTION INTRAMUSCULAR; INTRAVENOUS at 19:25

## 2020-12-19 ASSESSMENT — PAIN SCALES - GENERAL: PAINLEVEL_OUTOF10: 0

## 2020-12-19 ASSESSMENT — ENCOUNTER SYMPTOMS
BACK PAIN: 0
APNEA: 0
COLOR CHANGE: 0
ABDOMINAL PAIN: 0

## 2020-12-19 NOTE — PROGRESS NOTES
[facility] -   Infectious diseases evaluation  admission date 12/12/2020    reason for consultation:   MRSA sepsis    Impression :   Current:  · MRSA septicemia 12/15 - echo neg  · sphingomonas septicemia 12/13  · DM  · dementia  · CAD_ CABG  · ATN  · Anasarca  · Lymphedema both lower extremities  · AV block first-degree  · Iron deficiency anemia, severe gastritis on EGD 12/14-colonoscopy patient refused  · Bilateral pleural effusions with underlying atelectasis, right> left  ·   Discussion / summary of stay / plan of care   ·   Recommendations   · ceftaroline in 400 mg q 12  · Repeat daily x 3 till neg  · [facility] 12/18 no growth so far    Infection Control Recommendations   · [place] Precautions  · Contact Isolation   Antimicrobial Stewardship Recommendations   · Simplification of therapy  · Targeted therapy    Coordination ofOutpatient Care:   · Estimated Length of IV antimicrobials:  · Patient will need Midline / picc Catheter Insertion:   · Patient will need SNF:  · Patient will need outpatient wound care:     History of Present Illness:   Initial history:  Corinne Philip is a 72y.o.-year-old female who presented to another hospital because of increasing fatigue, with increasing lower extremity edema ongoing for about a month with shortness of breath. Patient was found to have be iron deficiency anemia, was diuresed but kidney function kept getting worse, renal ultrasound was normal, patient was transferred to [facility] to be seen by nephrology. Was a concern also of dark stools and possible GI bleed  The patient had a 2D echo on 12/8 showing no vegetation  But she was with anasarca upon admission with elevated creatinine, denying any fevers.   Admitted to [facility] 12/12    Blood culture 12/13 1 out of 2 showed sphingomonas  BC 12/15 MRSA x 2, vanco wen 2  Patient had no fever at admission, just had a low-grade on 12/18 Patient had a EGD on  showing normal esophagus, uterine hernia sliding, moderate gastritis throughout the stomach, without any active bleed. Duodenum was normal  Planned for colonoscopy  but patient refused, GI signed off    We are consulted because of the bacteremia,  The patient has been receiving vancomycin and cefepime  According to the lab this Mayda Horta is sensitive to ceftriaxone and hence we will be able to switch the patient to ceftaroline     on exam she is in full anasarca - Ox 2 and picture concerning for myxedema  No iv site inflammation or visible induration. No back pain -        Interval changes  2020   Patient Vitals for the past 8 hrs:   BP Temp Temp src Pulse Resp SpO2 Weight   20 0737    59      20 0700 (!) 132/53 97.4 °F (36.3 °C) Temporal 59 10 99 %    20 0619       (!) 303 lb 2.1 oz (137.5 kg)   20 0400 124/63 97.5 °F (36.4 °C) Temporal 58 9 100 %      Patient seen and examined at bedside. Awake, alert, answering questions appropriately. No acute changes overnight. She denies fevers, chills, SOB, and abdominal pain at this time. 4L NC. Afebrile. [facility]  no growth so far  echo neg    TSH and FT4 normal    Summary of relevant labs:  Labs:  WBC 5.8  Creat 2.3-2.5  AST/ALT NL    TSH 3.2  FT4 1.09    Micro:  BC 1 out of 2,  sphingomyelin S  BC 12/15,  2 out of 2,  MRSA AKILAH 1 to vancomycin  [facility]  no growth so far    ImaginD echo  no vegetation  CT chest  bibasilar airspace disease and effusions, personally reviewed, mostly right effusion with a very tiny left effusion and minimal atelectasis underlying, not the picture of pneumonia  Calcified coronary arteries. I have personally reviewed the past medical history, past surgical history, medications, social history, and family history, and I haveupdated the database accordingly.       Allergies: Mental Status: She is alert. She is disoriented. Psychiatric:      Comments: Calm demented         Past Medical History:     Past Medical History:   Diagnosis Date    Anxiety and depression     Background diabetic retinopathy(362.01)     (Mild)    Balance problem     CAD (coronary artery disease)     (with coronary artery bypass graft x4).  Cancer of uterus [facility])     history of, (probable cure)    Chronic kidney disease     Chronic low back pain     [facility]cerebral vascular accident) ([facility] [facility]     Dementia ([facility] [address]     (moderate)    Dry eye syndrome     GERD (gastroesophageal reflux disease)     Glaucoma suspect     Gout     Homonymous hemianopsia     (Right)    Hyperlipidemia     Hypertension     Keratitis     (secondary to dry eye syndrome).  Obesity     Peripheral polyneuropathy     (diabetic)    Pseudophakos     (Right Eye: 2012; Left Eye: 2012) - Dr. Ethan Gonzalez.  Stroke [facility])     (Multiple) MRI of brain 10/2008 shows multiple infarcts involving the temporal and parietal areas.  Trichiasis     (left eye)    Type 2 diabetes mellitus ([facility] [address]        Past Surgical  History:     Past Surgical History:   Procedure Laterality Date    CATARACT REMOVAL WITH IMPLANT  2012    (Right eye) - Dr. Ethan Gonzalez.  CATARACT REMOVAL WITH IMPLANT  2012    (Left eye) - Dr. Ethan Gonzalez.   SECTION      CHOLECYSTECTOMY      CORONARY ARTERY BYPASS GRAFT  12-    (x4) - LIMA-LAD, SVG-diagnoal 1, SVG-OM1, and SVG-PDA. Exploration of left radial. (Dr. Shawanda Eastman).  EYE SURGERY Left     as a child     GASTRIC BYPASS SURGERY      HYSTERECTOMY      (abdominal)  with left oophorectomy. Right ovary retained.     TONSILLECTOMY AND ADENOIDECTOMY      UPPER GASTROINTESTINAL ENDOSCOPY  2020    EGD BIOPSY performed by Carline Bar MD at [facility]       Medications:      ceftaroline fosamil (TEFLARO) IVPB  400 mg Intravenous Q12H  pantoprazole  40 mg Oral QAM AC    carvedilol  12.5 mg Oral BID WC    sodium chloride flush  10 mL Intravenous 2 times per day    miconazole   Topical BID    heparin (porcine)  5,000 Units Subcutaneous 3 times per day    atorvastatin  40 mg Oral Daily    citalopram  20 mg Oral Daily    clopidogrel  75 mg Oral Daily    insulin glargine  20 Units Subcutaneous Nightly    rivastigmine  1 patch Transdermal Daily    insulin lispro  0-12 Units Subcutaneous TID WC    insulin lispro  0-6 Units Subcutaneous Nightly    darbepoetin hung-polysorbate  60 mcg Subcutaneous Weekly       Social History:     Social History     Socioeconomic History    Marital status:      Spouse name: Not on file    Number of children: Not on file    Years of education: Not on file    Highest education level: Not on file   Occupational History    Not on file   Social Needs    Financial resource strain: Not on file    Food insecurity     Worry: Not on file     Inability: Not on file    Transportation needs     Medical: Not on file     Non-medical: Not on file   Tobacco Use    Smoking status: Never Smoker    Smokeless tobacco: Never Used    Tobacco comment: never smoker eclamb rrt 7/20/17   Substance and Sexual Activity    Alcohol use: No    Drug use: No    Sexual activity: Not on file   Lifestyle    Physical activity     Days per week: Not on file     Minutes per session: Not on file    Stress: Not on file   Relationships    Social connections     Talks on phone: Not on file     Gets together: Not on file     Attends [employer] service: Not on file     Active member of club or organization: Not on file     Attends meetings of clubs or organizations: Not on file     Relationship status: Not on file    Intimate partner violence     Fear of current or ex partner: Not on file     Emotionally abused: Not on file     Physically abused: Not on file     Forced sexual activity: Not on file   Other Topics Concern I have discussed the care of the patient, including pertinent history and exam findings,  with the student. I have seen and examined the patient and the key elements of all parts of the encounter have been performed by me.   I agree with the assessment, plan and orders as documented by the student    Aminah Wilfrid, Infectious Diseases

## 2020-12-19 NOTE — PROGRESS NOTES
[facility]   Progress Note                   Date:   12/19/2020  Patient name: Governor Woodard  Date of admission:  12/12/2020  4:17 AM  MRN:   [id number]  YOB: 1950  PCP: ERINN Garcia    Reason for Admission: Acute renal failure ([facility] [N17.9]    Subjective:       Clinical Changes / Abnormalities: Pt seen and examined in the room. She denies any CP. Reports ongoing SOB. Labs, vitals, & tele reviewed. Ongoing edema noted. On Lasix drip. I/O Yesterday -741 since admission -5144    Medications:   Scheduled Meds:   ceftaroline fosamil (TEFLARO) IVPB  400 mg Intravenous Q12H    pantoprazole  40 mg Oral QAM AC    carvedilol  12.5 mg Oral BID WC    sodium chloride flush  10 mL Intravenous 2 times per day    miconazole   Topical BID    heparin (porcine)  5,000 Units Subcutaneous 3 times per day    atorvastatin  40 mg Oral Daily    citalopram  20 mg Oral Daily    clopidogrel  75 mg Oral Daily    insulin glargine  20 Units Subcutaneous Nightly    rivastigmine  1 patch Transdermal Daily    insulin lispro  0-12 Units Subcutaneous TID WC    insulin lispro  0-6 Units Subcutaneous Nightly    darbepoetin hung-polysorbate  60 mcg Subcutaneous Weekly     Continuous Infusions:   dextrose      furosemide (LASIX) 1mg/ml infusion 30 mg/hr (12/19/20 0915)     CBC:   Recent Labs     12/17/20  0604 12/18/20  0523   WBC 5.9 5.8   HGB 8.1* 7.6*    178     BMP:    Recent Labs     12/17/20  0604 12/18/20  0523    141   K 4.5 4.2    104   CO2 24 26   BUN 53* 55*   CREATININE 2.49* 2.51*   GLUCOSE 123* 145*     Hepatic:   No results for input(s): AST, ALT, ALB, BILITOT, ALKPHOS in the last 72 hours. Troponin: No results for input(s): TROPHS in the last 72 hours. BNP: No results for input(s): BNP in the last 72 hours. Lipids: No results for input(s): CHOL, HDL in the last 72 hours.     Invalid input(s): LDLCALCU  INR: No results for input(s): INR in the last 72 hours. Objective:   Vitals: BP (!) 116/53   Pulse 64   Temp 97.8 °F (36.6 °C) (Temporal)   Resp 11   Ht 5' 7\" (1.702 m)   Wt (!) 303 lb 2.1 oz (137.5 kg)   SpO2 100%   BMI 47.48 kg/m²   General appearance: alert and cooperative with exam  HEENT: Head: Normocephalic, no lesions, without obvious abnormality. Neck: no JVD, trachea midline, no adenopathy  Lungs: diminished with fine rales in bilateral bases, on 02 per NC . No distress  Heart: Regular rate and rhythm, s1/s2 auscultated, no murmurs, SR 70s  Abdomen: soft, non-tender, bowel sounds active  Extremities: +2/3 edema bilateral legs, Upper extremities + 2  Neurologic: not done        Assessment / Acute Cardiac Problems:   1. Acute on chronic CHF   2. Anasarca  3. AMY on CKD   4. DM   5. HTN   6. Ho CABG   7.  Anemia     Patient Active Problem List:     Dementia ([address]     Hypertension     Hyperlipidemia     Class 2 severe obesity with serious comorbidity and body mass index (BMI) of 37.0 to 37.9 in adult [facility])     Gout     Peripheral polyneuropathy     Anxiety and depression     Acute kidney injury superimposed on CKD ([address]     Balance problem     CVA (cerebral vascular accident) ([address]     Type 2 diabetes mellitus with hyperglycemia, with long-term current use of insulin (HCC)     Acute on chronic diastolic heart failure ([address]     Acute cystitis     Infestation by bed bug     Infestation by maggots     Lymphedema of both lower extremities     PAD (peripheral artery disease) (HCC)     Pressure injury of right heel, unstageable ([address]     Acute kidney injury ([address]     Bradycardia     Hyperkalemia     AV block, 1st degree     Wounds, multiple     Buttock wound     Decubitus ulcer of trochanteric region of right hip, stage 2 (HCC)     Decompensated heart failure (HCC)     Acute anemia     Acute renal failure (ARF) ([address]     Anasarca      Plan of Treatment: 1. Acute on chronic diastolic chf. Appreciate Nephrology assistance with diuretics. Currently on lasix drip and hold ACE. Strict I/O  2. CAD stable. Hx of CABG. Continue statin, BB, plavix. 3. HTN controlled. Continue BB  4. AMY/CKD managed by nephrology  5. Anemia per primary/GI. Recommend HGB > 9  6. ID following for MRSA sepsis.    7. Keep K> 4 and Mag > 2  Stable        Electronically signed by SHIRA Escobar - NP on 12/19/2020 at [facility].  [phone]

## 2020-12-19 NOTE — PROGRESS NOTES
[unfilled]    [facility] 19    Progress Note    12/19/2020    10:28 AM    Name:   Kan Chacon  MRN:     [id number]     Acct:      [de-identified]   Room:   [address] [place] [address] Day:  7  Admit Date:  12/12/2020  4:17 AM    PCP:   ERINN Mina  Code Status:  Full Code    Subjective:     C/C: No chief complaint on file. Patient Active Problem List   Diagnosis    Dementia ([facility]    Hypertension    Hyperlipidemia    Class 2 severe obesity with serious comorbidity and body mass index (BMI) of 37.0 to 37.9 in [facility])    Gout    Peripheral polyneuropathy    Anxiety and depression    Acute kidney injury superimposed on CKD ([facility]    Balance problem    CVA (cerebral vascular accident) ([facility]    Type 2 diabetes mellitus with hyperglycemia, with long-term current use of insulin (HCC)    Acute on chronic diastolic heart failure (HCC)    Acute cystitis    Infestation by bed bug    Infestation by maggots    Lymphedema of both lower extremities    [facility]AD [facility]peripheral artery disease) ([facility]    Pressure injury of right heel, unstageable ([facility])    Acute kidney injury ([facility]    Bradycardia    Hyperkalemia    AV block, 1st degree    Wounds, multiple    Buttock wound    Decubitus ulcer of trochanteric region of right hip, stage 2 (HCC)    Decompensated heart failure (HCC)    Acute anemia    Acute renal failure (ARF) ([facility])    Anasarca    Gram-negative bacteremia    Stage 3 chronic kidney disease    MRSA bacteremia     Interval History Status: not changed. Patient was seen and examined at bedside. No acute overnight event. Patient is slightly more energetic compared to yesterday. States that she feels fine but patient has advanced dementia.     Brief History:       This is a 62-year-old female who initially presented to outside hospital with anemia, hemoglobin of 6.8.  She was given hemoglobin and developed lower extremity edema and was subsequently diuresed.  She was transferred here for acute renal failure.  She was evaluated by nephrology who reviewed her symptoms to [facility] due to intravascular depletion.  She was started on half bicarb drip and IV Lasix.  Her creatinine is slowly improving now.  Patient was evaluated by GI for her anemia: EGD shows mild gastritis.     Blood culture: 12/15 positive for gram negative rods in one culture. Following blood culture shows MRSA bacteremia.       Review of Systems:     Constitutional:  negative for chills, fevers, sweats  Respiratory:  negative for cough, dyspnea on exertion, shortness of breath, wheezing  Cardiovascular:  negative for chest pain, chest pressure/discomfort, lower extremity edema, palpitations  Gastrointestinal:  negative for abdominal pain, constipation, diarrhea, nausea, vomiting  Neurological:  negative for dizziness, headache    Medications: Allergies:     Allergies   Allergen Reactions    Bactrim [Sulfamethoxazole-Trimethoprim] Hives    Clonidine Derivatives     Amoxicillin-Pot Clavulanate Diarrhea, Nausea Only and Nausea And Vomiting       Current Meds:   Scheduled Meds:    ceftaroline fosamil (TEFLARO) IVPB  400 mg Intravenous Q12H    pantoprazole  40 mg Oral QAM AC    carvedilol  12.5 mg Oral BID WC    sodium chloride flush  10 mL Intravenous 2 times per day    miconazole   Topical BID    heparin (porcine)  5,000 Units Subcutaneous 3 times per day    atorvastatin  40 mg Oral Daily    citalopram  20 mg Oral Daily    clopidogrel  75 mg Oral Daily    insulin glargine  20 Units Subcutaneous Nightly    rivastigmine  1 patch Transdermal Daily    insulin lispro  0-12 Units Subcutaneous TID WC    insulin lispro  0-6 Units Subcutaneous Nightly  darbepoetin hung-polysorbate  60 mcg Subcutaneous Weekly     Continuous Infusions:    dextrose      furosemide (LASIX) 1mg/ml infusion 30 mg/hr (20 0915)     PRN Meds: glucose, dextrose, glucagon (rDNA), dextrose, sodium chloride flush, nicotine, promethazine **OR** ondansetron, acetaminophen **OR** acetaminophen, albuterol    Data:     Past Medical History:   has a past medical history of Anxiety and depression, Background diabetic retinopathy(362.01), Balance problem, CAD (coronary artery disease), Cancer of uterus ([facility] Chronic kidney disease, Chronic low back pain, CVA (cerebral vascular accident) ([facility] Dementia ([facility] Dry eye syndrome, GERD (gastroesophageal reflux disease), Glaucoma suspect, Gout, Homonymous hemianopsia, Hyperlipidemia, Hypertension, Keratitis, Obesity, Peripheral polyneuropathy, Pseudophakos, Stroke ([facility] Trichiasis, and Type 2 diabetes mellitus ([facility] Social History:   reports that she has never smoked. She has never used smokeless tobacco. She reports that she does not drink alcohol or use drugs. Family History:   Family History   Problem Relation Age of Onset    Diabetes Mother     Cancer Mother     High Blood Pressure Mother    Ivelisse Wen Stroke Mother     Kidney Disease Mother     Uterine Cancer Mother     Diabetes Father     Heart Disease Father     Glaucoma Father     Emphysema Father     Coronary Art Dis Other         All 4 siblings.  Stroke Other         1 sibling.  Lung Cancer Other         1 sibling - cause of death lung cancer.     Mental Retardation Other        Vitals:  BP (!) 132/53   Pulse 59   Temp 97.4 °F (36.3 °C) (Temporal)   Resp 10   Ht 5' 7\" (1.702 m)   Wt (!) 303 lb 2.1 oz (137.5 kg)   SpO2 99%   BMI 47.48 kg/m²   Temp (24hrs), Av.7 °F (36.5 °C), Min:97.4 °F (36.3 °C), Max:98.8 °F (37.1 °C)    Recent Labs     20  1125 20  1533 20  0724   POCGLU 162* 161* 193* 125*       I/O (24Hr): Intake/Output Summary (Last 24 hours) at 12/19/2020 1028  Last data filed at 12/19/2020 0900  Gross per 24 hour   Intake 1659 ml   Output 2625 ml   Net -966 ml       Labs:  Hematology:  Recent Labs     12/17/20  0604 12/18/20  0523   WBC 5.9 5.8   RBC 2.70* 2.56*   HGB 8.1* 7.6*   HCT 27.4* 25.8*   .5 100.8   MCH 30.0 29.7   MCHC 29.6 29.5   RDW 16.2* 16.1*    178   MPV 10.4 10.8     Chemistry:  Recent Labs     12/17/20  0604 12/18/20  0523    141   K 4.5 4.2    104   CO2 24 26   GLUCOSE 123* 145*   BUN 53* 55*   CREATININE 2.49* 2.51*   MG 1.9 1.9   ANIONGAP 12 11   LABGLOM 19* 19*   GFRAA 23* 23*   CALCIUM 8.8 8.7   PHOS 4.3 4.3     Recent Labs     12/17/20  0604 12/17/20  0604 12/18/20  0440 12/18/20  0523 12/18/20  0647 12/18/20  1125 12/18/20  1533 12/18/20 2005 12/19/20  0724   LABALBU 3.1*  --   --  2.9*  --   --   --   --   --    POCGLU  --    < > 131*  --  128* 162* 161* 193* 125*    < > = values in this interval not displayed. ABG:  Lab Results   Component Value Date    POCPH 7.284 12/13/2020    POCPCO2 54.5 12/13/2020    POCPO2 71.6 12/13/2020    POCHCO3 25.8 12/13/2020    NBEA 1 12/13/2020    PBEA NOT REPORTED 12/13/2020    SVN1DLO 28 12/13/2020    AKGI4QWR 92 12/13/2020    FIO2 2.0 12/13/2020     Lab Results   Component Value Date/Time    SPECIAL L. HAND 0.1ML 12/18/2020 08:47 PM     Lab Results   Component Value Date/Time    CULTURE NO GROWTH 9 HOURS 12/18/2020 08:47 PM       Radiology:  Ct Head Wo Contrast    Result Date: 12/13/2020  No acute intracranial abnormality. Old infarct left posterior cerebral artery territory. Ct Chest Wo Contrast    Result Date: 12/13/2020  Bibasilar airspace disease and effusions coronary artery disease and cardiomegaly.      Xr Chest Portable    Result Date: 12/16/2020  Stable pulmonary vascular congestion       Physical Examination:        General appearance:  alert, cooperative and no distress  Mental Status: Demented Lungs:  clear to auscultation bilaterally, normal effort  Heart:  regular rate and rhythm, no murmur  Abdomen:  soft, nontender, nondistended, normal bowel sounds, no masses, hepatomegaly, splenomegaly  Extremities: Significant edema in all extremities. Skin:  no gross lesions, rashes, induration    Assessment:        Hospital Problems           Last Modified POA    * (Principal) MRSA bacteremia 12/18/2020 Yes    Dementia ([address] 12/12/2020 Yes    Overview Signed 2/27/2014 10:21 AM by Doris Goodwin MD     (moderate)         Hypertension 12/12/2020 Yes    Hyperlipidemia 12/12/2020 Yes    CVA (cerebral vascular accident) ([address] 12/12/2020 Yes    Type 2 diabetes mellitus with hyperglycemia, with long-term current use of insulin ([address] 12/12/2020 Yes    Acute on chronic diastolic heart failure ([address] 12/12/2020 Yes    Acute renal failure (ARF) ([address] 12/15/2020 Yes    Anasarca 12/12/2020 Yes    Gram-negative bacteremia 12/18/2020 Yes    Stage 3 chronic kidney disease 12/17/2020 Yes          Plan:        1. Acute kidney injury on CKD stage III -patient's renal function is worsening today.  Nephrology is switching her IV Lasix push to Lasix drip. Urination improving. 2. MRSA bacteremia and Sphingomonas Bacteremia - On Ceftaroline per ID. Clnically improving. Continue Abx per ID recommendation. 3. Acute on chronic exacerbation of diastolic congestive heart failure. -Patient on Lasix drip.  Continue to monitor.  Continue other home medications. 4. Essential hypertension -stable  5. Obesity hypoventilation syndrome with obstructive sleep apnea -patient on CPAP as needed. 6. Morbid obesity  7. Insulin-dependent diabetes mellitus -continue current regimen of insulin.   8. Normocytic normochromic anemia -likely anemia of chronic disease and mild blood loss anemia.       Walker Fothergill, DO  12/19/2020  10:28 AM

## 2020-12-19 NOTE — PLAN OF CARE
Problem: Falls - Risk of:  Goal: Will remain free from falls  Description: Will remain free from falls  Outcome: Met This Shift     Problem: Tissue Perfusion - Renal, Altered:  Goal: Electrolytes within specified parameters  Description: Electrolytes within specified parameters  Outcome: Ongoing   Electronically signed by Alfredo Hale RN on 12/19/2020 at 2:35 AM

## 2020-12-19 NOTE — PROGRESS NOTES
Renal Progress Note    Patient :  Celeste Plasencia; 72 y.o. MRN# [id number]  Location:  7539/1343-71  Attending:  Aaliyah Jade DO  Admit Date:  12/12/2020   Hospital Day: 7      Subjective:     Patient seen and evaluated bedside. No acute events overnight. Vital signs stable. Urine output over the last 24 hours 2400 mL approximately 100 mL/hr. overall negative about 5 L since admission. Continues on Lasix drip 40 mg an hour patient also received a dose of Diuril yesterday. Patient's urine output started to  slowly about 150 mL an hour since the start of the shift. Labs not available for review. Stat BMP and CBC placed. Creatinine still in the 2.5 range. Expected to worsen further. Infectious disease note reviewed. Continues on ceftaroline in 400 mg q 12h. Outpatient Medications:     Medications Prior to Admission: aspirin 81 MG tablet, Take 1 tablet by mouth daily  senna (SENOKOT) 8.6 MG tablet, Take 1 tablet by mouth 2 times daily  oxybutynin (DITROPAN) 5 MG tablet, TAKE 1 TABLET BY MOUTH TWICE DAILY  losartan (COZAAR) 50 MG tablet, Take 1 tablet by mouth daily  OXYGEN, Oxgen at 2 liters per nasal cannula  loratadine (CLARITIN) 10 MG tablet, TAKE 1 TABLET(10 MG) BY MOUTH DAILY  Lancets MISC, Checks blood sugar ac and HS  blood glucose monitor strips, Test 3  times a day & as needed for symptoms of irregular blood glucose. Dispense sufficient amount for indicated testing frequency plus additional to accommodate PRN testing needs. Alcohol Swabs (ALCOHOL PREP) PADS, Checks blood sugar 3 times a day  Misc.  Devices MISC, Standard walker with wheels  Diagnosis dementia, CHF  atorvastatin (LIPITOR) 40 MG tablet, TAKE 1 TABLET BY MOUTH EVERYDAY  bumetanide (BUMEX) 1 MG tablet, Take 1 tablet by mouth daily  citalopram (CELEXA) 20 MG tablet, Take 1 tablet by mouth daily  clopidogrel (PLAVIX) 75 MG tablet, Take 1 tablet by mouth daily insulin lispro (HUMALOG) 100 UNIT/ML injection vial, Use 4 times a day per sliding scale  hydrALAZINE (APRESOLINE) 50 MG tablet, Take 1.5 tablets by mouth every 8 hours  insulin glargine (LANTUS) 100 UNIT/ML injection vial, Inject 20 Units into the skin nightly  memantine (NAMENDA) 10 MG tablet, TAKE 1 TABLET BY MOUTH TWICE DAILY  omeprazole (PRILOSEC) 20 MG delayed release capsule, Take 1 capsule by mouth every morning (before breakfast)  rivastigmine (EXELON) 9.5 MG/24HR, APPLY 1 NEW PATCH EVERY 24 HOURS  Diabetic Shoe MISC, by Does not apply route  Compression Stockings MISC, by Does not apply route 20-30 mm Hg bilateral knee high  nystatin (MYCOSTATIN) 157654 UNIT/GM cream, Apply topically 2 times daily. Current Medications:     Scheduled Meds:    ceftaroline fosamil (TEFLARO) IVPB  400 mg Intravenous Q12H    pantoprazole  40 mg Oral QAM AC    carvedilol  12.5 mg Oral BID WC    sodium chloride flush  10 mL Intravenous 2 times per day    miconazole   Topical BID    heparin (porcine)  5,000 Units Subcutaneous 3 times per day    atorvastatin  40 mg Oral Daily    citalopram  20 mg Oral Daily    clopidogrel  75 mg Oral Daily    insulin glargine  20 Units Subcutaneous Nightly    rivastigmine  1 patch Transdermal Daily    insulin lispro  0-12 Units Subcutaneous TID WC    insulin lispro  0-6 Units Subcutaneous Nightly    darbepoetin hung-polysorbate  60 mcg Subcutaneous Weekly     Continuous Infusions:    dextrose      furosemide (LASIX) 1mg/ml infusion 35 mg/hr (12/19/20 0505)     PRN Meds:  glucose, dextrose, glucagon (rDNA), dextrose, sodium chloride flush, nicotine, promethazine **OR** ondansetron, acetaminophen **OR** acetaminophen, albuterol    Input/Output:       I/O last 3 completed shifts: In: 1292 [P.O.:700; I.V.:909; IV Piggyback:50]  Out: 2400 [Urine:2400].       Patient Vitals for the past 96 hrs (Last 3 readings):   Weight   12/19/20 0619 (!) 303 lb 2.1 oz (137.5 kg) LABBETA 0.9 08/13/2020    BETAPCT 17 08/13/2020    GAMGLOB 1.4 08/13/2020    GGPCT 28 08/13/2020    PATH ELECTRONICALLY SIGNED. Demond Arroyo M.D. 08/13/2020    PATH ELECTRONICALLY SIGNED. Demond Arroyo M.D. 08/13/2020     C3:     Lab Results   Component Value Date    C3 114 08/13/2020     C4:     Lab Results   Component Value Date    C4 39 08/13/2020     MPO ANCA:     Lab Results   Component Value Date    MPO 14 08/13/2020     PR3 ANCA:     Lab Results   Component Value Date    PR3 8 08/13/2020     Hep C AB:          Lab Results   Component Value Date    HEPCAB NONREACTIVE 01/31/2018       Urinalysis/Chemistries:      Lab Results   Component Value Date    NITRU NEGATIVE 12/13/2020    COLORU YELLOW 12/13/2020    PHUR 5.0 12/13/2020    WBCUA 50  12/13/2020    RBCUA 20 TO 50 12/13/2020    MUCUS 1+ 12/13/2020    TRICHOMONAS NOT REPORTED 12/13/2020    YEAST NOT REPORTED 12/13/2020    BACTERIA MANY 12/13/2020    SPECGRAV 1.011 12/13/2020    LEUKOCYTESUR MODERATE 12/13/2020    UROBILINOGEN Normal 12/13/2020    BILIRUBINUR NEGATIVE 12/13/2020    GLUCOSEU NEGATIVE 12/13/2020    KETUA NEGATIVE 12/13/2020    AMORPHOUS NOT REPORTED 12/13/2020     Urine Sodium:     Lab Results   Component Value Date    LUKE <20 12/15/2020     Urine Osmolarity:   Lab Results   Component Value Date    OSMOU 351 12/15/2020       Urine Creatinine:     Lab Results   Component Value Date    LABCREA 18.4 08/20/2020     Radiology:     CXR: Reviewed as available    Assessment:     1. Acute Kidney Injury: Secondary to ischemic ATN from low blood flow related to severe anemia, drop in hemoglobin down to 6, was resolving urine output improved now developing second hit from MRSA bacteremia, urine output has declined, creatinine in the 2.5 range, baseline 1.51.7 labs pending this morning. Stat orders placed. 2. MRSA bacteremia source likely skin -continues on antibiotics per infectious disease. 3.  Chronic kidney disease stage III secondary to diabetic nephrosclerosis, proteinuria about 2.7 g.  4.  Anemia of chronic disease and GI blood loss. Stat orders placed. 5.  Chronic lower extremity edema from lymphedema    Plan:   1. Decrease Lasix dose to 30 mg an hour, keep urine output  mil an hour  2. Labs pending this morning. Stat orders placed for BMP and CBC. 3.  Avoid nephrotoxic agents including IV contrast if at all possible. 4.  Follow renal function closely  5. Labs in a.m. Nutrition   Please ensure that patient is on a renal diet/TF. Avoid nephrotoxic drugs/contrast exposure. We will continue to follow along with you. Electronically signed by Yelitza Garner CNP, APRN - CNP on 12/19/2020 at 7:59 AM     Attending Physician Statement  I have discussed the care of Iveth Barba, including pertinent history and exam findings with the CNP. I have reviewed the key elements of all parts of the encounter with the CNP. I have seen and examined the patient. I agree with the assessment and plan and status of the problem list as documented.       Dannielle Houser MD  Nephrology Attending Physician  [facility]

## 2020-12-19 NOTE — PROGRESS NOTES
Physical Therapy    Facility/Department: [facility] 4B STEPDOWN  Initial Assessment    NAME: Reddy Riavs  : 1950  MRN: [id number]    Date of Service: 2020    Discharge Recommendations:  Patient would benefit from continued therapy after discharge   Assessment   Body structures, Functions, Activity limitations: Decreased balance;Decreased endurance;Decreased functional mobility ; Decreased strength  Assessment: The pt stood with a RW with mod assist x 2 . She was unable to take any steps. She could benefit from a continuation of PT for gait training and strengthening following her DC  Prognosis: Good  Decision Making: Medium Complexity  PT Education: Goals;PT Role;Plan of Care  REQUIRES PT FOLLOW UP: Yes  Activity Tolerance  Activity Tolerance: Patient limited by fatigue   Patient Diagnosis(es): There were no encounter diagnoses. has a past medical history of Anxiety and depression, Background diabetic retinopathy(362.01), Balance problem, CAD (coronary artery disease), Cancer of uterus ([address] Chronic kidney disease, Chronic low back pain, CVA (cerebral vascular accident) ([address] Dementia ([address] Dry eye syndrome, GERD (gastroesophageal reflux disease), Glaucoma suspect, Gout, Homonymous hemianopsia, Hyperlipidemia, Hypertension, Keratitis, Obesity, Peripheral polyneuropathy, Pseudophakos, Stroke ([address] Trichiasis, and Type 2 diabetes mellitus ([address] has a past surgical history that includes Gastric bypass surgery; Cataract removal with implant (2012); Cataract removal with implant (2012); Coronary artery bypass graft (12-);  section; Hysterectomy; Cholecystectomy; Tonsillectomy and adenoidectomy; Eye surgery (Left); and Upper gastrointestinal endoscopy (2020).   Restrictions  Restrictions/Precautions  Restrictions/Precautions: Contact Precautions  Position Activity Restriction  Other position/activity restrictions: up with assistance  Vision/Hearing Vision: Within Functional Limits  Hearing: Within functional limits     Subjective  General  Patient assessed for rehabilitation services?: Yes  Response To Previous Treatment: Not applicable  Family / Caregiver Present: No  Subjective  Subjective: Pt found supine in bed upon arrival. Agreeable to therapy. Pain Screening  Patient Currently in Pain: No  Pain Assessment  Pain Assessment: 0-10  Pain Level: 0  Vital Signs  Patient Currently in Pain: No     Orientation  Orientation  Overall Orientation Status: Within Functional Limits  Social/Functional History  Social/Functional History  Lives With: Family(daughter and grandson)  Type of Home: House  Home Layout: One level  Home Access: Stairs to enter with rails  Entrance Stairs - Number of Steps: 1  Bathroom Shower/Tub: Tub/Shower unit  Bathroom Toilet: Standard  Bathroom Equipment: Shower chair  Bathroom Accessibility: Accessible  Home Equipment: Rolling walker, Oxygen  ADL Assistance: Needs assistance  Bath: Moderate assistance(reports sponge bathing recently)  Dressing: Minimal assistance  Homemaking Responsibilities: No  Ambulation Assistance: Independent  Transfer Assistance: Independent  Active : No  Patient's  Info: Pt reports grandson drives pt when necessary. Mode of Transportation: Cab  Occupation: Retired  Type of occupation:  at [employer] in [address] [place] [address] Avenue: Pt has been residing in [place] and plan to return to SNF per discussing with RN; Pt questionable historian, requiring multiple attempts for questions to be answered.   Cognition      Objective        AROM RLE (degrees)  RLE AROM: WFL  AROM LLE (degrees)  LLE AROM : WFL  AROM RUE (degrees)  RUE AROM : WFL  AROM LUE (degrees)  LUE AROM : WFL  Strength RLE  Strength RLE: WFL  Strength LLE  Strength LLE: WFL  Strength RUE  Strength RUE: WFL  Strength LUE  Strength LUE: WFL     Sensation  Overall Sensation Status: Impaired  Bed mobility

## 2020-12-19 NOTE — PROGRESS NOTES
Occupational Therapy   Occupational Therapy Initial Assessment  Date: 2020   Patient Name: Gifty Patel  MRN: [id number]     : 1950    Date of Service: 2020    Discharge Recommendations:  Patient would benefit from continued therapy after discharge     Assessment   Performance deficits / Impairments: Decreased functional mobility ; Decreased balance;Decreased ADL status; Decreased ROM; Decreased endurance;Decreased strength;Decreased cognition  Assessment: Patient demonstrates decreased cognition impacting performance and safety with functional tasks. Patient demonstrates Max A x2 to complete bed mobility and for sit to stand transfer EOB. Pt able to tolerate <1 min static standing EOB at [facility] using RW prior to fatiguing and returning to supine with elevated extremties d/t patient's edema. OT services are warranted to address functional deficits impacting performance and safety with ADLs and to promote independence through skilled intervention. Pt is unsafe to return to home without significant 24/7 support d/t current LOF. Prognosis: Fair  Decision Making: Medium Complexity  Patient Education: OT role, OT POC, purpose of evaluation, importance of OOB activity, elevation for extremities, continuation of therapy - fair return  Barriers to Learning: cognition  REQUIRES OT FOLLOW UP: Yes  Activity Tolerance  Activity Tolerance: Patient limited by fatigue;Treatment limited secondary to decreased cognition  Safety Devices  Safety Devices in place: Yes  Type of devices: Gait belt;Patient at risk for falls;Nurse notified; Left in bed;Bed alarm in place;Call light within reach; All fall risk precautions in place  Restraints  Initially in place: No         Patient Diagnosis(es): There were no encounter diagnoses. Homemaking Responsibilities: No  Ambulation Assistance: Independent  Transfer Assistance: Independent  Active : No  Patient's  Info: Pt reports grandson drives pt when necessary. Mode of Transportation: Cab  Occupation: Retired  Type of occupation: Rachel at [employer] in [address] [place] [address] Avenue: Pt has been residing in [place] and plan to return to SNF per discussing with RN; Pt questionable historian, requiring multiple attempts for questions to be answered. Objective   Vision: Within Functional Limits  Hearing: Within functional limits    Orientation  Overall Orientation Status: Within Functional Limits     Balance  Sitting Balance: Contact guard assistance(L Lateral lean into the bed with support on L UE for ~4 min)  Standing Balance: Contact guard assistance  Standing Balance  Time: <1 min  Activity: EOB using RW for support  Comment: unable to progress to steps  ADL  Feeding: Supervision;Setup  Grooming: Stand by assistance;Setup  UE Bathing: Moderate assistance;Setup;Verbal cueing; Increased time to complete  LE Bathing: Maximum assistance; Increased time to complete;Verbal cueing;Setup  UE Dressing: Moderate assistance;Setup;Verbal cueing; Increased time to complete  LE Dressing: Maximum assistance; Increased time to complete;Verbal cueing;Setup(TD for socks this date; B LE demo edema)  Toileting: Dependent/Total;Setup; Increased time to complete(Leah)  Tone RUE  RUE Tone: Normotonic  Tone LUE  LUE Tone: Normotonic  Coordination  Movements Are Fluid And Coordinated: Yes  Quality of Movement Other  Comment: edema and seeping from UE's     Bed mobility  Supine to Sit: 2 Person assistance;Maximum assistance  Sit to Supine: 2 Person assistance;Maximum assistance  Scooting: Maximal assistance  Transfers  Sit to stand: 2 Person assistance;Maximum assistance  Stand to sit: 2 Person assistance;Maximum assistance     Cognition  Overall Cognitive Status: Exceptions Short term goal 6: demo functional transfers at Mod A using LRD to engage in ADLs     Therapy Time   Individual Concurrent Group Co-treatment   Time In 1306         Time Out 1335         Minutes 29         Timed Code Treatment Minutes: 300 [facility], OTR/L

## 2020-12-20 LAB
ABSOLUTE EOS #: 0.14 K/UL (ref 0–0.44)
ABSOLUTE IMMATURE GRANULOCYTE: 0.03 K/UL (ref 0–0.3)
ABSOLUTE LYMPH #: 0.94 K/UL (ref 1.1–3.7)
ABSOLUTE MONO #: 0.66 K/UL (ref 0.1–1.2)
ALBUMIN SERPL-MCNC: 2.8 G/DL (ref 3.5–5.2)
ANION GAP SERPL CALCULATED.3IONS-SCNC: 9 MMOL/L (ref 9–17)
BASOPHILS # BLD: 1 % (ref 0–2)
BASOPHILS ABSOLUTE: 0.04 K/UL (ref 0–0.2)
BLOOD BANK SPECIMEN: NORMAL
BUN BLDV-MCNC: 54 MG/DL (ref 8–23)
BUN/CREAT BLD: ABNORMAL (ref 9–20)
CALCIUM SERPL-MCNC: 8.5 MG/DL (ref 8.6–10.4)
CHLORIDE BLD-SCNC: 102 MMOL/L (ref 98–107)
CO2: 27 MMOL/L (ref 20–31)
CREAT SERPL-MCNC: 2.32 MG/DL (ref 0.5–0.9)
DIFFERENTIAL TYPE: ABNORMAL
EOSINOPHILS RELATIVE PERCENT: 2 % (ref 1–4)
GFR AFRICAN AMERICAN: 25 ML/MIN
GFR NON-AFRICAN AMERICAN: 21 ML/MIN
GFR SERPL CREATININE-BSD FRML MDRD: ABNORMAL ML/MIN/{1.73_M2}
GFR SERPL CREATININE-BSD FRML MDRD: ABNORMAL ML/MIN/{1.73_M2}
GLUCOSE BLD-MCNC: 105 MG/DL (ref 65–105)
GLUCOSE BLD-MCNC: 113 MG/DL (ref 65–105)
GLUCOSE BLD-MCNC: 87 MG/DL (ref 65–105)
GLUCOSE BLD-MCNC: 91 MG/DL (ref 65–105)
GLUCOSE BLD-MCNC: 97 MG/DL (ref 70–99)
GLUCOSE BLD-MCNC: 99 MG/DL (ref 65–105)
HCT VFR BLD CALC: 25.3 % (ref 36.3–47.1)
HEMOGLOBIN: 7.4 G/DL (ref 11.9–15.1)
IMMATURE GRANULOCYTES: 0 %
LYMPHOCYTES # BLD: 14 % (ref 24–43)
MAGNESIUM: 1.8 MG/DL (ref 1.6–2.6)
MCH RBC QN AUTO: 29.4 PG (ref 25.2–33.5)
MCHC RBC AUTO-ENTMCNC: 29.2 G/DL (ref 28.4–34.8)
MCV RBC AUTO: 100.4 FL (ref 82.6–102.9)
MONOCYTES # BLD: 10 % (ref 3–12)
NRBC AUTOMATED: 0 PER 100 WBC
PDW BLD-RTO: 15.9 % (ref 11.8–14.4)
PHOSPHORUS: 3.7 MG/DL (ref 2.6–4.5)
PLATELET # BLD: 188 K/UL (ref 138–453)
PLATELET ESTIMATE: ABNORMAL
PMV BLD AUTO: 10.5 FL (ref 8.1–13.5)
POTASSIUM SERPL-SCNC: 3.9 MMOL/L (ref 3.7–5.3)
RBC # BLD: 2.52 M/UL (ref 3.95–5.11)
RBC # BLD: ABNORMAL 10*6/UL
SEG NEUTROPHILS: 74 % (ref 36–65)
SEGMENTED NEUTROPHILS ABSOLUTE COUNT: 5.1 K/UL (ref 1.5–8.1)
SODIUM BLD-SCNC: 138 MMOL/L (ref 135–144)
WBC # BLD: 6.9 K/UL (ref 3.5–11.3)
WBC # BLD: ABNORMAL 10*3/UL

## 2020-12-20 PROCEDURE — 2580000003 HC RX 258: Performed by: NURSE PRACTITIONER

## 2020-12-20 PROCEDURE — 6370000000 HC RX 637 (ALT 250 FOR IP): Performed by: INTERNAL MEDICINE

## 2020-12-20 PROCEDURE — 6360000002 HC RX W HCPCS: Performed by: INTERNAL MEDICINE

## 2020-12-20 PROCEDURE — 2580000003 HC RX 258: Performed by: INTERNAL MEDICINE

## 2020-12-20 PROCEDURE — 6360000002 HC RX W HCPCS: Performed by: NURSE PRACTITIONER

## 2020-12-20 PROCEDURE — 6370000000 HC RX 637 (ALT 250 FOR IP): Performed by: NURSE PRACTITIONER

## 2020-12-20 PROCEDURE — 97110 THERAPEUTIC EXERCISES: CPT

## 2020-12-20 PROCEDURE — 85025 COMPLETE CBC W/AUTO DIFF WBC: CPT

## 2020-12-20 PROCEDURE — 83735 ASSAY OF MAGNESIUM: CPT

## 2020-12-20 PROCEDURE — 97530 THERAPEUTIC ACTIVITIES: CPT

## 2020-12-20 PROCEDURE — 87040 BLOOD CULTURE FOR BACTERIA: CPT

## 2020-12-20 PROCEDURE — 99232 SBSQ HOSP IP/OBS MODERATE 35: CPT | Performed by: HOSPITALIST

## 2020-12-20 PROCEDURE — 99232 SBSQ HOSP IP/OBS MODERATE 35: CPT | Performed by: INTERNAL MEDICINE

## 2020-12-20 PROCEDURE — 86900 BLOOD TYPING SEROLOGIC ABO: CPT

## 2020-12-20 PROCEDURE — 2060000000 HC ICU INTERMEDIATE R&B

## 2020-12-20 PROCEDURE — 86920 COMPATIBILITY TEST SPIN: CPT

## 2020-12-20 PROCEDURE — 86850 RBC ANTIBODY SCREEN: CPT

## 2020-12-20 PROCEDURE — 36415 COLL VENOUS BLD VENIPUNCTURE: CPT

## 2020-12-20 PROCEDURE — 80069 RENAL FUNCTION PANEL: CPT

## 2020-12-20 PROCEDURE — 82947 ASSAY GLUCOSE BLOOD QUANT: CPT

## 2020-12-20 PROCEDURE — 86901 BLOOD TYPING SEROLOGIC RH(D): CPT

## 2020-12-20 RX ORDER — 0.9 % SODIUM CHLORIDE 0.9 %
20 INTRAVENOUS SOLUTION INTRAVENOUS ONCE
Status: COMPLETED | OUTPATIENT
Start: 2020-12-20 | End: 2020-12-21

## 2020-12-20 RX ORDER — HYDRALAZINE HYDROCHLORIDE 20 MG/ML
20 INJECTION INTRAMUSCULAR; INTRAVENOUS EVERY 6 HOURS PRN
Status: DISCONTINUED | OUTPATIENT
Start: 2020-12-20 | End: 2020-12-23 | Stop reason: HOSPADM

## 2020-12-20 RX ADMIN — HYDRALAZINE HYDROCHLORIDE 20 MG: 20 INJECTION INTRAMUSCULAR; INTRAVENOUS at 20:09

## 2020-12-20 RX ADMIN — HEPARIN SODIUM 5000 UNITS: 5000 INJECTION INTRAVENOUS; SUBCUTANEOUS at 21:39

## 2020-12-20 RX ADMIN — ANTI-FUNGAL POWDER MICONAZOLE NITRATE TALC FREE: 1.42 POWDER TOPICAL at 20:47

## 2020-12-20 RX ADMIN — CARVEDILOL 12.5 MG: 12.5 TABLET, FILM COATED ORAL at 18:13

## 2020-12-20 RX ADMIN — HEPARIN SODIUM 5000 UNITS: 5000 INJECTION INTRAVENOUS; SUBCUTANEOUS at 14:39

## 2020-12-20 RX ADMIN — SODIUM CHLORIDE, PRESERVATIVE FREE 10 ML: 5 INJECTION INTRAVENOUS at 20:10

## 2020-12-20 RX ADMIN — CITALOPRAM 20 MG: 20 TABLET, FILM COATED ORAL at 08:35

## 2020-12-20 RX ADMIN — CARVEDILOL 12.5 MG: 12.5 TABLET, FILM COATED ORAL at 08:35

## 2020-12-20 RX ADMIN — FUROSEMIDE 10 MG/HR: 10 INJECTION, SOLUTION INTRAMUSCULAR; INTRAVENOUS at 01:54

## 2020-12-20 RX ADMIN — FUROSEMIDE 10 MG/HR: 10 INJECTION, SOLUTION INTRAMUSCULAR; INTRAVENOUS at 05:22

## 2020-12-20 RX ADMIN — CLOPIDOGREL 75 MG: 75 TABLET, FILM COATED ORAL at 08:35

## 2020-12-20 RX ADMIN — FUROSEMIDE 25 MG/HR: 10 INJECTION, SOLUTION INTRAMUSCULAR; INTRAVENOUS at 08:35

## 2020-12-20 RX ADMIN — DESMOPRESSIN ACETATE 40 MG: 0.2 TABLET ORAL at 08:35

## 2020-12-20 RX ADMIN — PANTOPRAZOLE SODIUM 40 MG: 40 TABLET, DELAYED RELEASE ORAL at 05:58

## 2020-12-20 RX ADMIN — HEPARIN SODIUM 5000 UNITS: 5000 INJECTION INTRAVENOUS; SUBCUTANEOUS at 05:58

## 2020-12-20 RX ADMIN — CEFTAROLINE FOSAMIL 400 MG: 400 POWDER, FOR SOLUTION INTRAVENOUS at 05:05

## 2020-12-20 RX ADMIN — ANTI-FUNGAL POWDER MICONAZOLE NITRATE TALC FREE: 1.42 POWDER TOPICAL at 08:36

## 2020-12-20 RX ADMIN — CEFTAROLINE FOSAMIL 400 MG: 400 POWDER, FOR SOLUTION INTRAVENOUS at 18:14

## 2020-12-20 ASSESSMENT — ENCOUNTER SYMPTOMS
ABDOMINAL PAIN: 0
EYE REDNESS: 0
APNEA: 0
COLOR CHANGE: 0
BACK PAIN: 0

## 2020-12-20 NOTE — PROGRESS NOTES
[unfilled]    [facility] 19    Progress Note    12/20/2020    11:01 AM    Name:   Renato Abreu  MRN:     [id number]     Acct:      [de-identified]   Room:   [address] [place], [place] [id number] Day:  8  Admit Date:  12/12/2020  4:17 AM    PCP:   ERINN Lowery  Code Status:  Full Code    Subjective:     C/C: No chief complaint on file. Patient Active Problem List   Diagnosis    D[facility]    Hypertension    Hyperlipidemia    Class 2 severe obesity with serious comorbidity and body mass index (BMI) of 37.0 to 37.9 in [facility])    Gout    Peripheral polyneuropathy    Anxiety and depression    Acute kidney injury superimposed on CKD ([facility]    Balance problem    [facility]    Type 2 diabetes mellitus with hyperglycemia, with long-term current use of insulin (HCC)    Acute on chronic diastolic heart failure (HCC)    Acute cystitis    Infestation by bed bug    Infestation by maggots    Lymphedema of both lower extremities    PAD (peripheral artery disease) ([facility])    Pressure injury of right heel, unstageable ([facility])    Acute kidney injury ([facility]    Bradycardia    Hyperkalemia    AV block, 1st degree    Wounds, multiple    Buttock wound    Decubitus ulcer of trochanteric region of right hip, stage 2 (HCC)    Decompensated heart failure (HCC)    Acute anemia    Acute renal failure (ARF) ([facility])    Anasarca    Gram-negative bacteremia    Stage 3 chronic kidney disease    MRSA bacteremia       Interval History Status: improved. Patient was seen and examined at bedside. Patient's clinical condition continues to improve gradually. Patient is more awake and energetic compared to yesterday. Denies having any acute issues. Still has decreased oral intake. Due to dementia review of system is unreliable.     Brief History: This is a 81-year-old female who initially presented to outside hospital with anemia, hemoglobin of 6.8.  She was given hemoglobin and developed lower extremity edema and was subsequently diuresed.  She was transferred here for acute renal failure.  She was evaluated by nephrology who reviewed her symptoms to [facility] due to intravascular depletion.  She was started on half bicarb drip and IV Lasix.  Her creatinine is slowly improving now.  Patient was evaluated by GI for her anemia: EGD shows mild gastritis.     Blood culture: 12/15 positive for gram negative rods in one culture. Following blood culture shows MRSA bacteremia. Review of Systems:     Constitutional:  negative for chills, fevers, sweats  Respiratory:  negative for cough, dyspnea on exertion, shortness of breath, wheezing  Cardiovascular:  negative for chest pain, chest pressure/discomfort, lower extremity edema, palpitations  Gastrointestinal:  negative for abdominal pain, constipation, diarrhea, nausea, vomiting  Neurological:  negative for dizziness, headache    Medications: Allergies:     Allergies   Allergen Reactions    Bactrim [Sulfamethoxazole-Trimethoprim] Hives    Clonidine Derivatives     Amoxicillin-Pot Clavulanate Diarrhea, Nausea Only and Nausea And Vomiting       Current Meds:   Scheduled Meds:    ceftaroline fosamil (TEFLARO) IVPB  400 mg Intravenous Q12H    pantoprazole  40 mg Oral QAM AC    carvedilol  12.5 mg Oral BID WC    sodium chloride flush  10 mL Intravenous 2 times per day    miconazole   Topical BID    heparin (porcine)  5,000 Units Subcutaneous 3 times per day    atorvastatin  40 mg Oral Daily    citalopram  20 mg Oral Daily    clopidogrel  75 mg Oral Daily    insulin glargine  20 Units Subcutaneous Nightly    rivastigmine  1 patch Transdermal Daily    insulin lispro  0-12 Units Subcutaneous TID WC    insulin lispro  0-6 Units Subcutaneous Nightly  darbepoetin hung-polysorbate  60 mcg Subcutaneous Weekly     Continuous Infusions:    furosemide (LASIX) 5mg/ml infusion 20 mg/hr (20 0900)    dextrose       PRN Meds: glucose, dextrose, glucagon (rDNA), dextrose, sodium chloride flush, nicotine, promethazine **OR** ondansetron, acetaminophen **OR** acetaminophen, albuterol    Data:     Past Medical History:   has a past medical history of Anxiety and depression, Background diabetic retinopathy(362.01), Balance problem, CAD (coronary artery disease), Cancer of uterus ([facility] Chronic kidney disease, Chronic low back pain, CVA (cerebral vascular accident) ([facility] Dementia ([facility] Dry eye syndrome, GERD (gastroesophageal reflux disease), Glaucoma suspect, Gout, Homonymous hemianopsia, Hyperlipidemia, Hypertension, Keratitis, Obesity, Peripheral polyneuropathy, Pseudophakos, Stroke ([facility] Trichiasis, and Type 2 diabetes mellitus ([facility] Social History:   reports that she has never smoked. She has never used smokeless tobacco. She reports that she does not drink alcohol or use drugs. Family History:   Family History   Problem Relation Age of Onset    Diabetes Mother     Cancer Mother     High Blood Pressure Mother    Kisha Me Stroke Mother     Kidney Disease Mother     Uterine Cancer Mother     Diabetes Father     Heart Disease Father     Glaucoma Father     Emphysema Father     Coronary Art Dis Other         All 4 siblings.  Stroke Other         1 sibling.  Lung Cancer Other         1 sibling - cause of death lung cancer.     Mental Retardation Other        Vitals:  BP (!) 146/90   Pulse 62   Temp 98.5 °F (36.9 °C) (Temporal)   Resp 8   Ht 5' 7\" (1.702 m)   Wt (!) 301 lb 9.4 oz (136.8 kg)   SpO2 98%   BMI 47.24 kg/m²   Temp (24hrs), Av.1 °F (36.7 °C), Min:97.7 °F (36.5 °C), Max:98.5 °F (36.9 °C)    Recent Labs     20  1636 20  0634 20  0713   POCGLU 137* 156* 91 87       I/O (24Hr): Intake/Output Summary (Last 24 hours) at 12/20/2020 1101  Last data filed at 12/20/2020 1000  Gross per 24 hour   Intake 1245 ml   Output 3055 ml   Net -1810 ml       Labs:  Hematology:  Recent Labs     12/18/20  0523 12/19/20  1110 12/20/20  0549   WBC 5.8 5.9 6.9   RBC 2.56* 2.57* 2.52*   HGB 7.6* 7.7* 7.4*   HCT 25.8* 26.4* 25.3*   .8 102.7 100.4   MCH 29.7 30.0 29.4   MCHC 29.5 29.2 29.2   RDW 16.1* 16.3* 15.9*    191 188   MPV 10.8 10.8 10.5     Chemistry:  Recent Labs     12/18/20  0523 12/19/20  1110 12/19/20  1431 12/20/20  0549    139 140 138   K 4.2 Unable to perform testing: Specimen contaminated. 4.3 3.9    103 105 102   CO2 26 27 26 27   GLUCOSE 145* 178* 155* 97   BUN 55* 54* 54* 54*   CREATININE 2.51* 2.22* 2.38* 2.32*   MG 1.9  --  1.8 1.8   ANIONGAP 11 9 9 9   LABGLOM 19* 22* 20* 21*   GFRAA 23* 27* 24* 25*   CALCIUM 8.7 Unable to perform testing: Specimen contaminated. 8.4* 8.5*   PHOS 4.3 4.0 4.0 3.7     Recent Labs     12/19/20  0724 12/19/20  1108 12/19/20  1110 12/19/20  1431 12/19/20  1636 12/19/20  2005 12/20/20  0549 12/20/20  0634 12/20/20  0713   LABALBU  --   --  2.7* 2.8*  --   --  2.8*  --   --    POCGLU 125* 165*  --   --  137* 156*  --  91 87     ABG:  Lab Results   Component Value Date    POCPH 7.284 12/13/2020    POCPCO2 54.5 12/13/2020    POCPO2 71.6 12/13/2020    POCHCO3 25.8 12/13/2020    NBEA 1 12/13/2020    PBEA NOT REPORTED 12/13/2020    IAN8XZB 28 12/13/2020    LTFU9AUL 92 12/13/2020    FIO2 2.0 12/13/2020     Lab Results   Component Value Date/Time    SPECIAL 7ML LT HAND 12/19/2020 11:32 AM     Lab Results   Component Value Date/Time    CULTURE NO GROWTH 18 HOURS 12/19/2020 11:32 AM       Radiology:  Ct Head Wo Contrast    Result Date: 12/13/2020  No acute intracranial abnormality. Old infarct left posterior cerebral artery territory.      Ct Chest Wo Contrast    Result Date: 12/13/2020 Bibasilar airspace disease and effusions coronary artery disease and cardiomegaly. Xr Chest Portable    Result Date: 12/16/2020  Stable pulmonary vascular congestion       Physical Examination:        General appearance:  alert, cooperative and no distress  Mental Status: Demented  Lungs:  clear to auscultation bilaterally, normal effort  Heart:  regular rate and rhythm, no murmur  Abdomen:  soft, nontender, nondistended, normal bowel sounds, no masses, hepatomegaly, splenomegaly  Extremities: Significant edema in all extremities. Skin:  no gross lesions, rashes, induration    Assessment:        Hospital Problems           Last Modified POA    * (Principal) MRSA bacteremia 12/18/2020 Yes    Dementia ([address] 12/12/2020 Yes    Overview Signed 2/27/2014 10:21 AM by Dylan Hathaway MD     (moderate)         Hypertension 12/12/2020 Yes    Hyperlipidemia 12/12/2020 Yes    CVA (cerebral vascular accident) ([address] 12/12/2020 Yes    Type 2 diabetes mellitus with hyperglycemia, with long-term current use of insulin ([address] 12/12/2020 Yes    Acute on chronic diastolic heart failure ([address] 12/12/2020 Yes    Acute renal failure (ARF) ([address] 12/15/2020 Yes    Anasarca 12/12/2020 Yes    Gram-negative bacteremia 12/18/2020 Yes    Stage 3 chronic kidney disease 12/17/2020 Yes          Plan:        1. Acute kidney injury on CKD stage III -patient's renal function is worsening today.  Nephrology is switching her IV Lasix push to Lasix drip. Urination improving. 2. MRSA bacteremia and Sphingomonas Bacteremia - On Ceftaroline per ID. Clnically improving. Continue Abx per ID recommendation. 3. Acute on chronic exacerbation of diastolic congestive heart failure. -Patient on Lasix drip.  Continue to monitor.  Continue other home medications. 4. Essential hypertension -stable  5. Obesity hypoventilation syndrome with obstructive sleep apnea -patient on CPAP as needed.   6. Morbid obesity 7. Insulin-dependent diabetes mellitus -continue current regimen of insulin. 8. Normocytic normochromic anemia -likely anemia of chronic disease and mild blood loss anemia.     Robby Alexander DO  12/20/2020  11:01 AM

## 2020-12-20 NOTE — PROGRESS NOTES
Physical Therapy  Facility/Department: [address] STE[address]  Daily Treatment Note  NAME: Delilah Martinez  : 1950  MRN: [id number]    Date of Service: 2020    Discharge Recommendations:  Patient would benefit from continued therapy after discharge   PT Equipment Recommendations  Other: CTA    Assessment   Body structures, Functions, Activity limitations: Decreased balance;Decreased endurance;Decreased functional mobility ; Decreased strength  Assessment: The pt stood with a RW with mod assist x 2, Min A x2 for balance . She was unable to take any steps. She could benefit from a continuation of PT for gait training and strengthening following her DC  Prognosis: Good  REQUIRES PT FOLLOW UP: Yes  Activity Tolerance  Activity Tolerance: Patient limited by fatigue     Patient Diagnosis(es): There were no encounter diagnoses. has a past medical history of Anxiety and depression, Background diabetic retinopathy(362.01), Balance problem, CAD (coronary artery disease), Cancer of uterus ([address] Chronic kidney disease, Chronic low back pain, CVA (cerebral vascular accident) ([address] Dementia ([address] Dry eye syndrome, GERD (gastroesophageal reflux disease), Glaucoma suspect, Gout, Homonymous hemianopsia, Hyperlipidemia, Hypertension, Keratitis, Obesity, Peripheral polyneuropathy, Pseudophakos, Stroke ([address] Trichiasis, and Type 2 diabetes mellitus ([address] has a past surgical history that includes Gastric bypass surgery; Cataract removal with implant (2012); Cataract removal with implant (2012); Coronary artery bypass graft (12-);  section; Hysterectomy; Cholecystectomy; Tonsillectomy and adenoidectomy; Eye surgery (Left); and Upper gastrointestinal endoscopy (2020). Restrictions  Restrictions/Precautions  Restrictions/Precautions: Contact Precautions  Position Activity Restriction  Other position/activity restrictions: up with assistance  Subjective   General  Chart Reviewed:  Yes Response To Previous Treatment: Patient with no complaints from previous session. Family / Caregiver Present: No  Subjective  Subjective: Pt found supine in bed upon arrival. Agreeable to therapy. Pt has some Dementia max encouragement to get pt to participate  General Comment  Comments: Pt returned to bed after session  Pain Screening  Patient Currently in Pain: Denies  Vital Signs  Patient Currently in Pain: Denies       Orientation  Orientation  Overall Orientation Status: Within Functional Limits  Cognition      Objective   Bed mobility  Supine to Sit: Maximum assistance  Sit to Supine: 2 Person assistance;Maximum assistance  Transfers  Sit to Stand: 2 Person Assistance; Moderate Assistance  Stand to sit: 2 Person Assistance; Moderate Assistance  Comment: with RW  Ambulation  Ambulation?: No(Pt unsafe to ambulate at this time.)     Balance  Posture: Good  Sitting - Static: Fair  Sitting - Dynamic: Poor  Standing - Static: Fair  Comments: Pt stood for ~2 minutes with RW and Carlos of 2 for balance. flexed posture cueing to pull shoulders back, fair return. EOB for ~10 minutes  Exercises  Seated LE exercise program: Long Arc Quads, hip abduction/adduction, heel/toe raises, and marches.  Reps: x10  Goals  Short term goals  Time Frame for Short term goals: 10 visits  Short term goal 1: Bed mobility with mod I  Short term goal 2: Transfers with Mod I  Short term goal 3: Amb x 50ft with RW and mod I  Patient Goals   Patient goals : Return [place]  Times per week: 5-6x wk  Current Treatment Recommendations: Strengthening, Balance Training, Functional Mobility Training, Transfer Training, Gait Training, Endurance Training  Safety Devices  Type of devices: Nurse notified, Left in bed, Call light within reach     Therapy Time   Individual Concurrent Group Co-treatment   Time In 3109         Time Out 1343         Minutes 38         Timed Code Treatment Minutes: 109 [facility]

## 2020-12-20 NOTE — PROGRESS NOTES
[facility]   Progress Note                   Date:   12/20/2020  Patient name: Angelita Jarvis  Date of admission:  12/12/2020  4:17 AM  MRN:   [id number]  YOB: 1950  PCP: ERINN Panda    Reason for Admission: [facility] [N17.9]    Subjective:       Clinical Changes / Abnormalities: Pt seen and examined in the room. She denies any CP. Reports ongoing SOB. States she feels a little better. Labs, vitals, & tele reviewed. Ongoing edema noted. On Lasix drip. I/O Yesterday -670 since admission -1141    Medications:   Scheduled Meds:   ceftaroline fosamil (TEFLARO) IVPB  400 mg Intravenous Q12H    pantoprazole  40 mg Oral QAM AC    carvedilol  12.5 mg Oral BID WC    sodium chloride flush  10 mL Intravenous 2 times per day    miconazole   Topical BID    heparin (porcine)  5,000 Units Subcutaneous 3 times per day    atorvastatin  40 mg Oral Daily    citalopram  20 mg Oral Daily    clopidogrel  75 mg Oral Daily    insulin glargine  20 Units Subcutaneous Nightly    rivastigmine  1 patch Transdermal Daily    insulin lispro  0-12 Units Subcutaneous TID WC    insulin lispro  0-6 Units Subcutaneous Nightly    darbepoetin hung-polysorbate  60 mcg Subcutaneous Weekly     Continuous Infusions:   furosemide (LASIX) 5mg/ml infusion 20 mg/hr (12/20/20 0900)    dextrose       CBC:   Recent Labs     12/18/20  0523 12/19/20  1110 12/20/20  0549   WBC 5.8 5.9 6.9   HGB 7.6* 7.7* 7.4*    191 188     BMP:    Recent Labs     12/19/20  1110 12/19/20  1431 12/20/20  0549    140 138   K Unable to perform testing: Specimen contaminated. 4.3 3.9    105 102   CO2 27 26 27   BUN 54* 54* 54*   CREATININE 2.22* 2.38* 2.32*   GLUCOSE 178* 155* 97     Hepatic:   No results for input(s): AST, ALT, ALB, BILITOT, ALKPHOS in the last 72 hours. Troponin: No results for input(s): TROPHS in the last 72 hours. BNP: No results for input(s): BNP in the last 72 hours. Lipids: No results for input(s): CHOL, HDL in the last 72 hours. Invalid input(s): LDLCALCU  INR:   No results for input(s): INR in the last 72 hours. Objective:   Vitals: BP (!) 107/49   Pulse 63   Temp 98.4 °F (36.9 °C) (Oral)   Resp 12   Ht 5' 7\" (1.702 m)   Wt (!) 301 lb 9.4 oz (136.8 kg)   SpO2 94%   BMI 47.24 kg/m²   General appearance: alert and cooperative with exam  HEENT: Head: Normocephalic, no lesions, without obvious abnormality. Neck: no JVD, trachea midline, no adenopathy  Lungs: diminished with fine rales in bilateral bases, on 02 per NC . No distress  Heart: Regular rate and rhythm, s1/s2 auscultated, no murmurs, SR 70s  Abdomen: soft, non-tender, bowel sounds active  Extremities: +2/3 edema bilateral legs, Upper extremities + 2  Neurologic: not done        Assessment / Acute Cardiac Problems:   1. Acute on chronic CHF   2. Anasarca  3. AMY on CKD   4. DM   5. HTN   6. Ho CABG   7.  Anemia     Patient Active Problem List:     Dementia ([address]     Hypertension     Hyperlipidemia     Class 2 severe obesity with serious comorbidity and body mass index (BMI) of 37.0 to 37.9 in adult [facility])     Gout     Peripheral polyneuropathy     Anxiety and depression     Acute kidney injury superimposed on CKD ([address]     Balance problem     CVA (cerebral vascular accident) ([address]     Type 2 diabetes mellitus with hyperglycemia, with long-term current use of insulin ([facility])     Acute on chronic diastolic heart failure ([address]     Acute cystitis     Infestation by bed bug     Infestation by maggots     Lymphedema of both lower extremities     PAD (peripheral artery disease) ([facility])     Pressure injury of right heel, unstageable ([address]     Acute kidney injury ([address]     Bradycardia     Hyperkalemia     AV block, 1st degree     Wounds, multiple     Buttock wound     Decubitus ulcer of trochanteric region of right hip, stage 2 ([address] Decompensated heart failure (HCC)     Acute anemia     Acute renal failure (ARF) ([facility]     Anasarca      Plan of Treatment:   1. Acute on chronic diastolic chf. Appreciate Nephrology assistance with diuretics. Currently on lasix drip and hold ACE. Strict I/O  2. CAD stable. Hx of CABG. Continue statin, BB, plavix. 3. HTN controlled. Continue BB  4. AMY/CKD managed by nephrology  5. Anemia per primary/GI. Recommend HGB > 9  6. ID following for MRSA sepsis.    7. Keep K> 4 and Mag > 2  Stable        Electronically signed by SHIRA Stokes NP on 12/20/2020 at 11:38 AM  [address]  [phone]

## 2020-12-20 NOTE — PROGRESS NOTES
Renal Progress Note    Patient :  Colton Dance; 72 y.o. MRN# [id number]  Location:  3959/1689-46  Attending:  Elvia Riley DO  Admit Date:  12/12/2020   Hospital Day: 8      Subjective:     Patient seen and evaluated bedside. No acute events overnight. Vital signs stable. Urine output over the last 24 hours 3.2 L overall negative about -7.7 L since admission. Lasix drip adjusted yesterday to 30 mg an hour with urine output 75 to 125 mL an hour. Patient averaged 130 mL an hour over the last 24 hours. Labs reviewed. Creatinine has improved slightly from 2.3-2.32. Normokalemia. Hemoglobin 7.4. Chart reviewed. Outpatient Medications:     Medications Prior to Admission: aspirin 81 MG tablet, Take 1 tablet by mouth daily  senna (SENOKOT) 8.6 MG tablet, Take 1 tablet by mouth 2 times daily  oxybutynin (DITROPAN) 5 MG tablet, TAKE 1 TABLET BY MOUTH TWICE DAILY  losartan (COZAAR) 50 MG tablet, Take 1 tablet by mouth daily  OXYGEN, Oxgen at 2 liters per nasal cannula  loratadine (CLARITIN) 10 MG tablet, TAKE 1 TABLET(10 MG) BY MOUTH DAILY  Lancets MISC, Checks blood sugar ac and HS  blood glucose monitor strips, Test 3  times a day & as needed for symptoms of irregular blood glucose. Dispense sufficient amount for indicated testing frequency plus additional to accommodate PRN testing needs. Alcohol Swabs (ALCOHOL PREP) PADS, Checks blood sugar 3 times a day  Misc.  Devices MISC, Standard walker with wheels  Diagnosis dementia, CHF  atorvastatin (LIPITOR) 40 MG tablet, TAKE 1 TABLET BY MOUTH EVERYDAY  bumetanide (BUMEX) 1 MG tablet, Take 1 tablet by mouth daily  citalopram (CELEXA) 20 MG tablet, Take 1 tablet by mouth daily  clopidogrel (PLAVIX) 75 MG tablet, Take 1 tablet by mouth daily  insulin lispro (HUMALOG) 100 UNIT/ML injection vial, Use 4 times a day per sliding scale  hydrALAZINE (APRESOLINE) 50 MG tablet, Take 1.5 tablets by mouth every 8 hours insulin glargine (LANTUS) 100 UNIT/ML injection vial, Inject 20 Units into the skin nightly  memantine (NAMENDA) 10 MG tablet, TAKE 1 TABLET BY MOUTH TWICE DAILY  omeprazole (PRILOSEC) 20 MG delayed release capsule, Take 1 capsule by mouth every morning (before breakfast)  rivastigmine (EXELON) 9.5 MG/24HR, APPLY 1 NEW PATCH EVERY 24 HOURS  Diabetic Shoe MISC, by Does not apply route  Compression Stockings MISC, by Does not apply route 20-30 mm Hg bilateral knee high  nystatin (MYCOSTATIN) 340896 UNIT/GM cream, Apply topically 2 times daily. Current Medications:     Scheduled Meds:    ceftaroline fosamil (TEFLARO) IVPB  400 mg Intravenous Q12H    pantoprazole  40 mg Oral QAM AC    carvedilol  12.5 mg Oral BID WC    sodium chloride flush  10 mL Intravenous 2 times per day    miconazole   Topical BID    heparin (porcine)  5,000 Units Subcutaneous 3 times per day    atorvastatin  40 mg Oral Daily    citalopram  20 mg Oral Daily    clopidogrel  75 mg Oral Daily    insulin glargine  20 Units Subcutaneous Nightly    rivastigmine  1 patch Transdermal Daily    insulin lispro  0-12 Units Subcutaneous TID WC    insulin lispro  0-6 Units Subcutaneous Nightly    darbepoetin hung-polysorbate  60 mcg Subcutaneous Weekly     Continuous Infusions:    furosemide (LASIX) 5mg/ml infusion 20 mg/hr (12/20/20 0900)    dextrose       PRN Meds:  glucose, dextrose, glucagon (rDNA), dextrose, sodium chloride flush, nicotine, promethazine **OR** ondansetron, acetaminophen **OR** acetaminophen, albuterol    Input/Output:       I/O last 3 completed shifts: In: 6784 [P.O.:550; I.V.:645; IV Piggyback:50]  Out: 0840 [Urine:3160].       Patient Vitals for the past 96 hrs (Last 3 readings):   Weight   12/20/20 0341 (!) 301 lb 9.4 oz (136.8 kg)   12/19/20 0619 (!) 303 lb 2.1 oz (137.5 kg)   12/18/20 0330 (!) 304 lb 3.8 oz (138 kg)       Vital Signs:   Temperature:  Temp: 98.4 °F (36.9 °C) 2. MRSA bacteremia source likely skin -continues on antibiotics per infectious disease. 3.  Chronic kidney disease stage III secondary to diabetic nephrosclerosis, proteinuria about 2.7 g.  4.  Anemia of chronic disease and GI blood loss. Anemia 7.4 today   5. Chronic lower extremity edema from lymphedema  6. Morbid obesity  7. Significant deconditioning     Plan:   1. Titrate Lasix drip to keep urine output in the range of  mL/hr. Creatinine with a improvement mildly today. We will continue to monitor based on labs and output if need for continuous IV diuretic therapy. 2.  Strict I's and O's and daily weights  3. Avoid nephrotoxic agents including IV contrast if at all possible. 4.  Follow renal function closely  5. Labs in a.m. 6.  Following. Nutrition   Please ensure that patient is on a renal diet/TF. Avoid nephrotoxic drugs/contrast exposure. We will continue to follow along with you. Electronically signed by Maddi Brown CNP, APRN - CNP on 12/20/2020 at 11:57 AM   [facility]    Attending Physician Statement  I have discussed the care of Bridger Walker, including pertinent history and exam findings with the CNP. I have reviewed the key elements of all parts of the encounter with the CNP. I have seen and examined the patient. I agree with the assessment and plan and status of the problem list as documented.       Antony Middleton MD  Nephrology Attending Physician  [facility]

## 2020-12-21 LAB
ABSOLUTE EOS #: 0.1 K/UL (ref 0–0.44)
ABSOLUTE IMMATURE GRANULOCYTE: 0.05 K/UL (ref 0–0.3)
ABSOLUTE LYMPH #: 1.07 K/UL (ref 1.1–3.7)
ABSOLUTE MONO #: 1.02 K/UL (ref 0.1–1.2)
ALBUMIN SERPL-MCNC: 2.8 G/DL (ref 3.5–5.2)
ANION GAP SERPL CALCULATED.3IONS-SCNC: 6 MMOL/L (ref 9–17)
BASOPHILS # BLD: 1 % (ref 0–2)
BASOPHILS ABSOLUTE: 0.05 K/UL (ref 0–0.2)
BUN BLDV-MCNC: 55 MG/DL (ref 8–23)
BUN/CREAT BLD: ABNORMAL (ref 9–20)
CALCIUM SERPL-MCNC: 8.6 MG/DL (ref 8.6–10.4)
CHLORIDE BLD-SCNC: 101 MMOL/L (ref 98–107)
CO2: 31 MMOL/L (ref 20–31)
CREAT SERPL-MCNC: 2.24 MG/DL (ref 0.5–0.9)
DIFFERENTIAL TYPE: ABNORMAL
EOSINOPHILS RELATIVE PERCENT: 1 % (ref 1–4)
GFR AFRICAN AMERICAN: 26 ML/MIN
GFR NON-AFRICAN AMERICAN: 22 ML/MIN
GFR SERPL CREATININE-BSD FRML MDRD: ABNORMAL ML/MIN/{1.73_M2}
GFR SERPL CREATININE-BSD FRML MDRD: ABNORMAL ML/MIN/{1.73_M2}
GLUCOSE BLD-MCNC: 112 MG/DL (ref 65–105)
GLUCOSE BLD-MCNC: 116 MG/DL (ref 70–99)
GLUCOSE BLD-MCNC: 126 MG/DL (ref 65–105)
GLUCOSE BLD-MCNC: 148 MG/DL (ref 65–105)
GLUCOSE BLD-MCNC: 155 MG/DL (ref 65–105)
GLUCOSE BLD-MCNC: 159 MG/DL (ref 65–105)
HCT VFR BLD CALC: 27.1 % (ref 36.3–47.1)
HEMOGLOBIN: 8.2 G/DL (ref 11.9–15.1)
IMMATURE GRANULOCYTES: 1 %
LYMPHOCYTES # BLD: 10 % (ref 24–43)
MAGNESIUM: 1.9 MG/DL (ref 1.6–2.6)
MCH RBC QN AUTO: 29.9 PG (ref 25.2–33.5)
MCHC RBC AUTO-ENTMCNC: 30.3 G/DL (ref 28.4–34.8)
MCV RBC AUTO: 98.9 FL (ref 82.6–102.9)
MONOCYTES # BLD: 9 % (ref 3–12)
NRBC AUTOMATED: 0.2 PER 100 WBC
PDW BLD-RTO: 17 % (ref 11.8–14.4)
PHOSPHORUS: 3.2 MG/DL (ref 2.6–4.5)
PLATELET # BLD: 192 K/UL (ref 138–453)
PLATELET ESTIMATE: ABNORMAL
PMV BLD AUTO: 11.1 FL (ref 8.1–13.5)
POTASSIUM SERPL-SCNC: 3.7 MMOL/L (ref 3.7–5.3)
RBC # BLD: 2.74 M/UL (ref 3.95–5.11)
RBC # BLD: ABNORMAL 10*6/UL
SEG NEUTROPHILS: 79 % (ref 36–65)
SEGMENTED NEUTROPHILS ABSOLUTE COUNT: 8.72 K/UL (ref 1.5–8.1)
SODIUM BLD-SCNC: 138 MMOL/L (ref 135–144)
WBC # BLD: 11 K/UL (ref 3.5–11.3)
WBC # BLD: ABNORMAL 10*3/UL

## 2020-12-21 PROCEDURE — P9016 RBC LEUKOCYTES REDUCED: HCPCS

## 2020-12-21 PROCEDURE — 6360000002 HC RX W HCPCS: Performed by: INTERNAL MEDICINE

## 2020-12-21 PROCEDURE — 99232 SBSQ HOSP IP/OBS MODERATE 35: CPT | Performed by: INTERNAL MEDICINE

## 2020-12-21 PROCEDURE — 2580000003 HC RX 258: Performed by: INTERNAL MEDICINE

## 2020-12-21 PROCEDURE — 85025 COMPLETE CBC W/AUTO DIFF WBC: CPT

## 2020-12-21 PROCEDURE — 6370000000 HC RX 637 (ALT 250 FOR IP): Performed by: INTERNAL MEDICINE

## 2020-12-21 PROCEDURE — 6360000002 HC RX W HCPCS: Performed by: FAMILY MEDICINE

## 2020-12-21 PROCEDURE — 36430 TRANSFUSION BLD/BLD COMPNT: CPT

## 2020-12-21 PROCEDURE — 99232 SBSQ HOSP IP/OBS MODERATE 35: CPT | Performed by: FAMILY MEDICINE

## 2020-12-21 PROCEDURE — 83735 ASSAY OF MAGNESIUM: CPT

## 2020-12-21 PROCEDURE — 86900 BLOOD TYPING SEROLOGIC ABO: CPT

## 2020-12-21 PROCEDURE — 80069 RENAL FUNCTION PANEL: CPT

## 2020-12-21 PROCEDURE — 87040 BLOOD CULTURE FOR BACTERIA: CPT

## 2020-12-21 PROCEDURE — 2580000003 HC RX 258: Performed by: NURSE PRACTITIONER

## 2020-12-21 PROCEDURE — 2060000000 HC ICU INTERMEDIATE R&B

## 2020-12-21 PROCEDURE — 97535 SELF CARE MNGMENT TRAINING: CPT

## 2020-12-21 PROCEDURE — 6370000000 HC RX 637 (ALT 250 FOR IP): Performed by: NURSE PRACTITIONER

## 2020-12-21 PROCEDURE — 94761 N-INVAS EAR/PLS OXIMETRY MLT: CPT

## 2020-12-21 PROCEDURE — 36415 COLL VENOUS BLD VENIPUNCTURE: CPT

## 2020-12-21 PROCEDURE — 82947 ASSAY GLUCOSE BLOOD QUANT: CPT

## 2020-12-21 PROCEDURE — 6370000000 HC RX 637 (ALT 250 FOR IP): Performed by: FAMILY MEDICINE

## 2020-12-21 PROCEDURE — 2700000000 HC OXYGEN THERAPY PER DAY

## 2020-12-21 PROCEDURE — 97530 THERAPEUTIC ACTIVITIES: CPT

## 2020-12-21 RX ORDER — MAGNESIUM SULFATE IN WATER 40 MG/ML
2 INJECTION, SOLUTION INTRAVENOUS ONCE
Status: COMPLETED | OUTPATIENT
Start: 2020-12-21 | End: 2020-12-21

## 2020-12-21 RX ORDER — FUROSEMIDE 10 MG/ML
80 INJECTION INTRAMUSCULAR; INTRAVENOUS 2 TIMES DAILY
Status: DISCONTINUED | OUTPATIENT
Start: 2020-12-21 | End: 2020-12-22

## 2020-12-21 RX ORDER — POTASSIUM CHLORIDE 20 MEQ/1
40 TABLET, EXTENDED RELEASE ORAL ONCE
Status: COMPLETED | OUTPATIENT
Start: 2020-12-21 | End: 2020-12-21

## 2020-12-21 RX ADMIN — INSULIN LISPRO 2 UNITS: 100 INJECTION, SOLUTION INTRAVENOUS; SUBCUTANEOUS at 11:41

## 2020-12-21 RX ADMIN — FUROSEMIDE 80 MG: 10 INJECTION, SOLUTION INTRAMUSCULAR; INTRAVENOUS at 17:09

## 2020-12-21 RX ADMIN — CARVEDILOL 12.5 MG: 12.5 TABLET, FILM COATED ORAL at 17:09

## 2020-12-21 RX ADMIN — HEPARIN SODIUM 5000 UNITS: 5000 INJECTION INTRAVENOUS; SUBCUTANEOUS at 20:11

## 2020-12-21 RX ADMIN — PANTOPRAZOLE SODIUM 40 MG: 40 TABLET, DELAYED RELEASE ORAL at 05:48

## 2020-12-21 RX ADMIN — HEPARIN SODIUM 5000 UNITS: 5000 INJECTION INTRAVENOUS; SUBCUTANEOUS at 13:39

## 2020-12-21 RX ADMIN — CITALOPRAM 20 MG: 20 TABLET, FILM COATED ORAL at 07:58

## 2020-12-21 RX ADMIN — ANTI-FUNGAL POWDER MICONAZOLE NITRATE TALC FREE: 1.42 POWDER TOPICAL at 08:02

## 2020-12-21 RX ADMIN — SODIUM CHLORIDE, PRESERVATIVE FREE 10 ML: 5 INJECTION INTRAVENOUS at 07:59

## 2020-12-21 RX ADMIN — DESMOPRESSIN ACETATE 40 MG: 0.2 TABLET ORAL at 07:58

## 2020-12-21 RX ADMIN — CEFTAROLINE FOSAMIL 400 MG: 400 POWDER, FOR SOLUTION INTRAVENOUS at 17:09

## 2020-12-21 RX ADMIN — SODIUM CHLORIDE 20 ML: 9 INJECTION, SOLUTION INTRAVENOUS at 04:29

## 2020-12-21 RX ADMIN — POTASSIUM CHLORIDE 40 MEQ: 1500 TABLET, EXTENDED RELEASE ORAL at 08:51

## 2020-12-21 RX ADMIN — CARVEDILOL 12.5 MG: 12.5 TABLET, FILM COATED ORAL at 07:58

## 2020-12-21 RX ADMIN — ANTI-FUNGAL POWDER MICONAZOLE NITRATE TALC FREE: 1.42 POWDER TOPICAL at 20:14

## 2020-12-21 RX ADMIN — MAGNESIUM SULFATE 2 G: 2 INJECTION INTRAVENOUS at 08:51

## 2020-12-21 RX ADMIN — CLOPIDOGREL 75 MG: 75 TABLET, FILM COATED ORAL at 08:51

## 2020-12-21 RX ADMIN — HEPARIN SODIUM 5000 UNITS: 5000 INJECTION INTRAVENOUS; SUBCUTANEOUS at 05:47

## 2020-12-21 RX ADMIN — CEFTAROLINE FOSAMIL 400 MG: 400 POWDER, FOR SOLUTION INTRAVENOUS at 05:04

## 2020-12-21 RX ADMIN — INSULIN LISPRO 2 UNITS: 100 INJECTION, SOLUTION INTRAVENOUS; SUBCUTANEOUS at 17:04

## 2020-12-21 ASSESSMENT — ENCOUNTER SYMPTOMS
COLOR CHANGE: 0
APNEA: 0
EYE REDNESS: 0
BACK PAIN: 0
ABDOMINAL PAIN: 0

## 2020-12-21 NOTE — PROGRESS NOTES
Chart Reviewed: Yes  Response To Previous Treatment: Patient with no complaints from previous session. Family / Caregiver Present: No  Subjective  Subjective: Pt and RN agreeable to therapy this afternoon. Max encourageement to get pt to participate. Co-treat with OT. Pain Screening  Patient Currently in Pain: Denies  Vital Signs  Patient Currently in Pain: Denies       Orientation  Orientation  Overall Orientation Status: Within Functional Limits  Cognition      Objective   Bed mobility  Supine to Sit: Moderate assistance;2 Person assistance  Sit to Supine: Maximum assistance;2 Person assistance  Transfers  Comment: MARYANN, pt agreeable to EOB activity only. Ambulation  Ambulation?: No     Balance  Posture: Good  Sitting - Static: Fair  Sitting - Dynamic: Poor  Comments: Pt sat EOB ~8 minutes CGA-SBA. Exercises:  Supine Exercises: Ankle Pumps, Heel Slides. Reps: 10x each LE  Seated LE exercise program: Long Arc Quads, hip abduction/adduction, heel/toe raises.  Reps: 10x each LE      Goals  Short term goals  Time Frame for Short term goals: 10 visits  Short term goal 1: Bed mobility with mod I  Short term goal 2: Transfers with Mod I  Short term goal 3: Amb x 50ft with RW and mod I  Patient Goals   Patient goals : Return [place]  Times per week: 5-6x wk  Current Treatment Recommendations: Strengthening, Balance Training, Functional Mobility Training, Transfer Training, Gait Training, Endurance Training  Safety Devices  Type of devices: Nurse notified, Left in bed, Call light within reach, Bed alarm in place, All fall risk precautions in place  Restraints  Initially in place: No     Therapy Time   Individual Concurrent Group Co-treatment   Time In 1527         Time Out 1552         Minutes 25         Timed Code Treatment Minutes: 12 Minutes (Co-treat with OT)       Jay Short PTA

## 2020-12-21 NOTE — CARE COORDINATION
Transitional Planning  Lasix gtt off. ID reconciled Teflaro through 1/18/21. Today replacing electrolytes and patient received I unit PRBCs overnight. Call to [facility], spoke to Brian ([phone]), states they just got authorization and ready for admission.

## 2020-12-21 NOTE — PROGRESS NOTES
Occupational Therapy  Facility/Department: [facility] 4B STEPDOWN  Daily Treatment Note  NAME: Sunil Chester  : 1950  MRN: [id number]    Date of Service: 2020    Discharge Recommendations:  Patient would benefit from continued therapy after discharge in order to increase pt balance, activity tolerance and independence. Assessment   Performance deficits / Impairments: Decreased functional mobility ; Decreased balance;Decreased ADL status; Decreased ROM; Decreased endurance;Decreased strength;Decreased cognition  Prognosis: Fair  OT Education: OT Role;Precautions; Energy Conservation  Patient Education: purpose of OT; proper hand and foot placement; deep breathing; balance management  Barriers to Learning: pt demo F carry over with increased verbal cues  REQUIRES OT FOLLOW UP: Yes  Activity Tolerance  Activity Tolerance: Patient limited by fatigue;Patient Tolerated treatment well;Treatment limited secondary to decreased cognition  Safety Devices  Safety Devices in place: Yes  Type of devices: Left in bed;Nurse notified;Call light within reach; Patient at risk for falls; Bed alarm in place         Patient Diagnosis(es): There were no encounter diagnoses. has a past medical history of Anxiety and depression, Background diabetic retinopathy(362.01), Balance problem, CAD (coronary artery disease), Cancer of uterus ([address] Chronic kidney disease, Chronic low back pain, CVA (cerebral vascular accident) ([address] Dementia ([address] Dry eye syndrome, GERD (gastroesophageal reflux disease), Glaucoma suspect, Gout, Homonymous hemianopsia, Hyperlipidemia, Hypertension, Keratitis, Obesity, Peripheral polyneuropathy, Pseudophakos, Stroke ([address] Trichiasis, and Type 2 diabetes mellitus ([address] has a past surgical history that includes Gastric bypass surgery; Cataract removal with implant (2012); Cataract removal with implant (2012); Coronary artery bypass graft (12-);  section; Hysterectomy; Cholecystectomy; Tonsillectomy and adenoidectomy; Eye surgery (Left); and Upper gastrointestinal endoscopy (2020). Restrictions  Restrictions/Precautions  Restrictions/Precautions: Contact Precautions  Required Braces or Orthoses?: No  Position Activity Restriction  Other position/activity restrictions: up with assistance  Subjective   General  Chart Reviewed: Yes  Patient assessed for rehabilitation services?: Yes  Family / Caregiver Present: No  General Comment  Comments: RN and pt agreeable to therapy this day  Vital Signs  Patient Currently in Pain: Denies   Orientation  Orientation  Overall Orientation Status: Within Functional Limits  Objective    ADL  Grooming: Stand by assistance;Setup;Verbal cueing; Increased time to complete(face washing and hair brushing completed)  LE Dressing: Dependent/Total(to doff/don socks)  Additional Comments: Pt supine in bed at start of session req max enocuragement for participation. Face washing completed supine in bed. Hair brushing completed seated at EOB  Balance  Sitting Balance: Contact guard assistance(increasing to SBA pt tolerated approx 8 min static/dynamic sitting)  Standing Balance  Comment: pt declined standing sec to fatigue  Bed mobility  Supine to Sit: Moderate assistance;2 Person assistance  Sit to Supine: Maximum assistance;2 Person assistance  Scooting: Maximal assistance  Comment: HOB elevated  Cognition  Overall Cognitive Status: Exceptions  Arousal/Alertness: Delayed responses to stimuli  Following Commands: Follows multistep commands consistently; Follows multistep commands with increased time; Follows multistep commands with repitition  Attention Span: Attends with cues to redirect  Memory: Decreased short term memory Safety Judgement: Decreased awareness of need for assistance;Decreased awareness of need for safety  Problem Solving: Decreased awareness of errors;Assistance required to correct errors made;Assistance required to identify errors made  Insights: Decreased awareness of deficits  Initiation: Requires cues for some  Sequencing: Requires cues for some     Pt and RN agreeable to therapy this day. ADL tasks of grooming and LB dressing completed see above for LOF. 0 LOB noted seated pt declined standing. Co tx completed with PTA sec to pt req increased assist. At session end pt supine in bed with BLE elevated, BUE elevated, call light in reach and bed alarm on.    Plan   Plan  Times per week: 3x/wk  Current Treatment Recommendations: Self-Care / ADL, Safety Education & Training, Patient/Caregiver Education & Training, Strengthening, Endurance Training, Cognitive Reorientation, Functional Mobility Training, Balance Training, Equipment Evaluation, Education, & procurement, Positioning   Cont POC    Goals  Short term goals  Time Frame for Short term goals: Patient will, by discharge  Short term goal 1: demo grooming/self-feeding tasks at Supervision  Short term goal 2: demo UB ADLs at Supervision  Short term goal 3: demo LB ADLs at Mod A using AE PRN  Short term goal 4: demo bed mobility at Mod A to engage in functional tasks  Short term goal 5: demo 10+ min of dynamic sitting balance at CGA to engage in ADLs safely  Short term goal 6: demo functional transfers at Mod A using LRD to engage in ADLs       Therapy Time   Individual Concurrent Group Co-treatment   Time In 1527         Time Out 1552         Minutes 25         Timed Code Treatment Minutes: 11 Minutes(co tx with PTA)       RYAN Elena

## 2020-12-21 NOTE — PROGRESS NOTES
[facility]   Progress Note                   Date:   12/21/2020  Patient name: Shana Mejia  Date of admission:  12/12/2020  4:17 AM  MRN:   [id number]  YOB: 1950  PCP: Macon Kocher, PA    Reason for Admission: Acute renal failure (ARF) ([facility] [N17.9]    Subjective:       Clinical Changes / Abnormalities: Pt seen and examined in the room. She denies any CP. Or SOB. States her breathing is \"a little better. \"   Currently Lasix drip is off. Labs vitals and tele reviewed. -9.3L since admission and -3.6L in last 24hrs  Weight down another LB    Medications:   Scheduled Meds:   ceftaroline fosamil (TEFLARO) IVPB  400 mg Intravenous Q12H    pantoprazole  40 mg Oral QAM AC    carvedilol  12.5 mg Oral BID WC    sodium chloride flush  10 mL Intravenous 2 times per day    miconazole   Topical BID    heparin (porcine)  5,000 Units Subcutaneous 3 times per day    atorvastatin  40 mg Oral Daily    citalopram  20 mg Oral Daily    clopidogrel  75 mg Oral Daily    insulin glargine  20 Units Subcutaneous Nightly    rivastigmine  1 patch Transdermal Daily    insulin lispro  0-12 Units Subcutaneous TID WC    insulin lispro  0-6 Units Subcutaneous Nightly    darbepoetin hung-polysorbate  60 mcg Subcutaneous Weekly     Continuous Infusions:   furosemide (LASIX) 5mg/ml infusion Stopped (12/21/20 0612)    dextrose       CBC:   Recent Labs     12/19/20  1110 12/20/20  0549 12/21/20  0531   WBC 5.9 6.9 11.0   HGB 7.7* 7.4* 8.2*    188 192     BMP:    Recent Labs     12/19/20  1431 12/20/20  0549 12/21/20  0531    138 138   K 4.3 3.9 3.7    102 101   CO2 26 27 31   BUN 54* 54* 55*   CREATININE 2.38* 2.32* 2.24*   GLUCOSE 155* 97 116*     Hepatic:   No results for input(s): AST, ALT, ALB, BILITOT, ALKPHOS in the last 72 hours. Troponin: No results for input(s): TROPHS in the last 72 hours. BNP: No results for input(s): BNP in the last 72 hours. Lipids: No results for input(s): CHOL, HDL in the last 72 hours. Invalid input(s): LDLCALCU  INR:   No results for input(s): INR in the last 72 hours. Objective:   Vitals: BP (!) 156/67   Pulse 71   Temp 98.3 °F (36.8 °C) (Oral)   Resp 15   Ht 5' 7\" (1.702 m)   Wt 284 lb 13.4 oz (129.2 kg)   SpO2 98%   BMI 44.61 kg/m²   General appearance: alert and cooperative with exam  HEENT: Head: Normocephalic, no lesions, without obvious abnormality. Neck: no JVD, trachea midline, no adenopathy  Lungs: Clear to auscultations upper lobes, very dim b/l LL. No audible rales presently. on 02 per NC . No distress  Heart: Regular rate and rhythm, s1/s2 auscultated, +murmurs, SR 70s  Abdomen: soft, non-tender, bowel sounds active  Extremities: +2/3 edema bilateral legs, Upper extremities + 2  Neurologic: not done    Echo 12/7/2  Summary  Normal left ventricular diameter. Left ventricular systolic function is mildly reduced with LVEF 45-50%. Abnormal septal wall motion. Left ventricular ejection fraction 50 %. Grade I (mild) left ventricular diastolic dysfunction. Leftward compression of inter-ventricular septum (\"D-sign\") indicating RV  pressure/volume overload. Left atrium is severely dilated. Right atrial dilatation. Right ventricular dilatation with reduced systolic function. Aortic valve is sclerotic but opens well. Trace aortic insufficiency. Mitral annular calcification. Mild tricuspid regurgitation. Estimated right ventricular systolic pressure  is 35 mmHg. Left pleural effusion. Assessment / Acute Cardiac Problems:   1. Acute on chronic CHF , diastolic  2. Anasarca  3. AMY on CKD   4. DM   5. HTN   6. Ho CABG   7.  Anemia     Patient Active Problem List:     Dementia ([facility]     Hypertension     Hyperlipidemia     Class 2 severe obesity with serious comorbidity and body mass index (BMI) of 37.0 to 37.9 in adult [facility])     Gout     Peripheral polyneuropathy     Anxiety and depression Acute kidney injury superimposed on CKD (HCC)     Balance problem     CVA (cerebral vascular accident) ([facility]     Type 2 diabetes mellitus with hyperglycemia, with long-term current use of insulin (HCC)     Acute on chronic diastolic heart failure (HCC)     Acute cystitis     Infestation by bed bug     Infestation by maggots     Lymphedema of both lower extremities     PAD (peripheral artery disease) (HCC)     Pressure injury of right heel, unstageable ([facility])     Acute kidney injury ([facility]     Bradycardia     Hyperkalemia     AV block, 1st degree     Wounds, multiple     Buttock wound     Decubitus ulcer of trochanteric region of right hip, stage 2 (HCC)     Decompensated heart failure (HCC)     Acute anemia     Acute renal failure (ARF) ([facility]     Anasarca      Plan of Treatment:   1. Acute on chronic diastolic chf. Appreciate Nephrology assistance with diuretics. Appreciate recommendations. Strict I/O  2. CAD stable. Hx of CABG. Continue statin, BB, plavix. 3. HTN controlled. Continue BB  4. AMY/CKD managed by nephrology  5. Anemia per primary/GI. Recommend HGB > 9  6. ID following for MRSA sepsis.    7. Keep K> 4 and Mag > 2  Stable        Electronically signed by SHIRA Johnson CNP on 12/21/2020 at 11:44 AM  [address]  [phone]

## 2020-12-21 NOTE — PROGRESS NOTES
NEPHROLOGY PROGRESS NOTE      SUBJECTIVE     Hospitalized with exertional shortness of breath/lower worsening lower extremity swelling and noted to be having anemia along with decompensated congestive heart failure and acute kidney injury. Improving renal function. Currently on IV intermittent Lasix. Was used to be on IV Lasix drip. Blood pressure stable. OBJECTIVE     Vitals:    12/21/20 0752 12/21/20 0800 12/21/20 0900 12/21/20 1148   BP: (!) 157/55 (!) 158/59 (!) 156/67 (!) 154/62   Pulse: 68 70 71 58   Resp: 13  15 20   Temp: 98.3 °F (36.8 °C)   98.5 °F (36.9 °C)   TempSrc: Oral   Oral   SpO2: 99% 97% 98% 98%   Weight:       Height:         24HR INTAKE/OUTPUT:      Intake/Output Summary (Last 24 hours) at 12/21/2020 1631  Last data filed at 12/21/2020 1300  Gross per 24 hour   Intake 1794.43 ml   Output 3250 ml   Net -1455.57 ml       General appearance:Awake, alert,  HEENT: PERRLA  Respiratory[de-identified] Shortness of breath. Bilateral wheezes and basilar rales. Cardiovascular:S1 S2 normal,no gallop or organic murmur. Abdomen:Non tender/non distended. Bowel sounds present  Extremities: No Cyanosis or Clubbing, present lower extremity edema  Neurological:Alert and oriented. No abnormalities of mood, affect, memory, mentation, or behavior are noted      MEDICATIONS     Scheduled Meds:    furosemide  80 mg Intravenous BID    ceftaroline fosamil (TEFLARO) IVPB  400 mg Intravenous Q12H    pantoprazole  40 mg Oral QAM AC    carvedilol  12.5 mg Oral BID WC    sodium chloride flush  10 mL Intravenous 2 times per day    miconazole   Topical BID    heparin (porcine)  5,000 Units Subcutaneous 3 times per day    atorvastatin  40 mg Oral Daily    citalopram  20 mg Oral Daily    clopidogrel  75 mg Oral Daily    insulin glargine  20 Units Subcutaneous Nightly    rivastigmine  1 patch Transdermal Daily    insulin lispro  0-12 Units Subcutaneous TID WC    insulin lispro  0-6 Units Subcutaneous Nightly  darbepoetin hung-polysorbate  60 mcg Subcutaneous Weekly     Continuous Infusions:    dextrose       PRN Meds:  hydrALAZINE, glucose, dextrose, glucagon (rDNA), dextrose, sodium chloride flush, nicotine, promethazine **OR** ondansetron, acetaminophen **OR** acetaminophen, albuterol  Home Meds:                Medications Prior to Admission: aspirin 81 MG tablet, Take 1 tablet by mouth daily  senna (SENOKOT) 8.6 MG tablet, Take 1 tablet by mouth 2 times daily  oxybutynin (DITROPAN) 5 MG tablet, TAKE 1 TABLET BY MOUTH TWICE DAILY  losartan (COZAAR) 50 MG tablet, Take 1 tablet by mouth daily  OXYGEN, Oxgen at 2 liters per nasal cannula  loratadine (CLARITIN) 10 MG tablet, TAKE 1 TABLET(10 MG) BY MOUTH DAILY  Lancets MISC, Checks blood sugar ac and HS  blood glucose monitor strips, Test 3  times a day & as needed for symptoms of irregular blood glucose. Dispense sufficient amount for indicated testing frequency plus additional to accommodate PRN testing needs. Alcohol Swabs (ALCOHOL PREP) PADS, Checks blood sugar 3 times a day  Misc.  Devices MISC, Standard walker with wheels  Diagnosis dementia, CHF  atorvastatin (LIPITOR) 40 MG tablet, TAKE 1 TABLET BY MOUTH EVERYDAY  bumetanide (BUMEX) 1 MG tablet, Take 1 tablet by mouth daily  citalopram (CELEXA) 20 MG tablet, Take 1 tablet by mouth daily  clopidogrel (PLAVIX) 75 MG tablet, Take 1 tablet by mouth daily  insulin lispro (HUMALOG) 100 UNIT/ML injection vial, Use 4 times a day per sliding scale  hydrALAZINE (APRESOLINE) 50 MG tablet, Take 1.5 tablets by mouth every 8 hours  insulin glargine (LANTUS) 100 UNIT/ML injection vial, Inject 20 Units into the skin nightly  memantine (NAMENDA) 10 MG tablet, TAKE 1 TABLET BY MOUTH TWICE DAILY  omeprazole (PRILOSEC) 20 MG delayed release capsule, Take 1 capsule by mouth every morning (before breakfast)  rivastigmine (EXELON) 9.5 MG/24HR, APPLY 1 NEW PATCH EVERY 24 HOURS  Diabetic Shoe MISC, by Does not apply route Compression Stockings MISC, by Does not apply route 20-30 mm Hg bilateral knee high  nystatin (MYCOSTATIN) 162152 UNIT/GM cream, Apply topically 2 times daily. INVESTIGATIONS     Last 3 CMP:    Recent Labs     12/19/20  1431 12/20/20  0549 12/21/20  0531    138 138   K 4.3 3.9 3.7    102 101   CO2 26 27 31   BUN 54* 54* 55*   CREATININE 2.38* 2.32* 2.24*   CALCIUM 8.4* 8.5* 8.6   LABALBU 2.8* 2.8* 2.8*       Last 3 CBC:  Recent Labs     12/19/20  1110 12/20/20  0549 12/21/20  0531   WBC 5.9 6.9 11.0   RBC 2.57* 2.52* 2.74*   HGB 7.7* 7.4* 8.2*   HCT 26.4* 25.3* 27.1*   .7 100.4 98.9   MCH 30.0 29.4 29.9   MCHC 29.2 29.2 30.3   RDW 16.3* 15.9* 17.0*    188 192   MPV 10.8 10.5 11.1       ASSESSMENT     1. Acute kidney injury nonoliguric secondary to prerenal injury from reduced E ABV from anemia/decompensated LVDD/cor pulmonale -persisting. 2.  Chronic kidney disease stage III secondary diabetic ischemic nephrosclerosis with baseline creatinine 1.2-1.4. Follows up with Dr. Dariana Dill. 3.  Nonnephrotic range proteinuria from diabetes  4. Anemia status post EGD which showed mild gastritis. Also on JOBY  5. Decompensated left ventricular diastolic/cor pulmonale  6. History of CABG  7. History of type 2 diabetes    PLAN     Continue IV Lasix 2 times a day. Avoid nephrotoxins.     Diego Tanner MD, MRCP Adam Hunt, FACP   12/21/2020 4:31 PM    Nephrology [address] [place] [address]

## 2020-12-21 NOTE — PROGRESS NOTES
[facility] -   Infectious diseases evaluation  admission date 12/12/2020    reason for consultation:   MRSA sepsis    Impression :   Current:  · MRSA septicemia 12/15 - echo neg  · sphingomonas septicemia 12/13  · DM  · dementia  · CAD- CABG  · ATN  · Anasarca  · Lymphedema both lower extremities  · AV block first-degree  · Iron deficiency anemia, severe gastritis on EGD 12/14-colonoscopy patient refused  · Bilateral pleural effusions with underlying atelectasis, right> left  ·   Discussion / summary of stay / plan of care   ·   Recommendations   · ceftaroline in 400 mg q 12 x 4 weeks till 1/18/21  · Repeat daily x 3-so far no MRSA showing again  · PICC line ordered  · Chart reconciled    Infection Control Recommendations   · [place] Precautions  · Contact Isolation   Antimicrobial Stewardship Recommendations   · Simplification of therapy  · Targeted therapy    Coordination ofOutpatient Care:   · Estimated Length of IV antimicrobials:  · Patient will need Midline / picc Catheter Insertion:   · Patient will need SNF:  · Patient will need outpatient wound care:     History of Present Illness:   Initial history:  Oralia Ruvalcaba is a 72y.o.-year-old female who presented to another hospital because of increasing fatigue, with increasing lower extremity edema ongoing for about a month with shortness of breath. Patient was found to have be iron deficiency anemia, was diuresed but kidney function kept getting worse, renal ultrasound was normal, patient was transferred to [facility] to be seen by nephrology. Was a concern also of dark stools and possible GI bleed  The patient had a 2D echo on 12/8 showing no vegetation  But she was with anasarca upon admission with elevated creatinine, denying any fevers.   Admitted to [facility] 12/12    Blood culture 12/13 1 out of 2 showed sphingomonas  BC 12/15 MRSA x 2, vanco wen 2 Patient had no fever at admission, just had a low-grade on     Patient had a EGD on  showing normal esophagus, uterine hernia sliding, moderate gastritis throughout the stomach, without any active bleed. Duodenum was normal  Planned for colonoscopy  but patient refused, GI signed off    We are consulted because of the bacteremia,  The patient has been receiving vancomycin and cefepime  According to the lab this Carolyn Nava is sensitive to ceftriaxone and hence we will be able to switch the patient to ceftaroline     on exam she is in full anasarca - Ox 2 and picture concerning for myxedema  No iv site inflammation or visible induration. No back pain -        Interval changes  2020   Patient Vitals for the past 8 hrs:   BP Temp Temp src Pulse Resp SpO2 Weight   20 1148 (!) 154/62 98.5 °F (36.9 °C) Oral 58 20 98 %    20 0900 (!) 156/67   71 15 98 %    20 0800 (!) 158/59   70  97 %    20 0752 (!) 157/55 98.3 °F (36.8 °C) Oral 68 13 99 %    20 0700 (!) 135/46   63 20 100 %    20 0449 (!) 155/60 97.7 °F (36.5 °C) Axillary 70 12 96 % 284 lb 13.4 oz (129.2 kg)   20 0423 (!) 126/51 97.6 °F (36.4 °C) Axillary 63 14 100 %      Sleeping comfortably, arouseable and answer question appropriately. Continues to have some confusion, continues to have edema all over, denies abd pain, eating very well. Afebrile.  White count 11    Blood cx  neg,  SCN, contaminant,  no growth so far    echo neg    TSH and FT4 normal    Summary of relevant labs:  Labs:  WBC 5.86.9 - 11  Creat 2.3-2.5  AST/ALT NL    TSH 3.2  FT4 1.09    Micro:  BC 1 out of 2, / sphingomyelin S  BC 12/15,  2 out of 2,  MRSA AKILAH 1 to vancomycin  [facility]  no growth so far  [facility]  SCN contamination  BC  no growth so far    ImaginD echo  no vegetation CT chest 12/13 bibasilar airspace disease and effusions, personally reviewed, mostly right effusion with a very tiny left effusion and minimal atelectasis underlying, not the picture of pneumonia  Calcified coronary arteries. I have personally reviewed the past medical history, past surgical history, medications, social history, and family history, and I haveupdated the database accordingly. Allergies:   Bactrim [sulfamethoxazole-trimethoprim], Clonidine derivatives, and Amoxicillin-pot clavulanate     Review of Systems:     Review of Systems   Constitutional: Positive for activity change. Negative for appetite change and fever. HENT: Negative for congestion. Eyes: Negative for redness and visual disturbance. Respiratory: Negative for apnea. Cardiovascular: Positive for leg swelling. Negative for chest pain. Gastrointestinal: Negative for abdominal pain. Endocrine: Negative for polydipsia and polyuria. Genitourinary: Negative for dysuria. Musculoskeletal: Negative for back pain. Skin: Negative for color change. Allergic/Immunologic: Negative for immunocompromised state. Neurological: Negative for dizziness. Hematological: Bruises/bleeds easily. Psychiatric/Behavioral: Negative for agitation. Physical Examination :       Physical Exam  Constitutional:       General: She is not in acute distress. Appearance: Normal appearance. She is not ill-appearing. HENT:      Head: Normocephalic and atraumatic. Nose: Nose normal. No congestion. Mouth/Throat:      Mouth: Mucous membranes are moist.   Eyes:      General: No scleral icterus. Conjunctiva/sclera: Conjunctivae normal.   Neck:      Musculoskeletal: Neck supple. No neck rigidity. Cardiovascular:      Rate and Rhythm: Normal rate and regular rhythm. Heart sounds: Normal heart sounds. No murmur. Pulmonary:      Effort: No respiratory distress. Breath sounds: Normal breath sounds.    Abdominal: General: There is no distension. Palpations: Abdomen is soft. Tenderness: There is no abdominal tenderness. Genitourinary:     Comments: Lynn in - clear yellow urine  Musculoskeletal:         General: Swelling (anasarca) present. No deformity. Right lower leg: Edema present. Left lower leg: Edema present. Skin:     General: Skin is dry. Coloration: Skin is not jaundiced. Neurological:      General: No focal deficit present. Mental Status: She is alert. She is disoriented. Psychiatric:      Comments: Calm demented         Past Medical History:     Past Medical History:   Diagnosis Date    Anxiety and depression     Background diabetic retinopathy(362.01)     (Mild)    Balance problem     CAD (coronary artery disease)     (with coronary artery bypass graft x4).  Cancer of uterus [facility])     history of, (probable cure)    Chronic kidney disease     Chronic low back pain     CVA (cerebral vascular accident) ([address]     Dementia ([address]     (moderate)    Dry eye syndrome     GERD (gastroesophageal reflux disease)     Glaucoma suspect     Gout     Homonymous hemianopsia     (Right)    Hyperlipidemia     Hypertension     Keratitis     (secondary to dry eye syndrome).  Obesity     Peripheral polyneuropathy     (diabetic)    Pseudophakos     (Right Eye: 2012; Left Eye: 2012) - Dr. Kaylene Simms.  Stroke [facility])     (Multiple) MRI of brain 10/2008 shows multiple infarcts involving the temporal and parietal areas.  Trichiasis     (left eye)    Type 2 diabetes mellitus ([address]        Past Surgical  History:     Past Surgical History:   Procedure Laterality Date    CATARACT REMOVAL WITH IMPLANT  2012    (Right eye) - Dr. Kaylene Simms.  CATARACT REMOVAL WITH IMPLANT  2012    (Left eye) - Dr. Kaylene Simms.      SECTION      CHOLECYSTECTOMY      CORONARY ARTERY BYPASS GRAFT  12- (x4) - LIMA-LAD, SVG-diagnoal 1, SVG-OM1, and SVG-PDA. Exploration of left radial. (Dr. Macarena Judd).  EYE SURGERY Left     as a child     GASTRIC BYPASS SURGERY      HYSTERECTOMY      (abdominal)  with left oophorectomy. Right ovary retained.     TONSILLECTOMY AND ADENOIDECTOMY      UPPER GASTROINTESTINAL ENDOSCOPY  12/14/2020    EGD BIOPSY performed by Olesya Arevalo MD at [facility]       Medications:      ceftaroline fosamil (TEFLARO) IVPB  400 mg Intravenous Q12H    pantoprazole  40 mg Oral QAM AC    carvedilol  12.5 mg Oral BID WC    sodium chloride flush  10 mL Intravenous 2 times per day    miconazole   Topical BID    heparin (porcine)  5,000 Units Subcutaneous 3 times per day    atorvastatin  40 mg Oral Daily    citalopram  20 mg Oral Daily    clopidogrel  75 mg Oral Daily    insulin glargine  20 Units Subcutaneous Nightly    rivastigmine  1 patch Transdermal Daily    insulin lispro  0-12 Units Subcutaneous TID WC    insulin lispro  0-6 Units Subcutaneous Nightly    darbepoetin hung-polysorbate  60 mcg Subcutaneous Weekly       Social History:     Social History     Socioeconomic History    Marital status:      Spouse name: Not on file    Number of children: Not on file    Years of education: Not on file    Highest education level: Not on file   Occupational History    Not on file   Social Needs    Financial resource strain: Not on file    Food insecurity     Worry: Not on file     Inability: Not on file    Transportation needs     Medical: Not on file     Non-medical: Not on file   Tobacco Use    Smoking status: Never Smoker    Smokeless tobacco: Never Used    Tobacco comment: never smoker eclamb rrt 7/20/17   Substance and Sexual Activity    Alcohol use: No    Drug use: No    Sexual activity: Not on file   Lifestyle    Physical activity     Days per week: Not on file     Minutes per session: Not on file    Stress: Not on file   Relationships This note is created with the assistance of a speech recognition program.  While intending to generate adocument that actually reflects the content of the visit, the document can still have some errors including those of syntax and sound a like substitutions which may escape proof reading. It such instances, actual meaningcan be extrapolated by contextual diversion. [address] [place] [address]  Office: [phone]  Perfect serve / office [phone]      I have discussed the care of the patient, including pertinent history and exam findings,  with the student. I have seen and examined the patient and the key elements of all parts of the encounter have been performed by me.   I agree with the assessment, plan and orders as documented by the student    Aminah Yuen, Infectious Diseases

## 2020-12-21 NOTE — PROGRESS NOTES
Patient had no fever at admission, just had a low-grade on     Patient had a EGD on  showing normal esophagus, uterine hernia sliding, moderate gastritis throughout the stomach, without any active bleed. Duodenum was normal  Planned for colonoscopy  but patient refused, GI signed off    We are consulted because of the bacteremia,  The patient has been receiving vancomycin and cefepime  According to the lab this Zoraida Tapia is sensitive to ceftriaxone and hence we will be able to switch the patient to ceftaroline     on exam she is in full anasarca - Ox 2 and picture concerning for myxedema  No iv site inflammation or visible induration. No back pain -        Interval changes  2020   Patient Vitals for the past 8 hrs:   BP Temp Temp src Pulse Resp SpO2   20 210 (!) 160/59   66     20 2008 (!) 196/75   69     20 1914 (!) 186/80 99.4 °F (37.4 °C) Oral 69 10 100 %   20 1600 (!) 165/72 99.6 °F (37.6 °C) Oral 70 8 96 %     Continues to have some confusion, continues to have edema all over, eating very well. Very appropriate talking    [facility]  no growth so far  echo neg    TSH and FT4 normal    Summary of relevant labs:  Labs:  WBC 5.86.9  Creat 2.3-2.5  AST/ALT NL    TSH 3.2  FT4 1.09    Micro:  BC 1 out of 2,  sphingomyelin S  BC 12/15,  2 out of 2,  MRSA AKILAH 1 to vancomycin  [facility]  no growth so far  [facility]  SCN contamination    ImaginD echo  no vegetation  CT chest  bibasilar airspace disease and effusions, personally reviewed, mostly right effusion with a very tiny left effusion and minimal atelectasis underlying, not the picture of pneumonia  Calcified coronary arteries. I have personally reviewed the past medical history, past surgical history, medications, social history, and family history, and I haveupdated the database accordingly.       Allergies: Bactrim [sulfamethoxazole-trimethoprim], Clonidine derivatives, and Amoxicillin-pot clavulanate     Review of Systems:     Review of Systems   Constitutional: Positive for activity change. Negative for appetite change and fever. HENT: Negative for congestion. Eyes: Negative for redness and visual disturbance. Respiratory: Negative for apnea. Cardiovascular: Negative for chest pain and leg swelling. Gastrointestinal: Negative for abdominal pain. Endocrine: Negative for polydipsia and polyuria. Genitourinary: Negative for dysuria. Musculoskeletal: Negative for back pain. Skin: Negative for color change. Allergic/Immunologic: Negative for immunocompromised state. Neurological: Negative for dizziness. Hematological: Bruises/bleeds easily. Psychiatric/Behavioral: Negative for agitation. Physical Examination :       Physical Exam  Constitutional:       General: She is not in acute distress. Appearance: Normal appearance. She is not ill-appearing. HENT:      Head: Normocephalic and atraumatic. Nose: Nose normal. No congestion. Mouth/Throat:      Mouth: Mucous membranes are moist.   Eyes:      General: No scleral icterus. Conjunctiva/sclera: Conjunctivae normal.   Neck:      Musculoskeletal: Neck supple. No neck rigidity. Cardiovascular:      Rate and Rhythm: Normal rate and regular rhythm. Heart sounds: Normal heart sounds. No murmur. Pulmonary:      Effort: No respiratory distress. Breath sounds: Normal breath sounds. Abdominal:      General: There is no distension. Palpations: Abdomen is soft. Tenderness: There is no abdominal tenderness. Genitourinary:     Comments: Lynn in - clear yellow urine  Musculoskeletal:         General: Swelling (anasarca) present. No deformity. Right lower leg: Edema present. Left lower leg: Edema present. Skin:     General: Skin is dry. Coloration: Skin is not jaundiced.    Neurological: General: No focal deficit present. Mental Status: She is alert. She is disoriented. Psychiatric:      Comments: Calm demented         Past Medical History:     Past Medical History:   Diagnosis Date    Anxiety and depression     Background diabetic retinopathy(362.01)     (Mild)    Balance problem     CAD (coronary artery disease)     (with coronary artery bypass graft x4).  Cancer of uterus [facility])     history of, (probable cure)    Chronic kidney disease     Chronic low back pain     CVA (cerebral vascular accident) ([address]     Dementia ([address]     (moderate)    Dry eye syndrome     GERD (gastroesophageal reflux disease)     Glaucoma suspect     Gout     Homonymous hemianopsia     (Right)    Hyperlipidemia     Hypertension     Keratitis     (secondary to dry eye syndrome).  Obesity     Peripheral polyneuropathy     (diabetic)    Pseudophakos     (Right Eye: 2012; Left Eye: 2012) - Dr. Brandt Khan.  Stroke [facility])     (Multiple) MRI of brain 10/2008 shows multiple infarcts involving the temporal and parietal areas.  Trichiasis     (left eye)    Type 2 diabetes mellitus ([facility]        Past Surgical  History:     Past Surgical History:   Procedure Laterality Date    CATARACT REMOVAL WITH IMPLANT  2012    (Right eye) - Dr. Brandt Khan.  CATARACT REMOVAL WITH IMPLANT  2012    (Left eye) - Dr. Brandt Khan.   SECTION      CHOLECYSTECTOMY      CORONARY ARTERY BYPASS GRAFT  12-    (x4) - LIMA-LAD, SVG-diagnoal 1, SVG-OM1, and SVG-PDA. Exploration of left radial. (Dr. Bustamante Me).  EYE SURGERY Left     as a child     GASTRIC BYPASS SURGERY      HYSTERECTOMY      (abdominal)  with left oophorectomy. Right ovary retained.     TONSILLECTOMY AND ADENOIDECTOMY      UPPER GASTROINTESTINAL ENDOSCOPY  2020    EGD BIOPSY performed by Jillian Werner MD at [facility]       Medications:      sodium chloride  20 mL Intravenous Once  ceftaroline fosamil (TEFLARO) IVPB  400 mg Intravenous Q12H    pantoprazole  40 mg Oral QAM AC    carvedilol  12.5 mg Oral BID WC    sodium chloride flush  10 mL Intravenous 2 times per day    miconazole   Topical BID    heparin (porcine)  5,000 Units Subcutaneous 3 times per day    atorvastatin  40 mg Oral Daily    citalopram  20 mg Oral Daily    clopidogrel  75 mg Oral Daily    insulin glargine  20 Units Subcutaneous Nightly    rivastigmine  1 patch Transdermal Daily    insulin lispro  0-12 Units Subcutaneous TID WC    insulin lispro  0-6 Units Subcutaneous Nightly    darbepoetin hung-polysorbate  60 mcg Subcutaneous Weekly       Social History:     Social History     Socioeconomic History    Marital status:      Spouse name: Not on file    Number of children: Not on file    Years of education: Not on file    Highest education level: Not on file   Occupational History    Not on file   Social Needs    Financial resource strain: Not on file    Food insecurity     Worry: Not on file     Inability: Not on file    Transportation needs     Medical: Not on file     Non-medical: Not on file   Tobacco Use    Smoking status: Never Smoker    Smokeless tobacco: Never Used    Tobacco comment: never smoker eclamb rrt 7/20/17   Substance and Sexual Activity    Alcohol use: No    Drug use: No    Sexual activity: Not on file   Lifestyle    Physical activity     Days per week: Not on file     Minutes per session: Not on file    Stress: Not on file   Relationships    Social connections     Talks on phone: Not on file     Gets together: Not on file     Attends [employer] service: Not on file     Active member of club or organization: Not on file     Attends meetings of clubs or organizations: Not on file     Relationship status: Not on file    Intimate partner violence     Fear of current or ex partner: Not on file     Emotionally abused: Not on file     Physically abused: Not on file Forced sexual activity: Not on file   Other Topics Concern    Not on file   Social History Narrative    Not on file       Family History:     Family History   Problem Relation Age of Onset    Diabetes Mother     Cancer Mother     High Blood Pressure Mother    Juan C Mejia Stroke Mother     Kidney Disease Mother     Uterine Cancer Mother     Diabetes Father     Heart Disease Father     Glaucoma Father     Emphysema Father     Coronary Art Dis Other         All 4 siblings.  Stroke Other         1 sibling.  Lung Cancer Other         1 sibling - cause of death lung cancer.  Mental Retardation Other       Medical Decision Making:   I have independently reviewed/ordered the following labs:    CBC with Differential:   Recent Labs     12/19/20  1110 12/20/20  0549   WBC 5.9 6.9   HGB 7.7* 7.4*   HCT 26.4* 25.3*    188   LYMPHOPCT 16* 14*   MONOPCT 9 10     BMP:  Recent Labs     12/19/20  1431 12/20/20  0549    138   K 4.3 3.9    102   CO2 26 27   BUN 54* 54*   CREATININE 2.38* 2.32*   MG 1.8 1.8     Hepatic Function Panel:   Recent Labs     12/19/20  1431 12/20/20  0549   LABALBU 2.8* 2.8*     No results for input(s): RPR in the last 72 hours. No results for input(s): HIV in the last 72 hours. No results for input(s): BC in the last 72 hours. Lab Results   Component Value Date    CREATININE 2.32 12/20/2020    GLUCOSE 97 12/20/2020       Detailed results: Thank you for allowing us to participate in the care of this patient. Please call with questions. This note is created with the assistance of a speech recognition program.  While intending to generate adocument that actually reflects the content of the visit, the document can still have some errors including those of syntax and sound a like substitutions which may escape proof reading. It such instances, actual meaningcan be extrapolated by contextual diversion.     Alexandro Martinez MD  Office: [phone] Perfect serve / office [phone]      I have discussed the care of the patient, including pertinent history and exam findings,  with the student. I have seen and examined the patient and the key elements of all parts of the encounter have been performed by me.   I agree with the assessment, plan and orders as documented by the student    Aminah Wilfrid, Infectious Diseases

## 2020-12-22 LAB
ABO/RH: NORMAL
ABSOLUTE EOS #: 0.12 K/UL (ref 0–0.44)
ABSOLUTE IMMATURE GRANULOCYTE: <0.03 K/UL (ref 0–0.3)
ABSOLUTE LYMPH #: 0.88 K/UL (ref 1.1–3.7)
ABSOLUTE MONO #: 0.7 K/UL (ref 0.1–1.2)
ALBUMIN SERPL-MCNC: 2.7 G/DL (ref 3.5–5.2)
ANION GAP SERPL CALCULATED.3IONS-SCNC: 10 MMOL/L (ref 9–17)
ANTIBODY SCREEN: NEGATIVE
ARM BAND NUMBER: NORMAL
BASOPHILS # BLD: 0 % (ref 0–2)
BASOPHILS ABSOLUTE: <0.03 K/UL (ref 0–0.2)
BLD PROD TYP BPU: NORMAL
BUN BLDV-MCNC: 52 MG/DL (ref 8–23)
BUN/CREAT BLD: ABNORMAL (ref 9–20)
CALCIUM SERPL-MCNC: 8.7 MG/DL (ref 8.6–10.4)
CHLORIDE BLD-SCNC: 100 MMOL/L (ref 98–107)
CO2: 29 MMOL/L (ref 20–31)
CREAT SERPL-MCNC: 2.21 MG/DL (ref 0.5–0.9)
CROSSMATCH RESULT: NORMAL
DIFFERENTIAL TYPE: ABNORMAL
DISPENSE STATUS BLOOD BANK: NORMAL
EOSINOPHILS RELATIVE PERCENT: 2 % (ref 1–4)
EXPIRATION DATE: NORMAL
GFR AFRICAN AMERICAN: 27 ML/MIN
GFR NON-AFRICAN AMERICAN: 22 ML/MIN
GFR SERPL CREATININE-BSD FRML MDRD: ABNORMAL ML/MIN/{1.73_M2}
GFR SERPL CREATININE-BSD FRML MDRD: ABNORMAL ML/MIN/{1.73_M2}
GLUCOSE BLD-MCNC: 144 MG/DL (ref 70–99)
GLUCOSE BLD-MCNC: 170 MG/DL (ref 65–105)
GLUCOSE BLD-MCNC: 174 MG/DL (ref 65–105)
GLUCOSE BLD-MCNC: 197 MG/DL (ref 65–105)
HCT VFR BLD CALC: 27.4 % (ref 36.3–47.1)
HEMOGLOBIN: 8.1 G/DL (ref 11.9–15.1)
IMMATURE GRANULOCYTES: 0 %
LYMPHOCYTES # BLD: 11 % (ref 24–43)
MAGNESIUM: 2 MG/DL (ref 1.6–2.6)
MCH RBC QN AUTO: 29.7 PG (ref 25.2–33.5)
MCHC RBC AUTO-ENTMCNC: 29.6 G/DL (ref 28.4–34.8)
MCV RBC AUTO: 100.4 FL (ref 82.6–102.9)
MONOCYTES # BLD: 9 % (ref 3–12)
NRBC AUTOMATED: 0.3 PER 100 WBC
PDW BLD-RTO: 17.4 % (ref 11.8–14.4)
PHOSPHORUS: 3 MG/DL (ref 2.6–4.5)
PLATELET # BLD: 176 K/UL (ref 138–453)
PLATELET ESTIMATE: ABNORMAL
PMV BLD AUTO: 11 FL (ref 8.1–13.5)
POTASSIUM SERPL-SCNC: 4 MMOL/L (ref 3.7–5.3)
RBC # BLD: 2.73 M/UL (ref 3.95–5.11)
RBC # BLD: ABNORMAL 10*6/UL
SEG NEUTROPHILS: 78 % (ref 36–65)
SEGMENTED NEUTROPHILS ABSOLUTE COUNT: 6.03 K/UL (ref 1.5–8.1)
SODIUM BLD-SCNC: 139 MMOL/L (ref 135–144)
TRANSFUSION STATUS: NORMAL
UNIT DIVISION: 0
UNIT NUMBER: NORMAL
WBC # BLD: 7.8 K/UL (ref 3.5–11.3)
WBC # BLD: ABNORMAL 10*3/UL

## 2020-12-22 PROCEDURE — 99232 SBSQ HOSP IP/OBS MODERATE 35: CPT | Performed by: FAMILY MEDICINE

## 2020-12-22 PROCEDURE — 80069 RENAL FUNCTION PANEL: CPT

## 2020-12-22 PROCEDURE — 6360000002 HC RX W HCPCS: Performed by: INTERNAL MEDICINE

## 2020-12-22 PROCEDURE — 6370000000 HC RX 637 (ALT 250 FOR IP): Performed by: NURSE PRACTITIONER

## 2020-12-22 PROCEDURE — 6370000000 HC RX 637 (ALT 250 FOR IP): Performed by: INTERNAL MEDICINE

## 2020-12-22 PROCEDURE — 99232 SBSQ HOSP IP/OBS MODERATE 35: CPT | Performed by: INTERNAL MEDICINE

## 2020-12-22 PROCEDURE — 2580000003 HC RX 258: Performed by: INTERNAL MEDICINE

## 2020-12-22 PROCEDURE — 83735 ASSAY OF MAGNESIUM: CPT

## 2020-12-22 PROCEDURE — 85025 COMPLETE CBC W/AUTO DIFF WBC: CPT

## 2020-12-22 PROCEDURE — 2060000000 HC ICU INTERMEDIATE R&B

## 2020-12-22 PROCEDURE — 36415 COLL VENOUS BLD VENIPUNCTURE: CPT

## 2020-12-22 PROCEDURE — 82947 ASSAY GLUCOSE BLOOD QUANT: CPT

## 2020-12-22 RX ORDER — BUMETANIDE 1 MG/1
2 TABLET ORAL 2 TIMES DAILY
Status: DISCONTINUED | OUTPATIENT
Start: 2020-12-22 | End: 2020-12-23 | Stop reason: HOSPADM

## 2020-12-22 RX ADMIN — INSULIN LISPRO 2 UNITS: 100 INJECTION, SOLUTION INTRAVENOUS; SUBCUTANEOUS at 12:47

## 2020-12-22 RX ADMIN — HEPARIN SODIUM 5000 UNITS: 5000 INJECTION INTRAVENOUS; SUBCUTANEOUS at 15:00

## 2020-12-22 RX ADMIN — SODIUM CHLORIDE, PRESERVATIVE FREE 10 ML: 5 INJECTION INTRAVENOUS at 21:00

## 2020-12-22 RX ADMIN — HEPARIN SODIUM 5000 UNITS: 5000 INJECTION INTRAVENOUS; SUBCUTANEOUS at 05:17

## 2020-12-22 RX ADMIN — CITALOPRAM 20 MG: 20 TABLET, FILM COATED ORAL at 10:13

## 2020-12-22 RX ADMIN — SODIUM CHLORIDE, PRESERVATIVE FREE 10 ML: 5 INJECTION INTRAVENOUS at 10:15

## 2020-12-22 RX ADMIN — INSULIN LISPRO 2 UNITS: 100 INJECTION, SOLUTION INTRAVENOUS; SUBCUTANEOUS at 17:38

## 2020-12-22 RX ADMIN — BUMETANIDE 2 MG: 1 TABLET ORAL at 17:31

## 2020-12-22 RX ADMIN — ANTI-FUNGAL POWDER MICONAZOLE NITRATE TALC FREE: 1.42 POWDER TOPICAL at 21:00

## 2020-12-22 RX ADMIN — DESMOPRESSIN ACETATE 40 MG: 0.2 TABLET ORAL at 10:13

## 2020-12-22 RX ADMIN — FUROSEMIDE 80 MG: 10 INJECTION, SOLUTION INTRAMUSCULAR; INTRAVENOUS at 10:12

## 2020-12-22 RX ADMIN — ANTI-FUNGAL POWDER MICONAZOLE NITRATE TALC FREE: 1.42 POWDER TOPICAL at 10:14

## 2020-12-22 RX ADMIN — CLOPIDOGREL 75 MG: 75 TABLET, FILM COATED ORAL at 10:13

## 2020-12-22 RX ADMIN — CARVEDILOL 12.5 MG: 12.5 TABLET, FILM COATED ORAL at 10:12

## 2020-12-22 RX ADMIN — PANTOPRAZOLE SODIUM 40 MG: 40 TABLET, DELAYED RELEASE ORAL at 05:17

## 2020-12-22 RX ADMIN — CEFTAROLINE FOSAMIL 400 MG: 400 POWDER, FOR SOLUTION INTRAVENOUS at 17:34

## 2020-12-22 RX ADMIN — INSULIN GLARGINE 20 UNITS: 100 INJECTION, SOLUTION SUBCUTANEOUS at 21:00

## 2020-12-22 RX ADMIN — CEFTAROLINE FOSAMIL 400 MG: 400 POWDER, FOR SOLUTION INTRAVENOUS at 05:14

## 2020-12-22 RX ADMIN — INSULIN LISPRO 1 UNITS: 100 INJECTION, SOLUTION INTRAVENOUS; SUBCUTANEOUS at 21:00

## 2020-12-22 RX ADMIN — HEPARIN SODIUM 5000 UNITS: 5000 INJECTION INTRAVENOUS; SUBCUTANEOUS at 21:00

## 2020-12-22 ASSESSMENT — ENCOUNTER SYMPTOMS
APNEA: 0
EYE REDNESS: 0
BACK PAIN: 0
ABDOMINAL PAIN: 0
COLOR CHANGE: 0

## 2020-12-22 NOTE — PROGRESS NOTES
NEPHROLOGY PROGRESS NOTE      SUBJECTIVE     Hospitalized with exertional shortness of breath/lower worsening lower extremity swelling and noted to be having anemia along with decompensated congestive heart failure and acute kidney injury. On Iv Lasix. BID. Overall neg balance. Weight down. Creat plateau    OBJECTIVE     Vitals:    12/21/20 2000 12/21/20 2320 12/22/20 0324 12/22/20 1105   BP: (!) 136/50 (!) 141/52 (!) 160/60 (!) 156/56   Pulse: 57 63 66 64   Resp: 10 (!) 0 (!) 0 11   Temp: 97.9 °F (36.6 °C) 97.7 °F (36.5 °C) 98.1 °F (36.7 °C) 97.8 °F (36.6 °C)   TempSrc: Oral Oral Oral Oral   SpO2: 98% 99% 98% 100%   Weight:       Height:         24HR INTAKE/OUTPUT:      Intake/Output Summary (Last 24 hours) at 12/22/2020 1406  Last data filed at 12/21/2020 1700  Gross per 24 hour   Intake    Output 750 ml   Net -750 ml       General appearance:Awake, alert,  HEENT: PERRLA  Respiratory[de-identified] Shortness of breath. Bilateral wheezes and basilar rales. Cardiovascular:S1 S2 normal,no gallop or organic murmur. Abdomen:Non tender/non distended. Bowel sounds present  Extremities: No Cyanosis or Clubbing, present lower extremity edema  Neurological:Alert and oriented. No abnormalities of mood, affect, memory, mentation, or behavior are noted      MEDICATIONS     Scheduled Meds:    bumetanide  2 mg Oral BID    ceftaroline fosamil (TEFLARO) IVPB  400 mg Intravenous Q12H    pantoprazole  40 mg Oral QAM AC    carvedilol  12.5 mg Oral BID WC    sodium chloride flush  10 mL Intravenous 2 times per day    miconazole   Topical BID    heparin (porcine)  5,000 Units Subcutaneous 3 times per day    atorvastatin  40 mg Oral Daily    citalopram  20 mg Oral Daily    clopidogrel  75 mg Oral Daily    insulin glargine  20 Units Subcutaneous Nightly    rivastigmine  1 patch Transdermal Daily    insulin lispro  0-12 Units Subcutaneous TID WC    insulin lispro  0-6 Units Subcutaneous Nightly  darbepoetin hung-polysorbate  60 mcg Subcutaneous Weekly     Continuous Infusions:    dextrose       PRN Meds:  hydrALAZINE, glucose, dextrose, glucagon (rDNA), dextrose, sodium chloride flush, nicotine, promethazine **OR** ondansetron, acetaminophen **OR** acetaminophen, albuterol  Home Meds:                Medications Prior to Admission: aspirin 81 MG tablet, Take 1 tablet by mouth daily  senna (SENOKOT) 8.6 MG tablet, Take 1 tablet by mouth 2 times daily  oxybutynin (DITROPAN) 5 MG tablet, TAKE 1 TABLET BY MOUTH TWICE DAILY  losartan (COZAAR) 50 MG tablet, Take 1 tablet by mouth daily  OXYGEN, Oxgen at 2 liters per nasal cannula  loratadine (CLARITIN) 10 MG tablet, TAKE 1 TABLET(10 MG) BY MOUTH DAILY  Lancets MISC, Checks blood sugar ac and HS  blood glucose monitor strips, Test 3  times a day & as needed for symptoms of irregular blood glucose. Dispense sufficient amount for indicated testing frequency plus additional to accommodate PRN testing needs. Alcohol Swabs (ALCOHOL PREP) PADS, Checks blood sugar 3 times a day  Misc.  Devices MISC, Standard walker with wheels  Diagnosis dementia, CHF  atorvastatin (LIPITOR) 40 MG tablet, TAKE 1 TABLET BY MOUTH EVERYDAY  bumetanide (BUMEX) 1 MG tablet, Take 1 tablet by mouth daily  citalopram (CELEXA) 20 MG tablet, Take 1 tablet by mouth daily  clopidogrel (PLAVIX) 75 MG tablet, Take 1 tablet by mouth daily  insulin lispro (HUMALOG) 100 UNIT/ML injection vial, Use 4 times a day per sliding scale  hydrALAZINE (APRESOLINE) 50 MG tablet, Take 1.5 tablets by mouth every 8 hours  insulin glargine (LANTUS) 100 UNIT/ML injection vial, Inject 20 Units into the skin nightly  memantine (NAMENDA) 10 MG tablet, TAKE 1 TABLET BY MOUTH TWICE DAILY  omeprazole (PRILOSEC) 20 MG delayed release capsule, Take 1 capsule by mouth every morning (before breakfast)  rivastigmine (EXELON) 9.5 MG/24HR, APPLY 1 NEW PATCH EVERY 24 HOURS  Diabetic Shoe MISC, by Does not apply route Compression Stockings MISC, by Does not apply route 20-30 mm Hg bilateral knee high  nystatin (MYCOSTATIN) 596818 UNIT/GM cream, Apply topically 2 times daily. INVESTIGATIONS     Last 3 CMP:    Recent Labs     12/20/20  0549 12/21/20  0531 12/22/20  0412    138 139   K 3.9 3.7 4.0    101 100   CO2 27 31 29   BUN 54* 55* 52*   CREATININE 2.32* 2.24* 2.21*   CALCIUM 8.5* 8.6 8.7   LABALBU 2.8* 2.8* 2.7*       Last 3 CBC:  Recent Labs     12/20/20  0549 12/21/20  0531 12/22/20  0412   WBC 6.9 11.0 7.8   RBC 2.52* 2.74* 2.73*   HGB 7.4* 8.2* 8.1*   HCT 25.3* 27.1* 27.4*   .4 98.9 100.4   MCH 29.4 29.9 29.7   MCHC 29.2 30.3 29.6   RDW 15.9* 17.0* 17.4*    192 176   MPV 10.5 11.1 11.0       ASSESSMENT     1. Acute kidney injury nonoliguric secondary to prerenal injury from reduced E ABV from anemia/decompensated LVDD/cor pulmonale -PLATEAU  2. Chronic kidney disease stage III secondary diabetic ischemic nephrosclerosis with baseline creatinine 1.2-1.4. Follows up with Dr. Angel Lim. 3.  Nonnephrotic range proteinuria from diabetes  4. Anemia status post EGD which showed mild gastritis. Also on JOBY  5. Decompensated left ventricular diastolic/cor pulmonale - WEIGHT DOWN AND NEG BY 10 L  6. History of CABG  7. History of type 2 diabetes    PLAN     PO Bumex 2 mg BID  DC Lynn AM  Ok to dc am if renal function stable and optimal diuresis with PO diuretics  Avoid nephrotoxins.     Nathan Pritchard MD, MRCP Keegan Welsh, FACP   12/22/2020 2:06 PM    Nephrology [address] [place] [address]

## 2020-12-22 NOTE — CARE COORDINATION
Transitional Planning  Call to [facility] ([phone]) at [address] [facility] requesting return call. Per notes, anticipate discharge tomorrow pending lab improvement. 1702 - Received call from [facility], notified of anticipated discharge tomorrow. Patient will go to room 404.

## 2020-12-22 NOTE — PROGRESS NOTES
[facility]   Progress Note                   Date:   12/22/2020  Patient name: Markus Jacques  Date of admission:  12/12/2020  4:17 AM  MRN:   [id number]  YOB: 1950  PCP: ERINN Gomez    Reason for Admission: Acute renal failure ([facility]F) ([facility] [N17.9]    Subjective:       Clinical Changes / Abnormalities: Pt seen and examined in the room. She denies any CP. Or SOB. No acute issues overnight per RN. Edema slowly improving.     -10.4 L since admission and -1.1L in last 24hrs  Weight down 20lbs in 2 days per measurement -? Medications:   Scheduled Meds:   furosemide  80 mg Intravenous BID    ceftaroline fosamil (TEFLARO) IVPB  400 mg Intravenous Q12H    pantoprazole  40 mg Oral QAM AC    carvedilol  12.5 mg Oral BID WC    sodium chloride flush  10 mL Intravenous 2 times per day    miconazole   Topical BID    heparin (porcine)  5,000 Units Subcutaneous 3 times per day    atorvastatin  40 mg Oral Daily    citalopram  20 mg Oral Daily    clopidogrel  75 mg Oral Daily    insulin glargine  20 Units Subcutaneous Nightly    rivastigmine  1 patch Transdermal Daily    insulin lispro  0-12 Units Subcutaneous TID WC    insulin lispro  0-6 Units Subcutaneous Nightly    darbepoetin hung-polysorbate  60 mcg Subcutaneous Weekly     Continuous Infusions:   dextrose       CBC:   Recent Labs     12/20/20  0549 12/21/20  0531 12/22/20  0412   WBC 6.9 11.0 7.8   HGB 7.4* 8.2* 8.1*    192 176     BMP:    Recent Labs     12/20/20  0549 12/21/20  0531 12/22/20  0412    138 139   K 3.9 3.7 4.0    101 100   CO2 27 31 29   BUN 54* 55* 52*   CREATININE 2.32* 2.24* 2.21*   GLUCOSE 97 116* 144*     Hepatic:   No results for input(s): AST, ALT, ALB, BILITOT, ALKPHOS in the last 72 hours. Troponin: No results for input(s): TROPHS in the last 72 hours. BNP: No results for input(s): BNP in the last 72 hours. Lipids: No results for input(s): CHOL, HDL in the last 72 hours. Invalid input(s): LDLCALCU  INR:   No results for input(s): INR in the last 72 hours. Objective:   Vitals: BP (!) 156/56   Pulse 64   Temp 97.8 °F (36.6 °C) (Oral)   Resp 11   Ht 5' 7\" (1.702 m)   Wt 284 lb 13.4 oz (129.2 kg)   SpO2 100%   BMI 44.61 kg/m²   General appearance: alert and cooperative with exam  HEENT: Head: Normocephalic, no lesions, without obvious abnormality. Neck: no JVD, trachea midline, no adenopathy  Lungs: Clear to auscultations upper lobes, very dim b/l LL. No audible rales presently. on 02 per NC . No distress  Heart: Regular rate and rhythm, s1/s2 auscultated, +murmurs, SR 70s  Abdomen: soft, non-tender, bowel sounds active  Extremities: +2/3 edema bilateral legs, Upper extremities + 2  Neurologic: not done    Echo 12/7/2  Summary  Normal left ventricular diameter. Left ventricular systolic function is mildly reduced with LVEF 45-50%. Abnormal septal wall motion. Left ventricular ejection fraction 50 %. Grade I (mild) left ventricular diastolic dysfunction. Leftward compression of inter-ventricular septum (\"D-sign\") indicating RV  pressure/volume overload. Left atrium is severely dilated. Right atrial dilatation. Right ventricular dilatation with reduced systolic function. Aortic valve is sclerotic but opens well. Trace aortic insufficiency. Mitral annular calcification. Mild tricuspid regurgitation. Estimated right ventricular systolic pressure  is 35 mmHg. Left pleural effusion. Assessment / Acute Cardiac Problems:   1. Acute on chronic CHF , diastolic  2. Anasarca  3. AMY on CKD   4. DM   5. HTN   6. Ho CABG   7.  Anemia     Patient Active Problem List:     Dementia ([facility]     Hypertension     Hyperlipidemia     Class 2 severe obesity with serious comorbidity and body mass index (BMI) of 37.0 to 37.9 in adult [facility])     Gout     Peripheral polyneuropathy     Anxiety and depression     Acute kidney injury superimposed on CKD ([facility]     Balance problem CVA (cerebral vascular accident) ([facility]     Type 2 diabetes mellitus with hyperglycemia, with long-term current use of insulin (HCC)     Acute on chronic diastolic heart failure (HCC)     Acute cystitis     Infestation by bed bug     Infestation by maggots     Lymphedema of both lower extremities     PAD (peripheral artery disease) ([facility])     Pressure injury of right heel, unstageable (HCC)     Acute kidney injury ([facility]     Bradycardia     Hyperkalemia     AV block, 1st degree     Wounds, multiple     Buttock wound     Decubitus ulcer of trochanteric region of right hip, stage 2 (HCC)     Decompensated heart failure (HCC)     Acute anemia     Acute renal failure (ARF) ([facility]     Anasarca      Plan of Treatment:   1. Acute on chronic diastolic chf. Appreciate Nephrology assistance with diuretics. Appreciate recommendations - IV Lasix BID. Strict I/O  2. CAD stable. Hx of CABG. Continue statin, BB, plavix. 3. HTN controlled. Continue BB  4. AMY/CKD managed by nephrology  5. No plans for further CV work-up at this time. Will sign off.  F/U in clinic in 2 weeks after discharge        Electronically signed by SHIRA Quintero CNP on 12/22/2020 at 12:16 PM  [address]  [phone]

## 2020-12-22 NOTE — PROGRESS NOTES
Nutrition Interventions:   Food and/or Nutrient Delivery:  Continue Current Diet, Continue Oral Nutrition Supplement  Nutrition Education/Counseling:  No recommendation at this time   Coordination of Nutrition Care:  Continue to monitor while inpatient    Goals:  PO intake to meet % of estimated nutrition needs       Nutrition Monitoring and Evaluation:   Food/Nutrient Intake Outcomes:  Food and Nutrient Intake, Supplement Intake  Physical Signs/Symptoms Outcomes:  Biochemical Data, GI Status, Fluid Status or Edema, Hemodynamic Status, Nutrition Focused Physical Findings, Skin, Weight     Electronically signed by Estefany Camarena RD, LD on 12/22/20 at 12:33 PM EST    Contact: [phone]

## 2020-12-22 NOTE — PROGRESS NOTES
[facility]  Office: [address], DO, Roxy Evans, DO, Cindy Sullivan, DO, Kali King Blood, DO, Colleen Cedeño MD, Johann Felix MD, Fadumo Abarca MD, Omero Ewing MD, Ninoska Wilde MD, Deidre Portillo MD, Pratima Chandra MD, Gian Chacon MD, Mbonu Dusty Babinski, MD, Pricila Denney, DO, Beto Hernández MD, Samra Duval MD, Amber Guerra DO, Rossy Narvaez MD,  Chong Okeefe, DO, Tyra Holder MD, Azael Morrell MD, Katie Nicole, CNP, [facility] Jonnyanna, CNP, Karine Courtney, CNP, Parker Carranza, CNS, Zoey Barriga, CNP, Moise Sullivan, CNP, Bruno Bergeron, CNP, George Sorto, CNP, Rosita Ludwig, CNP, Ricky Valadez PA-C, Geraldine Miles, [facility], Josué Ambrocio, CNP, Ryan Gilliland, CNP, Guerita Diggs, CNP, Freddie Linn, CNP, Dedra Shane, CNP         [facility]   [address] [facility]    Progress Note    12/22/2020    2:43 PM    Name:   Courtney Moss  MRN:     [id number]     Acct:      [de-identified]   Room:   [address] [place] [address] Day:  8  Admit Date:  12/12/2020  4:17 AM    PCP:   ERINN Sewell  Code Status:  Full Code    Subjective:     C/C: SOB  Interval History Status: improved      Patient seen and examined at bedside, no acute events overnight.   Feeling and looking about the same , good urine output  Craet is about the same  Still on nasal cannula  I discussed with her PICC line and consented her  Patient vitals, labs and all providers notes were reviewed,from overnight shift and morning updates were noted and discussed with the nurse    Brief History:     Per chart This is a 56-year-old female who initially presented to outside hospital with anemia, hemoglobin of 6.8.  She was given hemoglobin and developed lower extremity edema and was subsequently diuresed.  She was transferred here for acute renal failure.  She was evaluated by nephrology who reviewed her symptoms to [facility] due to intravascular depletion.  She was started on half bicarb drip and IV Lasix.  Her creatinine is slowly improving now.  Patient was evaluated by GI for her anemia: EGD shows mild gastritis.     Blood culture: 12/15 positive for gram negative rods in one culture.  Following blood culture shows MRSA bacteremia    Review of Systems:     Constitutional:  negative for chills, fevers, sweats  Respiratory:  negative for cough. +ve for  dyspnea on exertion, shortness of breath. Cardiovascular:  negative for chest pain, chest pressure/discomfort, lower extremity edema, palpitations  Gastrointestinal:  negative for abdominal pain, constipation, diarrhea, nausea, vomiting  Neurological:  negative for dizziness, headache    Medications: Allergies:     Allergies   Allergen Reactions    Bactrim [Sulfamethoxazole-Trimethoprim] Hives    Clonidine Derivatives     Amoxicillin-Pot Clavulanate Diarrhea, Nausea Only and Nausea And Vomiting       Current Meds:   Scheduled Meds:    bumetanide  2 mg Oral BID    ceftaroline fosamil (TEFLARO) IVPB  400 mg Intravenous Q12H    pantoprazole  40 mg Oral QAM AC    carvedilol  12.5 mg Oral BID WC    sodium chloride flush  10 mL Intravenous 2 times per day    miconazole   Topical BID    heparin (porcine)  5,000 Units Subcutaneous 3 times per day    atorvastatin  40 mg Oral Daily    citalopram  20 mg Oral Daily    clopidogrel  75 mg Oral Daily    insulin glargine  20 Units Subcutaneous Nightly    rivastigmine  1 patch Transdermal Daily    insulin lispro  0-12 Units Subcutaneous TID WC    insulin lispro  0-6 Units Subcutaneous Nightly  darbepoetin hung-polysorbate  60 mcg Subcutaneous Weekly     Continuous Infusions:    dextrose       PRN Meds: hydrALAZINE, glucose, dextrose, glucagon (rDNA), dextrose, sodium chloride flush, nicotine, promethazine **OR** ondansetron, acetaminophen **OR** acetaminophen, albuterol    Data:     Past Medical History:   has a past medical history of Anxiety and depression, Background diabetic retinopathy(362.01), Balance problem, CAD (coronary artery disease), Cancer of uterus ([facility] Chronic kidney disease, Chronic low back pain, CVA (cerebral vascular accident) ([facility] Dementia ([facility] Dry eye syndrome, GERD (gastroesophageal reflux disease), Glaucoma suspect, Gout, Homonymous hemianopsia, Hyperlipidemia, Hypertension, Keratitis, Obesity, Peripheral polyneuropathy, Pseudophakos, Stroke ([facility] Trichiasis, and Type 2 diabetes mellitus ([facility] Social History:   reports that she has never smoked. She has never used smokeless tobacco. She reports that she does not drink alcohol or use drugs. Family History:   Family History   Problem Relation Age of Onset    Diabetes Mother     Cancer Mother     High Blood Pressure Mother    Gagandeep Loser Stroke Mother     Kidney Disease Mother     Uterine Cancer Mother     Diabetes Father     Heart Disease Father     Glaucoma Father     Emphysema Father     Coronary Art Dis Other         All 4 siblings.  Stroke Other         1 sibling.  Lung Cancer Other         1 sibling - cause of death lung cancer.  Mental Retardation Other        Vitals:  BP (!) 156/56   Pulse 64   Temp 97.8 °F (36.6 °C) (Oral)   Resp 11   Ht 5' 7\" (1.702 m)   Wt 284 lb 13.4 oz (129.2 kg)   SpO2 100%   BMI 44.61 kg/m²   Temp (24hrs), Av.9 °F (36.6 °C), Min:97.7 °F (36.5 °C), Max:98.1 °F (36.7 °C)    Recent Labs     20  1117 20  1544 20  1950 20  1106   POCGLU 159* 148* 155* 197*       I/O (24Hr):     Intake/Output Summary (Last 24 hours) at 2020 1443 Last data filed at 12/21/2020 1700  Gross per 24 hour   Intake    Output 750 ml   Net -750 ml       Labs:  Hematology:  Recent Labs     12/20/20  0549 12/21/20  0531 12/22/20  0412   WBC 6.9 11.0 7.8   RBC 2.52* 2.74* 2.73*   HGB 7.4* 8.2* 8.1*   HCT 25.3* 27.1* 27.4*   .4 98.9 100.4   MCH 29.4 29.9 29.7   MCHC 29.2 30.3 29.6   RDW 15.9* 17.0* 17.4*    192 176   MPV 10.5 11.1 11.0     Chemistry:  Recent Labs     12/20/20  0549 12/21/20  0531 12/22/20  0412    138 139   K 3.9 3.7 4.0    101 100   CO2 27 31 29   GLUCOSE 97 116* 144*   BUN 54* 55* 52*   CREATININE 2.32* 2.24* 2.21*   MG 1.8 1.9 2.0   ANIONGAP 9 6* 10   LABGLOM 21* 22* 22*   GFRAA 25* 26* 27*   CALCIUM 8.5* 8.6 8.7   PHOS 3.7 3.2 3.0     Recent Labs     12/20/20  0549 12/20/20  0549 12/21/20  0427 12/21/20  0531 12/21/20  0723 12/21/20  1117 12/21/20  1544 12/21/20  1950 12/22/20  0412 12/22/20  1106   LABALBU 2.8*  --   --  2.8*  --   --   --   --  2.7*  --    POCGLU  --    < > 126*  --  112* 159* 148* 155*  --  197*    < > = values in this interval not displayed.      ABG:  Lab Results   Component Value Date    POCPH 7.284 12/13/2020    POCPCO2 54.5 12/13/2020    POCPO2 71.6 12/13/2020    POCHCO3 25.8 12/13/2020    NBEA 1 12/13/2020    PBEA NOT REPORTED 12/13/2020    ZDG6BQL 28 12/13/2020    BDJK6ABV 92 12/13/2020    FIO2 2.0 12/13/2020     Lab Results   Component Value Date/Time    SPECIAL [facility] 12/21/2020 05:31 AM     Lab Results   Component Value Date/Time    CULTURE NO GROWTH 1 DAY 12/21/2020 05:31 AM       Radiology:  Padma Crespo Chest Portable    Result Date: 12/16/2020  Stable pulmonary vascular congestion       Physical Examination:        General appearance:  alert, cooperative and no distress, on nasal canula  Mental Status:  oriented to person, place and time and normal affect  Lungs:  clear to auscultation bilaterally, normal effort  Heart:  regular rate and rhythm, no murmur Abdomen:  soft, nontender, nondistended, normal bowel sounds, no masses, hepatomegaly, splenomegaly  Extremities:  2+ edema/ lymphedema. No  Redness or  tenderness in the calves  Skin:  no gross lesions, rashes, induration    Assessment:        Hospital Problems           Last Modified POA    * (Principal) MRSA bacteremia 12/18/2020 Yes    Dementia ([address] 12/12/2020 Yes    Overview Signed 2/27/2014 10:21 AM by Irene Messina MD     (moderate)         Hypertension 12/12/2020 Yes    Hyperlipidemia 12/12/2020 Yes    CVA (cerebral vascular accident) ([address] 12/12/2020 Yes    Type 2 diabetes mellitus with hyperglycemia, with long-term current use of insulin ([address] 12/12/2020 Yes    Acute on chronic diastolic heart failure ([address] 12/12/2020 Yes    Acute renal failure (ARF) ([address] 12/15/2020 Yes    Anasarca 12/12/2020 Yes    Gram-negative bacteremia 12/18/2020 Yes    Stage 3 chronic kidney disease 12/17/2020 Yes          Plan:          Acute kidney injury on CKD stage III : patient's renal function is plateau   Needed lasix drip the IV BID. Nephrology is switching her IV diuresis to oral and want to monitor one more day, they are discussing taking Lynn out in a.m.      MRSA bacteremia and Sphingomonas Bacteremia - On Ceftaroline per ID.  Clnically improving.  Continue Abx per ID recommendation.     Consent for PICC line done as repeat cultures been negative so far    Acute on chronic exacerbation of diastolic congestive heart failure. -Patient on Lasix drip.  Continue to monitor.  Continue other home medications. Essential hypertension -stable    Obesity hypoventilation syndrome with obstructive sleep apnea -patient on CPAP as needed. Morbid obesity    Insulin-dependent diabetes mellitus -continue current regimen of insulin. Normocytic normochromic anemia -likely anemia of chronic disease and mild blood loss anemia.     Discussed with the patient and the nurse Discussed with nephrology ( Dr Evelyn Wu) , would like to keep one extra day to evaluate her response to oral diuretics        Elvie Byrnes MD  12/22/2020  2:43 PM

## 2020-12-22 NOTE — PROGRESS NOTES
[facility] -   Infectious diseases evaluation  admission date 12/12/2020    reason for consultation:   MRSA sepsis    Impression :   Current:  · MRSA septicemia 12/15 - echo neg  · sphingomonas septicemia 12/13  · DM  · dementia  · CAD- CABG  · ATN  · Anasarca  · Lymphedema both lower extremities  · AV block first-degree  · Iron deficiency anemia, severe gastritis on EGD 12/14-colonoscopy patient refused  · Bilateral pleural effusions with underlying atelectasis, right> left  ·   Discussion / summary of stay / plan of care   ·   Recommendations   · ceftaroline in 400 mg q 12 x 4 weeks till 1/18/21  · Repeat daily x 3-so far no MRSA showing again  · PICC line ordered  · Chart reconciled    Infection Control Recommendations   · Helm Precautions  · Contact Isolation   Antimicrobial Stewardship Recommendations   · Simplification of therapy  · Targeted therapy    Coordination ofOutpatient Care:   · Estimated Length of IV antimicrobials:  · Patient will need Midline / picc Catheter Insertion:   · Patient will need SNF:  · Patient will need outpatient wound care:     History of Present Illness:   Initial history:  Corinne Philip is a 72y.o.-year-old female who presented to another hospital because of increasing fatigue, with increasing lower extremity edema ongoing for about a month with shortness of breath. Patient was found to have be iron deficiency anemia, was diuresed but kidney function kept getting worse, renal ultrasound was normal, patient was transferred to [place] [address] to be seen by nephrology. Was a concern also of dark stools and possible GI bleed  The patient had a 2D echo on 12/8 showing no vegetation  But she was with anasarca upon admission with elevated creatinine, denying any fevers.   Admitted to [place] [address] 12/12    Blood culture 12/13 1 out of 2 showed sphingomonas  BC 12/15 MRSA x 2, vanco wen 2 Patient had no fever at admission, just had a low-grade on     Patient had a EGD on  showing normal esophagus, uterine hernia sliding, moderate gastritis throughout the stomach, without any active bleed. Duodenum was normal  Planned for colonoscopy  but patient refused, GI signed off    We are consulted because of the bacteremia,  The patient has been receiving vancomycin and cefepime  According to the lab this Mindy Caller is sensitive to ceftriaxone and hence we will be able to switch the patient to ceftaroline     on exam she is in full anasarca - Ox 2 and picture concerning for myxedema  No iv site inflammation or visible induration. No back pain -      Interval changes  2020   Patient Vitals for the past 8 hrs:   BP Temp Temp src Pulse Resp SpO2   20 1105 (!) 156/56 97.8 °F (36.6 °C) Oral 64 11 100 %     Swelling in bilateral feet improving. Answers questions appropriately and oriented x3. Continues to have edema all over, denies back pain and abd pain. Eating very well. Afebrile. White count 7.8    Blood cx  neg,  SCN, contaminant,  &  negative so far    echo neg    TSH and FT4 normal    Summary of relevant labs:  Labs:  WBC 5.86.9 - 11 - 7.8  Creat 2.3-2.5 - 2.1  AST/ALT NL    TSH 3.2  FT4 1.09    Micro:  BC 1 out of 2,  sphingomyelin S  BC 12/15,  2 out of 2,  MRSA AKILAH 1 to vancomycin  [facility]  SCN contamination  BC  SCN contamination  BC  no growth 2 days  [facility]  no growth 1 day    ImaginD echo  no vegetation  CT chest  bibasilar airspace disease and effusions, personally reviewed, mostly right effusion with a very tiny left effusion and minimal atelectasis underlying, not the picture of pneumonia  Calcified coronary arteries. I have personally reviewed the past medical history, past surgical history, medications, social history, and family history, and I haveupdated the database accordingly.       Allergies: Bactrim [sulfamethoxazole-trimethoprim], Clonidine derivatives, and Amoxicillin-pot clavulanate     Review of Systems:     Review of Systems   Constitutional: Positive for activity change. Negative for appetite change and fever. HENT: Negative for congestion. Eyes: Negative for redness and visual disturbance. Respiratory: Negative for apnea. Cardiovascular: Positive for leg swelling. Negative for chest pain. Gastrointestinal: Negative for abdominal pain. Endocrine: Negative for polydipsia and polyuria. Genitourinary: Negative for dysuria. Musculoskeletal: Negative for back pain. Skin: Negative for color change. Allergic/Immunologic: Negative for immunocompromised state. Neurological: Negative for dizziness. Hematological: Bruises/bleeds easily. Psychiatric/Behavioral: Negative for agitation. Physical Examination :       Physical Exam  Constitutional:       General: She is not in acute distress. Appearance: Normal appearance. She is not ill-appearing. HENT:      Head: Normocephalic and atraumatic. Nose: Nose normal. No congestion. Mouth/Throat:      Mouth: Mucous membranes are moist.   Eyes:      General: No scleral icterus. Conjunctiva/sclera: Conjunctivae normal.   Neck:      Musculoskeletal: Neck supple. No neck rigidity. Cardiovascular:      Rate and Rhythm: Normal rate and regular rhythm. Heart sounds: Normal heart sounds. No murmur. Pulmonary:      Effort: No respiratory distress. Breath sounds: Normal breath sounds. Abdominal:      General: There is no distension. Palpations: Abdomen is soft. Tenderness: There is no abdominal tenderness. Genitourinary:     Comments: Lynn in - clear yellow urine  Musculoskeletal:         General: Swelling (anasarca) present. No deformity. Right lower leg: Edema present. Left lower leg: Edema present. Skin:     General: Skin is dry. Coloration: Skin is not jaundiced. Neurological:      General: No focal deficit present. Mental Status: She is alert. She is disoriented. Psychiatric:      Comments: Calm demented         Past Medical History:     Past Medical History:   Diagnosis Date    Anxiety and depression     Background diabetic retinopathy(362.01)     (Mild)    Balance problem     CAD (coronary artery disease)     (with coronary artery bypass graft x4).  Cancer of uterus [facility])     history of, (probable cure)    Chronic kidney disease     Chronic low back pain     CVA (cerebral vascular accident) ([address]     Dementia ([facility] [address]     (moderate)    Dry eye syndrome     GERD (gastroesophageal reflux disease)     Glaucoma suspect     Gout     Homonymous hemianopsia     (Right)    Hyperlipidemia     Hypertension     Keratitis     (secondary to dry eye syndrome).  Obesity     Peripheral polyneuropathy     (diabetic)    Pseudophakos     (Right Eye: 2012; Left Eye: 2012) - Dr. Madan Hyde.  Stroke [facility])     (Multiple) MRI of brain 10/2008 shows multiple infarcts involving the temporal and parietal areas.  Trichiasis     (left eye)    Type 2 diabetes mellitus ([facility] [address]        Past Surgical  History:     Past Surgical History:   Procedure Laterality Date    CATARACT REMOVAL WITH IMPLANT  2012    (Right eye) - Dr. Madan Hyde.  CATARACT REMOVAL WITH IMPLANT  2012    (Left eye) - Dr. Madan Hyde.   SECTION      CHOLECYSTECTOMY      CORONARY ARTERY BYPASS GRAFT  12-    (x4) - LIMA-LAD, SVG-diagnoal 1, SVG-OM1, and SVG-PDA. Exploration of left radial. (Dr. Melany Pastor).  EYE SURGERY Left     as a child     GASTRIC BYPASS SURGERY      HYSTERECTOMY      (abdominal)  with left oophorectomy. Right ovary retained.     TONSILLECTOMY AND ADENOIDECTOMY      UPPER GASTROINTESTINAL ENDOSCOPY  2020    EGD BIOPSY performed by Luna Rudolph MD at [facility]       Medications:  bumetanide  2 mg Oral BID    ceftaroline fosamil (TEFLARO) IVPB  400 mg Intravenous Q12H    pantoprazole  40 mg Oral QAM AC    carvedilol  12.5 mg Oral BID WC    sodium chloride flush  10 mL Intravenous 2 times per day    miconazole   Topical BID    heparin (porcine)  5,000 Units Subcutaneous 3 times per day    atorvastatin  40 mg Oral Daily    citalopram  20 mg Oral Daily    clopidogrel  75 mg Oral Daily    insulin glargine  20 Units Subcutaneous Nightly    rivastigmine  1 patch Transdermal Daily    insulin lispro  0-12 Units Subcutaneous TID WC    insulin lispro  0-6 Units Subcutaneous Nightly    darbepoetin hung-polysorbate  60 mcg Subcutaneous Weekly       Social History:     Social History     Socioeconomic History    Marital status:      Spouse name: Not on file    Number of children: Not on file    Years of education: Not on file    Highest education level: Not on file   Occupational History    Not on file   Social Needs    Financial resource strain: Not on file    Food insecurity     Worry: Not on file     Inability: Not on file    Transportation needs     Medical: Not on file     Non-medical: Not on file   Tobacco Use    Smoking status: Never Smoker    Smokeless tobacco: Never Used    Tobacco comment: never smoker eclamb rrt 7/20/17   Substance and Sexual Activity    Alcohol use: No    Drug use: No    Sexual activity: Not on file   Lifestyle    Physical activity     Days per week: Not on file     Minutes per session: Not on file    Stress: Not on file   Relationships    Social connections     Talks on phone: Not on file     Gets together: Not on file     Attends Jehovah's witness service: Not on file     Active member of club or organization: Not on file     Attends meetings of clubs or organizations: Not on file     Relationship status: Not on file    Intimate partner violence     Fear of current or ex partner: Not on file     Emotionally abused: Not on file Physically abused: Not on file     Forced sexual activity: Not on file   Other Topics Concern    Not on file   Social History Narrative    Not on file       Family History:     Family History   Problem Relation Age of Onset    Diabetes Mother     Cancer Mother     High Blood Pressure Mother     Stroke Mother     Kidney Disease Mother     Uterine Cancer Mother     Diabetes Father     Heart Disease Father     Glaucoma Father     Emphysema Father     Coronary Art Dis Other         All 4 siblings.  Stroke Other         1 sibling.  Lung Cancer Other         1 sibling - cause of death lung cancer.  Mental Retardation Other       Medical Decision Making:   I have independently reviewed/ordered the following labs:    CBC with Differential:   Recent Labs     12/21/20  0531 12/22/20  0412   WBC 11.0 7.8   HGB 8.2* 8.1*   HCT 27.1* 27.4*    176   LYMPHOPCT 10* 11*   MONOPCT 9 9     BMP:  Recent Labs     12/21/20  0531 12/22/20  0412    139   K 3.7 4.0    100   CO2 31 29   BUN 55* 52*   CREATININE 2.24* 2.21*   MG 1.9 2.0     Hepatic Function Panel:   Recent Labs     12/21/20  0531 12/22/20  0412   LABALBU 2.8* 2.7*     No results for input(s): RPR in the last 72 hours. No results for input(s): HIV in the last 72 hours. No results for input(s): BC in the last 72 hours. Lab Results   Component Value Date    CREATININE 2.21 12/22/2020    GLUCOSE 144 12/22/2020       Detailed results: Thank you for allowing us to participate in the care of this patient. Please call with questions. This note is created with the assistance of a speech recognition program.  While intending to generate adocument that actually reflects the content of the visit, the document can still have some errors including those of syntax and sound a like substitutions which may escape proof reading. It such instances, actual meaningcan be extrapolated by contextual diversion.     Garcia Nolen

## 2020-12-22 NOTE — PROGRESS NOTES
[facility]  Office: [address], DO, Harvey Joyy, DO, Evan Signs, DO, Joycelyn Sahu Blood, DO, Jas Jones MD, Mignon Meckel, MD, Marium Gaffney MD, Adina Cline MD, Yanet Xiao MD, Daryl Cueto MD, Maribeth Real MD, Dedrick Browne MD, Lopez Nguyen MD, Donta Moran, DO, Edgardo Garcia MD, Isaac Shane MD, Ara Topete, DO, Eloina Law MD,  Melisa Freeman, DO, Isaiah Sam MD, Inge Kruger MD, Kirsty Bautista, CNP, [facility] Elaine, CNP, Jo Chanel, CNP, Harley Day, CNS, Johnna Díaz, CNP, Kimmy Carrington, CNP, Gustavo Clayton, CNP, Arabella Valencia, CNP, Rogelio Whaley, CNP, Aaliyah Bridges PA-C, Lakeisha Lin, [facility], Indy Shell, CNP, Galina Lion, CNP, Zaynab Aguilar, CNP, Yash Valadez, CNP, Caryle Poet, CNP         [facility]   [address] [facility]    Progress Note    12/21/2020    10:21 PM    Name:   Norma Tate  MRN:     [id number]     Acct:      [de-identified]   Room:   [id number] Day:  9  Admit Date:  12/12/2020  4:17 AM    PCP:   ERINN Jose  Code Status:  Full Code    Subjective:     C/C: SOB  Interval History Status: improved      Patient seen and examined at bedside, no acute events overnight.   Continue to improve overall with better breathing and good diuresis  Off lasix drip  Craet is imptoving  Patient vitals, labs and all providers notes were reviewed,from overnight shift and morning updates were noted and discussed with the nurse      Brief History:     Per chart This is a 62-year-old female who initially presented to outside hospital with anemia, hemoglobin of 6.8.  She was given hemoglobin and developed lower extremity edema and was subsequently diuresed.  She was transferred here for acute renal failure.  She was evaluated by nephrology who reviewed her symptoms to [facility] due to intravascular depletion.  She was started on half bicarb drip and IV Lasix.  Her creatinine is slowly improving now.  Patient was evaluated by GI for her anemia: EGD shows mild gastritis.     Blood culture: 12/15 positive for gram negative rods in one culture.  Following blood culture shows MRSA bacteremia    Review of Systems:     Constitutional:  negative for chills, fevers, sweats  Respiratory:  negative for cough. +ve for  dyspnea on exertion, shortness of breath. Cardiovascular:  negative for chest pain, chest pressure/discomfort, lower extremity edema, palpitations  Gastrointestinal:  negative for abdominal pain, constipation, diarrhea, nausea, vomiting  Neurological:  negative for dizziness, headache    Medications: Allergies:     Allergies   Allergen Reactions    Bactrim [Sulfamethoxazole-Trimethoprim] Hives    Clonidine Derivatives     Amoxicillin-Pot Clavulanate Diarrhea, Nausea Only and Nausea And Vomiting       Current Meds:   Scheduled Meds:    furosemide  80 mg Intravenous BID    ceftaroline fosamil (TEFLARO) IVPB  400 mg Intravenous Q12H    pantoprazole  40 mg Oral QAM AC    carvedilol  12.5 mg Oral BID WC    sodium chloride flush  10 mL Intravenous 2 times per day    miconazole   Topical BID    heparin (porcine)  5,000 Units Subcutaneous 3 times per day    atorvastatin  40 mg Oral Daily    citalopram  20 mg Oral Daily    clopidogrel  75 mg Oral Daily    insulin glargine  20 Units Subcutaneous Nightly    rivastigmine  1 patch Transdermal Daily    insulin lispro  0-12 Units Subcutaneous TID WC    insulin lispro  0-6 Units Subcutaneous Nightly Last data filed at 12/21/2020 1700  Gross per 24 hour   Intake 1346.43 ml   Output 2925 ml   Net -1578.57 ml       Labs:  Hematology:  Recent Labs     12/19/20  1110 12/20/20  0549 12/21/20  0531   WBC 5.9 6.9 11.0   RBC 2.57* 2.52* 2.74*   HGB 7.7* 7.4* 8.2*   HCT 26.4* 25.3* 27.1*   .7 100.4 98.9   MCH 30.0 29.4 29.9   MCHC 29.2 29.2 30.3   RDW 16.3* 15.9* 17.0*    188 192   MPV 10.8 10.5 11.1     Chemistry:  Recent Labs     12/19/20  1431 12/20/20  0549 12/21/20  0531    138 138   K 4.3 3.9 3.7    102 101   CO2 26 27 31   GLUCOSE 155* 97 116*   BUN 54* 54* 55*   CREATININE 2.38* 2.32* 2.24*   MG 1.8 1.8 1.9   ANIONGAP 9 9 6*   LABGLOM 20* 21* 22*   GFRAA 24* 25* 26*   CALCIUM 8.4* 8.5* 8.6   PHOS 4.0 3.7 3.2     Recent Labs     12/19/20  1431 12/19/20  1431 12/20/20  0549 12/20/20  0549 12/20/20  2022 12/21/20  0427 12/21/20  0531 12/21/20  0723 12/21/20  1117 12/21/20  1544 12/21/20  1950   LABALBU 2.8*  --  2.8*  --   --   --  2.8*  --   --   --   --    POCGLU  --    < >  --    < > 105 126*  --  112* 159* 148* 155*    < > = values in this interval not displayed.      ABG:  Lab Results   Component Value Date    POCPH 7.284 12/13/2020    POCPCO2 54.5 12/13/2020    POCPO2 71.6 12/13/2020    POCHCO3 25.8 12/13/2020    NBEA 1 12/13/2020    PBEA NOT REPORTED 12/13/2020    RGE7QQJ 28 12/13/2020    ZKNH1ORF 92 12/13/2020    FIO2 2.0 12/13/2020     Lab Results   Component Value Date/Time    [facility] 12/21/2020 05:31 AM     Lab Results   Component Value Date/Time    CULTURE NO GROWTH 9 HOURS 12/21/2020 05:31 AM       Radiology:  Xr Chest Portable    Result Date: 12/16/2020  Stable pulmonary vascular congestion       Physical Examination:        General appearance:  alert, cooperative and no distress, on nasal canula  Mental Status:  oriented to person, place and time and normal affect  Lungs:  clear to auscultation bilaterally, normal effort  Heart:  regular rate and rhythm, no murmur Abdomen:  soft, nontender, nondistended, normal bowel sounds, no masses, hepatomegaly, splenomegaly  Extremities:  2+ edema/ lymphedema. No  Redness or  tenderness in the calves  Skin:  no gross lesions, rashes, induration    Assessment:        Hospital Problems           Last Modified POA    * (Principal) MRSA bacteremia 12/18/2020 Yes    Dementia ([address] 12/12/2020 Yes    Overview Signed 2/27/2014 10:21 AM by Doris Goodwin MD     (moderate)         Hypertension 12/12/2020 Yes    Hyperlipidemia 12/12/2020 Yes    CVA (cerebral vascular accident) ([address] 12/12/2020 Yes    Type 2 diabetes mellitus with hyperglycemia, with long-term current use of insulin ([address] 12/12/2020 Yes    Acute on chronic diastolic heart failure ([address] 12/12/2020 Yes    Acute renal failure (ARF) ([address] 12/15/2020 Yes    Anasarca 12/12/2020 Yes    Gram-negative bacteremia 12/18/2020 Yes    Stage 3 chronic kidney disease 12/17/2020 Yes          Plan:          Acute kidney injury on CKD stage III -patient's renal function is plateau  off  Lasix drip, on IV diuresis , rodriguez still in      MRSA bacteremia and Sphingomonas Bacteremia - On Ceftaroline per ID.  Clnically improving.  Continue Abx per ID recommendation.      Acute on chronic exacerbation of diastolic congestive heart failure. -Patient on Lasix drip.  Continue to monitor.  Continue other home medications. Essential hypertension -stable    Obesity hypoventilation syndrome with obstructive sleep apnea -patient on CPAP as needed. Morbid obesity    Insulin-dependent diabetes mellitus -continue current regimen of insulin. Normocytic normochromic anemia -likely anemia of chronic disease and mild blood loss anemia.     Discussed with the patient and the nurse       Miller Knapp MD  12/21/2020  10:21 PM

## 2020-12-22 NOTE — DISCHARGE INSTR - COC
Continuity of Care Form    Patient Name: Marylou Marques   :  1950  MRN:  [id number]    Admit date:  2020  Discharge date:  20    Code Status Order: Full Code   Advance Directives:   [facility] Documentation       Date/Time Healthcare Directive Type of Healthcare Directive Copy in [address] Agent's Name Healthcare Agent's Phone Number    20 0544  No, patient does not have an advance directive for healthcare treatment -- -- -- -- --            Admitting Physician:  Shari Rubio MD  PCP: ERINN Sharma    Discharging Nurse: Desiree Wang Unit/Room#: 8486/7493-23  Discharging Unit Phone Number: [phone]    Emergency Contact:   Extended Emergency Contact Information  Primary Emergency Contact: Jalen Hernandezois  Address: [address] Phone: [phone]  Mobile Phone: [phone]  Relation: Child    Past Surgical History:  Past Surgical History:   Procedure Laterality Date    CATARACT REMOVAL WITH IMPLANT  2012    (Right eye) - Dr. Magda Gilman.  CATARACT REMOVAL WITH IMPLANT  2012    (Left eye) - Dr. Magda Gilman.   SECTION      CHOLECYSTECTOMY      CORONARY ARTERY BYPASS GRAFT  12-    (x4) - LIMA-LAD, SVG-diagnoal 1, SVG-OM1, and SVG-PDA. Exploration of left radial. (Dr. Shirley Holley).  EYE SURGERY Left     as a child     GASTRIC BYPASS SURGERY      HYSTERECTOMY      (abdominal)  with left oophorectomy. Right ovary retained.     TONSILLECTOMY AND ADENOIDECTOMY      UPPER GASTROINTESTINAL ENDOSCOPY  2020    EGD BIOPSY performed by Galen Rivera [facility] at [facility]       Immunization History:   Immunization History   Administered Date(s) Administered    Influenza Virus Vaccine 10/20/1998, 10/12/2013, 10/27/2014, 10/12/2016    Influenza, High Dose (Fluzone 65 yrs and older) 2018, 10/05/2020  Influenza, Triv, inactivated, subunit, adjuvanted, IM (Fluad 65 yrs and older) 09/26/2019    Pneumococcal Conjugate 13-valent ([id number]) 04/12/2017    Pneumococcal Polysaccharide ([id number]) 02/06/2004, 08/21/2018    Tdap (Boostrix, Adacel) 09/04/2016    Zoster Recombinant (Shingrix) 01/03/2020       Active Problems:  Patient Active Problem List   Diagnosis Code    Dementia ([facility] F03.90    Hypertension I10    Hyperlipidemia E78.5    Class 2 severe obesity with serious comorbidity and body mass index (BMI) of 37.0 to 37.9 in adult ([facility]) E66.01, Z68.37    Gout M10.9    Peripheral polyneuropathy G62.9    Anxiety and depression F41.9, F32.9    Acute kidney injury superimposed on CKD ([facility]) N17.9, N18.9    Balance problem R26.89    CVA (cerebral vascular accident) ([facility] I63.9    Type 2 diabetes mellitus with hyperglycemia, with long-term current use of insulin ([facility]) E11.65, Z79.4    Acute on chronic diastolic heart failure ([facility]) I50.33    Acute cystitis N30.00    Infestation by bed bug B88.8    Infestation by maggots B87.9    Lymphedema of both lower extremities I89.0    PAD (peripheral artery disease) ([facility]) I73.9    Pressure injury of right heel, unstageable ([facility]) L89.610    Acute kidney injury ([facility] N17.9    Bradycardia R00.1    Hyperkalemia E87.5    AV block, 1st degree I44.0    Wounds, multiple T07. XXXA    Buttock wound S31.809A    Decubitus ulcer of trochanteric region of right hip, stage 2 ([facility]) Y02.243    Decompensated heart failure ([facility]) I50.9    Acute anemia D64.9    Acute renal failure (ARF) ([facility]) N17.9    Anasarca R60.1    Gram-negative bacteremia R78.81    Stage 3 chronic kidney disease N18.30    MRSA bacteremia R78.81, B95.62       Isolation/Infection:   Isolation            Contact          Patient Infection Status       Infection Onset Added Last Indicated Last Indicated By Review Planned Expiration Resolved Resolved By MRSA 12/15/20 12/18/20 12/15/20 Culture, Blood 1        Resolved    COVID-19 Rule Out 08/20/20 08/20/20 08/21/20 COVID-19 (Ordered)   08/21/20 Rule-Out Test Resulted            Nurse Assessment:  Last Vital Signs: BP (!) 156/56   Pulse 64   Temp 97.8 °F (36.6 °C) (Oral)   Resp 11   Ht 5' 7\" (1.702 m)   Wt 284 lb 13.4 oz (129.2 kg)   SpO2 100%   BMI 44.61 kg/m²     Last documented pain score (0-10 scale): Pain Level: 0  Last Weight:   Wt Readings from Last 1 Encounters:   12/21/20 284 lb 13.4 oz (129.2 kg)     Mental Status:  oriented and able to concentrate and follow conversation    IV Access:  - PICC - site  R Upper Arm, insertion date: 12/23/20    Nursing Mobility/ADLs:  Walking   Dependent  Transfer  Dependent  Bathing  Dependent  Dressing  Dependent  Toileting  Dependent  Feeding  Assisted  Med Admin  Assisted  Med Delivery   whole    Wound Care Documentation and Therapy:  Wound 08/07/20 Heel Right (Active)   Number of days: 136       Wound 08/07/20 Tibial Left multiple scattered wounds (Active)   Number of days: 136       Wound 08/10/20 Ischium Left (Active)   Number of days: 133       Wound 08/10/20 Hip Right Trochanter; CLUSTER OF WOUNDS (Active)   Number of days: 133       Wound 08/10/20 Hip Right (Active)   Number of days: 133        Elimination:  Continence:   · Bowel: No  · Bladder: No  Urinary Catheter: Removal Date 12/23/20 , 1100, voided 300per purwick at 1500,bladder scan 52 ml pvr   Colostomy/Ileostomy/Ileal Conduit: No       Date of Last BM: 12/23/20      Intake/Output Summary (Last 24 hours) at 12/22/2020 1435  Last data filed at 12/21/2020 1700  Gross per 24 hour   Intake    Output 750 ml   Net -750 ml     I/O last 3 completed shifts: In: 65 [P.O.:480; IV Piggyback:50]  Out: [facility] [Urine:1675]    Safety Concerns:      At Risk for Falls    Impairments/Disabilities:      None    Nutrition Therapy:  Current Nutrition Therapy:   - Oral Diet:  Carb Control 4 carbs/meal (1800kcals/day) Routes of Feeding: Oral  Liquids: No Restrictions  Daily Fluid Restriction: no  Last Modified Barium Swallow with Video (Video Swallowing Test): not done    Treatments at the Time of Hospital Discharge:   Respiratory Treatments: yes Oxygen Therapy:  is on oxygen at 4 L/min per nasal cannula. Ventilator:    - No ventilator support    Rehab Therapies: Physical Therapy and Occupational Therapy  Weight Bearing Status/Restrictions: No weight bearing restirctions  Other Medical Equipment (for information only, NOT a DME order):  wheelchair  Other Treatments: ***    Patient's personal belongings (please select all that are sent with patient):  Dentures upper    RN SIGNATURE:  Electronically signed by Rafael Carrasquillo RN on 12/23/20 at 4:29 PM EST    CASE MANAGEMENT/SOCIAL WORK SECTION    Inpatient Status Date: 12/12/2020    Readmission Risk Assessment Score:  Readmission Risk              Risk of Unplanned Readmission:        38           Discharging to Facility/ Agency   · Name:    [address] [place] [address]  · Address:    Gonsalo Dubose  · Phone:    [phone]  · Fax:     [phone]    Dialysis Facility (if applicable)   · Name:  · Address:  · Dialysis Schedule:  · Phone:  · Fax:    / signature: Electronically signed by Kallie Little RN on 12/22/20 at 5:04 PM EST    PHYSICIAN SECTION    Prognosis: Fair    Condition at Discharge: Stable    Rehab Potential (if transferring to Rehab): Fair    Recommended Labs or Other Treatments After Discharge:    F/U with nephrology as OP with repeat BMP  Abx till     Physician Certification: I certify the above information and transfer of Lalita Wan  is necessary for the continuing treatment of the diagnosis listed and that she requires [facility] for greater 30 days.      Update Admission H&P: As in [facility]:  Electronically signed by Tom David MD on 12/22/20 at 2:36 PM EST

## 2020-12-23 ENCOUNTER — APPOINTMENT (OUTPATIENT)
Dept: INTERVENTIONAL RADIOLOGY/VASCULAR | Age: 70
DRG: 682 | End: 2020-12-23
Attending: INTERNAL MEDICINE
Payer: MEDICARE

## 2020-12-23 VITALS
SYSTOLIC BLOOD PRESSURE: 150 MMHG | BODY MASS INDEX: 44.67 KG/M2 | DIASTOLIC BLOOD PRESSURE: 62 MMHG | TEMPERATURE: 97.8 F | HEIGHT: 67 IN | RESPIRATION RATE: 10 BRPM | WEIGHT: 284.61 LBS | OXYGEN SATURATION: 100 % | HEART RATE: 73 BPM

## 2020-12-23 LAB
ABSOLUTE EOS #: 0.12 K/UL (ref 0–0.44)
ABSOLUTE IMMATURE GRANULOCYTE: 0.03 K/UL (ref 0–0.3)
ABSOLUTE LYMPH #: 1.03 K/UL (ref 1.1–3.7)
ABSOLUTE MONO #: 0.76 K/UL (ref 0.1–1.2)
ALBUMIN SERPL-MCNC: 2.8 G/DL (ref 3.5–5.2)
ANION GAP SERPL CALCULATED.3IONS-SCNC: 8 MMOL/L (ref 9–17)
BASOPHILS # BLD: 0 % (ref 0–2)
BASOPHILS ABSOLUTE: 0.03 K/UL (ref 0–0.2)
BUN BLDV-MCNC: 49 MG/DL (ref 8–23)
BUN/CREAT BLD: ABNORMAL (ref 9–20)
CALCIUM SERPL-MCNC: 9 MG/DL (ref 8.6–10.4)
CHLORIDE BLD-SCNC: 100 MMOL/L (ref 98–107)
CO2: 33 MMOL/L (ref 20–31)
CREAT SERPL-MCNC: 2.16 MG/DL (ref 0.5–0.9)
CULTURE: ABNORMAL
DIFFERENTIAL TYPE: ABNORMAL
EOSINOPHILS RELATIVE PERCENT: 2 % (ref 1–4)
GFR AFRICAN AMERICAN: 27 ML/MIN
GFR NON-AFRICAN AMERICAN: 23 ML/MIN
GFR SERPL CREATININE-BSD FRML MDRD: ABNORMAL ML/MIN/{1.73_M2}
GFR SERPL CREATININE-BSD FRML MDRD: ABNORMAL ML/MIN/{1.73_M2}
GLUCOSE BLD-MCNC: 108 MG/DL (ref 70–99)
GLUCOSE BLD-MCNC: 111 MG/DL (ref 65–105)
HCT VFR BLD CALC: 29.3 % (ref 36.3–47.1)
HEMOGLOBIN: 8.6 G/DL (ref 11.9–15.1)
IMMATURE GRANULOCYTES: 0 %
LYMPHOCYTES # BLD: 13 % (ref 24–43)
Lab: ABNORMAL
Lab: ABNORMAL
MAGNESIUM: 1.9 MG/DL (ref 1.6–2.6)
MCH RBC QN AUTO: 29.2 PG (ref 25.2–33.5)
MCHC RBC AUTO-ENTMCNC: 29.4 G/DL (ref 28.4–34.8)
MCV RBC AUTO: 99.3 FL (ref 82.6–102.9)
MONOCYTES # BLD: 10 % (ref 3–12)
NRBC AUTOMATED: 0 PER 100 WBC
PDW BLD-RTO: 17.2 % (ref 11.8–14.4)
PHOSPHORUS: 2.9 MG/DL (ref 2.6–4.5)
PLATELET # BLD: 188 K/UL (ref 138–453)
PLATELET ESTIMATE: ABNORMAL
PMV BLD AUTO: 10.9 FL (ref 8.1–13.5)
POTASSIUM SERPL-SCNC: 3.8 MMOL/L (ref 3.7–5.3)
RBC # BLD: 2.95 M/UL (ref 3.95–5.11)
RBC # BLD: ABNORMAL 10*6/UL
SEG NEUTROPHILS: 75 % (ref 36–65)
SEGMENTED NEUTROPHILS ABSOLUTE COUNT: 5.73 K/UL (ref 1.5–8.1)
SODIUM BLD-SCNC: 141 MMOL/L (ref 135–144)
SPECIMEN DESCRIPTION: ABNORMAL
SPECIMEN DESCRIPTION: ABNORMAL
WBC # BLD: 7.7 K/UL (ref 3.5–11.3)
WBC # BLD: ABNORMAL 10*3/UL

## 2020-12-23 PROCEDURE — C1751 CATH, INF, PER/CENT/MIDLINE: HCPCS

## 2020-12-23 PROCEDURE — 94761 N-INVAS EAR/PLS OXIMETRY MLT: CPT

## 2020-12-23 PROCEDURE — 6360000002 HC RX W HCPCS: Performed by: INTERNAL MEDICINE

## 2020-12-23 PROCEDURE — 6370000000 HC RX 637 (ALT 250 FOR IP): Performed by: INTERNAL MEDICINE

## 2020-12-23 PROCEDURE — 82947 ASSAY GLUCOSE BLOOD QUANT: CPT

## 2020-12-23 PROCEDURE — 99232 SBSQ HOSP IP/OBS MODERATE 35: CPT | Performed by: INTERNAL MEDICINE

## 2020-12-23 PROCEDURE — 36415 COLL VENOUS BLD VENIPUNCTURE: CPT

## 2020-12-23 PROCEDURE — 02HV33Z INSERTION OF INFUSION DEVICE INTO SUPERIOR VENA CAVA, PERCUTANEOUS APPROACH: ICD-10-PCS | Performed by: FAMILY MEDICINE

## 2020-12-23 PROCEDURE — 2700000000 HC OXYGEN THERAPY PER DAY

## 2020-12-23 PROCEDURE — 2580000003 HC RX 258: Performed by: INTERNAL MEDICINE

## 2020-12-23 PROCEDURE — 2709999900 HC NON-CHARGEABLE SUPPLY

## 2020-12-23 PROCEDURE — 80069 RENAL FUNCTION PANEL: CPT

## 2020-12-23 PROCEDURE — 83735 ASSAY OF MAGNESIUM: CPT

## 2020-12-23 PROCEDURE — 99232 SBSQ HOSP IP/OBS MODERATE 35: CPT | Performed by: FAMILY MEDICINE

## 2020-12-23 PROCEDURE — 85025 COMPLETE CBC W/AUTO DIFF WBC: CPT

## 2020-12-23 PROCEDURE — 6370000000 HC RX 637 (ALT 250 FOR IP): Performed by: NURSE PRACTITIONER

## 2020-12-23 PROCEDURE — 36573 INSJ PICC RS&I 5 YR+: CPT

## 2020-12-23 RX ORDER — BUMETANIDE 2 MG/1
2 TABLET ORAL 2 TIMES DAILY
Qty: 30 TABLET | Refills: 3 | Status: ON HOLD | OUTPATIENT
Start: 2020-12-23 | End: 2021-01-11 | Stop reason: HOSPADM

## 2020-12-23 RX ORDER — CARVEDILOL 12.5 MG/1
12.5 TABLET ORAL 2 TIMES DAILY WITH MEALS
Qty: 60 TABLET | Refills: 3 | Status: SHIPPED | OUTPATIENT
Start: 2020-12-23 | End: 2021-03-10 | Stop reason: SDUPTHER

## 2020-12-23 RX ADMIN — ANTI-FUNGAL POWDER MICONAZOLE NITRATE TALC FREE: 1.42 POWDER TOPICAL at 11:32

## 2020-12-23 RX ADMIN — CEFTAROLINE FOSAMIL 400 MG: 400 POWDER, FOR SOLUTION INTRAVENOUS at 05:38

## 2020-12-23 RX ADMIN — BUMETANIDE 2 MG: 1 TABLET ORAL at 07:42

## 2020-12-23 RX ADMIN — CITALOPRAM 20 MG: 20 TABLET, FILM COATED ORAL at 07:43

## 2020-12-23 RX ADMIN — PANTOPRAZOLE SODIUM 40 MG: 40 TABLET, DELAYED RELEASE ORAL at 07:43

## 2020-12-23 RX ADMIN — CLOPIDOGREL 75 MG: 75 TABLET, FILM COATED ORAL at 07:43

## 2020-12-23 RX ADMIN — HEPARIN SODIUM 5000 UNITS: 5000 INJECTION INTRAVENOUS; SUBCUTANEOUS at 05:38

## 2020-12-23 RX ADMIN — SODIUM CHLORIDE, PRESERVATIVE FREE 10 ML: 5 INJECTION INTRAVENOUS at 07:42

## 2020-12-23 RX ADMIN — CARVEDILOL 12.5 MG: 12.5 TABLET, FILM COATED ORAL at 17:47

## 2020-12-23 RX ADMIN — DESMOPRESSIN ACETATE 40 MG: 0.2 TABLET ORAL at 11:31

## 2020-12-23 ASSESSMENT — ENCOUNTER SYMPTOMS
ABDOMINAL PAIN: 0
BACK PAIN: 0
APNEA: 0
EYE REDNESS: 0
COLOR CHANGE: 0

## 2020-12-23 NOTE — BRIEF OP NOTE
Brief Postoperative Note    Iveth Barba  YOB: 1950  [id number]    Pre-operative Diagnosis: Bacteremia     Post-operative Diagnosis: Same    Procedure: Picc Placement    Anesthesia: Local      Surgeons/Assistants: Ara Arias PA-C and Callie Vasques MD    Estimated Blood Loss: Minimal    Complications: none    Specimens: were not obtained    Procedure: Picc Placement. 4Fr single lumen Power Picc placed in right upper extremity. Dressings applied, PICC secured to skin. May use picc.       Electronically signed by ERINN Watson on 12/23/2020 at 3:18 PM

## 2020-12-23 NOTE — PROGRESS NOTES
[facility]  Office: [address], DO, Dai Schulz, DO, Davide Patee, DO, Graciela Hardinbeny Blood, DO, Han Barlow MD, Carly Mariano MD, Chris Gallegos MD, Durga Ambrocio MD, Phil Hagan MD, Grayson Frazier MD, Kalina Velez MD, Scar Daniel MD, Lopez Ross MD, Laurel Rodarte, DO, Vera Campos MD, Anel Jeffers MD, Nadya Scarce, DO, Phil Avitia MD,  Ambika Rota, DO, Jb Tsai MD, Leigh Bacon MD, Nelson Garibay, CNP, [facility] Sahu, CNP, Pebbles Beck, CNP, Carmelo Fregoso, CNS, Mal Reyna, CNP, Madelin Thompson, CNP, Wali Vásquez, CNP, Heddy Sacks, CNP, Alicia Fajardo, CNP, Ryann Perez PA-C, Karol Delgado DNP, Yumiko Grimm, CNP, Alee Colin, CNP, Raul Moura, CNP, Josué Barriga, CNP, Ehsan Zuniga, CNP         [facility]   [address] [facility]    Progress Note    12/23/2020    2:11 PM    Name:   Colton Dance  MRN:     [id number]     Acct:      [de-identified]   Room:   3752/8579-91   Day:  6  Admit Date:  12/12/2020  4:17 AM    PCP:   ERINN Christianson  Code Status:  Full Code    Subjective:     C/C: SOB  Interval History Status: improved      Patient seen and examined at bedside, no acute events overnight.   Feeling and looking better , good urine output overnight   Craet is about the same  Still on nasal cannula  I discussed with her PICC line and consented her  Patient vitals, labs and all providers notes were reviewed,from overnight shift and morning updates were noted and discussed with the nurse    Brief History:     Per chart This is a 28-year-old female who initially presented to outside hospital with anemia, hemoglobin of 6.8.  She was given hemoglobin and developed lower extremity edema and was subsequently diuresed.  She was transferred here for acute renal failure.  She was evaluated by nephrology who reviewed her symptoms to [facility] due to intravascular depletion.  She was started on half bicarb drip and IV Lasix.  Her creatinine is slowly improving now.  Patient was evaluated by GI for her anemia: EGD shows mild gastritis.     Blood culture: 12/15 positive for gram negative rods in one culture.  Following blood culture shows MRSA bacteremia    Review of Systems:     Constitutional:  negative for chills, fevers, sweats  Respiratory:  negative for cough. +ve for  dyspnea on exertion, shortness of breath. Cardiovascular:  negative for chest pain, chest pressure/discomfort, lower extremity edema, palpitations  Gastrointestinal:  negative for abdominal pain, constipation, diarrhea, nausea, vomiting  Neurological:  negative for dizziness, headache    Medications: Allergies:     Allergies   Allergen Reactions    Bactrim [Sulfamethoxazole-Trimethoprim] Hives    Clonidine Derivatives     Amoxicillin-Pot Clavulanate Diarrhea, Nausea Only and Nausea And Vomiting       Current Meds:   Scheduled Meds:    bumetanide  2 mg Oral BID    pantoprazole  40 mg Oral QAM AC    carvedilol  12.5 mg Oral BID WC    sodium chloride flush  10 mL Intravenous 2 times per day    miconazole   Topical BID    heparin (porcine)  5,000 Units Subcutaneous 3 times per day    atorvastatin  40 mg Oral Daily    citalopram  20 mg Oral Daily    clopidogrel  75 mg Oral Daily    insulin glargine  20 Units Subcutaneous Nightly    rivastigmine  1 patch Transdermal Daily    insulin lispro  0-12 Units Subcutaneous TID WC    insulin lispro  0-6 Units Subcutaneous Nightly    darbepoetin hung-polysorbate  60 mcg Subcutaneous Weekly     Continuous Infusions:  dextrose       PRN Meds: hydrALAZINE, glucose, dextrose, glucagon (rDNA), dextrose, sodium chloride flush, nicotine, promethazine **OR** ondansetron, acetaminophen **OR** acetaminophen, albuterol    Data:     Past Medical History:   has a past medical history of Anxiety and depression, Background diabetic retinopathy(362.01), Balance problem, CAD (coronary artery disease), Cancer of uterus ([facility] Chronic kidney disease, Chronic low back pain, CVA (cerebral vascular accident) ([facility] Dementia ([facility] Dry eye syndrome, GERD (gastroesophageal reflux disease), Glaucoma suspect, Gout, Homonymous hemianopsia, Hyperlipidemia, Hypertension, Keratitis, Obesity, Peripheral polyneuropathy, Pseudophakos, Stroke ([facility] Trichiasis, and Type 2 diabetes mellitus ([facility] Social History:   reports that she has never smoked. She has never used smokeless tobacco. She reports that she does not drink alcohol or use drugs. Family History:   Family History   Problem Relation Age of Onset    Diabetes Mother     Cancer Mother     High Blood Pressure Mother    Hue Kang Stroke Mother     Kidney Disease Mother     Uterine Cancer Mother     Diabetes Father     Heart Disease Father     Glaucoma Father     Emphysema Father     Coronary Art Dis Other         All 4 siblings.  Stroke Other         1 sibling.  Lung Cancer Other         1 sibling - cause of death lung cancer.  Mental Retardation Other        Vitals:  BP (!) 150/62   Pulse 62   Temp 97.8 °F (36.6 °C) (Oral)   Resp 10   Ht 5' 7\" (1.702 m)   Wt 284 lb 9.8 oz (129.1 kg)   SpO2 100%   BMI 44.58 kg/m²   Temp (24hrs), Av.9 °F (36.6 °C), Min:97.6 °F (36.4 °C), Max:98.3 °F (36.8 °C)    Recent Labs     20  1106 20  1544 20  1130   POCGLU 197* 174* 170* 111*       I/O (24Hr):     Intake/Output Summary (Last 24 hours) at 2020 1411  Last data filed at 2020 1830  Gross per 24 hour   Intake 752 ml   Output 1500 ml Net -748 ml       Labs:  Hematology:  Recent Labs     12/21/20  0531 12/22/20  0412 12/23/20  0555   WBC 11.0 7.8 7.7   RBC 2.74* 2.73* 2.95*   HGB 8.2* 8.1* 8.6*   HCT 27.1* 27.4* 29.3*   MCV 98.9 100.4 99.3   MCH 29.9 29.7 29.2   MCHC 30.3 29.6 29.4   RDW 17.0* 17.4* 17.2*    176 188   MPV 11.1 11.0 10.9     Chemistry:  Recent Labs     12/21/20  0531 12/22/20  0412 12/23/20  0555    139 141   K 3.7 4.0 3.8    100 100   CO2 31 29 33*   GLUCOSE 116* 144* 108*   BUN 55* 52* 49*   CREATININE 2.24* 2.21* 2.16*   MG 1.9 2.0 1.9   ANIONGAP 6* 10 8*   LABGLOM 22* 22* 23*   GFRAA 26* 27* 27*   CALCIUM 8.6 8.7 9.0   PHOS 3.2 3.0 2.9     Recent Labs     12/21/20  0531 12/21/20  0531 12/21/20  1544 12/21/20  1950 12/22/20  0412 12/22/20  1106 12/22/20  1544 12/22/20  2006 12/23/20  0555 12/23/20  1130   LABALBU 2.8*  --   --   --  2.7*  --   --   --  2.8*  --    POCGLU  --    < > 148* 155*  --  197* 174* 170*  --  111*    < > = values in this interval not displayed.      ABG:  Lab Results   Component Value Date    POCPH 7.284 12/13/2020    POCPCO2 54.5 12/13/2020    POCPO2 71.6 12/13/2020    POCHCO3 25.8 12/13/2020    NBEA 1 12/13/2020    PBEA NOT REPORTED 12/13/2020    CLZ2PMN 28 12/13/2020    YZXF3KUJ 92 12/13/2020    FIO2 2.0 12/13/2020     Lab Results   Component Value Date/Time    SPECIAL [facility] 12/21/2020 05:31 AM     Lab Results   Component Value Date/Time    CULTURE NO GROWTH 2 DAYS 12/21/2020 05:31 AM       Radiology:  Marlo Hollie Chest Portable    Result Date: 12/16/2020  Stable pulmonary vascular congestion       Physical Examination:        General appearance:  alert, cooperative and no distress, on nasal canula  Mental Status:  oriented to person, place and time and normal affect  Lungs:  clear to auscultation bilaterally, normal effort  Heart:  regular rate and rhythm, no murmur  Abdomen:  soft, nontender, nondistended, normal bowel sounds, no masses, hepatomegaly, splenomegaly Extremities:  2+ edema/ lymphedema. No  Redness or  tenderness in the calves  Skin:  no gross lesions, rashes, induration    Assessment:        Hospital Problems           Last Modified POA    * (Principal) MRSA bacteremia 12/18/2020 Yes    Dementia ([address] 12/12/2020 Yes    Overview Signed 2/27/2014 10:21 AM by Rosita Hernandes MD     (moderate)         Hypertension 12/12/2020 Yes    Hyperlipidemia 12/12/2020 Yes    CVA (cerebral vascular accident) ([address] 12/12/2020 Yes    Type 2 diabetes mellitus with hyperglycemia, with long-term current use of insulin ([address] 12/12/2020 Yes    Acute on chronic diastolic heart failure ([address] 12/12/2020 Yes    Acute renal failure (ARF) ([address] 12/15/2020 Yes    Anasarca 12/12/2020 Yes    Gram-negative bacteremia 12/18/2020 Yes    Stage 3 chronic kidney disease 12/17/2020 Yes          Plan:          Acute kidney injury on CKD stage III : patient's renal function is plateau   Needed lasix drip the IV BID. Nephrology is switching her IV diuresis to oral and want to monitor one more day, they are discussing taking Rodriguez out in a.m.      MRSA bacteremia and Sphingomonas Bacteremia - On Ceftaroline per ID.  Clnically improving.  Continue Abx per ID recommendation.     Consent for PICC line done as repeat cultures been negative so far    Acute on chronic exacerbation of diastolic congestive heart failure. -Patient on Lasix drip.  Continue to monitor.  Continue other home medications. Essential hypertension -stable    Obesity hypoventilation syndrome with obstructive sleep apnea -patient on CPAP as needed. Morbid obesity    Insulin-dependent diabetes mellitus -continue current regimen of insulin. Normocytic normochromic anemia -likely anemia of chronic disease and mild blood loss anemia.     Discussed with the patient and the nurse    Discussed with nephrology ( Dr Blake Overton) , ok for DC    I PAPO rodriguez and will void trial Can be discharged once PICC line inserted , If fail void trial will reinsert rodriguez and arrange for urology F/U as OP    Payal Carrillo MD  12/23/2020  2:11 PM

## 2020-12-23 NOTE — PROGRESS NOTES
PICC LINE PLACEMENT:RIGHT ARM  LUIS CARLOS QuevedoRN  [address]  M.-TECH    PATIENT TO IR , IDENTIFIED, ALLERGIES CONFIRMED. SUPINE, MONITOR ON. STABLE. TIME OUT PERFORMED. PREP TO RIGHT ARM  PER LUIS CARLOS PLASENCIA  PICC INSERTED WITH OUT INCIDENT. DRESSING APPLIED TO SECURE LINE-. BLEEDING AT INSERTION SITE -THROMBIX PAD AND 2X2 PLACED OVERTOP-THEN PICC LINE DRESSING -NO PROBLEMS NOTED. REPORT GIVEN TO RN-STABLE. OFF MONITOR AND READY FOR TRANSPORT.

## 2020-12-23 NOTE — CARE COORDINATION
Transitional Planning  Call to [facility] ([phone]) at [address], notified of discharge today and transport scheduled at 6:30 pm. BERTHA to be faxed to [phone].    [phone] - AVS with completed BERTHA faxed to [address]. RN to call report to [phone].       Discharge [facility] Case Management Department  Written by: Paola Gutierrez RN    Patient Name: Rajwinder Hsu  Attending Provider: Bryson Dewitt MD  Admit Date: 2020  4:17 AM  MRN: [id number]  Account: [de-identified]                     : 1950  Discharge Date:  20       Disposition: Kimberly Nielson RN

## 2020-12-23 NOTE — PROGRESS NOTES
NEPHROLOGY PROGRESS NOTE      SUBJECTIVE     Hospitalized with exertional shortness of breath/lower worsening lower extremity swelling and noted to be having anemia along with decompensated congestive heart failure and acute kidney injury. Currently on oral Bumex. Urine output good. Overall negative fluid balance. Weight has improved since admission. AAO x3. Able to converse appropriately. No acute events overnight. Stable for discharge from nephrology standpoint. OBJECTIVE     Vitals:    12/23/20 0710 12/23/20 1122 12/23/20 1125 12/23/20 1129   BP:   (!) 150/62    Pulse:   62    Resp:  11 10    Temp:   97.8 °F (36.6 °C) 97.8 °F (36.6 °C)   TempSrc:   Oral Oral   SpO2: 100% 100% 100%    Weight:       Height:         24HR INTAKE/OUTPUT:      Intake/Output Summary (Last 24 hours) at 12/23/2020 1337  Last data filed at 12/22/2020 1830  Gross per 24 hour   Intake 752 ml   Output 1500 ml   Net -748 ml       General appearance:Awake, alert,  HEENT: PERRLA  Respiratory[de-identified] Shortness of breath. Bilateral wheezes and basilar rales. Cardiovascular:S1 S2 normal,no gallop or organic murmur. Abdomen:Non tender/non distended. Bowel sounds present  Extremities: No Cyanosis or Clubbing, present lower extremity edema  Neurological:Alert and oriented. No abnormalities of mood, affect, memory, mentation, or behavior are noted      MEDICATIONS     Scheduled Meds:    bumetanide  2 mg Oral BID    pantoprazole  40 mg Oral QAM AC    carvedilol  12.5 mg Oral BID WC    sodium chloride flush  10 mL Intravenous 2 times per day    miconazole   Topical BID    heparin (porcine)  5,000 Units Subcutaneous 3 times per day    atorvastatin  40 mg Oral Daily    citalopram  20 mg Oral Daily    clopidogrel  75 mg Oral Daily    insulin glargine  20 Units Subcutaneous Nightly    rivastigmine  1 patch Transdermal Daily    insulin lispro  0-12 Units Subcutaneous TID WC    insulin lispro  0-6 Units Subcutaneous Nightly  darbepoetin hung-polysorbate  60 mcg Subcutaneous Weekly     Continuous Infusions:    dextrose       PRN Meds:  hydrALAZINE, glucose, dextrose, glucagon (rDNA), dextrose, sodium chloride flush, nicotine, promethazine **OR** ondansetron, acetaminophen **OR** acetaminophen, albuterol  Home Meds:                Medications Prior to Admission: aspirin 81 MG tablet, Take 1 tablet by mouth daily  senna (SENOKOT) 8.6 MG tablet, Take 1 tablet by mouth 2 times daily  oxybutynin (DITROPAN) 5 MG tablet, TAKE 1 TABLET BY MOUTH TWICE DAILY  losartan (COZAAR) 50 MG tablet, Take 1 tablet by mouth daily  OXYGEN, Oxgen at 2 liters per nasal cannula  loratadine (CLARITIN) 10 MG tablet, TAKE 1 TABLET(10 MG) BY MOUTH DAILY  Lancets MISC, Checks blood sugar ac and HS  blood glucose monitor strips, Test 3  times a day & as needed for symptoms of irregular blood glucose. Dispense sufficient amount for indicated testing frequency plus additional to accommodate PRN testing needs. Alcohol Swabs (ALCOHOL PREP) PADS, Checks blood sugar 3 times a day  Misc.  Devices MISC, Standard walker with wheels  Diagnosis dementia, CHF  atorvastatin (LIPITOR) 40 MG tablet, TAKE 1 TABLET BY MOUTH EVERYDAY  bumetanide (BUMEX) 1 MG tablet, Take 1 tablet by mouth daily  citalopram (CELEXA) 20 MG tablet, Take 1 tablet by mouth daily  clopidogrel (PLAVIX) 75 MG tablet, Take 1 tablet by mouth daily  insulin lispro (HUMALOG) 100 UNIT/ML injection vial, Use 4 times a day per sliding scale  hydrALAZINE (APRESOLINE) 50 MG tablet, Take 1.5 tablets by mouth every 8 hours  insulin glargine (LANTUS) 100 UNIT/ML injection vial, Inject 20 Units into the skin nightly  memantine (NAMENDA) 10 MG tablet, TAKE 1 TABLET BY MOUTH TWICE DAILY  omeprazole (PRILOSEC) 20 MG delayed release capsule, Take 1 capsule by mouth every morning (before breakfast)  rivastigmine (EXELON) 9.5 MG/24HR, APPLY 1 NEW PATCH EVERY 24 HOURS  Diabetic Shoe MISC, by Does not apply route

## 2020-12-24 NOTE — DISCHARGE SUMMARY
[facility]  Office: [address], DO, Thad Bishoponur, DO, Remington Hinojosa, DO, Lauri Stout, DO, Alexandre Florez MD, Darrius Farr MD, Delia Nielsen MD, Ajith Waller MD, Kelly Malave MD, Stan Rollins MD, Cecilio Costa MD, Mark Yañez MD, Mbonu Fleet Cowden, MD, Jono Gross DO, Samra Lee MD, Suellen Garcia MD, Roxana Stanley, DO, Darnell Phillip MD,  Henry Wilkes DO, Anderson Cali MD, Luna Diehl MD, Mariah Dumont, [facility], [facility], CNP, Xena Lino, CNP, Larisa Dominique, [facility], Angel Romero, CNP, Kallie Hall, CNP, Hugo Guadalupe, CNP, Frank Gutierrez, CNP, Gomez Sheehan, CNP, Lamar Gonsalves PA-C, Loreta Galloway, [facility], Ethel Escalante, CNP, Radha Leon, CNP, Cecily Leon, CNP, Petra Anguiano, CNP, Duc Armendariz, [facility]   [address] [facility]    Discharge Summary     Patient ID: Celeste Plasencia  :  1950   MRN: [id number]     ACCOUNT:  [de-identified]   Patient's PCP: ERINN Noriega  Admit Date: 2020   Discharge Date: 2020    Length of Stay: 11  Code Status:  Prior  Admitting Physician: Ajith Waller MD  Discharge Physician: Ajith Waller MD     Active Discharge Diagnoses:     Hospital Problem Lists:  Principal Problem:    MRSA bacteremia  Active Problems:    Dementia ([facility]    Hypertension    Hyperlipidemia    CVA (cerebral vascular accident) ([facility] [facility]    Type 2 diabetes mellitus with hyperglycemia, with long-term current use of insulin ([facility]    Acute on chronic diastolic heart failure (HCC)    Acute renal failure (ARF) ([facility]    Anasarca    Gram-negative bacteremia    Stage 3 chronic kidney disease  Resolved Problems:    * No resolved hospital problems.  *      Admission Condition:  poor     Discharged Condition: stable    Hospital Stay:     Hospital Course:    Per chart    This is a 81-year-old female who initially presented to outside hospital with anemia, hemoglobin of 6.8.  She was given hemoglobin and developed lower extremity edema and was subsequently diuresed.  She was transferred here for acute renal failure.  She was evaluated by nephrology who reviewed her symptoms to [facility] due to intravascular depletion.  She was started on half bicarb drip and IV Lasix.  Her creatinine is slowly improving now.  Patient was evaluated by GI for her anemia: EGD shows mild gastritis.     Blood culture: 12/15 positive for gram negative rods in one culture.  Following blood culture shows MRSA bacteremia     During the admission patient was managed as follow  Acute kidney injury on CKD stage III : patient's renal function is plateau   Needed lasix drip the IV BID.    Nephrology is switching her IV diuresis to oral and want to monitor one more day, they are discussing taking Rodriguez out in a.m.       MRSA bacteremia and Sphingomonas Bacteremia - On Ceftaroline per ID.  Clnically improving.  Continue Abx per ID recommendation.     Consent for PICC line done as repeat cultures been negative so far     Acute on chronic exacerbation of diastolic congestive heart failure. -Patient on Lasix drip.  Continue to monitor.  Continue other home medications.     Essential hypertension -stable     Obesity hypoventilation syndrome with obstructive sleep apnea -patient on CPAP as needed.     Morbid obesity     Insulin-dependent diabetes mellitus -continue current regimen of insulin.     Normocytic normochromic anemia -likely anemia of chronic disease and mild blood loss anemia.     Discussed with the patient and the nurse     Discussed with nephrology ( Dr Summer Levi) , ok for DC     I DC rodriguez and will void trial  , did oky     Med rec done  Scripts added   BERTHA signed  30+ minutes spent        Significant therapeutic interventions:     Significant Diagnostic Studies:     Radiology:  Ir Picc Wo Sq Port/pump > 5 Years Result Date: 12/23/2020  Successful ultrasound and fluoroscopy guided PICC placement       Consultations:    Consults:     Final Specialist Recommendations/Findings:   IP CONSULT TO CARDIOLOGY  IP CONSULT TO NEPHROLOGY  IP CONSULT TO GI  IP CONSULT TO IV TEAM  IP CONSULT TO INFECTIOUS DISEASES      The patient was seen and examined on day of discharge and this discharge summary is in conjunction with any daily progress note from day of discharge.     Discharge plan:     Disposition: [facility]    Physician Follow Up:     [facility]  [address] [facility] [address] [facility]  [address] [facility] [address] [phone]  In 2 weeks  Call for follow up - [facility]  [address] [place] [address]  [phone]  Schedule an appointment as soon as possible for a visit in 4 weeks  Cardiology       Requiring Further Evaluation/Follow Up POST HOSPITALIZATION/Incidental Findings:     Diet: cardiac diet, diabetic diet and renal diet    Activity: As tolerated    Instructions to Patient:     Discharge Medications:      Medication List      START taking these medications    carvedilol 12.5 MG tablet  Commonly known as: COREG  Take 1 tablet by mouth 2 times daily (with meals)     ceftaroline fosamil 400 MG Solr  Commonly known as: TEFLARO  Infuse 400 mg intravenously every 12 hours Stop after 1/18/21 and pull the line        CHANGE how you take these medications    bumetanide 2 MG tablet  Commonly known as: BUMEX  Take 1 tablet by mouth 2 times daily  What changed:   · medication strength  · how much to take  · when to take this        CONTINUE taking these medications    Alcohol Prep Pads  Checks blood sugar 3 times a day     aspirin 81 MG tablet  Take 1 tablet by mouth daily     atorvastatin 40 MG tablet  Commonly known as: LIPITOR  TAKE 1 TABLET BY MOUTH EVERYDAY     blood glucose test strips Test 3  times a day & as needed for symptoms of irregular blood glucose. Dispense sufficient amount for indicated testing frequency plus additional to accommodate PRN testing needs. citalopram 20 MG tablet  Commonly known as: CELEXA  Take 1 tablet by mouth daily     clopidogrel 75 MG tablet  Commonly known as: Plavix  Take 1 tablet by mouth daily     Compression Stockings Misc  by Does not apply route 20-30 mm Hg bilateral knee high     Diabetic Shoe Misc  by Does not apply route     hydrALAZINE 50 MG tablet  Commonly known as: APRESOLINE  Take 1.5 tablets by mouth every 8 hours     insulin glargine 100 UNIT/ML injection vial  Commonly known as: Lantus  Inject 20 Units into the skin nightly     insulin lispro 100 UNIT/ML injection vial  Commonly known as: HumaLOG  Use 4 times a day per sliding scale     Lancets Misc  Checks blood sugar ac and HS     loratadine 10 MG tablet  Commonly known as: CLARITIN  TAKE 1 TABLET(10 MG) BY MOUTH DAILY     memantine 10 MG tablet  Commonly known as: NAMENDA  TAKE 1 TABLET BY MOUTH TWICE DAILY     Misc. Devices Misc  Standard walker with wheels    Diagnosis dementia, CHF     nystatin 363973 UNIT/GM cream  Commonly known as: MYCOSTATIN  Apply topically 2 times daily.      omeprazole 20 MG delayed release capsule  Commonly known as: PRILOSEC  Take 1 capsule by mouth every morning (before breakfast)     oxybutynin 5 MG tablet  Commonly known as: DITROPAN  TAKE 1 TABLET BY MOUTH TWICE DAILY     OXYGEN  Oxgen at 2 liters per nasal cannula     rivastigmine 9.5 MG/24HR  Commonly known as: Exelon  APPLY 1 NEW PATCH EVERY 24 HOURS     senna 8.6 MG tablet  Commonly known as: Senokot  Take 1 tablet by mouth 2 times daily        STOP taking these medications    losartan 50 MG tablet  Commonly known as: COZAAR           Where to Get Your Medications These medications were sent to Leia Mittal [address] [place] [address], [facility] [address]ong Oseguera [address] [address]    Phone: [phone]   · bumetanide 2 MG tablet  · carvedilol 12.5 MG tablet     You can get these medications from any pharmacy    Bring a paper prescription for each of these medications  · ceftaroline fosamil 400 MG Solr         Discharge Procedure Orders   Basic Metabolic Panel   Standing Status: Future Standing Exp. Date: 12/23/21   Order Comments: Fax results to [phone]. Time Spent on discharge is  35 mins in patient examination, evaluation, counseling as well as medication reconciliation, prescriptions for required medications, discharge plan and follow up. Electronically signed by   Moose Ibarra MD  12/24/2020  6:08 PM      Thank you ERINN Nieves for the opportunity to be involved in this patient's care.

## 2020-12-26 LAB
CULTURE: NORMAL
Lab: NORMAL
SPECIMEN DESCRIPTION: NORMAL

## 2020-12-27 LAB
CULTURE: NORMAL
Lab: NORMAL
SPECIMEN DESCRIPTION: NORMAL

## 2020-12-29 ENCOUNTER — TELEPHONE (OUTPATIENT)
Dept: INTERNAL MEDICINE | Age: 70
End: 2020-12-29

## 2021-01-01 ENCOUNTER — TELEPHONE (OUTPATIENT)
Dept: INTERNAL MEDICINE | Age: 71
End: 2021-01-01

## 2021-01-01 ENCOUNTER — OUTSIDE SERVICES (OUTPATIENT)
Dept: INTERNAL MEDICINE | Age: 71
End: 2021-01-01
Payer: MEDICARE

## 2021-01-01 ENCOUNTER — APPOINTMENT (OUTPATIENT)
Dept: GENERAL RADIOLOGY | Age: 71
DRG: 228 | End: 2021-01-01
Attending: INTERNAL MEDICINE
Payer: MEDICARE

## 2021-01-01 ENCOUNTER — OFFICE VISIT (OUTPATIENT)
Dept: CARDIOLOGY | Age: 71
End: 2021-01-01
Payer: MEDICARE

## 2021-01-01 ENCOUNTER — APPOINTMENT (OUTPATIENT)
Dept: CT IMAGING | Age: 71
End: 2021-01-01
Payer: MEDICARE

## 2021-01-01 ENCOUNTER — APPOINTMENT (OUTPATIENT)
Dept: CARDIAC CATH/INVASIVE PROCEDURES | Age: 71
DRG: 228 | End: 2021-01-01
Attending: INTERNAL MEDICINE
Payer: MEDICARE

## 2021-01-01 ENCOUNTER — HOSPITAL ENCOUNTER (EMERGENCY)
Age: 71
Discharge: ANOTHER HOSPITAL | End: 2021-11-13
Attending: EMERGENCY MEDICINE
Payer: MEDICARE

## 2021-01-01 ENCOUNTER — HOSPITAL ENCOUNTER (INPATIENT)
Age: 71
LOS: 6 days | Discharge: NURSING, REHAB OR LONG-TERM CARE | DRG: 228 | End: 2021-11-19
Attending: INTERNAL MEDICINE | Admitting: INTERNAL MEDICINE
Payer: MEDICARE

## 2021-01-01 ENCOUNTER — APPOINTMENT (OUTPATIENT)
Dept: GENERAL RADIOLOGY | Age: 71
End: 2021-01-01
Payer: MEDICARE

## 2021-01-01 VITALS
HEART RATE: 61 BPM | WEIGHT: 209.44 LBS | SYSTOLIC BLOOD PRESSURE: 128 MMHG | TEMPERATURE: 95.5 F | DIASTOLIC BLOOD PRESSURE: 71 MMHG | BODY MASS INDEX: 33.8 KG/M2 | RESPIRATION RATE: 14 BRPM | OXYGEN SATURATION: 96 %

## 2021-01-01 VITALS
SYSTOLIC BLOOD PRESSURE: 172 MMHG | BODY MASS INDEX: 36.96 KG/M2 | DIASTOLIC BLOOD PRESSURE: 70 MMHG | HEIGHT: 66 IN | HEART RATE: 68 BPM

## 2021-01-01 VITALS
HEIGHT: 66 IN | WEIGHT: 199 LBS | BODY MASS INDEX: 31.98 KG/M2 | SYSTOLIC BLOOD PRESSURE: 151 MMHG | HEART RATE: 53 BPM | DIASTOLIC BLOOD PRESSURE: 83 MMHG

## 2021-01-01 VITALS
SYSTOLIC BLOOD PRESSURE: 146 MMHG | BODY MASS INDEX: 32.03 KG/M2 | RESPIRATION RATE: 12 BRPM | HEART RATE: 73 BPM | WEIGHT: 187.61 LBS | TEMPERATURE: 98 F | HEIGHT: 64 IN | DIASTOLIC BLOOD PRESSURE: 60 MMHG | OXYGEN SATURATION: 93 %

## 2021-01-01 DIAGNOSIS — Z99.2 ESRD ON HEMODIALYSIS (HCC): ICD-10-CM

## 2021-01-01 DIAGNOSIS — F03.90 DEMENTIA WITHOUT BEHAVIORAL DISTURBANCE, UNSPECIFIED DEMENTIA TYPE: ICD-10-CM

## 2021-01-01 DIAGNOSIS — F32.A ANXIETY AND DEPRESSION: ICD-10-CM

## 2021-01-01 DIAGNOSIS — F41.9 ANXIETY AND DEPRESSION: ICD-10-CM

## 2021-01-01 DIAGNOSIS — Z99.2 END STAGE RENAL DISEASE ON DIALYSIS (HCC): ICD-10-CM

## 2021-01-01 DIAGNOSIS — I10 ESSENTIAL HYPERTENSION: ICD-10-CM

## 2021-01-01 DIAGNOSIS — R53.83 FATIGUE, UNSPECIFIED TYPE: ICD-10-CM

## 2021-01-01 DIAGNOSIS — E78.2 MIXED HYPERLIPIDEMIA: ICD-10-CM

## 2021-01-01 DIAGNOSIS — E66.01 MORBID OBESITY (HCC): ICD-10-CM

## 2021-01-01 DIAGNOSIS — Z79.4 LONG-TERM INSULIN USE (HCC): ICD-10-CM

## 2021-01-01 DIAGNOSIS — Z99.2 HEMODIALYSIS PATIENT (HCC): ICD-10-CM

## 2021-01-01 DIAGNOSIS — Z79.4 CONTROLLED TYPE 2 DIABETES MELLITUS WITH CHRONIC KIDNEY DISEASE ON CHRONIC DIALYSIS, WITH LONG-TERM CURRENT USE OF INSULIN (HCC): Primary | ICD-10-CM

## 2021-01-01 DIAGNOSIS — Z99.2 CONTROLLED TYPE 2 DIABETES MELLITUS WITH CHRONIC KIDNEY DISEASE ON CHRONIC DIALYSIS, WITH LONG-TERM CURRENT USE OF INSULIN (HCC): Primary | ICD-10-CM

## 2021-01-01 DIAGNOSIS — N18.6 CONTROLLED TYPE 2 DIABETES MELLITUS WITH CHRONIC KIDNEY DISEASE ON CHRONIC DIALYSIS, WITH LONG-TERM CURRENT USE OF INSULIN (HCC): Primary | ICD-10-CM

## 2021-01-01 DIAGNOSIS — E11.22 CONTROLLED TYPE 2 DIABETES MELLITUS WITH CHRONIC KIDNEY DISEASE ON CHRONIC DIALYSIS, WITH LONG-TERM CURRENT USE OF INSULIN (HCC): Primary | ICD-10-CM

## 2021-01-01 DIAGNOSIS — I50.32 CHRONIC DIASTOLIC HEART FAILURE (HCC): ICD-10-CM

## 2021-01-01 DIAGNOSIS — L03.818 CELLULITIS OF OTHER SPECIFIED SITE: Primary | ICD-10-CM

## 2021-01-01 DIAGNOSIS — N18.6 END STAGE RENAL DISEASE ON DIALYSIS (HCC): ICD-10-CM

## 2021-01-01 DIAGNOSIS — E66.9 CLASS 1 OBESITY WITH SERIOUS COMORBIDITY AND BODY MASS INDEX (BMI) OF 32.0 TO 32.9 IN ADULT, UNSPECIFIED OBESITY TYPE: ICD-10-CM

## 2021-01-01 DIAGNOSIS — N18.6 CONTROLLED TYPE 2 DIABETES MELLITUS WITH CHRONIC KIDNEY DISEASE ON CHRONIC DIALYSIS, WITH LONG-TERM CURRENT USE OF INSULIN (HCC): ICD-10-CM

## 2021-01-01 DIAGNOSIS — I25.10 CORONARY ARTERY DISEASE INVOLVING NATIVE CORONARY ARTERY OF NATIVE HEART WITHOUT ANGINA PECTORIS: ICD-10-CM

## 2021-01-01 DIAGNOSIS — R00.1 SYMPTOMATIC BRADYCARDIA: Primary | ICD-10-CM

## 2021-01-01 DIAGNOSIS — N18.6 ESRD ON HEMODIALYSIS (HCC): ICD-10-CM

## 2021-01-01 DIAGNOSIS — R22.32 MASS OF LEFT UPPER EXTREMITY: Primary | ICD-10-CM

## 2021-01-01 DIAGNOSIS — R51.9 NONINTRACTABLE HEADACHE, UNSPECIFIED CHRONICITY PATTERN, UNSPECIFIED HEADACHE TYPE: Primary | ICD-10-CM

## 2021-01-01 DIAGNOSIS — R53.1 WEAKNESS: Primary | ICD-10-CM

## 2021-01-01 DIAGNOSIS — I44.0 AV BLOCK, 1ST DEGREE: ICD-10-CM

## 2021-01-01 DIAGNOSIS — R51.9 FREQUENT HEADACHES: Primary | ICD-10-CM

## 2021-01-01 DIAGNOSIS — Z79.4 CONTROLLED TYPE 2 DIABETES MELLITUS WITH CHRONIC KIDNEY DISEASE ON CHRONIC DIALYSIS, WITH LONG-TERM CURRENT USE OF INSULIN (HCC): ICD-10-CM

## 2021-01-01 DIAGNOSIS — I25.5 ISCHEMIC CARDIOMYOPATHY: Primary | ICD-10-CM

## 2021-01-01 DIAGNOSIS — R53.83 FATIGUE, UNSPECIFIED TYPE: Primary | ICD-10-CM

## 2021-01-01 DIAGNOSIS — Z99.2 CONTROLLED TYPE 2 DIABETES MELLITUS WITH CHRONIC KIDNEY DISEASE ON CHRONIC DIALYSIS, WITH LONG-TERM CURRENT USE OF INSULIN (HCC): ICD-10-CM

## 2021-01-01 DIAGNOSIS — E78.5 DYSLIPIDEMIA: ICD-10-CM

## 2021-01-01 DIAGNOSIS — E11.22 CONTROLLED TYPE 2 DIABETES MELLITUS WITH CHRONIC KIDNEY DISEASE ON CHRONIC DIALYSIS, WITH LONG-TERM CURRENT USE OF INSULIN (HCC): ICD-10-CM

## 2021-01-01 DIAGNOSIS — R50.9 FEVER, UNSPECIFIED FEVER CAUSE: Primary | ICD-10-CM

## 2021-01-01 LAB
ABSOLUTE EOS #: 0.05 K/UL (ref 0–0.44)
ABSOLUTE IMMATURE GRANULOCYTE: <0.03 K/UL (ref 0–0.3)
ABSOLUTE LYMPH #: 0.61 K/UL (ref 1.1–3.7)
ABSOLUTE MONO #: 0.46 K/UL (ref 0.1–1.2)
ALBUMIN SERPL-MCNC: 3.4 G/DL (ref 3.5–5.2)
ALBUMIN/GLOBULIN RATIO: 0.8 (ref 1–2.5)
ALP BLD-CCNC: 303 U/L (ref 35–104)
ALT SERPL-CCNC: 18 U/L (ref 5–33)
ANION GAP SERPL CALCULATED.3IONS-SCNC: 11 MMOL/L (ref 9–17)
ANION GAP SERPL CALCULATED.3IONS-SCNC: 12 MMOL/L (ref 9–17)
ANION GAP SERPL CALCULATED.3IONS-SCNC: 13 MMOL/L (ref 9–17)
ANION GAP SERPL CALCULATED.3IONS-SCNC: 13 MMOL/L (ref 9–17)
ANION GAP SERPL CALCULATED.3IONS-SCNC: 14 MMOL/L (ref 9–17)
ANION GAP SERPL CALCULATED.3IONS-SCNC: 14 MMOL/L (ref 9–17)
ANION GAP SERPL CALCULATED.3IONS-SCNC: 15 MMOL/L (ref 9–17)
ANION GAP SERPL CALCULATED.3IONS-SCNC: 20 MMOL/L (ref 9–17)
AST SERPL-CCNC: 29 U/L
BASOPHILS # BLD: 0 % (ref 0–2)
BASOPHILS ABSOLUTE: <0.03 K/UL (ref 0–0.2)
BILIRUB SERPL-MCNC: 0.6 MG/DL (ref 0.3–1.2)
BNP INTERPRETATION: ABNORMAL
BUN BLDV-MCNC: 22 MG/DL (ref 8–23)
BUN BLDV-MCNC: 26 MG/DL (ref 8–23)
BUN BLDV-MCNC: 28 MG/DL (ref 8–23)
BUN BLDV-MCNC: 38 MG/DL (ref 8–23)
BUN BLDV-MCNC: 42 MG/DL (ref 8–23)
BUN BLDV-MCNC: 47 MG/DL (ref 8–23)
BUN BLDV-MCNC: 53 MG/DL (ref 8–23)
BUN BLDV-MCNC: 58 MG/DL (ref 8–23)
BUN/CREAT BLD: 16 (ref 9–20)
BUN/CREAT BLD: ABNORMAL (ref 9–20)
CALCIUM SERPL-MCNC: 8 MG/DL (ref 8.6–10.4)
CALCIUM SERPL-MCNC: 8.2 MG/DL (ref 8.6–10.4)
CALCIUM SERPL-MCNC: 8.3 MG/DL (ref 8.6–10.4)
CALCIUM SERPL-MCNC: 8.4 MG/DL (ref 8.6–10.4)
CALCIUM SERPL-MCNC: 8.7 MG/DL (ref 8.6–10.4)
CALCIUM SERPL-MCNC: 9.3 MG/DL (ref 8.6–10.4)
CALCIUM SERPL-MCNC: 9.4 MG/DL (ref 8.6–10.4)
CALCIUM SERPL-MCNC: 9.6 MG/DL (ref 8.6–10.4)
CHLORIDE BLD-SCNC: 86 MMOL/L (ref 98–107)
CHLORIDE BLD-SCNC: 88 MMOL/L (ref 98–107)
CHLORIDE BLD-SCNC: 89 MMOL/L (ref 98–107)
CHLORIDE BLD-SCNC: 90 MMOL/L (ref 98–107)
CHLORIDE BLD-SCNC: 92 MMOL/L (ref 98–107)
CHLORIDE BLD-SCNC: 92 MMOL/L (ref 98–107)
CHOLESTEROL/HDL RATIO: 1.8
CHOLESTEROL: 87 MG/DL
CO2: 14 MMOL/L (ref 20–31)
CO2: 20 MMOL/L (ref 20–31)
CO2: 22 MMOL/L (ref 20–31)
CO2: 22 MMOL/L (ref 20–31)
CO2: 23 MMOL/L (ref 20–31)
CO2: 23 MMOL/L (ref 20–31)
CO2: 25 MMOL/L (ref 20–31)
CO2: 27 MMOL/L (ref 20–31)
CREAT SERPL-MCNC: 2.15 MG/DL (ref 0.5–0.9)
CREAT SERPL-MCNC: 2.25 MG/DL (ref 0.5–0.9)
CREAT SERPL-MCNC: 2.56 MG/DL (ref 0.5–0.9)
CREAT SERPL-MCNC: 2.88 MG/DL (ref 0.5–0.9)
CREAT SERPL-MCNC: 3.14 MG/DL (ref 0.5–0.9)
CREAT SERPL-MCNC: 3.43 MG/DL (ref 0.5–0.9)
CREAT SERPL-MCNC: 3.55 MG/DL (ref 0.5–0.9)
CREAT SERPL-MCNC: 3.85 MG/DL (ref 0.5–0.9)
CULTURE: NORMAL
CULTURE: NORMAL
DIFFERENTIAL TYPE: ABNORMAL
EKG ATRIAL RATE: 27 BPM
EKG ATRIAL RATE: 51 BPM
EKG ATRIAL RATE: 57 BPM
EKG ATRIAL RATE: 58 BPM
EKG ATRIAL RATE: 60 BPM
EKG P AXIS: 85 DEGREES
EKG P-R INTERVAL: 270 MS
EKG Q-T INTERVAL: 484 MS
EKG Q-T INTERVAL: 500 MS
EKG Q-T INTERVAL: 508 MS
EKG Q-T INTERVAL: 510 MS
EKG Q-T INTERVAL: 544 MS
EKG QRS DURATION: 100 MS
EKG QRS DURATION: 104 MS
EKG QRS DURATION: 108 MS
EKG QRS DURATION: 108 MS
EKG QRS DURATION: 110 MS
EKG QTC CALCULATION (BAZETT): 398 MS
EKG QTC CALCULATION (BAZETT): 446 MS
EKG QTC CALCULATION (BAZETT): 460 MS
EKG QTC CALCULATION (BAZETT): 465 MS
EKG QTC CALCULATION (BAZETT): 510 MS
EKG R AXIS: -157 DEGREES
EKG R AXIS: 152 DEGREES
EKG R AXIS: 157 DEGREES
EKG R AXIS: 158 DEGREES
EKG R AXIS: 161 DEGREES
EKG T AXIS: -5 DEGREES
EKG T AXIS: -82 DEGREES
EKG T AXIS: 103 DEGREES
EKG T AXIS: 71 DEGREES
EKG T AXIS: 9 DEGREES
EKG VENTRICULAR RATE: 37 BPM
EKG VENTRICULAR RATE: 44 BPM
EKG VENTRICULAR RATE: 51 BPM
EKG VENTRICULAR RATE: 51 BPM
EKG VENTRICULAR RATE: 60 BPM
EOSINOPHILS RELATIVE PERCENT: 1 % (ref 1–4)
GFR AFRICAN AMERICAN: 14 ML/MIN
GFR AFRICAN AMERICAN: 15 ML/MIN
GFR AFRICAN AMERICAN: 16 ML/MIN
GFR AFRICAN AMERICAN: 18 ML/MIN
GFR AFRICAN AMERICAN: 20 ML/MIN
GFR AFRICAN AMERICAN: 23 ML/MIN
GFR AFRICAN AMERICAN: 26 ML/MIN
GFR AFRICAN AMERICAN: 28 ML/MIN
GFR NON-AFRICAN AMERICAN: 12 ML/MIN
GFR NON-AFRICAN AMERICAN: 13 ML/MIN
GFR NON-AFRICAN AMERICAN: 13 ML/MIN
GFR NON-AFRICAN AMERICAN: 15 ML/MIN
GFR NON-AFRICAN AMERICAN: 16 ML/MIN
GFR NON-AFRICAN AMERICAN: 19 ML/MIN
GFR NON-AFRICAN AMERICAN: 22 ML/MIN
GFR NON-AFRICAN AMERICAN: 23 ML/MIN
GFR SERPL CREATININE-BSD FRML MDRD: ABNORMAL ML/MIN/{1.73_M2}
GLUCOSE BLD-MCNC: 104 MG/DL (ref 70–99)
GLUCOSE BLD-MCNC: 108 MG/DL (ref 65–105)
GLUCOSE BLD-MCNC: 109 MG/DL (ref 65–105)
GLUCOSE BLD-MCNC: 112 MG/DL (ref 70–99)
GLUCOSE BLD-MCNC: 114 MG/DL (ref 70–99)
GLUCOSE BLD-MCNC: 123 MG/DL (ref 65–105)
GLUCOSE BLD-MCNC: 127 MG/DL (ref 70–99)
GLUCOSE BLD-MCNC: 130 MG/DL (ref 65–105)
GLUCOSE BLD-MCNC: 130 MG/DL (ref 65–105)
GLUCOSE BLD-MCNC: 138 MG/DL (ref 65–105)
GLUCOSE BLD-MCNC: 141 MG/DL (ref 65–105)
GLUCOSE BLD-MCNC: 142 MG/DL (ref 70–99)
GLUCOSE BLD-MCNC: 148 MG/DL (ref 65–105)
GLUCOSE BLD-MCNC: 154 MG/DL (ref 65–105)
GLUCOSE BLD-MCNC: 157 MG/DL (ref 65–105)
GLUCOSE BLD-MCNC: 157 MG/DL (ref 65–105)
GLUCOSE BLD-MCNC: 181 MG/DL (ref 65–105)
GLUCOSE BLD-MCNC: 41 MG/DL (ref 65–105)
GLUCOSE BLD-MCNC: 43 MG/DL (ref 65–105)
GLUCOSE BLD-MCNC: 47 MG/DL (ref 70–99)
GLUCOSE BLD-MCNC: 67 MG/DL (ref 70–99)
GLUCOSE BLD-MCNC: 68 MG/DL (ref 65–105)
GLUCOSE BLD-MCNC: 78 MG/DL (ref 65–105)
GLUCOSE BLD-MCNC: 83 MG/DL (ref 70–99)
GLUCOSE BLD-MCNC: 86 MG/DL (ref 65–105)
GLUCOSE BLD-MCNC: 89 MG/DL (ref 65–105)
GLUCOSE BLD-MCNC: 90 MG/DL (ref 65–105)
GLUCOSE BLD-MCNC: 90 MG/DL (ref 65–105)
GLUCOSE BLD-MCNC: 91 MG/DL (ref 65–105)
GLUCOSE BLD-MCNC: 93 MG/DL (ref 65–105)
GLUCOSE BLD-MCNC: 95 MG/DL (ref 65–105)
GLUCOSE BLD-MCNC: 96 MG/DL (ref 65–105)
GLUCOSE BLD-MCNC: 99 MG/DL (ref 65–105)
HAV IGM SER IA-ACNC: NONREACTIVE
HCT VFR BLD CALC: 33.7 % (ref 36.3–47.1)
HCT VFR BLD CALC: 33.7 % (ref 36.3–47.1)
HCT VFR BLD CALC: 35 % (ref 36.3–47.1)
HCT VFR BLD CALC: 36 % (ref 36.3–47.1)
HCT VFR BLD CALC: 39 % (ref 36.3–47.1)
HCT VFR BLD CALC: 39.1 % (ref 36.3–47.1)
HDLC SERPL-MCNC: 48 MG/DL
HEMOGLOBIN: 11.1 G/DL (ref 11.9–15.1)
HEMOGLOBIN: 11.3 G/DL (ref 11.9–15.1)
HEMOGLOBIN: 11.3 G/DL (ref 11.9–15.1)
HEMOGLOBIN: 11.5 G/DL (ref 11.9–15.1)
HEMOGLOBIN: 12.4 G/DL (ref 11.9–15.1)
HEMOGLOBIN: 12.6 G/DL (ref 11.9–15.1)
HEPATITIS B CORE IGM ANTIBODY: NONREACTIVE
HEPATITIS B SURFACE ANTIGEN: NONREACTIVE
HEPATITIS C ANTIBODY: NONREACTIVE
IMMATURE GRANULOCYTES: 0 %
INR BLD: 1.3
LACTIC ACID, SEPSIS WHOLE BLOOD: 0.8 MMOL/L (ref 0.5–1.9)
LACTIC ACID, SEPSIS: NORMAL MMOL/L (ref 0.5–1.9)
LDL CHOLESTEROL: 29 MG/DL (ref 0–130)
LV EF: 45 %
LVEF MODALITY: NORMAL
LYMPHOCYTES # BLD: 13 % (ref 24–43)
Lab: NORMAL
Lab: NORMAL
MAGNESIUM: 2.1 MG/DL (ref 1.6–2.6)
MCH RBC QN AUTO: 30.5 PG (ref 25.2–33.5)
MCH RBC QN AUTO: 30.8 PG (ref 25.2–33.5)
MCH RBC QN AUTO: 30.9 PG (ref 25.2–33.5)
MCH RBC QN AUTO: 31.2 PG (ref 25.2–33.5)
MCH RBC QN AUTO: 31.3 PG (ref 25.2–33.5)
MCH RBC QN AUTO: 32.2 PG (ref 25.2–33.5)
MCHC RBC AUTO-ENTMCNC: 31.8 G/DL (ref 25.2–33.5)
MCHC RBC AUTO-ENTMCNC: 31.9 G/DL (ref 28.4–34.8)
MCHC RBC AUTO-ENTMCNC: 32.2 G/DL (ref 28.4–34.8)
MCHC RBC AUTO-ENTMCNC: 32.3 G/DL (ref 28.4–34.8)
MCHC RBC AUTO-ENTMCNC: 32.9 G/DL (ref 28.4–34.8)
MCHC RBC AUTO-ENTMCNC: 33.5 G/DL (ref 28.4–34.8)
MCV RBC AUTO: 94.7 FL (ref 82.6–102.9)
MCV RBC AUTO: 95.6 FL (ref 82.6–102.9)
MCV RBC AUTO: 95.6 FL (ref 82.6–102.9)
MCV RBC AUTO: 95.8 FL (ref 82.6–102.9)
MCV RBC AUTO: 96 FL (ref 82.6–102.9)
MCV RBC AUTO: 98.1 FL (ref 82.6–102.9)
MONOCYTES # BLD: 10 % (ref 3–12)
NRBC AUTOMATED: 0 PER 100 WBC
NRBC AUTOMATED: 0.5 PER 100 WBC
PARTIAL THROMBOPLASTIN TIME: 37 SEC (ref 23.9–33.8)
PDW BLD-RTO: 15.6 % (ref 11.8–14.4)
PDW BLD-RTO: 15.6 % (ref 11.8–14.4)
PDW BLD-RTO: 15.8 % (ref 11.8–14.4)
PDW BLD-RTO: 15.9 % (ref 11.8–14.4)
PDW BLD-RTO: 15.9 % (ref 11.8–14.4)
PDW BLD-RTO: 16 % (ref 11.8–14.4)
PLATELET # BLD: 347 K/UL (ref 138–453)
PLATELET # BLD: ABNORMAL K/UL (ref 138–453)
PLATELET ESTIMATE: ABNORMAL
PLATELET, FLUORESCENCE: 101 K/UL (ref 138–453)
PLATELET, FLUORESCENCE: 102 K/UL (ref 138–453)
PLATELET, FLUORESCENCE: 114 K/UL (ref 138–453)
PLATELET, FLUORESCENCE: 86 K/UL (ref 138–453)
PLATELET, FLUORESCENCE: 99 K/UL (ref 138–453)
PLATELET, IMMATURE FRACTION: 3.4 % (ref 1.1–10.3)
PLATELET, IMMATURE FRACTION: 3.6 % (ref 1.1–10.3)
PLATELET, IMMATURE FRACTION: 3.9 % (ref 1.1–10.3)
PLATELET, IMMATURE FRACTION: 4.5 % (ref 1.1–10.3)
PLATELET, IMMATURE FRACTION: 5.6 % (ref 1.1–10.3)
PMV BLD AUTO: 10.8 FL (ref 8.1–13.5)
PMV BLD AUTO: ABNORMAL FL (ref 8.1–13.5)
POTASSIUM SERPL-SCNC: 3.7 MMOL/L (ref 3.7–5.3)
POTASSIUM SERPL-SCNC: 3.8 MMOL/L (ref 3.7–5.3)
POTASSIUM SERPL-SCNC: 3.9 MMOL/L (ref 3.7–5.3)
POTASSIUM SERPL-SCNC: 4 MMOL/L (ref 3.7–5.3)
POTASSIUM SERPL-SCNC: 4.2 MMOL/L (ref 3.7–5.3)
POTASSIUM SERPL-SCNC: 4.4 MMOL/L (ref 3.7–5.3)
POTASSIUM SERPL-SCNC: 4.4 MMOL/L (ref 3.7–5.3)
POTASSIUM SERPL-SCNC: 4.6 MMOL/L (ref 3.7–5.3)
PRO-BNP: ABNORMAL PG/ML
PROTHROMBIN TIME: 15.2 SEC (ref 11.5–14.2)
RBC # BLD: 3.51 M/UL (ref 3.95–5.11)
RBC # BLD: 3.56 M/UL (ref 3.95–5.11)
RBC # BLD: 3.66 M/UL (ref 3.95–5.11)
RBC # BLD: 3.67 M/UL (ref 3.95–5.11)
RBC # BLD: 4.07 M/UL (ref 3.95–5.11)
RBC # BLD: 4.09 M/UL (ref 3.95–5.11)
RBC # BLD: ABNORMAL 10*6/UL
SARS-COV-2, RAPID: NOT DETECTED
SARS-COV-2, RAPID: NOT DETECTED
SEG NEUTROPHILS: 76 % (ref 36–65)
SEGMENTED NEUTROPHILS ABSOLUTE COUNT: 3.68 K/UL (ref 1.5–8.1)
SODIUM BLD-SCNC: 119 MMOL/L (ref 135–144)
SODIUM BLD-SCNC: 124 MMOL/L (ref 135–144)
SODIUM BLD-SCNC: 125 MMOL/L (ref 135–144)
SODIUM BLD-SCNC: 125 MMOL/L (ref 135–144)
SODIUM BLD-SCNC: 126 MMOL/L (ref 135–144)
SODIUM BLD-SCNC: 127 MMOL/L (ref 135–144)
SODIUM BLD-SCNC: 127 MMOL/L (ref 135–144)
SODIUM BLD-SCNC: 128 MMOL/L (ref 135–144)
SODIUM BLD-SCNC: 128 MMOL/L (ref 135–144)
SODIUM BLD-SCNC: 130 MMOL/L (ref 135–144)
SODIUM BLD-SCNC: 130 MMOL/L (ref 135–144)
SODIUM BLD-SCNC: 132 MMOL/L (ref 135–144)
SPECIMEN DESCRIPTION: NORMAL
TOTAL PROTEIN: 7.7 G/DL (ref 6.4–8.3)
TRIGL SERPL-MCNC: 49 MG/DL
TROPONIN INTERP: ABNORMAL
TROPONIN T: ABNORMAL NG/ML
TROPONIN, HIGH SENSITIVITY: 68 NG/L (ref 0–14)
TROPONIN, HIGH SENSITIVITY: 70 NG/L (ref 0–14)
TROPONIN, HIGH SENSITIVITY: 72 NG/L (ref 0–14)
VLDLC SERPL CALC-MCNC: NORMAL MG/DL (ref 1–30)
WBC # BLD: 3.8 K/UL (ref 3.5–11.3)
WBC # BLD: 3.9 K/UL (ref 3.5–11.3)
WBC # BLD: 4.4 K/UL (ref 3.5–11.3)
WBC # BLD: 4.8 K/UL (ref 3.5–11.3)
WBC # BLD: 5.8 K/UL (ref 3.5–11.3)
WBC # BLD: 6.2 K/UL (ref 3.5–11.3)
WBC # BLD: ABNORMAL 10*3/UL

## 2021-01-01 PROCEDURE — 90935 HEMODIALYSIS ONE EVALUATION: CPT

## 2021-01-01 PROCEDURE — 6370000000 HC RX 637 (ALT 250 FOR IP): Performed by: INTERNAL MEDICINE

## 2021-01-01 PROCEDURE — 85027 COMPLETE CBC AUTOMATED: CPT

## 2021-01-01 PROCEDURE — 83605 ASSAY OF LACTIC ACID: CPT

## 2021-01-01 PROCEDURE — 84295 ASSAY OF SERUM SODIUM: CPT

## 2021-01-01 PROCEDURE — 99308 SBSQ NF CARE LOW MDM 20: CPT | Performed by: NURSE PRACTITIONER

## 2021-01-01 PROCEDURE — 84484 ASSAY OF TROPONIN QUANT: CPT

## 2021-01-01 PROCEDURE — 99232 SBSQ HOSP IP/OBS MODERATE 35: CPT | Performed by: INTERNAL MEDICINE

## 2021-01-01 PROCEDURE — P9047 ALBUMIN (HUMAN), 25%, 50ML: HCPCS | Performed by: INTERNAL MEDICINE

## 2021-01-01 PROCEDURE — 93005 ELECTROCARDIOGRAM TRACING: CPT | Performed by: EMERGENCY MEDICINE

## 2021-01-01 PROCEDURE — 99239 HOSP IP/OBS DSCHRG MGMT >30: CPT | Performed by: INTERNAL MEDICINE

## 2021-01-01 PROCEDURE — 2060000000 HC ICU INTERMEDIATE R&B

## 2021-01-01 PROCEDURE — 36415 COLL VENOUS BLD VENIPUNCTURE: CPT

## 2021-01-01 PROCEDURE — 2580000003 HC RX 258: Performed by: STUDENT IN AN ORGANIZED HEALTH CARE EDUCATION/TRAINING PROGRAM

## 2021-01-01 PROCEDURE — 6360000002 HC RX W HCPCS: Performed by: INTERNAL MEDICINE

## 2021-01-01 PROCEDURE — 2709999900 HC NON-CHARGEABLE SUPPLY

## 2021-01-01 PROCEDURE — 82947 ASSAY GLUCOSE BLOOD QUANT: CPT

## 2021-01-01 PROCEDURE — 2000000000 HC ICU R&B

## 2021-01-01 PROCEDURE — 02HK3NZ INSERTION OF INTRACARDIAC PACEMAKER INTO RIGHT VENTRICLE, PERCUTANEOUS APPROACH: ICD-10-PCS | Performed by: STUDENT IN AN ORGANIZED HEALTH CARE EDUCATION/TRAINING PROGRAM

## 2021-01-01 PROCEDURE — 96365 THER/PROPH/DIAG IV INF INIT: CPT

## 2021-01-01 PROCEDURE — 80048 BASIC METABOLIC PNL TOTAL CA: CPT

## 2021-01-01 PROCEDURE — 99309 SBSQ NF CARE MODERATE MDM 30: CPT | Performed by: INTERNAL MEDICINE

## 2021-01-01 PROCEDURE — 90935 HEMODIALYSIS ONE EVALUATION: CPT | Performed by: INTERNAL MEDICINE

## 2021-01-01 PROCEDURE — 99214 OFFICE O/P EST MOD 30 MIN: CPT | Performed by: INTERNAL MEDICINE

## 2021-01-01 PROCEDURE — 71045 X-RAY EXAM CHEST 1 VIEW: CPT

## 2021-01-01 PROCEDURE — 99223 1ST HOSP IP/OBS HIGH 75: CPT | Performed by: NURSE PRACTITIONER

## 2021-01-01 PROCEDURE — 93306 TTE W/DOPPLER COMPLETE: CPT

## 2021-01-01 PROCEDURE — 99308 SBSQ NF CARE LOW MDM 20: CPT | Performed by: INTERNAL MEDICINE

## 2021-01-01 PROCEDURE — 6360000002 HC RX W HCPCS

## 2021-01-01 PROCEDURE — 2580000003 HC RX 258: Performed by: EMERGENCY MEDICINE

## 2021-01-01 PROCEDURE — 2500000003 HC RX 250 WO HCPCS: Performed by: NURSE PRACTITIONER

## 2021-01-01 PROCEDURE — 85610 PROTHROMBIN TIME: CPT

## 2021-01-01 PROCEDURE — 6370000000 HC RX 637 (ALT 250 FOR IP): Performed by: STUDENT IN AN ORGANIZED HEALTH CARE EDUCATION/TRAINING PROGRAM

## 2021-01-01 PROCEDURE — 80074 ACUTE HEPATITIS PANEL: CPT

## 2021-01-01 PROCEDURE — 85055 RETICULATED PLATELET ASSAY: CPT

## 2021-01-01 PROCEDURE — 6370000000 HC RX 637 (ALT 250 FOR IP): Performed by: NURSE PRACTITIONER

## 2021-01-01 PROCEDURE — 93005 ELECTROCARDIOGRAM TRACING: CPT | Performed by: STUDENT IN AN ORGANIZED HEALTH CARE EDUCATION/TRAINING PROGRAM

## 2021-01-01 PROCEDURE — 2500000003 HC RX 250 WO HCPCS

## 2021-01-01 PROCEDURE — 2580000003 HC RX 258: Performed by: INTERNAL MEDICINE

## 2021-01-01 PROCEDURE — 94761 N-INVAS EAR/PLS OXIMETRY MLT: CPT

## 2021-01-01 PROCEDURE — 87040 BLOOD CULTURE FOR BACTERIA: CPT

## 2021-01-01 PROCEDURE — 96366 THER/PROPH/DIAG IV INF ADDON: CPT

## 2021-01-01 PROCEDURE — 93010 ELECTROCARDIOGRAM REPORT: CPT | Performed by: INTERNAL MEDICINE

## 2021-01-01 PROCEDURE — 93005 ELECTROCARDIOGRAM TRACING: CPT | Performed by: INTERNAL MEDICINE

## 2021-01-01 PROCEDURE — 70450 CT HEAD/BRAIN W/O DYE: CPT

## 2021-01-01 PROCEDURE — 99285 EMERGENCY DEPT VISIT HI MDM: CPT

## 2021-01-01 PROCEDURE — 6360000004 HC RX CONTRAST MEDICATION

## 2021-01-01 PROCEDURE — C1894 INTRO/SHEATH, NON-LASER: HCPCS

## 2021-01-01 PROCEDURE — 2500000003 HC RX 250 WO HCPCS: Performed by: EMERGENCY MEDICINE

## 2021-01-01 PROCEDURE — 80053 COMPREHEN METABOLIC PANEL: CPT

## 2021-01-01 PROCEDURE — 85730 THROMBOPLASTIN TIME PARTIAL: CPT

## 2021-01-01 PROCEDURE — 99231 SBSQ HOSP IP/OBS SF/LOW 25: CPT | Performed by: INTERNAL MEDICINE

## 2021-01-01 PROCEDURE — 83735 ASSAY OF MAGNESIUM: CPT

## 2021-01-01 PROCEDURE — 85025 COMPLETE CBC W/AUTO DIFF WBC: CPT

## 2021-01-01 PROCEDURE — 90686 IIV4 VACC NO PRSV 0.5 ML IM: CPT | Performed by: INTERNAL MEDICINE

## 2021-01-01 PROCEDURE — 33224 INSERT PACING LEAD & CONNECT: CPT | Performed by: INTERNAL MEDICINE

## 2021-01-01 PROCEDURE — 2700000000 HC OXYGEN THERAPY PER DAY

## 2021-01-01 PROCEDURE — 96375 TX/PRO/DX INJ NEW DRUG ADDON: CPT

## 2021-01-01 PROCEDURE — 83880 ASSAY OF NATRIURETIC PEPTIDE: CPT

## 2021-01-01 PROCEDURE — 5A1D70Z PERFORMANCE OF URINARY FILTRATION, INTERMITTENT, LESS THAN 6 HOURS PER DAY: ICD-10-PCS | Performed by: INTERNAL MEDICINE

## 2021-01-01 PROCEDURE — 6360000002 HC RX W HCPCS: Performed by: EMERGENCY MEDICINE

## 2021-01-01 PROCEDURE — APPSS60 APP SPLIT SHARED TIME 46-60 MINUTES: Performed by: NURSE PRACTITIONER

## 2021-01-01 PROCEDURE — 87635 SARS-COV-2 COVID-19 AMP PRB: CPT

## 2021-01-01 PROCEDURE — 94660 CPAP INITIATION&MGMT: CPT

## 2021-01-01 PROCEDURE — 74176 CT ABD & PELVIS W/O CONTRAST: CPT

## 2021-01-01 PROCEDURE — 80061 LIPID PANEL: CPT

## 2021-01-01 PROCEDURE — C1786 PMKR, SINGLE, RATE-RESP: HCPCS

## 2021-01-01 PROCEDURE — G0008 ADMIN INFLUENZA VIRUS VAC: HCPCS | Performed by: INTERNAL MEDICINE

## 2021-01-01 PROCEDURE — 96376 TX/PRO/DX INJ SAME DRUG ADON: CPT

## 2021-01-01 PROCEDURE — 99233 SBSQ HOSP IP/OBS HIGH 50: CPT | Performed by: INTERNAL MEDICINE

## 2021-01-01 RX ORDER — CARVEDILOL 6.25 MG/1
6.25 TABLET ORAL 2 TIMES DAILY WITH MEALS
Status: DISCONTINUED | OUTPATIENT
Start: 2021-01-01 | End: 2021-01-01 | Stop reason: HOSPADM

## 2021-01-01 RX ORDER — NICOTINE POLACRILEX 4 MG
15 LOZENGE BUCCAL PRN
Status: DISCONTINUED | OUTPATIENT
Start: 2021-01-01 | End: 2021-01-01 | Stop reason: HOSPADM

## 2021-01-01 RX ORDER — HEPARIN SODIUM 1000 [USP'U]/ML
1900 INJECTION, SOLUTION INTRAVENOUS; SUBCUTANEOUS PRN
Status: DISCONTINUED | OUTPATIENT
Start: 2021-01-01 | End: 2021-01-01 | Stop reason: HOSPADM

## 2021-01-01 RX ORDER — DOPAMINE HYDROCHLORIDE 160 MG/100ML
INJECTION, SOLUTION INTRAVENOUS
Status: COMPLETED
Start: 2021-01-01 | End: 2021-01-01

## 2021-01-01 RX ORDER — ACETAMINOPHEN 650 MG/1
650 SUPPOSITORY RECTAL EVERY 6 HOURS PRN
Status: DISCONTINUED | OUTPATIENT
Start: 2021-01-01 | End: 2021-01-01 | Stop reason: HOSPADM

## 2021-01-01 RX ORDER — AMLODIPINE BESYLATE 10 MG/1
10 TABLET ORAL DAILY
Status: DISCONTINUED | OUTPATIENT
Start: 2021-01-01 | End: 2021-01-01 | Stop reason: HOSPADM

## 2021-01-01 RX ORDER — OXYBUTYNIN CHLORIDE 5 MG/1
5 TABLET ORAL 2 TIMES DAILY
Status: DISCONTINUED | OUTPATIENT
Start: 2021-01-01 | End: 2021-01-01 | Stop reason: HOSPADM

## 2021-01-01 RX ORDER — ECHINACEA PURPUREA EXTRACT 125 MG
2 TABLET ORAL 2 TIMES DAILY
COMMUNITY

## 2021-01-01 RX ORDER — ALBUMIN (HUMAN) 12.5 G/50ML
25 SOLUTION INTRAVENOUS ONCE
Status: COMPLETED | OUTPATIENT
Start: 2021-01-01 | End: 2021-01-01

## 2021-01-01 RX ORDER — MIDODRINE HYDROCHLORIDE 10 MG/1
10 TABLET ORAL
COMMUNITY

## 2021-01-01 RX ORDER — SODIUM CHLORIDE 0.9 % (FLUSH) 0.9 %
5-40 SYRINGE (ML) INJECTION EVERY 12 HOURS SCHEDULED
Status: DISCONTINUED | OUTPATIENT
Start: 2021-01-01 | End: 2021-01-01 | Stop reason: HOSPADM

## 2021-01-01 RX ORDER — EPINEPHRINE 1 MG/ML
INJECTION, SOLUTION, CONCENTRATE INTRAVENOUS
Status: DISCONTINUED
Start: 2021-01-01 | End: 2021-01-01 | Stop reason: HOSPADM

## 2021-01-01 RX ORDER — DOPAMINE HYDROCHLORIDE 160 MG/100ML
2-20 INJECTION, SOLUTION INTRAVENOUS CONTINUOUS
Status: DISCONTINUED | OUTPATIENT
Start: 2021-01-01 | End: 2021-01-01

## 2021-01-01 RX ORDER — POTASSIUM CHLORIDE 7.45 MG/ML
10 INJECTION INTRAVENOUS PRN
Status: DISCONTINUED | OUTPATIENT
Start: 2021-01-01 | End: 2021-01-01

## 2021-01-01 RX ORDER — ATORVASTATIN CALCIUM 40 MG/1
40 TABLET, FILM COATED ORAL NIGHTLY
Status: DISCONTINUED | OUTPATIENT
Start: 2021-01-01 | End: 2021-01-01

## 2021-01-01 RX ORDER — MIDODRINE HYDROCHLORIDE 5 MG/1
10 TABLET ORAL
Status: DISCONTINUED | OUTPATIENT
Start: 2021-01-01 | End: 2021-01-01 | Stop reason: HOSPADM

## 2021-01-01 RX ORDER — MIDODRINE HYDROCHLORIDE 5 MG/1
10 TABLET ORAL PRN
Status: DISCONTINUED | OUTPATIENT
Start: 2021-01-01 | End: 2021-01-01 | Stop reason: HOSPADM

## 2021-01-01 RX ORDER — SODIUM CHLORIDE 9 MG/ML
25 INJECTION, SOLUTION INTRAVENOUS PRN
Status: DISCONTINUED | OUTPATIENT
Start: 2021-01-01 | End: 2021-01-01 | Stop reason: HOSPADM

## 2021-01-01 RX ORDER — CLOPIDOGREL BISULFATE 75 MG/1
75 TABLET ORAL DAILY
Status: DISCONTINUED | OUTPATIENT
Start: 2021-01-01 | End: 2021-01-01

## 2021-01-01 RX ORDER — SODIUM CHLORIDE 0.9 % (FLUSH) 0.9 %
10 SYRINGE (ML) INJECTION PRN
Status: DISCONTINUED | OUTPATIENT
Start: 2021-01-01 | End: 2021-01-01 | Stop reason: HOSPADM

## 2021-01-01 RX ORDER — DOPAMINE HYDROCHLORIDE 160 MG/100ML
2-20 INJECTION, SOLUTION INTRAVENOUS CONTINUOUS
Status: DISCONTINUED | OUTPATIENT
Start: 2021-01-01 | End: 2021-01-01 | Stop reason: HOSPADM

## 2021-01-01 RX ORDER — ECHINACEA PURPUREA EXTRACT 125 MG
2 TABLET ORAL 2 TIMES DAILY
Status: DISCONTINUED | OUTPATIENT
Start: 2021-01-01 | End: 2021-01-01 | Stop reason: HOSPADM

## 2021-01-01 RX ORDER — DIPHENHYDRAMINE HCL 25 MG
25 TABLET ORAL EVERY 6 HOURS PRN
Status: DISCONTINUED | OUTPATIENT
Start: 2021-01-01 | End: 2021-01-01 | Stop reason: HOSPADM

## 2021-01-01 RX ORDER — HYDRALAZINE HYDROCHLORIDE 50 MG/1
100 TABLET, FILM COATED ORAL 3 TIMES DAILY
Status: DISCONTINUED | OUTPATIENT
Start: 2021-01-01 | End: 2021-01-01 | Stop reason: HOSPADM

## 2021-01-01 RX ORDER — RIVASTIGMINE 9.5 MG/24H
1 PATCH, EXTENDED RELEASE TRANSDERMAL DAILY
Status: DISCONTINUED | OUTPATIENT
Start: 2021-01-01 | End: 2021-01-01

## 2021-01-01 RX ORDER — ATROPINE SULFATE 0.1 MG/ML
0.5 INJECTION INTRAVENOUS ONCE
Status: COMPLETED | OUTPATIENT
Start: 2021-01-01 | End: 2021-01-01

## 2021-01-01 RX ORDER — SODIUM CHLORIDE 0.9 % (FLUSH) 0.9 %
5-40 SYRINGE (ML) INJECTION PRN
Status: DISCONTINUED | OUTPATIENT
Start: 2021-01-01 | End: 2021-01-01 | Stop reason: HOSPADM

## 2021-01-01 RX ORDER — MAGNESIUM SULFATE 1 G/100ML
1000 INJECTION INTRAVENOUS PRN
Status: DISCONTINUED | OUTPATIENT
Start: 2021-01-01 | End: 2021-01-01

## 2021-01-01 RX ORDER — CIPROFLOXACIN 250 MG/1
TABLET, FILM COATED ORAL
Status: ON HOLD | COMMUNITY
Start: 2021-01-01 | End: 2021-01-01 | Stop reason: HOSPADM

## 2021-01-01 RX ORDER — CARVEDILOL 12.5 MG/1
12.5 TABLET ORAL 2 TIMES DAILY WITH MEALS
Status: DISCONTINUED | OUTPATIENT
Start: 2021-01-01 | End: 2021-01-01

## 2021-01-01 RX ORDER — DEXTROSE MONOHYDRATE 25 G/50ML
12.5 INJECTION, SOLUTION INTRAVENOUS PRN
Status: DISCONTINUED | OUTPATIENT
Start: 2021-01-01 | End: 2021-01-01 | Stop reason: HOSPADM

## 2021-01-01 RX ORDER — ASPIRIN 81 MG/1
81 TABLET ORAL DAILY
Status: DISCONTINUED | OUTPATIENT
Start: 2021-01-01 | End: 2021-01-01 | Stop reason: HOSPADM

## 2021-01-01 RX ORDER — ROSUVASTATIN CALCIUM 20 MG/1
40 TABLET, COATED ORAL NIGHTLY
Status: DISCONTINUED | OUTPATIENT
Start: 2021-01-01 | End: 2021-01-01 | Stop reason: HOSPADM

## 2021-01-01 RX ORDER — ALBUMIN (HUMAN) 12.5 G/50ML
25 SOLUTION INTRAVENOUS PRN
Status: DISCONTINUED | OUTPATIENT
Start: 2021-01-01 | End: 2021-01-01 | Stop reason: HOSPADM

## 2021-01-01 RX ORDER — PANTOPRAZOLE SODIUM 40 MG/1
40 TABLET, DELAYED RELEASE ORAL
Status: DISCONTINUED | OUTPATIENT
Start: 2021-01-01 | End: 2021-01-01 | Stop reason: HOSPADM

## 2021-01-01 RX ORDER — NITROGLYCERIN 0.4 MG/1
TABLET SUBLINGUAL
Qty: 25 TABLET | Refills: 3 | DISCHARGE
Start: 2021-01-01

## 2021-01-01 RX ORDER — ATROPINE SULFATE 0.1 MG/ML
INJECTION INTRAVENOUS
Status: COMPLETED
Start: 2021-01-01 | End: 2021-01-01

## 2021-01-01 RX ORDER — HEPARIN SODIUM 5000 [USP'U]/ML
5000 INJECTION, SOLUTION INTRAVENOUS; SUBCUTANEOUS EVERY 8 HOURS SCHEDULED
Status: DISCONTINUED | OUTPATIENT
Start: 2021-01-01 | End: 2021-01-01 | Stop reason: HOSPADM

## 2021-01-01 RX ORDER — ASPIRIN 81 MG/1
81 TABLET, CHEWABLE ORAL DAILY
Status: DISCONTINUED | OUTPATIENT
Start: 2021-01-01 | End: 2021-01-01 | Stop reason: SDUPTHER

## 2021-01-01 RX ORDER — LIDOCAINE AND PRILOCAINE 25; 25 MG/G; MG/G
CREAM TOPICAL
COMMUNITY

## 2021-01-01 RX ORDER — POTASSIUM CHLORIDE 20 MEQ/1
40 TABLET, EXTENDED RELEASE ORAL PRN
Status: DISCONTINUED | OUTPATIENT
Start: 2021-01-01 | End: 2021-01-01

## 2021-01-01 RX ORDER — CARVEDILOL 6.25 MG/1
6.25 TABLET ORAL 2 TIMES DAILY WITH MEALS
Qty: 60 TABLET | Refills: 3 | Status: ON HOLD | DISCHARGE
Start: 2021-01-01 | End: 2022-01-01 | Stop reason: SDUPTHER

## 2021-01-01 RX ORDER — ONDANSETRON 4 MG/1
4 TABLET, ORALLY DISINTEGRATING ORAL EVERY 8 HOURS PRN
Status: DISCONTINUED | OUTPATIENT
Start: 2021-01-01 | End: 2021-01-01 | Stop reason: HOSPADM

## 2021-01-01 RX ORDER — DEXTROSE MONOHYDRATE 50 MG/ML
100 INJECTION, SOLUTION INTRAVENOUS PRN
Status: DISCONTINUED | OUTPATIENT
Start: 2021-01-01 | End: 2021-01-01 | Stop reason: HOSPADM

## 2021-01-01 RX ORDER — INSULIN GLARGINE 100 [IU]/ML
10 INJECTION, SOLUTION SUBCUTANEOUS NIGHTLY
Status: DISCONTINUED | OUTPATIENT
Start: 2021-01-01 | End: 2021-01-01 | Stop reason: HOSPADM

## 2021-01-01 RX ORDER — CITALOPRAM 20 MG/1
20 TABLET ORAL DAILY
Status: DISCONTINUED | OUTPATIENT
Start: 2021-01-01 | End: 2021-01-01 | Stop reason: HOSPADM

## 2021-01-01 RX ORDER — NITROGLYCERIN 0.4 MG/1
0.4 TABLET SUBLINGUAL EVERY 5 MIN PRN
Status: DISCONTINUED | OUTPATIENT
Start: 2021-01-01 | End: 2021-01-01 | Stop reason: HOSPADM

## 2021-01-01 RX ORDER — LIDOCAINE HYDROCHLORIDE ANHYDROUS AND DEXTROSE MONOHYDRATE .4; 5 G/100ML; G/100ML
0.5 INJECTION, SOLUTION INTRAVENOUS CONTINUOUS
Status: DISCONTINUED | OUTPATIENT
Start: 2021-01-01 | End: 2021-01-01 | Stop reason: CLARIF

## 2021-01-01 RX ORDER — ACETAMINOPHEN 325 MG/1
650 TABLET ORAL EVERY 6 HOURS PRN
Status: DISCONTINUED | OUTPATIENT
Start: 2021-01-01 | End: 2021-01-01 | Stop reason: HOSPADM

## 2021-01-01 RX ORDER — CALCIUM CARBONATE-CHOLECALCIFEROL TAB 250 MG-125 UNIT 250-125 MG-UNIT
1 TAB ORAL DAILY
Status: DISCONTINUED | OUTPATIENT
Start: 2021-01-01 | End: 2021-01-01 | Stop reason: HOSPADM

## 2021-01-01 RX ORDER — ONDANSETRON 2 MG/ML
4 INJECTION INTRAMUSCULAR; INTRAVENOUS EVERY 6 HOURS PRN
Status: DISCONTINUED | OUTPATIENT
Start: 2021-01-01 | End: 2021-01-01 | Stop reason: HOSPADM

## 2021-01-01 RX ORDER — RIVASTIGMINE 9.5 MG/24H
1 PATCH, EXTENDED RELEASE TRANSDERMAL DAILY
Status: DISCONTINUED | OUTPATIENT
Start: 2021-01-01 | End: 2021-01-01 | Stop reason: HOSPADM

## 2021-01-01 RX ADMIN — INSULIN LISPRO 1 UNITS: 100 INJECTION, SOLUTION INTRAVENOUS; SUBCUTANEOUS at 20:25

## 2021-01-01 RX ADMIN — SALINE NASAL SPRAY 2 SPRAY: 1.5 SOLUTION NASAL at 08:36

## 2021-01-01 RX ADMIN — ROSUVASTATIN CALCIUM 40 MG: 20 TABLET, COATED ORAL at 20:44

## 2021-01-01 RX ADMIN — PANTOPRAZOLE SODIUM 40 MG: 40 TABLET, DELAYED RELEASE ORAL at 16:00

## 2021-01-01 RX ADMIN — DOPAMINE HYDROCHLORIDE 16 MCG/KG/MIN: 160 INJECTION, SOLUTION INTRAVENOUS at 02:43

## 2021-01-01 RX ADMIN — HEPARIN SODIUM 5000 UNITS: 5000 INJECTION INTRAVENOUS; SUBCUTANEOUS at 21:47

## 2021-01-01 RX ADMIN — ATROPINE SULFATE 0.5 MG: 0.1 INJECTION INTRAVENOUS at 07:14

## 2021-01-01 RX ADMIN — SALINE NASAL SPRAY 2 SPRAY: 1.5 SOLUTION NASAL at 19:38

## 2021-01-01 RX ADMIN — SODIUM CHLORIDE, PRESERVATIVE FREE 10 ML: 5 INJECTION INTRAVENOUS at 08:54

## 2021-01-01 RX ADMIN — HYDRALAZINE HYDROCHLORIDE 100 MG: 50 TABLET, FILM COATED ORAL at 19:37

## 2021-01-01 RX ADMIN — OXYBUTYNIN CHLORIDE 5 MG: 5 TABLET ORAL at 08:35

## 2021-01-01 RX ADMIN — OXYBUTYNIN CHLORIDE 5 MG: 5 TABLET ORAL at 20:24

## 2021-01-01 RX ADMIN — EPINEPHRINE 2 MCG/MIN: 1 INJECTION PARENTERAL at 07:30

## 2021-01-01 RX ADMIN — PANTOPRAZOLE SODIUM 40 MG: 40 TABLET, DELAYED RELEASE ORAL at 05:19

## 2021-01-01 RX ADMIN — SALINE NASAL SPRAY 2 SPRAY: 1.5 SOLUTION NASAL at 20:27

## 2021-01-01 RX ADMIN — ACETAMINOPHEN 650 MG: 325 TABLET ORAL at 20:45

## 2021-01-01 RX ADMIN — SALINE NASAL SPRAY 2 SPRAY: 1.5 SOLUTION NASAL at 09:51

## 2021-01-01 RX ADMIN — CARVEDILOL 6.25 MG: 6.25 TABLET, FILM COATED ORAL at 08:07

## 2021-01-01 RX ADMIN — ASPIRIN 81 MG: 81 TABLET, COATED ORAL at 08:55

## 2021-01-01 RX ADMIN — ROSUVASTATIN CALCIUM 40 MG: 20 TABLET, COATED ORAL at 19:37

## 2021-01-01 RX ADMIN — OXYBUTYNIN CHLORIDE 5 MG: 5 TABLET ORAL at 19:37

## 2021-01-01 RX ADMIN — CITALOPRAM 20 MG: 20 TABLET, FILM COATED ORAL at 10:28

## 2021-01-01 RX ADMIN — AMLODIPINE BESYLATE 10 MG: 10 TABLET ORAL at 09:07

## 2021-01-01 RX ADMIN — HYDRALAZINE HYDROCHLORIDE 100 MG: 50 TABLET, FILM COATED ORAL at 14:00

## 2021-01-01 RX ADMIN — MIDODRINE HYDROCHLORIDE 10 MG: 5 TABLET ORAL at 09:50

## 2021-01-01 RX ADMIN — DOPAMINE HYDROCHLORIDE 10.5 MCG/KG/MIN: 160 INJECTION, SOLUTION INTRAVENOUS at 05:36

## 2021-01-01 RX ADMIN — HEPARIN SODIUM 5000 UNITS: 5000 INJECTION INTRAVENOUS; SUBCUTANEOUS at 15:38

## 2021-01-01 RX ADMIN — HEPARIN SODIUM 1900 UNITS: 1000 INJECTION INTRAVENOUS; SUBCUTANEOUS at 19:20

## 2021-01-01 RX ADMIN — PANTOPRAZOLE SODIUM 40 MG: 40 TABLET, DELAYED RELEASE ORAL at 06:48

## 2021-01-01 RX ADMIN — MIDODRINE HYDROCHLORIDE 10 MG: 5 TABLET ORAL at 08:09

## 2021-01-01 RX ADMIN — HYDRALAZINE HYDROCHLORIDE 100 MG: 50 TABLET, FILM COATED ORAL at 09:50

## 2021-01-01 RX ADMIN — HEPARIN SODIUM 5000 UNITS: 5000 INJECTION INTRAVENOUS; SUBCUTANEOUS at 05:16

## 2021-01-01 RX ADMIN — PANTOPRAZOLE SODIUM 40 MG: 40 TABLET, DELAYED RELEASE ORAL at 06:18

## 2021-01-01 RX ADMIN — ASPIRIN 81 MG: 81 TABLET, COATED ORAL at 08:35

## 2021-01-01 RX ADMIN — DOPAMINE HYDROCHLORIDE 10.5 MCG/KG/MIN: 160 INJECTION, SOLUTION INTRAVENOUS at 22:11

## 2021-01-01 RX ADMIN — HEPARIN SODIUM 5000 UNITS: 5000 INJECTION INTRAVENOUS; SUBCUTANEOUS at 05:36

## 2021-01-01 RX ADMIN — INFLUENZA A VIRUS A/VICTORIA/2570/2019 IVR-215 (H1N1) ANTIGEN (PROPIOLACTONE INACTIVATED), INFLUENZA A VIRUS A/CAMBODIA/E0826360/2020 IVR-224 (H3N2) ANTIGEN (PROPIOLACTONE INACTIVATED), INFLUENZA B VIRUS B/VICTORIA/705/2018 BVR-11 ANTIGEN (PROPIOLACTONE INACTIVATED), INFLUENZA B VIRUS B/PHUKET/3073/2013 BVR-1B ANTIGEN (PROPIOLACTONE INACTIVATED) 0.5 ML: 15; 15; 15; 15 INJECTION, SUSPENSION INTRAMUSCULAR at 09:53

## 2021-01-01 RX ADMIN — ROSUVASTATIN CALCIUM 40 MG: 20 TABLET, COATED ORAL at 20:24

## 2021-01-01 RX ADMIN — HEPARIN SODIUM 1900 UNITS: 1000 INJECTION INTRAVENOUS; SUBCUTANEOUS at 14:57

## 2021-01-01 RX ADMIN — SALINE NASAL SPRAY 2 SPRAY: 1.5 SOLUTION NASAL at 10:29

## 2021-01-01 RX ADMIN — OXYBUTYNIN CHLORIDE 5 MG: 5 TABLET ORAL at 20:48

## 2021-01-01 RX ADMIN — CALCIUM CARBONATE-CHOLECALCIFEROL TAB 250 MG-125 UNIT 1 TABLET: 250-125 TAB at 08:35

## 2021-01-01 RX ADMIN — OXYBUTYNIN CHLORIDE 5 MG: 5 TABLET ORAL at 08:56

## 2021-01-01 RX ADMIN — CALCIUM CARBONATE-CHOLECALCIFEROL TAB 250 MG-125 UNIT 250 MG: 250-125 TAB at 10:28

## 2021-01-01 RX ADMIN — HEPARIN SODIUM 1900 UNITS: 1000 INJECTION INTRAVENOUS; SUBCUTANEOUS at 18:30

## 2021-01-01 RX ADMIN — ASPIRIN 81 MG: 81 TABLET, COATED ORAL at 08:07

## 2021-01-01 RX ADMIN — CITALOPRAM 20 MG: 20 TABLET, FILM COATED ORAL at 08:26

## 2021-01-01 RX ADMIN — ASPIRIN 81 MG: 81 TABLET, COATED ORAL at 08:27

## 2021-01-01 RX ADMIN — HEPARIN SODIUM 5000 UNITS: 5000 INJECTION INTRAVENOUS; SUBCUTANEOUS at 05:54

## 2021-01-01 RX ADMIN — OXYBUTYNIN CHLORIDE 5 MG: 5 TABLET ORAL at 08:26

## 2021-01-01 RX ADMIN — INSULIN GLARGINE 10 UNITS: 100 INJECTION, SOLUTION SUBCUTANEOUS at 20:46

## 2021-01-01 RX ADMIN — OXYBUTYNIN CHLORIDE 5 MG: 5 TABLET ORAL at 20:44

## 2021-01-01 RX ADMIN — ACETAMINOPHEN 650 MG: 325 TABLET ORAL at 20:26

## 2021-01-01 RX ADMIN — SALINE NASAL SPRAY 2 SPRAY: 1.5 SOLUTION NASAL at 20:44

## 2021-01-01 RX ADMIN — CALCIUM CARBONATE-CHOLECALCIFEROL TAB 250 MG-125 UNIT 1 TABLET: 250-125 TAB at 08:07

## 2021-01-01 RX ADMIN — HYDRALAZINE HYDROCHLORIDE 100 MG: 50 TABLET, FILM COATED ORAL at 15:37

## 2021-01-01 RX ADMIN — OXYBUTYNIN CHLORIDE 5 MG: 5 TABLET ORAL at 20:26

## 2021-01-01 RX ADMIN — PANTOPRAZOLE SODIUM 40 MG: 40 TABLET, DELAYED RELEASE ORAL at 15:39

## 2021-01-01 RX ADMIN — CARVEDILOL 6.25 MG: 6.25 TABLET, FILM COATED ORAL at 08:35

## 2021-01-01 RX ADMIN — ASPIRIN 81 MG: 81 TABLET, COATED ORAL at 09:51

## 2021-01-01 RX ADMIN — HEPARIN SODIUM 5000 UNITS: 5000 INJECTION INTRAVENOUS; SUBCUTANEOUS at 22:23

## 2021-01-01 RX ADMIN — CALCIUM CARBONATE-CHOLECALCIFEROL TAB 250 MG-125 UNIT 250 MG: 250-125 TAB at 09:50

## 2021-01-01 RX ADMIN — PANTOPRAZOLE SODIUM 40 MG: 40 TABLET, DELAYED RELEASE ORAL at 07:53

## 2021-01-01 RX ADMIN — PANTOPRAZOLE SODIUM 40 MG: 40 TABLET, DELAYED RELEASE ORAL at 16:06

## 2021-01-01 RX ADMIN — HEPARIN SODIUM 5000 UNITS: 5000 INJECTION INTRAVENOUS; SUBCUTANEOUS at 22:49

## 2021-01-01 RX ADMIN — DEXTROSE MONOHYDRATE 12.5 G: 25 INJECTION, SOLUTION INTRAVENOUS at 08:45

## 2021-01-01 RX ADMIN — DOPAMINE HYDROCHLORIDE 12 MCG/KG/MIN: 160 INJECTION, SOLUTION INTRAVENOUS at 09:19

## 2021-01-01 RX ADMIN — CALCIUM CARBONATE-CHOLECALCIFEROL TAB 250 MG-125 UNIT 250 MG: 250-125 TAB at 09:32

## 2021-01-01 RX ADMIN — HEPARIN SODIUM 5000 UNITS: 5000 INJECTION INTRAVENOUS; SUBCUTANEOUS at 06:36

## 2021-01-01 RX ADMIN — INSULIN LISPRO 1 UNITS: 100 INJECTION, SOLUTION INTRAVENOUS; SUBCUTANEOUS at 21:47

## 2021-01-01 RX ADMIN — DOPAMINE HYDROCHLORIDE 10.5 MCG/KG/MIN: 160 INJECTION, SOLUTION INTRAVENOUS at 13:06

## 2021-01-01 RX ADMIN — OXYBUTYNIN CHLORIDE 5 MG: 5 TABLET ORAL at 09:50

## 2021-01-01 RX ADMIN — SALINE NASAL SPRAY 2 SPRAY: 1.5 SOLUTION NASAL at 20:24

## 2021-01-01 RX ADMIN — ALBUMIN (HUMAN) 25 G: 0.25 INJECTION, SOLUTION INTRAVENOUS at 17:48

## 2021-01-01 RX ADMIN — HEPARIN SODIUM 5000 UNITS: 5000 INJECTION INTRAVENOUS; SUBCUTANEOUS at 22:11

## 2021-01-01 RX ADMIN — ACETAMINOPHEN 650 MG: 325 TABLET ORAL at 16:29

## 2021-01-01 RX ADMIN — MIDODRINE HYDROCHLORIDE 10 MG: 5 TABLET ORAL at 16:20

## 2021-01-01 RX ADMIN — MIDODRINE HYDROCHLORIDE 10 MG: 5 TABLET ORAL at 16:29

## 2021-01-01 RX ADMIN — SALINE NASAL SPRAY 2 SPRAY: 1.5 SOLUTION NASAL at 20:48

## 2021-01-01 RX ADMIN — INSULIN LISPRO 1 UNITS: 100 INJECTION, SOLUTION INTRAVENOUS; SUBCUTANEOUS at 20:46

## 2021-01-01 RX ADMIN — HEPARIN SODIUM 1900 UNITS: 1000 INJECTION INTRAVENOUS; SUBCUTANEOUS at 18:00

## 2021-01-01 RX ADMIN — SALINE NASAL SPRAY 2 SPRAY: 1.5 SOLUTION NASAL at 08:28

## 2021-01-01 RX ADMIN — ATROPINE SULFATE 0.5 MG: 0.1 INJECTION INTRAVENOUS at 08:38

## 2021-01-01 RX ADMIN — AMLODIPINE BESYLATE 10 MG: 10 TABLET ORAL at 08:35

## 2021-01-01 RX ADMIN — CLOPIDOGREL 75 MG: 75 TABLET, FILM COATED ORAL at 08:26

## 2021-01-01 RX ADMIN — PANTOPRAZOLE SODIUM 40 MG: 40 TABLET, DELAYED RELEASE ORAL at 08:26

## 2021-01-01 RX ADMIN — HEPARIN SODIUM 5000 UNITS: 5000 INJECTION INTRAVENOUS; SUBCUTANEOUS at 14:00

## 2021-01-01 RX ADMIN — SALINE NASAL SPRAY 2 SPRAY: 1.5 SOLUTION NASAL at 20:47

## 2021-01-01 RX ADMIN — ASPIRIN 81 MG: 81 TABLET, COATED ORAL at 10:29

## 2021-01-01 RX ADMIN — CALCIUM CARBONATE-CHOLECALCIFEROL TAB 250 MG-125 UNIT 250 MG: 250-125 TAB at 08:55

## 2021-01-01 RX ADMIN — SODIUM CHLORIDE, PRESERVATIVE FREE 10 ML: 5 INJECTION INTRAVENOUS at 08:37

## 2021-01-01 RX ADMIN — HYDRALAZINE HYDROCHLORIDE 100 MG: 50 TABLET, FILM COATED ORAL at 20:45

## 2021-01-01 RX ADMIN — ROSUVASTATIN CALCIUM 40 MG: 20 TABLET, COATED ORAL at 20:45

## 2021-01-01 RX ADMIN — DOPAMINE HYDROCHLORIDE 8.5 MCG/KG/MIN: 160 INJECTION, SOLUTION INTRAVENOUS at 20:26

## 2021-01-01 RX ADMIN — INSULIN GLARGINE 10 UNITS: 100 INJECTION, SOLUTION SUBCUTANEOUS at 21:47

## 2021-01-01 RX ADMIN — CITALOPRAM 20 MG: 20 TABLET, FILM COATED ORAL at 08:07

## 2021-01-01 RX ADMIN — SALINE NASAL SPRAY 2 SPRAY: 1.5 SOLUTION NASAL at 08:08

## 2021-01-01 RX ADMIN — HYDRALAZINE HYDROCHLORIDE 100 MG: 50 TABLET, FILM COATED ORAL at 14:02

## 2021-01-01 RX ADMIN — HYDRALAZINE HYDROCHLORIDE 100 MG: 50 TABLET, FILM COATED ORAL at 20:24

## 2021-01-01 RX ADMIN — ACETAMINOPHEN 650 MG: 325 TABLET ORAL at 20:53

## 2021-01-01 RX ADMIN — CITALOPRAM 20 MG: 20 TABLET, FILM COATED ORAL at 08:56

## 2021-01-01 RX ADMIN — SODIUM CHLORIDE, PRESERVATIVE FREE 20 ML: 5 INJECTION INTRAVENOUS at 20:45

## 2021-01-01 RX ADMIN — OXYBUTYNIN CHLORIDE 5 MG: 5 TABLET ORAL at 08:07

## 2021-01-01 RX ADMIN — OXYBUTYNIN CHLORIDE 5 MG: 5 TABLET ORAL at 20:45

## 2021-01-01 RX ADMIN — DOPAMINE HYDROCHLORIDE 5.5 MCG/KG/MIN: 160 INJECTION, SOLUTION INTRAVENOUS at 17:15

## 2021-01-01 RX ADMIN — PANTOPRAZOLE SODIUM 40 MG: 40 TABLET, DELAYED RELEASE ORAL at 19:01

## 2021-01-01 RX ADMIN — HYDRALAZINE HYDROCHLORIDE 100 MG: 50 TABLET, FILM COATED ORAL at 08:07

## 2021-01-01 RX ADMIN — HYDRALAZINE HYDROCHLORIDE 100 MG: 50 TABLET, FILM COATED ORAL at 20:26

## 2021-01-01 RX ADMIN — INSULIN GLARGINE 10 UNITS: 100 INJECTION, SOLUTION SUBCUTANEOUS at 20:26

## 2021-01-01 RX ADMIN — ACETAMINOPHEN 650 MG: 325 TABLET ORAL at 08:37

## 2021-01-01 RX ADMIN — AMLODIPINE BESYLATE 10 MG: 10 TABLET ORAL at 08:07

## 2021-01-01 RX ADMIN — DOPAMINE HYDROCHLORIDE 3 MCG/KG/MIN: 160 INJECTION, SOLUTION INTRAVENOUS at 09:25

## 2021-01-01 RX ADMIN — HYDRALAZINE HYDROCHLORIDE 100 MG: 50 TABLET, FILM COATED ORAL at 08:35

## 2021-01-01 RX ADMIN — INSULIN LISPRO 1 UNITS: 100 INJECTION, SOLUTION INTRAVENOUS; SUBCUTANEOUS at 16:52

## 2021-01-01 RX ADMIN — CARVEDILOL 6.25 MG: 6.25 TABLET, FILM COATED ORAL at 16:06

## 2021-01-01 RX ADMIN — GLUCAGON HYDROCHLORIDE 1 MG: KIT at 09:23

## 2021-01-01 RX ADMIN — SODIUM CHLORIDE, PRESERVATIVE FREE 10 ML: 5 INJECTION INTRAVENOUS at 08:57

## 2021-01-01 RX ADMIN — DIPHENHYDRAMINE HCL 25 MG: 25 TABLET ORAL at 20:48

## 2021-01-01 RX ADMIN — ROSUVASTATIN CALCIUM 40 MG: 20 TABLET, COATED ORAL at 20:26

## 2021-01-01 RX ADMIN — CITALOPRAM 20 MG: 20 TABLET, FILM COATED ORAL at 09:50

## 2021-01-01 RX ADMIN — INSULIN LISPRO 1 UNITS: 100 INJECTION, SOLUTION INTRAVENOUS; SUBCUTANEOUS at 08:01

## 2021-01-01 RX ADMIN — OXYBUTYNIN CHLORIDE 5 MG: 5 TABLET ORAL at 10:28

## 2021-01-01 RX ADMIN — PANTOPRAZOLE SODIUM 40 MG: 40 TABLET, DELAYED RELEASE ORAL at 08:56

## 2021-01-01 RX ADMIN — CITALOPRAM 20 MG: 20 TABLET, FILM COATED ORAL at 08:35

## 2021-01-01 RX ADMIN — HYDRALAZINE HYDROCHLORIDE 100 MG: 50 TABLET, FILM COATED ORAL at 10:28

## 2021-01-01 RX ADMIN — SODIUM CHLORIDE, PRESERVATIVE FREE 10 ML: 5 INJECTION INTRAVENOUS at 20:48

## 2021-01-01 RX ADMIN — CLOPIDOGREL 75 MG: 75 TABLET, FILM COATED ORAL at 10:28

## 2021-01-01 RX ADMIN — HEPARIN SODIUM 5000 UNITS: 5000 INJECTION INTRAVENOUS; SUBCUTANEOUS at 05:18

## 2021-01-01 RX ADMIN — SODIUM CHLORIDE, PRESERVATIVE FREE 10 ML: 5 INJECTION INTRAVENOUS at 20:49

## 2021-01-01 RX ADMIN — SODIUM CHLORIDE, PRESERVATIVE FREE 10 ML: 5 INJECTION INTRAVENOUS at 20:25

## 2021-01-01 RX ADMIN — DOPAMINE HYDROCHLORIDE IN DEXTROSE 3 MCG/KG/MIN: 1.6 INJECTION, SOLUTION INTRAVENOUS at 09:25

## 2021-01-01 RX ADMIN — SODIUM CHLORIDE, PRESERVATIVE FREE 10 ML: 5 INJECTION INTRAVENOUS at 09:49

## 2021-01-01 RX ADMIN — ROSUVASTATIN CALCIUM 40 MG: 20 TABLET, COATED ORAL at 20:48

## 2021-01-01 RX ADMIN — MIDODRINE HYDROCHLORIDE 10 MG: 5 TABLET ORAL at 14:16

## 2021-01-01 RX ADMIN — PANTOPRAZOLE SODIUM 40 MG: 40 TABLET, DELAYED RELEASE ORAL at 17:28

## 2021-01-01 ASSESSMENT — PAIN SCALES - GENERAL
PAINLEVEL_OUTOF10: 10
PAINLEVEL_OUTOF10: 0
PAINLEVEL_OUTOF10: 0
PAINLEVEL_OUTOF10: 3
PAINLEVEL_OUTOF10: 0
PAINLEVEL_OUTOF10: 3
PAINLEVEL_OUTOF10: 0
PAINLEVEL_OUTOF10: 6
PAINLEVEL_OUTOF10: 0
PAINLEVEL_OUTOF10: 5
PAINLEVEL_OUTOF10: 6
PAINLEVEL_OUTOF10: 0
PAINLEVEL_OUTOF10: 6
PAINLEVEL_OUTOF10: 0
PAINLEVEL_OUTOF10: 0

## 2021-01-01 ASSESSMENT — ENCOUNTER SYMPTOMS
SORE THROAT: 0
SHORTNESS OF BREATH: 0
CHEST TIGHTNESS: 0
VOMITING: 0
WHEEZING: 0
DIARRHEA: 0
ABDOMINAL PAIN: 0
NAUSEA: 0
DIARRHEA: 0
BACK PAIN: 0
STRIDOR: 0
SHORTNESS OF BREATH: 0
SHORTNESS OF BREATH: 0
COLOR CHANGE: 0
TROUBLE SWALLOWING: 0
SINUS PRESSURE: 0
RHINORRHEA: 0
CHEST TIGHTNESS: 0
ABDOMINAL PAIN: 0
COUGH: 1
EYE PAIN: 0
FACIAL SWELLING: 0
VOMITING: 0
VOMITING: 0
SHORTNESS OF BREATH: 0
NAUSEA: 0
CONSTIPATION: 0
COUGH: 0
BLOOD IN STOOL: 0
EYES NEGATIVE: 1
BLOOD IN STOOL: 0
VOMITING: 0
WHEEZING: 0
NAUSEA: 0
EYE DISCHARGE: 0
ABDOMINAL PAIN: 0
NAUSEA: 0
CONSTIPATION: 0
EYE ITCHING: 0

## 2021-01-01 ASSESSMENT — PAIN DESCRIPTION - DESCRIPTORS
DESCRIPTORS: ACHING

## 2021-01-01 ASSESSMENT — PAIN DESCRIPTION - ONSET: ONSET: ON-GOING

## 2021-01-01 ASSESSMENT — PAIN DESCRIPTION - LOCATION
LOCATION: HEAD
LOCATION: HEAD
LOCATION: GENERALIZED

## 2021-01-01 ASSESSMENT — PAIN DESCRIPTION - FREQUENCY: FREQUENCY: CONTINUOUS

## 2021-01-01 ASSESSMENT — PAIN DESCRIPTION - PAIN TYPE
TYPE: ACUTE PAIN
TYPE: CHRONIC PAIN

## 2021-01-03 ENCOUNTER — OUTSIDE SERVICES (OUTPATIENT)
Dept: INTERNAL MEDICINE | Age: 71
End: 2021-01-03
Payer: MEDICARE

## 2021-01-03 DIAGNOSIS — B95.62 MRSA BACTEREMIA: ICD-10-CM

## 2021-01-03 DIAGNOSIS — R78.81 MRSA BACTEREMIA: ICD-10-CM

## 2021-01-03 DIAGNOSIS — I50.9 DECOMPENSATED HEART FAILURE (HCC): Primary | ICD-10-CM

## 2021-01-03 DIAGNOSIS — I10 ESSENTIAL HYPERTENSION: ICD-10-CM

## 2021-01-03 DIAGNOSIS — E66.01 MORBIDLY OBESE (HCC): ICD-10-CM

## 2021-01-03 DIAGNOSIS — F03.90 DEMENTIA WITHOUT BEHAVIORAL DISTURBANCE, UNSPECIFIED DEMENTIA TYPE: ICD-10-CM

## 2021-01-03 DIAGNOSIS — I25.10 CORONARY ARTERY DISEASE INVOLVING NATIVE HEART WITHOUT ANGINA PECTORIS, UNSPECIFIED VESSEL OR LESION TYPE: ICD-10-CM

## 2021-01-03 DIAGNOSIS — N18.32 STAGE 3B CHRONIC KIDNEY DISEASE (HCC): ICD-10-CM

## 2021-01-03 PROCEDURE — 99308 SBSQ NF CARE LOW MDM 20: CPT | Performed by: NURSE PRACTITIONER

## 2021-01-03 ASSESSMENT — ENCOUNTER SYMPTOMS
RHINORRHEA: 0
NAUSEA: 0
EYE PAIN: 0
CONSTIPATION: 0
BLOOD IN STOOL: 0
VOMITING: 0
ABDOMINAL PAIN: 0
SORE THROAT: 0
TROUBLE SWALLOWING: 0
COLOR CHANGE: 0
CHEST TIGHTNESS: 0
DIARRHEA: 0
SHORTNESS OF BREATH: 0
COUGH: 0
SINUS PRESSURE: 0
FACIAL SWELLING: 0
WHEEZING: 0

## 2021-01-04 NOTE — PROGRESS NOTES
12/30/2020  Alexander Burks  1950    Patient Resident of [facility]    Chief Complaint  1. Decompensated heart failure ([facility] [address]    2. MRSA bacteremia    3. Stage 3b chronic kidney disease    4. Uncontrolled type 2 diabetes mellitus with stage 3 chronic kidney disease, with long-term current use of insulin ([facility] [address]    5. Essential hypertension    6. Dementia without behavioral disturbance, unspecified dementia type ([facility]    7. Coronary artery disease involving native heart without angina pectoris, unspecified vessel or lesion type    8. Morbidly obese (HCC)        HPI:  67-year-old patient being seen from transition from [facility] Patient was admitted at [facility] 12/12/2020 through 12/23/2020 for anemia, hemoglobin 6.8, acute renal failure, blood culture 12/15/2020 positive for gram-negative rods in 1 culture with a follow-up blood culture showing MRSA bacteremia. Was placed on IV ceftaroline per ID. Has a PICC line in place. Was sent to [facility] for IV antibiotics and rehabilitation. Patient developed fluid overload needed Lasix drip. Fluids also provided to assist in acute on chronic kidney disease. Followed by infectious disease, nephrology, cardiology and internal medicine. Patient continues on IV antibiotics denies any acute concerns at present. Has been given her insulin per the nursing staff blood sugars have stabilized. She denies any chest discomfort continues with swelling to bilateral upper and lower extremities however nursing staff states this is significantly improved from yesterday. Patient denies any new shortness of breath. Chronically sleeps upright. Hypoventilatory syndrome.       Allergies   Allergen Reactions    Bactrim [Sulfamethoxazole-Trimethoprim] Hives    Clonidine Derivatives     Amoxicillin-Pot Clavulanate Diarrhea, Nausea Only and Nausea And Vomiting       Past Medical History:   Diagnosis Date  Not on file   Social Needs    Financial resource strain: Not on file    Food insecurity     Worry: Not on file     Inability: Not on file    Transportation needs     Medical: Not on file     Non-medical: Not on file   Tobacco Use    Smoking status: Never Smoker    Smokeless tobacco: Never Used    Tobacco comment: never smoker eclamb rrt 7/20/17   Substance and Sexual Activity    Alcohol use: No    Drug use: No    Sexual activity: Not on file   Lifestyle    Physical activity     Days per week: Not on file     Minutes per session: Not on file    Stress: Not on file   Relationships    Social connections     Talks on phone: Not on file     Gets together: Not on file     Attends Tenriism service: Not on file     Active member of club or organization: Not on file     Attends meetings of clubs or organizations: Not on file     Relationship status: Not on file    Intimate partner violence     Fear of current or ex partner: Not on file     Emotionally abused: Not on file     Physically abused: Not on file     Forced sexual activity: Not on file   Other Topics Concern    Not on file   Social History Narrative    Not on file       Review of Systems   Constitutional: Negative for activity change, appetite change, chills, fatigue, fever and unexpected weight change. HENT: Negative for congestion, dental problem, ear discharge, ear pain, facial swelling, hearing loss, postnasal drip, rhinorrhea, sinus pressure, sore throat and trouble swallowing. Eyes: Negative for pain and visual disturbance. Respiratory: Negative for cough, chest tightness, shortness of breath and wheezing. Cardiovascular: Positive for leg swelling. Negative for chest pain and palpitations. Gastrointestinal: Negative for abdominal pain, blood in stool, constipation, diarrhea, nausea and vomiting. Endocrine: Negative for cold intolerance, heat intolerance and polyuria. Genitourinary: Negative for difficulty urinating. Musculoskeletal: Negative for arthralgias, gait problem, myalgias, neck pain and neck stiffness. Skin: Negative for color change, rash and wound. Neurological: Positive for weakness. Negative for dizziness, tremors, seizures, light-headedness, numbness and headaches. Psychiatric/Behavioral: Negative for confusion and hallucinations. The patient is not nervous/anxious. Physical Exam  Vitals signs and nursing note reviewed. Constitutional:       General: She is not in acute distress. Appearance: Normal appearance. She is well-developed. She is not diaphoretic. HENT:      Head: Normocephalic and atraumatic. Right Ear: External ear normal.      Left Ear: External ear normal.   Eyes:      General:         Right eye: No discharge. Left eye: No discharge. Neck:      Trachea: No tracheal deviation. Cardiovascular:      Rate and Rhythm: Normal rate and regular rhythm. Pulses: Normal pulses. Heart sounds: Normal heart sounds. No murmur. No friction rub. No gallop. Pulmonary:      Effort: Pulmonary effort is normal. No respiratory distress. Breath sounds: Normal breath sounds. No stridor. No wheezing, rhonchi or rales. Chest:      Chest wall: No tenderness. Abdominal:      General: Bowel sounds are normal. There is no distension. Palpations: Abdomen is soft. Tenderness: There is no abdominal tenderness. Musculoskeletal:         General: No swelling. Comments: +2 edema to bilateral lower extremities and bilateral forearms. +1 bilateral hand edema   Skin:     General: Skin is warm and dry. Capillary Refill: Capillary refill takes less than 2 seconds. Coloration: Skin is not pale. Findings: No rash. Neurological:      General: No focal deficit present. Mental Status: She is alert. Mental status is at baseline. Cranial Nerves: No cranial nerve deficit. Sensory: No sensory deficit.       Coordination: Coordination normal. Psychiatric:         Mood and Affect: Mood normal.         Behavior: Behavior normal.         Vital Signs: Temperature 97.3 °F, blood pressure 142/62, pulse 63, respirations 16, SPO2 98% on room air    Assessment:  1. Decompensated heart failure (HCC)  Slow diuresis. Fluid restriction. Continues on Coreg, Bumex, aspirin, hydralazine. 2. MRSA bacteremia  Ongoing follow-up with infectious disease, continues on ceftaroline IV until 1/18/2020    3. Stage 3b chronic kidney disease  Avoid nephrotoxic drugs, continues to follow with nephrology    4. Uncontrolled type 2 diabetes mellitus with stage 3 chronic kidney disease, with long-term current use of insulin ([facility]  Blood sugars have stabilized at [facility]. Continues on lispro sliding scale and Basaglar insulin report sugars of persistent elevations    5. Essential hypertension  Stable at present on current antihypertensive regimen    6. Dementia without behavioral disturbance, unspecified dementia type (HCC)  Stable, continues on Exelon and Namenda    7. Coronary artery disease involving native heart without angina pectoris, unspecified vessel or lesion type  Stable, continues on Plavix, statin, aspirin, Coreg    8. Morbidly obese ([facility] [address]  Dietitian eval      Plan:  As noted above. Follow-up CBC and BMP already ordered routinely due to antibiotic. Follow-up with nephrology, infectious disease and cardiology  Hospital records reviewed  Follow up for routine visit. Call sooner with concerns prior.     Electronically signed by SHIRA Moore CNP on 1/3/2021 at 7:44 PM

## 2021-01-06 ENCOUNTER — OUTSIDE SERVICES (OUTPATIENT)
Dept: INTERNAL MEDICINE | Age: 71
End: 2021-01-06
Payer: MEDICARE

## 2021-01-06 ENCOUNTER — HOSPITAL ENCOUNTER (EMERGENCY)
Age: 71
Discharge: ANOTHER HOSPITAL | End: 2021-01-06
Attending: EMERGENCY MEDICINE
Payer: MEDICARE

## 2021-01-06 ENCOUNTER — TELEPHONE (OUTPATIENT)
Dept: OTHER | Facility: CLINIC | Age: 71
End: 2021-01-06

## 2021-01-06 ENCOUNTER — HOSPITAL ENCOUNTER (INPATIENT)
Age: 71
LOS: 5 days | Discharge: NURSING, REHAB OR LONG-TERM CARE | DRG: 673 | End: 2021-01-11
Attending: INTERNAL MEDICINE | Admitting: INTERNAL MEDICINE
Payer: MEDICARE

## 2021-01-06 ENCOUNTER — APPOINTMENT (OUTPATIENT)
Dept: ULTRASOUND IMAGING | Age: 71
DRG: 673 | End: 2021-01-06
Attending: INTERNAL MEDICINE
Payer: MEDICARE

## 2021-01-06 ENCOUNTER — APPOINTMENT (OUTPATIENT)
Dept: GENERAL RADIOLOGY | Age: 71
End: 2021-01-06
Payer: MEDICARE

## 2021-01-06 VITALS
BODY MASS INDEX: 44.57 KG/M2 | TEMPERATURE: 97.2 F | WEIGHT: 284 LBS | OXYGEN SATURATION: 94 % | HEART RATE: 66 BPM | HEIGHT: 67 IN | RESPIRATION RATE: 18 BRPM | DIASTOLIC BLOOD PRESSURE: 64 MMHG | SYSTOLIC BLOOD PRESSURE: 157 MMHG

## 2021-01-06 DIAGNOSIS — N17.9 AKI (ACUTE KIDNEY INJURY) (HCC): Primary | ICD-10-CM

## 2021-01-06 DIAGNOSIS — R78.81 MRSA BACTEREMIA: ICD-10-CM

## 2021-01-06 DIAGNOSIS — B95.62 MRSA BACTEREMIA: ICD-10-CM

## 2021-01-06 DIAGNOSIS — R60.9 PERIPHERAL EDEMA: ICD-10-CM

## 2021-01-06 DIAGNOSIS — I50.33 ACUTE ON CHRONIC DIASTOLIC HEART FAILURE (HCC): ICD-10-CM

## 2021-01-06 DIAGNOSIS — I10 ESSENTIAL HYPERTENSION: ICD-10-CM

## 2021-01-06 DIAGNOSIS — N18.6 ESRD (END STAGE RENAL DISEASE) (HCC): Primary | ICD-10-CM

## 2021-01-06 PROBLEM — N18.9 CHRONIC RENAL FAILURE SYNDROME: Status: ACTIVE | Noted: 2021-01-06

## 2021-01-06 LAB
-: ABNORMAL
ABSOLUTE EOS #: 0.11 K/UL (ref 0–0.44)
ABSOLUTE IMMATURE GRANULOCYTE: 0 K/UL (ref 0–0.3)
ABSOLUTE LYMPH #: 0.61 K/UL (ref 1.1–3.7)
ABSOLUTE MONO #: 0.5 K/UL (ref 0.1–1.2)
AMORPHOUS: ABNORMAL
ANION GAP SERPL CALCULATED.3IONS-SCNC: 10 MMOL/L (ref 9–17)
ANION GAP SERPL CALCULATED.3IONS-SCNC: 12 MMOL/L (ref 9–17)
BACTERIA: ABNORMAL
BASOPHILS # BLD: 1 % (ref 0–2)
BASOPHILS ABSOLUTE: 0.06 K/UL (ref 0–0.2)
BILIRUBIN URINE: NEGATIVE
BNP INTERPRETATION: ABNORMAL
BUN BLDV-MCNC: 64 MG/DL (ref 8–23)
BUN BLDV-MCNC: 67 MG/DL (ref 8–23)
BUN/CREAT BLD: 18 (ref 9–20)
BUN/CREAT BLD: ABNORMAL (ref 9–20)
CALCIUM SERPL-MCNC: 8.3 MG/DL (ref 8.6–10.4)
CALCIUM SERPL-MCNC: 8.3 MG/DL (ref 8.6–10.4)
CASTS UA: ABNORMAL /LPF (ref 0–2)
CHLORIDE BLD-SCNC: 96 MMOL/L (ref 98–107)
CHLORIDE BLD-SCNC: 97 MMOL/L (ref 98–107)
CO2: 29 MMOL/L (ref 20–31)
CO2: 30 MMOL/L (ref 20–31)
COLOR: ABNORMAL
COMMENT UA: ABNORMAL
CREAT SERPL-MCNC: 3.16 MG/DL (ref 0.5–0.9)
CREAT SERPL-MCNC: 3.66 MG/DL (ref 0.5–0.9)
CRYSTALS, UA: ABNORMAL /HPF
DIFFERENTIAL TYPE: ABNORMAL
EOSINOPHILS RELATIVE PERCENT: 2 % (ref 1–4)
EPITHELIAL CELLS UA: ABNORMAL /HPF (ref 0–5)
GFR AFRICAN AMERICAN: 15 ML/MIN
GFR AFRICAN AMERICAN: 18 ML/MIN
GFR NON-AFRICAN AMERICAN: 12 ML/MIN
GFR NON-AFRICAN AMERICAN: 15 ML/MIN
GFR SERPL CREATININE-BSD FRML MDRD: ABNORMAL ML/MIN/{1.73_M2}
GLUCOSE BLD-MCNC: 156 MG/DL (ref 65–105)
GLUCOSE BLD-MCNC: 165 MG/DL (ref 70–99)
GLUCOSE BLD-MCNC: 175 MG/DL (ref 70–99)
GLUCOSE BLD-MCNC: 184 MG/DL (ref 65–105)
GLUCOSE BLD-MCNC: 185 MG/DL (ref 65–105)
GLUCOSE URINE: NEGATIVE
HCT VFR BLD CALC: 27.1 % (ref 36.3–47.1)
HEMOGLOBIN: 8.1 G/DL (ref 11.9–15.1)
IMMATURE GRANULOCYTES: 0 %
INR BLD: 1.3
KETONES, URINE: NEGATIVE
LEUKOCYTE ESTERASE, URINE: NEGATIVE
LYMPHOCYTES # BLD: 11 % (ref 24–43)
MCH RBC QN AUTO: 30.1 PG (ref 25.2–33.5)
MCHC RBC AUTO-ENTMCNC: 29.9 G/DL (ref 25.2–33.5)
MCV RBC AUTO: 100.7 FL (ref 82.6–102.9)
MONOCYTES # BLD: 9 % (ref 3–12)
MORPHOLOGY: ABNORMAL
MORPHOLOGY: ABNORMAL
MUCUS: ABNORMAL
NITRITE, URINE: NEGATIVE
NRBC AUTOMATED: 0 PER 100 WBC
OTHER OBSERVATIONS UA: ABNORMAL
PARTIAL THROMBOPLASTIN TIME: 34.4 SEC (ref 23.9–33.8)
PDW BLD-RTO: 16.7 % (ref 11.8–14.4)
PH UA: 6 (ref 5–6)
PLATELET # BLD: 203 K/UL (ref 138–453)
PLATELET ESTIMATE: ABNORMAL
PMV BLD AUTO: 10.5 FL (ref 8.1–13.5)
POTASSIUM SERPL-SCNC: 4.7 MMOL/L (ref 3.7–5.3)
POTASSIUM SERPL-SCNC: 4.7 MMOL/L (ref 3.7–5.3)
PRO-BNP: ABNORMAL PG/ML
PROTEIN UA: ABNORMAL
PROTHROMBIN TIME: 15.8 SEC (ref 11.5–14.2)
RBC # BLD: 2.69 M/UL (ref 3.95–5.11)
RBC # BLD: ABNORMAL 10*6/UL
RBC UA: ABNORMAL /HPF (ref 0–4)
RENAL EPITHELIAL, UA: ABNORMAL /HPF
SEG NEUTROPHILS: 77 % (ref 36–65)
SEGMENTED NEUTROPHILS ABSOLUTE COUNT: 4.22 K/UL (ref 1.5–8.1)
SODIUM BLD-SCNC: 136 MMOL/L (ref 135–144)
SODIUM BLD-SCNC: 138 MMOL/L (ref 135–144)
SPECIFIC GRAVITY UA: 1.03 (ref 1.01–1.02)
TRICHOMONAS: ABNORMAL
TROPONIN INTERP: ABNORMAL
TROPONIN T: ABNORMAL NG/ML
TROPONIN, HIGH SENSITIVITY: 84 NG/L (ref 0–14)
TURBIDITY: ABNORMAL
URINE HGB: NEGATIVE
UROBILINOGEN, URINE: NORMAL
WBC # BLD: 5.5 K/UL (ref 3.5–11.3)
WBC # BLD: ABNORMAL 10*3/UL
WBC UA: ABNORMAL /HPF (ref 0–4)
YEAST: ABNORMAL

## 2021-01-06 PROCEDURE — 85025 COMPLETE CBC W/AUTO DIFF WBC: CPT

## 2021-01-06 PROCEDURE — 82947 ASSAY GLUCOSE BLOOD QUANT: CPT

## 2021-01-06 PROCEDURE — 6360000002 HC RX W HCPCS: Performed by: NURSE PRACTITIONER

## 2021-01-06 PROCEDURE — 1200000000 HC SEMI PRIVATE

## 2021-01-06 PROCEDURE — 36415 COLL VENOUS BLD VENIPUNCTURE: CPT

## 2021-01-06 PROCEDURE — 80048 BASIC METABOLIC PNL TOTAL CA: CPT

## 2021-01-06 PROCEDURE — 6360000002 HC RX W HCPCS

## 2021-01-06 PROCEDURE — 81001 URINALYSIS AUTO W/SCOPE: CPT

## 2021-01-06 PROCEDURE — 76770 US EXAM ABDO BACK WALL COMP: CPT

## 2021-01-06 PROCEDURE — 93005 ELECTROCARDIOGRAM TRACING: CPT | Performed by: EMERGENCY MEDICINE

## 2021-01-06 PROCEDURE — 96374 THER/PROPH/DIAG INJ IV PUSH: CPT

## 2021-01-06 PROCEDURE — 99285 EMERGENCY DEPT VISIT HI MDM: CPT

## 2021-01-06 PROCEDURE — 84484 ASSAY OF TROPONIN QUANT: CPT

## 2021-01-06 PROCEDURE — 83880 ASSAY OF NATRIURETIC PEPTIDE: CPT

## 2021-01-06 PROCEDURE — 85730 THROMBOPLASTIN TIME PARTIAL: CPT

## 2021-01-06 PROCEDURE — 6370000000 HC RX 637 (ALT 250 FOR IP): Performed by: NURSE PRACTITIONER

## 2021-01-06 PROCEDURE — 71045 X-RAY EXAM CHEST 1 VIEW: CPT

## 2021-01-06 PROCEDURE — 99308 SBSQ NF CARE LOW MDM 20: CPT | Performed by: NURSE PRACTITIONER

## 2021-01-06 PROCEDURE — 6360000002 HC RX W HCPCS: Performed by: EMERGENCY MEDICINE

## 2021-01-06 PROCEDURE — 85610 PROTHROMBIN TIME: CPT

## 2021-01-06 RX ORDER — DEXTROSE MONOHYDRATE 50 MG/ML
100 INJECTION, SOLUTION INTRAVENOUS PRN
Status: DISCONTINUED | OUTPATIENT
Start: 2021-01-06 | End: 2021-01-11 | Stop reason: HOSPADM

## 2021-01-06 RX ORDER — RIVASTIGMINE 9.5 MG/24H
1 PATCH, EXTENDED RELEASE TRANSDERMAL DAILY
Status: DISCONTINUED | OUTPATIENT
Start: 2021-01-06 | End: 2021-01-11 | Stop reason: HOSPADM

## 2021-01-06 RX ORDER — ACETAMINOPHEN 325 MG/1
650 TABLET ORAL EVERY 6 HOURS PRN
Status: DISCONTINUED | OUTPATIENT
Start: 2021-01-06 | End: 2021-01-11 | Stop reason: HOSPADM

## 2021-01-06 RX ORDER — CLOPIDOGREL BISULFATE 75 MG/1
75 TABLET ORAL DAILY
Status: DISCONTINUED | OUTPATIENT
Start: 2021-01-06 | End: 2021-01-07

## 2021-01-06 RX ORDER — INSULIN GLARGINE 100 [IU]/ML
20 INJECTION, SOLUTION SUBCUTANEOUS NIGHTLY
Status: DISCONTINUED | OUTPATIENT
Start: 2021-01-06 | End: 2021-01-07

## 2021-01-06 RX ORDER — MEMANTINE HYDROCHLORIDE 5 MG/1
10 TABLET ORAL 2 TIMES DAILY
Status: DISCONTINUED | OUTPATIENT
Start: 2021-01-06 | End: 2021-01-11 | Stop reason: HOSPADM

## 2021-01-06 RX ORDER — FUROSEMIDE 10 MG/ML
20 INJECTION INTRAMUSCULAR; INTRAVENOUS ONCE
Status: COMPLETED | OUTPATIENT
Start: 2021-01-06 | End: 2021-01-06

## 2021-01-06 RX ORDER — NICOTINE 21 MG/24HR
1 PATCH, TRANSDERMAL 24 HOURS TRANSDERMAL DAILY PRN
Status: DISCONTINUED | OUTPATIENT
Start: 2021-01-06 | End: 2021-01-11 | Stop reason: HOSPADM

## 2021-01-06 RX ORDER — HEPARIN SODIUM (PORCINE) LOCK FLUSH IV SOLN 100 UNIT/ML 100 UNIT/ML
SOLUTION INTRAVENOUS
Status: COMPLETED
Start: 2021-01-06 | End: 2021-01-06

## 2021-01-06 RX ORDER — NICOTINE POLACRILEX 4 MG
15 LOZENGE BUCCAL PRN
Status: DISCONTINUED | OUTPATIENT
Start: 2021-01-06 | End: 2021-01-11 | Stop reason: HOSPADM

## 2021-01-06 RX ORDER — SODIUM CHLORIDE 0.9 % (FLUSH) 0.9 %
10 SYRINGE (ML) INJECTION EVERY 12 HOURS SCHEDULED
Status: DISCONTINUED | OUTPATIENT
Start: 2021-01-06 | End: 2021-01-07

## 2021-01-06 RX ORDER — SODIUM POLYSTYRENE SULFONATE 15 G/60ML
15 SUSPENSION ORAL; RECTAL
Status: ACTIVE | OUTPATIENT
Start: 2021-01-06 | End: 2021-01-06

## 2021-01-06 RX ORDER — SODIUM CHLORIDE 0.9 % (FLUSH) 0.9 %
10 SYRINGE (ML) INJECTION PRN
Status: DISCONTINUED | OUTPATIENT
Start: 2021-01-06 | End: 2021-01-11 | Stop reason: HOSPADM

## 2021-01-06 RX ORDER — HEPARIN SODIUM 5000 [USP'U]/ML
5000 INJECTION, SOLUTION INTRAVENOUS; SUBCUTANEOUS EVERY 8 HOURS SCHEDULED
Status: DISCONTINUED | OUTPATIENT
Start: 2021-01-06 | End: 2021-01-11 | Stop reason: HOSPADM

## 2021-01-06 RX ORDER — SODIUM POLYSTYRENE SULFONATE 15 G/60ML
30 SUSPENSION ORAL; RECTAL
Status: ACTIVE | OUTPATIENT
Start: 2021-01-06 | End: 2021-01-06

## 2021-01-06 RX ORDER — DEXTROSE MONOHYDRATE 25 G/50ML
12.5 INJECTION, SOLUTION INTRAVENOUS PRN
Status: DISCONTINUED | OUTPATIENT
Start: 2021-01-06 | End: 2021-01-11 | Stop reason: HOSPADM

## 2021-01-06 RX ORDER — CETIRIZINE HYDROCHLORIDE 10 MG/1
5 TABLET ORAL DAILY
Status: DISCONTINUED | OUTPATIENT
Start: 2021-01-06 | End: 2021-01-11 | Stop reason: HOSPADM

## 2021-01-06 RX ORDER — ONDANSETRON 2 MG/ML
4 INJECTION INTRAMUSCULAR; INTRAVENOUS EVERY 6 HOURS PRN
Status: DISCONTINUED | OUTPATIENT
Start: 2021-01-06 | End: 2021-01-11 | Stop reason: HOSPADM

## 2021-01-06 RX ORDER — HEPARIN SODIUM (PORCINE) LOCK FLUSH IV SOLN 100 UNIT/ML 100 UNIT/ML
100 SOLUTION INTRAVENOUS PRN
Status: DISCONTINUED | OUTPATIENT
Start: 2021-01-06 | End: 2021-01-06 | Stop reason: HOSPADM

## 2021-01-06 RX ORDER — ATORVASTATIN CALCIUM 40 MG/1
40 TABLET, FILM COATED ORAL NIGHTLY
Status: DISCONTINUED | OUTPATIENT
Start: 2021-01-06 | End: 2021-01-11 | Stop reason: HOSPADM

## 2021-01-06 RX ORDER — CITALOPRAM 20 MG/1
20 TABLET ORAL DAILY
Status: DISCONTINUED | OUTPATIENT
Start: 2021-01-06 | End: 2021-01-11 | Stop reason: HOSPADM

## 2021-01-06 RX ORDER — ACETAMINOPHEN 650 MG/1
650 SUPPOSITORY RECTAL EVERY 6 HOURS PRN
Status: DISCONTINUED | OUTPATIENT
Start: 2021-01-06 | End: 2021-01-11 | Stop reason: HOSPADM

## 2021-01-06 RX ORDER — BUMETANIDE 1 MG/1
2 TABLET ORAL 2 TIMES DAILY
Status: DISCONTINUED | OUTPATIENT
Start: 2021-01-06 | End: 2021-01-09

## 2021-01-06 RX ORDER — PROMETHAZINE HYDROCHLORIDE 12.5 MG/1
12.5 TABLET ORAL EVERY 6 HOURS PRN
Status: DISCONTINUED | OUTPATIENT
Start: 2021-01-06 | End: 2021-01-11 | Stop reason: HOSPADM

## 2021-01-06 RX ORDER — ASPIRIN 81 MG/1
81 TABLET ORAL DAILY
Status: DISCONTINUED | OUTPATIENT
Start: 2021-01-06 | End: 2021-01-11 | Stop reason: HOSPADM

## 2021-01-06 RX ORDER — CARVEDILOL 12.5 MG/1
12.5 TABLET ORAL 2 TIMES DAILY WITH MEALS
Status: DISCONTINUED | OUTPATIENT
Start: 2021-01-06 | End: 2021-01-11 | Stop reason: HOSPADM

## 2021-01-06 RX ADMIN — MEMANTINE HYDROCHLORIDE 10 MG: 5 TABLET, FILM COATED ORAL at 23:38

## 2021-01-06 RX ADMIN — INSULIN LISPRO 1 UNITS: 100 INJECTION, SOLUTION INTRAVENOUS; SUBCUTANEOUS at 23:57

## 2021-01-06 RX ADMIN — HEPARIN SODIUM 5000 UNITS: 5000 INJECTION INTRAVENOUS; SUBCUTANEOUS at 23:58

## 2021-01-06 RX ADMIN — HYDRALAZINE HYDROCHLORIDE 75 MG: 25 TABLET ORAL at 23:38

## 2021-01-06 RX ADMIN — HEPARIN SODIUM (PORCINE) LOCK FLUSH IV SOLN 100 UNIT/ML 100 UNITS: 100 SOLUTION at 15:21

## 2021-01-06 RX ADMIN — BUMETANIDE 2 MG: 1 TABLET ORAL at 23:38

## 2021-01-06 RX ADMIN — FUROSEMIDE 20 MG: 10 INJECTION, SOLUTION INTRAMUSCULAR; INTRAVENOUS at 11:50

## 2021-01-06 RX ADMIN — DESMOPRESSIN ACETATE 40 MG: 0.2 TABLET ORAL at 23:38

## 2021-01-06 ASSESSMENT — ENCOUNTER SYMPTOMS
FACIAL SWELLING: 0
ABDOMINAL PAIN: 0
BLOOD IN STOOL: 0
DIARRHEA: 0
WHEEZING: 0
VOMITING: 0
COUGH: 0
RHINORRHEA: 0
CHEST TIGHTNESS: 0
SHORTNESS OF BREATH: 0
EYE PAIN: 0
COLOR CHANGE: 0
SINUS PRESSURE: 0
TROUBLE SWALLOWING: 0
NAUSEA: 0
CONSTIPATION: 0
SORE THROAT: 0

## 2021-01-06 NOTE — TELEPHONE ENCOUNTER
Writer contacted  ED provider Dago Berrios to inform of 30 day readmission risk. No Decision on disposition at this time.

## 2021-01-06 NOTE — ED NOTES
Attempted to call report staff will call back.    Ciro Parker RN  01/06/21 300 Jax Smiley RN  01/06/21 [id number]

## 2021-01-06 NOTE — ED TRIAGE NOTES
Patient to ED per stretcher. Patient is a NH resident and had abnormal labs with renal failure. Patient with extremities edema and tight x 4. Patient with PICC line to right upper arm for antibiotic therapy for MRSA. Patient pale but skin warm and dry. Oxygen at 2 L/min which pulse ox at this time is 88 to 89 .

## 2021-01-06 NOTE — ED NOTES
Patient asked to be on bed pan.  patien with skin irritation but no blood in stool and brown in color     Ender Hudson RN  01/06/21 [id number]

## 2021-01-06 NOTE — PROGRESS NOTES
01/06/21  Char Johnson  1950    Patient Resident of [place]    Chief Complaint  1. AMY (acute kidney injury) ([facility] [address]    2. Acute on chronic diastolic heart failure ([address]    3. Essential hypertension    4. MRSA bacteremia        HPI:  41-year-old patient with recent hospitalization at [facility] for MRSA bacteremia. Has been on IV antibiotics per infectious disease. Patient with critical labs today. Creat 3.5, BUN 66, BNP 1318 and hemoglobin 8.3. Is noted that patient is up 4 pounds from yesterday. Blood pressure elevated at 184/75. She denies any shortness of breath however when nurses aides are repositioning her today while I am in there she is in requesting that she get set up so she can breathe better. Pulse ox 93% on room air. Patient with significant amount of dependent edema. Abdomen tight. She denies any chest discomfort. No heart palpitations. Patient previously has had acute on chronic kidney failure requiring multiple hospitalizations. As noted patient is a full code. Allergies   Allergen Reactions    Bactrim [Sulfamethoxazole-Trimethoprim] Hives    Clonidine Derivatives     Amoxicillin-Pot Clavulanate Diarrhea, Nausea Only and Nausea And Vomiting       Past Medical History:   Diagnosis Date    Anxiety and depression     Background diabetic retinopathy(362.01) 2008    (Mild)    Balance problem     CAD (coronary artery disease)     (with coronary artery bypass graft x4).  Cancer of uterus [facility])     history of, (probable cure)    Chronic kidney disease     Chronic low back pain     CVA (cerebral vascular accident) ([address]     Dementia ([address]     (moderate)    Dry eye syndrome     GERD (gastroesophageal reflux disease)     Glaucoma suspect 2005    Gout     Homonymous hemianopsia 2008    (Right)    Hyperlipidemia     Hypertension     Keratitis     (secondary to dry eye syndrome).     Obesity     Peripheral polyneuropathy     (diabetic)  Pseudophakos     (Right Eye: 2012; Left Eye: 2012) - Dr. Preethi Drake.  Stroke [facility])     (Multiple) MRI of brain 10/2008 shows multiple infarcts involving the temporal and parietal areas.  Trichiasis     (left eye)    Type 2 diabetes mellitus ([facility] [address]        Past Surgical History:   Procedure Laterality Date    CATARACT REMOVAL WITH IMPLANT  2012    (Right eye) - Dr. Preethi Drake.  CATARACT REMOVAL WITH IMPLANT  2012    (Left eye) - Dr. Preethi Drake.   SECTION      CHOLECYSTECTOMY      CORONARY ARTERY BYPASS GRAFT  12-    (x4) - LIMA-LAD, SVG-diagnoal 1, SVG-OM1, and SVG-PDA. Exploration of left radial. (Dr. Kenisha Harper).  EYE SURGERY Left     as a child     GASTRIC BYPASS SURGERY      HYSTERECTOMY      (abdominal)  with left oophorectomy. Right ovary retained.     TONSILLECTOMY AND ADENOIDECTOMY      UPPER GASTROINTESTINAL ENDOSCOPY  2020    EGD BIOPSY performed by Prabhu Coleman MD at \[facility]\"" Endoscopy       Medications as per [facility] Chart /reviewed     Social History     Socioeconomic History    Marital status:      Spouse name: Not on file    Number of children: Not on file    Years of education: Not on file    Highest education level: Not on file   Occupational History    Not on file   Social Needs    Financial resource strain: Not on file    Food insecurity     Worry: Not on file     Inability: Not on file    Transportation needs     Medical: Not on file     Non-medical: Not on file   Tobacco Use    Smoking status: Never Smoker    Smokeless tobacco: Never Used    Tobacco comment: never smoker eclamb rrt 17   Substance and Sexual Activity    Alcohol use: No    Drug use: No    Sexual activity: Not on file   Lifestyle    Physical activity     Days per week: Not on file     Minutes per session: Not on file    Stress: Not on file   Relationships    Social connections     Talks on phone: Not on file Gets together: Not on file     Attends Evangelical service: Not on file     Active member of club or organization: Not on file     Attends meetings of clubs or organizations: Not on file     Relationship status: Not on file    Intimate partner violence     Fear of current or ex partner: Not on file     Emotionally abused: Not on file     Physically abused: Not on file     Forced sexual activity: Not on file   Other Topics Concern    Not on file   Social History Narrative    Not on file       Review of Systems   Constitutional: Positive for activity change. Negative for appetite change, chills, fatigue, fever and unexpected weight change. HENT: Negative for congestion, dental problem, ear discharge, ear pain, facial swelling, hearing loss, postnasal drip, rhinorrhea, sinus pressure, sore throat and trouble swallowing. Eyes: Negative for pain and visual disturbance. Respiratory: Negative for cough, chest tightness, shortness of breath and wheezing. Cardiovascular: Positive for leg swelling. Negative for chest pain and palpitations. Gastrointestinal: Negative for abdominal pain, blood in stool, constipation, diarrhea, nausea and vomiting. Endocrine: Negative for cold intolerance, heat intolerance and polyuria. Genitourinary: Negative for difficulty urinating. Musculoskeletal: Negative for arthralgias, gait problem, myalgias, neck pain and neck stiffness. Skin: Negative for color change, rash and wound. Neurological: Positive for weakness. Negative for dizziness, tremors, seizures, light-headedness, numbness and headaches. Psychiatric/Behavioral: Negative for confusion and hallucinations. The patient is not nervous/anxious. Physical Exam  Vitals signs and nursing note reviewed. Constitutional:       General: She is not in acute distress. Appearance: Normal appearance. She is well-developed. She is not diaphoretic. HENT:      Head: Normocephalic and atraumatic. Right Ear: External ear normal.      Left Ear: External ear normal.   Eyes:      General:         Right eye: No discharge. Left eye: No discharge. Neck:      Trachea: No tracheal deviation. Cardiovascular:      Rate and Rhythm: Normal rate and regular rhythm. Pulses: Normal pulses. Heart sounds: Normal heart sounds. No murmur. No friction rub. No gallop. Pulmonary:      Effort: Pulmonary effort is normal. No respiratory distress. Breath sounds: Normal breath sounds. No stridor. No wheezing, rhonchi or rales. Chest:      Chest wall: No tenderness. Abdominal:      General: Bowel sounds are normal. There is no distension. Palpations: Abdomen is soft. Tenderness: There is no abdominal tenderness. Musculoskeletal:         General: No swelling. Comments: +4 pedal edema, +3 bilateral forearm edema, +3 bilateral lower extremity edema, tight abdomen,   Skin:     General: Skin is warm and dry. Capillary Refill: Capillary refill takes less than 2 seconds. Coloration: Skin is not pale. Findings: No rash. Neurological:      General: No focal deficit present. Mental Status: She is alert. Mental status is at baseline. Cranial Nerves: No cranial nerve deficit. Sensory: No sensory deficit. Coordination: Coordination normal.   Psychiatric:         Mood and Affect: Mood normal.         Behavior: Behavior normal.         Vital Signs: Temperature 97.5 °F, blood pressure 184/75, pulse 60, respirations 18, SPO2 93% on room air    Assessment:  1. AMY (acute kidney injury) ([facility] [address]  2. Acute on chronic diastolic heart failure ([facility] [address]  3. Essential hypertension  4. MRSA bacteremia  Patient sent to the ER for probable admission with evaluation by cardiology and nephrology. Plan:  As noted above. Follow up for routine visit. Call sooner with concerns prior.     Electronically signed by SHIRA Knox CNP on 1/6/2021 at 9:59 AM

## 2021-01-06 NOTE — ED NOTES
Oxygen was increased to 4 L/min per nasal cannula with improved pulse OX to 92.  Patient denies chest pain or SOB     Amy Fitzpatrick RN  01/06/21 [id number]

## 2021-01-06 NOTE — ED NOTES
[facility] not available till 1. Will call another transfer service.      Clarissa Botello RN  01/06/21 [id number]

## 2021-01-06 NOTE — PROGRESS NOTES
Writer spoke to Dr. Luis A Wilson regarding pt. Arrival to floor, writer added Dr. Luis A Wilson to treatment per him to see patient tomorrow.

## 2021-01-06 NOTE — ED NOTES
Spoke with patients daughter Caroline Ibarra, okay to transfer to [id number] [place] [address] in [facility] if needed. She is going to come to the ER once she gets a ride.       Rae Schaefer RN  01/06/21 7616

## 2021-01-06 NOTE — FLOWSHEET NOTE
rounding on ED    Assessment: Patient is waiting on transfer to [facility]. Waiting for available bed. He daughter is present and supportive. Intervention: Engaged in conversation. Prayed with patient at bedside. Patient expressed appreciation for visit and offer of continued prayer. Plan: Chaplains are available on site or on call 24/7 for spiritual and emotional support.      01/06/21 1354   Encounter Summary   Services provided to: Patient and family together   Referral/Consult From: [address] [place] [address] Road Visiting   (1/6/21)   Complexity of Encounter Moderate   Length of Encounter 15 minutes   Spiritual/Confucianist   Type Spiritual support   Assessment Approachable   Intervention Active listening;Prayer   Outcome Expressed gratitude;Engaged in conversation

## 2021-01-06 NOTE — ED PROVIDER NOTES
[address]      Pt Name: Richy Mckeon  MRN: [id number]  [place] 1950  Date of evaluation: 1/6/2021      CHIEF COMPLAINT       Chief Complaint   Patient presents with    Other     abnormal labs         HISTORY OF PRESENT ILLNESS      The patient was sent to the emergency department for abnormal labs. She has worsening renal failure. She says she does not want dialysis unless she absolutely has to have it. She has seen a kidney specialist in the past but is not on dialysis currently. She denies chest pain, shortness of breath, or abdominal pain. She denies fever. Nothing makes her symptoms better or worse otherwise. REVIEW OF SYSTEMS       All systems reviewed and negative unless noted in HPI. The patient denies fever or constitutional symptoms. Denies vision change. Denies any sore throat or rhinorrhea. Denies any neck pain or stiffness. Denies chest pain or shortness of breath. No nausea,  vomiting or diarrhea. Denies any dysuria. Denies urinary frequency or hematuria. History of renal failure. Denies musculoskeletal injury or pain. Denies any weakness, numbness or focal neurologic deficit. Chronic edema. No recent psychiatric issues. Takes a blood thinner. History of DM. PAST MEDICAL HISTORY    has a past medical history of Anxiety and depression, Background diabetic retinopathy(362.01), Balance problem, CAD (coronary artery disease), Cancer of uterus ([address] Chronic kidney disease, Chronic low back pain, CVA (cerebral vascular accident) ([address] Dementia ([address] Dry eye syndrome, GERD (gastroesophageal reflux disease), Glaucoma suspect, Gout, Homonymous hemianopsia, Hyperlipidemia, Hypertension, Keratitis, Obesity, Peripheral polyneuropathy, Pseudophakos, Stroke ([address] Trichiasis, and Type 2 diabetes mellitus ([address] SURGICAL HISTORY      has a past surgical history that includes Gastric bypass surgery;  Cataract removal with implant (2012); Cataract removal with implant (2012); Coronary artery bypass graft (12-);  section; Hysterectomy; Cholecystectomy; Tonsillectomy and adenoidectomy; Eye surgery (Left); and Upper gastrointestinal endoscopy (2020). CURRENT MEDICATIONS       Previous Medications    ALCOHOL SWABS (ALCOHOL PREP) PADS    Checks blood sugar 3 times a day    ASPIRIN 81 MG TABLET    Take 1 tablet by mouth daily    ATORVASTATIN (LIPITOR) 40 MG TABLET    TAKE 1 TABLET BY MOUTH EVERYDAY    BLOOD GLUCOSE MONITOR STRIPS    Test 3  times a day & as needed for symptoms of irregular blood glucose. Dispense sufficient amount for indicated testing frequency plus additional to accommodate PRN testing needs. BUMETANIDE (BUMEX) 2 MG TABLET    Take 1 tablet by mouth 2 times daily    CARVEDILOL (COREG) 12.5 MG TABLET    Take 1 tablet by mouth 2 times daily (with meals)    CEFTAROLINE FOSAMIL (TEFLARO) 400 MG SOLR    Infuse 400 mg intravenously every 12 hours Stop after 21 and pull the line    CITALOPRAM (CELEXA) 20 MG TABLET    Take 1 tablet by mouth daily    CLOPIDOGREL (PLAVIX) 75 MG TABLET    Take 1 tablet by mouth daily    COMPRESSION STOCKINGS MISC    by Does not apply route 20-30 mm Hg bilateral knee high    DIABETIC SHOE MISC    by Does not apply route    HYDRALAZINE (APRESOLINE) 50 MG TABLET    Take 1.5 tablets by mouth every 8 hours    INSULIN GLARGINE (LANTUS) 100 UNIT/ML INJECTION VIAL    Inject 20 Units into the skin nightly    INSULIN LISPRO (HUMALOG) 100 UNIT/ML INJECTION VIAL    Use 4 times a day per sliding scale    LANCETS MISC    Checks blood sugar ac and HS    LORATADINE (CLARITIN) 10 MG TABLET    TAKE 1 TABLET(10 MG) BY MOUTH DAILY    MEMANTINE (NAMENDA) 10 MG TABLET    TAKE 1 TABLET BY MOUTH TWICE DAILY    MISC. DEVICES MISC    Standard walker with wheels    Diagnosis dementia, CHF    NYSTATIN (MYCOSTATIN) 913661 UNIT/GM CREAM    Apply topically 2 times daily. OMEPRAZOLE (PRILOSEC) 20 MG DELAYED RELEASE CAPSULE    Take 1 capsule by mouth every morning (before breakfast)    OXYBUTYNIN (DITROPAN) 5 MG TABLET    TAKE 1 TABLET BY MOUTH TWICE DAILY    OXYGEN    Oxgen at 2 liters per nasal cannula    RIVASTIGMINE (EXELON) 9.5 MG/24HR    APPLY 1 NEW PATCH EVERY 24 HOURS    SENNA (SENOKOT) 8.6 MG TABLET    Take 1 tablet by mouth 2 times daily       ALLERGIES     is allergic to bactrim [sulfamethoxazole-trimethoprim]; clonidine derivatives; and amoxicillin-pot clavulanate. FAMILY HISTORY     She indicated that her mother is . She indicated that her father is . family history includes Cancer in her mother; Coronary Art Dis in an other family member; Diabetes in her father and mother; Emphysema in her father; Glaucoma in her father; Heart Disease in her father; High Blood Pressure in her mother; Kidney Disease in her mother; Charlaine Roma in an other family member; Mental Retardation in an other family member; Stroke in her mother and another family member; Uterine Cancer in her mother. SOCIAL HISTORY      reports that she has never smoked. She has never used smokeless tobacco. She reports that she does not drink alcohol or use drugs. PHYSICAL EXAM     INITIAL VITALS:  height is 5' 7\" (1.702 m) and weight is 284 lb (128.8 kg). Her tympanic temperature is 97.2 °F (36.2 °C). Her blood pressure is 163/85 (abnormal) and her pulse is 64. Her respiration is 20 and oxygen saturation is 97%. The patient is alert and oriented, in no apparent distress. HEENT is atraumatic. Pupils are PERRL at 4 mm with normal extraocular motion. Mucous membranes moist.    Neck is supple with no lymphadenopathy. No meningismus. Heart sounds regular rate and rhythm with no gallops, murmurs, or rubs. Lungs clear, no wheezes, rales or rhonchi. Abdomen: soft, nontender with no pain to palpation. Normal bowel sounds are noted. No rebound or guarding. Musculoskeletal exam shows no evidence of trauma. Normal distal pulses in all extremities. Skin: 2+ edema in lower extremities, up to abdomen; dry skin on legs. Neurological exam reveals cranial nerves 2 through 12 grossly intact. Patient has equal  and normal deep tendon reflexes. Psychiatric: no hallucinations or suicidal ideation. Lymphatics.:  No lymphadenopathy. DIFFERENTIAL DIAGNOSIS/ MDM:     ESRD, chronic anemia, ACS, anasarca    DIAGNOSTIC RESULTS     EKG: All EKG's are interpreted by the Emergency Department Physician who either signs or Co-signs this chart in the absence of a cardiologist.    Sinus 58 with nonspecific ST change. Low voltage noted. No change from 12/12/2020. Axis I 39, , QRS 92, .    RADIOLOGY:   I reviewed the radiologist interpretations:  XR CHEST PORTABLE   Final Result   Stable cardiomegaly with mild pulmonary edema which is less prominent. Hazy perihilar bibasilar opacities which is more prominent on the right and   has decreased. Probable small bibasilar pleural effusions which is more prominent on the   right and have decreased. XR CHEST PORTABLE (Final result)  Result time 01/06/21 10:18:40  Final result by Carolyn Erickson MD (01/06/21 10:18:40)                Impression:    Stable cardiomegaly with mild pulmonary edema which is less prominent. Hazy perihilar bibasilar opacities which is more prominent on the right and   has decreased. Probable small bibasilar pleural effusions which is more prominent on the   right and have decreased.              Narrative:    EXAMINATION:   ONE XRAY VIEW OF THE CHEST     1/6/2021 10:13 am     COMPARISON:   12/16/2020     HISTORY:   ORDERING SYSTEM PROVIDED HISTORY: dyspnea   TECHNOLOGIST PROVIDED HISTORY:   dyspnea   Reason for Exam: dyspnea   Acuity: Acute   Type of Exam: Initial     FINDINGS:   The heart is mildly enlarged but unchanged.  The pulmonary vessels are less Brain Natriuretic Peptide   Result Value Ref Range    Pro-BNP 32,210 (H) <300 pg/mL    BNP Interpretation Pro-BNP Reference Range:    Troponin   Result Value Ref Range    Troponin, High Sensitivity 84 (HH) 0 - 14 ng/L    Troponin T NOT REPORTED <0.03 ng/mL    Troponin Interp NOT REPORTED    Protime-INR   Result Value Ref Range    Protime 15.8 (H) 11.5 - 14.2 sec    INR 1.3    APTT   Result Value Ref Range    PTT 34.4 (H) 23.9 - 33.8 sec   Urinalysis Reflex to Culture    Specimen: Urine, straight catheter   Result Value Ref Range    Color, UA NOT REPORTED YELLOW    Turbidity UA NOT REPORTED CLEAR    Glucose, Ur NEGATIVE NEGATIVE    Bilirubin Urine NEGATIVE NEGATIVE    Ketones, Urine NEGATIVE NEGATIVE    Specific Gravity, UA 1.030 (H) 1.010 - 1.025    Urine Hgb NEGATIVE NEGATIVE    pH, UA 6.0 5.0 - 6.0    Protein, UA 2+ (A) NEGATIVE    Urobilinogen, Urine Normal Normal    Nitrite, Urine NEGATIVE NEGATIVE    Leukocyte Esterase, Urine NEGATIVE NEGATIVE    Urinalysis Comments NOT REPORTED    Microscopic Urinalysis   Result Value Ref Range    -          WBC, UA 0 TO 4 0 - 4 /HPF    RBC, UA 0 TO 4 0 - 4 /HPF    Casts UA NOT REPORTED 0 - 2 /LPF    Crystals, UA NOT REPORTED None /HPF    Epithelial Cells UA 5 TO 10 0 - 5 /HPF    Renal Epithelial, UA NOT REPORTED 0 /HPF    Bacteria, UA TRACE (A) None    Mucus, UA NOT REPORTED None    Trichomonas, UA NOT REPORTED None    Amorphous, UA NOT REPORTED None    Other Observations UA NOT REPORTED NOT REQ.     Yeast, UA NOT REPORTED None   POC Glucose Fingerstick   Result Value Ref Range    POC Glucose 185 (H) 65 - 105 mg/dL   EKG 12 Lead   Result Value Ref Range    Ventricular Rate 58 BPM    Atrial Rate 58 BPM    P-R Interval 198 ms    QRS Duration 92 ms    Q-T Interval 490 ms    QTc Calculation (Bazett) 481 ms    P Axis 76 degrees    R Axis 139 degrees    T Axis 114 degrees         EMERGENCY DEPARTMENT COURSE:   Vitals:    Vitals:    01/06/21 1330 01/06/21 1430 01/06/21 1500 01/06/21 1530   BP: (!) 156/70 (!) 159/60 (!) 163/73 (!) 163/85   Pulse: 58 62 64 64   Resp: 18 22 18 20   Temp:       TempSrc:       SpO2: 95% 98% 95% 97%   Weight:       Height:         -------------------------  BP: (!) 163/85, Temp: 97.2 °F (36.2 °C), Pulse: 64, Resp: 20      Re-evaluation Notes    The patient received Lasix. We will be sending her to [place], where she can be cared for by a nephrologist.  The patient is transferred in stable condition. CONSULTS:    Skye Sanchez contacted to reach physician on call for Dr. Tanya Baeza, nephrology. [phone]  Discussed with Dr. Malcolm Hill. Recommends admission. Transfer to [place], on hospitalist service. [phone]  Discussed with [facility]. Will admit to med-surg tele. Please give some lasix. FINAL IMPRESSION      1. ESRD (end stage renal disease) ([facility] [address]    2. Peripheral edema          DISPOSITION/PLAN   DISPOSITION        Condition on Disposition    stable    PATIENT REFERRED TO:  No follow-up provider specified.     DISCHARGE MEDICATIONS:  New Prescriptions    No medications on file       (Please note that portions of this note were completed with a voice recognition program.  Efforts were made to edit the dictations but occasionally words are mis-transcribed.)    Duran MD   Attending Emergency Physician         Kirsten Singer MD  01/06/21 6599

## 2021-01-07 LAB
-: ABNORMAL
ALBUMIN SERPL-MCNC: 3.1 G/DL (ref 3.5–5.2)
ALBUMIN/GLOBULIN RATIO: 1 (ref 1–2.5)
ALP BLD-CCNC: 111 U/L (ref 35–104)
ALT SERPL-CCNC: 5 U/L (ref 5–33)
AMORPHOUS: ABNORMAL
ANION GAP SERPL CALCULATED.3IONS-SCNC: 13 MMOL/L (ref 9–17)
AST SERPL-CCNC: 11 U/L
BACTERIA: ABNORMAL
BILIRUB SERPL-MCNC: 0.36 MG/DL (ref 0.3–1.2)
BILIRUBIN URINE: NEGATIVE
BUN BLDV-MCNC: 64 MG/DL (ref 8–23)
BUN/CREAT BLD: ABNORMAL (ref 9–20)
CALCIUM SERPL-MCNC: 8.2 MG/DL (ref 8.6–10.4)
CASTS UA: ABNORMAL /LPF (ref 0–8)
CHLORIDE BLD-SCNC: 97 MMOL/L (ref 98–107)
CO2: 28 MMOL/L (ref 20–31)
COLOR: YELLOW
CREAT SERPL-MCNC: 3.47 MG/DL (ref 0.5–0.9)
CREATININE URINE: 133.5 MG/DL (ref 28–217)
CRYSTALS, UA: ABNORMAL /HPF
EKG ATRIAL RATE: 58 BPM
EKG P AXIS: 76 DEGREES
EKG P-R INTERVAL: 198 MS
EKG Q-T INTERVAL: 490 MS
EKG QRS DURATION: 92 MS
EKG QTC CALCULATION (BAZETT): 481 MS
EKG R AXIS: 139 DEGREES
EKG T AXIS: 114 DEGREES
EKG VENTRICULAR RATE: 58 BPM
EPITHELIAL CELLS UA: ABNORMAL /HPF (ref 0–5)
GFR AFRICAN AMERICAN: 16 ML/MIN
GFR NON-AFRICAN AMERICAN: 13 ML/MIN
GFR SERPL CREATININE-BSD FRML MDRD: ABNORMAL ML/MIN/{1.73_M2}
GFR SERPL CREATININE-BSD FRML MDRD: ABNORMAL ML/MIN/{1.73_M2}
GLUCOSE BLD-MCNC: 142 MG/DL (ref 65–105)
GLUCOSE BLD-MCNC: 142 MG/DL (ref 65–105)
GLUCOSE BLD-MCNC: 150 MG/DL (ref 65–105)
GLUCOSE BLD-MCNC: 156 MG/DL (ref 70–99)
GLUCOSE BLD-MCNC: 168 MG/DL (ref 65–105)
GLUCOSE URINE: NEGATIVE
HCT VFR BLD CALC: 25.4 % (ref 36.3–47.1)
HEMOGLOBIN: 7.6 G/DL (ref 11.9–15.1)
INR BLD: 1.2
KETONES, URINE: NEGATIVE
LEUKOCYTE ESTERASE, URINE: ABNORMAL
MAGNESIUM: 2 MG/DL (ref 1.6–2.6)
MCH RBC QN AUTO: 29.8 PG (ref 25.2–33.5)
MCHC RBC AUTO-ENTMCNC: 29.9 G/DL (ref 28.4–34.8)
MCV RBC AUTO: 99.6 FL (ref 82.6–102.9)
MUCUS: ABNORMAL
NITRITE, URINE: NEGATIVE
NRBC AUTOMATED: 0 PER 100 WBC
OSMOLALITY URINE: 348 MOSM/KG (ref 80–1300)
OTHER OBSERVATIONS UA: ABNORMAL
PDW BLD-RTO: 16.5 % (ref 11.8–14.4)
PH UA: 5 (ref 5–8)
PLATELET # BLD: 209 K/UL (ref 138–453)
PMV BLD AUTO: 10.3 FL (ref 8.1–13.5)
POTASSIUM SERPL-SCNC: 4.6 MMOL/L (ref 3.7–5.3)
PROTEIN UA: ABNORMAL
PROTHROMBIN TIME: 12.2 SEC (ref 9–12)
RBC # BLD: 2.55 M/UL (ref 3.95–5.11)
RBC UA: ABNORMAL /HPF (ref 0–4)
RENAL EPITHELIAL, UA: ABNORMAL /HPF
SODIUM BLD-SCNC: 138 MMOL/L (ref 135–144)
SODIUM,UR: <20 MMOL/L
SPECIFIC GRAVITY UA: 1.01 (ref 1–1.03)
TOTAL PROTEIN, URINE: 75 MG/DL
TOTAL PROTEIN: 6.1 G/DL (ref 6.4–8.3)
TRICHOMONAS: ABNORMAL
TROPONIN INTERP: ABNORMAL
TROPONIN T: ABNORMAL NG/ML
TROPONIN, HIGH SENSITIVITY: 76 NG/L (ref 0–14)
TURBIDITY: CLEAR
URINE HGB: NEGATIVE
UROBILINOGEN, URINE: NORMAL
WBC # BLD: 6.3 K/UL (ref 3.5–11.3)
WBC UA: ABNORMAL /HPF (ref 0–5)
YEAST: ABNORMAL

## 2021-01-07 PROCEDURE — 6360000002 HC RX W HCPCS: Performed by: INTERNAL MEDICINE

## 2021-01-07 PROCEDURE — 84300 ASSAY OF URINE SODIUM: CPT

## 2021-01-07 PROCEDURE — 87086 URINE CULTURE/COLONY COUNT: CPT

## 2021-01-07 PROCEDURE — 6370000000 HC RX 637 (ALT 250 FOR IP): Performed by: INTERNAL MEDICINE

## 2021-01-07 PROCEDURE — 99222 1ST HOSP IP/OBS MODERATE 55: CPT | Performed by: INTERNAL MEDICINE

## 2021-01-07 PROCEDURE — 6370000000 HC RX 637 (ALT 250 FOR IP): Performed by: NURSE PRACTITIONER

## 2021-01-07 PROCEDURE — 81001 URINALYSIS AUTO W/SCOPE: CPT

## 2021-01-07 PROCEDURE — 85027 COMPLETE CBC AUTOMATED: CPT

## 2021-01-07 PROCEDURE — 6360000002 HC RX W HCPCS: Performed by: NURSE PRACTITIONER

## 2021-01-07 PROCEDURE — 85610 PROTHROMBIN TIME: CPT

## 2021-01-07 PROCEDURE — 2580000003 HC RX 258: Performed by: INTERNAL MEDICINE

## 2021-01-07 PROCEDURE — 80053 COMPREHEN METABOLIC PANEL: CPT

## 2021-01-07 PROCEDURE — 82947 ASSAY GLUCOSE BLOOD QUANT: CPT

## 2021-01-07 PROCEDURE — 99223 1ST HOSP IP/OBS HIGH 75: CPT | Performed by: INTERNAL MEDICINE

## 2021-01-07 PROCEDURE — APPSS45 APP SPLIT SHARED TIME 31-45 MINUTES: Performed by: NURSE PRACTITIONER

## 2021-01-07 PROCEDURE — 84156 ASSAY OF PROTEIN URINE: CPT

## 2021-01-07 PROCEDURE — 36415 COLL VENOUS BLD VENIPUNCTURE: CPT

## 2021-01-07 PROCEDURE — 1200000000 HC SEMI PRIVATE

## 2021-01-07 PROCEDURE — 2580000003 HC RX 258: Performed by: NURSE PRACTITIONER

## 2021-01-07 PROCEDURE — 83935 ASSAY OF URINE OSMOLALITY: CPT

## 2021-01-07 PROCEDURE — 82570 ASSAY OF URINE CREATININE: CPT

## 2021-01-07 PROCEDURE — 83735 ASSAY OF MAGNESIUM: CPT

## 2021-01-07 PROCEDURE — 84484 ASSAY OF TROPONIN QUANT: CPT

## 2021-01-07 RX ORDER — SODIUM CHLORIDE 9 MG/ML
INJECTION, SOLUTION INTRAVENOUS CONTINUOUS
Status: DISCONTINUED | OUTPATIENT
Start: 2021-01-08 | End: 2021-01-11 | Stop reason: HOSPADM

## 2021-01-07 RX ORDER — PANTOPRAZOLE SODIUM 40 MG/1
40 TABLET, DELAYED RELEASE ORAL
Status: DISCONTINUED | OUTPATIENT
Start: 2021-01-07 | End: 2021-01-11 | Stop reason: HOSPADM

## 2021-01-07 RX ORDER — INSULIN GLARGINE 100 [IU]/ML
10 INJECTION, SOLUTION SUBCUTANEOUS NIGHTLY
Status: DISCONTINUED | OUTPATIENT
Start: 2021-01-07 | End: 2021-01-11 | Stop reason: HOSPADM

## 2021-01-07 RX ADMIN — PANTOPRAZOLE SODIUM 40 MG: 40 TABLET, DELAYED RELEASE ORAL at 17:51

## 2021-01-07 RX ADMIN — INSULIN LISPRO 2 UNITS: 100 INJECTION, SOLUTION INTRAVENOUS; SUBCUTANEOUS at 09:02

## 2021-01-07 RX ADMIN — BUMETANIDE 2 MG: 1 TABLET ORAL at 09:02

## 2021-01-07 RX ADMIN — CARVEDILOL 12.5 MG: 12.5 TABLET, FILM COATED ORAL at 09:02

## 2021-01-07 RX ADMIN — INSULIN LISPRO 2 UNITS: 100 INJECTION, SOLUTION INTRAVENOUS; SUBCUTANEOUS at 12:28

## 2021-01-07 RX ADMIN — HYDRALAZINE HYDROCHLORIDE 75 MG: 25 TABLET ORAL at 17:51

## 2021-01-07 RX ADMIN — CEFTAROLINE FOSAMIL 400 MG: 600 POWDER, FOR SOLUTION INTRAVENOUS at 15:43

## 2021-01-07 RX ADMIN — MEMANTINE HYDROCHLORIDE 10 MG: 5 TABLET, FILM COATED ORAL at 09:01

## 2021-01-07 RX ADMIN — INSULIN LISPRO 2 UNITS: 100 INJECTION, SOLUTION INTRAVENOUS; SUBCUTANEOUS at 17:51

## 2021-01-07 RX ADMIN — CARVEDILOL 12.5 MG: 12.5 TABLET, FILM COATED ORAL at 17:51

## 2021-01-07 RX ADMIN — HYDRALAZINE HYDROCHLORIDE 75 MG: 25 TABLET ORAL at 09:02

## 2021-01-07 RX ADMIN — CLOPIDOGREL 75 MG: 75 TABLET, FILM COATED ORAL at 09:02

## 2021-01-07 RX ADMIN — CETIRIZINE HYDROCHLORIDE 5 MG: 10 TABLET ORAL at 09:02

## 2021-01-07 RX ADMIN — HEPARIN SODIUM 5000 UNITS: 5000 INJECTION INTRAVENOUS; SUBCUTANEOUS at 09:01

## 2021-01-07 RX ADMIN — Medication 81 MG: at 09:02

## 2021-01-07 RX ADMIN — Medication 10 ML: at 09:03

## 2021-01-07 RX ADMIN — CITALOPRAM 20 MG: 20 TABLET, FILM COATED ORAL at 09:02

## 2021-01-07 ASSESSMENT — ENCOUNTER SYMPTOMS
ABDOMINAL PAIN: 0
SORE THROAT: 0
WHEEZING: 0
TROUBLE SWALLOWING: 0
PHOTOPHOBIA: 0
COLOR CHANGE: 0
VOMITING: 0
NAUSEA: 0
COUGH: 0
STRIDOR: 0
SHORTNESS OF BREATH: 0
DIARRHEA: 0
CONSTIPATION: 0
BLOOD IN STOOL: 0
CHEST TIGHTNESS: 0

## 2021-01-07 NOTE — CONSULTS
readmission showed BUN 67, Cr 3.55, proBNP 32,310, WBC 5.5, Hb 8.1, platelets 881. Urine culture was collected 1/7/21. CURRENT EVALUATION 1/7/2021  Afebrile  Vital signs stable    She notes baseline shortness of breath. Denies fevers, chills, chest pain, cough, nausea, vomiting, or diarrhea. No obvious signs of infection. R PICC line is in place and looks clean. Labs, X rays reviewed: 1/7/2021    BUN: 67-->64  Cr: 3.66-->3.47    WBC:5.5-->6.3  Hb:8.1-->7.6  Plat: 203-->209    Cultures:  Urine:  ·   Blood:  ·   Sputum :  ·   Wound:  ·     Discussed with patient, RN. I have personally reviewed the past medical history, past surgical history, medications, social history, and family history, and I have updated the database accordingly. Past Medical History:     Past Medical History:   Diagnosis Date    Anxiety and depression     Background diabetic retinopathy(362.01) 2008    (Mild)    Balance problem     CAD (coronary artery disease)     (with coronary artery bypass graft x4).  Cancer of uterus [facility])     history of, (probable cure)    Chronic kidney disease     Chronic low back pain     CVA (cerebral vascular accident) ([address]     Dementia ([address]     (moderate)    Dry eye syndrome     GERD (gastroesophageal reflux disease)     Glaucoma suspect 2005    Gout     Homonymous hemianopsia 2008    (Right)    Hyperlipidemia     Hypertension     Keratitis     (secondary to dry eye syndrome).  Obesity     Peripheral polyneuropathy     (diabetic)    Pseudophakos     (Right Eye: 05-; Left Eye: 04-) - Dr. Mendez Evans.  Stroke [facility])     (Multiple) MRI of brain 10/2008 shows multiple infarcts involving the temporal and parietal areas.  Trichiasis 2010    (left eye)    Type 2 diabetes mellitus ([address]        Past Surgical  History:     Past Surgical History:   Procedure Laterality Date    CATARACT REMOVAL WITH IMPLANT  05-    (Right eye) - Dr. Mendez Evans.     CATARACT REMOVAL WITH IMPLANT  2012    (Left eye) - Dr. Gayle Villagomez.   SECTION      CHOLECYSTECTOMY      CORONARY ARTERY BYPASS GRAFT  12-    (x4) - LIMA-LAD, SVG-diagnoal 1, SVG-OM1, and SVG-PDA. Exploration of left radial. (Dr. Elzbieta Pierce).  EYE SURGERY Left     as a child     GASTRIC BYPASS SURGERY      HYSTERECTOMY      (abdominal)  with left oophorectomy. Right ovary retained.     TONSILLECTOMY AND ADENOIDECTOMY      UPPER GASTROINTESTINAL ENDOSCOPY  2020    EGD BIOPSY performed by Kirill John MD at [facility]       Medications:      insulin glargine  10 Units Subcutaneous Nightly    aspirin  81 mg Oral Daily    atorvastatin  40 mg Oral Nightly    bumetanide  2 mg Oral BID    carvedilol  12.5 mg Oral BID WC    citalopram  20 mg Oral Daily    clopidogrel  75 mg Oral Daily    hydrALAZINE  75 mg Oral 3 times per day    cetirizine  5 mg Oral Daily    memantine  10 mg Oral BID    rivastigmine  1 patch Transdermal Daily    sodium chloride flush  10 mL Intravenous 2 times per day    insulin lispro  0-12 Units Subcutaneous TID WC    insulin lispro  0-6 Units Subcutaneous Nightly    heparin (porcine)  5,000 Units Subcutaneous 3 times per day       Social History:     Social History     Socioeconomic History    Marital status:      Spouse name: Not on file    Number of children: Not on file    Years of education: Not on file    Highest education level: Not on file   Occupational History    Not on file   Social Needs    Financial resource strain: Not on file    Food insecurity     Worry: Not on file     Inability: Not on file    Transportation needs     Medical: Not on file     Non-medical: Not on file   Tobacco Use    Smoking status: Never Smoker    Smokeless tobacco: Never Used    Tobacco comment: never smoker eclamb rrt 17   Substance and Sexual Activity    Alcohol use: No    Drug use: No    Sexual activity: Not on file   Lifestyle    Physical activity Days per week: Not on file     Minutes per session: Not on file    Stress: Not on file   Relationships    Social connections     Talks on phone: Not on file     Gets together: Not on file     Attends Rastafarian service: Not on file     Active member of club or organization: Not on file     Attends meetings of clubs or organizations: Not on file     Relationship status: Not on file    Intimate partner violence     Fear of current or ex partner: Not on file     Emotionally abused: Not on file     Physically abused: Not on file     Forced sexual activity: Not on file   Other Topics Concern    Not on file   Social History Narrative    Not on file       Family History:     Family History   Problem Relation Age of Onset    Diabetes Mother     Cancer Mother     High Blood Pressure Mother     Stroke Mother     Kidney Disease Mother     Uterine Cancer Mother     Diabetes Father     Heart Disease Father     Glaucoma Father     Emphysema Father     Coronary Art Dis Other         All 4 siblings.  Stroke Other         1 sibling.  Lung Cancer Other         1 sibling - cause of death lung cancer.  Mental Retardation Other         Allergies:   Bactrim [sulfamethoxazole-trimethoprim], Clonidine derivatives, and Amoxicillin-pot clavulanate     Review of Systems:   Constitutional: No fevers or chills. No systemic complaints  Head: No headaches  Eyes: No double vision or blurry vision. No conjunctival inflammation. ENT: No sore throat or runny nose. . No hearing loss, tinnitus or vertigo. Cardiovascular: No chest pain or palpitations. Lung: Baseline shortness of breath. No cough. No sputum production  Abdomen: No nausea, vomiting, diarrhea, or abdominal pain. Naveen Lamont No cramps. Genitourinary: No increased urinary frequency, or dysuria. No hematuria. No suprapubic or CVA pain  Musculoskeletal: No muscle aches or pains. No joint effusions, swelling or deformities  Hematologic: No bleeding or bruising.   Neurologic: No headache, weakness, numbness, or tingling. Integument: No rash, no ulcers. Psychiatric: No depression. Endocrine: No polyuria, no polydipsia, no polyphagia. Physical Examination :     Patient Vitals for the past 8 hrs:   BP Temp Temp src Pulse Resp SpO2   01/07/21 0730 132/67 98.9 °F (37.2 °C) Oral 66 18 100 %     General Appearance: Awake, alert, and in no apparent distress  Head:  Normocephalic, no trauma  Eyes: Pupils equal, round, reactive to light and accommodation; extraocular movements intact; sclera anicteric; conjunctivae pink. No embolic phenomena. ENT: Oropharynx clear, without erythema, exudate, or thrush. No tenderness of sinuses. Mouth/throat: mucosa pink and moist. No lesions. Dentition in good repair. Neck:Supple, without lymphadenopathy. Thyroid normal, No bruits. Pulmonary/Chest: Clear to auscultation, without wheezes, rales, or rhonchi. No dullness to percussion. Cardiovascular: Regular rate and rhythm without murmurs, rubs, or gallops. Abdomen: Soft, non tender. Bowel sounds normal. No organomegaly  All four Extremities: No cyanosis, clubbing, edema, or effusions. R PICC line in place. Neurologic: No gross sensory or motor deficits. Skin: Warm and dry with good turgor. No signs of peripheral arterial or venous insufficiency. No ulcerations. No open wounds. Medical Decision Making -Laboratory:   I have independently reviewed/ordered the following labs:    CBC with Differential:   Recent Labs     01/06/21  0945 01/07/21  0604   WBC 5.5 6.3   HGB 8.1* 7.6*   HCT 27.1* 25.4*    209   LYMPHOPCT 11*  --    MONOPCT 9  --      BMP:   Recent Labs     01/06/21 2027 01/07/21  0604    138   K 4.7 4.6   CL 97* 97*   CO2 29 28   BUN 64* 64*   CREATININE 3.16* 3.47*   MG  --  2.0     Hepatic Function Panel:   Recent Labs     01/07/21  0604   PROT 6.1*   LABALBU 3.1*   BILITOT 0.36   ALKPHOS 111*   ALT 5   AST 11     No results for input(s): RPR in the last 72 hours.   No results for input(s): HIV in the last 72 hours. No results for input(s): BC in the last 72 hours. Lab Results   Component Value Date    MUCUS NOT REPORTED 01/07/2021    RBC 2.55 01/07/2021    TRICHOMONAS NOT REPORTED 01/07/2021    WBC 6.3 01/07/2021    YEAST NOT REPORTED 01/07/2021    TURBIDITY CLEAR 01/07/2021     Lab Results   Component Value Date    CREATININE 3.47 01/07/2021    GLUCOSE 156 01/07/2021       Medical Decision Making-Imaging:       Medical Decision Jsjasw-Wovyvudn-Xveiw:       Medical Decision Making-Other:     Note:  · Labs, medications, radiologic studies were reviewed with personal review of films  · Moderate Large amounts of data were reviewed  · Discussed with nursing Staff, Discharge planner  · Infection Control and Prevention measures reviewed  · All prior entries were reviewed  · Administer medications as ordered  · Prognosis: Good  · Discharge planning reviewed  · Follow up as outpatient. Thank you for allowing us to participate in the care of this patient. Please call with questions. Eros Khan    ATTESTATION:    I have discussed the case, including pertinent history and exam findings with the residents and students. I have seen and examined the patient and the key elements of the encounter have been performed by me. I was present when the student obtained his information or examined the patient. I have reviewed the laboratory data, other diagnostic studies and discussed them with the residents. I have updated the medical record where necessary. I agree with the assessment, plan and orders as documented by the resident/ student.     Chrissy Bauman MD.

## 2021-01-07 NOTE — CONSULTS
[facility] - Initial Consult Note  Today's Date and Time: 1/7/2021, 5:17 PM    Impression :   · Recent hx of MRSA and Sphingomonas septicemia  · Chronic renal failure  · Diastolic CHF  · Type 2 DM    Recommendations:   · Continue ceftaroline 4 week course until 1/18/21    Medical Decision Making/Summary/Discussion:1/7/2021     ·   Infection Control Recommendations   · [place] Precautions  · Contact Isolation     Antimicrobial Stewardship Recommendations     · Simplification of therapy  · Targeted therapy  ·   Coordination of Outpatient Care:   · Estimated Length of IV antimicrobials: 1-18-21  · Patient will need Midline Catheter Insertion: no  · Patient will need PICC line Insertion: Yes  · Patient will need: Home IV , [place],  SNF,  LTAC:TBd  · Patient will need outpatient wound care:No    Chief complaint/reason for consultation:   · Recent MRSA and Sphingomonas bacteremia      History of Present Illness:   Odalys Blankenship is a 72y.o.-year-old  female with history of recent MRSA and Sphingomonas bacteremia, CKD, CAD s/p CABG, CVA who was initially admitted on 1/6/2021. Patient seen at the request of Dr. Mykel Bond. INITIAL HISTORY 1/7/21    Pt was instructed to go to the [facility] ED due to abnormal lab values. Pt states she is not sure what lab values were abnormal. She was recently admitted to [facility] for CHF exacerbation and was discharged to SNF on 12/23/2020. During this stay, she was diagnosed with MRSA and Sphingomonas septicemia. She was started on ceftaroline until 1/18/21. She states she continued to receive the antibiotic while at the SNF via Rt PICC line (placed 12/23/2020). She states she had no symptoms when she was instructed to return to the ED. She currently denies fevers, chills, chest pain, cough, nausea, vomiting, or diarrhea. Initial labs upon readmission showed BUN 67, Cr 3.55, proBNP 32,310, WBC 5.5, Hb 8.1, platelets 490. Urine culture was collected 21. CURRENT EVALUATION 2021  Afebrile  Vital signs stable    She notes baseline shortness of breath. Denies fevers, chills, chest pain, cough, nausea, vomiting, or diarrhea. No obvious signs of infection. Rt PICC line is in place and looks clean. Labs, X rays reviewed: 2021    BUN: 67-->64  Cr: 3.66-->3.47    WBC:5.5-->6.3  Hb:8.1-->7.6  Plat: 203-->209    Cultures:  Urine:  ·   Blood:  ·   Sputum :  ·   Wound:  ·     Discussed with patient, RN. I have personally reviewed the past medical history, past surgical history, medications, social history, and family history, and I have updated the database accordingly. Past Medical History:     Past Medical History:   Diagnosis Date    Anxiety and depression     Background diabetic retinopathy(362.01)     (Mild)    Balance problem     CAD (coronary artery disease)     (with coronary artery bypass graft x4).  Cancer of uterus [facility])     history of, (probable cure)    Chronic kidney disease     Chronic low back pain     CVA (cerebral vascular accident) ([address]     Dementia ([address]     (moderate)    Dry eye syndrome     GERD (gastroesophageal reflux disease)     Glaucoma suspect     Gout     Homonymous hemianopsia     (Right)    Hyperlipidemia     Hypertension     Keratitis     (secondary to dry eye syndrome).  Obesity     Peripheral polyneuropathy     (diabetic)    Pseudophakos     (Right Eye: 2012; Left Eye: 2012) - Dr. Bacilio Paulson.  Stroke [facility])     (Multiple) MRI of brain 10/2008 shows multiple infarcts involving the temporal and parietal areas.  Trichiasis     (left eye)    Type 2 diabetes mellitus ([address]        Past Surgical  History:     Past Surgical History:   Procedure Laterality Date    CATARACT REMOVAL WITH IMPLANT  2012    (Right eye) - Dr. Bacilio Paulson.  CATARACT REMOVAL WITH IMPLANT  2012    (Left eye) - Dr. Bacilio Paulson.      SECTION      CHOLECYSTECTOMY      CORONARY ARTERY BYPASS GRAFT  12-    (x4) - LIMA-LAD, SVG-diagnoal 1, SVG-OM1, and SVG-PDA. Exploration of left radial. (Dr. Jo Rob).  EYE SURGERY Left     as a child     GASTRIC BYPASS SURGERY      HYSTERECTOMY      (abdominal)  with left oophorectomy. Right ovary retained.     TONSILLECTOMY AND ADENOIDECTOMY      UPPER GASTROINTESTINAL ENDOSCOPY  12/14/2020    EGD BIOPSY performed by Fernando Uriostegui MD at [facility]       Medications:      insulin glargine  10 Units Subcutaneous Nightly    ceftaroline fosamil (TEFLARO) IVPB  400 mg Intravenous Q12H    pantoprazole  40 mg Oral BID AC    [Held by provider] aspirin  81 mg Oral Daily    atorvastatin  40 mg Oral Nightly    bumetanide  2 mg Oral BID    carvedilol  12.5 mg Oral BID WC    citalopram  20 mg Oral Daily    hydrALAZINE  75 mg Oral 3 times per day    cetirizine  5 mg Oral Daily    memantine  10 mg Oral BID    rivastigmine  1 patch Transdermal Daily    insulin lispro  0-12 Units Subcutaneous TID WC    insulin lispro  0-6 Units Subcutaneous Nightly    heparin (porcine)  5,000 Units Subcutaneous 3 times per day       Social History:     Social History     Socioeconomic History    Marital status:      Spouse name: Not on file    Number of children: Not on file    Years of education: Not on file    Highest education level: Not on file   Occupational History    Not on file   Social Needs    Financial resource strain: Not on file    Food insecurity     Worry: Not on file     Inability: Not on file    Transportation needs     Medical: Not on file     Non-medical: Not on file   Tobacco Use    Smoking status: Never Smoker    Smokeless tobacco: Never Used    Tobacco comment: never smoker eclamb rrt 7/20/17   Substance and Sexual Activity    Alcohol use: No    Drug use: No    Sexual activity: Not on file   Lifestyle    Physical activity     Days per week: Not on file     Minutes per session: Not on file    Stress: Not on file   Relationships    Social connections     Talks on phone: Not on file     Gets together: Not on file     Attends Restoration service: Not on file     Active member of club or organization: Not on file     Attends meetings of clubs or organizations: Not on file     Relationship status: Not on file    Intimate partner violence     Fear of current or ex partner: Not on file     Emotionally abused: Not on file     Physically abused: Not on file     Forced sexual activity: Not on file   Other Topics Concern    Not on file   Social History Narrative    Not on file       Family History:     Family History   Problem Relation Age of Onset    Diabetes Mother     Cancer Mother     High Blood Pressure Mother     Stroke Mother     Kidney Disease Mother     Uterine Cancer Mother     Diabetes Father     Heart Disease Father     Glaucoma Father     Emphysema Father     Coronary Art Dis Other         All 4 siblings.  Stroke Other         1 sibling.  Lung Cancer Other         1 sibling - cause of death lung cancer.  Mental Retardation Other         Allergies:   Bactrim [sulfamethoxazole-trimethoprim], Clonidine derivatives, and Amoxicillin-pot clavulanate     Review of Systems:   Constitutional: No fevers or chills. No systemic complaints  Head: No headaches  Eyes: No double vision or blurry vision. No conjunctival inflammation. ENT: No sore throat or runny nose. . No hearing loss, tinnitus or vertigo. Cardiovascular: No chest pain or palpitations. Lung: Baseline shortness of breath. No cough. No sputum production  Abdomen: No nausea, vomiting, diarrhea, or abdominal pain. Gomez Leisure No cramps. Genitourinary: No increased urinary frequency, or dysuria. No hematuria. No suprapubic or CVA pain  Musculoskeletal: No muscle aches or pains. No joint effusions, swelling or deformities  Hematologic: No bleeding or bruising.   Neurologic: No headache, weakness, numbness, or hours. Lab Results   Component Value Date    MUCUS NOT REPORTED 01/07/2021    RBC 2.55 01/07/2021    TRICHOMONAS NOT REPORTED 01/07/2021    WBC 6.3 01/07/2021    YEAST NOT REPORTED 01/07/2021    TURBIDITY CLEAR 01/07/2021     Lab Results   Component Value Date    CREATININE 3.47 01/07/2021    GLUCOSE 156 01/07/2021       Medical Decision Making-Imaging:       Medical Decision Ppmvre-Boopjnvs-Nmmbh:       Medical Decision Making-Other:     Note:  · Labs, medications, radiologic studies were reviewed with personal review of films  · Moderate Large amounts of data were reviewed  · Discussed with nursing Staff, Discharge planner  · Infection Control and Prevention measures reviewed  · All prior entries were reviewed  · Administer medications as ordered  · Prognosis: Good  · Discharge planning reviewed  · Follow up as outpatient. Thank you for allowing us to participate in the care of this patient. Please call with questions. Jay Perry MS4 participated in the evaluation of this patient under my direct supervision. Jeffery Mathews MD    ATTESTATION:    I have discussed the case, including pertinent history and exam findings with the residents and students. I have seen and examined the patient and the key elements of the encounter have been performed by me. I was present when the student obtained his information or examined the patient. I have reviewed the laboratory data, other diagnostic studies and discussed them with the residents. I have updated the medical record where necessary. I agree with the assessment, plan and orders as documented by the resident/ student.     Roseanne Corets MD.

## 2021-01-07 NOTE — CONSULTS
Renal Consult Note    Patient :  Madi Novak; 72 y.o. MRN# [id number]  Location:  2248/6465-25  Attending:  Danielle Oscar DO  Admit Date:  1/6/2021   Hospital Day: 1    Reason for Consult:     Asked by Dr Danielle Oscar DO to see for AMY/Elevated Creatinine. History Obtained From:     Patient, chart    History of Present Illness:     Madi Popr; 72 y.o. female with past medical history as mentioned below. Known history of chronic kidney disease stage IV approaching 5 baseline creatinine has been in the 2.5-2.7 range. Has followed up with Dr. Desirae Gibson in the past.  She has had multiple episodes of acute tubular necrosis in the past.  Proteinuria has been in the 2.5-3.0 g range. She was recently admitted to the hospital at [facility] in December as a transfer from [facility] after she had an episode of abdominal pain and diarrhea and black tarry stools, she developed acute kidney injury creatinine peaking at 3.7 at that time. Following which she was diagnosed to have MRSA and sphingomyelin S bacteremia. She was treated with ceftaroline. She was discharged to an [facility] facility. Creatinine on discharge was down to 2.2. She now comes into the hospital after lab work checked at the outside facility showed a creatinine of 3.6. Chest x-ray showed bilateral pleural effusions, generalized edema was seen as before. Nephrology has been consulted for acute kidney injury and progression of underlying kidney disease. ID has recommended continuation of ceftaroline till 1/18/2021 per outpatient treatment plan. Her oral intake has been somewhat suboptimal.  No fever chills nausea vomiting. No diarrhea or abdominal pain. No history of recent contrast exposure, No h/o prolonged NSAIDs use in the past, No h/o nephrolithiasis, No recent skin rashes or arthralgias, No hematuria or pyuria noticed in the recent past. Doesn't report any reduction in the urine output recently.  Non report of any obstructive urinary symptoms (urgency, frequency, weak stream, straining while urination). No h/o recurrent UTIs in the past.    Past History/Allergies? Social History:     Past Medical History:   Diagnosis Date    Anxiety and depression     Background diabetic retinopathy(362.01) 2008    (Mild)    Balance problem     CAD (coronary artery disease)     (with coronary artery bypass graft x4).  Cancer of uterus [facility])     history of, (probable cure)    Chronic kidney disease     Chronic low back pain     [facility]     Dementia ([facility]     (moderate)    Dry eye syndrome     GERD (gastroesophageal reflux disease)     Glaucoma suspect 2005    Gout     Homonymous hemianopsia 2008    (Right)    Hyperlipidemia     Hypertension     Keratitis     (secondary to dry eye syndrome).  Obesity     Peripheral polyneuropathy     (diabetic)    Pseudophakos     (Right Eye: 05-; Left Eye: 04-) - Dr. Preethi Drake.  Stroke [facility])     (Multiple) MRI of brain 10/2008 shows multiple infarcts involving the temporal and parietal areas.     Trichiasis 2010    (left eye)    Type 2 diabetes mellitus (HCC)        Allergies   Allergen Reactions    Bactrim [Sulfamethoxazole-Trimethoprim] Hives    Clonidine Derivatives     Amoxicillin-Pot Clavulanate Diarrhea, Nausea Only and Nausea And Vomiting       Social History     Socioeconomic History    Marital status:      Spouse name: Not on file    Number of children: Not on file    Years of education: Not on file    Highest education level: Not on file   Occupational History    Not on file   Social Needs    Financial resource strain: Not on file    Food insecurity     Worry: Not on file     Inability: Not on file    Transportation needs     Medical: Not on file     Non-medical: Not on file   Tobacco Use    Smoking status: Never Smoker    Smokeless tobacco: Never Used    Tobacco comment: never smoker eclamb rrt 7/20/17 Substance and Sexual Activity    Alcohol use: No    Drug use: No    Sexual activity: Not on file   Lifestyle    Physical activity     Days per week: Not on file     Minutes per session: Not on file    Stress: Not on file   Relationships    Social connections     Talks on phone: Not on file     Gets together: Not on file     Attends Jewish service: Not on file     Active member of club or organization: Not on file     Attends meetings of clubs or organizations: Not on file     Relationship status: Not on file    Intimate partner violence     Fear of current or ex partner: Not on file     Emotionally abused: Not on file     Physically abused: Not on file     Forced sexual activity: Not on file   Other Topics Concern    Not on file   Social History Narrative    Not on file       Family History:        Family History   Problem Relation Age of Onset    Diabetes Mother     Cancer Mother     High Blood Pressure Mother     Stroke Mother     Kidney Disease Mother     Uterine Cancer Mother     Diabetes Father     Heart Disease Father     Glaucoma Father     Emphysema Father     Coronary Art Dis Other         All 4 siblings.  Stroke Other         1 sibling.  Lung Cancer Other         1 sibling - cause of death lung cancer.     Mental Retardation Other        Outpatient Medications:     Medications Prior to Admission: carvedilol (COREG) 12.5 MG tablet, Take 1 tablet by mouth 2 times daily (with meals)  bumetanide (BUMEX) 2 MG tablet, Take 1 tablet by mouth 2 times daily  ceftaroline fosamil (TEFLARO) 400 MG SOLR, Infuse 400 mg intravenously every 12 hours Stop after 1/18/21 and pull the line  senna (SENOKOT) 8.6 MG tablet, Take 1 tablet by mouth 2 times daily  oxybutynin (DITROPAN) 5 MG tablet, TAKE 1 TABLET BY MOUTH TWICE DAILY  OXYGEN, Oxgen at 2 liters per nasal cannula  atorvastatin (LIPITOR) 40 MG tablet, TAKE 1 TABLET BY MOUTH EVERYDAY  clopidogrel (PLAVIX) 75 MG tablet, Take 1 tablet by mouth daily  hydrALAZINE (APRESOLINE) 50 MG tablet, Take 1.5 tablets by mouth every 8 hours  memantine (NAMENDA) 10 MG tablet, TAKE 1 TABLET BY MOUTH TWICE DAILY  omeprazole (PRILOSEC) 20 MG delayed release capsule, Take 1 capsule by mouth every morning (before breakfast)  rivastigmine (EXELON) 9.5 MG/24HR, APPLY 1 NEW PATCH EVERY 24 HOURS  aspirin 81 MG tablet, Take 1 tablet by mouth daily  nystatin (MYCOSTATIN) 298702 UNIT/GM cream, Apply topically 2 times daily. loratadine (CLARITIN) 10 MG tablet, TAKE 1 TABLET(10 MG) BY MOUTH DAILY  Lancets MISC, Checks blood sugar ac and HS  blood glucose monitor strips, Test 3  times a day & as needed for symptoms of irregular blood glucose. Dispense sufficient amount for indicated testing frequency plus additional to accommodate PRN testing needs. Alcohol Swabs (ALCOHOL PREP) PADS, Checks blood sugar 3 times a day  Misc.  Devices MISC, Standard walker with wheels  Diagnosis dementia, CHF  citalopram (CELEXA) 20 MG tablet, Take 1 tablet by mouth daily  insulin lispro (HUMALOG) 100 UNIT/ML injection vial, Use 4 times a day per sliding scale  insulin glargine (LANTUS) 100 UNIT/ML injection vial, Inject 20 Units into the skin nightly  Diabetic Shoe MISC, by Does not apply route  Compression Stockings MISC, by Does not apply route 20-30 mm Hg bilateral knee high    Current Medications:     Scheduled Meds:    insulin glargine  10 Units Subcutaneous Nightly    aspirin  81 mg Oral Daily    atorvastatin  40 mg Oral Nightly    bumetanide  2 mg Oral BID    carvedilol  12.5 mg Oral BID WC    citalopram  20 mg Oral Daily    hydrALAZINE  75 mg Oral 3 times per day    cetirizine  5 mg Oral Daily    memantine  10 mg Oral BID    rivastigmine  1 patch Transdermal Daily    sodium chloride flush  10 mL Intravenous 2 times per day    insulin lispro  0-12 Units Subcutaneous TID WC    insulin lispro  0-6 Units Subcutaneous Nightly    heparin (porcine)  5,000 Units Subcutaneous 3 times per day     Continuous Infusions:    dextrose       PRN Meds:  sodium chloride flush, nicotine, promethazine **OR** ondansetron, acetaminophen **OR** acetaminophen, glucose, dextrose, glucagon (rDNA), dextrose    Review of Systems:     Constitutional: No fever, no chills, no lethargy, no weakness. HEENT:  No headache, otalgia, itchy eyes, nasal discharge or sore throat. Cardiac:  No chest pain, dyspnea, orthopnea or PND. Chest:  No cough, phlegm or wheezing. Abdomen:  No abdominal pain, nausea or vomiting. Neuro:  No focal weakness, abnormal movements orseizure like activity. Skin:   No rashes, no itching. :   No hematuria, no pyuria, no dysuria, no flank pain. Extremities:  Generalized swelling no joint pains. ROS was otherwise negative except as mentioned in the [address] Input/Output:       I/O last 3 completed shifts:  In: -   Out: 350 [Urine:350]  No data found. Vital Signs:   Temperature:  Temp: 98.9 °F (37.2 °C)  TMax:   Temp (24hrs), Av.1 °F (36.7 °C), Min:97.5 °F (36.4 °C), Max:98.9 °F (37.2 °C)    Respirations:  Resp: 18  Pulse:   Pulse: 64  BP:    BP: (!) 135/59  BP Range: Systolic (58BIF), DQM:228 , Min:132 , IVS:672       Diastolic (79AYG), NQU:63, Min:59, Max:85      Physical Examination:     General:  AAO x 3, speaking in full sentences, no accessory muscle use. HEENT: Atraumatic, normocephalic, no throat congestion, moist mucosa. Eyes:   Pupils equal, round and reactive to light, EOMI. Neck:   No JVD, no thyromegaly, no lymphadenopathy. Chest:   Bilateral vesicular breath sounds, no rales or wheezes. Decreased air entry bilaterally  Cardiac:  S1 S2 RR, no murmurs, gallops or rubs, JVP not raised. Abdomen: Soft, non-tender, no masses or organomegaly, BS audible. :   No suprapubic or flank tenderness. Neuro:  AAO x 3, No FND. SKIN:  No rashes, good skin turgor. Extremities:  ++ edema, palpable peripheral pulses, no calf tenderness.     Labs:       Recent Labs 01/06/21  0945 01/07/21  0604   WBC 5.5 6.3   RBC 2.69* 2.55*   HGB 8.1* 7.6*   HCT 27.1* 25.4*   .7 99.6   MCH 30.1 29.8   MCHC 29.9 29.9   RDW 16.7* 16.5*    209   MPV 10.5 10.3      BMP:   Recent Labs     01/06/21  0945 01/06/21 2027 01/07/21  0604    138 138   K 4.7 4.7 4.6   CL 96* 97* 97*   CO2 30 29 28   BUN 67* 64* 64*   CREATININE 3.66* 3.16* 3.47*   GLUCOSE 165* 175* 156*   CALCIUM 8.3* 8.3* 8.2*      Phosphorus:   No results for input(s): PHOS in the last 72 hours. Magnesium:    Recent Labs     01/07/21  0604   MG 2.0     Albumin:    Recent Labs     01/07/21  0604   LABALBU 3.1*     BNP:    No results found for: BNP  JIMBO:    No results found for: JIMBO  SPEP:  Lab Results   Component Value Date    PROT 6.1 01/07/2021    ALBCAL 1.8 08/13/2020    ALBPCT 35 08/13/2020    LABALPH 0.2 08/13/2020    LABALPH 0.7 08/13/2020    A1PCT 5 08/13/2020    A2PCT 15 08/13/2020    LABBETA 0.9 08/13/2020    BETAPCT 17 08/13/2020    GAMGLOB 1.4 08/13/2020    GGPCT 28 08/13/2020    PATH ELECTRONICALLY SIGNED. Brenna Sam M.D. 08/13/2020    PATH ELECTRONICALLY SIGNED.  Brenna Sam M.D. 08/13/2020     UPEP:   No results found for: LABPE  C3:     Lab Results   Component Value Date    C3 114 08/13/2020     C4:     Lab Results   Component Value Date    C4 39 08/13/2020     MPO ANCA:     Lab Results   Component Value Date    MPO 14 08/13/2020     PR3 ANCA:     Lab Results   Component Value Date    PR3 8 08/13/2020     Anti-GBM:   No results found for: GBMABIGG  Hep BsAg:       No results found for: HEPBSAG  Hep C AB:          Lab Results   Component Value Date    HEPCAB NONREACTIVE 01/31/2018       Urinalysis/Chemistries:      Lab Results   Component Value Date    NITRU NEGATIVE 01/07/2021    COLORU YELLOW 01/07/2021    PHUR 5.0 01/07/2021    WBCUA 2 TO 5 01/07/2021    RBCUA 10 TO 20 01/07/2021    MUCUS NOT REPORTED 01/07/2021    TRICHOMONAS NOT REPORTED 01/07/2021    YEAST NOT REPORTED 01/07/2021 BACTERIA NOT REPORTED 01/07/2021    SPECGRAV 1.015 01/07/2021    LEUKOCYTESUR TRACE 01/07/2021    UROBILINOGEN Normal 01/07/2021    BILIRUBINUR NEGATIVE 01/07/2021    GLUCOSEU NEGATIVE 01/07/2021    KETUA NEGATIVE 01/07/2021    AMORPHOUS NOT REPORTED 01/07/2021     Urine Sodium:     Lab Results   Component Value Date    LUKE <20 01/07/2021     Urine Potassium:  No results found for: KUR  Urine Chloride:  No results found for: CLUR  Urine Osmolarity:   Lab Results   Component Value Date    OSMOU 348 01/07/2021     Urine Protein:   No results found for: TPU  Urine Creatinine:     Lab Results   Component Value Date    LABCREA 133.5 01/07/2021     UPC:     Urine Eosinophils:  No components found for: UEOS    Radiology:     CXR:     Assessment:     1. Acute Kidney Injury: Secondary to ischemic ATN versus progression of underlying kidney disease. Creatinine up to 3.6, has been fluctuating in the 3.5-3.7 range. 2.  ESRD/Chronic kidney disease stage IV approaching 5 secondary to diabetic nephrosclerosis proteinuria about 2.5 g  3. Massive lower extremity edema both from fluid retention from kidney disease, pulmonary hypertension right heart failure and low albumin state,  4. Morbid obesity  5. Recent MRSA and sphingomonas is bacteremia on ceftaroline  6. Hypertension  7. Type 2 diabetes    Plan:   1. Given the fact that she had recurrent episodes of acute kidney injury, progression of underlying kidney disease, massive fluid overload which has been refractory to treatment, following clearances, which recommended that she initiate renal replacement therapy to achieve adequate solute clearances and volume status. Discussed this with her daughter and the patient and they are both willing to proceed. 2.  We will place tunneled catheter tomorrow  3. Initiate hemodialysis tomorrow. 4.  Consult  for outpatient dialysis spot in the [place] area  5. Fluid restriction 1.5 L a day  6.   Continue Bumex 2 mg twice a day       Nutrition   Please ensure that patient is on a renal diet/TF. Avoid nephrotoxic drugs/contrast exposure. Thank you for the consultation. Please do not hesitate to contact us for any further questions/concerns. We will continue to follow along with you.

## 2021-01-07 NOTE — CARE COORDINATION
Case Management Initial Discharge Plan  Soraida Phipps,         Met with:patient to discuss discharge plans. Information verified: address, contacts, phone number, , insurance Yes    Emergency Contact/Next of Kin name & number: pt's daughter, Nas Kim [phone]    PCP: ERINN Helm  Date of last visit: virtual phone visit 2 months ago    Insurance Provider: [employer] Medicare- primary, Medicaid- secondary    Discharge Planning    Living Arrangements:  (staff and residents of facility)   Support Systems:  Children, Family Members    Home has 1 story  1 step to climb to get into front door  Pt currently resides at [facility], pt is a bedhold, will need pre-cert and a COVID test within 72 hours of admission. Confirmed with Dot with admissions at [facility]. Patient able to perform ADL's:Assisted    Current Services (outpatient & in home) none  DME equipment: walker, glucometer, Home O2 2 LNC ,   DME provider: Wily Odom [facility] in [place]    Receiving oral anticoagulation therapy?   Yes    If indicated:   Physician managing anticoagulation treatment: Saravanan Memos  Where does patient obtain lab work for [employer] treatment? n/a      Potential Assistance Needed:  Jax Mathews    Patient agreeable to home care: No  [place] of choice provided:  n/a    Prior SNF/Rehab Placement and Facility: [facility], plans to return there  Agreeable to SNF/Rehab: Yes  [place] of choice provided: yes, return to [facility] Evaluation: n/a    Expected Discharge date:  21    Patient expects to be discharged to:  [address] [place], [place] [address]  Follow Up Appointment: Best Day/ Time:      Transportation provider: [facility]  Transportation arrangements needed for discharge: Yes    Readmission Risk              Risk of Unplanned Readmission:        43             Does patient have a readmission risk score greater than 14?: Yes  If yes, follow-up appointment must be made within 7 days of discharge. Goals of Care:       Discharge Plan: Bed Hold at [address] [place] [address], will need pre-cert and COVID test- within 72 hours of admission, pre-cert started.         Electronically signed by Michelle Paz RN on 1/7/21 at 10:28 AM EST

## 2021-01-07 NOTE — H&P
[facility]  Office: [address], DO, Janis Cool, DO, Adam Duane, DO, Angeles Pae Blood, DO, Rodo Louis MD, Akil Mera MD, Jeffery Jean MD, Baudilio Hill MD, Oumar Viveros MD, Ricky Dillon MD, Baron Greco MD, Augustina Lesch, MD, Lopez Hudson MD, [facility], DO, Jhonathan Blanton MD, Matt Sabillon MD, Adrienne Stewart DO, Zeinab Silveira MD,  Lucas Soares, DO, Deena Abrams MD, Bisi Barba MD, Lesly Angeles, [facility], [address], CNP, Ernesto Khan, CNP, Kate Renae, CNS, Cynthia White, CNP, Ana Del Castillo, CNP, Yury Carter, CNP, Jeff Diaz, CNP, Jhoan Zeng, CNP, Randy Jack PA-C, Miguel Angel Haddad, AUDRA, Jayla Urena, CNP, Makenna Ambrosio, CNP, Colleen Villalpando, CNP, Diego Guerrier, CNP, Jet Gamboa, CNP         [facility]   [address] [facility]    HISTORY AND PHYSICAL EXAMINATION            Date:   1/7/2021  Patient name:  Aki Esparza  Date of admission:  1/6/2021  5:55 PM  MRN:   [id number]  Account:  [de-identified]  YOB: 1950  PCP:    ERINN Hilton  Room:   [address] [place] [address]  Code Status:    Full Code    Chief Complaint:     Abnormal labs    History Obtained From:     patient, electronic medical record    History of Present Illness:     Aki Esparza is a 72 y.o. Non-/non  female who presents with No chief complaint on file. and is admitted to the hospital for the management of Chronic renal failure syndrome. This is a 77 female with diabetes, Diastolic heart Failure with home oxygen dependence at 2L, CKD (follows with OP nephrology), HTN, HLD, CAD s/p 3/4 CABG, GERD, CVA (parietal and temporal infarcts), iron deficient anemia, dementia, and GERD who presents initially to [facility] for 'abnormal labs'. Patient is unsure which labs were abnormal, but was advised to present to the ER.   Patient recently discharged to SNF from our service on 12/23/2020 after admission for CHF exacerbation with subsequent AMY on CKD and MRSA and Sphingomonoas bacteremia with  outpatient antibiotic therapy on Ceftaroline through 1/18. Presenting labs reveal: Na: 138, K: 4.7, Cl: 96, BUN: 64, CRT: 3.66, GFR: 12, Glucose: 165, pro-bnp: 32,210, Trop: 84, HGB: 8.1, CXR with pulmonary edema and bilateral pleural effusions. Patient was given dose of IV Lasix at outlying ER, nephrology was consulted, and patient is transferred to our facility for further management and nephrology consultation. On my evaluation, patient is ill but non-toxic in appearance and significantly deconditioned. Multiple areas of pressure ulcers noted. Denies CP, SOB, ABD pain, n/v/d, HA/dizziness, or cough. Anasarca noted with 3+ BLE pitting edema. Lynn catheter in place from outlying facility but making adequate urine. RUE PICC line in place, site is clean and dry without s/s of infection. Hemodynamically stable with clinical distress. Past Medical History:     Past Medical History:   Diagnosis Date    Anxiety and depression     Background diabetic retinopathy(362.01) 2008    (Mild)    Balance problem     CAD (coronary artery disease)     (with coronary artery bypass graft x4).  Cancer of uterus [facility])     history of, (probable cure)    Chronic kidney disease     Chronic low back pain     CVA (cerebral vascular accident) ([address]     Dementia ([address]     (moderate)    Dry eye syndrome     GERD (gastroesophageal reflux disease)     Glaucoma suspect 2005    Gout     Homonymous hemianopsia 2008    (Right)    Hyperlipidemia     Hypertension     Keratitis     (secondary to dry eye syndrome).  Obesity     Peripheral polyneuropathy     (diabetic)    Pseudophakos     (Right Eye: 05-; Left Eye: 04-) - Dr. Elmer Chandler.  Stroke [facility])     (Multiple) MRI of brain 10/2008 shows multiple infarcts involving the temporal and parietal areas.     Trichiasis 2010    (left eye)    Type 2 diabetes mellitus ([address] Past Surgical History:     Past Surgical History:   Procedure Laterality Date    CATARACT REMOVAL WITH IMPLANT  2012    (Right eye) - Dr. Tommie Larsen.  CATARACT REMOVAL WITH IMPLANT  2012    (Left eye) - Dr. Tommie Larsen.   SECTION      CHOLECYSTECTOMY      CORONARY ARTERY BYPASS GRAFT  12-    (x4) - LIMA-LAD, SVG-diagnoal 1, SVG-OM1, and SVG-PDA. Exploration of left radial. (Dr. Gifty Proctor).  EYE SURGERY Left     as a child     GASTRIC BYPASS SURGERY      HYSTERECTOMY      (abdominal)  with left oophorectomy. Right ovary retained.  TONSILLECTOMY AND ADENOIDECTOMY      UPPER GASTROINTESTINAL ENDOSCOPY  2020    EGD BIOPSY performed by Oneida Cole MD at [facility]        Medications Prior to Admission:     Prior to Admission medications    Medication Sig Start Date End Date Taking?  Authorizing Provider   carvedilol (COREG) 12.5 MG tablet Take 1 tablet by mouth 2 times daily (with meals) 20  Yes Home Cintron MD   bumetanide (BUMEX) 2 MG tablet Take 1 tablet by mouth 2 times daily 20  Yes Home Cintron MD   ceftaroline fosamil (TEFLARO) 400 MG SOLR Infuse 400 mg intravenously every 12 hours Stop after 21 and pull the line 20 Yes Surya Rdz MD   senna (SENOKOT) 8.6 MG tablet Take 1 tablet by mouth 2 times daily 20 Yes Nakul Kasper PA   oxybutynin (DITROPAN) 5 MG tablet TAKE 1 TABLET BY MOUTH TWICE DAILY 20  Yes [place], [place]   OXYGEN Oxgen at 2 liters per nasal cannula 20  Yes Nakul [place], PA   atorvastatin (LIPITOR) 40 MG tablet TAKE 1 TABLET BY MOUTH EVERYDAY 10/23/20  Yes Stevie Mckinney DO   clopidogrel (PLAVIX) 75 MG tablet Take 1 tablet by mouth daily 10/23/20  Yes Stevie Mckinney DO   hydrALAZINE (APRESOLINE) 50 MG tablet Take 1.5 tablets by mouth every 8 hours 10/23/20  Yes Stevie Mckinney DO   memantine (NAMENDA) 10 MG tablet TAKE 1 TABLET BY MOUTH TWICE DAILY 10/23/20  Yes Stevie Mckinney DO   omeprazole (PRILOSEC) 20 MG delayed release capsule Take 1 capsule by mouth every morning (before breakfast) 10/23/20  Yes Stevie Mckinney, DO   rivastigmine (EXELON) 9.5 MG/24HR APPLY 1 NEW PATCH EVERY 24 HOURS 10/23/20  Yes Stevie Mckinney, DO   aspirin 81 MG tablet Take 1 tablet by mouth daily 1/3/20  Yes ERINN Rangel   nystatin (MYCOSTATIN) 336831 UNIT/GM cream Apply topically 2 times daily. 1/31/18  Yes ERINN Rangel   loratadine (CLARITIN) 10 MG tablet TAKE 1 TABLET(10 MG) BY MOUTH DAILY 11/5/20   ERINN Rangel   Lancets MISC Checks blood sugar ac and HS 11/4/20   Aime Conrad [place]   blood glucose monitor strips Test 3  times a day & as needed for symptoms of irregular blood glucose. Dispense sufficient amount for indicated testing frequency plus additional to accommodate PRN testing needs. 11/4/20   ERINN Rangel   Alcohol Swabs (ALCOHOL PREP) PADS Checks blood sugar 3 times a day 11/4/20   Aime Conrad [address] [facility] Standard walker with wheels    Diagnosis dementia, CHF 10/29/20   Levorn Ana Cristina, APRN - CNP   citalopram (CELEXA) 20 MG tablet Take 1 tablet by mouth daily 10/23/20   Angela Agent Hank,    insulin lispro (HUMALOG) 100 UNIT/ML injection vial Use 4 times a day per sliding scale 10/23/20   Stevie Mckinney,    insulin glargine (LANTUS) 100 UNIT/ML injection vial Inject 20 Units into the skin nightly 10/23/20   Blas Ireland DO   Diabetic Shoe MISC by Does not apply route 9/26/19   ERINN Rangel   Compression Stockings MISC by Does not apply route 20-30 mm Hg bilateral knee high 9/26/19   ERINN Rangel        Allergies:     Bactrim [sulfamethoxazole-trimethoprim], Clonidine derivatives, and Amoxicillin-pot clavulanate    Social History:     Tobacco:    reports that she has never smoked.  She has never used smokeless tobacco.  Alcohol:      reports no history of alcohol use. Drug Use:  reports no history of drug use. Family History:     Family History   Problem Relation Age of Onset    Diabetes Mother     Cancer Mother     High Blood Pressure Mother    [facility] Stroke Mother     Kidney Disease Mother     Uterine Cancer Mother     Diabetes Father     Heart Disease Father     Glaucoma Father     Emphysema Father     Coronary Art Dis Other         All 4 siblings.  Stroke Other         1 sibling.  Lung Cancer Other         1 sibling - cause of death lung cancer.  Mental Retardation Other        Review of Systems:     Positive and Negative as described in HPI. Review of Systems   Constitutional: Negative for appetite change, chills, fatigue and fever. HENT: Negative for sore throat and trouble swallowing. Eyes: Negative for photophobia and visual disturbance. Respiratory: Negative for cough, chest tightness, shortness of breath, wheezing and stridor. Cardiovascular: Positive for leg swelling. Negative for chest pain and palpitations. Gastrointestinal: Negative for abdominal pain, blood in stool, constipation, diarrhea, nausea and vomiting. Genitourinary: Negative for decreased urine volume and hematuria. Lynn catheter with sediment noted to urine   Musculoskeletal: Negative for myalgias, neck pain and neck stiffness. Skin: Negative for color change. Multiple areas of skin break down noted   Neurological: Negative for dizziness, tremors, speech difficulty, light-headedness and headaches. Generalized deconditioning   Psychiatric/Behavioral: Negative for agitation, behavioral problems and confusion. The patient is not nervous/anxious.          Underlying dementia       Physical Exam:   BP (!) 161/72   Pulse 64   Temp 97.8 °F (36.6 °C) (Oral)   Resp 18   SpO2 98%   Temp (24hrs), Av.5 °F (36.4 °C), Min:97.2 °F (36.2 °C), Max:97.8 °F (36.6 °C)    Recent Labs     21  1530 21  1828 21  2328   POCGLU 185* 184* 156*     No intake or output data in the 24 hours ending 01/07/21 0457    Physical Exam  Vitals signs and nursing note reviewed. Constitutional:       General: She is not in acute distress. Appearance: She is obese. She is ill-appearing. She is not toxic-appearing or diaphoretic. Comments: Anasarca     HENT:      Head: Normocephalic and atraumatic. Right Ear: External ear normal.      Left Ear: External ear normal.      Nose: Nose normal. No congestion or rhinorrhea. Mouth/Throat:      Mouth: Mucous membranes are dry. Pharynx: Oropharynx is clear. Eyes:      Extraocular Movements: Extraocular movements intact. Conjunctiva/sclera: Conjunctivae normal.      Pupils: Pupils are equal, round, and reactive to light. Neck:      Musculoskeletal: Normal range of motion and neck supple. No neck rigidity or muscular tenderness. Cardiovascular:      Rate and Rhythm: Normal rate and regular rhythm. Pulses: Normal pulses. Heart sounds: Normal heart sounds. No murmur. No friction rub. No gallop. Pulmonary:      Effort: Pulmonary effort is normal. No respiratory distress. Breath sounds: Rales present. No wheezing or rhonchi. Abdominal:      General: Bowel sounds are normal. There is no distension. Palpations: Abdomen is soft. There is no mass. Tenderness: There is no abdominal tenderness. Musculoskeletal:         General: No tenderness or signs of injury. Right lower leg: Edema present. Left lower leg: Edema present. Skin:     General: Skin is warm and dry. Capillary Refill: Capillary refill takes less than 2 seconds. Coloration: Skin is not jaundiced. Findings: No bruising or erythema. Neurological:      General: No focal deficit present. Mental Status: She is alert and oriented to person, place, and time. Mental status is at baseline. Cranial Nerves: No cranial nerve deficit. Sensory: No sensory deficit. Motor: No weakness. Psychiatric:         Mood and Affect: Mood normal.         Behavior: Behavior normal.         Thought Content:  Thought content normal.         Judgment: Judgment normal.         Investigations:      Laboratory Testing:  Recent Results (from the past 24 hour(s))   CBC Auto Differential    Collection Time: 01/06/21  9:45 AM   Result Value Ref Range    WBC 5.5 3.5 - 11.3 k/uL    RBC 2.69 (L) 3.95 - 5.11 m/uL    Hemoglobin 8.1 (L) 11.9 - 15.1 g/dL    Hematocrit 27.1 (L) 36.3 - 47.1 %    .7 82.6 - 102.9 fL    MCH 30.1 25.2 - 33.5 pg    MCHC 29.9 25.2 - 33.5 g/dL    RDW 16.7 (H) 11.8 - 14.4 %    Platelets 019 934 - 488 k/uL    MPV 10.5 8.1 - 13.5 fL    NRBC Automated 0.0 0.0 per 100 WBC    Differential Type NOT REPORTED     WBC Morphology NOT REPORTED     RBC Morphology NOT REPORTED     Platelet Estimate NOT REPORTED     Monocytes 9 3 - 12 %    Lymphocytes 11 (L) 24 - 43 %    Seg Neutrophils 77 (H) 36 - 65 %    Eosinophils % 2 1 - 4 %    Basophils 1 0 - 2 %    Immature Granulocytes 0 0 %    Absolute Mono # 0.50 0.10 - 1.20 k/uL    Absolute Lymph # 0.61 (L) 1.10 - 3.70 k/uL    Segs Absolute 4.22 1.50 - 8.10 k/uL    Absolute Eos # 0.11 0.00 - 0.44 k/uL    Basophils Absolute 0.06 0.00 - 0.20 k/uL    Absolute Immature Granulocyte 0.00 0.00 - 0.30 k/uL    Morphology ANISOCYTOSIS PRESENT     Morphology Platelet count adequate    Basic Metabolic Panel    Collection Time: 01/06/21  9:45 AM   Result Value Ref Range    Glucose 165 (H) 70 - 99 mg/dL    BUN 67 (H) 8 - 23 mg/dL    CREATININE 3.66 (H) 0.50 - 0.90 mg/dL    Bun/Cre Ratio 18 9 - 20    Calcium 8.3 (L) 8.6 - 10.4 mg/dL    Sodium 136 135 - 144 mmol/L    Potassium 4.7 3.7 - 5.3 mmol/L    Chloride 96 (L) 98 - 107 mmol/L    CO2 30 20 - 31 mmol/L    Anion Gap 10 9 - 17 mmol/L    GFR Non-African American 12 (L) >60 mL/min    GFR African American 15 (L) >60 mL/min    GFR Comment          GFR Staging NOT REPORTED    Brain Natriuretic Peptide REQ.    Yeast, UA NOT REPORTED None   POC Glucose Fingerstick    Collection Time: 01/06/21  3:30 PM   Result Value Ref Range    POC Glucose 185 (H) 65 - 105 mg/dL   POC Glucose Fingerstick    Collection Time: 01/06/21  6:28 PM   Result Value Ref Range    POC Glucose 184 (H) 65 - 105 mg/dL   BASIC METABOLIC PANEL    Collection Time: 01/06/21  8:27 PM   Result Value Ref Range    Glucose 175 (H) 70 - 99 mg/dL    BUN 64 (H) 8 - 23 mg/dL    CREATININE 3.16 (H) 0.50 - 0.90 mg/dL    Bun/Cre Ratio NOT REPORTED 9 - 20    Calcium 8.3 (L) 8.6 - 10.4 mg/dL    Sodium 138 135 - 144 mmol/L    Potassium 4.7 3.7 - 5.3 mmol/L    Chloride 97 (L) 98 - 107 mmol/L    CO2 29 20 - 31 mmol/L    Anion Gap 12 9 - 17 mmol/L    GFR Non-African American 15 (L) >60 mL/min    GFR  18 (L) >60 mL/min    GFR Comment          GFR Staging NOT REPORTED    POC Glucose Fingerstick    Collection Time: 01/06/21 11:28 PM   Result Value Ref Range    POC Glucose 156 (H) 65 - 105 mg/dL       Imaging/Diagnostics:  Us Renal Complete    Result Date: 1/6/2021  Unremarkable ultrasound of the right kidney, nonvisualization left kidney. Decompressed urinary bladder. Right upper quadrant ascites. Xr Chest Portable    Result Date: 1/6/2021  Stable cardiomegaly with mild pulmonary edema which is less prominent. Hazy perihilar bibasilar opacities which is more prominent on the right and has decreased. Probable small bibasilar pleural effusions which is more prominent on the right and have decreased.        Assessment :      Hospital Problems           Last Modified POA    * (Principal) Chronic renal failure syndrome 1/7/2021 Yes    Dementia ([address] 1/7/2021 Yes    Overview Signed 2/27/2014 10:21 AM by Shira Boateng MD     (moderate)         Hypertension 1/7/2021 Yes    Type 2 diabetes mellitus with hyperglycemia, with long-term current use of insulin ([address] 1/7/2021 Yes    Acute on chronic diastolic heart failure ([address] 1/7/2021 Yes    Anasarca 1/7/2021 Yes    Gram-negative bacteremia 1/7/2021 Yes    MRSA bacteremia 1/7/2021 Yes          Plan:     Patient status inpatient in the Progressive Unit/Step down    Acute on Chronic Renal Failure: Nephrology consulted, await formal evaluation, renal function worsened from recent discharge (up from 2.1 on discharge; baseline previously 1.2-1.4), avoid nephrotoxic medications, strict I&O  Acute on Chronic Diastolic Heart Failure: given 1 dose of lasix at OSH, will continue home dose bumex, and await further nephrology recommendations, daily weights, and strict I&O, will need rodriguez catheter changed pending insertion  MRSA and Gram Negative Bacteremia: ID consulted for follow up evaluation as patient discharged on IV ceftaroline through 1/18/21  DMII: continue medium intensity ss, decreased lantus to 10units as patient reports recently changed at SNF due to am hypoglycemia, monitor for s/s hypog/hyperglycemia  Anemia of Chronic Disease: stable at baseline  Anasarca  HTN: controlled at present  CAD s/p CABG: on statis, ASA, plavix  Dementia: no behavioral disturbance, continue namenda and exelon patch  Follow daily labs, repeat troponin this am (suspect type II elevation on setting of CHF and AMY), resume selected home medications, renal diet as tolerated  DVT/PPI prophylaxis  Full Code    Consultations:   IP CONSULT TO NEPHROLOGY  IP CONSULT TO INFECTIOUS DISEASES    Patient is admitted as inpatient status because of co-morbidities listed above, severity of signs and symptoms as outlined, requirement for current medical therapies and most importantly because of direct risk to patient if care not provided in a hospital setting. Expected length of stay > 48 hours.     SHIRA Juárez NP  1/7/2021  4:57 AM    Copy sent to ERINN Diehl

## 2021-01-07 NOTE — CARE COORDINATION
Consult received for OP dialysis arrangements  Met with pt briefly and she was agreeable to SW calling her dtr, Ama Angeles  Pt lives in [place] but is currently at [facility] for short-term rehab  Plan is for return to [facility] at discharge  Discussed OP dialysis units in the [place] area  Informed dtr that there is a [facility] and that pt could go to [facility] while she resides at [facility] and when she is ready to go home, they could transfer her care to the [place] unit - dtr agreeable  Called [facility] [place] and spoke with [facility] - updated on plans for dialysis and will arrange OP at [facility] - their SW will work on transferring to [place] unit once pt able to go home  Per [facility], they can provide transportation for either MWF schedule or TTS schedule    Ref made to [facility] [facility] Admissions  Will send updated info tomorrow after her first dialysis tx

## 2021-01-08 ENCOUNTER — APPOINTMENT (OUTPATIENT)
Dept: INTERVENTIONAL RADIOLOGY/VASCULAR | Age: 71
DRG: 673 | End: 2021-01-08
Attending: INTERNAL MEDICINE
Payer: MEDICARE

## 2021-01-08 ENCOUNTER — APPOINTMENT (OUTPATIENT)
Dept: DIALYSIS | Age: 71
DRG: 673 | End: 2021-01-08
Attending: INTERNAL MEDICINE
Payer: MEDICARE

## 2021-01-08 LAB
ANION GAP SERPL CALCULATED.3IONS-SCNC: 9 MMOL/L (ref 9–17)
BUN BLDV-MCNC: 62 MG/DL (ref 8–23)
BUN/CREAT BLD: ABNORMAL (ref 9–20)
CALCIUM SERPL-MCNC: 8.6 MG/DL (ref 8.6–10.4)
CHLORIDE BLD-SCNC: 100 MMOL/L (ref 98–107)
CO2: 32 MMOL/L (ref 20–31)
CREAT SERPL-MCNC: 3.17 MG/DL (ref 0.5–0.9)
CULTURE: NO GROWTH
GFR AFRICAN AMERICAN: 18 ML/MIN
GFR NON-AFRICAN AMERICAN: 15 ML/MIN
GFR SERPL CREATININE-BSD FRML MDRD: ABNORMAL ML/MIN/{1.73_M2}
GFR SERPL CREATININE-BSD FRML MDRD: ABNORMAL ML/MIN/{1.73_M2}
GLUCOSE BLD-MCNC: 101 MG/DL (ref 70–99)
GLUCOSE BLD-MCNC: 104 MG/DL (ref 65–105)
GLUCOSE BLD-MCNC: 107 MG/DL (ref 65–105)
GLUCOSE BLD-MCNC: 122 MG/DL (ref 65–105)
GLUCOSE BLD-MCNC: 131 MG/DL (ref 65–105)
HBV SURFACE AB TITR SER: <3.5 MIU/ML
HCT VFR BLD CALC: 26.8 % (ref 36.3–47.1)
HEMOGLOBIN: 8.1 G/DL (ref 11.9–15.1)
HEPATITIS B CORE TOTAL ANTIBODY: NONREACTIVE
HEPATITIS B SURFACE ANTIGEN: NONREACTIVE
HEPATITIS C ANTIBODY: NONREACTIVE
Lab: NORMAL
MCH RBC QN AUTO: 29.8 PG (ref 25.2–33.5)
MCHC RBC AUTO-ENTMCNC: 30.2 G/DL (ref 28.4–34.8)
MCV RBC AUTO: 98.5 FL (ref 82.6–102.9)
NRBC AUTOMATED: 0 PER 100 WBC
PDW BLD-RTO: 16.4 % (ref 11.8–14.4)
PLATELET # BLD: 246 K/UL (ref 138–453)
PMV BLD AUTO: 10.3 FL (ref 8.1–13.5)
POTASSIUM SERPL-SCNC: 4.3 MMOL/L (ref 3.7–5.3)
POTASSIUM SERPL-SCNC: 4.4 MMOL/L (ref 3.7–5.3)
RBC # BLD: 2.72 M/UL (ref 3.95–5.11)
SODIUM BLD-SCNC: 141 MMOL/L (ref 135–144)
SPECIMEN DESCRIPTION: NORMAL
WBC # BLD: 7.5 K/UL (ref 3.5–11.3)

## 2021-01-08 PROCEDURE — 86704 HEP B CORE ANTIBODY TOTAL: CPT

## 2021-01-08 PROCEDURE — 36558 INSERT TUNNELED CV CATH: CPT

## 2021-01-08 PROCEDURE — 86803 HEPATITIS C AB TEST: CPT

## 2021-01-08 PROCEDURE — 6370000000 HC RX 637 (ALT 250 FOR IP): Performed by: NURSE PRACTITIONER

## 2021-01-08 PROCEDURE — 87340 HEPATITIS B SURFACE AG IA: CPT

## 2021-01-08 PROCEDURE — 6360000002 HC RX W HCPCS: Performed by: INTERNAL MEDICINE

## 2021-01-08 PROCEDURE — 6360000002 HC RX W HCPCS: Performed by: NURSE PRACTITIONER

## 2021-01-08 PROCEDURE — 84132 ASSAY OF SERUM POTASSIUM: CPT

## 2021-01-08 PROCEDURE — 99232 SBSQ HOSP IP/OBS MODERATE 35: CPT | Performed by: INTERNAL MEDICINE

## 2021-01-08 PROCEDURE — 02HV33Z INSERTION OF INFUSION DEVICE INTO SUPERIOR VENA CAVA, PERCUTANEOUS APPROACH: ICD-10-PCS | Performed by: RADIOLOGY

## 2021-01-08 PROCEDURE — 2709999900 HC NON-CHARGEABLE SUPPLY

## 2021-01-08 PROCEDURE — C1894 INTRO/SHEATH, NON-LASER: HCPCS

## 2021-01-08 PROCEDURE — 90935 HEMODIALYSIS ONE EVALUATION: CPT

## 2021-01-08 PROCEDURE — 6370000000 HC RX 637 (ALT 250 FOR IP): Performed by: INTERNAL MEDICINE

## 2021-01-08 PROCEDURE — 5A1D70Z PERFORMANCE OF URINARY FILTRATION, INTERMITTENT, LESS THAN 6 HOURS PER DAY: ICD-10-PCS | Performed by: INTERNAL MEDICINE

## 2021-01-08 PROCEDURE — 90935 HEMODIALYSIS ONE EVALUATION: CPT | Performed by: INTERNAL MEDICINE

## 2021-01-08 PROCEDURE — 2580000003 HC RX 258: Performed by: RADIOLOGY

## 2021-01-08 PROCEDURE — 76937 US GUIDE VASCULAR ACCESS: CPT

## 2021-01-08 PROCEDURE — 36415 COLL VENOUS BLD VENIPUNCTURE: CPT

## 2021-01-08 PROCEDURE — 82947 ASSAY GLUCOSE BLOOD QUANT: CPT

## 2021-01-08 PROCEDURE — 6360000002 HC RX W HCPCS: Performed by: RADIOLOGY

## 2021-01-08 PROCEDURE — C1750 CATH, HEMODIALYSIS,LONG-TERM: HCPCS

## 2021-01-08 PROCEDURE — 2580000003 HC RX 258: Performed by: NURSE PRACTITIONER

## 2021-01-08 PROCEDURE — 2580000003 HC RX 258: Performed by: INTERNAL MEDICINE

## 2021-01-08 PROCEDURE — C1769 GUIDE WIRE: HCPCS

## 2021-01-08 PROCEDURE — 85027 COMPLETE CBC AUTOMATED: CPT

## 2021-01-08 PROCEDURE — 0JH63XZ INSERTION OF TUNNELED VASCULAR ACCESS DEVICE INTO CHEST SUBCUTANEOUS TISSUE AND FASCIA, PERCUTANEOUS APPROACH: ICD-10-PCS | Performed by: RADIOLOGY

## 2021-01-08 PROCEDURE — 77001 FLUOROGUIDE FOR VEIN DEVICE: CPT

## 2021-01-08 PROCEDURE — 1200000000 HC SEMI PRIVATE

## 2021-01-08 PROCEDURE — 80048 BASIC METABOLIC PNL TOTAL CA: CPT

## 2021-01-08 PROCEDURE — 86317 IMMUNOASSAY INFECTIOUS AGENT: CPT

## 2021-01-08 RX ORDER — HEPARIN SODIUM 1000 [USP'U]/ML
1600 INJECTION, SOLUTION INTRAVENOUS; SUBCUTANEOUS PRN
Status: DISCONTINUED | OUTPATIENT
Start: 2021-01-08 | End: 2021-01-11 | Stop reason: HOSPADM

## 2021-01-08 RX ORDER — HEPARIN SODIUM 5000 [USP'U]/ML
INJECTION, SOLUTION INTRAVENOUS; SUBCUTANEOUS
Status: COMPLETED | OUTPATIENT
Start: 2021-01-08 | End: 2021-01-08

## 2021-01-08 RX ADMIN — BUMETANIDE 2 MG: 1 TABLET ORAL at 00:51

## 2021-01-08 RX ADMIN — INSULIN GLARGINE 10 UNITS: 100 INJECTION, SOLUTION SUBCUTANEOUS at 00:52

## 2021-01-08 RX ADMIN — HYDRALAZINE HYDROCHLORIDE 75 MG: 25 TABLET ORAL at 16:35

## 2021-01-08 RX ADMIN — DESMOPRESSIN ACETATE 40 MG: 0.2 TABLET ORAL at 21:34

## 2021-01-08 RX ADMIN — HYDRALAZINE HYDROCHLORIDE 75 MG: 25 TABLET ORAL at 01:20

## 2021-01-08 RX ADMIN — Medication 10 ML: at 21:35

## 2021-01-08 RX ADMIN — HEPARIN SODIUM 1.6 ML: 5000 INJECTION INTRAVENOUS; SUBCUTANEOUS at 09:04

## 2021-01-08 RX ADMIN — HEPARIN SODIUM 5000 UNITS: 5000 INJECTION INTRAVENOUS; SUBCUTANEOUS at 16:36

## 2021-01-08 RX ADMIN — HEPARIN SODIUM 1.6 UNITS: 5000 INJECTION INTRAVENOUS; SUBCUTANEOUS at 09:05

## 2021-01-08 RX ADMIN — MEMANTINE HYDROCHLORIDE 10 MG: 5 TABLET, FILM COATED ORAL at 00:51

## 2021-01-08 RX ADMIN — BUMETANIDE 2 MG: 1 TABLET ORAL at 21:34

## 2021-01-08 RX ADMIN — HEPARIN SODIUM 1600 UNITS: 1000 INJECTION INTRAVENOUS; SUBCUTANEOUS at 12:13

## 2021-01-08 RX ADMIN — CEFTAROLINE FOSAMIL 400 MG: 600 POWDER, FOR SOLUTION INTRAVENOUS at 04:53

## 2021-01-08 RX ADMIN — DESMOPRESSIN ACETATE 40 MG: 0.2 TABLET ORAL at 00:50

## 2021-01-08 RX ADMIN — CEFTAROLINE FOSAMIL 400 MG: 600 POWDER, FOR SOLUTION INTRAVENOUS at 16:28

## 2021-01-08 RX ADMIN — SODIUM CHLORIDE: 9 INJECTION, SOLUTION INTRAVENOUS at 08:00

## 2021-01-08 RX ADMIN — CETIRIZINE HYDROCHLORIDE 5 MG: 10 TABLET ORAL at 12:51

## 2021-01-08 RX ADMIN — CITALOPRAM 20 MG: 20 TABLET, FILM COATED ORAL at 12:50

## 2021-01-08 RX ADMIN — MEMANTINE HYDROCHLORIDE 10 MG: 5 TABLET, FILM COATED ORAL at 12:50

## 2021-01-08 RX ADMIN — PANTOPRAZOLE SODIUM 40 MG: 40 TABLET, DELAYED RELEASE ORAL at 16:35

## 2021-01-08 RX ADMIN — INSULIN LISPRO 1 UNITS: 100 INJECTION, SOLUTION INTRAVENOUS; SUBCUTANEOUS at 00:52

## 2021-01-08 RX ADMIN — CARVEDILOL 12.5 MG: 12.5 TABLET, FILM COATED ORAL at 16:35

## 2021-01-08 RX ADMIN — MEMANTINE HYDROCHLORIDE 10 MG: 5 TABLET, FILM COATED ORAL at 21:34

## 2021-01-08 ASSESSMENT — PAIN SCALES - GENERAL: PAINLEVEL_OUTOF10: 0

## 2021-01-08 NOTE — PROGRESS NOTES
Patient noted to have moderate amount of dried bleed on ground , abd had bandaid on its wet and noted to be oozing blood .  IR informed

## 2021-01-08 NOTE — PROGRESS NOTES
Dialysis Post Treatment Note  Vitals:    01/08/21 1207   BP: (!) 168/74   Pulse: 60   Resp: 11   Temp: 98.2 °F (36.8 °C)   SpO2: 100%     Pre-Weight = 125.2kg  Post-weight = Weight: 272 lb 14.9 oz (123.8 kg)  Total Liters Processed = Total Liters Processed (l/min): 25 l/min  Rinseback Volume (mL) = Rinseback Volume (ml): 300 ml  Net Removal (mL) = NET Removed (ml): 1200 ml  Patient's dry weight=  Type of access used=cvc  Length of treatment=120    ellie well no issues. 1st tx completed with no events.

## 2021-01-08 NOTE — PROGRESS NOTES
dementia, CHF  citalopram (CELEXA) 20 MG tablet, Take 1 tablet by mouth daily  insulin lispro (HUMALOG) 100 UNIT/ML injection vial, Use 4 times a day per sliding scale  insulin glargine (LANTUS) 100 UNIT/ML injection vial, Inject 20 Units into the skin nightly  Diabetic Shoe MISC, by Does not apply route  Compression Stockings MISC, by Does not apply route 20-30 mm Hg bilateral knee high    Current Medications:     Scheduled Meds:    insulin glargine  10 Units Subcutaneous Nightly    ceftaroline fosamil (TEFLARO) IVPB  400 mg Intravenous Q12H    pantoprazole  40 mg Oral BID AC    [Held by provider] aspirin  81 mg Oral Daily    atorvastatin  40 mg Oral Nightly    bumetanide  2 mg Oral BID    carvedilol  12.5 mg Oral BID WC    citalopram  20 mg Oral Daily    hydrALAZINE  75 mg Oral 3 times per day    cetirizine  5 mg Oral Daily    memantine  10 mg Oral BID    rivastigmine  1 patch Transdermal Daily    insulin lispro  0-12 Units Subcutaneous TID WC    insulin lispro  0-6 Units Subcutaneous Nightly    heparin (porcine)  5,000 Units Subcutaneous 3 times per day     Continuous Infusions:    sodium chloride      dextrose       PRN Meds:  heparin (porcine), heparin (porcine), sodium chloride flush, nicotine, promethazine **OR** ondansetron, acetaminophen **OR** acetaminophen, glucose, dextrose, glucagon (rDNA), dextrose    Input/Output:       I/O last 3 completed shifts: In: 850 [P.O.:800; I.V.:50]  Out: 825 [Urine:825].       Patient Vitals for the past 96 hrs (Last 3 readings):   Weight   21 1207 272 lb 14.9 oz (123.8 kg)   21 0954 276 lb 0.3 oz (125.2 kg)       Vital Signs:   Temperature:  Temp: 97.4 °F (36.3 °C)  TMax:   Temp (24hrs), Av.8 °F (36.6 °C), Min:97 °F (36.1 °C), Max:98.2 °F (36.8 °C)    Respirations:  Resp: 14  Pulse:   Pulse: 64  BP:    BP: (!) 164/79  BP Range: Systolic (25CIP), SZX:810 , Min:135 , WJI:814       Diastolic (26ILA), OBT:58, Min:56, Max:79      Physical Examination:     General:  AAO x 3, speaking in full sentences, no accessory muscle use. HEENT: Atraumatic, normocephalic, no throat congestion, moist mucosa. Eyes:   Pupils equal, round and reactive to light, EOMI. Neck:   Supple  Chest:   Bilateral vesicular breath sounds, no rales or wheezes. Cardiac:  S1 S2 RR, no murmurs, gallops or rubs. Abdomen: Soft, obese, non-tender, no masses or organomegaly, BS audible. :   No suprapubic or flank tenderness. Neuro:  AAO x 3, No FND. SKIN:  No rashes, good skin turgor. Extremities:  +ve 2+ edema. Labs:       Recent Labs     01/06/21  0945 01/07/21  0604 01/08/21  1018   WBC 5.5 6.3 7.5   RBC 2.69* 2.55* 2.72*   HGB 8.1* 7.6* 8.1*   HCT 27.1* 25.4* 26.8*   .7 99.6 98.5   MCH 30.1 29.8 29.8   MCHC 29.9 29.9 30.2   RDW 16.7* 16.5* 16.4*    209 246   MPV 10.5 10.3 10.3      BMP:   Recent Labs     01/06/21 2027 01/07/21  0604 01/08/21  0655 01/08/21  1018    138  --  141   K 4.7 4.6 4.3 4.4   CL 97* 97*  --  100   CO2 29 28  --  32*   BUN 64* 64*  --  62*   CREATININE 3.16* 3.47*  --  3.17*   GLUCOSE 175* 156*  --  101*   CALCIUM 8.3* 8.2*  --  8.6      Magnesium:    Recent Labs     01/07/21  0604   MG 2.0     Albumin:    Recent Labs     01/07/21  0604   LABALBU 3.1*     SPEP:  Lab Results   Component Value Date    PROT 6.1 01/07/2021    ALBCAL 1.8 08/13/2020    ALBPCT 35 08/13/2020    LABALPH 0.2 08/13/2020    LABALPH 0.7 08/13/2020    A1PCT 5 08/13/2020    A2PCT 15 08/13/2020    LABBETA 0.9 08/13/2020    BETAPCT 17 08/13/2020    GAMGLOB 1.4 08/13/2020    GGPCT 28 08/13/2020    PATH ELECTRONICALLY SIGNED. Catia Meek M.D. 08/13/2020    PATH ELECTRONICALLY SIGNED.  Catia Meek M.D. 08/13/2020     C3:     Lab Results   Component Value Date    C3 114 08/13/2020     C4:     Lab Results   Component Value Date    C4 39 08/13/2020     MPO ANCA:     Lab Results   Component Value Date    MPO 14 08/13/2020     PR3 ANCA:     Lab Results Component Value Date    PR3 8 08/13/2020     Hep BsAg:         Lab Results   Component Value Date    HEPBSAG NONREACTIVE 01/08/2021     Hep C AB:          Lab Results   Component Value Date    HEPCAB NONREACTIVE 01/08/2021       Urinalysis/Chemistries:      Lab Results   Component Value Date    NITRU NEGATIVE 01/07/2021    COLORU YELLOW 01/07/2021    PHUR 5.0 01/07/2021    WBCUA 2 TO 5 01/07/2021    RBCUA 10 TO 20 01/07/2021    MUCUS NOT REPORTED 01/07/2021    TRICHOMONAS NOT REPORTED 01/07/2021    YEAST NOT REPORTED 01/07/2021    BACTERIA NOT REPORTED 01/07/2021    SPECGRAV 1.015 01/07/2021    LEUKOCYTESUR TRACE 01/07/2021    UROBILINOGEN Normal 01/07/2021    BILIRUBINUR NEGATIVE 01/07/2021    GLUCOSEU NEGATIVE 01/07/2021    KETUA NEGATIVE 01/07/2021    AMORPHOUS NOT REPORTED 01/07/2021     Urine Sodium:     Lab Results   Component Value Date    LUKE <20 01/07/2021     Urine Osmolarity:   Lab Results   Component Value Date    OSMOU 348 01/07/2021      Urine Creatinine:     Lab Results   Component Value Date    LABCREA 133.5 01/07/2021     Radiology:     Reviewed. Assessment:     1. Acute Kidney Injury: Secondary to ischemic ATN versus progression of underlying kidney disease. Creatinine up to 3.6, has been fluctuating in the 3.5-3.7 range with positive fluid overload requiring initiation of dialysis which was done today 1/8/2021. Tunneled catheter was placed on 1/8/2021.  2.  ESRD/Chronic kidney disease stage IV approaching 5 secondary to diabetic nephrosclerosis proteinuria about 2.5 g  3. Massive lower extremity edema both from fluid retention from kidney disease, pulmonary hypertension right heart failure and low albumin state,  4. Morbid obesity  5. Recent MRSA and sphingomonas is bacteremia on ceftaroline  6. Hypertension  7. Type 2 diabetes    Plan:   1. Patient was seen and examined on HD at bedside. Orders were confirmed with the HD nurse.    2.  Today is day # 1 with 200 blood flow rate, 400 dialysis flow rate, 2 hours run time, 3K potassium bath with 0.5 to 1 kg off as tolerated. 3.  Will run again tomorrow as day # 2.  4.   working towards setting up outpatient hemodialysis spot. 5.  BMP in AM.  6.  Will follow. Nutrition   Please ensure that patient is on a renal diet/TF. Avoid nephrotoxic drugs/contrast exposure. We will continue to follow along with you. Pedro Martell MD  [facility]     This note is created with the assistance of a speech-recognition program. While intending to generate a document that actually reflects the content of the visit, no guarantees can be provided that every mistake has been identified and corrected by editing.

## 2021-01-08 NOTE — PROGRESS NOTES
[facility] - Progress Note    Today's Date and Time: 1/8/2021, 4:08 PM    Impression :   · Recent hx of MRSA and Sphingomonas septicemia  · Chronic renal failure  · Diastolic CHF  · Type 2 DM    Recommendations:   · Continue ceftaroline 4 week course until 1/18/21    Medical Decision Making/Summary/Discussion:1/8/2021     ·   Infection Control Recommendations   · Clarissa Precautions  · Contact Isolation     Antimicrobial Stewardship Recommendations     · Simplification of therapy  · Targeted therapy  ·   Coordination of Outpatient Care:   · Estimated Length of IV antimicrobials: 1-18-21  · Patient will need Midline Catheter Insertion: no  · Patient will need PICC line Insertion: Yes  · Patient will need: Home IV , [place],  SNF,  LTAC:TBd  · Patient will need outpatient wound care:No    Chief complaint/reason for consultation:   · Recent MRSA and Sphingomonas bacteremia      History of Present Illness:   Severo Schaumann is a 72y.o.-year-old  female with history of recent MRSA and Sphingomonas bacteremia, CKD, CAD s/p CABG, CVA who was initially admitted on 1/6/2021. Patient seen at the request of Dr. Kaelyn Julio. INITIAL HISTORY 1/7/21    Pt was [facility] due to abnormal lab values. Pt states she is not sure what lab values were abnormal. She was recently admitted to [facility] for CHF exacerbation and was discharged to SNF on 12/23/2020. During this stay, she was diagnosed with MRSA and Sphingomonas septicemia. She was started on ceftaroline until 1/18/21. She states she continued to receive the antibiotic while at the SNF via Rt PICC line (placed 12/23/2020). She states she had no symptoms when she was instructed to return to the ED. She currently denies fevers, chills, chest pain, cough, nausea, vomiting, or diarrhea. Initial labs upon readmission showed BUN 67, Cr 3.55, proBNP 32,310, WBC 5.5, Hb 8.1, platelets 981.  Urine culture was collected 1/7/21. CURRENT EVALUATION 1/8/2021     Afebrile  VS stable    The patient seen and evaluated at bedside. Patient is better with no new acute issues or concerns today    She notes baseline shortness of breath. Denies fevers, chills, chest pain, cough, nausea, vomiting, or diarrhea. Rt PICC line is in place and looks clean. Labs, X rays reviewed: 1/8/2021    BUN: 67-->62  Cr: 3.66-->3.17    WBC:5.5-->6.3-->7.5  Hb:8.1-->7.6-->8.1  Plat: 203-->209-->246    Cultures:  Urine:  · 1-7-21: No growth  ·   Blood:  · 12-20-20: No growth  ·   Sputum :  ·   Wound:  ·     Discussed with patient, RN. I have personally reviewed the past medical history, past surgical history, medications, social history, and family history, and I have updated the database accordingly. Past Medical History:     Past Medical History:   Diagnosis Date    Anxiety and depression     Background diabetic retinopathy(362.01) 2008    (Mild)    Balance problem     CAD (coronary artery disease)     (with coronary artery bypass graft x4).  Cancer of uterus [facility])     history of, (probable cure)    Chronic kidney disease     Chronic low back pain     CVA (cerebral vascular accident) ([address]     Dementia ([address]     (moderate)    Dry eye syndrome     GERD (gastroesophageal reflux disease)     Glaucoma suspect 2005    Gout     Homonymous hemianopsia 2008    (Right)    Hyperlipidemia     Hypertension     Keratitis     (secondary to dry eye syndrome).  Obesity     Peripheral polyneuropathy     (diabetic)    Pseudophakos     (Right Eye: 05-; Left Eye: 04-) - Dr. Caesar Moreira.  Stroke [facility])     (Multiple) MRI of brain 10/2008 shows multiple infarcts involving the temporal and parietal areas.     Trichiasis 2010    (left eye)    Type 2 diabetes mellitus ([address]        Past Surgical  History:     Past Surgical History:   Procedure Laterality Date    CATARACT REMOVAL WITH IMPLANT  05- (Right eye) - Dr. Sally Mendiola.  CATARACT REMOVAL WITH IMPLANT  2012    (Left eye) - Dr. Sally Mendiola.   SECTION      CHOLECYSTECTOMY      CORONARY ARTERY BYPASS GRAFT  12-    (x4) - LIMA-LAD, SVG-diagnoal 1, SVG-OM1, and SVG-PDA. Exploration of left radial. (Dr. Chip Tomas).  EYE SURGERY Left     as a child     GASTRIC BYPASS SURGERY      HYSTERECTOMY      (abdominal)  with left oophorectomy. Right ovary retained.     TONSILLECTOMY AND ADENOIDECTOMY      UPPER GASTROINTESTINAL ENDOSCOPY  2020    EGD BIOPSY performed by Chas Hartmann MD at [facility]       Medications:      insulin glargine  10 Units Subcutaneous Nightly    ceftaroline fosamil (TEFLARO) IVPB  400 mg Intravenous Q12H    pantoprazole  40 mg Oral BID AC    aspirin  81 mg Oral Daily    atorvastatin  40 mg Oral Nightly    bumetanide  2 mg Oral BID    carvedilol  12.5 mg Oral BID WC    citalopram  20 mg Oral Daily    hydrALAZINE  75 mg Oral 3 times per day    cetirizine  5 mg Oral Daily    memantine  10 mg Oral BID    rivastigmine  1 patch Transdermal Daily    insulin lispro  0-12 Units Subcutaneous TID WC    insulin lispro  0-6 Units Subcutaneous Nightly    heparin (porcine)  5,000 Units Subcutaneous 3 times per day       Social History:     Social History     Socioeconomic History    Marital status:      Spouse name: Not on file    Number of children: Not on file    Years of education: Not on file    Highest education level: Not on file   Occupational History    Not on file   Social Needs    Financial resource strain: Not on file    Food insecurity     Worry: Not on file     Inability: Not on file    Transportation needs     Medical: Not on file     Non-medical: Not on file   Tobacco Use    Smoking status: Never Smoker    Smokeless tobacco: Never Used    Tobacco comment: never smoker eclamb rrt 17   Substance and Sexual Activity    Alcohol use: No    Drug use: No    Sexual activity: Not on file   Lifestyle    Physical activity     Days per week: Not on file     Minutes per session: Not on file    Stress: Not on file   Relationships    Social connections     Talks on phone: Not on file     Gets together: Not on file     Attends Yazdanism service: Not on file     Active member of club or organization: Not on file     Attends meetings of clubs or organizations: Not on file     Relationship status: Not on file    Intimate partner violence     Fear of current or ex partner: Not on file     Emotionally abused: Not on file     Physically abused: Not on file     Forced sexual activity: Not on file   Other Topics Concern    Not on file   Social History Narrative    Not on file       Family History:     Family History   Problem Relation Age of Onset    Diabetes Mother     Cancer Mother     High Blood Pressure Mother     Stroke Mother     Kidney Disease Mother     Uterine Cancer Mother     Diabetes Father     Heart Disease Father     Glaucoma Father     Emphysema Father     Coronary Art Dis Other         All 4 siblings.  Stroke Other         1 sibling.  Lung Cancer Other         1 sibling - cause of death lung cancer.  Mental Retardation Other         Allergies:   Bactrim [sulfamethoxazole-trimethoprim], Clonidine derivatives, and Amoxicillin-pot clavulanate     Review of Systems:   Constitutional: No fevers or chills. No systemic complaints  Head: No headaches  Eyes: No double vision or blurry vision. No conjunctival inflammation. ENT: No sore throat or runny nose. . No hearing loss, tinnitus or vertigo. Cardiovascular: No chest pain or palpitations. Lung: Baseline shortness of breath. No cough. No sputum production  Abdomen: No nausea, vomiting, diarrhea, or abdominal pain. Ulus Reusing No cramps. Genitourinary: No increased urinary frequency, or dysuria. No hematuria. No suprapubic or CVA pain  Musculoskeletal: No muscle aches or pains.  No joint effusions, swelling or deformities  Hematologic: No bleeding or bruising. Neurologic: No headache, weakness, numbness, or tingling. Integument: No rash, no ulcers. Psychiatric: No depression. Endocrine: No polyuria, no polydipsia, no polyphagia. Physical Examination :     Patient Vitals for the past 8 hrs:   BP Temp Pulse Resp SpO2 Weight   01/08/21 1242 (!) 164/79 97.4 °F (36.3 °C) 64 14 -- --   01/08/21 1207 (!) 168/74 98.2 °F (36.8 °C) 60 11 100 % 272 lb 14.9 oz (123.8 kg)   01/08/21 1130 (!) 160/76 -- 59 -- -- --   01/08/21 1100 (!) 166/71 -- 59 -- -- --   01/08/21 1031 (!) 166/72 -- 60 -- -- --   01/08/21 1000 (!) 178/56 -- 62 -- -- --   01/08/21 0954 (!) 178/56 97 °F (36.1 °C) 62 14 100 % 276 lb 0.3 oz (125.2 kg)   01/08/21 0908 (!) 173/63 -- 65 18 97 % --   01/08/21 0905 (!) 177/63 -- 66 18 97 % --   01/08/21 0900 (!) 180/67 -- 65 16 97 % --   01/08/21 0900 (!) 180/67 -- 65 -- 97 % --   01/08/21 0855 (!) 152/74 -- 65 -- 97 % --   01/08/21 0854 (!) 152/74 -- 65 14 98 % --   01/08/21 0850 (!) 171/77 -- 107 -- 98 % --   01/08/21 0845 (!) 171/70 -- 65 16 98 % --   01/08/21 0840 (!) 174/65 -- 65 -- 98 % --     General Appearance: Awake, alert, and in no apparent distress  Head:  Normocephalic, no trauma  Eyes: Pupils equal, round, reactive to light and accommodation; extraocular movements intact; sclera anicteric; conjunctivae pink. No embolic phenomena. ENT: Oropharynx clear, without erythema, exudate, or thrush. No tenderness of sinuses. Mouth/throat: mucosa pink and moist. No lesions. Dentition in good repair. Neck:Supple, without lymphadenopathy. Thyroid normal, No bruits. Pulmonary/Chest: Clear to auscultation, without wheezes, rales, or rhonchi. No dullness to percussion. Cardiovascular: Regular rate and rhythm without murmurs, rubs, or gallops. Abdomen: Soft, non tender. Bowel sounds normal. No organomegaly  All four Extremities: No cyanosis, clubbing, edema, or effusions. R PICC line in place.   Neurologic: No gross sensory or motor deficits. Skin: Warm and dry with good turgor. No signs of peripheral arterial or venous insufficiency. No ulcerations. No open wounds. Medical Decision Making -Laboratory:   I have independently reviewed/ordered the following labs:    CBC with Differential:   Recent Labs     01/06/21  0945 01/07/21  0604 01/08/21  1018   WBC 5.5 6.3 7.5   HGB 8.1* 7.6* 8.1*   HCT 27.1* 25.4* 26.8*    209 246   LYMPHOPCT 11*  --   --    MONOPCT 9  --   --      BMP:   Recent Labs     01/07/21  0604 01/08/21  0655 01/08/21  1018     --  141   K 4.6 4.3 4.4   CL 97*  --  100   CO2 28  --  32*   BUN 64*  --  62*   CREATININE 3.47*  --  3.17*   MG 2.0  --   --      Hepatic Function Panel:   Recent Labs     01/07/21  0604   PROT 6.1*   LABALBU 3.1*   BILITOT 0.36   ALKPHOS 111*   ALT 5   AST 11     No results for input(s): RPR in the last 72 hours. No results for input(s): HIV in the last 72 hours. No results for input(s): BC in the last 72 hours. Lab Results   Component Value Date    MUCUS NOT REPORTED 01/07/2021    RBC 2.72 01/08/2021    TRICHOMONAS NOT REPORTED 01/07/2021    WBC 7.5 01/08/2021    YEAST NOT REPORTED 01/07/2021    TURBIDITY CLEAR 01/07/2021     Lab Results   Component Value Date    CREATININE 3.17 01/08/2021    GLUCOSE 101 01/08/2021       Medical Decision Making-Imaging:       Medical Decision [id number]:       Medical Decision Making-Other:     Note:  · Labs, medications, radiologic studies were reviewed with personal review of films  · Moderate Large amounts of data were reviewed  · Discussed with nursing Staff, Discharge planner  · Infection Control and Prevention measures reviewed  · All prior entries were reviewed  · Administer medications as ordered  · Prognosis: Good  · Discharge planning reviewed  · Follow up as outpatient. Thank you for allowing us to participate in the care of this patient. Please call with questions.       Denys Klinefelter, MD

## 2021-01-08 NOTE — PLAN OF CARE
Problem: Skin Integrity:  Goal: Will show no infection signs and symptoms  Description: Will show no infection signs and symptoms  1/8/2021 1706 by Charu Marte RN  Outcome: Ongoing  1/8/2021 1659 by Charu Marte RN  Outcome: Ongoing  Goal: Absence of new skin breakdown  Description: Absence of new skin breakdown  1/8/2021 1706 by Charu Marte RN  Outcome: Ongoing  1/8/2021 1659 by Charu Marte RN  Outcome: Ongoing     Problem: Falls - Risk of:  Goal: Will remain free from falls  Description: Will remain free from falls  1/8/2021 1706 by Charu Marte RN  Outcome: Ongoing  1/8/2021 1659 by Charu Marte RN  Outcome: Ongoing  Goal: Absence of physical injury  Description: Absence of physical injury  1/8/2021 1706 by Charu Marte RN  Outcome: Ongoing  1/8/2021 1659 by Charu Marte RN  Outcome: Ongoing

## 2021-01-08 NOTE — CARE COORDINATION
Spoke with Ronald Hudson, [facility] [phone])  She is waiting for medical and financial clearance for [facility]  Will fax remainder of ref once available (hep B panel, tx note, tunneled cath report)      [address] of ref faxed  Received tentative OP dialysis chair time:  [facility]  - TTS @ 11:20a  However, still await financial and medical clearance for final confirmation  Called Madalyn Landeros at [facility] to make sure no issues with transportation to/from dialysis with TTS @ 11:20 - She stated no issues at their end

## 2021-01-08 NOTE — PROGRESS NOTES
[facility]  Office: [address], DO, Berenicenilam Portillo, DO, Cruz Stamp, DO, Phill Resendez Blood, DO, Roberto Carlos Moore MD, Cecily Schlatter, MD, Ariane Ruffin MD, Altagracia Mosqueda MD, Judith Mccray MD, Nate Clark MD, Uri Mcdaniels MD, Rich Feldman MD, Lopez Silva MD, Jeromy Spring DO, Lisbeth Gauthier MD, Soy Botello MD, Jaclyn Crespo, DO, Tamiko Zepeda MD,  Violeta Vasquez DO, Jimmy Serra MD, Isrrael Avila MD, Shantel Porter [facility], [facility], CNP, Monty Tapia, CNP, Erick Rodriguez, [facility], Noman Jacobs, [facility], Reid Malagon, CNP, Gustavo Dawson, CNP, Scott Veloz, CNP, Haley Patterson, CNP, Yessica Sanchez PA-C, Danna Marrero, [facility], Dacia Licona, CNP, Ted Bhagat, CNP, Edvin Hebert, CNP, Berna Spangler, CNP, Bhavesh Dye, [address] [place] [address]    Progress Note    1/8/2021    3:32 PM    Name:   Alexander Burks  MRN:     [id number]     Acct:      [de-identified]   Room:   [address] [place] [address] Day:  2  Admit Date:  1/6/2021  5:55 PM    PCP:   ERINN Pang  Code Status:  Full Code    Subjective:     C/C: Volume overload. Interval History Status: improved. Seen and examined at bedside. No acute events overnight. Denies any chest pain, shortness of breath consequently bleeding, nausea, vomiting, diarrhea        Review of Systems:     Constitutional:  negative for chills, fevers, sweats  Respiratory:  negative for cough, dyspnea on exertion, +shortness of breath, no wheezing  Cardiovascular:  negative for chest pain, chest pressure/discomfort, +lower extremity edema, palpitations  Gastrointestinal:  negative for abdominal pain, constipation, diarrhea, nausea, vomiting  Neurological:  negative for dizziness, headache    Medications: Allergies:     Allergies   Allergen Reactions    Bactrim [Sulfamethoxazole-Trimethoprim] Hives    Clonidine Derivatives     Amoxicillin-Pot Clavulanate Diarrhea, Nausea Only and Nausea And Vomiting       Current Meds:   Scheduled Meds:    insulin glargine  10 Units Subcutaneous Nightly    ceftaroline fosamil (TEFLARO) IVPB  400 mg Intravenous Q12H    pantoprazole  40 mg Oral BID AC    [Held by provider] aspirin  81 mg Oral Daily    atorvastatin  40 mg Oral Nightly    bumetanide  2 mg Oral BID    carvedilol  12.5 mg Oral BID WC    citalopram  20 mg Oral Daily    hydrALAZINE  75 mg Oral 3 times per day    cetirizine  5 mg Oral Daily    memantine  10 mg Oral BID    rivastigmine  1 patch Transdermal Daily    insulin lispro  0-12 Units Subcutaneous TID WC    insulin lispro  0-6 Units Subcutaneous Nightly    heparin (porcine)  5,000 Units Subcutaneous 3 times per day     Continuous Infusions:    sodium chloride 20 mL/hr at 01/08/21 0800    dextrose       PRN Meds: heparin (porcine), heparin (porcine), sodium chloride flush, nicotine, promethazine **OR** ondansetron, acetaminophen **OR** acetaminophen, glucose, dextrose, glucagon (rDNA), dextrose    Data:     Past Medical History:   has a past medical history of Anxiety and depression, Background diabetic retinopathy(362.01), Balance problem, CAD (coronary artery disease), Cancer of uterus ([facility] [address] Chronic kidney disease, Chronic low back pain, CVA (cerebral vascular accident) ([address] Dementia ([address] Dry eye syndrome, GERD (gastroesophageal reflux disease), Glaucoma suspect, Gout, Homonymous hemianopsia, Hyperlipidemia, Hypertension, Keratitis, Obesity, Peripheral polyneuropathy, Pseudophakos, Stroke ([address] Trichiasis, and Type 2 diabetes mellitus ([facility] [address] Social History:   reports that she has never smoked. She has never used smokeless tobacco. She reports that she does not drink alcohol or use drugs.      Family History:   Family History   Problem Relation Age of Onset    Diabetes Mother     Cancer Mother     High Blood Pressure Mother     Stroke Mother     Kidney Disease Mother     Uterine Cancer Mother     Diabetes Father  Heart Disease Father     Glaucoma Father     Emphysema Father     Coronary Art Dis Other         All 4 siblings.  Stroke Other         1 sibling.  Lung Cancer Other         1 sibling - cause of death lung cancer.  Mental Retardation Other        Vitals:  BP (!) 164/79   Pulse 64   Temp 97.4 °F (36.3 °C)   Resp 14   Wt 272 lb 14.9 oz (123.8 kg)   SpO2 100%   BMI 42.75 kg/m²   Temp (24hrs), Av.8 °F (36.6 °C), Min:97 °F (36.1 °C), Max:98.2 °F (36.8 °C)    Recent Labs     21  1655 217 21  0809 21  1236   POCGLU 168* 150* 131* 107*       I/O (24Hr):     Intake/Output Summary (Last 24 hours) at 2021 1532  Last data filed at 2021 1207  Gross per 24 hour   Intake 1150 ml   Output 2275 ml   Net -1125 ml       Labs:  Hematology:  Recent Labs     21  0945 21  0604 21  1018   WBC 5.5 6.3 7.5   RBC 2.69* 2.55* 2.72*   HGB 8.1* 7.6* 8.1*   HCT 27.1* 25.4* 26.8*   .7 99.6 98.5   MCH 30.1 29.8 29.8   MCHC 29.9 29.9 30.2   RDW 16.7* 16.5* 16.4*    209 246   MPV 10.5 10.3 10.3   INR 1.3 1.2  --      Chemistry:  Recent Labs     21  0945 21  0604 21  0655 21  1018    138 138  --  141   K 4.7 4.7 4.6 4.3 4.4   CL 96* 97* 97*  --  100   CO2 30 29 28  --  32*   GLUCOSE 165* 175* 156*  --  101*   BUN 67* 64* 64*  --  62*   CREATININE 3.66* 3.16* 3.47*  --  3.17*   MG  --   --  2.0  --   --    ANIONGAP 10 12 13  --  9   LABGLOM 12* 15* 13*  --  15*   GFRAA 15* 18* 16*  --  18*   CALCIUM 8.3* 8.3* 8.2*  --  8.6   PROBNP 32,210*  --   --   --   --    TROPHS 84*  --  76*  --   --      Recent Labs     21  0604 21  0726 21  1130 21  1655 21  2117 21  0809 21  1236   PROT 6.1*  --   --   --   --   --   --    LABALBU 3.1*  --   --   --   --   --   --    AST 11  --   --   --   --   --   --    ALT 5  --   --   --   --   --   --    ALKPHOS 111*  --   --   --   --   --   -- BILITOT 0.36  --   --   --   --   --   --    POCGLU  --  142* 142* 168* 150* 131* 107*     ABG:  Lab Results   Component Value Date    POCPH 7.284 12/13/2020    POCPCO2 54.5 12/13/2020    POCPO2 71.6 12/13/2020    POCHCO3 25.8 12/13/2020    NBEA 1 12/13/2020    PBEA NOT REPORTED 12/13/2020    WWA8OIL 28 12/13/2020    LKNZ4SMQ 92 12/13/2020    FIO2 2.0 12/13/2020     Lab Results   Component Value Date/Time    SPECIAL NOT REPORTED 01/07/2021 09:12 AM     Lab Results   Component Value Date/Time    CULTURE NO GROWTH 01/07/2021 09:12 AM       Radiology:  Us Renal Complete    Result Date: 1/6/2021  Unremarkable ultrasound of the right kidney, nonvisualization left kidney. Decompressed urinary bladder. Right upper quadrant ascites. Xr Chest Portable    Result Date: 1/6/2021  Stable cardiomegaly with mild pulmonary edema which is less prominent. Hazy perihilar bibasilar opacities which is more prominent on the right and has decreased. Probable small bibasilar pleural effusions which is more prominent on the right and have decreased. Physical Examination:        General appearance:  alert, cooperative and no distress  Mental Status:  oriented to person, place and time and normal affect  Lungs: Breath sounds at the bases, coarse to auscultation.   Heart:  regular rate and rhythm, no murmur  Abdomen:abdominal and lower extremity swelling   Extremities: Diffuse anasarca with bilateral pitting edema to the abdomen  Skin:  no gross lesions, rashes, induration    Assessment:        Hospital Problems           Last Modified POA    * (Principal) Chronic renal failure syndrome 1/7/2021 Yes    Dementia ([address] 1/7/2021 Yes    Overview Signed 2/27/2014 10:21 AM by Soy Jimenez MD     (moderate)         Hypertension 1/7/2021 Yes    Type 2 diabetes mellitus with hyperglycemia, with long-term current use of insulin ([address] 1/7/2021 Yes    Acute on chronic diastolic heart failure ([address] 1/7/2021 Yes    Anasarca 1/7/2021 Yes

## 2021-01-09 LAB
ANION GAP SERPL CALCULATED.3IONS-SCNC: 8 MMOL/L (ref 9–17)
BUN BLDV-MCNC: 48 MG/DL (ref 8–23)
BUN/CREAT BLD: ABNORMAL (ref 9–20)
CALCIUM SERPL-MCNC: 8.2 MG/DL (ref 8.6–10.4)
CHLORIDE BLD-SCNC: 98 MMOL/L (ref 98–107)
CO2: 30 MMOL/L (ref 20–31)
CREAT SERPL-MCNC: 2.42 MG/DL (ref 0.5–0.9)
GFR AFRICAN AMERICAN: 24 ML/MIN
GFR NON-AFRICAN AMERICAN: 20 ML/MIN
GFR SERPL CREATININE-BSD FRML MDRD: ABNORMAL ML/MIN/{1.73_M2}
GFR SERPL CREATININE-BSD FRML MDRD: ABNORMAL ML/MIN/{1.73_M2}
GLUCOSE BLD-MCNC: 113 MG/DL (ref 70–99)
GLUCOSE BLD-MCNC: 121 MG/DL (ref 65–105)
GLUCOSE BLD-MCNC: 128 MG/DL (ref 65–105)
GLUCOSE BLD-MCNC: 143 MG/DL (ref 65–105)
GLUCOSE BLD-MCNC: 171 MG/DL (ref 65–105)
POTASSIUM SERPL-SCNC: 4 MMOL/L (ref 3.7–5.3)
SODIUM BLD-SCNC: 136 MMOL/L (ref 135–144)

## 2021-01-09 PROCEDURE — 80048 BASIC METABOLIC PNL TOTAL CA: CPT

## 2021-01-09 PROCEDURE — 1200000000 HC SEMI PRIVATE

## 2021-01-09 PROCEDURE — 90935 HEMODIALYSIS ONE EVALUATION: CPT

## 2021-01-09 PROCEDURE — 6360000002 HC RX W HCPCS: Performed by: NURSE PRACTITIONER

## 2021-01-09 PROCEDURE — 99232 SBSQ HOSP IP/OBS MODERATE 35: CPT | Performed by: INTERNAL MEDICINE

## 2021-01-09 PROCEDURE — 6360000002 HC RX W HCPCS: Performed by: INTERNAL MEDICINE

## 2021-01-09 PROCEDURE — 36415 COLL VENOUS BLD VENIPUNCTURE: CPT

## 2021-01-09 PROCEDURE — 2580000003 HC RX 258: Performed by: INTERNAL MEDICINE

## 2021-01-09 PROCEDURE — 99233 SBSQ HOSP IP/OBS HIGH 50: CPT | Performed by: INTERNAL MEDICINE

## 2021-01-09 PROCEDURE — 82947 ASSAY GLUCOSE BLOOD QUANT: CPT

## 2021-01-09 PROCEDURE — 6370000000 HC RX 637 (ALT 250 FOR IP): Performed by: NURSE PRACTITIONER

## 2021-01-09 PROCEDURE — 94760 N-INVAS EAR/PLS OXIMETRY 1: CPT

## 2021-01-09 PROCEDURE — 6370000000 HC RX 637 (ALT 250 FOR IP): Performed by: INTERNAL MEDICINE

## 2021-01-09 RX ADMIN — HEPARIN SODIUM 5000 UNITS: 5000 INJECTION INTRAVENOUS; SUBCUTANEOUS at 23:51

## 2021-01-09 RX ADMIN — DESMOPRESSIN ACETATE 40 MG: 0.2 TABLET ORAL at 20:26

## 2021-01-09 RX ADMIN — PANTOPRAZOLE SODIUM 40 MG: 40 TABLET, DELAYED RELEASE ORAL at 17:00

## 2021-01-09 RX ADMIN — CEFTAROLINE FOSAMIL 400 MG: 600 POWDER, FOR SOLUTION INTRAVENOUS at 15:09

## 2021-01-09 RX ADMIN — Medication 81 MG: at 12:40

## 2021-01-09 RX ADMIN — BUMETANIDE 2 MG: 1 TABLET ORAL at 12:40

## 2021-01-09 RX ADMIN — MEMANTINE HYDROCHLORIDE 10 MG: 5 TABLET, FILM COATED ORAL at 12:40

## 2021-01-09 RX ADMIN — HEPARIN SODIUM 1600 UNITS: 1000 INJECTION INTRAVENOUS; SUBCUTANEOUS at 09:27

## 2021-01-09 RX ADMIN — CEFTAROLINE FOSAMIL 400 MG: 600 POWDER, FOR SOLUTION INTRAVENOUS at 03:24

## 2021-01-09 RX ADMIN — HYDRALAZINE HYDROCHLORIDE 75 MG: 25 TABLET ORAL at 23:57

## 2021-01-09 RX ADMIN — HYDRALAZINE HYDROCHLORIDE 75 MG: 25 TABLET ORAL at 17:00

## 2021-01-09 RX ADMIN — HEPARIN SODIUM 5000 UNITS: 5000 INJECTION INTRAVENOUS; SUBCUTANEOUS at 00:02

## 2021-01-09 RX ADMIN — MEMANTINE HYDROCHLORIDE 10 MG: 5 TABLET, FILM COATED ORAL at 20:25

## 2021-01-09 RX ADMIN — INSULIN LISPRO 2 UNITS: 100 INJECTION, SOLUTION INTRAVENOUS; SUBCUTANEOUS at 17:12

## 2021-01-09 RX ADMIN — CITALOPRAM 20 MG: 20 TABLET, FILM COATED ORAL at 12:39

## 2021-01-09 RX ADMIN — HYDRALAZINE HYDROCHLORIDE 75 MG: 25 TABLET ORAL at 00:02

## 2021-01-09 RX ADMIN — INSULIN LISPRO 1 UNITS: 100 INJECTION, SOLUTION INTRAVENOUS; SUBCUTANEOUS at 22:07

## 2021-01-09 RX ADMIN — PANTOPRAZOLE SODIUM 40 MG: 40 TABLET, DELAYED RELEASE ORAL at 07:14

## 2021-01-09 RX ADMIN — HEPARIN SODIUM 1600 UNITS: 1000 INJECTION INTRAVENOUS; SUBCUTANEOUS at 11:27

## 2021-01-09 RX ADMIN — HEPARIN SODIUM 5000 UNITS: 5000 INJECTION INTRAVENOUS; SUBCUTANEOUS at 17:12

## 2021-01-09 RX ADMIN — CETIRIZINE HYDROCHLORIDE 5 MG: 10 TABLET ORAL at 12:40

## 2021-01-09 RX ADMIN — CARVEDILOL 12.5 MG: 12.5 TABLET, FILM COATED ORAL at 17:01

## 2021-01-09 ASSESSMENT — PAIN SCALES - GENERAL: PAINLEVEL_OUTOF10: 0

## 2021-01-09 NOTE — PROGRESS NOTES
Renal Progress Note    Patient :  Lisa Son; 72 y.o. MRN# [id number]  Location:  5323/6654-09  Attending:  Ja Irene DO  Admit Date:  1/6/2021   Hospital Day: 3      Subjective: Following for AMY/ATN with volume overload. Initiated   Tunneled catheter was placed in today by IR 1/7/2021. First dialysis session was yesterday. 1200 removed, and tolerated well. Urine output 200 last 24 hours. Still has rodriguez cath. Blood pressure 160-190  Remains on Bumex 2mg PO BID    Outpatient Medications:     Medications Prior to Admission: carvedilol (COREG) 12.5 MG tablet, Take 1 tablet by mouth 2 times daily (with meals)  bumetanide (BUMEX) 2 MG tablet, Take 1 tablet by mouth 2 times daily  ceftaroline fosamil (TEFLARO) 400 MG SOLR, Infuse 400 mg intravenously every 12 hours Stop after 1/18/21 and pull the line  senna (SENOKOT) 8.6 MG tablet, Take 1 tablet by mouth 2 times daily  oxybutynin (DITROPAN) 5 MG tablet, TAKE 1 TABLET BY MOUTH TWICE DAILY  OXYGEN, Oxgen at 2 liters per nasal cannula  atorvastatin (LIPITOR) 40 MG tablet, TAKE 1 TABLET BY MOUTH EVERYDAY  clopidogrel (PLAVIX) 75 MG tablet, Take 1 tablet by mouth daily  hydrALAZINE (APRESOLINE) 50 MG tablet, Take 1.5 tablets by mouth every 8 hours  memantine (NAMENDA) 10 MG tablet, TAKE 1 TABLET BY MOUTH TWICE DAILY  omeprazole (PRILOSEC) 20 MG delayed release capsule, Take 1 capsule by mouth every morning (before breakfast)  rivastigmine (EXELON) 9.5 MG/24HR, APPLY 1 NEW PATCH EVERY 24 HOURS  aspirin 81 MG tablet, Take 1 tablet by mouth daily  nystatin (MYCOSTATIN) 690770 UNIT/GM cream, Apply topically 2 times daily. loratadine (CLARITIN) 10 MG tablet, TAKE 1 TABLET(10 MG) BY MOUTH DAILY  Lancets MISC, Checks blood sugar ac and HS  blood glucose monitor strips, Test 3  times a day & as needed for symptoms of irregular blood glucose. Dispense sufficient amount for indicated testing frequency plus additional to accommodate PRN testing needs.   Alcohol Swabs (ALCOHOL PREP) PADS, Checks blood sugar 3 times a day  Misc. Devices MISC, Standard walker with wheels  Diagnosis dementia, CHF  citalopram (CELEXA) 20 MG tablet, Take 1 tablet by mouth daily  insulin lispro (HUMALOG) 100 UNIT/ML injection vial, Use 4 times a day per sliding scale  insulin glargine (LANTUS) 100 UNIT/ML injection vial, Inject 20 Units into the skin nightly  Diabetic Shoe MISC, by Does not apply route  Compression Stockings MISC, by Does not apply route 20-30 mm Hg bilateral knee high    Current Medications:     Scheduled Meds:    insulin glargine  10 Units Subcutaneous Nightly    ceftaroline fosamil (TEFLARO) IVPB  400 mg Intravenous Q12H    pantoprazole  40 mg Oral BID AC    aspirin  81 mg Oral Daily    atorvastatin  40 mg Oral Nightly    bumetanide  2 mg Oral BID    carvedilol  12.5 mg Oral BID WC    citalopram  20 mg Oral Daily    hydrALAZINE  75 mg Oral 3 times per day    cetirizine  5 mg Oral Daily    memantine  10 mg Oral BID    rivastigmine  1 patch Transdermal Daily    insulin lispro  0-12 Units Subcutaneous TID WC    insulin lispro  0-6 Units Subcutaneous Nightly    heparin (porcine)  5,000 Units Subcutaneous 3 times per day     Continuous Infusions:    sodium chloride 20 mL/hr at 21 0800    dextrose       PRN Meds:  heparin (porcine), heparin (porcine), sodium chloride flush, nicotine, promethazine **OR** ondansetron, acetaminophen **OR** acetaminophen, glucose, dextrose, glucagon (rDNA), dextrose    Input/Output:       I/O last 3 completed shifts: In: 700 [P.O.:400]  Out: 1700 [Urine:200].       Patient Vitals for the past 96 hrs (Last 3 readings):   Weight   21 0821 271 lb 13.2 oz (123.3 kg)   21 1207 272 lb 14.9 oz (123.8 kg)   21 0954 276 lb 0.3 oz (125.2 kg)       Vital Signs:   Temperature:  Temp: 98 °F (36.7 °C)  TMax:   Temp (24hrs), Av.8 °F (36.6 °C), Min:97.4 °F (36.3 °C), Max:98.2 °F (36.8 °C)    Respirations:  Resp: 14  Pulse: Pulse: 65  BP:    BP: (!) 154/66  BP Range: Systolic (04YKX), PXS:274 , Min:153 , SQO:115       Diastolic (89CHU), IBV:75, Min:66, Max:95      Physical Examination:     General:  AAO x 3, speaking in full sentences, no accessory muscle use. HEENT: Atraumatic, normocephalic, no throat congestion, moist mucosa. Eyes:   Pupils equal, round and reactive to light, EOMI. Neck:   Supple  Chest:   Bilateral vesicular breath sounds, no rales or wheezes. Cardiac:  S1 S2 RR, ++ murmur. Abdomen: Soft, obese, non-tender, no masses or organomegaly, BS audible. :   No suprapubic or flank tenderness. Neuro:  AAO x 3, No FND. SKIN:  No rashes, good skin turgor. Extremities:  +ve 2+ edema. Labs:       Recent Labs     01/07/21  0604 01/08/21  1018   WBC 6.3 7.5   RBC 2.55* 2.72*   HGB 7.6* 8.1*   HCT 25.4* 26.8*   MCV 99.6 98.5   MCH 29.8 29.8   MCHC 29.9 30.2   RDW 16.5* 16.4*    246   MPV 10.3 10.3      BMP:   Recent Labs     01/07/21  0604 01/08/21  0655 01/08/21  1018 01/09/21  0803     --  141 136   K 4.6 4.3 4.4 4.0   CL 97*  --  100 98   CO2 28  --  32* 30   BUN 64*  --  62* 48*   CREATININE 3.47*  --  3.17* 2.42*   GLUCOSE 156*  --  101* 113*   CALCIUM 8.2*  --  8.6 8.2*      Magnesium:    Recent Labs     01/07/21  0604   MG 2.0     Albumin:    Recent Labs     01/07/21  0604   LABALBU 3.1*     SPEP:  Lab Results   Component Value Date    PROT 6.1 01/07/2021    ALBCAL 1.8 08/13/2020    ALBPCT 35 08/13/2020    LABALPH 0.2 08/13/2020    LABALPH 0.7 08/13/2020    A1PCT 5 08/13/2020    A2PCT 15 08/13/2020    LABBETA 0.9 08/13/2020    BETAPCT 17 08/13/2020    GAMGLOB 1.4 08/13/2020    GGPCT 28 08/13/2020    PATH ELECTRONICALLY SIGNED. Marcelle Schumacher M.D. 08/13/2020    PATH ELECTRONICALLY SIGNED.  Marcelle Schumacher M.D. 08/13/2020     C3:     Lab Results   Component Value Date    C3 114 08/13/2020     C4:     Lab Results   Component Value Date    C4 39 08/13/2020     MPO ANCA:     Lab Results Component Value Date    MPO 14 08/13/2020     PR3 ANCA:     Lab Results   Component Value Date    PR3 8 08/13/2020     Hep BsAg:         Lab Results   Component Value Date    HEPBSAG NONREACTIVE 01/08/2021     Hep C AB:          Lab Results   Component Value Date    HEPCAB NONREACTIVE 01/08/2021       Urinalysis/Chemistries:      Lab Results   Component Value Date    NITRU NEGATIVE 01/07/2021    COLORU YELLOW 01/07/2021    PHUR 5.0 01/07/2021    WBCUA 2 TO 5 01/07/2021    RBCUA 10 TO 20 01/07/2021    MUCUS NOT REPORTED 01/07/2021    TRICHOMONAS NOT REPORTED 01/07/2021    YEAST NOT REPORTED 01/07/2021    BACTERIA NOT REPORTED 01/07/2021    SPECGRAV 1.015 01/07/2021    LEUKOCYTESUR TRACE 01/07/2021    UROBILINOGEN Normal 01/07/2021    BILIRUBINUR NEGATIVE 01/07/2021    GLUCOSEU NEGATIVE 01/07/2021    KETUA NEGATIVE 01/07/2021    AMORPHOUS NOT REPORTED 01/07/2021     Urine Sodium:     Lab Results   Component Value Date    LUKE <20 01/07/2021     Urine Osmolarity:   Lab Results   Component Value Date    OSMOU 348 01/07/2021      Urine Creatinine:     Lab Results   Component Value Date    LABCREA 133.5 01/07/2021     Radiology:     Reviewed. Assessment:     1. Acute Kidney Injury: Secondary to ischemic ATN versus progression of underlying kidney disease. Creatinine up to 3.6, has been fluctuating in the 3.5-3.7 range with positive fluid overload requiring initiation of dialysis which was done today 1/8/2021. Tunneled catheter was placed on 1/8/2021.  2.  ESRD/Chronic kidney disease stage IV approaching 5 secondary to diabetic nephrosclerosis proteinuria about 2.5 g  3. Massive lower extremity edema both from fluid retention from kidney disease, pulmonary hypertension right heart failure and low albumin state,  4. Morbid obesity  5. Recent MRSA and sphingomonas bacteremia on ceftaroline  6. Hypertension  7. Type 2 diabetes    Plan: 1. HD #2 today  2.    working towards setting up outpatient hemodialysis spot. 3.  BMP in AM.  4.  Add ARB  5. Will follow. Nutrition   Please ensure that patient is on a renal diet/TF. Avoid nephrotoxic drugs/contrast exposure. We will continue to follow along with you. Attending Physician Statement  I have discussed the care of Leanne Lopez, including pertinent history and exam findings,  with the CNP. I have reviewed the key elements of all parts of the encounter with the CNP. I agree with the assessment, plan and orders as documented.     Chester Browning MD, MRCP Stephanie Lama, FACP   1/9/2021 3:03 PM    Nephrology [address] [place] [address]

## 2021-01-09 NOTE — PLAN OF CARE
Problem: Skin Integrity:  Goal: Will show no infection signs and symptoms  Description: Will show no infection signs and symptoms  1/9/2021 1412 by Chrissie Sicard, RN  Outcome: Ongoing  1/9/2021 0636 by Ning Lopez RN  Outcome: Ongoing  Goal: Absence of new skin breakdown  Description: Absence of new skin breakdown  1/9/2021 1412 by Chrissie Sicard, RN  Outcome: Ongoing  1/9/2021 0636 by Ning Lopez RN  Outcome: Ongoing     Problem: Falls - Risk of:  Goal: Will remain free from falls  Description: Will remain free from falls  1/9/2021 1412 by Chrissie Sicard, RN  Outcome: Ongoing  1/9/2021 0636 by Ning Lopez RN  Outcome: Ongoing  Goal: Absence of physical injury  Description: Absence of physical injury  1/9/2021 1412 by Chrissie Sicard, RN  Outcome: Ongoing  1/9/2021 0636 by Ning Lopez RN  Outcome: Ongoing

## 2021-01-09 NOTE — PLAN OF CARE
Problem: Skin Integrity:  Goal: Will show no infection signs and symptoms  Description: Will show no infection signs and symptoms  1/9/2021 0636 by Jayden Logan RN  Outcome: Ongoing  1/8/2021 1706 by Clark Castaneda RN  Outcome: Ongoing  1/8/2021 1659 by Clark Castaneda RN  Outcome: Ongoing  Goal: Absence of new skin breakdown  Description: Absence of new skin breakdown  1/9/2021 0636 by Jayden Logan RN  Outcome: Ongoing  1/8/2021 1706 by Clark Castaneda RN  Outcome: Ongoing  1/8/2021 1659 by Clark Castaneda RN  Outcome: Ongoing     Problem: Falls - Risk of:  Goal: Will remain free from falls  Description: Will remain free from falls  1/9/2021 0636 by Jayden Logan RN  Outcome: Ongoing  1/8/2021 1706 by Clark Castaneda RN  Outcome: Ongoing  1/8/2021 1659 by Clark Castaneda RN  Outcome: Ongoing  Goal: Absence of physical injury  Description: Absence of physical injury  1/9/2021 0636 by Jayden Logan RN  Outcome: Ongoing  1/8/2021 1706 by Clark Castaneda RN  Outcome: Ongoing  1/8/2021 1659 by Clark Castaneda RN  Outcome: Ongoing

## 2021-01-09 NOTE — PROGRESS NOTES
[facility] - Progress Note    Today's Date and Time: 1/9/2021, 8:47 AM    Impression :   · Recent hx of MRSA and Sphingomonas septicemia  · Chronic renal failure  · Diastolic CHF  · Type 2 DM    Recommendations:   · Continue ceftaroline 4 week course until 1/18/21    Medical Decision Making/Summary/Discussion:1/9/2021     ·   Infection Control Recommendations   · Union Precautions  · Contact Isolation     Antimicrobial Stewardship Recommendations     · Simplification of therapy  · Targeted therapy  ·   Coordination of Outpatient Care:   · Estimated Length of IV antimicrobials: 1-18-21  · Patient will need Midline Catheter Insertion: no  · Patient will need PICC line Insertion: Yes  · Patient will need: Home IV , [place],  SNF,  LTAC:TBd  · Patient will need outpatient wound care:No    Chief complaint/reason for consultation:   · Recent MRSA and Sphingomonas bacteremia      History of Present Illness:   Lexis Morrison is a 72y.o.-year-old  female with history of recent MRSA and Sphingomonas bacteremia, CKD, CAD s/p CABG, CVA who was initially admitted on 1/6/2021. Patient seen at the request of Dr. Alix Wolfe. INITIAL HISTORY 1/7/21    Pt was instructed to go to the [facility] ED due to abnormal lab values. Pt states she is not sure what lab values were abnormal. She was recently admitted to [facility] for CHF exacerbation and was discharged to SNF on 12/23/2020. During this stay, she was diagnosed with MRSA and Sphingomonas septicemia. She was started on ceftaroline until 1/18/21. She states she continued to receive the antibiotic while at the SNF via Rt PICC line (placed 12/23/2020). She states she had no symptoms when she was instructed to return to the ED. She currently denies fevers, chills, chest pain, cough, nausea, vomiting, or diarrhea. Initial labs upon readmission showed BUN 67, Cr 3.55, proBNP 32,310, WBC 5.5, Hb 8.1, platelets 494.  Urine culture was collected 1/7/21. CURRENT EVALUATION 1/9/2021     Afebrile  VS stable    The patient seen and evaluated at bedside. Patient is better with no new acute issues or concerns today. She notes baseline shortness of breath. Denies fevers, chills, chest pain, cough, nausea, vomiting, or diarrhea. Rt PICC line is in place and looks clean. Labs, X rays reviewed: 1/9/2021    BUN: 67-->62  Cr: 3.66-->3.17    WBC:5.5-->6.3-->7.5  Hb:8.1-->7.6-->8.1  Plat: 203-->209-->246    Cultures:  Urine:  · 1-7-21: No growth  ·   Blood:  · 12-20-20: No growth  ·   Sputum :  ·   Wound:  ·     Discussed with patient, RN. I have personally reviewed the past medical history, past surgical history, medications, social history, and family history, and I have updated the database accordingly. Past Medical History:     Past Medical History:   Diagnosis Date    Anxiety and depression     Background diabetic retinopathy(362.01) 2008    (Mild)    Balance problem     CAD (coronary artery disease)     (with coronary artery bypass graft x4).  Cancer of uterus [facility])     history of, (probable cure)    Chronic kidney disease     Chronic low back pain     CVA (cerebral vascular accident) ([address]     Dementia ([address]     (moderate)    Dry eye syndrome     GERD (gastroesophageal reflux disease)     Glaucoma suspect 2005    Gout     Homonymous hemianopsia 2008    (Right)    Hyperlipidemia     Hypertension     Keratitis     (secondary to dry eye syndrome).  Obesity     Peripheral polyneuropathy     (diabetic)    Pseudophakos     (Right Eye: 05-; Left Eye: 04-) - Dr. Tommie Larsen.  Stroke [facility])     (Multiple) MRI of brain 10/2008 shows multiple infarcts involving the temporal and parietal areas.     Trichiasis 2010    (left eye)    Type 2 diabetes mellitus ([address]        Past Surgical  History:     Past Surgical History:   Procedure Laterality Date    CATARACT REMOVAL WITH IMPLANT  05- (Right eye) - Dr. Rain Thomas.  CATARACT REMOVAL WITH IMPLANT  2012    (Left eye) - Dr. Rain Thomas.   SECTION      CHOLECYSTECTOMY      CORONARY ARTERY BYPASS GRAFT  12-    (x4) - LIMA-LAD, SVG-diagnoal 1, SVG-OM1, and SVG-PDA. Exploration of left radial. (Dr. Terence Yanes).  EYE SURGERY Left     as a child     GASTRIC BYPASS SURGERY      HYSTERECTOMY      (abdominal)  with left oophorectomy. Right ovary retained.     TONSILLECTOMY AND ADENOIDECTOMY      UPPER GASTROINTESTINAL ENDOSCOPY  2020    EGD BIOPSY performed by Mayra Licona MD at [facility]       Medications:      insulin glargine  10 Units Subcutaneous Nightly    ceftaroline fosamil (TEFLARO) IVPB  400 mg Intravenous Q12H    pantoprazole  40 mg Oral BID AC    aspirin  81 mg Oral Daily    atorvastatin  40 mg Oral Nightly    bumetanide  2 mg Oral BID    carvedilol  12.5 mg Oral BID WC    citalopram  20 mg Oral Daily    hydrALAZINE  75 mg Oral 3 times per day    cetirizine  5 mg Oral Daily    memantine  10 mg Oral BID    rivastigmine  1 patch Transdermal Daily    insulin lispro  0-12 Units Subcutaneous TID WC    insulin lispro  0-6 Units Subcutaneous Nightly    heparin (porcine)  5,000 Units Subcutaneous 3 times per day       Social History:     Social History     Socioeconomic History    Marital status:      Spouse name: Not on file    Number of children: Not on file    Years of education: Not on file    Highest education level: Not on file   Occupational History    Not on file   Social Needs    Financial resource strain: Not on file    Food insecurity     Worry: Not on file     Inability: Not on file    Transportation needs     Medical: Not on file     Non-medical: Not on file   Tobacco Use    Smoking status: Never Smoker    Smokeless tobacco: Never Used    Tobacco comment: never smoker eclamb rrt 17   Substance and Sexual Activity    Alcohol use: No    Drug use: No    Sexual activity: Not on file   Lifestyle    Physical activity     Days per week: Not on file     Minutes per session: Not on file    Stress: Not on file   Relationships    Social connections     Talks on phone: Not on file     Gets together: Not on file     Attends Anabaptism service: Not on file     Active member of club or organization: Not on file     Attends meetings of clubs or organizations: Not on file     Relationship status: Not on file    Intimate partner violence     Fear of current or ex partner: Not on file     Emotionally abused: Not on file     Physically abused: Not on file     Forced sexual activity: Not on file   Other Topics Concern    Not on file   Social History Narrative    Not on file       Family History:     Family History   Problem Relation Age of Onset    Diabetes Mother     Cancer Mother     High Blood Pressure Mother     Stroke Mother     Kidney Disease Mother     Uterine Cancer Mother     Diabetes Father     Heart Disease Father     Glaucoma Father     Emphysema Father     Coronary Art Dis Other         All 4 siblings.  Stroke Other         1 sibling.  Lung Cancer Other         1 sibling - cause of death lung cancer.  Mental Retardation Other         Allergies:   Bactrim [sulfamethoxazole-trimethoprim], Clonidine derivatives, and Amoxicillin-pot clavulanate     Review of Systems:   Constitutional: No fevers or chills. No systemic complaints  Head: No headaches  Eyes: No double vision or blurry vision. No conjunctival inflammation. ENT: No sore throat or runny nose. . No hearing loss, tinnitus or vertigo. Cardiovascular: No chest pain or palpitations. Lung: Baseline shortness of breath. No cough. No sputum production  Abdomen: No nausea, vomiting, diarrhea, or abdominal pain. Naveen Lamont No cramps. Genitourinary: No increased urinary frequency, or dysuria. No hematuria. No suprapubic or CVA pain  Musculoskeletal: No muscle aches or pains.  No joint effusions, swelling or deformities  Hematologic: No bleeding or bruising. Neurologic: No headache, weakness, numbness, or tingling. Integument: No rash, no ulcers. Psychiatric: No depression. Endocrine: No polyuria, no polydipsia, no polyphagia. Physical Examination :     Patient Vitals for the past 8 hrs:   BP Temp Pulse Resp SpO2 Weight   01/09/21 0825 (!) 172/79 -- 68 -- -- --   01/09/21 0821 (!) 173/77 98 °F (36.7 °C) 68 14 100 % 271 lb 13.2 oz (123.3 kg)     General Appearance: Awake, alert, and in no apparent distress  Head:  Normocephalic, no trauma  Eyes: Pupils equal, round, reactive to light and accommodation; extraocular movements intact; sclera anicteric; conjunctivae pink. No embolic phenomena. ENT: Oropharynx clear, without erythema, exudate, or thrush. No tenderness of sinuses. Mouth/throat: mucosa pink and moist. No lesions. Dentition in good repair. Neck:Supple, without lymphadenopathy. Thyroid normal, No bruits. Pulmonary/Chest: Clear to auscultation, without wheezes, rales, or rhonchi. No dullness to percussion. Cardiovascular: Regular rate and rhythm without murmurs, rubs, or gallops. Abdomen: Soft, non tender. Bowel sounds normal. No organomegaly  All four Extremities: No cyanosis, clubbing, edema, or effusions. R PICC line in place. Neurologic: No gross sensory or motor deficits. Skin: Warm and dry with good turgor. No signs of peripheral arterial or venous insufficiency. No ulcerations. No open wounds.     Medical Decision Making -Laboratory:   I have independently reviewed/ordered the following labs:    CBC with Differential:   Recent Labs     01/06/21  0945 01/07/21  0604 01/08/21  1018   WBC 5.5 6.3 7.5   HGB 8.1* 7.6* 8.1*   HCT 27.1* 25.4* 26.8*    209 246   LYMPHOPCT 11*  --   --    MONOPCT 9  --   --      BMP:   Recent Labs     01/07/21  0604 01/08/21  0655 01/08/21  1018     --  141   K 4.6 4.3 4.4   CL 97*  --  100   CO2 28  --  32*   BUN 64*  --  62*   CREATININE 3.47*  -- 3.17*   MG 2.0  --   --      Hepatic Function Panel:   Recent Labs     01/07/21  0604   PROT 6.1*   LABALBU 3.1*   BILITOT 0.36   ALKPHOS 111*   ALT 5   AST 11     No results for input(s): RPR in the last 72 hours. No results for input(s): HIV in the last 72 hours. No results for input(s): BC in the last 72 hours. Lab Results   Component Value Date    MUCUS NOT REPORTED 01/07/2021    RBC 2.72 01/08/2021    TRICHOMONAS NOT REPORTED 01/07/2021    WBC 7.5 01/08/2021    YEAST NOT REPORTED 01/07/2021    TURBIDITY CLEAR 01/07/2021     Lab Results   Component Value Date    CREATININE 3.17 01/08/2021    GLUCOSE 101 01/08/2021       Medical Decision Making-Imaging:       Medical Decision Ajcwzb-Tbnqfujx-Vagic:       Medical Decision Making-Other:     Note:  · Labs, medications, radiologic studies were reviewed with personal review of films  · Moderate Large amounts of data were reviewed  · Discussed with nursing Staff, Discharge planner  · Infection Control and Prevention measures reviewed  · All prior entries were reviewed  · Administer medications as ordered  · Prognosis: Good  · Discharge planning reviewed  · Follow up as outpatient. Thank you for allowing us to participate in the care of this patient. Please call with questions.       Dolores Pillai MD

## 2021-01-10 LAB
ABSOLUTE EOS #: 0.13 K/UL (ref 0–0.44)
ABSOLUTE IMMATURE GRANULOCYTE: <0.03 K/UL (ref 0–0.3)
ABSOLUTE LYMPH #: 0.8 K/UL (ref 1.1–3.7)
ABSOLUTE MONO #: 0.66 K/UL (ref 0.1–1.2)
ANION GAP SERPL CALCULATED.3IONS-SCNC: 8 MMOL/L (ref 9–17)
BASOPHILS # BLD: 1 % (ref 0–2)
BASOPHILS ABSOLUTE: 0.04 K/UL (ref 0–0.2)
BUN BLDV-MCNC: 33 MG/DL (ref 8–23)
BUN/CREAT BLD: ABNORMAL (ref 9–20)
CALCIUM SERPL-MCNC: 8.5 MG/DL (ref 8.6–10.4)
CHLORIDE BLD-SCNC: 94 MMOL/L (ref 98–107)
CO2: 29 MMOL/L (ref 20–31)
CREAT SERPL-MCNC: 2.1 MG/DL (ref 0.5–0.9)
DIFFERENTIAL TYPE: ABNORMAL
EOSINOPHILS RELATIVE PERCENT: 2 % (ref 1–4)
GFR AFRICAN AMERICAN: 28 ML/MIN
GFR NON-AFRICAN AMERICAN: 23 ML/MIN
GFR SERPL CREATININE-BSD FRML MDRD: ABNORMAL ML/MIN/{1.73_M2}
GFR SERPL CREATININE-BSD FRML MDRD: ABNORMAL ML/MIN/{1.73_M2}
GLUCOSE BLD-MCNC: 116 MG/DL (ref 70–99)
GLUCOSE BLD-MCNC: 122 MG/DL (ref 65–105)
GLUCOSE BLD-MCNC: 153 MG/DL (ref 65–105)
GLUCOSE BLD-MCNC: 153 MG/DL (ref 65–105)
GLUCOSE BLD-MCNC: 99 MG/DL (ref 65–105)
HCT VFR BLD CALC: 27 % (ref 36.3–47.1)
HEMOGLOBIN: 8.1 G/DL (ref 11.9–15.1)
IMMATURE GRANULOCYTES: 0 %
LYMPHOCYTES # BLD: 15 % (ref 24–43)
MCH RBC QN AUTO: 29.9 PG (ref 25.2–33.5)
MCHC RBC AUTO-ENTMCNC: 30 G/DL (ref 28.4–34.8)
MCV RBC AUTO: 99.6 FL (ref 82.6–102.9)
MONOCYTES # BLD: 12 % (ref 3–12)
NRBC AUTOMATED: 0 PER 100 WBC
PDW BLD-RTO: 16.4 % (ref 11.8–14.4)
PLATELET # BLD: 179 K/UL (ref 138–453)
PLATELET ESTIMATE: ABNORMAL
PMV BLD AUTO: 10.3 FL (ref 8.1–13.5)
POTASSIUM SERPL-SCNC: 4.2 MMOL/L (ref 3.7–5.3)
RBC # BLD: 2.71 M/UL (ref 3.95–5.11)
RBC # BLD: ABNORMAL 10*6/UL
SEG NEUTROPHILS: 70 % (ref 36–65)
SEGMENTED NEUTROPHILS ABSOLUTE COUNT: 3.73 K/UL (ref 1.5–8.1)
SODIUM BLD-SCNC: 131 MMOL/L (ref 135–144)
WBC # BLD: 5.4 K/UL (ref 3.5–11.3)
WBC # BLD: ABNORMAL 10*3/UL

## 2021-01-10 PROCEDURE — 1200000000 HC SEMI PRIVATE

## 2021-01-10 PROCEDURE — 99232 SBSQ HOSP IP/OBS MODERATE 35: CPT | Performed by: INTERNAL MEDICINE

## 2021-01-10 PROCEDURE — 6360000002 HC RX W HCPCS: Performed by: INTERNAL MEDICINE

## 2021-01-10 PROCEDURE — 80048 BASIC METABOLIC PNL TOTAL CA: CPT

## 2021-01-10 PROCEDURE — 6370000000 HC RX 637 (ALT 250 FOR IP): Performed by: INTERNAL MEDICINE

## 2021-01-10 PROCEDURE — 36415 COLL VENOUS BLD VENIPUNCTURE: CPT

## 2021-01-10 PROCEDURE — 82947 ASSAY GLUCOSE BLOOD QUANT: CPT

## 2021-01-10 PROCEDURE — U0005 INFEC AGEN DETEC AMPLI PROBE: HCPCS

## 2021-01-10 PROCEDURE — 6370000000 HC RX 637 (ALT 250 FOR IP): Performed by: NURSE PRACTITIONER

## 2021-01-10 PROCEDURE — 85025 COMPLETE CBC W/AUTO DIFF WBC: CPT

## 2021-01-10 PROCEDURE — U0003 INFECTIOUS AGENT DETECTION BY NUCLEIC ACID (DNA OR RNA); SEVERE ACUTE RESPIRATORY SYNDROME CORONAVIRUS 2 (SARS-COV-2) (CORONAVIRUS DISEASE [COVID-19]), AMPLIFIED PROBE TECHNIQUE, MAKING USE OF HIGH THROUGHPUT TECHNOLOGIES AS DESCRIBED BY CMS-2020-01-R: HCPCS

## 2021-01-10 PROCEDURE — 2700000000 HC OXYGEN THERAPY PER DAY

## 2021-01-10 PROCEDURE — 2580000003 HC RX 258: Performed by: INTERNAL MEDICINE

## 2021-01-10 RX ADMIN — CITALOPRAM 20 MG: 20 TABLET, FILM COATED ORAL at 10:22

## 2021-01-10 RX ADMIN — CARVEDILOL 12.5 MG: 12.5 TABLET, FILM COATED ORAL at 10:22

## 2021-01-10 RX ADMIN — CEFTAROLINE FOSAMIL 400 MG: 600 POWDER, FOR SOLUTION INTRAVENOUS at 16:04

## 2021-01-10 RX ADMIN — Medication 81 MG: at 10:22

## 2021-01-10 RX ADMIN — CARVEDILOL 12.5 MG: 12.5 TABLET, FILM COATED ORAL at 16:04

## 2021-01-10 RX ADMIN — CEFTAROLINE FOSAMIL 400 MG: 600 POWDER, FOR SOLUTION INTRAVENOUS at 02:51

## 2021-01-10 RX ADMIN — INSULIN LISPRO 1 UNITS: 100 INJECTION, SOLUTION INTRAVENOUS; SUBCUTANEOUS at 21:20

## 2021-01-10 RX ADMIN — CETIRIZINE HYDROCHLORIDE 5 MG: 10 TABLET ORAL at 10:22

## 2021-01-10 RX ADMIN — MEMANTINE HYDROCHLORIDE 10 MG: 5 TABLET, FILM COATED ORAL at 10:22

## 2021-01-10 RX ADMIN — MEMANTINE HYDROCHLORIDE 10 MG: 5 TABLET, FILM COATED ORAL at 21:20

## 2021-01-10 RX ADMIN — HYDRALAZINE HYDROCHLORIDE 75 MG: 25 TABLET ORAL at 10:21

## 2021-01-10 RX ADMIN — HYDRALAZINE HYDROCHLORIDE 75 MG: 25 TABLET ORAL at 23:30

## 2021-01-10 RX ADMIN — PANTOPRAZOLE SODIUM 40 MG: 40 TABLET, DELAYED RELEASE ORAL at 06:15

## 2021-01-10 RX ADMIN — DESMOPRESSIN ACETATE 40 MG: 0.2 TABLET ORAL at 21:20

## 2021-01-10 RX ADMIN — HYDRALAZINE HYDROCHLORIDE 75 MG: 25 TABLET ORAL at 16:04

## 2021-01-10 RX ADMIN — PANTOPRAZOLE SODIUM 40 MG: 40 TABLET, DELAYED RELEASE ORAL at 16:04

## 2021-01-10 NOTE — PROGRESS NOTES
[facility]  Office: [address], DO, Veta Mcardle, DO, Demetrius Weinstein, DO, Ester Kerrbeny Stout, DO, Madhu Disla MD, Veronika Sanders MD, Leesa Tejada MD, Vianca Land MD, Silvana Tompkins MD, Cathi Valladares MD, Yane Parker MD, Rosalind Avina MD, Lopez Chen MD, Joseph Del Cid DO, Jeremy Sloan MD, Major Black MD, Vera Pope DO, Angela Camarena MD,  Elliot Rosa DO, Agustin Arriaza MD, Kamari Barnes MD, Florencia Torres, [facility], [facility], [facility], Skip Jacobs, CNP, Steve Linares, CNS, Rachel Perkins, CNP, Barb Avitia, CNP, Marcie Shaikh, CNP, Carlo Jarvis, CNP, Kusum Briones, CNP, Joelle Novak PA-C, Gayle Mac, [facility], Jadyn Early, CNP, Cecelia Whyte, CNP, Adrienne Fossa, CNP, Roosevelt Marie, CNP, Gerda Gonzalez, [address] [place] [address]    Progress Note    1/10/2021    4:07 PM    Name:   Leanne Lopez  MRN:     [id number]     Acct:      [de-identified]   Room:   [address] [place] [address] Day:  4  Admit Date:  1/6/2021  5:55 PM    PCP:   ERINN Mike  Code Status:  Full Code    Subjective:     C/C: Volume overload. Interval History Status: improved. Seen and examined at bedside. No acute events overnight. Denies any chest pain, shortness of breath improved. No nausea, vomiting or diarrhea. Review of Systems:     Constitutional:  negative for chills, fevers, sweats  Respiratory:  negative for cough, dyspnea on exertion, +shortness of breath, no wheezing  Cardiovascular:  negative for chest pain, chest pressure/discomfort, +lower extremity edema, palpitations  Gastrointestinal:  negative for abdominal pain, constipation, diarrhea, nausea, vomiting  Neurological:  negative for dizziness, headache    Medications: Allergies:     Allergies   Allergen Reactions    Bactrim [Sulfamethoxazole-Trimethoprim] Hives    Clonidine Derivatives     Amoxicillin-Pot Clavulanate Diarrhea, Nausea Only and Nausea And Vomiting       Current Meds:   Scheduled Meds:    insulin glargine  10 Units Subcutaneous Nightly    ceftaroline fosamil (TEFLARO) IVPB  400 mg Intravenous Q12H    pantoprazole  40 mg Oral BID AC    aspirin  81 mg Oral Daily    atorvastatin  40 mg Oral Nightly    carvedilol  12.5 mg Oral BID WC    citalopram  20 mg Oral Daily    hydrALAZINE  75 mg Oral 3 times per day    cetirizine  5 mg Oral Daily    memantine  10 mg Oral BID    rivastigmine  1 patch Transdermal Daily    insulin lispro  0-12 Units Subcutaneous TID WC    insulin lispro  0-6 Units Subcutaneous Nightly    heparin (porcine)  5,000 Units Subcutaneous 3 times per day     Continuous Infusions:    sodium chloride 20 mL/hr at 01/08/21 0800    dextrose       PRN Meds: heparin (porcine), heparin (porcine), sodium chloride flush, nicotine, promethazine **OR** ondansetron, acetaminophen **OR** acetaminophen, glucose, dextrose, glucagon (rDNA), dextrose    Data:     Past Medical History:   has a past medical history of Anxiety and depression, Background diabetic retinopathy(362.01), Balance problem, CAD (coronary artery disease), Cancer of uterus ([facility] Chronic kidney disease, Chronic low back pain, CVA (cerebral vascular accident) ([facility] Dementia ([facility] Dry eye syndrome, GERD (gastroesophageal reflux disease), Glaucoma suspect, Gout, Homonymous hemianopsia, Hyperlipidemia, Hypertension, Keratitis, Obesity, Peripheral polyneuropathy, Pseudophakos, Stroke ([facility] Trichiasis, and Type 2 diabetes mellitus ([facility] Social History:   reports that she has never smoked. She has never used smokeless tobacco. She reports that she does not drink alcohol or use drugs.      Family History:   Family History   Problem Relation Age of Onset    Diabetes Mother     Cancer Mother     High Blood Pressure Mother     Stroke Mother     Kidney Disease Mother     Uterine Cancer Mother     Diabetes Father     Heart Disease Father     Glaucoma Father    Shahnaz Garcia Emphysema Father     Coronary Art Dis Other         All 4 siblings.  Stroke Other         1 sibling.  Lung Cancer Other         1 sibling - cause of death lung cancer.  Mental Retardation Other        Vitals:  BP (!) 150/45   Pulse 63   Temp 97.5 °F (36.4 °C)   Resp 18   Wt 264 lb 15.9 oz (120.2 kg)   SpO2 100%   BMI 41.50 kg/m²   Temp (24hrs), Av.8 °F (36.6 °C), Min:97.5 °F (36.4 °C), Max:98 °F (36.7 °C)    Recent Labs     01/09/21  1640 01/09/21  2038 01/10/21  0758 01/10/21  1153   POCGLU 143* 171* 99 122*       I/O (24Hr):     Intake/Output Summary (Last 24 hours) at 1/10/2021 1607  Last data filed at 2021 1810  Gross per 24 hour   Intake 500 ml   Output 300 ml   Net 200 ml       Labs:  Hematology:  Recent Labs     21  1018 01/10/21  1046   WBC 7.5 5.4   RBC 2.72* 2.71*   HGB 8.1* 8.1*   HCT 26.8* 27.0*   MCV 98.5 99.6   MCH 29.8 29.9   MCHC 30.2 30.0   RDW 16.4* 16.4*    179   MPV 10.3 10.3     Chemistry:  Recent Labs     21  1018 21  0803 01/10/21  1046    136 131*   K 4.4 4.0 4.2    98 94*   CO2 32* 30 29   GLUCOSE 101* 113* 116*   BUN 62* 48* 33*   CREATININE 3.17* 2.42* 2.10*   ANIONGAP 9 8* 8*   LABGLOM 15* 20* 23*   GFRAA 18* 24* 28*   CALCIUM 8.6 8.2* 8.5*     Recent Labs     21  0803 21  1244 01/09/21  1640 01/09/21  2038 01/10/21  0758 01/10/21  1153   POCGLU 121* 128* 143* 171* 99 122*     ABG:  Lab Results   Component Value Date    POCPH 7.284 2020    POCPCO2 54.5 2020    POCPO2 71.6 2020    POCHCO3 25.8 2020    NBEA 1 2020    PBEA NOT REPORTED 2020    DLW6LBR 28 2020    FVCG4YXK 92 2020    FIO2 2.0 2020     Lab Results   Component Value Date/Time    SPECIAL NOT REPORTED 2021 09:12 AM     Lab Results   Component Value Date/Time    CULTURE NO GROWTH 2021 09:12 AM       Radiology:  Us Renal Complete    Result Date: 2021  Unremarkable ultrasound of the right kidney, nonvisualization left kidney. Decompressed urinary bladder. Right upper quadrant ascites. Xr Chest Portable    Result Date: 1/6/2021  Stable cardiomegaly with mild pulmonary edema which is less prominent. Hazy perihilar bibasilar opacities which is more prominent on the right and has decreased. Probable small bibasilar pleural effusions which is more prominent on the right and have decreased. Physical Examination:        General appearance:  alert, cooperative and no distress  Mental Status:  oriented to person, place and time and normal affect  Lungs: Breath sounds at the bases, coarse to auscultation. Heart:  regular rate and rhythm, no murmur  Abdomen:abdominal and lower extremity swelling   Extremities: Diffuse anasarca with bilateral pitting edema to the abdomen  Skin:  no gross lesions, rashes, induration    Assessment:        Hospital Problems           Last Modified POA    * (Principal) Chronic renal failure syndrome 1/7/2021 Yes    Dementia ([facility] 1/7/2021 Yes    Overview Signed 2/27/2014 10:21 AM by Usman Lo MD     (moderate)         Hypertension 1/7/2021 Yes    Type 2 diabetes mellitus with hyperglycemia, with long-term current use of insulin ([facility] 1/7/2021 Yes    Acute on chronic diastolic heart failure ([facility] 1/7/2021 Yes    Anasarca 1/7/2021 Yes    Gram-negative bacteremia 1/7/2021 Yes    MRSA bacteremia 1/7/2021 Yes        Brief History:         Plan:        Acute Kidney injury on CKD: Now dialysis dependent. Tunneled cath placed 1/7/20. Pt did have HD on 1/8/20 with 1200 cc of UF and again on 1/9 with 3L removed. Repeat HD on 1/11 Case management setting pt up with outpt HD. Acute on Chronic Diastolic Heart Failure: Nephrology to manage, continue daily weights, and strict I&O, will need rodriguez catheter changed pending insertion  MRSA and Gram Negative Bacteremia: Continue IV ceftaroline through 1/18/21  NSTEMI type II: 2/2 demand ischemia likely 2/2 CKD/AMY.  Troponin down trending. Doubt ACS  DMII: continue medium intensity ss, continue lantus 10 unitsmonitor for s/s hypo/hyperglycemia  Anemia of Chronic Disease: stable at baseline  Chronic gastritis:  Protonix BID per GI recommendations for 6 weeks then switch to once daily. Anasarca  HTN: controlled at present  CAD s/p CABG: on statis, ASA, plavix  Dementia: no behavioral disturbance, continue namenda and exelon patch  DVT/PPI prophylaxis  Full Code    Dispo: Stable for discharge, awaiting CM to setup outpt HD.    Sabas Katz DO   1/10/2021  4:07 PM

## 2021-01-10 NOTE — PROGRESS NOTES
needs. Alcohol Swabs (ALCOHOL PREP) PADS, Checks blood sugar 3 times a day  Misc. Devices MISC, Standard walker with wheels  Diagnosis dementia, CHF  citalopram (CELEXA) 20 MG tablet, Take 1 tablet by mouth daily  insulin lispro (HUMALOG) 100 UNIT/ML injection vial, Use 4 times a day per sliding scale  insulin glargine (LANTUS) 100 UNIT/ML injection vial, Inject 20 Units into the skin nightly  Diabetic Shoe MISC, by Does not apply route  Compression Stockings MISC, by Does not apply route 20-30 mm Hg bilateral knee high    Current Medications:     Scheduled Meds:    insulin glargine  10 Units Subcutaneous Nightly    ceftaroline fosamil (TEFLARO) IVPB  400 mg Intravenous Q12H    pantoprazole  40 mg Oral BID AC    aspirin  81 mg Oral Daily    atorvastatin  40 mg Oral Nightly    carvedilol  12.5 mg Oral BID WC    citalopram  20 mg Oral Daily    hydrALAZINE  75 mg Oral 3 times per day    cetirizine  5 mg Oral Daily    memantine  10 mg Oral BID    rivastigmine  1 patch Transdermal Daily    insulin lispro  0-12 Units Subcutaneous TID WC    insulin lispro  0-6 Units Subcutaneous Nightly    heparin (porcine)  5,000 Units Subcutaneous 3 times per day     Continuous Infusions:    sodium chloride 20 mL/hr at 21 0800    dextrose       PRN Meds:  heparin (porcine), heparin (porcine), sodium chloride flush, nicotine, promethazine **OR** ondansetron, acetaminophen **OR** acetaminophen, glucose, dextrose, glucagon (rDNA), dextrose    Input/Output:       I/O last 3 completed shifts: In: 12 [P.O.:400; I.V.:100]  Out: 3600 [Urine:300].       Patient Vitals for the past 96 hrs (Last 3 readings):   Weight   21 1136 264 lb 15.9 oz (120.2 kg)   21 0821 271 lb 13.2 oz (123.3 kg)   21 1207 272 lb 14.9 oz (123.8 kg)       Vital Signs:   Temperature:  Temp: 97.5 °F (36.4 °C)  TMax:   Temp (24hrs), Av °F (36.7 °C), Min:97.5 °F (36.4 °C), Max:98.6 °F (37 °C)    Respirations:  Resp: 18  Pulse: HEPBSAG NONREACTIVE 01/08/2021     Hep C AB:          Lab Results   Component Value Date    HEPCAB NONREACTIVE 01/08/2021       Urinalysis/Chemistries:      Lab Results   Component Value Date    NITRU NEGATIVE 01/07/2021    COLORU YELLOW 01/07/2021    PHUR 5.0 01/07/2021    WBCUA 2 TO 5 01/07/2021    RBCUA 10 TO 20 01/07/2021    MUCUS NOT REPORTED 01/07/2021    TRICHOMONAS NOT REPORTED 01/07/2021    YEAST NOT REPORTED 01/07/2021    BACTERIA NOT REPORTED 01/07/2021    SPECGRAV 1.015 01/07/2021    LEUKOCYTESUR TRACE 01/07/2021    UROBILINOGEN Normal 01/07/2021    BILIRUBINUR NEGATIVE 01/07/2021    GLUCOSEU NEGATIVE 01/07/2021    KETUA NEGATIVE 01/07/2021    AMORPHOUS NOT REPORTED 01/07/2021     Urine Sodium:     Lab Results   Component Value Date    LUKE <20 01/07/2021     Urine Osmolarity:   Lab Results   Component Value Date    OSMOU 348 01/07/2021      Urine Creatinine:     Lab Results   Component Value Date    LABCREA 133.5 01/07/2021     Radiology:     Reviewed. Assessment:     1. Acute Kidney Injury: Secondary to ischemic ATN versus progression of underlying kidney disease. Creatinine up to 3.6, has been fluctuating in the 3.5-3.7 range with positive fluid overload requiring initiation of dialysis which was done today 1/8/2021. Tunneled catheter was placed on 1/8/2021.  2.  ESRD/Chronic kidney disease stage IV approaching 5 secondary to diabetic nephrosclerosis proteinuria about 2.5 g  3. Massive lower extremity edema both from fluid retention from kidney disease, pulmonary hypertension right heart failure and low albumin state,  4. Morbid obesity  5. Recent MRSA and sphingomonas bacteremia on ceftaroline  6. Hypertension  7. Type 2 diabetes    Plan:   1.  HD again tomorrow no acute need for today. Discontinue Lynn catheter  2.  working towards setting up outpatient hemodialysis spot. 3.  BMP in AM.  4.  Continue with ARB. 5. Will follow.     Nutrition   Please ensure that patient is on a renal diet/TF. Avoid nephrotoxic drugs/contrast exposure. We will continue to follow along with you. Electronically signed by Author TASHA Wen, APRN - CNP on 1/10/2021 at 11:10 AM   Nephrology [address] [place] [address]    Attending Physician Statement  I have discussed the care of Vianca Zaidi, including pertinent history and exam findings,  with the CNP. I have reviewed the key elements of all parts of the encounter with the CNP. I agree with the assessment, plan and orders as documented.     Murtaza Jimenez MD, MRCP [facility]), FACP   1/10/2021 3:43 PM    Nephrology [address] [place] [address] Drive

## 2021-01-10 NOTE — CARE COORDINATION
80 Dr. Helen Reilly called and asked if dialysis was set up.   Debora Reilly back and notified him that we do not have financial clearance yet for [facility]. Left message on [place], [place] at [phone]  concerning the issue above and contact Dr. Helen Reilly when dialysis is set-up. [id number] Ishmael Garrido, RN notified pt needs COVID test 72 hrs prior to admission to [id number] [facility].

## 2021-01-10 NOTE — PROGRESS NOTES
[facility] - Progress Note    Today's Date and Time: 1/10/2021, 4:29 PM    Impression :   · Recent hx of MRSA and Sphingomonas septicemia  · Chronic renal failure  · Diastolic CHF  · Type 2 DM    Recommendations:   · Continue ceftaroline 4 week course until 1/18/21    Medical Decision Making/Summary/Discussion:1/10/2021     ·   Infection Control Recommendations   · [place] Precautions  · Contact Isolation     Antimicrobial Stewardship Recommendations     · Simplification of therapy  · Targeted therapy  ·   Coordination of Outpatient Care:   · Estimated Length of IV antimicrobials: 1-18-21  · Patient will need Midline Catheter Insertion: no  · Patient will need PICC line Insertion: Yes  · Patient will need: Home IV , [place],  SNF,  LTAC:TBd  · Patient will need outpatient wound care:No    Chief complaint/reason for consultation:   · Recent MRSA and Sphingomonas bacteremia      History of Present Illness:   Carin Baez is a 72y.o.-year-old  female with history of recent MRSA and Sphingomonas bacteremia, CKD, CAD s/p CABG, CVA who was initially admitted on 1/6/2021. Patient seen at the request of Dr. Caryle Minister. INITIAL HISTORY 1/7/21    Pt was instructed to go to the [place] ED due to abnormal lab values. Pt states she is not sure what lab values were abnormal. She was recently [facility] for [facility]F exacerbation and was discharged to SNF on 12/23/2020. During this stay, she was diagnosed with MRSA and Sphingomonas septicemia. She was started on ceftaroline until 1/18/21. She states she continued to receive the antibiotic while at the SNF via Rt PICC line (placed 12/23/2020). She states she had no symptoms when she was instructed to return to the ED. She currently denies fevers, chills, chest pain, cough, nausea, vomiting, or diarrhea. Initial labs upon readmission showed BUN 67, Cr 3.55, proBNP 32,310, WBC 5.5, Hb 8.1, platelets 551. Urine culture was collected 1/7/21. CURRENT EVALUATION 1/10/2021     Afebrile  VS stable    The patient seen and evaluated at bedside. Patient is better with no new acute issues or concerns today. She notes baseline shortness of breath. Denies fevers, chills, chest pain, cough, nausea, vomiting, or diarrhea. Rt PICC line is in place and looks clean. Labs, X rays reviewed: 1/10/2021    BUN: 67-->62-->33  Cr: 3.66-->3.17-->2.10    WBC:5.5-->6.3-->7.5-->5.4  Hb:8.1-->7.6-->8.1  Plat: 203-->209-->246-->179    Cultures:  Urine:  · 1-7-21: No growth  ·   Blood:  · 12-20-20: No growth  ·   Sputum :  ·   Wound:  ·     Discussed with patient, RN. I have personally reviewed the past medical history, past surgical history, medications, social history, and family history, and I have updated the database accordingly. Past Medical History:     Past Medical History:   Diagnosis Date    Anxiety and depression     Background diabetic retinopathy(362.01) 2008    (Mild)    Balance problem     CAD (coronary artery disease)     (with coronary artery bypass graft x4).  Cancer of uterus [facility])     history of, (probable cure)    Chronic kidney disease     Chronic low back pain     CVA (cerebral vascular accident) ([address]     Dementia ([address]     (moderate)    Dry eye syndrome     GERD (gastroesophageal reflux disease)     Glaucoma suspect 2005    Gout     Homonymous hemianopsia 2008    (Right)    Hyperlipidemia     Hypertension     Keratitis     (secondary to dry eye syndrome).  Obesity     Peripheral polyneuropathy     (diabetic)    Pseudophakos     (Right Eye: 05-; Left Eye: 04-) - Dr. Mendez Evans.  Stroke [facility])     (Multiple) MRI of brain 10/2008 shows multiple infarcts involving the temporal and parietal areas.     Trichiasis 2010    (left eye)    Type 2 diabetes mellitus ([address]        Past Surgical  History:     Past Surgical History:   Procedure Laterality Date    CATARACT REMOVAL WITH IMPLANT  2012    (Right eye) - Dr. Preethi Drake.  CATARACT REMOVAL WITH IMPLANT  2012    (Left eye) - Dr. Preethi Drake.   SECTION      CHOLECYSTECTOMY      CORONARY ARTERY BYPASS GRAFT  12-    (x4) - LIMA-LAD, SVG-diagnoal 1, SVG-OM1, and SVG-PDA. Exploration of left radial. (Dr. Kenisha Harper).  EYE SURGERY Left     as a child     GASTRIC BYPASS SURGERY      HYSTERECTOMY      (abdominal)  with left oophorectomy. Right ovary retained.     IR TUNNELED CATHETER PLACEMENT GREATER THAN 5 YEARS  2021    IR TUNNELED CATHETER PLACEMENT GREATER THAN 5 YEARS 2021 Macarena Ash MD [facility] SPECIAL PROCEDURES    TONSILLECTOMY AND ADENOIDECTOMY      UPPER GASTROINTESTINAL ENDOSCOPY  2020    EGD BIOPSY performed by Prabhu Coleman MD at [facility]       Medications:      insulin glargine  10 Units Subcutaneous Nightly    ceftaroline fosamil (TEFLARO) IVPB  400 mg Intravenous Q12H    pantoprazole  40 mg Oral BID AC    aspirin  81 mg Oral Daily    atorvastatin  40 mg Oral Nightly    carvedilol  12.5 mg Oral BID WC    citalopram  20 mg Oral Daily    hydrALAZINE  75 mg Oral 3 times per day    cetirizine  5 mg Oral Daily    memantine  10 mg Oral BID    rivastigmine  1 patch Transdermal Daily    insulin lispro  0-12 Units Subcutaneous TID WC    insulin lispro  0-6 Units Subcutaneous Nightly    heparin (porcine)  5,000 Units Subcutaneous 3 times per day       Social History:     Social History     Socioeconomic History    Marital status:      Spouse name: Not on file    Number of children: Not on file    Years of education: Not on file    Highest education level: Not on file   Occupational History    Not on file   Social Needs    Financial resource strain: Not on file    Food insecurity     Worry: Not on file     Inability: Not on file    Transportation needs     Medical: Not on file     Non-medical: Not on file   Tobacco Use    Smoking status: Never Smoker    Smokeless tobacco: Never Used    Tobacco comment: never smoker eclamb rrt 7/20/17   Substance and Sexual Activity    Alcohol use: No    Drug use: No    Sexual activity: Not on file   Lifestyle    Physical activity     Days per week: Not on file     Minutes per session: Not on file    Stress: Not on file   Relationships    Social connections     Talks on phone: Not on file     Gets together: Not on file     Attends Quaker service: Not on file     Active member of club or organization: Not on file     Attends meetings of clubs or organizations: Not on file     Relationship status: Not on file    Intimate partner violence     Fear of current or ex partner: Not on file     Emotionally abused: Not on file     Physically abused: Not on file     Forced sexual activity: Not on file   Other Topics Concern    Not on file   Social History Narrative    Not on file       Family History:     Family History   Problem Relation Age of Onset    Diabetes Mother     Cancer Mother     High Blood Pressure Mother     Stroke Mother     Kidney Disease Mother     Uterine Cancer Mother     Diabetes Father     Heart Disease Father     Glaucoma Father     Emphysema Father     Coronary Art Dis Other         All 4 siblings.  Stroke Other         1 sibling.  Lung Cancer Other         1 sibling - cause of death lung cancer.  Mental Retardation Other         Allergies:   Bactrim [sulfamethoxazole-trimethoprim], Clonidine derivatives, and Amoxicillin-pot clavulanate     Review of Systems:   Constitutional: No fevers or chills. No systemic complaints  Head: No headaches  Eyes: No double vision or blurry vision. No conjunctival inflammation. ENT: No sore throat or runny nose. . No hearing loss, tinnitus or vertigo. Cardiovascular: No chest pain or palpitations. Lung: Baseline shortness of breath. No cough. No sputum production  Abdomen: No nausea, vomiting, diarrhea, or abdominal pain. [employer]  No cramps. Genitourinary: No increased urinary frequency, or dysuria. No hematuria. No suprapubic or CVA pain  Musculoskeletal: No muscle aches or pains. No joint effusions, swelling or deformities  Hematologic: No bleeding or bruising. Neurologic: No headache, weakness, numbness, or tingling. Integument: No rash, no ulcers. Psychiatric: No depression. Endocrine: No polyuria, no polydipsia, no polyphagia. Physical Examination :     Patient Vitals for the past 8 hrs:   BP Temp Pulse Resp SpO2   01/10/21 0841 (!) 150/45 97.5 °F (36.4 °C) 63 18 100 %     General Appearance: Awake, alert, and in no apparent distress  Head:  Normocephalic, no trauma  Eyes: Pupils equal, round, reactive to light and accommodation; extraocular movements intact; sclera anicteric; conjunctivae pink. No embolic phenomena. ENT: Oropharynx clear, without erythema, exudate, or thrush. No tenderness of sinuses. Mouth/throat: mucosa pink and moist. No lesions. Dentition in good repair. Neck:Supple, without lymphadenopathy. Thyroid normal, No bruits. Pulmonary/Chest: Clear to auscultation, without wheezes, rales, or rhonchi. No dullness to percussion. Cardiovascular: Regular rate and rhythm without murmurs, rubs, or gallops. Abdomen: Soft, non tender. Bowel sounds normal. No organomegaly  All four Extremities: No cyanosis, clubbing, edema, or effusions. R PICC line in place. Neurologic: No gross sensory or motor deficits. Skin: Warm and dry with good turgor. No signs of peripheral arterial or venous insufficiency. No ulcerations. No open wounds.     Medical Decision Making -Laboratory:   I have independently reviewed/ordered the following labs:    CBC with Differential:   Recent Labs     01/08/21  1018 01/10/21  1046   WBC 7.5 5.4   HGB 8.1* 8.1*   HCT 26.8* 27.0*    179   LYMPHOPCT  --  15*   MONOPCT  --  12     BMP:   Recent Labs     01/09/21  0803 01/10/21  1046    131*   K 4.0 4.2   CL 98 94*   CO2 30 29   BUN 48* 33* CREATININE 2.42* 2.10*     Hepatic Function Panel:   No results for input(s): PROT, LABALBU, BILIDIR, IBILI, BILITOT, ALKPHOS, ALT, AST in the last 72 hours. No results for input(s): RPR in the last 72 hours. No results for input(s): HIV in the last 72 hours. No results for input(s): BC in the last 72 hours. Lab Results   Component Value Date    MUCUS NOT REPORTED 01/07/2021    RBC 2.71 01/10/2021    TRICHOMONAS NOT REPORTED 01/07/2021    WBC 5.4 01/10/2021    YEAST NOT REPORTED 01/07/2021    TURBIDITY CLEAR 01/07/2021     Lab Results   Component Value Date    CREATININE 2.10 01/10/2021    GLUCOSE 116 01/10/2021       Medical Decision Making-Imaging:       Medical Decision Rdmmqs-Rnhnionq-Iibvj:       Medical Decision Making-Other:     Note:  · Labs, medications, radiologic studies were reviewed with personal review of films  · Moderate Large amounts of data were reviewed  · Discussed with nursing Staff, Discharge planner  · Infection Control and Prevention measures reviewed  · All prior entries were reviewed  · Administer medications as ordered  · Prognosis: Good  · Discharge planning reviewed  · Follow up as outpatient. Thank you for allowing us to participate in the care of this patient. Please call with questions.       Reyes Shingles, MD

## 2021-01-10 NOTE — PLAN OF CARE
Problem: Skin Integrity:  Goal: Will show no infection signs and symptoms  Description: Will show no infection signs and symptoms  1/10/2021 1429 by Agustin Aldana RN  Outcome: Ongoing  1/10/2021 0629 by Flower Moody RN  Outcome: Ongoing  Goal: Absence of new skin breakdown  Description: Absence of new skin breakdown  1/10/2021 1429 by Agustin Aldana RN  Outcome: Ongoing  1/10/2021 0629 by Flower Moody RN  Outcome: Ongoing     Problem: Falls - Risk of:  Goal: Will remain free from falls  Description: Will remain free from falls  1/10/2021 1429 by Agustin Aldana RN  Outcome: Ongoing  1/10/2021 0629 by Flower Moody RN  Outcome: Ongoing  Goal: Absence of physical injury  Description: Absence of physical injury  1/10/2021 1429 by Agustin Aldana RN  Outcome: Ongoing  1/10/2021 0629 by Flower Moody RN  Outcome: Ongoing

## 2021-01-10 NOTE — PROGRESS NOTES
[facility]  Office: [facility] 56, DO, Nakia Mcdonalde, DO, Nick Maker, DO, Surya Sizer Blood, DO, Courtney Alexandre MD, Sony Mora MD, Tameka Joshi MD, Luis Esteves MD, Vinicius Knapp MD, Bessy Pace MD, Darius Jules MD, Latasha Carty MD, Lopez Polo MD, Daniel Keane DO, Stanley Cisse MD, Mehul Jenkins MD, Kayy Schaffer DO, Nguyen Pierson MD,  Tavo Robertson DO, Jaylon Saunders MD, Lian Solorio MD, Isaac Garner CNP, [facility], CNP, Page Leyva, CNP, Stanislaw Saha, CNS, Becky Neff, CNP, Olimpia Brooks, CNP, Amadeo Nguyen, CNP, Edwin Tatum, CNP, Latanya Shea, CNP, Kyra Dominguez PA-C, Ben Schultz, [facility], Galilea Carver, CNP, Chago Samuel, CNP, Anitha Byrne, CNP, Reba Robertson, [facility], Naveen Galindo, [address] [place] [address]    Progress Note    1/9/2021    7:44 PM    Name:   Lexis Morrison  MRN:     [id number]     Acct:      [de-identified]   Room:   [address] [place] [address] Day:  3  Admit Date:  1/6/2021  5:55 PM    PCP:   ERINN Alejandro  Code Status:  Full Code    Subjective:     C/C: Volume overload. Interval History Status: improved. Seen and examined at bedside. No acute events overnight. Denies any chest pain, shortness of breath consequently bleeding, nausea, vomiting, diarrhea. She did have 3 L removed today again in dialysis. Does like she is improving    Review of Systems:     Constitutional:  negative for chills, fevers, sweats  Respiratory:  negative for cough, dyspnea on exertion, +shortness of breath, no wheezing  Cardiovascular:  negative for chest pain, chest pressure/discomfort, +lower extremity edema, palpitations  Gastrointestinal:  negative for abdominal pain, constipation, diarrhea, nausea, vomiting  Neurological:  negative for dizziness, headache    Medications: Allergies:     Allergies   Allergen Reactions    Bactrim [Sulfamethoxazole-Trimethoprim] Hives    Clonidine Derivatives     Amoxicillin-Pot Clavulanate Diarrhea, Nausea Only and Nausea And Vomiting       Current Meds:   Scheduled Meds:    insulin glargine  10 Units Subcutaneous Nightly    ceftaroline fosamil (TEFLARO) IVPB  400 mg Intravenous Q12H    pantoprazole  40 mg Oral BID AC    aspirin  81 mg Oral Daily    atorvastatin  40 mg Oral Nightly    bumetanide  2 mg Oral BID    carvedilol  12.5 mg Oral BID WC    citalopram  20 mg Oral Daily    hydrALAZINE  75 mg Oral 3 times per day    cetirizine  5 mg Oral Daily    memantine  10 mg Oral BID    rivastigmine  1 patch Transdermal Daily    insulin lispro  0-12 Units Subcutaneous TID WC    insulin lispro  0-6 Units Subcutaneous Nightly    heparin (porcine)  5,000 Units Subcutaneous 3 times per day     Continuous Infusions:    sodium chloride 20 mL/hr at 01/08/21 0800    dextrose       PRN Meds: heparin (porcine), heparin (porcine), sodium chloride flush, nicotine, promethazine **OR** ondansetron, acetaminophen **OR** acetaminophen, glucose, dextrose, glucagon (rDNA), dextrose    Data:     Past Medical History:   has a past medical history of Anxiety and depression, Background diabetic retinopathy(362.01), Balance problem, CAD (coronary artery disease), Cancer of uterus ([facility] [address] Chronic kidney disease, Chronic low back pain, CVA (cerebral vascular accident) ([facility] [address] Dementia ([facility] [address] Dry eye syndrome, GERD (gastroesophageal reflux disease), Glaucoma suspect, Gout, Homonymous hemianopsia, Hyperlipidemia, Hypertension, Keratitis, Obesity, Peripheral polyneuropathy, Pseudophakos, Stroke ([facility] [address] Trichiasis, and Type 2 diabetes mellitus ([facility] [address] Social History:   reports that she has never smoked. She has never used smokeless tobacco. She reports that she does not drink alcohol or use drugs.      Family History:   Family History   Problem Relation Age of Onset    Diabetes Mother     Cancer Mother     High Blood Pressure Mother     Stroke Mother     Kidney Disease Mother     Uterine Cancer Mother     Diabetes Father     Heart Disease Father     Glaucoma Father     Emphysema Father     Coronary Art Dis Other         All 4 siblings.  Stroke Other         1 sibling.  Lung Cancer Other         1 sibling - cause of death lung cancer.  Mental Retardation Other        Vitals:  BP (!) 158/68   Pulse 67   Temp 97.8 °F (36.6 °C) (Oral)   Resp 16   Wt 264 lb 15.9 oz (120.2 kg)   SpO2 100%   BMI 41.50 kg/m²   Temp (24hrs), Av °F (36.7 °C), Min:97.5 °F (36.4 °C), Max:98.6 °F (37 °C)    Recent Labs     21  2128 21  0803 21  1244 21  1640   POCGLU 104 121* 128* 143*       I/O (24Hr):     Intake/Output Summary (Last 24 hours) at 2021 1944  Last data filed at 2021 1810  Gross per 24 hour   Intake 800 ml   Output 3600 ml   Net -2800 ml       Labs:  Hematology:  Recent Labs     21  0604 21  1018   WBC 6.3 7.5   RBC 2.55* 2.72*   HGB 7.6* 8.1*   HCT 25.4* 26.8*   MCV 99.6 98.5   MCH 29.8 29.8   MCHC 29.9 30.2   RDW 16.5* 16.4*    246   MPV 10.3 10.3   INR 1.2  --      Chemistry:  Recent Labs     21  0604 21  0655 21  1018 21  0803     --  141 136   K 4.6 4.3 4.4 4.0   CL 97*  --  100 98   CO2 28  --  32* 30   GLUCOSE 156*  --  101* 113*   BUN 64*  --  62* 48*   CREATININE 3.47*  --  3.17* 2.42*   MG 2.0  --   --   --    ANIONGAP 13  --  9 8*   LABGLOM 13*  --  15* 20*   GFRAA 16*  --  18* 24*   CALCIUM 8.2*  --  8.6 8.2*   TROPHS 76*  --   --   --      Recent Labs     21  0621  0621  1236 21  1823 21  2128 21  0803 21  1244 21  1640   PROT 6.1*  --   --   --   --   --   --   --    LABALBU 3.1*  --   --   --   --   --   --   --    AST 11  --   --   --   --   --   --   --    ALT 5  --   --   --   --   --   --   --    ALKPHOS 111*  --   --   --   --   --   --   --    BILITOT 0.36  --   --   --   --   --   --   --    POCGLU  --    < > 107* 122* 104 121* 128* 143*    < > = values in this interval not displayed. ABG:  Lab Results   Component Value Date    POCPH 7.284 12/13/2020    POCPCO2 54.5 12/13/2020    POCPO2 71.6 12/13/2020    POCHCO3 25.8 12/13/2020    NBEA 1 12/13/2020    PBEA NOT REPORTED 12/13/2020    CEO1BIR 28 12/13/2020    GSYO2IOK 92 12/13/2020    FIO2 2.0 12/13/2020     Lab Results   Component Value Date/Time    SPECIAL NOT REPORTED 01/07/2021 09:12 AM     Lab Results   Component Value Date/Time    CULTURE NO GROWTH 01/07/2021 09:12 AM       Radiology:  Us Renal Complete    Result Date: 1/6/2021  Unremarkable ultrasound of the right kidney, nonvisualization left kidney. Decompressed urinary bladder. Right upper quadrant ascites. Xr Chest Portable    Result Date: 1/6/2021  Stable cardiomegaly with mild pulmonary edema which is less prominent. Hazy perihilar bibasilar opacities which is more prominent on the right and has decreased. Probable small bibasilar pleural effusions which is more prominent on the right and have decreased. Physical Examination:        General appearance:  alert, cooperative and no distress  Mental Status:  oriented to person, place and time and normal affect  Lungs: Breath sounds at the bases, coarse to auscultation.   Heart:  regular rate and rhythm, no murmur  Abdomen:abdominal and lower extremity swelling   Extremities: Diffuse anasarca with bilateral pitting edema to the abdomen  Skin:  no gross lesions, rashes, induration    Assessment:        Hospital Problems           Last Modified POA    * (Principal) Chronic renal failure syndrome 1/7/2021 Yes    Dementia ([address] 1/7/2021 Yes    Overview Signed 2/27/2014 10:21 AM by Jb Nunez MD     (moderate)         Hypertension 1/7/2021 Yes    Type 2 diabetes mellitus with hyperglycemia, with long-term current use of insulin ([address] 1/7/2021 Yes    Acute on chronic diastolic heart failure ([address] 1/7/2021 Yes    Anasarca 1/7/2021 Yes Gram-negative bacteremia 1/7/2021 Yes    MRSA bacteremia 1/7/2021 Yes        Brief History:         Plan:        Acute Kidney injury on CKD: Now dialysis dependent. Tunneled cath placed 1/7/20. Pt did have HD on 1/8/20 with 1200 cc of UF and again on 1/9 with 3L removed. Case management setting pt up with outpt HD. Acute on Chronic Diastolic Heart Failure: Nephrology to manage, continue daily weights, and strict I&O, will need rodriguez catheter changed pending insertion  MRSA and Gram Negative Bacteremia: ID consulted for follow up evaluation as patient discharged on IV ceftaroline through 1/18/21  NSTEMI type II: 2/2 demand ischemia likely 2/2 CKD/AMY. Troponin down trending. Doubt ACS  DMII: continue medium intensity ss, decreased lantus to 10units as patient reports recently changed at SNF due to am hypoglycemia, monitor for s/s hypog/hyperglycemia  Anemia of Chronic Disease: stable at baseline  Chronic gastritis: start Protonix BID per GI recommendations for 6 weeks then switch to once daily.    Anasarca  HTN: controlled at present  CAD s/p CABG: on statis, ASA, plavix  Dementia: no behavioral disturbance, continue namenda and exelon patch  DVT/PPI prophylaxis  Full Code    Fernando Pfeiffer DO  1/9/2021  7:44 PM

## 2021-01-11 ENCOUNTER — APPOINTMENT (OUTPATIENT)
Dept: GENERAL RADIOLOGY | Age: 71
DRG: 673 | End: 2021-01-11
Attending: INTERNAL MEDICINE
Payer: MEDICARE

## 2021-01-11 ENCOUNTER — TELEPHONE (OUTPATIENT)
Dept: INTERNAL MEDICINE | Age: 71
End: 2021-01-11

## 2021-01-11 VITALS
SYSTOLIC BLOOD PRESSURE: 131 MMHG | BODY MASS INDEX: 40.68 KG/M2 | HEART RATE: 60 BPM | WEIGHT: 259.7 LBS | RESPIRATION RATE: 16 BRPM | DIASTOLIC BLOOD PRESSURE: 50 MMHG | OXYGEN SATURATION: 99 % | TEMPERATURE: 98 F

## 2021-01-11 PROBLEM — R78.81 GRAM-NEGATIVE BACTEREMIA: Status: RESOLVED | Noted: 2020-12-15 | Resolved: 2021-01-11

## 2021-01-11 PROBLEM — R60.1 ANASARCA: Status: RESOLVED | Noted: 2020-12-12 | Resolved: 2021-01-11

## 2021-01-11 LAB
ANION GAP SERPL CALCULATED.3IONS-SCNC: 9 MMOL/L (ref 9–17)
BUN BLDV-MCNC: 37 MG/DL (ref 8–23)
BUN/CREAT BLD: ABNORMAL (ref 9–20)
C-REACTIVE PROTEIN: 11.8 MG/L (ref 0–5)
CALCIUM SERPL-MCNC: 8.1 MG/DL (ref 8.6–10.4)
CHLORIDE BLD-SCNC: 98 MMOL/L (ref 98–107)
CO2: 29 MMOL/L (ref 20–31)
CREAT SERPL-MCNC: 2.51 MG/DL (ref 0.5–0.9)
GFR AFRICAN AMERICAN: 23 ML/MIN
GFR NON-AFRICAN AMERICAN: 19 ML/MIN
GFR SERPL CREATININE-BSD FRML MDRD: ABNORMAL ML/MIN/{1.73_M2}
GFR SERPL CREATININE-BSD FRML MDRD: ABNORMAL ML/MIN/{1.73_M2}
GLUCOSE BLD-MCNC: 149 MG/DL (ref 70–99)
GLUCOSE BLD-MCNC: 153 MG/DL (ref 65–105)
HCT VFR BLD CALC: 25.3 % (ref 36.3–47.1)
HEMOGLOBIN: 7.7 G/DL (ref 11.9–15.1)
MCH RBC QN AUTO: 29.8 PG (ref 25.2–33.5)
MCHC RBC AUTO-ENTMCNC: 30.4 G/DL (ref 28.4–34.8)
MCV RBC AUTO: 98.1 FL (ref 82.6–102.9)
NRBC AUTOMATED: 0 PER 100 WBC
PDW BLD-RTO: 16.2 % (ref 11.8–14.4)
PLATELET # BLD: 172 K/UL (ref 138–453)
PMV BLD AUTO: 10 FL (ref 8.1–13.5)
POTASSIUM SERPL-SCNC: 4.1 MMOL/L (ref 3.7–5.3)
RBC # BLD: 2.58 M/UL (ref 3.95–5.11)
SARS-COV-2, RAPID: NORMAL
SARS-COV-2: NORMAL
SARS-COV-2: NOT DETECTED
SEDIMENTATION RATE, ERYTHROCYTE: 16 MM (ref 0–30)
SODIUM BLD-SCNC: 136 MMOL/L (ref 135–144)
SOURCE: NORMAL
WBC # BLD: 5.4 K/UL (ref 3.5–11.3)

## 2021-01-11 PROCEDURE — 6370000000 HC RX 637 (ALT 250 FOR IP): Performed by: NURSE PRACTITIONER

## 2021-01-11 PROCEDURE — 99239 HOSP IP/OBS DSCHRG MGMT >30: CPT | Performed by: INTERNAL MEDICINE

## 2021-01-11 PROCEDURE — 85027 COMPLETE CBC AUTOMATED: CPT

## 2021-01-11 PROCEDURE — 97530 THERAPEUTIC ACTIVITIES: CPT

## 2021-01-11 PROCEDURE — 6370000000 HC RX 637 (ALT 250 FOR IP): Performed by: INTERNAL MEDICINE

## 2021-01-11 PROCEDURE — 36415 COLL VENOUS BLD VENIPUNCTURE: CPT

## 2021-01-11 PROCEDURE — 82947 ASSAY GLUCOSE BLOOD QUANT: CPT

## 2021-01-11 PROCEDURE — 97535 SELF CARE MNGMENT TRAINING: CPT

## 2021-01-11 PROCEDURE — 6360000002 HC RX W HCPCS: Performed by: INTERNAL MEDICINE

## 2021-01-11 PROCEDURE — 73630 X-RAY EXAM OF FOOT: CPT

## 2021-01-11 PROCEDURE — 90935 HEMODIALYSIS ONE EVALUATION: CPT | Performed by: INTERNAL MEDICINE

## 2021-01-11 PROCEDURE — 2580000003 HC RX 258: Performed by: INTERNAL MEDICINE

## 2021-01-11 PROCEDURE — 85652 RBC SED RATE AUTOMATED: CPT

## 2021-01-11 PROCEDURE — 99232 SBSQ HOSP IP/OBS MODERATE 35: CPT | Performed by: INTERNAL MEDICINE

## 2021-01-11 PROCEDURE — 97166 OT EVAL MOD COMPLEX 45 MIN: CPT

## 2021-01-11 PROCEDURE — 86140 C-REACTIVE PROTEIN: CPT

## 2021-01-11 PROCEDURE — 80048 BASIC METABOLIC PNL TOTAL CA: CPT

## 2021-01-11 PROCEDURE — 97162 PT EVAL MOD COMPLEX 30 MIN: CPT

## 2021-01-11 PROCEDURE — 90935 HEMODIALYSIS ONE EVALUATION: CPT

## 2021-01-11 RX ORDER — PANTOPRAZOLE SODIUM 40 MG/1
40 TABLET, DELAYED RELEASE ORAL
Qty: 30 TABLET | Refills: 3 | DISCHARGE
Start: 2021-01-11 | End: 2021-03-10 | Stop reason: SDUPTHER

## 2021-01-11 RX ORDER — INSULIN GLARGINE 100 [IU]/ML
10 INJECTION, SOLUTION SUBCUTANEOUS NIGHTLY
Qty: 1 VIAL | Refills: 3 | DISCHARGE
Start: 2021-01-11 | End: 2021-03-10 | Stop reason: SDUPTHER

## 2021-01-11 RX ADMIN — PANTOPRAZOLE SODIUM 40 MG: 40 TABLET, DELAYED RELEASE ORAL at 16:47

## 2021-01-11 RX ADMIN — INSULIN LISPRO 2 UNITS: 100 INJECTION, SOLUTION INTRAVENOUS; SUBCUTANEOUS at 12:46

## 2021-01-11 RX ADMIN — CARVEDILOL 12.5 MG: 12.5 TABLET, FILM COATED ORAL at 16:47

## 2021-01-11 RX ADMIN — CEFTAROLINE FOSAMIL 400 MG: 600 POWDER, FOR SOLUTION INTRAVENOUS at 03:50

## 2021-01-11 RX ADMIN — HYDRALAZINE HYDROCHLORIDE 75 MG: 25 TABLET ORAL at 16:47

## 2021-01-11 RX ADMIN — CITALOPRAM 20 MG: 20 TABLET, FILM COATED ORAL at 12:43

## 2021-01-11 RX ADMIN — HEPARIN SODIUM 1600 UNITS: 1000 INJECTION INTRAVENOUS; SUBCUTANEOUS at 08:10

## 2021-01-11 RX ADMIN — Medication 81 MG: at 12:43

## 2021-01-11 RX ADMIN — PANTOPRAZOLE SODIUM 40 MG: 40 TABLET, DELAYED RELEASE ORAL at 06:03

## 2021-01-11 RX ADMIN — MEMANTINE HYDROCHLORIDE 10 MG: 5 TABLET, FILM COATED ORAL at 12:43

## 2021-01-11 RX ADMIN — CETIRIZINE HYDROCHLORIDE 5 MG: 10 TABLET ORAL at 12:43

## 2021-01-11 NOTE — PROGRESS NOTES
Occupational Therapy   Occupational Therapy Initial Assessment  Date: 2021   Patient Name: Radha Hernandez  MRN: [id number]     : 1950    Date of Service: 2021    Discharge Recommendations:  Patient would benefit from continued therapy after discharge       Assessment   Performance deficits / Impairments: Decreased functional mobility ; Decreased ADL status; Decreased cognition;Decreased endurance;Decreased balance;Decreased high-level IADLs  Assessment: Pt agreeable to OT eval this date requiring encouragement. Pt completed bed mobility with Mod A X1-2, VCs for sequencing and proper hand placement. Pt completed functional transfers and mobility with Min A X2 and RW use. Pt notably SOB and c/o fatigue during session. Pt requires Max A for LB dressing and toileting tasks this date; see below for more info. Pt is unsafe to return to prior living arrangements based on level of assistance required today. Pt to benefit from continued OT services to increase safety and independence with ADLs/IADLs and functional transfers/mobility  Prognosis: Good  Decision Making: Medium Complexity  Patient Education: Pt educated on OT role, OT POC, safety awareness, bed mobility training, transfer training, EC/WS, and importance of continued OT. Pt verbalized good understanding  REQUIRES OT FOLLOW UP: Yes  Activity Tolerance  Activity Tolerance: Patient Tolerated treatment well;Patient limited by fatigue  Activity Tolerance: SOB  Safety Devices  Safety Devices in place: Yes  Type of devices: Nurse notified; Left in bed;Gait belt;Call light within reach; Bed alarm in place  Restraints  Initially in place: No           Patient Diagnosis(es): There were no encounter diagnoses.      has a past medical history of Anxiety and depression, Background diabetic retinopathy(362.01), Balance problem, CAD (coronary artery disease), Cancer of uterus ([facility] [address] Chronic kidney disease, Chronic low back pain, CVA (cerebral vascular accident) ([facility] [address] Dementia ([facility] [address] Dry eye syndrome, GERD (gastroesophageal reflux disease), Glaucoma suspect, Gout, Homonymous hemianopsia, Hyperlipidemia, Hypertension, Keratitis, Obesity, Peripheral polyneuropathy, Pseudophakos, Stroke ([facility] [address] Trichiasis, and Type 2 diabetes mellitus ([facility] [address] has a past surgical history that includes Gastric bypass surgery; Cataract removal with implant (2012); Cataract removal with implant (2012); Coronary artery bypass graft (12-);  section; Hysterectomy; Cholecystectomy; Tonsillectomy and adenoidectomy; Eye surgery (Left); Upper gastrointestinal endoscopy (2020); and IR TUNNELED CVC PLACE WO SQ PORT/PUMP > 5 YEARS (2021). Restrictions  Restrictions/Precautions  Restrictions/Precautions: Fall Risk  Required Braces or Orthoses?: No  Position Activity Restriction  Other position/activity restrictions: Up with assist    Subjective   General  Patient assessed for rehabilitation services?: Yes  Family / Caregiver Present: No  General Comment  Comments: RN ok'd pt for therapy this date.  Pt agreeable to session and cooperative throughout  Patient Currently in Pain: Denies  Vital Signs  Patient Currently in Pain: Denies     Social/Functional History  Social/Functional History  Lives With: Daughter(home at all times)  Type of Home: House  Home Layout: One level  Home Access: Stairs to enter without rails  Entrance Stairs - Number of Steps: 1  Bathroom Shower/Tub: Tub/Shower unit  Bathroom Toilet: Standard  Bathroom Equipment: Shower chair  Home Equipment: Rolling walker, Oxygen(2L O2)  ADL Assistance: Needs assistance(pt reports needing assistance with dressing/bathing tasks \"sometimes\")  Homemaking Assistance: Needs assistance(daughter and grandson complete)  Homemaking Responsibilities: No  Ambulation Assistance: Independent  Transfer Assistance: Independent  Active : No  Patient's  Info: daughter's boyfriend  Leisure & Hobbies: \"sometimes I sit at the table and sometimes I rest\"       Objective   Vision: Within Functional Limits  Hearing: Within functional limits    Orientation  Overall Orientation Status: Within Functional Limits  Observation/Palpation  Observation: 2 1/2 LPM oxygen through nasal cannula  Edema: thighs, legs with dense pitting edema. Arms, forearms with dense, pitting edema  Balance  Sitting Balance: Contact guard assistance  Standing Balance: Minimal assistance  Functional Mobility  Functional - Mobility Device: Rolling Walker  Activity: Other  Assist Level: Minimal assistance  Functional Mobility Comments: Pt completed side steps to L toward HOB with Min A X2 and RW use     ADL  Feeding: Independent;Setup  Grooming: Stand by assistance;Setup  UE Bathing: Minimal assistance;Setup; Increased time to complete  LE Bathing: Setup;Maximum assistance; Increased time to complete  UE Dressing: Minimal assistance;Setup; Increased time to complete  LE Dressing: Maximum assistance;Setup; Increased time to complete  Toileting: Maximum assistance;Setup; Increased time to complete(pt attempted voiding on bed pan requiring Max A for pericare; pt stood for brief change and bottom hygiene with max A)  Tone RUE  RUE Tone: Normotonic  Tone LUE  LUE Tone: Normotonic  Coordination  Movements Are Fluid And Coordinated: Yes    Bed mobility  Rolling to Left: Moderate assistance  Rolling to Right: Moderate assistance  Supine to Sit: Moderate assistance  Sit to Supine: Moderate assistance  Scooting: Moderate assistance  Comment: HOB elevated with bed rail use  Transfers  Sit to stand: Minimal assistance;2 Person assistance  Stand to sit: Minimal assistance;2 Person assistance  Transfer Comments: VCs for hand placement with RW    Cognition  Overall Cognitive Status: Exceptions  Arousal/Alertness: Delayed responses to stimuli  Following Commands: Follows one step commands with repetition; Follows one step commands with increased time  Attention Span: Appears intact  Problem Solving: Assistance required to identify errors made;Assistance required to implement solutions;Assistance required to correct errors made;Assistance required to generate solutions  Initiation: Requires cues for some  Sequencing: Requires cues for some       Sensation  Overall Sensation Status: Impaired  Light Touch: Partial deficits in the LLE;Partial deficits in the RLE        LUE AROM (degrees)  LUE AROM : WFL  Left Hand AROM (degrees)  Left Hand AROM: WFL  RUE AROM (degrees)  RUE AROM : WFL  Right Hand AROM (degrees)  Right Hand AROM: WFL  LUE Strength  Gross LUE Strength: WFL  L Hand General: 4/5  LUE Strength Comment: grossly 4/5  RUE Strength  Gross RUE Strength: WFL  R Hand General: 4/5  RUE Strength Comment: grossly 4/5                   Plan   Plan  Times per week: 3-5 x/wk  Current Treatment Recommendations: Balance Training, Functional Mobility Training, Endurance Training, Cognitive Reorientation, Safety Education & Training, Patient/Caregiver Education & Training, Equipment Evaluation, Education, & procurement, Self-Care / ADL, Home Management Training    AM-PAC Score        AM-[facility] Inpatient Daily Activity Raw Score: 16 (01/11/21 1558)  AM-PAC Inpatient ADL T-Scale Score : 35.96 (01/11/21 1558)  ADL Inpatient CMS 0-100% Score: 53.32 (01/11/21 1558)  ADL Inpatient CMS G-Code Modifier : CK (01/11/21 1558)    Goals  Short term goals  Time Frame for Short term goals: By discharge, pt will:  Short term goal 1: Demo CGA for functional transfers and functional mobility with use of LRD  Short term goal 2: Demo SBA with setup for UB ADLs and grooming tasks  Short term goal 3: Demo Min A with setup and AE PRN for LB ADLs and toileting tasks  Short term goal 4: Demo 8+ minutes dynamic standing task to increase safety and independence with ADLs/IADLs  Short term goal 5: Demo I with v/d 2+ EC/WS tech to incorporate into daily ADL routine       Therapy Time   Individual Concurrent Group Co-treatment

## 2021-01-11 NOTE — CARE COORDINATION
Transitional planning, Informed Cody with [facility] [place] that pt may be discharged today. Pt will not need pre-cert. Pt will need COVID test. Waiting to hear from [facility] [place]. [phone] informed by Courtney Hodges that Josué Alvarado with [facility] need PT/OT evals for pre-cert  David Guillaume RN is perfect serving doctor for this now. 1500 PT/OT working with pt for Evals, notified Josuéjaciel Nolenbeny with [facility]. She said they would get notes and then start pre-cert. Pt can still come tonight and transport time of 5p is still good. Informed pt's CORNEL Guillaume.     1705 faxed BRIELLE/BERTHA BA:[phone]  Report [phone]  Nurse.

## 2021-01-11 NOTE — PROGRESS NOTES
Nephrology Progress Note        Subjective: Patient evaluated on dialysis. Patientt is stable on dialysis. Access cannulated without problems. No new issues overnite. Stable hemodynamics.          Objective:  CURRENT TEMPERATURE:  Temp: 97.2 °F (36.2 °C)  MAXIMUM TEMPERATURE OVER 24HRS:  Temp (24hrs), Av.4 °F (36.3 °C), Min:97.2 °F (36.2 °C), Max:97.7 °F (36.5 °C)    CURRENT RESPIRATORY RATE:  Resp: 14  CURRENT PULSE:  Pulse: 62  CURRENT BLOOD PRESSURE:  BP: (!) 169/74  24HR BLOOD PRESSURE RANGE:  Systolic (20BIJ), DFR:815 , Min:138 , MYM:874   ; Diastolic (27GQJ), BZS:48, Min:36, Max:82    24HR INTAKE/OUTPUT:      Intake/Output Summary (Last 24 hours) at 2021 1056  Last data filed at 2021 3755  Gross per 24 hour   Intake 610 ml   Output --   Net 610 ml     Patient Vitals for the past 96 hrs (Last 3 readings):   Weight   21 0756 267 lb 6.7 oz (121.3 kg)   21 0620 263 lb (119.3 kg)   21 1136 264 lb 15.9 oz (120.2 kg)         Physical Exam:  General appearance:Awake, alert, in no acute distress  Skin: warm and dry, no rash or erythema  Eyes: conjunctivae pale and sclera anicteric  ENT:no thrush, moist mucus membranes  Neck:  No JVD, midline trachea and no accessory muscle use  Pulmonary: good bilateral air entry with decreased breath sounds at the bases  Cardiovascular: positive S1 and S2, distant tones  Abdomen: obese and soft and mildly distended with positive pulse   Extremities: 2-3+ lower extremity edema with chronic cellulitic change     Access:  previous permacath    Current Medications:        heparin (porcine) injection 1,600 Units, PRN      heparin (porcine) injection 1,600 Units, PRN      insulin glargine (LANTUS) injection vial 10 Units, Nightly      ceftaroline fosamil (TEFLARO) 400 mg in dextrose 5 % 50 mL IVPB, Q12H      pantoprazole (PROTONIX) tablet 40 mg, BID AC      0.9 % sodium chloride infusion, Continuous      aspirin EC tablet 81 mg, Daily      atorvastatin (LIPITOR) tablet 40 mg, Nightly      carvedilol (COREG) tablet 12.5 mg, BID WC      citalopram (CELEXA) tablet 20 mg, Daily      hydrALAZINE (APRESOLINE) tablet 75 mg, 3 times per day      cetirizine (ZYRTEC) tablet 5 mg, Daily      memantine (NAMENDA) tablet 10 mg, BID      rivastigmine (EXELON) 9.5 MG/24HR 1 patch, Daily      sodium chloride flush 0.9 % injection 10 mL, PRN      nicotine (NICODERM CQ) 21 MG/24HR 1 patch, Daily PRN      promethazine (PHENERGAN) tablet 12.5 mg, Q6H PRN    Or      ondansetron (ZOFRAN) injection 4 mg, Q6H PRN      acetaminophen (TYLENOL) tablet 650 mg, Q6H PRN    Or      acetaminophen (TYLENOL) suppository 650 mg, Q6H PRN      insulin lispro (HUMALOG) injection vial 0-12 Units, TID WC      insulin lispro (HUMALOG) injection vial 0-6 Units, Nightly      glucose (GLUTOSE) 40 % oral gel 15 g, PRN      dextrose 50 % IV solution, PRN      glucagon (rDNA) injection 1 mg, PRN      dextrose 5 % solution, PRN      heparin (porcine) injection 5,000 Units, 3 times per day      Labs:   CBC:   Recent Labs     01/10/21  1046 01/11/21  0803   WBC 5.4 5.4   RBC 2.71* 2.58*   HGB 8.1* 7.7*   HCT 27.0* 25.3*    172   MPV 10.3 10.0      BMP:   Recent Labs     01/09/21  0803 01/10/21  1046 01/11/21  0803    131* 136   K 4.0 4.2 4.1   CL 98 94* 98   CO2 30 29 29   BUN 48* 33* 37*   CREATININE 2.42* 2.10* 2.51*   GLUCOSE 113* 116* 149*   CALCIUM 8.2* 8.5* 8.1*        Phosphorus:  No results for input(s): PHOS in the last 72 hours. Magnesium: No results for input(s): MG in the last 72 hours. Albumin: No results for input(s): LABALBU in the last 72 hours. Dialysis bath: Dialysis Bath  K+ (Potassium): 3  Ca+ (Calcium): 2.25  Na+ (Sodium): 137  HCO3 (Bicarb): 35    Radiology:  Reviewed as available. Assessment:  1 ESRD:dialysis bath reviewed with nurse. Patient initiated on hemodialysis   2. Total body volume overload, initiated on dialysis  3.  Morbid obesity  4  Edema

## 2021-01-11 NOTE — PROGRESS NOTES
[facility]  Office: [address], DO, Dawna Lesches, DO, Jazmyn Fuentes, DO, Kentrell Stout, DO, Elda Jacobs MD, Brenna Porter MD, Nieves Peña MD, Home Cintron MD, Gerson Bernabe MD, Milena Smith MD, Bon Escalante MD, Breanna Middleton MD, Lopez Mercer MD, Noe Lopez DO, Chan Hitchcock MD, Clarissa Givens MD, Lynne Arce DO, Hunter Jones MD,  Aimee Sen DO, Shanita Negro MD, Annika Pulido MD, Hosea Franco, [facility], [facility], [facility], Karen Weiner, CNP, Sonam Corbett, CNS, Josselin Ortega, CNP, Blas Nguyễn, CNP, Vero Mccallum, CNP, Jennifer Adair, CNP, Brook Cameron, CNP, Eugene Katz PA-C, Iglesia Schultz, [facility], Eric Gimenez, CNP, Jason Kunz, CNP, [facility], CNP, Marybel Meyer, CNP, Nelly Alba, [address] [place] [address]    Progress Note    1/11/2021    11:44 AM    Name:   Gopal Villafana  MRN:     [id number]     Acct:      [de-identified]   Room:   [address] [place] [address] Day:  5  Admit Date:  1/6/2021  5:55 PM    PCP:   Thea Litten, PA  Code Status:  Full Code    Subjective:     C/C: Volume overload. Interval History Status: improved. Seen and examined at bedside. No acute events overnight. She denies any chest pain, shortness of breath, difficulty breathing, nausea, vomiting, diarrhea. Review of Systems:     Constitutional:  negative for chills, fevers, sweats  Respiratory:  negative for cough, dyspnea on exertion, no shortness of breath, no wheezing  Cardiovascular:  negative for chest pain, chest pressure/discomfort, improving lower extremity edema, palpitations  Gastrointestinal:  negative for abdominal pain, constipation, diarrhea, nausea, vomiting  Neurological:  negative for dizziness, headache    Medications: Allergies:     Allergies   Allergen Reactions    Bactrim [Sulfamethoxazole-Trimethoprim] Hives    Clonidine Derivatives     Amoxicillin-Pot Clavulanate Diarrhea, Nausea Only and Nausea And Vomiting       Current Meds:   Scheduled Meds:    insulin glargine  10 Units Subcutaneous Nightly    ceftaroline fosamil (TEFLARO) IVPB  400 mg Intravenous Q12H    pantoprazole  40 mg Oral BID AC    aspirin  81 mg Oral Daily    atorvastatin  40 mg Oral Nightly    carvedilol  12.5 mg Oral BID WC    citalopram  20 mg Oral Daily    hydrALAZINE  75 mg Oral 3 times per day    cetirizine  5 mg Oral Daily    memantine  10 mg Oral BID    rivastigmine  1 patch Transdermal Daily    insulin lispro  0-12 Units Subcutaneous TID WC    insulin lispro  0-6 Units Subcutaneous Nightly    heparin (porcine)  5,000 Units Subcutaneous 3 times per day     Continuous Infusions:    sodium chloride 20 mL/hr at 01/08/21 0800    dextrose       PRN Meds: heparin (porcine), heparin (porcine), sodium chloride flush, nicotine, promethazine **OR** ondansetron, acetaminophen **OR** acetaminophen, glucose, dextrose, glucagon (rDNA), dextrose    Data:     Past Medical History:   has a past medical history of Anxiety and depression, Background diabetic retinopathy(362.01), Balance problem, CAD (coronary artery disease), Cancer of uterus ([facility] Chronic kidney disease, Chronic low back pain, CVA (cerebral vascular accident) ([facility] Dementia ([facility] Dry eye syndrome, GERD (gastroesophageal reflux disease), Glaucoma suspect, Gout, Homonymous hemianopsia, Hyperlipidemia, Hypertension, Keratitis, Obesity, Peripheral polyneuropathy, Pseudophakos, Stroke ([facility] Trichiasis, and Type 2 diabetes mellitus ([facility] Social History:   reports that she has never smoked. She has never used smokeless tobacco. She reports that she does not drink alcohol or use drugs.      Family History:   Family History   Problem Relation Age of Onset    Diabetes Mother     Cancer Mother     High Blood Pressure Mother     Stroke Mother     Kidney Disease Mother     Uterine Cancer Mother     Diabetes Father     Heart Disease Father    Orion Beatriz Glaucoma Father     Emphysema Father     Coronary Art Dis Other         All 4 siblings.  Stroke Other         1 sibling.  Lung Cancer Other         1 sibling - cause of death lung cancer.  Mental Retardation Other        Vitals:  BP (!) 167/70   Pulse 60   Temp 97.2 °F (36.2 °C)   Resp 14   Wt 267 lb 6.7 oz (121.3 kg)   SpO2 99%   BMI 41.88 kg/m²   Temp (24hrs), Av.4 °F (36.3 °C), Min:97.2 °F (36.2 °C), Max:97.7 °F (36.5 °C)    Recent Labs     01/10/21  0758 01/10/21  1153 01/10/21  1638 01/10/21  2050   POCGLU 99 122* 153* 153*       I/O (24Hr):     Intake/Output Summary (Last 24 hours) at 2021 1144  Last data filed at 2021 0620  Gross per 24 hour   Intake 610 ml   Output --   Net 610 ml       Labs:  Hematology:  Recent Labs     01/10/21  1046 21  0803   WBC 5.4 5.4   RBC 2.71* 2.58*   HGB 8.1* 7.7*   HCT 27.0* 25.3*   MCV 99.6 98.1   MCH 29.9 29.8   MCHC 30.0 30.4   RDW 16.4* 16.2*    172   MPV 10.3 10.0   SEDRATE  --  16   CRP  --  11.8*     Chemistry:  Recent Labs     21  0803 01/10/21  1046 21  0803    131* 136   K 4.0 4.2 4.1   CL 98 94* 98   CO2 30 29 29   GLUCOSE 113* 116* 149*   BUN 48* 33* 37*   CREATININE 2.42* 2.10* 2.51*   ANIONGAP 8* 8* 9   LABGLOM 20* 23* 19*   GFRAA 24* 28* 23*   CALCIUM 8.2* 8.5* 8.1*     Recent Labs     21  1640 21  2038 01/10/21  0758 01/10/21  1153 01/10/21  1638 01/10/21  2050   POCGLU 143* 171* 99 122* 153* 153*     ABG:  Lab Results   Component Value Date    POCPH 7.284 2020    POCPCO2 54.5 2020    POCPO2 71.6 2020    POCHCO3 25.8 2020    NBEA 1 2020    PBEA NOT REPORTED 2020    SJJ8HSV 28 2020    AOMX5ZUR 92 2020    FIO2 2.0 2020     Lab Results   Component Value Date/Time    SPECIAL NOT REPORTED 2021 09:12 AM     Lab Results   Component Value Date/Time    CULTURE NO GROWTH 2021 09:12 AM       Radiology:  Us Renal Complete    Result Date: 1/6/2021  Unremarkable ultrasound of the right kidney, nonvisualization left kidney. Decompressed urinary bladder. Right upper quadrant ascites. Xr Chest Portable    Result Date: 1/6/2021  Stable cardiomegaly with mild pulmonary edema which is less prominent. Hazy perihilar bibasilar opacities which is more prominent on the right and has decreased. Probable small bibasilar pleural effusions which is more prominent on the right and have decreased. Physical Examination:        General appearance:  alert, cooperative and no distress  Mental Status:  oriented to person, place and time and normal affect  Lungs: Breath sounds at the bases, coarse to auscultation. Heart:  regular rate and rhythm, no murmur  Abdomen:abdominal and lower extremity swelling   Extremities: Diffuse anasarca with bilateral pitting edema to the abdomen  Skin:  no gross lesions, rashes, induration    Assessment:        Hospital Problems           Last Modified POA    * (Principal) Chronic renal failure syndrome 1/7/2021 Yes    Dementia ([facility] 1/7/2021 Yes    Overview Signed 2/27/2014 10:21 AM by Karina Hilton MD     (moderate)         Hypertension 1/7/2021 Yes    Type 2 diabetes mellitus with hyperglycemia, with long-term current use of insulin ([address] 1/7/2021 Yes    Acute on chronic diastolic heart failure ([address] 1/7/2021 Yes    Anasarca 1/7/2021 Yes    Gram-negative bacteremia 1/7/2021 Yes    MRSA bacteremia 1/7/2021 Yes        Brief History:         Plan:        Acute Kidney injury on CKD: Now dialysis dependent. Tunneled cath placed 1/7/20. Pt did have HD on 1/8/20 with 1200 cc of UF and again on 1/9 with 3L removed. Repeat HD on 1/11 Case management setting pt up with outpt HD.    Acute on Chronic Diastolic Heart Failure: Nephrology to manage, continue daily weights, and strict I&O, will need rodriguez catheter changed pending insertion  MRSA and Gram Negative Bacteremia: Continue IV ceftaroline through 1/18/21  NSTEMI type II: 2/2 demand ischemia likely 2/2 CKD/AMY. Troponin down trending. Doubt ACS  DMII: continue medium intensity ss, continue lantus 10 unitsmonitor for s/s hypo/hyperglycemia  Anemia of Chronic Disease: stable at baseline  Chronic gastritis:  Protonix BID per GI recommendations for 6 weeks then switch to once daily. Anasarca  HTN: controlled at present  CAD s/p CABG: on statis, ASA, plavix  Dementia: no behavioral disturbance, continue namenda and exelon patch  DVT/PPI prophylaxis  Full Code    Dispo:  Stable for discharge today   Fernando Pfeiffer DO   1/11/2021  11:44 AM

## 2021-01-11 NOTE — PROGRESS NOTES
Cholecystectomy; Tonsillectomy and adenoidectomy; Eye surgery (Left); Upper gastrointestinal endoscopy (12/14/2020); and IR TUNNELED CVC PLACE WO SQ PORT/PUMP > 5 YEARS (1/8/2021). Restrictions  Restrictions/Precautions  Restrictions/Precautions: Fall Risk  Required Braces or Orthoses?: No  Position Activity Restriction  Other position/activity restrictions: Up with assist  Vision/Hearing  Vision: Within Functional Limits     Subjective  General  Chart Reviewed: Yes  Patient assessed for rehabilitation services?: Yes  Family / Caregiver Present: No  Follows Commands: Within Functional Limits  Pain Screening  Patient Currently in Pain: Denies  Vital Signs  Patient Currently in Pain: Denies       Orientation  Orientation  Overall Orientation Status: Within Functional Limits  Social/Functional History  Social/Functional History  Lives With: Daughter(home at all times)  Type of Home: House  Home Layout: One level  Home Access: Stairs to enter without rails  Entrance Stairs - Number of Steps: 1  Bathroom Shower/Tub: Tub/Shower unit  Bathroom Toilet: Standard  Bathroom Equipment: Shower chair  Home Equipment: Rolling walker, Oxygen(2L O2)  ADL Assistance: Needs assistance(pt reports needing assistance with dressing/bathing tasks \"sometimes\")  Homemaking Assistance: Needs assistance(daughter and grandson complete)  Homemaking Responsibilities: No  Ambulation Assistance: Independent  Transfer Assistance: Independent  Active : No  Patient's  Info: daughter's boyfriend  Leisure & Hobbies: \"sometimes I sit at the table and sometimes I rest\"  Cognition   Cognition  Overall Cognitive Status: Exceptions  Arousal/Alertness: Delayed responses to stimuli  Following Commands: Follows one step commands with repetition; Follows one step commands with increased time  Attention Span: Appears intact  Problem Solving: Assistance required to identify errors made;Assistance required to implement solutions;Assistance required to correct errors made;Assistance required to generate solutions  Initiation: Requires cues for some  Sequencing: Requires cues for some    Objective     Observation/Palpation  Observation: 2 1/2 LPM oxygen through nasal cannula  Edema: thighs, legs with dense pitting edema. Arms, forearms with dense, pitting edema       Strength RLE  Strength RLE: Exception  R Hip Flexion: 2+/5  R Knee Flexion: 3-/5  R Knee Extension: 3/5  R Ankle Dorsiflexion: 3/5  R Ankle Plantar flexion: 3/5  Strength LLE  Strength LLE: Exception  L Hip Flexion: 3-/5  L Knee Flexion: 3/5  L Knee Extension: 3/5  L Ankle Dorsiflexion: 3+/5  L Ankle Plantar Flexion: 3+/5     Sensation  Overall Sensation Status: Impaired  Light Touch: Partial deficits in the LLE;Partial deficits in the RLE  Bed mobility  Rolling to Left: Moderate assistance  Rolling to Right: Moderate assistance  Supine to Sit: Moderate assistance  Sit to Supine: Moderate assistance  Transfers  Sit to Stand: Minimal Assistance;2 Person Assistance  Stand to sit: Minimal Assistance;2 Person Assistance  Ambulation  Ambulation?: Yes  Ambulation 1  Surface: level tile  Device: Rolling Walker  Assistance: Minimal assistance  Quality of Gait: Her thighs are so edematous that her standing position must be overly wide  Gait Deviations: Increased RACIEL; Decreased step height  Distance: 1' toward head of bed after standing for 3 minutes     Balance  Standing - Static: Fair;-  Standing - Dynamic: Poor;+  Comments: With no challenges, she can stand with two hands on walker and have no losses of balance. She is unable to reach, lean, or accept challenges without assistance.         Plan   Plan  Times per week: 5-6x/wk  Current Treatment Recommendations: Strengthening, Gait Training, Functional Mobility Training, Endurance Training, Home Exercise Program, Transfer Training, Safety Education & Training, Patient/Caregiver Education & Training  Safety Devices  Type of devices: Gait belt, All fall risk precautions in place, Call light within reach, Left in bed, Nurse notified, Bed alarm in place    AM-PAC Score  AM-PAC Inpatient Mobility Raw Score : 11 (01/11/21 1550)  AM-PAC Inpatient T-Scale Score : 33.86 (01/11/21 1550)  Mobility Inpatient CMS 0-100% Score: 72.57 (01/11/21 1550)  Mobility Inpatient CMS G-Code Modifier : CL (01/11/21 1550)          Goals  Short term goals  Time Frame for Short term goals: 14 visits  Short term goal 1: Supine to/from sit with SBA  Short term goal 2: Sit to/from stand with min A. Short term goal 3: Amb 22' with RW with CGA.   Patient Goals   Patient goals : Get better       Therapy Time   Individual Concurrent Group Co-treatment   Time In 2370         Time Out 8042         Minutes 29         Timed Code Treatment Minutes: 8 Minutes       Mary Baez, PT

## 2021-01-11 NOTE — DISCHARGE INSTR - COC
Continuity of Care Form    Patient Name: Agustín Higgins   :  1950  MRN:  [id number]    Admit date:  2021  Discharge date: 2021    Code Status Order: Full Code   Advance Directives:      Admitting Physician:  Sabas Katz DO  PCP: ERINN Marquez    Discharging Nurse: [facility] Unit/Room#: 2421/0910-49  Discharging Unit Phone Number: [phone]    Emergency Contact:   Extended Emergency Contact Information  Primary Emergency Contact: Rafiq Bob  Address: [address] [place] Phone: [phone]  Mobile Phone: [phone]  Relation: Child    Past Surgical History:  Past Surgical History:   Procedure Laterality Date    CATARACT REMOVAL WITH IMPLANT  2012    (Right eye) - Dr. Mendez Evans.  CATARACT REMOVAL WITH IMPLANT  2012    (Left eye) - Dr. Mendez Evans.   SECTION      CHOLECYSTECTOMY      CORONARY ARTERY BYPASS GRAFT  12-    (x4) - LIMA-LAD, SVG-diagnoal 1, SVG-OM1, and SVG-PDA. Exploration of left radial. (Dr. Ton Avery).  EYE SURGERY Left     as a child     GASTRIC BYPASS SURGERY      HYSTERECTOMY      (abdominal)  with left oophorectomy. Right ovary retained.     IR TUNNELED CATHETER PLACEMENT GREATER THAN 5 YEARS  2021    IR TUNNELED CATHETER PLACEMENT GREATER THAN 5 YEARS 2021 Alma Dyer MD [facility] SPECIAL PROCEDURES    TONSILLECTOMY AND ADENOIDECTOMY      UPPER GASTROINTESTINAL ENDOSCOPY  2020    EGD BIOPSY [facility] by [facility]ra Seay [facility] at [facility]       Immunization History:   Immunization History   Administered Date(s) Administered    Influenza Virus Vaccine 10/20/1998, 10/12/2013, 10/27/2014, 10/12/2016    Influenza, High Dose (Fluzone 65 yrs and older) 2018, 10/05/2020    Influenza, Triv, inactivated, subunit, adjuvanted, IM (Fluad 65 yrs and older) 2019    Pneumococcal Conjugate 13-valent (Abdirahman Ghazi) 2017    Pneumococcal Polysaccharide ([id number]) 02/06/2004, 08/21/2018    Tdap (Boostrix, Adacel) 09/04/2016    Zoster Recombinant (Shingrix) 01/03/2020       Active Problems:  Patient Active Problem List   Diagnosis Code    Dementia ([facility] F03.90    Hypertension I10    Hyperlipidemia E78.5    Class 2 severe obesity with serious comorbidity and body mass index (BMI) of 37.0 to 37.9 in adult ([facility]) E66.01, Z68.37    Gout M10.9    Peripheral polyneuropathy G62.9    Anxiety and depression F41.9, F32.9    Acute kidney injury superimposed on CKD ([facility]) N17.9, N18.9    Balance problem R26.89    CVA (cerebral vascular accident) ([facility] I63.9    Type 2 diabetes mellitus with hyperglycemia, with long-term current use of insulin ([facility]) E11.65, Z79.4    Acute on chronic diastolic heart failure ([facility]) I50.33    Acute cystitis N30.00    Infestation by bed bug B88.8    Infestation by maggots B87.9    Lymphedema of both lower extremities I89.0    PAD (peripheral artery disease) ([facility]) I73.9    Pressure injury of right heel, unstageable ([facility]) L89.610    Acute kidney injury ([facility] N17.9    Bradycardia R00.1    Hyperkalemia E87.5    AV block, 1st degree I44.0    Wounds, multiple T07. XXXA    Buttock wound S31.809A    Decubitus ulcer of trochanteric region of right hip, stage 2 ([facility]) Z31.090    Decompensated heart failure ([facility]) I50.9    Acute anemia D64.9    Acute renal failure (ARF) ([facility]) N17.9    Anasarca R60.1    Gram-negative bacteremia R78.81    Stage 3 chronic kidney disease N18.30    MRSA bacteremia R78.81, B95.62    Chronic renal failure syndrome N18.9       Isolation/Infection:   Isolation            Contact          Patient Infection Status       Infection Onset Added Last Indicated Last Indicated By Review Planned Expiration Resolved Resolved By    MRSA 12/15/20 12/18/20 12/15/20 Culture, Blood 1        Resolved    COVID-19 Rule Out 08/20/20 08/20/20 08/21/20 COVID-19 (Ordered)   08/21/20 Rule-Out Test Therapy:  is on oxygen at 4 L/min per nasal cannula. Ventilator:    - No ventilator support    Rehab Therapies: Physical Therapy and Occupational Therapy  Weight Bearing Status/Restrictions: No weight bearing restirctions  Other Medical Equipment (for information only, NOT a DME order):  ***  Other Treatments: ***    Patient's personal belongings (please select all that are sent with patient):  None    RN SIGNATURE:  Electronically signed by Erika Rodrigues RN on 1/11/21 at 11:32 AM EST    CASE MANAGEMENT/SOCIAL WORK SECTION    Inpatient Status Date: ***    Readmission Risk Assessment Score:  Readmission Risk              Risk of Unplanned Readmission:        39           Discharging to Facility/ Agency   · Name: [facility]  · Address:  · Phone: [phone]  · Fax:    Dialysis Facility (if applicable)   · Name:  [facility]  · Address: [address] [place] [address] [address]. [place], [place]   · Dialysis Schedule: Wed 1/13 to arrive at 2:45pm, Starting Sat 1/16 pt to arrive at 6:15am and will be T,TH,S  · Phone: [phone]  · Fax: [phone]    / signature: Electronically signed by Michelle Paz RN on 1/11/21 at 3:32 PM EST    PHYSICIAN SECTION    Prognosis: Fair    Condition at Discharge: Stable    Rehab Potential (if transferring to Rehab): Fair    Recommended Labs or Other Treatments After Discharge: Patient will need continued follow-up with infectious disease in 1 week's time for reevaluation of MRSA bacteremia after completing course of ceftaroline. She will need evaluation by [place] [facility] for management of her CHF, and CAD. She will need to follow-up with gastroenterology for management of gastritis. Continue taking PPI as prescribed.   She will need continued follow-up with nephrology for hemodialysis    Physician Certification: I certify the above information and transfer of Kenisha Washington  is necessary for the continuing treatment of the diagnosis listed and that she requires [facility] for greater 30 days.      Update Admission H&P: No change in H&P    PHYSICIAN SIGNATURE:  Electronically signed by Faith Wylie DO on 1/11/21 at 1:25 PM EST

## 2021-01-11 NOTE — PROGRESS NOTES
Pharmacy Note  Vancomycin Consult    Harleen Espinoza is a 72 y.o. female started on Vancomycin for septicemia; consult received from Dr. Bere Infante, to manage therapy. Also receiving the following antibiotics: none.     Patient Active Problem List   Diagnosis    Dementia ([facility]    Hypertension    Hyperlipidemia    Class 2 severe obesity with serious comorbidity and body mass index (BMI) of 37.0 to 37.9 in adult [facility])    Gout    Peripheral polyneuropathy    Anxiety and depression    Acute kidney injury superimposed on CKD ([facility]    Balance problem    [facility]) [facility]    Type 2 diabetes mellitus with hyperglycemia, with long-term current use of insulin ([facility])    Acute on chronic diastolic heart failure ([facility]    Acute cystitis    Infestation by bed bug    Infestation by maggots    Lymphedema of both lower extremities    PAD (peripheral artery disease) ([facility])    Pressure injury of right heel, unstageable ([facility])    Acute kidney injury ([facility]    Bradycardia    Hyperkalemia    AV block, 1st degree    Wounds, multiple    Buttock wound    Decubitus ulcer of trochanteric region of right hip, stage 2 ([facility])    Decompensated heart failure ([facility])    Acute anemia    Acute renal failure (ARF) ([facility])    Stage 3 chronic kidney disease    MRSA bacteremia    Chronic renal failure syndrome       Allergies:  Bactrim [sulfamethoxazole-trimethoprim], Clonidine derivatives, and Amoxicillin-pot clavulanate     Temp max: 98    Recent Labs     01/10/21  1046 01/11/21  0803   BUN 33* 37*       Recent Labs     01/10/21  1046 01/11/21  0803   CREATININE 2.10* 2.51*       Recent Labs     01/10/21  1046 01/11/21  0803   WBC 5.4 5.4         Intake/Output Summary (Last 24 hours) at 1/11/2021 1340  Last data filed at 1/11/2021 1151  Gross per 24 hour   Intake 910 ml   Output 4000 ml   Net -3090 ml       Culture Date      Source                       Results  See micro     Ht Readings from Last 1 Encounters:   01/06/21 5' 7\" (1.702 m)        Wt Readings from Last 1 Encounters:   01/11/21 259 lb 11.2 oz (117.8 kg)         Body mass index is 40.68 kg/m². CrCl cannot be calculated (Unknown ideal weight.). CrCl is 25 ml/min using IBW of 66 kg    Goal Trough Level: 15-20 mcg/mL    Assessment/Plan:  Will stop Ceftoraline due to shortage. Will start 1750 mg IV every 35 hours. Timing of trough level will be determined based on culture results, renal function, and clinical response. Thank you for the consult. Will continue to follow.      Gavin Hilario, PharmD, BCPS  1/11/21  1:43 pm

## 2021-01-11 NOTE — DISCHARGE SUMMARY
[facility]  Office: [address], DO, Ludy Combs, DO, Suresh Rayo, DO, Bethany Garcia Girish, DO, Gifty Drummond MD, Sheridan Gutierrez MD, Heidy Ness MD, Tricia Graff MD, Isabella Tate MD, Viral Gan MD, Lenore Dubois MD, Marco A Anderson MD, Lopez Baker MD, De Iraheta DO, Sebastian Moses MD, Preethi Ramos MD, Dejuan Rhodes DO, Judi Warren MD,  Yobani Moss DO, Perry Denny MD, Alli Franks MD, Estuardo Lopez [facility], [facility], CNP, Obed Westfall, CNP, Marisa Greer, CNS, Wu Estrada, CNP, Soy Smith, CNP, Fan Nelson, CNP, Roosevelt Ruth, CNP, Jl Aburto, CNP, Pedro Smalls PA-C, Kaye Ralph, [facility], Katty Ram, CNP, Gumaro Orozco, CNP, Eulalio Frausto, CNP, Orion Man, CNP, Beth Romo, [facility]    Discharge Summary     Patient ID: Racheal Kaur  :  1950   MRN: [id number]     ACCOUNT:  [de-identified]   Patient's PCP: ERINN Ontiveros  Admit Date: 2021   Discharge Date: 2021     Length of Stay: 5  Code Status:  Full Code  Admitting Physician: Alisia Palmer DO  Discharge Physician: Alisia Palmer DO     Active Discharge Diagnoses:     Hospital Problem Lists:  Principal Problem:    Chronic renal failure syndrome  Active Problems:    Dementia ([facility]    Hypertension    Type 2 diabetes mellitus with hyperglycemia, with long-term current use of insulin ([facility]    Acute on chronic diastolic heart failure ([facility] [facility]    MRSA bacteremia  Resolved Problems:    Anasarca    Gram-negative bacteremia      Admission Condition:  stable     Discharged Condition: stable    Hospital Stay:     Hospital Course:  Racheal Kaur is a 72 y.o. female who was transferred to our hospital from her SNF due to anasarca and volume overload. Patient was admitted and evaluated by nephrology who attempted to diurese her without success.   Ultimately, decision was made to start patient on hemodialysis. She received dialysis on 1/7/2020, 1/8/2020, 1/11/2020. Patient tolerated this well without issues. Remainder of patient's stay relatively unremarkable. Currently, patient is medically stable for discharge. She will need to follow-up and [place] for continued hemodialysis. Otherwise, patient was previously diagnosed with MRSA bacteremia during her last hospitalization at which point she was started on IV ceftaroline until January 18, 2021. Patient will be discharged on IV antibiotics for continuation of therapy. She will need follow-up with nephrology, infectious disease and her primary care physician for management of her ESRD, MRSA bacteremia, diabetes, and hypertension. Significant therapeutic interventions: Tunneled catheter placed, patient initiated on hemodialysis    Significant Diagnostic Studies:   Labs / Micro:  CBC:   Lab Results   Component Value Date    WBC 5.4 01/11/2021    RBC 2.58 01/11/2021    HGB 7.7 01/11/2021    HCT 25.3 01/11/2021    MCV 98.1 01/11/2021    MCH 29.8 01/11/2021    MCHC 30.4 01/11/2021    RDW 16.2 01/11/2021     01/11/2021     BMP:    Lab Results   Component Value Date    GLUCOSE 149 01/11/2021     01/11/2021    K 4.1 01/11/2021    CL 98 01/11/2021    CO2 29 01/11/2021    ANIONGAP 9 01/11/2021    BUN 37 01/11/2021    CREATININE 2.51 01/11/2021    BUNCRER NOT REPORTED 01/11/2021    CALCIUM 8.1 01/11/2021    LABGLOM 19 01/11/2021    GFRAA 23 01/11/2021    GFR      01/11/2021    GFR NOT REPORTED 01/11/2021        Radiology:  Us Renal Complete    Result Date: 1/6/2021  Unremarkable ultrasound of the right kidney, nonvisualization left kidney. Decompressed urinary bladder. Right upper quadrant ascites. Xr Chest Portable    Result Date: 1/6/2021  Stable cardiomegaly with mild pulmonary edema which is less prominent. Hazy perihilar bibasilar opacities which is more prominent on the right and has decreased.  Probable small bibasilar pleural effusions which is more prominent on the right and have decreased. Ir Tunneled Cvc Place Wo Sq Port/pump > 5 Years    Result Date: 1/9/2021  Successful placement of right IJ tunneled PermCath. Tip in the SVC right atrial junction. Catheter is okay for use. Consultations:    Consults:     Final Specialist Recommendations/Findings:   IP CONSULT TO NEPHROLOGY  IP CONSULT TO INFECTIOUS DISEASES  IP CONSULT TO SOCIAL WORK  IP CONSULT TO CASE MANAGEMENT  PHARMACY TO DOSE VANCOMYCIN      The patient was seen and examined on day of discharge and this discharge summary is in conjunction with any daily progress note from day of discharge. Discharge plan:     Disposition: To a non-[facility] facility    Physician Follow Up:     Erica Byrne, [address]  [address]  [phone]    In 3 days  Hospital follow-up and reevaluation. [facility]   [address]  [phone]    managment of gastritis    [facility]  [facility] [address] [phone]  Schedule an appointment as soon as possible for a visit  managment of [address] [facility], [facility].  [address] [place] [address] [phone]    In 1 week  Reevaluation of MRSA bacteremia     [facility] [address], [address] [address]    Management of CKD       Requiring Further Evaluation/Follow Up POST HOSPITALIZATION/Incidental Findings: Patient will need continued follow-up with infectious disease in 1 week's time for reevaluation of MRSA bacteremia after completing course of ceftaroline. She will need evaluation by [facility] for management of her CHF, and CAD. She will need to follow-up with gastroenterology for management of gastritis. Continue taking PPI as prescribed.   She will need continued follow-up with nephrology for hemodialysis    Diet: renal diet    Activity: As tolerated    Instructions to Patient: see above    Discharge Medications:      Medication List      START taking these medications    pantoprazole 40 MG tablet  Commonly known as: PROTONIX  Take 1 tablet by mouth 2 times daily (before meals)     vancomycin  infusion  Commonly known as: VANCOCIN  Infuse 1,750 mg intravenously every 36 hours for 3 doses Compound per protocol. CHANGE how you take these medications    insulin glargine 100 UNIT/ML injection vial  Commonly known as: Lantus  Inject 10 Units into the skin nightly  What changed: how much to take        CONTINUE taking these medications    Alcohol Prep Pads  Checks blood sugar 3 times a day     aspirin 81 MG tablet  Take 1 tablet by mouth daily     atorvastatin 40 MG tablet  Commonly known as: LIPITOR  TAKE 1 TABLET BY MOUTH EVERYDAY     blood glucose test strips  Test 3  times a day & as needed for symptoms of irregular blood glucose. Dispense sufficient amount for indicated testing frequency plus additional to accommodate PRN testing needs. carvedilol 12.5 MG tablet  Commonly known as: COREG  Take 1 tablet by mouth 2 times daily (with meals)     citalopram 20 MG tablet  Commonly known as: CELEXA  Take 1 tablet by mouth daily     clopidogrel 75 MG tablet  Commonly known as: Plavix  Take 1 tablet by mouth daily     Compression Stockings Misc  by Does not apply route 20-30 mm Hg bilateral knee high     Diabetic Shoe Misc  by Does not apply route     hydrALAZINE 50 MG tablet  Commonly known as: APRESOLINE  Take 1.5 tablets by mouth every 8 hours     insulin lispro 100 UNIT/ML injection vial  Commonly known as: HumaLOG  Use 4 times a day per sliding scale     Lancets Misc  Checks blood sugar ac and HS     loratadine 10 MG tablet  Commonly known as: CLARITIN  TAKE 1 TABLET(10 MG) BY MOUTH DAILY     memantine 10 MG tablet  Commonly known as: NAMENDA  TAKE 1 TABLET BY MOUTH TWICE DAILY     Misc.  Devices Misc  Standard walker with wheels    Diagnosis dementia, CHF     nystatin 778241 UNIT/GM cream  Commonly known as: MYCOSTATIN  Apply topically 2 times daily. oxybutynin 5 MG tablet  Commonly known as: DITROPAN  TAKE 1 TABLET BY MOUTH TWICE DAILY     OXYGEN  Oxgen at 2 liters per nasal cannula     rivastigmine 9.5 MG/24HR  Commonly known as: Exelon  APPLY 1 NEW PATCH EVERY 24 HOURS     senna 8.6 MG tablet  Commonly known as: Senokot  Take 1 tablet by mouth 2 times daily        STOP taking these medications    bumetanide 2 MG tablet  Commonly known as: BUMEX     ceftaroline fosamil 400 MG Solr  Commonly known as: TEFLARO     omeprazole 20 MG delayed release capsule  Commonly known as: PRILOSEC           Where to Get Your Medications      Information about where to get these medications is not yet available    Ask your nurse or doctor about these medications  insulin glargine 100 UNIT/ML injection vial  pantoprazole 40 MG tablet  vancomycin  infusion         No discharge procedures on file. Time Spent on discharge is 35 min patient examination, evaluation, counseling as well as medication reconciliation, prescriptions for required medications, discharge plan and follow up. Electronically signed by   Sebastian Mcdonnell DO  1/11/2021  4:50 PM      Thank you Dr. Thea Litten, PA for the opportunity to be involved in this patient's care.

## 2021-01-11 NOTE — PROGRESS NOTES
Regular Covid 19 swab taken from right nare, labeled, placed in red dot bag, and handed off to second healthcare worker outside of room for transport to laboratory per hospital policy and procedure. Patient tolerated procedure well. Nurse notified of completed swab.

## 2021-01-11 NOTE — PROGRESS NOTES
[facility] - Progress Note    Today's Date and Time: 1/11/2021, 2:31 PM    Impression :   · Recent hx of MRSA and Sphingomonas septicemia  · Chronic renal failure  · Diastolic CHF  · Type 2 DM    Recommendations:   · Discontinue ceftaroline because of medication shortage  · Vancomycin 1750 mg every 36 hr to complete 4 week course on 1/18/21 for MRSA septicemia    Medical Decision Making/Summary/Discussion:1/11/2021     ·   Infection Control Recommendations   · [place] Precautions  · Contact Isolation     Antimicrobial Stewardship Recommendations     · Simplification of therapy  · Targeted therapy  ·   Coordination of Outpatient Care:   · Estimated Length of IV antimicrobials: 1-18-21  · Patient will need Midline Catheter Insertion: no  · Patient will need PICC line Insertion: Yes  · Patient will need: Home IV , [place],  SNF,  LTAC:TBd  · Patient will need outpatient wound care:No    Chief complaint/reason for consultation:   · Recent MRSA and Sphingomonas bacteremia      History of Present Illness:   Chrissy Valencia is a 72y.o.-year-old  female with history of recent MRSA and Sphingomonas bacteremia, CKD, CAD s/p CABG, CVA who was initially admitted on 1/6/2021. Patient seen at the request of Dr. Birttanie Pace. INITIAL HISTORY 1/7/21    Pt was instructed to go to the [facility] ED due to abnormal lab values. Pt states she is not sure what lab values were abnormal. She was recently admitted to [facility] for CHF exacerbation and was discharged to SNF on 12/23/2020. During this stay, she was diagnosed with MRSA and Sphingomonas septicemia. She was started on ceftaroline until 1/18/21. She states she continued to receive the antibiotic while at the SNF via Rt PICC line (placed 12/23/2020). She states she had no symptoms when she was instructed to return to the ED. She currently denies fevers, chills, chest pain, cough, nausea, vomiting, or diarrhea.      Initial labs upon readmission showed BUN 67, Cr 3.55, proBNP 32,310, WBC 5.5, Hb 8.1, platelets 110. Urine culture was collected 1/7/21. CURRENT EVALUATION 1/11/2021     Afebrile  VS stable    The patient seen and evaluated at bedside. Patient is better with no new acute issues or concerns today. She notes baseline shortness of breath. Denies fevers, chills, chest pain, cough, nausea, vomiting, or diarrhea. Rt PICC line is in place and looks clean. Labs, X rays reviewed: 1/11/2021    BUN: 67-->62-->37  Cr: 3.66-->3.17-->2.10-->2. 51    WBC:5.5-->6.3-->7.5-->5.4  Hb:8.1-->7.6-->8.1-->7.7  Plat: 203-->209-->246-->179-->172    Cultures:  Urine:  · 1-7-21: No growth  ·   Blood:  · 12-20-20: No growth  ·   Sputum :  ·   Wound:  ·     Discussed with patient, RN. I have personally reviewed the past medical history, past surgical history, medications, social history, and family history, and I have updated the database accordingly. Past Medical History:     Past Medical History:   Diagnosis Date    Anxiety and depression     Background diabetic retinopathy(362.01) 2008    (Mild)    Balance problem     CAD (coronary artery disease)     (with coronary artery bypass graft x4).  Cancer of uterus [facility])     history of, (probable cure)    Chronic kidney disease     Chronic low back pain     CVA (cerebral vascular accident) ([address]     Dementia ([facility] [address] [address]     (moderate)    Dry eye syndrome     GERD (gastroesophageal reflux disease)     Glaucoma suspect 2005    Gout     Homonymous hemianopsia 2008    (Right)    Hyperlipidemia     Hypertension     Keratitis     (secondary to dry eye syndrome).  Obesity     Peripheral polyneuropathy     (diabetic)    Pseudophakos     (Right Eye: 05-; Left Eye: 04-) - Dr. Tommie Larsen.  Stroke [facility])     (Multiple) MRI of brain 10/2008 shows multiple infarcts involving the temporal and parietal areas.     Trichiasis 2010    (left eye)    Type 2 diabetes mellitus [facility])        Past Surgical  History:     Past Surgical History:   Procedure Laterality Date    CATARACT REMOVAL WITH IMPLANT  2012    (Right eye) - Dr. Marisela Damon.  CATARACT REMOVAL WITH IMPLANT  2012    (Left eye) - Dr. Marisela Damon.   SECTION      CHOLECYSTECTOMY      CORONARY ARTERY BYPASS GRAFT  12-    (x4) - LIMA-LAD, SVG-diagnoal 1, SVG-OM1, and SVG-PDA. Exploration of left radial. (Dr. Bettina Starkey).  EYE SURGERY Left     as a child     GASTRIC BYPASS SURGERY      HYSTERECTOMY      (abdominal)  with left oophorectomy. Right ovary retained.     IR TUNNELED CATHETER PLACEMENT GREATER THAN 5 YEARS  2021    IR TUNNELED CATHETER PLACEMENT GREATER THAN 5 YEARS 2021 Herlinda Vaca MD [facility] SPECIAL PROCEDURES    TONSILLECTOMY AND ADENOIDECTOMY      UPPER GASTROINTESTINAL ENDOSCOPY  2020    EGD BIOPSY performed by Sandy Kang MD at [facility]       Medications:      vancomycin  1,750 mg Intravenous Q36H    vancomycin (VANCOCIN) intermittent dosing (placeholder)   Other RX Placeholder    insulin glargine  10 Units Subcutaneous Nightly    pantoprazole  40 mg Oral BID AC    aspirin  81 mg Oral Daily    atorvastatin  40 mg Oral Nightly    carvedilol  12.5 mg Oral BID WC    citalopram  20 mg Oral Daily    hydrALAZINE  75 mg Oral 3 times per day    cetirizine  5 mg Oral Daily    memantine  10 mg Oral BID    rivastigmine  1 patch Transdermal Daily    insulin lispro  0-12 Units Subcutaneous TID WC    insulin lispro  0-6 Units Subcutaneous Nightly    heparin (porcine)  5,000 Units Subcutaneous 3 times per day       Social History:     Social History     Socioeconomic History    Marital status:      Spouse name: Not on file    Number of children: Not on file    Years of education: Not on file    Highest education level: Not on file   Occupational History    Not on file   Social Needs    Financial resource strain: Not on file   Tradono-Fátima insecurity     Worry: Not on file     Inability: Not on file    Transportation needs     Medical: Not on file     Non-medical: Not on file   Tobacco Use    Smoking status: Never Smoker    Smokeless tobacco: Never Used    Tobacco comment: never smoker eclamb rrt 7/20/17   Substance and Sexual Activity    Alcohol use: No    Drug use: No    Sexual activity: Not on file   Lifestyle    Physical activity     Days per week: Not on file     Minutes per session: Not on file    Stress: Not on file   Relationships    Social connections     Talks on phone: Not on file     Gets together: Not on file     Attends Orthodoxy service: Not on file     Active member of club or organization: Not on file     Attends meetings of clubs or organizations: Not on file     Relationship status: Not on file    Intimate partner violence     Fear of current or ex partner: Not on file     Emotionally abused: Not on file     Physically abused: Not on file     Forced sexual activity: Not on file   Other Topics Concern    Not on file   Social History Narrative    Not on file       Family History:     Family History   Problem Relation Age of Onset    Diabetes Mother     Cancer Mother     High Blood Pressure Mother     Stroke Mother     Kidney Disease Mother     Uterine Cancer Mother     Diabetes Father     Heart Disease Father     Glaucoma Father     Emphysema Father     Coronary Art Dis Other         All 4 siblings.  Stroke Other         1 sibling.  Lung Cancer Other         1 sibling - cause of death lung cancer.  Mental Retardation Other         Allergies:   Bactrim [sulfamethoxazole-trimethoprim], Clonidine derivatives, and Amoxicillin-pot clavulanate     Review of Systems:   Constitutional: No fevers or chills. No systemic complaints  Head: No headaches  Eyes: No double vision or blurry vision. No conjunctival inflammation. ENT: No sore throat or runny nose. . No hearing loss, tinnitus or vertigo.   Cardiovascular: No chest pain or palpitations. Lung: Baseline shortness of breath. No cough. No sputum production  Abdomen: No nausea, vomiting, diarrhea, or abdominal pain. Orrie Jose No cramps. Genitourinary: No increased urinary frequency, or dysuria. No hematuria. No suprapubic or CVA pain  Musculoskeletal: No muscle aches or pains. No joint effusions, swelling or deformities  Hematologic: No bleeding or bruising. Neurologic: No headache, weakness, numbness, or tingling. Integument: No rash, no ulcers. Psychiatric: No depression. Endocrine: No polyuria, no polydipsia, no polyphagia. Physical Examination :     Patient Vitals for the past 8 hrs:   BP Temp Pulse Resp SpO2 Weight   01/11/21 1236 (!) 160/76 98 °F (36.7 °C) 64 16 99 % --   01/11/21 1151 (!) 171/72 98.2 °F (36.8 °C) 62 10 100 % 259 lb 11.2 oz (117.8 kg)   01/11/21 1120 (!) 167/70 -- 60 -- -- --   01/11/21 1050 (!) 169/74 -- 62 -- -- --   01/11/21 1020 (!) 166/71 -- 61 -- -- --   01/11/21 0950 (!) 163/66 -- 60 -- -- --   01/11/21 0920 (!) 166/70 -- 62 -- -- --   01/11/21 0850 (!) 166/77 -- 62 -- -- --   01/11/21 0820 (!) 176/82 -- 65 -- -- --   01/11/21 0756 (!) 174/76 97.2 °F (36.2 °C) 66 14 -- 267 lb 6.7 oz (121.3 kg)     General Appearance: Awake, alert, and in no apparent distress  Head:  Normocephalic, no trauma  Eyes: Pupils equal, round, reactive to light and accommodation; extraocular movements intact; sclera anicteric; conjunctivae pink. No embolic phenomena. ENT: Oropharynx clear, without erythema, exudate, or thrush. No tenderness of sinuses. Mouth/throat: mucosa pink and moist. No lesions. Dentition in good repair. Neck:Supple, without lymphadenopathy. Thyroid normal, No bruits. Pulmonary/Chest: Clear to auscultation, without wheezes, rales, or rhonchi. No dullness to percussion. Cardiovascular: Regular rate and rhythm without murmurs, rubs, or gallops. Abdomen: Soft, non tender.  Bowel sounds normal. No organomegaly  All four Extremities: No cyanosis, clubbing, edema, or effusions. R PICC line in place. Neurologic: No gross sensory or motor deficits. Skin: Warm and dry with good turgor. No signs of peripheral arterial or venous insufficiency. No ulcerations. No open wounds. Medical Decision Making -Laboratory:   I have independently reviewed/ordered the following labs:    CBC with Differential:   Recent Labs     01/10/21  1046 01/11/21  0803   WBC 5.4 5.4   HGB 8.1* 7.7*   HCT 27.0* 25.3*    172   LYMPHOPCT 15*  --    MONOPCT 12  --      BMP:   Recent Labs     01/10/21  1046 01/11/21  0803   * 136   K 4.2 4.1   CL 94* 98   CO2 29 29   BUN 33* 37*   CREATININE 2.10* 2.51*     Hepatic Function Panel:   No results for input(s): PROT, LABALBU, BILIDIR, IBILI, BILITOT, ALKPHOS, ALT, AST in the last 72 hours. No results for input(s): RPR in the last 72 hours. No results for input(s): HIV in the last 72 hours. No results for input(s): BC in the last 72 hours. Lab Results   Component Value Date    MUCUS NOT REPORTED 01/07/2021    RBC 2.58 01/11/2021    TRICHOMONAS NOT REPORTED 01/07/2021    WBC 5.4 01/11/2021    YEAST NOT REPORTED 01/07/2021    TURBIDITY CLEAR 01/07/2021     Lab Results   Component Value Date    CREATININE 2.51 01/11/2021    GLUCOSE 149 01/11/2021       Medical Decision Making-Imaging:       Medical Decision [id number]-[id number]:       Medical Decision Making-Other:     Note:  · Labs, medications, radiologic studies were reviewed with personal review of films  · Moderate Large amounts of data were reviewed  · Discussed with nursing Staff, Discharge planner  · Infection Control and Prevention measures reviewed  · All prior entries were reviewed  · Administer medications as ordered  · Prognosis: Good  · Discharge planning reviewed  · Follow up as outpatient. Thank you for allowing us to participate in the care of this patient. Please call with questions.       Claudio Auguste MD

## 2021-01-11 NOTE — CARE COORDINATION
Continue to await clearance from [employer] [facility]. Dr. Tanya Seals is now requesting a referral to [facility] in [facility]. Called [facility] and they do have openings. Referral faxed.

## 2021-01-11 NOTE — CARE COORDINATION
Pt was accepted at [address] [facility] in [place] starting on Wed 1/13 at 2:45pm.  Pt will then start on Sat at 6:15am and will then dialyze T,TH, S.

## 2021-01-11 NOTE — PROGRESS NOTES
Dialysis Post Treatment Note  Vitals:    01/11/21 1151   BP: (!) 171/72   Pulse: 62   Resp: 10   Temp: 98.2 °F (36.8 °C)   SpO2: 100%     Pre-Weight = 121.3kg  Post-weight = Weight: 259 lb 11.2 oz (117.8 kg)  Total Liters Processed = Total Liters Processed (l/min): 82.4 l/min  Rinseback Volume (mL) = Rinseback Volume (ml): 300 ml  Net Removal (mL) = NET Removed (ml): 3700 ml  Patient's dry weight=n/a  Type of access used=cvc  Length of treatment=210    To well no issues- tolerated 3.5kg well with no BP fluctuation.

## 2021-01-11 NOTE — PLAN OF CARE
Problem: Skin Integrity:  Goal: Will show no infection signs and symptoms  Description: Will show no infection signs and symptoms  1/10/2021 1429 by Zulma Granger RN  Outcome: Ongoing  Goal: Absence of new skin breakdown  Description: Absence of new skin breakdown  1/11/2021 0422 by Tyler Ordonez RN  Outcome: Ongoing  1/10/2021 1429 by Zulma Granger RN  Outcome: Ongoing     Problem: Falls - Risk of:  Goal: Will remain free from falls  Description: Will remain free from falls  1/11/2021 0422 by Tyler Ordonez RN  Outcome: Ongoing  1/10/2021 1429 by Zulma Granger RN  Outcome: Ongoing  Goal: Absence of physical injury  Description: Absence of physical injury  1/10/2021 1429 by Zulma Granger RN  Outcome: Ongoing

## 2021-01-13 ENCOUNTER — TELEPHONE (OUTPATIENT)
Dept: FAMILY MEDICINE CLINIC | Age: 71
End: 2021-01-13

## 2021-01-13 ENCOUNTER — OUTSIDE SERVICES (OUTPATIENT)
Dept: INTERNAL MEDICINE | Age: 71
End: 2021-01-13
Payer: MEDICARE

## 2021-01-13 DIAGNOSIS — I50.9 DECOMPENSATED HEART FAILURE (HCC): ICD-10-CM

## 2021-01-13 DIAGNOSIS — R78.81 MRSA BACTEREMIA: ICD-10-CM

## 2021-01-13 DIAGNOSIS — L89.212: ICD-10-CM

## 2021-01-13 DIAGNOSIS — Z79.4 TYPE 2 DIABETES MELLITUS WITH HYPERGLYCEMIA, WITH LONG-TERM CURRENT USE OF INSULIN (HCC): ICD-10-CM

## 2021-01-13 DIAGNOSIS — I73.9 PAD (PERIPHERAL ARTERY DISEASE) (HCC): ICD-10-CM

## 2021-01-13 DIAGNOSIS — B95.62 MRSA BACTEREMIA: ICD-10-CM

## 2021-01-13 DIAGNOSIS — E11.65 TYPE 2 DIABETES MELLITUS WITH HYPERGLYCEMIA, WITH LONG-TERM CURRENT USE OF INSULIN (HCC): ICD-10-CM

## 2021-01-13 DIAGNOSIS — N17.9 ACUTE RENAL FAILURE, UNSPECIFIED ACUTE RENAL FAILURE TYPE (HCC): Primary | ICD-10-CM

## 2021-01-13 PROCEDURE — 99308 SBSQ NF CARE LOW MDM 20: CPT | Performed by: NURSE PRACTITIONER

## 2021-01-13 ASSESSMENT — ENCOUNTER SYMPTOMS
BLOOD IN STOOL: 0
SHORTNESS OF BREATH: 0
DIARRHEA: 0
WHEEZING: 0
CHEST TIGHTNESS: 0
VOMITING: 0
ABDOMINAL PAIN: 0
COLOR CHANGE: 0
SINUS PRESSURE: 0
COUGH: 0
NAUSEA: 0
CONSTIPATION: 0
SORE THROAT: 0
FACIAL SWELLING: 0
RHINORRHEA: 0
TROUBLE SWALLOWING: 0
EYE PAIN: 0

## 2021-01-13 NOTE — PROGRESS NOTES
01/13/21  Kenisha Washington  1950    Patient Resident of [facility]    Chief Complaint  1. Acute renal failure, unspecified acute renal failure type ([address]    2. Decompensated heart failure ([address]    3. Decubitus ulcer of trochanteric region of right hip, stage 2 ([address]    4. PAD (peripheral artery disease) ([address]    5. Type 2 diabetes mellitus with hyperglycemia, with long-term current use of insulin ([facility])    6. MRSA bacteremia        HPI:  67-year-old patient being seen for transition into ECF from [facility]. [facility] where she was seen for chronic renal failure decompensated heart failure. Was seen by myself at [facility] noted to have significant fluid overload with worsening kidney functions with previous acute on chronic kidney disease sent into the hospital evaluated by nephrology. Per note attempted to diurese without success. Decision was made to start her on hemodialysis which was started on 1/7/2020. Per notes she tolerated well. She was seen by infectious disease for the septicemia. Due to a steroid agent of previous antibiotic she was changed over to IV vancomycin which she has 3 doses left to complete. She was sent back to the nursing home with PICC line to the right upper extremity in a tunneled dialysis catheter to the right upper chest.  She does have some sacral wounds which wound care will be following along. She denies any problems with bowel. Feels the swelling is significantly improved. She denies any chest discomfort or shortness of breath. Has been afebrile. Vital signs have been stable per nursing staff. Nursing staff deny any nursing service issues at present. She will be going to dialysis Monday Wednesday and Friday.     Allergies   Allergen Reactions    Bactrim [Sulfamethoxazole-Trimethoprim] Hives    Clonidine Derivatives     Amoxicillin-Pot Clavulanate Diarrhea, Nausea Only and Nausea And Vomiting       Past Medical History:   Diagnosis Date  Anxiety and depression     Background diabetic retinopathy(362.01)     (Mild)    Balance problem     CAD (coronary artery disease)     (with coronary artery bypass graft x4).  Cancer of uterus [facility])     history of, (probable cure)    Chronic kidney disease     Chronic low back pain     CVA (cerebral vascular accident) ([address]     Dementia ([address]     (moderate)    Dry eye syndrome     GERD (gastroesophageal reflux disease)     Glaucoma suspect     Gout     Homonymous hemianopsia     (Right)    Hyperlipidemia     Hypertension     Keratitis     (secondary to dry eye syndrome).  Obesity     Peripheral polyneuropathy     (diabetic)    Pseudophakos     (Right Eye: 2012; Left Eye: 2012) - Dr. Kirk Ward.  Stroke [facility])     (Multiple) MRI of brain 10/2008 shows multiple infarcts involving the temporal and parietal areas.  Trichiasis     (left eye)    Type 2 diabetes mellitus ([facility]        Past Surgical History:   Procedure Laterality Date    CATARACT REMOVAL WITH IMPLANT  2012    (Right eye) - Dr. Kirk Ward.  CATARACT REMOVAL WITH IMPLANT  2012    (Left eye) - Dr. Kirk Ward.   SECTION      CHOLECYSTECTOMY      CORONARY ARTERY BYPASS GRAFT  12-    (x4) - LIMA-LAD, SVG-diagnoal 1, SVG-OM1, and SVG-PDA. Exploration of left radial. (Dr. Danilo Marie).  EYE SURGERY Left     as a child     GASTRIC BYPASS SURGERY      HYSTERECTOMY      (abdominal)  with left oophorectomy. Right ovary retained.     IR TUNNELED CATHETER PLACEMENT GREATER THAN 5 YEARS  2021    IR TUNNELED CATHETER PLACEMENT GREATER THAN 5 YEARS 2021 Deidra Newell MD [facility] SPECIAL PROCEDURES    TONSILLECTOMY AND ADENOIDECTOMY      UPPER GASTROINTESTINAL ENDOSCOPY  2020    EGD BIOPSY performed by Delvin Mace MD at [facility]       Medications as per [facility] Chart Lv Brito     Social History     Socioeconomic History  Marital status:      Spouse name: Not on file    Number of children: Not on file    Years of education: Not on file    Highest education level: Not on file   Occupational History    Not on file   Social Needs    Financial resource strain: Not on file    Food insecurity     Worry: Not on file     Inability: Not on file    Transportation needs     Medical: Not on file     Non-medical: Not on file   Tobacco Use    Smoking status: Never Smoker    Smokeless tobacco: Never Used    Tobacco comment: never smoker eclamb rrt 7/20/17   Substance and Sexual Activity    Alcohol use: No    Drug use: No    Sexual activity: Not on file   Lifestyle    Physical activity     Days per week: Not on file     Minutes per session: Not on file    Stress: Not on file   Relationships    Social connections     Talks on phone: Not on file     Gets together: Not on file     Attends Yazidism service: Not on file     Active member of club or organization: Not on file     Attends meetings of clubs or organizations: Not on file     Relationship status: Not on file    Intimate partner violence     Fear of current or ex partner: Not on file     Emotionally abused: Not on file     Physically abused: Not on file     Forced sexual activity: Not on file   Other Topics Concern    Not on file   Social History Narrative    Not on file       Review of Systems   Constitutional: Negative for activity change, appetite change, chills, fatigue, fever and unexpected weight change. HENT: Negative for congestion, dental problem, ear discharge, ear pain, facial swelling, hearing loss, postnasal drip, rhinorrhea, sinus pressure, sore throat and trouble swallowing. Eyes: Negative for pain and visual disturbance. Respiratory: Negative for cough, chest tightness, shortness of breath and wheezing. Cardiovascular: Positive for leg swelling. Negative for chest pain and palpitations. Gastrointestinal: Negative for abdominal pain, blood in stool, constipation, diarrhea, nausea and vomiting. Endocrine: Negative for cold intolerance, heat intolerance and polyuria. Genitourinary: Negative for difficulty urinating. Musculoskeletal: Negative for arthralgias, gait problem, myalgias, neck pain and neck stiffness. Skin: Negative for color change, rash and wound. Neurological: Positive for weakness. Negative for dizziness, tremors, seizures, light-headedness, numbness and headaches. Psychiatric/Behavioral: Negative for confusion and hallucinations. The patient is not nervous/anxious. Physical Exam  Vitals signs and nursing note reviewed. Constitutional:       General: She is not in acute distress. Appearance: Normal appearance. She is well-developed. She is not diaphoretic. HENT:      Head: Normocephalic and atraumatic. Right Ear: External ear normal.      Left Ear: External ear normal.   Eyes:      General:         Right eye: No discharge. Left eye: No discharge. Neck:      Trachea: No tracheal deviation. Cardiovascular:      Rate and Rhythm: Normal rate and regular rhythm. Heart sounds: Normal heart sounds. No murmur. No friction rub. No gallop. Pulmonary:      Effort: Pulmonary effort is normal. No respiratory distress. Breath sounds: Normal breath sounds. No stridor. No wheezing, rhonchi or rales. Chest:      Chest wall: No tenderness. Abdominal:      General: Bowel sounds are normal. There is no distension. Palpations: Abdomen is soft. Tenderness: There is no abdominal tenderness. Musculoskeletal:         General: No swelling. Right lower leg: Edema present. Left lower leg: Edema (+3 edema to bilateral lower extremities to knee, +1 hand edema, +2 forearm edema bilateral, anasarca) present. Skin:     General: Skin is warm and dry. Capillary Refill: Capillary refill takes less than 2 seconds. Coloration: Skin is not jaundiced or pale. Findings: No rash. Neurological:      General: No focal deficit present. Mental Status: She is alert and oriented to person, place, and time. Cranial Nerves: No cranial nerve deficit. Sensory: No sensory deficit. Motor: Weakness present. Coordination: Coordination normal.   Psychiatric:         Mood and Affect: Mood normal.         Behavior: Behavior normal.         Thought Content: Thought content normal.         Judgment: Judgment normal.         Vital Signs: Temperature 96°F, blood pressure 136/58, pulse 56, respirations 16, SPO2 96% on room air    Assessment:  1. Acute renal failure, unspecified acute renal failure type ([facility]  Continues on dialysis Monday Wednesday Friday. Ongoing follow-up with nephrology    2. Decompensated heart failure ([address]  Improving with dialysis. Weight daily, 2 L fluid restriction, 2 g sodium diet. Ongoing follow-up with cardiology    3. Decubitus ulcer of trochanteric region of right hip, stage 2 ([address]  Wound care to follow    4. PAD (peripheral artery disease) ([facility])  Stable, continue on Plavix aspirin and statin. 5. Type 2 diabetes mellitus with hyperglycemia, with long-term current use of insulin ([facility])  Stable on current insulin regimen. Continue to monitor. Notify if any persistent elevation or hypoglycemic episodes    6. MRSA bacteremia  Continues on vancomycin every 36 hours for total of 3 doses. Pull PICC once completed      Plan:  As noted above. Hospital records reviewed, CBC BMP in 2 days  Follow up for routine visit. Call sooner with concerns prior.     Electronically signed by SHIRA Garay CNP on 1/13/2021 at 10:41 AM

## 2021-01-15 NOTE — ADT AUTH CERT
Renal Failure, Chronic - Care Day 5 (1/10/2021) by Peyton Vora RN       Review Status Review Entered   Completed 1/15/2021 12:56      Criteria Review      Care Day: 5 Care Date: 1/10/2021 Level of Care: Inpatient Floor    Guideline Day 2    Level Of Care    (X) Floor    Clinical Status    (X) * Hemodynamic stability    1/15/2021 12:56 PM EST by Kwame Baez      36.3-16-/% 2l nc    (X) * Nausea and vomiting absent or improved    ( ) * Electrolyte and acid-base status at baseline or improved    1/15/2021 12:56 PM EST by Kwame Baez      na 131    Activity    (X) Activity as tolerated    Routes    (X) Parenteral or oral hydration, medications    1/15/2021 12:56 PM EST by Kwame Baez      iv antibiotics    (X) Renal diet as tolerated    1/15/2021 12:56 PM EST by Kwame Baez      fluid restriciton    Interventions    (X) Establish long-term treatment plan [G]    (X) Monitor electrolytes, glucose, acid-base, and volume status    Medications    (X) Possible erythropoiesis-stimulating agent, calcium supplement, phosphate binder, bicarbonate, vitamin D, antihypertensive medication    1/15/2021 12:56 PM EST by Kwame Baez      apresoline    * Milestone   Additional Notes   1/10 care day 5    No acute events overnight.  Denies any chest pain, shortness of breath improved. No nausea, vomiting or diarrhea. PHYSICAL EXAM     No acute events overnight.  Denies any chest pain, shortness of breath improved. No nausea, vomiting or diarrhea.    Extremities: Diffuse anasarca with bilateral pitting edema to the abdomen      MEDS   · insulin glargine 10 Units Subcutaneous Nightly   · ceftaroline fosamil (TEFLARO) IVPB 400 mg Intravenous Q12H   · pantoprazole 40 mg Oral BID AC   · aspirin 81 mg Oral Daily   · atorvastatin 40 mg Oral Nightly   · carvedilol 12.5 mg Oral BID WC   · citalopram 20 mg Oral Daily   · hydrALAZINE 75 mg Oral 3 times per day   · cetirizine 5 mg Oral Daily   · memantine 10 mg Oral BID · rivastigmine 1 patch Transdermal Daily   · insulin lispro 0-12 Units Subcutaneous TID WC   · insulin lispro 0-6 Units Subcutaneous Nightly   · heparin (porcine) 5,000 Units Subcutaneous 3 times per day      RESULTS        Glucose                     116 (H)           70 - 99 mg/dL        BUN                         33 (H)            8 - 23 mg/dL         CREATININE                  2.10 (H)          0.50 - 0.90 *        Calcium                     8.5 (L)           8.6 - 10.4 m*        Sodium                      131 (L)           135 - 144 mm*            Chloride                    94 (L)            98 - 107 mmo*             Hemoglobin                  8.1 (L)           11.9 - 15.1 *        Hematocrit                  27.0 (L)          36.3 - 47.1 %           SARS-CoV-2                  Not Detected      Not Detected       PLAN   ===MEDICINE   1. Acute Kidney injury on CKD: Now dialysis dependent. Tunneled cath placed 1/7/20. Pt did have HD on 1/8/20 with 1200 cc of UF and again on 1/9 with 3L removed. Repeat HD on 1/11 Case management setting pt up with outpt HD. 2. Acute on Chronic Diastolic Heart Failure: Nephrology to manage, continue daily weights, and strict I&O, will need rodriguez catheter changed pending insertion   3. MRSA and Gram Negative Bacteremia: Continue IV ceftaroline through 1/18/21   4. NSTEMI type II: 2/2 demand ischemia likely 2/2 CKD/AMY. Troponin down trending. Doubt ACS   5. DMII: continue medium intensity ss, continue lantus 10 unitsmonitor for s/s hypo/hyperglycemia   6. Anemia of Chronic Disease: stable at baseline   7. Chronic gastritis:  Protonix BID per GI recommendations for 6 weeks then switch to once daily. 8. Anasarca   9. HTN: controlled at present   8. CAD s/p CABG: on statis, ASA, plavix   11. Dementia: no behavioral disturbance, continue namenda and exelon patch   12. DVT/PPI prophylaxis   13.  Full Code       ===ID   · Continue ceftaroline 4 week course until 1/18/21     ===NEPHRO   1.  HD again tomorrow no acute need for today.  Discontinue Lynn catheter   2.   working towards setting up outpatient hemodialysis spot. 3.  BMP in AM.   4.  Continue with ARB. 5. Will follow      DC PLAN - RETURN TO [facility]      Renal Failure, Chronic - Care Day 4 (1/9/2021) by Wilbur Guy RN       Review Status Review Entered   Completed 1/15/2021 12:51      Criteria Review      Care Day: 4 Care Date: 1/9/2021 Level of Care: Inpatient Floor    Guideline Day 2    Level Of Care    (X) Floor    Clinical Status    (X) * Hemodynamic stability    1/15/2021 12:48 PM EST by Anhelo Indu      36.8-18-/% ra    (X) * Nausea and vomiting absent or improved    ( ) * Electrolyte and acid-base status at baseline or improved    1/15/2021 12:51 PM EST by Alexander Marcelo 96     1/15/2021 12:48 PM EST by Azra Griggs      anion gap 8  na 131    Activity    (X) Activity as tolerated    Routes    (X) Parenteral or oral hydration, medications    (X) Renal diet as tolerated    1/15/2021 12:48 PM EST by Azra Griggs      renal diet with fluid restriction    Interventions    (X) Possible dialysis    1/15/2021 12:48 PM EST by Azra Griggs      1-9    (X) Establish long-term treatment plan [G]    (X) Monitor electrolytes, glucose, acid-base, and volume status    Medications    (X) Possible erythropoiesis-stimulating agent, calcium supplement, phosphate binder, bicarbonate, vitamin D, antihypertensive medication    1/15/2021 12:48 PM EST by Marce ovalles    * Milestone   Additional Notes   1/9 care day 4   Seen and examined at bedside.  No acute events overnight.  Denies any chest pain, shortness of breath consequently bleeding, nausea, vomiting, diarrhea.  She did have 3 L removed today again in dialysis.  Does like she is improving      PHYSICAL EXAM    Lungs: Breath sounds at the bases, coarse to auscultation.    Extremities: Diffuse anasarca with bilateral pitting edema to the abdomen      MEDS   · insulin glargine 10 Units Subcutaneous Nightly   · ceftaroline fosamil (TEFLARO) IVPB 400 mg Intravenous Q12H   · pantoprazole 40 mg Oral BID AC   · aspirin 81 mg Oral Daily   · atorvastatin 40 mg Oral Nightly   · bumetanide 2 mg Oral BID   · carvedilol 12.5 mg Oral BID WC   · citalopram 20 mg Oral Daily   · hydrALAZINE 75 mg Oral 3 times per day   · cetirizine 5 mg Oral Daily   · memantine 10 mg Oral BID   · rivastigmine 1 patch Transdermal Daily   · insulin lispro 0-12 Units Subcutaneous TID WC   · insulin lispro 0-6 Units Subcutaneous Nightly   · heparin (porcine) 5,000 Units Subcutaneous 3 times per day      RESULTS        Glucose                     113 (H)           70 - 99 mg/dL        BUN                         48 (H)            8 - 23 mg/dL         CREATININE                  2.42 (H)          0.50 - 0.90 *        Calcium                     8.2 (L)           8.6 - 10.4 m*        Sodium                      136               135 - 144 mm*       PLAN   ===MEDICINE   1. Acute Kidney injury on CKD: Now dialysis dependent. Tunneled cath placed 1/7/20. Pt did have HD on 1/8/20 with 1200 cc of UF and again on 1/9 with 3L removed. Case management setting pt up with outpt HD. 2. Acute on Chronic Diastolic Heart Failure: Nephrology to manage, continue daily weights, and strict I&O, will need rodriguez catheter changed pending insertion   3. MRSA and Gram Negative Bacteremia: ID consulted for follow up evaluation as patient discharged on IV ceftaroline through 1/18/21   4. NSTEMI type II: 2/2 demand ischemia likely 2/2 CKD/AMY. Troponin down trending. Doubt ACS   5. DMII: continue medium intensity ss, decreased lantus to 10units as patient reports recently changed at SNF due to am hypoglycemia, monitor for s/s hypog/hyperglycemia   6. Anemia of Chronic Disease: stable at baseline   7.  Chronic gastritis: start Protonix BID per GI recommendations for 6 weeks then switch to once daily. 8. Anasarca   9. HTN: controlled at present   8. CAD s/p CABG: on statis, ASA, plavix   11. Dementia: no behavioral disturbance, continue namenda and exelon patch   12. DVT/PPI prophylaxis   13. Full Code      ===NEPHROLOGY   1.HD #2 today   2.   working towards setting up outpatient hemodialysis spot. 3.  BMP in AM.   4.  Add ARB   5. Will follow.      ===ID   · Continue ceftaroline 4 week course until 1/18/21      DC PLAN - return to [address] [place] [address] with OP dialysis.

## 2021-01-25 NOTE — PROGRESS NOTES
[facility] - Initial Office Consult Note  Today's Date and Time: 2/8/2021, 10:23 PM    Diagnostic Impression :     1. Type 2 diabetes mellitus with hyperglycemia, with long-term current use of insulin (HCC)    2. Stage 3b chronic kidney disease    3. Pressure injury of right heel, unstageable ([address]    4. Peripheral polyneuropathy    5. PAD (peripheral artery disease) ([address]    6. MRSA bacteremia    7. Lymphedema of both lower extremities    8. Dementia without behavioral disturbance, unspecified dementia type ([address]    9. Acute on chronic diastolic heart failure ([address]        Recommendations   · Patient successfully completed prior treatment for MRSA and Sphingomonas septicemia. · No additional antibiotic treatment is necessary  · Continue preventive measures to avoid decubitus formation   · Follow up in the office if additional problems should develop. Chief complaint/reason for consultation:     Chief Complaint   Patient presents with    Rash     stated appeared this morning / medication goes to nursing home        History of Present Illness:   Kevin Hill is a 72y.o.-year-old  female who was initially evaluated on 1/26/2021. Patient seen at the request of [place], [place]    INITIAL HISTORY:    Patient known to our service from prior admission to [facility] on 1-7-21 when she presented with anasarca, decompensated CHF and renal insufficiency. Diuresis was attempted but was not successful. The patient recieved HD on 1-7-21, 1-8-21 and 1-11-21 before improvement occurred and she was able to be discharged. She was previously admitted to [place] [address] on 12-12-20 because of CHF exacerbation and was discharged to SNF on 12/23/2020. During that admission, she was diagnosed with MRSA and Sphingomonas septicemia.  She was treated with ceftaroline until 1/18/21.         CURRENT EVALUATION 1/7/2021  Afebrile  Vital signs stable     She notes baseline shortness of breath. Denies fevers, chills, chest pain, cough, nausea, vomiting, or diarrhea. No obvious signs of infection.     Rt PICC line is in place and looks clean.     Labs, X rays reviewed: 1/7/2021     BUN: 67-->64  Cr: 3.66-->3.47     WBC:5.5-->6.3  Hb:8.1-->7.6  Plat: 203-->209     Cultures:  Urine:  ·  1-7-21 No growth   Blood:  ·  12-21-20: No growth  · 12-20-20: No growth x 2  · 12-19-20: Staphylococcus capitis   · 12-18-20: Coagulase negative Staphylococci   · 12-15-20: MRSA x 2  · 12-13-20: 1/2 Sphingomonas paucimobilis  Sputum :  ·    Wound:  ·      I have personally reviewed the past medical history, past surgical history, medications, social history, and family history, and I have updated the database accordingly. Past Medical History:     Past Medical History:   Diagnosis Date    Anxiety and depression     Background diabetic retinopathy(362.01) 2008    (Mild)    Balance problem     CAD (coronary artery disease)     (with coronary artery bypass graft x4).  Cancer of uterus [facility])     history of, (probable cure)    Chronic kidney disease     Chronic low back pain     CVA (cerebral vascular accident) ([address]     Dementia ([address]     (moderate)    Dry eye syndrome     GERD (gastroesophageal reflux disease)     Glaucoma suspect 2005    Gout     Homonymous hemianopsia 2008    (Right)    Hyperlipidemia     Hypertension     Keratitis     (secondary to dry eye syndrome).  Obesity     Peripheral polyneuropathy     (diabetic)    Pseudophakos     (Right Eye: 05-; Left Eye: 04-) - Dr. Jose Manuel Adair.  Stroke [facility])     (Multiple) MRI of brain 10/2008 shows multiple infarcts involving the temporal and parietal areas.  Trichiasis 2010    (left eye)    Type 2 diabetes mellitus ([address]        Past Surgical  History:     Past Surgical History:   Procedure Laterality Date    CATARACT REMOVAL WITH IMPLANT  05-    (Right eye) - Dr. Jose Manuel Adair.     CATARACT REMOVAL WITH IMPLANT  04- (Left eye) - Dr. Caesar Moreira.   SECTION      CHOLECYSTECTOMY      CORONARY ARTERY BYPASS GRAFT  12-    (x4) - LIMA-LAD, SVG-diagnoal 1, SVG-OM1, and SVG-PDA. Exploration of left radial. (Dr. Sary Nichols).  EYE SURGERY Left     as a child     GASTRIC BYPASS SURGERY      HYSTERECTOMY      (abdominal)  with left oophorectomy. Right ovary retained.  IR TUNNELED CATHETER PLACEMENT GREATER THAN 5 YEARS  2021    IR TUNNELED CATHETER PLACEMENT GREATER THAN 5 YEARS 2021 Faustino Dougherty MD [facility] SPECIAL PROCEDURES    TONSILLECTOMY AND ADENOIDECTOMY      UPPER GASTROINTESTINAL ENDOSCOPY  2020    EGD BIOPSY performed by Angelica Dietz MD at [facility]       Medications:     Current Outpatient Medications:     insulin glargine (LANTUS) 100 UNIT/ML injection vial, Inject 10 Units into the skin nightly, Disp: 1 vial, Rfl: 3    pantoprazole (PROTONIX) 40 MG tablet, Take 1 tablet by mouth 2 times daily (before meals), Disp: 30 tablet, Rfl: 3    carvedilol (COREG) 12.5 MG tablet, Take 1 tablet by mouth 2 times daily (with meals), Disp: 60 tablet, Rfl: 3    senna (SENOKOT) 8.6 MG tablet, Take 1 tablet by mouth 2 times daily, Disp: 60 tablet, Rfl: 11    OXYGEN, Oxgen at 2 liters per nasal cannula, Disp: 1 Device, Rfl: 0    loratadine (CLARITIN) 10 MG tablet, TAKE 1 TABLET(10 MG) BY MOUTH DAILY, Disp: 90 tablet, Rfl: 3    Lancets MISC, Checks blood sugar ac and HS, Disp: 300 each, Rfl: 3    blood glucose monitor strips, Test 3  times a day & as needed for symptoms of irregular blood glucose. Dispense sufficient amount for indicated testing frequency plus additional to accommodate PRN testing needs. , Disp: 200 strip, Rfl: 3    Alcohol Swabs (ALCOHOL PREP) PADS, Checks blood sugar 3 times a day, Disp: 300 each, Rfl: 0    Misc.  Devices MISC, Standard walker with wheels  Diagnosis dementia, CHF, Disp: 1 Device, Rfl: 0    atorvastatin (LIPITOR) 40 MG tablet, TAKE 1 TABLET BY MOUTH EVERYDAY, Disp: 30 tablet, Rfl: 0    citalopram (CELEXA) 20 MG tablet, Take 1 tablet by mouth daily, Disp: 30 tablet, Rfl: 0    clopidogrel (PLAVIX) 75 MG tablet, Take 1 tablet by mouth daily, Disp: 30 tablet, Rfl: 0    insulin lispro (HUMALOG) 100 UNIT/ML injection vial, Use 4 times a day per sliding scale, Disp: 3 vial, Rfl: 0    hydrALAZINE (APRESOLINE) 50 MG tablet, Take 1.5 tablets by mouth every 8 hours, Disp: 90 tablet, Rfl: 0    memantine (NAMENDA) 10 MG tablet, TAKE 1 TABLET BY MOUTH TWICE DAILY, Disp: 60 tablet, Rfl: 0    rivastigmine (EXELON) 9.5 MG/24HR, APPLY 1 NEW PATCH EVERY 24 HOURS, Disp: 30 patch, Rfl: 0    aspirin 81 MG tablet, Take 1 tablet by mouth daily, Disp: 90 tablet, Rfl: 3    Diabetic Shoe MISC, by Does not apply route, Disp: 1 each, Rfl: 0    Compression Stockings MISC, by Does not apply route 20-30 mm Hg bilateral knee high, Disp: 5 each, Rfl: 0    nystatin (MYCOSTATIN) 815094 UNIT/GM cream, Apply topically 2 times daily. , Disp: 30 g, Rfl: 1    amLODIPine (NORVASC) 5 MG tablet, Take 1 tablet by mouth daily, Disp: 90 tablet, Rfl: 1    oxybutynin (DITROPAN) 5 MG tablet, TAKE 1 TABLET BY MOUTH TWICE DAILY (Patient not taking: Reported on 1/26/2021), Disp: 60 tablet, Rfl: 5     Social History:     Social History     Socioeconomic History    Marital status:      Spouse name: Not on file    Number of children: Not on file    Years of education: Not on file    Highest education level: Not on file   Occupational History    Not on file   Social Needs    Financial resource strain: Not on file    Food insecurity     Worry: Not on file     Inability: Not on file    Transportation needs     Medical: Not on file     Non-medical: Not on file   Tobacco Use    Smoking status: Never Smoker    Smokeless tobacco: Never Used    Tobacco comment: never smoker [facility] rrt 7/20/17   Substance and Sexual Activity    Alcohol use: No    Drug use: No    Sexual activity: Not on file   Lifestyle    Physical activity     Days per week: Not on file     Minutes per session: Not on file    Stress: Not on file   Relationships    Social connections     Talks on phone: Not on file     Gets together: Not on file     Attends Christian service: Not on file     Active member of club or organization: Not on file     Attends meetings of clubs or organizations: Not on file     Relationship status: Not on file    Intimate partner violence     Fear of current or ex partner: Not on file     Emotionally abused: Not on file     Physically abused: Not on file     Forced sexual activity: Not on file   Other Topics Concern    Not on file   Social History Narrative    Not on file       Family History:     Family History   Problem Relation Age of Onset    Diabetes Mother     Cancer Mother     High Blood Pressure Mother     Stroke Mother     Kidney Disease Mother     Uterine Cancer Mother     Diabetes Father     Heart Disease Father     Glaucoma Father     Emphysema Father     Coronary Art Dis Other         All 4 siblings.  Stroke Other         1 sibling.  Lung Cancer Other         1 sibling - cause of death lung cancer.  Mental Retardation Other         Allergies:   Bactrim [sulfamethoxazole-trimethoprim], Clonidine derivatives, and Amoxicillin-pot clavulanate     Review of Systems:   Constitutional: No fevers or chills. No systemic complaints  Head: No headaches  Eyes: No double vision or blurry vision. ENT: No sore throat or runny nose. . No hearing loss, tinnitus or vertigo. Cardiovascular: No chest pain or palpitations. Reports shortness of breath. CUTLER  Lung: Shortness of breath, no cough. No sputum production  Abdomen: No nausea, vomiting, diarrhea, or abdominal pain. .  Genitourinary: No increased urinary frequency, or dysuria. No hematuria. No suprapubic or CVA pain  Musculoskeletal: No muscle aches or pains.  No joint effusions, swelling or deformities  Hematologic: No bleeding or bruising. Neurologic: No headache, weakness, numbness, or tingling. Physical Examination :   BP (!) 175/84 (Site: Left Upper Arm)   Pulse 73   Temp 98.4 °F (36.9 °C) (Temporal)   Resp 18   Ht 5' 7\" (1.702 m)   Wt 229 lb (103.9 kg)   BMI 35.87 kg/m²    General Appearance: Awake, alert, and in no apparent distress  Head:  Normocephalic, no trauma  Eyes: Pupils equal, round, reactive, to light and accommodation; extraocular movements intact; sclera anicteric; conjunctivae pink. No embolic phenomena. ENT: Oropharynx clear, without erythema, exudate, or thrush. No tenderness of sinuses. Mouth/throat: mucosa pink and moist. No lesions. Dentition in good repair. Neck:Supple, without lymphadenopathy. Thyroid normal, No bruits. Pulmonary/Chest: Decreased air movement because of body habitus  Cardiovascular: Regular rate and rhythm without murmurs, rubs, or gallops. Abdomen: Soft, non tender. Bowel sounds normal. No organomegaly  All four Extremities: Chronic edema'  Neurologic: No gross sensory or motor deficits. Skin: Warm and dry with good turgor. Signs of peripheral arterial and venous insufficiency. Unstageable heel ulcer. No other sites of skin breakdown appreciated but difficult and limited exam on transportation stretcher because of body size and inability to transfer to examination table.     Medical Decision Making:   I have independently reviewed/ordered the following labs:    CBC with Differential:  Lab Results   Component Value Date    WBC 5.4 01/11/2021    WBC 5.4 01/10/2021    HGB 7.7 01/11/2021    HGB 8.1 01/10/2021    HCT 25.3 01/11/2021    HCT 27.0 01/10/2021     01/11/2021     01/10/2021    LYMPHOPCT 15 01/10/2021    LYMPHOPCT 11 01/06/2021    LYMPHOPCT 15.4 11/10/2020    MONOPCT 12 01/10/2021    MONOPCT 9 01/06/2021    MONOPCT 6.4 11/10/2020    EOSPCT 1.4 11/10/2020     BMP:   Lab Results   Component Value Date     01/11/2021     01/10/2021    K 4.1 01/11/2021    K 4.2 01/10/2021    CL 98 01/11/2021    CL 94 01/10/2021    CO2 29 01/11/2021    CO2 29 01/10/2021    BUN 37 01/11/2021    BUN 33 01/10/2021    CREATININE 2.51 01/11/2021    CREATININE 2.10 01/10/2021    MG 2.0 01/07/2021    MG 1.9 12/23/2020     Hepatic Function Panel:  Lab Results   Component Value Date    PROT 6.1 01/07/2021    PROT 6.1 12/15/2020    LABALBU 3.1 01/07/2021    LABALBU 2.8 12/23/2020    BILIDIR 0.23 12/15/2020    BILIDIR 0.10 08/20/2020    IBILI 0.17 12/15/2020    IBILI 0.12 08/20/2020    BILITOT 0.36 01/07/2021    BILITOT 0.40 12/15/2020    ALKPHOS 111 01/07/2021    ALKPHOS 103 12/15/2020    ALT 5 01/07/2021    ALT 8 12/15/2020    AST 11 01/07/2021    AST 16 12/15/2020     No results found for: RPR  No results found for: HIV  No results found for: [facility]  Lab Results   Component Value Date    MUCUS NOT REPORTED 01/07/2021    RBC 2.58 01/11/2021    TRICHOMONAS NOT REPORTED 01/07/2021    WBC 5.4 01/11/2021    YEAST NOT REPORTED 01/07/2021    TURBIDITY CLEAR 01/07/2021     Lab Results   Component Value Date    CREATININE 2.51 01/11/2021    GLUCOSE 149 01/11/2021       Thank you for allowing us to participate in the care of this patient. Please call with questions.     Brittni Murphy MD  Pager: [phone] - Office: [phone]

## 2021-01-26 ENCOUNTER — TELEPHONE (OUTPATIENT)
Dept: INTERNAL MEDICINE | Age: 71
End: 2021-01-26

## 2021-01-26 ENCOUNTER — OFFICE VISIT (OUTPATIENT)
Dept: INFECTIOUS DISEASES | Age: 71
End: 2021-01-26
Payer: MEDICARE

## 2021-01-26 VITALS
RESPIRATION RATE: 18 BRPM | WEIGHT: 229 LBS | HEIGHT: 67 IN | BODY MASS INDEX: 35.94 KG/M2 | HEART RATE: 73 BPM | SYSTOLIC BLOOD PRESSURE: 175 MMHG | DIASTOLIC BLOOD PRESSURE: 84 MMHG | TEMPERATURE: 98.4 F

## 2021-01-26 DIAGNOSIS — I50.33 ACUTE ON CHRONIC DIASTOLIC HEART FAILURE (HCC): ICD-10-CM

## 2021-01-26 DIAGNOSIS — F03.90 DEMENTIA WITHOUT BEHAVIORAL DISTURBANCE, UNSPECIFIED DEMENTIA TYPE: ICD-10-CM

## 2021-01-26 DIAGNOSIS — R78.81 MRSA BACTEREMIA: ICD-10-CM

## 2021-01-26 DIAGNOSIS — L89.610 PRESSURE INJURY OF RIGHT HEEL, UNSTAGEABLE (HCC): ICD-10-CM

## 2021-01-26 DIAGNOSIS — I89.0 LYMPHEDEMA OF BOTH LOWER EXTREMITIES: ICD-10-CM

## 2021-01-26 DIAGNOSIS — G62.9 PERIPHERAL POLYNEUROPATHY: ICD-10-CM

## 2021-01-26 DIAGNOSIS — E11.65 TYPE 2 DIABETES MELLITUS WITH HYPERGLYCEMIA, WITH LONG-TERM CURRENT USE OF INSULIN (HCC): Primary | ICD-10-CM

## 2021-01-26 DIAGNOSIS — B95.62 MRSA BACTEREMIA: ICD-10-CM

## 2021-01-26 DIAGNOSIS — N18.32 STAGE 3B CHRONIC KIDNEY DISEASE (HCC): ICD-10-CM

## 2021-01-26 DIAGNOSIS — I73.9 PAD (PERIPHERAL ARTERY DISEASE) (HCC): ICD-10-CM

## 2021-01-26 DIAGNOSIS — Z79.4 TYPE 2 DIABETES MELLITUS WITH HYPERGLYCEMIA, WITH LONG-TERM CURRENT USE OF INSULIN (HCC): Primary | ICD-10-CM

## 2021-01-26 PROCEDURE — 99214 OFFICE O/P EST MOD 30 MIN: CPT | Performed by: INTERNAL MEDICINE

## 2021-01-28 ENCOUNTER — OUTSIDE SERVICES (OUTPATIENT)
Dept: INTERNAL MEDICINE | Age: 71
End: 2021-01-28
Payer: MEDICARE

## 2021-01-28 DIAGNOSIS — Z91.15 DIALYSIS PATIENT, NONCOMPLIANT (HCC): ICD-10-CM

## 2021-01-28 DIAGNOSIS — I50.32 CHRONIC DIASTOLIC HEART FAILURE (HCC): ICD-10-CM

## 2021-01-28 DIAGNOSIS — R23.3 PETECHIAL RASH: Primary | ICD-10-CM

## 2021-01-28 PROCEDURE — 99308 SBSQ NF CARE LOW MDM 20: CPT | Performed by: NURSE PRACTITIONER

## 2021-01-29 ENCOUNTER — OFFICE VISIT (OUTPATIENT)
Dept: CARDIOLOGY | Age: 71
End: 2021-01-29
Payer: MEDICARE

## 2021-01-29 ENCOUNTER — TELEPHONE (OUTPATIENT)
Dept: FAMILY MEDICINE CLINIC | Age: 71
End: 2021-01-29

## 2021-01-29 DIAGNOSIS — E78.5 DYSLIPIDEMIA: ICD-10-CM

## 2021-01-29 DIAGNOSIS — I25.10 CORONARY ARTERY DISEASE INVOLVING NATIVE CORONARY ARTERY OF NATIVE HEART WITHOUT ANGINA PECTORIS: Primary | ICD-10-CM

## 2021-01-29 DIAGNOSIS — I10 ESSENTIAL HYPERTENSION: ICD-10-CM

## 2021-01-29 PROCEDURE — 99214 OFFICE O/P EST MOD 30 MIN: CPT

## 2021-01-29 PROCEDURE — 99214 OFFICE O/P EST MOD 30 MIN: CPT | Performed by: INTERNAL MEDICINE

## 2021-01-29 RX ORDER — AMLODIPINE BESYLATE 5 MG/1
5 TABLET ORAL DAILY
Qty: 90 TABLET | Refills: 1 | Status: SHIPPED | OUTPATIENT
Start: 2021-01-29 | End: 2021-03-10 | Stop reason: SDUPTHER

## 2021-01-29 ASSESSMENT — ENCOUNTER SYMPTOMS
NAUSEA: 0
EYE ITCHING: 0
SHORTNESS OF BREATH: 1
ABDOMINAL PAIN: 0
BACK PAIN: 0
EYE DISCHARGE: 0
VOMITING: 0
CHEST TIGHTNESS: 0

## 2021-01-29 NOTE — PROGRESS NOTES
Today's Date: 2021  Patient's Name: Kevin Hill  Patient's age: 72 y.o., 1950    Subjective: The patient is a 72y.o. year old, , female is in the office for SOB. Was recently discharged from hospital after she was admitted with acute CHF. Still feels SOB on exertion, better compared to before, no chest pain, no dizziness or syncope. Past Medical History:   has a past medical history of Anxiety and depression, Background diabetic retinopathy(362.01), Balance problem, CAD (coronary artery disease), Cancer of uterus ([address] Chronic kidney disease, Chronic low back pain, CVA (cerebral vascular accident) ([address] Dementia ([address] Dry eye syndrome, GERD (gastroesophageal reflux disease), Glaucoma suspect, Gout, Homonymous hemianopsia, Hyperlipidemia, Hypertension, Keratitis, Obesity, Peripheral polyneuropathy, Pseudophakos, Stroke ([address] Trichiasis, and Type 2 diabetes mellitus ([address] Past Surgical History:   has a past surgical history that includes Gastric bypass surgery; Cataract removal with implant (2012); Cataract removal with implant (2012); Coronary artery bypass graft (12-);  section; Hysterectomy; Cholecystectomy; Tonsillectomy and adenoidectomy; Eye surgery (Left); Upper gastrointestinal endoscopy (2020); and IR TUNNELED CVC PLACE WO SQ PORT/PUMP > 5 YEARS (2021). Home Medications:  Prior to Admission medications    Medication Sig Start Date End Date Taking?  Authorizing Provider   insulin glargine (LANTUS) 100 UNIT/ML injection vial Inject 10 Units into the skin nightly 21  Yes Jacki Hurtado DO   pantoprazole (PROTONIX) 40 MG tablet Take 1 tablet by mouth 2 times daily (before meals) 21  Yes Jacki Hurtado DO   carvedilol (COREG) 12.5 MG tablet Take 1 tablet by mouth 2 times daily (with meals) 20  Yes MD tommy [place] (SENOKOT) 8.6 MG tablet Take 1 tablet by mouth 2 times daily 20 Yes Fabián Gallego   OXYGEN Oxgen at 2 liters per nasal cannula 11/5/20  Yes Fabián Gallego   loratadine (CLARITIN) 10 MG tablet TAKE 1 TABLET(10 MG) BY MOUTH DAILY 11/5/20  Yes Fabián Gallego   Lancets MISC Checks blood sugar ac and HS 11/4/20  Yes Fabián Gallego   blood glucose monitor strips Test 3  times a day & as needed for symptoms of irregular blood glucose. Dispense sufficient amount for indicated testing frequency plus additional to accommodate PRN testing needs. 11/4/20  Yes Shawna Marvin [address] [employer] [facility] Standard walker with wheels    Diagnosis dementia, CHF 10/29/20  Yes SHIRA Herr - CNP   atorvastatin (LIPITOR) 40 MG tablet TAKE 1 TABLET BY MOUTH EVERYDAY 10/23/20  Yes Stevie Mckinney DO   citalopram (CELEXA) 20 MG tablet Take 1 tablet by mouth daily 10/23/20  Yes Stevie Mckinney DO   clopidogrel (PLAVIX) 75 MG tablet Take 1 tablet by mouth daily 10/23/20  Yes Stevie Mckinney DO   insulin lispro (HUMALOG) 100 UNIT/ML injection vial Use 4 times a day per sliding scale 10/23/20  Yes Stevie Mckinney DO   hydrALAZINE (APRESOLINE) 50 MG tablet Take 1.5 tablets by mouth every 8 hours 10/23/20  Yes Stevie Mckinney DO   memantine (NAMENDA) 10 MG tablet TAKE 1 TABLET BY MOUTH TWICE DAILY 10/23/20  Yes Stevie Mckinney DO   rivastigmine (EXELON) 9.5 MG/24HR APPLY 1 NEW PATCH EVERY 24 HOURS 10/23/20  Yes Stevie Mckinney DO   aspirin 81 MG tablet Take 1 tablet by mouth daily 1/3/20  Yes ERINN Gallego   Diabetic Shoe MISC by Does not apply route 9/26/19  Yes ERINN Gallego   Compression Stockings MISC by Does not apply route 20-30 mm Hg bilateral knee high 9/26/19  Yes ERINN Gallego   nystatin (MYCOSTATIN) 766407 UNIT/GM cream Apply topically 2 times daily.  1/31/18  Yes ERINN Gallego   oxybutynin (DITROPAN) 5 MG tablet TAKE 1 TABLET BY MOUTH TWICE DAILY  Patient not taking: Reported on 1/26/2021 11/22/20 Fabián Youngblood   Alcohol Swabs (ALCOHOL PREP) PADS Checks blood sugar 3 times a day 11/4/20   Fabián Youngblood       Allergies:  Bactrim [sulfamethoxazole-trimethoprim], Clonidine derivatives, and Amoxicillin-pot clavulanate    Social History:   reports that she has never smoked. She has never used smokeless tobacco. She reports that she does not drink alcohol or use drugs. Review of Systems:  Review of Systems   Constitutional: Positive for fatigue. Negative for chills and fever. HENT: Negative for congestion and dental problem. Eyes: Negative for discharge and itching. Respiratory: Positive for shortness of breath. Negative for chest tightness. Cardiovascular: Negative for chest pain and palpitations. Gastrointestinal: Negative for abdominal pain, nausea and vomiting. Endocrine: Negative for cold intolerance and heat intolerance. Genitourinary: Negative for difficulty urinating. Musculoskeletal: Negative for arthralgias and back pain. Neurological: Negative for dizziness and facial asymmetry. Hematological: Negative for adenopathy. Psychiatric/Behavioral: Negative for agitation and behavioral problems. Physical Exam:  BP (!) 172/70   Pulse 68   Ht 5' 6\" (1.676 m) Comment: per patient  Wt 293 lb (132.9 kg) Comment: Per Patient  BMI 47.29 kg/m²    Physical Exam  Constitutional:       Appearance: Normal appearance. HENT:      Head: Normocephalic and atraumatic. Nose: Nose normal.   Neck:      Musculoskeletal: Normal range of motion. No neck rigidity or muscular tenderness. Cardiovascular:      Rate and Rhythm: Normal rate and regular rhythm. Pulses: Normal pulses. Heart sounds: Normal heart sounds. Pulmonary:      Effort: Pulmonary effort is normal. No respiratory distress. Breath sounds: Normal breath sounds. No stridor. Abdominal:      General: There is no distension. Palpations: There is no mass.    Musculoskeletal:         General: Swelling present. No tenderness. Skin:     Capillary Refill: Capillary refill takes less than 2 seconds. Neurological:      General: No focal deficit present. Mental Status: She is alert and oriented to person, place, and time. Cranial Nerves: No cranial nerve deficit. Psychiatric:         Mood and Affect: Mood normal.         Behavior: Behavior normal.         Cardiac Data:      Labs:     CBC: No results for input(s): WBC, HGB, HCT, PLT in the last 72 hours. BMP:No results for input(s): NA, K, CO2, BUN, CREATININE, LABGLOM, GLUCOSE in the last 72 hours. PT/INR: No results for input(s): PROTIME, INR in the last 72 hours. FASTING LIPID PANEL:  Lab Results   Component Value Date    HDL 48 12/07/2020    TRIG 79 12/07/2020     LIVER PROFILE:No results for input(s): AST, ALT, LABALBU in the last 72 hours.     IMPRESSION:    Patient Active Problem List   Diagnosis    Dementia [facility]    Hypertension    Hyperlipidemia    Class 2 severe obesity with serious comorbidity and body mass index (BMI) of 37.0 to 37.9 in [facility])    Gout    Peripheral polyneuropathy    Anxiety and depression    Acute kidney injury superimposed on CKD ([facility]    Balance problem    CVA (cerebral vascular accident) ([facility]    Type 2 diabetes mellitus with hyperglycemia, with long-term current use of insulin ([facility])    Acute on chronic diastolic heart failure (HCC)    Acute cystitis    Infestation by bed bug    Infestation by maggots    Lymphedema of both lower extremities    PAD (peripheral artery disease) ([facility])    Pressure injury of right heel, unstageable (HCC)    Acute kidney injury ([facility]    Bradycardia    Hyperkalemia    AV block, 1st degree    Wounds, multiple    Buttock wound    Decubitus ulcer of trochanteric region of right hip, stage 2 (HCC)    Decompensated heart failure (HCC)    Acute anemia    Acute renal failure (ARF) (HCC)    Stage 3 chronic kidney disease    MRSA bacteremia    Chronic renal failure syndrome       Assessment/Plan:  -Sinus bradycardia with pauses. Metoprolol and clonidine stopped. EP did not think PPM is warranted. Back on Coreg and tolerating it. -CAD- 3/4 bypass graft patent on cardiac cath 8/13/2020. Continue ASA  -ICM. Repeat Echo 12/2020 EF 45-50%. Grade I DD. No significant valvular disease. Continue current medications. Volume control via dialysis. -HTN- Will add Norvasc for better BP control.   -Obesity - encouraged diet, exercise, and discussed weight loss extensively. -Hyperlipidemia- continue statin. LDL 22 ob 12/2020. -CVA- on ASA and plavix  -Dementia- on memantine  -CKD. On HD.          Asael Leon MD  [facility]           [phone]

## 2021-01-31 PROCEDURE — 99306 1ST NF CARE HIGH MDM 50: CPT | Performed by: INTERNAL MEDICINE

## 2021-02-01 ENCOUNTER — OUTSIDE SERVICES (OUTPATIENT)
Dept: INTERNAL MEDICINE | Age: 71
End: 2021-02-01
Payer: MEDICARE

## 2021-02-01 DIAGNOSIS — I50.42 CHRONIC COMBINED SYSTOLIC AND DIASTOLIC CHF (CONGESTIVE HEART FAILURE) (HCC): ICD-10-CM

## 2021-02-01 DIAGNOSIS — R23.3 PETECHIAL RASH: Primary | ICD-10-CM

## 2021-02-01 DIAGNOSIS — E66.01 MORBIDLY OBESE (HCC): ICD-10-CM

## 2021-02-01 DIAGNOSIS — Z99.2 DIALYSIS PATIENT (HCC): ICD-10-CM

## 2021-02-01 PROCEDURE — 99308 SBSQ NF CARE LOW MDM 20: CPT | Performed by: NURSE PRACTITIONER

## 2021-02-01 ASSESSMENT — ENCOUNTER SYMPTOMS
RHINORRHEA: 0
TROUBLE SWALLOWING: 0
SINUS PRESSURE: 0
VOMITING: 0
NAUSEA: 0
SHORTNESS OF BREATH: 0
DIARRHEA: 0
ABDOMINAL PAIN: 0
EYE PAIN: 0
FACIAL SWELLING: 0
COUGH: 0
SORE THROAT: 0
CHEST TIGHTNESS: 0
BLOOD IN STOOL: 0
CONSTIPATION: 0
WHEEZING: 0
COLOR CHANGE: 0

## 2021-02-01 NOTE — PROGRESS NOTES
 Stroke [facility])     (Multiple) MRI of brain 10/2008 shows multiple infarcts involving the temporal and parietal areas.  Trichiasis     (left eye)    Type 2 diabetes mellitus ([address]        Past Surgical History:   Procedure Laterality Date    CATARACT REMOVAL WITH IMPLANT  2012    (Right eye) - Dr. Phyllis Retana.  CATARACT REMOVAL WITH IMPLANT  2012    (Left eye) - Dr. Phyllis Retana.   SECTION      CHOLECYSTECTOMY      CORONARY ARTERY BYPASS GRAFT  12-    (x4) - LIMA-LAD, SVG-diagnoal 1, SVG-OM1, and SVG-PDA. Exploration of left radial. (Dr. Kali Luke).  EYE SURGERY Left     as a child     GASTRIC BYPASS SURGERY      HYSTERECTOMY      (abdominal)  with left oophorectomy. Right ovary retained.     IR TUNNELED CATHETER PLACEMENT GREATER THAN 5 YEARS  2021    IR TUNNELED CATHETER PLACEMENT GREATER THAN 5 YEARS [facility]    TONSILLECTOMY AND ADENOIDECTOMY      UPPER GASTROINTESTINAL ENDOSCOPY  2020    EGD BIOPSY performed by Vannesa Ramirez MD at [facility]       Medications as per [facility] Chart /reviewed     Social History     Socioeconomic History    Marital status:      Spouse name: Not on file    Number of children: Not on file    Years of education: Not on file    Highest education level: Not on file   Occupational History    Not on file   Social Needs    Financial resource strain: Not on file    Food insecurity     Worry: Not on file     Inability: Not on file    Transportation needs     Medical: Not on file     Non-medical: Not on file   Tobacco Use    Smoking status: Never Smoker    Smokeless tobacco: Never Used    Tobacco comment: never smoker eclamb rrt [address]   Substance and Sexual Activity    Alcohol use: No    Drug use: No    Sexual activity: Not on file   Lifestyle    Physical activity     Days per week: Not on file     Minutes per session: Not on file    Stress: Not on file Relationships    Social connections     Talks on phone: Not on file     Gets together: Not on file     Attends Judaism service: Not on file     Active member of club or organization: Not on file     Attends meetings of clubs or organizations: Not on file     Relationship status: Not on file    Intimate partner violence     Fear of current or ex partner: Not on file     Emotionally abused: Not on file     Physically abused: Not on file     Forced sexual activity: Not on file   Other Topics Concern    Not on file   Social History Narrative    Not on file       Review of Systems   Constitutional: Negative for activity change, appetite change, chills, fatigue, fever and unexpected weight change. HENT: Negative for congestion, dental problem, ear discharge, ear pain, facial swelling, hearing loss, postnasal drip, rhinorrhea, sinus pressure, sore throat and trouble swallowing. Eyes: Negative for pain and visual disturbance. Respiratory: Negative for cough, chest tightness, shortness of breath and wheezing. Cardiovascular: Negative for chest pain, palpitations and leg swelling. Gastrointestinal: Negative for abdominal pain, blood in stool, constipation, diarrhea, nausea and vomiting. Endocrine: Negative for cold intolerance, heat intolerance and polyuria. Genitourinary: Negative for difficulty urinating. Musculoskeletal: Negative for arthralgias, gait problem, myalgias, neck pain and neck stiffness. Skin: Positive for rash. Negative for color change and wound. Neurological: Negative for dizziness, tremors, seizures, weakness, light-headedness, numbness and headaches. Psychiatric/Behavioral: Negative for confusion and hallucinations. The patient is not nervous/anxious. Physical Exam  Vitals signs and nursing note reviewed. Constitutional:       General: She is not in acute distress. Appearance: She is well-developed. She is not diaphoretic.    HENT: Head: Normocephalic and atraumatic. Right Ear: External ear normal.      Left Ear: External ear normal.   Eyes:      General:         Right eye: No discharge. Left eye: No discharge. Neck:      Trachea: No tracheal deviation. Cardiovascular:      Rate and Rhythm: Normal rate and regular rhythm. Heart sounds: Normal heart sounds. Pulmonary:      Effort: Pulmonary effort is normal. No respiratory distress. Breath sounds: No wheezing or rhonchi. Abdominal:      Comments: Morbidly obese. No further petechial rash   Skin:     General: Skin is warm and dry. Coloration: Skin is not pale. Findings: No rash. Comments: Significantly improved petechial rash to bilateral forearms. Completely subsided to bilateral lower extremities. Neurological:      Mental Status: She is alert and oriented to person, place, and time. Cranial Nerves: No cranial nerve deficit. Coordination: Coordination normal.   Psychiatric:         Behavior: Behavior normal.         Thought Content: Thought content normal.         Judgment: Judgment normal.         Vital Signs: Temperature 98.2 °F, blood pressure 116/55, pulse 65, respirations 16, SPO2 98% on room air    Assessment:  1. Petechial rash  Conservative measures. Labs stable    2. Morbidly obese ([address]  Continue to work on diet weight loss. 3. Chronic combined systolic and diastolic CHF (congestive heart failure) (HCC)  Stable, no signs of acute fluid overload. Continues on dialysis    4. Dialysis patient [facility])  Continue to follow with dialysis/nephrologist for anemia      Plan:  As noted above. Follow up for routine visit. Call sooner with concerns prior.     Electronically signed by SHIRA Rader CNP on 2/1/2021 at 4:31 PM

## 2021-02-04 ENCOUNTER — OUTSIDE SERVICES (OUTPATIENT)
Dept: INTERNAL MEDICINE | Age: 71
End: 2021-02-04
Payer: MEDICARE

## 2021-02-04 DIAGNOSIS — E66.01 MORBIDLY OBESE (HCC): Primary | ICD-10-CM

## 2021-02-04 DIAGNOSIS — I10 ESSENTIAL HYPERTENSION: ICD-10-CM

## 2021-02-04 PROCEDURE — 99308 SBSQ NF CARE LOW MDM 20: CPT | Performed by: NURSE PRACTITIONER

## 2021-02-04 ASSESSMENT — ENCOUNTER SYMPTOMS
COUGH: 0
WHEEZING: 0
NAUSEA: 0
COLOR CHANGE: 0
BLOOD IN STOOL: 0
ABDOMINAL PAIN: 0
DIARRHEA: 0
CHEST TIGHTNESS: 0
SHORTNESS OF BREATH: 0
VOMITING: 0
CONSTIPATION: 0
TROUBLE SWALLOWING: 0
SORE THROAT: 0
FACIAL SWELLING: 0
SINUS PRESSURE: 0
RHINORRHEA: 0
EYE PAIN: 0

## 2021-02-04 NOTE — PROGRESS NOTES
02/04/21  Summer Moyer  1950    Patient Resident of [facility]    Chief Complaint  1. Morbidly obese ([address]    2. Essential hypertension        HPI:  61-year-old patient being seen at the request of the nursing staff due to fluctuation in weight. They state her weight is down 24 pounds over the last month. States dietitian has been following and she is eating more than 50% and receiving prostat twice a day. We discussed with the staff reason for significant amount of weight loss is fluid. Patient previously with +3 edema to bilateral upper extremities and up to bilateral thighs. Currently is on dialysis and they have significantly removed fluid. No signs of dehydration. Actually her blood pressures have been running a little bit higher 2. She currently is on Coreg 12.5 twice daily and hydralazine 75 mg every 8 hours. She does also continue on the Norvasc 5 mg daily. She denies any lightheadedness dizziness. Other than blood pressure and weight vitals have remained stable      Allergies   Allergen Reactions    Bactrim [Sulfamethoxazole-Trimethoprim] Hives    Clonidine Derivatives     Amoxicillin-Pot Clavulanate Diarrhea, Nausea Only and Nausea And Vomiting       Past Medical History:   Diagnosis Date    Anxiety and depression     Background diabetic retinopathy(362.01) 2008    (Mild)    Balance problem     CAD (coronary artery disease)     (with coronary artery bypass graft x4).  Cancer of uterus [facility])     history of, (probable cure)    Chronic kidney disease     Chronic low back pain     CVA (cerebral vascular accident) ([address]     Dementia ([address]     (moderate)    Dry eye syndrome     GERD (gastroesophageal reflux disease)     Glaucoma suspect 2005    Gout     Homonymous hemianopsia 2008    (Right)    Hyperlipidemia     Hypertension     Keratitis     (secondary to dry eye syndrome).     Obesity     Peripheral polyneuropathy     (diabetic)    Pseudophakos (Right Eye: 2012; Left Eye: 2012) - Dr. Chris Olivas.  Stroke [facility])     (Multiple) MRI of brain 10/2008 shows multiple infarcts involving the temporal and parietal areas.  Trichiasis     (left eye)    Type 2 diabetes mellitus ([facility]        Past Surgical History:   Procedure Laterality Date    CATARACT REMOVAL WITH IMPLANT  2012    (Right eye) - Dr. Chris Olivas.  CATARACT REMOVAL WITH IMPLANT  2012    (Left eye) - Dr. Chris Olivas.   SECTION      CHOLECYSTECTOMY      CORONARY ARTERY BYPASS GRAFT  12-    (x4) - LIMA-LAD, SVG-diagnoal 1, SVG-OM1, and SVG-PDA. Exploration of left radial. (Dr. Eugune Babinski).  EYE SURGERY Left     as a child     GASTRIC BYPASS SURGERY      HYSTERECTOMY      (abdominal)  with left oophorectomy. Right ovary retained.     IR TUNNELED CATHETER PLACEMENT GREATER THAN 5 YEARS  2021    IR TUNNELED CATHETER PLACEMENT GREATER THAN 5 YEARS 2021 Margarita Goode MD [facility] SPECIAL PROCEDURES    TONSILLECTOMY AND ADENOIDECTOMY      UPPER GASTROINTESTINAL ENDOSCOPY  2020    EGD BIOPSY performed by Carissa Tavarez MD at [facility]       Medications as per [facility] Chart /reviewed     Social History     Socioeconomic History    Marital status:      Spouse name: Not on file    Number of children: Not on file    Years of education: Not on file    Highest education level: Not on file   Occupational History    Not on file   Social Needs    Financial resource strain: Not on file    Food insecurity     Worry: Not on file     Inability: Not on file    Transportation needs     Medical: Not on file     Non-medical: Not on file   Tobacco Use    Smoking status: Never Smoker    Smokeless tobacco: Never Used    Tobacco comment: never smoker ecl[address] r[address]   Substance and Sexual Activity    Alcohol use: No    Drug use: No    Sexual activity: Not on file   Lifestyle    Physical activity Days per week: Not on file     Minutes per session: Not on file    Stress: Not on file   Relationships    Social connections     Talks on phone: Not on file     Gets together: Not on file     Attends Yarsanism service: Not on file     Active member of club or organization: Not on file     Attends meetings of clubs or organizations: Not on file     Relationship status: Not on file    Intimate partner violence     Fear of current or ex partner: Not on file     Emotionally abused: Not on file     Physically abused: Not on file     Forced sexual activity: Not on file   Other Topics Concern    Not on file   Social History Narrative    Not on file       Review of Systems   Constitutional: Negative for activity change, appetite change, chills, fatigue, fever and unexpected weight change. HENT: Negative for congestion, dental problem, ear discharge, ear pain, facial swelling, hearing loss, postnasal drip, rhinorrhea, sinus pressure, sore throat and trouble swallowing. Eyes: Negative for pain and visual disturbance. Respiratory: Negative for cough, chest tightness, shortness of breath and wheezing. Cardiovascular: Negative for chest pain, palpitations and leg swelling. Gastrointestinal: Negative for abdominal pain, blood in stool, constipation, diarrhea, nausea and vomiting. Endocrine: Negative for cold intolerance, heat intolerance and polyuria. Genitourinary: Negative for difficulty urinating. Musculoskeletal: Negative for arthralgias, gait problem, myalgias, neck pain and neck stiffness. Skin: Negative for color change, rash and wound. Neurological: Positive for weakness. Negative for dizziness, tremors, seizures, light-headedness, numbness and headaches. Psychiatric/Behavioral: Negative for confusion and hallucinations. The patient is not nervous/anxious. Physical Exam  Vitals signs and nursing note reviewed. Constitutional:       General: She is not in acute distress. Appearance: Normal appearance. She is well-developed. She is not diaphoretic. HENT:      Head: Normocephalic and atraumatic. Right Ear: External ear normal.      Left Ear: External ear normal.   Eyes:      General:         Right eye: No discharge. Left eye: No discharge. Neck:      Trachea: No tracheal deviation. Cardiovascular:      Rate and Rhythm: Normal rate and regular rhythm. Pulses: Normal pulses. Heart sounds: Normal heart sounds. No murmur. No friction rub. No gallop. Pulmonary:      Effort: Pulmonary effort is normal. No respiratory distress. Breath sounds: Normal breath sounds. No stridor. No wheezing, rhonchi or rales. Chest:      Chest wall: No tenderness. Abdominal:      General: Bowel sounds are normal. There is no distension. Palpations: Abdomen is soft. Tenderness: There is no abdominal tenderness. Musculoskeletal:         General: No swelling. Right lower leg: Edema present. Left lower leg: Edema (Significant improved edema to bilateral upper extremities and bilateral lower extremities. ) present. Skin:     General: Skin is warm and dry. Capillary Refill: Capillary refill takes less than 2 seconds. Coloration: Skin is not pale. Findings: No rash. Neurological:      General: No focal deficit present. Mental Status: She is alert. Mental status is at baseline. Cranial Nerves: No cranial nerve deficit. Sensory: No sensory deficit. Coordination: Coordination normal.   Psychiatric:         Mood and Affect: Mood normal.         Behavior: Behavior normal.         Vital Signs: Temperature 98 °F, blood pressure 168/58, pulse 60, respirations 20, SPO2 98% on room air    Assessment:  1.  Morbidly obese ([address] With concern from the nursing staff significant weight loss this is more than likely fluid related. Patient eating well and has significantly decreased the swelling to bilateral upper and lower extremities. Continue to monitor dietary intake and daily weights    2. Essential hypertension  Continue on her Coreg 12.5 twice daily, Norvasc 5 mg daily and increase her hydralazine 200 mg 3 times daily. Blood pressures every shift report in 1 week sooner with concerns prior      Plan:  As noted above. Follow up for routine visit. Call sooner with concerns prior.     Electronically signed by SHIRA Abdalla Caro, CNP on 2/4/2021 at 12:13 PM

## 2021-02-12 ENCOUNTER — TELEPHONE (OUTPATIENT)
Dept: INTERNAL MEDICINE | Age: 71
End: 2021-02-12

## 2021-02-12 ASSESSMENT — ENCOUNTER SYMPTOMS
BLOOD IN STOOL: 0
CONSTIPATION: 0
RHINORRHEA: 0
EYE PAIN: 0
WHEEZING: 0
NAUSEA: 0
ABDOMINAL PAIN: 0
COUGH: 0
TROUBLE SWALLOWING: 0
COLOR CHANGE: 1
FACIAL SWELLING: 0
SINUS PRESSURE: 0
SHORTNESS OF BREATH: 0
VOMITING: 0
DIARRHEA: 0
CHEST TIGHTNESS: 0
SORE THROAT: 0

## 2021-02-12 NOTE — PROGRESS NOTES
(Multiple) MRI of brain 10/2008 shows multiple infarcts involving the temporal and parietal areas.  Trichiasis     (left eye)    Type 2 diabetes mellitus ([facility]        Past Surgical History:   Procedure Laterality Date    CATARACT REMOVAL WITH IMPLANT  2012    (Right eye) - Dr. Mirta Wylie.  CATARACT REMOVAL WITH IMPLANT  2012    (Left eye) - Dr. Mirta Wylie.   SECTION      CHOLECYSTECTOMY      CORONARY ARTERY BYPASS GRAFT  12-    (x4) - LIMA-LAD, SVG-diagnoal 1, SVG-OM1, and SVG-PDA. Exploration of left radial. (Dr. Joo Goodman).  EYE SURGERY Left     as a child     GASTRIC BYPASS SURGERY      HYSTERECTOMY      (abdominal)  with left oophorectomy. Right ovary retained.     IR TUNNELED CATHETER PLACEMENT GREATER THAN 5 YEARS  2021    IR TUNNELED CATHETER PLACEMENT GREATER THAN 5 YEARS [facility]    TONSILLECTOMY AND ADENOIDECTOMY      UPPER GASTROINTESTINAL ENDOSCOPY  2020    EGD BIOPSY performed by Gayle Aguayo MD at [facility]       Medications as per [facility] Chart /reviewed     Social History     Socioeconomic History    Marital status:      Spouse name: Not on file    Number of children: Not on file    Years of education: Not on file    Highest education level: Not on file   Occupational History    Not on file   Social Needs    Financial resource strain: Not on file    Food insecurity     Worry: Not on file     Inability: Not on file    Transportation needs     Medical: Not on file     Non-medical: Not on file   Tobacco Use    Smoking status: Never Smoker    Smokeless tobacco: Never Used    Tobacco comment: never smoker eclamb r[address]   Substance and Sexual Activity    Alcohol use: No    Drug use: No    Sexual activity: Not on file   Lifestyle    Physical activity     Days per week: Not on file     Minutes per session: Not on file    Stress: Not on file   Relationships  Social connections     Talks on phone: Not on file     Gets together: Not on file     Attends Latter-day service: Not on file     Active member of club or organization: Not on file     Attends meetings of clubs or organizations: Not on file     Relationship status: Not on file    Intimate partner violence     Fear of current or ex partner: Not on file     Emotionally abused: Not on file     Physically abused: Not on file     Forced sexual activity: Not on file   Other Topics Concern    Not on file   Social History Narrative    Not on file       Review of Systems   Constitutional: Negative for activity change, appetite change, chills, fatigue, fever and unexpected weight change. HENT: Negative for congestion, dental problem, ear discharge, ear pain, facial swelling, hearing loss, postnasal drip, rhinorrhea, sinus pressure, sore throat and trouble swallowing. Eyes: Negative for pain and visual disturbance. Respiratory: Negative for cough, chest tightness, shortness of breath and wheezing. Cardiovascular: Negative for chest pain, palpitations and leg swelling. Gastrointestinal: Negative for abdominal pain, blood in stool, constipation, diarrhea, nausea and vomiting. Endocrine: Negative for cold intolerance, heat intolerance and polyuria. Genitourinary: Negative for difficulty urinating. Musculoskeletal: Positive for arthralgias and gait problem. Negative for myalgias, neck pain and neck stiffness. Skin: Positive for color change. Negative for pallor, rash and wound. Neurological: Positive for weakness. Negative for dizziness, tremors, seizures, light-headedness, numbness and headaches. Psychiatric/Behavioral: Negative for confusion and hallucinations. The patient is not nervous/anxious. Physical Exam  Vitals signs and nursing note reviewed. Constitutional:       General: She is not in acute distress. Appearance: Normal appearance. She is well-developed. She is not diaphoretic. HENT:      Head: Normocephalic and atraumatic. Right Ear: External ear normal.      Left Ear: External ear normal.   Eyes:      General:         Right eye: No discharge. Left eye: No discharge. Neck:      Trachea: No tracheal deviation. Cardiovascular:      Rate and Rhythm: Normal rate and regular rhythm. Heart sounds: Normal heart sounds. No murmur. No friction rub. No gallop. Pulmonary:      Effort: Pulmonary effort is normal. No respiratory distress. Breath sounds: Normal breath sounds. No stridor. No wheezing, rhonchi or rales. Chest:      Chest wall: No tenderness. Abdominal:      General: Bowel sounds are normal. There is no distension. Palpations: Abdomen is soft. Tenderness: There is no abdominal tenderness. Musculoskeletal:         General: No swelling. Right lower leg: Edema present. Left lower leg: Edema (+2 edema to bilateral lower extremities. Edema to bilateral forearms almost completely subsided. Scant amount edema to bilateral hands. Overall edema significantly improved) present. Skin:     General: Skin is warm and dry. Capillary Refill: Capillary refill takes less than 2 seconds. Coloration: Skin is not jaundiced or pale. Findings: Rash (Petechial rash to bilateral lower extremities in a linear pattern surrounding the top of the calf where her support hose would and. Petechial rash to bilateral forearms. Left greater than right.) present. Neurological:      General: No focal deficit present. Mental Status: She is alert and oriented to person, place, and time. Cranial Nerves: No cranial nerve deficit. Sensory: No sensory deficit. Coordination: Coordination normal.   Psychiatric:         Mood and Affect: Mood normal.         Behavior: Behavior normal.         Thought Content:  Thought content normal.         Judgment: Judgment normal. Vital Signs: Temperature 98.5 °F blood pressure 152/72, pulse 66, respirations 18, SPO2 100% on supplemental oxygen    Assessment:  1. Petechial rash  INR, PT, CBC and CMP today. They are able to obtain these at dialysis. Stop support hose to bilateral lower extremities. Monitor swelling. Elevate legs in bed throughout the day. If swelling worsens may need to use Ace wraps    2. Dialysis patient,  New to patient [facility])  Continues on dialysis, continue to follow with nephrology    3. Chronic diastolic heart failure (HCC)  Weight stable, fluid decreasing. Continues on dialysis. Continue to monitor at present. Follows with cardiology. Plan:  As noted above. Follow up for routine visit. Call sooner with concerns prior.     Electronically signed by SHIRA Berry CNP on 2/12/2021 at 8:19 AM

## 2021-02-17 ENCOUNTER — TELEPHONE (OUTPATIENT)
Dept: CARDIOLOGY | Age: 71
End: 2021-02-17

## 2021-02-17 NOTE — LETTER
Cardiology Consultation  [facility] [address] [place]  [phone]      2/18/21      Regarding:  Agustín Higgins  1950      To Whom It May Concern,      Patient is  Intermediate  Cardiac Risk for planned surgery. (AV fistula).             Thank you,        Dr. Zina Loya Cardiology     Electronically signed by Jeanette Philip MD

## 2021-02-22 ENCOUNTER — OUTSIDE SERVICES (OUTPATIENT)
Dept: INTERNAL MEDICINE | Age: 71
End: 2021-02-22
Payer: MEDICARE

## 2021-02-22 DIAGNOSIS — I10 ESSENTIAL HYPERTENSION: Primary | ICD-10-CM

## 2021-02-22 DIAGNOSIS — I50.32 CHRONIC DIASTOLIC HEART FAILURE (HCC): ICD-10-CM

## 2021-02-22 PROCEDURE — 99308 SBSQ NF CARE LOW MDM 20: CPT | Performed by: NURSE PRACTITIONER

## 2021-02-22 NOTE — PROGRESS NOTES
02[facility]18/21  [facility]  1950    Patient Resident of [facility]    Chief Complaint  1. Essential hypertension    2. Chronic diastolic heart failure (HCC)        HPI:  41-year-old patient being seen at the request of the nursing staff due to persistent elevations in blood pressure. She denies any dizziness lightheadedness. Currently is on Norvasc 5 mg daily, hydralazine 100 mg 3 times daily and Coreg 12.5 twice daily. States the swelling is significantly improving to her lower extremities. Completely gone in upper extremities. She does continue with dialysis. No chest discomfort. No orthopnea      Allergies   Allergen Reactions    Bactrim [Sulfamethoxazole-Trimethoprim] Hives    Clonidine Derivatives     Amoxicillin-Pot Clavulanate Diarrhea, Nausea Only and Nausea And Vomiting       Past Medical History:   Diagnosis Date    Anxiety and depression     Background diabetic retinopathy(362.01) 2008    (Mild)    Balance problem     CAD (coronary artery disease)     (with coronary artery bypass graft x4).  Cancer of uterus [facility])     history of, (probable cure)    Chronic kidney disease     Chronic low back pain     CVA (cerebral vascular accident) ([address]     Dementia ([address]     (moderate)    Dry eye syndrome     GERD (gastroesophageal reflux disease)     Glaucoma suspect 2005    Gout     Homonymous hemianopsia 2008    (Right)    Hyperlipidemia     Hypertension     Keratitis     (secondary to dry eye syndrome).  Obesity     Peripheral polyneuropathy     (diabetic)    Pseudophakos     (Right Eye: 05-; Left Eye: 04-) - Dr. Mera Mcgee.  Stroke [facility])     (Multiple) MRI of brain 10/2008 shows multiple infarcts involving the temporal and parietal areas.  Trichiasis 2010    (left eye)    Type 2 diabetes mellitus ([address]        Past Surgical History:   Procedure Laterality Date    CATARACT REMOVAL WITH IMPLANT  05-    (Right eye) - Dr. Mera Mcgee.  Intimate partner violence     Fear of current or ex partner: Not on file     Emotionally abused: Not on file     Physically abused: Not on file     Forced sexual activity: Not on file   Other Topics Concern    Not on file   Social History Narrative    Not on file       Review of Systems   Cardiovascular: Positive for leg swelling. Musculoskeletal: Positive for arthralgias. Neurological: Positive for weakness. All other systems reviewed and are negative. Physical Exam  Vitals signs and nursing note reviewed. Constitutional:       General: She is not in acute distress. Appearance: Normal appearance. She is well-developed. She is not diaphoretic. HENT:      Head: Normocephalic and atraumatic. Right Ear: External ear normal.      Left Ear: External ear normal.   Eyes:      General:         Right eye: No discharge. Left eye: No discharge. Neck:      Trachea: No tracheal deviation. Cardiovascular:      Rate and Rhythm: Normal rate and regular rhythm. Pulses: Normal pulses. Heart sounds: Normal heart sounds. No murmur. No friction rub. No gallop. Pulmonary:      Effort: Pulmonary effort is normal. No respiratory distress. Breath sounds: Normal breath sounds. No stridor. No wheezing, rhonchi or rales. Chest:      Chest wall: No tenderness. Abdominal:      General: Bowel sounds are normal. There is no distension. Palpations: Abdomen is soft. Tenderness: There is no abdominal tenderness. Musculoskeletal:         General: No swelling. Right lower leg: Edema present. Left lower leg: Edema (+2 lower extremity edema to mid calf) present. Skin:     General: Skin is warm and dry. Capillary Refill: Capillary refill takes less than 2 seconds. Coloration: Skin is not pale. Findings: No rash. Neurological:      General: No focal deficit present. Mental Status: She is alert. Mental status is at baseline. Cranial Nerves: No cranial nerve deficit. Sensory: No sensory deficit. Coordination: Coordination normal.   Psychiatric:         Mood and Affect: Mood normal.         Behavior: Behavior normal.         Vital Signs: Temperature afebrile, vital signs reviewed and plan click care. Persistent elevation of systolic blood pressure greater than 150. Assessment:  1. Essential hypertension  We will increase her Norvasc to 10 mg daily. Monitor blood pressure routinely. Notify if persistent elevations. 2. Chronic diastolic heart failure (HCC)  No signs of fluid overload      Plan:  As noted above. Follow up for routine visit. Call sooner with concerns prior.     Electronically signed by SHIRA Wright CNP on 2/22/2021 at 12:23 PM

## 2021-02-28 PROCEDURE — 99309 SBSQ NF CARE MODERATE MDM 30: CPT | Performed by: INTERNAL MEDICINE

## 2021-03-01 ENCOUNTER — OUTSIDE SERVICES (OUTPATIENT)
Dept: INTERNAL MEDICINE | Age: 71
End: 2021-03-01
Payer: MEDICAID

## 2021-03-01 DIAGNOSIS — F32.A ANXIETY AND DEPRESSION: ICD-10-CM

## 2021-03-01 DIAGNOSIS — Z99.2 CONTROLLED TYPE 2 DIABETES MELLITUS WITH CHRONIC KIDNEY DISEASE ON CHRONIC DIALYSIS, WITH LONG-TERM CURRENT USE OF INSULIN (HCC): ICD-10-CM

## 2021-03-01 DIAGNOSIS — E78.2 MIXED HYPERLIPIDEMIA: ICD-10-CM

## 2021-03-01 DIAGNOSIS — N18.6 END STAGE RENAL DISEASE ON DIALYSIS (HCC): ICD-10-CM

## 2021-03-01 DIAGNOSIS — F03.90 DEMENTIA WITHOUT BEHAVIORAL DISTURBANCE, UNSPECIFIED DEMENTIA TYPE: ICD-10-CM

## 2021-03-01 DIAGNOSIS — I50.32 CHRONIC DIASTOLIC HEART FAILURE (HCC): ICD-10-CM

## 2021-03-01 DIAGNOSIS — Z99.2 END STAGE RENAL DISEASE ON DIALYSIS (HCC): ICD-10-CM

## 2021-03-01 DIAGNOSIS — E11.22 CONTROLLED TYPE 2 DIABETES MELLITUS WITH CHRONIC KIDNEY DISEASE ON CHRONIC DIALYSIS, WITH LONG-TERM CURRENT USE OF INSULIN (HCC): ICD-10-CM

## 2021-03-01 DIAGNOSIS — N18.6 CONTROLLED TYPE 2 DIABETES MELLITUS WITH CHRONIC KIDNEY DISEASE ON CHRONIC DIALYSIS, WITH LONG-TERM CURRENT USE OF INSULIN (HCC): ICD-10-CM

## 2021-03-01 DIAGNOSIS — R60.1 ANASARCA: Primary | ICD-10-CM

## 2021-03-01 DIAGNOSIS — Z79.4 CONTROLLED TYPE 2 DIABETES MELLITUS WITH CHRONIC KIDNEY DISEASE ON CHRONIC DIALYSIS, WITH LONG-TERM CURRENT USE OF INSULIN (HCC): ICD-10-CM

## 2021-03-01 DIAGNOSIS — F41.9 ANXIETY AND DEPRESSION: ICD-10-CM

## 2021-03-01 DIAGNOSIS — E66.01 MORBID OBESITY (HCC): ICD-10-CM

## 2021-03-01 DIAGNOSIS — I10 ESSENTIAL HYPERTENSION: ICD-10-CM

## 2021-03-01 DIAGNOSIS — Z99.2 HEMODIALYSIS PATIENT (HCC): ICD-10-CM

## 2021-03-01 PROBLEM — R00.1 BRADYCARDIA: Status: RESOLVED | Noted: 2020-08-19 | Resolved: 2021-03-01

## 2021-03-01 PROBLEM — I50.33 ACUTE ON CHRONIC DIASTOLIC HEART FAILURE (HCC): Status: RESOLVED | Noted: 2020-08-07 | Resolved: 2021-03-01

## 2021-03-01 PROBLEM — T07.XXXA WOUNDS, MULTIPLE: Status: RESOLVED | Noted: 2020-08-20 | Resolved: 2021-03-01

## 2021-03-01 PROBLEM — N17.9 ACUTE KIDNEY INJURY (HCC): Status: RESOLVED | Noted: 2020-08-19 | Resolved: 2021-03-01

## 2021-03-01 PROBLEM — L89.212: Status: RESOLVED | Noted: 2020-08-23 | Resolved: 2021-03-01

## 2021-03-01 PROBLEM — L89.610 PRESSURE INJURY OF RIGHT HEEL, UNSTAGEABLE (HCC): Status: RESOLVED | Noted: 2020-08-18 | Resolved: 2021-03-01

## 2021-03-01 PROBLEM — S31.809A BUTTOCK WOUND: Status: RESOLVED | Noted: 2020-08-20 | Resolved: 2021-03-01

## 2021-03-01 PROBLEM — B95.62 MRSA BACTEREMIA: Status: RESOLVED | Noted: 2020-12-18 | Resolved: 2021-03-01

## 2021-03-01 PROBLEM — E87.5 HYPERKALEMIA: Status: RESOLVED | Noted: 2020-08-19 | Resolved: 2021-03-01

## 2021-03-01 PROBLEM — N17.9 ACUTE RENAL FAILURE (ARF) (HCC): Status: RESOLVED | Noted: 2020-12-11 | Resolved: 2021-03-01

## 2021-03-01 PROBLEM — R78.81 MRSA BACTEREMIA: Status: RESOLVED | Noted: 2020-12-18 | Resolved: 2021-03-01

## 2021-03-01 PROBLEM — B87.9 INFESTATION BY MAGGOTS: Status: RESOLVED | Noted: 2020-08-08 | Resolved: 2021-03-01

## 2021-03-01 PROBLEM — I50.9 DECOMPENSATED HEART FAILURE (HCC): Status: RESOLVED | Noted: 2020-12-04 | Resolved: 2021-03-01

## 2021-03-01 PROBLEM — B88.8 INFESTATION BY BED BUG: Status: RESOLVED | Noted: 2020-08-08 | Resolved: 2021-03-01

## 2021-03-01 PROBLEM — N30.00 ACUTE CYSTITIS: Status: RESOLVED | Noted: 2020-08-08 | Resolved: 2021-03-01

## 2021-03-01 NOTE — PROGRESS NOTES
DR. Ann Gamble - [facility] PATIENT HISTORY & PHYSICAL EXAM    DATE OF SERVICE: 1/31/21    NURSING HOME: [facility]    CHRONIC/ACTIVE PROBLEM LIST:     Patient Active Problem List   Diagnosis    Dementia [facility]    Hypertension    Hyperlipidemia    Gout    Peripheral polyneuropathy    Anxiety and depression    CVA (cerebral vascular accident) [facility]    Controlled type 2 diabetes mellitus with chronic 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 dialysis, with long-term current use of insulin ([facility])    Lymphedema of both lower extremities    PAD (peripheral artery disease) ([facility])    AV block, 1st degree    Anasarca    Chronic diastolic heart failure (HCC)    End stage renal disease on dialysis ([facility]    Hemodialysis patient ([facility]    Morbid obesity ([facility]    BMI 45.0-49.9, adult ([facility]       CHIEF COMPLAINT: Here for rehabilitation and physical therapy    HISTORY OF CHIEF COMPLAINT: This 72 y.o.  female was admitted to the [address] [place] [address] for rehabilitation and physical therapy after a recent hospitalization at [facility] for anasarca and volume overload. She was in acute end-stage renal disease so they started her on hemodialysis, which continues at this time. She is diabetic and takes insulin for it. Her last hemoglobin A1c was 5.6% in December 2020. She uses an Exelon patch for dementia. She is on citalopram for depression. She takes Lipitor for hyperlipidemia. Since getting out of the hospital she seems to be doing fairly well with physical therapy. She is getting dialysis at the dialysis center 3 days a week. There are no other complaints. ALLERGIES:   Allergies   Allergen Reactions    Bactrim [Sulfamethoxazole-Trimethoprim] Hives    Clonidine Derivatives     Amoxicillin-Pot Clavulanate Diarrhea, Nausea Only and Nausea And Vomiting       MEDICATIONS: As noted on the [facility], referenced and incorporated herein. PAST MEDICAL HISTORY:   Past Medical History:   Diagnosis Date    Anxiety and depression     Background diabetic retinopathy(362.01)     (Mild)    Balance problem     CAD (coronary artery disease)     (with coronary artery bypass graft x4).  Cancer of uterus [facility])     history of, (probable cure)    Chronic kidney disease     Chronic low back pain     CVA (cerebral vascular accident) ([address]     Dementia ([address]     (moderate)    Dry eye syndrome     GERD (gastroesophageal reflux disease)     Glaucoma suspect     Gout     Homonymous hemianopsia     (Right)    Hyperlipidemia     Hypertension     Keratitis     (secondary to dry eye syndrome).  Obesity     Peripheral polyneuropathy     (diabetic)    Pseudophakos     (Right Eye: 2012; Left Eye: 2012) - Dr. Brenton Chavez.  Stroke [facility])     (Multiple) MRI of brain 10/2008 shows multiple infarcts involving the temporal and parietal areas.  Trichiasis     (left eye)    Type 2 diabetes mellitus ([address]        PAST SURGICAL HISTORY:   Past Surgical History:   Procedure Laterality Date    CATARACT REMOVAL WITH IMPLANT  2012    (Right eye) - Dr. Brenton Chavez.  CATARACT REMOVAL WITH IMPLANT  2012    (Left eye) - Dr. Brenton Chavez.   SECTION      CHOLECYSTECTOMY      CORONARY ARTERY BYPASS GRAFT  12-    (x4) - LIMA-LAD, SVG-diagnoal 1, SVG-OM1, and SVG-PDA. Exploration of left radial. (Dr. Emily Jacobs).  EYE SURGERY Left     as a child     GASTRIC BYPASS SURGERY      HYSTERECTOMY      (abdominal)  with left oophorectomy. Right ovary retained.     IR TUNNELED CATHETER PLACEMENT GREATER THAN 5 YEARS  2021    IR TUNNELED CATHETER PLACEMENT GREATER THAN 5 YEARS 2021 Walter Gardner MD [facility] SPECIAL PROCEDURES    TONSILLECTOMY AND ADENOIDECTOMY      UPPER GASTROINTESTINAL ENDOSCOPY  2020    EGD BIOPSY performed by Margarita Recinos MD at [facility]       SOCIAL HISTORY:     Tobacco: Electronically signed by Sergey Pisano DO on 3/1/2021 at 2:38 AM  Internal Medicine

## 2021-03-08 ENCOUNTER — OUTSIDE SERVICES (OUTPATIENT)
Dept: INTERNAL MEDICINE | Age: 71
End: 2021-03-08
Payer: MEDICARE

## 2021-03-08 DIAGNOSIS — N18.6 CKD (CHRONIC KIDNEY DISEASE) REQUIRING CHRONIC DIALYSIS (HCC): ICD-10-CM

## 2021-03-08 DIAGNOSIS — Z99.2 CKD (CHRONIC KIDNEY DISEASE) REQUIRING CHRONIC DIALYSIS (HCC): ICD-10-CM

## 2021-03-08 DIAGNOSIS — I10 ESSENTIAL HYPERTENSION: Primary | ICD-10-CM

## 2021-03-08 PROCEDURE — 99308 SBSQ NF CARE LOW MDM 20: CPT | Performed by: NURSE PRACTITIONER

## 2021-03-10 ENCOUNTER — TELEPHONE (OUTPATIENT)
Dept: INTERNAL MEDICINE | Age: 71
End: 2021-03-10

## 2021-03-10 DIAGNOSIS — Z79.4 TYPE 2 DIABETES MELLITUS WITH HYPERGLYCEMIA, WITH LONG-TERM CURRENT USE OF INSULIN (HCC): ICD-10-CM

## 2021-03-10 DIAGNOSIS — E11.65 TYPE 2 DIABETES MELLITUS WITH HYPERGLYCEMIA, WITH LONG-TERM CURRENT USE OF INSULIN (HCC): ICD-10-CM

## 2021-03-10 RX ORDER — AMLODIPINE BESYLATE 10 MG/1
10 TABLET ORAL DAILY
Qty: 30 TABLET | Refills: 0 | Status: SHIPPED | OUTPATIENT
Start: 2021-03-10

## 2021-03-10 RX ORDER — OXYBUTYNIN CHLORIDE 5 MG/1
TABLET ORAL
Qty: 60 TABLET | Refills: 0 | Status: SHIPPED | OUTPATIENT
Start: 2021-03-10

## 2021-03-10 RX ORDER — RIVASTIGMINE 9.5 MG/24H
PATCH, EXTENDED RELEASE TRANSDERMAL
Qty: 30 PATCH | Refills: 0 | Status: SHIPPED | OUTPATIENT
Start: 2021-03-10

## 2021-03-10 RX ORDER — HYDRALAZINE HYDROCHLORIDE 100 MG/1
100 TABLET, FILM COATED ORAL 3 TIMES DAILY
Qty: 90 TABLET | Refills: 0 | Status: SHIPPED | OUTPATIENT
Start: 2021-03-10

## 2021-03-10 RX ORDER — ATORVASTATIN CALCIUM 40 MG/1
TABLET, FILM COATED ORAL
Qty: 30 TABLET | Refills: 0 | Status: SHIPPED | OUTPATIENT
Start: 2021-03-10

## 2021-03-10 RX ORDER — PANTOPRAZOLE SODIUM 40 MG/1
40 TABLET, DELAYED RELEASE ORAL
Qty: 60 TABLET | Refills: 0 | Status: SHIPPED | OUTPATIENT
Start: 2021-03-10

## 2021-03-10 RX ORDER — CITALOPRAM 20 MG/1
20 TABLET ORAL DAILY
Qty: 30 TABLET | Refills: 0 | Status: SHIPPED | OUTPATIENT
Start: 2021-03-10

## 2021-03-10 RX ORDER — INSULIN GLARGINE 100 [IU]/ML
10 INJECTION, SOLUTION SUBCUTANEOUS NIGHTLY
Qty: 1 VIAL | Refills: 1 | Status: ON HOLD | OUTPATIENT
Start: 2021-03-10 | End: 2022-01-01

## 2021-03-10 RX ORDER — NYSTATIN 100000 U/G
CREAM TOPICAL
Qty: 30 G | Refills: 0 | Status: ON HOLD | OUTPATIENT
Start: 2021-03-10 | End: 2022-01-01

## 2021-03-10 RX ORDER — CLOPIDOGREL BISULFATE 75 MG/1
75 TABLET ORAL DAILY
Qty: 30 TABLET | Refills: 0 | Status: ON HOLD | OUTPATIENT
Start: 2021-03-10 | End: 2021-01-01 | Stop reason: HOSPADM

## 2021-03-10 RX ORDER — CARVEDILOL 12.5 MG/1
12.5 TABLET ORAL 2 TIMES DAILY WITH MEALS
Qty: 60 TABLET | Refills: 0 | Status: ON HOLD | OUTPATIENT
Start: 2021-03-10 | End: 2021-01-01 | Stop reason: HOSPADM

## 2021-03-10 ASSESSMENT — ENCOUNTER SYMPTOMS
VOMITING: 0
ABDOMINAL PAIN: 0
RHINORRHEA: 0
CHEST TIGHTNESS: 0
TROUBLE SWALLOWING: 0
SORE THROAT: 0
FACIAL SWELLING: 0
NAUSEA: 0
COLOR CHANGE: 0
EYE PAIN: 0
CONSTIPATION: 0
DIARRHEA: 0
COUGH: 0
SHORTNESS OF BREATH: 0
WHEEZING: 0
SINUS PRESSURE: 0
BLOOD IN STOOL: 0

## 2021-03-10 NOTE — TELEPHONE ENCOUNTER
[facility] requesting a refill of the below medication which has been pended for you:     Requested Prescriptions     Pending Prescriptions Disp Refills    amLODIPine (NORVASC) 10 MG tablet 30 tablet 0     Sig: Take 1 tablet by mouth daily    atorvastatin (LIPITOR) 40 MG tablet 30 tablet 0     Sig: TAKE 1 TABLET BY MOUTH EVERYDAY    Cholecalciferol 125 MCG (5000 UT) CHEW 30 tablet 0     Sig: Take 1 tablet by mouth daily    citalopram (CELEXA) 20 MG tablet 30 tablet 0     Sig: Take 1 tablet by mouth daily    carvedilol (COREG) 12.5 MG tablet 60 tablet 0     Sig: Take 1 tablet by mouth 2 times daily (with meals)    hydrALAZINE (APRESOLINE) 100 MG tablet 90 tablet 0     Sig: Take 1 tablet by mouth 3 times daily    insulin glargine (LANTUS) 100 UNIT/ML injection vial 1 vial 1     Sig: Inject 10 Units into the skin nightly    insulin lispro (HUMALOG) 100 UNIT/ML injection vial 3 vial 0     Sig: Use 4 times a day per sliding scale    nystatin (MYCOSTATIN) 046092 UNIT/GM cream 30 g 0     Sig: Apply topically 2 times daily.     oxybutynin (DITROPAN) 5 MG tablet 60 tablet 0     Sig: TAKE 1 TABLET BY MOUTH TWICE DAILY    clopidogrel (PLAVIX) 75 MG tablet 30 tablet 0     Sig: Take 1 tablet by mouth daily    pantoprazole (PROTONIX) 40 MG tablet 60 tablet 0     Sig: Take 1 tablet by mouth 2 times daily (before meals)    rivastigmine (EXELON) 9.5 MG/24HR 30 patch 0     Sig: APPLY 1 NEW PATCH EVERY 24 HOURS       Last Appointment Date: Visit date not found  Next Appointment Date: Visit date not found    Allergies   Allergen Reactions    Bactrim [Sulfamethoxazole-Trimethoprim] Hives    Clonidine Derivatives     Amoxicillin-Pot Clavulanate Diarrhea, Nausea Only and Nausea And Vomiting

## 2021-03-11 ENCOUNTER — TELEPHONE (OUTPATIENT)
Dept: FAMILY MEDICINE CLINIC | Age: 71
End: 2021-03-11

## 2021-03-11 NOTE — PROGRESS NOTES
03/08/21  Chrissy Valencia  1950    Patient Resident of [facility]    Chief Complaint  1. Essential hypertension    2. CKD (chronic kidney disease) requiring chronic dialysis [facility])        HPI:  Pleasant 62-year-old female with history of CHF, hypertension, chronic kidney disease being seen at the request of the nursing staff due to persistent elevations in blood pressure. Currently is on hydralazine 100 mg 3 times daily, Norvasc 10 mg daily, Coreg 12.5 twice daily. Persistently running in the 478V to 855R systolically. She denies any lightheadedness. No dizziness. No chest discomfort or shortness of breath. No signs of fluid overload. Allergies   Allergen Reactions    Bactrim [Sulfamethoxazole-Trimethoprim] Hives    Clonidine Derivatives     Amoxicillin-Pot Clavulanate Diarrhea, Nausea Only and Nausea And Vomiting       Past Medical History:   Diagnosis Date    Acute anemia     Acute cystitis 8/8/2020    Acute kidney injury ([address] 8/19/2020    Acute kidney injury superimposed on CKD ([address]     Acute on chronic diastolic heart failure ([address] 8/7/2020    Acute renal failure (ARF) ([address] 12/11/2020    Anasarca 12/12/2020    Anxiety and depression     AV block, 1st degree 8/19/2020    Background diabetic retinopathy(362.01) 2008    (Mild)    Balance problem     BMI 45.0-49.9, adult ([address] 3/1/2021    Bradycardia 8/19/2020    Buttock wound 8/20/2020    CAD (coronary artery disease)     (with coronary artery bypass graft x4).     Cancer of uterus [facility])     history of, (probable cure)    Chronic diastolic heart failure ([address] 3/1/2021    Chronic kidney disease     Chronic low back pain     Controlled type 2 diabetes mellitus with chronic 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 dialysis, with long-term current use of insulin (HCC)     CVA (cerebral vascular accident) ([address]     Decompensated heart failure ([address] 12/4/2020    Decubitus ulcer of trochanteric region of right hip, stage 2 ([address] 2020    Dementia (HCC)     (moderate)    Dry eye syndrome     End stage renal disease on dialysis ([facility] 3/1/2021    GERD (gastroesophageal reflux disease)     Glaucoma suspect     Gout     Hemodialysis patient ([address] 3/1/2021    Homonymous hemianopsia     (Right)    Hyperkalemia 2020    Hyperlipidemia     Hypertension     Infestation by bed bug 2020    Infestation by maggots 2020    Keratitis     (secondary to dry eye syndrome).  Lymphedema of both lower extremities 2020    Morbid obesity ([address] 3/1/2021    MRSA bacteremia 2020    Obesity     PAD (peripheral artery disease) (HCC)     Peripheral polyneuropathy     (diabetic)    Pressure injury of right heel, unstageable ([address] 2020    Pseudophakos     (Right Eye: 2012; Left Eye: 2012) - Dr. Estelita Moore.  Stroke [facility])     (Multiple) MRI of brain 10/2008 shows multiple infarcts involving the temporal and parietal areas.  Trichiasis     (left eye)    Type 2 diabetes mellitus ([address]     Wounds, multiple 2020       Past Surgical History:   Procedure Laterality Date    CATARACT REMOVAL WITH IMPLANT  2012    (Right eye) - Dr. Estelita Moore.  CATARACT REMOVAL WITH IMPLANT  2012    (Left eye) - Dr. Estelita Moore.   SECTION      CHOLECYSTECTOMY      CORONARY ARTERY BYPASS GRAFT  12-    (x4) - LIMA-LAD, SVG-diagnoal 1, SVG-OM1, and SVG-PDA. Exploration of left radial. (Dr. Gunjan Reyes).  EYE SURGERY Left     as a child     GASTRIC BYPASS SURGERY      HYSTERECTOMY      (abdominal)  with left oophorectomy. Right ovary retained.     IR TUNNELED CATHETER PLACEMENT GREATER THAN 5 YEARS  2021    IR TUNNELED CATHETER PLACEMENT GREATER THAN 5 YEARS 2021 Aung Gomez MD [facility] SPECIAL PROCEDURES    TONSILLECTOMY AND ADENOIDECTOMY      UPPER GASTROINTESTINAL ENDOSCOPY  2020    EGD [facility]OPSY [facility]     Social History     Socioeconomic History    Marital status:      Spouse name: Not on file    Number of children: Not on file    Years of education: Not on file    Highest education level: Not on file   Occupational History    Not on file   Social Needs    Financial resource strain: Not on file    Food insecurity     Worry: Not on file     Inability: Not on file    Transportation needs     Medical: Not on file     Non-medical: Not on file   Tobacco Use    Smoking status: Never Smoker    Smokeless tobacco: Never Used    Tobacco comment: never smoker eclamb rrt 7/20/17   Substance and Sexual Activity    Alcohol use: No    Drug use: No    Sexual activity: Not on file   Lifestyle    Physical activity     Days per week: Not on file     Minutes per session: Not on file    Stress: Not on file   Relationships    Social connections     Talks on phone: Not on file     Gets together: Not on file     Attends [employer] service: Not on file     Active member of club or organization: Not on file     Attends meetings of clubs or organizations: Not on file     Relationship status: Not on file    Intimate partner violence     Fear of current or ex partner: Not on file     Emotionally abused: Not on file     Physically abused: Not on file     Forced sexual activity: Not on file   Other Topics Concern    Not on file   Social History Narrative    Not on file       Review of Systems   Constitutional: Negative for activity change, appetite change, chills, fatigue, fever and unexpected weight change. HENT: Negative for congestion, dental problem, ear discharge, ear pain, facial swelling, hearing loss, postnasal drip, rhinorrhea, sinus pressure, sore throat and trouble swallowing. Eyes: Negative for pain and visual disturbance. Respiratory: Negative for cough, chest tightness, shortness of breath and wheezing.     Cardiovascular: Negative for chest pain, palpitations and leg swelling. Gastrointestinal: Negative for abdominal pain, blood in stool, constipation, diarrhea, nausea and vomiting. Endocrine: Negative for cold intolerance, heat intolerance and polyuria. Genitourinary: Negative for difficulty urinating. Musculoskeletal: Negative for arthralgias, gait problem, myalgias, neck pain and neck stiffness. Skin: Negative for color change, rash and wound. Neurological: Negative for dizziness, tremors, seizures, weakness, light-headedness, numbness and headaches. Psychiatric/Behavioral: Negative for confusion and hallucinations. The patient is not nervous/anxious. Physical Exam  Vitals signs and nursing note reviewed. Constitutional:       General: She is not in acute distress. Appearance: Normal appearance. She is well-developed. She is not diaphoretic. HENT:      Head: Normocephalic and atraumatic. Right Ear: External ear normal.      Left Ear: External ear normal.   Eyes:      General:         Right eye: No discharge. Left eye: No discharge. Neck:      Trachea: No tracheal deviation. Cardiovascular:      Rate and Rhythm: Normal rate and regular rhythm. Heart sounds: Normal heart sounds. No murmur. No friction rub. No gallop. Pulmonary:      Effort: Pulmonary effort is normal. No respiratory distress. Breath sounds: Normal breath sounds. No stridor. No wheezing, rhonchi or rales. Chest:      Chest wall: No tenderness. Abdominal:      General: Bowel sounds are normal. There is no distension. Palpations: Abdomen is soft. Tenderness: There is no abdominal tenderness. Musculoskeletal:         General: No swelling. Right lower leg: Edema (+2 bilateral lower extremity edema) present. Left lower leg: Edema present. Skin:     General: Skin is warm and dry. Capillary Refill: Capillary refill takes less than 2 seconds. Coloration: Skin is not jaundiced or pale.       Findings: No rash.   Neurological:      General: No focal deficit present. Mental Status: She is alert and oriented to person, place, and time. Cranial Nerves: No cranial nerve deficit. Sensory: No sensory deficit. Coordination: Coordination normal.   Psychiatric:         Mood and Affect: Mood normal.         Behavior: Behavior normal.         Thought Content: Thought content normal.         Judgment: Judgment normal.         Vital Signs: Temperature 97.8 °F, blood pressure 151/50, pulse 66, respirations 16, SPO2 94% on room air    Assessment:  1. Essential hypertension  Currently on hydralazine 100 3 times daily, Norvasc 10 mg daily, Coreg 12.5 twice daily suggest they reach out to nephrology for further recommendations. 2. CKD (chronic kidney disease) requiring chronic dialysis ([facility]  Continues on dialysis. Would appreciate nephrology's input      Plan:  As noted above. Follow up for routine visit. Call sooner with concerns prior.     Electronically signed by SHIRA Fuller CNP on 3/10/2021 at 10:26 PM

## 2021-03-11 NOTE — TELEPHONE ENCOUNTER
Nurse mauricio called in wanting to get a discharge f/u with [facility] for this patient. She stated she is getting discharged tomorrow and for a home health aid needs a f/u up with pcp.

## 2021-03-11 NOTE — TELEPHONE ENCOUNTER
Talked with Gus Niño about anything we can do for her to stay at facility. They have tried everything  To get her to stay. She passes mental test that they do daily 14/15. They are very concerned with diet when she is discharged. Bp remain high and Melody Svitlana will not adjust medication any more. She had them contact Dr Gerardo Shepard and he will not adjust medication at this time

## 2021-03-30 NOTE — PROGRESS NOTES
DR. Marylen [facility] VISIT    DATE OF SERVICE: 2/28/21    NURSING HOME: [facility]    CHIEF COMPLAINT/HISTORY OF CHIEF COMPLAINT: This patient is being seen for ongoing evaluation and management of her diabetes mellitus type 2, hypertension, hyperlipidemia, end-stage renal disease on hemodialysis, chronic diastolic heart failure, dementia, anxiety, depression, and morbid obesity. She is currently on fluid restrictions due to dialysis and she was wondering if she could have more water than she is currently allowed. She does take insulin for her diabetes. She is on Lipitor for hyperlipidemia. She uses an Exelon patch for dementia. There are no other complaints. ALLERGIES:   Allergies   Allergen Reactions    Bactrim [Sulfamethoxazole-Trimethoprim] Hives    Clonidine Derivatives     Amoxicillin-Pot Clavulanate Diarrhea, Nausea Only and Nausea And Vomiting       MEDICATIONS: As noted on the [facility], referenced and incorporated herein. PAST MEDICAL HISTORY:   Past Medical History:   Diagnosis Date    Acute anemia     Acute cystitis 8/8/2020    Acute kidney injury ([address] 8/19/2020    Acute kidney injury superimposed on CKD ([address]     Acute on chronic diastolic heart failure ([address] 8/7/2020    Acute renal failure (ARF) ([address] 12/11/2020    Anasarca 12/12/2020    Anxiety and depression     AV block, 1st degree 8/19/2020    Background diabetic retinopathy(362.01) 2008    (Mild)    Balance problem     BMI 45.0-49.9, adult ([address] 3/1/2021    Bradycardia 8/19/2020    Buttock wound 8/20/2020    CAD (coronary artery disease)     (with coronary artery bypass graft x4).     Cancer of uterus [facility])     history of, (probable cure)    Chronic diastolic heart failure ([address] 3/1/2021    Chronic kidney disease     Chronic low back pain     Controlled type 2 diabetes mellitus with chronic 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 dialysis, with long-term current use of insulin ([address]     CVA (cerebral vascular accident) ([address]     Decompensated heart failure ([address] 2020    Decubitus ulcer of trochanteric region of right hip, stage 2 ([address] 2020    Dementia ([address]     (moderate)    Dry eye syndrome     End stage renal disease on dialysis ([address] 3/1/2021    GERD (gastroesophageal reflux disease)     Glaucoma suspect     Gout     Hemodialysis patient ([address] 3/1/2021    Homonymous hemianopsia     (Right)    Hyperkalemia 2020    Hyperlipidemia     Hypertension     Infestation by bed bug 2020    Infestation by maggots 2020    Keratitis     (secondary to dry eye syndrome).  Lymphedema of both lower extremities 2020    Morbid obesity ([address] 3/1/2021    MRSA bacteremia 2020    Obesity     PAD (peripheral artery disease) (HCC)     Peripheral polyneuropathy     (diabetic)    Pressure injury of right heel, unstageable ([address] 2020    Pseudophakos     (Right Eye: 2012; Left Eye: 2012) - Dr. Kirk Ward.  Stroke [facility])     (Multiple) MRI of brain 10/2008 shows multiple infarcts involving the temporal and parietal areas.  Trichiasis     (left eye)    Type 2 diabetes mellitus ([address]     Wounds, multiple 2020       PAST SURGICAL HISTORY:   Past Surgical History:   Procedure Laterality Date    CATARACT REMOVAL WITH IMPLANT  2012    (Right eye) - Dr. Kirk Ward.  CATARACT REMOVAL WITH IMPLANT  2012    (Left eye) - Dr. Kirk Ward.   SECTION      CHOLECYSTECTOMY      CORONARY ARTERY BYPASS GRAFT  12-    (x4) - LIMA-LAD, SVG-diagnoal 1, SVG-OM1, and SVG-PDA. Exploration of left radial. (Dr. Danilo Marie).  EYE SURGERY Left     as a child     GASTRIC BYPASS SURGERY      HYSTERECTOMY      (abdominal)  with left oophorectomy. Right ovary retained.     IR TUNNELED CATHETER PLACEMENT GREATER THAN 5 YEARS  2021    IR TUNNELED CATHETER PLACEMENT GREATER THAN 5 YEARS 2021 Deidra Newell MD [facility] SPECIAL PROCEDURES    TONSILLECTOMY AND ADENOIDECTOMY      UPPER GASTROINTESTINAL ENDOSCOPY  12/14/2020    EGD BIOPSY performed by Rosana Quezada MD at [facility]       SOCIAL HISTORY:     Tobacco:   Social History     Tobacco Use   Smoking Status Never Smoker   Smokeless Tobacco Never Used   Tobacco Comment    never smoker eclamb rrt 7/20/17     Alcohol:   Social History     Substance and Sexual Activity   Alcohol Use No     Drugs:   Social History     Substance and Sexual Activity   Drug Use No       FAMILY HISTORY: family history includes Cancer in her mother; Coronary Art Dis in an other family member; Diabetes in her father and mother; Emphysema in her father; Glaucoma in her father; Heart Disease in her father; High Blood Pressure in her mother; Kidney Disease in her mother; Lesia Ridges in an other family member; Mental Retardation in an other family member; Stroke in her mother and another family member; Uterine Cancer in her mother. REVIEW OF SYSTEMS:     Please see history of chief complaint above; otherwise no new problems with respect to General, HEENT, Cardiovascular, Respiratory, Gastrointestinal, Genitourinary, Endocrinologic, Musculoskeletal, or Neuropsychiatric complaints. PHYSICAL EXAMINATION:    Vitals: Temp: 98.5 deg F. Pulse: 57. Resp: 16. BP: 132/56. General: A 72 y.o.  female. Alert and oriented to person, place and time. She  does not appear to be in any acute distress. Skin: Skin color, texture, turgor normal. No rashes or lesions. HEENT/Neck: Essentially unremarkable  Lungs: Normal - CTA without rales, rhonchi, or wheezing. Heart: regular rate and rhythm, S1, S2 normal, no murmur, click, rub or gallop No S3 or S4. Abdomen: Obese soft, non-distended, non-tender, normal active bowel sounds, no masses palpated and no hepatosplenomegaly  Extremities: No clubbing, cyanosis, or edema in any of the extremities.   Neurologic: cranial nerves II-XII are grossly intact ASSESSMENT:     Diagnosis Orders   1. Controlled type 2 diabetes mellitus with chronic 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 dialysis, with long-term current use of insulin ([facility]     2. Long-term insulin use ([facility]     3. Essential hypertension     4. Mixed hyperlipidemia     5. Chronic diastolic heart failure ([address]     6. End stage renal disease on dialysis ([facility]     7. Hemodialysis patient ([facility]     8. Dementia without behavioral disturbance, unspecified dementia type ([facility]     9. Anxiety and depression     10. Morbid obesity ([facility]     11. BMI 45.0-49.9, adult ([facility]           PLAN:    1. Continue current treatment  2. Nursing home record reviewed and updates summarized and entered into electronic record  3. Nursing home blood sugar logs and diabetic medication administration reviewed. No changes  4. Explained that we cannot lift or loosen her fluid restrictions at this time   5. See nursing home orders and MAR.         Electronically signed by Angy Starr DO on 3/30/2021 at 3:38 PM  Internal Medicine

## 2021-04-29 NOTE — PROGRESS NOTES
stage 2 ([address] 2020    Dementia (HCC)     (moderate)    Dry eye syndrome     End stage renal disease on dialysis ([address] 3/1/2021    GERD (gastroesophageal reflux disease)     Glaucoma suspect     Gout     Hemodialysis patient ([address] 3/1/2021    Homonymous hemianopsia 2008    (Right)    Hyperkalemia 2020    Hyperlipidemia     Hypertension     Infestation by bed bug 2020    Infestation by maggots 2020    Keratitis     (secondary to dry eye syndrome).  Lymphedema of both lower extremities 2020    Morbid obesity ([address] 3/1/2021    MRSA bacteremia 2020    Obesity     PAD (peripheral artery disease) (HCC)     Peripheral polyneuropathy     (diabetic)    Pressure injury of right heel, unstageable ([address] 2020    Pseudophakos     (Right Eye: 2012; Left Eye: 2012) - Dr. Abdoul Akhtar.  Stroke [facility])     (Multiple) MRI of brain 10/2008 shows multiple infarcts involving the temporal and parietal areas.  Trichiasis     (left eye)    Type 2 diabetes mellitus ([address]     Wounds, multiple 2020       PAST SURGICAL HISTORY:   Past Surgical History:   Procedure Laterality Date    CATARACT REMOVAL WITH IMPLANT  2012    (Right eye) - Dr. Abdoul Akhtar.  CATARACT REMOVAL WITH IMPLANT  2012    (Left eye) - Dr. Abdoul Johnson   SECTION      CHOLECYSTECTOMY      CORONARY ARTERY BYPASS GRAFT  12-    (x4) - LIMA-LAD, SVG-diagnoal 1, SVG-OM1, and SVG-PDA. Exploration of left radial. (Dr. Charly Shaffer).  EYE SURGERY Left     as a child     GASTRIC BYPASS SURGERY      HYSTERECTOMY      (abdominal)  with left oophorectomy. Right ovary retained.     IR TUNNELED CATHETER PLACEMENT GREATER THAN 5 YEARS  2021    IR TUNNELED CATHETER PLACEMENT GREATER THAN 5 YEARS 2021 Matt Teixeira MD [facility] SPECIAL PROCEDURES    TONSILLECTOMY AND ADENOIDECTOMY      UPPER GASTROINTESTINAL ENDOSCOPY  2020    EGD BIOPSY performed by Oj Gannon Sixto Fenton MD at [facility]       SOCIAL HISTORY:     Tobacco:   Social History     Tobacco Use   Smoking Status Never Smoker   Smokeless Tobacco Never Used   Tobacco Comment    never smoker eclamb rrt 7/20/17     Alcohol:   Social History     Substance and Sexual Activity   Alcohol Use No     Drugs:   Social History     Substance and Sexual Activity   Drug Use No       FAMILY HISTORY: family history includes Cancer in her mother; Coronary Art Dis in an other family member; Diabetes in her father and mother; Emphysema in her father; Glaucoma in her father; Heart Disease in her father; High Blood Pressure in her mother; Kidney Disease in her mother; Mira Stalling in an other family member; Mental Retardation in an other family member; Stroke in her mother and another family member; Uterine Cancer in her mother. REVIEW OF SYSTEMS:     Please see history of chief complaint above; otherwise no new problems with respect to General, HEENT, Cardiovascular, Respiratory, Gastrointestinal, Genitourinary, Endocrinologic, Musculoskeletal, or Neuropsychiatric complaints. PHYSICAL EXAMINATION:    Vitals: Temp: 97.8 deg F. Pulse: 56. Resp: 16. BP: 151/64. General: A 72 y.o.  female. Alert and oriented to person, place and time. She does not appear to be in any acute distress. Skin: Skin color, texture, turgor normal. No rashes or lesions. HEENT/Neck: Essentially unremarkable  Lungs: Normal - CTA without rales, rhonchi, or wheezing. Heart: regular rate and rhythm, S1, S2 normal, no murmur, click, rub or gallop No S3 or S4. Abdomen: Obese soft, non-distended, non-tender, normal active bowel sounds, no masses palpated and no hepatosplenomegaly  Extremities: No clubbing, cyanosis, or edema in any of the extremities. Neurologic: cranial nerves II-XII are grossly intact    ASSESSMENT:     Diagnosis Orders   1.  Controlled type 2 diabetes mellitus with chronic 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 dialysis, with long-term current use of insulin ([facility] [address] [address]     2. Long-term insulin use ([facility]     3. Essential hypertension     4. Mixed hyperlipidemia     5. Chronic diastolic heart failure ([facility]     6. End stage renal disease on dialysis ([facility]     7. Hemodialysis patient ([facility] [address]     8. Dementia without behavioral disturbance, unspecified dementia type ([facility]     9. Anxiety and depression     10. Morbid obesity ([facility]     11. BMI 45.0-49.9, adult ([facility]           PLAN:    1. Continue current treatment  2. Nursing home record reviewed and updates summarized and entered into electronic record  3. Nursing home blood sugar logs and diabetic medication administration reviewed. No changes  4. See nursing home orders and MAR.         Electronically signed by German Lerner DO on 4/29/2021 at 2:53 AM  Internal Medicine

## 2021-05-24 NOTE — PROGRESS NOTES
05/24/21  Nina Valladares  1950    Patient Resident of [facility]    Chief Complaint  1. Mass of left upper extremity        HPI:  72year-old patient being seen at the request of the nurse as She noted a firm mass to her right deltoid. She stated it was with positive bruit and thrill. Nontender. On exam patient states is actually her left upper extremity. States it has been there at least 4 years. States has not changed in size. Nontender. Allergies   Allergen Reactions    Bactrim [Sulfamethoxazole-Trimethoprim] Hives    Clonidine Derivatives     Amoxicillin-Pot Clavulanate Diarrhea, Nausea Only and Nausea And Vomiting       Past Medical History:   Diagnosis Date    Acute anemia     Acute cystitis 8/8/2020    Acute kidney injury ([address] 8/19/2020    Acute kidney injury superimposed on CKD ([address]     Acute on chronic diastolic heart failure ([address] 8/7/2020    Acute renal failure (ARF) ([address] 12/11/2020    Anasarca 12/12/2020    Anxiety and depression     AV block, 1st degree 8/19/2020    Background diabetic retinopathy(362.01) 2008    (Mild)    Balance problem     BMI 45.0-49.9, adult ([address] 3/1/2021    Bradycardia 8/19/2020    Buttock wound 8/20/2020    CAD (coronary artery disease)     (with coronary artery bypass graft x4).     Cancer of uterus [facility])     history of, (probable cure)    Chronic diastolic heart failure ([address] 3/1/2021    Chronic kidney disease     Chronic low back pain     Controlled type 2 diabetes mellitus with chronic 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 dialysis, with long-term current use of insulin (HCC)     CVA (cerebral vascular accident) ([address]     Decompensated heart failure ([address] 12/4/2020    Decubitus ulcer of trochanteric region of right hip, stage 2 ([address] 8/23/2020    Dementia (HCC)     (moderate)    Dry eye syndrome     End stage renal disease on dialysis ([address] 3/1/2021    GERD (gastroesophageal reflux disease)     Glaucoma suspect 2005    Gout education: Not on file    Highest education level: Not on file   Occupational History    Not on file   Tobacco Use    Smoking status: Never Smoker    Smokeless tobacco: Never Used    Tobacco comment: never smoker eclamb rrt 7/20/17   Substance and Sexual Activity    Alcohol use: No    Drug use: No    Sexual activity: Not on file   Other Topics Concern    Not on file   Social History Narrative    Not on file     Social Determinants of Health     Financial Resource Strain:     Difficulty of Paying Living Expenses:    Food Insecurity:     Worried About Running Out of Food in the Last Year:     [address] in the Last Year:    Transportation Needs:     Lack of Transportation (Medical):  Lack of Transportation (Non-Medical):    Physical Activity:     Days of Exercise per Week:     Minutes of Exercise per Session:    Stress:     Feeling of Stress :    Social Connections:     Frequency of Communication with Friends and Family:     Frequency of Social Gatherings with Friends and Family:     Attends Zoroast[employer] Services:     Active Member of Clubs or Organizations:     Attends Club or Organization Meetings:     Marital Status:    Intimate Partner Violence:     Fear of Current or Ex-Partner:     Emotionally Abused:     Physically Abused:     Sexually Abused:        Review of Systems   Musculoskeletal:        Lump to left upper extremity   All other systems reviewed and are negative. Physical Exam  Vitals and nursing note reviewed. Constitutional:       General: She is not in acute distress. Appearance: She is well-developed. She is not diaphoretic. HENT:      Head: Normocephalic and atraumatic. Right Ear: External ear normal.      Left Ear: External ear normal.   Eyes:      General:         Right eye: No discharge. Left eye: No discharge. Neck:      Trachea: No tracheal deviation. Cardiovascular:      Rate and Rhythm: Normal rate.       Heart sounds: Normal heart sounds. Pulmonary:      Effort: Pulmonary effort is normal. No respiratory distress. Musculoskeletal:        Arms:    Skin:     General: Skin is warm and dry. Coloration: Skin is not pale. Findings: No rash. Neurological:      Mental Status: She is alert and oriented to person, place, and time. Cranial Nerves: No cranial nerve deficit. Coordination: Coordination normal.   Psychiatric:         Behavior: Behavior normal.         Thought Content: Thought content normal.         Judgment: Judgment normal.         Vital Signs: Temperature 97.9 °F, blood pressure 120/57, pulse 55, respirations 16, SPO2 94% on room air    Assessment:  1. Mass of left upper extremity  Ultrasound for further evaluation      Plan:  As noted above. Follow up for routine visit. Call sooner with concerns prior.     Electronically signed by SHIRA Metcalf CNP on 5/24/2021 at 10:42 AM

## 2021-06-02 NOTE — PROGRESS NOTES
DR. Conner Lunsford - [facility] VISIT    DATE OF SERVICE: 4/27/21    NURSING HOME: [facility]    CHIEF COMPLAINT/HISTORY OF CHIEF COMPLAINT: This patient is being seen for ongoing evaluation and management of her diabetes mellitus type 2, hypertension, hyperlipidemia, end-stage renal disease on hemodialysis, chronic diastolic heart failure, dementia, anxiety, depression, and morbid obesity. She does take insulin for her diabetes. She is on Lipitor for hyperlipidemia. She uses an Exelon patch for dementia. There are no new complaints. ALLERGIES:   Allergies   Allergen Reactions    Bactrim [Sulfamethoxazole-Trimethoprim] Hives    Clonidine Derivatives     Amoxicillin-Pot Clavulanate Diarrhea, Nausea Only and Nausea And Vomiting       MEDICATIONS: As noted on the [facility] MAR, referenced and incorporated herein. PAST MEDICAL HISTORY:   Past Medical History:   Diagnosis Date    Acute anemia     Acute cystitis 8/8/2020    Acute kidney injury ([address] 8/19/2020    Acute kidney injury superimposed on CKD ([address]     Acute on chronic diastolic heart failure ([address] 8/7/2020    Acute renal failure (ARF) ([address] 12/11/2020    Anasarca 12/12/2020    Anxiety and depression     AV block, 1st degree 8/19/2020    Background diabetic retinopathy(362.01) 2008    (Mild)    Balance problem     BMI 45.0-49.9, adult ([address] 3/1/2021    Bradycardia 8/19/2020    Buttock wound 8/20/2020    CAD (coronary artery disease)     (with coronary artery bypass graft x4).     Cancer of uterus [facility])     history of, (probable cure)    Chronic diastolic heart failure ([address] 3/1/2021    Chronic kidney disease     Chronic low back pain     Controlled type 2 diabetes mellitus with chronic 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 dialysis, with long-term current use of insulin (HCC)     CVA (cerebral vascular accident) ([address]     Decompensated heart failure ([address] 12/4/2020    Decubitus ulcer of trochanteric region of right hip, stage 2 ([address] 2020    Dementia (HCC)     (moderate)    Dry eye syndrome     End stage renal disease on dialysis ([address] 3/1/2021    GERD (gastroesophageal reflux disease)     Glaucoma suspect 2005    Gout     Hemodialysis patient ([address] 3/1/2021    Homonymous hemianopsia 2008    (Right)    Hyperkalemia 2020    Hyperlipidemia     Hypertension     Infestation by bed bug 2020    Infestation by maggots 2020    Keratitis     (secondary to dry eye syndrome).  Lymphedema of both lower extremities 2020    Morbid obesity ([address] 3/1/2021    MRSA bacteremia 2020    Obesity     PAD (peripheral artery disease) (HCC)     Peripheral polyneuropathy     (diabetic)    Pressure injury of right heel, unstageable ([address] 2020    Pseudophakos     (Right Eye: 2012; Left Eye: 2012) - Dr. Amina Srivastava.  Stroke [facility])     (Multiple) MRI of brain 10/2008 shows multiple infarcts involving the temporal and parietal areas.  Trichiasis     (left eye)    Type 2 diabetes mellitus ([address]     Wounds, multiple 2020       PAST SURGICAL HISTORY:   Past Surgical History:   Procedure Laterality Date    CATARACT REMOVAL WITH IMPLANT  2012    (Right eye) - Dr. Amina Srivastava.  CATARACT REMOVAL WITH IMPLANT  2012    (Left eye) - Dr. Amina Srivastava.   SECTION      CHOLECYSTECTOMY      CORONARY ARTERY BYPASS GRAFT  12-    (x4) - LIMA-LAD, SVG-diagnoal 1, SVG-OM1, and SVG-PDA. Exploration of left radial. (Dr. Oswaldo Marte).  EYE SURGERY Left     as a child     GASTRIC BYPASS SURGERY      HYSTERECTOMY      (abdominal)  with left oophorectomy. Right ovary retained.     IR TUNNELED CATHETER PLACEMENT GREATER THAN 5 YEARS  2021    IR TUNNELED CATHETER PLACEMENT GREATER THAN 5 YEARS 2021 Oren iKng MD [facility]    TONSILLECTOMY AND ADENOIDECTOMY      UPPER GASTROINTESTINAL ENDOSCOPY  2020    EGD BIOPSY performed by Rozina Corbett Alka Haider MD at [facility]       SOCIAL HISTORY:     Tobacco:   Social History     Tobacco Use   Smoking Status Never Smoker   Smokeless Tobacco Never Used   Tobacco Comment    never smoker eclamb rrt 7/20/17     Alcohol:   Social History     Substance and Sexual Activity   Alcohol Use No     Drugs:   Social History     Substance and Sexual Activity   Drug Use No       FAMILY HISTORY: family history includes Cancer in her mother; Coronary Art Dis in an other family member; Diabetes in her father and mother; Emphysema in her father; Glaucoma in her father; Heart Disease in her father; High Blood Pressure in her mother; Kidney Disease in her mother; Leatha Sever in an other family member; Mental Retardation in an other family member; Stroke in her mother and another family member; Uterine Cancer in her mother. REVIEW OF SYSTEMS:     Please see history of chief complaint above; otherwise no new problems with respect to General, HEENT, Cardiovascular, Respiratory, Gastrointestinal, Genitourinary, Endocrinologic, Musculoskeletal, or Neuropsychiatric complaints. PHYSICAL EXAMINATION:    Vitals: Temp: 98.3 deg F. Pulse: 59. Resp: 16. BP: 154/58. General: A 72 y.o.  female. Alert and oriented to person, place and time. She does not appear to be in any acute distress. Skin: Skin color, texture, turgor normal. No rashes or lesions. HEENT/Neck: Essentially unremarkable  Lungs: Normal - CTA without rales, rhonchi, or wheezing. Heart: regular rate and rhythm, S1, S2 normal, no murmur, click, rub or gallop No S3 or S4. Abdomen: Obese soft, non-distended, non-tender, normal active bowel sounds, no masses palpated and no hepatosplenomegaly  Extremities: No clubbing, cyanosis, or edema in any of the extremities. Neurologic: cranial nerves II-XII are grossly intact    ASSESSMENT:     Diagnosis Orders   1.  Controlled type 2 diabetes mellitus with chronic 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 dialysis, with long-term current use of insulin ([facility]     2. Long-term insulin use ([facility]     3. Essential hypertension     4. Mixed hyperlipidemia     5. Chronic diastolic heart failure ([facility]     6. End stage renal disease on dialysis ([facility]     7. Hemodialysis patient ([facility]     8. Dementia without behavioral disturbance, unspecified dementia type ([facility]     9. Anxiety and depression     10. Morbid obesity ([facility]     11. BMI 45.0-49.9, adult ([facility]           PLAN:    1. Continue current treatment  2. Nursing home record reviewed and updates summarized and entered into electronic record  3. Nursing home blood sugar logs and diabetic medication administration reviewed. No changes  4. See nursing home orders and MAR.         Electronically signed by Ramiro Bose DO on 6/2/2021 at 4:26 AM  Internal Medicine

## 2021-06-25 NOTE — PROGRESS NOTES
DR. Prasanth Muñoz - [facility] VISIT    DATE OF SERVICE: 5/25/21    NURSING HOME: [facility]    CHIEF COMPLAINT/HISTORY OF CHIEF COMPLAINT: This patient is being seen for ongoing evaluation and management of her diabetes mellitus type 2, hypertension, hyperlipidemia, end-stage renal disease on hemodialysis, chronic diastolic heart failure, dementia, anxiety, depression, and morbid obesity. She is getting dialysis regularly. She still has a chest catheter in place. She does take insulin for her diabetes. She is on Lipitor for hyperlipidemia. She uses an Exelon patch for dementia. There are no new complaints. ALLERGIES:   Allergies   Allergen Reactions    Bactrim [Sulfamethoxazole-Trimethoprim] Hives    Clonidine Derivatives     Amoxicillin-Pot Clavulanate Diarrhea, Nausea Only and Nausea And Vomiting       MEDICATIONS: As noted on the [facility] MAR, referenced and incorporated herein. PAST MEDICAL HISTORY:   Past Medical History:   Diagnosis Date    Acute anemia     Acute cystitis 8/8/2020    Acute kidney injury ([address] 8/19/2020    Acute kidney injury superimposed on CKD ([address]     Acute on chronic diastolic heart failure ([address] 8/7/2020    Acute renal failure (ARF) ([address] 12/11/2020    Anasarca 12/12/2020    Anxiety and depression     AV block, 1st degree 8/19/2020    Background diabetic retinopathy(362.01) 2008    (Mild)    Balance problem     BMI 45.0-49.9, adult ([address] 3/1/2021    Bradycardia 8/19/2020    Buttock wound 8/20/2020    CAD (coronary artery disease)     (with coronary artery bypass graft x4).     Cancer of uterus [facility])     history of, (probable cure)    Chronic diastolic heart failure ([address] 3/1/2021    Chronic kidney disease     Chronic low back pain     Controlled type 2 diabetes mellitus with chronic 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 dialysis, with long-term current use of insulin ([address]     CVA (cerebral vascular accident) ([address]     Decompensated heart failure ([address] 2020    Decubitus ulcer of trochanteric region of right hip, stage 2 ([address] 2020    Dementia (HCC)     (moderate)    Dry eye syndrome     End stage renal disease on dialysis ([address] 3/1/2021    GERD (gastroesophageal reflux disease)     Glaucoma suspect     Gout     Hemodialysis patient ([address] 3/1/2021    Homonymous hemianopsia     (Right)    Hyperkalemia 2020    Hyperlipidemia     Hypertension     Infestation by bed bug 2020    Infestation by maggots 2020    Keratitis     (secondary to dry eye syndrome).  Lymphedema of both lower extremities 2020    Morbid obesity ([address] 3/1/2021    MRSA bacteremia 2020    Obesity     PAD (peripheral artery disease) (HCC)     Peripheral polyneuropathy     (diabetic)    Pressure injury of right heel, unstageable ([address] 2020    Pseudophakos     (Right Eye: 2012; Left Eye: 2012) - Dr. Anabel Samuel.  Stroke [facility])     (Multiple) MRI of brain 10/2008 shows multiple infarcts involving the temporal and parietal areas.  Trichiasis     (left eye)    Type 2 diabetes mellitus ([address]     Wounds, multiple 2020       PAST SURGICAL HISTORY:   Past Surgical History:   Procedure Laterality Date    CATARACT REMOVAL WITH IMPLANT  2012    (Right eye) - Dr. Anabel Samuel.  CATARACT REMOVAL WITH IMPLANT  2012    (Left eye) - Dr. Anabel Samuel.   SECTION      CHOLECYSTECTOMY      CORONARY ARTERY BYPASS GRAFT  12-    (x4) - LIMA-LAD, SVG-diagnoal 1, SVG-OM1, and SVG-PDA. Exploration of left radial. (Dr. Carolyn Todd).  EYE SURGERY Left     as a child     GASTRIC BYPASS SURGERY      HYSTERECTOMY      (abdominal)  with left oophorectomy. Right ovary retained.     IR TUNNELED CATHETER PLACEMENT GREATER THAN 5 YEARS  2021    IR TUNNELED CATHETER PLACEMENT GREATER THAN 5 YEARS 2021 Albaro Ramirez MD [facility]    TONSILLECTOMY AND ADENOIDECTOMY     [facility] UPPER GASTROINTESTINAL ENDOSCOPY  12/14/2020    EGD BIOPSY performed by Ike Webb MD at [facility]       SOCIAL HISTORY:     Tobacco:   Social History     Tobacco Use   Smoking Status Never Smoker   Smokeless Tobacco Never Used   Tobacco Comment    never smoker eclamb rrt 7/20/17     Alcohol:   Social History     Substance and Sexual Activity   Alcohol Use No     Drugs:   Social History     Substance and Sexual Activity   Drug Use No       FAMILY HISTORY: family history includes Cancer in her mother; Coronary Art Dis in an other family member; Diabetes in her father and mother; Emphysema in her father; Glaucoma in her father; Heart Disease in her father; High Blood Pressure in her mother; Kidney Disease in her mother; Mira Stalling in an other family member; Mental Retardation in an other family member; Stroke in her mother and another family member; Uterine Cancer in her mother. REVIEW OF SYSTEMS:     Please see history of chief complaint above; otherwise no new problems with respect to General, HEENT, Cardiovascular, Respiratory, Gastrointestinal, Genitourinary, Endocrinologic, Musculoskeletal, or Neuropsychiatric complaints. PHYSICAL EXAMINATION:    Vitals: Temp: 98.8 deg F. Pulse: 57. Resp: 16. BP: 148/60. General: A 72 y.o.  female. Alert and oriented to person, place and time. She does not appear to be in any acute distress. Skin: Skin color, texture, turgor normal. No rashes or lesions. HEENT/Neck: Essentially unremarkable  Lungs: Normal - CTA without rales, rhonchi, or wheezing. Heart: regular rate and rhythm, S1, S2 normal, no murmur, click, rub or gallop No S3 or S4. Abdomen: Obese soft, non-distended, non-tender, normal active bowel sounds, no masses palpated and no hepatosplenomegaly  Extremities: No clubbing, cyanosis, or edema in any of the extremities. Neurologic: cranial nerves II-XII are grossly intact    ASSESSMENT:     Diagnosis Orders   1.  Controlled type 2 diabetes mellitus with chronic 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 dialysis, with long-term current use of insulin ([facility]     2. Long-term insulin use ([facility]     3. Essential hypertension     4. Mixed hyperlipidemia     5. Chronic diastolic heart failure ([address]     6. End stage renal disease on dialysis ([facility]     7. Hemodialysis patient ([facility]     8. Dementia without behavioral disturbance, unspecified dementia type ([facility]     9. Anxiety and depression     10. Morbid obesity ([facility]     11. BMI 45.0-49.9, adult ([facility]           PLAN:    1. Continue current treatment  2. Nursing home record reviewed and updates summarized and entered into electronic record  3. Nursing home blood sugar logs and diabetic medication administration reviewed. No changes  4. See nursing home orders and MAR.         Electronically signed by Madhav Batres DO on 6/25/2021 at 3:44 AM  Internal Medicine

## 2021-06-30 NOTE — PROGRESS NOTES
DR. Lenin Canales - [facility] VISIT    DATE OF SERVICE: 6/19/21    NURSING HOME: [facility] [place]    CHIEF COMPLAINT/HISTORY OF CHIEF COMPLAINT: This patient is being seen for ongoing evaluation and management of her diabetes mellitus type 2, hypertension, hyperlipidemia, end-stage renal disease on hemodialysis, chronic diastolic heart failure, dementia, anxiety, depression, and morbid obesity. She is getting dialysis regularly. Now that her fistula has matured they are going to remove the chest catheter soon. She does take insulin for her diabetes. She is on Lipitor for hyperlipidemia. She uses an Exelon patch for dementia. There are no new complaints. ALLERGIES:   Allergies   Allergen Reactions    Bactrim [Sulfamethoxazole-Trimethoprim] Hives    Clonidine Derivatives     Amoxicillin-Pot Clavulanate Diarrhea, Nausea Only and Nausea And Vomiting       MEDICATIONS: As noted on the [facility] MAR, referenced and incorporated herein. PAST MEDICAL HISTORY:   Past Medical History:   Diagnosis Date    Acute anemia     Acute cystitis 8/8/2020    Acute kidney injury ([address] 8/19/2020    Acute kidney injury superimposed on CKD ([address]     Acute on chronic diastolic heart failure ([address] 8/7/2020    Acute renal failure (ARF) ([address] 12/11/2020    Anasarca 12/12/2020    Anxiety and depression     AV block, 1st degree 8/19/2020    Background diabetic retinopathy(362.01) 2008    (Mild)    Balance problem     BMI 45.0-49.9, adult ([address] 3/1/2021    Bradycardia 8/19/2020    Buttock wound 8/20/2020    CAD (coronary artery disease)     (with coronary artery bypass graft x4).     Cancer of uterus [facility])     history of, (probable cure)    Chronic diastolic heart failure ([address] 3/1/2021    Chronic kidney disease     Chronic low back pain     Controlled type 2 diabetes mellitus with chronic 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 dialysis, with long-term current use of insulin (HCC)     CVA (cerebral vascular accident) ([address]     Decompensated heart failure ([address] 2020    Decubitus ulcer of trochanteric region of right hip, stage 2 ([address] 2020    Dementia ([address]     (moderate)    Dry eye syndrome     End stage renal disease on dialysis ([address] 3/1/2021    GERD (gastroesophageal reflux disease)     Glaucoma suspect     Gout     Hemodialysis patient ([address] 3/1/2021    Homonymous hemianopsia     (Right)    Hyperkalemia 2020    Hyperlipidemia     Hypertension     Infestation by bed bug 2020    Infestation by maggots 2020    Keratitis     (secondary to dry eye syndrome).  Lymphedema of both lower extremities 2020    Morbid obesity ([address] 3/1/2021    MRSA bacteremia 2020    Obesity     PAD (peripheral artery disease) (HCC)     Peripheral polyneuropathy     (diabetic)    Pressure injury of right heel, unstageable ([address] 2020    Pseudophakos     (Right Eye: 2012; Left Eye: 2012) - Dr. Radha Chaparro.  Stroke [facility])     (Multiple) MRI of brain 10/2008 shows multiple infarcts involving the temporal and parietal areas.  Trichiasis     (left eye)    Type 2 diabetes mellitus ([address]     Wounds, multiple 2020       PAST SURGICAL HISTORY:   Past Surgical History:   Procedure Laterality Date    CATARACT REMOVAL WITH IMPLANT  2012    (Right eye) - Dr. Radha Chaparro.  CATARACT REMOVAL WITH IMPLANT  2012    (Left eye) - Dr. Radha Chaparro.   SECTION      CHOLECYSTECTOMY      CORONARY ARTERY BYPASS GRAFT  12-    (x4) - LIMA-LAD, SVG-diagnoal 1, SVG-OM1, and SVG-PDA. Exploration of left radial. (Dr. Myrna Riley).  EYE SURGERY Left     as a child     GASTRIC BYPASS SURGERY      HYSTERECTOMY      (abdominal)  with left oophorectomy. Right ovary retained.     IR TUNNELED CATHETER PLACEMENT GREATER THAN 5 YEARS  2021    IR TUNNELED CATHETER PLACEMENT GREATER THAN 5 YEARS 2021 Kinza Clark MD [facility]    TONSILLECTOMY AND ADENOIDECTOMY      UPPER GASTROINTESTINAL ENDOSCOPY  12/14/2020    EGD BIOPSY performed by Delma Hua MD at [facility]       SOCIAL HISTORY:     Tobacco:   Social History     Tobacco Use   Smoking Status Never Smoker   Smokeless Tobacco Never Used   Tobacco Comment    never smoker eclamb rrt 7/20/17     Alcohol:   Social History     Substance and Sexual Activity   Alcohol Use No     Drugs:   Social History     Substance and Sexual Activity   Drug Use No       FAMILY HISTORY: family history includes Cancer in her mother; Coronary Art Dis in an other family member; Diabetes in her father and mother; Emphysema in her father; Glaucoma in her father; Heart Disease in her father; High Blood Pressure in her mother; Kidney Disease in her mother; Annamary  in an other family member; Mental Retardation in an other family member; Stroke in her mother and another family member; Uterine Cancer in her mother. REVIEW OF SYSTEMS:     Please see history of chief complaint above; otherwise no new problems with respect to General, HEENT, Cardiovascular, Respiratory, Gastrointestinal, Genitourinary, Endocrinologic, Musculoskeletal, or Neuropsychiatric complaints. PHYSICAL EXAMINATION:    Vitals: Temp: 98.5 deg F. Pulse: 53. Resp: 16. BP: 123/52. General: A 72 y.o.  female. Alert and oriented to person, place and time. She does not appear to be in any acute distress. Skin: Skin color, texture, turgor normal. No rashes or lesions. HEENT/Neck: Essentially unremarkable  Lungs: Normal - CTA without rales, rhonchi, or wheezing. Heart: regular rate and rhythm, S1, S2 normal, no murmur, click, rub or gallop No S3 or S4. Abdomen: Obese soft, non-distended, non-tender, normal active bowel sounds, no masses palpated and no hepatosplenomegaly  Extremities: No clubbing, cyanosis, or edema in any of the extremities.   Neurologic: cranial nerves II-XII are grossly intact    ASSESSMENT:     Diagnosis Orders   1. Controlled type 2 diabetes mellitus with chronic 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 dialysis, with long-term current use of insulin ([facility]     2. Long-term insulin use ([facility]     3. Essential hypertension     4. Mixed hyperlipidemia     5. Chronic diastolic heart failure ([address]     6. End stage renal disease on dialysis ([facility]     7. Hemodialysis patient ([facility]     8. Dementia without behavioral disturbance, unspecified dementia type ([facility]     9. Anxiety and depression     10. Morbid obesity ([facility]     11. BMI 45.0-49.9, adult ([facility]           PLAN:    1. Continue current treatment  2. Nursing home record reviewed and updates summarized and entered into electronic record  3. Nursing home blood sugar logs and diabetic medication administration reviewed. No changes  4. See nursing home orders and MAR.         Electronically signed by John Edwards DO on 6/30/2021 at 1:54 AM  Internal Medicine

## 2021-07-19 NOTE — PROGRESS NOTES
of trochanteric region of right hip, stage 2 ([address] 2020    Dementia ([address]     (moderate)    Dry eye syndrome     End stage renal disease on dialysis ([address] 3/1/2021    GERD (gastroesophageal reflux disease)     Glaucoma suspect     Gout     Hemodialysis patient ([address] 3/1/2021    Homonymous hemianopsia 2008    (Right)    Hyperkalemia 2020    Hyperlipidemia     Hypertension     Infestation by bed bug 2020    Infestation by maggots 2020    Keratitis     (secondary to dry eye syndrome).  Lymphedema of both lower extremities 2020    Morbid obesity ([address] 3/1/2021    MRSA bacteremia 2020    Obesity     PAD (peripheral artery disease) (HCC)     Peripheral polyneuropathy     (diabetic)    Pressure injury of right heel, unstageable ([address] 2020    Pseudophakos     (Right Eye: 2012; Left Eye: 2012) - Dr. Car Shafer.  Stroke [facility])     (Multiple) MRI of brain 10/2008 shows multiple infarcts involving the temporal and parietal areas.  Trichiasis     (left eye)    Type 2 diabetes mellitus ([address]     Wounds, multiple 2020       Past Surgical History:   Procedure Laterality Date    CATARACT REMOVAL WITH IMPLANT  2012    (Right eye) - Dr. Car Shafer.  CATARACT REMOVAL WITH IMPLANT  2012    (Left eye) - Dr. Car Shafer.   SECTION      CHOLECYSTECTOMY      CORONARY ARTERY BYPASS GRAFT  12-    (x4) - LIMA-LAD, SVG-diagnoal 1, SVG-OM1, and SVG-PDA. Exploration of left radial. (Dr. Lily De La O).  EYE SURGERY Left     as a child     GASTRIC BYPASS SURGERY      HYSTERECTOMY      (abdominal)  with left oophorectomy. Right ovary retained.     IR TUNNELED CATHETER PLACEMENT GREATER THAN 5 YEARS  2021    IR TUNNELED CATHETER PLACEMENT GREATER THAN 5 YEARS 2021 Jeremías Iqbal MD [facility]    TONSILLECTOMY AND ADENOIDECTOMY      UPPER GASTROINTESTINAL ENDOSCOPY  2020    EGD BIOPSY performed by Jillian Werner MD at [facility]       [facility]reviewed     Social History     Socioeconomic History    Marital status:      Spouse name: Not on file    Number of children: Not on file    Years of education: Not on file    Highest education level: Not on file   Occupational History    Not on file   Tobacco Use    Smoking status: Never Smoker    Smokeless tobacco: Never Used    Tobacco comment: never smoker eclamb rrt 7/20/17   Substance and Sexual Activity    Alcohol use: No    Drug use: No    Sexual activity: Not on file   Other Topics Concern    Not on file   Social History Narrative    Not on file     Social Determinants of Health     Financial Resource Strain:     Difficulty of Paying Living Expenses:    Food Insecurity:     Worried About Running Out of Food in the Last Year:     Rosanne of Food in the Last Year:    Transportation Needs:     Lack of Transportation (Medical):  Lack of Transportation (Non-Medical):    Physical Activity:     Days of Exercise per Week:     Minutes of Exercise per Session:    Stress:     Feeling of Stress :    Social Connections:     Frequency of Communication with Friends and Family:     Frequency of Social Gatherings with Friends and Family:     Attends Christian Services:     Active Member of Clubs or Organizations:     Attends Club or Organization Meetings:     Marital Status:    Intimate Partner Violence:     Fear of Current or Ex-Partner:     Emotionally Abused:     Physically Abused:     Sexually Abused:        Review of Systems   Constitutional: Positive for fever. All other systems reviewed and are negative. Physical Exam  Vitals and nursing note reviewed. Constitutional:       General: She is not in acute distress. Appearance: Normal appearance. She is well-developed. She is not diaphoretic. HENT:      Head: Normocephalic and atraumatic.       Right Ear: External ear normal. Left Ear: External ear normal.   Eyes:      General:         Right eye: No discharge. Left eye: No discharge. Neck:      Trachea: No tracheal deviation. Cardiovascular:      Rate and Rhythm: Normal rate and regular rhythm. Heart sounds: Normal heart sounds. No murmur heard. No friction rub. No gallop. Pulmonary:      Effort: Pulmonary effort is normal. No respiratory distress. Breath sounds: Normal breath sounds. No stridor. No wheezing, rhonchi or rales. Chest:      Chest wall: No tenderness. Abdominal:      General: Bowel sounds are normal. There is no distension. Palpations: Abdomen is soft. Tenderness: There is no abdominal tenderness. Comments: Obese   Musculoskeletal:         General: No swelling. Skin:     General: Skin is warm and dry. Capillary Refill: Capillary refill takes less than 2 seconds. Coloration: Skin is not jaundiced or pale. Findings: No rash. Neurological:      General: No focal deficit present. Mental Status: She is alert and oriented to person, place, and time. Cranial Nerves: No cranial nerve deficit. Sensory: No sensory deficit. Coordination: Coordination normal.   Psychiatric:         Mood and Affect: Mood normal.         Behavior: Behavior normal.         Thought Content: Thought content normal.         Judgment: Judgment normal.         Vital Signs: Temperature today temperature 98.4 °F yesterday 100.5, pulse 90, respirations 16, SPO2 96% on room air, blood pressure 133/70    Assessment:  1. Fever, unspecified fever cause  2. End stage renal disease on dialysis ([facility]  3. Essential hypertension  4. Chronic diastolic heart failure (HCC)  No signs of fluid overload, vitals have been stable. Intermittent temp of up to 100.5. Awaiting for UA sample, awaiting CBC to be resulted. Continue to monitor patient closely. Patient asymptomatic        Plan:  As noted above. Follow up for routine visit.   Call sooner with concerns prior.     Electronically signed by SHIRA Herrera CNP on 7/19/2021 at 5:25 PM

## 2021-07-30 NOTE — PROGRESS NOTES
Today's Date: 7/30/2021  Patient's Name: Lili Iglesias  Patient's age: 72 y.o., 1950    CC: follow up for sinus bradycardia    HPI:  The patient is a 72y.o. year old, , female is in the office for follow up. Lives at a NH. Ambulates with wheelchair mostly or a walker. Is getting HD 3 x / week. BP has been running higher. Past Medical History:   has a past medical history of Acute anemia, Acute cystitis, Acute kidney injury ([address] Acute kidney injury superimposed on CKD ([address] Acute on chronic diastolic heart failure ([address] Acute renal failure (ARF) ([address] Anasarca, Anxiety and depression, AV block, 1st degree, Background diabetic retinopathy(362.01), Balance problem, BMI 45.0-49.9, adult ([address] Bradycardia, Buttock wound, CAD (coronary artery disease), Cancer of uterus ([address] Chronic diastolic heart failure ([address] Chronic kidney disease, Chronic low back pain, Controlled type 2 diabetes mellitus with chronic 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 dialysis, with long-term current use of insulin ([address] CVA (cerebral vascular accident) ([address] Decompensated heart failure ([address] Decubitus ulcer of trochanteric region of right hip, stage 2 ([address] Dementia ([address] Dry eye syndrome, End stage renal disease on dialysis ([address] GERD (gastroesophageal reflux disease), Glaucoma suspect, Gout, Hemodialysis patient ([address] Homonymous hemianopsia, Hyperkalemia, Hyperlipidemia, Hypertension, Infestation by bed bug, Infestation by maggots, Keratitis, Lymphedema of both lower extremities, Morbid obesity ([address] MRSA bacteremia, Obesity, PAD (peripheral artery disease) ([address] Peripheral polyneuropathy, Pressure injury of right heel, unstageable ([address] Pseudophakos, Stroke ([address] Trichiasis, Type 2 diabetes mellitus ([facility] and Wounds, multiple. Past Surgical History:   has a past surgical history that includes Gastric bypass surgery; Cataract removal with implant (05-);  Cataract removal with implant (2012); Coronary artery bypass graft (12-);  section; Hysterectomy; Cholecystectomy; Tonsillectomy and adenoidectomy; Eye surgery (Left); Upper gastrointestinal endoscopy (2020); and IR TUNNELED CVC PLACE WO SQ PORT/PUMP > 5 YEARS (2021). Home Medications:  Prior to Admission medications    Medication Sig Start Date End Date Taking?  Authorizing Provider   ciprofloxacin (CIPRO) 250 MG tablet  21  Yes Historical Provider, MD   midodrine (PROAMATINE) 10 MG tablet Take 10 mg by mouth three times a week    Yes Historical Provider, MD   sodium chloride (OCEAN) 0.65 % nasal spray 2 sprays by Nasal route 2 times daily   Yes Historical Provider, MD   lidocaine-prilocaine (EMLA) 2.5-2.5 % cream Apply topically three times a week Apply topically as needed-    Yes Historical Provider, MD   amLODIPine (NORVASC) 10 MG tablet Take 1 tablet by mouth daily 3/10/21  Yes Stevie Mckinney DO   atorvastatin (LIPITOR) 40 MG tablet TAKE 1 TABLET BY MOUTH EVERYDAY 3/10/21  Yes Stevie Mckinney DO   Cholecalciferol 125 MCG (5000 UT) CHEW Take 1 tablet by mouth daily 3/10/21  Yes Stevie Mckinney DO   citalopram (CELEXA) 20 MG tablet Take 1 tablet by mouth daily 3/10/21  Yes Stevie Mckinney DO   carvedilol (COREG) 12.5 MG tablet Take 1 tablet by mouth 2 times daily (with meals) 3/10/21  Yes Stevie Mckinney DO   hydrALAZINE (APRESOLINE) 100 MG tablet Take 1 tablet by mouth 3 times daily 3/10/21  Yes Stevie Mckinney DO   insulin glargine (LANTUS) 100 UNIT/ML injection vial Inject 10 Units into the skin nightly 3/10/21  Yes Stevie Mckinney DO   insulin lispro (HUMALOG) 100 UNIT/ML injection vial Use 4 times a day per sliding scale 3/10/21  Yes Stevie Mckinney DO   oxybutynin (DITROPAN) 5 MG tablet TAKE 1 TABLET BY MOUTH TWICE DAILY 3/10/21  Yes Stevie Mckinney DO   clopidogrel (PLAVIX) 75 MG tablet Take 1 tablet by mouth daily 3/10/21  Yes Stevie Mckinney DO   pantoprazole (PROTONIX) 40 MG tablet Take 1 tablet by mouth 2 times daily (before meals) 3/10/21  Yes Stevie Mckinney DO   rivastigmine (EXELON) 9.5 MG/24HR APPLY 1 NEW PATCH EVERY 24 HOURS 3/10/21  Yes Stevie Mckinney DO   OXYGEN Oxgen at 2 liters per nasal cannula 11/5/20  Yes ERINN Purcell   loratadine (CLARITIN) 10 MG tablet TAKE 1 TABLET(10 MG) BY MOUTH DAILY 11/5/20  Yes Fabián Purcell   aspirin 81 MG tablet Take 1 tablet by mouth daily 1/3/20  Yes ERINN Purcell   nystatin (MYCOSTATIN) 238919 UNIT/GM cream Apply topically 2 times daily. Patient not taking: Reported on 7/30/2021 3/10/21   Paige Davidson DO Hank   senna (SENOKOT) 8.6 MG tablet Take 1 tablet by mouth 2 times daily  Patient taking differently: Take 1 tablet by mouth as needed  11/25/20 11/25/21  ERINN Purcell   Lancets MISC Checks blood sugar ac and HS 11/4/20   Fabián Purcell   blood glucose monitor strips Test 3  times a day & as needed for symptoms of irregular blood glucose. Dispense sufficient amount for indicated testing frequency plus additional to accommodate PRN testing needs. 11/4/20   ERINN Purcell   Alcohol Swabs (ALCOHOL PREP) PADS Checks blood sugar 3 times a day 11/4/20   Yann Acosta [address] [employer] [facility] Standard walker with wheels    Diagnosis dementia, CHF 10/29/20   Danni Laughter, APRN - CNP   Diabetic Shoe MISC by Does not apply route 9/26/19   ERINN Purcell   Compression Stockings MISC by Does not apply route 20-30 mm Hg bilateral knee high 9/26/19   Fabián Purcell       Allergies:  Bactrim [sulfamethoxazole-trimethoprim], Clonidine derivatives, and Amoxicillin-pot clavulanate    Social History:   reports that she has never smoked. She has never used smokeless tobacco. She reports that she does not drink alcohol and does not use drugs.     Review of Systems:  Review of Systems   Constitutional: Negative for chills, fatigue and fever. HENT: Negative for congestion and dental problem. Eyes: Negative for discharge and itching. Respiratory: Negative for chest tightness and shortness of breath. Cardiovascular: Negative for chest pain and palpitations. Gastrointestinal: Negative for abdominal pain, nausea and vomiting. Endocrine: Negative for cold intolerance and heat intolerance. Genitourinary: Negative for difficulty urinating. Musculoskeletal: Negative for arthralgias and back pain. Neurological: Negative for dizziness and facial asymmetry. Hematological: Negative for adenopathy. Psychiatric/Behavioral: Negative for agitation and behavioral problems. Physical Exam:  BP (!) 151/83 (Site: Right Upper Arm)   Pulse 53   Ht 5' 6\" (1.676 m)   Wt 199 lb (90.3 kg)   BMI 32.12 kg/m²    Physical Exam  Constitutional:       Appearance: Normal appearance. HENT:      Head: Normocephalic and atraumatic. Nose: Nose normal.   Cardiovascular:      Rate and Rhythm: Normal rate and regular rhythm. Pulses: Normal pulses. Heart sounds: Normal heart sounds. Pulmonary:      Effort: Pulmonary effort is normal. No respiratory distress. Breath sounds: Normal breath sounds. No stridor. Abdominal:      General: There is no distension. Palpations: There is no mass. Musculoskeletal:         General: Swelling present. No tenderness. Cervical back: Normal range of motion. No rigidity. No muscular tenderness. Skin:     Capillary Refill: Capillary refill takes less than 2 seconds. Neurological:      General: No focal deficit present. Mental Status: She is alert and oriented to person, place, and time. Cranial Nerves: No cranial nerve deficit.    Psychiatric:         Mood and Affect: Mood normal.         Behavior: Behavior normal.         Cardiac Data:  ECG:  Sinus  Bradycardia  -First degree A-V block   Dhruv = 236  -Decreasing R-wave progression -may be secondary to pulmonary disease   consider old anterior infarct.    -  Nonspecific T-abnormality. Rightward P/QRS axis and rotation -possible pulmonary disease. Labs:     Lab Results   Component Value Date    CHOL 86 12/07/2020    TRIG 79 12/07/2020    HDL 48 12/07/2020    LDLCHOLESTEROL 22 12/07/2020    VLDL NOT REPORTED 12/07/2020    CHOLHDLRATIO 1.8 12/07/2020     Lab Results   Component Value Date     01/11/2021    K 4.1 01/11/2021    CL 98 01/11/2021    CO2 29 01/11/2021    BUN 37 (H) 01/11/2021    CREATININE 2.51 (H) 01/11/2021    GLUCOSE 149 (H) 01/11/2021    CALCIUM 8.1 (L) 01/11/2021    PROT 6.1 (L) 01/07/2021    LABALBU 3.1 (L) 01/07/2021    BILITOT 0.36 01/07/2021    ALKPHOS 111 (H) 01/07/2021    AST 11 01/07/2021    ALT 5 01/07/2021    LABGLOM 19 (L) 01/11/2021    GFRAA 23 (L) 01/11/2021    GLOB NOT REPORTED 12/15/2020     Assessment/Plan:  -Sinus bradycardia with pauses. Metoprolol and clonidine stopped. EP did not think PPM is warranted. Back on Coreg and tolerating it. -CAD- 3/4 bypass graft patent on cardiac cath 8/13/2020. Continue ASA  -ICM. Repeat Echo 12/2020 EF 45-50%. Grade I DD. No significant valvular disease. Continue current medications. Volume control via dialysis. -HTN- acceptable control- although high here, needs midodrine on dialysis days, so for now will watch on hydral, norvasc, coreg.   -Obesity - encouraged diet, exercise, and discussed weight loss extensively. -Hyperlipidemia- continue statin. LDL 22 ob 12/2020. -CVA- on ASA and plavix  -Dementia- on memantine  -CKD. On HD. The patient is to continue heart healthy diet, weight loss and exercise as tolerated. Patient's medications and side effects were discussed. Medication refills were provided if needed. Follow up appointment timing was discussed. All questions and concerns were addressed to patient's satisfaction. The patient is to follow up in 6 months or sooner if necessary.      Thank you for allowing me to participate in the care of this patient, please do not hesitate to call if you have any questions. Daisha Gardner DO, [facility], [address], [place] [address] [facility]  [facility]. [facility]  [phone]

## 2021-08-31 PROBLEM — E66.9 CLASS 1 OBESITY WITH SERIOUS COMORBIDITY AND BODY MASS INDEX (BMI) OF 32.0 TO 32.9 IN ADULT: Status: ACTIVE | Noted: 2021-03-01

## 2021-08-31 NOTE — PROGRESS NOTES
DR. Ga Chatterjee - [facility] VISIT    DATE OF SERVICE: 7/31/21    NURSING HOME: [facility]    CHIEF COMPLAINT/HISTORY OF CHIEF COMPLAINT: This patient is being seen for ongoing evaluation and management of her diabetes mellitus type 2, hypertension, hyperlipidemia, end-stage renal disease on hemodialysis, chronic diastolic heart failure, dementia, anxiety, depression, and morbid obesity. She is getting dialysis regularly. She does take insulin for her diabetes. She is on Lipitor for hyperlipidemia. She uses an Exelon patch for dementia. There are no new complaints. ALLERGIES:   Allergies   Allergen Reactions    Bactrim [Sulfamethoxazole-Trimethoprim] Hives    Clonidine Derivatives     Amoxicillin-Pot Clavulanate Diarrhea, Nausea Only and Nausea And Vomiting       MEDICATIONS: As noted on the [facility] MAR, referenced and incorporated herein. PAST MEDICAL HISTORY:   Past Medical History:   Diagnosis Date    Acute anemia     Acute cystitis 8/8/2020    Acute kidney injury ([address] 8/19/2020    Acute kidney injury superimposed on CKD ([address]     Acute on chronic diastolic heart failure ([address] 8/7/2020    Acute renal failure (ARF) ([address] 12/11/2020    Anasarca 12/12/2020    Anxiety and depression     AV block, 1st degree 8/19/2020    Background diabetic retinopathy(362.01) 2008    (Mild)    Balance problem     BMI 45.0-49.9, adult ([address] 3/1/2021    Bradycardia 8/19/2020    Buttock wound 8/20/2020    CAD (coronary artery disease)     (with coronary artery bypass graft x4).     Cancer of uterus [facility])     history of, (probable cure)    Chronic diastolic heart failure ([address] 3/1/2021    Chronic kidney disease     Chronic low back pain     Controlled type 2 diabetes mellitus with chronic 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 dialysis, with long-term current use of insulin (HCC)     CVA (cerebral vascular accident) ([address]     Decompensated heart failure ([address] 12/4/2020    Decubitus ulcer of trochanteric region of right hip, stage 2 ([address] 2020    Dementia ([address]     (moderate)    Dry eye syndrome     End stage renal disease on dialysis ([address] 3/1/2021    GERD (gastroesophageal reflux disease)     Glaucoma suspect     Gout     Hemodialysis patient ([address] 3/1/2021    Homonymous hemianopsia 2008    (Right)    Hyperkalemia 2020    Hyperlipidemia     Hypertension     Infestation by bed bug 2020    Infestation by maggots 2020    Keratitis     (secondary to dry eye syndrome).  Lymphedema of both lower extremities 2020    Morbid obesity ([address] 3/1/2021    MRSA bacteremia 2020    Obesity     PAD (peripheral artery disease) (HCC)     Peripheral polyneuropathy     (diabetic)    Pressure injury of right heel, unstageable ([address] 2020    Pseudophakos     (Right Eye: 2012; Left Eye: 2012) - Dr. Damaris Orantes.  Stroke [facility])     (Multiple) MRI of brain 10/2008 shows multiple infarcts involving the temporal and parietal areas.  Trichiasis     (left eye)    Type 2 diabetes mellitus ([address]     Wounds, multiple 2020       PAST SURGICAL HISTORY:   Past Surgical History:   Procedure Laterality Date    CATARACT REMOVAL WITH IMPLANT  2012    (Right eye) - Dr. Damaris Orantes.  CATARACT REMOVAL WITH IMPLANT  2012    (Left eye) - Dr. Damaris Orantes.   SECTION      CHOLECYSTECTOMY      CORONARY ARTERY BYPASS GRAFT  12-    (x4) - LIMA-LAD, SVG-diagnoal 1, SVG-OM1, and SVG-PDA. Exploration of left radial. (Dr. Martinez Saini).  EYE SURGERY Left     as a child     GASTRIC BYPASS SURGERY      HYSTERECTOMY      (abdominal)  with left oophorectomy. Right ovary retained.     IR TUNNELED CATHETER PLACEMENT GREATER THAN 5 YEARS  2021    IR TUNNELED CATHETER PLACEMENT GREATER THAN 5 YEARS 2021 Agata Biswas MD [facility]    TONSILLECTOMY AND ADENOIDECTOMY      UPPER GASTROINTESTINAL ENDOSCOPY  2020 EGD BIOPSY performed by Onur Chandra MD at [facility]       SOCIAL HISTORY:     Tobacco:   Social History     Tobacco Use   Smoking Status Never Smoker   Smokeless Tobacco Never Used   Tobacco Comment    never smoker eclamb rrt 7/20/17     Alcohol:   Social History     Substance and Sexual Activity   Alcohol Use No     Drugs:   Social History     Substance and Sexual Activity   Drug Use No       FAMILY HISTORY: family history includes Cancer in her mother; Coronary Art Dis in an other family member; Diabetes in her father and mother; Emphysema in her father; Glaucoma in her father; Heart Disease in her father; High Blood Pressure in her mother; Kidney Disease in her mother; Berneda Civatte in an other family member; Mental Retardation in an other family member; Stroke in her mother and another family member; Uterine Cancer in her mother. REVIEW OF SYSTEMS:     Please see history of chief complaint above; otherwise no new problems with respect to General, HEENT, Cardiovascular, Respiratory, Gastrointestinal, Genitourinary, Endocrinologic, Musculoskeletal, or Neuropsychiatric complaints. PHYSICAL EXAMINATION:    Vitals: Temp: 98.2 deg F. Pulse: 54. Resp: 16. BP: 136/70. General: A 72 y.o.  female. Alert and oriented to person, place and time. She does not appear to be in any acute distress. Skin: Skin color, texture, turgor normal. No rashes or lesions. HEENT/Neck: Essentially unremarkable  Lungs: Normal - CTA without rales, rhonchi, or wheezing. Heart: regular rate and rhythm, S1, S2 normal, with a grade 2/6 systolic murmur. No click, rub or gallop No S3 or S4. Abdomen: Obese soft, non-distended, non-tender, normal active bowel sounds, no masses palpated and no hepatosplenomegaly  Extremities: No clubbing, cyanosis, or edema in any of the extremities. Neurologic: cranial nerves II-XII are grossly intact    ASSESSMENT:     Diagnosis Orders   1.  Controlled type 2 diabetes mellitus with chronic 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 dialysis, with long-term current use of insulin ([facility]     2. Long-term insulin use ([address]     3. Essential hypertension     4. Mixed hyperlipidemia     5. Chronic diastolic heart failure ([address]     6. End stage renal disease on dialysis ([address]     7. Hemodialysis patient ([facility]     8. Dementia without behavioral disturbance, unspecified dementia type ([facility]     9. Anxiety and depression     10. Class 1 obesity with serious comorbidity and body mass index (BMI) of 32.0 to 32.9 in adult, unspecified obesity type           PLAN:    1. Continue current treatment  2. Nursing home record reviewed and updates summarized and entered into electronic record  3. Nursing home blood sugar logs and diabetic medication administration reviewed. No changes  4. See nursing home orders and MAR.         Electronically signed by Amber Tyler DO on 8/31/2021 at 3:20 AM  Internal Medicine

## 2021-09-16 NOTE — PROGRESS NOTES
09/15/21  Mumtaz Winter  1950    Patient Resident of [facility]    Chief Complaint  1. Cellulitis of other specified site        HPI:  80-year-old patient being seen at the request of the nursing staff for redness rash to the left groin. Staff state they have just noticed it today. Foul odor. Scant amount of purulent drainage versus powder that patient had the aids put on her. She has been afebrile. States it does itch. Allergies   Allergen Reactions    Bactrim [Sulfamethoxazole-Trimethoprim] Hives    Clonidine Derivatives     Amoxicillin-Pot Clavulanate Diarrhea, Nausea Only and Nausea And Vomiting       Past Medical History:   Diagnosis Date    Acute anemia     Acute cystitis 8/8/2020    Acute kidney injury ([address] 8/19/2020    Acute kidney injury superimposed on CKD ([address]     Acute on chronic diastolic heart failure ([address] 8/7/2020    Acute renal failure (ARF) ([address] 12/11/2020    Anasarca 12/12/2020    Anxiety and depression     AV block, 1st degree 8/19/2020    Background diabetic retinopathy(362.01) 2008    (Mild)    Balance problem     BMI 45.0-49.9, adult ([address] 3/1/2021    Bradycardia 8/19/2020    Buttock wound 8/20/2020    CAD (coronary artery disease)     (with coronary artery bypass graft x4).     Cancer of uterus [facility])     history of, (probable cure)    Chronic diastolic heart failure ([address] 3/1/2021    Chronic kidney disease     Chronic low back pain     Controlled type 2 diabetes mellitus with chronic 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 dialysis, with long-term current use of insulin (HCC)     CVA (cerebral vascular accident) ([address]     Decompensated heart failure ([address] 12/4/2020    Decubitus ulcer of trochanteric region of right hip, stage 2 ([address] 8/23/2020    Dementia (HCC)     (moderate)    Dry eye syndrome     End stage renal disease on dialysis ([address] 3/1/2021    GERD (gastroesophageal reflux disease)     Glaucoma suspect 2005    Gout     Hemodialysis  Highest education level: Not on file   Occupational History    Not on file   Tobacco Use    Smoking status: Never Smoker    Smokeless tobacco: Never Used    Tobacco comment: never smoker eclamb rrt 7/20/17   Substance and Sexual Activity    Alcohol use: No    Drug use: No    Sexual activity: Not on file   Other Topics Concern    Not on file   Social History Narrative    Not on file     Social Determinants of Health     Financial Resource Strain:     Difficulty of Paying Living Expenses:    Food Insecurity:     Worried About Running Out of Food in the Last Year:     [address] in the Last Year:    Transportation Needs:     Lack of Transportation (Medical):  Lack of Transportation (Non-Medical):    Physical Activity:     Days of Exercise per Week:     Minutes of Exercise per Session:    Stress:     Feeling of Stress :    Social Connections:     Frequency of Communication with Friends and Family:     Frequency of Social Gatherings with Friends and Family:     Attends Jewish Services:     Active Member of Clubs or Organizations:     Attends Club or Organization Meetings:     Marital Status:    Intimate Partner Violence:     Fear of Current or Ex-Partner:     Emotionally Abused:     Physically Abused:     Sexually Abused:        Review of Systems   Skin: Positive for rash. All other systems reviewed and are negative. Physical Exam  Vitals and nursing note reviewed. Constitutional:       General: She is not in acute distress. Appearance: She is well-developed. She is not diaphoretic. HENT:      Head: Normocephalic and atraumatic. Right Ear: External ear normal.      Left Ear: External ear normal.   Eyes:      General:         Right eye: No discharge. Left eye: No discharge. Neck:      Trachea: No tracheal deviation. Cardiovascular:      Rate and Rhythm: Normal rate. Heart sounds: Normal heart sounds.    Pulmonary:      Effort: Pulmonary effort is normal. No respiratory distress. Skin:     General: Skin is warm and dry. Coloration: Skin is not pale. Findings: No rash. Neurological:      Mental Status: She is alert and oriented to person, place, and time. Cranial Nerves: No cranial nerve deficit. Coordination: Coordination normal.   Psychiatric:         Behavior: Behavior normal.         Thought Content: Thought content normal.         Judgment: Judgment normal.         Vital Signs: Temperature 98 °F, blood pressure 113/51, pulse 62, respirations 16, SPO2 96% on room air    Assessment:  1. Cellulitis of other specified site  Cleanse site with mild soap and water, pat dry, nystatin powder. Keflex as directed for cellulitis. Monitor closely notify if worsening or persisting        Plan:  As noted above. Follow up for routine visit. Call sooner with concerns prior.     Electronically signed by SHIRA Gill CNP on 9/15/2021 at 9:34 PM

## 2021-10-04 NOTE — PROGRESS NOTES
10/04/21  Simone Schulz  1950    Patient Resident of [facility]    Chief Complaint  1. Fatigue, unspecified type    2. Controlled type 2 diabetes mellitus with chronic 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 dialysis, with long-term current use of insulin ([address]    3. Chronic diastolic heart failure ([address]    4. End stage renal disease on dialysis [facility])        HPI:  67-year-old patient with history of end-stage renal disease on dialysis, CHF, diabetes mellitus being seen as the nurses states over the weekend complaining of cough increasing fatigue. Patient at [facility] and getting routine Covid swabs which have been negative. She has been afebrile. States the cough is just keeping her awake. States starting to become more productive. Denies any wheezing. No shortness of breath. No chest discomfort. Blood sugars have been stable. Remainder of vital stable. Allergies   Allergen Reactions    Bactrim [Sulfamethoxazole-Trimethoprim] Hives    Clonidine Derivatives     Amoxicillin-Pot Clavulanate Diarrhea, Nausea Only and Nausea And Vomiting       Past Medical History:   Diagnosis Date    Acute anemia     Acute cystitis 8/8/2020    Acute kidney injury ([address] 8/19/2020    Acute kidney injury superimposed on CKD ([address]     Acute on chronic diastolic heart failure ([address] 8/7/2020    Acute renal failure (ARF) ([address] 12/11/2020    Anasarca 12/12/2020    Anxiety and depression     AV block, 1st degree 8/19/2020    Background diabetic retinopathy(362.01) 2008    (Mild)    Balance problem     BMI 45.0-49.9, adult ([address] 3/1/2021    Bradycardia 8/19/2020    Buttock wound 8/20/2020    CAD (coronary artery disease)     (with coronary artery bypass graft x4).     Cancer of uterus [facility])     history of, (probable cure)    Chronic diastolic heart failure ([address] 3/1/2021    Chronic kidney disease     Chronic low back pain     Controlled type 2 diabetes mellitus with chronic 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 dialysis, with long-term current use of insulin ([address]     CVA (cerebral vascular accident) ([address]     Decompensated heart failure ([address] 2020    Decubitus ulcer of trochanteric region of right hip, stage 2 ([address] 2020    Dementia ([address]     (moderate)    Dry eye syndrome     End stage renal disease on dialysis ([address] 3/1/2021    GERD (gastroesophageal reflux disease)     Glaucoma suspect     Gout     Hemodialysis patient ([address] 3/1/2021    Homonymous hemianopsia     (Right)    Hyperkalemia 2020    Hyperlipidemia     Hypertension     Infestation by bed bug 2020    Infestation by maggots 2020    Keratitis     (secondary to dry eye syndrome).  Lymphedema of both lower extremities 2020    Morbid obesity ([address] 3/1/2021    MRSA bacteremia 2020    Obesity     PAD (peripheral artery disease) (HCC)     Peripheral polyneuropathy     (diabetic)    Pressure injury of right heel, unstageable ([address] 2020    Pseudophakos     (Right Eye: 2012; Left Eye: 2012) - Dr. Nory Zapien.  Stroke [facility])     (Multiple) MRI of brain 10/2008 shows multiple infarcts involving the temporal and parietal areas.  Trichiasis     (left eye)    Type 2 diabetes mellitus ([address]     Wounds, multiple 2020       Past Surgical History:   Procedure Laterality Date    CATARACT REMOVAL WITH IMPLANT  2012    (Right eye) - Dr. Nory Zapien.  CATARACT REMOVAL WITH IMPLANT  2012    (Left eye) - Dr. Nory Zapien.   SECTION      CHOLECYSTECTOMY      CORONARY ARTERY BYPASS GRAFT  12-    (x4) - LIMA-LAD, SVG-diagnoal 1, SVG-OM1, and SVG-PDA. Exploration of left radial. (Dr. Gertrudis Menjivar).  EYE SURGERY Left     as a child     GASTRIC BYPASS SURGERY      HYSTERECTOMY      (abdominal)  with left oophorectomy. Right ovary retained.     IR TUNNELED CATHETER PLACEMENT GREATER THAN 5 YEARS  2021    IR TUNNELED CATHETER PLACEMENT GREATER THAN 5 YEARS 2021 Randi Galvin Osorio Guardado MD [facility] SPECIAL PROCEDURES    TONSILLECTOMY AND ADENOIDECTOMY      UPPER GASTROINTESTINAL ENDOSCOPY  12/14/2020    EGD BIOPSY performed by Gabe Steen MD at [facility]       Medications as per [facility] Chart /reviewed     Social History     Socioeconomic History    Marital status:      Spouse name: Not on file    Number of children: Not on file    Years of education: Not on file    Highest education level: Not on file   Occupational History    Not on file   Tobacco Use    Smoking status: Never Smoker    Smokeless tobacco: Never Used    Tobacco comment: never smoker eclamb rrt 7/20/17   Substance and Sexual Activity    Alcohol use: No    Drug use: No    Sexual activity: Not on file   Other Topics Concern    Not on file   Social History Narrative    Not on file     Social Determinants of Health     Financial Resource Strain:     Difficulty of Paying Living Expenses:    Food Insecurity:     Worried About Running Out of Food in the Last Year:     Rosanne of Food in the Last Year:    Transportation Needs:     Lack of Transportation (Medical):  Lack of Transportation (Non-Medical):    Physical Activity:     Days of Exercise per Week:     Minutes of Exercise per Session:    Stress:     Feeling of Stress :    Social Connections:     Frequency of Communication with Friends and Family:     Frequency of Social Gatherings with Friends and Family:     Attends [employer] Services:     Active Member of Clubs or Organizations:     Attends Club or Organization Meetings:     Marital Status:    Intimate Partner Violence:     Fear of Current or Ex-Partner:     Emotionally Abused:     Physically Abused:     Sexually Abused:        Review of Systems   Constitutional: Positive for fatigue. Negative for activity change, appetite change, chills, fever and unexpected weight change.    HENT: Negative for congestion, dental problem, ear discharge, ear pain, facial swelling, hearing loss, postnasal drip, rhinorrhea, sinus pressure, sore throat and trouble swallowing. Eyes: Negative for pain and visual disturbance. Respiratory: Positive for cough. Negative for chest tightness, shortness of breath and wheezing. Cardiovascular: Negative for chest pain, palpitations and leg swelling. Gastrointestinal: Negative for abdominal pain, blood in stool, constipation, diarrhea, nausea and vomiting. Endocrine: Negative for cold intolerance, heat intolerance and polyuria. Genitourinary: Negative for difficulty urinating. Musculoskeletal: Negative for arthralgias, gait problem, myalgias, neck pain and neck stiffness. Skin: Negative for color change, rash and wound. Neurological: Negative for dizziness, tremors, seizures, weakness, light-headedness, numbness and headaches. Psychiatric/Behavioral: Positive for sleep disturbance. Negative for confusion and hallucinations. The patient is not nervous/anxious. Physical Exam  Vitals and nursing note reviewed. Constitutional:       General: She is not in acute distress. Appearance: Normal appearance. She is well-developed. She is not diaphoretic. HENT:      Head: Normocephalic and atraumatic. Right Ear: External ear normal.      Left Ear: External ear normal.   Eyes:      General:         Right eye: No discharge. Left eye: No discharge. Neck:      Trachea: No tracheal deviation. Cardiovascular:      Rate and Rhythm: Normal rate and regular rhythm. Heart sounds: Normal heart sounds. No murmur heard. No friction rub. No gallop. Pulmonary:      Effort: Pulmonary effort is normal. No respiratory distress. Breath sounds: Normal breath sounds. No stridor. No wheezing, rhonchi or rales. Comments: Diminished in bilateral bases  Chest:      Chest wall: No tenderness. Abdominal:      General: Bowel sounds are normal. There is no distension. Palpations: Abdomen is soft.       Tenderness: There is no abdominal tenderness. Musculoskeletal:         General: No swelling. Skin:     General: Skin is warm and dry. Capillary Refill: Capillary refill takes less than 2 seconds. Coloration: Skin is not jaundiced or pale. Findings: No rash. Neurological:      General: No focal deficit present. Mental Status: She is alert and oriented to person, place, and time. Cranial Nerves: No cranial nerve deficit. Sensory: No sensory deficit. Coordination: Coordination normal.   Psychiatric:         Mood and Affect: Mood normal.         Behavior: Behavior normal.         Thought Content: Thought content normal.         Judgment: Judgment normal.         Vital Signs: Temperature 98 °F, blood pressure 122/52, pulse 66, respirations 16, SPO2 98% on room air    Assessment:  1. Fatigue, unspecified type  2. Controlled type 2 diabetes mellitus with chronic 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 dialysis, with long-term current use of insulin ([facility]  3. Chronic diastolic heart failure ([facility]  4. End stage renal disease on dialysis [facility])  Chest x-ray, CBC. Call results once obtained        Plan:  As noted above. Follow up for routine visit. Call sooner with concerns prior.     Electronically signed by SHIRA Miller CNP on 10/4/2021 at 4:46 PM

## 2021-10-25 NOTE — PROGRESS NOTES
of trochanteric region of right hip, stage 2 ([address] 2020    Dementia ([address]     (moderate)    Dry eye syndrome     End stage renal disease on dialysis ([address] 3/1/2021    GERD (gastroesophageal reflux disease)     Glaucoma suspect     Gout     Hemodialysis patient ([address] 3/1/2021    Homonymous hemianopsia 2008    (Right)    Hyperkalemia 2020    Hyperlipidemia     Hypertension     Infestation by bed bug 2020    Infestation by maggots 2020    Keratitis     (secondary to dry eye syndrome).  Lymphedema of both lower extremities 2020    Morbid obesity ([address] 3/1/2021    MRSA bacteremia 2020    Obesity     PAD (peripheral artery disease) (HCC)     Peripheral polyneuropathy     (diabetic)    Pressure injury of right heel, unstageable ([address] 2020    Pseudophakos     (Right Eye: 2012; Left Eye: 2012) - Dr. Marianna Wilson.  Stroke [facility])     (Multiple) MRI of brain 10/2008 shows multiple infarcts involving the temporal and parietal areas.  Trichiasis     (left eye)    Type 2 diabetes mellitus ([address]     Wounds, multiple 2020       PAST SURGICAL HISTORY:   Past Surgical History:   Procedure Laterality Date    CATARACT REMOVAL WITH IMPLANT  2012    (Right eye) - Dr. Marianna Wilson.  CATARACT REMOVAL WITH IMPLANT  2012    (Left eye) - Dr. Marianna Wilson.   SECTION      CHOLECYSTECTOMY      CORONARY ARTERY BYPASS GRAFT  12-    (x4) - LIMA-LAD, SVG-diagnoal 1, SVG-OM1, and SVG-PDA. Exploration of left radial. (Dr. Annalise Crook).  EYE SURGERY Left     as a child     GASTRIC BYPASS SURGERY      HYSTERECTOMY      (abdominal)  with left oophorectomy. Right ovary retained.     IR TUNNELED CATHETER PLACEMENT GREATER THAN 5 YEARS  2021    IR TUNNELED CATHETER PLACEMENT GREATER THAN 5 YEARS 2021 Elsy Zaidi MD [facility] SPECIAL PROCEDURES    TONSILLECTOMY AND ADENOIDECTOMY      UPPER GASTROINTESTINAL ENDOSCOPY  2020 EGD BIOPSY performed by Nadege Lake MD at [facility]       SOCIAL HISTORY:     Tobacco:   Social History     Tobacco Use   Smoking Status Never Smoker   Smokeless Tobacco Never Used   Tobacco Comment    never smoker eclamb rrt 7/20/17     Alcohol:   Social History     Substance and Sexual Activity   Alcohol Use No     Drugs:   Social History     Substance and Sexual Activity   Drug Use No       FAMILY HISTORY: family history includes Cancer in her mother; Coronary Art Dis in an other family member; Diabetes in her father and mother; Emphysema in her father; Glaucoma in her father; Heart Disease in her father; High Blood Pressure in her mother; Kidney Disease in her mother; Dene Ganser in an other family member; Mental Retardation in an other family member; Stroke in her mother and another family member; Uterine Cancer in her mother. REVIEW OF SYSTEMS:     Please see history of chief complaint above; otherwise no new problems with respect to General, HEENT, Cardiovascular, Respiratory, Gastrointestinal, Genitourinary, Endocrinologic, Musculoskeletal, or Neuropsychiatric complaints. PHYSICAL EXAMINATION:    Vitals: Temp: 98.1 deg F. Pulse: 51. Resp: 16. BP: 132/49. General: A 72 y.o.  female. Alert and oriented to person, place and time. She does not appear to be in any acute distress. Skin: Skin color, texture, turgor normal. No rashes or lesions. HEENT/Neck: Essentially unremarkable  Lungs: Normal - CTA without rales, rhonchi, or wheezing. Heart: regular rate and rhythm, S1, S2 normal, with a grade 2/6 systolic murmur. No click, rub or gallop No S3 or S4. Abdomen: Obese soft, non-distended, non-tender, normal active bowel sounds, no masses palpated and no hepatosplenomegaly  Extremities: No clubbing, cyanosis, or edema in any of the extremities. Neurologic: cranial nerves II-XII are grossly intact    ASSESSMENT:     Diagnosis Orders   1.  Controlled type 2 diabetes mellitus with chronic 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 dialysis, with long-term current use of insulin ([address]     2. Long-term insulin use ([address]     3. Essential hypertension     4. Mixed hyperlipidemia     5. Chronic diastolic heart failure ([address]     6. End stage renal disease on dialysis ([address]     7. Hemodialysis patient ([address]     8. Dementia without behavioral disturbance, unspecified dementia type ([facility]     9. Anxiety and depression     10. Class 1 obesity with serious comorbidity and body mass index (BMI) of 32.0 to 32.9 in adult, unspecified obesity type           PLAN:    1. Continue current treatment  2. Nursing home record reviewed and updates summarized and entered into electronic record  3. Nursing home blood sugar logs and diabetic medication administration reviewed. No changes  4. See nursing home orders and MAR.         Electronically signed by Cory Brar DO on 10/25/2021 at 3:26 AM  Internal Medicine

## 2021-11-03 NOTE — TELEPHONE ENCOUNTER
Patient has been complaining of being \"backed up\" with stool. Did have a BM last night but was small and hard. Abdomen today is hard and distended. Received a senna earlier today, but no results. Is a full code. Any further orders?

## 2021-11-09 NOTE — PROGRESS NOTES
11/03/21  Patrick Genera  1950    Patient Resident of [facility]    Chief Complaint  1. Nonintractable headache, unspecified chronicity pattern, unspecified headache type    2. End stage renal disease on dialysis ([address]    3. Chronic diastolic heart failure ([address]    4. Essential hypertension        HPI:  71-year-old patient with history of hypertension CHF, chronic kidney disease dialysis patient being seen for episodes of intermittent headaches worse in the morning. Patient states she is also having increased arthritic pain. Worse after she allows her bilateral the.  States they did give her dose of Tylenol with relief of her headache. Inquiring about getting this scheduled for her intermittent headaches along with arthritic pain. Weights have been stable. No signs of fluid overload. Denies any blurred vision double vision lightheadedness or dizziness. Denies any headache currently. Denies any chest discomfort shortness of breath. Vital signs have been stable. Allergies   Allergen Reactions    Bactrim [Sulfamethoxazole-Trimethoprim] Hives    Clonidine Derivatives     Amoxicillin-Pot Clavulanate Diarrhea, Nausea Only and Nausea And Vomiting       Past Medical History:   Diagnosis Date    Acute anemia     Acute cystitis 8/8/2020    Acute kidney injury ([address] 8/19/2020    Acute kidney injury superimposed on CKD ([address]     Acute on chronic diastolic heart failure ([address] 8/7/2020    Acute renal failure (ARF) ([address] 12/11/2020    Anasarca 12/12/2020    Anxiety and depression     AV block, 1st degree 8/19/2020    Background diabetic retinopathy(362.01) 2008    (Mild)    Balance problem     BMI 45.0-49.9, adult ([address] 3/1/2021    Bradycardia 8/19/2020    Buttock wound 8/20/2020    CAD (coronary artery disease)     (with coronary artery bypass graft x4).     Cancer of uterus [facility])     history of, (probable cure)    Chronic diastolic heart failure ([address] 3/1/2021    Chronic kidney disease     Chronic low back pain     Controlled type 2 diabetes mellitus with chronic 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 dialysis, with long-term current use of insulin (HCC)     CVA (cerebral vascular accident) ([address]     Decompensated heart failure ([address] 2020    Decubitus ulcer of trochanteric region of right hip, stage 2 ([address] 2020    Dementia ([address]     (moderate)    Dry eye syndrome     End stage renal disease on dialysis ([address] 3/1/2021    GERD (gastroesophageal reflux disease)     Glaucoma suspect     Gout     Hemodialysis patient ([address] 3/1/2021    Homonymous hemianopsia     (Right)    Hyperkalemia 2020    Hyperlipidemia     Hypertension     Infestation by bed bug 2020    Infestation by maggots 2020    Keratitis     (secondary to dry eye syndrome).  Lymphedema of both lower extremities 2020    Morbid obesity ([address] 3/1/2021    MRSA bacteremia 2020    Obesity     PAD (peripheral artery disease) (HCC)     Peripheral polyneuropathy     (diabetic)    Pressure injury of right heel, unstageable ([address] 2020    Pseudophakos     (Right Eye: 2012; Left Eye: 2012) - Dr. Ana Lilia Alanizr.  Stroke [facility])     (Multiple) MRI of brain 10/2008 shows multiple infarcts involving the temporal and parietal areas.  Trichiasis     (left eye)    Type 2 diabetes mellitus ([address]     Wounds, multiple 2020       Past Surgical History:   Procedure Laterality Date    CATARACT REMOVAL WITH IMPLANT  2012    (Right eye) - Dr. Sung Slider.  CATARACT REMOVAL WITH IMPLANT  2012    (Left eye) - Dr. Sung Slider.   SECTION      CHOLECYSTECTOMY      CORONARY ARTERY BYPASS GRAFT  12-    (x4) - LIMA-LAD, SVG-diagnoal 1, SVG-OM1, and SVG-PDA. Exploration of left radial. (Dr. Eugene Oglesby).  EYE SURGERY Left     as a child     GASTRIC BYPASS SURGERY      HYSTERECTOMY      (abdominal)  with left oophorectomy. Right ovary retained.     IR TUNNELED CATHETER PLACEMENT GREATER THAN 5 YEARS  1/8/2021    IR TUNNELED CATHETER PLACEMENT GREATER THAN 5 YEARS 1/8/2021 Omar Russo MD [facility]    TONSILLECTOMY AND ADENOIDECTOMY      UPPER GASTROINTESTINAL ENDOSCOPY  12/14/2020    EGD BIOPSY performed by Zenaida Urbina MD at [facility]       Medications as per [facility] Chart /reviewed     Social History     Socioeconomic History    Marital status:      Spouse name: Not on file    Number of children: Not on file    Years of education: Not on file    Highest education level: Not on file   Occupational History    Not on file   Tobacco Use    Smoking status: Never Smoker    Smokeless tobacco: Never Used    Tobacco comment: never smoker eclamb rrt 7/20/17   Substance and Sexual Activity    Alcohol use: No    Drug use: No    Sexual activity: Not on file   Other Topics Concern    Not on file   Social History Narrative    Not on file     Social Determinants of Health     Financial Resource Strain:     Difficulty of Paying Living Expenses: Not on file   Food Insecurity:     Worried About Running Out of Food in the Last Year: Not on file    Rosanne of Food in the Last Year: Not on file   Transportation Needs:     Lack of Transportation (Medical): Not on file    Lack of Transportation (Non-Medical):  Not on file   Physical Activity:     Days of Exercise per Week: Not on file    Minutes of Exercise per Session: Not on file   Stress:     Feeling of Stress : Not on file   Social Connections:     Frequency of Communication with Friends and Family: Not on file    Frequency of Social Gatherings with Friends and Family: Not on file    Attends Anabaptist Services: Not on file    Active Member of Clubs or Organizations: Not on file    Attends Club or Organization Meetings: Not on file    Marital Status: Not on file   Intimate Partner Violence:     Fear of Current or Ex-Partner: Not on file   [employer] Abused: Not on file    Physically Abused: Not on file    Sexually Abused: Not on file   Housing Stability:     Unable to Pay for Housing in the Last Year: Not on file    Number of Places Lived in the Last Year: Not on file    Unstable Housing in the Last Year: Not on file       Review of Systems   Eyes: Negative. Musculoskeletal: Positive for arthralgias. Neurological: Positive for headaches. Negative for dizziness, facial asymmetry and light-headedness. All other systems reviewed and are negative. Physical Exam  Vitals and nursing note reviewed. Constitutional:       General: She is not in acute distress. Appearance: She is well-developed. She is not diaphoretic. HENT:      Head: Normocephalic and atraumatic. Right Ear: External ear normal.      Left Ear: External ear normal.   Eyes:      General:         Right eye: No discharge. Left eye: No discharge. Neck:      Trachea: No tracheal deviation. Cardiovascular:      Rate and Rhythm: Normal rate and regular rhythm. Heart sounds: Normal heart sounds. Pulmonary:      Effort: Pulmonary effort is normal. No respiratory distress. Breath sounds: Normal breath sounds. No stridor. No wheezing, rhonchi or rales. Chest:      Chest wall: No tenderness. Abdominal:      General: There is no distension. Skin:     General: Skin is warm and dry. Coloration: Skin is not pale. Findings: No rash. Neurological:      General: No focal deficit present. Mental Status: She is alert and oriented to person, place, and time. Mental status is at baseline. Cranial Nerves: No cranial nerve deficit. Coordination: Coordination normal.      Gait: Gait normal.   Psychiatric:         Mood and Affect: Mood normal.         Behavior: Behavior normal.         Thought Content:  Thought content normal.         Judgment: Judgment normal.         Vital Signs: Temperature 98 °F, blood pressure 111/60, pulse 66, respirations 16, SPO2 98% on room air    Assessment:  1. Nonintractable headache, unspecified chronicity pattern, unspecified headache type  Add Tylenol twice daily. If continues to have persistent headaches they are to notify office. 2. End stage renal disease on dialysis ([facility]  Stable, continues to follow with nephrology    3. Chronic diastolic heart failure (HCC)  No signs of fluid overload. Suggest avoiding NSAIDs due to renal disease and heart failure    4. Essential hypertension  Stable at present        Plan:  As noted above. Follow up for routine visit. Call sooner with concerns prior.     Electronically signed by SHIRA De La Fuente CNP on 11/9/2021 at 3:33 PM

## 2021-11-13 PROBLEM — E87.1 HYPONATREMIA: Status: ACTIVE | Noted: 2021-01-01

## 2021-11-13 PROBLEM — R51.9 INTRACTABLE HEADACHE: Status: ACTIVE | Noted: 2021-01-01

## 2021-11-13 PROBLEM — R00.1 BRADYCARDIA: Status: ACTIVE | Noted: 2021-01-01

## 2021-11-13 NOTE — PROGRESS NOTES
PHARMACY NOTE:    The electrolyte replacement protocol for potassium/magnesium has been discontinued per P&T guidelines because the patient has reduced renal function (CrCl < 30 mL/min). The patient's most recent potassium & magnesium levels are:  Recent Labs     11/13/21  0715   K 4.2     Estimated Creatinine Clearance: 21 mL/min (A) (based on SCr of 2.88 mg/dL (H)). For patients with decreased renal function (below 30ml/min) needing potassium/magnesium supplementation, please order individual bolus doses with appropriate monitoring. Please contact the inpatient pharmacy with any concerns. Thank you.   Paco Mathews, PharmD, 11/13/2021 6:13 PM

## 2021-11-13 NOTE — ED PROVIDER NOTES
[facility] ED  [address]  [place] [address] [place]  Phone: [phone]  eMERGENCY dEPARTMENT eNCOUnter      Pt Name: Susanna Hennessy  MRN: [id number]  Ifeoma 1950  Date of evaluation: 11/13/21      CHIEF COMPLAINT     Chief Complaint   Patient presents with    Bradycardia       [address] [facility] Marty Calderon is a 72 y.o. female who presents today from dialysis. Patient went for her routine dialysis she was found to have bradycardia with a heart rate in the 30s. She did not get any dialysis treatments. Patient arrives complaining of headache. She has a history of headache she states that this headache is worse than her normal headache. She is not able to give much history other than that. She does not complain of chest pain or difficulty breathing or lightheadedness. REVIEW OF SYSTEMS     Review of Systems   Unable to perform ROS: Acuity of condition   Constitutional: Negative for fever. Respiratory: Negative for shortness of breath. Cardiovascular: Negative for chest pain. Gastrointestinal: Negative for nausea and vomiting. Neurological: Positive for headaches.      PAST MEDICAL HISTORY    has a past medical history of Acute anemia, Acute cystitis, Acute kidney injury ([address] Acute kidney injury superimposed on CKD ([address] Acute on chronic diastolic heart failure ([address] Acute renal failure (ARF) ([address] Anasarca, Anxiety and depression, AV block, 1st degree, Background diabetic retinopathy(362.01), Balance problem, BMI 45.0-49.9, adult ([address] Bradycardia, Buttock wound, CAD (coronary artery disease), Cancer of uterus ([address] Chronic diastolic heart failure ([address] Chronic kidney disease, Chronic low back pain, Controlled type 2 diabetes mellitus with chronic 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 dialysis, with long-term current use of insulin ([address] CVA (cerebral vascular accident) ([address] Decompensated heart failure ([address] Decubitus ulcer of trochanteric region of right hip, stage 2 ([facility] Dementia ([facility] Dry eye syndrome, End stage renal disease on dialysis ([facility] GERD (gastroesophageal reflux disease), Glaucoma suspect, Gout, Hemodialysis patient ([facility] Homonymous hemianopsia, Hyperkalemia, Hyperlipidemia, Hypertension, Infestation by bed bug, Infestation by maggots, Keratitis, Lymphedema of both lower extremities, Morbid obesity ([facility] MRSA bacteremia, Obesity, PAD (peripheral artery disease) ([facility] Peripheral polyneuropathy, Pressure injury of right heel, unstageable ([facility] Pseudophakos, Stroke ([facility] Trichiasis, Type 2 diabetes mellitus ([facility] and Wounds, multiple. SURGICAL HISTORY      has a past surgical history that includes Gastric bypass surgery; Cataract removal with implant (2012); Cataract removal with implant (2012); Coronary artery bypass graft (12-);  section; Hysterectomy; Cholecystectomy; Tonsillectomy and adenoidectomy; Eye surgery (Left); Upper gastrointestinal endoscopy (2020); and IR TUNNELED CVC PLACE WO SQ PORT/PUMP > 5 YEARS (2021).     CURRENT MEDICATIONS       Discharge Medication List as of 2021  4:26 PM      CONTINUE these medications which have NOT CHANGED    Details   ciprofloxacin (CIPRO) 250 MG tablet Historical Med      midodrine (PROAMATINE) 10 MG tablet Take 10 mg by mouth three times a week [id number] Med      sodium chloride (OCEAN) 0.65 % nasal spray 2 sprays by Nasal route 2 times dailyHistorical Med      lidocaine-prilocaine (EMLA) 2.5-2.5 % cream Apply topically three times a week Apply topically as needed- , Topical, THREE TIMES WEEKLY, Historical Med      amLODIPine (NORVASC) 10 MG tablet Take 1 tablet by mouth daily, Disp-30 tablet, R-0Normal      atorvastatin (LIPITOR) 40 MG tablet TAKE 1 TABLET BY MOUTH EVERYDAY, Disp-30 tablet, R-0Normal      Cholecalciferol 125 MCG (5000 UT) CHEW Take 1 tablet by mouth daily, Disp-30 tablet, R-0Normal      citalopram (CELEXA) 20 MG tablet Take 1 tablet by mouth daily, Disp-30 tablet, R-0Normal      carvedilol (COREG) 12.5 MG tablet Take 1 tablet by mouth 2 times daily (with meals), Disp-60 tablet, R-0Normal      hydrALAZINE (APRESOLINE) 100 MG tablet Take 1 tablet by mouth 3 times daily, Disp-90 tablet, R-0Normal      insulin glargine (LANTUS) 100 UNIT/ML injection vial Inject 10 Units into the skin nightly, Disp-1 vial, R-1Normal      insulin lispro (HUMALOG) 100 UNIT/ML injection vial Use 4 times a day per sliding scale, Disp-3 vial, R-0Normal      nystatin (MYCOSTATIN) 399313 UNIT/GM cream Apply topically 2 times daily. , Disp-30 g, R-0, Normal      oxybutynin (DITROPAN) 5 MG tablet TAKE 1 TABLET BY MOUTH TWICE DAILY, Disp-60 tablet, R-0Normal      clopidogrel (PLAVIX) 75 MG tablet Take 1 tablet by mouth daily, Disp-30 tablet, R-0Normal      pantoprazole (PROTONIX) 40 MG tablet Take 1 tablet by mouth 2 times daily (before meals), Disp-60 tablet, R-0Normal      rivastigmine (EXELON) 9.5 MG/24HR APPLY 1 NEW PATCH EVERY 24 HOURS, Disp-30 patch, R-0Normal      senna (SENOKOT) 8.6 MG tablet Take 1 tablet by mouth 2 times daily, Disp-60 tablet,R-11Normal      OXYGEN Oxgen at 2 liters per nasal cannula, Disp-1 Device,R-0Print      loratadine (CLARITIN) 10 MG tablet TAKE 1 TABLET(10 MG) BY MOUTH DAILY, Disp-90 tablet,R-3Normal      Lancets MISC Disp-300 each,R-3, NormalChecks blood sugar ac and HS      blood glucose monitor strips Test 3  times a day & as needed for symptoms of irregular blood glucose. Dispense sufficient amount for indicated testing frequency plus additional to accommodate PRN testing needs. , Disp-200 strip,R-3, Normal      Alcohol Swabs (ALCOHOL PREP) PADS Disp-300 each,R-0, NormalChecks blood sugar 3 times a day      Misc.  Devices MISC Disp-1 Device,R-0, PrintStandard walker with wheels  Diagnosis dementia, CHF      aspirin 81 MG tablet Take 1 tablet by mouth daily, Disp-90 tablet, R-3Normal      Diabetic Shoe MISC Starting Thu 9/26/2019, Disp-1 each, R-0, Print      Compression Stockings MISC Starting Thu 2019, Disp-5 each, R-0, [id number] mm Hg bilateral knee high             ALLERGIES     is allergic to bactrim [sulfamethoxazole-trimethoprim], clonidine derivatives, adhesive tape, and amoxicillin-pot clavulanate. FAMILY HISTORY     She indicated that her mother is . She indicated that her father is . family history includes Cancer in her mother; Coronary Art Dis in an other family member; Diabetes in her father and mother; Emphysema in her father; Glaucoma in her father; Heart Disease in her father; High Blood Pressure in her mother; Kidney Disease in her mother; Saji Sender in an other family member; Mental Retardation in an other family member; Stroke in her mother and another family member; Uterine Cancer in her mother. SOCIAL HISTORY      reports that she has never smoked. She has never used smokeless tobacco. She reports that she does not drink alcohol and does not use drugs. PHYSICAL EXAM     INITIAL VITALS:  weight is 209 lb 7 oz (95 kg). Her tympanic temperature is 95.5 °F (35.3 °C). Her blood pressure is 128/71 and her pulse is 61. Her respiration is 14 and oxygen saturation is 96%. Physical Exam  Vitals reviewed. Constitutional:       General: She is not in acute distress. Comments: Appears uncomfortable but nontoxic. HENT:      Head: Normocephalic and atraumatic. Eyes:      Extraocular Movements: Extraocular movements intact. Pupils: Pupils are equal, round, and reactive to light. Cardiovascular:      Rate and Rhythm: Bradycardia present. Rhythm irregular. Pulses: Normal pulses. Heart sounds: Normal heart sounds. Pulmonary:      Effort: Pulmonary effort is normal. No respiratory distress. Breath sounds: Normal breath sounds. Abdominal:      Comments: Very firm abdomen that is nontender possibly with anasarca. Musculoskeletal:         General: Swelling present. No tenderness. Skin:     General: Skin is warm and dry. Capillary Refill: Capillary refill takes less than 2 seconds. Neurological:      General: No focal deficit present. Mental Status: She is alert. Comments: No focal deficit. DIFFERENTIAL DIAGNOSIS / MDM / EMERGENCY DEPARTMENT COURSE:     Patient arrives with bradycardia and headache. Patient has pacer patches placed. Half a milligram of atropine was given with transient improvement in rate. EKG appears to show underlying atrial fibrillation with slow response. Patient will be started on an epinephrine drip we will monitor and titrate for response. Case is signed out to the oncoming physician approximately 7:30 in the morning due to change of shift. I have reviewed the disposition diagnosis with the patient and or their family/guardian. I have answered their questions and givendischarge instructions. They voiced understanding of these instructions and did not have any further questions or complaints. DIAGNOSTIC RESULTS     EKG: All EKG's are interpreted by the Emergency Department Physician who either signs or Co-signs this chart inthe absence of a cardiologist.    Twelve-lead EKG time 715 bradycardic rhythm with a narrow QRS complex ventricular rate of 37 bpm appears to be junctional no acute ST-T wave changes are appreciated    RADIOLOGY:   Radiologist interpretation of radiologic studies:     No results found. LABS:  Results for orders placed or performed during the hospital encounter of 11/13/21   COVID-19, Rapid    Specimen: Nasopharyngeal Swab   Result Value Ref Range    Specimen Description . NASOPHARYNGEAL SWAB     SARS-CoV-2, Rapid Not Detected Not Detected   CBC Auto Differential   Result Value Ref Range    WBC 4.8 3.5 - 11.3 k/uL    RBC 4.07 3.95 - 5.11 m/uL    Hemoglobin 12.4 11.9 - 15.1 g/dL    Hematocrit 39.0 36.3 - 47.1 %    MCV 95.8 82.6 - 102.9 fL    MCH 30.5 25.2 - 33.5 pg    MCHC 31.8 25.2 - 33.5 g/dL RDW 15.9 (H) 11.8 - 14.4 %    Platelets See Reflexed IPF Result 138 - 453 k/uL    MPV NOT REPORTED 8.1 - 13.5 fL    NRBC Automated 0.0 0.0 per 100 WBC    Differential Type NOT REPORTED     WBC Morphology NOT REPORTED     RBC Morphology NOT REPORTED     Platelet Estimate NOT REPORTED     Seg Neutrophils 76 (H) 36 - 65 %    Lymphocytes 13 (L) 24 - 43 %    Monocytes 10 3 - 12 %    Eosinophils % 1 1 - 4 %    Basophils 0 0 - 2 %    Immature Granulocytes 0 0 %    Segs Absolute 3.68 1.50 - 8.10 k/uL    Absolute Lymph # 0.61 (L) 1.10 - 3.70 k/uL    Absolute Mono # 0.46 0.10 - 1.20 k/uL    Absolute Eos # 0.05 0.00 - 0.44 k/uL    Basophils Absolute <0.03 0.00 - 0.20 k/uL    Absolute Immature Granulocyte <0.03 0.00 - 0.30 k/uL   Basic Metabolic Panel   Result Value Ref Range    Glucose 114 (H) 70 - 99 mg/dL    BUN 47 (H) 8 - 23 mg/dL    CREATININE 2.88 (H) 0.50 - 0.90 mg/dL    Bun/Cre Ratio 16 9 - 20    Calcium 9.6 8.6 - 10.4 mg/dL    Sodium 128 (L) 135 - 144 mmol/L    Potassium 4.2 3.7 - 5.3 mmol/L    Chloride 92 (L) 98 - 107 mmol/L    CO2 23 20 - 31 mmol/L    Anion Gap 13 9 - 17 mmol/L    GFR Non-African American 16 (L) >60 mL/min    GFR African American 20 (L) >60 mL/min    GFR Comment          GFR Staging NOT REPORTED    Troponin   Result Value Ref Range    Troponin, High Sensitivity 72 (HH) 0 - 14 ng/L    Troponin T NOT REPORTED <0.03 ng/mL    Troponin Interp NOT REPORTED    APTT   Result Value Ref Range    PTT 37.0 (H) 23.9 - 33.8 sec   Protime-INR   Result Value Ref Range    Protime 15.2 (H) 11.5 - 14.2 sec    INR 1.3    Immature Platelet Fraction   Result Value Ref Range    Platelet, Immature Fraction 5.6 1.1 - 10.3 %    Platelet, Fluorescence 86 (L) 138 - 453 k/uL   POC Glucose Fingerstick   Result Value Ref Range    POC Glucose 108 (H) 65 - 105 mg/dL   EKG 12 Lead   Result Value Ref Range    Ventricular Rate 37 BPM    Atrial Rate 58 BPM    QRS Duration 104 ms    Q-T Interval 508 ms    QTc Calculation (Bazett) 398 ms    R Axis 161 degrees    T Axis 103 degrees       EMERGENCY DEPARTMENT COURSE:   Vitals:    Vitals:    11/13/21 1500 11/13/21 1515 11/13/21 1530 11/13/21 1545   BP: (!) 124/54 117/73 (!) 116/59 128/71   Pulse: (!) 48 (!) 46 (!) 43 61   Resp: 17 11 11 14   Temp:       TempSrc:       SpO2: 96% 96% 96% 96%   Weight:         -------------------------  BP: 128/71, Temp: 95.5 °F (35.3 °C), Pulse: 61, Resp: 14    CONSULTS:  None    PROCEDURES:  None    FINAL IMPRESSION      1. Symptomatic bradycardia    2. ESRD on hemodialysis [facility])          DISPOSITION/PLAN   DISPOSITION Decision To Transfer 11/13/2021 08:16:54 AM      PATIENT REFERRED TO:  No follow-up provider specified. DISCHARGE MEDICATIONS:  Discharge Medication List as of 11/13/2021  4:26 PM          There are no active hospital problems to display for this patient.       (Please note that portions of this note were completed with avoice recognition program.  Efforts were made to edit the dictations but occasionally words are mis-transcribed.)    Mayuri Teixeira MD, [facility]  Attending Emergency Medicine Physician        Mayuri Teixeira MD  11/13/21 2468       Mayuri Teixeira MD  11/13/21 [id number]       Mayuri Teixeira MD  11/17/21 7700       Mayuri Teixeira MD  11/17/21 0280

## 2021-11-14 NOTE — H&P
[facility]  Office: [address], , Author Prakash, DO, Radha Truong, DO, Ivonne Stout, DO, Sherri Hunt MD, Al Lang MD, Ernie West MD, Carolan Schilder, MD, Latricia Menendez MD, Danette Alcantar MD, Nathan Fox MD, Sandor Hurley, DO, Soraida Scott DO, Wilma Chacon MD,  Bernice Peterson DO, Margaret Marmolejo MD, Jacob Emery MD, Asim Isidro MD, Jake Roe MD, Patrizia Sandhu MD, Danilo Miranda MD, Mariajose Zimmerman MD, Lynne Lee [facility], [facility], CNP, Cordell Arauz, CNP, Hema Hebert, CNS, Everett Farris, CNP, Adamaris Yung, CNP, Donnell Ortiz, CNP, Marilynn Bridges, CNP, Sheila Pinon, CNP, Chris Lamar PA-C, Berhane Ramon, [facility], Lysbeth Angelucci, CNP, Stephanie Landis, CNP, Cade Rodney, CNP, Coty Collado, CNP, Dallas Winslow, CNP, Elizabeth Abarca, CNP, Arnaldo Mcguire, [address]    HISTORY AND PHYSICAL EXAMINATION            Date:   11/13/2021  Patient name:  Jerrod Vega  Date of admission:  11/13/2021  5:32 PM  MRN:   [id number]  Account:  [de-identified]  YOB: 1950  PCP:    Rod Austin DO  Room:   [address] [place] [address]  Code Status:    Full Code    Chief Complaint:     Bradycardia      History Obtained From:     patient, electronic medical record    History of Present Illness:     Jerrod Vega is a 72 y.o. Non- / non  female who presents with No chief complaint on file. and is admitted to the hospital for the management of Bradycardia. The patient presented to ER from dialysis today. Patient presented for her routine dialysis and was found to have bradycardia with heart rate in the 30s so dialysis was held today and the patient was sent to the ER for evaluation. On arrival she is complaining of a headache. She states she has a history of headaches but this headache is worse than her normal headache.  According to the chart she was seen by her PCP on 11/9/2021 complaining of a non-intractable headache and she was started on Tylenol. Patient's pertinent history includes end-stage renal disease with dialysis, anasarca, first-degree AV block, obesity, CAD including coronary artery bypass graft x4, type 2 diabetes, CVA, decompensated heart failure, dementia, hypertension, peripheral polyneuropathy and lymphedema to her lower extremities. Per the ER note   Patient arrives with bradycardia and headache. Patient has pacer patches placed. Half a milligram of atropine was given with transient improvement in rate. EKG appears to show underlying atrial fibrillation with slow response. Patient will be started on an epinephrine drip we will monitor and titrate for response. Per her admission order she is on a dopamine drip    Patient follows with [place] cardiology  Patient follows with Dr. Kimmie Webb for nephrology    Sodium 128-nephrology is consulted  Chloride 92  BUN/creatinine 47/2.88-nephrology consulted for dialysis recommendations  Troponin 72 then 68  EKG shows a junctional bradycardia replacing A. Fib? No history of A. fib is listed in the patient's chart-cardiology is consulted    Plan to hold Norvasc and Coreg    Patient may benefit from a dialysis tomorrow related to findings of cardiomegaly with vascular congestion bibasilar airspace disease and pleural effusions. CT of the abdomen shows cardiomegaly with partially visualized pleural effusion with consolidative changes involving the lower lobes. A superimposed pneumonia cannot be excluded. She has cirrhotic liver with a large amount of ascites, diffuse body wall anasarca and left colon diverticulosis. CT of the head was completed related to her headache which showed no acute intracranial abnormality. There is an old left PCA distribution infarct with associated encephalomalacia unchanged from previous with mild diffuse cerebral atrophy. Echo 12/20   Normal left ventricular diameter.   Left ventricular systolic Decubitus ulcer of trochanteric region of right hip, stage 2 ([address] 2020    Dementia (HCC)     (moderate)    Dry eye syndrome     End stage renal disease on dialysis ([address] 3/1/2021    GERD (gastroesophageal reflux disease)     Glaucoma suspect     Gout     Hemodialysis patient ([address] 3/1/2021    Homonymous hemianopsia 2008    (Right)    Hyperkalemia 2020    Hyperlipidemia     Hypertension     Infestation by bed bug 2020    Infestation by maggots 2020    Keratitis     (secondary to dry eye syndrome).  Lymphedema of both lower extremities 2020    Morbid obesity ([address] 3/1/2021    MRSA bacteremia 2020    Obesity     PAD (peripheral artery disease) (HCC)     Peripheral polyneuropathy     (diabetic)    Pressure injury of right heel, unstageable ([address] 2020    Pseudophakos     (Right Eye: 2012; Left Eye: 2012) - Dr. Blanca Sawant.  Stroke [facility])     (Multiple) MRI of brain 10/2008 shows multiple infarcts involving the temporal and parietal areas.  Trichiasis     (left eye)    Type 2 diabetes mellitus ([address]     Wounds, multiple 2020        Past Surgical History:     Past Surgical History:   Procedure Laterality Date    CATARACT REMOVAL WITH IMPLANT  2012    (Right eye) - Dr. Blanca Sawant.  CATARACT REMOVAL WITH IMPLANT  2012    (Left eye) - Dr. Blanca Sawant.   SECTION      CHOLECYSTECTOMY      CORONARY ARTERY BYPASS GRAFT  12-    (x4) - LIMA-LAD, SVG-diagnoal 1, SVG-OM1, and SVG-PDA. Exploration of left radial. (Dr. Prasanth Landrum).  EYE SURGERY Left     as a child     GASTRIC BYPASS SURGERY      HYSTERECTOMY      (abdominal)  with left oophorectomy. Right ovary retained.     IR TUNNELED CATHETER PLACEMENT GREATER THAN 5 YEARS  2021    IR TUNNELED CATHETER PLACEMENT GREATER THAN 5 YEARS 2021 Jenny Alexandre MD [facility]    TONSILLECTOMY AND ADENOIDECTOMY      UPPER GASTROINTESTINAL pantoprazole (PROTONIX) 40 MG tablet Take 1 tablet by mouth 2 times daily (before meals) 3/10/21   Stevie Mckinney DO   rivastigmine (EXELON) 9.5 MG/24HR APPLY 1 NEW PATCH EVERY 24 HOURS 3/10/21   Stevie Mckinney DO   senna (SENOKOT) 8.6 MG tablet Take 1 tablet by mouth 2 times daily  Patient taking differently: Take 1 tablet by mouth as needed  11/25/20 11/25/21  ERINN Kang   OXYGEN Oxgen at 2 liters per nasal cannula 11/5/20   ERINN Kang   loratadine (CLARITIN) 10 MG tablet TAKE 1 TABLET(10 MG) BY MOUTH DAILY 11/5/20   ERINN Kang   Lancets MISC Checks blood sugar ac and HS 11/4/20   ERINN Kang   blood glucose monitor strips Test 3  times a day & as needed for symptoms of irregular blood glucose. Dispense sufficient amount for indicated testing frequency plus additional to accommodate PRN testing needs. 11/4/20   ERINN Kang   Alcohol Swabs (ALCOHOL PREP) PADS Checks blood sugar 3 times a day 11/4/20   [facility] [address] Devices MISC Standard walker with wheels    Diagnosis dementia, CHF 10/29/20   SHIRA Mcpherson - CNP   aspirin 81 MG tablet Take 1 tablet by mouth daily 1/3/20   [facility], [place]   Diabetic Shoe MISC by Does not apply route 9/26/19   ERINN Kang   Compression Stockings MISC by Does not apply route 20-30 mm Hg bilateral knee high 9/26/19   [facility], [place]        Allergies:     Bactrim [sulfamethoxazole-trimethoprim], Clonidine derivatives, Adhesive tape, and Amoxicillin-pot clavulanate    Social History:     Tobacco:    reports that she has never smoked. She has never used smokeless tobacco.  Alcohol:      reports no history of alcohol use. Drug Use:  reports no history of drug use.     Family History:     Family History   Problem Relation Age of Onset    Diabetes Mother     Cancer Mother     High Blood Pressure Mother     Stroke Mother     Kidney Disease Mother     Uterine Cancer Mother     Diabetes Father  Heart Disease Father     Glaucoma Father     Emphysema Father     Coronary Art Dis Other         All 4 siblings.  Stroke Other         1 sibling.  Lung Cancer Other         1 sibling - cause of death lung cancer.  Mental Retardation Other        Review of Systems:     Positive and Negative as described in HPI. Review of Systems   Constitutional: Negative for chills, diaphoresis and fever. HENT: Negative for congestion and hearing loss. Respiratory: Negative for cough, shortness of breath, wheezing and stridor. Cardiovascular: Negative for chest pain, palpitations and leg swelling. Gastrointestinal: Negative for abdominal pain, blood in stool, constipation, diarrhea, nausea and vomiting. Genitourinary: Negative for dysuria and frequency. Musculoskeletal: Negative for myalgias. Skin: Negative for rash. Neurological: Positive for headaches. Negative for dizziness and seizures. Hematological:        Itching to the right forarm   Psychiatric/Behavioral: The patient is not nervous/anxious. Physical Exam:   BP (!) 134/59   Pulse (!) 46   Temp 97.3 °F (36.3 °C) (Axillary)   Resp 16   Ht 5' 4\" (1.626 m)   Wt 202 lb 13.2 oz (92 kg)   SpO2 98%   BMI 34.81 kg/m²   Temp (24hrs), Av.8 °F (36 °C), Min:95.5 °F (35.3 °C), Max:97.7 °F (36.5 °C)    Recent Labs     21  1451 21  1756   POCGLU 108* 109*     No intake or output data in the 24 hours ending 21    Physical Exam  Vitals and nursing note reviewed. HENT:      Mouth/Throat:      Mouth: Mucous membranes are dry. Eyes:      Conjunctiva/sclera: Conjunctivae normal.   Cardiovascular:      Rate and Rhythm: Regular rhythm. Bradycardia present. Pulses: Normal pulses. Pulmonary:      Effort: Pulmonary effort is normal.   Abdominal:      General: Bowel sounds are normal.   Musculoskeletal:      Cervical back: Neck supple. Right lower leg: Edema present. Left lower leg: No edema.    Skin: General: Skin is dry. Capillary Refill: Capillary refill takes 2 to 3 seconds. Comments: Area of erthema to the right forarm between where her 2 IV's are. The Dopamine is running in the distal IV and the redness is above the area but its difficult to assess where the redness starts because of the tape. The patient states she is allergic to tape. Ace wraps to bilateral lower extremities. Skin is cracked and dry to her feet bilaterally. I did not remove the ace wraps   Neurological:      Mental Status: She is alert and oriented to person, place, and time. GCS: GCS verbal subscore is 5. GCS motor subscore is 6. Sensory: Sensation is intact.    Psychiatric:         Attention and Perception: Attention normal.         Investigations:      Laboratory Testing:  Recent Results (from the past 24 hour(s))   CBC Auto Differential    Collection Time: 11/13/21  7:15 AM   Result Value Ref Range    WBC 4.8 3.5 - 11.3 k/uL    RBC 4.07 3.95 - 5.11 m/uL    Hemoglobin 12.4 11.9 - 15.1 g/dL    Hematocrit 39.0 36.3 - 47.1 %    MCV 95.8 82.6 - 102.9 fL    MCH 30.5 25.2 - 33.5 pg    MCHC 31.8 25.2 - 33.5 g/dL    RDW 15.9 (H) 11.8 - 14.4 %    Platelets See Reflexed IPF Result 138 - 453 k/uL    MPV NOT REPORTED 8.1 - 13.5 fL    NRBC Automated 0.0 0.0 per 100 WBC    Differential Type NOT REPORTED     WBC Morphology NOT REPORTED     RBC Morphology NOT REPORTED     Platelet Estimate NOT REPORTED     Seg Neutrophils 76 (H) 36 - 65 %    Lymphocytes 13 (L) 24 - 43 %    Monocytes 10 3 - 12 %    Eosinophils % 1 1 - 4 %    Basophils 0 0 - 2 %    Immature Granulocytes 0 0 %    Segs Absolute 3.68 1.50 - 8.10 k/uL    Absolute Lymph # 0.61 (L) 1.10 - 3.70 k/uL    Absolute Mono # 0.46 0.10 - 1.20 k/uL    Absolute Eos # 0.05 0.00 - 0.44 k/uL    Basophils Absolute <0.03 0.00 - 0.20 k/uL    Absolute Immature Granulocyte <0.03 0.00 - 0.30 k/uL   Basic Metabolic Panel    Collection Time: 11/13/21  7:15 AM   Result Value Ref Range Glucose 114 (H) 70 - 99 mg/dL    BUN 47 (H) 8 - 23 mg/dL    CREATININE 2.88 (H) 0.50 - 0.90 mg/dL    Bun/Cre Ratio 16 9 - 20    Calcium 9.6 8.6 - 10.4 mg/dL    Sodium 128 (L) 135 - 144 mmol/L    Potassium 4.2 3.7 - 5.3 mmol/L    Chloride 92 (L) 98 - 107 mmol/L    CO2 23 20 - 31 mmol/L    Anion Gap 13 9 - 17 mmol/L    GFR Non-African American 16 (L) >60 mL/min    GFR African American 20 (L) >60 mL/min    GFR Comment          GFR Staging NOT REPORTED    Troponin    Collection Time: 11/13/21  7:15 AM   Result Value Ref Range    Troponin, High Sensitivity 72 (HH) 0 - 14 ng/L    Troponin T NOT REPORTED <0.03 ng/mL    Troponin Interp NOT REPORTED    APTT    Collection Time: 11/13/21  7:15 AM   Result Value Ref Range    PTT 37.0 (H) 23.9 - 33.8 sec   Protime-INR    Collection Time: 11/13/21  7:15 AM   Result Value Ref Range    Protime 15.2 (H) 11.5 - 14.2 sec    INR 1.3    Immature Platelet Fraction    Collection Time: 11/13/21  7:15 AM   Result Value Ref Range    Platelet, Immature Fraction 5.6 1.1 - 10.3 %    Platelet, Fluorescence 86 (L) 138 - 453 k/uL   EKG 12 Lead    Collection Time: 11/13/21  7:15 AM   Result Value Ref Range    Ventricular Rate 37 BPM    Atrial Rate 58 BPM    QRS Duration 104 ms    Q-T Interval 508 ms    QTc Calculation (Bazett) 398 ms    R Axis 161 degrees    T Axis 103 degrees   COVID-19, Rapid    Collection Time: 11/13/21  8:50 AM    Specimen: Nasopharyngeal Swab   Result Value Ref Range    Specimen Description . NASOPHARYNGEAL SWAB     SARS-CoV-2, Rapid Not Detected Not Detected   POC Glucose Fingerstick    Collection Time: 11/13/21  2:51 PM   Result Value Ref Range    POC Glucose 108 (H) 65 - 105 mg/dL   POC Glucose Fingerstick    Collection Time: 11/13/21  5:56 PM   Result Value Ref Range    POC Glucose 109 (H) 65 - 105 mg/dL   Troponin    Collection Time: 11/13/21  6:02 PM   Result Value Ref Range    Troponin, High Sensitivity 68 (HH) 0 - 14 ng/L    Troponin T NOT REPORTED <0.03 ng/mL Troponin Interp NOT REPORTED        Imaging/Diagnostics:  CT ABDOMEN PELVIS WO CONTRAST Additional Contrast? None    Result Date: 11/13/2021  No definite acute process is seen. 1. Cardiomegaly with partially visualized pleural effusions with consolidative changes involving the lower lobes. Superimposed pneumonia cannot be excluded. 2. Cirrhotic liver with large amount of ascites. 3. Diffuse body wall anasarca. 4. Left colon diverticulosis. CT HEAD WO CONTRAST    Result Date: 11/13/2021  No acute intracranial abnormality. Old left PCA distribution infarct with associated encephalomalacia unchanged from previous. Mild diffuse cerebral atrophy. XR CHEST PORTABLE    Result Date: 11/13/2021  CHF findings with cardiomegaly, vascular congestion bibasilar airspace disease and pleural effusions. Assessment :      Hospital Problems           Last Modified POA    * (Principal) Bradycardia 11/13/2021 Yes    Hypertension 11/13/2021 Yes    Hyperlipidemia 11/13/2021 Yes    CAD (coronary artery disease) 11/13/2021 Yes    Overview Signed 2/27/2014 10:22 AM by Munir Baez MD     (with coronary artery bypass graft x4). Controlled type 2 diabetes mellitus with chronic 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 dialysis, with long-term current use of insulin ([address] 11/13/2021 Yes    Acute on chronic diastolic heart failure ([address] 11/13/2021 Yes    Lymphedema of both lower extremities 11/13/2021 Yes    Anasarca 11/13/2021 Yes    End stage renal disease on dialysis ([address] 11/13/2021 Yes    Class 1 obesity with body mass index (BMI) of 34.0 to 34.9 in adult 11/13/2021 Yes    Hyponatremia 11/13/2021 Yes    Nonintractable headache 11/13/2021 Yes          Plan:     Patient status inpatient in the Cardiovascular ICU    Bradycardia; continue dopamine, hold bb and CCB. Cardiology consulted    HTN; monitor bp. Hold bb and CCB. Hold tonight's hydralazine. Monitor bp and pulse    Acute on chronic CHF; monitor for fluid overload.

## 2021-11-14 NOTE — PROGRESS NOTES
Comprehensive Nutrition Assessment    Type and Reason for Visit:  Positive Nutrition Screen (wounds)    Nutrition Recommendations/Plan:   - Continue current diet, 2g Na, CCHO (60 g), 1.5L FR  - Will add Ensure HP BID  - Monitor/encourage PO    Nutrition Assessment:  71 yo F admitted for mgt of bradycardia. Pt tries to follow low sodium diet at home. No diet ordered at time of visit. Pt willing to try ONS (no vanilla, no butter pecan). Malnutrition Assessment:  Malnutrition Status:  Insufficient data      Estimated Daily Nutrient Needs:  Energy (kcal):  9762-8784 kcal/day (21-24 kcal/kg); Weight Used for Energy Requirements:  Current     Protein (g):  90 to 100 g/day (1.0 to 1.1 g/kg); Weight Used for Protein Requirements:  Current        Fluid (ml/day):  Per MD 1500 ml; Method Used for Fluid Requirements:         Nutrition Related Findings:  Labs/meds reviewed      Wounds:  None (bruising, excoriation)       Current Nutrition Therapies:    ADULT DIET; Regular; 4 carb choices (60 gm/meal); Low Sodium (2 gm); 1500 ml; No Caffeine  ADULT ORAL NUTRITION SUPPLEMENT; Dinner, Breakfast; Low Calorie/High Protein Oral Supplement    Anthropometric Measures:  · Height: 5' 4\" (162.6 cm)  · Current Body Weight: 200 lb 9.9 oz (91 kg)   · Admission Body Weight:      · Usual Body Weight: 197 lb (89.4 kg) (Dry wt, per pt)     · Ideal Body Weight: 120 lbs; % Ideal Body Weight 167.2 %   · BMI: 34.4  · BMI Categories: Obese Class 1 (BMI 30.0-34. 9)       Nutrition Diagnosis:   · Inadequate protein-energy intake related to  (current medical condition) as evidenced by NPO or clear liquid status due to medical condition    Nutrition Interventions:   Food and/or Nutrient Delivery:  Continue NPO  Nutrition Education/Counseling:  No recommendation at this time   Coordination of Nutrition Care:  Continue to monitor while inpatient    Goals:  Obtain >50% of nutrition needs via all sources of intake       Nutrition Monitoring and Evaluation:   Behavioral-Environmental Outcomes:  None Identified   Food/Nutrient Intake Outcomes:  Diet Advancement/Tolerance, Supplement Intake  Physical Signs/Symptoms Outcomes:  Biochemical Data, Fluid Status or Edema, Nutrition Focused Physical Findings, Skin, Weight     Discharge Planning:     Too soon to determine     Electronically signed by Hitesh Velásquez MS, RD, LD on 11/14/21 at 4:05 PM EST    Contact: [phone] or floor RD

## 2021-11-14 NOTE — CONSULTS
Renal Consult Note    Patient :  Home Hutton; 72 y.o. MRN# [id number]  Location:  [facility]5/1005-01  Attending:  Cora Rea DO  Admit Date:  11/13/2021   Hospital Day: 1    Reason for Consult:     Asked by Dr Cora Rea DO to see for AMY/Elevated Creatinine. History Obtained From:     patient, electronic medical record    HD Access:     previous permacath    HD Unit:     [place]. Nephrologist:     Dr Gómez Martinez Weight:     Patient unsure    Admission Weight:     92kg    History of Present Illness:     Home Hutton; 72 y.o. female with past medical history as mentioned below. Patient presents for ongoing intermittent headaches and bradycardia. She denies feeling light headed and dizziness. She arrived for dialysis on Saturday but it was held due to the bradycardia. She takes Norvasc and Coreg at home. It is held at this time. She has seen the patient ESRD patient under the care of Dr. Sariah Bello at [facility] dialysis on Tuesday Thursday Saturday via right tunneled catheter. She did not get her treatment yesterday due to bradycardia. She is unsure of her dry weight. Patient was admitted with a sodium of 128. Her sodium gradually dropped and most recent sodium is 119. Based on physical examination patient is in mild CHF. She denies any excessive free water consumption. She was not receiving any IV fluid with hypotonic saline. Past History/Allergies? Social History:     Past Medical History:   Diagnosis Date    Acute anemia     Acute cystitis 8/8/2020    Acute kidney injury ([address] 8/19/2020    Acute kidney injury superimposed on CKD ([address]     Acute on chronic diastolic heart failure ([address] 8/7/2020    Acute renal failure (ARF) ([address] 12/11/2020    Anasarca 12/12/2020    Anxiety and depression     AV block, 1st degree 8/19/2020    Background diabetic retinopathy(362.01) 2008    (Mild)    Balance problem     BMI 45.0-49.9, adult ([address] 3/1/2021    Bradycardia 8/19/2020    Buttock wound 8/20/2020    CAD (coronary artery disease)     (with coronary artery bypass graft x4).  Cancer of uterus [facility])     history of, (probable cure)    Chronic diastolic heart failure ([address] 3/1/2021    Chronic kidney disease     Chronic low back pain     Controlled type 2 diabetes mellitus with chronic 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 dialysis, with long-term current use of insulin ([address]     CVA (cerebral vascular accident) ([address]     Decompensated heart failure ([address] 12/4/2020    Decubitus ulcer of trochanteric region of right hip, stage 2 ([address] 8/23/2020    Dementia ([address]     (moderate)    Dry eye syndrome     End stage renal disease on dialysis ([address] 3/1/2021    GERD (gastroesophageal reflux disease)     Glaucoma suspect 2005    Gout     Hemodialysis patient ([address] 3/1/2021    Homonymous hemianopsia 2008    (Right)    Hyperkalemia 8/19/2020    Hyperlipidemia     Hypertension     Infestation by bed bug 8/8/2020    Infestation by maggots 8/8/2020    Keratitis     (secondary to dry eye syndrome).  Lymphedema of both lower extremities 8/8/2020    Morbid obesity ([address] 3/1/2021    MRSA bacteremia 12/18/2020    Obesity     PAD [facility]peripheral artery disease) [facility]     Peripheral polyneuropathy     (diabetic)    Pressure injury of right heel, unstageable ([address] 8/18/2020    Pseudophakos     (Right Eye: 05-; Left Eye: 04-) - Dr. Anika Hernandez.  Stroke [facility])     (Multiple) MRI of brain 10/2008 shows multiple infarcts involving the temporal and parietal areas.     Trichiasis 2010    (left eye)    Type 2 diabetes mellitus (HCC)     Wounds, multiple 8/20/2020       Allergies   Allergen Reactions    Bactrim [Sulfamethoxazole-Trimethoprim] Hives    Clonidine Derivatives     Adhesive Tape Itching    Amoxicillin-Pot Clavulanate Diarrhea, Nausea Only and Nausea And Vomiting       Social History     Socioeconomic History    Marital status:      Spouse name: Not on file    Number of children: Not on file    Years of education: Not on file    Highest education level: Not on file   Occupational History    Not on file   Tobacco Use    Smoking status: Never Smoker    Smokeless tobacco: Never Used    Tobacco comment: never smoker eclamb rrt 7/20/17   Substance and Sexual Activity    Alcohol use: No    Drug use: No    Sexual activity: Not on file   Other Topics Concern    Not on file   Social History Narrative    Not on file     Social Determinants of Health     Financial Resource Strain:     Difficulty of Paying Living Expenses: Not on file   Food Insecurity:     Worried About Running Out of Food in the Last Year: Not on file    Rosanne of Food in the Last Year: Not on file   Transportation Needs:     Lack of Transportation (Medical): Not on file    Lack of Transportation (Non-Medical):  Not on file   Physical Activity:     Days of Exercise per Week: Not on file    Minutes of Exercise per Session: Not on file   Stress:     Feeling of Stress : Not on file   Social Connections:     Frequency of Communication with Friends and Family: Not on file    Frequency of Social Gatherings with Friends and Family: Not on file    Attends [employer] Services: Not on file    Active Member of [address] [place] [address] [employer] or Organizations: Not on file    Attends Club or Organization Meetings: Not on file    Marital Status: Not on file   Intimate Partner Violence:     Fear of Current or Ex-Partner: Not on file    Emotionally Abused: Not on file    Physically Abused: Not on file    Sexually Abused: Not on file   Housing Stability:     Unable to Pay for Housing in the Last Year: Not on file    Number of [place] in the Last Year: Not on file    Unstable Housing in the Last Year: Not on file       Family History:        Family History   Problem Relation Age of Onset    Diabetes Mother     Cancer Mother     High Blood Pressure Mother     Stroke Mother     Kidney Disease Mother    Lucio Braden Uterine Cancer Mother     Diabetes Father     Heart Disease Father     Glaucoma Father     Emphysema Father     Coronary Art Dis Other         All 4 siblings.  Stroke Other         1 sibling.  Lung Cancer Other         1 sibling - cause of death lung cancer.  Mental Retardation Other        Outpatient Medications:     Medications Prior to Admission: ciprofloxacin (CIPRO) 250 MG tablet,   midodrine (PROAMATINE) 10 MG tablet, Take 10 mg by mouth three times a week tu-th-sa  sodium chloride (OCEAN) 0.65 % nasal spray, 2 sprays by Nasal route 2 times daily  lidocaine-prilocaine (EMLA) 2.5-2.5 % cream, Apply topically three times a week Apply topically as needed- Tu-Th-Sa  amLODIPine (NORVASC) 10 MG tablet, Take 1 tablet by mouth daily  atorvastatin (LIPITOR) 40 MG tablet, TAKE 1 TABLET BY MOUTH EVERYDAY  Cholecalciferol 125 MCG (5000 UT) CHEW, Take 1 tablet by mouth daily  citalopram (CELEXA) 20 MG tablet, Take 1 tablet by mouth daily  carvedilol (COREG) 12.5 MG tablet, Take 1 tablet by mouth 2 times daily (with meals)  hydrALAZINE (APRESOLINE) 100 MG tablet, Take 1 tablet by mouth 3 times daily  insulin glargine (LANTUS) 100 UNIT/ML injection vial, Inject 10 Units into the skin nightly  insulin lispro (HUMALOG) 100 UNIT/ML injection vial, Use 4 times a day per sliding scale  nystatin (MYCOSTATIN) 109648 UNIT/GM cream, Apply topically 2 times daily.  (Patient not taking: Reported on 7/30/2021)  oxybutynin (DITROPAN) 5 MG tablet, TAKE 1 TABLET BY MOUTH TWICE DAILY  clopidogrel (PLAVIX) 75 MG tablet, Take 1 tablet by mouth daily  pantoprazole (PROTONIX) 40 MG tablet, Take 1 tablet by mouth 2 times daily (before meals)  rivastigmine (EXELON) 9.5 MG/24HR, APPLY 1 NEW PATCH EVERY 24 HOURS  senna (SENOKOT) 8.6 MG tablet, Take 1 tablet by mouth 2 times daily (Patient taking differently: Take 1 tablet by mouth as needed )  OXYGEN, Oxgen at 2 liters per nasal cannula  loratadine (CLARITIN) 10 MG tablet, TAKE 1 TABLET(10 MG) BY MOUTH DAILY  Lancets MISC, Checks blood sugar ac and HS  blood glucose monitor strips, Test 3  times a day & as needed for symptoms of irregular blood glucose. Dispense sufficient amount for indicated testing frequency plus additional to accommodate PRN testing needs. Alcohol Swabs (ALCOHOL PREP) PADS, Checks blood sugar 3 times a day  Misc. Devices MISC, Standard walker with wheels  Diagnosis dementia, CHF  aspirin 81 MG tablet, Take 1 tablet by mouth daily  Diabetic Shoe MISC, by Does not apply route  Compression Stockings MISC, by Does not apply route 20-30 mm Hg bilateral knee high    Current Medications:     Scheduled Meds:    aspirin  81 mg Oral Daily    [Held by provider] amLODIPine  10 mg Oral Daily    Calcium Carb-Cholecalciferol  1 tablet Oral Daily    citalopram  20 mg Oral Daily    [Held by provider] carvedilol  12.5 mg Oral BID WC    hydrALAZINE  100 mg Oral TID    insulin glargine  10 Units SubCUTAneous Nightly    oxybutynin  5 mg Oral BID    clopidogrel  75 mg Oral Daily    pantoprazole  40 mg Oral BID AC    [START ON 11/15/2021] midodrine  10 mg Oral Once per day on Mon Wed Fri    sodium chloride  2 spray Nasal BID    sodium chloride flush  5-40 mL IntraVENous 2 times per day    rosuvastatin  40 mg Oral Nightly    rivastigmine  1 patch TransDERmal Daily    heparin (porcine)  5,000 Units SubCUTAneous 3 times per day    influenza virus vaccine  0.5 mL IntraMUSCular Prior to discharge    insulin lispro  0-6 Units SubCUTAneous TID     insulin lispro  0-3 Units SubCUTAneous Nightly       Review of Systems:     Constitutional: No fever or chills  HEENT:  She was having some headaches yesterday but at this point she denies any headache   cardiac:  No palpitations  Chest:  No cough, phlegm or wheezing. Abdomen:  No abdominal pain, nausea or vomiting. Neuro:  No focal weakness, abnormal movements orseizure like activity. Skin:   No rashes, no itching.   :   No hematuria, no pyuria, no dysuria, no flank pain. Extremities:  Increasing leg swelling ROS was otherwise negative except as mentioned in the [address] Input/Output:     I/O last 3 completed shifts: In: 240 [P.O.:240]  Out: -       Vital Signs:   Temperature:  Temp: 97.6 °F (36.4 °C)  TMax:   Temp (24hrs), Av.6 °F (36.4 °C), Min:97 °F (36.1 °C), Max:98.1 °F (36.7 °C)    Respirations:  Resp: 16  Pulse:   Pulse: (!) 48  BP:    BP: (!) 98/54  BP Range: Systolic (97DXU), YTT:053 , Min:96 , FAH:589       Diastolic (44UHP), NNO:91, Min:40, Max:93      Physical Examination:     General:  Alert and awake  HEENT: Atraumatic, normocephalic, no throat congestion, moist mucosa. Neck:   No JVD, no thyromegaly, no lymphadenopathy. Chest:              Diminished bilateral vesicular breath sounds, no rales or wheezes. Cardiac:  S1 S2 RR, no murmurs, gallops or rubs, JVP not raised. Abdomen: Soft, non-tender, no masses or organomegaly, BS audible. :   No suprapubic or flank tenderness. Neuro:  AAO x 3, No FND. SKIN:  Dry, No rashes, good skin turgor. Extremities:  + edema, palpable peripheral pulses, no calf tenderness.     Labs:       Recent Labs     21  0715 21  0345   WBC 4.8 6.2   RBC 4.07 4.09   HGB 12.4 12.6   HCT 39.0 39.1   MCV 95.8 95.6   MCH 30.5 30.8   MCHC 31.8 32.2   RDW 15.9* 15.8*   PLT See Reflexed IPF Result See Reflexed IPF Result   MPV NOT REPORTED NOT REPORTED      BMP:   Recent Labs     21  0715 21  2100 21  0345   * 124* 119*   K 4.2  --  4.4   CL 92*  --  86*   CO2 23  --  20   BUN 47*  --  53*   CREATININE 2.88*  --  3.55*   GLUCOSE 114*  --  142*   CALCIUM 9.6  --  9.3    Magnesium:    Recent Labs     21  0345   MG 2.1     Albumin:    Recent Labs     21  0345   LABALBU 3.4*     Urinalysis/Chemistries:      Lab Results   Component Value Date    NITRU NEGATIVE 2021    COLORU YELLOW 2021    PHUR 5.0 2021    WBCUA 2 TO 5 2021 RBCUA 10 TO 20 01/07/2021    MUCUS NOT REPORTED 01/07/2021    TRICHOMONAS NOT REPORTED 01/07/2021    YEAST NOT REPORTED 01/07/2021    BACTERIA NOT REPORTED 01/07/2021    SPECGRAV 1.015 01/07/2021    LEUKOCYTESUR TRACE 01/07/2021    UROBILINOGEN Normal 01/07/2021    BILIRUBINUR NEGATIVE 01/07/2021    GLUCOSEU NEGATIVE 01/07/2021    [address] NEGATIVE 01/07/2021    AMORPHOUS NOT REPORTED 01/07/2021       Radiology:     CXR: 11/13/21  Impression   CHF findings with cardiomegaly, vascular congestion bibasilar airspace   disease and pleural effusions. Assessment:     1. ESRD on Hemodialysis. His regular HD days are Tuesday Thursday Saturday at [facility] hemodialysis facility using catheter under Dr. Elias Lee. Her dry weight is unknown. Admitted with 92 kg  2. Anemia of chronic disease  3. Secondary hyperparathyroidism  4. Hypertension  5. Hyponatremia -most likely dilutional.  6. Bradycardia: Patient is on dopamine    Plan:   1.  2 hour UF treatment today, then continue Tuesday Thursday Saturday schedule  2. Strict Input and Output, Daily weigh and document in the chart. 3. Low Potassium, Low phosphorus and low salt diet. Fluids to be restricted to 1500ml/day. 4. IV Aranesp/Epogen for anemia of chronic disease with HD weekly. 5. IV Zemplar per protocol for secondary hyperparathyroidism with HD thrice a week. 6.  Sodium checks every 8 hours     Nutrition   Please ensure that patient is on a renal diet/TF. Thank you for the consultation. Please do not hesitate to contact us for any further questions/concerns. We will continue to follow along with you. Attending Physician Statement  I have discussed the care of Theresa Ayala, including pertinent history and exam findings with the NP I have reviewed the key elements of all parts of the encounter with the np. Note was updated and recorded changes were made I have seen and examined the patient with the np.   I agree with the assessment and plan and status of the problem list as documented.   Addiitionally I recommend we will follow serum sodium levels  Ivonne Loja MD

## 2021-11-14 NOTE — PROGRESS NOTES
Pt. Admitted to room 1005 from [facility] ED. Dopamine infusing at 5.5 mcg/kg/min. Skin assessed and multiple wounds noted to bilateral groin and abdominal folds, bruising noted to right chest and bilateral leg wounds wrapped in ace wraps. Wound care eval ordered. Dopamine ordered per Dr. Joanette Bence. Cardiology notified of EKG and elevated troponin.  Pt. Resting comfortably in bed, see documented assessment/vital signs

## 2021-11-14 NOTE — PROGRESS NOTES
Dialysis Time Out  To be done by RN and tech or 2 RNs  Staff Names Marie Palomares RN    [x]  Identity of the patient using 2 patient identifiers  [x]  Consent for treatment  [x]  Equipment-proper machine and dialyzer  [x]  B-Hep B status  [x]  Orders- to include bath, blood flow, dialyzer, time and fluid removal  [x]  Access-Correct site and in working order  [x]  Time for patient to ask questions.

## 2021-11-14 NOTE — CONSULTS
Renal Consult Note    Patient :  Juan M Mirza; 72 y.o. MRN# [id number]  Location:  1005/1005-01  Attending:  Marty Aguirre DO  Admit Date:  11/13/2021   Hospital Day: 1    Reason for Consult:     Asked by Dr Marty Aguirre DO to see for End stage renal disease on dialysis and hyponatremia. History Obtained From:     patient, electronic medical record    History of Present Illness:     Juan M Mirza; 72 y.o. female with past medical history as mentioned below. Patient presents for ongoing intermittent headaches and bradycardia. She denies feeling light headed and dizziness. Takes Norvasc and Coreg at home - held at this time. She has seen the patient ESRD patient under the care of Dr. Misty Lee at [facility] dialysis on Tuesday Thursday Saturday via right tunneled catheter. She did not get her treatment yesterday due to bradycardia. She is unsure of her dry weight. No history of recent contrast exposure, No h/o prolonged NSAIDs use in the past, No h/o nephrolithiasis, No recent skin rashes or arthralgias, No hematuria or pyuria noticed in the recent past. Doesn't report any reduction in the urine output recently. Non report of any obstructive urinary symptoms (urgency, frequency, weak stream, straining while urination). No h/o recurrent UTIs in the past.    Past History/Allergies? Social History:     Past Medical History:   Diagnosis Date    Acute anemia     Acute cystitis 8/8/2020    Acute kidney injury ([address] 8/19/2020    Acute kidney injury superimposed on CKD ([address]     Acute on chronic diastolic heart failure ([address] 8/7/2020    Acute renal failure (ARF) ([address] 12/11/2020    Anasarca 12/12/2020    Anxiety and depression     AV block, 1st degree 8/19/2020    Background diabetic retinopathy(362.01) 2008    (Mild)    Balance problem     BMI 45.0-49.9, adult ([address] 3/1/2021    Bradycardia 8/19/2020    Buttock wound 8/20/2020    CAD (coronary artery disease)     (with coronary artery bypass graft x4).  Cancer of uterus [facility])     history of, (probable cure)    Chronic diastolic heart failure ([address] 3/1/2021    Chronic kidney disease     Chronic low back pain     Controlled type 2 diabetes mellitus with chronic 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 dialysis, with long-term current use of insulin ([address]     CVA (cerebral vascular accident) ([address]     Decompensated heart failure ([address] 12/4/2020    Decubitus ulcer of trochanteric region of right hip, stage 2 ([address] 8/23/2020    Dementia ([address]     (moderate)    Dry eye syndrome     End stage renal disease on dialysis ([address] 3/1/2021    GERD (gastroesophageal reflux disease)     Glaucoma suspect 2005    Gout     Hemodialysis patient ([address] 3/1/2021    Homonymous hemianopsia 2008    (Right)    Hyperkalemia 8/19/2020    Hyperlipidemia     Hypertension     Infestation by bed bug 8/8/2020    Infestation by maggots 8/8/2020    Keratitis     (secondary to dry eye syndrome).  Lymphedema of both lower extremities 8/8/2020    Morbid obesity ([address] 3/1/2021    MRSA bacteremia 12/18/2020    Obesity     PAD (peripheral artery disease) ([facility])     Peripheral polyneuropathy     (diabetic)    Pressure injury of right heel, unstageable ([address] 8/18/2020    Pseudophakos     (Right Eye: 05-; Left Eye: 04-) - Dr. Alma Bermudez.  Stroke [facility])     (Multiple) MRI of brain 10/2008 shows multiple infarcts involving the temporal and parietal areas.     Trichiasis 2010    (left eye)    Type 2 diabetes mellitus (HCC)     Wounds, multiple 8/20/2020       Allergies   Allergen Reactions    Bactrim [Sulfamethoxazole-Trimethoprim] Hives    Clonidine Derivatives     Adhesive Tape Itching    Amoxicillin-Pot Clavulanate Diarrhea, Nausea Only and Nausea And Vomiting       Social History     Socioeconomic History    Marital status:      Spouse name: Not on file    Number of children: Not on file    Years of education: Not on file    Highest education level: Not on file   Occupational History    Not on file   Tobacco Use    Smoking status: Never Smoker    Smokeless tobacco: Never Used    Tobacco comment: never smoker eclamb rrt 7/20/17   Substance and Sexual Activity    Alcohol use: No    Drug use: No    Sexual activity: Not on file   Other Topics Concern    Not on file   Social History Narrative    Not on file     Social Determinants of Health     Financial Resource Strain:     Difficulty of Paying Living Expenses: Not on file   Food Insecurity:     Worried About Running Out of Food in the Last Year: Not on file    Rosanne of Food in the Last Year: Not on file   Transportation Needs:     Lack of Transportation (Medical): Not on file    Lack of Transportation (Non-Medical):  Not on file   Physical Activity:     Days of Exercise per Week: Not on file    Minutes of Exercise per Session: Not on file   Stress:     Feeling of Stress : Not on file   Social Connections:     Frequency of Communication with Friends and Family: Not on file    Frequency of Social Gatherings with Friends and Family: Not on file    Attends Protestant Services: Not on file    Active Member of [address] [place] [address] or Organizations: Not on file    Attends Club or Organization Meetings: Not on file    Marital Status: Not on file   Intimate Partner Violence:     Fear of Current or Ex-Partner: Not on file    Emotionally Abused: Not on file    Physically Abused: Not on file    Sexually Abused: Not on file   Housing Stability:     Unable to Pay for Housing in the Last Year: Not on file    Number of [place] in the Last Year: Not on file    Unstable Housing in the Last Year: Not on file       Family History:        Family History   Problem Relation Age of Onset    Diabetes Mother     Cancer Mother     High Blood Pressure Mother     Stroke Mother     Kidney Disease Mother     Uterine Cancer Mother     Diabetes Father     Heart Disease Father     Glaucoma Father    Renee Ruby Emphysema Father     Coronary Art Dis Other         All 4 siblings.  Stroke Other         1 sibling.  Lung Cancer Other         1 sibling - cause of death lung cancer.  Mental Retardation Other        Outpatient Medications:     Medications Prior to Admission: ciprofloxacin (CIPRO) 250 MG tablet,   midodrine (PROAMATINE) 10 MG tablet, Take 10 mg by mouth three times a week tu-th-sa  sodium chloride (OCEAN) 0.65 % nasal spray, 2 sprays by Nasal route 2 times daily  lidocaine-prilocaine (EMLA) 2.5-2.5 % cream, Apply topically three times a week Apply topically as needed- Tu-Th-Sa  amLODIPine (NORVASC) 10 MG tablet, Take 1 tablet by mouth daily  atorvastatin (LIPITOR) 40 MG tablet, TAKE 1 TABLET BY MOUTH EVERYDAY  Cholecalciferol 125 MCG (5000 UT) CHEW, Take 1 tablet by mouth daily  citalopram (CELEXA) 20 MG tablet, Take 1 tablet by mouth daily  carvedilol (COREG) 12.5 MG tablet, Take 1 tablet by mouth 2 times daily (with meals)  hydrALAZINE (APRESOLINE) 100 MG tablet, Take 1 tablet by mouth 3 times daily  insulin glargine (LANTUS) 100 UNIT/ML injection vial, Inject 10 Units into the skin nightly  insulin lispro (HUMALOG) 100 UNIT/ML injection vial, Use 4 times a day per sliding scale  nystatin (MYCOSTATIN) 647213 UNIT/GM cream, Apply topically 2 times daily.  (Patient not taking: Reported on 7/30/2021)  oxybutynin (DITROPAN) 5 MG tablet, TAKE 1 TABLET BY MOUTH TWICE DAILY  clopidogrel (PLAVIX) 75 MG tablet, Take 1 tablet by mouth daily  pantoprazole (PROTONIX) 40 MG tablet, Take 1 tablet by mouth 2 times daily (before meals)  rivastigmine (EXELON) 9.5 MG/24HR, APPLY 1 NEW PATCH EVERY 24 HOURS  senna (SENOKOT) 8.6 MG tablet, Take 1 tablet by mouth 2 times daily (Patient taking differently: Take 1 tablet by mouth as needed )  OXYGEN, Oxgen at 2 liters per nasal cannula  loratadine (CLARITIN) 10 MG tablet, TAKE 1 TABLET(10 MG) BY MOUTH DAILY  Lancets MISC, Checks blood sugar ac and HS  blood glucose monitor strips, Test 3  times a day & as needed for symptoms of irregular blood glucose. Dispense sufficient amount for indicated testing frequency plus additional to accommodate PRN testing needs. Alcohol Swabs (ALCOHOL PREP) PADS, Checks blood sugar 3 times a day  Misc. Devices MISC, Standard walker with wheels  Diagnosis dementia, CHF  aspirin 81 MG tablet, Take 1 tablet by mouth daily  Diabetic Shoe MISC, by Does not apply route  Compression Stockings MISC, by Does not apply route 20-30 mm Hg bilateral knee high    Current Medications:     Scheduled Meds:    aspirin  81 mg Oral Daily    [Held by provider] amLODIPine  10 mg Oral Daily    Calcium Carb-Cholecalciferol  1 tablet Oral Daily    citalopram  20 mg Oral Daily    [Held by provider] carvedilol  12.5 mg Oral BID     hydrALAZINE  100 mg Oral TID    insulin glargine  10 Units SubCUTAneous Nightly    oxybutynin  5 mg Oral BID    clopidogrel  75 mg Oral Daily    pantoprazole  40 mg Oral BID AC    [START ON 11/15/2021] midodrine  10 mg Oral Once per day on Mon Wed Fri    sodium chloride  2 spray Nasal BID    sodium chloride flush  5-40 mL IntraVENous 2 times per day    rosuvastatin  40 mg Oral Nightly    rivastigmine  1 patch TransDERmal Daily    heparin (porcine)  5,000 Units SubCUTAneous 3 times per day    influenza virus vaccine  0.5 mL IntraMUSCular Prior to discharge    insulin lispro  0-6 Units SubCUTAneous TID     insulin lispro  0-3 Units SubCUTAneous Nightly     Continuous Infusions:    sodium chloride      DOPamine 10.5 mcg/kg/min (11/14/21 0536)    dextrose       PRN Meds:  sodium chloride flush, sodium chloride, ondansetron **OR** ondansetron, acetaminophen **OR** acetaminophen, magnesium hydroxide, nitroGLYCERIN, perflutren lipid microspheres, diphenhydrAMINE, glucose, dextrose, glucagon (rDNA), dextrose    Review of Systems:     Constitutional: No fever, no chills, sleepy, + weakness.   HEENT:  + headache, otalgia, itchy eyes, nasal discharge or sore throat. Cardiac:  No chest pain, dyspnea, orthopnea or PND. Chest:  No cough, phlegm or wheezing. Abdomen:  No abdominal pain, nausea or vomiting. Neuro:  No focal weakness, abnormal movements orseizure like activity. Skin:   No rashes, no itching. :   No hematuria, no pyuria, no dysuria, no flank pain. Extremities:  + swelling, no joint pains. ROS was otherwise negative except as mentioned in the [address] Input/Output:       I/O last 3 completed shifts: In: 240 [P.O.:240]  Out: -     Vital Signs:   Temperature:  Temp: 97.6 °F (36.4 °C)  TMax:   Temp (24hrs), Av.3 °F (36.3 °C), Min:95.5 °F (35.3 °C), Max:98.1 °F (36.7 °C)    Respirations:  Resp: 16  Pulse:   Pulse: (!) 48  BP:    BP: (!) 98/54  BP Range: Systolic (44FCT), SXS:816 , Min:96 , PETER:929       Diastolic (12LTZ), VDT:92, Min:38, Max:93      Physical Examination:     General:  AAO x 3, speaking in full sentences, sleepy, poor historian. HEENT: Atraumatic, normocephalic, no throat congestion, moist mucosa. Neck:   No JVD, no thyromegaly, no lymphadenopathy. Chest:             Decreased bilateral vesicular breath sounds, no rales or wheezes. Cardiac:  S1 S2 RR, no murmurs, gallops or rubs, JVP not raised. Abdomen: Soft, non-tender, no masses or organomegaly, BS audible. :   No suprapubic or flank tenderness. Neuro:  AAO x 3, No FND. SKIN:  Dry, No rashes, good skin turgor. Extremities:  + edema, palpable peripheral pulses, no calf tenderness.     Labs:       Recent Labs     21  0715 21  0345   WBC 4.8 6.2   RBC 4.07 4.09   HGB 12.4 12.6   HCT 39.0 39.1   MCV 95.8 95.6   MCH 30.5 30.8   MCHC 31.8 32.2   RDW 15.9* 15.8*   PLT See Reflexed IPF Result See Reflexed IPF Result   MPV NOT REPORTED NOT REPORTED      BMP:   Recent Labs     21  0715 21  2100 21  0345   * 124* 119*   K 4.2  --  4.4   CL 92*  --  86*   CO2 23  --  20   BUN 47*  --  53*   CREATININE 2.88*  --  3.55* GLUCOSE 114*  --  142*   CALCIUM 9.6  --  9.3      Phosphorus:   No results for input(s): PHOS in the last 72 hours. Magnesium:    Recent Labs     11/14/21  0345   MG 2.1     Albumin:    Recent Labs     11/14/21  0345   LABALBU 3.4*     SPEP:  Lab Results   Component Value Date    PROT 7.7 11/14/2021    ALBCAL 1.8 08/13/2020    ALBPCT 35 08/13/2020    LABALPH 0.2 08/13/2020    LABALPH 0.7 08/13/2020    A1PCT 5 08/13/2020    A2PCT 15 08/13/2020    LABBETA 0.9 08/13/2020    BETAPCT 17 08/13/2020    GAMGLOB 1.4 08/13/2020    GGPCT 28 08/13/2020    PATH ELECTRONICALLY SIGNED. Amber Coyle M.D. 08/13/2020    PATH ELECTRONICALLY SIGNED.  Amber Coyle M.D. 08/13/2020     UPEP:   No results found for: LABPE  C3:     Lab Results   Component Value Date    C3 114 08/13/2020     C4:     Lab Results   Component Value Date    C4 39 08/13/2020     MPO ANCA:     Lab Results   Component Value Date    MPO 14 08/13/2020     PR3 ANCA:     Lab Results   Component Value Date    PR3 8 08/13/2020     Anti-GBM:   No results found for: GBMABIGG  Hep BsAg:         Lab Results   Component Value Date    HEPBSAG NONREACTIVE 01/08/2021     Hep C AB:          Lab Results   Component Value Date    HEPCAB NONREACTIVE 01/08/2021       Urinalysis/Chemistries:      Lab Results   Component Value Date    NITRU NEGATIVE 01/07/2021    COLORU YELLOW 01/07/2021    PHUR 5.0 01/07/2021    WBCUA 2 TO 5 01/07/2021    RBCUA 10 TO 20 01/07/2021    MUCUS NOT REPORTED 01/07/2021    TRICHOMONAS NOT REPORTED 01/07/2021    YEAST NOT REPORTED 01/07/2021    BACTERIA NOT REPORTED 01/07/2021    SPECGRAV 1.015 01/07/2021    LEUKOCYTESUR TRACE 01/07/2021    UROBILINOGEN Normal 01/07/2021    BILIRUBINUR NEGATIVE 01/07/2021    GLUCOSEU NEGATIVE 01/07/2021    KETUA NEGATIVE 01/07/2021    AMORPHOUS NOT REPORTED 01/07/2021     Urine Sodium:     Lab Results   Component Value Date    LUKE <20 01/07/2021     Urine Potassium:  No results found for: KUR  Urine Chloride: No results found for: CLUR  Urine Osmolarity:   Lab Results   Component Value Date    OSMOU 348 01/07/2021     Urine Protein:   No results found for: TPU  Urine Creatinine:     Lab Results   Component Value Date    LABCREA 133.5 01/07/2021     UPC:     Urine Eosinophils:  No components found for: UEOS    Radiology:     CXR:   Impression   CHF findings with cardiomegaly, vascular congestion bibasilar airspace   disease and pleural effusions. Assessment:     1. ESRD  2. Bradycardia  3. Hyponatremia   4. Plan:   1. Dialysis today  2. Fluid restriction of 1500ml  3. Sodium checks every 8 hours. 3.    Nutrition   Please ensure that patient is on a renal diet/TF. Avoid nephrotoxic drugs/contrast exposure. Thank you for the consultation. Please do not hesitate to contact us for any further questions/concerns. We will continue to follow along with you.

## 2021-11-14 NOTE — PROGRESS NOTES
[facility]  Office: [address], DO, Dave Angela, DO, Rylie Duane, DO, Luis Meza Blood, DO, Puja Weiss MD, Ba Velázquez MD, Jose Christiansen MD, Ava Ng MD, Michelle Saravia MD, Fei Ferrer MD, Paul Hall MD, Juli Barrios, DO, Dafne Sanchez, DO, Nina Ocasio MD,  Madison Kaufman, DO, Ronni Wilson MD, Raquel Guerrero MD, Chasity Tavarez MD, Al Dumont MD, Lexie Anaya MD, Darlene Thomas MD, Wandy Flores MD, Calli Carrasquillo [facility], [facility] Adelina, CNP, Landon Long, CNP, Tanda Lesches, CNS, Ja Underwood, CNP, Marina Candelaria, CNP, Rebecca Barth, CNP, Eli Smith, CNP, Delbert Aase, CNP, Portia Reynolds PA-C, Kinsey Mcgregor, [facility], Jaspal Mohamud, CNP, Kassandra Khoury, CNP, Vianey Rodrigues, CNP, Denny Alvarez, CNP, Jessica Fuentes, [facility], Queen Jimmynce, [facility], Peggy Frey, [address] [place] [address]    Progress Note    11/14/2021    4:49 PM    Name:   Marlo Severe  MRN:     [id number]     Acct:      [de-identified]   Room:   [address] [place] [address] Day:  1  Admit Date:  11/13/2021  5:32 PM    PCP:   Amber Mcrae DO  Code Status:  Full Code    Subjective:     C/C low HR  Interval History Status: not changed. She is evaluated today. Patient moving dopamine drip. Heart rate ranging between 40 and 50. Denies any complaints currently. Brief History: This is a 67-year-old female who presents to our hospital with complaints of a headache. Patient found to be bradycardic during dialysis. Heart rate was noted to ranging between 30 and 40. Patient was admitted and started on dopamine drip after EKG showed junctional rhythm.   Patient eval by cardiology, currently planning for pacemaker    Review of Systems:     Constitutional:  negative for chills, fevers, sweats admits to headache  Respiratory:  negative for cough, dyspnea on exertion, shortness of breath, wheezing  Cardiovascular:  negative for chest pain, chest pressure/discomfort, lower extremity edema, admits to palpitations  Gastrointestinal:  negative for abdominal pain, constipation, diarrhea, nausea, vomiting  Neurological:  negative for dizziness, headache    Medications: Allergies:     Allergies   Allergen Reactions    Bactrim [Sulfamethoxazole-Trimethoprim] Hives    Clonidine Derivatives     Adhesive Tape Itching    Amoxicillin-Pot Clavulanate Diarrhea, Nausea Only and Nausea And Vomiting       Current Meds:   Scheduled Meds:    aspirin  81 mg Oral Daily    [Held by provider] amLODIPine  10 mg Oral Daily    Calcium Carb-Cholecalciferol  1 tablet Oral Daily    citalopram  20 mg Oral Daily    [Held by provider] carvedilol  12.5 mg Oral BID     hydrALAZINE  100 mg Oral TID    insulin glargine  10 Units SubCUTAneous Nightly    oxybutynin  5 mg Oral BID    clopidogrel  75 mg Oral Daily    pantoprazole  40 mg Oral BID AC    [START ON 11/15/2021] midodrine  10 mg Oral Once per day on Mon Wed Fri    sodium chloride  2 spray Nasal BID    sodium chloride flush  5-40 mL IntraVENous 2 times per day    rosuvastatin  40 mg Oral Nightly    rivastigmine  1 patch TransDERmal Daily    heparin (porcine)  5,000 Units SubCUTAneous 3 times per day    influenza virus vaccine  0.5 mL IntraMUSCular Prior to discharge    insulin lispro  0-6 Units SubCUTAneous TID     insulin lispro  0-3 Units SubCUTAneous Nightly     Continuous Infusions:    sodium chloride      DOPamine 10.5 mcg/kg/min (11/14/21 1306)    dextrose       PRN Meds: heparin (porcine), heparin (porcine), sodium chloride flush, sodium chloride, ondansetron **OR** ondansetron, acetaminophen **OR** acetaminophen, magnesium hydroxide, nitroGLYCERIN, perflutren lipid microspheres, diphenhydrAMINE, glucose, dextrose, glucagon (rDNA), dextrose    Data:     Past Medical History:   has a past medical history of Acute anemia, Acute cystitis, Acute kidney injury ([facility] Acute kidney injury superimposed on CKD ([address] Acute on chronic diastolic heart failure ([facility]), Acute renal failure (ARF) ([facility]), Anasarca, Anxiety and depression, AV block, 1st degree, Background diabetic retinopathy(362.01), Balance problem, BMI 45.0-49.9, adult ([facility]), Bradycardia, Buttock wound, CAD (coronary artery disease), Cancer of uterus ([facility]), Chronic diastolic heart failure ([facility]), Chronic kidney disease, Chronic low back pain, Controlled type 2 diabetes mellitus with chronic 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 dialysis, with long-term current use of insulin ([address] CVA (cerebral vascular accident) ([address] Decompensated heart failure ([address] Decubitus ulcer of trochanteric region of right hip, stage 2 ([address] Dementia ([address] Dry eye syndrome, End stage renal disease on dialysis ([address] GERD (gastroesophageal reflux disease), Glaucoma suspect, Gout, Hemodialysis patient ([address] Homonymous hemianopsia, Hyperkalemia, Hyperlipidemia, Hypertension, Infestation by bed bug, Infestation by maggots, Keratitis, Lymphedema of both lower extremities, Morbid obesity ([address] MRSA bacteremia, Obesity, PAD (peripheral artery disease) ([address] Peripheral polyneuropathy, Pressure injury of right heel, unstageable ([address] Pseudophakos, Stroke ([address] Trichiasis, Type 2 diabetes mellitus ([address] and Wounds, multiple. Social History:   reports that she has never smoked. She has never used smokeless tobacco. She reports that she does not drink alcohol and does not use drugs. Family History:   Family History   Problem Relation Age of Onset    Diabetes Mother     Cancer Mother     High Blood Pressure Mother    [place] Stroke Mother     Kidney Disease Mother     Uterine Cancer Mother     Diabetes Father     Heart Disease Father     Glaucoma Father     Emphysema Father     Coronary Art Dis Other         All 4 siblings.  Stroke Other         1 sibling.  Lung Cancer Other         1 sibling - cause of death lung cancer.     Mental Retardation Other Vitals:  BP (!) 113/35   Pulse (!) 49   Temp 97.3 °F (36.3 °C) (Oral)   Resp 13   Ht 5' 4\" (1.626 m)   Wt 200 lb 9.9 oz (91 kg)   SpO2 98%   BMI 34.44 kg/m²   Temp (24hrs), Av.6 °F (36.4 °C), Min:97 °F (36.1 °C), Max:98.1 °F (36.7 °C)    Recent Labs     21  1451 21  1756 21   POCGLU 108* 109* 130*       I/O (24Hr): Intake/Output Summary (Last 24 hours) at 2021 1649  Last data filed at 2021 1600  Gross per 24 hour   Intake 1212 ml   Output 2470 ml   Net -1258 ml       Labs:  Hematology:  Recent Labs     21  0715 21  0345   WBC 4.8 6.2   RBC 4.07 4.09   HGB 12.4 12.6   HCT 39.0 39.1   MCV 95.8 95.6   MCH 30.5 30.8   MCHC 31.8 32.2   RDW 15.9* 15.8*   PLT See Reflexed IPF Result See Reflexed IPF Result   MPV NOT REPORTED NOT REPORTED   INR 1.3  --      Chemistry:  Recent Labs     21  0715 21  0715 21  1802 21  2100 21  0345 21  1355   *   < >  --  124* 119* 124*   K 4.2  --   --   --  4.4  --    CL 92*  --   --   --  86*  --    CO2 23  --   --   --  20  --    GLUCOSE 114*  --   --   --  142*  --    BUN 47*  --   --   --  53*  --    CREATININE 2.88*  --   --   --  3.55*  --    MG  --   --   --   --  2.1  --    ANIONGAP 13  --   --   --  13  --    LABGLOM 16*  --   --   --  13*  --    GFRAA 20*  --   --   --  15*  --    CALCIUM 9.6  --   --   --  9.3  --    PROBNP  --   --   --   --  35,412*  --    TROPHS 72*  --  68* 70*  --   --     < > = values in this interval not displayed.      Recent Labs     21  1451 21  1756 21  0345   PROT  --   --   --  7.7   LABALBU  --   --   --  3.4*   AST  --   --   --  29   ALT  --   --   --  18   ALKPHOS  --   --   --  303*   BILITOT  --   --   --  0.60   CHOL  --   --   --  87   HDL  --   --   --  48   LDLCHOLESTEROL  --   --   --  29   CHOLHDLRATIO  --   --   --  1.8   TRIG  --   --   --  49   VLDL  --   --   --  NOT REPORTED   JORGEGLU 108* 109* 130*  --      ABG:  Lab Results   Component Value Date    POCPH 7.284 12/13/2020    POCPCO2 54.5 12/13/2020    POCPO2 71.6 12/13/2020    POCHCO3 25.8 12/13/2020    NBEA 1 12/13/2020    PBEA NOT REPORTED 12/13/2020    NMX2KTI 28 12/13/2020    AJNR4AHM 92 12/13/2020    FIO2 2.0 12/13/2020     Lab Results   Component Value Date/Time    SPECIAL NOT REPORTED 01/07/2021 09:12 AM     Lab Results   Component Value Date/Time    CULTURE NO GROWTH 01/07/2021 09:12 AM       Radiology:  CT ABDOMEN PELVIS WO CONTRAST Additional Contrast? None    Result Date: 11/13/2021  No definite acute process is seen. 1. Cardiomegaly with partially visualized pleural effusions with consolidative changes involving the lower lobes. Superimposed pneumonia cannot be excluded. 2. Cirrhotic liver with large amount of ascites. 3. Diffuse body wall anasarca. 4. Left colon diverticulosis. CT HEAD WO CONTRAST    Result Date: 11/13/2021  No acute intracranial abnormality. Old left PCA distribution infarct with associated encephalomalacia unchanged from previous. Mild diffuse cerebral atrophy. XR CHEST PORTABLE    Result Date: 11/13/2021  CHF findings with cardiomegaly, vascular congestion bibasilar airspace disease and pleural effusions.        Physical Examination:        General appearance:  alert, cooperative and no distress, chronically ill-appearing female  Mental Status:  oriented to person, place and time and normal affect  Lungs:  clear to auscultation bilaterally, normal effort  Heart:  regular rate and rhythm, no murmur  Abdomen:  soft, nontender, nondistended, normal bowel sounds, no masses, hepatomegaly, splenomegaly  Extremities:  no edema, redness, tenderness in the calves  Skin:  no gross lesions, rashes, induration    Assessment:        Hospital Problems           Last Modified POA    * (Principal) Symptomatic bradycardia 11/14/2021 Yes    Hypertension 11/13/2021 Yes    Hyperlipidemia 11/13/2021 Yes    CAD (coronary artery disease) 11/13/2021 Yes    Overview Signed 2/27/2014 10:22 AM by Don Mac MD     (with coronary artery bypass graft x4). Controlled type 2 diabetes mellitus with chronic 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 dialysis, with long-term current use of insulin ([facility] 11/13/2021 Yes    Acute on chronic diastolic heart failure ([facility] 11/13/2021 Yes    Lymphedema of both lower extremities 11/13/2021 Yes    Anasarca 11/13/2021 Yes    End stage renal disease on dialysis ([facility] 11/13/2021 Yes    Class 1 obesity with body mass index (BMI) of 34.0 to 34.9 in adult 11/13/2021 Yes    Hyponatremia 11/13/2021 Yes    Nonintractable headache 11/13/2021 Yes          Plan:        Symptomatic bradycardia: HR ranging between 30-40. Started on Dopamine. Cardiology consulted, plan for PPM placement. Hold BB  ESRD on HD: Volume management per Nephrology. Hyponatremia: due to increased TBW. Plan for HD today. Continue to check sodium Q8 hours.  Up to 124 from 119  HLD: continue statin    Salo Cunningham,   11/14/2021  4:49 PM

## 2021-11-14 NOTE — PROGRESS NOTES
Dialysis Post Treatment Note  Vitals:    11/14/21 1500   BP: (!) 133/47   Pulse: (!) 46   Resp: 18   Temp: 97.4 °F (36.3 °C)   SpO2: 97%     Pre-Weight = 93.1kg  Post-weight = Weight: 200 lb 9.9 oz (91 kg)  Total Liters Processed = Total Liters Processed (l/min): 47.9 l/min (UF tx only)  Rinseback Volume (mL) = Rinseback Volume (ml): 270 ml  Net Removal (mL) = NET Removed (ml): 2100 ml  Patient's dry weight=Unknown but pt believes her EDW is 197 lbs  Type of access used=RIJ perm cath  Length of treatment=120 minutes    Pt tolerated 2hr UF tx well without complaints.

## 2021-11-14 NOTE — CONSULTS
Attestation signed by      Attending Physician Statement:    I have discussed the care of  Cynthia Yap , including pertinent history and exam findings, with the Cardiology fellow/resident. I have seen and examined the patient and the key elements of all parts of the encounter have been performed by me. I agree with the assessment, plan and orders as documented by the fellow/resident, after I modified exam findings and plan of treatments, and the final version is my approved version of the assessment. Additional Comments: Junctional bradycardia with HR into 30s. Previously was evaluated by EP and did not think PPM was indicated at that time. Currently on dopamine with HR 50. Will need PPM now. BB held. Does have indication for BB due to CAD/CABG and ICM. On HD for ESRD. Hyponatremia. Volume management per nephrology. Fluid restriction. Velma Vázquez, [address] [place] [address] [facility]   Consultation Note               Today's Date: 11/14/2021  Patient Name: Cynthia Yap  Date of admission: 11/13/2021  5:32 PM  Patient's age: 72 y.o., 1950  Admission Dx: Bradycardia [R00.1]    Reason for Consult:  bradycardia    Requesting Physician: Shilpa Carter DO    CHIEF COMPLAINT:  No chief complaint on file. History Obtained From:  Patient and EMR    HISTORY OF PRESENT ILLNESS:      Patient with hx of CAD s/p CABG, ESRD was sent to the ER yesterday after she was noted to be bradycardic in 30's  on arrival to dialysis.  In ER she was administered half mg of atropine with transient improvement in rate and subsequently patient was started on dopamine gtt.       Past Medical History:   has a past medical history of Acute anemia, Acute cystitis, Acute kidney injury ([address] Acute kidney injury superimposed on CKD ([address] Acute on chronic diastolic heart failure ([address] Acute renal failure (ARF) ([address] Anasarca, Anxiety and depression, AV block, 1st degree, Background diabetic retinopathy(362.01), Balance problem, BMI 45.0-49.9, adult (HCC), Bradycardia, Buttock wound, CAD (coronary artery disease), Cancer of uterus ([address] Chronic diastolic heart failure (HCC), Chronic kidney disease, Chronic low back pain, Controlled type 2 diabetes mellitus with chronic 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 dialysis, with long-term current use of insulin ([address] CVA (cerebral vascular accident) ([address] Decompensated heart failure ([address] Decubitus ulcer of trochanteric region of right hip, stage 2 ([address] Dementia ([address] Dry eye syndrome, End stage renal disease on dialysis ([address] GERD (gastroesophageal reflux disease), Glaucoma suspect, Gout, Hemodialysis patient ([address] Homonymous hemianopsia, Hyperkalemia, Hyperlipidemia, Hypertension, Infestation by bed bug, Infestation by maggots, Keratitis, Lymphedema of both lower extremities, Morbid obesity ([address] MRSA bacteremia, Obesity, PAD (peripheral artery disease) ([address] Peripheral polyneuropathy, Pressure injury of right heel, unstageable ([address] Pseudophakos, Stroke ([address] Trichiasis, Type 2 diabetes mellitus ([address] and Wounds, multiple. Past Surgical History:   has a past surgical history that includes Gastric bypass surgery; Cataract removal with implant (2012); Cataract removal with implant (2012); Coronary artery bypass graft (12-);  section; Hysterectomy; Cholecystectomy; Tonsillectomy and adenoidectomy; Eye surgery (Left); Upper gastrointestinal endoscopy (2020); and IR TUNNELED CVC PLACE WO SQ PORT/PUMP > 5 YEARS (2021). Home Medications:    Prior to Admission medications    Medication Sig Start Date End Date Taking?  Authorizing Provider   ciprofloxacin (CIPRO) 250 MG tablet  21   Historical Provider, MD   midodrine (PROAMATINE) 10 MG tablet Take 10 mg by mouth three times a week     Historical Provider, MD   sodium chloride (OCEAN) 0.65 % nasal spray 2 sprays by Nasal route 2 times daily    Historical Provider, MD   lidocaine-prilocaine (EMLA) 2.5-2.5 % cream Apply topically three times a week Apply topically as needed- Tu-Th-Sa    Historical Provider, MD   amLODIPine (NORVASC) 10 MG tablet Take 1 tablet by mouth daily 3/10/21   Stevie Mckinney, DO   atorvastatin (LIPITOR) 40 MG tablet TAKE 1 TABLET BY MOUTH EVERYDAY 3/10/21   Odette Anders, DO   Cholecalciferol 125 MCG (5000 UT) CHEW Take 1 tablet by mouth daily 3/10/21   Maranda Mckinney, DO   citalopram (CELEXA) 20 MG tablet Take 1 tablet by mouth daily 3/10/21   Eugene Mckinney, DO   carvedilol (COREG) 12.5 MG tablet Take 1 tablet by mouth 2 times daily (with meals) 3/10/21   Stevie Mckinney, DO   hydrALAZINE (APRESOLINE) 100 MG tablet Take 1 tablet by mouth 3 times daily 3/10/21   Stevie Mckinney, DO   insulin glargine (LANTUS) 100 UNIT/ML injection vial Inject 10 Units into the skin nightly 3/10/21   Stevie Mckinney, DO   insulin lispro (HUMALOG) 100 UNIT/ML injection vial Use 4 times a day per sliding scale 3/10/21   Stevie Mckinney, DO   nystatin (MYCOSTATIN) 830207 UNIT/GM cream Apply topically 2 times daily.   Patient not taking: Reported on 7/30/2021 3/10/21   Maranda Mckinney DO   oxybutynin (DITROPAN) 5 MG tablet TAKE 1 TABLET BY MOUTH TWICE DAILY 3/10/21   Maranda Mckinney DO   clopidogrel (PLAVIX) 75 MG tablet Take 1 tablet by mouth daily 3/10/21   Stevie Mckinney, DO   pantoprazole (PROTONIX) 40 MG tablet Take 1 tablet by mouth 2 times daily (before meals) 3/10/21   Stevie Mckinney, DO   rivastigmine (EXELON) 9.5 MG/24HR APPLY 1 NEW PATCH EVERY 24 HOURS 3/10/21   Stevie R DO dionne Mckinney (SENOKOT) 8.6 MG tablet Take 1 tablet by mouth 2 times daily  Patient taking differently: Take 1 tablet by mouth as needed  11/25/20 11/25/21  ERINN Fermin   OXYGEN Oxgen at 2 liters per nasal cannula 11/5/20   ERINN Fermin   loratadine (CLARITIN) 10 MG tablet TAKE 1 TABLET(10 MG) BY MOUTH DAILY 11/5/20   ERINN Barnhart   Lancets MISC Checks blood sugar ac and HS 11/4/20   Fabián Barnhart   blood glucose monitor strips Test 3  times a day & as needed for symptoms of irregular blood glucose. Dispense sufficient amount for indicated testing frequency plus additional to accommodate PRN testing needs. 11/4/20   ERINN Barnhart   Alcohol Swabs (ALCOHOL PREP) PADS Checks blood sugar 3 times a day 11/4/20   Ej Barnhart [address]  [facility] [place]    Diagnosis dementia, CHF 10/29/20   Malini Smith, APRN - CNP   aspirin 81 MG tablet Take 1 tablet by mouth daily 1/3/20   ERINN Barnhart   Diabetic Shoe MISC by Does not apply route 9/26/19   ERINN Barnhart   Compression Stockings MISC by Does not apply route 20-30 mm Hg bilateral knee high 9/26/19   ERINN Barnhart      Current Facility-Administered Medications: aspirin EC tablet 81 mg, 81 mg, Oral, Daily  [Held by provider] amLODIPine (NORVASC) tablet 10 mg, 10 mg, Oral, Daily  Calcium Carb-Cholecalciferol 250-125 MG-UNIT TABS 250 mg, 1 tablet, Oral, Daily  citalopram (CELEXA) tablet 20 mg, 20 mg, Oral, Daily  [Held by provider] carvedilol (COREG) tablet 12.5 mg, 12.5 mg, Oral, BID WC  hydrALAZINE (APRESOLINE) tablet 100 mg, 100 mg, Oral, TID  insulin glargine (LANTUS) injection vial 10 Units, 10 Units, SubCUTAneous, Nightly  oxybutynin (DITROPAN) tablet 5 mg, 5 mg, Oral, BID  clopidogrel (PLAVIX) tablet 75 mg, 75 mg, Oral, Daily  pantoprazole (PROTONIX) tablet 40 mg, 40 mg, Oral, BID AC  [START ON 11/15/2021] midodrine (PROAMATINE) tablet 10 mg, 10 mg, Oral, Once per day on Mon Wed Fri  sodium chloride (OCEAN) 0.65 % nasal spray 2 spray, 2 spray, Nasal, BID  sodium chloride flush 0.9 % injection 5-40 mL, 5-40 mL, IntraVENous, 2 times per day  sodium chloride flush 0.9 % injection 10 mL, 10 mL, IntraVENous, PRN  0.9 % sodium chloride infusion, 25 mL, IntraVENous, PRN  ondansetron (ZOFRAN-ODT) disintegrating tablet 4 mg, 4 mg, Oral, Q8H PRN **OR** ondansetron (ZOFRAN) injection 4 mg, 4 mg, IntraVENous, Q6H PRN  acetaminophen (TYLENOL) tablet 650 mg, 650 mg, Oral, Q6H PRN **OR** acetaminophen (TYLENOL) suppository 650 mg, 650 mg, Rectal, Q6H PRN  magnesium hydroxide (MILK OF MAGNESIA) 400 MG/5ML suspension 30 mL, 30 mL, Oral, Daily PRN  nitroGLYCERIN (NITROSTAT) SL tablet 0.4 mg, 0.4 mg, SubLINGual, Q5 Min PRN  perflutren lipid microspheres (DEFINITY) injection 1.65 mg, 1.5 mL, IntraVENous, ONCE PRN  rosuvastatin (CRESTOR) tablet 40 mg, 40 mg, Oral, Nightly  rivastigmine (EXELON) 9.5 MG/24HR 1 patch, 1 patch, TransDERmal, Daily  heparin (porcine) injection 5,000 Units, 5,000 Units, SubCUTAneous, 3 times per day  influenza quadrivalent split vaccine (FLUZONE;FLUARIX;FLULAVAL;AFLURIA) injection 0.5 mL, 0.5 mL, IntraMUSCular, Prior to discharge  DOPamine (INTROPIN) 400 mg in dextrose 5 % 250 mL infusion, 2-20 mcg/kg/min, IntraVENous, Continuous  diphenhydrAMINE (BENADRYL) tablet 25 mg, 25 mg, Oral, Q6H PRN  insulin lispro (HUMALOG) injection vial 0-6 Units, 0-6 Units, SubCUTAneous, TID WC  insulin lispro (HUMALOG) injection vial 0-3 Units, 0-3 Units, SubCUTAneous, Nightly  glucose (GLUTOSE) 40 % oral gel 15 g, 15 g, Oral, PRN  dextrose 50 % IV solution, 12.5 g, IntraVENous, PRN  glucagon (rDNA) injection 1 mg, 1 mg, IntraMUSCular, PRN  dextrose 5 % solution, 100 mL/hr, IntraVENous, PRN      Allergies:  Bactrim [sulfamethoxazole-trimethoprim], Clonidine derivatives, Adhesive tape, and Amoxicillin-pot clavulanate      Social History:   reports that she has never smoked. She has never used smokeless tobacco. She reports that she does not drink alcohol and does not use drugs.        Family History: family history includes Cancer in her mother; Coronary Art Dis in an other family member; Diabetes in her father and mother; Emphysema in her father; Glaucoma in her father; Heart Disease in her father; High Blood Pressure in her mother; Kidney Disease in her mother; Raya Kasal in an other family member; Mental Retardation in an other family member; Stroke in her mother and another family member; Uterine Cancer in her mother. REVIEW OF SYSTEMS:    · Constitutional: there has been no unanticipated weight loss. · Eyes: No visual changes or diplopia. · ENT: No Headaches  · Cardiovascular: see above  · Respiratory: No previous pulmonary problems, No cough  · Gastrointestinal: No abdominal pain. No change in bowel or bladder habits. · Genitourinary: No dysuria, trouble voiding, or hematuria. · Musculoskeletal:  No gait disturbance, No weakness or joint complaints. · Integumentary: No rash or pruritis. · Neurological: No headache, diplopia      PHYSICAL EXAM:      BP (!) 98/54   Pulse (!) 48   Temp 97.6 °F (36.4 °C) (Oral)   Resp 16   Ht 5' 4\" (1.626 m)   Wt 202 lb 13.2 oz (92 kg)   SpO2 99%   BMI 34.81 kg/m²    Constitutional and General Appearance: Alert, no distress  Respiratory:  · No increased work of breathing. · Clear to auscultation bilaterally. No wheeze or crackles. Cardiovascular:  · Normal S1 and S2.   · Jugular venous pulsation Normal  Abdomen:   · Soft  · No tenderness  Extremities:  · No lower extremity edema  Neurologic:  · Alert and oriented. · Moves all extremities well      DATA:    Diagnostics:    Labs:     CBC:   Recent Labs     11/13/21  0715 11/14/21  0345   WBC 4.8 6.2   HGB 12.4 12.6   HCT 39.0 39.1   PLT See Reflexed IPF Result See Reflexed IPF Result     BMP:   Recent Labs     11/13/21  0715 11/13/21  0715 11/13/21  2100 11/14/21  0345   *   < > 124* 119*   K 4.2  --   --  4.4   CO2 23  --   --  20   BUN 47*  --   --  53*   CREATININE 2.88*  --   --  3.55*   LABGLOM 16*  --   --  13*   GLUCOSE 114*  --   --  142*    < > = values in this interval not displayed. BNP: No results for input(s): BNP in the last 72 hours.   PT/INR:   Recent Labs 11/13/21  0715   PROTIME 15.2*   INR 1.3     APTT:  Recent Labs     11/13/21  0715   APTT 37.0*     CARDIAC ENZYMES:  Recent Labs     11/13/21  0715 11/13/21  1802 11/13/21  2100   TROPHS 72* 68* 70*     No results for input(s): CKTOTAL, CKMB, CKMBINDEX, TROPONINI in the last 72 hours. Invalid input(s):  [address]  Recent Labs     11/13/21  0715 11/13/21  1802 11/13/21  2100   TROPONINT NOT REPORTED NOT REPORTED NOT REPORTED     FASTING LIPID PANEL:  Lab Results   Component Value Date    HDL 48 11/14/2021    TRIG 49 11/14/2021     LIVER PROFILE:  Recent Labs     11/14/21  0345   AST 29   ALT 18   LABALBU 3.4*         EKG: Junctional bradycardia    ECHO 12/7/2020  Normal left ventricular diameter. Left ventricular systolic function is mildly reduced with LVEF 45-50%. Abnormal septal wall motion. Left ventricular ejection fraction 50 %. Grade I (mild) left ventricular diastolic dysfunction. Leftward compression of inter-ventricular septum (\"D-sign\") indicating RV  pressure/volume overload. Left atrium is severely dilated. Right atrial dilatation. Right ventricular dilatation with reduced systolic function. Aortic valve is sclerotic but opens well. Trace aortic insufficiency. Mitral annular calcification. Mild tricuspid regurgitation. Estimated right ventricular systolic pressure  is 35 mmHg. Left pleural effusion.             IMPRESSION:    1. Junctional Bradycardia, on coreg at home. Started on dopamine gtt  2. ICM Systolic CHF LVEF 56-68%, Grade I DD  3. H/o CABG, LIMA- LAD, SVG-OM ,SVG-D1, SVG-PDA  4. ESRD  5. DM      RECOMMENDATIONS:  1. Hold AV roslyn blocking agents  2. Will plan patient for temporary pacemaker. 3. Nephrology on board for HD and fluid management. Thank you for allowing us to participate in [facility]. Will follow with you.       Electronically signed on 11/14/21 at 7:06 AM by:    Kit Chan MD, MD   Fellow, [address] [employer]

## 2021-11-14 NOTE — PROGRESS NOTES
After removing ace bandages it is noted that patient does not have any open wounds, removed from IAF. Skin is fragile and extremely dry, lotion/barrier cream applied, but wounds are not present. Elevation of heels off bed to prevent further breakdown.

## 2021-11-15 NOTE — PROGRESS NOTES
Renal Progress Note    Patient :  Nahun Stewart; 72 y.o. MRN# [id number]  Location:  1005/1005-01  Attending:  Lesly Jerez DO  Admit Date:  11/13/2021   Hospital Day: 2      Subjective:     Patient is a 60-year-old female with ESRD 2/2 DM admitted for bradycardia recognized during her usual dialysis treatment on Saturday 11/13. She is on dopamine drip with plan to place PPM. TTE in December of last year shows EF 45-50%, BNP elevated to 35,000 on admission. She continued to be bradycardic overnight with junctional rhythm in the 40s on dopamine drip, MAP in the 70s. Na is improved at 124. She tolerated dialysis well yesterday with 2.1 liters removed. She is diffusely edematous with cirrhosis, ascites, anasarca identified on CTAP. She denies chest pain, SOB, abdominal pain, N/V. There are pleural effusions and pulmonary vascular congestion on CXR, she saturates 99% on 2 L NC.      Outpatient Medications:     Medications Prior to Admission: ciprofloxacin (CIPRO) 250 MG tablet,   midodrine (PROAMATINE) 10 MG tablet, Take 10 mg by mouth three times a week tu-th-sa  sodium chloride (OCEAN) 0.65 % nasal spray, 2 sprays by Nasal route 2 times daily  lidocaine-prilocaine (EMLA) 2.5-2.5 % cream, Apply topically three times a week Apply topically as needed- Tu-Th-Sa  amLODIPine (NORVASC) 10 MG tablet, Take 1 tablet by mouth daily  atorvastatin (LIPITOR) 40 MG tablet, TAKE 1 TABLET BY MOUTH EVERYDAY  Cholecalciferol 125 MCG (5000 UT) CHEW, Take 1 tablet by mouth daily  citalopram (CELEXA) 20 MG tablet, Take 1 tablet by mouth daily  carvedilol (COREG) 12.5 MG tablet, Take 1 tablet by mouth 2 times daily (with meals)  hydrALAZINE (APRESOLINE) 100 MG tablet, Take 1 tablet by mouth 3 times daily  insulin glargine (LANTUS) 100 UNIT/ML injection vial, Inject 10 Units into the skin nightly  insulin lispro (HUMALOG) 100 UNIT/ML injection vial, Use 4 times a day per sliding scale  nystatin (MYCOSTATIN) 412868 UNIT/GM cream, Apply topically 2 times daily. (Patient not taking: Reported on 7/30/2021)  oxybutynin (DITROPAN) 5 MG tablet, TAKE 1 TABLET BY MOUTH TWICE DAILY  clopidogrel (PLAVIX) 75 MG tablet, Take 1 tablet by mouth daily  pantoprazole (PROTONIX) 40 MG tablet, Take 1 tablet by mouth 2 times daily (before meals)  rivastigmine (EXELON) 9.5 MG/24HR, APPLY 1 NEW PATCH EVERY 24 HOURS  senna (SENOKOT) 8.6 MG tablet, Take 1 tablet by mouth 2 times daily (Patient taking differently: Take 1 tablet by mouth as needed )  OXYGEN, Oxgen at 2 liters per nasal cannula  loratadine (CLARITIN) 10 MG tablet, TAKE 1 TABLET(10 MG) BY MOUTH DAILY  Lancets MISC, Checks blood sugar ac and HS  blood glucose monitor strips, Test 3  times a day & as needed for symptoms of irregular blood glucose. Dispense sufficient amount for indicated testing frequency plus additional to accommodate PRN testing needs. Alcohol Swabs (ALCOHOL PREP) PADS, Checks blood sugar 3 times a day  Misc.  Devices MISC, Standard walker with wheels  Diagnosis dementia, CHF  aspirin 81 MG tablet, Take 1 tablet by mouth daily  Diabetic Shoe MISC, by Does not apply route  Compression Stockings MISC, by Does not apply route 20-30 mm Hg bilateral knee high    Current Medications:     Scheduled Meds:    aspirin  81 mg Oral Daily    amLODIPine  10 mg Oral Daily    Calcium Carb-Cholecalciferol  1 tablet Oral Daily    citalopram  20 mg Oral Daily    [Held by provider] carvedilol  12.5 mg Oral BID WC    hydrALAZINE  100 mg Oral TID    insulin glargine  10 Units SubCUTAneous Nightly    oxybutynin  5 mg Oral BID    clopidogrel  75 mg Oral Daily    pantoprazole  40 mg Oral BID AC    midodrine  10 mg Oral Once per day on Mon Wed Fri    sodium chloride  2 spray Nasal BID    sodium chloride flush  5-40 mL IntraVENous 2 times per day    rosuvastatin  40 mg Oral Nightly    rivastigmine  1 patch TransDERmal Daily    heparin (porcine)  5,000 Units SubCUTAneous 3 times per day    influenza virus vaccine  0.5 mL IntraMUSCular Prior to discharge    insulin lispro  0-6 Units SubCUTAneous TID WC    insulin lispro  0-3 Units SubCUTAneous Nightly     Continuous Infusions:    sodium chloride      DOPamine 14 mcg/kg/min (11/15/21 0355)    dextrose       PRN Meds:  heparin (porcine), heparin (porcine), sodium chloride flush, sodium chloride, ondansetron **OR** ondansetron, acetaminophen **OR** acetaminophen, magnesium hydroxide, nitroGLYCERIN, perflutren lipid microspheres, diphenhydrAMINE, glucose, dextrose, glucagon (rDNA), dextrose    Input/Output:       I/O last 3 completed shifts: In: 1488.2 [P.O.:720; I.V.:398.2]  Out: 2470 . Patient Vitals for the past 96 hrs (Last 3 readings):   Weight   11/15/21 0500 204 lb 9.4 oz (92.8 kg)   21 1500 200 lb 9.9 oz (91 kg)   21 1230 205 lb 4 oz (93.1 kg)       Vital Signs:   Temperature:  Temp: 97.8 °F (36.6 °C)  TMax:   Temp (24hrs), Av.5 °F (36.4 °C), Min:97.3 °F (36.3 °C), Max:97.8 °F (36.6 °C)    Respirations:  Resp: 12  Pulse:   Pulse: 51  BP:    BP: (!) 122/58  BP Range: Systolic (28VOY), ZYP:589 , Min:113 , QQJ:164       Diastolic (58LVH), IYP:91, Min:35, Max:87      Physical Examination:     General:  AAO x 3, speaking in full sentences, no accessory muscle use. HEENT: Atraumatic, normocephalic, no throat congestion, moist mucosa. Eyes:   Pupils equal, round and reactive to light, EOMI. Neck:   Supple  Chest:   Bilateral vesicular breath sounds, no rales or wheezes. +ve BIPAP. Cardiac:  S1 S2 RR, no murmurs, gallops or rubs. Abdomen: distended, no masses or organomegaly, BS audible. :   No suprapubic or flank tenderness. Neuro:  AAO x 3, No FND.   SKIN:  Dry, flaky skin on LE bilaterally, ecchymosis on right forearm  Extremities:  2-3+ peripheral edema    Labs:       Recent Labs     21  0715 21  0345   WBC 4.8 6.2   RBC 4.07 4.09   HGB 12.4 12.6   HCT 39.0 39.1   MCV 95.8 95.6   MCH 30.5 30.8   MCHC 31.8 32.2   RDW 15.9* 15.8*   PLT See Reflexed IPF Result See Reflexed IPF Result   MPV NOT REPORTED NOT REPORTED      BMP:   Recent Labs     11/13/21  0715 11/13/21  2100 11/14/21  0345 11/14/21  0345 11/14/21  1355 11/14/21  2011 11/15/21  0457   *   < > 119*   < > 124* 125* 124*   K 4.2  --  4.4  --   --   --   --    CL 92*  --  86*  --   --   --   --    CO2 23  --  20  --   --   --   --    BUN 47*  --  53*  --   --   --   --    CREATININE 2.88*  --  3.55*  --   --   --   --    GLUCOSE 114*  --  142*  --   --   --   --    CALCIUM 9.6  --  9.3  --   --   --   --     < > = values in this interval not displayed. Magnesium:    Recent Labs     11/14/21 0345   MG 2.1     Albumin:    Recent Labs     11/14/21 0345   LABALBU 3.4*     SPEP:  Lab Results   Component Value Date    PROT 7.7 11/14/2021    ALBCAL 1.8 08/13/2020    ALBPCT 35 08/13/2020    LABALPH 0.2 08/13/2020    LABALPH 0.7 08/13/2020    A1PCT 5 08/13/2020    A2PCT 15 08/13/2020    LABBETA 0.9 08/13/2020    BETAPCT 17 08/13/2020    GAMGLOB 1.4 08/13/2020    GGPCT 28 08/13/2020    PATH ELECTRONICALLY SIGNED. Claude Bienenstock, M.D. 08/13/2020    PATH ELECTRONICALLY SIGNED.  Claude Bienenstock, M.D. 08/13/2020     C3:     Lab Results   Component Value Date    C3 114 08/13/2020     C4:     Lab Results   Component Value Date    C4 39 08/13/2020     MPO ANCA:     Lab Results   Component Value Date    MPO 14 08/13/2020     PR3 ANCA:     Lab Results   Component Value Date    PR3 8 08/13/2020     Hep BsAg:         Lab Results   Component Value Date    HEPBSAG NONREACTIVE 01/08/2021     Hep C AB:          Lab Results   Component Value Date    HEPCAB NONREACTIVE 01/08/2021       Urinalysis/Chemistries:      Lab Results   Component Value Date    NITRU NEGATIVE 01/07/2021    COLORU YELLOW 01/07/2021    PHUR 5.0 01/07/2021    WBCUA 2 TO 5 01/07/2021    RBCUA 10 TO 20 01/07/2021    MUCUS NOT REPORTED 01/07/2021    TRICHOMONAS NOT REPORTED 01/07/2021    YEAST NOT REPORTED 01/07/2021    BACTERIA NOT REPORTED 01/07/2021    SPECGRAV 1.015 01/07/2021    LEUKOCYTESUR TRACE 01/07/2021    UROBILINOGEN Normal 01/07/2021    BILIRUBINUR NEGATIVE 01/07/2021    GLUCOSEU NEGATIVE 01/07/2021    KETUA NEGATIVE 01/07/2021    AMORPHOUS NOT REPORTED 01/07/2021     Urine Sodium:     Lab Results   Component Value Date    LUKE <20 01/07/2021     Urine Osmolarity:   Lab Results   Component Value Date    OSMOU 348 01/07/2021      Urine Creatinine:     Lab Results   Component Value Date    LABCREA 133.5 01/07/2021       Radiology:     Reviewed. Assessment:     1. ESRD 2/2 chronic diabetes and HTN, on Hemodialysis. Regular HD days are Tuesday Thursday Saturday at [facility] hemodialysis facility using tunnel catheter under Dr. Pamela Arango. Her dry weight is unknown. Admitted with 92 kg. She is volume overloaded. 2. Anemia of chronic disease  3. Secondary hyperparathyroidism  4. Hypertension  5. Hyponatremia -most likely dilutional  6. Bradycardia - on dopamine drip, cardiology planning to place PPM  7. Cirrhosis and ascites - most likely 2/2 [place]  8. Dementia - on rivastigmine patch, could be contributing to slow heart rhythm  9. CAD - had CABG a few years ago, could also be contributing to bradyarrhythmia      Plan:   1. Will do extra HD today due to fluid overload. Orders were confirmed with  HD nurse. 2. Strict Input and Output, Daily weigh and document in the chart. 3. Low Potassium, Low phosphorus and low salt diet. Fluids to be restricted to 1500ml/day. 4. IV Aranesp/Epogen for anemia of chronic disease with HD weekly. 5. IV Zemplar per protocol for secondary hyperparathyroidism with HD thrice a week. 6. HD again in am as per TTS schedule. 7. BMP in am.   8. Will follow. Nutrition   Please ensure that patient is on a renal diet/TF. Avoid nephrotoxic drugs/contrast exposure. We will continue to follow along with you.      Adalberto Nelson, OMS-4  [phone]    Attending Physician Statement  I have discussed the care of Radha Jackson, including pertinent history and exam findings, with the Resident/CNP/medical student. I also saw and examined the patient myself. I have reviewed all the key elements of all parts of the encounter and edited all that was required. Pedro Acosta MD   Nephrology [address] [place] [address] Drive    This note is created with the assistance of a speech-recognition program. While intending to generate a document that actually reflects the content of the visit, no guarantees can be provided that every mistake has been identified and corrected by editing.

## 2021-11-15 NOTE — PROGRESS NOTES
Clarified with cardiology that patient should be NPO as potential PPM placement today. Order placed.

## 2021-11-15 NOTE — PROGRESS NOTES
RN tore down right groin dressing, noted oozing around M-stitch site. New dressing applied. Will reevaluate site in another hour.

## 2021-11-15 NOTE — OP NOTE
[facility]             Procedure Note  MICRA PPM      Today's Date:  11/15/2021  Patient name:  Saranya Londono  MRN:   [id number]  YOB: 1950  PCP:    Luana Guallpa DO      Procedure: Merna Smith PPM    Indication: Junctional bradycardia    Operators:  Primary: Dr. Patrizia Mishra (Attending)    :        Name Model Serial   Medtronic TrendBent [id number]     Right groin femoral vein access achieved after local anesthesia. Initial 8 Fr sheath placed, dilated with 12 Fr, 14 Fr, and 18 Fr dilators. Micra device deployed using the delivery system (27 Fr)  Thresholds and capture assessed. No acute complications. Groin closed with Perclose. Impression / Device:    Successful Implantation of MICRA PPM      Plan:    Telemetry monitoring  Post-Device protocol  Interrogate pacemaker prior to discharge. Discharge if patient remains stable            Electronically signed on 11/15/21 at 2:27 PM by:    Ernesto Jaquez MD, MD   Fellow, [address]     I have reviewed the case / procedure with resident / fellow  I have examined the patient personally  Patient agree with treatment plan as discussed before, final arrangement based on my evaluation and exam.    Risk and benefit of procedure planned were explained in details. Procedure was performed by me personally, with all aspect of the procedure being done using standard protocol. Note was modified based on my own assessment and treatment.     Kathrine Ba MD  [facility]

## 2021-11-15 NOTE — PROGRESS NOTES
Dialysis Time Out  To be done by RN and tech or 2 RNs  Staff Names [address] [place] [address]    [x]  Identity of the patient using 2 patient identifiers  [x]  Consent for treatment  [x]  Equipment-proper machine and dialyzer  [x]  B-Hep B status  [x]  Orders- to include bath, blood flow, dialyzer, time and fluid removal  [x]  Access-Correct site and in working order  [x]  Time for patient to ask questions.

## 2021-11-15 NOTE — PROGRESS NOTES
[facility]  Office: [address], DO, Juwan Hunt, DO, Duniafly Oconnell, DO, Willy Sandhu Blood, DO, Fred Olivera MD, Shaylee Pepe MD, Mary Orellana MD, Naeem Olson MD, Pebbles Whitten MD, Ely Regalado MD, David Ewing MD, Giovana Cross, DO, Tesfaye Naranjo, DO, Meghann Scott MD,  Dimitri Rodriguez DO, Leatha Coyle MD, Dianne Nunez MD, Leny Norris MD, Jewell Louis MD, Rach Bruno MD, Nicole Davenport MD, Bartolome Shaikh MD, Elisa Nguyễn, [facility], CNP, Tigist Montgomery, CNP, Darron Pa, CNS, Kaylyn Guadalupe, [facility], Dimitry Smith, CNP, Delilah Thompson, [facility], Maggie Valadez, [facility], Deejay Corrigan, CNP, Rowan Oppenheim, PA-C, Lisandro Best, [facility], Chris Drake, CNP, Ciara Epstein, CNP, Guera Carrion, CNP, Ozzy Julian, CNP, Mariam Graves, CNP, Christiano Byrnes, CNP, Lenny Linda, [address] [place] [address]    Progress Note    11/15/2021    2:34 PM    Name:   Coral Nelson  MRN:     [id number]     Acct:      [de-identified]   Room:   [address] [place] [address] Day:  2  Admit Date:  11/13/2021  5:32 PM    PCP:   Latanya Valdes DO  Code Status:  Full Code    Subjective:     C/C low HR  Interval History Status: not changed. Still on Dopamine drip. Doing well, no complaints. Denies fevers, chills, nausea, vomiting and diarrhea. Brief History: This is a 64-year-old female who presents to our hospital with complaints of a headache. Patient found to be bradycardic during dialysis. Heart rate was noted to ranging between 30 and 40. Patient was admitted and started on dopamine drip after EKG showed junctional rhythm.       Patient eval by cardiology, currently planning for pacemaker today 11/15    Review of Systems:     Constitutional:  negative for chills, fevers, sweats admits to headache  Respiratory:  negative for cough, dyspnea on exertion, shortness of breath, wheezing  Cardiovascular:  negative for chest pain, chest pressure/discomfort, lower extremity edema, admits to palpitations  Gastrointestinal:  negative for abdominal pain, constipation, diarrhea, nausea, vomiting  Neurological:  negative for dizziness, headache    Medications: Allergies:     Allergies   Allergen Reactions    Bactrim [Sulfamethoxazole-Trimethoprim] Hives    Clonidine Derivatives     Adhesive Tape Itching    Amoxicillin-Pot Clavulanate Diarrhea, Nausea Only and Nausea And Vomiting       Current Meds:   Scheduled Meds:    aspirin  81 mg Oral Daily    amLODIPine  10 mg Oral Daily    Calcium Carb-Cholecalciferol  1 tablet Oral Daily    citalopram  20 mg Oral Daily    [Held by provider] carvedilol  12.5 mg Oral BID     hydrALAZINE  100 mg Oral TID    insulin glargine  10 Units SubCUTAneous Nightly    oxybutynin  5 mg Oral BID    clopidogrel  75 mg Oral Daily    pantoprazole  40 mg Oral BID AC    midodrine  10 mg Oral Once per day on Mon Wed Fri    sodium chloride  2 spray Nasal BID    sodium chloride flush  5-40 mL IntraVENous 2 times per day    rosuvastatin  40 mg Oral Nightly    rivastigmine  1 patch TransDERmal Daily    heparin (porcine)  5,000 Units SubCUTAneous 3 times per day    influenza virus vaccine  0.5 mL IntraMUSCular Prior to discharge    insulin lispro  0-6 Units SubCUTAneous TID     insulin lispro  0-3 Units SubCUTAneous Nightly     Continuous Infusions:    sodium chloride      DOPamine 12 mcg/kg/min (11/15/21 0919)    dextrose       PRN Meds: heparin (porcine), heparin (porcine), sodium chloride flush, sodium chloride, ondansetron **OR** ondansetron, acetaminophen **OR** acetaminophen, magnesium hydroxide, nitroGLYCERIN, perflutren lipid microspheres, diphenhydrAMINE, glucose, dextrose, glucagon (rDNA), dextrose    Data:     Past Medical History:   has a past medical history of Acute anemia, Acute cystitis, Acute kidney injury ([facility] Acute kidney injury superimposed on CKD ([facility] [address] [facility] Acute on chronic diastolic heart failure ([address] Acute renal failure (ARF) ([address] Anasarca, Anxiety and depression, AV block, 1st degree, Background diabetic retinopathy(362.01), Balance problem, BMI 45.0-49.9, adult (HCC), Bradycardia, Buttock wound, CAD (coronary artery disease), Cancer of uterus ([address] Chronic diastolic heart failure (HCC), Chronic kidney disease, Chronic low back pain, Controlled type 2 diabetes mellitus with chronic 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 dialysis, with long-term current use of insulin ([address] CVA (cerebral vascular accident) ([address] Decompensated heart failure ([address] Decubitus ulcer of trochanteric region of right hip, stage 2 ([address] Dementia ([address] Dry eye syndrome, End stage renal disease on dialysis ([address] GERD (gastroesophageal reflux disease), Glaucoma suspect, Gout, Hemodialysis patient ([address] Homonymous hemianopsia, Hyperkalemia, Hyperlipidemia, Hypertension, Infestation by bed bug, Infestation by maggots, Keratitis, Lymphedema of both lower extremities, Morbid obesity ([address] MRSA bacteremia, Obesity, PAD (peripheral artery disease) ([address] Peripheral polyneuropathy, Pressure injury of right heel, unstageable ([address] Pseudophakos, Stroke ([address] Trichiasis, Type 2 diabetes mellitus ([address] and Wounds, multiple. Social History:   reports that she has never smoked. She has never used smokeless tobacco. She reports that she does not drink alcohol and does not use drugs. Family History:   Family History   Problem Relation Age of Onset    Diabetes Mother     Cancer Mother     High Blood Pressure Mother    [facility] Stroke Mother     Kidney Disease Mother     Uterine Cancer Mother     Diabetes Father     Heart Disease Father     Glaucoma Father     Emphysema Father     Coronary Art Dis Other         All 4 siblings.  Stroke Other         1 sibling.  Lung Cancer Other         1 sibling - cause of death lung cancer.     Mental Retardation Other        Vitals:  BP (!) 129/52   Pulse (!) 48   Temp 97.7 °F (36.5 °C) (Oral)   Resp 12   Ht 5' 4\" (1.626 m)   Wt 204 lb 9.4 oz (92.8 kg)   SpO2 98%   BMI 35.12 kg/m²   Temp (24hrs), Av.5 °F (36.4 °C), Min:97.3 °F (36.3 °C), Max:97.8 °F (36.6 °C)    Recent Labs     21  1648 21  2125 11/15/21  0728 11/15/21  1107   POCGLU 138* 154* 89 90       I/O (24Hr): Intake/Output Summary (Last 24 hours) at 11/15/2021 1434  Last data filed at 11/15/2021 0900  Gross per 24 hour   Intake 1069.8 ml   Output 2470 ml   Net -1400.2 ml       Labs:  Hematology:  Recent Labs     21  0715 11/14/21  0345   WBC 4.8 6.2   RBC 4.07 4.09   HGB 12.4 12.6   HCT 39.0 39.1   MCV 95.8 95.6   MCH 30.5 30.8   MCHC 31.8 32.2   RDW 15.9* 15.8*   PLT See Reflexed IPF Result See Reflexed IPF Result   MPV NOT REPORTED NOT REPORTED   INR 1.3  --      Chemistry:  Recent Labs     21  0715 21  0715 21  18021  0345 21  0345 21  1355 11/14/21  2011 11/15/21  0457   *   < >  --  124*   < > 119*   < > 124* 125* 124*  124*   K 4.2  --   --   --   --  4.4  --   --   --  4.6   CL 92*  --   --   --   --  86*  --   --   --  90*   CO2 23  --   --   --   --  20  --   --   --  14*   GLUCOSE 114*  --   --   --   --  142*  --   --   --  104*   BUN 47*  --   --   --   --  53*  --   --   --  58*   CREATININE 2.88*  --   --   --   --  3.55*  --   --   --  3.85*   MG  --   --   --   --   --  2.1  --   --   --   --    ANIONGAP 13  --   --   --   --  13  --   --   --  20*   LABGLOM 16*  --   --   --   --  13*  --   --   --  12*   GFRAA 20*  --   --   --   --  15*  --   --   --  14*   CALCIUM 9.6  --   --   --   --  9.3  --   --   --  9.4   PROBNP  --   --   --   --   --  35,412*  --   --   --   --    TROPHS 72*  --  68* 70*  --   --   --   --   --   --     < > = values in this interval not displayed.      Recent Labs     21  0345 21  0758 21  1220 21  1648 21  2125 11/15/21  0728 11/15/21  1107   PROT --  7.7  --   --   --   --   --   --    LABALBU  --  3.4*  --   --   --   --   --   --    AST  --  29  --   --   --   --   --   --    ALT  --  18  --   --   --   --   --   --    ALKPHOS  --  303*  --   --   --   --   --   --    BILITOT  --  0.60  --   --   --   --   --   --    CHOL  --  87  --   --   --   --   --   --    HDL  --  48  --   --   --   --   --   --    LDLCHOLESTEROL  --  29  --   --   --   --   --   --    CHOLHDLRATIO  --  1.8  --   --   --   --   --   --    TRIG  --  49  --   --   --   --   --   --    VLDL  --  NOT REPORTED  --   --   --   --   --   --    POCGLU   < >  --  148* 123* 138* 154* 89 90    < > = values in this interval not displayed. ABG:  Lab Results   Component Value Date    POCPH 7.284 12/13/2020    POCPCO2 54.5 12/13/2020    POCPO2 71.6 12/13/2020    POCHCO3 25.8 12/13/2020    NBEA 1 12/13/2020    PBEA NOT REPORTED 12/13/2020    AUT4LLY 28 12/13/2020    XQAC7RNZ 92 12/13/2020    FIO2 2.0 12/13/2020     Lab Results   Component Value Date/Time    SPECIAL NOT REPORTED 01/07/2021 09:12 AM     Lab Results   Component Value Date/Time    CULTURE NO GROWTH 01/07/2021 09:12 AM       Radiology:  CT ABDOMEN PELVIS WO CONTRAST Additional Contrast? None    Result Date: 11/13/2021  No definite acute process is seen. 1. Cardiomegaly with partially visualized pleural effusions with consolidative changes involving the lower lobes. Superimposed pneumonia cannot be excluded. 2. Cirrhotic liver with large amount of ascites. 3. Diffuse body wall anasarca. 4. Left colon diverticulosis. CT HEAD WO CONTRAST    Result Date: 11/13/2021  No acute intracranial abnormality. Old left PCA distribution infarct with associated encephalomalacia unchanged from previous. Mild diffuse cerebral atrophy. XR CHEST PORTABLE    Result Date: 11/13/2021  CHF findings with cardiomegaly, vascular congestion bibasilar airspace disease and pleural effusions.        Physical Examination:        General appearance:  alert, cooperative and no distress, chronically ill-appearing female  Mental Status:  oriented to person, place and time and normal affect  Lungs:  clear to auscultation bilaterally, normal effort  Heart:  regular rate and rhythm, no murmur  Abdomen:  soft, nontender, nondistended, normal bowel sounds, no masses, hepatomegaly, splenomegaly  Extremities:  no edema, redness, tenderness in the calves  Skin:  no gross lesions, rashes, induration    Assessment:        Hospital Problems           Last Modified POA    * (Principal) Symptomatic bradycardia 11/14/2021 Yes    Hypertension 11/13/2021 Yes    Hyperlipidemia 11/13/2021 Yes    CAD (coronary artery disease) 11/13/2021 Yes    Overview Signed 2/27/2014 10:22 AM by Ronnell Barth MD     (with coronary artery bypass graft x4). Controlled type 2 diabetes mellitus with chronic 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 dialysis, with long-term current use of insulin ([address] 11/13/2021 Yes    Acute on chronic diastolic heart failure ([address] 11/13/2021 Yes    Lymphedema of both lower extremities 11/13/2021 Yes    Anasarca 11/13/2021 Yes    End stage renal disease on dialysis ([address] 11/13/2021 Yes    Class 1 obesity with body mass index (BMI) of 34.0 to 34.9 in adult 11/13/2021 Yes    Hyponatremia 11/13/2021 Yes    Nonintractable headache 11/13/2021 Yes          Plan:        Symptomatic bradycardia: HR ranging between 30-40. Started on Dopamine. Cardiology consulted, plan for PPM placement today 11/15. Hold BB  ESRD on HD: Volume management per Nephrology. Hyponatremia: due to increased TBW. Plan for HD today. Continue to check sodium Q8 hours. Up to 124 from 119  HLD: continue statin    Dispo: Discharge after PPM placement if stays stable.    Krupa Alamo DO  11/15/2021  2:34 PM

## 2021-11-15 NOTE — PLAN OF CARE
Problem: Skin Integrity:  Goal: Will show no infection signs and symptoms  Description: Will show no infection signs and symptoms  11/14/2021 2108 by Fly Duffy RN  Outcome: Ongoing     Problem: Skin Integrity:  Goal: Absence of new skin breakdown  Description: Absence of new skin breakdown  11/14/2021 2108 by Fly Duffy RN  Outcome: Ongoing     Problem: Falls - Risk of:  Goal: Will remain free from falls  Description: Will remain free from falls  11/14/2021 2108 by Fly Duffy RN  Outcome: Ongoing     Problem: Falls - Risk of:  Goal: Absence of physical injury  Description: Absence of physical injury  11/14/2021 2108 by Fly Duffy RN  Outcome: Ongoing

## 2021-11-16 NOTE — PROGRESS NOTES
Dialysis Time Out  To be done by RN and tech or 2 RNs  Staff Names Caren CARRILLO RN and Sailaja LONG/ CORNEL    [x]  Identity of the patient using 2 patient identifiers  [x]  Consent for treatment  [x]  Equipment-proper machine and dialyzer  [x]  B-Hep B status  [x]  Orders- to include bath, blood flow, dialyzer, time and fluid removal  [x]  Access-Correct site and in working order  [x]  Time for patient to ask questions.

## 2021-11-16 NOTE — PROGRESS NOTES
[facility]  Office: [address], DO, Sanjuanita Jasmineet, DO, Alexandra Grigsby, DO, Jamie Carrington Blood, DO, aFn Alvarez MD, Priscilla Verde MD, Rhoda Garcia MD, Jeison De Guzman MD, Rishi Pruitt MD, Leandra Higgins MD, Elodia Hudson MD, Guera Robertson, DO, Dimitrios Hudson, DO, Rosette Grimm MD,  Claudetta Shilling, DO, Dipika Dior MD, Marleni Garcia MD, Adalgisa Sears MD, Georgia Barfield MD, Sony Matute MD, Gilda Paul MD, Radha Stein MD, Juan Jeffers, [facility], [facility], CNP, Cathy Blunt, CNP, Gabby Skelton, CNS, Javier Nugent, CNP, Moni Clubs, [facility], Jeannine Hawk, CNP, Kristine Fishman, CNP, Magdalena Campos, CNP, Tushar Wilkes PA-C, Gregorio Durant, [facility], Amrik Caban, CNP, Jojo Lees, CNP, Kesha Sullivan, CNP, Desire Moreno, CNP, Jessy Workman, CNP, Harrison Turner, CNP, Alva Valdez, [address] [place] [address]    Progress Note    11/16/2021    11:27 AM    Name:   Jessica Saunders  MRN:     [id number]     Acct:      [de-identified]   Room:   [address] [place] [address] Day:  3  Admit Date:  11/13/2021  5:32 PM    PCP:   [place] DO  Code Status:  Full Code    Subjective:     C/C low HR  Interval History Status: improved    Patient doing well today. Pacemaker placement was successful. Patient did have continuous oozing from groin with persistent bleeding that is currently being monitored by cardiology. Patient has any chest pain, nausea, vomiting, diarrhea. Sodium is 126 today, patient being seen by nephrology for possible dialysis. Brief History: This is a 56-year-old female who presents to our hospital with complaints of a headache. Patient found to be bradycardic during dialysis. Heart rate was noted to ranging between 30 and 40. Patient was admitted and started on dopamine drip after EKG showed junctional rhythm.       Patient eval by cardiology, she had replacement placed on 15 however was having some significant bleeding from catheter site    Review of Systems:     Constitutional:  negative for chills, fevers, sweats admits to headache  Respiratory:  negative for cough, dyspnea on exertion, shortness of breath, wheezing  Cardiovascular:  negative for chest pain, chest pressure/discomfort, lower extremity edema, admits to palpitations  Gastrointestinal:  negative for abdominal pain, constipation, diarrhea, nausea, vomiting  Neurological:  negative for dizziness, headache    Medications: Allergies:     Allergies   Allergen Reactions    Bactrim [Sulfamethoxazole-Trimethoprim] Hives    Clonidine Derivatives     Adhesive Tape Itching    Amoxicillin-Pot Clavulanate Diarrhea, Nausea Only and Nausea And Vomiting       Current Meds:   Scheduled Meds:    sodium chloride flush  5-40 mL IntraVENous 2 times per day    aspirin  81 mg Oral Daily    amLODIPine  10 mg Oral Daily    Calcium Carb-Cholecalciferol  1 tablet Oral Daily    citalopram  20 mg Oral Daily    [Held by provider] carvedilol  12.5 mg Oral BID     hydrALAZINE  100 mg Oral TID    insulin glargine  10 Units SubCUTAneous Nightly    oxybutynin  5 mg Oral BID    clopidogrel  75 mg Oral Daily    pantoprazole  40 mg Oral BID AC    midodrine  10 mg Oral Once per day on Mon Wed Fri    sodium chloride  2 spray Nasal BID    sodium chloride flush  5-40 mL IntraVENous 2 times per day    rosuvastatin  40 mg Oral Nightly    rivastigmine  1 patch TransDERmal Daily    heparin (porcine)  5,000 Units SubCUTAneous 3 times per day    influenza virus vaccine  0.5 mL IntraMUSCular Prior to discharge    insulin lispro  0-6 Units SubCUTAneous TID     insulin lispro  0-3 Units SubCUTAneous Nightly     Continuous Infusions:    sodium chloride      sodium chloride      DOPamine 12 mcg/kg/min (11/15/21 0919)    dextrose       PRN Meds: sodium chloride flush, sodium chloride, heparin (porcine), heparin (porcine), sodium chloride flush, sodium chloride, ondansetron **OR** ondansetron, acetaminophen **OR** acetaminophen, magnesium hydroxide, nitroGLYCERIN, perflutren lipid microspheres, diphenhydrAMINE, glucose, dextrose, glucagon (rDNA), dextrose    Data:     Past Medical History:   has a past medical history of Acute anemia, Acute cystitis, Acute kidney injury ([facility] [address] Acute kidney injury superimposed on CKD ([address] Acute on chronic diastolic heart failure ([facility] [address] Acute renal failure (ARF) ([facility] [address] Anasarca, Anxiety and depression, AV block, 1st degree, Background diabetic retinopathy(362.01), Balance problem, BMI 45.0-49.9, adult ([facility] [address] Bradycardia, Buttock wound, CAD (coronary artery disease), Cancer of uterus ([facility] [address] Chronic diastolic heart failure ([facility] [address] Chronic kidney disease, Chronic low back pain, Controlled type 2 diabetes mellitus with chronic 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 dialysis, with long-term current use of insulin ([facility] [address] CVA (cerebral vascular accident) ([facility] [address] Decompensated heart failure ([facility] [address] Decubitus ulcer of trochanteric region of right hip, stage 2 ([address] Dementia ([facility] [address] Dry eye syndrome, End stage renal disease on dialysis ([facility] [address] GERD (gastroesophageal reflux disease), Glaucoma suspect, Gout, Hemodialysis patient ([facility] [address] Homonymous hemianopsia, Hyperkalemia, Hyperlipidemia, Hypertension, Infestation by bed bug, Infestation by maggots, Keratitis, Lymphedema of both lower extremities, Morbid obesity ([address] MRSA bacteremia, Obesity, PAD (peripheral artery disease) ([facility] [address] Peripheral polyneuropathy, Pressure injury of right heel, unstageable ([facility] [address] Pseudophakos, Stroke ([facility] [address] Trichiasis, Type 2 diabetes mellitus ([facility] [address] and Wounds, multiple. Social History:   reports that she has never smoked. She has never used smokeless tobacco. She reports that she does not drink alcohol and does not use drugs.      Family History:   Family History   Problem Relation Age of Onset    Diabetes Mother     Cancer Mother     High Blood Pressure Mother     Stroke Mother     Kidney Disease Mother     Uterine Cancer Mother     Diabetes Father     Heart Disease Father     Glaucoma Father     Emphysema Father     Coronary Art Dis Other         All 4 siblings.  Stroke Other         1 sibling.  Lung Cancer Other         1 sibling - cause of death lung cancer.  Mental Retardation Other        Vitals:  BP (!) 116/56   Pulse 61   Temp 97.6 °F (36.4 °C) (Oral)   Resp 17   Ht 5' 4\" (1.626 m)   Wt 199 lb 4.7 oz (90.4 kg)   SpO2 99%   BMI 34.21 kg/m²   Temp (24hrs), Av.2 °F (36.2 °C), Min:96.3 °F (35.7 °C), Max:97.7 °F (36.5 °C)    Recent Labs     11/15/21  1107 11/15/21  1633 11/15/21  2048 21  1031   POCGLU 90 91 99 93       I/O (24Hr):     Intake/Output Summary (Last 24 hours) at 2021 1127  Last data filed at 11/15/2021 1930  Gross per 24 hour   Intake 1063 ml   Output 2400 ml   Net -1337 ml       Labs:  Hematology:  Recent Labs     21  0345 21  0608   WBC 6.2 5.8   RBC 4.09 3.67*   HGB 12.6 11.5*   HCT 39.1 36.0*   MCV 95.6 98.1   MCH 30.8 31.3   MCHC 32.2 31.9   RDW 15.8* 15.9*   PLT See Reflexed IPF Result See Reflexed IPF Result   MPV NOT REPORTED NOT REPORTED     Chemistry:  Recent Labs     21  1802 21  2100 21  2100 21  0345 21  1355 11/15/21  0457 11/15/21  2253 21  0608   NA  --  124*   < > 119*   < > 124*  124* 127* 126*   K  --   --   --  4.4  --  4.6  --  3.8   CL  --   --   --  86*  --  90*  --  90*   CO2  --   --   --  20  --  14*  --  22   GLUCOSE  --   --   --  142*  --  104*  --  67*   BUN  --   --   --  53*  --  58*  --  38*   CREATININE  --   --   --  3.55*  --  3.85*  --  3.14*   MG  --   --   --  2.1  --   --   --   --    ANIONGAP  --   --   --  13  --  20*  --  14   LABGLOM  --   --   --  13*  --  12*  --  15*   GFRAA  --   --   --  15*  --  14*  --  18*   CALCIUM  --   --   --  9.3  --  9.4  --  8.3*   PROBNP  --   --   --  20,446*  --   --   --   --    TROPHS 68* 70*  --   --   --   --   --   -- < > = values in this interval not displayed. Recent Labs     11/14/21  0345 11/14/21  0758 11/14/21  2125 11/15/21  0728 11/15/21  1107 11/15/21  1633 11/15/21  2048 11/16/21  1031   PROT 7.7  --   --   --   --   --   --   --    LABALBU 3.4*  --   --   --   --   --   --   --    AST 29  --   --   --   --   --   --   --    ALT 18  --   --   --   --   --   --   --    ALKPHOS 303*  --   --   --   --   --   --   --    BILITOT 0.60  --   --   --   --   --   --   --    CHOL 87  --   --   --   --   --   --   --    HDL 48  --   --   --   --   --   --   --    LDLCHOLESTEROL 29  --   --   --   --   --   --   --    CHOLHDLRATIO 1.8  --   --   --   --   --   --   --    TRIG 49  --   --   --   --   --   --   --    VLDL NOT REPORTED  --   --   --   --   --   --   --    POCGLU  --    < > 154* 89 90 91 99 93    < > = values in this interval not displayed. ABG:  Lab Results   Component Value Date    POCPH 7.284 12/13/2020    POCPCO2 54.5 12/13/2020    POCPO2 71.6 12/13/2020    POCHCO3 25.8 12/13/2020    NBEA 1 12/13/2020    PBEA NOT REPORTED 12/13/2020    DER9AAJ 28 12/13/2020    CWGI9KKA 92 12/13/2020    FIO2 2.0 12/13/2020     Lab Results   Component Value Date/Time    SPECIAL NOT REPORTED 01/07/2021 09:12 AM     Lab Results   Component Value Date/Time    CULTURE NO GROWTH 01/07/2021 09:12 AM       Radiology:  CT ABDOMEN PELVIS WO CONTRAST Additional Contrast? None    Result Date: 11/13/2021  No definite acute process is seen. 1. Cardiomegaly with partially visualized pleural effusions with consolidative changes involving the lower lobes. Superimposed pneumonia cannot be excluded. 2. Cirrhotic liver with large amount of ascites. 3. Diffuse body wall anasarca. 4. Left colon diverticulosis. CT HEAD WO CONTRAST    Result Date: 11/13/2021  No acute intracranial abnormality. Old left PCA distribution infarct with associated encephalomalacia unchanged from previous. Mild diffuse cerebral atrophy.      XR CHEST PORTABLE    Result Date: 11/13/2021  CHF findings with cardiomegaly, vascular congestion bibasilar airspace disease and pleural effusions. Physical Examination:        General appearance:  alert, cooperative and no distress, chronically ill-appearing female  Mental Status:  oriented to person, place and time and normal affect  Lungs:  clear to auscultation bilaterally, normal effort  Heart:  regular rate and rhythm, no murmur  Abdomen:  soft, nontender, nondistended, normal bowel sounds, no masses, hepatomegaly, splenomegaly  Extremities:  no edema, redness, tenderness in the calves  Skin:  no gross lesions, rashes, induration. Groin oozing blood, stop guard placed    Assessment:        Hospital Problems           Last Modified POA    * (Principal) Symptomatic bradycardia 11/14/2021 Yes    Hypertension 11/13/2021 Yes    Hyperlipidemia 11/13/2021 Yes    CAD (coronary artery disease) 11/13/2021 Yes    Overview Signed 2/27/2014 10:22 AM by Barb Candelario MD     (with coronary artery bypass graft x4). Controlled type 2 diabetes mellitus with chronic 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 dialysis, with long-term current use of insulin ([address] 11/13/2021 Yes    Acute on chronic diastolic heart failure ([address] 11/13/2021 Yes    Lymphedema of both lower extremities 11/13/2021 Yes    Anasarca 11/13/2021 Yes    End stage renal disease on dialysis ([address] 11/13/2021 Yes    Class 1 obesity with body mass index (BMI) of 34.0 to 34.9 in adult 11/13/2021 Yes    Hyponatremia 11/13/2021 Yes    Nonintractable headache 11/13/2021 Yes          Plan:        Symptomatic bradycardia: HR ranging between 30-40. Started on Dopamine that was discontinued after successful placement of Micra PPM on 11/15/2021. Patient did have bleeding/oozing from cath site which is delaying her discharge. Spoke with cardiology, would like to monitor patient and if bleeding improves later today she can be discharged from their end.    ESRD on HD: Volume management per Nephrology today 11/16. Hyponatremia: due to increased TBW. Plan for HD today. Continue to check sodium Q8 hours. Up to 124 from 119  HLD: continue statin    Dispo: Needs HD today due to hyponatremia, volume overload. Also has continued bleeding from her groin site. Needs continued pressure.  IF improves will discharge later today otherwise will wait till am.       Donta Hayward DO  11/16/2021  11:27 AM

## 2021-11-16 NOTE — PROGRESS NOTES
Renal Progress Note    Patient :  Margaret Epstein; 72 y.o. MRN# [id number]  Location:  1005/1005-01  Attending:  Jennifer Dubois DO  Admit Date:  11/13/2021   Hospital Day: 3      Subjective:     Patient is a 67-year-old female with ESRD 2/2 DM admitted for bradycardia recognized during her usual dialysis treatment on Saturday 11/13. She is on dopamine drip with plan to place PPM. TTE in December of last year shows EF 45-50%, BNP elevated to 35,000 on admission. Patient was seen and examined on HD. Tolerating the procedure well. No new issues reported overnight. Status post successful implantation of Micra PPM yesterday 11/15/2021. Patient did require extra dialysis yesterday ran for 180 minutes and got about 2 L removed. Patient normally runs as per TTS schedule. She is diffusely edematous with cirrhosis, ascites, anasarca identified on CTAP. She denies chest pain, SOB, abdominal pain, N/V.      Outpatient Medications:     Medications Prior to Admission: ciprofloxacin (CIPRO) 250 MG tablet,   midodrine (PROAMATINE) 10 MG tablet, Take 10 mg by mouth three times a week tu-th-sa  sodium chloride (OCEAN) 0.65 % nasal spray, 2 sprays by Nasal route 2 times daily  lidocaine-prilocaine (EMLA) 2.5-2.5 % cream, Apply topically three times a week Apply topically as needed- Tu-Th-Sa  amLODIPine (NORVASC) 10 MG tablet, Take 1 tablet by mouth daily  atorvastatin (LIPITOR) 40 MG tablet, TAKE 1 TABLET BY MOUTH EVERYDAY  Cholecalciferol 125 MCG (5000 UT) CHEW, Take 1 tablet by mouth daily  citalopram (CELEXA) 20 MG tablet, Take 1 tablet by mouth daily  carvedilol (COREG) 12.5 MG tablet, Take 1 tablet by mouth 2 times daily (with meals)  hydrALAZINE (APRESOLINE) 100 MG tablet, Take 1 tablet by mouth 3 times daily  insulin glargine (LANTUS) 100 UNIT/ML injection vial, Inject 10 Units into the skin nightly  insulin lispro (HUMALOG) 100 UNIT/ML injection vial, Use 4 times a day per sliding scale  nystatin (MYCOSTATIN) 105688 UNIT/GM cream, Apply topically 2 times daily. (Patient not taking: Reported on 7/30/2021)  oxybutynin (DITROPAN) 5 MG tablet, TAKE 1 TABLET BY MOUTH TWICE DAILY  clopidogrel (PLAVIX) 75 MG tablet, Take 1 tablet by mouth daily  pantoprazole (PROTONIX) 40 MG tablet, Take 1 tablet by mouth 2 times daily (before meals)  rivastigmine (EXELON) 9.5 MG/24HR, APPLY 1 NEW PATCH EVERY 24 HOURS  senna (SENOKOT) 8.6 MG tablet, Take 1 tablet by mouth 2 times daily (Patient taking differently: Take 1 tablet by mouth as needed )  OXYGEN, Oxgen at 2 liters per nasal cannula  loratadine (CLARITIN) 10 MG tablet, TAKE 1 TABLET(10 MG) BY MOUTH DAILY  Lancets MISC, Checks blood sugar ac and HS  blood glucose monitor strips, Test 3  times a day & as needed for symptoms of irregular blood glucose. Dispense sufficient amount for indicated testing frequency plus additional to accommodate PRN testing needs. Alcohol Swabs (ALCOHOL PREP) PADS, Checks blood sugar 3 times a day  Misc.  Devices MISC, Standard walker with wheels  Diagnosis dementia, CHF  aspirin 81 MG tablet, Take 1 tablet by mouth daily  Diabetic Shoe MISC, by Does not apply route  Compression Stockings MISC, by Does not apply route 20-30 mm Hg bilateral knee high    Current Medications:     Scheduled Meds:    sodium chloride flush  5-40 mL IntraVENous 2 times per day    aspirin  81 mg Oral Daily    amLODIPine  10 mg Oral Daily    Calcium Carb-Cholecalciferol  1 tablet Oral Daily    citalopram  20 mg Oral Daily    [Held by provider] carvedilol  12.5 mg Oral BID WC    hydrALAZINE  100 mg Oral TID    insulin glargine  10 Units SubCUTAneous Nightly    oxybutynin  5 mg Oral BID    clopidogrel  75 mg Oral Daily    pantoprazole  40 mg Oral BID AC    midodrine  10 mg Oral Once per day on Mon Wed Fri    sodium chloride  2 spray Nasal BID    sodium chloride flush  5-40 mL IntraVENous 2 times per day    rosuvastatin  40 mg Oral Nightly    rivastigmine  1 patch TransDERmal Daily    heparin (porcine)  5,000 Units SubCUTAneous 3 times per day    influenza virus vaccine  0.5 mL IntraMUSCular Prior to discharge    insulin lispro  0-6 Units SubCUTAneous TID WC    insulin lispro  0-3 Units SubCUTAneous Nightly     Continuous Infusions:    sodium chloride      sodium chloride      DOPamine 12 mcg/kg/min (11/15/21 0919)    dextrose       PRN Meds:  sodium chloride flush, sodium chloride, heparin (porcine), heparin (porcine), sodium chloride flush, sodium chloride, ondansetron **OR** ondansetron, acetaminophen **OR** acetaminophen, magnesium hydroxide, nitroGLYCERIN, perflutren lipid microspheres, diphenhydrAMINE, glucose, dextrose, glucagon (rDNA), dextrose    Input/Output:       I/O last 3 completed shifts: In: 1138 [P.O.:75; I.V.:963]  Out: 2400 . Patient Vitals for the past 96 hrs (Last 3 readings):   Weight   11/15/21 1930 199 lb 4.7 oz (90.4 kg)   11/15/21 1600 204 lb 2.3 oz (92.6 kg)   11/15/21 0500 204 lb 9.4 oz (92.8 kg)       Vital Signs:   Temperature:  Temp: 97.6 °F (36.4 °C)  TMax:   Temp (24hrs), Av.2 °F (36.2 °C), Min:96.3 °F (35.7 °C), Max:97.7 °F (36.5 °C)    Respirations:  Resp: 17  Pulse:   Pulse: 61  BP:    BP: (!) 116/56  BP Range: Systolic (41TTT), PMR:170 , Min:88 , AMH:485       Diastolic (78BBG), JJT:69, Min:37, Max:107      Physical Examination:     General:  AAO x 3, speaking in full sentences, no accessory muscle use. HEENT: Atraumatic, normocephalic, no throat congestion, moist mucosa. Eyes:   Pupils equal, round and reactive to light, EOMI. Neck:   Supple  Chest:   Bilateral vesicular breath sounds, no rales or wheezes. +ve BIPAP. Cardiac:  S1 S2 RR, no murmurs, gallops or rubs. Abdomen: Non-distended, no masses or organomegaly, BS audible. :   No suprapubic or flank tenderness. Neuro:  AAO x 3, No FND.   SKIN:  Dry, flaky skin on LE bilaterally, ecchymosis on right forearm  Extremities:  +ve 2-3+ peripheral edema    Labs: Recent Labs     11/14/21  0345 11/16/21  0608   WBC 6.2 5.8   RBC 4.09 3.67*   HGB 12.6 11.5*   HCT 39.1 36.0*   MCV 95.6 98.1   MCH 30.8 31.3   MCHC 32.2 31.9   RDW 15.8* 15.9*   PLT See Reflexed IPF Result See Reflexed IPF Result   MPV NOT REPORTED NOT REPORTED      BMP:   Recent Labs     11/14/21  0345 11/14/21  1355 11/15/21  0457 11/15/21  2253 11/16/21  0608   *   < > 124*  124* 127* 126*   K 4.4  --  4.6  --  3.8   CL 86*  --  90*  --  90*   CO2 20  --  14*  --  22   BUN 53*  --  58*  --  38*   CREATININE 3.55*  --  3.85*  --  3.14*   GLUCOSE 142*  --  104*  --  67*   CALCIUM 9.3  --  9.4  --  8.3*    < > = values in this interval not displayed. Magnesium:    Recent Labs     11/14/21  0345   MG 2.1     Albumin:    Recent Labs     11/14/21  0345   LABALBU 3.4*     SPEP:  Lab Results   Component Value Date    PROT 7.7 11/14/2021    ALBCAL 1.8 08/13/2020    ALBPCT 35 08/13/2020    LABALPH 0.2 08/13/2020    LABALPH 0.7 08/13/2020    A1PCT 5 08/13/2020    A2PCT 15 08/13/2020    LABBETA 0.9 08/13/2020    BETAPCT 17 08/13/2020    GAMGLOB 1.4 08/13/2020    GGPCT 28 08/13/2020    PATH ELECTRONICALLY SIGNED. Claude Bienenstock, M.D. 08/13/2020    PATH ELECTRONICALLY SIGNED.  Claude Bienenstock, M.D. 08/13/2020     C3:     Lab Results   Component Value Date    C3 114 08/13/2020     C4:     Lab Results   Component Value Date    C4 39 08/13/2020     MPO ANCA:     Lab Results   Component Value Date    MPO 14 08/13/2020     PR3 ANCA:     Lab Results   Component Value Date    PR3 8 08/13/2020     Hep BsAg:         Lab Results   Component Value Date    HEPBSAG NONREACTIVE 01/08/2021     Hep C AB:          Lab Results   Component Value Date    HEPCAB NONREACTIVE 01/08/2021       Urinalysis/Chemistries:      Lab Results   Component Value Date    NITRU NEGATIVE 01/07/2021    COLORU YELLOW 01/07/2021    PHUR 5.0 01/07/2021    WBCUA 2 TO 5 01/07/2021    RBCUA 10 TO 20 01/07/2021    MUCUS NOT REPORTED 01/07/2021 TRICHOMONAS NOT REPORTED 01/07/2021    YEAST NOT REPORTED 01/07/2021    BACTERIA NOT REPORTED 01/07/2021    SPECGRAV 1.015 01/07/2021    LEUKOCYTESUR TRACE 01/07/2021    UROBILINOGEN Normal 01/07/2021    BILIRUBINUR NEGATIVE 01/07/2021    GLUCOSEU NEGATIVE 01/07/2021    KETUA NEGATIVE 01/07/2021    AMORPHOUS NOT REPORTED 01/07/2021     Urine Sodium:     Lab Results   Component Value Date    LUKE <20 01/07/2021     Urine Osmolarity:   Lab Results   Component Value Date    OSMOU 348 01/07/2021      Urine Creatinine:     Lab Results   Component Value Date    LABCREA 133.5 01/07/2021       Radiology:     Reviewed. Assessment:     1. ESRD 2/2 chronic diabetes and HTN, on Hemodialysis. Regular HD days are Tuesday Thursday Saturday at [facility] using tunnel catheter under Dr. Darío Victoria. Her dry weight is unknown. Admitted with 92 kg. She is volume overloaded. 2. Anemia of chronic disease  3. Secondary hyperparathyroidism  4. Hypertension  5. Hyponatremia -most likely dilutional  6. Bradycardia - on dopamine drip, cardiology planning to place PPM  7. Cirrhosis and ascites - most likely 2/2 [place]  8. Dementia - on rivastigmine patch, could be contributing to slow heart rhythm  9. CAD - had CABG a few years ago, could also be contributing to bradyarrhythmia  Plan:   1. Patient was seen and examined on HD. Orders were confirmed with the dialysis nurse. 2.  Bradycardia management as per cardiology. 3.  Confirmed with the  outpatient hemodialysis spot, patient will go back to [facility] on discharge. 4.  Okay to discharge from nephrology standpoint postdialysis today. Nutrition   Please ensure that patient is on a renal diet/TF. Avoid nephrotoxic drugs/contrast exposure. We will continue to follow along with you. Pedro Crane MD  [facility]     This note is created with the assistance of a speech-recognition program. While intending to generate a document that actually reflects the content of the visit, no guarantees can be provided that every mistake has been identified and corrected by editing.

## 2021-11-16 NOTE — PROGRESS NOTES
Dialysis Post Treatment Note  Vitals:    11/15/21 1930   BP: (!) 109/58   Pulse: 64   Resp: 12   Temp: 96.3 °F (35.7 °C)   SpO2: 100%     Pre-Weight = 92.6kg  Post-weight = Weight: 199 lb 4.7 oz (90.4 kg)  Total Liters Processed = Total Liters Processed (l/min): 67.8 l/min  Rinseback Volume (mL) = Rinseback Volume (ml): 300 ml  Net Removal (mL) = NET Removed (ml): 2000 ml  Type of access used= right tunnel cath  Length of treatment=180 minutes    All blood returned, sterile caps placed and lines heparinized. Patient tolerated treatment well.  Post /58

## 2021-11-16 NOTE — PROGRESS NOTES
Dialysis Post Treatment Note  Vitals:    11/16/21 1815   BP: 133/62   Pulse: 71   Resp: 18   Temp: 97.3 °F (36.3 °C)   SpO2:      Pre-Weight = 89.4kg  Post-weight = Weight: 190 lb 7.6 oz (86.4 kg)  Total Liters Processed = Total Liters Processed (l/min): 84 l/min  Rinseback Volume (mL) = Rinseback Volume (ml): 300 ml  Net Removal (mL) = NET Removed (ml): 3000 ml  Patient's dry weight=89.4kg  Type of access used= IJ Catheter  Length of treatment=210    Pt received Midodrine pre and mid treatment. Tolerated treatment well. No concerns noted. Pt had no concerns. Catheter hepranized and capped.

## 2021-11-16 NOTE — PROGRESS NOTES
[employer] at bedside to interrogate patient's pacemaker. Patient noted to be oozing from femoral insertion site after femstop was removed. New femstop placed.

## 2021-11-16 NOTE — PROGRESS NOTES
[facility]   Progress Note                   Date:   11/16/2021  Patient name: Marlo Severe  Date of admission:  11/13/2021  5:32 PM  MRN:   [id number]  YOB: 1950  PCP: Amber Mcrae DO    Reason for Admission:     Subjective:       POD1 of Micra RV PPM for symptomatic junctional bradycardia. Mild oozing at sites of permanent pacemaker overnight. Dressing was changed and manual pressure applied after which oozing stopped.         Medications:   Scheduled Meds:   sodium chloride flush  5-40 mL IntraVENous 2 times per day    aspirin  81 mg Oral Daily    amLODIPine  10 mg Oral Daily    Calcium Carb-Cholecalciferol  1 tablet Oral Daily    citalopram  20 mg Oral Daily    [Held by provider] carvedilol  12.5 mg Oral BID WC    hydrALAZINE  100 mg Oral TID    insulin glargine  10 Units SubCUTAneous Nightly    oxybutynin  5 mg Oral BID    clopidogrel  75 mg Oral Daily    pantoprazole  40 mg Oral BID AC    midodrine  10 mg Oral Once per day on Mon Wed Fri    sodium chloride  2 spray Nasal BID    sodium chloride flush  5-40 mL IntraVENous 2 times per day    rosuvastatin  40 mg Oral Nightly    rivastigmine  1 patch TransDERmal Daily    heparin (porcine)  5,000 Units SubCUTAneous 3 times per day    influenza virus vaccine  0.5 mL IntraMUSCular Prior to discharge    insulin lispro  0-6 Units SubCUTAneous TID WC    insulin lispro  0-3 Units SubCUTAneous Nightly     Continuous Infusions:   sodium chloride      sodium chloride      DOPamine 12 mcg/kg/min (11/15/21 0919)    dextrose       CBC:   Recent Labs     11/13/21  0715 11/14/21  0345 11/16/21  0608   WBC 4.8 6.2 5.8   HGB 12.4 12.6 11.5*   PLT See Reflexed IPF Result See Reflexed IPF Result See Reflexed IPF Result     BMP:    Recent Labs     11/14/21  0345 11/14/21  1355 11/15/21  0457 11/15/21  2253 11/16/21  0608   *   < > 124*  124* 127* 126*   K 4.4  --  4.6  --  3.8   CL 86*  --  90*  --  90* CO2 20  --  14*  --  22   BUN 53*  --  58*  --  38*   CREATININE 3.55*  --  3.85*  --  3.14*   GLUCOSE 142*  --  104*  --  67*    < > = values in this interval not displayed. Hepatic:   Recent Labs     11/14/21  0345   AST 29   ALT 18   BILITOT 0.60   ALKPHOS 303*     Lipids:   Recent Labs     11/14/21  0345   CHOL 87   HDL 48     INR:   Recent Labs     11/13/21  0715   INR 1.3       Objective:   Vitals: BP (!) 111/42   Pulse 64   Temp 97.6 °F (36.4 °C) (Oral)   Resp 14   Ht 5' 4\" (1.626 m)   Wt 199 lb 4.7 oz (90.4 kg)   SpO2 94%   BMI 34.21 kg/m²   General appearance: alert and cooperative with exam  HEENT: Head: Normocephalic, no lesions, without obvious abnormality. Neck: no adenopathy, no carotid bruit, no JVD, supple, symmetrical, trachea midline and thyroid not enlarged, symmetric, no tenderness/mass/nodules  Lungs: clear to auscultation bilaterally  Heart: regular rate and rhythm, S1, S2 normal, no murmur, click, rub or gallop  Abdomen: soft, non-tender; bowel sounds normal; no masses,  no organomegaly  Extremities: extremities normal, atraumatic, no cyanosis or edema  Neurologic: Mental status: Alert, oriented, thought content appropriate       EKG: Junctional bradycardia     ECHO 12/7/2020  Normal left ventricular diameter. Left ventricular systolic function is mildly reduced with LVEF 45-50%. Abnormal septal wall motion. Left ventricular ejection fraction 50 %. Grade I (mild) left ventricular diastolic dysfunction. Leftward compression of inter-ventricular septum (\"D-sign\") indicating RV  pressure/volume overload. Left atrium is severely dilated. Right atrial dilatation. Right ventricular dilatation with reduced systolic function. Aortic valve is sclerotic but opens well. Trace aortic insufficiency. Mitral annular calcification. Mild tricuspid regurgitation. Estimated right ventricular systolic pressure  is 35 mmHg.   Left pleural effusion.     Cardiac Cath 08/2020   Cardiac Arteries and Lesion Findings     LMCA: Normal 0% stenosis.     LAD: Chronic occlusion 100 % . with patent LIMA-LAD and SVG to Diagoanl       Lesion on Mid LAD: 100% stenosis. Lesion on 1st Diag: Ostial.90% stenosis. LCx: Single stenosis. with patent SVG-OM       Lesion on Mid CX: 90% stenosis. RCA: Chronic occlusion 100 % . with left to right collaterals       Lesion on Dist RCA: 100% stenosis. Graft Lesions       Lesion on Aorta Right to R PDA: Proximal anastomosis. 100% stenosis. Cardiac Grafts      -  There is a LIMA graft that originates at the LIMA and attaches to the      Mid LAD. Patent with 0% stenosis      -  There is a Vein graft that originates at the Aorta Left and attaches to      the 1st Diag. Patent with 0% stenosis      -  There is a Vein graft that originates at the Aorta Left and attaches to      the 2nd Ob Sushma. Patent with 0% stenosis      -  There is a Vein graft that originates at the Aorta Right and attaches      to the R PDA. 100% occluded at proximal anastomosis      IMPRESSION:    1. Junctional Bradycardia, S/P Micra RV PPM implantation 11/15. 2.  Hyponatremia likely dilutional as per nephrology, history of ESRD. 3.  ICM Systolic CHF LVEF 02-45%, Grade I DD  4. H/o CABG, LIMA- LAD, SVG-OM ,SVG-D1, SVG-PDA  5. ESRD  6. DM.  7. Thrombocytopenia, platelets have been staying around 100 K.       RECOMMENDATIONS:  1. Telemetry monitoring. 2. Post device protocol. 3. Interrogate pacemaker prior to discharge. 4. Discontinue Plavix due to thrombocytopenia and continue oozing at PPM site  5. Monitor one more day       Gonsalo Frances MD       Cardiovascular Fellow PGY-4  11/16/2021, 6:55 AM       Attending Physician Statement  I have discussed the case of Susanna Hennessy including pertinent history and exam findings with the resident. I have seen and examined the patient and the key elements of the encounter have been performed by me.  I agree with the assessment, plan and orders as documented by the resident With changes made to the note.      Electronically signed by Sky Khanna MD on 11/16/2021 at [facility]      [phone]

## 2021-11-17 NOTE — DISCHARGE INSTR - COC
Continuity of Care Form    Patient Name: Ton Silva   :  1950  MRN:  [id number]    Admit date:  2021  Discharge date:  2021    Code Status Order: Full Code   Advance Directives:      Admitting Physician:  Alysia Palafox DO  PCP: Jessy Hamilton DO    Discharging Nurse: [facility] Unit/Room#: 1005/1005-01  Discharging Unit Phone Number: [phone]    Emergency Contact:   Extended Emergency Contact Information  Primary Emergency Contact: Juju Lock  Address: [place] [address] Phone: [phone]  Mobile Phone: [phone]  Relation: Child    Past Surgical History:  Past Surgical History:   Procedure Laterality Date    CATARACT REMOVAL WITH IMPLANT  2012    (Right eye) - Dr. Rigoberto Harris. CATARACT REMOVAL WITH IMPLANT  2012    (Left eye) - Dr. Rigoberto Harris.  SECTION      CHOLECYSTECTOMY      CORONARY ARTERY BYPASS GRAFT  12-    (x4) - LIMA-LAD, SVG-diagnoal 1, SVG-OM1, and SVG-PDA. Exploration of left radial. (Dr. Misael Rangel). EYE SURGERY Left     as a child     GASTRIC BYPASS SURGERY      HYSTERECTOMY      (abdominal)  with left oophorectomy. Right ovary retained.     IR TUNNELED CATHETER PLACEMENT GREATER THAN 5 YEARS  2021    IR TUNNELED CATHETER PLACEMENT GREATER THAN 5 YEARS 2021 [facility]    TONSILLECTOMY AND ADENOIDECTOMY      UPPER GASTROINTESTINAL ENDOSCOPY  2020    EGD BIOPSY performed by Gabe Steen MD at [facility]       Immunization History:   Immunization History   Administered Date(s) Administered    COVID-19, Pfizer, PF, 30mcg/0.3mL 2021, 2021    Influenza Virus Vaccine 10/20/1998, 10/12/2013, 10/27/2014, 10/12/2016    Influenza, High Dose (Fluzone 65 yrs and older) 2018, 10/05/2020    Influenza, Triv, inactivated, subunit, adjuvanted, IM (Fluad 65 yrs and older) 2019 oriented, alert, coherent, logical, thought processes intact, and able to concentrate and follow conversation    IV Access:  - Dialysis Catheter  - site  right and subclavian, insertion date: 01/08/2021    Nursing Mobility/ADLs:  Walking   Dependent  Transfer  Dependent  Bathing  Dependent  Dressing  Dependent  Toileting  Dependent  Feeding  Assisted  Med Admin  Assisted  Med Delivery   whole    Wound Care Documentation and Therapy:  Wound 08/07/20 Heel Right (Active)   Number of days: 466       Wound 08/10/20 Ischium Left (Active)   Number of days: 463       Wound 08/10/20 Hip Right Trochanter; CLUSTER OF WOUNDS (Active)   Number of days: 463       Wound 08/10/20 Hip Right (Active)   Number of days: 463        Elimination:  Continence: Bowel: No  Bladder: No  Urinary Catheter: None   Colostomy/Ileostomy/Ileal Conduit: No       Date of Last BM: 11/17/2021    Intake/Output Summary (Last 24 hours) at 11/17/2021 1220  Last data filed at 11/16/2021 1815  Gross per 24 hour   Intake 200 ml   Output 3500 ml   Net -3300 ml     I/O last 3 completed shifts: In: 200   Out: 3500     Safety Concerns: At Risk for Falls    Impairments/Disabilities:      None    Nutrition Therapy:  Current Nutrition Therapy:   - Oral Diet:  Carb Control 3 carbs/meal (1500kcals/day)    Routes of Feeding: Oral  Liquids: Thin Liquids  Daily Fluid Restriction: no  Last Modified Barium Swallow with Video (Video Swallowing Test): not done    Treatments at the Time of Hospital Discharge:   Respiratory Treatments:     Oxygen Therapy:  is not on home oxygen therapy.   Ventilator:    - No ventilator support    Rehab Therapies: Physical Therapy and Occupational Therapy  Weight Bearing Status/Restrictions: No weight bearing restirctions  Other Medical Equipment (for information only, NOT a DME order):  wheelchair  Other Treatments:       Patient's personal belongings (please select all that are sent with patient):  None    RN SIGNATURE:  Electronically signed by Anna Marie RN on 11/18/21 at 2:32 PM EST    CASE MANAGEMENT/SOCIAL WORK SECTION    Inpatient Status Date: 11-    Readmission Risk Assessment Score:  Readmission Risk              Risk of Unplanned Readmission:  40           Discharging to Facility/ Agency   Name: [facility]  Address:  Phone:  Fax:    Dialysis Facility (if applicable)   Name: [address] [facility]  Address: [address]   Dialysis Schedule: Tuesday, Thursday, Saturday  Phone: [phone]  Fax: [phone]    / signature: Electronically signed by Danelle Billy RN on 11/19/21 at 9:52 AM EST    PHYSICIAN SECTION    Prognosis: Fair    Condition at Discharge: Stable    Rehab Potential (if transferring to Rehab): Fair    Recommended Labs or Other Treatments After Discharge: ***    Physician Certification: I certify the above information and transfer of oTn Silva  is necessary for the continuing treatment of the diagnosis listed and that she requires Jax Mathews for greater 30 days.      Update Admission H&P: No change in H&P    PHYSICIAN SIGNATURE:  Electronically signed by Mai Rose MD on 11/18/21 at 1:13 PM EST

## 2021-11-17 NOTE — PROGRESS NOTES
Dialysis Post Treatment Note  Vitals:    11/17/21 1830   BP: 125/66   Pulse: 68   Resp: 16   Temp: 97.7 °F (36.5 °C)   SpO2: 95%     Pre-Weight = 87.1kg  Post-weight = Weight: 186 lb 11.7 oz (84.7 kg)  Total Liters Processed = Total Liters Processed (l/min): 73.6 l/min  Rinseback Volume (mL) = Rinseback Volume (ml): 300 ml  Net Removal (mL) = NET Removed (ml): 2800 ml  Type of access used= right tunnel cath  Length of treatment=210 minutes    All blood returned, sterile caps placed and lines heparinized. Patient tolerated treatment well.  Post /66

## 2021-11-17 NOTE — PROGRESS NOTES
Renal Progress Note    Patient :  Jerrod Vega; 72 y.o. MRN# [id number]  Location:  1005/1005-01  Attending:  Danette Alcantar MD  Admit Date:  11/13/2021   Hospital Day: 4      Subjective:     Patient is a 45-year-old female with ESRD 2/2 DM admitted for bradycardia recognized during her usual dialysis treatment on Saturday 11/13. Micra leadless PPM placed via Right femoral cannulation on 11/15. She was seen and examined at bedside on HD. Tolerating the procedure well. Extra Dialysis yesterday due to fluid overload with 3 L off, still on 2 L supplemental O2 by NC. She was having some oozing from the cath site and thrombocytopenia to 100 therefore watched by cardiology overnight, plavix discontinued, now okay to DC from their perspective back to SNF. She does have some petechiae on the left hand which she states she has had before. She is diffusely edematous with cirrhosis, ascites, anasarca identified on CTAP. She denies chest pain, SOB, abdominal pain, N/V.      Outpatient Medications:     Medications Prior to Admission: ciprofloxacin (CIPRO) 250 MG tablet,   midodrine (PROAMATINE) 10 MG tablet, Take 10 mg by mouth three times a week tu-th-sa  sodium chloride (OCEAN) 0.65 % nasal spray, 2 sprays by Nasal route 2 times daily  lidocaine-prilocaine (EMLA) 2.5-2.5 % cream, Apply topically three times a week Apply topically as needed- Tu-Th-Sa  amLODIPine (NORVASC) 10 MG tablet, Take 1 tablet by mouth daily  atorvastatin (LIPITOR) 40 MG tablet, TAKE 1 TABLET BY MOUTH EVERYDAY  Cholecalciferol 125 MCG (5000 UT) CHEW, Take 1 tablet by mouth daily  citalopram (CELEXA) 20 MG tablet, Take 1 tablet by mouth daily  carvedilol (COREG) 12.5 MG tablet, Take 1 tablet by mouth 2 times daily (with meals)  hydrALAZINE (APRESOLINE) 100 MG tablet, Take 1 tablet by mouth 3 times daily  insulin glargine (LANTUS) 100 UNIT/ML injection vial, Inject 10 Units into the skin nightly  insulin lispro (HUMALOG) 100 UNIT/ML injection vial, TransDERmal Daily    heparin (porcine)  5,000 Units SubCUTAneous 3 times per day    influenza virus vaccine  0.5 mL IntraMUSCular Prior to discharge    insulin lispro  0-6 Units SubCUTAneous TID WC    insulin lispro  0-3 Units SubCUTAneous Nightly     Continuous Infusions:    sodium chloride      sodium chloride      dextrose       PRN Meds:  albumin human, midodrine, sodium chloride flush, sodium chloride, heparin (porcine), heparin (porcine), sodium chloride flush, sodium chloride, ondansetron **OR** ondansetron, acetaminophen **OR** acetaminophen, magnesium hydroxide, nitroGLYCERIN, perflutren lipid microspheres, diphenhydrAMINE, glucose, dextrose, glucagon (rDNA), dextrose    Input/Output:       I/O last 3 completed shifts: In: 200   Out: 3500 . Patient Vitals for the past 96 hrs (Last 3 readings):   Weight   21 1815 190 lb 7.6 oz (86.4 kg)   21 1400 197 lb 1.5 oz (89.4 kg)   11/15/21 1930 199 lb 4.7 oz (90.4 kg)       Vital Signs:   Temperature:  Temp: 97.9 °F (36.6 °C)  TMax:   Temp (24hrs), Av.6 °F (36.4 °C), Min:97.3 °F (36.3 °C), Max:98 °F (36.7 °C)    Respirations:  Resp: 12  Pulse:   Pulse: 68  BP:    BP: 117/64  BP Range: Systolic (37ZMR), RQR:354 , Min:103 , VLT:259       Diastolic (50TMA), ZVZ:68, Min:38, Max:78      Physical Examination:     General:  AAO x 3, speaking in full sentences, no accessory muscle use. HEENT: Atraumatic, normocephalic, no throat congestion, moist mucosa. Eyes:   Pupils equal, round and reactive to light, EOMI. Neck:   Supple  Chest:   Bilateral vesicular breath sounds, no rales or wheezes. Cardiac:  S1 S2 RR, soft ejection murmur, gallops or rubs. Abdomen: Distended, lymphedema, no masses or organomegaly, BS audible. :   No suprapubic or flank tenderness. Neuro:  No FND.   SKIN:  Dry, flaky skin on LE bilaterally, diffuse lymphedema, ecchymosis on right forearm, petechiae on left hand  Extremities:  +ve 1-2+ peripheral edema    Labs: Recent Labs     11/16/21  0608 11/17/21  0819   WBC 5.8 3.8   RBC 3.67* 3.56*   HGB 11.5* 11.1*   HCT 36.0* 33.7*   MCV 98.1 94.7   MCH 31.3 31.2   MCHC 31.9 32.9   RDW 15.9* 15.6*   PLT See Reflexed IPF Result See Reflexed IPF Result   MPV NOT REPORTED NOT REPORTED      BMP:   Recent Labs     11/16/21  0608 11/16/21  0608 11/16/21  1410 11/16/21  1410 11/16/21  2142 11/17/21  0511 11/17/21  0819   *   < > 127*   < > 128* 124* 126*   K 3.8  --  4.0  --   --   --  3.7   CL 90*  --  90*  --   --   --  89*   CO2 22  --  23  --   --   --  25   BUN 38*  --  42*  --   --   --  26*   CREATININE 3.14*  --  3.43*  --   --   --  2.25*   GLUCOSE 67*  --  127*  --   --   --  83   CALCIUM 8.3*  --  8.4*  --   --   --  8.0*    < > = values in this interval not displayed. SPEP:  Lab Results   Component Value Date    PROT 7.7 11/14/2021    ALBCAL 1.8 08/13/2020    ALBPCT 35 08/13/2020    LABALPH 0.2 08/13/2020    LABALPH 0.7 08/13/2020    A1PCT 5 08/13/2020    A2PCT 15 08/13/2020    LABBETA 0.9 08/13/2020    BETAPCT 17 08/13/2020    GAMGLOB 1.4 08/13/2020    GGPCT 28 08/13/2020    PATH ELECTRONICALLY SIGNED. Amauri Wise M.D. 08/13/2020    PATH ELECTRONICALLY SIGNED.  Amauri Wise M.D. 08/13/2020     C3:     Lab Results   Component Value Date    C3 114 08/13/2020     C4:     Lab Results   Component Value Date    C4 39 08/13/2020     MPO ANCA:     Lab Results   Component Value Date    MPO 14 08/13/2020     PR3 ANCA:     Lab Results   Component Value Date    PR3 8 08/13/2020     Hep BsAg:         Lab Results   Component Value Date    HEPBSAG NONREACTIVE 01/08/2021     Hep C AB:          Lab Results   Component Value Date    HEPCAB NONREACTIVE 01/08/2021       Urinalysis/Chemistries:      Lab Results   Component Value Date    NITRU NEGATIVE 01/07/2021    COLORU YELLOW 01/07/2021    PHUR 5.0 01/07/2021    WBCUA 2 TO 5 01/07/2021    RBCUA 10 TO 20 01/07/2021    MUCUS NOT REPORTED 01/07/2021    TRICHOMONAS NOT REPORTED 01/07/2021    YEAST NOT REPORTED 01/07/2021    BACTERIA NOT REPORTED 01/07/2021    SPECGRAV 1.015 01/07/2021    LEUKOCYTESUR TRACE 01/07/2021    UROBILINOGEN Normal 01/07/2021    BILIRUBINUR NEGATIVE 01/07/2021    GLUCOSEU NEGATIVE 01/07/2021    KETUA NEGATIVE 01/07/2021    AMORPHOUS NOT REPORTED 01/07/2021     Urine Sodium:     Lab Results   Component Value Date    LUKE <20 01/07/2021     Urine Osmolarity:   Lab Results   Component Value Date    OSMOU 348 01/07/2021      Urine Creatinine:     Lab Results   Component Value Date    LABCREA 133.5 01/07/2021       Radiology:     Reviewed. Assessment:     1. ESRD 2/2 chronic diabetes and HTN, on Hemodialysis. Regular HD days are Tuesday Thursday Saturday at [facility] hemodialysis facility using tunnel catheter under Dr. Gabi Jordan. Her dry weight is unknown. Admitted with 92 kg, now 86 kg. She is still volume overloaded, but net 5.3 L negative fluid balance. 2. Anemia of chronic disease  3. Secondary hyperparathyroidism  4. Hypertension  5. Hyponatremia -most likely dilutional  6. Bradycardia -Micra leadless PPM placed 11/15  7. Cirrhosis and ascites - most likely 2/2 [place]  8. Dementia - on rivastigmine patch, could be contributing to slow heart rhythm  9. CAD - had CABG a few years ago, could also be contributing to bradyarrhythmia  10. Lymphedema  11. Thrombocytopenia - platelets are 405  Plan:   1. Patient was seen and examined on HD at bedside. Orders were confirmed with the HD nurse. 2.  Confirmed with the  outpatient hemodialysis spot, patient will go back to [facility] [place] on discharge. TTS schedule. 3.  Okay to discharge from renal perspective post HD today. Nutrition   Please ensure that patient is on a renal diet/TF. Avoid nephrotoxic drugs/contrast exposure. We will continue to follow along with you.      Demi Davies, S-4  [phone]    Attending Physician Statement  I have discussed the care of Tono Guaman including pertinent history and exam findings, with the Resident/CNP/medical student. I also saw and examined the patient myself. I have reviewed all the key elements of all parts of the encounter and edited all that was required. Pedro Rosenbaum MD   Nephrology [address] [place] [address] Drive    This note is created with the assistance of a speech-recognition program. While intending to generate a document that actually reflects the content of the visit, no guarantees can be provided that every mistake has been identified and corrected by editing.

## 2021-11-17 NOTE — PROGRESS NOTES
[facility]  Office: [address], DO, Marlee Hitchcock, DO, Jer Babb, DO, Crystal Walsh [facility], DO, Mela Prieto MD, Js Loving MD, Fanny Hilton MD, Prabhu Lyle MD, Bess Gaytan MD, Garcia Hanna MD, Renny Ward MD, Jaspreet Baptiste, DO, Jas Boyd, DO, Renae Allison MD,  Staci Jacobs, DO, Aaliyah Baird MD, Abdon Rodney MD, Faith Raman MD, Louis Case MD, Natasha Soares MD, Krystina Burgos MD, Michael Bermudez MD, Candace Madison, [facility], [facility], CNP, Melissa Hodges CNP, Jumana Hernandez, CNS, Joelle Paulson, CNP, Olivier Nielsen, CNP, Carolina Wang, CNP, Olena Kaiser, CNP, Boyd Larkin, CNP, Nabil Logan PA-C, Lucila Maradiaga [facility], Christiano Shin, CNP, Danny Montoya, CNP, Gunnar May, CNP, Seng Beck, CNP, Chasity Escobar, CNP, Juanita Mcgregor, CNP, uLis Rogers, [address] [place] [address]    Progress Note    11/17/2021    10:52 AM    Name:   Theresa Ayala  MRN:     [id number]     Acct:      [de-identified]   Room:   [address] [place] [address] Day:  4  Admit Date:  11/13/2021  5:32 PM    PCP:   Ezio Arvizu DO  Code Status:  Full Code    Subjective:     C/C: bradycardia     Interval History Status: not changed. No issues overnight  Plans for pacemaker interrogation  Continue monitoring per cardiology  No other complaints  Sodium remains low     Brief History: This is a 44-year-old female who presents to our hospital with complaints of a headache. Patient found to be bradycardic during dialysis. Heart rate was noted to ranging between 30 and 40.   Patient was admitted and started on dopamine drip after EKG showed junctional rhythm.       Patient eval by cardiology, she had replacement placed on 15 however was having some significant bleeding from catheter site    Review of Systems:     Constitutional:  negative for chills, fevers, sweats  Respiratory:  negative for cough, dyspnea on exertion, shortness of breath, wheezing  Cardiovascular:  negative for chest pain, chest pressure/discomfort  Gastrointestinal:  negative for abdominal pain, constipation, diarrhea, nausea, vomiting  Neurological:  negative for dizziness, headache    Medications: Allergies:     Allergies   Allergen Reactions    Bactrim [Sulfamethoxazole-Trimethoprim] Hives    Clonidine Derivatives     Adhesive Tape Itching    Amoxicillin-Pot Clavulanate Diarrhea, Nausea Only and Nausea And Vomiting       Current Meds:   Scheduled Meds:    carvedilol  6.25 mg Oral BID WC    sodium chloride flush  5-40 mL IntraVENous 2 times per day    aspirin  81 mg Oral Daily    amLODIPine  10 mg Oral Daily    Calcium Carb-Cholecalciferol  1 tablet Oral Daily    citalopram  20 mg Oral Daily    hydrALAZINE  100 mg Oral TID    insulin glargine  10 Units SubCUTAneous Nightly    oxybutynin  5 mg Oral BID    pantoprazole  40 mg Oral BID AC    midodrine  10 mg Oral Once per day on Mon Wed Fri    sodium chloride  2 spray Nasal BID    sodium chloride flush  5-40 mL IntraVENous 2 times per day    rosuvastatin  40 mg Oral Nightly    rivastigmine  1 patch TransDERmal Daily    heparin (porcine)  5,000 Units SubCUTAneous 3 times per day    influenza virus vaccine  0.5 mL IntraMUSCular Prior to discharge    insulin lispro  0-6 Units SubCUTAneous TID WC    insulin lispro  0-3 Units SubCUTAneous Nightly     Continuous Infusions:    sodium chloride      sodium chloride      dextrose       PRN Meds: albumin human, midodrine, sodium chloride flush, sodium chloride, heparin (porcine), heparin (porcine), sodium chloride flush, sodium chloride, ondansetron **OR** ondansetron, acetaminophen **OR** acetaminophen, magnesium hydroxide, nitroGLYCERIN, perflutren lipid microspheres, diphenhydrAMINE, glucose, dextrose, glucagon (rDNA), dextrose    Data:     Past Medical History:   has a past medical history of Acute anemia, Acute cystitis, Acute kidney injury ([facility] [address] Acute kidney injury superimposed on CKD (HCC), Acute on chronic diastolic heart failure ([address] Acute renal failure (ARF) ([address] Anasarca, Anxiety and depression, AV block, 1st degree, Background diabetic retinopathy(362.01), Balance problem, BMI 45.0-49.9, adult ([facility]), Bradycardia, Buttock wound, CAD (coronary artery disease), Cancer of uterus ([facility]), Chronic diastolic heart failure ([facility]), Chronic kidney disease, Chronic low back pain, Controlled type 2 diabetes mellitus with chronic 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 dialysis, with long-term current use of insulin ([address] CVA (cerebral vascular accident) ([address] Decompensated heart failure ([address] Decubitus ulcer of trochanteric region of right hip, stage 2 ([address] Dementia ([address] Dry eye syndrome, End stage renal disease on dialysis ([address] GERD (gastroesophageal reflux disease), Glaucoma suspect, Gout, Hemodialysis patient ([address] Homonymous hemianopsia, Hyperkalemia, Hyperlipidemia, Hypertension, Infestation by bed bug, Infestation by maggots, Keratitis, Lymphedema of both lower extremities, Morbid obesity ([address] MRSA bacteremia, Obesity, PAD (peripheral artery disease) ([address] Peripheral polyneuropathy, Pressure injury of right heel, unstageable ([address] Pseudophakos, Stroke ([facility] [address] Trichiasis, Type 2 diabetes mellitus ([address] and Wounds, multiple. Social History:   reports that she has never smoked. She has never used smokeless tobacco. She reports that she does not drink alcohol and does not use drugs. Family History:   Family History   Problem Relation Age of Onset    Diabetes Mother     Cancer Mother     High Blood Pressure Mother    [employer] Stroke Mother     Kidney Disease Mother     Uterine Cancer Mother     Diabetes Father     Heart Disease Father     Glaucoma Father     Emphysema Father     Coronary Art Dis Other         All 4 siblings.  Stroke Other         1 sibling.  Lung Cancer Other         1 sibling - cause of death lung cancer.     Mental Retardation Other        Vitals:  /64   Pulse 68   Temp 97.9 °F (36.6 °C) (Oral)   Resp 12   Ht 5' 4\" (1.626 m)   Wt 190 lb 7.6 oz (86.4 kg)   SpO2 99%   BMI 32.70 kg/m²   Temp (24hrs), Av.6 °F (36.4 °C), Min:97.3 °F (36.3 °C), Max:98 °F (36.7 °C)    Recent Labs     21  1122 21  1611 21  1947 21  0755   POCGLU 90 141* 181* 78       I/O (24Hr): Intake/Output Summary (Last 24 hours) at 2021 1052  Last data filed at 2021 1815  Gross per 24 hour   Intake 200 ml   Output 3500 ml   Net -3300 ml       Labs:  Hematology:  Recent Labs     21  0608 21  0819   WBC 5.8 3.8   RBC 3.67* 3.56*   HGB 11.5* 11.1*   HCT 36.0* 33.7*   MCV 98.1 94.7   MCH 31.3 31.2   MCHC 31.9 32.9   RDW 15.9* 15.6*   PLT See Reflexed IPF Result See Reflexed IPF Result   MPV NOT REPORTED NOT REPORTED     Chemistry:  Recent Labs     21  0608 21  0608 21  1410 21  1410 21  2142 21  0511 21  0819   *   < > 127*   < > 128* 124* 126*   K 3.8  --  4.0  --   --   --  3.7   CL 90*  --  90*  --   --   --  89*   CO2 22  --  23  --   --   --  25   GLUCOSE 67*  --  127*  --   --   --  83   BUN 38*  --  42*  --   --   --  26*   CREATININE 3.14*  --  3.43*  --   --   --  2.25*   ANIONGAP 14  --  14  --   --   --  12   LABGLOM 15*  --  13*  --   --   --  22*   GFRAA 18*  --  16*  --   --   --  26*   CALCIUM 8.3*  --  8.4*  --   --   --  8.0*    < > = values in this interval not displayed.      Recent Labs     21  0902 21  1031 21  1122 21  1611 21  1947 21  0755   POCGLU 68 93 90 141* 181* 78     ABG:  Lab Results   Component Value Date    POCPH 7.284 2020    POCPCO2 54.5 2020    POCPO2 71.6 2020    POCHCO3 25.8 2020    NBEA 1 2020    PBEA NOT REPORTED 2020    KDW2UZE 28 2020    VNZH7CFI 92 2020    FIO2 2.0 2020     Lab Results   Component Value Date/Time    SPECIAL RT AC 12 ML 11/16/2021 06:11 AM     Lab Results   Component Value Date/Time    CULTURE NO GROWTH 22 HOURS 11/16/2021 06:11 AM       Radiology:  CT ABDOMEN PELVIS WO CONTRAST Additional Contrast? None    Result Date: 11/13/2021  No definite acute process is seen. 1. Cardiomegaly with partially visualized pleural effusions with consolidative changes involving the lower lobes. Superimposed pneumonia cannot be excluded. 2. Cirrhotic liver with large amount of ascites. 3. Diffuse body wall anasarca. 4. Left colon diverticulosis. CT HEAD WO CONTRAST    Result Date: 11/13/2021  No acute intracranial abnormality. Old left PCA distribution infarct with associated encephalomalacia unchanged from previous. Mild diffuse cerebral atrophy. XR CHEST PORTABLE    Result Date: 11/15/2021  1. Pacing device over the left lower chest.  No pneumothorax. 2. Interstitial prominence with bilateral perihilar opacities and bilateral pleural effusions, most consistent with edema. 3. Cardiomegaly. XR CHEST PORTABLE    Result Date: 11/13/2021  CHF findings with cardiomegaly, vascular congestion bibasilar airspace disease and pleural effusions. Physical Examination:        General appearance:  alert, cooperative and no distress  Mental Status:  oriented to person, place and time and normal affect  Lungs:  clear to auscultation bilaterally, normal effort  Heart:  regular rate and rhythm  Abdomen:  soft, nontender, nondistended, normal bowel sounds  Extremities:  no edema, redness, tenderness in the calves  Skin:  no gross lesions, rashes, induration    Assessment:        Hospital Problems           Last Modified POA    * (Principal) Symptomatic bradycardia 11/14/2021 Yes    Hypertension 11/13/2021 Yes    Hyperlipidemia 11/13/2021 Yes    CAD (coronary artery disease) 11/13/2021 Yes    Overview Signed 2/27/2014 10:22 AM by Homa Barragan MD     (with coronary artery bypass graft x4).          Controlled type 2 diabetes mellitus with chronic 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 dialysis, with long-term current use of insulin ([facility] [address] 11/13/2021 Yes    Acute on chronic diastolic heart failure ([facility] [address] 11/13/2021 Yes    Lymphedema of both lower extremities 11/13/2021 Yes    Anasarca 11/13/2021 Yes    End stage renal disease on dialysis ([facility] [address] 11/13/2021 Yes    Class 1 obesity with body mass index (BMI) of 34.0 to 34.9 in adult 11/13/2021 Yes    Hyponatremia 11/13/2021 Yes    Nonintractable headache 11/13/2021 Yes          Plan:        - Vitals, labs, imaging, medications reviewed  - Cardiology following - s/p PPM - monitor for bleeding  - Nephrology following for ESRD on HD and hyponatremia  - Monitor sodium  - Pacemaker interrogation   - DC pending further improvement in sodium     Gerald Chavez MD  11/17/2021  10:52 AM

## 2021-11-17 NOTE — PROGRESS NOTES
[facility]   Progress Note                   Date:   11/17/2021  Patient name: Carlos Gama  Date of admission:  11/13/2021  5:32 PM  MRN:   [id number]  YOB: 1950  PCP: Orion Beltran DO    Reason for Admission:     Subjective:       POD2 of Micra RV PPM for symptomatic junctional bradycardia. No Ooze today. Will DC Plavix due to thrombocytopenia and more than 1 year post CABG. Continue aspirin as outpatient.           Medications:   Scheduled Meds:   carvedilol  6.25 mg Oral BID WC    sodium chloride flush  5-40 mL IntraVENous 2 times per day    aspirin  81 mg Oral Daily    amLODIPine  10 mg Oral Daily    Calcium Carb-Cholecalciferol  1 tablet Oral Daily    citalopram  20 mg Oral Daily    hydrALAZINE  100 mg Oral TID    insulin glargine  10 Units SubCUTAneous Nightly    oxybutynin  5 mg Oral BID    pantoprazole  40 mg Oral BID AC    midodrine  10 mg Oral Once per day on Mon Wed Fri    sodium chloride  2 spray Nasal BID    sodium chloride flush  5-40 mL IntraVENous 2 times per day    rosuvastatin  40 mg Oral Nightly    rivastigmine  1 patch TransDERmal Daily    heparin (porcine)  5,000 Units SubCUTAneous 3 times per day    influenza virus vaccine  0.5 mL IntraMUSCular Prior to discharge    insulin lispro  0-6 Units SubCUTAneous TID WC    insulin lispro  0-3 Units SubCUTAneous Nightly     Continuous Infusions:   sodium chloride      sodium chloride      dextrose       CBC:   Recent Labs     11/16/21  0608   WBC 5.8   HGB 11.5*   PLT See Reflexed IPF Result     BMP:    Recent Labs     11/15/21  0457 11/15/21  2253 11/16/21  0608 11/16/21  0608 11/16/21  1410 11/16/21  2142 11/17/21  0511   *  124*   < > 126*   < > 127* 128* 124*   K 4.6  --  3.8  --  4.0  --   --    CL 90*  --  90*  --  90*  --   --    CO2 14*  --  22  --  23  --   --    BUN 58*  --  38*  --  42*  --   --    CREATININE 3.85*  --  3.14*  --  3.43*  --   --    GLUCOSE 104*  --  67*  --  127*  --   -- < > = values in this interval not displayed. Hepatic:   No results for input(s): AST, ALT, ALB, BILITOT, ALKPHOS in the last 72 hours. Lipids:   No results for input(s): CHOL, HDL in the last 72 hours. Invalid input(s): LDLCALCU  INR:   No results for input(s): INR in the last 72 hours. Objective:   Vitals: BP (!) 135/52   Pulse 64   Temp 97.7 °F (36.5 °C) (Oral)   Resp 10   Ht 5' 4\" (1.626 m)   Wt 190 lb 7.6 oz (86.4 kg)   SpO2 96%   BMI 32.70 kg/m²   General appearance: alert and cooperative with exam  HEENT: Head: Normocephalic, no lesions, without obvious abnormality. Neck: no adenopathy, no carotid bruit, no JVD, supple, symmetrical, trachea midline and thyroid not enlarged, symmetric, no tenderness/mass/nodules  Lungs: clear to auscultation bilaterally  Heart: regular rate and rhythm, S1, S2 normal, no murmur, click, rub or gallop  Abdomen: soft, non-tender; bowel sounds normal; no masses,  no organomegaly  Extremities: extremities normal, atraumatic, no cyanosis or edema  Neurologic: Mental status: Alert, oriented, thought content appropriate       EKG: Junctional bradycardia     ECHO 12/7/2020  Normal left ventricular diameter. Left ventricular systolic function is mildly reduced with LVEF 45-50%. Abnormal septal wall motion. Left ventricular ejection fraction 50 %. Grade I (mild) left ventricular diastolic dysfunction. Leftward compression of inter-ventricular septum (\"D-sign\") indicating RV  pressure/volume overload. Left atrium is severely dilated. Right atrial dilatation. Right ventricular dilatation with reduced systolic function. Aortic valve is sclerotic but opens well. Trace aortic insufficiency. Mitral annular calcification. Mild tricuspid regurgitation. Estimated right ventricular systolic pressure  is 35 mmHg. Left pleural effusion. Cardiac Cath 08/2020   Cardiac Arteries and Lesion Findings     LMCA: Normal 0% stenosis. LAD: Chronic occlusion 100 % . with patent LIMA-LAD and SVG to Diagoanl       Lesion on Mid LAD: 100% stenosis. Lesion on 1st Diag: Ostial.90% stenosis. LCx: Single stenosis. with patent SVG-OM       Lesion on Mid CX: 90% stenosis. RCA: Chronic occlusion 100 % . with left to right collaterals       Lesion on Dist RCA: 100% stenosis. Graft Lesions       Lesion on Aorta Right to R PDA: Proximal anastomosis. 100% stenosis. Cardiac Grafts      -  There is a LIMA graft that originates at the LIMA and attaches to the      Mid LAD. Patent with 0% stenosis      -  There is a Vein graft that originates at the Aorta Left and attaches to      the 1st Diag. Patent with 0% stenosis      -  There is a Vein graft that originates at the Aorta Left and attaches to      the 2nd Ob Sushma. Patent with 0% stenosis      -  There is a Vein graft that originates at the Aorta Right and attaches      to the R PDA. 100% occluded at proximal anastomosis      IMPRESSION:    1. Junctional Bradycardia, S/P Micra RV PPM implantation 11/15. 2.  Hyponatremia likely dilutional as per nephrology, history of ESRD. 3.  ICM Systolic CHF LVEF 05-35%, Grade I DD  4. H/o CABG, LIMA- LAD, SVG-OM ,SVG-D1, SVG-PDA  5. ESRD  6. DM.  7. Thrombocytopenia, platelets have been staying around 100 K. RECOMMENDATIONS:  Telemetry monitoring. Post device protocol. Interrogate pacemaker prior to discharge. Discontinue Plavix due to thrombocytopenia and continue oozing at [facility] site. Restart low dose of coreg. Continue Amlodipine and hydralazine. Ok to DC. Taylor Garner MD       Cardiovascular Fellow PGY-4  11/17/2021, 7:31 AM      Attending Physician Statement  I have discussed the case of Garry Ceja including pertinent history and exam findings with the resident. I have seen and examined the patient and the key elements of the encounter have been performed by me. I agree with the assessment, plan and orders as documented by the resident With changes made to the note. Electronically signed by Lina Fregoso MD on 11/20/2021 at 3:12 PM.    [facility]      [phone]

## 2021-11-18 PROBLEM — R51.9 NONINTRACTABLE HEADACHE: Status: RESOLVED | Noted: 2021-01-01 | Resolved: 2021-01-01

## 2021-11-18 PROBLEM — R00.1 SYMPTOMATIC BRADYCARDIA: Status: RESOLVED | Noted: 2021-01-01 | Resolved: 2021-01-01

## 2021-11-18 NOTE — DISCHARGE SUMMARY
2.15 11/18/2021    BUNCRER NOT REPORTED 11/18/2021    CALCIUM 8.2 11/18/2021    LABGLOM 23 11/18/2021    GFRAA 28 11/18/2021    GFR      11/18/2021    GFR NOT REPORTED 11/18/2021     HFP:    Lab Results   Component Value Date    PROT 7.7 11/14/2021     CMP:    Lab Results   Component Value Date    GLUCOSE 47 11/18/2021     11/18/2021    K 3.9 11/18/2021    CL 92 11/18/2021    CO2 27 11/18/2021    BUN 22 11/18/2021    CREATININE 2.15 11/18/2021    ANIONGAP 11 11/18/2021    ALKPHOS 303 11/14/2021    ALT 18 11/14/2021    AST 29 11/14/2021    BILITOT 0.60 11/14/2021    LABALBU 3.4 11/14/2021    ALBUMIN 0.8 11/14/2021    LABGLOM 23 11/18/2021    GFRAA 28 11/18/2021    GFR      11/18/2021    GFR NOT REPORTED 11/18/2021    PROT 7.7 11/14/2021    CALCIUM 8.2 11/18/2021     PT/INR:    Lab Results   Component Value Date    PROTIME 15.2 11/13/2021    INR 1.3 11/13/2021     PTT:   Lab Results   Component Value Date    APTT 37.0 11/13/2021     FLP:    Lab Results   Component Value Date    CHOL 87 11/14/2021    TRIG 49 11/14/2021    HDL 48 11/14/2021     U/A:    Lab Results   Component Value Date    COLORU YELLOW 01/07/2021    TURBIDITY CLEAR 01/07/2021    SPECGRAV 1.015 01/07/2021    HGBUR NEGATIVE 01/07/2021    PHUR 5.0 01/07/2021    PROTEINU 2+ 01/07/2021    GLUCOSEU NEGATIVE 01/07/2021    KETUA NEGATIVE 01/07/2021    BILIRUBINUR NEGATIVE 01/07/2021    UROBILINOGEN Normal 01/07/2021    NITRU NEGATIVE 01/07/2021    LEUKOCYTESUR TRACE 01/07/2021     TSH:    Lab Results   Component Value Date    TSH 3.20 12/13/2020        Radiology:  CT ABDOMEN PELVIS WO CONTRAST Additional Contrast? None    Result Date: 11/13/2021  No definite acute process is seen. 1. Cardiomegaly with partially visualized pleural effusions with consolidative changes involving the lower lobes. Superimposed pneumonia cannot be excluded. 2. Cirrhotic liver with large amount of ascites. 3. Diffuse body wall anasarca. 4. Left colon diverticulosis.      CT carvedilol 6.25 MG tablet  Commonly known as: COREG  Take 1 tablet by mouth 2 times daily (with meals)  What changed:   medication strength  how much to take     * insulin lispro 100 UNIT/ML injection vial  Commonly known as: HUMALOG  Inject 0-6 Units into the skin 3 times daily (with meals) Glucose: Dose:               No Insulin  140-199 1 Unit  200-249 2 Units  250-299 3 Units  300-349 4 Units  350-399 5 Units  Over 399 6 Units  What changed:   how much to take  how to take this  when to take this  additional instructions     * insulin lispro 100 UNIT/ML injection vial  Commonly known as: HUMALOG  Inject 0-3 Units into the skin nightly Glucose: Dose:               No Insulin  140-249 1 Unit  250-349 2 Units  Over 350 3 Units  What changed: You were already taking a medication with the same name, and this prescription was added. Make sure you understand how and when to take each. senna 8.6 MG tablet  Commonly known as: Senokot  Take 1 tablet by mouth 2 times daily  What changed:   when to take this  reasons to take this         * This list has 2 medication(s) that are the same as other medications prescribed for you. Read the directions carefully, and ask your doctor or other care provider to review them with you. CONTINUE taking these medications    Alcohol Prep Pads  Checks blood sugar 3 times a day     amLODIPine 10 MG tablet  Commonly known as: NORVASC  Take 1 tablet by mouth daily     aspirin 81 MG tablet  Take 1 tablet by mouth daily     atorvastatin 40 MG tablet  Commonly known as: LIPITOR  TAKE 1 TABLET BY MOUTH EVERYDAY     blood glucose test strips  Test 3  times a day & as needed for symptoms of irregular blood glucose. Dispense sufficient amount for indicated testing frequency plus additional to accommodate PRN testing needs.      Cholecalciferol 125 MCG (5000 UT) Chew  Take 1 tablet by mouth daily     citalopram 20 MG tablet  Commonly known as: CELEXA  Take 1 tablet by mouth daily     Compression Stockings Misc  by Does not apply route 20-30 mm Hg bilateral knee high     Diabetic Shoe Misc  by Does not apply route     hydrALAZINE 100 MG tablet  Commonly known as: APRESOLINE  Take 1 tablet by mouth 3 times daily     insulin glargine 100 UNIT/ML injection vial  Commonly known as: Lantus  Inject 10 Units into the skin nightly     Lancets Misc  Checks blood sugar ac and HS     lidocaine-prilocaine 2.5-2.5 % cream  Commonly known as: EMLA     loratadine 10 MG tablet  Commonly known as: CLARITIN  TAKE 1 TABLET(10 MG) BY MOUTH DAILY     midodrine 10 MG tablet  Commonly known as: PROAMATINE     Misc. Devices Misc  Standard walker with wheels    Diagnosis dementia, CHF     nystatin [id number] UNIT/GM cream  Commonly known as: MYCOSTATIN  Apply topically 2 times daily. oxybutynin 5 MG tablet  Commonly known as: DITROPAN  TAKE 1 TABLET BY MOUTH TWICE DAILY     OXYGEN  Oxgen at 2 liters per nasal cannula     pantoprazole 40 MG tablet  Commonly known as: PROTONIX  Take 1 tablet by mouth 2 times daily (before meals)     rivastigmine 9.5 MG/24HR  Commonly known as: Exelon  APPLY 1 NEW PATCH EVERY 24 HOURS     sodium chloride 0.65 % nasal spray  Commonly known as: OCEAN        STOP taking these medications    ciprofloxacin 250 MG tablet  Commonly known as: CIPRO     clopidogrel 75 MG tablet  Commonly known as: Plavix           Where to Get Your Medications      Information about where to get these medications is not yet available    Ask your nurse or doctor about these medications  carvedilol 6.25 MG tablet  insulin lispro 100 UNIT/ML injection vial  insulin lispro 100 UNIT/ML injection vial  nitroGLYCERIN 0.4 MG SL tablet         No discharge procedures on file. Time Spent on discharge is  31 mins in patient examination, evaluation, counseling as well as medication reconciliation, prescriptions for required medications, discharge plan and follow up.     Discussed with attending  Electronically signed by   SHIRA Sauer NP  11/18/2021  2:14 PM      Thank you Dr. Stephan Gonzalez DO for the opportunity to be involved in this patient's care. Attending Supervising Physicians Attestation Statement  I have seen and examined Albert Capellan and the valdes elements of all parts of the encounter have been performed by me. I agree with the assessment, plan and orders as documented by NP. I discussed the findings and plans with the resident physician and agree as documented in their note . In addition to the pertinent positives and negatives as stated within HPI and the review of systems as documented in the notes, all other systems were reviewed when able to and are reported negative.     Electronically signed by Agustin Styles MD on 11/19/2021 at 1:44 PM

## 2021-11-18 NOTE — PROGRESS NOTES
PATIENT REFUSES TO WEAR BIPAP     [x] Risks and benefits explained to patient   [x] Patient refuses to wear Bipap stating  Don't like it  [x] Patient verbalizes understanding of information presented.

## 2021-11-18 NOTE — PROGRESS NOTES
Comprehensive Nutrition Assessment    Type and Reason for Visit:  Reassess    Nutrition Recommendations/Plan:   - Continue Regular Diet as tolerated  - Continue high calorie / high protein oral nutrition supplement (Ensure Enlive) 3x/d as tolerated  - Monitor/encourage PO intakes     Nutrition Assessment:  Attempted to speak with patient at time of visit, but she was sleeping and did not wake to writer's voice. Per nursing documentation, patient consumed % of breakfast this morning. Weight has fluctuated from 186-205 lbs throughout admit, which is likely related to shifts in fluid 2/2 dialysis. Patient's dry weight is unknown, per Nephrology. Labs reviewed include hyponatremia. Last BM noted on 11/18. Malnutrition Assessment:  Malnutrition Status: At risk for malnutrition     Context:  Chronic Illness     Findings of the 6 clinical characteristics of malnutrition:  Energy Intake:  Mild decrease in energy intake   Weight Loss:  Unable to assess     Body Fat Loss:  No significant body fat loss     Muscle Mass Loss:  No significant muscle mass loss    Fluid Accumulation:   Mild to Moderate- Generalized, Extremities   Strength:  Not Performed    Estimated Daily Nutrient Needs:  Energy (kcal):  5639-3783 kcal/day (21-24 kcal/kg); Weight Used for Energy Requirements:  Current     Protein (g):  90 to 100 g/day (1.0 to 1.1 g/kg); Weight Used for Protein Requirements:  Current        Fluid (ml/day):  Per MD    Nutrition Related Findings:  Labs: Na 130 (L). Meds: Calcium Carb-Cholecalciferol, Lantus, Humalog. Last BM 11/18 (loose). +1/trace/+2 pitting edema. Wounds:  None       Current Nutrition Therapies:    ADULT DIET; Regular  ADULT ORAL NUTRITION SUPPLEMENT; Breakfast, Lunch, Dinner;  Low Calorie/High Protein Oral Supplement    Anthropometric Measures:  · Height: 5' 4\" (162.6 cm)  · Current Body Weight: 186 lb 11.7 oz (84.7 kg)   · Admission Body Weight: 202 lb 13.2 oz (92 kg)    · Usual Body Weight: 197 lb (89.4 kg) (Dry wt, per pt)     · Ideal Body Weight: 120 lbs; % Ideal Body Weight 155.6 %   · BMI: 32  · Adjusted Body Weight:  No Adjustment   · BMI Categories: Obese Class 1 (BMI 30.0-34. 9)       Nutrition Diagnosis:   · Inadequate oral intake related to current medical condition as evidenced by variable PO intakes and need for ONS    Nutrition Interventions:   Food and/or Nutrient Delivery:  Continue Current Diet, Continue Oral Nutrition Supplement  Nutrition Education/Counseling:  No recommendation at this time   Coordination of Nutrition Care:  Continue to monitor while inpatient    Goals:  PO intake to meet > 50% of estimated nutrition needs       Nutrition Monitoring and Evaluation:   Behavioral-Environmental Outcomes:  None Identified   Food/Nutrient Intake Outcomes:  Food and Nutrient Intake, Supplement Intake  Physical Signs/Symptoms Outcomes:  Biochemical Data, GI Status, Fluid Status or Edema, Nutrition Focused Physical Findings, Skin, Weight     Discharge Planning:     Too soon to determine     Electronically signed by Ronald Kohli RD, LD on 11/18/21 at 1:34 PM EST    Contact: [phone]

## 2021-11-18 NOTE — PROGRESS NOTES
EVERYDAY  Cholecalciferol 125 MCG (5000 UT) CHEW, Take 1 tablet by mouth daily  citalopram (CELEXA) 20 MG tablet, Take 1 tablet by mouth daily  carvedilol (COREG) 12.5 MG tablet, Take 1 tablet by mouth 2 times daily (with meals)  hydrALAZINE (APRESOLINE) 100 MG tablet, Take 1 tablet by mouth 3 times daily  insulin glargine (LANTUS) 100 UNIT/ML injection vial, Inject 10 Units into the skin nightly  insulin lispro (HUMALOG) 100 UNIT/ML injection vial, Use 4 times a day per sliding scale  nystatin (MYCOSTATIN) 886405 UNIT/GM cream, Apply topically 2 times daily. (Patient not taking: Reported on 7/30/2021)  oxybutynin (DITROPAN) 5 MG tablet, TAKE 1 TABLET BY MOUTH TWICE DAILY  clopidogrel (PLAVIX) 75 MG tablet, Take 1 tablet by mouth daily  pantoprazole (PROTONIX) 40 MG tablet, Take 1 tablet by mouth 2 times daily (before meals)  rivastigmine (EXELON) 9.5 MG/24HR, APPLY 1 NEW PATCH EVERY 24 HOURS  senna (SENOKOT) 8.6 MG tablet, Take 1 tablet by mouth 2 times daily (Patient taking differently: Take 1 tablet by mouth as needed )  OXYGEN, Oxgen at 2 liters per nasal cannula  loratadine (CLARITIN) 10 MG tablet, TAKE 1 TABLET(10 MG) BY MOUTH DAILY  Lancets MISC, Checks blood sugar ac and HS  blood glucose monitor strips, Test 3  times a day & as needed for symptoms of irregular blood glucose. Dispense sufficient amount for indicated testing frequency plus additional to accommodate PRN testing needs. Alcohol Swabs (ALCOHOL PREP) PADS, Checks blood sugar 3 times a day  Misc.  Devices MISC, Standard walker with wheels  Diagnosis dementia, CHF  aspirin 81 MG tablet, Take 1 tablet by mouth daily  Diabetic Shoe MISC, by Does not apply route  Compression Stockings MISC, by Does not apply route 20-30 mm Hg bilateral knee high    Current Medications:     Scheduled Meds:    carvedilol  6.25 mg Oral BID WC    sodium chloride flush  5-40 mL IntraVENous 2 times per day    aspirin  81 mg Oral Daily    amLODIPine  10 mg Oral Daily    Calcium Carb-Cholecalciferol  1 tablet Oral Daily    citalopram  20 mg Oral Daily    hydrALAZINE  100 mg Oral TID    insulin glargine  10 Units SubCUTAneous Nightly    oxybutynin  5 mg Oral BID    pantoprazole  40 mg Oral BID AC    midodrine  10 mg Oral Once per day on     sodium chloride  2 spray Nasal BID    sodium chloride flush  5-40 mL IntraVENous 2 times per day    rosuvastatin  40 mg Oral Nightly    rivastigmine  1 patch TransDERmal Daily    heparin (porcine)  5,000 Units SubCUTAneous 3 times per day    influenza virus vaccine  0.5 mL IntraMUSCular Prior to discharge    insulin lispro  0-6 Units SubCUTAneous TID     insulin lispro  0-3 Units SubCUTAneous Nightly     Input/Output:       I/O last 3 completed shifts: In: 100   Out: 3200 . Patient Vitals for the past 96 hrs (Last 3 readings):   Weight   21 1830 186 lb 11.7 oz (84.7 kg)   21 1445 192 lb 0.3 oz (87.1 kg)   21 1815 190 lb 7.6 oz (86.4 kg)       Vital Signs:   Temperature:  Temp: 98.1 °F (36.7 °C)  TMax:   Temp (24hrs), Av.9 °F (36.6 °C), Min:97.5 °F (36.4 °C), Max:98.9 °F (37.2 °C)    Respirations:  Resp: 14  Pulse:   Pulse: 59  BP:    BP: (!) 118/49  BP Range: Systolic (47FEZ), RYAN:403 , Min:116 , OMK:168       Diastolic (67BFY), EP, Min:46, Max:87      Physical Examination:     General:  AAO x 3, speaking in full sentences, no accessory muscle use. Eyes:   Pupils equal, round and reactive to light, EOMI. Neck:   Supple  Chest:   Bilateral vesicular breath sounds, no rales or wheezes. Cardiac:  S1 S2 RR, soft ejection murmur, gallops or rubs. Abdomen: Distended, lymphedema, nontender, no rigidity, no masses or organomegaly, BS audible. :   No suprapubic or flank tenderness. Neuro:  No FND.   SKIN:  Dry, flaky skin on LE bilaterally, diffuse lymphedema, ecchymosis on right forearm, petechiae on left hand  Extremities:  +ve 1-2+ peripheral edema    Labs:       Recent Labs 11/16/21  0608 11/17/21  0819   WBC 5.8 3.8   RBC 3.67* 3.56*   HGB 11.5* 11.1*   HCT 36.0* 33.7*   MCV 98.1 94.7   MCH 31.3 31.2   MCHC 31.9 32.9   RDW 15.9* 15.6*   PLT See Reflexed IPF Result See Reflexed IPF Result   MPV NOT REPORTED NOT REPORTED      BMP:   Recent Labs     11/16/21  0608 11/16/21  0608 11/16/21  1410 11/16/21  2142 11/17/21  0819 11/17/21  2343 11/18/21  0510   *   < > 127*   < > 126* 130* 132*   K 3.8  --  4.0  --  3.7  --   --    CL 90*  --  90*  --  89*  --   --    CO2 22  --  23  --  25  --   --    BUN 38*  --  42*  --  26*  --   --    CREATININE 3.14*  --  3.43*  --  2.25*  --   --    GLUCOSE 67*  --  127*  --  83  --   --    CALCIUM 8.3*  --  8.4*  --  8.0*  --   --     < > = values in this interval not displayed. Reviewed. Assessment:     1. ESRD 2/2 chronic diabetes and HTN, on Hemodialysis. Regular HD days are Tuesday Thursday Saturday at [facility] facility using tunnel catheter under Dr. Kimmie Webb. Her dry weight is unknown. Admitted with 92 kg, now 85 kg. Net 8.4 L negative fluid balance since admission. 2. Anemia of chronic disease, Hgb is 11.1  3. Secondary hyperparathyroidism  4. Hypertension  5. Hyponatremia -most likely dilutional  6. Bradycardia - Micra leadless PPM placed 11/15  7. Cirrhosis and ascites - most likely 2/2 [place]  8. Dementia - on rivastigmine patch, could be contributing to slow heart rhythm  9. CAD - had CABG a few years ago, could also be contributing to bradyarrhythmia  10. Lymphedema  11. Thrombocytopenia - platelets are stable at 100  Plan:   1. Discharge per primary back to [address] [place] [address]  2. Confirmed with the  outpatient hemodialysis spot, patient will go back to [facility] on discharge. TTS schedule. 3.  Okay to discharge from renal perspective today    Nutrition   Please ensure that patient is on a renal diet/TF. Avoid nephrotoxic drugs/contrast exposure. We will continue to follow along with you. Adrián Abbasi, S-4  [phone]  Attending Physician Statement  I have discussed the care of Margaret Epstein, including pertinent history and exam findings with the MS. I have reviewed the key elements of all parts of the encounter with the MS. .  Note was updated and recorded changes were made I have seen and examined the patient with the MS. .  I agree with the assessment and plan and status of the problem list as documented.   Addiitionally I recommend HD in AM.  Ione Bumpers, MD

## 2021-11-18 NOTE — PROGRESS NOTES
[facility]  Office: [address], DO, Sidney Baum, DO, Stacey Cervantes, DO, Keri Saez Blood, DO, John Sabillon MD, Darryn Vazquez MD, Meghann Woo MD, Luma Polo MD, Juana Andrade MD, Beth Lema MD, Harry Chavira MD, Gama Maharaj, DO, Elizabeth Singh, DO, Elier Carriazles MD,  Amberly Jett, DO, Lisa Malone MD, Alberto Guallpa MD, Mason Blevins MD, Akil Rios MD, Linda Rondon MD, Blaine Andujar MD, Sabina Chung MD, Brea Anderson, [facility], [facility], CNP, Lexa Thomas, CNP, Darrius Carrillo, CNS, Lina Gamboa, CNP, Fani Davalos, CNP, Ruddy Rucker, CNP, Paco Jackson, CNP, Skip Elmore, CNP, Ajay Holden PA-C, Rosy Garibay, [facility], Ying Matson, CNP, Daphney Herrera, CNP, Maria Del Rosario Dos Santos, CNP, Juan Alberto Wesley, CNP, Aaliyah Raman, CNP, Tiago Acosta, [facility], May De La Paz, [address] [place] [address]    Progress Note    11/18/2021    7:33 AM    Name:   Cynthia Yap  MRN:     [id number]     Acct:      [de-identified]   Room:   [address] [place] [address] Day:  5  Admit Date:  11/13/2021  5:32 PM    PCP:   Nate Delgado DO  Code Status:  Full Code    Subjective:     C/C: bradycardia    Interval History Status: improved. Patient seen and evaluated in room sitting at bedside eating breakfast in no acute distress. Vital signs stable. Hypoglycemic this morning. Stable for discharge    Brief History:        This is a 80-year-old female who presents to our hospital with complaints of a headache.  Patient found to be bradycardic during dialysis.  Heart rate was noted to ranging between 30 and 36.  Patient was admitted and started on dopamine drip after EKG showed junctional rhythm.       Patient eval by cardiology, she had replacement placed on 15 however was having some significant bleeding from catheter site    Review of Systems:     Constitutional:  negative for chills, fevers, sweats  Respiratory:  negative for cough, dyspnea on exertion, shortness of breath, wheezing  Cardiovascular:  negative for chest pain, chest pressure/discomfort, lower extremity edema, palpitations  Gastrointestinal:  negative for abdominal pain, constipation, diarrhea, nausea, vomiting  Neurological:  negative for dizziness, headache    Medications: Allergies:     Allergies   Allergen Reactions    Bactrim [Sulfamethoxazole-Trimethoprim] Hives    Clonidine Derivatives     Adhesive Tape Itching    Amoxicillin-Pot Clavulanate Diarrhea, Nausea Only and Nausea And Vomiting       Current Meds:   Scheduled Meds:    carvedilol  6.25 mg Oral BID WC    sodium chloride flush  5-40 mL IntraVENous 2 times per day    aspirin  81 mg Oral Daily    amLODIPine  10 mg Oral Daily    Calcium Carb-Cholecalciferol  1 tablet Oral Daily    citalopram  20 mg Oral Daily    hydrALAZINE  100 mg Oral TID    insulin glargine  10 Units SubCUTAneous Nightly    oxybutynin  5 mg Oral BID    pantoprazole  40 mg Oral BID AC    midodrine  10 mg Oral Once per day on Mon Wed Fri    sodium chloride  2 spray Nasal BID    sodium chloride flush  5-40 mL IntraVENous 2 times per day    rosuvastatin  40 mg Oral Nightly    rivastigmine  1 patch TransDERmal Daily    heparin (porcine)  5,000 Units SubCUTAneous 3 times per day    influenza virus vaccine  0.5 mL IntraMUSCular Prior to discharge    insulin lispro  0-6 Units SubCUTAneous TID WC    insulin lispro  0-3 Units SubCUTAneous Nightly     Continuous Infusions:    sodium chloride      sodium chloride      dextrose       PRN Meds: albumin human, midodrine, sodium chloride flush, sodium chloride, heparin (porcine), heparin (porcine), sodium chloride flush, sodium chloride, ondansetron **OR** ondansetron, acetaminophen **OR** acetaminophen, magnesium hydroxide, nitroGLYCERIN, perflutren lipid microspheres, diphenhydrAMINE, glucose, dextrose, glucagon (rDNA), dextrose    Data:     Past Medical History:   has a past medical history of Acute anemia, Acute cystitis, Acute kidney injury ([address] Acute kidney injury superimposed on CKD (HCC), Acute on chronic diastolic heart failure ([address] Acute renal failure (ARF) ([address] Anasarca, Anxiety and depression, AV block, 1st degree, Background diabetic retinopathy(362.01), Balance problem, BMI 45.0-49.9, adult ([address] Bradycardia, Buttock wound, CAD (coronary artery disease), Cancer of uterus ([address] Chronic diastolic heart failure (HCC), Chronic kidney disease, Chronic low back pain, Controlled type 2 diabetes mellitus with chronic 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 dialysis, with long-term current use of insulin ([address] CVA (cerebral vascular accident) ([address] Decompensated heart failure ([address] Decubitus ulcer of trochanteric region of right hip, stage 2 ([address] Dementia ([address] Dry eye syndrome, End stage renal disease on dialysis ([address] GERD (gastroesophageal reflux disease), Glaucoma suspect, Gout, Hemodialysis patient ([address] Homonymous hemianopsia, Hyperkalemia, Hyperlipidemia, Hypertension, Infestation by bed bug, Infestation by maggots, Keratitis, Lymphedema of both lower extremities, Morbid obesity ([address] MRSA bacteremia, Obesity, PAD (peripheral artery disease) ([address] Peripheral polyneuropathy, Pressure injury of right heel, unstageable ([address] Pseudophakos, Stroke ([address] Trichiasis, Type 2 diabetes mellitus ([address] and Wounds, multiple. Social History:   reports that she has never smoked. She has never used smokeless tobacco. She reports that she does not drink alcohol and does not use drugs. Family History:   Family History   Problem Relation Age of Onset    Diabetes Mother     Cancer Mother     High Blood Pressure Mother    [facility] Stroke Mother     Kidney Disease Mother     Uterine Cancer Mother     Diabetes Father     Heart Disease Father     Glaucoma Father     Emphysema Father     Coronary Art Dis Other         All 4 siblings.  Stroke Other         1 sibling.     Lung Cancer Other 1 sibling - cause of death lung cancer.  Mental Retardation Other        Vitals:  BP (!) 118/49   Pulse 59   Temp 98.1 °F (36.7 °C) (Oral)   Resp 14   Ht 5' 4\" (1.626 m)   Wt 186 lb 11.7 oz (84.7 kg)   SpO2 97%   BMI 32.05 kg/m²   Temp (24hrs), Av.9 °F (36.6 °C), Min:97.5 °F (36.4 °C), Max:98.9 °F (37.2 °C)    Recent Labs     21  0755 21  1720 21  204   POCGLU 78 130* 157* 157*       I/O (24Hr): Intake/Output Summary (Last 24 hours) at 2021 0733  Last data filed at 2021 1830  Gross per 24 hour   Intake 100 ml   Output 3200 ml   Net -3100 ml       Labs:  Hematology:  Recent Labs     21  0608 21  0819   WBC 5.8 3.8   RBC 3.67* 3.56*   HGB 11.5* 11.1*   HCT 36.0* 33.7*   MCV 98.1 94.7   MCH 31.3 31.2   MCHC 31.9 32.9   RDW 15.9* 15.6*   PLT See Reflexed IPF Result See Reflexed IPF Result   MPV NOT REPORTED NOT REPORTED     Chemistry:  Recent Labs     21  0608 21  0608 21  1410 21  2142 21  0819 21  2343 21  0510   *   < > 127*   < > 126* 130* 132*   K 3.8  --  4.0  --  3.7  --   --    CL 90*  --  90*  --  89*  --   --    CO2 22  --  23  --  25  --   --    GLUCOSE 67*  --  127*  --  83  --   --    BUN 38*  --  42*  --  26*  --   --    CREATININE 3.14*  --  3.43*  --  2.25*  --   --    ANIONGAP 14  --  14  --  12  --   --    LABGLOM 15*  --  13*  --  22*  --   --    GFRAA 18*  --  16*  --  26*  --   --    CALCIUM 8.3*  --  8.4*  --  8.0*  --   --     < > = values in this interval not displayed.      Recent Labs     21  1611 21  1947 21  0755 21  1720 21   POCGLU 141* 181* 78 130* 157* 157*     ABG:  Lab Results   Component Value Date    POCPH 7.284 2020    POCPCO2 54.5 2020    POCPO2 71.6 2020    POCHCO3 25.8 2020    NBEA 1 2020    PBEA NOT REPORTED 2020    BBY5UEX 28 2020    SKKZ1ACO 92 2020 FIO2 2.0 12/13/2020     Lab Results   Component Value Date/Time    SPECIAL RT AC 12 ML 11/16/2021 06:11 AM     Lab Results   Component Value Date/Time    CULTURE NO GROWTH 2 DAYS 11/16/2021 06:11 AM       Radiology:  CT ABDOMEN PELVIS WO CONTRAST Additional Contrast? None    Result Date: 11/13/2021  No definite acute process is seen. 1. Cardiomegaly with partially visualized pleural effusions with consolidative changes involving the lower lobes. Superimposed pneumonia cannot be excluded. 2. Cirrhotic liver with large amount of ascites. 3. Diffuse body wall anasarca. 4. Left colon diverticulosis. CT HEAD WO CONTRAST    Result Date: 11/13/2021  No acute intracranial abnormality. Old left PCA distribution infarct with associated encephalomalacia unchanged from previous. Mild diffuse cerebral atrophy. XR CHEST PORTABLE    Result Date: 11/15/2021  1. Pacing device over the left lower chest.  No pneumothorax. 2. Interstitial prominence with bilateral perihilar opacities and bilateral pleural effusions, most consistent with edema. 3. Cardiomegaly. XR CHEST PORTABLE    Result Date: 11/13/2021  CHF findings with cardiomegaly, vascular congestion bibasilar airspace disease and pleural effusions.        Physical Examination:        General appearance:  alert, cooperative and no distress  Mental Status:  oriented to person, place and time and normal affect  Lungs:  clear to auscultation bilaterally, normal effort  Heart:  regular rate and rhythm, no murmur  Abdomen:  soft, nontender, nondistended, normal bowel sounds, no masses, hepatomegaly, splenomegaly  Extremities: Bilateral lower extremity lymphedema, Ace wraps on   skin:  no gross lesions, rashes, induration, PPM site occluded with dressing    Assessment:        Hospital Problems           Last Modified POA    * (Principal) Symptomatic bradycardia 11/14/2021 Yes    Hypertension 11/13/2021 Yes    Hyperlipidemia 11/13/2021 Yes    CAD (coronary artery disease)

## 2021-11-18 NOTE — PLAN OF CARE
Nutrition Problem #1: Inadequate oral intake  Intervention: Food and/or Nutrient Delivery: Continue Current Diet, Continue Oral Nutrition Supplement  Nutritional Goals: PO intake to meet > 50% of estimated nutrition needs

## 2021-11-18 NOTE — PLAN OF CARE
Problem: Skin Integrity:  Goal: Will show no infection signs and symptoms  Description: Will show no infection signs and symptoms  11/18/2021 1326 by Tee Torres RN  Outcome: Completed  11/18/2021 0854 by Tee Torres RN  Outcome: Ongoing  Goal: Absence of new skin breakdown  Description: Absence of new skin breakdown  11/18/2021 1326 by Tee Torres RN  Outcome: Completed  11/18/2021 0854 by Tee Torres RN  Outcome: Ongoing     Problem: Falls - Risk of:  Goal: Will remain free from falls  Description: Will remain free from falls  11/18/2021 1326 by Tee Torres RN  Outcome: Completed  11/18/2021 0854 by Tee Torres RN  Outcome: Ongoing  Goal: Absence of physical injury  Description: Absence of physical injury  11/18/2021 1326 by Tee Torres RN  Outcome: Completed  11/18/2021 0854 by Tee Torres RN  Outcome: Ongoing     Problem: Pain:  Goal: Pain level will decrease  Description: Pain level will decrease  11/18/2021 1326 by Tee Torres RN  Outcome: Completed  11/18/2021 0854 by Tee Torres RN  Outcome: Ongoing  Goal: Control of acute pain  Description: Control of acute pain  11/18/2021 1326 by Tee Torres RN  Outcome: Completed  11/18/2021 0854 by Tee Torres RN  Outcome: Ongoing  Goal: Control of chronic pain  Description: Control of chronic pain  11/18/2021 1326 by Tee Torres RN  Outcome: Completed  11/18/2021 0854 by Tee Torres RN  Outcome: Ongoing

## 2021-11-19 NOTE — CARE COORDINATION
Call from [facility] with UR, patient is OON. Notified Dr. Brian Cordoba, he'd like to discharge patient if ok with cardiology. Patient had PPM today. PS Dr. Scott Negrete cardiology fellow who states discharge if patient remains stable overnight. Spoke with anne at [facility], patient is a bed hold.   Patient currently being dialyzed at bedside
Discharge [facility] Case Management Department  Written by: Briseida Julien RN    Patient Name: Robyn Soto  Attending Provider: Kylah Phelan MD  Admit Date: 2021  5:32 PM  MRN: [id number]  Account: [de-identified]                     : 1950  Discharge Date: 21       Disposition: Pt discharged to [facility] RN
KARLA spoke with Dr. Adore Villagomez, confirmed outpatient clinic. KARLA contacted [facility] to notify clinic of upcoming discharge. Clinic will anticipate patient to return on 11/18/21 for TTS schedule.
Patient scheduled for discharge at 11am. KARLA spoke with Leola at [address] [place] [address] Court to notify clinic of discharge. Clinic will anticipate patient 11/20/21 for treatment. [id number] - Placed call to [facility], spoke with Emilio Beltran, updated her that transport will be here at 6. Will fax BERTHA to [facility] at [phone].    1006 - AVS and rapid Covid test results faxed to [facility].
Placed call to [facility], and spoke with Joann Rosales, pt is a bed hold, can take when ready for discharge. 0 - Received call from Trevon at [facility], she stated that Covid testing must be performed within 72hrs of discharge.
Received voicemail from [facility], and per [facility] they transferred transportation to [address] [place] [address] call to  and  at [phone] and per  and , transport is arranged at 11am. Updated pt and pt is in agreement.
Spoke with Lubna at [employer], patient can return when medically ready, does not need precert.
plan      Discharge Plan: return to [address] [place] [address]. Spoke to Ginger. Pt is a long term resident and is a bed hold.  Updated her on plan          Electronically signed by Sheryle Kell, RN on 11/14/21 at 10:58 AM EST

## 2021-11-19 NOTE — PLAN OF CARE
Problem: Nutrition  Goal: Optimal nutrition therapy  11/19/2021 0817 by Raguel Sandhoff, RN  Outcome: Completed  11/19/2021 0816 by Raguel Sandhoff, RN  Outcome: Ongoing  11/19/2021 0513 by Jeffrey Valderrama RN  Outcome: Ongoing     Problem: Infection:  Goal: Will remain free from infection  Description: Will remain free from infection  11/19/2021 0817 by Raguel Sandhoff, RN  Outcome: Completed  11/19/2021 0816 by Raguel Sandhoff, RN  Outcome: Ongoing  11/19/2021 0513 by Jeffrey Valderrama RN  Outcome: Ongoing     Problem: Safety:  Goal: Free from accidental physical injury  Description: Free from accidental physical injury  11/19/2021 0817 by Raguel Sandhoff, RN  Outcome: Completed  11/19/2021 0816 by Raguel Sandhoff, RN  Outcome: Ongoing  11/19/2021 0513 by Jeffrey Valderrama RN  Outcome: Ongoing  Goal: Free from intentional harm  Description: Free from intentional harm  11/19/2021 0817 by Raguel Sandhoff, RN  Outcome: Completed  11/19/2021 0816 by Raguel Sandhoff, RN  Outcome: Ongoing  11/19/2021 0513 by Jeffrey Valderrama RN  Outcome: Ongoing     Problem: Daily Care:  Goal: Daily care needs are met  Description: Daily care needs are met  11/19/2021 0817 by Raguel Sandhoff, RN  Outcome: Completed  11/19/2021 0816 by Raguel Sandhoff, RN  Outcome: Ongoing  11/19/2021 0513 by Jeffrey Valderrama RN  Outcome: Ongoing     Problem: Pain:  Goal: Patient's pain/discomfort is manageable  Description: Patient's pain/discomfort is manageable  11/19/2021 0817 by Raguel Sandhoff, RN  Outcome: Completed  11/19/2021 0816 by Raguel Sandhoff, RN  Outcome: Ongoing  11/19/2021 0513 by Jeffrey Valderrama RN  Outcome: Ongoing     Problem: Skin Integrity:  Goal: Skin integrity will stabilize  Description: Skin integrity will stabilize  11/19/2021 0817 by Raguel Sandhoff, RN  Outcome: Completed  11/19/2021 0816 by Raguel Sandhoff, RN  Outcome: Ongoing  11/19/2021 0513 by Jeffrey Valderrama RN  Outcome: Ongoing     Problem: Discharge Planning:  Goal: Patients continuum of care needs are met  Description: Patients continuum of care needs are met  11/19/2021 0817 by Shira Nichole RN  Outcome: Completed  11/19/2021 0816 by Shira Nichole RN  Outcome: Ongoing  11/19/2021 0513 by Iris Garcia RN  Outcome: Ongoing

## 2021-11-19 NOTE — PROGRESS NOTES
[facility]  Office: [address], DO, Rebecca Shah, DO, Sonal Alejandra, DO, Harika Escobar Blood, DO, Tom Milton MD, Jayesh Soares MD, Phani Harmon MD, Mohini Avalos MD, Mahin Heredia MD, Omar Damon MD, Sabina Plummer MD, Jillian Argueta, DO, Carson Sanders DO, Susy Davies MD,  Amauri Baum, DO, Chelle Rushing MD, Joce Owen MD, Lorraine Spencer MD, Tiffanie Corona MD, Ari Watt MD, Cecelia Cisse MD, Rebecca Bliss MD, Noram Rhodes, [facility], [facility], CNP, Yvonne Brewer, CNP, Stephania Dubois, CNS, Lorrie Thomas, CNP, Jaylen Villafana, CNP, Ellen Maldonado, CNP, Ernesto Rios, CNP, Ernesto Kan, CNP, Jesus Orozco PA-C, Cleone Denver, [facility], Dimitri Stahl, CNP, Renu Gibbs, CNP, Larry Cornejo, CNP, Reyes Krishnan, CNP, Ashley Morales, CNP, Juan J Flores, CNP, Otoniel Hanna, [address] [place] [address]    Progress Note    11/19/2021    10:28 AM    Name:   Susanna Hennessy  MRN:     [id number]     Acct:      [de-identified]   Room:   [address] [place] [address] Day:  6  Admit Date:  11/13/2021  5:32 PM    PCP:   Araceli Ramos DO  Code Status:  Full Code    Subjective:     C/C: bradycardia     Interval History Status: not changed. No issues overnight  Sodium stable  Dc planning  Awaiting placement  Dc today     Brief History: This is a 71-year-old female who presents to our hospital with complaints of a headache. Patient found to be bradycardic during dialysis. Heart rate was noted to ranging between 30 and 40.   Patient was admitted and started on dopamine drip after EKG showed junctional rhythm.       Patient eval by cardiology, she had replacement placed on 15 however was having some significant bleeding from catheter site    Review of Systems:     Constitutional:  negative for chills, fevers, sweats  Respiratory:  negative for cough, dyspnea on exertion, shortness of breath, wheezing  Cardiovascular:  negative for chest pain, chest pressure/discomfort  Gastrointestinal:  negative for abdominal pain, constipation, diarrhea, nausea, vomiting  Neurological:  negative for dizziness, headache    Medications: Allergies:     Allergies   Allergen Reactions    Bactrim [Sulfamethoxazole-Trimethoprim] Hives    Clonidine Derivatives     Adhesive Tape Itching    Amoxicillin-Pot Clavulanate Diarrhea, Nausea Only and Nausea And Vomiting       Current Meds:   Scheduled Meds:    carvedilol  6.25 mg Oral BID WC    sodium chloride flush  5-40 mL IntraVENous 2 times per day    aspirin  81 mg Oral Daily    amLODIPine  10 mg Oral Daily    Calcium Carb-Cholecalciferol  1 tablet Oral Daily    citalopram  20 mg Oral Daily    hydrALAZINE  100 mg Oral TID    insulin glargine  10 Units SubCUTAneous Nightly    oxybutynin  5 mg Oral BID    pantoprazole  40 mg Oral BID AC    midodrine  10 mg Oral Once per day on Mon Wed Fri    sodium chloride  2 spray Nasal BID    sodium chloride flush  5-40 mL IntraVENous 2 times per day    rosuvastatin  40 mg Oral Nightly    rivastigmine  1 patch TransDERmal Daily    heparin (porcine)  5,000 Units SubCUTAneous 3 times per day    insulin lispro  0-6 Units SubCUTAneous TID WC    insulin lispro  0-3 Units SubCUTAneous Nightly     Continuous Infusions:    sodium chloride      sodium chloride      dextrose       PRN Meds: albumin human, midodrine, sodium chloride flush, sodium chloride, heparin (porcine), heparin (porcine), sodium chloride flush, sodium chloride, ondansetron **OR** ondansetron, acetaminophen **OR** acetaminophen, magnesium hydroxide, nitroGLYCERIN, perflutren lipid microspheres, diphenhydrAMINE, glucose, dextrose, glucagon (rDNA), dextrose    Data:     Past Medical History:   has a past medical history of Acute anemia, Acute cystitis, Acute kidney injury ([facility] Acute kidney injury superimposed on CKD ([facility] Acute on chronic diastolic heart failure ([facility] Acute renal failure (ARF) ([address] Anasarca, Anxiety and depression, AV block, 1st degree, Background diabetic retinopathy(362.01), Balance problem, BMI 45.0-49.9, adult (HCC), Bradycardia, Buttock wound, CAD (coronary artery disease), Cancer of uterus ([address] Chronic diastolic heart failure ([address] Chronic kidney disease, Chronic low back pain, Controlled type 2 diabetes mellitus with chronic 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 dialysis, with long-term current use of insulin ([address] CVA (cerebral vascular accident) ([address] Decompensated heart failure ([address] Decubitus ulcer of trochanteric region of right hip, stage 2 ([address] Dementia ([address] Dry eye syndrome, End stage renal disease on dialysis ([address] GERD (gastroesophageal reflux disease), Glaucoma suspect, Gout, Hemodialysis patient ([address] Homonymous hemianopsia, Hyperkalemia, Hyperlipidemia, Hypertension, Infestation by bed bug, Infestation by maggots, Keratitis, Lymphedema of both lower extremities, Morbid obesity ([address] MRSA bacteremia, Obesity, PAD (peripheral artery disease) ([address] Peripheral polyneuropathy, Pressure injury of right heel, unstageable ([address] Pseudophakos, Stroke ([address] Trichiasis, Type 2 diabetes mellitus ([address] and Wounds, multiple. Social History:   reports that she has never smoked. She has never used smokeless tobacco. She reports that she does not drink alcohol and does not use drugs. Family History:   Family History   Problem Relation Age of Onset    Diabetes Mother     Cancer Mother     High Blood Pressure Mother    Rolan Lasso Stroke Mother     Kidney Disease Mother     Uterine Cancer Mother     Diabetes Father     Heart Disease Father     Glaucoma Father     Emphysema Father     Coronary Art Dis Other         All 4 siblings.  Stroke Other         1 sibling.  Lung Cancer Other         1 sibling - cause of death lung cancer.     Mental Retardation Other        Vitals:  BP (!) 146/60   Pulse 73   Temp 98 °F (36.7 °C) (Oral)   Resp 12   Ht 5' 4\" (1.626 m)   Wt 187 lb 9.8 oz (85.1 kg)   SpO2 93%   BMI 32.20 kg/m²   Temp (24hrs), Av.5 °F (36.4 °C), Min:96.9 °F (36.1 °C), Max:98 °F (36.7 °C)    Recent Labs     21  0843 21  0858 21  1147 21  0736   POCGLU 43* 96 86 95       I/O (24Hr): Intake/Output Summary (Last 24 hours) at 2021 1028  Last data filed at 2021 1150  Gross per 24 hour   Intake 120 ml   Output --   Net 120 ml       Labs:  Hematology:  Recent Labs     21  0819 21  0755 21  0428   WBC 3.8 4.4 3.9   RBC 3.56* 3.66* 3.51*   HGB 11.1* 11.3* 11.3*   HCT 33.7* 35.0* 33.7*   MCV 94.7 95.6 96.0   MCH 31.2 30.9 32.2   MCHC 32.9 32.3 33.5   RDW 15.6* 15.6* 16.0*   PLT See Reflexed IPF Result See Reflexed IPF Result 347   MPV NOT REPORTED NOT REPORTED 10.8     Chemistry:  Recent Labs     21  0819 21  2343 21  0755 21  0755 21  1442 21  2218 21  0428   *   < > 130*   < > 126* 124* 125*   K 3.7  --  3.9  --   --   --  4.4   CL 89*  --  92*  --   --   --  88*   CO2 25  --  27  --   --   --  22   GLUCOSE 83  --  47*  --   --   --  112*   BUN 26*  --  22  --   --   --  28*   CREATININE 2.25*  --  2.15*  --   --   --  2.56*   ANIONGAP 12  --  11  --   --   --  15   LABGLOM 22*  --  23*  --   --   --  19*   GFRAA 26*  --  28*  --   --   --  23*   CALCIUM 8.0*  --  8.2*  --   --   --  8.7    < > = values in this interval not displayed.      Recent Labs     21  0828 21  0843 21  0858 21  1147 21  0736   POCGLU 157* 41* 43* 96 86 95     ABG:  Lab Results   Component Value Date    POCPH 7.284 2020    POCPCO2 54.5 2020    POCPO2 71.6 2020    POCHCO3 25.8 2020    NBEA 1 2020    PBEA NOT REPORTED 2020    IYN8GYV 28 2020    LFHG5OYZ 92 2020    FIO2 2.0 2020     Lab Results   Component Value Date/Time    SPECIAL RT AC 12 ML 2021 06:11 AM     Lab Results   Component Value Date/Time    CULTURE NO GROWTH 3 DAYS 11/16/2021 06:11 AM       Radiology:  CT ABDOMEN PELVIS WO CONTRAST Additional Contrast? None    Result Date: 11/13/2021  No definite acute process is seen. 1. Cardiomegaly with partially visualized pleural effusions with consolidative changes involving the lower lobes. Superimposed pneumonia cannot be excluded. 2. Cirrhotic liver with large amount of ascites. 3. Diffuse body wall anasarca. 4. Left colon diverticulosis. CT HEAD WO CONTRAST    Result Date: 11/13/2021  No acute intracranial abnormality. Old left PCA distribution infarct with associated encephalomalacia unchanged from previous. Mild diffuse cerebral atrophy. XR CHEST PORTABLE    Result Date: 11/15/2021  1. Pacing device over the left lower chest.  No pneumothorax. 2. Interstitial prominence with bilateral perihilar opacities and bilateral pleural effusions, most consistent with edema. 3. Cardiomegaly. XR CHEST PORTABLE    Result Date: 11/13/2021  CHF findings with cardiomegaly, vascular congestion bibasilar airspace disease and pleural effusions. Physical Examination:        General appearance:  alert, cooperative and no distress  Mental Status:  oriented to person, place and time and normal affect  Lungs:  clear to auscultation bilaterally, normal effort  Heart:  regular rate and rhythm  Abdomen:  soft, nontender, nondistended, normal bowel sounds  Extremities:  no edema, redness, tenderness in the calves  Skin:  no gross lesions, rashes, induration    Assessment:        Hospital Problems           Last Modified POA    Hypertension 11/18/2021 Yes    Hyperlipidemia 11/18/2021 Yes    CAD (coronary artery disease) 11/18/2021 Yes    Overview Signed 2/27/2014 10:22 AM by Radha Grewal MD     (with coronary artery bypass graft x4).          Controlled type 2 diabetes mellitus with chronic 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 dialysis, with long-term current use of insulin ([facility] 11/18/2021 Yes    Acute on chronic diastolic heart failure ([facility] 11/18/2021 Yes    Lymphedema of both lower extremities 11/18/2021 Yes    Anasarca 11/18/2021 Yes    End stage renal disease on dialysis ([facility] 11/18/2021 Yes    Class 1 obesity with body mass index (BMI) of 34.0 to 34.9 in adult 11/18/2021 Yes    Hyponatremia 11/18/2021 Yes          Plan:        - Vitals, labs, imaging, medications reviewed  - Cardiology following - s/p PPM - monitor for bleeding  - Nephrology following for ESRD on HD and hyponatremia  - Monitor sodium  - Pacemaker interrogation   - DC planning today     Jemal Wolf MD  11/19/2021  10:28 AM

## 2021-11-19 NOTE — PROGRESS NOTES
Renal Progress Note    Patient :  Simone Schulz; 72 y.o. MRN# [id number]  Location:  [facility]5/1005-01  Attending:  Ruthann Delgado MD  Admit Date:  11/13/2021   Hospital Day: 6      Subjective:     Patient is a 29-year-old female with ESRD 2/2 DM admitted for bradycardia recognized during her usual dialysis treatment on Saturday 11/13. Micra leadless PPM placed via Right femoral cannulation on 11/15. Echo on 11/16 shows LVEF 58%, diastolic dysfunction, RV dilation, elevated RVSP. She was seen and examined at bedside today. Transport to [facility] arranged today. She denies chest pain, SOB, fever, N/V/D. Vitals reviewed, HDS, afebrile, saturating 96% on RA. Na is 125. Hypoglycemic episode yesterday, got 50 g dextrose. Patient will be getting dialysis as per TTS schedule at her home dialysis unit-  [facility] [place]. Outpatient Medications:     Medications Prior to Admission: midodrine (PROAMATINE) 10 MG tablet, Take 10 mg by mouth three times a week tu-th-sa  sodium chloride (OCEAN) 0.65 % nasal spray, 2 sprays by Nasal route 2 times daily  lidocaine-prilocaine (EMLA) 2.5-2.5 % cream, Apply topically three times a week Apply topically as needed- Tu-Th-Sa  [DISCONTINUED] ciprofloxacin (CIPRO) 250 MG tablet,   amLODIPine (NORVASC) 10 MG tablet, Take 1 tablet by mouth daily  atorvastatin (LIPITOR) 40 MG tablet, TAKE 1 TABLET BY MOUTH EVERYDAY  Cholecalciferol 125 MCG (5000 UT) CHEW, Take 1 tablet by mouth daily  citalopram (CELEXA) 20 MG tablet, Take 1 tablet by mouth daily  hydrALAZINE (APRESOLINE) 100 MG tablet, Take 1 tablet by mouth 3 times daily  insulin glargine (LANTUS) 100 UNIT/ML injection vial, Inject 10 Units into the skin nightly  nystatin (MYCOSTATIN) 333985 UNIT/GM cream, Apply topically 2 times daily.  (Patient not taking: Reported on 7/30/2021)  oxybutynin (DITROPAN) 5 MG tablet, TAKE 1 TABLET BY MOUTH TWICE DAILY  pantoprazole (PROTONIX) 40 MG tablet, Take 1 tablet by mouth 2 times daily (before meals)  rivastigmine (EXELON) 9.5 MG/24HR, APPLY 1 NEW PATCH EVERY 24 HOURS  [DISCONTINUED] carvedilol (COREG) 12.5 MG tablet, Take 1 tablet by mouth 2 times daily (with meals)  [DISCONTINUED] insulin lispro (HUMALOG) 100 UNIT/ML injection vial, Use 4 times a day per sliding scale  [DISCONTINUED] clopidogrel (PLAVIX) 75 MG tablet, Take 1 tablet by mouth daily  senna (SENOKOT) 8.6 MG tablet, Take 1 tablet by mouth 2 times daily (Patient taking differently: Take 1 tablet by mouth as needed )  OXYGEN, Oxgen at 2 liters per nasal cannula  loratadine (CLARITIN) 10 MG tablet, TAKE 1 TABLET(10 MG) BY MOUTH DAILY  Lancets MISC, Checks blood sugar ac and HS  blood glucose monitor strips, Test 3  times a day & as needed for symptoms of irregular blood glucose. Dispense sufficient amount for indicated testing frequency plus additional to accommodate PRN testing needs. Alcohol Swabs (ALCOHOL PREP) PADS, Checks blood sugar 3 times a day  Misc.  Devices MISC, Standard walker with wheels  Diagnosis dementia, CHF  aspirin 81 MG tablet, Take 1 tablet by mouth daily  Diabetic Shoe MISC, by Does not apply route  Compression Stockings MISC, by Does not apply route 20-30 mm Hg bilateral knee high    Current Medications:     Scheduled Meds:    carvedilol  6.25 mg Oral BID WC    sodium chloride flush  5-40 mL IntraVENous 2 times per day    aspirin  81 mg Oral Daily    amLODIPine  10 mg Oral Daily    Calcium Carb-Cholecalciferol  1 tablet Oral Daily    citalopram  20 mg Oral Daily    hydrALAZINE  100 mg Oral TID    insulin glargine  10 Units SubCUTAneous Nightly    oxybutynin  5 mg Oral BID    pantoprazole  40 mg Oral BID AC    midodrine  10 mg Oral Once per day on Mon Wed Fri    sodium chloride  2 spray Nasal BID    sodium chloride flush  5-40 mL IntraVENous 2 times per day    rosuvastatin  40 mg Oral Nightly    rivastigmine  1 patch TransDERmal Daily    heparin (porcine)  5,000 Units SubCUTAneous 3 times per day    influenza virus vaccine  0.5 mL IntraMUSCular Prior to discharge    insulin lispro  0-6 Units SubCUTAneous TID     insulin lispro  0-3 Units SubCUTAneous Nightly     Input/Output:       I/O last 3 completed shifts: In: 240 [P.O.:240]  Out: - .      Patient Vitals for the past 96 hrs (Last 3 readings):   Weight   21 0519 187 lb 9.8 oz (85.1 kg)   21 1830 186 lb 11.7 oz (84.7 kg)   21 1445 192 lb 0.3 oz (87.1 kg)       Vital Signs:   Temperature:  Temp: 98 °F (36.7 °C)  TMax:   Temp (24hrs), Av.5 °F (36.4 °C), Min:96.9 °F (36.1 °C), Max:98 °F (36.7 °C)    Respirations:  Resp: 12  Pulse:   Pulse: 73  BP:    BP: (!) 146/60  BP Range: Systolic (85TRX), TKQ:402 , Min:117 , RC       Diastolic (88ZLV), JDK:41, Min:53, Max:66      Physical Examination:     General:  AAO x 3, speaking in full sentences, no accessory muscle use. Eyes:   Pupils equal, round and reactive to light, EOMI. Neck:   Supple  Chest:   Bilateral vesicular breath sounds, no rales or wheezes. Cardiac:  S1 S2 RR, soft ejection murmur, gallops or rubs. Abdomen: Distended, lymphedema, nontender, no rigidity, no masses or organomegaly, BS audible. :   No suprapubic or flank tenderness. Neuro:  No FND.   SKIN:  Dry, flaky skin on LE bilaterally, diffuse lymphedema, ecchymosis on right forearm, petechiae on left hand  Extremities:  +ve 1-2+ peripheral edema    Labs:       Recent Labs     21  0819 21  0755 21  0428   WBC 3.8 4.4 3.9   RBC 3.56* 3.66* 3.51*   HGB 11.1* 11.3* 11.3*   HCT 33.7* 35.0* 33.7*   MCV 94.7 95.6 96.0   MCH 31.2 30.9 32.2   MCHC 32.9 32.3 33.5   RDW 15.6* 15.6* 16.0*   PLT See Reflexed IPF Result See Reflexed IPF Result 347   MPV NOT REPORTED NOT REPORTED 10.8      BMP:   Recent Labs     21  0819 21  2343 21  0755 21  0755 21  1442 21  2218 21  0428   *   < > 130*   < > 126* 124* 125*   K 3.7  --  3.9  --   --   --  4.4   CL 89*  -- 92*  --   --   --  88*   CO2 25  --  27  --   --   --  22   BUN 26*  --  22  --   --   --  28*   CREATININE 2.25*  --  2.15*  --   --   --  2.56*   GLUCOSE 83  --  47*  --   --   --  112*   CALCIUM 8.0*  --  8.2*  --   --   --  8.7    < > = values in this interval not displayed. Radiology: Reviewed    Assessment:     1. ESRD 2/2 chronic diabetes and HTN, on Hemodialysis. Regular HD days are Tuesday Thursday Saturday at [facility] hemodialysis facility using tunnel catheter under Dr. Darío Victoria. Her dry weight is unknown. Admitted with 92 kg, now 85 kg  2. Anemia of chronic disease  3. Secondary hyperparathyroidism  4. Hypertension  5. Hyponatremia -most likely dilutional  6. Bradycardia - Micra leadless PPM placed 11/15  7. Cirrhosis and ascites - most likely 2/2 [place]  8. Dementia - on rivastigmine patch, could be contributing to slow heart rhythm  9. CAD - had CABG a few years ago, could also be contributing to bradyarrhythmia  10. Lymphedema  11. Thrombocytopenia - platelets are stable at 100  Plan:   1. Discharge per primary back to [facility] today  2. Confirmed with the  outpatient hemodialysis spot, patient will go back to  renal [place] on discharge. TTS schedule  3. Okay to discharge from renal perspective today    Nutrition   Please ensure that patient is on a renal diet/TF. Avoid nephrotoxic drugs/contrast exposure. We will continue to follow along with you. Aline Longo, S-4  [phone]    Attending Physician Statement  I have discussed the care of Nahun Stewart, including pertinent history and exam findings, with the Resident/CNP/medical student. I also saw and examined the patient myself. I have reviewed all the key elements of all parts of the encounter and edited all that was required. Pedro Crane MD   Nephrology [address] [place] [address] Drive    This note is created with the assistance of a speech-recognition program. While intending to generate a document that actually reflects the content of the visit, no guarantees can be provided that every mistake has been identified and corrected by editing.

## 2021-11-20 NOTE — PROGRESS NOTES
DR. Tanya Aleman - [facility] VISIT    DATE OF SERVICE: 9/28/21    [facility]    CHIEF COMPLAINT/HISTORY OF CHIEF COMPLAINT: This patient is being seen for ongoing evaluation and management of her diabetes mellitus type 2, hypertension, hyperlipidemia, end-stage renal disease on hemodialysis, chronic diastolic heart failure, dementia, anxiety, depression, and obesity. She is getting dialysis regularly. She does take insulin for her diabetes. She is on Lipitor for hyperlipidemia. She uses an Exelon patch for dementia. There are no new complaints at this time. ALLERGIES:   Allergies   Allergen Reactions    Bactrim [Sulfamethoxazole-Trimethoprim] Hives    Clonidine Derivatives     Adhesive Tape Itching    Amoxicillin-Pot Clavulanate Diarrhea, Nausea Only and Nausea And Vomiting       MEDICATIONS: As noted on the [facility], referenced and incorporated herein. PAST MEDICAL HISTORY:   Past Medical History:   Diagnosis Date    Acute anemia     Acute cystitis 8/8/2020    Acute kidney injury ([address] 8/19/2020    Acute kidney injury superimposed on CKD ([address]     Acute on chronic diastolic heart failure ([address] 8/7/2020    Acute renal failure (ARF) ([address] 12/11/2020    Anasarca 12/12/2020    Anxiety and depression     AV block, 1st degree 8/19/2020    Background diabetic retinopathy(362.01) 2008    (Mild)    Balance problem     BMI 45.0-49.9, adult ([address] 3/1/2021    Bradycardia 8/19/2020    Buttock wound 8/20/2020    CAD (coronary artery disease)     (with coronary artery bypass graft x4).     Cancer of uterus [facility])     history of, (probable cure)    Chronic diastolic heart failure ([address] 3/1/2021    Chronic kidney disease     Chronic low back pain     Controlled type 2 diabetes mellitus with chronic 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 dialysis, with long-term current use of insulin ([address]     CVA (cerebral vascular accident) ([address]     Decompensated heart failure ([address] GASTROINTESTINAL ENDOSCOPY  12/14/2020    EGD BIOPSY performed by Angely Ponec MD at [facility] Endoscopy       SOCIAL HISTORY:     Tobacco:   Social History     Tobacco Use   Smoking Status Never Smoker   Smokeless Tobacco Never Used   Tobacco Comment    never smoker eclamb rrt 7/20/17     Alcohol:   Social History     Substance and Sexual Activity   Alcohol Use No     Drugs:   Social History     Substance and Sexual Activity   Drug Use No       FAMILY HISTORY: family history includes Cancer in her mother; Coronary Art Dis in an other family member; Diabetes in her father and mother; Emphysema in her father; Glaucoma in her father; Heart Disease in her father; High Blood Pressure in her mother; Kidney Disease in her mother; [place] in an other family member; Mental Retardation in an other family member; Stroke in her mother and another family member; Uterine Cancer in her mother. REVIEW OF SYSTEMS:     Please see history of chief complaint above; otherwise no new problems with respect to General, HEENT, Cardiovascular, Respiratory, Gastrointestinal, Genitourinary, Endocrinologic, Musculoskeletal, or Neuropsychiatric complaints. PHYSICAL EXAMINATION:    Vitals: Temp: 98.1 deg F. Pulse: 43. Resp: 16. BP: 127/52. General: A 72 y.o.  female. Alert and oriented to person, place and time. She does not appear to be in any acute distress. Skin: Skin color, texture, turgor normal. No rashes or lesions. HEENT/Neck: Essentially unremarkable  Lungs: Normal - CTA without rales, rhonchi, or wheezing. Heart: regular rate and rhythm, S1, S2 normal, with a grade 2/6 systolic murmur. No click, rub or gallop No S3 or S4. Abdomen: Obese soft, non-distended, non-tender, normal active bowel sounds, no masses palpated and no hepatosplenomegaly  Extremities: No clubbing, cyanosis, or edema in any of the extremities. Neurologic: cranial nerves II-XII are grossly intact    ASSESSMENT:     Diagnosis Orders   1. Controlled type 2 diabetes mellitus with chronic 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 dialysis, with long-term current use of insulin ([address]     2. Long-term insulin use ([address]     3. Essential hypertension     4. Mixed hyperlipidemia     5. Chronic diastolic heart failure ([address]     6. End stage renal disease on dialysis ([address]     7. Hemodialysis patient ([facility]     8. Dementia without behavioral disturbance, unspecified dementia type ([facility]     9. Anxiety and depression     10. Class 1 obesity with serious comorbidity and body mass index (BMI) of 32.0 to 32.9 in adult, unspecified obesity type           PLAN:    1. Continue current treatment  2. Nursing home record reviewed and updates summarized and entered into electronic record  3. Nursing home blood sugar logs and diabetic medication administration reviewed. No changes  4. She will continue with her dialysis  5. See nursing home orders and MAR.         Electronically signed by Amber Mcrae DO on 11/20/2021 at 4:04 AM  Internal Medicine

## 2021-11-23 NOTE — PROGRESS NOTES
DR. Alec Naylor - [facility] VISIT    DATE OF SERVICE: 10/31/21    NURSING HOME: [facility]    CHIEF COMPLAINT/HISTORY OF CHIEF COMPLAINT: This patient is being seen for ongoing evaluation and management of her diabetes mellitus type 2, hypertension, hyperlipidemia, end-stage renal disease on hemodialysis, chronic diastolic heart failure, dementia, anxiety, depression, and obesity. She is getting dialysis regularly. She does take insulin for her diabetes. She is on Lipitor for hyperlipidemia. She uses an Exelon patch for dementia. There are no new complaints. ALLERGIES:   Allergies   Allergen Reactions    Bactrim [Sulfamethoxazole-Trimethoprim] Hives    Clonidine Derivatives     Adhesive Tape Itching    Amoxicillin-Pot Clavulanate Diarrhea, Nausea Only and Nausea And Vomiting       MEDICATIONS: As noted on the [facility] MAR, referenced and incorporated herein. PAST MEDICAL HISTORY:   Past Medical History:   Diagnosis Date    Acute anemia     Acute cystitis 8/8/2020    Acute kidney injury ([address] 8/19/2020    Acute kidney injury superimposed on CKD ([address]     Acute on chronic diastolic heart failure ([address] 8/7/2020    Acute renal failure (ARF) ([address] 12/11/2020    Anasarca 12/12/2020    Anxiety and depression     AV block, 1st degree 8/19/2020    Background diabetic retinopathy(362.01) 2008    (Mild)    Balance problem     BMI 45.0-49.9, adult ([address] 3/1/2021    Bradycardia 8/19/2020    Buttock wound 8/20/2020    CAD (coronary artery disease)     (with coronary artery bypass graft x4).     Cancer of uterus [facility])     history of, (probable cure)    Chronic diastolic heart failure ([address] 3/1/2021    Chronic kidney disease     Chronic low back pain     Controlled type 2 diabetes mellitus with chronic 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 dialysis, with long-term current use of insulin ([address]     CVA (cerebral vascular accident) ([address]     Decompensated heart failure ([address] 12/4/2020  Decubitus ulcer of trochanteric region of right hip, stage 2 ([address] 2020    Dementia (HCC)     (moderate)    Dry eye syndrome     End stage renal disease on dialysis ([address] 3/1/2021    GERD (gastroesophageal reflux disease)     Glaucoma suspect     Gout     Hemodialysis patient ([address] 3/1/2021    Homonymous hemianopsia 2008    (Right)    Hyperkalemia 2020    Hyperlipidemia     Hypertension     Infestation by bed bug 2020    Infestation by maggots 2020    Keratitis     (secondary to dry eye syndrome).  Lymphedema of both lower extremities 2020    Morbid obesity ([address] 3/1/2021    MRSA bacteremia 2020    Obesity     PAD (peripheral artery disease) (HCC)     Peripheral polyneuropathy     (diabetic)    Pressure injury of right heel, unstageable ([address] 2020    Pseudophakos     (Right Eye: 2012; Left Eye: 2012) - Dr. Julia Novoa.  Stroke [facility])     (Multiple) MRI of brain 10/2008 shows multiple infarcts involving the temporal and parietal areas.  Trichiasis     (left eye)    Type 2 diabetes mellitus ([address]     Wounds, multiple 2020       PAST SURGICAL HISTORY:   Past Surgical History:   Procedure Laterality Date    CATARACT REMOVAL WITH IMPLANT  2012    (Right eye) - Dr. Julia Novoa.  CATARACT REMOVAL WITH IMPLANT  2012    (Left eye) - Dr. Julia Novoa.   SECTION      CHOLECYSTECTOMY      CORONARY ARTERY BYPASS GRAFT  12-    (x4) - LIMA-LAD, SVG-diagnoal 1, SVG-OM1, and SVG-PDA. Exploration of left radial. (Dr. Katelyn De La Garza).  EYE SURGERY Left     as a child     GASTRIC BYPASS SURGERY      HYSTERECTOMY      (abdominal)  with left oophorectomy. Right ovary retained.     IR TUNNELED CATHETER PLACEMENT GREATER THAN 5 YEARS  2021    IR TUNNELED CATHETER PLACEMENT GREATER THAN 5 YEARS 2021 Jono Gamble MD [facility]    TONSILLECTOMY AND ADENOIDECTOMY      UPPER GASTROINTESTINAL ENDOSCOPY  12/14/2020    EGD BIOPSY performed by Sean Teague MD at [facility] Endoscopy       SOCIAL HISTORY:     Tobacco:   Social History     Tobacco Use   Smoking Status Never Smoker   Smokeless Tobacco Never Used   Tobacco Comment    never smoker eclamb rrt 7/20/17     Alcohol:   Social History     Substance and Sexual Activity   Alcohol Use No     Drugs:   Social History     Substance and Sexual Activity   Drug Use No       FAMILY HISTORY: family history includes Cancer in her mother; Coronary Art Dis in an other family member; Diabetes in her father and mother; Emphysema in her father; Glaucoma in her father; Heart Disease in her father; High Blood Pressure in her mother; Kidney Disease in her mother; Maia Games in an other family member; Mental Retardation in an other family member; Stroke in her mother and another family member; Uterine Cancer in her mother. REVIEW OF SYSTEMS:     Please see history of chief complaint above; otherwise no new problems with respect to General, HEENT, Cardiovascular, Respiratory, Gastrointestinal, Genitourinary, Endocrinologic, Musculoskeletal, or Neuropsychiatric complaints. PHYSICAL EXAMINATION:    Vitals: Temp: 97.3 deg F. Pulse: 61. Resp: 16. BP: 122/64. General: A 72 y.o.  female. Alert and oriented to person, place and time. She does not appear to be in any acute distress. Skin: Skin color, texture, turgor normal. No rashes or lesions. HEENT/Neck: Essentially unremarkable  Lungs: Normal - CTA without rales, rhonchi, or wheezing. Heart: regular rate and rhythm, S1, S2 normal, with a grade 2/6 systolic murmur. No click, rub or gallop No S3 or S4. Abdomen: Obese soft, non-distended, non-tender, normal active bowel sounds, no masses palpated and no hepatosplenomegaly  Extremities: No clubbing, cyanosis, or edema in any of the extremities. Neurologic: cranial nerves II-XII are grossly intact    ASSESSMENT:     Diagnosis Orders   1.  Controlled type 2 diabetes mellitus with chronic 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 dialysis, with long-term current use of insulin ([facility]     2. Long-term insulin use ([address]     3. Essential hypertension     4. Mixed hyperlipidemia     5. Chronic diastolic heart failure ([address]     6. End stage renal disease on dialysis ([address]     7. Hemodialysis patient ([facility]     8. Dementia without behavioral disturbance, unspecified dementia type ([facility]     9. Anxiety and depression     10. Class 1 obesity with serious comorbidity and body mass index (BMI) of 32.0 to 32.9 in adult, unspecified obesity type           PLAN:    1. Continue current treatment  2. Nursing home record reviewed and updates summarized and entered into electronic record  3. Nursing home blood sugar logs and diabetic medication administration reviewed. No changes  4. She will continue with her dialysis  5. See nursing home orders and MAR.         Electronically signed by Josh Justice DO on 11/23/2021 at 4:56 PM  Internal Medicine

## 2021-12-08 NOTE — PROGRESS NOTES
12/08/21  Yolanda Byron  1950    Patient Resident of [facility]    Chief Complaint  1. Weakness    2. Fatigue, unspecified type    3. Chronic diastolic heart failure ([address]    4. End stage renal disease on dialysis ([address]    5. Dementia without behavioral disturbance, unspecified dementia type [facility])        HPI:  71-year-old patient being seen at the request of the nursing staff due to overall physical decline weakness. Recent hospitalization for symptomatic bradycardia. Pacemaker was placed. She has been picked up by physical therapy. She continues with dialysis 3 times a week. Review of chart shows last A1c 4.8. Nursing staff state she has not been eating as well. Has had some low blood sugars. On examination patient declines any issues concerns. States she is feeling well however she does fall asleep during conversation. Allergies   Allergen Reactions    Bactrim [Sulfamethoxazole-Trimethoprim] Hives    Clonidine Derivatives     Adhesive Tape Itching    Amoxicillin-Pot Clavulanate Diarrhea, Nausea Only and Nausea And Vomiting       Past Medical History:   Diagnosis Date    Acute anemia     Acute cystitis 8/8/2020    Acute kidney injury ([address] 8/19/2020    Acute kidney injury superimposed on CKD ([address]     Acute on chronic diastolic heart failure ([address] 8/7/2020    Acute renal failure (ARF) ([address] 12/11/2020    Anasarca 12/12/2020    Anxiety and depression     AV block, 1st degree 8/19/2020    Background diabetic retinopathy(362.01) 2008    (Mild)    Balance problem     BMI 45.0-49.9, adult ([address] 3/1/2021    Bradycardia 8/19/2020    Buttock wound 8/20/2020    CAD (coronary artery disease)     (with coronary artery bypass graft x4).     Cancer of uterus [facility])     history of, (probable cure)    Chronic diastolic heart failure ([address] 3/1/2021    Chronic kidney disease     Chronic low back pain     Controlled type 2 diabetes mellitus with chronic 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 dialysis, with long-term current use of insulin ([address]     CVA (cerebral vascular accident) ([address]     Decompensated heart failure ([address] 2020    Decubitus ulcer of trochanteric region of right hip, stage 2 ([address] 2020    Dementia ([address]     (moderate)    Dry eye syndrome     End stage renal disease on dialysis ([address] 3/1/2021    GERD (gastroesophageal reflux disease)     Glaucoma suspect     Gout     Hemodialysis patient ([address] 3/1/2021    Homonymous hemianopsia     (Right)    Hyperkalemia 2020    Hyperlipidemia     Hypertension     Infestation by bed bug 2020    Infestation by maggots 2020    Keratitis     (secondary to dry eye syndrome).  Lymphedema of both lower extremities 2020    Morbid obesity ([address] 3/1/2021    MRSA bacteremia 2020    Obesity     PAD (peripheral artery disease) (HCC)     Peripheral polyneuropathy     (diabetic)    Pressure injury of right heel, unstageable ([address] 2020    Pseudophakos     (Right Eye: 2012; Left Eye: 2012) - Dr. Anika Hernandez.  Stroke [facility])     (Multiple) MRI of brain 10/2008 shows multiple infarcts involving the temporal and parietal areas.  Trichiasis     (left eye)    Type 2 diabetes mellitus ([address]     Wounds, multiple 2020       Past Surgical History:   Procedure Laterality Date    CATARACT REMOVAL WITH IMPLANT  2012    (Right eye) - Dr. Anika Hernandez.  CATARACT REMOVAL WITH IMPLANT  2012    (Left eye) - Dr. Anika Hernandez.   SECTION      CHOLECYSTECTOMY      CORONARY ARTERY BYPASS GRAFT  12-    (x4) - LIMA-LAD, SVG-diagnoal 1, SVG-OM1, and SVG-PDA. Exploration of left radial. (Dr. Asha Hawk).  EYE SURGERY Left     as a child     GASTRIC BYPASS SURGERY      HYSTERECTOMY      (abdominal)  with left oophorectomy. Right ovary retained.     IR TUNNELED CATHETER PLACEMENT GREATER THAN 5 YEARS  2021    IR TUNNELED CATHETER PLACEMENT GREATER THAN 5 YEARS 1/8/2021 [place], MD [facility]    TONSILLECTOMY AND ADENOIDECTOMY      UPPER GASTROINTESTINAL ENDOSCOPY  12/14/2020    EGD BIOPSY performed by Nadege Lake MD at [facility] Endoscopy       Medications as per [facility] Chart /reviewed     Social History     Socioeconomic History    Marital status:      Spouse name: Not on file    Number of children: Not on file    Years of education: Not on file    Highest education level: Not on file   Occupational History    Not on file   Tobacco Use    Smoking status: Never Smoker    Smokeless tobacco: Never Used    Tobacco comment: never smoker eclamb rrt 7/20/17   Substance and Sexual Activity    Alcohol use: No    Drug use: No    Sexual activity: Not on file   Other Topics Concern    Not on file   Social History Narrative    Not on file     Social Determinants of Health     Financial Resource Strain:     Difficulty of Paying Living Expenses: Not on file   Food Insecurity:     Worried About Running Out of Food in the Last Year: Not on file    Rosanne of Food in the Last Year: Not on file   Transportation Needs:     Lack of Transportation (Medical): Not on file    Lack of Transportation (Non-Medical):  Not on file   Physical Activity:     Days of Exercise per Week: Not on file    Minutes of Exercise per Session: Not on file   Stress:     Feeling of Stress : Not on file   Social Connections:     Frequency of Communication with Friends and Family: Not on file    Frequency of Social Gatherings with Friends and Family: Not on file    Attends Anabaptist Services: Not on file    Active Member of Clubs or Organizations: Not on file    Attends Club or Organization Meetings: Not on file    Marital Status: Not on file   Intimate Partner Violence:     Fear of Current or Ex-Partner: Not on file    Emotionally Abused: Not on file    Physically Abused: Not on file    Sexually Abused: Not on file   Housing Stability:     Unable to Pay for Housing in the Last Year: Not on file    Number of Places Lived in the Last Year: Not on file    Unstable Housing in the Last Year: Not on file       Review of Systems   Reason unable to perform ROS: Limited due to fatigue however patient declines any acute concerns. Physical Exam  Vitals and nursing note reviewed. Constitutional:       General: She is not in acute distress. Appearance: Normal appearance. She is well-developed. She is not diaphoretic. Comments: Fatigued   HENT:      Head: Normocephalic and atraumatic. Right Ear: External ear normal.      Left Ear: External ear normal.   Eyes:      General:         Right eye: No discharge. Left eye: No discharge. Neck:      Trachea: No tracheal deviation. Cardiovascular:      Rate and Rhythm: Normal rate and regular rhythm. Heart sounds: Normal heart sounds. No murmur heard. No friction rub. No gallop. Pulmonary:      Effort: Pulmonary effort is normal. No respiratory distress. Breath sounds: Normal breath sounds. No stridor. No wheezing, rhonchi or rales. Chest:      Chest wall: No tenderness. Abdominal:      General: Bowel sounds are normal. There is no distension. Palpations: Abdomen is soft. Tenderness: There is no abdominal tenderness. Musculoskeletal:         General: No swelling. Right lower leg: No edema. Left lower leg: No edema. Skin:     General: Skin is warm and dry. Capillary Refill: Capillary refill takes less than 2 seconds. Coloration: Skin is not jaundiced or pale. Findings: No rash. Neurological:      General: No focal deficit present. Mental Status: She is alert and oriented to person, place, and time. Cranial Nerves: No cranial nerve deficit. Sensory: No sensory deficit. Motor: Weakness present.       Coordination: Coordination normal.   Psychiatric:         Mood and Affect: Mood normal.         Behavior: Behavior normal. Thought Content: Thought content normal.         Judgment: Judgment normal.         Vital Signs: Temperature 97.8 °F, blood pressure 116/68, pulse 60, respirations 16, SPO2 97% on room air    Assessment:  1. Weakness    2. Fatigue, unspecified type    3. Chronic diastolic heart failure ([facility]  4. End stage renal disease on dialysis ([facility]  5. Dementia without behavioral disturbance, unspecified dementia type (HCC)  CBC, TSH, CMP, vitamin D, vitamin B12. Decrease Lantus to 5 units report blood sugars in 1 week. If persistent lows we will discontinue Lantus. Monitor dietary intake        Plan:  As noted above. Follow up for routine visit. Call sooner with concerns prior.     Electronically signed by SHIRA Wu CNP on 12/8/2021 at 4:48 PM

## 2021-12-21 NOTE — PROGRESS NOTES
DR. Perez [facility] VISIT    DATE OF SERVICE: 11/23/21    [facility]URSING [facility]    CHIEF COMPLAINT/HISTORY OF CHIEF COMPLAINT: This patient is being seen for ongoing evaluation and management of her diabetes mellitus type 2, hypertension, hyperlipidemia, end-stage renal disease on hemodialysis, chronic diastolic heart failure, dementia, anxiety, depression, and obesity. She is getting dialysis regularly. She does take insulin for her diabetes. She is on Lipitor for hyperlipidemia. She uses an Exelon patch for dementia. There are no new complaints at this time. ALLERGIES:   Allergies   Allergen Reactions    Bactrim [Sulfamethoxazole-Trimethoprim] Hives    Clonidine Derivatives     Adhesive Tape Itching    Amoxicillin-Pot Clavulanate Diarrhea, Nausea Only and Nausea And Vomiting       MEDICATIONS: As [facility], referenced and incorporated herein. PAST MEDICAL HISTORY:   Past Medical History:   Diagnosis Date    Acute anemia     Acute cystitis 8/8/2020    Acute kidney injury ([address] 8/19/2020    Acute kidney injury superimposed on CKD ([address]     Acute on chronic diastolic heart failure ([address] 8/7/2020    Acute renal failure (ARF) ([address] 12/11/2020    Anasarca 12/12/2020    Anxiety and depression     AV block, 1st degree 8/19/2020    Background diabetic retinopathy(362.01) 2008    (Mild)    Balance problem     BMI 45.0-49.9, adult ([address] 3/1/2021    Bradycardia 8/19/2020    Buttock wound 8/20/2020    CAD (coronary artery disease)     (with coronary artery bypass graft x4).     Cancer of uterus [facility])     history of, (probable cure)    Chronic diastolic heart failure ([address] 3/1/2021    Chronic kidney disease     Chronic low back pain     Controlled type 2 diabetes mellitus with chronic 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 dialysis, with long-term current use of insulin (HCC)     CVA (cerebral vascular accident) ([address]     Decompensated heart failure ([address] 2020    Decubitus ulcer of trochanteric region of right hip, stage 2 ([address] 2020    Dementia (HCC)     (moderate)    Dry eye syndrome     End stage renal disease on dialysis ([address] 3/1/2021    GERD (gastroesophageal reflux disease)     Glaucoma suspect     Gout     Hemodialysis patient ([address] 3/1/2021    Homonymous hemianopsia     (Right)    Hyperkalemia 2020    Hyperlipidemia     Hypertension     Infestation by bed bug 2020    Infestation by maggots 2020    Keratitis     (secondary to dry eye syndrome).  Lymphedema of both lower extremities 2020    Morbid obesity ([address] 3/1/2021    MRSA bacteremia 2020    Obesity     PAD (peripheral artery disease) (HCC)     Peripheral polyneuropathy     (diabetic)    Pressure injury of right heel, unstageable ([address] 2020    Pseudophakos     (Right Eye: 2012; Left Eye: 2012) - Dr. Alma Bermudez.  Stroke [facility])     (Multiple) MRI of brain 10/2008 shows multiple infarcts involving the temporal and parietal areas.  Trichiasis     (left eye)    Type 2 diabetes mellitus ([address]     Wounds, multiple 2020       PAST SURGICAL HISTORY:   Past Surgical History:   Procedure Laterality Date    CATARACT REMOVAL WITH IMPLANT  2012    (Right eye) - Dr. Alma Bermudez.  CATARACT REMOVAL WITH IMPLANT  2012    (Left eye) - Dr. Alma Bermudez.   SECTION      CHOLECYSTECTOMY      CORONARY ARTERY BYPASS GRAFT  12-    (x4) - LIMA-LAD, SVG-diagnoal 1, SVG-OM1, and SVG-PDA. Exploration of left radial. (Dr. Lauryn Dos Santos).  EYE SURGERY Left     as a child     GASTRIC BYPASS SURGERY      HYSTERECTOMY      (abdominal)  with left oophorectomy. Right ovary retained.     IR TUNNELED CATHETER PLACEMENT GREATER THAN 5 YEARS  2021    IR TUNNELED CATHETER PLACEMENT GREATER THAN 5 YEARS 2021 Ana Paula Cantu MD [facility] SPECIAL PROCEDURES    TONSILLECTOMY AND ADENOIDECTOMY      UPPER GASTROINTESTINAL ENDOSCOPY  12/14/2020    EGD BIOPSY performed by Salinas Stafford MD at [facility] End[facility]       SOCIAL HISTORY:     Tobacco:   Social History     Tobacco Use   Smoking Status Never Smoker   Smokeless Tobacco Never Used   Tobacco Comment    never smoker eclamb rrt 7/20/17     Alcohol:   Social History     Substance and Sexual Activity   Alcohol Use No     Drugs:   Social History     Substance and Sexual Activity   Drug Use No       FAMILY HISTORY: family history includes Cancer in her mother; Coronary Art Dis in an other family member; Diabetes in her father and mother; Emphysema in her father; Glaucoma in her father; Heart Disease in her father; High Blood Pressure in her mother; Kidney Disease in her mother; Marge Sharps in an other family member; Mental Retardation in an other family member; Stroke in her mother and another family member; Uterine Cancer in her mother. REVIEW OF SYSTEMS:     Please see history of chief complaint above; otherwise no new problems with respect to General, HEENT, Cardiovascular, Respiratory, Gastrointestinal, Genitourinary, Endocrinologic, Musculoskeletal, or Neuropsychiatric complaints. PHYSICAL EXAMINATION:    Vitals: Temp: 98.0 deg F. Pulse: 68. Resp: 16. BP: 138/71. General: A 80 y.o.  female. Alert and oriented to person, place and time. She does not appear to be in any acute distress. Skin: Skin color, texture, turgor normal. No rashes or lesions. HEENT/Neck: Essentially unremarkable  Lungs: Normal - CTA without rales, rhonchi, or wheezing. Heart: regular rate and rhythm, S1, S2 normal, with a grade 2/6 systolic murmur. No click, rub or gallop No S3 or S4. Abdomen: Obese soft, non-distended, non-tender, normal active bowel sounds, no masses palpated and no hepatosplenomegaly  Extremities: No clubbing, cyanosis, or edema in any of the extremities. Neurologic: cranial nerves II-XII are grossly intact    ASSESSMENT:     Diagnosis Orders   1. Controlled type 2 diabetes mellitus with chronic 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 dialysis, with long-term current use of insulin ([address]     2. Long-term insulin use ([address]     3. Essential hypertension     4. Mixed hyperlipidemia     5. Chronic diastolic heart failure ([address]     6. End stage renal disease on dialysis ([address]     7. Hemodialysis patient ([facility]     8. Dementia without behavioral disturbance, unspecified dementia type ([facility]     9. Anxiety and depression     10. Class 1 obesity with serious comorbidity and body mass index (BMI) of 32.0 to 32.9 in adult, unspecified obesity type           PLAN:    1. Continue current treatment  2. Nursing home record reviewed and updates summarized and entered into electronic record  3. Nursing home blood sugar logs and diabetic medication administration reviewed. No changes  4. She will continue with her dialysis  5. See nursing home orders and MAR.         Electronically signed by Bennie Guerrero DO on 12/21/2021 at 12:12 PM  Internal Medicine

## 2022-01-01 ENCOUNTER — TELEPHONE (OUTPATIENT)
Dept: INTERNAL MEDICINE | Age: 72
End: 2022-01-01

## 2022-01-01 ENCOUNTER — APPOINTMENT (OUTPATIENT)
Dept: GENERAL RADIOLOGY | Age: 72
DRG: 280 | End: 2022-01-01
Attending: INTERNAL MEDICINE
Payer: MEDICARE

## 2022-01-01 ENCOUNTER — OFFICE VISIT (OUTPATIENT)
Dept: NEUROLOGY | Age: 72
End: 2022-01-01
Payer: MEDICARE

## 2022-01-01 ENCOUNTER — APPOINTMENT (OUTPATIENT)
Dept: ULTRASOUND IMAGING | Age: 72
DRG: 291 | End: 2022-01-01
Attending: INTERNAL MEDICINE
Payer: MEDICARE

## 2022-01-01 ENCOUNTER — HOSPITAL ENCOUNTER (INPATIENT)
Age: 72
LOS: 2 days | DRG: 291 | End: 2022-03-21
Attending: INTERNAL MEDICINE | Admitting: HOSPITALIST
Payer: MEDICARE

## 2022-01-01 ENCOUNTER — HOSPITAL ENCOUNTER (EMERGENCY)
Age: 72
Discharge: ANOTHER HOSPITAL | End: 2022-01-26
Attending: EMERGENCY MEDICINE
Payer: MEDICARE

## 2022-01-01 ENCOUNTER — OUTSIDE SERVICES (OUTPATIENT)
Dept: INTERNAL MEDICINE | Age: 72
End: 2022-01-01
Payer: MEDICARE

## 2022-01-01 ENCOUNTER — HOSPITAL ENCOUNTER (OUTPATIENT)
Dept: NEUROLOGY | Age: 72
Discharge: HOME | End: 2022-03-14
Payer: MEDICARE

## 2022-01-01 ENCOUNTER — APPOINTMENT (OUTPATIENT)
Dept: GENERAL RADIOLOGY | Age: 72
End: 2022-01-01
Payer: MEDICARE

## 2022-01-01 ENCOUNTER — APPOINTMENT (OUTPATIENT)
Dept: CT IMAGING | Age: 72
End: 2022-01-01
Payer: MEDICARE

## 2022-01-01 ENCOUNTER — PROCEDURE VISIT (OUTPATIENT)
Dept: CARDIOLOGY | Age: 72
End: 2022-01-01
Payer: MEDICARE

## 2022-01-01 ENCOUNTER — APPOINTMENT (OUTPATIENT)
Dept: DIALYSIS | Age: 72
DRG: 291 | End: 2022-01-01
Attending: INTERNAL MEDICINE
Payer: MEDICARE

## 2022-01-01 ENCOUNTER — HOSPITAL ENCOUNTER (EMERGENCY)
Age: 72
Discharge: ANOTHER HOSPITAL | End: 2022-03-19
Attending: EMERGENCY MEDICINE
Payer: MEDICARE

## 2022-01-01 ENCOUNTER — APPOINTMENT (OUTPATIENT)
Dept: GENERAL RADIOLOGY | Age: 72
DRG: 291 | End: 2022-01-01
Attending: INTERNAL MEDICINE
Payer: MEDICARE

## 2022-01-01 ENCOUNTER — TELEPHONE (OUTPATIENT)
Dept: CARDIOLOGY | Age: 72
End: 2022-01-01

## 2022-01-01 ENCOUNTER — APPOINTMENT (OUTPATIENT)
Dept: INTERVENTIONAL RADIOLOGY/VASCULAR | Age: 72
DRG: 280 | End: 2022-01-01
Attending: INTERNAL MEDICINE
Payer: MEDICARE

## 2022-01-01 ENCOUNTER — HOSPITAL ENCOUNTER (INPATIENT)
Age: 72
LOS: 4 days | Discharge: NURSING, REHAB OR LONG-TERM CARE | DRG: 280 | End: 2022-01-30
Attending: INTERNAL MEDICINE | Admitting: INTERNAL MEDICINE
Payer: MEDICARE

## 2022-01-01 VITALS
SYSTOLIC BLOOD PRESSURE: 114 MMHG | DIASTOLIC BLOOD PRESSURE: 64 MMHG | WEIGHT: 162.8 LBS | OXYGEN SATURATION: 99 % | BODY MASS INDEX: 27.79 KG/M2 | HEART RATE: 58 BPM | HEIGHT: 64 IN

## 2022-01-01 VITALS
WEIGHT: 174.38 LBS | HEART RATE: 53 BPM | HEIGHT: 64 IN | SYSTOLIC BLOOD PRESSURE: 122 MMHG | OXYGEN SATURATION: 99 % | BODY MASS INDEX: 29.77 KG/M2 | TEMPERATURE: 98 F | RESPIRATION RATE: 16 BRPM | DIASTOLIC BLOOD PRESSURE: 56 MMHG

## 2022-01-01 VITALS
RESPIRATION RATE: 19 BRPM | HEART RATE: 65 BPM | OXYGEN SATURATION: 94 % | TEMPERATURE: 97.5 F | WEIGHT: 177.4 LBS | SYSTOLIC BLOOD PRESSURE: 108 MMHG | DIASTOLIC BLOOD PRESSURE: 52 MMHG | BODY MASS INDEX: 30.45 KG/M2

## 2022-01-01 VITALS
HEIGHT: 66 IN | DIASTOLIC BLOOD PRESSURE: 49 MMHG | BODY MASS INDEX: 23.77 KG/M2 | SYSTOLIC BLOOD PRESSURE: 112 MMHG | OXYGEN SATURATION: 100 % | WEIGHT: 147.93 LBS | TEMPERATURE: 98.4 F

## 2022-01-01 VITALS
WEIGHT: 150 LBS | HEIGHT: 66 IN | HEART RATE: 60 BPM | SYSTOLIC BLOOD PRESSURE: 118 MMHG | TEMPERATURE: 96.3 F | DIASTOLIC BLOOD PRESSURE: 55 MMHG | RESPIRATION RATE: 15 BRPM | BODY MASS INDEX: 24.11 KG/M2 | OXYGEN SATURATION: 94 %

## 2022-01-01 VITALS
SYSTOLIC BLOOD PRESSURE: 107 MMHG | OXYGEN SATURATION: 98 % | TEMPERATURE: 98.2 F | DIASTOLIC BLOOD PRESSURE: 47 MMHG | HEART RATE: 91 BPM | RESPIRATION RATE: 16 BRPM

## 2022-01-01 DIAGNOSIS — Z99.2 END STAGE RENAL DISEASE ON DIALYSIS (HCC): ICD-10-CM

## 2022-01-01 DIAGNOSIS — Z00.8 ENCOUNTER FOR OTHER GENERAL EXAMINATION: Primary | ICD-10-CM

## 2022-01-01 DIAGNOSIS — I50.32 CHRONIC DIASTOLIC HEART FAILURE (HCC): ICD-10-CM

## 2022-01-01 DIAGNOSIS — I69.30 CHRONIC ISCHEMIC LEFT PCA STROKE: ICD-10-CM

## 2022-01-01 DIAGNOSIS — E11.22 CONTROLLED TYPE 2 DIABETES MELLITUS WITH CHRONIC KIDNEY DISEASE ON CHRONIC DIALYSIS, WITH LONG-TERM CURRENT USE OF INSULIN (HCC): Primary | ICD-10-CM

## 2022-01-01 DIAGNOSIS — Z79.4 CONTROLLED TYPE 2 DIABETES MELLITUS WITH CHRONIC KIDNEY DISEASE ON CHRONIC DIALYSIS, WITH LONG-TERM CURRENT USE OF INSULIN (HCC): ICD-10-CM

## 2022-01-01 DIAGNOSIS — G62.9 PERIPHERAL POLYNEUROPATHY: ICD-10-CM

## 2022-01-01 DIAGNOSIS — I25.10 CORONARY ARTERY DISEASE INVOLVING NATIVE CORONARY ARTERY OF NATIVE HEART WITHOUT ANGINA PECTORIS: ICD-10-CM

## 2022-01-01 DIAGNOSIS — R21 RASH: Primary | ICD-10-CM

## 2022-01-01 DIAGNOSIS — F32.A ANXIETY AND DEPRESSION: ICD-10-CM

## 2022-01-01 DIAGNOSIS — G31.9 ACQUIRED CEREBRAL ATROPHY (HCC): ICD-10-CM

## 2022-01-01 DIAGNOSIS — E66.3 OVERWEIGHT: ICD-10-CM

## 2022-01-01 DIAGNOSIS — I07.1 SEVERE TRICUSPID REGURGITATION: ICD-10-CM

## 2022-01-01 DIAGNOSIS — R42 DIZZINESS AND GIDDINESS: ICD-10-CM

## 2022-01-01 DIAGNOSIS — F03.90 DEMENTIA WITHOUT BEHAVIORAL DISTURBANCE, UNSPECIFIED DEMENTIA TYPE: ICD-10-CM

## 2022-01-01 DIAGNOSIS — I44.0 AV BLOCK, 1ST DEGREE: ICD-10-CM

## 2022-01-01 DIAGNOSIS — R51.9 FRONTAL HEADACHE: Primary | ICD-10-CM

## 2022-01-01 DIAGNOSIS — Z79.4 LONG-TERM INSULIN USE (HCC): ICD-10-CM

## 2022-01-01 DIAGNOSIS — I10 ESSENTIAL HYPERTENSION: ICD-10-CM

## 2022-01-01 DIAGNOSIS — E11.22 CONTROLLED TYPE 2 DIABETES MELLITUS WITH CHRONIC KIDNEY DISEASE ON CHRONIC DIALYSIS, WITH LONG-TERM CURRENT USE OF INSULIN (HCC): ICD-10-CM

## 2022-01-01 DIAGNOSIS — N18.6 CONTROLLED TYPE 2 DIABETES MELLITUS WITH CHRONIC KIDNEY DISEASE ON CHRONIC DIALYSIS, WITH LONG-TERM CURRENT USE OF INSULIN (HCC): Primary | ICD-10-CM

## 2022-01-01 DIAGNOSIS — Z99.2 CONTROLLED TYPE 2 DIABETES MELLITUS WITH CHRONIC KIDNEY DISEASE ON CHRONIC DIALYSIS, WITH LONG-TERM CURRENT USE OF INSULIN (HCC): Primary | ICD-10-CM

## 2022-01-01 DIAGNOSIS — G93.89 ENCEPHALOMALACIA: ICD-10-CM

## 2022-01-01 DIAGNOSIS — N18.6 END STAGE RENAL DISEASE ON DIALYSIS (HCC): ICD-10-CM

## 2022-01-01 DIAGNOSIS — Z99.2 HEMODIALYSIS PATIENT (HCC): ICD-10-CM

## 2022-01-01 DIAGNOSIS — I73.9 PAD (PERIPHERAL ARTERY DISEASE) (HCC): ICD-10-CM

## 2022-01-01 DIAGNOSIS — D69.6 THROMBOCYTOPENIA (HCC): Primary | ICD-10-CM

## 2022-01-01 DIAGNOSIS — K92.2 GASTROINTESTINAL HEMORRHAGE, UNSPECIFIED GASTROINTESTINAL HEMORRHAGE TYPE: ICD-10-CM

## 2022-01-01 DIAGNOSIS — Z99.2 CONTROLLED TYPE 2 DIABETES MELLITUS WITH CHRONIC KIDNEY DISEASE ON CHRONIC DIALYSIS, WITH LONG-TERM CURRENT USE OF INSULIN (HCC): ICD-10-CM

## 2022-01-01 DIAGNOSIS — E16.2 HYPOGLYCEMIA: ICD-10-CM

## 2022-01-01 DIAGNOSIS — N18.6 CONTROLLED TYPE 2 DIABETES MELLITUS WITH CHRONIC KIDNEY DISEASE ON CHRONIC DIALYSIS, WITH LONG-TERM CURRENT USE OF INSULIN (HCC): ICD-10-CM

## 2022-01-01 DIAGNOSIS — Z95.0 PACEMAKER: ICD-10-CM

## 2022-01-01 DIAGNOSIS — D69.6 THROMBOCYTOPENIA (HCC): ICD-10-CM

## 2022-01-01 DIAGNOSIS — R51.9 FRONTAL HEADACHE: ICD-10-CM

## 2022-01-01 DIAGNOSIS — D64.9 ACUTE ANEMIA: Primary | ICD-10-CM

## 2022-01-01 DIAGNOSIS — R00.1 BRADYCARDIA: ICD-10-CM

## 2022-01-01 DIAGNOSIS — J96.11 CHRONIC RESPIRATORY FAILURE WITH HYPOXIA (HCC): ICD-10-CM

## 2022-01-01 DIAGNOSIS — R77.8 ELEVATED TROPONIN: ICD-10-CM

## 2022-01-01 DIAGNOSIS — R06.02 SHORTNESS OF BREATH: Primary | ICD-10-CM

## 2022-01-01 DIAGNOSIS — I27.20 PULMONARY HTN (HCC): ICD-10-CM

## 2022-01-01 DIAGNOSIS — F41.9 ANXIETY AND DEPRESSION: ICD-10-CM

## 2022-01-01 DIAGNOSIS — R23.3 PETECHIAE: Primary | ICD-10-CM

## 2022-01-01 DIAGNOSIS — Z79.4 CONTROLLED TYPE 2 DIABETES MELLITUS WITH CHRONIC KIDNEY DISEASE ON CHRONIC DIALYSIS, WITH LONG-TERM CURRENT USE OF INSULIN (HCC): Primary | ICD-10-CM

## 2022-01-01 DIAGNOSIS — E78.2 MIXED HYPERLIPIDEMIA: ICD-10-CM

## 2022-01-01 LAB
ABSOLUTE EOS #: 0.03 K/UL (ref 0–0.44)
ABSOLUTE EOS #: 0.03 K/UL (ref 0–0.44)
ABSOLUTE EOS #: 0.07 K/UL (ref 0–0.4)
ABSOLUTE IMMATURE GRANULOCYTE: 0 K/UL (ref 0–0.3)
ABSOLUTE IMMATURE GRANULOCYTE: 0.03 K/UL (ref 0–0.3)
ABSOLUTE IMMATURE GRANULOCYTE: <0.03 K/UL (ref 0–0.3)
ABSOLUTE LYMPH #: 0.73 K/UL (ref 1.1–3.7)
ABSOLUTE LYMPH #: 0.82 K/UL (ref 1.1–3.7)
ABSOLUTE LYMPH #: 0.82 K/UL (ref 1–4.8)
ABSOLUTE MONO #: 0.36 K/UL (ref 0.1–1.2)
ABSOLUTE MONO #: 0.48 K/UL (ref 0.1–1.2)
ABSOLUTE MONO #: 0.68 K/UL (ref 0.1–1.2)
AFP: 1.7 UG/L
ALBUMIN SERPL-MCNC: 2.8 G/DL (ref 3.5–5.2)
ALBUMIN SERPL-MCNC: 3 G/DL (ref 3.5–5.2)
ALBUMIN SERPL-MCNC: 3.1 G/DL (ref 3.5–5.2)
ALBUMIN SERPL-MCNC: 3.3 G/DL (ref 3.5–5.2)
ALBUMIN/GLOBULIN RATIO: 0.7 (ref 1–2.5)
ALBUMIN/GLOBULIN RATIO: 0.8 (ref 1–2.5)
ALBUMIN/GLOBULIN RATIO: ABNORMAL (ref 1–2.5)
ALBUMIN/GLOBULIN RATIO: ABNORMAL (ref 1–2.5)
ALP BLD-CCNC: 310 U/L (ref 35–104)
ALP BLD-CCNC: 315 U/L (ref 35–104)
ALP BLD-CCNC: 323 U/L (ref 35–104)
ALP BLD-CCNC: 371 U/L (ref 35–104)
ALPHA-1 ANTITRYPSIN: 190 MG/DL (ref 90–200)
ALT SERPL-CCNC: 13 U/L (ref 5–33)
ALT SERPL-CCNC: 14 U/L (ref 5–33)
ALT SERPL-CCNC: 14 U/L (ref 5–33)
ALT SERPL-CCNC: 29 U/L (ref 5–33)
ANION GAP SERPL CALCULATED.3IONS-SCNC: 10 MMOL/L (ref 9–17)
ANION GAP SERPL CALCULATED.3IONS-SCNC: 10 MMOL/L (ref 9–17)
ANION GAP SERPL CALCULATED.3IONS-SCNC: 11 MMOL/L (ref 9–17)
ANION GAP SERPL CALCULATED.3IONS-SCNC: 12 MMOL/L (ref 9–17)
ANION GAP SERPL CALCULATED.3IONS-SCNC: 13 MMOL/L (ref 9–17)
ANION GAP SERPL CALCULATED.3IONS-SCNC: 7 MMOL/L (ref 9–17)
ANION GAP SERPL CALCULATED.3IONS-SCNC: 8 MMOL/L (ref 9–17)
ANION GAP SERPL CALCULATED.3IONS-SCNC: 8 MMOL/L (ref 9–17)
ANION GAP SERPL CALCULATED.3IONS-SCNC: 9 MMOL/L (ref 9–17)
ANTI DNA DOUBLE STRANDED: 8.7 IU/ML
ANTI-NUCLEAR ANTIBODY (ANA): NEGATIVE
AST SERPL-CCNC: 23 U/L
AST SERPL-CCNC: 25 U/L
AST SERPL-CCNC: 29 U/L
AST SERPL-CCNC: 76 U/L
BASOPHILS # BLD: 0 % (ref 0–1)
BASOPHILS # BLD: 0 % (ref 0–2)
BASOPHILS # BLD: 0 % (ref 0–2)
BASOPHILS ABSOLUTE: 0 K/UL (ref 0–0.2)
BASOPHILS ABSOLUTE: 0.03 K/UL (ref 0–0.2)
BASOPHILS ABSOLUTE: <0.03 K/UL (ref 0–0.2)
BILIRUB SERPL-MCNC: 0.48 MG/DL (ref 0.3–1.2)
BILIRUB SERPL-MCNC: 0.51 MG/DL (ref 0.3–1.2)
BILIRUB SERPL-MCNC: 0.54 MG/DL (ref 0.3–1.2)
BILIRUB SERPL-MCNC: 0.64 MG/DL (ref 0.3–1.2)
BILIRUBIN DIRECT: 0.26 MG/DL
BILIRUBIN DIRECT: 0.29 MG/DL
BILIRUBIN, INDIRECT: 0.22 MG/DL (ref 0–1)
BILIRUBIN, INDIRECT: 0.22 MG/DL (ref 0–1)
BNP INTERPRETATION: ABNORMAL
BUN BLDV-MCNC: 12 MG/DL (ref 8–23)
BUN BLDV-MCNC: 16 MG/DL (ref 8–23)
BUN BLDV-MCNC: 20 MG/DL (ref 8–23)
BUN BLDV-MCNC: 20 MG/DL (ref 8–23)
BUN BLDV-MCNC: 22 MG/DL (ref 8–23)
BUN BLDV-MCNC: 23 MG/DL (ref 8–23)
BUN BLDV-MCNC: 28 MG/DL (ref 8–23)
BUN BLDV-MCNC: 28 MG/DL (ref 8–23)
BUN BLDV-MCNC: 33 MG/DL (ref 8–23)
BUN/CREAT BLD: 10 (ref 9–20)
BUN/CREAT BLD: 10 (ref 9–20)
BUN/CREAT BLD: 12 (ref 9–20)
BUN/CREAT BLD: 8 (ref 9–20)
BUN/CREAT BLD: 9 (ref 9–20)
CALCIUM SERPL-MCNC: 7.9 MG/DL (ref 8.6–10.4)
CALCIUM SERPL-MCNC: 8.2 MG/DL (ref 8.6–10.4)
CALCIUM SERPL-MCNC: 8.8 MG/DL (ref 8.6–10.4)
CALCIUM SERPL-MCNC: 8.8 MG/DL (ref 8.6–10.4)
CALCIUM SERPL-MCNC: 8.9 MG/DL (ref 8.6–10.4)
CALCIUM SERPL-MCNC: 8.9 MG/DL (ref 8.6–10.4)
CALCIUM SERPL-MCNC: 9 MG/DL (ref 8.6–10.4)
CALCIUM SERPL-MCNC: 9.1 MG/DL (ref 8.6–10.4)
CALCIUM SERPL-MCNC: 9.3 MG/DL (ref 8.6–10.4)
CHLORIDE BLD-SCNC: 100 MMOL/L (ref 98–107)
CHLORIDE BLD-SCNC: 102 MMOL/L (ref 98–107)
CHLORIDE BLD-SCNC: 103 MMOL/L (ref 98–107)
CHLORIDE BLD-SCNC: 96 MMOL/L (ref 98–107)
CHLORIDE BLD-SCNC: 97 MMOL/L (ref 98–107)
CHLORIDE BLD-SCNC: 97 MMOL/L (ref 98–107)
CHLORIDE BLD-SCNC: 99 MMOL/L (ref 98–107)
CO2: 20 MMOL/L (ref 20–31)
CO2: 22 MMOL/L (ref 20–31)
CO2: 22 MMOL/L (ref 20–31)
CO2: 24 MMOL/L (ref 20–31)
CO2: 25 MMOL/L (ref 20–31)
CO2: 25 MMOL/L (ref 20–31)
CO2: 27 MMOL/L (ref 20–31)
CO2: 28 MMOL/L (ref 20–31)
CO2: 29 MMOL/L (ref 20–31)
CREAT SERPL-MCNC: 1.09 MG/DL (ref 0.5–0.9)
CREAT SERPL-MCNC: 1.55 MG/DL (ref 0.5–0.9)
CREAT SERPL-MCNC: 1.82 MG/DL (ref 0.5–0.9)
CREAT SERPL-MCNC: 1.87 MG/DL (ref 0.5–0.9)
CREAT SERPL-MCNC: 2.14 MG/DL (ref 0.5–0.9)
CREAT SERPL-MCNC: 2.87 MG/DL (ref 0.5–0.9)
CREAT SERPL-MCNC: 2.9 MG/DL (ref 0.5–0.9)
CREAT SERPL-MCNC: 3.28 MG/DL (ref 0.5–0.9)
CREAT SERPL-MCNC: 3.37 MG/DL (ref 0.5–0.9)
DIFFERENTIAL TYPE: ABNORMAL
DIFFERENTIAL TYPE: ABNORMAL
EKG ATRIAL RATE: 58 BPM
EKG ATRIAL RATE: 75 BPM
EKG P AXIS: 75 DEGREES
EKG P-R INTERVAL: 300 MS
EKG Q-T INTERVAL: 476 MS
EKG Q-T INTERVAL: 512 MS
EKG QRS DURATION: 100 MS
EKG QRS DURATION: 108 MS
EKG QTC CALCULATION (BAZETT): 499 MS
EKG QTC CALCULATION (BAZETT): 502 MS
EKG R AXIS: -150 DEGREES
EKG R AXIS: -78 DEGREES
EKG T AXIS: 141 DEGREES
EKG T AXIS: 155 DEGREES
EKG VENTRICULAR RATE: 58 BPM
EKG VENTRICULAR RATE: 66 BPM
ENA ANTIBODIES SCREEN: 0.4 U/ML
EOSINOPHILS RELATIVE PERCENT: 0 % (ref 1–4)
EOSINOPHILS RELATIVE PERCENT: 1 % (ref 1–4)
EOSINOPHILS RELATIVE PERCENT: 1 % (ref 1–7)
ESTIMATED AVERAGE GLUCOSE: 97 MG/DL
FERRITIN: 1129 UG/L (ref 13–150)
FLU A ANTIGEN: NEGATIVE
FLU B ANTIGEN: NEGATIVE
GFR AFRICAN AMERICAN: 16 ML/MIN
GFR AFRICAN AMERICAN: 17 ML/MIN
GFR AFRICAN AMERICAN: 19 ML/MIN
GFR AFRICAN AMERICAN: 20 ML/MIN
GFR AFRICAN AMERICAN: 28 ML/MIN
GFR AFRICAN AMERICAN: 32 ML/MIN
GFR AFRICAN AMERICAN: 33 ML/MIN
GFR AFRICAN AMERICAN: 40 ML/MIN
GFR AFRICAN AMERICAN: >60 ML/MIN
GFR NON-AFRICAN AMERICAN: 13 ML/MIN
GFR NON-AFRICAN AMERICAN: 14 ML/MIN
GFR NON-AFRICAN AMERICAN: 16 ML/MIN
GFR NON-AFRICAN AMERICAN: 16 ML/MIN
GFR NON-AFRICAN AMERICAN: 23 ML/MIN
GFR NON-AFRICAN AMERICAN: 27 ML/MIN
GFR NON-AFRICAN AMERICAN: 27 ML/MIN
GFR NON-AFRICAN AMERICAN: 33 ML/MIN
GFR NON-AFRICAN AMERICAN: 50 ML/MIN
GFR SERPL CREATININE-BSD FRML MDRD: ABNORMAL ML/MIN/{1.73_M2}
GLOBULIN: 4.2 G/DL (ref 1.5–3.8)
GLUCOSE BLD-MCNC: 100 MG/DL (ref 65–105)
GLUCOSE BLD-MCNC: 109 MG/DL (ref 65–105)
GLUCOSE BLD-MCNC: 111 MG/DL (ref 65–105)
GLUCOSE BLD-MCNC: 112 MG/DL (ref 65–105)
GLUCOSE BLD-MCNC: 112 MG/DL (ref 70–99)
GLUCOSE BLD-MCNC: 119 MG/DL (ref 65–105)
GLUCOSE BLD-MCNC: 119 MG/DL (ref 65–105)
GLUCOSE BLD-MCNC: 121 MG/DL (ref 65–105)
GLUCOSE BLD-MCNC: 128 MG/DL (ref 70–99)
GLUCOSE BLD-MCNC: 129 MG/DL (ref 65–105)
GLUCOSE BLD-MCNC: 129 MG/DL (ref 65–105)
GLUCOSE BLD-MCNC: 130 MG/DL (ref 65–105)
GLUCOSE BLD-MCNC: 135 MG/DL (ref 70–99)
GLUCOSE BLD-MCNC: 141 MG/DL (ref 65–105)
GLUCOSE BLD-MCNC: 144 MG/DL (ref 65–105)
GLUCOSE BLD-MCNC: 57 MG/DL (ref 65–105)
GLUCOSE BLD-MCNC: 59 MG/DL (ref 65–105)
GLUCOSE BLD-MCNC: 64 MG/DL (ref 65–105)
GLUCOSE BLD-MCNC: 66 MG/DL (ref 65–105)
GLUCOSE BLD-MCNC: 66 MG/DL (ref 65–105)
GLUCOSE BLD-MCNC: 67 MG/DL (ref 65–105)
GLUCOSE BLD-MCNC: 68 MG/DL (ref 65–105)
GLUCOSE BLD-MCNC: 71 MG/DL (ref 70–99)
GLUCOSE BLD-MCNC: 74 MG/DL (ref 65–105)
GLUCOSE BLD-MCNC: 74 MG/DL (ref 70–99)
GLUCOSE BLD-MCNC: 78 MG/DL (ref 65–105)
GLUCOSE BLD-MCNC: 79 MG/DL (ref 70–99)
GLUCOSE BLD-MCNC: 80 MG/DL (ref 65–105)
GLUCOSE BLD-MCNC: 83 MG/DL (ref 65–105)
GLUCOSE BLD-MCNC: 85 MG/DL (ref 65–105)
GLUCOSE BLD-MCNC: 87 MG/DL (ref 65–105)
GLUCOSE BLD-MCNC: 87 MG/DL (ref 65–105)
GLUCOSE BLD-MCNC: 88 MG/DL (ref 65–105)
GLUCOSE BLD-MCNC: 88 MG/DL (ref 65–105)
GLUCOSE BLD-MCNC: 88 MG/DL (ref 70–99)
GLUCOSE BLD-MCNC: 89 MG/DL (ref 65–105)
GLUCOSE BLD-MCNC: 90 MG/DL (ref 65–105)
GLUCOSE BLD-MCNC: 92 MG/DL (ref 70–99)
GLUCOSE BLD-MCNC: 93 MG/DL (ref 65–105)
GLUCOSE BLD-MCNC: 98 MG/DL (ref 65–105)
GLUCOSE BLD-MCNC: 98 MG/DL (ref 70–99)
GLUCOSE BLD-MCNC: 99 MG/DL (ref 65–105)
HAV IGM SER IA-ACNC: NONREACTIVE
HBA1C MFR BLD: 5 % (ref 4–6)
HCT VFR BLD CALC: 28.5 % (ref 36.3–47.1)
HCT VFR BLD CALC: 28.8 % (ref 36.3–47.1)
HCT VFR BLD CALC: 28.8 % (ref 36.3–47.1)
HCT VFR BLD CALC: 29.4 % (ref 36.3–47.1)
HCT VFR BLD CALC: 30 % (ref 36.3–47.1)
HCT VFR BLD CALC: 30.2 % (ref 36.3–47.1)
HCT VFR BLD CALC: 33.8 % (ref 36.3–47.1)
HCT VFR BLD CALC: 33.8 % (ref 36.3–47.1)
HCT VFR BLD CALC: 35.8 % (ref 36.3–47.1)
HCT VFR BLD CALC: 36.7 % (ref 36.3–47.1)
HEMOGLOBIN: 10.7 G/DL (ref 11.9–15.1)
HEMOGLOBIN: 11.2 G/DL (ref 11.9–15.1)
HEMOGLOBIN: 11.4 G/DL (ref 11.9–15.1)
HEMOGLOBIN: 8.6 G/DL (ref 11.9–15.1)
HEMOGLOBIN: 8.6 G/DL (ref 11.9–15.1)
HEMOGLOBIN: 8.7 G/DL (ref 11.9–15.1)
HEMOGLOBIN: 8.8 G/DL (ref 11.9–15.1)
HEMOGLOBIN: 9.9 G/DL (ref 11.9–15.1)
HEPATITIS B CORE IGM ANTIBODY: NONREACTIVE
HEPATITIS B SURFACE ANTIGEN: NONREACTIVE
HEPATITIS B SURFACE ANTIGEN: NONREACTIVE
HEPATITIS C ANTIBODY: NONREACTIVE
IMMATURE GRANULOCYTES: 0 %
INR BLD: 1.1
INR BLD: 1.2
IRON SATURATION: 53 % (ref 20–55)
IRON: 48 UG/DL (ref 37–145)
LACTIC ACID, SEPSIS: 0.5 MMOL/L (ref 0.5–1.9)
LYMPHOCYTES # BLD: 11 % (ref 24–43)
LYMPHOCYTES # BLD: 12 % (ref 16–46)
LYMPHOCYTES # BLD: 16 % (ref 24–43)
MCH RBC QN AUTO: 31.1 PG (ref 25.2–33.5)
MCH RBC QN AUTO: 31.2 PG (ref 25.2–33.5)
MCH RBC QN AUTO: 31.6 PG (ref 25.2–33.5)
MCH RBC QN AUTO: 31.8 PG (ref 25.2–33.5)
MCHC RBC AUTO-ENTMCNC: 30.2 G/DL (ref 25.2–33.5)
MCHC RBC AUTO-ENTMCNC: 31.1 G/DL (ref 28.4–34.8)
MCHC RBC AUTO-ENTMCNC: 31.3 G/DL (ref 25.2–33.5)
MCHC RBC AUTO-ENTMCNC: 31.7 G/DL (ref 28.4–34.8)
MCV RBC AUTO: 100.3 FL (ref 82.6–102.9)
MCV RBC AUTO: 100.5 FL (ref 82.6–102.9)
MCV RBC AUTO: 101.1 FL (ref 82.6–102.9)
MCV RBC AUTO: 102.9 FL (ref 82.6–102.9)
MITOCHONDRIAL ANTIBODY: 1.5 U/ML (ref 0–4)
MONOCYTES # BLD: 7 % (ref 4–11)
MONOCYTES # BLD: 8 % (ref 3–12)
MONOCYTES # BLD: 9 % (ref 3–12)
MORPHOLOGY: ABNORMAL
MORPHOLOGY: ABNORMAL
MYOGLOBIN: 105 NG/ML (ref 25–58)
MYOGLOBIN: 115 NG/ML (ref 25–58)
MYOGLOBIN: 132 NG/ML (ref 25–58)
MYOGLOBIN: 94 NG/ML (ref 25–58)
NRBC AUTOMATED: 0 PER 100 WBC
PDW BLD-RTO: 18 % (ref 11.8–14.4)
PDW BLD-RTO: 18.3 % (ref 11.8–14.4)
PDW BLD-RTO: 18.6 % (ref 11.8–14.4)
PDW BLD-RTO: 18.8 % (ref 11.8–14.4)
PHOSPHORUS: 2.2 MG/DL (ref 2.6–4.5)
PHOSPHORUS: 2.8 MG/DL (ref 2.6–4.5)
PLATELET # BLD: 100 K/UL (ref 138–453)
PLATELET # BLD: 121 K/UL (ref 138–453)
PLATELET # BLD: ABNORMAL K/UL (ref 138–453)
PLATELET # BLD: ABNORMAL K/UL (ref 138–453)
PLATELET ESTIMATE: ABNORMAL
PLATELET ESTIMATE: ABNORMAL
PLATELET, FLUORESCENCE: 92 K/UL (ref 138–453)
PLATELET, FLUORESCENCE: 97 K/UL (ref 138–453)
PLATELET, IMMATURE FRACTION: 5.7 % (ref 1.1–10.3)
PLATELET, IMMATURE FRACTION: 8 % (ref 1.1–10.3)
PMV BLD AUTO: 10.4 FL (ref 8.1–13.5)
PMV BLD AUTO: 10.5 FL (ref 8.1–13.5)
PMV BLD AUTO: ABNORMAL FL (ref 8.1–13.5)
POC OCCULT BLOOD, FECAL: POSITIVE
POTASSIUM SERPL-SCNC: 3.5 MMOL/L (ref 3.7–5.3)
POTASSIUM SERPL-SCNC: 3.5 MMOL/L (ref 3.7–5.3)
POTASSIUM SERPL-SCNC: 3.9 MMOL/L (ref 3.7–5.3)
POTASSIUM SERPL-SCNC: 3.9 MMOL/L (ref 3.7–5.3)
POTASSIUM SERPL-SCNC: 4.1 MMOL/L (ref 3.7–5.3)
POTASSIUM SERPL-SCNC: 4.1 MMOL/L (ref 3.7–5.3)
POTASSIUM SERPL-SCNC: 4.3 MMOL/L (ref 3.7–5.3)
POTASSIUM SERPL-SCNC: 4.3 MMOL/L (ref 3.7–5.3)
POTASSIUM SERPL-SCNC: 4.4 MMOL/L (ref 3.7–5.3)
PRO-BNP: ABNORMAL PG/ML
PROTHROMBIN TIME: 11.9 SEC (ref 9.1–12.3)
PROTHROMBIN TIME: 15.4 SEC (ref 11.5–14.2)
RBC # BLD: 2.8 M/UL (ref 3.95–5.11)
RBC # BLD: 3.37 M/UL (ref 3.95–5.11)
RBC # BLD: 3.54 M/UL (ref 3.95–5.11)
RBC # BLD: 3.65 M/UL (ref 3.95–5.11)
RBC # BLD: ABNORMAL 10*6/UL
RBC # BLD: ABNORMAL 10*6/UL
SARS-COV-2, RAPID: NOT DETECTED
SARS-COV-2, RAPID: NOT DETECTED
SEG NEUTROPHILS: 75 % (ref 36–65)
SEG NEUTROPHILS: 79 % (ref 36–65)
SEG NEUTROPHILS: 80 % (ref 43–77)
SEGMENTED NEUTROPHILS ABSOLUTE COUNT: 3.42 K/UL (ref 1.5–8.1)
SEGMENTED NEUTROPHILS ABSOLUTE COUNT: 5.43 K/UL (ref 1.5–8.1)
SEGMENTED NEUTROPHILS ABSOLUTE COUNT: 6.02 K/UL (ref 1.5–8.1)
SODIUM BLD-SCNC: 131 MMOL/L (ref 135–144)
SODIUM BLD-SCNC: 131 MMOL/L (ref 135–144)
SODIUM BLD-SCNC: 132 MMOL/L (ref 135–144)
SODIUM BLD-SCNC: 133 MMOL/L (ref 135–144)
SODIUM BLD-SCNC: 133 MMOL/L (ref 135–144)
SODIUM BLD-SCNC: 135 MMOL/L (ref 135–144)
SODIUM BLD-SCNC: 135 MMOL/L (ref 135–144)
SODIUM BLD-SCNC: 136 MMOL/L (ref 135–144)
SODIUM BLD-SCNC: 136 MMOL/L (ref 135–144)
SPECIMEN DESCRIPTION: NORMAL
SPECIMEN DESCRIPTION: NORMAL
TOTAL IRON BINDING CAPACITY: 91 UG/DL (ref 250–450)
TOTAL PROTEIN: 6.9 G/DL (ref 6.4–8.3)
TOTAL PROTEIN: 7 G/DL (ref 6.4–8.3)
TOTAL PROTEIN: 7.2 G/DL (ref 6.4–8.3)
TOTAL PROTEIN: 7.5 G/DL (ref 6.4–8.3)
TROPONIN INTERP: ABNORMAL
TROPONIN T: ABNORMAL NG/ML
TROPONIN, HIGH SENSITIVITY: 106 NG/L (ref 0–14)
TROPONIN, HIGH SENSITIVITY: 106 NG/L (ref 0–14)
TROPONIN, HIGH SENSITIVITY: 144 NG/L (ref 0–14)
TROPONIN, HIGH SENSITIVITY: 149 NG/L (ref 0–14)
TROPONIN, HIGH SENSITIVITY: 150 NG/L (ref 0–14)
TROPONIN, HIGH SENSITIVITY: 154 NG/L (ref 0–14)
TROPONIN, HIGH SENSITIVITY: 159 NG/L (ref 0–14)
TROPONIN, HIGH SENSITIVITY: 159 NG/L (ref 0–14)
TROPONIN, HIGH SENSITIVITY: 87 NG/L (ref 0–14)
TROPONIN, HIGH SENSITIVITY: 91 NG/L (ref 0–14)
UNSATURATED IRON BINDING CAPACITY: 43 UG/DL (ref 112–347)
WBC # BLD: 4.6 K/UL (ref 3.5–11.3)
WBC # BLD: 6.8 K/UL (ref 3.5–11.3)
WBC # BLD: 7.3 K/UL (ref 3.5–11.3)
WBC # BLD: 7.6 K/UL (ref 3.5–11.3)
WBC # BLD: ABNORMAL 10*3/UL
WBC # BLD: ABNORMAL 10*3/UL

## 2022-01-01 PROCEDURE — 83036 HEMOGLOBIN GLYCOSYLATED A1C: CPT

## 2022-01-01 PROCEDURE — 99233 SBSQ HOSP IP/OBS HIGH 50: CPT | Performed by: INTERNAL MEDICINE

## 2022-01-01 PROCEDURE — 94664 DEMO&/EVAL PT USE INHALER: CPT

## 2022-01-01 PROCEDURE — 36415 COLL VENOUS BLD VENIPUNCTURE: CPT

## 2022-01-01 PROCEDURE — 83516 IMMUNOASSAY NONANTIBODY: CPT

## 2022-01-01 PROCEDURE — 99222 1ST HOSP IP/OBS MODERATE 55: CPT | Performed by: INTERNAL MEDICINE

## 2022-01-01 PROCEDURE — 6370000000 HC RX 637 (ALT 250 FOR IP): Performed by: INTERNAL MEDICINE

## 2022-01-01 PROCEDURE — 99308 SBSQ NF CARE LOW MDM 20: CPT | Performed by: NURSE PRACTITIONER

## 2022-01-01 PROCEDURE — 6370000000 HC RX 637 (ALT 250 FOR IP): Performed by: HOSPITALIST

## 2022-01-01 PROCEDURE — 83880 ASSAY OF NATRIURETIC PEPTIDE: CPT

## 2022-01-01 PROCEDURE — 99284 EMERGENCY DEPT VISIT MOD MDM: CPT

## 2022-01-01 PROCEDURE — G0439 PPPS, SUBSEQ VISIT: HCPCS | Performed by: NURSE PRACTITIONER

## 2022-01-01 PROCEDURE — 85025 COMPLETE CBC W/AUTO DIFF WBC: CPT

## 2022-01-01 PROCEDURE — 86225 DNA ANTIBODY NATIVE: CPT

## 2022-01-01 PROCEDURE — 97116 GAIT TRAINING THERAPY: CPT

## 2022-01-01 PROCEDURE — 82728 ASSAY OF FERRITIN: CPT

## 2022-01-01 PROCEDURE — 6360000002 HC RX W HCPCS: Performed by: INTERNAL MEDICINE

## 2022-01-01 PROCEDURE — 80076 HEPATIC FUNCTION PANEL: CPT

## 2022-01-01 PROCEDURE — 1200000000 HC SEMI PRIVATE

## 2022-01-01 PROCEDURE — 95813 EEG EXTND MNTR 61-119 MIN: CPT

## 2022-01-01 PROCEDURE — 85055 RETICULATED PLATELET ASSAY: CPT

## 2022-01-01 PROCEDURE — 80053 COMPREHEN METABOLIC PANEL: CPT

## 2022-01-01 PROCEDURE — 2700000000 HC OXYGEN THERAPY PER DAY

## 2022-01-01 PROCEDURE — 6370000000 HC RX 637 (ALT 250 FOR IP): Performed by: NURSE PRACTITIONER

## 2022-01-01 PROCEDURE — 6360000002 HC RX W HCPCS: Performed by: CLINICAL NURSE SPECIALIST

## 2022-01-01 PROCEDURE — 97535 SELF CARE MNGMENT TRAINING: CPT

## 2022-01-01 PROCEDURE — 82947 ASSAY GLUCOSE BLOOD QUANT: CPT

## 2022-01-01 PROCEDURE — 90935 HEMODIALYSIS ONE EVALUATION: CPT

## 2022-01-01 PROCEDURE — 92526 ORAL FUNCTION THERAPY: CPT

## 2022-01-01 PROCEDURE — 6370000000 HC RX 637 (ALT 250 FOR IP): Performed by: CLINICAL NURSE SPECIALIST

## 2022-01-01 PROCEDURE — 85018 HEMOGLOBIN: CPT

## 2022-01-01 PROCEDURE — 97110 THERAPEUTIC EXERCISES: CPT

## 2022-01-01 PROCEDURE — 87635 SARS-COV-2 COVID-19 AMP PRB: CPT

## 2022-01-01 PROCEDURE — 84484 ASSAY OF TROPONIN QUANT: CPT

## 2022-01-01 PROCEDURE — 5A1D70Z PERFORMANCE OF URINARY FILTRATION, INTERMITTENT, LESS THAN 6 HOURS PER DAY: ICD-10-PCS | Performed by: HOSPITALIST

## 2022-01-01 PROCEDURE — 99232 SBSQ HOSP IP/OBS MODERATE 35: CPT | Performed by: INTERNAL MEDICINE

## 2022-01-01 PROCEDURE — 97530 THERAPEUTIC ACTIVITIES: CPT

## 2022-01-01 PROCEDURE — 99214 OFFICE O/P EST MOD 30 MIN: CPT | Performed by: PSYCHIATRY & NEUROLOGY

## 2022-01-01 PROCEDURE — 83550 IRON BINDING TEST: CPT

## 2022-01-01 PROCEDURE — 99223 1ST HOSP IP/OBS HIGH 75: CPT | Performed by: INTERNAL MEDICINE

## 2022-01-01 PROCEDURE — 84100 ASSAY OF PHOSPHORUS: CPT

## 2022-01-01 PROCEDURE — 76604 US EXAM CHEST: CPT

## 2022-01-01 PROCEDURE — 5A1D70Z PERFORMANCE OF URINARY FILTRATION, INTERMITTENT, LESS THAN 6 HOURS PER DAY: ICD-10-PCS | Performed by: INTERNAL MEDICINE

## 2022-01-01 PROCEDURE — 2580000003 HC RX 258: Performed by: NURSE PRACTITIONER

## 2022-01-01 PROCEDURE — 99223 1ST HOSP IP/OBS HIGH 75: CPT | Performed by: FAMILY MEDICINE

## 2022-01-01 PROCEDURE — 2580000003 HC RX 258: Performed by: CLINICAL NURSE SPECIALIST

## 2022-01-01 PROCEDURE — 80048 BASIC METABOLIC PNL TOTAL CA: CPT

## 2022-01-01 PROCEDURE — 2500000003 HC RX 250 WO HCPCS: Performed by: INTERNAL MEDICINE

## 2022-01-01 PROCEDURE — 71045 X-RAY EXAM CHEST 1 VIEW: CPT

## 2022-01-01 PROCEDURE — 82272 OCCULT BLD FECES 1-3 TESTS: CPT

## 2022-01-01 PROCEDURE — APPSS45 APP SPLIT SHARED TIME 31-45 MINUTES: Performed by: NURSE PRACTITIONER

## 2022-01-01 PROCEDURE — P9047 ALBUMIN (HUMAN), 25%, 50ML: HCPCS | Performed by: PHYSICIAN ASSISTANT

## 2022-01-01 PROCEDURE — 85014 HEMATOCRIT: CPT

## 2022-01-01 PROCEDURE — 2500000003 HC RX 250 WO HCPCS: Performed by: EMERGENCY MEDICINE

## 2022-01-01 PROCEDURE — 93005 ELECTROCARDIOGRAM TRACING: CPT | Performed by: EMERGENCY MEDICINE

## 2022-01-01 PROCEDURE — 74230 X-RAY XM SWLNG FUNCJ C+: CPT

## 2022-01-01 PROCEDURE — 85027 COMPLETE CBC AUTOMATED: CPT

## 2022-01-01 PROCEDURE — 80074 ACUTE HEPATITIS PANEL: CPT

## 2022-01-01 PROCEDURE — 94761 N-INVAS EAR/PLS OXIMETRY MLT: CPT

## 2022-01-01 PROCEDURE — 6360000002 HC RX W HCPCS: Performed by: PHYSICIAN ASSISTANT

## 2022-01-01 PROCEDURE — 97166 OT EVAL MOD COMPLEX 45 MIN: CPT

## 2022-01-01 PROCEDURE — 83874 ASSAY OF MYOGLOBIN: CPT

## 2022-01-01 PROCEDURE — 99204 OFFICE O/P NEW MOD 45 MIN: CPT | Performed by: PSYCHIATRY & NEUROLOGY

## 2022-01-01 PROCEDURE — 93288 INTERROG EVL PM/LDLS PM IP: CPT | Performed by: INTERNAL MEDICINE

## 2022-01-01 PROCEDURE — 2580000003 HC RX 258: Performed by: HOSPITALIST

## 2022-01-01 PROCEDURE — 83540 ASSAY OF IRON: CPT

## 2022-01-01 PROCEDURE — 92611 MOTION FLUOROSCOPY/SWALLOW: CPT

## 2022-01-01 PROCEDURE — 83605 ASSAY OF LACTIC ACID: CPT

## 2022-01-01 PROCEDURE — 99232 SBSQ HOSP IP/OBS MODERATE 35: CPT | Performed by: HOSPITALIST

## 2022-01-01 PROCEDURE — 86038 ANTINUCLEAR ANTIBODIES: CPT

## 2022-01-01 PROCEDURE — 87804 INFLUENZA ASSAY W/OPTIC: CPT

## 2022-01-01 PROCEDURE — 0W9G3ZZ DRAINAGE OF PERITONEAL CAVITY, PERCUTANEOUS APPROACH: ICD-10-PCS | Performed by: RADIOLOGY

## 2022-01-01 PROCEDURE — 99285 EMERGENCY DEPT VISIT HI MDM: CPT

## 2022-01-01 PROCEDURE — 85610 PROTHROMBIN TIME: CPT

## 2022-01-01 PROCEDURE — 76705 ECHO EXAM OF ABDOMEN: CPT

## 2022-01-01 PROCEDURE — 99309 SBSQ NF CARE MODERATE MDM 30: CPT | Performed by: INTERNAL MEDICINE

## 2022-01-01 PROCEDURE — 99223 1ST HOSP IP/OBS HIGH 75: CPT | Performed by: HOSPITALIST

## 2022-01-01 PROCEDURE — 82105 ALPHA-FETOPROTEIN SERUM: CPT

## 2022-01-01 PROCEDURE — 82103 ALPHA-1-ANTITRYPSIN TOTAL: CPT

## 2022-01-01 PROCEDURE — 2500000003 HC RX 250 WO HCPCS: Performed by: NURSE PRACTITIONER

## 2022-01-01 PROCEDURE — 99309 SBSQ NF CARE MODERATE MDM 30: CPT | Performed by: NURSE PRACTITIONER

## 2022-01-01 PROCEDURE — 2709999900 US GUIDED PARACENTESIS

## 2022-01-01 PROCEDURE — 70450 CT HEAD/BRAIN W/O DYE: CPT

## 2022-01-01 PROCEDURE — 97162 PT EVAL MOD COMPLEX 30 MIN: CPT

## 2022-01-01 PROCEDURE — 87340 HEPATITIS B SURFACE AG IA: CPT

## 2022-01-01 RX ORDER — SODIUM CHLORIDE 0.9 % (FLUSH) 0.9 %
5-40 SYRINGE (ML) INJECTION EVERY 12 HOURS SCHEDULED
Status: DISCONTINUED | OUTPATIENT
Start: 2022-01-01 | End: 2022-01-01 | Stop reason: HOSPADM

## 2022-01-01 RX ORDER — LIDOCAINE 40 MG/G
CREAM TOPICAL
Status: DISCONTINUED | OUTPATIENT
Start: 2022-01-01 | End: 2022-01-01 | Stop reason: HOSPADM

## 2022-01-01 RX ORDER — ECHINACEA PURPUREA EXTRACT 125 MG
2 TABLET ORAL 2 TIMES DAILY
Status: DISCONTINUED | OUTPATIENT
Start: 2022-01-01 | End: 2022-01-01 | Stop reason: HOSPADM

## 2022-01-01 RX ORDER — 0.9 % SODIUM CHLORIDE 0.9 %
250 INTRAVENOUS SOLUTION INTRAVENOUS PRN
Status: DISCONTINUED | OUTPATIENT
Start: 2022-01-01 | End: 2022-01-01 | Stop reason: HOSPADM

## 2022-01-01 RX ORDER — ONDANSETRON 4 MG/1
4 TABLET, ORALLY DISINTEGRATING ORAL EVERY 8 HOURS PRN
Status: DISCONTINUED | OUTPATIENT
Start: 2022-01-01 | End: 2022-01-01 | Stop reason: HOSPADM

## 2022-01-01 RX ORDER — AMLODIPINE BESYLATE 10 MG/1
10 TABLET ORAL DAILY
Status: DISCONTINUED | OUTPATIENT
Start: 2022-01-01 | End: 2022-01-01 | Stop reason: HOSPADM

## 2022-01-01 RX ORDER — POTASSIUM CHLORIDE 7.45 MG/ML
10 INJECTION INTRAVENOUS PRN
Status: DISCONTINUED | OUTPATIENT
Start: 2022-01-01 | End: 2022-01-01

## 2022-01-01 RX ORDER — HYDRALAZINE HYDROCHLORIDE 50 MG/1
100 TABLET, FILM COATED ORAL 3 TIMES DAILY
Status: DISCONTINUED | OUTPATIENT
Start: 2022-01-01 | End: 2022-01-01

## 2022-01-01 RX ORDER — NYSTATIN 100000 U/G
CREAM TOPICAL 2 TIMES DAILY PRN
COMMUNITY

## 2022-01-01 RX ORDER — HYDRALAZINE HYDROCHLORIDE 50 MG/1
50 TABLET, FILM COATED ORAL 3 TIMES DAILY
Status: DISCONTINUED | OUTPATIENT
Start: 2022-01-01 | End: 2022-01-01 | Stop reason: HOSPADM

## 2022-01-01 RX ORDER — MAGNESIUM SULFATE 1 G/100ML
1000 INJECTION INTRAVENOUS PRN
Status: DISCONTINUED | OUTPATIENT
Start: 2022-01-01 | End: 2022-01-01

## 2022-01-01 RX ORDER — DEXTROSE MONOHYDRATE 25 G/50ML
25 INJECTION, SOLUTION INTRAVENOUS ONCE
Status: COMPLETED | OUTPATIENT
Start: 2022-01-01 | End: 2022-01-01

## 2022-01-01 RX ORDER — MIDODRINE HYDROCHLORIDE 10 MG/1
10 TABLET ORAL PRN
Status: DISCONTINUED | OUTPATIENT
Start: 2022-01-01 | End: 2022-01-01 | Stop reason: HOSPADM

## 2022-01-01 RX ORDER — ATORVASTATIN CALCIUM 40 MG/1
40 TABLET, FILM COATED ORAL NIGHTLY
Status: DISCONTINUED | OUTPATIENT
Start: 2022-01-01 | End: 2022-01-01 | Stop reason: HOSPADM

## 2022-01-01 RX ORDER — POLYETHYLENE GLYCOL 3350 17 G/17G
17 POWDER, FOR SOLUTION ORAL 2 TIMES DAILY
Status: DISCONTINUED | OUTPATIENT
Start: 2022-01-01 | End: 2022-01-01 | Stop reason: HOSPADM

## 2022-01-01 RX ORDER — POLYETHYLENE GLYCOL 3350 17 G/17G
17 POWDER, FOR SOLUTION ORAL DAILY PRN
Status: DISCONTINUED | OUTPATIENT
Start: 2022-01-01 | End: 2022-01-01

## 2022-01-01 RX ORDER — ONDANSETRON 2 MG/ML
4 INJECTION INTRAMUSCULAR; INTRAVENOUS EVERY 6 HOURS PRN
Status: DISCONTINUED | OUTPATIENT
Start: 2022-01-01 | End: 2022-01-01 | Stop reason: HOSPADM

## 2022-01-01 RX ORDER — RIVASTIGMINE 9.5 MG/24H
1 PATCH, EXTENDED RELEASE TRANSDERMAL DAILY
Status: DISCONTINUED | OUTPATIENT
Start: 2022-01-01 | End: 2022-01-01 | Stop reason: HOSPADM

## 2022-01-01 RX ORDER — MIDODRINE HYDROCHLORIDE 5 MG/1
10 TABLET ORAL ONCE
Status: COMPLETED | OUTPATIENT
Start: 2022-01-01 | End: 2022-01-01

## 2022-01-01 RX ORDER — MIDODRINE HYDROCHLORIDE 10 MG/1
10 TABLET ORAL
Status: DISCONTINUED | OUTPATIENT
Start: 2022-01-01 | End: 2022-01-01 | Stop reason: HOSPADM

## 2022-01-01 RX ORDER — OXYBUTYNIN CHLORIDE 5 MG/1
5 TABLET ORAL 2 TIMES DAILY
Status: DISCONTINUED | OUTPATIENT
Start: 2022-01-01 | End: 2022-01-01 | Stop reason: HOSPADM

## 2022-01-01 RX ORDER — NICOTINE POLACRILEX 4 MG
15 LOZENGE BUCCAL PRN
Status: DISCONTINUED | OUTPATIENT
Start: 2022-01-01 | End: 2022-01-01 | Stop reason: HOSPADM

## 2022-01-01 RX ORDER — SODIUM CHLORIDE 9 MG/ML
INJECTION, SOLUTION INTRAVENOUS CONTINUOUS
Status: DISCONTINUED | OUTPATIENT
Start: 2022-01-01 | End: 2022-01-01

## 2022-01-01 RX ORDER — ALBUMIN (HUMAN) 12.5 G/50ML
50 SOLUTION INTRAVENOUS ONCE
Status: COMPLETED | OUTPATIENT
Start: 2022-01-01 | End: 2022-01-01

## 2022-01-01 RX ORDER — MEGESTROL ACETATE 40 MG/ML
200 SUSPENSION ORAL DAILY
Status: DISCONTINUED | OUTPATIENT
Start: 2022-01-01 | End: 2022-01-01 | Stop reason: HOSPADM

## 2022-01-01 RX ORDER — NYSTATIN 100000 U/G
CREAM TOPICAL 2 TIMES DAILY PRN
Status: DISCONTINUED | OUTPATIENT
Start: 2022-01-01 | End: 2022-01-01 | Stop reason: HOSPADM

## 2022-01-01 RX ORDER — CETIRIZINE HYDROCHLORIDE 10 MG/1
10 TABLET ORAL DAILY
Status: DISCONTINUED | OUTPATIENT
Start: 2022-01-01 | End: 2022-01-01 | Stop reason: HOSPADM

## 2022-01-01 RX ORDER — HEPARIN SODIUM 5000 [USP'U]/ML
5000 INJECTION, SOLUTION INTRAVENOUS; SUBCUTANEOUS EVERY 8 HOURS SCHEDULED
Status: DISCONTINUED | OUTPATIENT
Start: 2022-01-01 | End: 2022-01-01

## 2022-01-01 RX ORDER — INSULIN GLARGINE 100 [IU]/ML
10 INJECTION, SOLUTION SUBCUTANEOUS NIGHTLY
Status: DISCONTINUED | OUTPATIENT
Start: 2022-01-01 | End: 2022-01-01 | Stop reason: HOSPADM

## 2022-01-01 RX ORDER — ACETAMINOPHEN 650 MG/1
650 SUPPOSITORY RECTAL EVERY 6 HOURS PRN
Status: DISCONTINUED | OUTPATIENT
Start: 2022-01-01 | End: 2022-01-01 | Stop reason: HOSPADM

## 2022-01-01 RX ORDER — SENNA PLUS 8.6 MG/1
1 TABLET ORAL 2 TIMES DAILY
Status: DISCONTINUED | OUTPATIENT
Start: 2022-01-01 | End: 2022-01-01 | Stop reason: HOSPADM

## 2022-01-01 RX ORDER — CARVEDILOL 6.25 MG/1
6.25 TABLET ORAL 2 TIMES DAILY WITH MEALS
Status: DISCONTINUED | OUTPATIENT
Start: 2022-01-01 | End: 2022-01-01 | Stop reason: HOSPADM

## 2022-01-01 RX ORDER — DOCUSATE SODIUM 100 MG/1
100 CAPSULE, LIQUID FILLED ORAL 3 TIMES DAILY
Status: DISCONTINUED | OUTPATIENT
Start: 2022-01-01 | End: 2022-01-01 | Stop reason: HOSPADM

## 2022-01-01 RX ORDER — MIDODRINE HYDROCHLORIDE 5 MG/1
10 TABLET ORAL
Status: DISCONTINUED | OUTPATIENT
Start: 2022-03-22 | End: 2022-01-01 | Stop reason: HOSPADM

## 2022-01-01 RX ORDER — VITAMIN B COMPLEX
5000 TABLET ORAL DAILY
Status: DISCONTINUED | OUTPATIENT
Start: 2022-01-01 | End: 2022-01-01 | Stop reason: HOSPADM

## 2022-01-01 RX ORDER — SODIUM CHLORIDE 9 MG/ML
25 INJECTION, SOLUTION INTRAVENOUS PRN
Status: DISCONTINUED | OUTPATIENT
Start: 2022-01-01 | End: 2022-01-01 | Stop reason: HOSPADM

## 2022-01-01 RX ORDER — 0.9 % SODIUM CHLORIDE 0.9 %
150 INTRAVENOUS SOLUTION INTRAVENOUS PRN
Status: DISCONTINUED | OUTPATIENT
Start: 2022-01-01 | End: 2022-01-01 | Stop reason: HOSPADM

## 2022-01-01 RX ORDER — SENNA PLUS 8.6 MG/1
1 TABLET ORAL 2 TIMES DAILY
COMMUNITY

## 2022-01-01 RX ORDER — CARVEDILOL 6.25 MG/1
6.25 TABLET ORAL 2 TIMES DAILY WITH MEALS
Qty: 60 TABLET | Refills: 3 | Status: SHIPPED | OUTPATIENT
Start: 2022-01-01

## 2022-01-01 RX ORDER — MIDODRINE HYDROCHLORIDE 10 MG/1
10 TABLET ORAL
Status: COMPLETED | OUTPATIENT
Start: 2022-01-01 | End: 2022-01-01

## 2022-01-01 RX ORDER — ASPIRIN 81 MG/1
81 TABLET ORAL DAILY
Status: DISCONTINUED | OUTPATIENT
Start: 2022-01-01 | End: 2022-01-01 | Stop reason: HOSPADM

## 2022-01-01 RX ORDER — ACETAMINOPHEN 325 MG/1
650 TABLET ORAL 3 TIMES DAILY
Status: ON HOLD | COMMUNITY
End: 2022-01-01 | Stop reason: HOSPADM

## 2022-01-01 RX ORDER — CITALOPRAM 20 MG/1
20 TABLET ORAL DAILY
Status: DISCONTINUED | OUTPATIENT
Start: 2022-01-01 | End: 2022-01-01 | Stop reason: HOSPADM

## 2022-01-01 RX ORDER — SODIUM CHLORIDE 0.9 % (FLUSH) 0.9 %
5-40 SYRINGE (ML) INJECTION PRN
Status: DISCONTINUED | OUTPATIENT
Start: 2022-01-01 | End: 2022-01-01 | Stop reason: HOSPADM

## 2022-01-01 RX ORDER — SODIUM CHLORIDE 0.9 % (FLUSH) 0.9 %
10 SYRINGE (ML) INJECTION PRN
Status: DISCONTINUED | OUTPATIENT
Start: 2022-01-01 | End: 2022-01-01 | Stop reason: HOSPADM

## 2022-01-01 RX ORDER — DEXTROSE MONOHYDRATE 25 G/50ML
12.5 INJECTION, SOLUTION INTRAVENOUS PRN
Status: DISCONTINUED | OUTPATIENT
Start: 2022-01-01 | End: 2022-01-01 | Stop reason: HOSPADM

## 2022-01-01 RX ORDER — ACETAMINOPHEN 325 MG/1
650 TABLET ORAL EVERY 4 HOURS PRN
COMMUNITY

## 2022-01-01 RX ORDER — DEXTROSE AND SODIUM CHLORIDE 5; .9 G/100ML; G/100ML
INJECTION, SOLUTION INTRAVENOUS CONTINUOUS
Status: DISCONTINUED | OUTPATIENT
Start: 2022-01-01 | End: 2022-01-01 | Stop reason: HOSPADM

## 2022-01-01 RX ORDER — DEXTROSE MONOHYDRATE 50 MG/ML
100 INJECTION, SOLUTION INTRAVENOUS PRN
Status: DISCONTINUED | OUTPATIENT
Start: 2022-01-01 | End: 2022-01-01 | Stop reason: HOSPADM

## 2022-01-01 RX ORDER — MIDODRINE HYDROCHLORIDE 5 MG/1
10 TABLET ORAL PRN
Status: DISCONTINUED | OUTPATIENT
Start: 2022-01-01 | End: 2022-01-01 | Stop reason: HOSPADM

## 2022-01-01 RX ORDER — ACETAMINOPHEN 325 MG/1
650 TABLET ORAL EVERY 6 HOURS PRN
Status: DISCONTINUED | OUTPATIENT
Start: 2022-01-01 | End: 2022-01-01 | Stop reason: HOSPADM

## 2022-01-01 RX ORDER — POTASSIUM CHLORIDE 20 MEQ/1
40 TABLET, EXTENDED RELEASE ORAL PRN
Status: DISCONTINUED | OUTPATIENT
Start: 2022-01-01 | End: 2022-01-01

## 2022-01-01 RX ORDER — ACETAMINOPHEN 325 MG/1
650 TABLET ORAL EVERY 4 HOURS PRN
Status: DISCONTINUED | OUTPATIENT
Start: 2022-01-01 | End: 2022-01-01 | Stop reason: HOSPADM

## 2022-01-01 RX ORDER — HEPARIN SODIUM 1000 [USP'U]/ML
1900 INJECTION, SOLUTION INTRAVENOUS; SUBCUTANEOUS PRN
Status: DISCONTINUED | OUTPATIENT
Start: 2022-01-01 | End: 2022-01-01 | Stop reason: HOSPADM

## 2022-01-01 RX ORDER — HEPARIN SODIUM 5000 [USP'U]/ML
5000 INJECTION, SOLUTION INTRAVENOUS; SUBCUTANEOUS 2 TIMES DAILY
Status: DISCONTINUED | OUTPATIENT
Start: 2022-01-01 | End: 2022-01-01 | Stop reason: HOSPADM

## 2022-01-01 RX ORDER — PANTOPRAZOLE SODIUM 40 MG/1
40 TABLET, DELAYED RELEASE ORAL
Status: DISCONTINUED | OUTPATIENT
Start: 2022-01-01 | End: 2022-01-01 | Stop reason: HOSPADM

## 2022-01-01 RX ORDER — LORATADINE 10 MG/1
10 TABLET ORAL EVERY OTHER DAY
COMMUNITY

## 2022-01-01 RX ORDER — AMLODIPINE BESYLATE 10 MG/1
10 TABLET ORAL DAILY
Status: DISCONTINUED | OUTPATIENT
Start: 2022-01-01 | End: 2022-01-01

## 2022-01-01 RX ORDER — NITROGLYCERIN 0.4 MG/1
0.4 TABLET SUBLINGUAL EVERY 5 MIN PRN
Status: DISCONTINUED | OUTPATIENT
Start: 2022-01-01 | End: 2022-01-01 | Stop reason: HOSPADM

## 2022-01-01 RX ORDER — MIDODRINE HYDROCHLORIDE 10 MG/1
10 TABLET ORAL PRN
COMMUNITY

## 2022-01-01 RX ORDER — SENNA PLUS 8.6 MG/1
1 TABLET ORAL NIGHTLY
Status: DISCONTINUED | OUTPATIENT
Start: 2022-01-01 | End: 2022-01-01 | Stop reason: HOSPADM

## 2022-01-01 RX ADMIN — POLYETHYLENE GLYCOL 3350 17 G: 17 POWDER, FOR SOLUTION ORAL at 09:06

## 2022-01-01 RX ADMIN — SALINE NASAL SPRAY 2 SPRAY: 1.5 SOLUTION NASAL at 14:24

## 2022-01-01 RX ADMIN — ALBUMIN (HUMAN) 50 G: 0.25 INJECTION, SOLUTION INTRAVENOUS at 13:14

## 2022-01-01 RX ADMIN — Medication 81 MG: at 14:22

## 2022-01-01 RX ADMIN — Medication 5000 UNITS: at 08:46

## 2022-01-01 RX ADMIN — SENNOSIDES 8.6 MG: 8.6 TABLET, COATED ORAL at 22:46

## 2022-01-01 RX ADMIN — POLYETHYLENE GLYCOL 3350 17 G: 17 POWDER, FOR SOLUTION ORAL at 08:47

## 2022-01-01 RX ADMIN — PANTOPRAZOLE SODIUM 40 MG: 40 TABLET, DELAYED RELEASE ORAL at 16:40

## 2022-01-01 RX ADMIN — CITALOPRAM HYDROBROMIDE 20 MG: 20 TABLET ORAL at 09:15

## 2022-01-01 RX ADMIN — PANTOPRAZOLE SODIUM 40 MG: 40 TABLET, DELAYED RELEASE ORAL at 06:25

## 2022-01-01 RX ADMIN — DOCUSATE SODIUM 100 MG: 100 CAPSULE, LIQUID FILLED ORAL at 22:09

## 2022-01-01 RX ADMIN — ATORVASTATIN CALCIUM 40 MG: 40 TABLET, FILM COATED ORAL at 21:43

## 2022-01-01 RX ADMIN — Medication 12.5 G: at 06:21

## 2022-01-01 RX ADMIN — HEPARIN SODIUM 5000 UNITS: 5000 INJECTION INTRAVENOUS; SUBCUTANEOUS at 05:38

## 2022-01-01 RX ADMIN — OXYBUTYNIN CHLORIDE 5 MG: 5 TABLET ORAL at 14:23

## 2022-01-01 RX ADMIN — MIDODRINE HYDROCHLORIDE 10 MG: 5 TABLET ORAL at 16:44

## 2022-01-01 RX ADMIN — SODIUM CHLORIDE, PRESERVATIVE FREE 10 ML: 5 INJECTION INTRAVENOUS at 22:25

## 2022-01-01 RX ADMIN — SALINE NASAL SPRAY 2 SPRAY: 1.5 SOLUTION NASAL at 20:44

## 2022-01-01 RX ADMIN — PANTOPRAZOLE SODIUM 40 MG: 40 TABLET, DELAYED RELEASE ORAL at 16:44

## 2022-01-01 RX ADMIN — SODIUM CHLORIDE, PRESERVATIVE FREE 10 ML: 5 INJECTION INTRAVENOUS at 09:15

## 2022-01-01 RX ADMIN — SODIUM CHLORIDE, PRESERVATIVE FREE 10 ML: 5 INJECTION INTRAVENOUS at 08:47

## 2022-01-01 RX ADMIN — ACETAMINOPHEN 650 MG: 325 TABLET ORAL at 21:48

## 2022-01-01 RX ADMIN — Medication 1.9 ML: at 22:12

## 2022-01-01 RX ADMIN — SODIUM CHLORIDE, PRESERVATIVE FREE 10 ML: 5 INJECTION INTRAVENOUS at 21:16

## 2022-01-01 RX ADMIN — SALINE NASAL SPRAY 2 SPRAY: 1.5 SOLUTION NASAL at 23:06

## 2022-01-01 RX ADMIN — DOCUSATE SODIUM 100 MG: 100 CAPSULE, LIQUID FILLED ORAL at 08:45

## 2022-01-01 RX ADMIN — Medication 5000 UNITS: at 08:47

## 2022-01-01 RX ADMIN — ASPIRIN 81 MG: 81 TABLET, COATED ORAL at 09:15

## 2022-01-01 RX ADMIN — ASPIRIN 81 MG: 81 TABLET, COATED ORAL at 08:57

## 2022-01-01 RX ADMIN — HEPARIN SODIUM 5000 UNITS: 5000 INJECTION INTRAVENOUS; SUBCUTANEOUS at 05:58

## 2022-01-01 RX ADMIN — OXYBUTYNIN CHLORIDE 5 MG: 5 TABLET ORAL at 20:37

## 2022-01-01 RX ADMIN — HEPARIN SODIUM 5000 UNITS: 5000 INJECTION INTRAVENOUS; SUBCUTANEOUS at 22:25

## 2022-01-01 RX ADMIN — PANTOPRAZOLE SODIUM 40 MG: 40 TABLET, DELAYED RELEASE ORAL at 16:10

## 2022-01-01 RX ADMIN — CITALOPRAM HYDROBROMIDE 20 MG: 20 TABLET ORAL at 08:46

## 2022-01-01 RX ADMIN — Medication 5000 UNITS: at 14:22

## 2022-01-01 RX ADMIN — SALINE NASAL SPRAY 2 SPRAY: 1.5 SOLUTION NASAL at 22:10

## 2022-01-01 RX ADMIN — CITALOPRAM HYDROBROMIDE 20 MG: 20 TABLET ORAL at 08:58

## 2022-01-01 RX ADMIN — Medication 12.5 G: at 06:44

## 2022-01-01 RX ADMIN — HEPARIN SODIUM 1900 UNITS: 1000 INJECTION INTRAVENOUS; SUBCUTANEOUS at 13:13

## 2022-01-01 RX ADMIN — Medication 1.9 ML: at 16:46

## 2022-01-01 RX ADMIN — SODIUM CHLORIDE, PRESERVATIVE FREE 10 ML: 5 INJECTION INTRAVENOUS at 08:23

## 2022-01-01 RX ADMIN — POLYETHYLENE GLYCOL 3350 17 G: 17 POWDER, FOR SOLUTION ORAL at 22:09

## 2022-01-01 RX ADMIN — CITALOPRAM HYDROBROMIDE 20 MG: 20 TABLET ORAL at 14:23

## 2022-01-01 RX ADMIN — DOCUSATE SODIUM 100 MG: 100 CAPSULE, LIQUID FILLED ORAL at 08:58

## 2022-01-01 RX ADMIN — SODIUM CHLORIDE, PRESERVATIVE FREE 10 ML: 5 INJECTION INTRAVENOUS at 22:06

## 2022-01-01 RX ADMIN — SODIUM CHLORIDE, PRESERVATIVE FREE 10 ML: 5 INJECTION INTRAVENOUS at 23:14

## 2022-01-01 RX ADMIN — HEPARIN SODIUM 5000 UNITS: 5000 INJECTION INTRAVENOUS; SUBCUTANEOUS at 21:43

## 2022-01-01 RX ADMIN — CITALOPRAM HYDROBROMIDE 20 MG: 20 TABLET ORAL at 08:47

## 2022-01-01 RX ADMIN — SENNOSIDES 8.6 MG: 8.6 TABLET, COATED ORAL at 14:26

## 2022-01-01 RX ADMIN — SALINE NASAL SPRAY 2 SPRAY: 1.5 SOLUTION NASAL at 21:46

## 2022-01-01 RX ADMIN — PANTOPRAZOLE SODIUM 40 MG: 40 TABLET, DELAYED RELEASE ORAL at 05:58

## 2022-01-01 RX ADMIN — Medication 12.5 G: at 08:23

## 2022-01-01 RX ADMIN — PANTOPRAZOLE SODIUM 40 MG: 40 TABLET, DELAYED RELEASE ORAL at 05:38

## 2022-01-01 RX ADMIN — ATORVASTATIN CALCIUM 40 MG: 40 TABLET, FILM COATED ORAL at 22:06

## 2022-01-01 RX ADMIN — SALINE NASAL SPRAY 2 SPRAY: 1.5 SOLUTION NASAL at 08:57

## 2022-01-01 RX ADMIN — HEPARIN SODIUM 5000 UNITS: 5000 INJECTION INTRAVENOUS; SUBCUTANEOUS at 23:01

## 2022-01-01 RX ADMIN — SODIUM CHLORIDE, PRESERVATIVE FREE 10 ML: 5 INJECTION INTRAVENOUS at 22:09

## 2022-01-01 RX ADMIN — PANTOPRAZOLE SODIUM 40 MG: 40 TABLET, DELAYED RELEASE ORAL at 18:47

## 2022-01-01 RX ADMIN — SODIUM CHLORIDE, PRESERVATIVE FREE 10 ML: 5 INJECTION INTRAVENOUS at 08:55

## 2022-01-01 RX ADMIN — POLYETHYLENE GLYCOL 3350 17 G: 17 POWDER, FOR SOLUTION ORAL at 02:08

## 2022-01-01 RX ADMIN — CETIRIZINE HYDROCHLORIDE 10 MG: 10 TABLET, FILM COATED ORAL at 22:25

## 2022-01-01 RX ADMIN — SODIUM CHLORIDE, PRESERVATIVE FREE 10 ML: 5 INJECTION INTRAVENOUS at 08:59

## 2022-01-01 RX ADMIN — CETIRIZINE HYDROCHLORIDE 10 MG: 10 TABLET, FILM COATED ORAL at 08:58

## 2022-01-01 RX ADMIN — OXYBUTYNIN CHLORIDE 5 MG: 5 TABLET ORAL at 22:25

## 2022-01-01 RX ADMIN — ASPIRIN 81 MG: 81 TABLET, COATED ORAL at 08:48

## 2022-01-01 RX ADMIN — PANTOPRAZOLE SODIUM 40 MG: 40 TABLET, DELAYED RELEASE ORAL at 15:49

## 2022-01-01 RX ADMIN — ACETAMINOPHEN 650 MG: 325 TABLET ORAL at 08:58

## 2022-01-01 RX ADMIN — CITALOPRAM HYDROBROMIDE 20 MG: 20 TABLET ORAL at 22:25

## 2022-01-01 RX ADMIN — ATORVASTATIN CALCIUM 40 MG: 40 TABLET, FILM COATED ORAL at 22:25

## 2022-01-01 RX ADMIN — GLUCAGON HYDROCHLORIDE 1 MG: KIT at 03:39

## 2022-01-01 RX ADMIN — CETIRIZINE HYDROCHLORIDE 10 MG: 10 TABLET, FILM COATED ORAL at 09:15

## 2022-01-01 RX ADMIN — MEGESTROL ACETATE 200 MG: 40 SUSPENSION ORAL at 14:22

## 2022-01-01 RX ADMIN — SENNOSIDES 8.6 MG: 8.6 TABLET, COATED ORAL at 21:43

## 2022-01-01 RX ADMIN — SALINE NASAL SPRAY 2 SPRAY: 1.5 SOLUTION NASAL at 22:07

## 2022-01-01 RX ADMIN — MIDODRINE HYDROCHLORIDE 10 MG: 10 TABLET ORAL at 22:25

## 2022-01-01 RX ADMIN — Medication 1.9 ML: at 22:13

## 2022-01-01 RX ADMIN — MIDODRINE HYDROCHLORIDE 10 MG: 5 TABLET ORAL at 20:35

## 2022-01-01 RX ADMIN — HEPARIN SODIUM 5000 UNITS: 5000 INJECTION INTRAVENOUS; SUBCUTANEOUS at 22:06

## 2022-01-01 RX ADMIN — SODIUM CHLORIDE, PRESERVATIVE FREE 10 ML: 5 INJECTION INTRAVENOUS at 08:48

## 2022-01-01 RX ADMIN — SALINE NASAL SPRAY 2 SPRAY: 1.5 SOLUTION NASAL at 08:48

## 2022-01-01 RX ADMIN — Medication 5000 UNITS: at 08:58

## 2022-01-01 RX ADMIN — CETIRIZINE HYDROCHLORIDE 10 MG: 10 TABLET, FILM COATED ORAL at 08:46

## 2022-01-01 RX ADMIN — ATORVASTATIN CALCIUM 40 MG: 40 TABLET, FILM COATED ORAL at 23:02

## 2022-01-01 RX ADMIN — DOCUSATE SODIUM 100 MG: 100 CAPSULE, LIQUID FILLED ORAL at 08:48

## 2022-01-01 RX ADMIN — Medication 5000 UNITS: at 22:25

## 2022-01-01 RX ADMIN — MIDODRINE HYDROCHLORIDE 10 MG: 10 TABLET ORAL at 08:47

## 2022-01-01 RX ADMIN — SODIUM CHLORIDE, PRESERVATIVE FREE 10 ML: 5 INJECTION INTRAVENOUS at 21:44

## 2022-01-01 RX ADMIN — CETIRIZINE HYDROCHLORIDE 10 MG: 10 TABLET, FILM COATED ORAL at 08:48

## 2022-01-01 RX ADMIN — DEXTROSE AND SODIUM CHLORIDE: 5; 900 INJECTION, SOLUTION INTRAVENOUS at 08:31

## 2022-01-01 RX ADMIN — Medication 5000 UNITS: at 09:15

## 2022-01-01 RX ADMIN — POLYETHYLENE GLYCOL 3350 17 G: 17 POWDER, FOR SOLUTION ORAL at 23:04

## 2022-01-01 RX ADMIN — SENNOSIDES 8.6 MG: 8.6 TABLET, COATED ORAL at 20:35

## 2022-01-01 RX ADMIN — SODIUM CHLORIDE, PRESERVATIVE FREE 10 ML: 5 INJECTION INTRAVENOUS at 14:25

## 2022-01-01 RX ADMIN — AMLODIPINE BESYLATE 10 MG: 10 TABLET ORAL at 14:22

## 2022-01-01 RX ADMIN — Medication 1.9 ML: at 16:47

## 2022-01-01 RX ADMIN — ASPIRIN 81 MG: 81 TABLET, COATED ORAL at 08:46

## 2022-01-01 RX ADMIN — Medication 5000 UNITS: at 11:36

## 2022-01-01 RX ADMIN — SALINE NASAL SPRAY 2 SPRAY: 1.5 SOLUTION NASAL at 09:15

## 2022-01-01 RX ADMIN — DESMOPRESSIN ACETATE 40 MG: 0.2 TABLET ORAL at 20:37

## 2022-01-01 RX ADMIN — DEXTROSE MONOHYDRATE 25 G: 25 INJECTION, SOLUTION INTRAVENOUS at 16:00

## 2022-01-01 RX ADMIN — CETIRIZINE HYDROCHLORIDE 10 MG: 10 TABLET ORAL at 14:23

## 2022-01-01 RX ADMIN — HEPARIN SODIUM 5000 UNITS: 5000 INJECTION INTRAVENOUS; SUBCUTANEOUS at 08:58

## 2022-01-01 RX ADMIN — MIDODRINE HYDROCHLORIDE 10 MG: 10 TABLET ORAL at 14:05

## 2022-01-01 RX ADMIN — HEPARIN SODIUM 5000 UNITS: 5000 INJECTION INTRAVENOUS; SUBCUTANEOUS at 06:24

## 2022-01-01 ASSESSMENT — ENCOUNTER SYMPTOMS
WHEEZING: 0
COUGH: 0
RHINORRHEA: 0
EYE ITCHING: 0
CHANGE IN BOWEL HABIT: 0
ABDOMINAL PAIN: 0
EYE PAIN: 0
WHEEZING: 0
SHORTNESS OF BREATH: 0
NAUSEA: 0
SORE THROAT: 0
COLOR CHANGE: 0
ABDOMINAL PAIN: 0
FACIAL SWELLING: 0
SHORTNESS OF BREATH: 0
CHEST TIGHTNESS: 0
NAUSEA: 0
TROUBLE SWALLOWING: 0
SHORTNESS OF BREATH: 0
CONSTIPATION: 0
COLOR CHANGE: 0
ABDOMINAL PAIN: 0
SINUS PRESSURE: 0
TROUBLE SWALLOWING: 0
BLOOD IN STOOL: 0
APNEA: 0
ABDOMINAL PAIN: 0
SINUS PRESSURE: 0
CONSTIPATION: 0
VOMITING: 0
COUGH: 0
TROUBLE SWALLOWING: 0
SHORTNESS OF BREATH: 1
SINUS PRESSURE: 0
SORE THROAT: 0
VOMITING: 0
RHINORRHEA: 0
BLOOD IN STOOL: 0
VOMITING: 0
PHOTOPHOBIA: 0
CHOKING: 0
EYE REDNESS: 0
NAUSEA: 0
EYE PAIN: 0
BLOOD IN STOOL: 0
SHORTNESS OF BREATH: 0
CHEST TIGHTNESS: 0
NAUSEA: 0
COUGH: 0
DIARRHEA: 0
SORE THROAT: 0
DIARRHEA: 0
DIARRHEA: 0
COLOR CHANGE: 0
COUGH: 0
WHEEZING: 0
VOICE CHANGE: 0
EYE DISCHARGE: 0
EYE PAIN: 0
CONSTIPATION: 0
SHORTNESS OF BREATH: 1
FACIAL SWELLING: 0
CHEST TIGHTNESS: 0
BACK PAIN: 1
VOMITING: 0
SWOLLEN GLANDS: 0
BOWEL INCONTINENCE: 0
FACIAL SWELLING: 0
VISUAL CHANGE: 1
ABDOMINAL DISTENTION: 0

## 2022-01-01 ASSESSMENT — PAIN SCALES - GENERAL
PAINLEVEL_OUTOF10: 10
PAINLEVEL_OUTOF10: 0
PAINLEVEL_OUTOF10: 0
PAINLEVEL_OUTOF10: 3
PAINLEVEL_OUTOF10: 0
PAINLEVEL_OUTOF10: 10
PAINLEVEL_OUTOF10: 10
PAINLEVEL_OUTOF10: 0

## 2022-01-01 ASSESSMENT — PAIN DESCRIPTION - FREQUENCY
FREQUENCY: CONTINUOUS
FREQUENCY: CONTINUOUS

## 2022-01-01 ASSESSMENT — PAIN DESCRIPTION - LOCATION
LOCATION: HEAD

## 2022-01-01 ASSESSMENT — PAIN DESCRIPTION - PAIN TYPE
TYPE: CHRONIC PAIN
TYPE: ACUTE PAIN
TYPE: ACUTE PAIN

## 2022-01-01 ASSESSMENT — PAIN DESCRIPTION - ONSET
ONSET: ON-GOING
ONSET: ON-GOING

## 2022-01-01 ASSESSMENT — PATIENT HEALTH QUESTIONNAIRE - PHQ9
SUM OF ALL RESPONSES TO PHQ QUESTIONS 1-9: 0
1. LITTLE INTEREST OR PLEASURE IN DOING THINGS: 0
SUM OF ALL RESPONSES TO PHQ QUESTIONS 1-9: 0
SUM OF ALL RESPONSES TO PHQ9 QUESTIONS 1 & 2: 0
2. FEELING DOWN, DEPRESSED OR HOPELESS: 0
SUM OF ALL RESPONSES TO PHQ QUESTIONS 1-9: 0
SUM OF ALL RESPONSES TO PHQ QUESTIONS 1-9: 0

## 2022-01-01 ASSESSMENT — PAIN DESCRIPTION - PROGRESSION
CLINICAL_PROGRESSION: GRADUALLY WORSENING
CLINICAL_PROGRESSION: NOT CHANGED

## 2022-01-01 ASSESSMENT — LIFESTYLE VARIABLES: HOW OFTEN DO YOU HAVE A DRINK CONTAINING ALCOHOL: 0

## 2022-01-01 ASSESSMENT — PAIN DESCRIPTION - DESCRIPTORS
DESCRIPTORS: HEADACHE
DESCRIPTORS: HEADACHE

## 2022-01-01 ASSESSMENT — PAIN - FUNCTIONAL ASSESSMENT: PAIN_FUNCTIONAL_ASSESSMENT: ACTIVITIES ARE NOT PREVENTED

## 2022-01-10 NOTE — PROGRESS NOTES
01/10/22  Ren Moy  1950    Patient Resident of [facility]    Chief Complaint  1. Controlled type 2 diabetes mellitus with chronic 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 dialysis, with long-term current use of insulin ([address]    2. Long-term insulin use ([address]    3. Hypoglycemia        HPI: 76-year-old patient being seen at the request of the nursing staff for persistent low blood sugars. Approximately 2 weeks ago her basal insulin insulin was decreased from 10 units nightly to 5 units nightly she has been having some low blood sugars in the morning. Consistently stable under 150 over the last 3 weeks. She does have a sliding scale available which they also have not been using. She continues to eat well. Continues on dialysis. Afebrile. Allergies   Allergen Reactions    Bactrim [Sulfamethoxazole-Trimethoprim] Hives    Clonidine Derivatives     Adhesive Tape Itching    Amoxicillin-Pot Clavulanate Diarrhea, Nausea Only and Nausea And Vomiting       Past Medical History:   Diagnosis Date    Acute anemia     Acute cystitis 8/8/2020    Acute kidney injury ([address] 8/19/2020    Acute kidney injury superimposed on CKD ([address]     Acute on chronic diastolic heart failure ([address] 8/7/2020    Acute renal failure (ARF) ([address] 12/11/2020    Anasarca 12/12/2020    Anxiety and depression     AV block, 1st degree 8/19/2020    Background diabetic retinopathy(362.01) 2008    (Mild)    Balance problem     BMI 45.0-49.9, adult ([address] 3/1/2021    Bradycardia 8/19/2020    Buttock wound 8/20/2020    CAD (coronary artery disease)     (with coronary artery bypass graft x4).     Cancer of uterus [facility])     history of, (probable cure)    Chronic diastolic heart failure ([address] 3/1/2021    Chronic kidney disease     Chronic low back pain     Controlled type 2 diabetes mellitus with chronic 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 dialysis, with long-term current use of insulin ([address]     CVA (cerebral vascular accident) ([address]     Decompensated heart failure ([address] 2020    Decubitus ulcer of trochanteric region of right hip, stage 2 ([address] 2020    Dementia (HCC)     (moderate)    Dry eye syndrome     End stage renal disease on dialysis ([address] 3/1/2021    GERD (gastroesophageal reflux disease)     Glaucoma suspect     Gout     Hemodialysis patient ([address] 3/1/2021    Homonymous hemianopsia     (Right)    Hyperkalemia 2020    Hyperlipidemia     Hypertension     Infestation by bed bug 2020    Infestation by maggots 2020    Keratitis     (secondary to dry eye syndrome).  Lymphedema of both lower extremities 2020    Morbid obesity ([address] 3/1/2021    MRSA bacteremia 2020    Obesity     PAD (peripheral artery disease) (HCC)     Peripheral polyneuropathy     (diabetic)    Pressure injury of right heel, unstageable ([address] 2020    Pseudophakos     (Right Eye: 2012; Left Eye: 2012) - Dr. Jaime Salgado.  Stroke [facility])     (Multiple) MRI of brain 10/2008 shows multiple infarcts involving the temporal and parietal areas.  Trichiasis     (left eye)    Type 2 diabetes mellitus ([address]     Wounds, multiple 2020       Past Surgical History:   Procedure Laterality Date    CATARACT REMOVAL WITH IMPLANT  2012    (Right eye) - Dr. Jaime Salgado.  CATARACT REMOVAL WITH IMPLANT  2012    (Left eye) - Dr. Jaime Salgado.   SECTION      CHOLECYSTECTOMY      CORONARY ARTERY BYPASS GRAFT  12-    (x4) - LIMA-LAD, SVG-diagnoal 1, SVG-OM1, and SVG-PDA. Exploration of left radial. (Dr. Andrés Clark).  EYE SURGERY Left     as a child     GASTRIC BYPASS SURGERY      HYSTERECTOMY      (abdominal)  with left oophorectomy. Right ovary retained.     IR TUNNELED CATHETER PLACEMENT GREATER THAN 5 YEARS  2021    IR TUNNELED CATHETER PLACEMENT GREATER THAN 5 YEARS 2021 Jay Gutierrez MD [facility] SPECIAL PROCEDURES    TONSILLECTOMY AND ADENOIDECTOMY      UPPER GASTROINTESTINAL ENDOSCOPY  12/14/2020    EGD BIOPSY performed by Calin Nunez MD at [facility] Endoscopy       Medications as per [facility] Chart /reviewed     Social History     Socioeconomic History    Marital status:      Spouse name: Not on file    Number of children: Not on file    Years of education: Not on file    Highest education level: Not on file   Occupational History    Not on file   Tobacco Use    Smoking status: Never Smoker    Smokeless tobacco: Never Used    Tobacco comment: never smoker eclamb rrt 7/20/17   Substance and Sexual Activity    Alcohol use: No    Drug use: No    Sexual activity: Not on file   Other Topics Concern    Not on file   Social History Narrative    Not on file     Social Determinants of Health     Financial Resource Strain:     Difficulty of Paying Living Expenses: Not on file   Food Insecurity:     Worried About Running Out of Food in the Last Year: Not on file    Rosanne of Food in the Last Year: Not on file   Transportation Needs:     Lack of Transportation (Medical): Not on file    Lack of Transportation (Non-Medical):  Not on file   Physical Activity:     Days of Exercise per Week: Not on file    Minutes of Exercise per Session: Not on file   Stress:     Feeling of Stress : Not on file   Social Connections:     Frequency of Communication with Friends and Family: Not on file    Frequency of Social Gatherings with Friends and Family: Not on file    Attends Evangelical Services: Not on file    Active Member of Clubs or Organizations: Not on file    Attends Club or Organization Meetings: Not on file    Marital Status: Not on file   Intimate Partner Violence:     Fear of Current or Ex-Partner: Not on file    Emotionally Abused: Not on file    Physically Abused: Not on file    Sexually Abused: Not on file   Housing Stability:     Unable to Pay for Housing in the Last Year: Not on file    Number of [place] in the Last Year: Not on sounds: Normal breath sounds. No stridor. No wheezing, rhonchi or rales. Chest:      Chest wall: No tenderness. Abdominal:      General: Bowel sounds are normal. There is no distension. Palpations: Abdomen is soft. Tenderness: There is no abdominal tenderness. Musculoskeletal:         General: No swelling. Skin:     General: Skin is warm and dry. Capillary Refill: Capillary refill takes less than 2 seconds. Coloration: Skin is not pale. Findings: No rash. Neurological:      General: No focal deficit present. Mental Status: She is alert. Mental status is at baseline. Cranial Nerves: No cranial nerve deficit. Sensory: No sensory deficit. Coordination: Coordination normal.   Psychiatric:         Mood and Affect: Mood normal.         Behavior: Behavior normal.         Vital Signs: Temperature 97.2 °F, blood pressure 130/66, pulse 63, respirations 16, SPO2 94% on room air    Assessment:  1. Controlled type 2 diabetes mellitus with chronic 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 dialysis, with long-term current use of insulin ([address]   stop basal insulin. Continue with sliding scale. Report blood sugars in 1 week    2. Long-term insulin use ([address]  As noted above    3. Hypoglycemia          Plan:  As noted above. Follow up for routine visit. Call sooner with concerns prior.     Electronically signed by SHIRA Murillo CNP on 1/10/2022 at 12:36 PM

## 2022-01-24 NOTE — PATIENT INSTRUCTIONS
Personalized Preventive Plan for [facility] - 1/24/2022  Medicare offers a range of preventive health benefits. Some of the tests and screenings are paid in full while other may be subject to a deductible, co-insurance, and/or copay. Some of these benefits include a comprehensive review of your medical history including lifestyle, illnesses that may run in your family, and various assessments and screenings as appropriate. After reviewing your medical record and screening and assessments performed today your provider may have ordered immunizations, labs, imaging, and/or referrals for you. A list of these orders (if applicable) as well as your Preventive Care list are included within your After Visit Summary for your review. Other Preventive Recommendations:    · A preventive eye exam performed by an eye specialist is recommended every 1-2 years to screen for glaucoma; cataracts, macular degeneration, and other eye disorders. · A preventive dental visit is recommended every 6 months. · Try to get at least 150 minutes of exercise per week or 10,000 steps per day on a pedometer . · Order or download the FREE \"Exercise & Physical Activity: Your Everyday Guide\" from The [employer]. Call 0-409.695.2720 or search The [employer] online. · You need 8860-0575 mg of calcium and 8984-2032 IU of vitamin D per day. It is possible to meet your calcium requirement with diet alone, but a vitamin D supplement is usually necessary to meet this goal.  · When exposed to the sun, use a sunscreen that protects against both UVA and UVB radiation with an SPF of 30 or greater. Reapply every 2 to 3 hours or after sweating, drying off with a towel, or swimming. · Always wear a seat belt when traveling in a car. Always wear a helmet when riding a bicycle or motorcycle. Personalized Preventive Plan for [facility] - 1/24/2022  Medicare offers a range of preventive health benefits.  Some of the tests and screenings are paid in full while other may be subject to a deductible, co-insurance, and/or copay. Some of these benefits include a comprehensive review of your medical history including lifestyle, illnesses that may run in your family, and various assessments and screenings as appropriate. After reviewing your medical record and screening and assessments performed today your provider may have ordered immunizations, labs, imaging, and/or referrals for you. A list of these orders (if applicable) as well as your Preventive Care list are included within your After Visit Summary for your review. Other Preventive Recommendations:    A preventive eye exam performed by an eye specialist is recommended every 1-2 years to screen for glaucoma; cataracts, macular degeneration, and other eye disorders. A preventive dental visit is recommended every 6 months. Try to get at least 150 minutes of exercise per week or 10,000 steps per day on a pedometer . Order or download the FREE \"Exercise & Physical Activity: Your Everyday Guide\" from The [employer]. Call 2-589.343.5881 or search [employer] online. You need 8376-9148 mg of calcium and 4653-5061 IU of vitamin D per day. It is possible to meet your calcium requirement with diet alone, but a vitamin D supplement is usually necessary to meet this goal.  When exposed to the sun, use a sunscreen that protects against both UVA and UVB radiation with an SPF of 30 or greater. Reapply every 2 to 3 hours or after sweating, drying off with a towel, or swimming. Always wear a seat belt when traveling in a car. Always wear a helmet when riding a bicycle or motorcycle.

## 2022-01-24 NOTE — PROGRESS NOTES
Medicare Annual Wellness Visit  Name: Brandi Hunt Date: 2022   MRN: [id number] Sex: Female   Age: 80 y.o. Ethnicity: Non- / Non    : 1950 Race: White (non-)      True Barlow is here for Medicare AWV    Screenings for behavioral, psychosocial and functional/safety risks, and cognitive dysfunction are all negative except as indicated below. These results, as well as other patient data from the [address] form, are documented in Flowsheets linked to this Encounter. Allergies   Allergen Reactions    Bactrim [Sulfamethoxazole-Trimethoprim] Hives    Clonidine Derivatives     Adhesive Tape Itching    Amoxicillin-Pot Clavulanate Diarrhea, Nausea Only and Nausea And Vomiting       Prior to Visit Medications    Medication Sig Taking? Authorizing Provider   nitroGLYCERIN (NITROSTAT) 0.4 MG SL tablet up to max of 3 total doses. If no relief after 1 dose, call 911.   SHIRA Hernandez NP   insulin lispro (HUMALOG) 100 UNIT/ML injection vial Inject 0-6 Units into the skin 3 times daily (with meals) Glucose: Dose:               No Insulin  140-199 1 Unit  200-249 2 Units  250-299 3 Units  300-349 4 Units  350-399 5 Units  Over 399 6 Units  SHIRA Hernandez NP   insulin lispro (HUMALOG) 100 UNIT/ML injection vial Inject 0-3 Units into the skin nightly Glucose: Dose:               No Insulin  140-249 1 Unit  250-349 2 Units  Over 350 3 Units  SHIRA Hernandez NP   carvedilol (COREG) 6.25 MG tablet Take 1 tablet by mouth 2 times daily (with meals)  SHIRA Hernandez NP   midodrine (PROAMATINE) 10 MG tablet Take 10 mg by mouth three times a week   Historical Provider, MD   sodium chloride (OCEAN) 0.65 % nasal spray 2 sprays by Nasal route 2 times daily  Historical Provider, MD   lidocaine-prilocaine (EMLA) 2.5-2.5 % cream Apply topically three times a week Apply topically as needed-   Historical Provider, MD   amLODIPine (NORVASC) 10 MG tablet Take 1 tablet by mouth daily  Stevie Mckinney DO   atorvastatin (LIPITOR) 40 MG tablet TAKE 1 TABLET BY MOUTH EVERYDAY  Stevie Mckinney DO   Cholecalciferol 125 MCG (5000 UT) CHEW Take 1 tablet by mouth daily  Stevie Mckinney DO   citalopram (CELEXA) 20 MG tablet Take 1 tablet by mouth daily  Stevie Mckinney DO   hydrALAZINE (APRESOLINE) 100 MG tablet Take 1 tablet by mouth 3 times daily  Stevie Mckinney DO   insulin glargine (LANTUS) 100 UNIT/ML injection vial Inject 10 Units into the skin nightly  Stevie Mckinney DO   nystatin (MYCOSTATIN) 171200 UNIT/GM cream Apply topically 2 times daily. Patient not taking: Reported on 7/30/2021  Stevie Mckinney DO   oxybutynin (DITROPAN) 5 MG tablet TAKE 1 TABLET BY MOUTH TWICE DAILY  Stevie Mckinney DO   pantoprazole (PROTONIX) 40 MG tablet Take 1 tablet by mouth 2 times daily (before meals)  Stevie Mckinney DO   rivastigmine (EXELON) 9.5 MG/24HR APPLY 1 NEW PATCH EVERY 24 HOURS  Stevie Mckinney DO   OXYGEN Oxgen at 2 liters per nasal cannula  ERINN Nagel   loratadine (CLARITIN) 10 MG tablet TAKE 1 TABLET(10 MG) BY MOUTH DAILY  ERINN Nagel   Lancets MISC Checks blood sugar ac and HS  ERINN Nagel   blood glucose monitor strips Test 3  times a day & as needed for symptoms of irregular blood glucose. Dispense sufficient amount for indicated testing frequency plus additional to accommodate PRN testing needs. Bairon Clark, [address]   Alcohol Swabs (ALCOHOL PREP) PADS Checks blood sugar 3 times a day  Dudley Nagele   [address]  Devices MISC Standard walker with wheels    Diagnosis dementia, CHF  Sumeet Clifton, APRN - CNP   aspirin 81 MG tablet Take 1 tablet by mouth daily  ERINN Nagel   Diabetic Shoe MISC by Does not apply route  ERINN Nagel   Compression Stockings MISC by Does not apply route 20-30 mm Hg bilateral knee high  Cresenciano Seller, [address]       Past Medical History:   Diagnosis Date    Acute anemia     Acute cystitis 8/8/2020    Acute kidney injury ([address] 8/19/2020    Acute kidney injury superimposed on CKD ([address]     Acute on chronic diastolic heart failure ([address] 8/7/2020    Acute renal failure (ARF) ([address] 12/11/2020    Anasarca 12/12/2020    Anxiety and depression     AV block, 1st degree 8/19/2020    Background diabetic retinopathy(362.01) 2008    (Mild)    Balance problem     BMI 45.0-49.9, adult ([address] 3/1/2021    Bradycardia 8/19/2020    Buttock wound 8/20/2020    CAD (coronary artery disease)     (with coronary artery bypass graft x4).  Cancer of uterus [facility])     history of, (probable cure)    Chronic diastolic heart failure ([address] 3/1/2021    Chronic kidney disease     Chronic low back pain     Controlled type 2 diabetes mellitus with chronic 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 dialysis, with long-term current use of insulin ([address]     CVA (cerebral vascular accident) ([address]     Decompensated heart failure ([address] 12/4/2020    Decubitus ulcer of trochanteric region of right hip, stage 2 ([address] 8/23/2020    Dementia ([address]     (moderate)    Dry eye syndrome     End stage renal disease on dialysis ([address] 3/1/2021    GERD (gastroesophageal reflux disease)     Glaucoma suspect 2005    Gout     Hemodialysis patient ([address] 3/1/2021    Homonymous hemianopsia 2008    (Right)    Hyperkalemia 8/19/2020    Hyperlipidemia     Hypertension     Infestation by bed bug 8/8/2020    Infestation by maggots 8/8/2020    Keratitis     (secondary to dry eye syndrome).  Lymphedema of both lower extremities 8/8/2020    Morbid obesity ([address] 3/1/2021    MRSA bacteremia 12/18/2020    Obesity     PAD (peripheral artery disease) (HCC)     Peripheral polyneuropathy     (diabetic)    Pressure injury of right heel, unstageable ([address] 8/18/2020    Pseudophakos     (Right Eye: 05-; Left Eye: 04-) - Dr. Osman Velarde.     Stroke [facility])     (Multiple) MRI of brain 10/2008 shows multiple infarcts involving the temporal and parietal areas.  Trichiasis     (left eye)    Type 2 diabetes mellitus ([address]     Wounds, multiple 2020       Past Surgical History:   Procedure Laterality Date    CATARACT REMOVAL WITH IMPLANT  2012    (Right eye) - Dr. Reena Gamez.  CATARACT REMOVAL WITH IMPLANT  2012    (Left eye) - Dr. Reena Gamez.   SECTION      CHOLECYSTECTOMY      CORONARY ARTERY BYPASS GRAFT  12-    (x4) - LIMA-LAD, SVG-diagnoal 1, SVG-OM1, and SVG-PDA. Exploration of left radial. (Dr. Binh Smith).  EYE SURGERY Left     as a child     GASTRIC BYPASS SURGERY      HYSTERECTOMY      (abdominal)  with left oophorectomy. Right ovary retained.  IR TUNNELED CATHETER PLACEMENT GREATER THAN 5 YEARS  2021    IR TUNNELED CATHETER PLACEMENT GREATER THAN 5 YEARS 2021 Christiano Swift MD [facility] SPECIAL PROCEDURES    TONSILLECTOMY AND ADENOIDECTOMY      UPPER GASTROINTESTINAL ENDOSCOPY  2020    EGD BIOPSY performed by Katherene Primrose, MD at [facility]       Family History   Problem Relation Age of Onset    Diabetes Mother     Cancer Mother     High Blood Pressure Mother     Stroke Mother     Kidney Disease Mother     Uterine Cancer Mother     Diabetes Father     Heart Disease Father     Glaucoma Father     Emphysema Father     Coronary Art Dis Other         All 4 siblings.  Stroke Other         1 sibling.  Lung Cancer Other         1 sibling - cause of death lung cancer.     Mental Retardation Other        CareTeam (Including outside providers/suppliers regularly involved in providing care):   Patient Care Team:  Harpreet Wong DO as PCP - General (Internal Medicine)    Wt Readings from Last 3 Encounters:   21 187 lb 9.8 oz (85.1 kg)   21 209 lb 7 oz (95 kg)   21 199 lb (90.3 kg)     Vitals:    22 1248   BP: (!) 107/47   Pulse: 91   Resp: 16   Temp: 98.2 °F (36.8 °C) TempSrc: Oral   SpO2: 98%     There is no height or weight on file to calculate BMI. Based upon direct observation of the patient, evaluation of cognition reveals global memory impairment noted. Patient asleep in bed. Awakens to verbal stimuli. Respirations easy and unlabored. Lung sounds clear throughout. Chest rises and falls symmetrically. No use of accessory muscles. Heart regular rate and rhythm. Abdomen soft nontender. Patient alert to person and place. Mild confusion. Denies any pain. Falls back asleep quickly. Patient's complete Health Risk Assessment and screening values have been reviewed and are found in Flowsheets. The following problems were reviewed today and where indicated follow up appointments were made and/or referrals ordered. Positive Risk Factor Screenings with Interventions:      Cognitive:   Words recalled: 0 Words Recalled  Clock Drawing Test (CDT) Score:  (Declined)  Total Score Interpretation: Positive Mini-Cog  Cognitive Impairment Interventions:  · Staff currently working to find legal guardian for patient          Health Habits/Nutrition:  Health Habits/Nutrition  Do you exercise for at least 20 minutes 2-3 times per week?: (!) No  Have you lost any weight without trying in the past 3 months?: No (Weight loss associated with fluid overload loss)  Do you eat only one meal per day?: No  Have you seen the dentist within the past year?: N/A - wear dentures     Health Habits/Nutrition Interventions:  · Patient declines physical therapy or exercise routine         Personalized Preventive Plan   Current Health Maintenance Status  Immunization History   Administered Date(s) Administered    COVID-19, Pfizer Purple top, DILUTE for use, 12+ yrs, 30mcg/0.3mL dose 01/27/2021, 02/17/2021    Influenza Virus Vaccine 10/20/1998, 10/12/2013, 10/27/2014, 10/12/2016    Influenza, High Dose (Fluzone 65 yrs and older) 01/31/2018, 10/05/2020    Influenza, Quadv, IM, PF (6 mo and older Fluzone, Flulaval, Fluarix, and 3 yrs and older Afluria) 11/19/2021    Influenza, Triv, inactivated, subunit, adjuvanted, IM (Fluad 65 yrs and older) 09/26/2019    Pneumococcal Conjugate 13-valent ([id number]) 04/12/2017    Pneumococcal Polysaccharide ([id number]) 02/06/2004, 08/21/2018    Tdap (Boostrix, Adacel) 09/04/2016    Zoster Recombinant (Shingrix) 01/03/2020        Health Maintenance   Topic Date Due    Hepatitis A vaccine (1 of 2 - Risk 2-dose series) Never done    Depression Monitoring  Never done    Diabetic retinal exam  Never done    Breast cancer screen  Never done    DEXA (modify frequency per FRAX score)  Never done    Shingles Vaccine (2 of 2) 02/28/2020    COVID-19 Vaccine (3 - Booster for Pfizer series) 07/17/2021    Diabetic foot exam  08/11/2021    A1C test (Diabetic or Prediabetic)  12/04/2021    Colon Cancer Screen FIT/FOBT  12/12/2021    Annual Wellness Visit (AWV)  12/21/2021    Lipid screen  11/14/2022    DTaP/Tdap/Td vaccine (2 - Td or Tdap) 09/04/2026    Flu vaccine  Completed    Pneumococcal 65+ yrs at Risk Vaccine  Completed    Hepatitis C screen  Completed    Hib vaccine  Aged Out    Meningococcal (ACWY) vaccine  Aged Out     Recommendations for [employer] Due: see orders and patient instructions/AVS.  . Recommended screening schedule for the next 5-10 years is provided to the patient in written form: see Patient Kate Fernandes was seen today for medicare awv.     Diagnoses and all orders for this visit:    Encounter for other general examination

## 2022-01-25 PROBLEM — I50.9 CONGESTIVE HEART FAILURE OF UNKNOWN ETIOLOGY (HCC): Status: ACTIVE | Noted: 2022-01-01

## 2022-01-25 NOTE — ED PROVIDER NOTES
[facility] ED  [address]  Phone: [phone]        Pt Name: Hallie Patel  MRN: [id number]  [place] 1950  Date of evaluation: 1/25/22      CHIEF COMPLAINT     Chief Complaint   Patient presents with    Shortness of Breath         HISTORY OF PRESENT ILLNESS  (Location/Symptom, Timing/Onset, Context/Setting, Quality, Duration, Modifying Factors, Severity.)    Hallie Patel is a 80 y.o. female who presents with shortness of breath. This 71-year-old female is brought from dialysis she gets dialysis on Tuesdays Thursdays and Saturdays she had completed 2-1/2 hours of her dialysis complaining of shortness of breath so EMS brings her to our facility for evaluation when asked the patient dates she has been feeling short of breath for the last several weeks she is on oxygen at a skilled nursing facility which she states really does not help her shortness of breath she denies any chest pain states she does have a headache denies any fever chills or cough nothing makes her symptoms better or worse patient further states she has chronic swelling of the lower extremities of which she has compressing dressings on for her swelling      REVIEW OF SYSTEMS    (2-9 systems for level 4, 10 or more for level 5)     Review of Systems   Respiratory: Positive for shortness of breath. Cardiovascular: Positive for leg swelling. Neurological: Positive for headaches. All other systems reviewed and are negative.       PAST MEDICAL HISTORY    has a past medical history of Acute anemia, Acute cystitis, Acute kidney injury ([address] Acute kidney injury superimposed on CKD ([address] Acute on chronic diastolic heart failure ([address] Acute renal failure (ARF) ([address] Anasarca, Anxiety and depression, AV block, 1st degree, Background diabetic retinopathy(362.01), Balance problem, BMI 45.0-49.9, adult ([address] Bradycardia, Buttock wound, CAD (coronary artery disease), Cancer of uterus ([address] Chronic diastolic heart failure (HCC), Chronic kidney disease, Chronic low back pain, Controlled type 2 diabetes mellitus with chronic 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 dialysis, with long-term current use of insulin ([address] CVA (cerebral vascular accident) ([address] Decompensated heart failure ([address] Decubitus ulcer of trochanteric region of right hip, stage 2 ([address] Dementia ([address] Dry eye syndrome, End stage renal disease on dialysis ([address] GERD (gastroesophageal reflux disease), Glaucoma suspect, Gout, Hemodialysis patient ([address] Homonymous hemianopsia, Hyperkalemia, Hyperlipidemia, Hypertension, Infestation by bed bug, Infestation by maggots, Keratitis, Lymphedema of both lower extremities, Morbid obesity ([address] MRSA bacteremia, Obesity, PAD (peripheral artery disease) ([address] Peripheral polyneuropathy, Pressure injury of right heel, unstageable ([address] Pseudophakos, Stroke ([address] Trichiasis, Type 2 diabetes mellitus ([address] and Wounds, multiple. SURGICAL HISTORY      has a past surgical history that includes Gastric bypass surgery; Cataract removal with implant (2012); Cataract removal with implant (2012); Coronary artery bypass graft (12-);  section; Hysterectomy; Cholecystectomy; Tonsillectomy and adenoidectomy; Eye surgery (Left); Upper gastrointestinal endoscopy (2020); and IR TUNNELED CVC PLACE WO SQ PORT/PUMP > 5 YEARS (2021). CURRENTMEDICATIONS       Previous Medications    ALCOHOL SWABS (ALCOHOL PREP) PADS    Checks blood sugar 3 times a day    AMLODIPINE (NORVASC) 10 MG TABLET    Take 1 tablet by mouth daily    ASPIRIN 81 MG TABLET    Take 1 tablet by mouth daily    ATORVASTATIN (LIPITOR) 40 MG TABLET    TAKE 1 TABLET BY MOUTH EVERYDAY    BLOOD GLUCOSE MONITOR STRIPS    Test 3  times a day & as needed for symptoms of irregular blood glucose. Dispense sufficient amount for indicated testing frequency plus additional to accommodate PRN testing needs.     CARVEDILOL (COREG) 6.25 MG TABLET    Take 1 tablet by mouth 2 times daily (with meals)    CHOLECALCIFEROL 125 MCG (5000 UT) CHEW    Take 1 tablet by mouth daily    CITALOPRAM (CELEXA) 20 MG TABLET    Take 1 tablet by mouth daily    COMPRESSION STOCKINGS MISC    by Does not apply route 20-30 mm Hg bilateral knee high    DIABETIC SHOE MISC    by Does not apply route    HYDRALAZINE (APRESOLINE) 100 MG TABLET    Take 1 tablet by mouth 3 times daily    INSULIN GLARGINE (LANTUS) 100 UNIT/ML INJECTION VIAL    Inject 10 Units into the skin nightly    INSULIN LISPRO (HUMALOG) 100 UNIT/ML INJECTION VIAL    Inject 0-6 Units into the skin 3 times daily (with meals) Glucose: Dose:               No Insulin  140-199 1 Unit  200-249 2 Units  250-299 3 Units  300-349 4 Units  350-399 5 Units  Over 399 6 Units    INSULIN LISPRO (HUMALOG) 100 UNIT/ML INJECTION VIAL    Inject 0-3 Units into the skin nightly Glucose: Dose:               No Insulin  140-249 1 Unit  250-349 2 Units  Over 350 3 Units    LANCETS MISC    Checks blood sugar ac and HS    LIDOCAINE-PRILOCAINE (EMLA) 2.5-2.5 % CREAM    Apply topically three times a week Apply topically as needed- Tu-Th-Sa    LORATADINE (CLARITIN) 10 MG TABLET    TAKE 1 TABLET(10 MG) BY MOUTH DAILY    MIDODRINE (PROAMATINE) 10 MG TABLET    Take 10 mg by mouth three times a week tu-th-sa    MISC. DEVICES MISC    Standard walker with wheels    Diagnosis dementia, CHF    NITROGLYCERIN (NITROSTAT) 0.4 MG SL TABLET    up to max of 3 total doses. If no relief after 1 dose, call 911. NYSTATIN (MYCOSTATIN) 277653 UNIT/GM CREAM    Apply topically 2 times daily.     OXYBUTYNIN (DITROPAN) 5 MG TABLET    TAKE 1 TABLET BY MOUTH TWICE DAILY    OXYGEN    Oxgen at 2 liters per nasal cannula    PANTOPRAZOLE (PROTONIX) 40 MG TABLET    Take 1 tablet by mouth 2 times daily (before meals)    RIVASTIGMINE (EXELON) 9.5 MG/24HR    APPLY 1 NEW PATCH EVERY 24 HOURS    SODIUM CHLORIDE (OCEAN) 0.65 % NASAL SPRAY    2 sprays by Nasal route 2 times daily       ALLERGIES     is allergic to bactrim [sulfamethoxazole-trimethoprim], clonidine derivatives, adhesive tape, and amoxicillin-pot clavulanate. FAMILY HISTORY     She indicated that her mother is . She indicated that her father is . family history includes Cancer in her mother; Coronary Art Dis in an other family member; Diabetes in her father and mother; Emphysema in her father; Glaucoma in her father; Heart Disease in her father; High Blood Pressure in her mother; Kidney Disease in her mother; Arline Outlaw in an other family member; Mental Retardation in an other family member; Stroke in her mother and another family member; Uterine Cancer in her mother. SOCIAL HISTORY      reports that she has never smoked. She has never used smokeless tobacco. She reports that she does not drink alcohol and does not use drugs. PHYSICAL EXAM    (up to 7 for level 4, 8 or more for level 5)   INITIAL VITALS:  weight is 177 lb 6.4 oz (80.5 kg). Her tympanic temperature is 97.5 °F (36.4 °C). Her blood pressure is 113/56 (abnormal) and her pulse is 57. Her respiration is 13 and oxygen saturation is 100%. Physical Exam  Vitals and nursing note reviewed. Constitutional:       Appearance: Normal appearance. HENT:      Head: Normocephalic and atraumatic. Eyes:      Conjunctiva/sclera: Conjunctivae normal.      Pupils: Pupils are equal, round, and reactive to light. Cardiovascular:      Rate and Rhythm: Normal rate and regular rhythm. Pulmonary:      Comments: Diminished breath sounds with some questionable rales in the bases no focal rhonchi or wheezing  Abdominal:      General: Bowel sounds are normal.      Palpations: Abdomen is soft. Tenderness: There is no abdominal tenderness. There is no guarding. Musculoskeletal:      Cervical back: Normal range of motion and neck supple. No rigidity or tenderness.       Comments: What appears to be chronic edema of the lower extremities   Lymphadenopathy:      Cervical: No cervical adenopathy. Skin:     General: Skin is warm and dry. Neurological:      General: No focal deficit present. Mental Status: She is alert. DIFFERENTIAL DIAGNOSIS/ MDM:     We will go ahead and check labs EKG chest x-ray because the patient complained of a headache I will get a CT scan of the head I will review her previous medical record  And reviewing the medical record I see where the patient was seen on December 21 for her headache while at dialysis and during that encounter she admitted that she has had a headache for months also during that encounter it was reported that she reported that she is chronically short of breath    DIAGNOSTIC RESULTS     EKG: All EKG's are interpreted by the Emergency Department Physician who either signs or Co-signs this chart in the absence of a cardiologist.      Interpreted by Ankush Choudhary MD     Rhythm:bradycardia   Rate: 58  Axis: 210  Ectopy: none  Conduction: normal  ST Segments: no acute change  T Waves: no acute change  Q Waves: none    Clinical Impression: bradycardia with no acute changes/normal EKG. No acute infarction/ischemia noted. RADIOLOGY:        Interpretation per the Radiologist below, if available at the time of this note:    XR CHEST PORTABLE (Final result)  Result time 01/25/22 10:00:34  Final result by Sirena Hernandez MD (01/25/22 10:00:34)                Impression:    1. Cardiomegaly with mild vascular congestion. 2. Bilateral pleural effusions with lower lobe atelectasis. Narrative:    EXAMINATION:   ONE XRAY VIEW OF THE CHEST     1/25/2022 9:55 am     COMPARISON:   11/15/2021.      HISTORY:   ORDERING SYSTEM PROVIDED HISTORY: dyspnea   TECHNOLOGIST PROVIDED HISTORY:   dyspnea   Reason for Exam: SOB     FINDINGS:   There are postsurgical changes of median sternotomy.  The heart size is   enlarged.  The pulmonary vasculature is mildly congested. [facility] are   bilateral pleural effusions with lower lobe atelectasis.  Note is made of a   right dialysis catheter.  No pneumothoraces are seen.                         CT HEAD WO CONTRAST (Final result)  Result time 01/25/22 10:00:26  Final result by Raymond Stewart MD (01/25/22 10:00:26)                Impression:    No acute intracranial abnormality.  Senescent changes including chronic   microvascular change.  Old left PCA distribution infarct. RECOMMENDATIONS:   Unavailable             Narrative:    EXAMINATION:   CT OF THE HEAD WITHOUT CONTRAST  1/25/2022 9:45 am     TECHNIQUE:   CT of the head was performed without the administration of intravenous   contrast. Dose modulation, iterative reconstruction, and/or weight based   adjustment of the mA/kV was utilized to reduce the radiation dose to as low   as reasonably achievable. COMPARISON:   13 November 2021     HISTORY:   ORDERING SYSTEM PROVIDED HISTORY: headache   TECHNOLOGIST PROVIDED HISTORY:     headache   Decision Support Exception - unselect if not a suspected or confirmed   emergency medical condition->Emergency Medical Condition (MA)   Reason for Exam: headache     FINDINGS:   BRAIN/VENTRICLES: There is no acute intracranial hemorrhage, mass effect or   midline shift.  No abnormal extra-axial fluid collection.  The gray-white   differentiation is maintained without evidence of an acute infarct.  There is   no evidence of hydrocephalus. Area of encephalomalacia is present in the left   occipital lobe, unchanged from prior exam.  Calcified vertebral and cavernous   carotid arteries are noted.  Scattered hypodensity in the white matter is   present consistent with chronic microvascular change. ORBITS: The visualized portion of the orbits demonstrate no acute abnormality. SINUSES: The visualized paranasal sinuses and mastoid air cells demonstrate   no acute abnormality.      SOFT TISSUES/SKULL:  No acute abnormality of the visualized skull or soft tissues.                    LABS:  Results for orders placed or performed during the hospital encounter of 01/25/22   CBC Auto Differential   Result Value Ref Range    WBC 6.8 3.5 - 11.3 k/uL    RBC 3.54 (L) 3.95 - 5.11 m/uL    Hemoglobin 11.2 (L) 11.9 - 15.1 g/dL    Hematocrit 35.8 (L) 36.3 - 47.1 %    .1 82.6 - 102.9 fL    MCH 31.6 25.2 - 33.5 pg    MCHC 31.3 25.2 - 33.5 g/dL    RDW 18.8 (H) 11.8 - 14.4 %    Platelets See Reflexed IPF Result 138 - 453 k/uL    MPV NOT REPORTED 8.1 - 13.5 fL    NRBC Automated 0.0 0.0 per 100 WBC    Differential Type NOT REPORTED     WBC Morphology NOT REPORTED     RBC Morphology NOT REPORTED     Platelet Estimate NOT REPORTED     Monocytes 7 4 - 11 %    Lymphocytes 12 (L) 16 - 46 %    Seg Neutrophils 80 (H) 43 - 77 %    Eosinophils % 1 1 - 7 %    Basophils 0 0 - 1 %    Immature Granulocytes 0 0 %    Absolute Mono # 0.48 0.1 - 1.2 k/uL    Absolute Lymph # 0.82 (L) 1.0 - 4.8 k/uL    Segs Absolute 5.43 1.50 - 8.10 k/uL    Absolute Eos # 0.07 0.0 - 0.4 k/uL    Basophils Absolute 0.00 0.0 - 0.2 k/uL    Absolute Immature Granulocyte 0.00 0.00 - 0.30 k/uL    Morphology ANISOCYTOSIS PRESENT     Morphology Platelet scan shows Decreased Platelets    Basic Metabolic Panel   Result Value Ref Range    Glucose 79 70 - 99 mg/dL    BUN 20 8 - 23 mg/dL    CREATININE 2.14 (H) 0.50 - 0.90 mg/dL    Bun/Cre Ratio 9 9 - 20    Calcium 9.1 8.6 - 10.4 mg/dL    Sodium 132 (L) 135 - 144 mmol/L    Potassium 3.5 (L) 3.7 - 5.3 mmol/L    Chloride 97 (L) 98 - 107 mmol/L    CO2 27 20 - 31 mmol/L    Anion Gap 8 (L) 9 - 17 mmol/L    GFR Non-African American 23 (L) >60 mL/min    GFR  28 (L) >60 mL/min    GFR Comment          GFR Staging NOT REPORTED    Hepatic Function Panel   Result Value Ref Range    Albumin 3.3 (L) 3.5 - 5.2 g/dL    Alkaline Phosphatase 323 (H) 35 - 104 U/L    ALT 14 5 - 33 U/L    AST 25 <32 U/L    Total Bilirubin 0.48 0.3 - 1.2 mg/dL    Bilirubin, Direct 0.26 <0.31 mg/dL Stable    PATIENT REFERRED TO:  No follow-up provider specified. DISCHARGE MEDICATIONS:  New Prescriptions    No medications on file       (Please note that portions of this note were completed with a voicerecognition program.  Efforts were made to edit the dictations but occasionally words are mis-transcribed.)    Lucio Barber MD,, MD, F.A.C.E.P.   Attending Emergency Medicine Physician       Lucio Barber MD  01/25/22 [id number]

## 2022-01-25 NOTE — ED PROVIDER NOTES
[facility] ED  [address]  Phone: [phone]        Pt Name: Krysta Painter  MRN: [id number]  [place] 1950  Date of evaluation: 1/25/22      CHIEF COMPLAINT     Chief Complaint   Patient presents with    Shortness of Breath         HISTORY OF PRESENT ILLNESS  (Location/Symptom, Timing/Onset, Context/Setting, Quality, Duration, Modifying Factors, Severity.)    Krysta Painter is a 80 y.o. female who presents with shortness of breath. This 81-year-old female is brought from dialysis she gets dialysis on Tuesdays Thursdays and Saturdays she had completed 2-1/2 hours of her dialysis complaining of shortness of breath so EMS brings her to our facility for evaluation when asked the patient dates she has been feeling short of breath for the last several weeks she is on oxygen at a skilled nursing facility which she states really does not help her shortness of breath she denies any chest pain states she does have a headache denies any fever chills or cough nothing makes her symptoms better or worse patient further states she has chronic swelling of the lower extremities of which she has compressing dressings on for her swelling      REVIEW OF SYSTEMS    (2-9 systems for level 4, 10 or more for level 5)     Review of Systems   Respiratory: Positive for shortness of breath. Cardiovascular: Positive for leg swelling. Neurological: Positive for headaches. All other systems reviewed and are negative.       PAST MEDICAL HISTORY    has a past medical history of Acute anemia, Acute cystitis, Acute kidney injury ([address] Acute kidney injury superimposed on CKD ([address] Acute on chronic diastolic heart failure ([address] Acute renal failure (ARF) ([address] Anasarca, Anxiety and depression, AV block, 1st degree, Background diabetic retinopathy(362.01), Balance problem, BMI 45.0-49.9, adult ([address] Bradycardia, Buttock wound, CAD (coronary artery disease), Cancer of uterus ([address] Chronic diastolic heart failure (HCC), Chronic kidney disease, Chronic low back pain, Controlled type 2 diabetes mellitus with chronic 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 dialysis, with long-term current use of insulin ([address] CVA (cerebral vascular accident) ([address] Decompensated heart failure ([address] Decubitus ulcer of trochanteric region of right hip, stage 2 ([address] Dementia ([address] Dry eye syndrome, End stage renal disease on dialysis ([address] GERD (gastroesophageal reflux disease), Glaucoma suspect, Gout, Hemodialysis patient ([address] Homonymous hemianopsia, Hyperkalemia, Hyperlipidemia, Hypertension, Infestation by bed bug, Infestation by maggots, Keratitis, Lymphedema of both lower extremities, Morbid obesity ([address] MRSA bacteremia, Obesity, PAD (peripheral artery disease) ([address] Peripheral polyneuropathy, Pressure injury of right heel, unstageable ([address] Pseudophakos, Stroke ([address] Trichiasis, Type 2 diabetes mellitus ([address] and Wounds, multiple. SURGICAL HISTORY      has a past surgical history that includes Gastric bypass surgery; Cataract removal with implant (2012); Cataract removal with implant (2012); Coronary artery bypass graft (12-);  section; Hysterectomy; Cholecystectomy; Tonsillectomy and adenoidectomy; Eye surgery (Left); Upper gastrointestinal endoscopy (2020); and IR TUNNELED CVC PLACE WO SQ PORT/PUMP > 5 YEARS (2021). CURRENTMEDICATIONS       Previous Medications    ALCOHOL SWABS (ALCOHOL PREP) PADS    Checks blood sugar 3 times a day    AMLODIPINE (NORVASC) 10 MG TABLET    Take 1 tablet by mouth daily    ASPIRIN 81 MG TABLET    Take 1 tablet by mouth daily    ATORVASTATIN (LIPITOR) 40 MG TABLET    TAKE 1 TABLET BY MOUTH EVERYDAY    BLOOD GLUCOSE MONITOR STRIPS    Test 3  times a day & as needed for symptoms of irregular blood glucose. Dispense sufficient amount for indicated testing frequency plus additional to accommodate PRN testing needs.     CARVEDILOL (COREG) 6.25 MG TABLET    Take 1 tablet by mouth 2 times daily (with meals)    CHOLECALCIFEROL 125 MCG (5000 UT) CHEW    Take 1 tablet by mouth daily    CITALOPRAM (CELEXA) 20 MG TABLET    Take 1 tablet by mouth daily    COMPRESSION STOCKINGS MISC    by Does not apply route 20-30 mm Hg bilateral knee high    DIABETIC SHOE MISC    by Does not apply route    HYDRALAZINE (APRESOLINE) 100 MG TABLET    Take 1 tablet by mouth 3 times daily    INSULIN GLARGINE (LANTUS) 100 UNIT/ML INJECTION VIAL    Inject 10 Units into the skin nightly    INSULIN LISPRO (HUMALOG) 100 UNIT/ML INJECTION VIAL    Inject 0-6 Units into the skin 3 times daily (with meals) Glucose: Dose:               No Insulin  140-199 1 Unit  200-249 2 Units  250-299 3 Units  300-349 4 Units  350-399 5 Units  Over 399 6 Units    INSULIN LISPRO (HUMALOG) 100 UNIT/ML INJECTION VIAL    Inject 0-3 Units into the skin nightly Glucose: Dose:               No Insulin  140-249 1 Unit  250-349 2 Units  Over 350 3 Units    LANCETS MISC    Checks blood sugar ac and HS    LIDOCAINE-PRILOCAINE (EMLA) 2.5-2.5 % CREAM    Apply topically three times a week Apply topically as needed- Tu-Th-Sa    LORATADINE (CLARITIN) 10 MG TABLET    TAKE 1 TABLET(10 MG) BY MOUTH DAILY    MIDODRINE (PROAMATINE) 10 MG TABLET    Take 10 mg by mouth three times a week tu-th-sa    MISC. DEVICES MISC    Standard walker with wheels    Diagnosis dementia, CHF    NITROGLYCERIN (NITROSTAT) 0.4 MG SL TABLET    up to max of 3 total doses. If no relief after 1 dose, call 911. NYSTATIN (MYCOSTATIN) 623333 UNIT/GM CREAM    Apply topically 2 times daily.     OXYBUTYNIN (DITROPAN) 5 MG TABLET    TAKE 1 TABLET BY MOUTH TWICE DAILY    OXYGEN    Oxgen at 2 liters per nasal cannula    PANTOPRAZOLE (PROTONIX) 40 MG TABLET    Take 1 tablet by mouth 2 times daily (before meals)    RIVASTIGMINE (EXELON) 9.5 MG/24HR    APPLY 1 NEW PATCH EVERY 24 HOURS    SODIUM CHLORIDE (OCEAN) 0.65 % NASAL SPRAY    2 sprays by Nasal route 2 times daily       ALLERGIES     is allergic to bactrim [sulfamethoxazole-trimethoprim], clonidine derivatives, adhesive tape, and amoxicillin-pot clavulanate. FAMILY HISTORY     She indicated that her mother is . She indicated that her father is . family history includes Cancer in her mother; Coronary Art Dis in an other family member; Diabetes in her father and mother; Emphysema in her father; Glaucoma in her father; Heart Disease in her father; High Blood Pressure in her mother; Kidney Disease in her mother; Annamary  in an other family member; Mental Retardation in an other family member; Stroke in her mother and another family member; Uterine Cancer in her mother. SOCIAL HISTORY      reports that she has never smoked. She has never used smokeless tobacco. She reports that she does not drink alcohol and does not use drugs. PHYSICAL EXAM    (up to 7 for level 4, 8 or more for level 5)   INITIAL VITALS:  weight is 177 lb 6.4 oz (80.5 kg). Her tympanic temperature is 97.5 °F (36.4 °C). Her blood pressure is 113/56 (abnormal) and her pulse is 57. Her respiration is 13 and oxygen saturation is 100%. Physical Exam  Vitals and nursing note reviewed. Constitutional:       Appearance: Normal appearance. HENT:      Head: Normocephalic and atraumatic. Eyes:      Conjunctiva/sclera: Conjunctivae normal.      Pupils: Pupils are equal, round, and reactive to light. Cardiovascular:      Rate and Rhythm: Normal rate and regular rhythm. Pulmonary:      Comments: Diminished breath sounds with some questionable rales in the bases no focal rhonchi or wheezing  Abdominal:      General: Bowel sounds are normal.      Palpations: Abdomen is soft. Tenderness: There is no abdominal tenderness. There is no guarding. Musculoskeletal:      Cervical back: Normal range of motion and neck supple. No rigidity or tenderness.       Comments: What appears to be chronic edema of the lower extremities   Lymphadenopathy:      Cervical: No cervical adenopathy. Skin:     General: Skin is warm and dry. Neurological:      General: No focal deficit present. Mental Status: She is alert. DIFFERENTIAL DIAGNOSIS/ MDM:     We will go ahead and check labs EKG chest x-ray because the patient complained of a headache I will get a CT scan of the head I will review her previous medical record  And reviewing the medical record I see where the patient was seen on December 21 for her headache while at dialysis and during that encounter she admitted that she has had a headache for months also during that encounter it was reported that she reported that she is chronically short of breath    DIAGNOSTIC RESULTS     EKG: All EKG's are interpreted by the Emergency Department Physician who either signs or Co-signs this chart in the absence of a cardiologist.      Interpreted by Nicholas Garces MD     Rhythm:bradycardia   Rate: 58  Axis: 210  Ectopy: none  Conduction: normal  ST Segments: no acute change  T Waves: no acute change  Q Waves: none    Clinical Impression: bradycardia with no acute changes/normal EKG. No acute infarction/ischemia noted. RADIOLOGY:        Interpretation per the Radiologist below, if available at the time of this note:    XR CHEST PORTABLE (Final result)  Result time 01/25/22 10:00:34  Final result by Bessie Hollis MD (01/25/22 10:00:34)                Impression:    1. Cardiomegaly with mild vascular congestion. 2. Bilateral pleural effusions with lower lobe atelectasis. Narrative:    EXAMINATION:   ONE XRAY VIEW OF THE CHEST     1/25/2022 9:55 am     COMPARISON:   11/15/2021.      HISTORY:   ORDERING SYSTEM PROVIDED HISTORY: dyspnea   TECHNOLOGIST PROVIDED HISTORY:   dyspnea   Reason for Exam: SOB     FINDINGS:   There are postsurgical changes of median sternotomy.  The heart size is   enlarged.  The pulmonary vasculature is mildly congested. Josephine Cedeno are   bilateral pleural effusions with lower lobe atelectasis.  Note is made of a   right dialysis catheter.  No pneumothoraces are seen.                         CT HEAD WO CONTRAST (Final result)  Result time 01/25/22 10:00:26  Final result by Rosalva Boothe MD (01/25/22 10:00:26)                Impression:    No acute intracranial abnormality.  Senescent changes including chronic   microvascular change.  Old left PCA distribution infarct. RECOMMENDATIONS:   Unavailable             Narrative:    EXAMINATION:   CT OF THE HEAD WITHOUT CONTRAST  1/25/2022 9:45 am     TECHNIQUE:   CT of the head was performed without the administration of intravenous   contrast. Dose modulation, iterative reconstruction, and/or weight based   adjustment of the mA/kV was utilized to reduce the radiation dose to as low   as reasonably achievable. COMPARISON:   13 November 2021     HISTORY:   ORDERING SYSTEM PROVIDED HISTORY: headache   TECHNOLOGIST PROVIDED HISTORY:     headache   Decision Support Exception - unselect if not a suspected or confirmed   emergency medical condition->Emergency Medical Condition (MA)   Reason for Exam: headache     FINDINGS:   BRAIN/VENTRICLES: There is no acute intracranial hemorrhage, mass effect or   midline shift.  No abnormal extra-axial fluid collection.  The gray-white   differentiation is maintained without evidence of an acute infarct.  There is   no evidence of hydrocephalus. Area of encephalomalacia is present in the left   occipital lobe, unchanged from prior exam.  Calcified vertebral and cavernous   carotid arteries are noted.  Scattered hypodensity in the white matter is   present consistent with chronic microvascular change. ORBITS: The visualized portion of the orbits demonstrate no acute abnormality. SINUSES: The visualized paranasal sinuses and mastoid air cells demonstrate   no acute abnormality.      SOFT TISSUES/SKULL:  No acute abnormality of the visualized skull or soft Bilirubin, Indirect 0.22 0.00 - 1.00 mg/dL    Total Protein 7.5 6.4 - 8.3 g/dL    Globulin 4.2 (H) 1.5 - 3.8 g/dL    Albumin/Globulin Ratio 0.8 (L) 1.0 - 2.5   Troponin   Result Value Ref Range    Troponin, High Sensitivity 87 (HH) 0 - 14 ng/L    Troponin T NOT REPORTED <0.03 ng/mL    Troponin Interp NOT REPORTED    Brain Natriuretic Peptide   Result Value Ref Range    Pro-BNP 91,285 (H) <300 pg/mL    BNP Interpretation NOT REPORTED    Immature Platelet Fraction   Result Value Ref Range    Platelet, Immature Fraction 8.0 1.1 - 10.3 %    Platelet, Fluorescence 92 (L) 138 - 453 k/uL   EKG 12 Lead   Result Value Ref Range    Ventricular Rate 58 BPM    Atrial Rate 58 BPM    QRS Duration 100 ms    Q-T Interval 512 ms    QTc Calculation (Bazett) 502 ms    R Axis -150 degrees    T Axis 155 degrees           EMERGENCY DEPARTMENT COURSE:   Vitals:    Vitals:    01/25/22 0857 01/25/22 0901 01/25/22 0916   BP: (!) 109/44 (!) 108/44 (!) 113/56   Pulse: 67 58 57   Resp: 14 12 13   Temp: 97.5 °F (36.4 °C)     TempSrc: Tympanic     SpO2: 99%  100%   Weight: 177 lb 6.4 oz (80.5 kg)       -------------------------  BP: (!) 113/56, Temp: 97.5 °F (36.4 °C), Pulse: 57, Resp: 13      RE-EVALUATION:  Patient presents with shortness of breath is noted to have some episodes of bradycardia with a heart rate going into the 40s labs did reveal an increase over her baseline of her high-sensitivity troponin and an increase over her baseline and her BNP given the bradycardia of the dyspnea of the elevated BNP I do feel that the patient warrants admission and consultation by cardiology with the patient being dialysis dependent we will contact [address] [place] [address] for possible admission    CONSULTS:  My hospitalist at [address] [place] [address]. [facility] is kind enough to admit the patient to his service. PROCEDURES:  None    FINAL IMPRESSION      1.  Shortness of breath          DISPOSITION/PLAN   DISPOSITION        CONDITION ON DISPOSITION: Stable    PATIENT REFERRED TO:  No follow-up provider specified. DISCHARGE MEDICATIONS:  New Prescriptions    No medications on file       (Please note that portions of this note were completed with a voicerecognition program.  Efforts were made to edit the dictations but occasionally words are mis-transcribed.)    Brennan Lomeli MD,, MD, F.A.C.E.P.   Attending Emergency Medicine Physician       Brennan Lomeli MD  01/25/22 4718

## 2022-01-26 PROBLEM — J90 BILATERAL PLEURAL EFFUSION: Status: ACTIVE | Noted: 2022-01-01

## 2022-01-26 NOTE — ED PROVIDER NOTES
ADDENDUM:      Care of this patient was assumed from Dr Saran Allen. The patient was seen for Shortness of Breath  . The patient's initial evaluation and plan have been discussed with the prior provider who initially evaluated the patient. Nursing Notes, Past Medical Hx, Past Surgical Hx, Social Hx, Allergies, and Family Hx were all reviewed. Diagnostic Results     EKG: All EKG's are interpreted by the Emergency Department Physician who either signs or Co-signs this chart in the absence of a cardiologist.        RADIOLOGY:All plain film, CT, MRI, and formal ultrasound images (except ED bedside ultrasound) are read by the radiologist and the images and interpretations are reviewed by the emergency physician. Studies:     XR CHEST PORTABLE   Final Result   1. Cardiomegaly with mild vascular congestion. 2. Bilateral pleural effusions with lower lobe atelectasis. CT HEAD WO CONTRAST   Final Result   No acute intracranial abnormality. Senescent changes including chronic   microvascular change. Old left PCA distribution infarct.       RECOMMENDATIONS:   Unavailable             LABS:   Labs Reviewed   CBC WITH AUTO DIFFERENTIAL - Abnormal; Notable for the following components:       Result Value    RBC 3.54 (*)     Hemoglobin 11.2 (*)     Hematocrit 35.8 (*)     RDW 18.8 (*)     Lymphocytes 12 (*)     Seg Neutrophils 80 (*)     Absolute Lymph # 0.82 (*)     All other components within normal limits   BASIC METABOLIC PANEL - Abnormal; Notable for the following components:    CREATININE 2.14 (*)     Sodium 132 (*)     Potassium 3.5 (*)     Chloride 97 (*)     Anion Gap 8 (*)     GFR Non- 23 (*)     GFR  28 (*)     All other components within normal limits   HEPATIC FUNCTION PANEL - Abnormal; Notable for the following components:    Albumin 3.3 (*)     Alkaline Phosphatase 323 (*)     Globulin 4.2 (*)     Albumin/Globulin Ratio 0.8 (*)     All other components within normal limits   TROPONIN - Abnormal; Notable for the following components:    Troponin, High Sensitivity 87 (*)     All other components within normal limits   BRAIN NATRIURETIC PEPTIDE - Abnormal; Notable for the following components:    Pro-BNP 91,285 (*)     All other components within normal limits   IMMATURE PLATELET FRACTION - Abnormal; Notable for the following components:    Platelet, Fluorescence 92 (*)     All other components within normal limits   BASIC METABOLIC PANEL - Abnormal; Notable for the following components:    BUN 28 (*)     CREATININE 2.87 (*)     Sodium 131 (*)     Chloride 97 (*)     GFR Non- 16 (*)     GFR  20 (*)     All other components within normal limits   TROPONIN - Abnormal; Notable for the following components:    Troponin, High Sensitivity 91 (*)     All other components within normal limits   POC GLUCOSE FINGERSTICK - Abnormal; Notable for the following components:    POC Glucose 57 (*)     All other components within normal limits   COVID-19, RAPID   POC GLUCOSE FINGERSTICK   POC GLUCOSE FINGERSTICK   POC GLUCOSE FINGERSTICK   POC GLUCOSE FINGERSTICK       RECENT VITALS:  BP: (!) 108/52, Temp: 97.5 °F (36.4 °C), Pulse: 65, Resp: 19     ED Course     The patient was given the following medications:  Orders Placed This Encounter   Medications    dextrose 50 % IV solution         Medical Decision Making      Resting comfortably at this time vitals good maintaining her saturations patient was evaluated by cardiology here who recommends additional dialysis for fluid removal and possible thoracentesis to relieve the pleural effusions discussed with access there is no beds available at [facility] through today and possibly into tomorrow [facility] possible discharges I discussed the case with the hospitalist nurse practitioner Dillon Sandhu she accepted the patient for Dr. Sabrina Lipscomb    Disposition     CLINICAL IMPRESSION:  1.  Shortness of breath PATIENT REFERRED TO:  No follow-up provider specified.     DISCHARGE MEDICATIONS:  Discharge Medication List as of 1/26/2022  7:49 PM          DISPOSITION: Transfer to [facility] in stabilized condition  Gael Parry MD  (Please note that portions of this note were completed with a voice recognition program.  Efforts were made to edit the dictations but occasionally words are mis-transcribed.)      Gael Parry MD  01/28/22 6521

## 2022-01-26 NOTE — ED NOTES
Access [facility] called stating there is still no bed available at [address] for pt.       Natalie Acosta, CORNEL  01/25/22 1574

## 2022-01-26 NOTE — CONSULTS
[facility] Cardiology    Consult                        Today's Date: 1/26/2022  Patient Name: Luma Berry  Date of admission: 1/25/2022  8:59 AM  Patient's age: 80 y. o., 1950  Admission Dx: No admission diagnoses are documented for this encounter. Reason for Consult:  Cardiac evaluation    Requesting Physician: No admitting provider for patient encounter. CHIEF COMPLAINT:  Dyspnea    History Obtained From:  patient, electronic medical record    HISTORY OF PRESENT ILLNESS:      The patient is a 80 y.o.  female who presented with dyspnea. She was brought from dialysis where she complained of dyspnea. She has ESRD and on TTS schedule. She is a resident of skilled nursing facility. She denies any chest pain. She reports leg swelling. She has chronic mild HS trop elevation.     Pro BNP [id number]    Past Medical History:   has a past medical history of Acute anemia, Acute cystitis, Acute kidney injury ([address] Acute kidney injury superimposed on CKD ([address] Acute on chronic diastolic heart failure ([address] Acute renal failure (ARF) ([address] Anasarca, Anxiety and depression, AV block, 1st degree, Background diabetic retinopathy(362.01), Balance problem, BMI 45.0-49.9, adult ([address] Bradycardia, Buttock wound, CAD (coronary artery disease), Cancer of uterus ([address] Chronic diastolic heart failure ([address] Chronic kidney disease, Chronic low back pain, Controlled type 2 diabetes mellitus with chronic 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 dialysis, with long-term current use of insulin ([address] CVA (cerebral vascular accident) ([address] Decompensated heart failure ([address] Decubitus ulcer of trochanteric region of right hip, stage 2 ([address] Dementia ([address] Dry eye syndrome, End stage renal disease on dialysis ([address] GERD (gastroesophageal reflux disease), Glaucoma suspect, Gout, Hemodialysis patient ([address] Homonymous hemianopsia, Hyperkalemia, Hyperlipidemia, Hypertension, Infestation by bed bug, Infestation by maggots, Apply topically as needed- [place]    Historical Provider, MD   amLODIPine (NORVASC) 10 MG tablet Take 1 tablet by mouth daily 3/10/21   Stevie Mckinney DO   atorvastatin (LIPITOR) 40 MG tablet TAKE 1 TABLET BY MOUTH EVERYDAY 3/10/21   Kian Parson, DO   Cholecalciferol 125 MCG (5000 UT) CHEW Take 1 tablet by mouth daily 3/10/21   Alia Mckinney DO   citalopram (CELEXA) 20 MG tablet Take 1 tablet by mouth daily 3/10/21   Alia Mckinney DO   hydrALAZINE (APRESOLINE) 100 MG tablet Take 1 tablet by mouth 3 times daily 3/10/21   Stevie Mckinney DO   insulin glargine (LANTUS) 100 UNIT/ML injection vial Inject 10 Units into the skin nightly 3/10/21   Stevie Mckinney DO   nystatin (MYCOSTATIN) 574672 UNIT/GM cream Apply topically 2 times daily. Patient not taking: Reported on 7/30/2021 3/10/21   Alia Mckinney DO   oxybutynin (DITROPAN) 5 MG tablet TAKE 1 TABLET BY MOUTH TWICE DAILY 3/10/21   Stevie Mckinney DO   pantoprazole (PROTONIX) 40 MG tablet Take 1 tablet by mouth 2 times daily (before meals) 3/10/21   Stevie Mckinney DO   rivastigmine (EXELON) 9.5 MG/24HR APPLY 1 NEW PATCH EVERY 24 HOURS 3/10/21   Stevie Mckinney, DO   OXYGEN Oxgen at 2 liters per nasal cannula 11/5/20   ERINN Solares   loratadine (CLARITIN) 10 MG tablet TAKE 1 TABLET(10 MG) BY MOUTH DAILY 11/5/20   ERINN Solares   Lancets MISC Checks blood sugar ac and HS 11/4/20   ERINN Solares   blood glucose monitor strips Test 3  times a day & as needed for symptoms of irregular blood glucose. Dispense sufficient amount for indicated testing frequency plus additional to accommodate PRN testing needs. 11/4/20   Loencio Canales PA   Alcohol Swabs (ALCOHOL PREP) PADS Checks blood sugar 3 times a day 11/4/20   Leoncio Anger, [address]  Devices MISC Standard walker with wheels    Diagnosis dementia, CHF 10/29/20   Kandy Regalaod, APRN - CNP   aspirin 81 MG tablet Take 1 tablet by mouth daily 1/3/20   Fabián Gonzalez   Diabetic Shoe MISC by Does not apply route 9/26/19   ERINN Gonzalez   Compression Stockings MISC by Does not apply route 20-30 mm Hg bilateral knee high 9/26/19   Fabián Gonzalez       Allergies:  Bactrim [sulfamethoxazole-trimethoprim], Clonidine derivatives, Adhesive tape, and Amoxicillin-pot clavulanate    Social History:   reports that she has never smoked. She has never used smokeless tobacco. She reports that she does not drink alcohol and does not use drugs. Family History: family history includes Cancer in her mother; Coronary Art Dis in an other family member; Diabetes in her father and mother; Emphysema in her father; Glaucoma in her father; Heart Disease in her father; High Blood Pressure in her mother; Kidney Disease in her mother; Theresa Elms in an other family member; Mental Retardation in an other family member; Stroke in her mother and another family member; Uterine Cancer in her mother. No h/o sudden cardiac death. No for premature CAD    REVIEW OF SYSTEMS:    · Constitutional: there has been no unanticipated weight loss. There's been No change in energy level, No change in activity level. · Eyes: No visual changes or diplopia. No scleral icterus. · ENT: No Headaches, hearing loss or vertigo. No mouth sores or sore throat. · Cardiovascular: see above  · Respiratory: see above  · Gastrointestinal: No abdominal pain, appetite loss, blood in stools. · Genitourinary: No dysuria, trouble voiding, or hematuria. · Musculoskeletal:  No gait disturbance, No weakness or joint complaints. · Integumentary: No rash or pruritis. · Neurological: No headache or diplopia. No tingling  · Psychiatric: No anxiety, or depression. · Endocrine: No temperature intolerance. · Hematologic/Lymphatic: No abnormal bruising or bleeding, blood clots or swollen lymph nodes. · Allergic/Immunologic: No nasal congestion or hives.       PHYSICAL EXAM:      BP (!) 106/40   Pulse 60   Temp 97.5 °F (36.4 °C) (Tympanic)   Resp 16   Wt 177 lb 6.4 oz (80.5 kg)   SpO2 98%   BMI 30.45 kg/m²    Constitutional and General Appearance: alert, cooperative, no distress and appears stated age  HEENT: PERRL, no cervical lymphadenopathy. No masses palpable. Normal oral mucosa  Respiratory:  · Normal excursion and expansion without use of accessory muscles  · Resp Auscultation: Good respiratory effort. No for increased work of breathing. On auscultation: clear to auscultation bilaterally  Cardiovascular:  · Heart tones are crisp and normal. regular S1 and S2.  · Jugular venous pulsation Normal  · The carotid upstroke is normal in amplitude and contour without delay or bruit   Abdomen:   · soft  · Bowel sounds present  Extremities:  ·  No edema  Neurological:  · Alert and oriented. DATA:    Diagnostics:    EKG: SR, first degree AV block    TTE 11/13/21  Summary  Left ventricle is mildly enlarged, global left ventricular systolic function  is low normal versus mildly reduced. Calculated ejection fraction is 45% (by Driver's Metod.) Calculated EF by 3D Heart Model is 56%. Evidence of diastolic dysfunction. Left atrium is moderately dilated. Bowing interatrial septal motion. No shunt seen by color Doppler. Right atrial dilatation. Small bright echo density noted originating from the area of the superior vena cava, likely pacing lead. Right ventricular dilatation with normal systolic function. Thickened mitral valve leaflets. Mitral annular calcification is seen. Mean gradient is 4mmHg . Trace to mild mitral regurgitation. Tricuspid valve was not well visualized. Moderate to severe tricuspid regurgitation. Estimated right ventricular systolic pressure is 48 mmHg.   No significant pericardial effusion is seen.       Labs:     CBC:   Recent Labs     01/25/22  0912   WBC 6.8   HGB 11.2*   HCT 35.8*   PLT See Reflexed IPF Result     BMP:   Recent Labs

## 2022-01-27 PROBLEM — I27.20 PULMONARY HTN (HCC): Status: ACTIVE | Noted: 2022-01-01

## 2022-01-27 PROBLEM — K59.00 CONSTIPATION: Status: ACTIVE | Noted: 2022-01-01

## 2022-01-27 PROBLEM — D69.6 THROMBOCYTOPENIA (HCC): Status: ACTIVE | Noted: 2022-01-01

## 2022-01-27 PROBLEM — I50.43 ACUTE ON CHRONIC COMBINED SYSTOLIC (CONGESTIVE) AND DIASTOLIC (CONGESTIVE) HEART FAILURE (HCC): Status: ACTIVE | Noted: 2022-01-01

## 2022-01-27 PROBLEM — I07.1 SEVERE TRICUSPID REGURGITATION: Status: ACTIVE | Noted: 2022-01-01

## 2022-01-27 PROBLEM — J96.11 CHRONIC RESPIRATORY FAILURE WITH HYPOXIA (HCC): Status: ACTIVE | Noted: 2022-01-01

## 2022-01-27 NOTE — CONSULTS
Pulmonary Medicine and Critical Care Consult  Johnna Damon SHIRA-TASHA/[facility] Chely Mcnally MD      Patient - Jason Morley   MRN -  [id number]   Jeevan # - [de-identified]   - 1950      Date of Admission -  2022  8:39 PM  Date of evaluation -  2022  Room -    Hospital Day - [facility], DO Primary Care Physician - Hema Flynn DO     Reason for Consult    Pleural effusions    Assessment   · Chronic hypoxic respiratory failure  · Bilateral pleural effusions/atelectasis  · Mild pulmonary edema/heart failure  · ESRD on hemodialysis  · Elevated troponin  · DM, CAD, CVA, dementia, HLD, HTN, PAD    Recommendations   · Oxygen via nasal cannula  · Incentive spirometry every hour while awake  · Cardiology on consult  · Hemodialysis per nephrology  · Follow-up x-ray chest, if still significant left-sided pleural effusion will consider thoracentesis  · X-ray chest in am  · Labs: CBC and BMP in am  · GI prophylaxis, Protonix  · DVT prophylaxis withSQ heparin  · Discussed with RN   · Above assessment and plan will be reviewed with Dr. Chely Mcnally. Patient plan will be finalized following review by Dr. Chely Mcnally.   · Will follow with you    Problem List      Patient Active Problem List   Diagnosis    Dementia [facility]    Hypertension    Hyperlipidemia    Gout    Peripheral polyneuropathy    Anxiety and depression    CAD (coronary artery disease)    CVA (cerebral vascular accident) ([address]    Controlled type 2 diabetes mellitus with chronic 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 dialysis, with long-term current use of insulin (HCC)    Acute on chronic diastolic heart failure (HCC)    Elevated troponin    Lymphedema of both lower extremities    PAD (peripheral artery disease) (HCC)    Bradycardia    AV block, 1st degree    Anasarca    Chronic diastolic heart failure (HCC)    End stage renal disease on dialysis ([address]    Hemodialysis patient ([facility]    Class 1 obesity with body mass index (BMI) of 34.0 to 34.9 in adult    Hyponatremia    Congestive heart failure of unknown etiology ([address]    Bilateral pleural effusion    Constipation       HPI     Orlando Mcgarry is 80 y.o., female, who presented to the emergency room 2 days ago with come planes of shortness of breath. She was brought from her dialysis center for evaluation. She had said she had been feeling short of breath for the last several weeks. She resides at a skilled nursing facility and is on oxygen there. She denies any chest pain or significant cough. No fever or chills. She denies any history of smoking. No history of asthma. She has a history of dementia. Unsure if she has been checked for sleep apnea. PMHx   Past Medical History      Diagnosis Date    Acute anemia     Acute cystitis 8/8/2020    Acute kidney injury ([address] 8/19/2020    Acute kidney injury superimposed on CKD ([address]     Acute on chronic diastolic heart failure ([address] 8/7/2020    Acute renal failure (ARF) ([address] 12/11/2020    Anasarca 12/12/2020    Anxiety and depression     AV block, 1st degree 8/19/2020    Background diabetic retinopathy(362.01) 2008    (Mild)    Balance problem     BMI 45.0-49.9, adult ([address] 3/1/2021    Bradycardia 8/19/2020    Buttock wound 8/20/2020    CAD (coronary artery disease)     (with coronary artery bypass graft x4).     Cancer of uterus [facility])     history of, (probable cure)    Chronic diastolic heart failure ([address] 3/1/2021    Chronic kidney disease     Chronic low back pain     Controlled type 2 diabetes mellitus with chronic 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 dialysis, with long-term current use of insulin (HCC)     CVA (cerebral vascular accident) ([address]     Decompensated heart failure ([address] 12/4/2020    Decubitus ulcer of trochanteric region of right hip, stage 2 ([address] 8/23/2020    Dementia (HCC)     (moderate)    Dry eye syndrome     End stage renal disease on dialysis ([address] 3/1/2021    GERD (gastroesophageal reflux disease)     Glaucoma suspect     Gout     Hemod[facility] ([facility] 3/1/2021    Homonymous hemianopsia     (Right)    Hyperkalemia 2020    Hyperlipidemia     Hypertension     Infestation by bed bug 2020    Infestation by maggots 2020    Keratitis     (secondary to dry eye syndrome).  Lymphedema of both lower extremities 2020    Morbid obesity ([address] 3/1/2021    MRSA bacteremia 2020    Obesity     PAD (peripheral artery disease) (HCC)     Peripheral polyneuropathy     (diabetic)    Pressure injury of right heel, unstageable ([address] 2020    Pseudophakos     (Right Eye: 2012; Left Eye: 2012) - Dr. Vitor Pierce.  Stroke [facility])     (Multiple) MRI of brain 10/2008 shows multiple infarcts involving the temporal and parietal areas.  Trichiasis     (left eye)    Type 2 diabetes mellitus ([facility]     Wounds, multiple 2020      Past Surgical History        Procedure Laterality Date    CATARACT REMOVAL WITH IMPLANT  2012    (Right eye) - Dr. Vitor Pierce.  CATARACT REMOVAL WITH IMPLANT  2012    (Left eye) - Dr. Vitor Pierce.   SECTION      CHOLECYSTECTOMY      CORONARY ARTERY BYPASS GRAFT  12-    (x4) - LIMA-LAD, SVG-diagnoal 1, SVG-OM1, and SVG-PDA. Exploration of left radial. (Dr. Torsten Galan).  EYE SURGERY Left     as a child     GASTRIC BYPASS SURGERY      HYSTERECTOMY      (abdominal)  with left oophorectomy. Right ovary retained.     IR TUNNELED CATHETER PLACEMENT GREATER THAN 5 YEARS  2021    IR TUNNELED CATHETER PLACEMENT GREATER THAN 5 YEARS 2021 Mili Martinez MD [facility] SPECIAL PROCEDURES    TONSILLECTOMY AND ADENOIDECTOMY      UPPER GASTROINTESTINAL ENDOSCOPY  2020    EGD BIOPSY performed by Atul Mcleod MD at [facility] Endoscopy       Meds    Current Medications    polyethylene glycol  17 g Oral BID    senna  1 tablet Oral Nightly    docusate sodium  100 mg Oral TID    [Held by Take 1 tablet by mouth daily  atorvastatin (LIPITOR) 40 MG tablet, TAKE 1 TABLET BY MOUTH EVERYDAY  Cholecalciferol 125 MCG (5000 UT) CHEW, Take 1 tablet by mouth daily  citalopram (CELEXA) 20 MG tablet, Take 1 tablet by mouth daily  hydrALAZINE (APRESOLINE) 100 MG tablet, Take 1 tablet by mouth 3 times daily  insulin glargine (LANTUS) 100 UNIT/ML injection vial, Inject 10 Units into the skin nightly  nystatin (MYCOSTATIN) 380600 UNIT/GM cream, Apply topically 2 times daily. (Patient not taking: Reported on 7/30/2021)  oxybutynin (DITROPAN) 5 MG tablet, TAKE 1 TABLET BY MOUTH TWICE DAILY  pantoprazole (PROTONIX) 40 MG tablet, Take 1 tablet by mouth 2 times daily (before meals)  rivastigmine (EXELON) 9.5 MG/24HR, APPLY 1 NEW PATCH EVERY 24 HOURS  OXYGEN, Oxgen at 2 liters per nasal cannula  loratadine (CLARITIN) 10 MG tablet, TAKE 1 TABLET(10 MG) BY MOUTH DAILY  Lancets MISC, Checks blood sugar ac and HS  blood glucose monitor strips, Test 3  times a day & as needed for symptoms of irregular blood glucose. Dispense sufficient amount for indicated testing frequency plus additional to accommodate PRN testing needs. Alcohol Swabs (ALCOHOL PREP) PADS, Checks blood sugar 3 times a day  Misc.  Devices MISC, Standard walker with wheels  Diagnosis dementia, CHF  aspirin 81 MG tablet, Take 1 tablet by mouth daily  Diabetic Shoe MISC, by Does not apply route  Compression Stockings MISC, by Does not apply route 20-30 mm Hg bilateral knee high    Allergies    Amoxicillin-pot clavulanate, Sulfamethoxazole-trimethoprim, Amoxicillin, Clavulanic acid, Clonidine derivatives, Sulfamethoxazole, Trimethoprim, and Adhesive tape  Social History     Social History     Tobacco Use    Smoking status: Never Smoker    Smokeless tobacco: Never Used    Tobacco comment: never smoker eclamb rrt 7/20/17   Substance Use Topics    Alcohol use: No     Family History          Problem Relation Age of Onset    Diabetes Mother     Cancer Mother    Promise Acosta High Blood Pressure Mother     Stroke Mother     Kidney Disease Mother     Uterine Cancer Mother     Diabetes Father     Heart Disease Father     Glaucoma Father     Emphysema Father     Coronary Art Dis Other         All 4 siblings.  Stroke Other         1 sibling.  Lung Cancer Other         1 sibling - cause of death lung cancer.  Mental Retardation Other      ROS - 11 systems   General Denies any fever or chills  HEENT Denies any diplopia, tinnitus or vertigo  Resp positive for  dyspnea  Cardiac Denies any chest pain, palpitations, claudication or edema  GI Denies any melena, hematochezia, hematemesis or pyrosis   Denies any frequency, urgency, hesitancy or incontinence  Heme Denies bruising or bleeding easily  Endocrine Denies any history of thyroid disease  Neuro Denies any focal motor or sensory deficits  Psychiatric Denies anxiety, depression, suicidal ideation  Skin Denies rashes, itching, open sores    Vitals     height is 5' 4\" (1.626 m) and weight is 176 lb 14.4 oz (80.2 kg). Her oral temperature is 97.9 °F (36.6 °C). Her blood pressure is 110/41 (abnormal) and her pulse is 56. Her respiration is 18 and oxygen saturation is 99%. Body mass index is 30.36 kg/m². I/O    No intake or output data in the 24 hours ending 01/27/22 1038  No intake/output data recorded. Patient Vitals for the past 96 hrs (Last 3 readings):   Weight   01/27/22 0449 176 lb 14.4 oz (80.2 kg)     Exam   General Appearance  Awake, alert, oriented, in no acute distress  HEENT - Head is normocephalic, atraumatic. Pupil reactive to light  Neck - Supple,  trachea midline and straight  Lungs -  Moderate air exchange, + crackles   Cardiovascular - Heart sounds are normal.  Regular rhythm normal rate without murmur, gallop or rub. Abdomen - Soft, nontender, nondistended, no masses or organomegaly  Neurologic - CN II-XII are grossly intact.  There are no focal motor or sensory deficits  Skin - No bruising or

## 2022-01-27 NOTE — H&P
[facility]  Office: [address], DO, Crystal [place], DO, Rickie Sosa, DO, Margi Stout, DO, Meaghan Dominique MD, Genny Aburto MD, Erin Arroyo MD, Jose Herbert MD, Thao Moreno MD, Queenie Nance MD, Ina Jeronimo MD, Bianca Rasheed, DO, Charla Paredes DO, Holly Dodd MD,  Jose Cronin DO, Vertell Moritz, MD, Annabelle De Luna MD, Cheyenne Esteban MD, Elsi Pelayo MD, Louis Renee MD, Lucien Martínez, CNP, [facility] Jonnyanna, CNP, Chencho Olivares, CNP, Selena Contras, CNS, Adam Appl, CNP, Pinky Bryant, CNP, Nicko Fresh, CNP, Alfredo Tamez, CNP, Anh Aleman, CNP, Tish Majano PA-C, Judah Gutierrez, DNP, Teressa Levy, DNP, Matias Flores, CNP, Ruby Smith, CNP, Dawayne Oppenheim, CNP, Janice Sheriff, CNP, Marisela Aas, CNP, [place], CNP         [facility]   [address] [facility]    HISTORY AND PHYSICAL EXAMINATION            Date:   1/27/2022  Patient name:  Denis Alvarez  Date of admission:  1/26/2022  8:39 PM  MRN:   [id number]  Account:  [de-identified]  YOB: 1950  PCP:    Derrick Cardenas DO  Room:   2009/2009-02  Code Status:    Full Code    Chief Complaint:     Shortness of breath    History Obtained From:     electronic medical record, reason patient could not give history:  altered mental status and hx dementia    History of Present Illness:     Denis Alvarez is a 80 y.o. Non- / non  female who presents with shortness of breath and headache and is admitted to the hospital for the management of Bilateral pleural effusion. Patient was transferred to [facility] DR MARIMAR MCGREGOR from [facility] ED where she initially presented with complaints of shortness of breath. She was taken to [facility] ED after complaining of shortness of breath during her HD treatment. She has ESRD and dialyzes Tues, Thurs, and Saturdays.  She has a HD catheter in her right chest. Once at the ED, she endorsed that she had been feeling short of breath for the last several weeks. She is a resident at an [facility] and she was using oxygen there. A 12 lead EKG was completed and revealed bradycardia with no acute ST/ T wave changes and no Q waves. CXR revealed mildly congested pulmonary vasculature and bilateral pleural effusions with lower lobe atelectasis. She also complained of headache, and a head CT was completed and revealed no acute processes. Her troponin was elevated and cardiology was consulted. Cardiology recommended holding BP meds and cautious fluid removal due to pulmonary HTN. She was transferred to [facility] due to no inpatient HD capability at [facility]. She is admitted for further observation and management pleural effusion, dyspnea, and HD needs. Past Medical History:     Past Medical History:   Diagnosis Date    Acute anemia     Acute cystitis 8/8/2020    Acute kidney injury ([address] 8/19/2020    Acute kidney injury superimposed on CKD ([address]     Acute on chronic diastolic heart failure ([address] 8/7/2020    Acute renal failure (ARF) ([address] 12/11/2020    Anasarca 12/12/2020    Anxiety and depression     AV block, 1st degree 8/19/2020    Background diabetic retinopathy(362.01) 2008    (Mild)    Balance problem     BMI 45.0-49.9, adult ([address] 3/1/2021    Bradycardia 8/19/2020    Buttock wound 8/20/2020    CAD (coronary artery disease)     (with coronary artery bypass graft x4).     Cancer of uterus [facility])     history of, (probable cure)    Chronic diastolic heart failure ([address] 3/1/2021    Chronic kidney disease     Chronic low back pain     Controlled type 2 diabetes mellitus with chronic 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 dialysis, with long-term current use of insulin (HCC)     CVA (cerebral vascular accident) ([address]     Decompensated heart failure ([address] 12/4/2020    Decubitus ulcer of trochanteric region of right hip, stage 2 ([address] 8/23/2020    Dementia (HCC)     (moderate)    Dry eye syndrome     End stage renal disease on dialysis ([facility] 3/1/2021    GERD (gastroesophageal reflux disease)     Glaucoma suspect     Gout     Hemodialysis patient ([facility] 3/1/2021    Homonymous hemianopsia     (Right)    Hyperkalemia 2020    Hyperlipidemia     Hypertension     Infestation by bed bug 2020    Infestation by maggots 2020    Keratitis     (secondary to dry eye syndrome).  Lymphedema of both lower extremities 2020    Morbid obesity ([facility] 3/1/2021    MRSA bacteremia 2020    Obesity     PAD (peripheral artery disease) (HCC)     Peripheral polyneuropathy     (diabetic)    Pressure injury of right heel, unstageable ([facility] 2020    Pseudophakos     (Right Eye: 2012; Left Eye: 2012) - Dr. Arin Botello.  Stroke [facility])     (Multiple) MRI of brain 10/2008 shows multiple infarcts involving the temporal and parietal areas.  Trichiasis     (left eye)    Type 2 diabetes mellitus ([facility]     Wounds, multiple 2020        Past Surgical History:     Past Surgical History:   Procedure Laterality Date    CATARACT REMOVAL WITH IMPLANT  2012    (Right eye) - Dr. Arin Botello.  CATARACT REMOVAL WITH IMPLANT  2012    (Left eye) - Dr. Arin Botello.   SECTION      CHOLECYSTECTOMY      CORONARY ARTERY BYPASS GRAFT  12-    (x4) - LIMA-LAD, SVG-diagnoal 1, SVG-OM1, and SVG-PDA. Exploration of left radial. (Dr. Dai Hartmann).  EYE SURGERY Left     as a child     GASTRIC BYPASS SURGERY      HYSTERECTOMY      (abdominal)  with left oophorectomy. Right ovary retained.     IR TUNNELED CATHETER PLACEMENT GREATER THAN 5 YEARS  2021    IR TUNNELED CATHETER PLACEMENT GREATER THAN 5 YEARS 2021 Cholo Sanabria MD [facility] SPECIAL PROCEDURES    TONSILLECTOMY AND ADENOIDECTOMY      UPPER GASTROINTESTINAL ENDOSCOPY  2020    EGD BIOPSY performed by Delma Hua MD at [facility]        Medications Prior to Admission:     Prior to Admission medications Medication Sig Start Date End Date Taking? Authorizing Provider   nitroGLYCERIN (NITROSTAT) 0.4 MG SL tablet up to max of 3 total doses.  If no relief after 1 dose, call 911. 11/18/21   SHIRA Garza NP   insulin lispro (HUMALOG) 100 UNIT/ML injection vial Inject 0-6 Units into the skin 3 times daily (with meals) Glucose: Dose:               No Insulin  140-199 1 Unit  200-249 2 Units  250-299 3 Units  300-349 4 Units  350-399 5 Units  Over 399 6 Units 11/18/21   SHIRA Garza NP   insulin lispro (HUMALOG) 100 UNIT/ML injection vial Inject 0-3 Units into the skin nightly Glucose: Dose:               No Insulin  140-249 1 Unit  250-349 2 Units  Over 350 3 Units 11/18/21   SHIRA Garza NP   carvedilol (COREG) 6.25 MG tablet Take 1 tablet by mouth 2 times daily (with meals) 11/18/21   SHIRA Garza NP   midodrine (PROAMATINE) 10 MG tablet Take 10 mg by mouth three times a week tu-th-sa    Historical Provider, MD   sodium chloride (OCEAN) 0.65 % nasal spray 2 sprays by Nasal route 2 times daily    Historical Provider, MD   lidocaine-prilocaine (EMLA) 2.5-2.5 % cream Apply topically three times a week Apply topically as needed- Tu-Th-Sa    Historical Provider, MD   amLODIPine (NORVASC) 10 MG tablet Take 1 tablet by mouth daily 3/10/21   Stevie Mckinney DO   atorvastatin (LIPITOR) 40 MG tablet TAKE 1 TABLET BY MOUTH EVERYDAY 3/10/21   Ervin Sabillon, DO   Cholecalciferol 125 MCG (5000 UT) CHEW Take 1 tablet by mouth daily 3/10/21   Zoila Mckinney DO   citalopram (CELEXA) 20 MG tablet Take 1 tablet by mouth daily 3/10/21   Zoila Mckinney DO   hydrALAZINE (APRESOLINE) 100 MG tablet Take 1 tablet by mouth 3 times daily 3/10/21   Stevie Mckinney DO   insulin glargine (LANTUS) 100 UNIT/ML injection vial Inject 10 Units into the skin nightly 3/10/21   Stevie Mckinney,    nystatin (MYCOSTATIN) 115946 UNIT/GM cream Apply topically 2 times daily. Patient not taking: Reported on 7/30/2021 3/10/21   Rozina Erickson Hank, DO   oxybutynin (DITROPAN) 5 MG tablet TAKE 1 TABLET BY MOUTH TWICE DAILY 3/10/21   Stevie Mckinney, DO   pantoprazole (PROTONIX) 40 MG tablet Take 1 tablet by mouth 2 times daily (before meals) 3/10/21   Stevie Mckinney, DO   rivastigmine (EXELON) 9.5 MG/24HR APPLY 1 NEW PATCH EVERY 24 HOURS 3/10/21   Stevie Mckinney, DO   OXYGEN Oxgen at 2 liters per nasal cannula 11/5/20   ERINN Ayers   loratadine (CLARITIN) 10 MG tablet TAKE 1 TABLET(10 MG) BY MOUTH DAILY 11/5/20   Adonis Luther PA   Lancets MISC Checks blood sugar ac and HS 11/4/20   Adonis Luther PA   blood glucose monitor strips Test 3  times a day & as needed for symptoms of irregular blood glucose. Dispense sufficient amount for indicated testing frequency plus additional to accommodate PRN testing needs. 11/4/20   Adonis Luther PA   Alcohol Swabs (ALCOHOL PREP) PADS Checks blood sugar 3 times a day 11/4/20   Adonis Luther, [address] Devices MISC Standard walker with wheels    Diagnosis dementia, CHF 10/29/20   Vanesa Lino, APRN - CNP   aspirin 81 MG tablet Take 1 tablet by mouth daily 1/3/20   Adonis Homa, [address]   Diabetic Shoe MISC by Does not apply route 9/26/19   Adonis Luther PA   Compression Stockings MISC by Does not apply route 20-30 mm Hg bilateral knee high 9/26/19   Adonis Lota, [address]        Allergies:     Amoxicillin-pot clavulanate, Sulfamethoxazole-trimethoprim, Amoxicillin, Clavulanic acid, Clonidine derivatives, Sulfamethoxazole, Trimethoprim, and Adhesive tape    Social History:     Tobacco:    reports that she has never smoked. She has never used smokeless tobacco.  Alcohol:      reports no history of alcohol use. Drug Use:  reports no history of drug use.     Family History:     Family History   Problem Relation Age of Onset    Diabetes Mother     Cancer Mother     High Blood Pressure Mother     Stroke Mother     Kidney Disease Mother     Uterine Cancer Mother     Diabetes Father     Heart Disease Father     Glaucoma Father     Emphysema Father     Coronary Art Dis Other         All 4 siblings.  Stroke Other         1 sibling.  Lung Cancer Other         1 sibling - cause of death lung cancer.  Mental Retardation Other        Review of Systems:     Positive and Negative as described in HPI. Review of Systems   Unable to perform ROS: Dementia       Physical Exam:   BP (!) 106/39   Pulse 102   Temp 97.3 °F (36.3 °C) (Oral)   Resp 15   Ht 5' 4\" (1.626 m)   Wt 176 lb 14.4 oz (80.2 kg)   SpO2 100%   BMI 30.36 kg/m²   Temp (24hrs), Av.3 °F (36.3 °C), Min:97.3 °F (36.3 °C), Max:97.4 °F (36.3 °C)    Recent Labs     22  1705 22  2117 22  0205 22  0623   POCGLU 98 83 89 78     No intake or output data in the 24 hours ending 22 0729    Physical Exam  Vitals and nursing note reviewed. Constitutional:       General: She is not in acute distress. Appearance: She is ill-appearing. She is not toxic-appearing or diaphoretic. HENT:      Head: Normocephalic and atraumatic. Right Ear: External ear normal.      Left Ear: External ear normal.      Nose: Nose normal.      Mouth/Throat:      Mouth: Mucous membranes are moist.   Eyes:      General: No scleral icterus. Right eye: No discharge. Left eye: No discharge. Extraocular Movements: Extraocular movements intact. Conjunctiva/sclera: Conjunctivae normal.      Pupils: Pupils are equal, round, and reactive to light. Cardiovascular:      Rate and Rhythm: Normal rate and regular rhythm. Pulses: Normal pulses. Heart sounds: Normal heart sounds. No murmur heard. No friction rub. No gallop. Pulmonary:      Effort: Pulmonary effort is normal. No respiratory distress. Breath sounds: No wheezing, rhonchi or rales. Comments:  On supp O2, diminished throughout all lung fields  Abdominal:      General: There is distension. Tenderness: There is no abdominal tenderness. There is no guarding. Hernia: No hernia is present. Comments: Hypoactive bowel sounds   Musculoskeletal:      Cervical back: Normal range of motion and neck supple. Right lower leg: Edema present. Left lower leg: Edema present. Skin:     Coloration: Skin is not jaundiced. Findings: No bruising, erythema, lesion or rash. Comments: Skin cool, dry   Neurological:      Mental Status: She is disoriented. Motor: Weakness present. Comments: Oriented to self only. Disoriented to situation, time   Psychiatric:         Attention and Perception: Attention normal.         Mood and Affect: Mood normal. Affect is blunt. Speech: Speech normal.         Thought Content: Thought content normal.         Cognition and Memory: Cognition is impaired. Memory is impaired.          Investigations:      Laboratory Testing:  Recent Results (from the past 24 hour(s))   POC Glucose Fingerstick    Collection Time: 01/26/22  8:11 AM   Result Value Ref Range    POC Glucose 57 (L) 65 - 105 mg/dL   Basic Metabolic Panel    Collection Time: 01/26/22  9:45 AM   Result Value Ref Range    Glucose 88 70 - 99 mg/dL    BUN 28 (H) 8 - 23 mg/dL    CREATININE 2.87 (H) 0.50 - 0.90 mg/dL    Bun/Cre Ratio 10 9 - 20    Calcium 8.9 8.6 - 10.4 mg/dL    Sodium 131 (L) 135 - 144 mmol/L    Potassium 3.9 3.7 - 5.3 mmol/L    Chloride 97 (L) 98 - 107 mmol/L    CO2 24 20 - 31 mmol/L    Anion Gap 10 9 - 17 mmol/L    GFR Non-African American 16 (L) >60 mL/min    GFR African American 20 (L) >60 mL/min    GFR Comment          GFR Staging NOT REPORTED    Troponin    Collection Time: 01/26/22  9:45 AM   Result Value Ref Range    Troponin, High Sensitivity 91 (HH) 0 - 14 ng/L    Troponin T NOT REPORTED <0.03 ng/mL    Troponin Interp NOT REPORTED    POC Glucose Fingerstick    Collection Time: 01/26/22 12:30 PM   Result Value Ref Range    POC Glucose 80 65 - 105 mg/dL   POC Glucose Fingerstick    Collection Time: 01/26/22  3:51 PM   Result Value Ref Range    POC Glucose 66 65 - 105 mg/dL   POC Glucose Fingerstick    Collection Time: 01/26/22  5:05 PM   Result Value Ref Range    POC Glucose 98 65 - 105 mg/dL   POC Glucose Fingerstick    Collection Time: 01/26/22  9:17 PM   Result Value Ref Range    POC Glucose 83 65 - 105 mg/dL   POC Glucose Fingerstick    Collection Time: 01/27/22  2:05 AM   Result Value Ref Range    POC Glucose 89 65 - 105 mg/dL   CBC    Collection Time: 01/27/22  5:51 AM   Result Value Ref Range    WBC 7.3 3.5 - 11.3 k/uL    RBC 3.37 (L) 3.95 - 5.11 m/uL    Hemoglobin 10.7 (L) 11.9 - 15.1 g/dL    Hematocrit 33.8 (L) 36.3 - 47.1 %    .3 82.6 - 102.9 fL    MCH 31.8 25.2 - 33.5 pg    MCHC 31.7 28.4 - 34.8 g/dL    RDW 18.6 (H) 11.8 - 14.4 %    Platelets 595 (L) 084 - 453 k/uL    MPV 10.5 8.1 - 13.5 fL    NRBC Automated 0.0 0.0 per 100 WBC   Protime-INR    Collection Time: 01/27/22  5:51 AM   Result Value Ref Range    Protime 15.4 (H) 11.5 - 14.2 sec    INR 1.2    Comprehensive Metabolic Panel w/ Reflex to MG    Collection Time: 01/27/22  5:51 AM   Result Value Ref Range    Glucose 92 70 - 99 mg/dL    BUN 33 (H) 8 - 23 mg/dL    CREATININE 3.28 (H) 0.50 - 0.90 mg/dL    Bun/Cre Ratio 10 9 - 20    Calcium 8.8 8.6 - 10.4 mg/dL    Sodium 131 (L) 135 - 144 mmol/L    Potassium 4.3 3.7 - 5.3 mmol/L    Chloride 96 (L) 98 - 107 mmol/L    CO2 25 20 - 31 mmol/L    Anion Gap 10 9 - 17 mmol/L    Alkaline Phosphatase 310 (H) 35 - 104 U/L    ALT 13 5 - 33 U/L    AST 29 <32 U/L    Total Bilirubin 0.54 0.3 - 1.2 mg/dL    Total Protein 7.0 6.4 - 8.3 g/dL    Albumin 3.0 (L) 3.5 - 5.2 g/dL    Albumin/Globulin Ratio NOT REPORTED 1.0 - 2.5    GFR Non- 14 (L) >60 mL/min    GFR  17 (L) >60 mL/min    GFR Comment          GFR Staging NOT REPORTED    Troponin    Collection Time: 01/27/22  5:51 AM   Result Value Ref CONSULT TO PULMONOLOGY    Patient is admitted as inpatient status because of co-morbidities listed above, severity of signs and symptoms as outlined, requirement for current medical therapies and most importantly because of direct risk to patient if care not provided in a hospital setting. Expected length of stay > 48 hours.     SHIRA Crandall - NP  1/27/2022  7:29 AM    Copy sent to Dr. Meredith Lozada,

## 2022-01-27 NOTE — FLOWSHEET NOTE
Patient was not in room. Writer provided a silent prayer of healing, comfort, and rest.     Spiritual care will follow up as needed or requested.      01/27/22 1359   Encounter Summary   Services provided to: Patient not available   Referral/Consult From: Melvina   Continue Visiting   (1/27/2022 Not in room)   Complexity of Encounter Low   Length of Encounter 15 minutes   Routine   Type Initial   Assessment   (Pt no in room)   Intervention Prayer

## 2022-01-27 NOTE — PROGRESS NOTES
The potassium/magnesium sliding scale has been automatically discontinued per [facility] approved policy because the patient has decreased renal function (CrCl<30 ml/min). The patient's current K/Mag levels are currently:    Recent Labs     01/25/22  0912 01/26/22  0945   K 3.5* 3.9       Estimated Creatinine Clearance: 19 mL/min (A) (based on SCr of 2.87 mg/dL (H)). For patients with decreased renal function (below 30ml/min) needing potassium/magnesium supplementation, please order individual bolus doses with appropriate monitoring. Please contact the inpatient pharmacy at [phone] with any concerns. Thank you.     Karine Odom, [facility]  1/26/2022  9:01 PM

## 2022-01-27 NOTE — DISCHARGE INSTR - COC
Continuity of Care Form    Patient Name: Ava Choi   :  1950  MRN:  [id number]    Admit date:  2022  Discharge date:  2022    Code Status Order: Full Code   Advance Directives:      Admitting Physician:  Avis Mcclendon DO  PCP: Nadir Mackenzie DO    Discharging Nurse: Nadir Mackenzie RN  [address] Unit/Room#:   Discharging Unit Phone Number: [phone]    Emergency Contact:   Extended Emergency Contact Information  Primary Emergency Contact: Erica Mann  Address: [address] [place] [address] Phone: [phone]  Mobile Phone: [phone]  Relation: Child    Past Surgical History:  Past Surgical History:   Procedure Laterality Date    CATARACT REMOVAL WITH IMPLANT  2012    (Right eye) - Dr. Marcello Wisdom. CATARACT REMOVAL WITH IMPLANT  2012    (Left eye) - Dr. Marcello Wisdom.  SECTION      CHOLECYSTECTOMY      CORONARY ARTERY BYPASS GRAFT  12-    (x4) - LIMA-LAD, SVG-diagnoal 1, SVG-OM1, and SVG-PDA. Exploration of left radial. (Dr. Walker Heart). EYE SURGERY Left     as a child     GASTRIC BYPASS SURGERY      HYSTERECTOMY      (abdominal)  with left oophorectomy. Right ovary retained.     IR TUNNELED CATHETER PLACEMENT GREATER THAN 5 YEARS  2021    IR TUNNELED CATHETER PLACEMENT GREATER THAN 5 YEARS 2021 Jake Carolina MD [facility] SPECIAL PROCEDURES    TONSILLECTOMY AND ADENOIDECTOMY      UPPER GASTROINTESTINAL ENDOSCOPY  2020    EGD BIOPSY performed by Mike Valadez MD at [facility]       Immunization History:   Immunization History   Administered Date(s) Administered    COVID-19, Pfizer Purple top, DILUTE for use, 12+ yrs, 30mcg/0.3mL dose 2021, 2021    Influenza Virus Vaccine 10/20/1998, 10/12/2013, 10/27/2014, 10/12/2016    Influenza, High Dose (Fluzone 65 yrs and older) 2018, 10/05/2020    Influenza, Quadv, IM, PF (6 mo and older Fluzone, Flulaval, Fluarix, and 3 yrs and older Afluria) 11/19/2021    Influenza, Triv, inactivated, subunit, adjuvanted, IM (Fluad 65 yrs and older) 09/26/2019    Pneumococcal Conjugate 13-valent ([id number]) 04/12/2017    Pneumococcal Polysaccharide ([id number]) 02/06/2004, 08/21/2018    Tdap (Boostrix, Adacel) 09/04/2016    Zoster Recombinant (Shingrix) 01/03/2020       Active Problems:  Patient Active Problem List   Diagnosis Code    Dementia ([facility] F03.90    Hypertension I10    Hyperlipidemia E78.5    Gout M10.9    Peripheral polyneuropathy G62.9    Anxiety and depression F41.9, F32. A    CAD (coronary artery disease) I25.10    CVA (cerebral vascular accident) ([facility] I63.9    Controlled type 2 diabetes mellitus with chronic 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 dialysis, with long-term current use of insulin ([facility]) E11.22, N18.6, Z79.4, Z99.2    Acute on chronic diastolic heart failure ([facility]) I50.33    Elevated troponin R77.8    Lymphedema of both lower extremities I89.0    PAD (peripheral artery disease) ([facility]) I73.9    Bradycardia R00.1    AV block, 1st degree I44.0    Anasarca R60.1    Chronic diastolic heart failure ([facility]) I50.32    End stage renal disease on dialysis ([facility]) N18.6, Z99.2    Hemodialysis patient ([facility] Z99.2    Class 1 obesity with body mass index (BMI) of 34.0 to 34.9 in adult E66.9, Z68.34    Hyponatremia E87.1    Congestive heart failure of unknown etiology ([facility] I50.9    Bilateral pleural effusion J90    Constipation K59.00       Isolation/Infection: MRSA  Isolation            Contact          Patient Infection Status       Infection Onset Added Last Indicated Last Indicated By Review Planned Expiration Resolved Resolved By    MRSA 12/15/20 12/18/20 12/15/20 Culture, Blood 1        Resolved    COVID-19 (Rule Out) 08/20/20 08/20/20 08/21/20 COVID-19 (Ordered)   08/21/20 Rule-Out Test Resulted            Nurse Assessment:  Last Vital Signs: BP (!) 111/45   Pulse 66   Temp 97.4 °F (36.3 °C) (Oral)   Resp 18   Ht 5' 4\" (1.626 m)   Wt 176 lb 14.4 oz (80.2 kg)   SpO2 100%   BMI 30.36 kg/m²     Last documented pain score (0-10 scale): Pain Level: 0  Last Weight:   Wt Readings from Last 1 Encounters:   22 176 lb 14.4 oz (80.2 kg)     Mental Status:  alert and disoriented, hx dementia    IV Access:  - Dialysis Catheter  - site  right, insertion date: 2021  - LT forearm arteriovenous fistula    Nursing Mobility/ADLs:  Walking   Dependent  Transfer  Dependent  Bathing  Dependent  Dressing  Dependent  Toileting  Dependent  Feeding  Assisted  Med Admin  Dependent  Med Delivery   whole and in applesauce    Wound Care Documentation and Therapy:  Wound 20 Heel Right (Active)   Number of days: 537       Wound 08/10/20 Ischium Left (Active)   Number of days: 534       Wound 08/10/20 Hip Right Trochanter; CLUSTER OF WOUNDS (Active)   Number of days: 534       Wound 08/10/20 Hip Right (Active)   Number of days: 534        Elimination:  Continence: Bowel: {YES / B}  Bladder: {YES / [id number]}  Urinary Catheter: None   Colostomy/Ileostomy/Ileal Conduit: No       Date of Last BM: 2022  No intake or output data in the 24 hours ending 22 1231  No intake/output data recorded. Safety Concerns: At Risk for Falls and bleeding risk    Impairments/Disabilities:      None    Nutrition Therapy:  Current Nutrition Therapy:   - Oral Diet:  Carb Control 4 carbs/meal (1800kcals/day), Low Potassium, Low Sodium, Low Potassium, Low Phosphorus    Routes of Feeding: Oral  Liquids: No Restrictions  Daily Fluid Restriction: yes - amount 1500 mL  Last Modified Barium Swallow with Video (Video Swallowing Test): not done    Treatments at the Time of Hospital Discharge:   Respiratory Treatments: None  Oxygen Therapy:  is on oxygen at 2 L/min per nasal cannula.   Ventilator:    - No ventilator support    Rehab Therapies: Physical Therapy and Occupational Therapy  Weight Bearing Status/Restrictions: No weight bearing restrictions  Other Medical Equipment (for information only, NOT a DME order):  walker and [place]  Other Treatments: ***    Patient's personal belongings (please select all that are sent with patient): All personal belongings sent to [address] [place] [address] with patient at time of discharge    RN SIGNATURE:  Electronically signed by Jackie Eavns RN on 1/30/22 at 6:00 PM EST    CASE MANAGEMENT/SOCIAL WORK SECTION    Inpatient Status Date: ***    Readmission Risk Assessment Score:  Readmission Risk              Risk of Unplanned Readmission:  25           Discharging to Facility/ Agency   Name: [facility]  Address: [address] [place] [address], [address] [place]  Phone: [phone]   Fax: [phone]    Dialysis Facility (if applicable)   Name: [address] [facility]   Address: [address] [place] [address]  Dialysis Schedule: t/th/sat 6:15AM  Phone: [phone]  Fax: [phone]    / signature: Electronically signed by JEFFREY Beaver on 1/27/22 at 12:33 PM EST    PHYSICIAN SECTION    Prognosis: Fair    Condition at Discharge: Stable    Rehab Potential (if transferring to Rehab):  Fair    Recommended Labs or Other Treatments After Discharge:   Optimize cardio-pulmonary function  Cardiology evaluation and f/u Dr Elise Meneses  Non-ST elevation MI, thought to be type II  Observe for symptomatic bradycardia  Renal evaluation and f/u, discussed with Dr Sindhu Monterroso   Avoid nephrotoxins  Dialysis as scheduled  Pulmonary eval & f/u as scheduled Dr Viki Velasco al  Thoracentesis prn   Blood Pressure - Monitor and control  Glycemic contol - Monitor and control blood sugars   Blood products as needed  Aranesp  Anemia / thrombocytopenia work-up on outpatient basis  PT/OT  Follow-up with PCP in one week, Suzanne Lora DO    Physician Certification: I certify the above information and transfer of Joanna Eckert  is necessary for the continuing treatment of the diagnosis listed and that she requires Jax Mathews for less 30 days.     Update Admission H&P:   Principal Problem:    Bilateral pleural effusion  Active Problems:    Dementia ([address]    Hypertension    Controlled type 2 diabetes mellitus with chronic 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 dialysis, with long-term current use of insulin (H[facility])    Elevated troponin    Lymphedema of both lower extremities    Bradycardia    End stage renal disease on dialysis (HCC)    Hyponatremia    Constipation    Pulmonary HTN (HCC)    Thrombocytopenia (HCC)    Acute on chronic combined systolic (congestive) and diastolic (congestive) heart failure (HCC)    Severe tricuspid regurgitation    Chronic respiratory failure with hypoxia ([address]  Resolved Problems:    * No resolved hospital problems.  *     PHYSICIAN SIGNATURE:  Electronically signed by John Ricci DO on 1/30/22 at 11:02 AM EST

## 2022-01-27 NOTE — PROGRESS NOTES
HEMODIALYSIS PRE-TREATMENT NOTE    Patient Identifiers prior to treatment: Name,     Isolation Required:  Yes                      Isolation Type: Contact/MRSA       (please document if patient is being managed as a PUI/COVID-19 patient)        Hepatitis status:                           Date Drawn                             Result  Hepatitis B Surface Antigen 22     neg                     Hepatitis B Surface Antibody 22 neg        Hepatitis B Core Antibody 21 neg          How was Hepatitis Status verified: Outpatient Labs     Was a copy of the labs you documented provided to facility for the patient's chart: Yes    Hemodialysis orders verified: Yes, with Dr. Cally Adair Within normal limits ( I.e. s/s of infection,...): WNL     Pre-Assessment completed: Yes    Pre-dialysis report received from: Arun Larkin RN                      Time: 13:15

## 2022-01-27 NOTE — PROGRESS NOTES
Transitions of Care Pharmacy Service   Medication Review    The patient's list of current home medications has been reviewed. Source(s) of information: med list from facility ([facility]), dialysis center ( [facility])      PROVIDER ACTION REQUESTED  Medications that need to be addressed by a physician/nurse practitioner:    Medication Action Requested   Lantus 10 units nightly   Not a current med at facility -- please discontinue as appropriate         Cetirizine 10mg daily,  Midodrine 10mg MWF Facility doses are different - please adjust as appropriate:    Cetirizine 10mg every other day  Midodrine 10mg on dialysis days (Tu/Thur/Sat)           Please feel free to call me with any questions about this encounter. Thank you. Michelle Coles, [facility]   Transitions of Care Pharmacy Service  Phone:  [phone]  Fax: [phone]      Electronically signed by Michelle Coles [facility] on 1/27/2022 at 2:22 PM           Medications Prior to Admission:   acetaminophen (TYLENOL) 325 MG tablet, Take 650 mg by mouth every 4 hours as needed for Pain or Fever  acetaminophen (TYLENOL) 325 MG tablet, Take 650 mg by mouth 3 times daily  loratadine (CLARITIN) 10 MG tablet, Take 10 mg by mouth every other day  midodrine (PROAMATINE) 10 MG tablet, Take 10 mg by mouth as needed (hypotension mid-dialysis)  nystatin (MYCOSTATIN) 969865 UNIT/GM cream, Apply topically 2 times daily as needed (redness)  senna (SENOKOT) 8.6 MG tablet, Take 1 tablet by mouth 2 times daily  nitroGLYCERIN (NITROSTAT) 0.4 MG SL tablet, up to max of 3 total doses. If no relief after 1 dose, call 911.   insulin lispro (HUMALOG) 100 UNIT/ML injection vial, Inject 0-6 Units into the skin 3 times daily (with meals) Glucose: Dose:              No Insulin 140-199 1 Unit 200-249 2 Units 250-299 3 Units 300-349 4 Units 350-399 5 Units Over 399 6 Units  carvedilol (COREG) 6.25 MG tablet, Take 1 tablet by mouth 2 times daily (with meals)  midodrine (PROAMATINE) 10 MG tablet, Take 10 mg by mouth three times a week Indications: on dialysis days Tu/Thur/Sat   sodium chloride (OCEAN) 0.65 % nasal spray, 2 sprays by Nasal route 2 times daily  lidocaine-prilocaine (EMLA) 2.5-2.5 % cream, Apply topically three times a week On dialysis days Tu-Th-Sa  amLODIPine (NORVASC) 10 MG tablet, Take 1 tablet by mouth daily  atorvastatin (LIPITOR) 40 MG tablet, TAKE 1 TABLET BY MOUTH EVERYDAY  Cholecalciferol 125 MCG (5000 UT) CHEW, Take 1 tablet by mouth daily  citalopram (CELEXA) 20 MG tablet, Take 1 tablet by mouth daily  hydrALAZINE (APRESOLINE) 100 MG tablet, Take 1 tablet by mouth 3 times daily  oxybutynin (DITROPAN) 5 MG tablet, TAKE 1 TABLET BY MOUTH TWICE DAILY  pantoprazole (PROTONIX) 40 MG tablet, Take 1 tablet by mouth 2 times daily (before meals)  rivastigmine (EXELON) 9.5 MG/24HR, APPLY 1 NEW PATCH EVERY 24 HOURS  aspirin 81 MG tablet, Take 1 tablet by mouth daily

## 2022-01-27 NOTE — CONSULTS
[facility]   Progress Note                   Date:   1/27/2022  Patient name: Juanpablo Bearden  Date of admission:  1/26/2022  8:39 PM  MRN:   [id number]  YOB: 1950  PCP: Irene Jackson DO    Reason for Admission:  SOB    Subjective:       Clinical Changes / Abnormalities:Still feels SOB, better compared to before, no chest pain      Medications:   Scheduled Meds:   polyethylene glycol  17 g Oral BID    senna  1 tablet Oral Nightly    docusate sodium  100 mg Oral TID    midodrine  10 mg Oral Once in dialysis    [Held by provider] amLODIPine  10 mg Oral Daily    aspirin  81 mg Oral Daily    atorvastatin  40 mg Oral Nightly    [Held by provider] carvedilol  6.25 mg Oral BID WC    vitamin D3  5,000 Units Oral Daily    citalopram  20 mg Oral Daily    insulin glargine  10 Units SubCUTAneous Nightly    insulin lispro  0-12 Units SubCUTAneous TID WC    insulin lispro  0-6 Units SubCUTAneous Nightly    cetirizine  10 mg Oral Daily    midodrine  10 mg Oral Once per day on Mon Wed Fri    [Held by provider] oxybutynin  5 mg Oral BID    pantoprazole  40 mg Oral BID AC    rivastigmine  1 patch TransDERmal Daily    sodium chloride  2 spray Nasal BID    sodium chloride flush  5-40 mL IntraVENous 2 times per day    heparin (porcine)  5,000 Units SubCUTAneous 3 times per day     Continuous Infusions:   sodium chloride       CBC:   Recent Labs     01/25/22  0912 01/27/22  0551   WBC 6.8 7.3   HGB 11.2* 10.7*   PLT See Reflexed IPF Result 100*     BMP:    Recent Labs     01/25/22  0912 01/26/22  0945 01/27/22  0551   * 131* 131*   K 3.5* 3.9 4.3   CL 97* 97* 96*   CO2 27 24 25   BUN 20 28* 33*   CREATININE 2.14* 2.87* 3.28*   GLUCOSE 79 88 92     Hepatic:   Recent Labs     01/25/22  0912 01/27/22  0551   AST 25 29   ALT 14 13   BILITOT 0.48 0.54   ALKPHOS 323* 310*     Troponin: No results for input(s): TROPONINI in the last 72 hours.   BNP: No results for input(s): BNP in the last 72 hours. Lipids: No results for input(s): CHOL, HDL in the last 72 hours. Invalid input(s): LDLCALCU  INR:   Recent Labs     01/27/22  0551   INR 1.2       Objective:   Vitals: BP (!) 111/45   Pulse 66   Temp 97.4 °F (36.3 °C) (Oral)   Resp 18   Ht 5' 4\" (1.626 m)   Wt 176 lb 14.4 oz (80.2 kg)   SpO2 100%   BMI 30.36 kg/m²   General appearance: alert and cooperative with exam  HEENT: Head: Normocephalic, no lesions, without obvious abnormality. Neck: no adenopathy, no carotid bruit, no JVD, supple, symmetrical, trachea midline and thyroid not enlarged, symmetric, no tenderness/mass/nodules  Lungs: clear to auscultation bilaterally  Heart: regular rate and rhythm, S1, S2 normal, no murmur, click, rub or gallop  Abdomen: soft, non-tender; bowel sounds normal; no masses,  no organomegaly  Extremities: extremities normal, atraumatic, no cyanosis or edema          TTE 11/13/21  Summary  Left ventricle is mildly enlarged, global left ventricular systolic function  is low normal versus mildly reduced. Calculated ejection fraction is 45% (by Driver's Metod.) Calculated EF by 3D Heart Model is 56%. Evidence of diastolic dysfunction. Left atrium is moderately dilated. Bowing interatrial septal motion. No shunt seen by color Doppler. Right atrial dilatation. Small bright echo density noted originating from the area of the superior vena cava, likely pacing lead. Right ventricular dilatation with normal systolic function. Thickened mitral valve leaflets. Mitral annular calcification is seen. Mean gradient is 4mmHg . Trace to mild mitral regurgitation. Tricuspid valve was not well visualized. Moderate to severe tricuspid regurgitation. Estimated right ventricular systolic pressure is 48 mmHg. No significant pericardial effusion is seen.          Assessment / Acute Cardiac Problems/Plan   1. Acute CHF with preserved EF. ESRD on HD with electrolyte disturbance.  Volume control via

## 2022-01-27 NOTE — PLAN OF CARE
Problem: Skin Integrity:  Goal: Will show no infection signs and symptoms  Description: Will show no infection signs and symptoms  1/27/2022 1144 by Flores Kessler RN  Outcome: Ongoing  1/27/2022 0115 by Margarita Black RN  Outcome: Ongoing  Goal: Absence of new skin breakdown  Description: Absence of new skin breakdown  1/27/2022 1144 by Flores Kessler RN  Outcome: Ongoing  1/27/2022 0115 by Margarita Black RN  Outcome: Ongoing     Problem: Falls - Risk of:  Goal: Will remain free from falls  Description: Will remain free from falls  1/27/2022 1144 by Flores Kessler RN  Outcome: Ongoing  1/27/2022 0115 by Margarita Black RN  Outcome: Ongoing  Goal: Absence of physical injury  Description: Absence of physical injury  1/27/2022 1144 by Flores Kessler RN  Outcome: Ongoing  1/27/2022 0115 by Margarita Black RN  Outcome: Ongoing

## 2022-01-27 NOTE — CONSULTS
Nephrology ESRD Consult Note    Reason for Consult:  Fluid and hypertension management for pt with ESRD     Requesting Physician: Dr. Shaggy Sanchez    History Obtained From:  patient, electronic medical record    HD Unit:        [facility]    Dry Weight:       83.5 kg     Chief Complaint: Shortness of breath    History of Present Illness: This is a 80 y.o. female with end stage renal disease on hemodialysis [facility] who presents to the hospital for evaluation of increasing shortness of breath and dyspnea progressive over the last 2 to 3 weeks despite being quite compliant with her dialysis treatments. At 1 time her dry weight used to be somewhere around 92 kg, over the last several weeks they have been able to drop her dry weight down to 83.5 kg. She does have significant pulmonary hypertension history and chronic edema and anasarca-like picture. She is typically followed by Dr. Rubi Ponce in [address] [place] [address] renal dialysis unit. She has an AV fistula left which we are not cannulating just yet. She is got a PermCath in place. Patient presented to the emergency room at [facility] yesterday with symptoms as described above. She was noted to have cardiomegaly and bilateral pleural effusions with lower lobe atelectasis. She was transferred over to us for additional management of above. Pulmonary consultants have seen her today. There is a potential she might require or benefit from thoracentesis. Cardiac echo from November 2021 shows ejection fraction of around 45 to 50%. There was some evidence of diastolic dysfunction right ventricle dilatation was noted. Moderate to severe TR was noted with pulmonary hypertension. Patient denies any nausea, vomiting, fever, chills, hemoptysis. His outpatient history and dialysis orders, usual dry wt  and out pt dialysis run sheets were reviewed.      Past Medical History:        Diagnosis Date    Acute anemia     Acute cystitis 8/8/2020    Acute kidney injury ([address] 8/19/2020    Acute kidney injury superimposed on CKD ([address]     Acute on chronic diastolic heart failure ([address] 8/7/2020    Acute renal failure (ARF) ([address] 12/11/2020    Anasarca 12/12/2020    Anxiety and depression     AV block, 1st degree 8/19/2020    Background diabetic retinopathy(362.01) 2008    (Mild)    Balance problem     BMI 45.0-49.9, adult ([address] 3/1/2021    Bradycardia 8/19/2020    Buttock wound 8/20/2020    CAD (coronary artery disease)     (with coronary artery bypass graft x4).  Cancer of uterus [facility])     history of, (probable cure)    Chronic diastolic heart failure ([address] 3/1/2021    Chronic kidney disease     Chronic low back pain     Controlled type 2 diabetes mellitus with chronic 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 dialysis, with long-term current use of insulin ([address]     CVA (cerebral vascular accident) ([address]     Decompensated heart failure ([address] 12/4/2020    Decubitus ulcer of trochanteric region of right hip, stage 2 ([address] 8/23/2020    Dementia ([address]     (moderate)    Dry eye syndrome     End stage renal disease on dialysis ([address] 3/1/2021    GERD (gastroesophageal reflux disease)     Glaucoma suspect 2005    Gout     Hemodialysis patient ([address] 3/1/2021    Homonymous hemianopsia 2008    (Right)    Hyperkalemia 8/19/2020    Hyperlipidemia     Hypertension     Infestation by bed bug 8/8/2020    Infestation by maggots 8/8/2020    Keratitis     (secondary to dry eye syndrome).  Lymphedema of both lower extremities 8/8/2020    Morbid obesity ([address] 3/1/2021    MRSA bacteremia 12/18/2020    Obesity     PAD (peripheral artery disease) (HCC)     Peripheral polyneuropathy     (diabetic)    Pressure injury of right heel, unstageable ([address] 8/18/2020    Pseudophakos     (Right Eye: 05-; Left Eye: 04-) - Dr. Reena Gamez.     Stroke [facility])     (Multiple) MRI of brain 10/2008 shows multiple infarcts involving the temporal and parietal areas.  Trichiasis     (left eye)    Type 2 diabetes mellitus ([address]     Wounds, multiple 2020       Access:  previous permacath    Past Surgical History:        Procedure Laterality Date    CATARACT REMOVAL WITH IMPLANT  2012    (Right eye) - Dr. Gee Patel.  CATARACT REMOVAL WITH IMPLANT  2012    (Left eye) - Dr. Gee Patel.   SECTION      CHOLECYSTECTOMY      CORONARY ARTERY BYPASS GRAFT  12-    (x4) - LIMA-LAD, SVG-diagnoal 1, SVG-OM1, and SVG-PDA. Exploration of left radial. (Dr. Iker Gary).  EYE SURGERY Left     as a child     GASTRIC BYPASS SURGERY      HYSTERECTOMY      (abdominal)  with left oophorectomy. Right ovary retained.  IR TUNNELED CATHETER PLACEMENT GREATER THAN 5 YEARS  2021    IR TUNNELED CATHETER PLACEMENT GREATER THAN 5 YEARS 2021 Dru Mcdonough MD [facility] SPECIAL PROCEDURES    TONSILLECTOMY AND ADENOIDECTOMY      UPPER GASTROINTESTINAL ENDOSCOPY  2020    EGD BIOPSY performed by Vi Rangel MD at [facility]       Outpatient Medications:     Medications Prior to Admission: nitroGLYCERIN (NITROSTAT) 0.4 MG SL tablet, up to max of 3 total doses. If no relief after 1 dose, call 911.   insulin lispro (HUMALOG) 100 UNIT/ML injection vial, Inject 0-6 Units into the skin 3 times daily (with meals) Glucose: Dose:              No Insulin 140-199 1 Unit 200-249 2 Units 250-299 3 Units 300-349 4 Units 350-399 5 Units Over 399 6 Units  insulin lispro (HUMALOG) 100 UNIT/ML injection vial, Inject 0-3 Units into the skin nightly Glucose: Dose:              No Insulin 140-249 1 Unit 250-349 2 Units Over 350 3 Units  carvedilol (COREG) 6.25 MG tablet, Take 1 tablet by mouth 2 times daily (with meals)  midodrine (PROAMATINE) 10 MG tablet, Take 10 mg by mouth three times a week   sodium chloride (OCEAN) 0.65 % nasal spray, 2 sprays by Nasal route 2 times daily  lidocaine-prilocaine (EMLA) 2.5-2.5 % cream, (CHOLECALCIFEROL) tablet 5,000 Units, Daily  citalopram (CELEXA) tablet 20 mg, Daily  insulin glargine (LANTUS) injection vial 10 Units, Nightly  insulin lispro (HUMALOG) injection vial 0-12 Units, TID WC  insulin lispro (HUMALOG) injection vial 0-6 Units, Nightly  cetirizine (ZYRTEC) tablet 10 mg, Daily  midodrine (PROAMATINE) tablet 10 mg, Once per day on Mon Wed Fri  nitroGLYCERIN (NITROSTAT) SL tablet 0.4 mg, Q5 Min PRN  [Held by provider] oxybutynin (DITROPAN) tablet 5 mg, BID  pantoprazole (PROTONIX) tablet 40 mg, BID AC  rivastigmine (EXELON) 9.5 MG/24HR 1 patch, Daily  sodium chloride (OCEAN, BABY AYR) 0.65 % nasal spray 2 spray, BID  sodium chloride flush 0.9 % injection 5-40 mL, 2 times per day  sodium chloride flush 0.9 % injection 10 mL, PRN  0.9 % sodium chloride infusion, PRN  ondansetron (ZOFRAN-ODT) disintegrating tablet 4 mg, Q8H PRN   Or  ondansetron (ZOFRAN) injection 4 mg, Q6H PRN  acetaminophen (TYLENOL) tablet 650 mg, Q6H PRN   Or  acetaminophen (TYLENOL) suppository 650 mg, Q6H PRN  heparin (porcine) injection 5,000 Units, 3 times per day        Allergies:  Amoxicillin-pot clavulanate, Sulfamethoxazole-trimethoprim, Amoxicillin, Clavulanic acid, Clonidine derivatives, Sulfamethoxazole, Trimethoprim, and Adhesive tape    Social History:    Social History     Socioeconomic History    Marital status:      Spouse name: Not on file    Number of children: Not on file    Years of education: Not on file    Highest education level: Not on file   Occupational History    Not on file   Tobacco Use    Smoking status: Never Smoker    Smokeless tobacco: Never Used    Tobacco comment: never smoker [facility] rrt 7/20/17   Vaping Use    Vaping Use: Unknown   Substance and Sexual Activity    Alcohol use: No    Drug use: No    Sexual activity: Not on file   Other Topics Concern    Not on file   Social History Narrative    Not on file     Social Determinants of Health     Financial Resource Strain:     Difficulty of Paying Living Expenses: Not on file   Food Insecurity:     Worried About Running Out of Food in the Last Year: Not on file    Rosanne of Food in the Last Year: Not on file   Transportation Needs:     Lack of Transportation (Medical): Not on file    Lack of Transportation (Non-Medical): Not on file   Physical Activity:     Days of Exercise per Week: Not on file    Minutes of Exercise per Session: Not on file   Stress:     Feeling of Stress : Not on file   Social Connections:     Frequency of Communication with Friends and Family: Not on file    Frequency of Social Gatherings with Friends and Family: Not on file    Attends Gnosticist Services: Not on file    Active Member of Clubs or Organizations: Not on file    Attends Club or Organization Meetings: Not on file    Marital Status: Not on file   Intimate Partner Violence:     Fear of Current or Ex-Partner: Not on file    Emotionally Abused: Not on file    Physically Abused: Not on file    Sexually Abused: Not on file   Housing Stability:     Unable to Pay for Housing in the Last Year: Not on file    Number of [place] in the Last Year: Not on file    Unstable Housing in the Last Year: Not on file       Family History:   Family History   Problem Relation Age of Onset    Diabetes Mother     Cancer Mother     High Blood Pressure Mother     Stroke Mother     Kidney Disease Mother     Uterine Cancer Mother     Diabetes Father     Heart Disease Father     Glaucoma Father     Emphysema Father     Coronary Art Dis Other         All 4 siblings.  Stroke Other         1 sibling.  Lung Cancer Other         1 sibling - cause of death lung cancer.  Mental Retardation Other        Review of Systems:    Constitutional: No fever, no chills, + lethargy, + weakness. HEENT:  No headache, otalgia, itchy eyes, nasal discharge or sore throat.   Cardiac:  No chest pain, + shortness of breath /dyspnea, she denied PND PND.  Chest:              No cough, phlegm or wheezing. Abdomen:  No abdominal pain, nausea or vomiting. Neuro:  No focal weakness, abnormal movements orseizure like activity. Skin:   No rashes, no itching. :   No hematuria, no pyuria, no dysuria, no flank pain. Extremities:  + swelling or joint pains. ROS was otherwise negative except as mentioned in the [address] Objective:  Constitutional:    CURRENT TEMPERATURE:  Temp: 97.4 °F (36.3 °C)  MAXIMUM TEMPERATURE OVER 24HRS:  Temp (24hrs), Av.5 °F (36.4 °C), Min:97.3 °F (36.3 °C), Max:97.9 °F (36.6 °C)    CURRENT RESPIRATORY RATE:  Resp: 18  CURRENT PULSE:  Pulse: 66  CURRENT BLOOD PRESSURE:  BP: (!) 111/45  24HR BLOOD PRESSURE RANGE:  Systolic (99OTM), FJN:600 , Min:92 , VYQ:646   ; Diastolic (96WSD), VMK:50, Min:34, Max:65    24HR INTAKE/OUTPUT:  No intake or output data in the 24 hours ending 22 1144      Physical Exam:  AAO x 3,          speaking in full sentences, no accessory muscle use. HEENT: Atraumatic, normocephalic, no throat congestion, moist mucosa. Eyes:   Pupils equal, round and reactive to light, EOMI. Neck:   + JVD, no thyromegaly, no lymphadenopathy. Chest:              Diminished breath sounds at both bases assisting with effusions, basal crackles right greater than left. Cardiac:  S1 S2 irregular, + murmurs, gallops or rubs . Abdomen: Soft, non-tender, no masses or organomegaly, BS audible. :   No suprapubic or flank tenderness. Neuro:  AAO x 3, No FND. SKIN:  No rashes, good skin turgor.   Extremities:  + edema, no calf tenderness    Labs:    CBC:   Recent Labs     22  0912 22  0551   WBC 6.8 7.3   RBC 3.54* 3.37*   HGB 11.2* 10.7*   HCT 35.8* 33.8*   .1 100.3   MCH 31.6 31.8   MCHC 31.3 31.7   RDW 18.8* 18.6*   PLT See Reflexed IPF Result 100*   MPV NOT REPORTED 10.5      BMP:   Recent Labs     22  0912 22  0945 22  0551   * 131* 131*   K 3.5* 3.9 4.3   CL 97* 97* 96*   CO2 27

## 2022-01-27 NOTE — PROGRESS NOTES
Patient back on unit from dialysis. VSS. Patient sugar checked on unit. BS stable. Set up for dinner. Will continue to monitor.

## 2022-01-27 NOTE — PROGRESS NOTES
Treatment time: 210 minutes    Net UF: 2000 mL    Pre weight: 80.2 kg  Post weight: n/a  EDW: 83.5 kg ? Access used: CVC right chest  Access function: good    Medications or blood products given: 10 mg midodrine    Regular outpatient schedule: [facility]    Summary of response to treatment: Patient tolerated treatment well, midodrine given at beginning of treatment, new dressing applied to catheter site, patient to be evaluated in am for extra dialysis treatment, report called to Joselusi Cartagena, [facility]. Copy of dialysis treatment record placed in chart, to be scanned into EMR.

## 2022-01-27 NOTE — CARE COORDINATION
Discharge planning    Patient chart reviewed. Patient is from [facility] and VM left with ss to see if long term vs skilled. According to prior [facility] note in 11/2021 patient is long term and a bed hold.

## 2022-01-27 NOTE — PROGRESS NOTES
Pt admitted to room 2009 per stretcher in stable condition from [place] ED. Oriented to room and surroundings  Bed in lowest position, wheels locked, 2/4 side rails up  Call light in reach, room free of clutter, adequate lighting provided  Denies any further questions at this time  Instructed to call out with any questions/concerns/new onset of pain and/or n/v   White board updated  Continue to monitor with hourly rounding  STAY WITH ME protocol initiated   Bed alarm on/Fall Risk signs in place/Fall risk sticker to wrist band  Non-skid socks on/at bedside  [address] in place for patient/family to view & ask questions.

## 2022-01-27 NOTE — PROGRESS NOTES
Occupational Therapy   Occupational Therapy Initial Assessment  Date: 2022   Patient Name: Estella Wood  MRN: [id number]     : 1950    RN Aurora Younger reports patient is medically stable for therapy treatment this date. Chart reviewed prior to treatment and patient is agreeable for therapy. All lines intact and patient positioned comfortably at end of treatment. All patient needs addressed prior to ending therapy session. Date of Service: 2022    Discharge Recommendations:  Patient would benefit from continued therapy after discharge   Pt currently functioning below baseline. Would suggest additional therapy at time of discharge to maximize long term outcomes and prevent re-admission. Please refer to AM-PAC score for current level of function. OT Equipment Recommendations  Equipment Needed: Yes  Mobility Devices: ADL Assistive Devices  ADL Assistive Devices: Long-handled [place];Emergency Alert System;Long-handled Sponge;Reacher;Sock-Aid Hard    Assessment   Performance deficits / Impairments: Decreased functional mobility ; Decreased ADL status; Decreased safe awareness;Decreased strength;Decreased ROM; Decreased endurance;Decreased high-level IADLs;Decreased fine motor control;Decreased cognition;Decreased balance;Decreased coordination;Decreased posture  Assessment: Pt would benefit from continued skilled OT Services are indicated to increase I and safety during functional and ADL tasks to reduce caregiver burden as able.   Decision Making: Medium Complexity  OT Education: OT Role;Transfer Training;Energy Conservation;Plan of Care;ADL Adaptive Strategies;Orientation  Patient Education: cognitive reorientation, safety in function, fall prevention/call light use, benefits of being oob, proper bed mobility, recommendations for discharge  Barriers to Learning: cognition  REQUIRES OT FOLLOW UP: Yes  Activity Tolerance  Activity Tolerance: Treatment limited secondary to decreased entry  Bathroom Shower/Tub: Walk-in shower  Bathroom Toilet: Handicap height  Bathroom Equipment: Shower chair,Grab bars around toilet,Grab bars in [address]: Rolling walker,Wheelchair-manual,Oxygen (Oxygen AAT 2L)  Receives Help From:  (Pt reports she gets PT 4 days a week)  ADL Assistance: Needs assistance (Pt reports staff assist with all ADLs)  Homemaking Assistance: Needs assistance (Pt reports staff does all IADLs)  Ambulation Assistance: Needs assistance (uses RW)  Transfer Assistance: Needs assistance  Active : No  Occupation: Retired  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 pt denies any recent falls. Objective   Vision: Within Functional Limits (Pt denies any recent changes)  Hearing: Within functional limits  Hearing Exceptions: Bilateral hearing aid;Hard of hearing/hearing concerns      Orientation  Overall Orientation Status: Impaired (\"[facility]\" \"Blood pressure dipped way down\")  Orientation Level: Disoriented to place;Oriented to time;Oriented to person;Disoriented to situation  Observation/Palpation  Observation: Dry fragile skin and bruising noted on B UE's. BLE's wrapped  Edema: none       Balance  Sitting Balance: Contact guard assistance  Standing Balance: Maximum assistance (x2 staff assist with RW)  Standing Balance  Time: standing ~ 1-2 min  Activity: functional mobility and standing ellie  Functional Mobility  Functional - Mobility Device: Rolling Walker  Activity:  (4 steps towards HOB)  Assist Level: Maximum assistance (x2 staff assist)  Functional Mobility Comments: Pt required MAX verbal cues/hand over hand assist for RW safety, upright posture, hand placement on bedrail, pacing self, sequencing, initation, scanning room all to increase safety.        ADL  Feeding: Setup;Minimal assistance  Grooming: Setup;Minimal assistance;Verbal cueing  UE Bathing: Setup; Moderate assistance;Verbal cueing  LE Bathing: Setup;Maximum assistance;Dependent/Total  UE Dressing: Setup; Moderate assistance (to tie/adjust gown seated on EOB)  LE Dressing: Dependent/Total (for donning B socks supine in bed)  Toileting: Dependent/Total  Additional Comments: Pt is limited by cognition, general weakness and fatigue at this time. Tone RUE  RUE Tone: Normotonic  Tone LUE  LUE Tone: Normotonic  Coordination  Movements Are Fluid And Coordinated: No  Coordination and Movement description: Fine motor impairments; Left UE;Right UE;Decreased accuracy; Decreased speed (slowed/delayed B opposition)          Bed mobility  Supine to Sit: Maximum assistance;2 Person assistance  Sit to Supine: 2 Person assistance;Maximum assistance  Scooting: Maximal assistance;2 Person assistance  Comment: Pt given MAX verbal cues/hand over hand assist for proper bed mobility, initiation, sequencing, hand placement on bedrails and assistance with lines all to increase safety and reduce risk of falls. Transfers  Sit to stand: Maximum assistance;2 Person assistance (with RW)  Stand to sit: Maximum assistance;2 Person assistance  Transfer Comments: Pt required MAX verbal cues/tactile assist for RW safety, upright posture, controlled stand to sit, nose over toes, pacing self, line mgmt and hand placement on RW all to increase safety. Cognition  Overall Cognitive Status: Exceptions  Arousal/Alertness: Delayed responses to stimuli  Following Commands: Follows one step commands with repetition; Follows one step commands with increased time  Attention Span: Appears intact  Memory: Decreased short term memory  Safety Judgement: Decreased awareness of need for safety;Decreased awareness of need for assistance  Problem Solving: Decreased awareness of errors;Assistance required to identify errors made;Assistance required to generate solutions;Assistance required to implement solutions;Assistance required to correct errors made  Insights: Not aware of deficits  Initiation: Requires cues for some  Sequencing: Requires cues for some        Sensation  Overall Sensation Status: WFL        LUE AROM (degrees)  LUE AROM : Exceptions  LUE General AROM: shld ~ 90 degrees, Rest WFL  RUE AROM (degrees)  RUE AROM : Exceptions  RUE General AROM: shld ~ 90 degrees, Rest WFL  LUE Strength  Gross LUE Strength: Exceptions to [facility]  LUE Strength Comment: shld ~ 3/5, Rest 3+/5  RUE Strength  Gross RUE Strength: Exceptions to [facility] [place]  RUE Strength Comment: shld ~ 3/5, Rest 3+/5             Plan   Plan  Times per week: 4-5x/wk 1x/day as ellie  Current Treatment Recommendations: Strengthening,Endurance Training,Balance Training,Functional Mobility Training,Safety Education & Training,Home Management Training,Self-Care / ADL,Equipment Evaluation, Education, & procurement,Pain Management      AM-PAC Score        AM-PAC Inpatient Daily Activity Raw Score: 14 (01/27/22 1311)  AM-PAC Inpatient ADL T-Scale Score : 33.39 (01/27/22 1311)  ADL Inpatient CMS 0-100% Score: 59.67 (01/27/22 1311)  ADL Inpatient CMS G-Code Modifier : CK (01/27/22 1311)        Goals  Short term goals  Time Frame for Short term goals: By discharge, pt to demo  Short term goal 1: ADL transfers and functional mobility to Min A with use of AD as needed. Short term goal 2: increased B UE strength by 1/2 grade/increased B shld AROM by 10-15 degrees to assist with functional tasks. Short term goal 3: bed mobility to Min A with use of bedrails as needed. Short term goal 4: toileting to Mod A with use of AD/BSC as needed. Short term goal 5: UB ADLs to Set up and LB ADLs Mod A with use of AD/AE as needed. Long term goals  Long term goal 1: Pt to be I with fall prevention education, EC/WS tech, recommendations for discharge/AE with use of handouts as needed.   Long term goal 2: increased standing ellie > 8 min with Mod A with use of AD as needed to reduce risk of falls during functional

## 2022-01-27 NOTE — PROGRESS NOTES
Physical Therapy    Facility/Department: [facility]  Initial Assessment    NAME: Pedro Luis Swift  : 1950  MRN: [id number]    Date of Service: 2022   CORNEL Drake reports patient is medically stable for therapy treatment this date. Chart reviewed prior to treatment and patient is agreeable for therapy. All lines intact and patient positioned comfortably at end of treatment. All patient needs addressed prior to ending therapy session. Per H&P: This is a 80 y.o. female with end stage renal disease on hemodialysis [id number] who presents to the hospital for evaluation of increasing shortness of breath and dyspnea progressive over the last 2 to 3 weeks despite being quite compliant with her dialysis treatments. At 1 time her dry weight used to be somewhere around 92 kg, over the last several weeks they have been able to drop her dry weight down to 83.5 kg.  She does have significant pulmonary hypertension history and chronic edema and anasarca-like picture. Discharge Recommendations:  Pt currently functioning below baseline. Would suggest additional therapy at time of discharge to maximize long term outcomes and prevent re-admission. Please refer to AM-PAC score for current level of function. Patient would benefit from continued therapy after discharge      Assessment   Body structures, Functions, Activity limitations: Decreased functional mobility ; Decreased strength;Decreased safe awareness;Decreased balance;Decreased endurance;Decreased cognition;Decreased posture  Assessment: Pt tolerated PT eval fair. Pt w/ significant deficits in bed mobility, transfers, and ambulation this date. Pt w/ poor steadiness throughout, most limited by decreased endurance. Pt is a HIGH fall risk d/t decreased balance, generalized weakness, decreased endurance, and decreased safety awareness.   Pt currently requires skilled 2 person assist for safe functional mobility and would benefit from continued skilled PT in order to address deficits in order to maximize independence w/ functional mobility. Prognosis: Good  Decision Making: Medium Complexity  PT Education: Goals;PT Role;Plan of Care;General Safety;Transfer Training;Gait Training;Functional Mobility Training  Patient Education: Pt educated on: purpose of acute PT eval, importance of continued mobility throughout admission, general safety awareness, activity tolerance, safe transfers and ambulation w/ RW, and PT POC. Pt verbalized poor understanding. Pt requires continued reinforcement of education. REQUIRES PT FOLLOW UP: Yes  Activity Tolerance  Activity Tolerance: Patient limited by endurance; Patient limited by fatigue       Patient Diagnosis(es): There were no encounter diagnoses.      has a past medical history of Acute anemia, Acute cystitis, Acute kidney injury ([address] Acute kidney injury superimposed on CKD ([address] Acute on chronic diastolic heart failure ([address] Acute renal failure (ARF) ([address] Anasarca, Anxiety and depression, AV block, 1st degree, Background diabetic retinopathy(362.01), Balance problem, BMI 45.0-49.9, adult ([address] Bradycardia, Buttock wound, CAD (coronary artery disease), Cancer of uterus ([address] Chronic diastolic heart failure ([address] Chronic kidney disease, Chronic low back pain, Controlled type 2 diabetes mellitus with chronic 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 dialysis, with long-term current use of insulin ([address] CVA (cerebral vascular accident) ([address] Decompensated heart failure ([address] Decubitus ulcer of trochanteric region of right hip, stage 2 ([address] Dementia ([address] Dry eye syndrome, End stage renal disease on dialysis ([address] GERD (gastroesophageal reflux disease), Glaucoma suspect, Gout, Hemodialysis patient ([address] Homonymous hemianopsia, Hyperkalemia, Hyperlipidemia, Hypertension, Infestation by bed bug, Infestation by maggots, Keratitis, Lymphedema of both lower extremities, Morbid obesity ([address] MRSA bacteremia, Obesity, PAD (peripheral artery disease) ([address] Peripheral polyneuropathy, Pressure injury of right heel, unstageable ([address] Pseudophakos, Stroke ([address] Trichiasis, Type 2 diabetes mellitus ([address] and Wounds, multiple.   has a past surgical history that includes Gastric bypass surgery; Cataract removal with implant (2012); Cataract removal with implant (2012); Coronary artery bypass graft (12-);  section; Hysterectomy; Cholecystectomy; Tonsillectomy and adenoidectomy; Eye surgery (Left); Upper gastrointestinal endoscopy (2020); and IR TUNNELED CVC PLACE WO SQ PORT/PUMP > 5 YEARS (2021). Restrictions  Restrictions/Precautions  Restrictions/Precautions: General Precautions,Fall Risk,Contact Precautions  Required Braces or Orthoses?: No  Position Activity Restriction  Other position/activity restrictions: 1500ml fluid restriction, strict I/O, elevate heels, 2L O2 per nc, up with assist  Vision/Hearing  Vision: Within Functional Limits (Pt denies any recent vision changes)  Hearing: Exceptions to [facility]  Hearing Exceptions: Bilateral hearing aid;Hard of hearing/hearing concerns     Subjective  General  Patient assessed for rehabilitation services?: Yes  Response To Previous Treatment: Not applicable  Family / Caregiver Present: No  Follows Commands: Within Functional Limits  General Comment  Comments: RN and pt agreeable to therapy. Pt supine in bed upon arrival.  Pt lethargic but cooperative throughout. Subjective  Subjective: Pt reporting \"feeling a little better today. \"          Orientation  Orientation  Overall Orientation Status: Impaired  Orientation Level: Disoriented to place;Oriented to time;Oriented to person;Disoriented to situation  Social/Functional History  Social/Functional History  Type of Home: Facility ([address] [place] [address])  Home Layout: One level  Home Access: Level entry  Bathroom Shower/Tub: Walk-in shower  Bathroom Toilet: Handicap height  Bathroom Equipment: Shower chair,Grab bars around toilet,Grab bars in shower  Home Equipment: Rolling walker,Wheelchair-manual,Oxygen (Oxygen AAT 2L)  Receives Help From:  (Pt reports she gets PT 4 days a week)  ADL Assistance: Needs assistance (Pt reports staff assist with all ADLs)  Homemaking Assistance: Needs assistance (Pt reports staff does all IADLs)  Ambulation Assistance: Needs assistance (uses RW)  Transfer Assistance: Needs assistance  Active : No  Occupation: Retired  Type of occupation:  at [employer] in [address] [place] [address] Rd: Watching TV  Additional Comments: Pt reports she has been at [employer] about 15 years. pt denies any recent falls. Cognition   Cognition  Overall Cognitive Status: Exceptions  Arousal/Alertness: Delayed responses to stimuli  Following Commands: Follows one step commands with repetition; Follows one step commands with increased time  Attention Span: Appears intact  Memory: Decreased short term memory;Decreased recall of recent events  Safety Judgement: Decreased awareness of need for safety;Decreased awareness of need for assistance  Problem Solving: Decreased awareness of errors;Assistance required to identify errors made;Assistance required to generate solutions;Assistance required to implement solutions;Assistance required to correct errors made  Insights: Not aware of deficits  Initiation: Requires cues for some  Sequencing: Requires cues for some    Objective     Observation/Palpation  Posture: Fair  Observation: ace wrapping on BLE from knee down    AROM RLE (degrees)  RLE AROM: WFL  RLE General AROM: Except ankle DF only to neutral  AROM LLE (degrees)  LLE AROM : WFL  LLE General AROM: Except ankle DF only to neutral  AROM RUE (degrees)  RUE General AROM: See OT assessment for detail  AROM LUE (degrees)  LUE General AROM: See OT assessment for detail  Strength RLE  Strength RLE: Exception  Comment: Grossly 3-/5  Strength LLE  Strength LLE: Exception  Comment: Grossly 3-/5  Strength RUE  Comment: See OT assessment for detail  Strength LUE  Comment: See OT assessment for detail  Tone RLE  RLE Tone: Normotonic  Tone LLE  LLE Tone: Normotonic  Motor Control  Gross Motor?: WFL  Sensation  Overall Sensation Status: WFL (Pt denies numbness/tingling)  Bed mobility  Supine to Sit: Maximum assistance;2 Person assistance  Sit to Supine: Maximum assistance;2 Person assistance  Scooting: Maximal assistance;2 Person assistance  Comment: Pt w/ significant difficulty throughout bed mobility this date requiring max verbal and tactile cueing for initiation, progression, and sequencing of BLE and trunk throughout w/ poor return demo. Upon sitting EOB, pt requiring CGA-ModA from staff to maintain sitting balance throughout. Assist required for safe line mgmt throughout. Transfers  Sit to Stand: Maximum Assistance;2 Person Assistance  Stand to sit: Maximum Assistance;2 Person Assistance  Comment: Pt performed 2 STS transfers this date from EOB demonstrating poor steadiness throughout. Pt requiring max verbal and tactile cueing for proper hand placement throughout transfers w/ RW w/ poor return demo. Upon standing, pt requiring assist from staff for placement of B hands on RW. Pt w/ significant posterior lean upon standing requiring MaxA x 1-2 to maintain standing balance. Pt demonstrating poor eccentric quad control throughout stand>sit transfers.   Ambulation  Ambulation?: Yes  More Ambulation?: No  Ambulation 1  Surface: level tile  Device: Rolling Walker  Other Apparatus: O2 (2L O2 NC)  Assistance: Maximum assistance;2 Person assistance  Quality of Gait: poor steadiness, max verbal cueing for progression of RW & BLE throughout, short shuffling steps  Gait Deviations: Slow Vicenta;Decreased step length;Decreased step height;Shuffles  Distance: 6 steps laterally along EOB  Comments: Pt w/ poor steadiness throughout ambulation this date requiring MAX verbal and tactile cueing for progression of RW and BLE throughout w/ fair return demo. Pt w/ forward flexed trunk and forward head posture throughout. Stairs/Curb  Stairs?: No     Balance  Posture: Poor  Sitting - Static: Fair;+  Sitting - Dynamic: Fair;-  Standing - Static: Poor;+  Standing - Dynamic: Poor  Comments: Standing balance assessed w/ RW        Plan   Plan  Times per week: 1-2x/day, 5-6x/week  Current Treatment Recommendations: Strengthening,ROM,Balance Training,Functional Mobility Training,Gait Training,Transfer Training,Endurance Training,Neuromuscular Re-education,Safety Education & Training,Home Exercise Program,Equipment Evaluation, Education, & procurement,Patient/Caregiver Education & Training  Safety Devices  Type of devices: Bed alarm in place,Call light within reach,Gait belt,Nurse notified,Left in bed  Restraints  Initially in place: No    AM-PAC Score  AM-PAC Inpatient Mobility Raw Score : 11 (01/27/22 1320)  AM-PAC Inpatient T-Scale Score : 33.86 (01/27/22 1320)  Mobility Inpatient CMS 0-100% Score: 72.57 (01/27/22 1320)  Mobility Inpatient CMS G-Code Modifier : CL (01/27/22 1320)       Functional Outcome Measure-   Single Leg Stance Test:  0 sec. (<5 sec.= fall risk)    Goals  Short term goals  Time Frame for Short term goals: 12 visits  Short term goal 1: Pt to demonstrate bed mobility ModA  Short term goal 2: Pt to perform STS transfers w/ RW ModA  Short term goal 3: Pt to ambulate at least 50ft w/ RW ModA  Short term goal 4: Pt to actively participate in at least 30 minutes of physical therapy for ther act, ther ex, balance, gait, and endurance training  Patient Goals   Patient goals :  To feel better, to get stronger       Therapy Time   Individual Concurrent Group Co-treatment   Time In 1005         Time Out 1046         Minutes 41           Treatment time: 30 minutes       Co-treatment with OT warranted secondary to decreased safety and independence requiring 2 skilled therapy professionals to address individual discipline's goals.      Dylon Labrum, PT

## 2022-01-27 NOTE — CARE COORDINATION
Social work:  Spoke with [employer], patient is a paid bed hold and able to return at discharge.    # for facility: [phone]  Fax: [phone]

## 2022-01-28 NOTE — PROGRESS NOTES
Physical Therapy  Facility/Department: [facility]  Daily Treatment Note  NAME: Estella Wood  : 1950  MRN: [id number]    Date of Service: 2022   CORNEL Campbell reports patient is medically stable for therapy treatment this date. Chart reviewed prior to treatment and patient is agreeable for therapy. All lines intact and patient positioned comfortably at end of treatment. All patient needs addressed prior to ending therapy session. Discharge Recommendations:  Pt currently functioning below baseline. Would suggest additional therapy at time of discharge to maximize long term outcomes and prevent re-admission. Please refer to AM-PAC score for current level of function. Patient would benefit from continued therapy after discharge      Assessment   Body structures, Functions, Activity limitations: Decreased functional mobility ; Decreased strength;Decreased safe awareness;Decreased balance;Decreased endurance;Decreased cognition;Decreased posture  Assessment: Pt tolerated session fair. Pt demonstrating mild improvements in activity tolerance thi sdate. Pt continuing to demonstrate poor steadiness thorughout mobility and ambulation w/ MaxA x 2. Pt is a HIGH fall risk and would benefit from continued skilled physical therapy to address these deficits in order to return to [facility]. Prognosis: Good  Decision Making: Medium Complexity  PT Education: Goals;PT Role;Plan of Care;General Safety;Transfer Training;Gait Training;Functional Mobility Training  Patient Education: Pt educated on: importance of continued mobility throughout admission, general safety awareness, pressure relief, and PT POC. No evidence of learning. Pt requires continued reinforcement of education. REQUIRES PT FOLLOW UP: Yes  Activity Tolerance  Activity Tolerance: Patient limited by endurance; Patient limited by fatigue     Patient Diagnosis(es): There were no encounter diagnoses.      has a past medical history of Acute anemia, Acute cystitis, Acute kidney injury ([address] Acute kidney injury superimposed on CKD ([address] Acute on chronic diastolic heart failure ([address] Acute renal failure (ARF) ([address] Anasarca, Anxiety and depression, AV block, 1st degree, Background diabetic retinopathy(362.01), Balance problem, BMI 45.0-49.9, adult ([address] Bradycardia, Buttock wound, CAD (coronary artery disease), Cancer of uterus ([address] Chronic diastolic heart failure (HCC), Chronic kidney disease, Chronic low back pain, Controlled type 2 diabetes mellitus with chronic 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 dialysis, with long-term current use of insulin ([address] CVA (cerebral vascular accident) ([address] Decompensated heart failure ([address] Decubitus ulcer of trochanteric region of right hip, stage 2 ([address] Dementia ([address] Dry eye syndrome, End stage renal disease on dialysis ([address] GERD (gastroesophageal reflux disease), Glaucoma suspect, Gout, Hemodialysis patient ([address] Homonymous hemianopsia, Hyperkalemia, Hyperlipidemia, Hypertension, Infestation by bed bug, Infestation by maggots, Keratitis, Lymphedema of both lower extremities, Morbid obesity ([address] MRSA bacteremia, Obesity, PAD (peripheral artery disease) ([address] Peripheral polyneuropathy, Pressure injury of right heel, unstageable ([address] Pseudophakos, Stroke ([address] Trichiasis, Type 2 diabetes mellitus ([address] and Wounds, multiple.   has a past surgical history that includes Gastric bypass surgery; Cataract removal with implant (2012); Cataract removal with implant (2012); Coronary artery bypass graft (12-);  section; Hysterectomy; Cholecystectomy; Tonsillectomy and adenoidectomy; Eye surgery (Left); Upper gastrointestinal endoscopy (2020); and IR TUNNELED CVC PLACE WO SQ PORT/PUMP > 5 YEARS (2021).     Restrictions  Restrictions/Precautions  Restrictions/Precautions: General Precautions,Fall Risk,Contact Precautions  Required Braces or Orthoses?: No  Position Activity Restriction  Other position/activity restrictions: 1500ml fluid restriction, strict I/O, elevate heels, 2L O2 per nc, up with assist  Subjective   General  Response To Previous Treatment: Patient with no complaints from previous session. Family / Caregiver Present: No  Subjective  Subjective: \"I'm okay. \"  General Comment  Comments: RN and pt agreeable to therapy. Pt supine in bed upon arrival.  Pt lethargic but cooperative throughout. Orientation  Orientation  Overall Orientation Status: Impaired  Orientation Level: Disoriented to situation;Disoriented to time;Disoriented to place;Oriented to person  Cognition   Cognition  Overall Cognitive Status: Exceptions  Arousal/Alertness: Delayed responses to stimuli  Following Commands: Follows one step commands with repetition; Follows one step commands with increased time  Attention Span: Attends with cues to redirect  Memory: Decreased short term memory;Decreased recall of recent events  Safety Judgement: Decreased awareness of need for safety;Decreased awareness of need for assistance  Problem Solving: Decreased awareness of errors;Assistance required to identify errors made;Assistance required to generate solutions;Assistance required to implement solutions;Assistance required to correct errors made  Insights: Not aware of deficits  Initiation: Requires cues for some  Sequencing: Requires cues for some  Objective   Bed mobility  Rolling to Left: Maximum assistance  Rolling to Right: Maximum assistance  Supine to Sit: Maximum assistance;2 Person assistance  Sit to Supine: Maximum assistance;2 Person assistance  Scooting: Maximal assistance;2 Person assistance  Comment: Pt rolled several times at beginning of session this date in order to perform pericare and don/doff new brief. Pt w/ significant difficulty throughout bed mobility this date requiring MAX verbal and tactile cueing for initiation, progression, and sequencing of BLE and trunk throughout w/ poor return demo.   Pt sat EOB ~15 minutes this date requiring Nora from staff to maintain upright posture throughout. Transfers  Sit to Stand: Maximum Assistance;2 Person Assistance  Stand to sit: Maximum Assistance;2 Person Assistance  Comment: Pt w/ significant difficulty throughout transfers this date requiring max verbal and tactile cueing for proper hand placement throughout transfers w/ RW w/ poor return demo. Upon standing, pt requiring ModA-MaxA x 1-2 to maintain standing balance w/ RW. Pt w/ fair eccentric quad control throughout stand>sit transfers this date. Ambulation  Ambulation?: Yes  More Ambulation?: No  Ambulation 1  Surface: level tile  Device: Rolling Walker  Other Apparatus: O2  Assistance: Maximum assistance;2 Person assistance  Quality of Gait: poor steadiness, max verbal and tactile cueing for progression of RW & BLE throughout, short shuffling steps  Gait Deviations: Slow Vicenta;Decreased step length;Decreased step height;Shuffles  Distance: 3 steps laterally along EOB  Comments: Pt w/ poor steadiness throughout ambulation this date and requiring MAX verbal and tactile cueing for progression of RW and BLE throughout w/ POOR return demo. Pt ambulating w/ short, shuffling steps throughout. Stairs/Curb  Stairs?: No     Balance  Posture: Poor  Sitting - Static: Fair;+  Sitting - Dynamic: Fair;-  Standing - Static: Poor  Standing - Dynamic: Poor  Comments: Standing balance assessed w/ RW  Exercises  Heelslides: x 10  Hip Flexion: x 5  Knee Long Arc Quad: x 10  Ankle Pumps: x 15  Comments: Pt requiring max verbal and tactile cueing to maintain attention to task this date and for proper technique w/ fair return demo. Comment: Assisted w/ pericare following BM, donning/doffing new brief, replacing linens.         AM-PAC Score  AM-PAC Inpatient Mobility Raw Score : 11 (01/28/22 1301)  AM-PAC Inpatient T-Scale Score : 33.86 (01/28/22 1301)  Mobility Inpatient CMS 0-100% Score: 72.57 (01/28/22 1301)  Mobility Inpatient CMS G-Code Modifier : CL (01/28/22 9801)       Functional Outcome Measure-   Single Leg Stance Test:  0 sec. (<5 sec.= fall risk)    Goals  Short term goals  Time Frame for Short term goals: 12 visits  Short term goal 1: Pt to demonstrate bed mobility ModA  Short term goal 2: Pt to perform STS transfers w/ RW ModA  Short term goal 3: Pt to ambulate at least 50ft w/ RW ModA  Short term goal 4: Pt to actively participate in at least 30 minutes of physical therapy for ther act, ther ex, balance, gait, and endurance training  Patient Goals   Patient goals :  To feel better, to get stronger    Plan    Plan  Times per week: 1-2x/day, 5-6x/week  Current Treatment Recommendations: Strengthening,ROM,Balance Training,Functional Mobility Training,Gait Training,Transfer Training,Endurance Training,Neuromuscular Re-education,Safety Education & Training,Home Exercise Program,Equipment Evaluation, Education, & procurement,Patient/Caregiver Education & Training  Safety Devices  Type of devices: Bed alarm in place,Call light within reach,Gait belt,Nurse notified,Left in bed  Restraints  Initially in place: No     Therapy Time   Individual Concurrent Group Co-treatment   Time In 1057         Time Out 1139         Minutes 815 [facility], PT

## 2022-01-28 NOTE — PROGRESS NOTES
Nephrology ESRD Progress Note         HD Unit:        [facility] [place]    Dry Weight:       83.5 kg     Chief Complaint: Shortness of breath    History of Present Illness: This is a 80 y.o. female with end stage renal disease on hemodialysis [facility] who presents to the hospital for evaluation of increasing shortness of breath  V    At one time, her dry weight used to be somewhere around 92 kg, over the last several weeks they have been able to drop her dry weight down to 83.5 kg. She does have significant pulmonary hypertension history and chronic edema and anasarca-like picture. She is typically followed by Dr. Brielle David in [address] [place] [address] renal dialysis unit. She has an AV fistula left which we are not cannulating just yet. She is got a PermCath in place. Patient presented to the emergency room at [facility] yesterday with symptoms as described above. She was noted to have cardiomegaly and bilateral pleural effusions with lower lobe atelectasis. She was transferred over to us for additional management of above. Pulmonary consultants have seen her today. There is a potential she might require or benefit from thoracentesis. Cardiac echo from November 2021 shows ejection fraction of around 45 to 50%. There was some evidence of diastolic dysfunction right ventricle dilatation was noted. Moderate to severe TR was noted with pulmonary hypertension. Patient denies any nausea, vomiting, fever, chills, hemoptysis. His outpatient history and dialysis orders, usual dry wt  and out pt dialysis run sheets were reviewed.      Past Medical History:        Diagnosis Date    Acute anemia     Acute cystitis 8/8/2020    Acute kidney injury ([address] 8/19/2020    Acute kidney injury superimposed on CKD ([address]     Acute on chronic diastolic heart failure ([address] 8/7/2020    Acute renal failure (ARF) ([address] 12/11/2020    Anasarca 12/12/2020    Anxiety and depression     AV block, 1st degree 8/19/2020    Background diabetic retinopathy(362.01) 2008    (Mild)    Balance problem     BMI 45.0-49.9, adult ([address] 3/1/2021    Bradycardia 8/19/2020    Buttock wound 8/20/2020    CAD (coronary artery disease)     (with coronary artery bypass graft x4).  Cancer of uterus [facility])     history of, (probable cure)    Chronic diastolic heart failure ([address] 3/1/2021    Chronic kidney disease     Chronic low back pain     Controlled type 2 diabetes mellitus with chronic 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 dialysis, with long-term current use of insulin ([address]     CVA (cerebral vascular accident) ([address]     Decompensated heart failure ([address] 12/4/2020    Decubitus ulcer of trochanteric region of right hip, stage 2 ([address] 8/23/2020    Dementia ([address]     (moderate)    Dry eye syndrome     End stage renal disease on dialysis ([address] 3/1/2021    GERD (gastroesophageal reflux disease)     Glaucoma suspect 2005    Gout     Hemodialysis patient ([address] 3/1/2021    Homonymous hemianopsia 2008    (Right)    Hyperkalemia 8/19/2020    Hyperlipidemia     Hypertension     Infestation by bed bug 8/8/2020    Infestation by maggots 8/8/2020    Keratitis     (secondary to dry eye syndrome).  Lymphedema of both lower extremities 8/8/2020    Morbid obesity ([address] 3/1/2021    MRSA bacteremia 12/18/2020    Obesity     PAD (peripheral artery disease) (HCC)     Peripheral polyneuropathy     (diabetic)    Pressure injury of right heel, unstageable ([address] 8/18/2020    Pseudophakos     (Right Eye: 05-; Left Eye: 04-) - Dr. Osman Velarde.  Stroke [facility])     (Multiple) MRI of brain 10/2008 shows multiple infarcts involving the temporal and parietal areas.     Trichiasis 2010    (left eye)    Type 2 diabetes mellitus ([address]     Wounds, multiple 8/20/2020       Access:  previous permacath    Past Surgical History:        Procedure Laterality Date    CATARACT REMOVAL WITH IMPLANT  05-    (Right eye) -  Enedelia.  CATARACT REMOVAL WITH IMPLANT  2012    (Left eye) - Dr. Bria Mendes.   SECTION      CHOLECYSTECTOMY      CORONARY ARTERY BYPASS GRAFT  12-    (x4) - LIMA-LAD, SVG-diagnoal 1, SVG-OM1, and SVG-PDA. Exploration of left radial. (Dr. Vicenta Hernandez).  EYE SURGERY Left     as a child     GASTRIC BYPASS SURGERY      HYSTERECTOMY      (abdominal)  with left oophorectomy. Right ovary retained.  IR TUNNELED CATHETER PLACEMENT GREATER THAN 5 YEARS  2021    IR TUNNELED CATHETER PLACEMENT GREATER THAN 5 YEARS 2021 Nazanin Elizabeth MD [facility] SPECIAL PROCEDURES    TONSILLECTOMY AND ADENOIDECTOMY      UPPER GASTROINTESTINAL ENDOSCOPY  2020    EGD BIOPSY performed by Dinesh Beltre MD at [facility]       Outpatient Medications:     Medications Prior to Admission: acetaminophen (TYLENOL) 325 MG tablet, Take 650 mg by mouth every 4 hours as needed for Pain or Fever  acetaminophen (TYLENOL) 325 MG tablet, Take 650 mg by mouth 3 times daily  loratadine (CLARITIN) 10 MG tablet, Take 10 mg by mouth every other day  midodrine (PROAMATINE) 10 MG tablet, Take 10 mg by mouth as needed (hypotension mid-dialysis)  nystatin (MYCOSTATIN) 366362 UNIT/GM cream, Apply topically 2 times daily as needed (redness)  senna (SENOKOT) 8.6 MG tablet, Take 1 tablet by mouth 2 times daily  nitroGLYCERIN (NITROSTAT) 0.4 MG SL tablet, up to max of 3 total doses. If no relief after 1 dose, call 911.   insulin lispro (HUMALOG) 100 UNIT/ML injection vial, Inject 0-6 Units into the skin 3 times daily (with meals) Glucose: Dose:              No Insulin 140-199 1 Unit 200-249 2 Units 250-299 3 Units 300-349 4 Units 350-399 5 Units Over 399 6 Units  carvedilol (COREG) 6.25 MG tablet, Take 1 tablet by mouth 2 times daily (with meals)  midodrine (PROAMATINE) 10 MG tablet, Take 10 mg by mouth three times a week Indications: on dialysis days /Thur/Sat   sodium chloride (OCEAN) 0.65 % nasal spray, 2 sprays by Nasal route 2 times daily  lidocaine-prilocaine (EMLA) 2.5-2.5 % cream, Apply topically three times a week On dialysis days Tu-Th-Sa  amLODIPine (NORVASC) 10 MG tablet, Take 1 tablet by mouth daily  atorvastatin (LIPITOR) 40 MG tablet, TAKE 1 TABLET BY MOUTH EVERYDAY  Cholecalciferol 125 MCG (5000 UT) CHEW, Take 1 tablet by mouth daily  citalopram (CELEXA) 20 MG tablet, Take 1 tablet by mouth daily  hydrALAZINE (APRESOLINE) 100 MG tablet, Take 1 tablet by mouth 3 times daily  oxybutynin (DITROPAN) 5 MG tablet, TAKE 1 TABLET BY MOUTH TWICE DAILY  pantoprazole (PROTONIX) 40 MG tablet, Take 1 tablet by mouth 2 times daily (before meals)  rivastigmine (EXELON) 9.5 MG/24HR, APPLY 1 NEW PATCH EVERY 24 HOURS  aspirin 81 MG tablet, Take 1 tablet by mouth daily  [DISCONTINUED] insulin lispro (HUMALOG) 100 UNIT/ML injection vial, Inject 0-3 Units into the skin nightly Glucose: Dose:              No Insulin 140-249 1 Unit 250-349 2 Units Over 350 3 Units  [DISCONTINUED] insulin glargine (LANTUS) 100 UNIT/ML injection vial, Inject 10 Units into the skin nightly  [DISCONTINUED] nystatin (MYCOSTATIN) 709396 UNIT/GM cream, Apply topically 2 times daily. (Patient taking differently: as needed Apply topically 2 times daily.)  [DISCONTINUED] OXYGEN, Oxgen at 2 liters per nasal cannula  [DISCONTINUED] loratadine (CLARITIN) 10 MG tablet, TAKE 1 TABLET(10 MG) BY MOUTH DAILY (Patient taking differently: every other day TAKE 1 TABLET(10 MG) BY MOUTH DAILY)  Alcohol Swabs (ALCOHOL PREP) PADS, Checks blood sugar 3 times a day  [DISCONTINUED] Lancets MISC, Checks blood sugar ac and HS  [DISCONTINUED] blood glucose monitor strips, Test 3  times a day & as needed for symptoms of irregular blood glucose. Dispense sufficient amount for indicated testing frequency plus additional to accommodate PRN testing needs. [DISCONTINUED] Misc.  Devices MISC, Standard walker with wheels  Diagnosis dementia, CHF  [DISCONTINUED] Diabetic Shoe MISC, by Does not apply route  [DISCONTINUED] Compression Stockings MISC, by Does not apply route 20-30 mm Hg bilateral knee high    Current Medications:    polyethylene glycol (GLYCOLAX) packet 17 g, BID  senna (SENOKOT) tablet 8.6 mg, Nightly  docusate sodium (COLACE) capsule 100 mg, TID  anticoagulant sodium citrate 4 % injection 1.9 mL, PRN  anticoagulant sodium citrate 4 % injection 1.9 mL, PRN  midodrine (PROAMATINE) tablet 10 mg, PRN  [Held by provider] amLODIPine (NORVASC) tablet 10 mg, Daily  aspirin EC tablet 81 mg, Daily  atorvastatin (LIPITOR) tablet 40 mg, Nightly  [Held by provider] carvedilol (COREG) tablet 6.25 mg, BID WC  vitamin D3 (CHOLECALCIFEROL) tablet 5,000 Units, Daily  citalopram (CELEXA) tablet 20 mg, Daily  [Held by provider] insulin glargine (LANTUS) injection vial 10 Units, Nightly  insulin lispro (HUMALOG) injection vial 0-12 Units, TID WC  insulin lispro (HUMALOG) injection vial 0-6 Units, Nightly  cetirizine (ZYRTEC) tablet 10 mg, Daily  midodrine (PROAMATINE) tablet 10 mg, Once per day on Mon Wed Fri  nitroGLYCERIN (NITROSTAT) SL tablet 0.4 mg, Q5 Min PRN  [Held by provider] oxybutynin (DITROPAN) tablet 5 mg, BID  pantoprazole (PROTONIX) tablet 40 mg, BID AC  rivastigmine (EXELON) 9.5 MG/24HR 1 patch, Daily  sodium chloride (OCEAN, BABY AYR) 0.65 % nasal spray 2 spray, BID  sodium chloride flush 0.9 % injection 5-40 mL, 2 times per day  sodium chloride flush 0.9 % injection 10 mL, PRN  0.9 % sodium chloride infusion, PRN  ondansetron (ZOFRAN-ODT) disintegrating tablet 4 mg, Q8H PRN   Or  ondansetron (ZOFRAN) injection 4 mg, Q6H PRN  acetaminophen (TYLENOL) tablet 650 mg, Q6H PRN   Or  acetaminophen (TYLENOL) suppository 650 mg, Q6H PRN  heparin (porcine) injection 5,000 Units, 3 times per day        Allergies:  Amoxicillin-pot clavulanate, Sulfamethoxazole-trimethoprim, Amoxicillin, Clavulanic acid, Clonidine derivatives, Sulfamethoxazole, Trimethoprim, and Adhesive tape    Social History:    Social History     Socioeconomic History    Marital status:      Spouse name: Not on file    Number of children: Not on file    Years of education: Not on file    Highest education level: Not on file   Occupational History    Not on file   Tobacco Use    Smoking status: Never Smoker    Smokeless tobacco: Never Used    Tobacco comment: never smoker wildab rrt 7/20/17   Vaping Use    Vaping Use: Unknown   Substance and Sexual Activity    Alcohol use: No    Drug use: No    Sexual activity: Not on file   Other Topics Concern    Not on file   Social History Narrative    Not on file     Social Determinants of Health     Financial Resource Strain:     Difficulty of Paying Living Expenses: Not on file   Food Insecurity:     Worried About Running Out of Food in the Last Year: Not on file    Rosanne of Food in the Last Year: Not on file   Transportation Needs:     Lack of Transportation (Medical): Not on file    Lack of Transportation (Non-Medical):  Not on file   Physical Activity:     Days of Exercise per Week: Not on file    Minutes of Exercise per Session: Not on file   Stress:     Feeling of Stress : Not on file   Social Connections:     Frequency of Communication with Friends and Family: Not on file    Frequency of Social Gatherings with Friends and Family: Not on file    Attends Sabianism Services: Not on file    Active Member of [address] [place] [address] Managed Systems or Organizations: Not on file    Attends Club or Organization Meetings: Not on file    Marital Status: Not on file   Intimate Partner Violence:     Fear of Current or Ex-Partner: Not on file    Emotionally Abused: Not on file    Physically Abused: Not on file    Sexually Abused: Not on file   Housing Stability:     Unable to Pay for Housing in the Last Year: Not on file    Number of [place] in the Last Year: Not on file    Unstable Housing in the Last Year: Not on file       Family History:   Family History   Problem Relation Age of Onset    Diabetes Mother     Cancer Mother     High Blood Pressure Mother    Yovany Salgado Stroke Mother     Kidney Disease Mother     Uterine Cancer Mother     Diabetes Father     Heart Disease Father     Glaucoma Father     Emphysema Father     Coronary Art Dis Other         All 4 siblings.  Stroke Other         1 sibling.  Lung Cancer Other         1 sibling - cause of death lung cancer.  Mental Retardation Other        Review of Systems:    Constitutional: No fever, no chills, + lethargy, + weakness. HEENT:  No headache, otalgia, itchy eyes, nasal discharge or sore throat. Cardiac:  No chest pain, + shortness of breath /dyspnea, she denied PND PND. Chest:              No cough, phlegm or wheezing. Abdomen:  No abdominal pain, nausea or vomiting. Neuro:  No focal weakness, abnormal movements orseizure like activity. Skin:   No rashes, no itching. :   No hematuria, no pyuria, no dysuria, no flank pain. Extremities:  + swelling or joint pains. ROS was otherwise negative except as mentioned in the [address] Objective:  Constitutional:    CURRENT TEMPERATURE:  Temp: 97.3 °F (36.3 °C)  MAXIMUM TEMPERATURE OVER 24HRS:  Temp (24hrs), Av.4 °F (36.3 °C), Min:97.3 °F (36.3 °C), Max:97.5 °F (36.4 °C)    CURRENT RESPIRATORY RATE:  Resp: 18  CURRENT PULSE:  Pulse: 67  CURRENT BLOOD PRESSURE:  BP: (!) 115/42  24HR BLOOD PRESSURE RANGE:  Systolic (04CWV), DPW:241 , Min:91 , QAE:396   ; Diastolic (95KCZ), OHH:03, Min:42, Max:72    24HR INTAKE/OUTPUT:      Intake/Output Summary (Last 24 hours) at 2022 1044  Last data filed at 2022 0607  Gross per 24 hour   Intake    Output 65 ml   Net -65 ml         Physical Exam:  AAO x 3,          speaking in full sentences, no accessory muscle use. HEENT: Atraumatic, normocephalic, no throat congestion, moist mucosa. Eyes:   Pupils equal, round and reactive to light, EOMI.   Neck:   + JVD, midline trachea, no accessory muscle use   chest: Diminished breath sounds at both bases consistent with effusions, basal crackles right greater than left. Cardiac:  S1 S2 irregular, + murmurs, gallops or rubs . Abdomen: Soft, non-tender, no masses or organomegaly, BS audible. :   No suprapubic or flank tenderness. Neuro:  AAO x 3, No FND. SKIN:  No rashes, good skin turgor. Extremities:  2+ pitting lower extremity edema, no calf tenderness    Labs:    CBC:   Recent Labs     01/27/22  0551   WBC 7.3   RBC 3.37*   HGB 10.7*   HCT 33.8*   .3   MCH 31.8   MCHC 31.7   RDW 18.6*   *   MPV 10.5      BMP:   Recent Labs     01/26/22  0945 01/27/22  0551   * 131*   K 3.9 4.3   CL 97* 96*   CO2 24 25   BUN 28* 33*   CREATININE 2.87* 3.28*   GLUCOSE 88 92   CALCIUM 8.9 8.8        Albumin:   Recent Labs     01/27/22  0551   LABALBU 3.0*     Assessment:  1. ESRD: On  Hemodialysis. patient's regular HD days are [facility]          Admitted with increasing shortness of breath, radiological findings of bilateral pleural effusions as well as basilar atelectasis and vascular congestion. 2. HTN: Typically runs low blood pressures. She is on ProAmatine as needed on dialysis if her blood pressure should drop. 3.  Urinary hypertension  4. Secondary hyperparathyroidism:  5. Anemia of chronic disease :  6.  Hyponatremia: Dilutional    Plan:  1. HD as per schedule. Follow renal labs   2. Strict Input and Output, Daily weigh   3. Low Potassium, Low phosphorus and low salt diet. Fluids  restricted to 1500ml/day. 4. IV Aranesp/Epogen for anemia of chronic disease with HD weekly. 5. IV Zemplar per protocol for secondary hyperparathyroidism   6. Hemodialysis next scheduled for Saturday, 01/29/2022 keep the patient on her usual schedule will attempt to take 2 to 3 kg off as blood pressure would allow when tolerated. Will use ProAmatine with dialysis. Patient is to have chest x-ray tomorrow.   Decision regarding thoracentesis to be formulated by pulmonary after looking at the x-rays tomorrow. 7.  Following with you    Thank you for the consultation. Please do not hesitate to call with questions.     Electronically signed by Jay Pulliam MD on 1/28/2022 at 10:44 AM

## 2022-01-28 NOTE — PROGRESS NOTES
Patient to IR for left thoracentesis. Upon evaluation with ultrasound, /. Priscilla Crespo determined there is not enough fluid present to safely perform thoracentesis at this time. Patient returned to room, nurse updated.

## 2022-01-28 NOTE — PROGRESS NOTES
Pulmonary Critical Care Progress Note  SHIRA Moreno-TASHA/Fitz Yeboah MD     Patient seen for the follow up of  Bilateral pleural effusion     Subjective:  No significant overnight events noted. She is resting comfortably in bed, no distress. She had dialysis yesterday. She feels shortness of breath is about the same. She denies chest pain. She is occasional cough, mostly dry she tolerating orals. Examination:  Vitals: BP (!) 115/42   Pulse 67   Temp 97.3 °F (36.3 °C) (Oral)   Resp 18   Ht 5' 4\" (1.626 m)   Wt 176 lb 14.4 oz (80.2 kg)   SpO2 100%   BMI 30.36 kg/m²   General appearance: alert and cooperative with exam, resting comfortably in bed  Neck: No JVD  Lungs: Decreased air exchange and crackles on the left, moderate air exchange on the right  Heart: regular rate and rhythm, S1, S2 normal, no gallop  Abdomen: Soft, non tender, + BS  Extremities: no cyanosis or clubbing. Bilateral lower extremities and Ace wraps.   LABs:  CBC:   Recent Labs     01/27/22  0551   WBC 7.3   HGB 10.7*   HCT 33.8*   *     BMP:   Recent Labs     01/26/22  0945 01/27/22  0551   * 131*   K 3.9 4.3   CO2 24 25   BUN 28* 33*   CREATININE 2.87* 3.28*   LABGLOM 16* 14*   GLUCOSE 88 92     PT/INR:   Recent Labs     01/27/22  0551   PROTIME 15.4*   INR 1.2     LIVER PROFILE:  Recent Labs     01/27/22  0551   AST 29   ALT 13   LABALBU 3.0*       Radiology:  X-ray chest 1/28/2022      Impression:  · Chronic hypoxic respiratory failure  · Bilateral pleural effusions/atelectasis  · Mild pulmonary edema/heart failure  · ESRD on hemodialysis  · Elevated troponin  · DM, CAD, CVA, dementia, HLD, HTN, PAD    Recommendations:  · Oxygen via nasal cannula  · Incentive spirometry every hour while awake  · Cardiology on consult  · Hemodialysis per nephrology  · Consult IR to evaluate for left thoracentesis  · Follow-up x-ray chest, if still significant left-sided pleural effusion will consider thoracentesis  · X-ray chest in am  · Labs: CBC and BMP in am  · GI prophylaxis, Protonix  · DVT prophylaxis withSQ heparin  · Discussed with RN   · Above assessment and plan will be reviewed with Dr. Neeraj Dodd will be finalized following review by Dr. Eric Miller.   · Will follow with you    SHIRA Patterson-CNP   Pulmonary Critical Care and Sleep Medicine,  Patient seen under the supervision of Nara Clark MD, [facility]

## 2022-01-28 NOTE — PROGRESS NOTES
superimposed on CKD ([address] Acute on chronic diastolic heart failure (HCC), Acute renal failure (ARF) ([facility]), Anasarca, Anxiety and depression, AV block, 1st degree, Background diabetic retinopathy(362.01), Balance problem, BMI 45.0-49.9, adult ([facility]), Bradycardia, Buttock wound, CAD (coronary artery disease), Cancer of uterus ([facility]), Chronic diastolic heart failure ([facility]), Chronic kidney disease, Chronic low back pain, Controlled type 2 diabetes mellitus with chronic 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 dialysis, with long-term current use of insulin ([address] CVA (cerebral vascular accident) ([address] Decompensated heart failure ([address] Decubitus ulcer of trochanteric region of right hip, stage 2 ([address] Dementia ([address] Dry eye syndrome, End stage renal disease on dialysis ([address] GERD (gastroesophageal reflux disease), Glaucoma suspect, Gout, Hemodialysis patient ([address] Homonymous hemianopsia, Hyperkalemia, Hyperlipidemia, Hypertension, Infestation by bed bug, Infestation by maggots, Keratitis, Lymphedema of both lower extremities, Morbid obesity ([address] MRSA bacteremia, Obesity, PAD (peripheral artery disease) ([address] Peripheral polyneuropathy, Pressure injury of right heel, unstageable ([address] Pseudophakos, Stroke ([address] Trichiasis, Type 2 diabetes mellitus ([address] and Wounds, multiple.   has a past surgical history that includes Gastric bypass surgery; Cataract removal with implant (2012); Cataract removal with implant (2012); Coronary artery bypass graft (12-);  section; Hysterectomy; Cholecystectomy; Tonsillectomy and adenoidectomy; Eye surgery (Left); Upper gastrointestinal endoscopy (2020); and IR TUNNELED CVC PLACE WO SQ PORT/PUMP > 5 YEARS (2021).     Restrictions  Restrictions/Precautions  Restrictions/Precautions: General Precautions,Fall Risk,Contact Precautions  Required Braces or Orthoses?: No  Position Activity Restriction  Other position/activity restrictions: 1500ml fluid restriction, strict I/O, elevate heels, 2L O2 per nc, up with assist  Subjective   General  Chart Reviewed: Yes  Patient assessed for rehabilitation services?: Yes  Additional Pertinent Hx: Pt. agreeable for treatment  Response to previous treatment: Patient with no complaints from previous session  Family / Caregiver Present: No  Subjective  Subjective: \"Ok I will try\"  General Comment  Comments: Pt. cooperative with treatment      Orientation  Orientation  Overall Orientation Status: Impaired  Orientation Level: Disoriented to place;Oriented to time;Oriented to person;Disoriented to situation  Objective    ADL  Grooming: Moderate assistance;Setup  UE Bathing: Moderate assistance;Setup  LE Bathing: Dependent/Total  UE Dressing: Moderate assistance  LE Dressing: Dependent/Total  Toileting: Dependent/Total  Additional Comments: Constant verbal cues required to hold position when rolling side to side. Balance  Sitting Balance: Minimal assistance  Standing Balance: Maximum assistance  Standing Balance  Time: 1min  Activity: ADL transfer  Comment: Pt. attempted to follow all cues. Functional Mobility  Functional - Mobility Device: Rolling Walker  Activity: Other (sit to stand from EOB)  Assist Level: Maximum assistance  Functional Mobility Comments: Pt required MAX verbal cues/hand over hand assist for RW safety, upright posture, hand placement on bedrail, pacing self, sequencing, initation, scanning room all to increase safety. Bed mobility  Rolling to Left: Maximum assistance  Rolling to Right: Maximum assistance  Supine to Sit: Maximum assistance;2 Person assistance  Sit to Supine: Maximum assistance;2 Person assistance  Scooting: Maximal assistance;2 Person assistance  Comment: Max verbal/tactile cues for bed mobility with use of side rails. Multiple rolling completed d/t BM requiring pericare and brief change. Max assist required to complete rolling tech and hold position.   Transfers  Stand Step Transfers: 2 Person assistance;Maximum assistance  Sit to stand: 2 Person assistance;Maximum assistance  Stand to sit: 2 Person assistance;Maximum assistance  Transfer Comments: Max assist x2 to stand with RW and complete side steps to Head of bed. Constant cues on proper tech provided to complete task safely     Cognition  Overall Cognitive Status: Exceptions  Arousal/Alertness: Delayed responses to stimuli  Following Commands: Follows one step commands with repetition; Follows one step commands with increased time  Attention Span: Attends with cues to redirect  Memory: Decreased short term memory;Decreased recall of recent events  Safety Judgement: Decreased awareness of need for safety;Decreased awareness of need for assistance  Problem Solving: Decreased awareness of errors;Assistance required to identify errors made;Assistance required to generate solutions;Assistance required to implement solutions;Assistance required to correct errors made  Insights: Decreased awareness of deficits  Initiation: Requires cues for some  Sequencing: Requires cues for some     Perception  Overall Perceptual Status: WFL              Type of ROM/Therapeutic Exercise  Type of ROM/Therapeutic Exercise: AAROM  Comment: AAROM completed with both UE while sitting EOB.  Pt. completed AAROM in 3 sets of exercises to promote shoulder ROM  Exercises  Shoulder Depression: x10  Shoulder Elevation: x10  Shoulder ABduction: x10  Shoulder ADduction: x10  Horizontal ABduction: x10  Horizontal ADduction: x10         Plan   Plan  Times per week: 4-5x/wk 1x/day as ellie  Times per day: Daily  Current Treatment Recommendations: Strengthening,Endurance Training,Balance Training,Functional Mobility Training,Safety Education & Training,Home Management Training,Self-Care / ADL,Equipment Evaluation, Education, & procurement,Pain Management  Plan Comment: Cont with stated POC     AM-PAC Score             Goals  Short term goals  Time Frame for Short term goals: By discharge, pt to demo  Short term goal 1: ADL transfers and functional mobility to Min A with use of AD as needed. Short term goal 2: increased B UE strength by 1/2 grade/increased B shld AROM by 10-15 degrees to assist with functional tasks. Short term goal 3: bed mobility to Min A with use of bedrails as needed. Short term goal 4: toileting to Mod A with use of AD/BSC as needed. Short term goal 5: UB ADLs to Set up and LB ADLs Mod A with use of AD/AE as needed. Long term goals  Long term goal 1: Pt to be I with fall prevention education, EC/WS tech, recommendations for discharge/AE with use of handouts as needed. Long term goal 2: increased standing ellie > 8 min with Mod A with use of AD as needed to reduce risk of falls during functional tasks. Patient Goals   Patient goals : did not state. Therapy Time   Individual Concurrent Group Co-treatment   Time In [facility] 178         Time Out 18         Minutes 43              Co-treatment with PT warranted secondary to decreased safety and independence requiring 2 skilled therapy professionals to address individual discipline's goals. OT addressing preparation for ADL transfer, sitting/activity tolerance, functional reaching, fall prevention, functional mobility for ADL transfers, ability to sequence and follow directions and functional UE strength.     ROSA MARIA Arreola

## 2022-01-28 NOTE — PROGRESS NOTES
[facility]  Office: [address], DO, Crystal Green, DO, Rickie Sosa, DO, Margi Stout, DO, Meaghan Dominique MD, Genny Aburto MD, Erin Arroyo MD, Jose Herbert MD, Thao Moreno MD, Queenie Nance MD, Ina Jeronimo MD, Bianca Rasheed, DO, Charla Paredes DO, Holly Dodd MD,  Jose Cronin DO, Vertell Moritz, MD, Annabelle De Luna MD, Cheyenne Esteban MD, Elsi Pelayo MD, Louis Renee MD, Lucien Martínez, [facility], [facility] Jonnyanna, CNP, Chencho Olivares, CNP, Selena Lieberman, CNS, Adam Appl, CNP, Pinky Bryant, CNP, Nicko Fresh, CNP, Alfredo Tamez, CNP, Anh Aleman, CNP, iTsh Majano PA-C, Judah Gutierrez, [facility], Teressa Levy, [facility], Matias Flores, CNP, Ruby Smith, CNP, Dawayne Oppenheim, CNP, Janice Sheriff, CNP, Marisela Seals, [facility], [place], [address]    Progress Note    1/28/2022    5:13 PM    Name:   Denis Alvarez  MRN:     [id number]     Acct:      [de-identified]   Room:   2009/2009-02  IP Day:  2  Admit Date:  1/26/2022  8:39 PM    PCP:   Derrick Cardenas DO  Code Status:  Full Code    Subjective:     C/C:   Shortness of breath    Interval History Status:   Lying flat  Somnolent  No distress  Confused    Data Base Updates:  WBC7.3k/uL RBC3.37 Low m/uL Jbjuiyzusf34.7 Low    Ppwfcbyoq527 Low      Follow-up chest x-ray:  Impression:  1. Interval increase in vascular congestion and findings of pulmonary edema. 2.  Similar appearance of pleural effusions with basilar atelectasis.          Brief History:     Additional Comments:   26-year-old female admitted from [facility] with concerns of:  Shortness of breath, bilateral pleural effusions  End-stage renal disease on dialysis  Bradycardia  Troponin elevation     Database has included:  WBC7.3k/uL RBC3.37 Low m/uL Uhdmzlaurv91.7 Low    Xiglacrgp597 Low       Qhjnqlz34po/dL      BUN33 High mg/dL   CREATININE3.28 High mg/dL   Bun/Cre Ratio10      Calcium8.8mg/dL   Zacxpy344 Low     Troponin, High Sensitivity 106 High Panic       EKG:  Junctional rhythm  Low voltage QRS  Anterolateral infarct  Abnormal ECG    BMP 91,285  Creatinine 3.8  Troponin I 06     Echo 11/2021:  Left ventricle is mildly enlarged, global left ventricular systolic function  is low normal versus mildly reduced. Calculated ejection fraction is 45% (by Driver's Metod.) Calculated EF by  3D Heart Model is 56%. Evidence of diastolic dysfunction. Left atrium is moderately dilated. Bowing interatrial septal motion. No shunt seen by color Doppler. Right atrial dilatation. Small bright echo density noted originating from the area of the superior  vena cava, likely pacing lead. Right ventricular dilatation with normal systolic function. Thickened mitral valve leaflets. Mitral annular calcification is seen. Mean gradient is 4mmHg . Trace to mild mitral regurgitation. Tricuspid valve was not well visualized. Moderate to severe tricuspid regurgitation. Estimated right ventricular systolic pressure is 48 mmHg. No significant pericardial effusion is seen.     Initial plan included:  Optimize cardio-pulmonary function  Cardiology evaluation  Rule out non-ST elevation MI  Observe for symptomatic bradycardia  Renal evaluation  Dialysis as scheduled  Pulmonary consultation  May need thoracentesis  Blood Pressure - Monitor and control  Glycemic contol - Monitor and control blood sugars   Blood products as needed  Anemia / thrombocytopenia work-up on outpatient basis    Past Medical History:   has a past medical history of Acute anemia, Acute cystitis, Acute kidney injury ([address] Acute kidney injury superimposed on CKD ([address] Acute on chronic diastolic heart failure ([address] Acute renal failure (ARF) ([address] Anasarca, Anxiety and depression, AV block, 1st degree, Background diabetic retinopathy(362.01), Balance problem, BMI 45.0-49.9, adult ([address] Bradycardia, Buttock wound, CAD (coronary artery disease), Cancer of uterus [facility]), Chronic diastolic heart failure (HCC), Chronic kidney disease, Chronic low back pain, Controlled type 2 diabetes mellitus with chronic 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 dialysis, with long-term current use of insulin ([address] CVA (cerebral vascular accident) ([address] Decompensated heart failure ([address] Decubitus ulcer of trochanteric region of right hip, stage 2 ([address] Dementia ([address] Dry eye syndrome, End stage renal disease on dialysis ([address] GERD (gastroesophageal reflux disease), Glaucoma suspect, Gout, Hemodialysis patient ([address] Homonymous hemianopsia, Hyperkalemia, Hyperlipidemia, Hypertension, Infestation by bed bug, Infestation by maggots, Keratitis, Lymphedema of both lower extremities, Morbid obesity ([address] MRSA bacteremia, Obesity, PAD (peripheral artery disease) ([address] Peripheral polyneuropathy, Pressure injury of right heel, unstageable ([address] Pseudophakos, Stroke ([address] Trichiasis, Type 2 diabetes mellitus ([address] and Wounds, multiple. Social History:   reports that she has never smoked. She has never used smokeless tobacco. She reports that she does not drink alcohol and does not use drugs. Family History:   Family History   Problem Relation Age of Onset    Diabetes Mother     Cancer Mother     High Blood Pressure Mother    Duana Big Stroke Mother     Kidney Disease Mother     Uterine Cancer Mother     Diabetes Father     Heart Disease Father     Glaucoma Father     Emphysema Father     Coronary Art Dis Other         All 4 siblings.  Stroke Other         1 sibling.  Lung Cancer Other         1 sibling - cause of death lung cancer.     Mental Retardation Other        Review of Systems:     Review of Systems   Unable to perform ROS: Mental status change   The patient is somnolent  Not very conversant  Lying flat  Denies shortness of breath  No chest pain  Had not eaten much breakfast    Physical Examination:        Vitals  BP (!) 130/53   Pulse 52   Temp 97.5 °F (36.4 °C) (Oral)   Resp 16 Ht 5' 4\" (1.626 m)   Wt 176 lb 14.4 oz (80.2 kg)   SpO2 100%   BMI 30.36 kg/m²   Temp (24hrs), Av.4 °F (36.3 °C), Min:97.3 °F (36.3 °C), Max:97.5 °F (36.4 °C)    Recent Labs     22  0544 22  0627 22  1200   POCGLU 88 64* 90 88       Physical Exam  Vitals reviewed. Constitutional:       General: She is not in acute distress. Appearance: She is ill-appearing. She is not diaphoretic. HENT:      Head: Normocephalic. Nose: Nose normal.   Eyes:      General: No scleral icterus. Conjunctiva/sclera: Conjunctivae normal.   Neck:      Trachea: No tracheal deviation. Cardiovascular:      Rate and Rhythm: Normal rate and regular rhythm. Pulmonary:      Effort: Pulmonary effort is normal. No respiratory distress. Breath sounds: Rales present. No wheezing. Chest:      Chest wall: No tenderness. Abdominal:      General: There is no distension. Palpations: Abdomen is soft. Tenderness: There is no abdominal tenderness. Musculoskeletal:         General: No tenderness. Cervical back: Neck supple. Right lower leg: Edema present. Left lower leg: Edema present. Skin:     General: Skin is warm and dry. Coloration: Skin is pale. Findings: Rash (Stasis dermatitis) present. Neurological:      Comments: The patient is having trouble providing historical data          Medications: Allergies:     Allergies   Allergen Reactions    Amoxicillin-Pot Clavulanate Diarrhea, Nausea Only and Nausea And Vomiting     Other reaction(s): Vomiting    Sulfamethoxazole-Trimethoprim Hives    Amoxicillin     Clavulanic Acid     Clonidine Derivatives Swelling    Sulfamethoxazole     Trimethoprim     Adhesive Tape Itching       Current Meds:   Scheduled Meds:    polyethylene glycol  17 g Oral BID    senna  1 tablet Oral Nightly    docusate sodium  100 mg Oral TID    [Held by provider] amLODIPine  10 mg Oral Daily    aspirin  81 mg Oral Daily    atorvastatin  40 mg Oral Nightly    [Held by provider] carvedilol  6.25 mg Oral BID     vitamin D3  5,000 Units Oral Daily    citalopram  20 mg Oral Daily    [Held by provider] insulin glargine  10 Units SubCUTAneous Nightly    insulin lispro  0-12 Units SubCUTAneous TID     insulin lispro  0-6 Units SubCUTAneous Nightly    cetirizine  10 mg Oral Daily    midodrine  10 mg Oral Once per day on Mon Wed Fri    [Held by provider] oxybutynin  5 mg Oral BID    pantoprazole  40 mg Oral BID AC    rivastigmine  1 patch TransDERmal Daily    sodium chloride  2 spray Nasal BID    sodium chloride flush  5-40 mL IntraVENous 2 times per day    heparin (porcine)  5,000 Units SubCUTAneous 3 times per day     Continuous Infusions:    sodium chloride       PRN Meds: anticoagulant sodium citrate, anticoagulant sodium citrate, midodrine, nitroGLYCERIN, sodium chloride flush, sodium chloride, ondansetron **OR** ondansetron, acetaminophen **OR** acetaminophen    Data:     I/O (24Hr):     Intake/Output Summary (Last 24 hours) at 1/28/2022 1713  Last data filed at 1/28/2022 7606  Gross per 24 hour   Intake    Output 65 ml   Net -65 ml       Labs:  Hematology:  Recent Labs     01/27/22  0551   WBC 7.3   RBC 3.37*   HGB 10.7*   HCT 33.8*   .3   MCH 31.8   MCHC 31.7   RDW 18.6*   *   MPV 10.5   INR 1.2     Chemistry:  Recent Labs     01/26/22  0945 01/27/22  0551   * 131*   K 3.9 4.3   CL 97* 96*   CO2 24 25   GLUCOSE 88 92   BUN 28* 33*   CREATININE 2.87* 3.28*   ANIONGAP 10 10   LABGLOM 16* 14*   GFRAA 20* 17*   CALCIUM 8.9 8.8   TROPHS 91* 106*     Recent Labs     01/27/22  0551 01/27/22  0623 01/27/22  1205 01/27/22  1708 01/27/22  2038 01/28/22  0544 01/28/22  0627 01/28/22  1200   PROT 7.0  --   --   --   --   --   --   --    LABALBU 3.0*  --   --   --   --   --   --   --    AST 29  --   --   --   --   --   --   --    ALT 13  --   --   --   --   --   --   --    ALKPHOS 310*  --   --   -- --   --   --   --    BILITOT 0.54  --   --   --   --   --   --   --    POCGLU  --    < > 99 83 88 64* 90 88    < > = values in this interval not displayed. ABG:  Lab Results   Component Value Date    POCPH 7.284 12/13/2020    POCPCO2 54.5 12/13/2020    POCPO2 71.6 12/13/2020    POCHCO3 25.8 12/13/2020    NBEA 1 12/13/2020    PBEA NOT REPORTED 12/13/2020    NAX5GCQ 28 12/13/2020    RYMN3CLB 92 12/13/2020    FIO2 2.0 12/13/2020     Lab Results   Component Value Date/Time    SPECIAL RT AC 12 ML 11/16/2021 06:11 AM     Lab Results   Component Value Date/Time    CULTURE NO GROWTH 6 DAYS 11/16/2021 06:11 AM       Radiology:  CT HEAD WO CONTRAST    Result Date: 1/25/2022  No acute intracranial abnormality. Senescent changes including chronic microvascular change. Old left PCA distribution infarct. RECOMMENDATIONS: Unavailable     XR CHEST PORTABLE    Result Date: 1/28/2022  1. Interval increase in vascular congestion and findings of pulmonary edema. 2.  Similar appearance of pleural effusions with basilar atelectasis. XR CHEST PORTABLE    Result Date: 1/25/2022  1. Cardiomegaly with mild vascular congestion. 2. Bilateral pleural effusions with lower lobe atelectasis.        Assessment:        Primary Problem  Bilateral pleural effusion    Active Hospital Problems    Diagnosis Date Noted    Constipation [K59.00] 01/27/2022    Pulmonary HTN ([facility] [I27.20] 01/27/2022    Thrombocytopenia ([facility] [D69.6] 01/27/2022    Acute on chronic combined systolic (congestive) and diastolic (congestive) heart failure (HCC) [I50.43] 01/27/2022    Severe tricuspid regurgitation [I07.1] 01/27/2022    Chronic respiratory failure with hypoxia (HCC) [J96.11] 01/27/2022    Bilateral pleural effusion [J90] 01/26/2022    Hyponatremia [E87.1] 11/13/2021    End stage renal disease on dialysis ([facility] [N18.6, Z99.2] 03/01/2021    Bradycardia [R00.1] 08/19/2020    Elevated troponin [R77.8] 08/08/2020    Lymphedema of both lower extremities [I89.0] 08/08/2020    Controlled type 2 diabetes mellitus with chronic 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 dialysis, with long-term current use of insulin (HCC) [E11.22, N18.6, Z79.4, Z99.2]     Hypertension [I10]     Dementia ([facility] [F03.90]          Plan:        Optimize cardio-pulmonary function  Cardiology evaluation  Non-ST elevation MI, thought to be type II  Observe for symptomatic bradycardia  Renal evaluation  Avoid nephrotoxins  Trend BUN and creatinine  Dialysis as scheduled  Pulmonary consultation  Thoracentesis  Blood Pressure - Monitor and control  Glycemic contol - Monitor and control blood sugars   Blood products as needed  Aranesp  Anemia / thrombocytopenia work-up on outpatient basis  PT/OT  DVT prophylaxis  Overall condition and prognosis appear somewhat guarded  Palliative care consult    IP CONSULT TO CARDIOLOGY  IP CONSULT TO PULMONOLOGY  IP CONSULT TO DO Colt  1/28/2022  5:13 PM

## 2022-01-28 NOTE — PLAN OF CARE
Problem: Skin Integrity:  Goal: Will show no infection signs and symptoms  Description: Will show no infection signs and symptoms  1/28/2022 0014 by Margarita Black RN  Outcome: Ongoing     Problem: Skin Integrity:  Goal: Absence of new skin breakdown  Description: Absence of new skin breakdown  1/28/2022 0014 by Margarita Black RN  Outcome: Ongoing     Problem: Falls - Risk of:  Goal: Will remain free from falls  Description: Will remain free from falls  1/28/2022 0014 by Margarita Black RN  Outcome: Ongoing     Problem: Falls - Risk of:  Goal: Absence of physical injury  Description: Absence of physical injury  1/28/2022 0014 by Margarita Black RN  Outcome: Ongoing

## 2022-01-28 NOTE — PLAN OF CARE
Problem: Skin Integrity:  Goal: Will show no infection signs and symptoms  Description: Will show no infection signs and symptoms  1/28/2022 1139 by Lolly Eaton RN  Outcome: Ongoing  1/28/2022 0014 by Bebe Carlisle RN  Outcome: Ongoing  Goal: Absence of new skin breakdown  Description: Absence of new skin breakdown  1/28/2022 1139 by Lolly Eaton RN  Outcome: Ongoing  1/28/2022 0014 by Bebe Carlisle RN  Outcome: Ongoing     Problem: Falls - Risk of:  Goal: Will remain free from falls  Description: Will remain free from falls  1/28/2022 1139 by Lolly Eaton RN  Outcome: Ongoing  1/28/2022 0014 by Bebe Carlisle RN  Outcome: Ongoing  Goal: Absence of physical injury  Description: Absence of physical injury  1/28/2022 1139 by Lolly Eaton RN  Outcome: Ongoing  1/28/2022 0014 by Bebe Carlisle RN  Outcome: Ongoing     Problem: Metabolic:  Goal: Ability to maintain appropriate glucose levels will improve  Description: Ability to maintain appropriate glucose levels will improve  Outcome: Ongoing     Problem: Fluid Volume:  Goal: Will show no signs or symptoms of fluid imbalance  Description: Will show no signs or symptoms of fluid imbalance  Outcome: Ongoing     Problem: Tissue Perfusion - Renal, Altered:  Goal: Electrolytes within specified parameters  Description: Electrolytes within specified parameters  Outcome: Ongoing

## 2022-01-29 NOTE — PLAN OF CARE
Problem: Skin Integrity:  Goal: Will show no infection signs and symptoms  Description: Will show no infection signs and symptoms  1/28/2022 2352 by Rossy Saldivar RN  Outcome: Ongoing     Problem: Skin Integrity:  Goal: Absence of new skin breakdown  Description: Absence of new skin breakdown  1/28/2022 2352 by Rossy Saldivar RN  Outcome: Ongoing     Problem: Falls - Risk of:  Goal: Will remain free from falls  Description: Will remain free from falls  1/28/2022 2352 by Rossy Saldivar RN  Outcome: Ongoing     Problem: Falls - Risk of:  Goal: Absence of physical injury  Description: Absence of physical injury  1/28/2022 2352 by Rossy Saldivar RN  Outcome: Ongoing

## 2022-01-29 NOTE — PROGRESS NOTES
SUBJECTIVE    Patient was seen and examined. Patient was sitting on the side of the bed. She was alert and oriented x3. She was dialyzed yesterday and tolerated well. Today is her regular dialysis day. She denies any increase in shortness of breath. Hemoglobin remained stable.   Next  OBJECTIVE      CURRENT TEMPERATURE:  Temp: 97.5 °F (36.4 °C)  MAXIMUM TEMPERATURE OVER 24HRS:  Temp (24hrs), Av.6 °F (36.4 °C), Min:97.3 °F (36.3 °C), Max:97.9 °F (36.6 °C)    CURRENT RESPIRATORY RATE:  Resp: 12  CURRENT PULSE:  Pulse: 74  CURRENT BLOOD PRESSURE:  BP: (!) 133/52  24HR BLOOD PRESSURE RANGE:  Systolic (85EHX), HSS:745 , Min:119 , XKB:660   ; Diastolic (96UYX), ZGM:58, Min:45, Max:60    24HR INTAKE/OUTPUT:  No intake or output data in the 24 hours ending 22 1131  WEIGHT :  Patient Vitals for the past 96 hrs (Last 3 readings):   Weight   22 0357 176 lb (79.8 kg)   22 0449 176 lb 14.4 oz (80.2 kg)     PHYSICAL EXAM      GENERAL APPEARANCE: Alert and awake x3  SKIN: Warm to touch  ENT: no Tenderness over frontal or maxillary sinuses  NECK: No lymphadenopathy or carotid bruit  PULMONARY: l bilateral air entry but decreased at the bases   CADRDIOVASCULAR: S1 and S2 audible no S3   ABDOMEN: soft nontender, bowel sounds present, no organomegaly,  no ascites EXTREMITIES: Chronic skin changes and no edema   CURRENT MEDICATIONS      polyethylene glycol (GLYCOLAX) packet 17 g, BID  senna (SENOKOT) tablet 8.6 mg, Nightly  docusate sodium (COLACE) capsule 100 mg, TID  anticoagulant sodium citrate 4 % injection 1.9 mL, PRN  anticoagulant sodium citrate 4 % injection 1.9 mL, PRN  midodrine (PROAMATINE) tablet 10 mg, PRN  [Held by provider] amLODIPine (NORVASC) tablet 10 mg, Daily  aspirin EC tablet 81 mg, Daily  atorvastatin (LIPITOR) tablet 40 mg, Nightly  [Held by provider] carvedilol (COREG) tablet 6.25 mg, BID WC  vitamin D3 (CHOLECALCIFEROL) tablet 5,000 Units, Daily  citalopram (CELEXA) tablet 20 mg, Daily  [Held by provider] insulin glargine (LANTUS) injection vial 10 Units, Nightly  insulin lispro (HUMALOG) injection vial 0-12 Units, TID WC  insulin lispro (HUMALOG) injection vial 0-6 Units, Nightly  cetirizine (ZYRTEC) tablet 10 mg, Daily  midodrine (PROAMATINE) tablet 10 mg, Once per day on Mon Wed Fri  nitroGLYCERIN (NITROSTAT) SL tablet 0.4 mg, Q5 Min PRN  [Held by provider] oxybutynin (DITROPAN) tablet 5 mg, BID  pantoprazole (PROTONIX) tablet 40 mg, BID AC  rivastigmine (EXELON) 9.5 MG/24HR 1 patch, Daily  sodium chloride (OCEAN, BABY AYR) 0.65 % nasal spray 2 spray, BID  sodium chloride flush 0.9 % injection 5-40 mL, 2 times per day  sodium chloride flush 0.9 % injection 10 mL, PRN  0.9 % sodium chloride infusion, PRN  ondansetron (ZOFRAN-ODT) disintegrating tablet 4 mg, Q8H PRN   Or  ondansetron (ZOFRAN) injection 4 mg, Q6H PRN  acetaminophen (TYLENOL) tablet 650 mg, Q6H PRN   Or  acetaminophen (TYLENOL) suppository 650 mg, Q6H PRN  heparin (porcine) injection 5,000 Units, 3 times per day          LABS      CBC:   Recent Labs     01/27/22  0551 01/29/22  0613   WBC 7.3 7.6   RBC 3.37* 3.65*   HGB 10.7* 11.4*   HCT 33.8* 36.7   .3 100.5   MCH 31.8 31.2   MCHC 31.7 31.1   RDW 18.6* 18.3*   * 121*   MPV 10.5 10.4      BMP:   Recent Labs     01/27/22  0551 01/29/22  0613   * 133*   K 4.3 4.4   CL 96* 100   CO2 25 20   BUN 33* 28*   CREATININE 3.28* 3.37*   GLUCOSE 92 135*   CALCIUM 8.8 9.3          RADIOLOGY      Reviewed as available. ASSESSMENT    1. ESRD: End-stage renal disease today is her regular dialysis day and will dialyze her  [place]        patient was admitted with increasing shortness of breath and dialyzed yesterday and tolerated well  2. HTN:  Chronically low blood pressure   3. Urinary hypertension  4. Secondary hyperparathyroidism:  5. Anemia of chronic disease :  6.  Hyponatremia: Resolved  PLAN      1. Dialysis today  2.   CBC and BMP in a.m.  3.  Patient can be potentially discharged from renal standpoint if it is okay with other services  4. Follow-up with Dr. Kristi Balbuena as an outpatient    Please do not hesitate to call with questions.     Electronically signed by Emilio Patel MD on 1/29/2022 at 11:31 AM

## 2022-01-29 NOTE — PROGRESS NOTES
[facility]  Office: [address], DO, Tyler Ache, DO, Jose Hasten, DO, Elfialana Preston Blood, DO, Stephan Barfield MD, Maricruz Chowdhury MD, Margie Herndon MD, Nilsa Treviño MD, Marlene Buchanan MD, Keya Keller MD, Vilma Weinstein MD, Catherine Holly, DO, Michael Nugent, DO, Mackenzie Orozco MD,  Vivek Riddle, DO, Leodan Walls MD, Joseluis Willis MD, Zachary Garcia MD, Sathish Arechiga MD, Car Hanna MD, Bro Fay, [facility], [facility], CNP, Sonam Judd, CNP, Joce Wong, CNS, Desirae Courts, CNP, Alejandrina Cronin, CNP, Cristina Durán, CNP, Layne Ocean, CNP, Elliot Angela, CNP, Qian Gudino PA-C, Wilbur Isidro, [facility], Kae Lord, [facility], Gabriele Limrafael, CNP, Peyman Carla, CNP, Jean-Claude Flair, CNP, Nicholas Kendall, CNP, Marvin Fernandez, CNP, Angeles Palmer, [facility]    Progress Note    1/29/2022    3:56 PM    Name:   Orlando Mcgarry  MRN:     [id number]     Acct:      [de-identified]   Room:   2009/2009-02   Day:  3  Admit Date:  1/26/2022  8:39 PM    PCP:   Johnna Elizabeth DO  Code Status:  Full Code    Subjective:     C/C:   Shortness of breath    Interval History Status:   Somnolent  Confused  No distress  Denies chest pain  No shortness of breath/orthopnea    Data Base Updates:  Troponin, High Sensitivity 106 High Panic      WBC7.6k/uL RBC3.65 Low m/uL Djqhvbonqy96.4 Low    Ybpjnvntg898 Low      Mvosnkh382 High mg/dL     BUN28 High mg/dL   CREATININE3.37 High mg/dL   Bun/Cre Ratio8 Low      Calcium9.3mg/dL   Gachaa833 Low      Pro-RWN717,584 High      Follow-up chest x-ray:  Impression:  Stable congestive heart failure with associated bilateral pleural effusions.        Brief History:     Additional Comments:   78-year-old female admitted from [facility] with concerns of:  Shortness of breath, bilateral pleural effusions  End-stage renal disease on dialysis  Bradycardia  Troponin elevation     Database has included:  WBC7.3k/uL RBC3.37 Low m/uL Ielgwrlpsi00.7 Low    Xkgildnpi501 Low       Rtgchqm72nj/dL      BUN33 High mg/dL   CREATININE3.28 High mg/dL   Bun/Cre Ratio10      Calcium8.8mg/dL   Kvqdig273 Low       Troponin, High Sensitivity 106 High Panic       EKG:  Junctional rhythm  Low voltage QRS  Anterolateral infarct  Abnormal ECG    BMP 91,285  Creatinine 3.8  Troponin I 06     Echo 11/2021:  Left ventricle is mildly enlarged, global left ventricular systolic function  is low normal versus mildly reduced. Calculated ejection fraction is 45% (by Driver's Metod.) Calculated EF by  3D Heart Model is 56%. Evidence of diastolic dysfunction. Left atrium is moderately dilated. Bowing interatrial septal motion. No shunt seen by color Doppler. Right atrial dilatation. Small bright echo density noted originating from the area of the superior  vena cava, likely pacing lead. Right ventricular dilatation with normal systolic function. Thickened mitral valve leaflets. Mitral annular calcification is seen. Mean gradient is 4mmHg . Trace to mild mitral regurgitation. Tricuspid valve was not well visualized. Moderate to severe tricuspid regurgitation. Estimated right ventricular systolic pressure is 48 mmHg. No significant pericardial effusion is seen.     Initial plan included:  Optimize cardio-pulmonary function  Cardiology evaluation  Rule out non-ST elevation MI  Observe for symptomatic bradycardia  Renal evaluation  Dialysis as scheduled  Pulmonary consultation  May need thoracentesis  Blood Pressure - Monitor and control  Glycemic contol - Monitor and control blood sugars   Blood products as needed  Anemia / thrombocytopenia work-up on outpatient basis    Past Medical History:   has a past medical history of Acute anemia, Acute cystitis, Acute kidney injury ([address] Acute kidney injury superimposed on CKD ([address] Acute on chronic diastolic heart failure ([address] Acute renal failure (ARF) ([address] Anasarca, Anxiety and depression, AV block, 1st degree, Background diabetic retinopathy(362.01), Balance problem, BMI 45.0-49.9, adult (HCC), Bradycardia, Buttock wound, CAD (coronary artery disease), Cancer of uterus ([address] Chronic diastolic heart failure ([facility]), Chronic kidney disease, Chronic low back pain, Controlled type 2 diabetes mellitus with chronic 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 dialysis, with long-term current use of insulin ([address] CVA (cerebral vascular accident) ([address] Decompensated heart failure ([address] Decubitus ulcer of trochanteric region of right hip, stage 2 ([address] Dementia ([address] Dry eye syndrome, End stage renal disease on dialysis ([address] GERD (gastroesophageal reflux disease), Glaucoma suspect, Gout, Hemodialysis patient ([address] Homonymous hemianopsia, Hyperkalemia, Hyperlipidemia, Hypertension, Infestation by bed bug, Infestation by maggots, Keratitis, Lymphedema of both lower extremities, Morbid obesity ([address] MRSA bacteremia, Obesity, PAD (peripheral artery disease) ([address] Peripheral polyneuropathy, Pressure injury of right heel, unstageable ([address] Pseudophakos, Stroke ([address] Trichiasis, Type 2 diabetes mellitus ([address] and Wounds, multiple. Social History:   reports that she has never smoked. She has never used smokeless tobacco. She reports that she does not drink alcohol and does not use drugs. Family History:   Family History   Problem Relation Age of Onset    Diabetes Mother     Cancer Mother     High Blood Pressure Mother    Jason Johnson Stroke Mother     Kidney Disease Mother     Uterine Cancer Mother     Diabetes Father     Heart Disease Father     Glaucoma Father     Emphysema Father     Coronary Art Dis Other         All 4 siblings.  Stroke Other         1 sibling.  Lung Cancer Other         1 sibling - cause of death lung cancer.     Mental Retardation Other        Review of Systems:     Review of Systems   Unable to perform ROS: Mental status change       Physical Examination:        Vitals  BP (!) 133/52 Pulse 74   Temp 97.5 °F (36.4 °C) (Oral)   Resp 12   Ht 5' 4\" (1.626 m)   Wt 176 lb (79.8 kg)   SpO2 95%   BMI 30.21 kg/m²   Temp (24hrs), Av.6 °F (36.4 °C), Min:97.5 °F (36.4 °C), Max:97.9 °F (36.6 °C)    Recent Labs     22  1724 22  2059 22  0631 22  1216   POCGLU 93 109* 144* 130*       Physical Exam  Vitals reviewed. Constitutional:       General: She is not in acute distress. Appearance: She is ill-appearing. She is not diaphoretic. HENT:      Head: Normocephalic. Nose: Nose normal.   Eyes:      General: No scleral icterus. Conjunctiva/sclera: Conjunctivae normal.   Neck:      Trachea: No tracheal deviation. Cardiovascular:      Rate and Rhythm: Normal rate and regular rhythm. Pulmonary:      Effort: Pulmonary effort is normal. No respiratory distress. Breath sounds: Rales present. No wheezing. Chest:      Chest wall: No tenderness. Abdominal:      General: There is no distension. Palpations: Abdomen is soft. Tenderness: There is no abdominal tenderness. Musculoskeletal:         General: No tenderness. Cervical back: Neck supple. Right lower leg: Edema present. Left lower leg: Edema present. Skin:     General: Skin is warm and dry. Coloration: Skin is pale. Findings: Rash (Stasis dermatitis) present. Neurological:      Comments: The patient is having trouble providing historical data          Medications: Allergies:     Allergies   Allergen Reactions    Amoxicillin-Pot Clavulanate Diarrhea, Nausea Only and Nausea And Vomiting     Other reaction(s): Vomiting    Sulfamethoxazole-Trimethoprim Hives    Amoxicillin     Clavulanic Acid     Clonidine Derivatives Swelling    Sulfamethoxazole     Trimethoprim     Adhesive Tape Itching       Current Meds:   Scheduled Meds:    heparin (porcine)  5,000 Units SubCUTAneous BID    polyethylene glycol  17 g Oral BID    senna  1 tablet Oral Nightly    docusate sodium  100 mg Oral TID    [Held by provider] amLODIPine  10 mg Oral Daily    aspirin  81 mg Oral Daily    atorvastatin  40 mg Oral Nightly    [Held by provider] carvedilol  6.25 mg Oral BID     vitamin D3  5,000 Units Oral Daily    citalopram  20 mg Oral Daily    [Held by provider] insulin glargine  10 Units SubCUTAneous Nightly    insulin lispro  0-12 Units SubCUTAneous TID     insulin lispro  0-6 Units SubCUTAneous Nightly    cetirizine  10 mg Oral Daily    midodrine  10 mg Oral Once per day on Mon Wed Fri    [Held by provider] oxybutynin  5 mg Oral BID    pantoprazole  40 mg Oral BID AC    rivastigmine  1 patch TransDERmal Daily    sodium chloride  2 spray Nasal BID    sodium chloride flush  5-40 mL IntraVENous 2 times per day     Continuous Infusions:    sodium chloride       PRN Meds: anticoagulant sodium citrate, anticoagulant sodium citrate, midodrine, nitroGLYCERIN, sodium chloride flush, sodium chloride, ondansetron **OR** ondansetron, acetaminophen **OR** acetaminophen    Data:     I/O (24Hr):   No intake or output data in the 24 hours ending 01/29/22 1556    Labs:  Hematology:  Recent Labs     01/27/22  0551 01/29/22  0613   WBC 7.3 7.6   RBC 3.37* 3.65*   HGB 10.7* 11.4*   HCT 33.8* 36.7   .3 100.5   MCH 31.8 31.2   MCHC 31.7 31.1   RDW 18.6* 18.3*   * 121*   MPV 10.5 10.4   INR 1.2  --      Chemistry:  Recent Labs     01/27/22  0551 01/29/22  0613   * 133*   K 4.3 4.4   CL 96* 100   CO2 25 20   GLUCOSE 92 135*   BUN 33* 28*   CREATININE 3.28* 3.37*   ANIONGAP 10 13   LABGLOM 14* 13*   GFRAA 17* 16*   CALCIUM 8.8 9.3   PHOS  --  2.8   PROBNP  --  112,584*   TROPHS 106* 106*     Recent Labs     01/27/22  0551 01/27/22  0623 01/28/22  0627 01/28/22  1200 01/28/22  1724 01/28/22  2059 01/29/22  0631 01/29/22  1216   PROT 7.0  --   --   --   --   --   --   --    LABALBU 3.0*  --   --   --   --   --   --   --    AST 29  --   --   --   --   --   --   --    ALT 13  -- Elevated troponin [R77.8] 08/08/2020    Lymphedema of both lower extremities [I89.0] 08/08/2020    Controlled type 2 diabetes mellitus with chronic 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 dialysis, with long-term current use of insulin (HCC) [E11.22, N18.6, Z79.4, Z99.2]     Hypertension [I10]     Dementia ([facility] [F03.90]        Plan:        Optimize cardio-pulmonary function  Cardiology evaluation  Non-ST elevation MI, thought to be type II  Observe for symptomatic bradycardia  Renal evaluation  Avoid nephrotoxins  Dialysis as scheduled  Pulmonary consultation  Thoracentesis prn   Blood Pressure - Monitor and control  Glycemic contol - Monitor and control blood sugars   Blood products as needed  Aranesp  Anemia / thrombocytopenia work-up on outpatient basis  PT/OT  DVT prophylaxis  Overall condition and prognosis appear somewhat guarded  Palliative care consult    IP CONSULT TO CARDIOLOGY  IP CONSULT TO PULMONOLOGY  IP CONSULT TO NEPHROLOGY  IP CONSULT TO [address] [place] [address]  1/29/2022  3:56 PM

## 2022-01-29 NOTE — PROGRESS NOTES
Hemodialysis RN called and stated she had to complete a STAT tx on another pt and would not be able to do pt's HD tx until around 1900.    Electronically signed by Cristina Simmons RN on 1/29/2022 at 4:31 PM

## 2022-01-29 NOTE — PLAN OF CARE
Problem: Skin Integrity:  Goal: Will show no infection signs and symptoms  Description: Will show no infection signs and symptoms  1/29/2022 1155 by Farnaz Ortiz RN  Outcome: Ongoing  1/28/2022 2352 by Melany Ordoñez RN  Outcome: Ongoing  Goal: Absence of new skin breakdown  Description: Absence of new skin breakdown  1/29/2022 1155 by Farnaz Ortiz RN  Outcome: Ongoing  1/28/2022 2352 by Melany Ordoñez RN  Outcome: Ongoing     Problem: Falls - Risk of:  Goal: Will remain free from falls  Description: Will remain free from falls  1/29/2022 1155 by Farnaz Ortiz RN  Outcome: Ongoing  1/28/2022 2352 by Melany Ordoñez RN  Outcome: Ongoing  Goal: Absence of physical injury  Description: Absence of physical injury  1/29/2022 1155 by Farnaz Ortiz RN  Outcome: Ongoing  1/28/2022 2352 by Melany Ordoñez RN  Outcome: Ongoing     Problem: Metabolic:  Goal: Ability to maintain appropriate glucose levels will improve  Description: Ability to maintain appropriate glucose levels will improve  Outcome: Ongoing     Problem: Fluid Volume:  Goal: Will show no signs or symptoms of fluid imbalance  Description: Will show no signs or symptoms of fluid imbalance  Outcome: Ongoing     Problem: Tissue Perfusion - Renal, Altered:  Goal: Electrolytes within specified parameters  Description: Electrolytes within specified parameters  Outcome: Ongoing

## 2022-01-29 NOTE — PROGRESS NOTES
Physical Therapy  Facility/Department: [facility]  Daily Treatment Note  NAME: Ren Winter  : 1950  MRN: [id number]    Date of Service: 2022    Discharge Recommendations:  Patient would benefit from continued therapy after discharge        Assessment   Assessment: Pt demos improved functional mobiltiy tolerance and performance. Prognosis: Good  PT Education: Goals;PT Role;Plan of Care;General Safety;Transfer Training;Gait Training;Functional Mobility Training  Patient Education: Cues for safe transfer technique  Activity Tolerance  Activity Tolerance: Patient limited by endurance; Patient limited by fatigue  Activity Tolerance: Markedly improved activity tolerance     Patient Diagnosis(es): There were no encounter diagnoses.      has a past medical history of Acute anemia, Acute cystitis, Acute kidney injury ([address] Acute kidney injury superimposed on CKD ([address] Acute on chronic diastolic heart failure ([address] Acute renal failure (ARF) ([address] Anasarca, Anxiety and depression, AV block, 1st degree, Background diabetic retinopathy(362.01), Balance problem, BMI 45.0-49.9, adult ([address] Bradycardia, Buttock wound, CAD (coronary artery disease), Cancer of uterus ([address] Chronic diastolic heart failure ([address] Chronic kidney disease, Chronic low back pain, Controlled type 2 diabetes mellitus with chronic 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 dialysis, with long-term current use of insulin ([address] CVA (cerebral vascular accident) ([address] Decompensated heart failure ([address] Decubitus ulcer of trochanteric region of right hip, stage 2 ([address] Dementia ([address] Dry eye syndrome, End stage renal disease on dialysis ([address] GERD (gastroesophageal reflux disease), Glaucoma suspect, Gout, Hemodialysis patient ([address] Homonymous hemianopsia, Hyperkalemia, Hyperlipidemia, Hypertension, Infestation by bed bug, Infestation by maggots, Keratitis, Lymphedema of both lower extremities, Morbid obesity ([address] MRSA bacteremia, Obesity, PAD (peripheral artery disease) ([facility] Peripheral polyneuropathy, Pressure injury of right heel, unstageable ([facility] Pseudophakos, Stroke ([facility] Trichiasis, Type 2 diabetes mellitus ([facility] and Wounds, multiple.   has a past surgical history that includes Gastric bypass surgery; Cataract removal with implant (2012); Cataract removal with implant (2012); Coronary artery bypass graft (12-);  section; Hysterectomy; Cholecystectomy; Tonsillectomy and adenoidectomy; Eye surgery (Left); Upper gastrointestinal endoscopy (2020); and IR TUNNELED CVC PLACE WO SQ PORT/PUMP > 5 YEARS (2021). Restrictions  Restrictions/Precautions  Restrictions/Precautions: General Precautions,Fall Risk,Contact Precautions  Required Braces or Orthoses?: No  Position Activity Restriction  Other position/activity restrictions: 1500ml fluid restriction, strict I/O, elevate heels, 1.5 L O2 per nc, up with assist  Subjective   General  Chart Reviewed: Yes  Response To Previous Treatment: Patient with no complaints from previous session. Family / Caregiver Present: No  Subjective  Subjective: \"I'm okay. \"  General Comment  Comments: RN and pt agreeable to therapy. Pt supine in bed upon arrival.  Pain Screening  Patient Currently in Pain: Denies  Vital Signs  Patient Currently in Pain: Denies       Orientation  Orientation  Overall Orientation Status: Impaired  Orientation Level: Oriented to place; Disoriented to time  Cognition   Cognition  Overall Cognitive Status: WFL  Objective   Bed mobility  Rolling to Right: Minimal assistance  Supine to Sit: Moderate assistance (for trunk)  Sit to Supine:  Moderate assistance (assist x 1 for LE's)  Comment: Markedly improved bed mob, cues for sequencing  Transfers  Sit to Stand: Contact guard assistance  Stand to sit: Contact guard assistance  Squat Pivot Transfers: Contact guard assistance  Lateral Transfers: Contact guard assistance  Comment: Transfers with RW, vc's for sequencing, to push up/reach back with UE's to dec fall risk. Ambulation  Ambulation?: Yes  Ambulation 1  Surface: level tile  Device: Rolling Walker  Other Apparatus: O2  Assistance: Contact guard assistance;Minimal assistance  Gait Deviations: Slow Vicenta;Decreased step length  Distance: 3 ft  Comments: No unsteadiness, assist with RW navigation PRN, small shuffling steps. Balance  Posture: Poor  Sitting - Static: Good  Sitting - Dynamic: Fair  Standing - Static: Fair;-  Standing - Dynamic: Fair;-  Exercises  Comments: Pt able to tolerate LE supine ther ex AAROM with VVT cues. OutComes Score   AM-PAC Score  AM-PAC Inpatient Mobility Raw Score : 15 (01/29/22 1248)  AM-PAC Inpatient T-Scale Score : 39.45 (01/29/22 1248)  Mobility Inpatient CMS 0-100% Score: 57.7 (01/29/22 1248)  Mobility Inpatient CMS G-Code Modifier : CK (01/29/22 1248)        Goals  Short term goals  Time Frame for Short term goals: 12 visits  Short term goal 1: Pt to demonstrate bed mobility ModA  Short term goal 2: Pt to perform STS transfers w/ RW ModA-MET  Short term goal 3: Pt to ambulate at least 50ft w/ RW ModA  Short term goal 4: Pt to actively participate in at least 30 minutes of physical therapy for ther act, ther ex, balance, gait, and endurance training  Patient Goals   Patient goals :  To feel better, to get stronger    Plan    Plan  Times per week: 1-2x/day, 5-6x/week  Current Treatment Recommendations: Strengthening,ROM,Balance Training,Functional Mobility Training,Gait Training,Transfer Training,Endurance Training,Neuromuscular Re-education,Safety Education & Training,Home Exercise Program,Equipment Evaluation, Education, & procurement,Patient/Caregiver Education & Training  Safety Devices  Type of devices: Bed alarm in place,Call light within reach,Gait belt,Nurse notified,Left in bed  Restraints  Initially in place: No     Therapy Time   Individual Concurrent Group Co-treatment   Time In 1101         Time Out 1144 Minutes [address], PTA

## 2022-01-30 PROBLEM — R60.1 ANASARCA: Status: RESOLVED | Noted: 2020-12-12 | Resolved: 2022-01-01

## 2022-01-30 PROBLEM — I50.43 ACUTE ON CHRONIC COMBINED SYSTOLIC (CONGESTIVE) AND DIASTOLIC (CONGESTIVE) HEART FAILURE (HCC): Status: RESOLVED | Noted: 2022-01-01 | Resolved: 2022-01-01

## 2022-01-30 PROBLEM — E66.3 OVERWEIGHT: Status: ACTIVE | Noted: 2022-01-01

## 2022-01-30 PROBLEM — R79.89 ELEVATED TROPONIN: Status: RESOLVED | Noted: 2020-08-08 | Resolved: 2022-01-01

## 2022-01-30 PROBLEM — R77.8 ELEVATED TROPONIN: Status: RESOLVED | Noted: 2020-08-08 | Resolved: 2022-01-01

## 2022-01-30 PROBLEM — J90 BILATERAL PLEURAL EFFUSION: Status: RESOLVED | Noted: 2022-01-01 | Resolved: 2022-01-01

## 2022-01-30 NOTE — PROGRESS NOTES
HEMODIALYSIS PRE-TREATMENT NOTE    Patient Identifiers prior to treatment: Name,     Isolation Required:  Yes                      Isolation Type: Contact/MRSA       (please document if patient is being managed as a PUI/COVID-19 patient)        Hepatitis status:                           Date Drawn                             Result  Hepatitis B Surface Antigen 22     neg                     Hepatitis B Surface Antibody 22 neg        Hepatitis B Core Antibody 21 neg          How was Hepatitis Status verified: Outpatient Labs     Was a copy of the labs you documented provided to facility for the patient's chart: Yes    Hemodialysis orders verified: Yes with Dr. Carmelita Page Within normal limits ( I.e. s/s of infection,...): Yes     Pre-Assessment completed: Yes    Pre-dialysis report received from: Meño Pate RN                      Time: 19:35

## 2022-01-30 NOTE — PROGRESS NOTES
Physical Therapy  Facility/Department: [facility]  Daily Treatment Note  NAME: Palak Mayers  : 1950  MRN: [id number]    Date of Service: 2022    Discharge Recommendations:  Patient would benefit from continued therapy after discharge        Assessment   Body structures, Functions, Activity limitations: Decreased functional mobility ; Decreased strength;Decreased safe awareness;Decreased balance;Decreased endurance;Decreased cognition;Decreased posture  Assessment: pt fatigued and presents with increased weakness as compared to documenation from last PT session   Activity Tolerance  Activity Tolerance: Patient limited by endurance; Patient limited by fatigue  Activity Tolerance: decreased activity tolerance this session     Patient Diagnosis(es): There were no encounter diagnoses.      has a past medical history of Acute anemia, Acute cystitis, Acute kidney injury ([address] Acute kidney injury superimposed on CKD ([address] Acute on chronic diastolic heart failure ([address] Acute renal failure (ARF) ([address] Anasarca, Anxiety and depression, AV block, 1st degree, Background diabetic retinopathy(362.01), Balance problem, BMI 45.0-49.9, adult ([address] Bradycardia, Buttock wound, CAD (coronary artery disease), Cancer of uterus ([address] Chronic diastolic heart failure ([address] Chronic kidney disease, Chronic low back pain, Controlled type 2 diabetes mellitus with chronic 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 dialysis, with long-term current use of insulin ([address] CVA (cerebral vascular accident) ([address] Decompensated heart failure ([address] Decubitus ulcer of trochanteric region of right hip, stage 2 ([address] Dementia ([address] Dry eye syndrome, End stage renal disease on dialysis ([address] GERD (gastroesophageal reflux disease), Glaucoma suspect, Gout, Hemodialysis patient ([address] Homonymous hemianopsia, Hyperkalemia, Hyperlipidemia, Hypertension, Infestation by bed bug, Infestation by maggots, Keratitis, Lymphedema of both lower extremities, Morbid obesity ([facility] MRSA bacteremia, Obesity, PAD (peripheral artery disease) ([facility] Peripheral polyneuropathy, Pressure injury of right heel, unstageable ([facility] Pseudophakos, Stroke ([facility] Trichiasis, Type 2 diabetes mellitus ([facility] and Wounds, multiple.   has a past surgical history that includes Gastric bypass surgery; Cataract removal with implant (2012); Cataract removal with implant (2012); Coronary artery bypass graft (12-);  section; Hysterectomy; Cholecystectomy; Tonsillectomy and adenoidectomy; Eye surgery (Left); Upper gastrointestinal endoscopy (2020); and IR TUNNELED CVC PLACE WO SQ PORT/PUMP > 5 YEARS (2021).     Restrictions  Restrictions/Precautions  Restrictions/Precautions: General Precautions,Fall Risk,Contact Precautions  Required Braces or Orthoses?: No  Position Activity Restriction  Other position/activity restrictions: 1500ml fluid restriction, strict I/O, elevate heels, 1.5 L O2 per nc, up with assist  Subjective   General  Chart Reviewed: Yes  Subjective  Subjective: pt agreeable to PT RN ok's PT  Pain Screening  Patient Currently in Pain: Denies  Vital Signs  Patient Currently in Pain: Denies       Orientation     Cognition      Objective   Bed mobility  Scooting: Maximal assistance  Transfers  Sit to Stand: Maximum Assistance  Stand to sit: Maximum Assistance  Comment: used ted stedy to transfer pt to bedside chair, 3 attempts were needed for standing from bed into the ted stedy, standing was achieved on third attempt with bed raised and MAX A      Ambulation  Ambulation?: No     Balance  Posture: Poor  Sitting - Static: Good  Sitting - Dynamic: Fair  Standing - Static: Fair;-  Standing - Dynamic: Fair;-  Exercises  Gluteal Sets: 10  Hip Flexion: 10  Hip Abduction: 10  Knee Long Arc Quad: 10  Ankle Pumps: 10         Comment: transfer to bedside chair at conclusion of treatment session using ted stedy for breakfast, chair alarm on                G-Code OutComes Score                                                     AM-PAC Score  AM-PAC Inpatient Mobility Raw Score : 9 (01/30/22 1108)  AM-PAC Inpatient T-Scale Score : 30.55 (01/30/22 1108)  Mobility Inpatient CMS 0-100% Score: 81.38 (01/30/22 1108)  Mobility Inpatient CMS G-Code Modifier : CM (01/30/22 1108)          Goals  Short term goals  Time Frame for Short term goals: 12 visits  Short term goal 1: Pt to demonstrate bed mobility ModA  Short term goal 2: Pt to perform STS transfers w/ RW ModA-MET  Short term goal 3: Pt to ambulate at least 50ft w/ RW ModA  Short term goal 4: Pt to actively participate in at least 30 minutes of physical therapy for ther act, ther ex, balance, gait, and endurance training  Patient Goals   Patient goals :  To feel better, to get stronger    Plan    Plan  Times per week: 1-2x/day, 5-6x/week  Current Treatment Recommendations: Strengthening,ROM,Balance Training,Functional Mobility Training,Gait Training,Transfer Training,Endurance Training,Neuromuscular Re-education,Safety Education & Training,Home Exercise Program,Equipment Evaluation, Education, & procurement,Patient/Caregiver Education & Training  Safety Devices  Type of devices: Bed alarm in place,Call light within reach,Gait belt,Nurse notified,Left in bed  Restraints  Initially in place: No     Therapy Time   Individual Concurrent Group Co-treatment   Time In  810         Time Out  837         Minutes  [address], PTA

## 2022-01-30 NOTE — CARE COORDINATION
Social work: advised pt could return to [address] [facility] if transportation was possible and snf did ok the return. If HS meds could be provided here if needed as the snf will not have time to get meds delivered tonight this late on a Sunday. Pt can come up to leave at 9 pm get there by 10 pm with meds given. Or tomorrow am is also ok with snf. Working with Jaspreet Davidson to see if [facility] crew is available tonight or in am early. Will advise snf and family of plans along with RN on floor when completed. Need humaira, Rx and discharge order for snf at discharge. Since pt is a return no new hens is needed. Tory malone    Social work: [facility] can come at 7:45 pm tonight to return pt to [facility]. Will fax humaira to snf and [facility] renal dialysis. Tory malone    Report phone: [phone]. Fax [phone] CORNEL Linares to advise pt and family of time for transfer. And call report.  Tory malone

## 2022-01-30 NOTE — PLAN OF CARE
Problem: Skin Integrity:  Goal: Will show no infection signs and symptoms  Description: Will show no infection signs and symptoms  Outcome: Ongoing  Note: Skin assessment ongoing. Pressure ulcer preventions being practiced - see charting for details. Patient turned every two hours. Skin integrity maintained, no new skin abnormalities assessed. Appropriate measures remain in place      Problem: Skin Integrity:  Goal: Absence of new skin breakdown  Description: Absence of new skin breakdown  Outcome: Ongoing     Problem: Falls - Risk of:  Goal: Will remain free from falls  Description: Will remain free from falls  Outcome: Ongoing  Note: Patient is a fall risk during this admission. Fall risk assessment was performed. Patient is absent of falls. Bed is in the lowest position. Wheels on the bed are locked. Call light and bed side table are within reach. Clutter is removed. Patient was educated to call out when needing assistance or wanting to get out of bed. Patient offered toileting assistance during rounding. Hourly rounds have been performed. Problem: Falls - Risk of:  Goal: Absence of physical injury  Description: Absence of physical injury  Outcome: Ongoing     Problem: Metabolic:  Goal: Ability to maintain appropriate glucose levels will improve  Description: Ability to maintain appropriate glucose levels will improve  Outcome: Ongoing  Note: Blood sugars stable and maintained with sliding scale insulin     Problem: Fluid Volume:  Goal: Will show no signs or symptoms of fluid imbalance  Description: Will show no signs or symptoms of fluid imbalance  Outcome: Ongoing  Note: Electrolytes replaced following protocol for replacement.      Problem: Tissue Perfusion - Renal, Altered:  Goal: Electrolytes within specified parameters  Description: Electrolytes within specified parameters  Outcome: Ongoing

## 2022-01-30 NOTE — PROGRESS NOTES
Writer called report to [employer] [facility] [employer].     Electronically signed by Viktoriya Means RN on 1/30/2022 at 6:32 PM

## 2022-01-30 NOTE — PROGRESS NOTES
Writer spoke to Dr. Alicia Mendez from cardiology, [place] to d/c per cardiology.    Electronically signed by Sosa Fowler RN on 1/30/2022 at 3:32 PM

## 2022-01-30 NOTE — PROGRESS NOTES
Pt taken to dialysis via bed assisted by writer and nightshift CORNEL Osullivan.    Electronically signed by Gareth Gleason RN on 1/29/2022 at 7:40 PM

## 2022-01-30 NOTE — PROGRESS NOTES
[facility]  Office: [address], DO, Shakeel Camarenaleyla, DO, Ari Guardados, DO, Varghese Newton Blood, DO, Garcia Antonio MD, Silvia Lr MD, Estela Carlisle MD, Salinas Mcdonald MD, Keisha Orta MD, Layne Broderick MD, Rob Underwood MD, Fabiana Small, DO, Omaira Botello, DO, Marshall Gardner MD,  Julian Mace, DO, Cody Moore MD, Pia Tejdaa MD, Lis Quintana MD, Francoise Sullivan MD, Ever Holland MD, Patti Beck, [facility], [facility] Hospital[facility]ESSICA Sahu, CNP, Barrie Mackenzie, CNP, Inderjit Brownlee, CNS, Paula Medrano, [facility], Merry Higgins, CNP, Karlee Mariano, CNP, Maria Luisa Winslow, CNP, Eloina White, CNP, Neno Cintron PA-C, Toma Gaytan, DNP, Eladio Goldberg, AUDRA, Janell Tripathi, CNP, Ricardo Wisdom, CNP, Igor Alvarado, CNP, Lana Haskins, CNP, Tiffanie Strange, [facility], Mago Cap, [facility]    Progress Note    1/30/2022    11:04 AM    Name:   Noe Dwyer  MRN:     [id number]     Acct:      [de-identified]   Room:   2009/2009-02  IP Day:  4  Admit Date:  1/26/2022  8:39 PM    PCP:   Alberta Diaz DO  Code Status:  Full Code    Subjective:     C/C:   Shortness of breath    Interval History Status:   Comfortable  No chest pain  No shortness of breath  Her only complaint is a frontal headache for which she is requesting to Tylenol    Data Base Updates:  Pro-RTX790,808 High      Albumin3. 1 Low g/dL   Albumin/Globulin RatioNOT REPORTED   Alkaline Qasmizyjmak231 High U/L     ALT29U/L   AST76 High U/L   Total Bilirubin0.64mg/dL     YOU32oz/dL   CREATININE2.90 High mg/dL     Calcium9.0mg/dL   Phosphorus2.2 Low mg/dL     Hxtsloj91or/dl    Gsvjqh403 Low mmol/L   Potassium4.1         Brief History:     73-year-old female admitted from [facility] with concerns of:  Shortness of breath, bilateral pleural effusions  End-stage renal disease on dialysis  Bradycardia  Troponin elevation     Database has included:     Echo 11/2021:  Left ventricle is mildly enlarged, global left ventricular systolic function  is low normal versus mildly reduced. Calculated ejection fraction is 45% (by Drivre's Metod.) Calculated EF by  3D Heart Model is 56%. Evidence of diastolic dysfunction. Left atrium is moderately dilated. Bowing interatrial septal motion. No shunt seen by color Doppler. Right atrial dilatation. Small bright echo density noted originating from the area of the superior  vena cava, likely pacing lead. Right ventricular dilatation with normal systolic function. Thickened mitral valve leaflets. Mitral annular calcification is seen. Mean gradient is 4mmHg . Trace to mild mitral regurgitation. Tricuspid valve was not well visualized. Moderate to severe tricuspid regurgitation. Estimated right ventricular systolic pressure is 48 mmHg. No significant pericardial effusion is seen.     Initial plan included:  Optimize cardio-pulmonary function  Cardiology evaluation  Rule out non-ST elevation MI  Observe for symptomatic bradycardia   Renal evaluation  Dialysis as scheduled  Pulmonary consultation  May need thoracentesis  Blood Pressure - Monitor and control  Glycemic contol - Monitor and control blood sugars   Blood products as needed  Anemia / thrombocytopenia work-up on outpatient basis        Past Medical History:   has a past medical history of Acute anemia, Acute cystitis, Acute kidney injury ([address] Acute kidney injury superimposed on CKD ([address] Acute on chronic diastolic heart failure ([address] Acute renal failure (ARF) ([address] Anasarca, Anxiety and depression, AV block, 1st degree, Background diabetic retinopathy(362.01), Balance problem, BMI 45.0-49.9, adult ([address] Bradycardia, Buttock wound, CAD (coronary artery disease), Cancer of uterus ([address] Chronic diastolic heart failure ([address] Chronic kidney disease, Chronic low back pain, Controlled type 2 diabetes mellitus with chronic 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 dialysis, with long-term current use of insulin ([address] CVA (cerebral vascular accident) [facility]), Decompensated heart failure ([facility] Decubitus ulcer of trochanteric region of right hip, stage 2 ([facility] Dementia ([facility] Dry eye syndrome, End stage renal disease on dialysis ([facility] GERD (gastroesophageal reflux disease), Glaucoma suspect, Gout, Hemodialysis patient ([facility] Homonymous hemianopsia, Hyperkalemia, Hyperlipidemia, Hypertension, Infestation by bed bug, Infestation by maggots, Keratitis, Lymphedema of both lower extremities, Morbid obesity ([facility] MRSA bacteremia, Obesity, PAD (peripheral artery disease) ([facility] Peripheral polyneuropathy, Pressure injury of right heel, unstageable ([facility] Pseudophakos, Stroke ([facility] Trichiasis, Type 2 diabetes mellitus ([facility] and Wounds, multiple. Social History:   reports that she has never smoked. She has never used smokeless tobacco. She reports that she does not drink alcohol and does not use drugs. Family History:   Family History   Problem Relation Age of Onset    Diabetes Mother     Cancer Mother     High Blood Pressure Mother    Bhavani Bran Stroke Mother     Kidney Disease Mother     Uterine Cancer Mother     Diabetes Father     Heart Disease Father     Glaucoma Father     Emphysema Father     Coronary Art Dis Other         All 4 siblings.  Stroke Other         1 sibling.  Lung Cancer Other         1 sibling - cause of death lung cancer.  Mental Retardation Other        Review of Systems:     Less confused, doing better with historical data  Review of Systems   Constitutional: Positive for fatigue. Respiratory: Negative for cough and shortness of breath. Cardiovascular: Negative for chest pain and palpitations. Gastrointestinal: Negative for abdominal pain, nausea and vomiting. Genitourinary: Negative for flank pain and hematuria.        Physical Examination:        Vitals  /60   Pulse 73   Temp 98 °F (36.7 °C) (Oral)   Resp 14   Ht 5' 4\" (1.626 m)   Wt 174 lb 6 oz (79.1 kg)   SpO2 100%   BMI 29.93 kg/m² Temp (24hrs), Av.8 °F (36.6 °C), Min:97.4 °F (36.3 °C), Max:98 °F (36.7 °C)    Recent Labs     22  1216 22  1612 22  2312 22  0626   POCGLU 130* 129* 119* 100       Physical Exam  Vitals reviewed. Constitutional:       General: She is not in acute distress. Appearance: She is ill-appearing. She is not diaphoretic. HENT:      Head: Normocephalic. Nose: Nose normal.   Eyes:      General: No scleral icterus. Conjunctiva/sclera: Conjunctivae normal.   Neck:      Trachea: No tracheal deviation. Cardiovascular:      Rate and Rhythm: Normal rate and regular rhythm. Pulmonary:      Effort: Pulmonary effort is normal. No respiratory distress. Breath sounds: Rales present. No wheezing. Chest:      Chest wall: No tenderness. Abdominal:      General: There is no distension. Palpations: Abdomen is soft. Tenderness: There is no abdominal tenderness. Musculoskeletal:         General: No tenderness. Cervical back: Neck supple. Right lower leg: Edema present. Left lower leg: Edema present. Skin:     General: Skin is warm and dry. Coloration: Skin is pale. Findings: Rash (Stasis dermatitis) present. Comments: Hirsutism noted   Neurological:      Comments: The patient is having trouble providing historical data          Medications: Allergies:     Allergies   Allergen Reactions    Amoxicillin-Pot Clavulanate Diarrhea, Nausea Only and Nausea And Vomiting     Other reaction(s): Vomiting    Sulfamethoxazole-Trimethoprim Hives    Amoxicillin     Clavulanic Acid     Clonidine Derivatives Swelling    Sulfamethoxazole     Trimethoprim     Adhesive Tape Itching       Current Meds:   Scheduled Meds:    heparin (porcine)  5,000 Units SubCUTAneous BID    polyethylene glycol  17 g Oral BID    senna  1 tablet Oral Nightly    docusate sodium  100 mg Oral TID    [Held by provider] amLODIPine  10 mg Oral Daily    aspirin  81 mg Oral Daily    atorvastatin  40 mg Oral Nightly    [Held by provider] carvedilol  6.25 mg Oral BID     vitamin D3  5,000 Units Oral Daily    citalopram  20 mg Oral Daily    [Held by provider] insulin glargine  10 Units SubCUTAneous Nightly    insulin lispro  0-12 Units SubCUTAneous TID     insulin lispro  0-6 Units SubCUTAneous Nightly    cetirizine  10 mg Oral Daily    midodrine  10 mg Oral Once per day on Mon Wed Fri    [Held by provider] oxybutynin  5 mg Oral BID    pantoprazole  40 mg Oral BID AC    rivastigmine  1 patch TransDERmal Daily    sodium chloride  2 spray Nasal BID    sodium chloride flush  5-40 mL IntraVENous 2 times per day     Continuous Infusions:    sodium chloride       PRN Meds: anticoagulant sodium citrate, anticoagulant sodium citrate, midodrine, nitroGLYCERIN, sodium chloride flush, sodium chloride, ondansetron **OR** ondansetron, acetaminophen **OR** acetaminophen    Data:     I/O (24Hr):     Intake/Output Summary (Last 24 hours) at 1/30/2022 1104  Last data filed at 1/30/2022 5009  Gross per 24 hour   Intake 150 ml   Output    Net 150 ml       Labs:  Hematology:  Recent Labs     01/29/22  0613   WBC 7.6   RBC 3.65*   HGB 11.4*   HCT 36.7   .5   MCH 31.2   MCHC 31.1   RDW 18.3*   *   MPV 10.4     Chemistry:  Recent Labs     01/29/22  0613 01/30/22  0634   * 133*   K 4.4 4.1    99   CO2 20 22   GLUCOSE 135* 98   BUN 28* 20   CREATININE 3.37* 2.90*   ANIONGAP 13 12   LABGLOM 13* 16*   GFRAA 16* 19*   CALCIUM 9.3 9.0   PHOS 2.8 2.2*   PROBNP 112,584* 145,808*   TROPHS 106*  --      Recent Labs     01/28/22  2059 01/29/22  0631 01/29/22  1216 01/29/22  1612 01/29/22  2312 01/30/22  0626 01/30/22  0634   PROT  --   --   --   --   --   --  7.2   LABALBU  --   --   --   --   --   --  3.1*   AST  --   --   --   --   --   --  76*   ALT  --   --   --   --   --   --  29   ALKPHOS  --   --   --   --   --   --  371*   BILITOT  --   --   --   --   --   --  0.64 POCGLU 109* 144* 130* 129* 119* 100  --      ABG:  Lab Results   Component Value Date    POCPH 7.284 12/13/2020    POCPCO2 54.5 12/13/2020    POCPO2 71.6 12/13/2020    POCHCO3 25.8 12/13/2020    NBEA 1 12/13/2020    PBEA NOT REPORTED 12/13/2020    LAS5TCO 28 12/13/2020    WADA1ESG 92 12/13/2020    FIO2 2.0 12/13/2020     Lab Results   Component Value Date/Time    SPECIAL RT AC 12 ML 11/16/2021 06:11 AM     Lab Results   Component Value Date/Time    CULTURE NO GROWTH 6 DAYS 11/16/2021 06:11 AM       Radiology:  CT HEAD WO CONTRAST    Result Date: 1/25/2022  No acute intracranial abnormality. Senescent changes including chronic microvascular change. Old left PCA distribution infarct. RECOMMENDATIONS: Unavailable     XR CHEST PORTABLE    Result Date: 1/28/2022  1. Interval increase in vascular congestion and findings of pulmonary edema. 2.  Similar appearance of pleural effusions with basilar atelectasis. XR CHEST PORTABLE    Result Date: 1/25/2022  1. Cardiomegaly with mild vascular congestion. 2. Bilateral pleural effusions with lower lobe atelectasis.        Assessment:        Primary Problem  Bilateral pleural effusion    Active Hospital Problems    Diagnosis Date Noted    Hypervolemia [E87.70]     Constipation [K59.00] 01/27/2022    Pulmonary HTN ([facility] [I27.20] 01/27/2022    Thrombocytopenia ([facility] [D69.6] 01/27/2022    Acute on chronic combined systolic (congestive) and diastolic (congestive) heart failure (HCC) [I50.43] 01/27/2022    Severe tricuspid regurgitation [I07.1] 01/27/2022    Chronic respiratory failure with hypoxia (HCC) [J96.11] 01/27/2022    Bilateral pleural effusion [J90] 01/26/2022    Hyponatremia [E87.1] 11/13/2021    End stage renal disease on dialysis ([facility] [N18.6, Z99.2] 03/01/2021    Bradycardia [R00.1] 08/19/2020    Elevated troponin [R77.8] 08/08/2020    Lymphedema of both lower extremities [I89.0] 08/08/2020    Controlled type 2 diabetes mellitus with chronic 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 dialysis, with long-term current use of insulin (HCC) [E11.22, N18.6, Z79.4, Z99.2]     Hypertension [I10]     Dementia ([facility] [F03.90]        Plan:        Optimize cardio-pulmonary function  Cardiology evaluation and f/u Dr Rohan Salas  Non-ST elevation MI, thought to be type II  Observe for symptomatic bradycardia  Renal evaluation and f/u, discussed with Dr Lisa Mars, [facility] for DC\"   Avoid nephrotoxins  Dialysis as scheduled  Observe for volume overload  Pulmonary eval & f/u as scheduled Dr Nadya Anderson al  Thoracentesis prn   (\"Not enough fluid: per IR)  Blood Pressure - Monitor and control  Glycemic contol - Monitor and control blood sugars   Blood products as needed  Aranesp  Anemia / thrombocytopenia work-up on outpatient basis  PT/OT  DVT prophylaxis  Overall condition and prognosis appear somewhat guarded  Palliative care consult  Discharge planning initiated  Will discharge when arrangements complete and ok with other services.   Follow-up with PCP in one week, KEN SANTOS DO  Notify PCP of discharge   Med rec done  BERTHA done  DCP 32 min+    IP CONSULT TO CARDIOLOGY  IP CONSULT TO PULMONOLOGY  IP CONSULT TO NEPHROLOGY  IP CONSULT TO [address] [place] [address]  1/30/2022  11:04 AM

## 2022-01-30 NOTE — PROGRESS NOTES
Pulmonary Critical Care Progress Note       Patient seen for the follow up of  Bilateral pleural effusion     Subjective:  She is on oxygen 2 L nasal cannula she is able to feed herself with difficulty. She feels shortness of breath is about the same. She denies chest pain. She is occasional cough. Examination:  Vitals: /60   Pulse 73   Temp 98 °F (36.7 °C) (Oral)   Resp 14   Ht 5' 4\" (1.626 m)   Wt 174 lb 6 oz (79.1 kg)   SpO2 100%   BMI 29.93 kg/m²   General appearance: alert and cooperative with exam  Neck: No JVD  Lungs: Decreased breath sounds more on the left no wheezing  Heart: regular rate and rhythm, S1, S2 normal, no gallop  Abdomen: Soft, non tender, + BS  Extremities: no cyanosis or clubbing. B 1+ edema and Ace wraps to right wrist.  LABs:  CBC:   Recent Labs     01/29/22  0613   WBC 7.6   HGB 11.4*   HCT 36.7   *     BMP:   Recent Labs     01/29/22  0613 01/30/22  0634   * 133*   K 4.4 4.1   CO2 20 22   BUN 28* 20   CREATININE 3.37* 2.90*   LABGLOM 13* 16*   GLUCOSE 135* 98     PT/INR:   No results for input(s): PROTIME, INR in the last 72 hours. LIVER PROFILE:  Recent Labs     01/30/22  0634   AST 76*   ALT 29   LABALBU 3.1*   Results for Ady Lobo (MRN [id number]) as of 1/30/2022 13:59   Ref.  Range 1/30/2022 06:34   Pro-BNP Latest Ref Range: <300 pg/mL 145,808 (H)       Radiology:  Chest x-ray 1/29    Stable congestive heart failure with associated bilateral pleural effusions        Impression:  · Chronic hypoxic respiratory failure  · Bilateral pleural effusions/atelectasis  · Mild pulmonary edema/heart failure  · ESRD on hemodialysis  · Elevated troponin  · DM, CAD, CVA, dementia, HLD, HTN, PAD    Recommendations:  · Oxygen via nasal cannula  · Incentive spirometry every hour while awake  · Cardiology on consult  · Hemodialysis per nephrology  · IR consult to evaluate for left thoracentesis  · Assist with feeding  · Physical therapy  · Rehab discharge evaluation  · DVT prophylaxis neck Heparin 5000 every 12 hours    Cindy Blancas MD  Pulmonary Critical Care and Sleep Medicine,

## 2022-01-30 NOTE — PROGRESS NOTES
Treatment time: 150 minutes    Net UF: 1500 mL    Pre weight: 79.8 kg  Post weight: 78.3 kg  EDW: 83.5 kg    Access used: CVC right chest  Access function: good    Medications or blood products given: None    Regular outpatient schedule: TTS  Renal [place]    Summary of response to treatment: Patient tolerated treatment well, no signs or symptoms of distress noted, report called to Bashir Corrigan RN. Copy of dialysis treatment record placed in chart, to be scanned into EMR.

## 2022-01-30 NOTE — PLAN OF CARE
Problem: Skin Integrity:  Goal: Will show no infection signs and symptoms  Description: Will show no infection signs and symptoms  1/30/2022 1046 by Estuardo Duffy RN  Outcome: Ongoing  1/30/2022 0156 by Chago Hdz RN  Outcome: Ongoing  Note: Skin assessment ongoing. Pressure ulcer preventions being practiced - see charting for details. Patient turned every two hours. Skin integrity maintained, no new skin abnormalities assessed. Appropriate measures remain in place   Goal: Absence of new skin breakdown  Description: Absence of new skin breakdown  1/30/2022 1046 by Estuardo Duffy RN  Outcome: Ongoing  1/30/2022 0156 by Chago Hdz RN  Outcome: Ongoing     Problem: Falls - Risk of:  Goal: Will remain free from falls  Description: Will remain free from falls  1/30/2022 1046 by Estuardo Duffy RN  Outcome: Ongoing  1/30/2022 0156 by Chago Hdz RN  Outcome: Ongoing  Note: Patient is a fall risk during this admission. Fall risk assessment was performed. Patient is absent of falls. Bed is in the lowest position. Wheels on the bed are locked. Call light and bed side table are within reach. Clutter is removed. Patient was educated to call out when needing assistance or wanting to get out of bed. Patient offered toileting assistance during rounding. Hourly rounds have been performed.     Goal: Absence of physical injury  Description: Absence of physical injury  1/30/2022 1046 by Estuardo Duffy RN  Outcome: Ongoing  1/30/2022 0156 by Chago Hdz RN  Outcome: Ongoing     Problem: Metabolic:  Goal: Ability to maintain appropriate glucose levels will improve  Description: Ability to maintain appropriate glucose levels will improve  1/30/2022 1046 by Estuardo Duffy RN  Outcome: Ongoing  1/30/2022 0156 by Chago Hdz RN  Outcome: Ongoing  Note: Blood sugars stable and maintained with sliding scale insulin     Problem: Fluid Volume:  Goal: Will show no signs or symptoms of fluid imbalance  Description: Will show no signs or symptoms of fluid imbalance  1/30/2022 1046 by Michelle Huber RN  Outcome: Ongoing  1/30/2022 0156 by Cj Black RN  Outcome: Ongoing  Note: Electrolytes replaced following protocol for replacement.      Problem: Tissue Perfusion - Renal, Altered:  Goal: Electrolytes within specified parameters  Description: Electrolytes within specified parameters  1/30/2022 1046 by Michelle Huber RN  Outcome: Ongoing  1/30/2022 0156 by Cj Black RN  Outcome: Ongoing

## 2022-01-31 NOTE — PROGRESS NOTES
Fran Landa here to transport patient to [facility]. Patient alert, no distress noted. Patient discharged with 2 EMT's via stretcher.

## 2022-01-31 NOTE — DISCHARGE SUMMARY
[facility]  Office: [address], DO, Kevin Abreu, DO, Vlad Patel, DO, Orlando Kaufmandick Stout, DO, Bernadene Ahumada, MD, Grant Lopez MD, Dale Braswell MD, Norma Souza MD, Rea Murphy MD, Luis Greer MD, Linda Ritchie MD, Vicki Tinoco, DO, Avelino Love, DO, Andres Sandhu MD,  Magali Oglesby, DO, Javi Beal MD, Mini Mendiola MD, Chance Rios MD, Vikram Espino MD, Candida Leyva MD, Davian Ireland, CNP, Angelica Salas, CNP, Maricarmen Muhammad, CNP, Neeraj Nichole, CNS, Juan C Mosley, CNP, Sharmaine Dhillon, CNP, Twylla Boeck, CNP, Kareen Farris, CNP, Jon Mcqueen, CNP, Meenu Ross PA-C, Abilio Rm, DNP, Lazaro Saini, DNP, Alena Chavira, CNP, Gisela rGeer, CNP, Esther Niño, CNP, Emma Barrett, CNP, Praveen Vang, CNP, Polly Barnett, [address] [facility]    Discharge Summary    Patient ID: Jose Khalil  :  1950   MRN: [id number]     ACCOUNT:  [de-identified]   Patient's PCP: Dolores Thomas DO  Admit Date: 2022   Discharge Date: 2022    Discharge Physician: John Ricci DO     The patient was seen and examined on day of discharge and this discharge summary is in conjunction with any daily progress note from day of discharge.     Active Discharge Diagnoses:     Primary Problem  Bilateral pleural effusion      Hospital Problems  Active Hospital Problems    Diagnosis Date Noted    Hypervolemia [E87.70]     Constipation [K59.00] 2022    Pulmonary HTN ([facility] [address] [I27.20] 2022    Thrombocytopenia ([facility] [address] [D69.6] 2022    Severe tricuspid regurgitation [I07.1] 2022    Chronic respiratory failure with hypoxia (HCC) [J96.11] 2022    Hyponatremia [E87.1] 2021    End stage renal disease on dialysis [facility]) [N18.6, Z99.2] 03/01/2021    Bradycardia [R00.1] 08/19/2020    Lymphedema of both lower extremities [I89.0] 08/08/2020    Controlled type 2 diabetes mellitus with chronic 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 dialysis, with long-term current use of insulin ([facility] [address] [E11.22, N18.6, Z79.4, Z99.2]     Hypertension [I10]     Dementia [facility]) [F03.90]          Hospital Course:     Brief History  73-year-old female admitted from [facility] with concerns of:  Shortness of breath, bilateral pleural effusions  End-stage renal disease on dialysis  Bradycardia  Troponin elevation     Database has included:     Echo 11/2021:  Left ventricle is mildly enlarged, global left ventricular systolic function  is low normal versus mildly reduced. Calculated ejection fraction is 45% (by Driver's Metod.) Calculated EF by  3D Heart Model is 56%. Evidence of diastolic dysfunction. Left atrium is moderately dilated. Bowing interatrial septal motion. No shunt seen by color Doppler. Right atrial dilatation. Small bright echo density noted originating from the area of the superior  vena cava, likely pacing lead. Right ventricular dilatation with normal systolic function. Thickened mitral valve leaflets. Mitral annular calcification is seen. Mean gradient is 4mmHg . Trace to mild mitral regurgitation. Tricuspid valve was not well visualized. Moderate to severe tricuspid regurgitation. Estimated right ventricular systolic pressure is 48 mmHg.   No significant pericardial effusion is seen.     Initial plan included:  Optimize cardio-pulmonary function  Cardiology evaluation  Rule out non-ST elevation MI  Observe for symptomatic bradycardia   Renal evaluation  Dialysis as scheduled  Pulmonary consultation  May need thoracentesis  Blood Pressure - Monitor and control  Glycemic contol - Monitor and control blood sugars   Blood products as needed  Anemia / thrombocytopenia work-up on outpatient basis     The patient's condition was stabilized  Discharge planning initiated    Discharge plan:     Optimize cardio-pulmonary function  Cardiology evaluation and f/u Dr Ling Liang  Non-ST elevation MI, thought to be type II  Observe for symptomatic bradycardia  Renal evaluation and f/u, discussed with Nichole Bustilloocent for DC\"   Avoid nephrotoxins  Dialysis as scheduled  Observe for volume overload  Pulmonary eval & f/u as scheduled Dr Rolan Turner al  Thoracentesis prn   (\"Not enough fluid: per IRat this time)  Blood Pressure - Monitor and control  Glycemic contol - Monitor and control blood sugars   Blood products as needed  Aranesp  Anemia / thrombocytopenia work-up on outpatient basis  PT/OT  DVT prophylaxis  Overall condition and prognosis appear somewhat guarded  Palliative care consult  Discharge planning initiated  Will discharge when arrangements complete and ok with other services. Follow-up with PCP in one week, KEN SANTOS DO  Notify PCP of discharge   Med rec done  BERTHA done  DCP 32 min+    No discharge procedures on file.     Significant Diagnostic Studies:     Labs / Micro:  Labs:  Hematology:  Recent Labs     01/29/22  0613   WBC 7.6   RBC 3.65*   HGB 11.4*   HCT 36.7   .5   MCH 31.2   MCHC 31.1   RDW 18.3*   *   MPV 10.4     Chemistry:  Recent Labs     01/29/22  0613 01/30/22  0634   * 133*   K 4.4 4.1    99   CO2 20 22   GLUCOSE 135* 98   BUN 28* 20   CREATININE 3.37* 2.90*   ANIONGAP 13 12   LABGLOM 13* 16*   GFRAA 16* 19*   CALCIUM 9.3 9.0   PHOS 2.8 2.2*   PROBNP 112,584* 145,808*   TROPHS 106*  --      Recent Labs     01/29/22  1216 01/29/22  1612 01/29/22  2312 01/30/22  0626 01/30/22  0634 01/30/22  1125 01/30/22  1651   PROT  --   --   --   --  7.2  --   --    LABALBU  --   --   --   --  3.1*  --   --    AST  --   --   --   --  76*  --   --    ALT  --   --   --   --  29  --   --    ALKPHOS  --   --   --   --  371*  --   --    BILITOT  --   --   --   --  0.64  --   --    POCGLU 130* 129* 119* 100  --  112* 119*         Radiology:    CT HEAD WO CONTRAST    Result Date: 1/25/2022  EXAMINATION: CT OF THE HEAD WITHOUT CONTRAST  1/25/2022 9:45 am TECHNIQUE: CT of the head was performed without the administration of intravenous contrast. Dose modulation, iterative reconstruction, and/or weight based adjustment of the mA/kV was utilized to reduce the radiation dose to as low as reasonably achievable. COMPARISON: 13 November 2021 HISTORY: ORDERING SYSTEM PROVIDED HISTORY: headache TECHNOLOGIST PROVIDED HISTORY: headache Decision Support Exception - unselect if not a suspected or confirmed emergency medical condition->Emergency Medical Condition (MA) Reason for Exam: headache FINDINGS: BRAIN/VENTRICLES: There is no acute intracranial hemorrhage, mass effect or midline shift. No abnormal extra-axial fluid collection. The gray-white differentiation is maintained without evidence of an acute infarct. There is no evidence of hydrocephalus. Area of encephalomalacia is present in the left occipital lobe, unchanged from prior exam.  Calcified vertebral and cavernous carotid arteries are noted. Scattered hypodensity in the white matter is present consistent with chronic microvascular change. ORBITS: The visualized portion of the orbits demonstrate no acute abnormality. SINUSES: The visualized paranasal sinuses and mastoid air cells demonstrate no acute abnormality. SOFT TISSUES/SKULL:  No acute abnormality of the visualized skull or soft tissues. No acute intracranial abnormality. Senescent changes including chronic microvascular change. Old left PCA distribution infarct.  RECOMMENDATIONS: Unavailable     XR CHEST PORTABLE    Result Date: 1/29/2022  EXAMINATION: ONE XRAY VIEW OF THE CHEST 1/29/2022 5:59 am COMPARISON: 01/28/2022 HISTORY: ORDERING SYSTEM PROVIDED HISTORY: Effusion/infiltrate TECHNOLOGIST PROVIDED HISTORY: Effusion/infiltrate Reason for Exam: Port upright AP CXR - effusion/infiltrate FINDINGS: There is a rightcentral venous catheter with the tip in the right atrium. Cardiac size is enlarged. No acute infiltrates are seen . The pulmonary vascularity is hazy and indistinct. No pneumothorax. Stable bilateral pleural effusions. Postsurgical changes overlying the mediastinum and left upper quadrant. Stable congestive heart failure with associated bilateral pleural effusions. XR CHEST PORTABLE    Result Date: 1/28/2022  EXAMINATION: ONE XRAY VIEW OF THE CHEST 1/28/2022 9:04 am COMPARISON: 01/25/2022, 11/15/2021 HISTORY: ORDERING SYSTEM PROVIDED HISTORY: Effusion/infiltrate TECHNOLOGIST PROVIDED HISTORY: Effusion/infiltrate Reason for Exam: port ap upright effusion FINDINGS: Tunneled right internal jugular dialysis catheter in place. The cardiac and mediastinal contours appear unchanged. Vascular congestion has increased in the interval.  Basilar opacities and pleural effusions are otherwise without significant change. No pneumothorax identified. 1.  Interval increase in vascular congestion and findings of pulmonary edema. 2.  Similar appearance of pleural effusions with basilar atelectasis. XR CHEST PORTABLE    Result Date: 1/25/2022  EXAMINATION: ONE XRAY VIEW OF THE CHEST 1/25/2022 9:55 am COMPARISON: 11/15/2021. HISTORY: ORDERING SYSTEM PROVIDED HISTORY: dyspnea TECHNOLOGIST PROVIDED HISTORY: dyspnea Reason for Exam: SOB FINDINGS: There are postsurgical changes of median sternotomy. The heart size is enlarged. The pulmonary vasculature is mildly congested. There are bilateral pleural effusions with lower lobe atelectasis. Note is made of a right dialysis catheter. No pneumothoraces are seen. 1. Cardiomegaly with mild vascular congestion. 2. Bilateral pleural effusions with lower lobe atelectasis.      IR GUIDED THORACENTESIS PLEURAL    Result Date: 1/31/2022  PROCEDURE: ULTRASOUND CHEST FOR THORACENTESIS 1/28/2022 HISTORY: ORDERING SYSTEM PROVIDED HISTORY: pleural effusion TECHNOLOGIST PROVIDED HISTORY: pleural effusion Which side should the procedure be performed? ->Left FINDINGS: Patient was evaluated by Dr. Asa Mason for possible thoracentesis. Scanning of the left chest shows only a relatively small pleural effusion. Due to the small quantity of fluid present, thoracentesis was deferred at this time. Findings were conveyed to the clinical service. Small left pleural effusion. Planned thoracentesis was deferred at this time. Consultations:    Consults:     Final Specialist Recommendations/Findings:   IP CONSULT TO CARDIOLOGY  IP CONSULT TO PULMONOLOGY  IP CONSULT TO NEPHROLOGY  IP CONSULT TO PALLIATIVE CARE        Discharged Condition:    Stable     Disposition: skilled nursing facility    Physician Follow Up:   No follow-up provider specified. Activity:  activity as tolerated    Diet:  diabetic diet and renal diet     Discharge Medications:      Medication List      CHANGE how you take these medications    acetaminophen 325 MG tablet  Commonly known as: TYLENOL  What changed: Another medication with the same name was removed. Continue taking this medication, and follow the directions you see here. carvedilol 6.25 MG tablet  Commonly known as: COREG  Take 1 tablet by mouth 2 times daily (with meals) Hold for heart rate less than 60  Hold for BP less than 115  What changed: additional instructions     Claritin 10 MG tablet  Generic drug: loratadine  What changed: Another medication with the same name was removed. Continue taking this medication, and follow the directions you see here. insulin lispro 100 UNIT/ML injection vial  Commonly known as: HUMALOG  Inject 0-6 Units into the skin 3 times daily (with meals) Glucose: Dose:               No Insulin  140-199 1 Unit  200-249 2 Units  250-299 3 Units  300-349 4 Units  350-399 5 Units  Over 399 6 Units  What changed: Another medication with the same name was removed.  Continue taking this medication, and follow the directions you see here. nystatin [id number] UNIT/GM cream  Commonly known as: MYCOSTATIN  What changed: Another medication with the same name was removed. Continue taking this medication, and follow the directions you see here. CONTINUE taking these medications    Alcohol Prep Pads  Checks blood sugar 3 times a day     amLODIPine 10 MG tablet  Commonly known as: NORVASC  Take 1 tablet by mouth daily     aspirin 81 MG tablet  Take 1 tablet by mouth daily     atorvastatin 40 MG tablet  Commonly known as: LIPITOR  TAKE 1 TABLET BY MOUTH EVERYDAY     Cholecalciferol 125 MCG (5000 UT) Chew  Take 1 tablet by mouth daily     citalopram 20 MG tablet  Commonly known as: CELEXA  Take 1 tablet by mouth daily     hydrALAZINE 100 MG tablet  Commonly known as: APRESOLINE  Take 1 tablet by mouth 3 times daily     lidocaine-prilocaine 2.5-2.5 % cream  Commonly known as: EMLA     * midodrine 10 MG tablet  Commonly known as: PROAMATINE     * midodrine 10 MG tablet  Commonly known as: PROAMATINE     nitroGLYCERIN 0.4 MG SL tablet  Commonly known as: NITROSTAT  up to max of 3 total doses. If no relief after 1 dose, call 911. oxybutynin 5 MG tablet  Commonly known as: DITROPAN  TAKE 1 TABLET BY MOUTH TWICE DAILY     pantoprazole 40 MG tablet  Commonly known as: PROTONIX  Take 1 tablet by mouth 2 times daily (before meals)     rivastigmine 9.5 MG/24HR  Commonly known as: Exelon  APPLY 1 NEW PATCH EVERY 24 HOURS     senna 8.6 MG tablet  Commonly known as: SENOKOT     sodium chloride 0.65 % nasal spray  Commonly known as: OCEAN         * This list has 2 medication(s) that are the same as other medications prescribed for you. Read the directions carefully, and ask your doctor or other care provider to review them with you.                Where to Get Your Medications      You can get these medications from any pharmacy    Bring a paper prescription for each of these medications  · carvedilol 6.25 MG tablet         Time Spent on discharge is  32 mins in patient examination, evaluation, counseling, medication reconciliation, discharge plan and follow up. Electronically signed by   Mikayla Magana DO  1/31/2022  9:08 AM      Thank you Dr. Kyrie Sandoval DO for the opportunity to be involved in this patient's care.

## 2022-01-31 NOTE — PROGRESS NOTES
DR. Carlee Byrd - [facility] VISIT    DATE OF SERVICE: 12/21/21    NURSING HOME: [facility]    CHIEF COMPLAINT/HISTORY OF CHIEF COMPLAINT: This patient is being seen for ongoing evaluation and management of her diabetes mellitus type 2, hypertension, hyperlipidemia, end-stage renal disease on hemodialysis, chronic diastolic heart failure, dementia, anxiety, depression, and obesity. She is getting dialysis regularly. She does take insulin for her diabetes. She is on Lipitor for hyperlipidemia. She uses an Exelon patch for dementia. She developed a severe headache today while at dialysis and she was sent to [facility] emergency room. They found her to have some thrombocytopenia, but that was it. She was sent back to the nursing home. There are no other complaints. ALLERGIES:   Allergies   Allergen Reactions    Amoxicillin-Pot Clavulanate Diarrhea, Nausea Only and Nausea And Vomiting     Other reaction(s): Vomiting    Sulfamethoxazole-Trimethoprim Hives    Amoxicillin     Clavulanic Acid     Clonidine Derivatives Swelling    Sulfamethoxazole     Trimethoprim     Adhesive Tape Itching       MEDICATIONS: As noted on the [facility], referenced and incorporated herein. PAST MEDICAL HISTORY:   Past Medical History:   Diagnosis Date    Acute anemia     Acute cystitis 8/8/2020    Acute kidney injury ([address] 8/19/2020    Acute kidney injury superimposed on CKD ([address]     Acute on chronic diastolic heart failure ([address] 8/7/2020    Acute renal failure (ARF) ([address] 12/11/2020    Anasarca 12/12/2020    Anxiety and depression     AV block, 1st degree 8/19/2020    Background diabetic retinopathy(362.01) 2008    (Mild)    Balance problem     BMI 45.0-49.9, adult ([address] 3/1/2021    Bradycardia 8/19/2020    Buttock wound 8/20/2020    CAD (coronary artery disease)     (with coronary artery bypass graft x4).     Cancer of uterus ([address]     history of, (probable cure)    Chronic diastolic heart failure ([address] 3/1/2021    Chronic kidney disease     Chronic low back pain     Controlled type 2 diabetes mellitus with chronic 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 dialysis, with long-term current use of insulin ([address]     CVA (cerebral vascular accident) ([address]     Decompensated heart failure ([address] 2020    Decubitus ulcer of trochanteric region of right hip, stage 2 ([address] 2020    Dementia ([address]     (moderate)    Dry eye syndrome     End stage renal disease on dialysis ([address] 3/1/2021    GERD (gastroesophageal reflux disease)     Glaucoma suspect     Gout     Hemodialysis patient ([address] 3/1/2021    Homonymous hemianopsia     (Right)    Hyperkalemia 2020    Hyperlipidemia     Hypertension     Infestation by bed bug 2020    Infestation by maggots 2020    Keratitis     (secondary to dry eye syndrome).  Lymphedema of both lower extremities 2020    Morbid obesity ([address] 3/1/2021    MRSA bacteremia 2020    Obesity     PAD (peripheral artery disease) (HCC)     Peripheral polyneuropathy     (diabetic)    Pressure injury of right heel, unstageable ([address] 2020    Pseudophakos     (Right Eye: 2012; Left Eye: 2012) - Dr. Abdoul Akhtar.  Stroke [facility])     (Multiple) MRI of brain 10/2008 shows multiple infarcts involving the temporal and parietal areas.  Trichiasis     (left eye)    Type 2 diabetes mellitus ([address]     Wounds, multiple 2020       PAST SURGICAL HISTORY:   Past Surgical History:   Procedure Laterality Date    CATARACT REMOVAL WITH IMPLANT  2012    (Right eye) - Dr. Abdoul Johnson  CATARACT REMOVAL WITH IMPLANT  2012    (Left eye) - Dr. Abdoul Johnson   SECTION      CHOLECYSTECTOMY      CORONARY ARTERY BYPASS GRAFT  12-    (x4) - LIMA-LAD, SVG-diagnoal 1, SVG-OM1, and SVG-PDA. Exploration of left radial. (Dr. Charly Shaffer).     EYE SURGERY Left     as a child     GASTRIC BYPASS SURGERY      HYSTERECTOMY      (abdominal)  with left oophorectomy. Right ovary retained.  IR TUNNELED CATHETER PLACEMENT GREATER THAN 5 YEARS  1/8/2021    IR TUNNELED CATHETER PLACEMENT GREATER THAN 5 YEARS 1/8/2021 Matt Teixeira MD [facility] SPECIAL PROCEDURES    TONSILLECTOMY AND ADENOIDECTOMY      UPPER GASTROINTESTINAL ENDOSCOPY  12/14/2020    EGD BIOPSY performed by Liane Neves MD at [facility] Endoscopy       SOCIAL HISTORY:     Tobacco:   Social History     Tobacco Use   Smoking Status Never Smoker   Smokeless Tobacco Never Used   Tobacco Comment    never smoker eclamb rrt 7/20/17     Alcohol:   Social History     Substance and Sexual Activity   Alcohol Use No     Drugs:   Social History     Substance and Sexual Activity   Drug Use No       FAMILY HISTORY: family history includes Cancer in her mother; Coronary Art Dis in an other family member; Diabetes in her father and mother; Emphysema in her father; Glaucoma in her father; Heart Disease in her father; High Blood Pressure in her mother; Kidney Disease in her mother; Arline Outlaw in an other family member; Mental Retardation in an other family member; Stroke in her mother and another family member; Uterine Cancer in her mother. REVIEW OF SYSTEMS:     Please see history of chief complaint above; otherwise no new problems with respect to General, HEENT, Cardiovascular, Respiratory, Gastrointestinal, Genitourinary, Endocrinologic, Musculoskeletal, or Neuropsychiatric complaints. PHYSICAL EXAMINATION:    Vitals: Temp: 97.5 deg F. Pulse: 56. Resp: 16. BP: 96/49. General: A 80 y.o.  female. Alert and oriented to person, place and time. She does not appear to be in any acute distress. Skin: Skin color, texture, turgor normal. No rashes or lesions. HEENT/Neck: Essentially unremarkable  Lungs: Normal - CTA without rales, rhonchi, or wheezing. Heart: regular rate and rhythm, S1, S2 normal, with a grade 2/6 systolic murmur. No click, rub or gallop No S3 or S4. Abdomen: Obese soft, non-distended, non-tender, normal active bowel sounds, no masses palpated and no hepatosplenomegaly  Extremities: No clubbing, cyanosis, or edema in any of the extremities. Neurologic: cranial nerves II-XII are grossly intact    ASSESSMENT:     Diagnosis Orders   1. Thrombocytopenia ([address]     2. Controlled type 2 diabetes mellitus with chronic 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 dialysis, with long-term current use of insulin (HCC)     3. Long-term insulin use ([address]     4. Essential hypertension     5. Mixed hyperlipidemia     6. Chronic diastolic heart failure (HCC)     7. End stage renal disease on dialysis ([address]     8. Hemodialysis patient ([address]     9. Dementia without behavioral disturbance, unspecified dementia type ([address]     10. Anxiety and depression     11. Overweight           PLAN:    1. Continue current treatment  2. Nursing home record reviewed and updates summarized and entered into electronic record  3. Nursing home blood sugar logs and diabetic medication administration reviewed. No changes  4. Emergency room record reviewed. 5. Will monitor thrombocytopenia for now. 6. See nursing home orders and MAR.         Electronically signed by Delpha Prader, DO on 1/30/2022 at 10:57 PM  Internal Medicine

## 2022-02-09 NOTE — PROGRESS NOTES
02/09/22  Cleve Solitario  1950    Patient Resident of [facility]    Chief Complaint  1. Rash    2. Overweight        HPI:  78-year-old patient history of obesity being seen at the request of the nursing staff as she has had some rash under bilateral breasts. On review also has a little under her right abdominal fold. States it itches. No current treatment. No new environmental exposures no new medications or dietary changes    Allergies   Allergen Reactions    Amoxicillin-Pot Clavulanate Diarrhea, Nausea Only and Nausea And Vomiting     Other reaction(s): Vomiting    Sulfamethoxazole-Trimethoprim Hives    Amoxicillin     Clavulanic Acid     Clonidine Derivatives Swelling    Sulfamethoxazole     Trimethoprim     Adhesive Tape Itching       Past Medical History:   Diagnosis Date    Acute anemia     Acute cystitis 8/8/2020    Acute kidney injury ([address] 8/19/2020    Acute kidney injury superimposed on CKD ([address]     Acute on chronic combined systolic (congestive) and diastolic (congestive) heart failure ([address] 1/27/2022    Acute on chronic diastolic heart failure ([address] 8/7/2020    Acute renal failure (ARF) ([address] 12/11/2020    Anasarca 12/12/2020    Anxiety and depression     AV block, 1st degree 8/19/2020    Background diabetic retinopathy(362.01) 2008    (Mild)    Balance problem     Bilateral pleural effusion 1/26/2022    BMI 45.0-49.9, adult ([address] 3/1/2021    Bradycardia 8/19/2020    Buttock wound 8/20/2020    CAD (coronary artery disease)     (with coronary artery bypass graft x4).     Cancer of uterus [facility])     history of, (probable cure)    Chronic diastolic heart failure ([address] 3/1/2021    Chronic kidney disease     Chronic low back pain     Controlled type 2 diabetes mellitus with chronic 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 dialysis, with long-term current use of insulin ([address]     CVA (cerebral vascular accident) ([address]     Decompensated heart failure ([address] 12/4/2020    Decubitus ulcer of trochanteric region of right hip, stage 2 ([address] 2020    Dementia (HCC)     (moderate)    Dry eye syndrome     Elevated troponin 2020    End stage renal disease on dialysis ([address] 3/1/2021    GERD (gastroesophageal reflux disease)     Glaucoma suspect     Gout     Hemodialysis patient ([address] 3/1/2021    Homonymous hemianopsia     (Right)    Hyperkalemia 2020    Hyperlipidemia     Hypertension     Infestation by bed bug 2020    Infestation by maggots 2020    Keratitis     (secondary to dry eye syndrome).  Lymphedema of both lower extremities 2020    Morbid obesity ([address] 3/1/2021    MRSA bacteremia 2020    Obesity     PAD (peripheral artery disease) (HCC)     Peripheral polyneuropathy     (diabetic)    Pressure injury of right heel, unstageable ([address] 2020    Pseudophakos     (Right Eye: 2012; Left Eye: 2012) - Dr. Osman Velarde.  Stroke [facility])     (Multiple) MRI of brain 10/2008 shows multiple infarcts involving the temporal and parietal areas.  Trichiasis     (left eye)    Type 2 diabetes mellitus ([address]     Wounds, multiple 2020       Past Surgical History:   Procedure Laterality Date    CATARACT REMOVAL WITH IMPLANT  2012    (Right eye) - Dr. Osman Velarde.  CATARACT REMOVAL WITH IMPLANT  2012    (Left eye) - Dr. Osman Velarde.   SECTION      CHOLECYSTECTOMY      CORONARY ARTERY BYPASS GRAFT  12-    (x4) - LIMA-LAD, SVG-diagnoal 1, SVG-OM1, and SVG-PDA. Exploration of left radial. (Dr. Elliot Arredondo).  EYE SURGERY Left     as a child     GASTRIC BYPASS SURGERY      HYSTERECTOMY      (abdominal)  with left oophorectomy. Right ovary retained.     IR TUNNELED CATHETER PLACEMENT GREATER THAN 5 YEARS  2021    IR TUNNELED CATHETER PLACEMENT GREATER THAN 5 YEARS 2021 Monie Del Castillo MD [facility] SPECIAL PROCEDURES    TONSILLECTOMY AND ADENOIDECTOMY      UPPER GASTROINTESTINAL ENDOSCOPY  12/14/2020    EGD BIOPSY performed by Ras Victor MD at [facility]       Medications as per [facility] Chart /reviewed     Social History     Socioeconomic History    Marital status:      Spouse name: Not on file    Number of children: Not on file    Years of education: Not on file    Highest education level: Not on file   Occupational History    Not on file   Tobacco Use    Smoking status: Never Smoker    Smokeless tobacco: Never Used    Tobacco comment: never smoker eclamb rrt 7/20/17   Vaping Use    Vaping Use: Unknown   Substance and Sexual Activity    Alcohol use: No    Drug use: No    Sexual activity: Not on file   Other Topics Concern    Not on file   Social History Narrative    Not on file     Social Determinants of Health     Financial Resource Strain:     Difficulty of Paying Living Expenses: Not on file   Food Insecurity:     Worried About Running Out of Food in the Last Year: Not on file    Rosanne of Food in the Last Year: Not on file   Transportation Needs:     Lack of Transportation (Medical): Not on file    Lack of Transportation (Non-Medical):  Not on file   Physical Activity:     Days of Exercise per Week: Not on file    Minutes of Exercise per Session: Not on file   Stress:     Feeling of Stress : Not on file   Social Connections:     Frequency of Communication with Friends and Family: Not on file    Frequency of Social Gatherings with Friends and Family: Not on file    Attends Congregation Services: Not on file    Active Member of Clubs or Organizations: Not on file    Attends Club or Organization Meetings: Not on file    Marital Status: Not on file   Intimate Partner Violence:     Fear of Current or Ex-Partner: Not on file    Emotionally Abused: Not on file    Physically Abused: Not on file    Sexually Abused: Not on file   Housing Stability:     Unable to Pay for Housing in the Last Year: Not on file    Number of [facility] in the Last Year: Not on file    Unstable Housing in the Last Year: Not on file       Review of Systems   Reason unable to perform ROS: Limited due to patient's cognition. Skin: Positive for rash. All other systems reviewed and are negative. Physical Exam  Vitals and nursing note reviewed. Constitutional:       General: She is not in acute distress. Appearance: Normal appearance. She is well-developed. She is not diaphoretic. HENT:      Head: Normocephalic and atraumatic. Right Ear: External ear normal.      Left Ear: External ear normal.   Eyes:      General:         Right eye: No discharge. Left eye: No discharge. Neck:      Trachea: No tracheal deviation. Cardiovascular:      Rate and Rhythm: Normal rate and regular rhythm. Pulses: Normal pulses. Heart sounds: Normal heart sounds. No murmur heard. No friction rub. No gallop. Pulmonary:      Effort: Pulmonary effort is normal. No respiratory distress. Breath sounds: Normal breath sounds. No stridor. No wheezing, rhonchi or rales. Chest:      Chest wall: No tenderness. Abdominal:      General: Bowel sounds are normal. There is no distension. Palpations: Abdomen is soft. Tenderness: There is no abdominal tenderness. Musculoskeletal:         General: No swelling. Skin:     General: Skin is warm and dry. Capillary Refill: Capillary refill takes less than 2 seconds. Coloration: Skin is not pale. Findings: Erythema (Mildly erythemic plaque under bilateral breasts and right groin/abdominal fold) present. No rash. Neurological:      General: No focal deficit present. Mental Status: She is alert. Mental status is at baseline. Cranial Nerves: No cranial nerve deficit. Sensory: No sensory deficit.       Coordination: Coordination normal.   Psychiatric:         Mood and Affect: Mood normal.         Behavior: Behavior normal.         Vital Signs: Temperature afebrile, vital signs stable, reviewed in point click care    Assessment:  1. Rash  2. Overweight  Nystatin powder as directed. Make sure they are cleansing and drying her skin well avoid irritants        Plan:  As noted above. Follow up for routine visit. Call sooner with concerns prior.     Electronically signed by SHIRA Alicea CNP on 2/9/2022 at 5:02 PM

## 2022-02-16 NOTE — PROGRESS NOTES
02/16/22  Ava Joeys  1950    Patient Resident of [facility]    Chief Complaint  1. Petechiae    2. Thrombocytopenia ([facility]    3. Essential hypertension    4. Dementia without behavioral disturbance, unspecified dementia type ([facility]    5. Chronic diastolic heart failure ([facility]    6. End stage renal disease on dialysis [facility])        HPI:  80year-old patient being seen at the request of the nursing staff for ongoing weight loss, progressive mentation decline and now petechiae to bilateral hands forearms and chest that began through the evening. Patient with a history of thrombocytopenia. No recent CBC over the past 4 weeks at [facility] however recent CBC with platelets 632,177 and of January. .  Patient did have dialysis yesterday. Patient did have a hospital stay end of January. No recent change in medication. Her vitals stable today in office. Patient sitting upright eating breakfast.  Unsure why her hands have spots all over them. Denies any trauma. We discussed CODE STATUS again at length. Patient continues to wish to be a full code. Discussed if her heart would stop she would want us to do CPR and shock her heart if indicated. If she would quit breathing she states she wants to be on a breathing tube. Has no desire to quit dialysis. There has been some concern at the [facility] that patient is incompetent to make her own decisions. They are currently have a referral for neurology/psychiatry to decide if she is competent to make her own decisions.       Allergies   Allergen Reactions    Amoxicillin-Pot Clavulanate Diarrhea, Nausea Only and Nausea And Vomiting     Other reaction(s): Vomiting    Sulfamethoxazole-Trimethoprim Hives    Amoxicillin     Clavulanic Acid     Clonidine Derivatives Swelling    Sulfamethoxazole     Trimethoprim     Adhesive Tape Itching       Past Medical History:   Diagnosis Date    Acute anemia     Acute cystitis 8/8/2020    Acute kidney injury ([facility] 8/19/2020    2012) - Dr. Abhishek Malcolm.  Stroke [facility])     (Multiple) MRI of brain 10/2008 shows multiple infarcts involving the temporal and parietal areas.  Trichiasis     (left eye)    Type 2 diabetes mellitus ([address]     Wounds, multiple 2020       Past Surgical History:   Procedure Laterality Date    CATARACT REMOVAL WITH IMPLANT  2012    (Right eye) - Dr. Abhishek Malcolm.  CATARACT REMOVAL WITH IMPLANT  2012    (Left eye) - Dr. Abhishek Malcolm.   SECTION      CHOLECYSTECTOMY      CORONARY ARTERY BYPASS GRAFT  12-    (x4) - LIMA-LAD, SVG-diagnoal 1, SVG-OM1, and SVG-PDA. Exploration of left radial. (Dr. Sandro Blanton).  EYE SURGERY Left     as a child     GASTRIC BYPASS SURGERY      HYSTERECTOMY      (abdominal)  with left oophorectomy. Right ovary retained.     IR TUNNELED CATHETER PLACEMENT GREATER THAN 5 YEARS  2021    IR TUNNELED CATHETER PLACEMENT GREATER THAN 5 YEARS 2021 Steve Peter MD [facility] SPECIAL PROCEDURES    TONSILLECTOMY AND ADENOIDECTOMY      UPPER GASTROINTESTINAL ENDOSCOPY  2020    EGD BIOPSY performed by Calin Nunez MD at [facility]       Medications as per [facility] Chart /reviewed     Social History     Socioeconomic History    Marital status:      Spouse name: Not on file    Number of children: Not on file    Years of education: Not on file    Highest education level: Not on file   Occupational History    Not on file   Tobacco Use    Smoking status: Never Smoker    Smokeless tobacco: Never Used    Tobacco comment: never smoker eclamb rrt 17   Vaping Use    Vaping Use: Unknown   Substance and Sexual Activity    Alcohol use: No    Drug use: No    Sexual activity: Not on file   Other Topics Concern    Not on file   Social History Narrative    Not on file     Social Determinants of Health     Financial Resource Strain:     Difficulty of Paying Living Expenses: Not on file   Food Insecurity:     Worried About Running Out of Food in the Last Year: Not on file    Ran Out of Food in the Last Year: Not on file   Transportation Needs:     Lack of Transportation (Medical): Not on file    Lack of Transportation (Non-Medical): Not on file   Physical Activity:     Days of Exercise per Week: Not on file    Minutes of Exercise per Session: Not on file   Stress:     Feeling of Stress : Not on file   Social Connections:     Frequency of Communication with Friends and Family: Not on file    Frequency of Social Gatherings with Friends and Family: Not on file    Attends Jainism Services: Not on file    Active Member of [address] [place] [address] [employer] or Organizations: Not on file    Attends Club or Organization Meetings: Not on file    Marital Status: Not on file   Intimate Partner Violence:     Fear of Current or Ex-Partner: Not on file    Emotionally Abused: Not on file    Physically Abused: Not on file    Sexually Abused: Not on file   Housing Stability:     Unable to Pay for Housing in the Last Year: Not on file    Number of [place] in the Last Year: Not on file    Unstable Housing in the Last Year: Not on file       Review of Systems   Constitutional: Negative for activity change, appetite change, chills, fatigue, fever and unexpected weight change. HENT: Negative for congestion, dental problem, ear discharge, ear pain, facial swelling, hearing loss, postnasal drip, rhinorrhea, sinus pressure, sore throat and trouble swallowing. Eyes: Negative for pain and visual disturbance. Respiratory: Negative for cough, chest tightness, shortness of breath and wheezing. Cardiovascular: Negative for chest pain, palpitations and leg swelling. Gastrointestinal: Negative for abdominal pain, blood in stool, constipation, diarrhea, nausea and vomiting. Endocrine: Negative for cold intolerance, heat intolerance and polyuria. Genitourinary: Negative for difficulty urinating.    Musculoskeletal: Negative for arthralgias, gait problem, myalgias, neck pain and neck stiffness. Skin: Negative for color change, rash and wound. Neurological: Positive for weakness (However limited by patient's cognition). Negative for dizziness, tremors, seizures, light-headedness, numbness and headaches. Psychiatric/Behavioral: Negative for confusion and hallucinations. The patient is not nervous/anxious. Physical Exam  Vitals and nursing note reviewed. Constitutional:       General: She is not in acute distress. Appearance: Normal appearance. She is well-developed. She is not diaphoretic. HENT:      Head: Normocephalic and atraumatic. Right Ear: External ear normal.      Left Ear: External ear normal.   Eyes:      General:         Right eye: No discharge. Left eye: No discharge. Neck:      Trachea: No tracheal deviation. Cardiovascular:      Rate and Rhythm: Normal rate and regular rhythm. Pulses: Normal pulses. Heart sounds: Normal heart sounds. No murmur heard. No friction rub. No gallop. Pulmonary:      Effort: Pulmonary effort is normal. No respiratory distress. Breath sounds: Normal breath sounds. No stridor. No wheezing, rhonchi or rales. Chest:      Chest wall: No tenderness. Abdominal:      General: Bowel sounds are normal. There is no distension. Palpations: Abdomen is soft. Tenderness: There is no abdominal tenderness. Musculoskeletal:         General: No swelling. Skin:     General: Skin is warm and dry. Capillary Refill: Capillary refill takes less than 2 seconds. Coloration: Skin is not pale. Findings: No bruising (Positive petechiae to bilateral hands forearm faint amount to anterior chest) or rash. Neurological:      General: No focal deficit present. Mental Status: She is alert. Mental status is at baseline. Cranial Nerves: No cranial nerve deficit. Sensory: No sensory deficit.       Coordination: Coordination normal.      Comments: Alert to person fluid place but not to time   Psychiatric:         Mood and Affect: Mood normal.         Behavior: Behavior normal.         Vital Signs: Temperature 97.9 °F, blood pressure 113/66, pulse 56, respirations 16, SPO2 99% on room air    Assessment:  1. Petechiae  2. Thrombocytopenia ([facility]  Follow-up labs today. INR 1.4. Await CBC CMP. Previous CT of abdomen suggestive of cirrhotic liver. Will need to get her set up with gastroenterology    3. Essential hypertension  Stable. Continue to monitor    4. Dementia without behavioral disturbance, unspecified dementia type ([facility]  Progressive dementia. Progressive mentation decline. Awaiting psych/neurology to evaluate. 5. Chronic diastolic heart failure ([facility]  Patient with history of pleural effusions. Follow-up chest x-ray completed today await results    6. End stage renal disease on dialysis [facility])  Continue on dialysis per patient's request        Plan:  As noted above. Follow up for routine visit. Call sooner with concerns prior.     Electronically signed by SHIRA Tiwari CNP on 2/16/2022 at 2:06 PM

## 2022-02-16 NOTE — TELEPHONE ENCOUNTER
[place] review [place] [facility] [address] [facility]. 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 She has dialysis on T, TH, SAT. Concerned there are no pacer spikes on EKG. Colleen Das is contact at [address]. [phone]    Outside Services    2/16/2022  [address] Internal Med A department of [facility], APRN - CNP    Nurse Practitioner  Petechiae +5 more    Dx     Admission:     Discharge:     Hospital: [address] [place] [address])       Progress Notes  Barth Crigler, APRN - CNP (Nurse Practitioner) Nilson Vargas Nurse Practitioner  Expand All Collapse All  02/16/22  Orien Mcardle  1950     Patient Resident of [facility] [address] [address] [facility]  Chief Complaint  1. Petechiae    2. Thrombocytopenia ([address]    3. Essential hypertension    4. Dementia without behavioral disturbance, unspecified dementia type ([address]    5. Chronic diastolic heart failure ([address]    6. End stage renal disease on dialysis [facility])          HPI:  80year-old patient being seen at the request of the nursing staff for ongoing weight loss, progressive mentation decline and now petechiae to bilateral hands forearms and chest that began through the evening. Patient with a history of thrombocytopenia. No recent CBC over the past 4 weeks at [facility] however recent CBC with platelets 949,224 and of January. .  Patient did have dialysis yesterday. Patient did have a hospital stay end of January. No recent change in medication. Her vitals stable today in office. Patient sitting upright eating breakfast.  Unsure why her hands have spots all over them. Denies any trauma. We discussed CODE STATUS again at length. Patient continues to wish to be a full code. Discussed if her heart would stop she would want us to do CPR and shock her heart if indicated. If she would quit breathing she states she wants to be on a breathing tube. Has no desire to quit dialysis.   There has been some concern at the [facility] that patient is incompetent to make her own decisions. They are currently have a referral for neurology/psychiatry to decide if she is competent to make her own decisions.              Allergies   Allergen Reactions    Amoxicillin-Pot Clavulanate Diarrhea, Nausea Only and Nausea And Vomiting       Other reaction(s): Vomiting    Sulfamethoxazole-Trimethoprim Hives    Amoxicillin      Clavulanic Acid      Clonidine Derivatives Swelling    Sulfamethoxazole      Trimethoprim      Adhesive Tape Itching         Past Medical History        Past Medical History:   Diagnosis Date    Acute anemia      Acute cystitis 8/8/2020    Acute kidney injury ([address] 8/19/2020    Acute kidney injury superimposed on CKD ([address]      Acute on chronic combined systolic (congestive) and diastolic (congestive) heart failure ([address] 1/27/2022    Acute on chronic diastolic heart failure ([address] 8/7/2020    Acute renal failure (ARF) ([address] 12/11/2020    Anasarca 12/12/2020    Anxiety and depression      AV block, 1st degree 8/19/2020    Background diabetic retinopathy(362.01) 2008     (Mild)    Balance problem      Bilateral pleural effusion 1/26/2022    BMI 45.0-49.9, adult ([address] 3/1/2021    Bradycardia 8/19/2020    Buttock wound 8/20/2020    CAD (coronary artery disease)       (with coronary artery bypass graft x4).     Cancer of uterus ([address]       history of, (probable cure)    Chronic diastolic heart failure ([address] 3/1/2021    Chronic kidney disease      Chronic low back pain      Controlled type 2 diabetes mellitus with chronic 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 dialysis, with long-term current use of insulin (HCC)      CVA (cerebral vascular accident) ([address]      Decompensated heart failure ([address] 12/4/2020    Decubitus ulcer of trochanteric region of right hip, stage 2 ([address] 8/23/2020    Dementia (HCC)       (moderate)    Dry eye syndrome      Elevated troponin 8/8/2020    End stage renal disease on dialysis ([address] 3/1/2021  GERD (gastroesophageal reflux disease)      Glaucoma suspect     Gout      Hemodialysis patient ([facility] 3/1/2021    Homonymous hemianopsia      (Right)    Hyperkalemia 2020    Hyperlipidemia      Hypertension      Infestation by bed bug 2020    Infestation by maggots 2020    Keratitis       (secondary to dry eye syndrome).  Lymphedema of both lower extremities 2020    Morbid obesity ([address] 3/1/2021    MRSA bacteremia 2020    Obesity      PAD (peripheral artery disease) (HCC)      Peripheral polyneuropathy       (diabetic)    Pressure injury of right heel, unstageable ([address] 2020    Pseudophakos       (Right Eye: 2012; Left Eye: 2012) - Dr. Leslie Lawrence.  Stroke [facility])       (Multiple) MRI of brain 10/2008 shows multiple infarcts involving the temporal and parietal areas.  Trichiasis      (left eye)    Type 2 diabetes mellitus ([facility]      Wounds, multiple 2020            Past Surgical History         Past Surgical History:   Procedure Laterality Date    CATARACT REMOVAL WITH IMPLANT   2012     (Right eye) - Dr. Leslie Lawrence.  CATARACT REMOVAL WITH IMPLANT   2012     (Left eye) - Dr. Leslie Lawrence.   SECTION        CHOLECYSTECTOMY        CORONARY ARTERY BYPASS GRAFT   12-     (x4) - LIMA-LAD, SVG-diagnoal 1, SVG-OM1, and SVG-PDA. Exploration of left radial. (Dr. Qu[place]ones [place]).  EYE SURGERY Left       as a child     GASTRIC BYPASS SURGERY        HYSTERECTOMY         (abdominal)  with left oophorectomy. Right ovary retained.     IR TUNNELED CATHETER PLACEMENT GREATER THAN 5 YEARS   2021     IR TUNNELED CATHETER PLACEMENT GREATER THAN 5 YEARS 2021 Courtney Recinos MD [facility] SPECIAL PROCEDURES    TONSILLECTOMY AND ADENOIDECTOMY        UPPER GASTROINTESTINAL ENDOSCOPY   2020     EGD BIOPSY performed by Maye Baez MD at [facility]            Medications as per [facility] Chart Norma Thapa      Social History               Socioeconomic History    Marital status:        Spouse name: Not on file    Number of children: Not on file    Years of education: Not on file    Highest education level: Not on file   Occupational History    Not on file   Tobacco Use    Smoking status: Never Smoker    Smokeless tobacco: Never Used    Tobacco comment: never smoker eclamb rrt 7/20/17   Vaping Use    Vaping Use: Unknown   Substance and Sexual Activity    Alcohol use: No    Drug use: No    Sexual activity: Not on file   Other Topics Concern    Not on file   Social History Narrative    Not on file      Social Determinants of Health          Financial Resource Strain:     Difficulty of Paying Living Expenses: Not on file   Food Insecurity:     Worried About Running Out of Food in the Last Year: Not on file    Rosanne of Food in the Last Year: Not on file   Transportation Needs:     Lack of Transportation (Medical): Not on file    Lack of Transportation (Non-Medical):  Not on file   Physical Activity:     Days of Exercise per Week: Not on file    Minutes of Exercise per Session: Not on file   Stress:     Feeling of Stress : Not on file   Social Connections:     Frequency of Communication with Friends and Family: Not on file    Frequency of Social Gatherings with Friends and Family: Not on file    Attends Nondenominational Services: Not on file    Active Member of [address] [place] [address] or Organizations: Not on file    Attends Club or Organization Meetings: Not on file    Marital Status: Not on file   Intimate Partner Violence:     Fear of Current or Ex-Partner: Not on file    Emotionally Abused: Not on file    Physically Abused: Not on file    Sexually Abused: Not on file   Housing Stability:     Unable to Pay for Housing in the Last Year: Not on file    Number of [place] in the Last Year: Not on file    Unstable Housing in the Last Year: Not on file            Review of Systems Constitutional: Negative for activity change, appetite change, chills, fatigue, fever and unexpected weight change. HENT: Negative for congestion, dental problem, ear discharge, ear pain, facial swelling, hearing loss, postnasal drip, rhinorrhea, sinus pressure, sore throat and trouble swallowing. Eyes: Negative for pain and visual disturbance. Respiratory: Negative for cough, chest tightness, shortness of breath and wheezing. Cardiovascular: Negative for chest pain, palpitations and leg swelling. Gastrointestinal: Negative for abdominal pain, blood in stool, constipation, diarrhea, nausea and vomiting. Endocrine: Negative for cold intolerance, heat intolerance and polyuria. Genitourinary: Negative for difficulty urinating. Musculoskeletal: Negative for arthralgias, gait problem, myalgias, neck pain and neck stiffness. Skin: Negative for color change, rash and wound. Neurological: Positive for weakness (However limited by patient's cognition). Negative for dizziness, tremors, seizures, light-headedness, numbness and headaches. Psychiatric/Behavioral: Negative for confusion and hallucinations. The patient is not nervous/anxious.          Physical Exam  Vitals and nursing note reviewed. Constitutional:       General: She is not in acute distress. Appearance: Normal appearance. She is well-developed. She is not diaphoretic. HENT:      Head: Normocephalic and atraumatic. Right Ear: External ear normal.      Left Ear: External ear normal.   Eyes:      General:         Right eye: No discharge. Left eye: No discharge. Neck:      Trachea: No tracheal deviation. Cardiovascular:      Rate and Rhythm: Normal rate and regular rhythm. Pulses: Normal pulses. Heart sounds: Normal heart sounds. No murmur heard. No friction rub. No gallop. Pulmonary:      Effort: Pulmonary effort is normal. No respiratory distress. Breath sounds: Normal breath sounds. No stridor. No wheezing, rhonchi or rales. Chest:      Chest wall: No tenderness. Abdominal:      General: Bowel sounds are normal. There is no distension. Palpations: Abdomen is soft. Tenderness: There is no abdominal tenderness. Musculoskeletal:         General: No swelling. Skin:     General: Skin is warm and dry. Capillary Refill: Capillary refill takes less than 2 seconds. Coloration: Skin is not pale. Findings: No bruising (Positive petechiae to bilateral hands forearm faint amount to anterior chest) or rash. Neurological:      General: No focal deficit present. Mental Status: She is alert. Mental status is at baseline. Cranial Nerves: No cranial nerve deficit. Sensory: No sensory deficit. Coordination: Coordination normal.      Comments: Alert to person fluid place but not to time   Psychiatric:         Mood and Affect: Mood normal.         Behavior: Behavior normal.            Vital Signs: Temperature 97.9 °F, blood pressure 113/66, pulse 56, respirations 16, SPO2 99% on room air     Assessment:  1. Petechiae  2. Thrombocytopenia ([address]  Follow-up labs today. INR 1.4. Await CBC CMP. Previous CT of abdomen suggestive of cirrhotic liver. Will need to get her set up with gastroenterology     3. Essential hypertension  Stable. Continue to monitor     4. Dementia without behavioral disturbance, unspecified dementia type ([address]  Progressive dementia. Progressive mentation decline. Awaiting psych/neurology to evaluate.     5. Chronic diastolic heart failure ([address]  Patient with history of pleural effusions. Follow-up chest x-ray completed today await results     6. End stage renal disease on dialysis ([address]  Continue on dialysis per patient's request           Plan:  As noted above. Follow up for routine visit.   Call sooner with concerns prior.     Electronically signed by SHIRA Grajeda CNP on 2/16/2022 at 2:06 PM        Instructions     AVS (Automatic SnapShot taken 2/16/2022)        Additional Documentation    Encounter Info:  Billing Info,     Allergies,     Detailed Report,     History,     Medications,     Questionnaires          Chart Review Routing History    No routing history on file. Encounter Status    Signed by SHIRA Lara CNP on 2/16/22 at 14:12    BestPractice Advisories    Click to view BestPractice Advisory history     Encounter Messages    No messages in this encounter       Orders Placed     None      Outpatient Medications at End of Encounter as of 2/16/2022    carvedilol (COREG) 6.25 MG tablet Take 1 tablet by mouth 2 times daily (with meals) Hold for heart rate less than 60   Hold for BP less than 115   acetaminophen (TYLENOL) 325 MG tablet Take 650 mg by mouth every 4 hours as needed for Pain or Fever   loratadine (CLARITIN) 10 MG tablet Take 10 mg by mouth every other day   midodrine (PROAMATINE) 10 MG tablet Take 10 mg by mouth as needed (hypotension mid-dialysis)   nystatin (MYCOSTATIN) 695555 UNIT/GM cream Apply topically 2 times daily as needed (redness)   senna (SENOKOT) 8.6 MG tablet Take 1 tablet by mouth 2 times daily   nitroGLYCERIN (NITROSTAT) 0.4 MG SL tablet up to max of 3 total doses. If no relief after 1 dose, call 911.    insulin lispro (HUMALOG) 100 UNIT/ML injection vial Inject 0-6 Units into the skin 3 times daily (with meals) Glucose: Dose:                No Insulin   140-199 1 Unit   200-249 2 Units   250-299 3 Units   300-349 4 Units   350-399 5 Units   Over 399 6 Units   midodrine (PROAMATINE) 10 MG tablet Take 10 mg by mouth three times a week Indications: on dialysis days Tu/Thur/Sat    sodium chloride (OCEAN) 0.65 % nasal spray 2 sprays by Nasal route 2 times daily   lidocaine-prilocaine (EMLA) 2.5-2.5 % cream Apply topically three times a week On dialysis days Tu-Th-Sa   amLODIPine (NORVASC) 10 MG tablet Take 1 tablet by mouth daily   atorvastatin (LIPITOR) 40 MG tablet TAKE 1 TABLET BY MOUTH EVERYDAY   Cholecalciferol 125 MCG (5000 UT) CHEW Take 1 tablet by mouth daily   citalopram (CELEXA) 20 MG tablet Take 1 tablet by mouth daily   hydrALAZINE (APRESOLINE) 100 MG tablet Take 1 tablet by mouth 3 times daily   oxybutynin (DITROPAN) 5 MG tablet TAKE 1 TABLET BY MOUTH TWICE DAILY   pantoprazole (PROTONIX) 40 MG tablet Take 1 tablet by mouth 2 times daily (before meals)   rivastigmine (EXELON) 9.5 MG/24HR APPLY 1 NEW PATCH EVERY 24 HOURS   Alcohol Swabs (ALCOHOL PREP) PADS Checks blood sugar 3 times a day   aspirin 81 MG tablet Take 1 tablet by mouth daily

## 2022-02-18 NOTE — TELEPHONE ENCOUNTER
Will need to do ECG and fax it to us any symptoms she needs to go to ER or follow-up with Dr. Brigitte Reyes.

## 2022-02-18 NOTE — TELEPHONE ENCOUNTER
[facility] nurse Ariel Ortiz notified. EKG has not been done yet. They will fax to cardio when done.

## 2022-02-25 PROBLEM — R51.9 FRONTAL HEADACHE: Status: ACTIVE | Noted: 2022-01-01

## 2022-02-25 PROBLEM — G31.9 ACQUIRED CEREBRAL ATROPHY (HCC): Status: ACTIVE | Noted: 2022-01-01

## 2022-02-25 PROBLEM — G93.89 ENCEPHALOMALACIA: Status: ACTIVE | Noted: 2022-01-01

## 2022-02-25 PROBLEM — I69.30 CHRONIC ISCHEMIC LEFT PCA STROKE: Status: ACTIVE | Noted: 2022-01-01

## 2022-02-25 PROBLEM — R42 DIZZINESS AND GIDDINESS: Status: ACTIVE | Noted: 2022-01-01

## 2022-02-25 PROBLEM — Z86.73 CHRONIC ISCHEMIC LEFT PCA STROKE: Status: ACTIVE | Noted: 2022-01-01

## 2022-02-25 NOTE — PATIENT INSTRUCTIONS
* FALL   PRECAUTIONS. *  USE   WALKING  ASSISTANCE  DEVICES   /   [facility]          *  SUPERVISED    CARE. *   ADEQUATE   FLUID  INTAKE   AND  ELECTROLYTE  BALANCE             * KEEP  DAIRY  OF   THE  NEUROLOGICAL  SYMPTOMS          *  TO  MAINTAIN  REGULAR  SLEEP  WAKE  CYCLES. *   TO  HAVE  ADEQUATE  REST  AND   SLEEP    HOURS.          *    AVOID  USAGE OF   TOBACCO,  EXCESSIVE  ALCOHOL                AND   ILLEGAL   SUBSTANCES,  IF  ANY          *  Maintain   Healthy  Life Style    With   Periodic  Monitoring  Of         Any  Medical  Conditions  Including   Elevated  Blood  Pressure,  Lipid  Profile,       Blood  Sugar levels  And   Heart  Disease. *   Period   Screening  For  Cancers  Involving  Breast,  Colon,         Lungs  And  Other  Organs  As  Applicable,           In consultation   With  Your  Primary Care Providers. *  If   There is  Any  Significant  Worsening   Of  Current  Symptoms  And             Or  If    Any additional  New  Neurological  Symptoms  and          Significant  Concerns   Should  Call  911 or  Go  To  Emergency  Department            For  Further  Immediate  Evaluation.

## 2022-02-25 NOTE — PROGRESS NOTES
[facility]  Neurology    [address] [address]  GCXVY:[phone]   Fax: [phone]        SUBJECTIVE:       PATIENT ID:  Anna Tang is a  RIGHT   HANDED 80 y.o. female. Neurologic Problem  The patient's primary symptoms include clumsiness, focal sensory loss, focal weakness, a loss of balance, memory loss, a visual change and weakness. The patient's pertinent negatives include no altered mental status, near-syncope, slurred speech or syncope. Primary symptoms comment: FRONTAL  HEADACHES  . This is a recurrent problem. The neurological problem developed gradually. The problem has been waxing and waning since onset. There was no focality noted. Associated symptoms include back pain, confusion, dizziness and headaches. Pertinent negatives include no abdominal pain, auditory change, aura, bladder incontinence, bowel incontinence, chest pain, diaphoresis, fatigue, fever, light-headedness, nausea, neck pain, palpitations, shortness of breath, vertigo or vomiting. Past treatments include nothing. The treatment provided no relief. Her past medical history is significant for a CVA, dementia and mood changes. There is no history of a bleeding disorder, a clotting disorder, head trauma, liver disease or seizures. Cerebrovascular Accident  This is a chronic problem. Episode onset: OLD  LEFT   PCA  STROKE    MORE  THAN   TWO  YEARS  AGO. The problem has been resolved. Associated symptoms include arthralgias, headaches, numbness, a visual change and weakness. Pertinent negatives include no abdominal pain, anorexia, change in bowel habit, chest pain, chills, congestion, coughing, diaphoresis, fatigue, fever, joint swelling, myalgias, nausea, neck pain, rash, sore throat, swollen glands, urinary symptoms, vertigo or vomiting. Nothing aggravates the symptoms. Treatments tried: ANTI PLATELET  THERAPY. The treatment provided moderate relief.              History obtained from The   PATIENT         and other  available   medical  records   were  Also  reviewed. The  Duration,  Quality,  Severity,  Location,  Timing,  Context,  Modifying  Factors   Of   The   Chief   Complaint       And  Present  Illness  Was   Reviewed   In   Chronological   Manner.                                                      PATIENT'S  MAIN  CONCERNS INCLUDE :                                               1)    H/O    FRONTAL  HEADACHES      SINCE    2021                                        INTERMITTENTLY                         2)    PATIENT   POOR  HISTORIAN                             3)       H/O     INSULIN  REQUIRING    TYPE  II  DM                           4)       H/O   ESRD    ON    DIALYSIS                             5)     H/O    CHRONIC    DEPRESSION     -    ON      CELEXA                             6)     H/O    MODERATE  DEMENTIA    -    ON    EXELON   PATCH                            7)        MULTIPLE  CO MORBID  MEDICAL  CONDITIONS                                   TO  FOLLOW  UP   PCP  AND  HER     CARDIOLOGY                           8)     CHRONIC  RESPIRATORY   FAILURE      WITH  HYPOXIA                                     -   ON   OXYGEN  SUPPLEMENTATION                                PATIENT  TO  FOLLOW  WITH PULMONARY  CONSULTANTS                           9)     PREVIOUS      H/O     STROKE                                    -   ON  ANTI PLATELET   THERAPY                           10)     H/O    DIZZINESS   INTERMITTENTLY                                        -    MULTI  FACTORIAL   ETIOLOGIES                           11)     DIABETIC  PERIPHERAL  POLYNEUROPATHY                              12)     H/O   CHRONIC  GAIT  AND  BALANCE  PROBLEMS                                   PATIENT  IN   THE  WHEEL  CHAIR                              13)     PATIENT  IS  RESIDENT  OF     NURSING  CARE  FACILITY                              14)      CT   HEAD   DONE  IN   JAN. 2022 SHOWED                                   A)   OLD  LEFT  PCA   STROKE                                  B)    ENCEPHALOMALACIA   IN   LEFT  OCCIPITAL  LOBE                                  C)   CHRONIC  CEREBRAL  ISCHEMIA    AND   ATROPHY                                              15)   IN  VIEW  OF  THE  PATIENT'S    MULTIPLE   NEUROLOGICAL                           SYMPTOMS  AND  CONCERNS    FOR  PROLONGED   DURATION,                           AND    MULTIPLE   CO MORBID  MEDICAL  CONDITIONS,                           PATIENT    NEEDS  NEURO  DIAGNOSTIC  EVALUATIONS                      FOR   ANY   NEUROLOGICAL  PATHOLOGIES    AND  OTHER                        CORRECTABLE   ETIOLOGIES;     AND                              PATIENT  REQUESTS   THE  SAME. 16)   VARIOUS  RISK   FACTORS   WERE  REVIEWED   AND   DISCUSSED. PATIENT   HAS  MULTIPLE   MEDICAL, NEUROLOGICAL                        AND   MENTAL HEALTH   PROBLEMS . PATIENT'S   MANAGEMENT  IS  CHALLENGING.                                         PRECIPITATING  FACTORS: including  fever/infection, exertion/relaxation, position change, stress,                weather change,   medications/alcohol, time of day/darkness/light  Are  absent                                                          MODIFYING  FACTORS:  fever/infection, exertion/relaxation, position change, stress, weather change,               medications/alcohol, time of day/darkness/light  Are  absent                Patient   Indicates   The  Presence   And  The  Absence  Of  The  Following    Associated  And             Additional  Neurological    Symptoms:                                Balance  And coordination   problems  present           Gait problems     present            Headaches      present              Migraines           absent           Memory problemspresent              Confusion        absent Paresthesia   numbness          present           Seizures  And  Starring  Episodes           absent           Syncope,  Near  syncopal episodes         absent           Speech   problems           absent             Swallowing   Problems      absent            Dizziness,  Light headedness           present              Vertigo        absent             Generalized   Weakness    present              focal  Weakness     absent             Tremors         absent              Sleep  Problems     absent             History  Of   Recent  Head  Injury     absent             History  Of   Recent  TIA     absent             History  Of   Recent    Stroke     absent             Neck  Pain   and   Neck muscle  Spasms  present               Radiating  down   And   Weakness           absent            Lower back   Pain  And     Spasms  absent              Radiating    Down   And   Weakness          absent                H/OFALLS        absent               History  Of   Visual  Symptoms    absent                  Associated   Diplopia       absent                                               Also   Additional   Symptoms   Present    As  Documented    In   The   detailed                  Review  Of  Systems   And    Please   Refer   To    Them for   Additional    Information. Any components  That are either  Unobtainable  Or  Limited  In   HPI, ROS  And/or PFSH   Are                   Due   ToPatient's  Medical  Problems,  Clinical  Condition   and/or lack of                                 other    Alternate   resources.             RECORDS   REVIEWED:    historical medical records           INFORMATION   REVIEWED:     MEDICAL   HISTORY,SURGICAL   HISTORY,     MEDICATIONS   LIST,   ALLERGIES AND  DRUG  INTOLERANCES,       FAMILY   HISTORY,  SOCIAL  HISTORY,      PROBLEM  LIST   FOR  PATIENT  CARE   COORDINATION          Past Medical History:   Diagnosis Date    Acute anemia     Acute cystitis 8/8/2020    (Right Eye: 2012; Left Eye: 2012) - Dr. Calixto Plata.  Stroke [facility])     (Multiple) MRI of brain 10/2008 shows multiple infarcts involving the temporal and parietal areas.  Trichiasis     (left eye)    Type 2 diabetes mellitus ([address]     Wounds, multiple 2020         Past Surgical History:   Procedure Laterality Date    CATARACT REMOVAL WITH IMPLANT  2012    (Right eye) - Dr. Calixto Plata.  CATARACT REMOVAL WITH IMPLANT  2012    (Left eye) - Dr. Calixto Plata.   SECTION      CHOLECYSTECTOMY      CORONARY ARTERY BYPASS GRAFT  12-    (x4) - LIMA-LAD, SVG-diagnoal 1, SVG-OM1, and SVG-PDA. Exploration of left radial. (Dr. Giovanni Khan).  EYE SURGERY Left     as a child     GASTRIC BYPASS SURGERY      HYSTERECTOMY      (abdominal)  with left oophorectomy. Right ovary retained.     IR TUNNELED CATHETER PLACEMENT GREATER THAN 5 YEARS  2021    IR TUNNELED CATHETER PLACEMENT GREATER THAN 5 YEARS 2021 Humberto Schumacher MD [facility] SPECIAL PROCEDURES    TONSILLECTOMY AND ADENOIDECTOMY      UPPER GASTROINTESTINAL ENDOSCOPY  2020    EGD BIOPSY performed by Art Infante MD at [facility]         Current Outpatient Medications   Medication Sig Dispense Refill    Pollen Extracts (PROSTAT PO) Take 30 mLs by mouth three times daily      carvedilol (COREG) 6.25 MG tablet Take 1 tablet by mouth 2 times daily (with meals) Hold for heart rate less than 60  Hold for BP less than 115 60 tablet 3    acetaminophen (TYLENOL) 325 MG tablet Take 650 mg by mouth every 4 hours as needed for Pain or Fever      loratadine (CLARITIN) 10 MG tablet Take 10 mg by mouth every other day      midodrine (PROAMATINE) 10 MG tablet Take 10 mg by mouth as needed (hypotension mid-dialysis)      nystatin (MYCOSTATIN) 883131 UNIT/GM cream Apply topically 2 times daily as needed (redness)      senna (SENOKOT) 8.6 MG tablet Take 1 tablet by mouth 2 times daily      nitroGLYCERIN (NITROSTAT) 0.4 MG SL tablet up to max of 3 total doses. If no relief after 1 dose, call 911. 25 tablet 3    insulin lispro (HUMALOG) 100 UNIT/ML injection vial Inject 0-6 Units into the skin 3 times daily (with meals) Glucose: Dose:               No Insulin  140-199 1 Unit  200-249 2 Units  250-299 3 Units  300-349 4 Units  350-399 5 Units  Over 399 6 Units 10 mL 3    midodrine (PROAMATINE) 10 MG tablet Take 10 mg by mouth three times a week Indications: on dialysis days Tu/Thur/Sat       sodium chloride (OCEAN) 0.65 % nasal spray 2 sprays by Nasal route 2 times daily      lidocaine-prilocaine (EMLA) 2.5-2.5 % cream Apply topically three times a week On dialysis days Tu-Th-Sa      amLODIPine (NORVASC) 10 MG tablet Take 1 tablet by mouth daily 30 tablet 0    atorvastatin (LIPITOR) 40 MG tablet TAKE 1 TABLET BY MOUTH EVERYDAY 30 tablet 0    Cholecalciferol 125 MCG (5000 UT) CHEW Take 1 tablet by mouth daily 30 tablet 0    citalopram (CELEXA) 20 MG tablet Take 1 tablet by mouth daily 30 tablet 0    hydrALAZINE (APRESOLINE) 100 MG tablet Take 1 tablet by mouth 3 times daily 90 tablet 0    oxybutynin (DITROPAN) 5 MG tablet TAKE 1 TABLET BY MOUTH TWICE DAILY 60 tablet 0    pantoprazole (PROTONIX) 40 MG tablet Take 1 tablet by mouth 2 times daily (before meals) 60 tablet 0    rivastigmine (EXELON) 9.5 MG/24HR APPLY 1 NEW PATCH EVERY 24 HOURS 30 patch 0    Alcohol Swabs (ALCOHOL PREP) PADS Checks blood sugar 3 times a day 300 each 0    aspirin 81 MG tablet Take 1 tablet by mouth daily 90 tablet 3     No current facility-administered medications for this visit.          Allergies   Allergen Reactions    Amoxicillin-Pot Clavulanate Diarrhea, Nausea Only and Nausea And Vomiting     Other reaction(s): Vomiting    Sulfamethoxazole-Trimethoprim Hives    Amoxicillin     Clavulanic Acid     Clonidine Derivatives Swelling    Sulfamethoxazole     Trimethoprim     Adhesive Tape Itching         Family History   Problem Relation Age of Onset    Diabetes Mother     Cancer Mother     High Blood Pressure Mother    Mcgraw Stroke Mother     Kidney Disease Mother     Uterine Cancer Mother     Diabetes Father     Heart Disease Father     Glaucoma Father     Emphysema Father     Coronary Art Dis Other         All 4 siblings.  Stroke Other         1 sibling.  Lung Cancer Other         1 sibling - cause of death lung cancer.  Mental Retardation Other          Social History     Socioeconomic History    Marital status:      Spouse name: Not on file    Number of children: Not on file    Years of education: Not on file    Highest education level: Not on file   Occupational History    Not on file   Tobacco Use    Smoking status: Never Smoker    Smokeless tobacco: Never Used    Tobacco comment: never smoker eclamb rrt 7/20/17   Vaping Use    Vaping Use: Unknown   Substance and Sexual Activity    Alcohol use: No    Drug use: No    Sexual activity: Not on file   Other Topics Concern    Not on file   Social History Narrative    Not on file     Social Determinants of Health     Financial Resource Strain:     Difficulty of Paying Living Expenses: Not on file   Food Insecurity:     Worried About Running Out of Food in the Last Year: Not on file    Rosanne of Food in the Last Year: Not on file   Transportation Needs:     Lack of Transportation (Medical): Not on file    Lack of Transportation (Non-Medical):  Not on file   Physical Activity:     Days of Exercise per Week: Not on file    Minutes of Exercise per Session: Not on file   Stress:     Feeling of Stress : Not on file   Social Connections:     Frequency of Communication with Friends and Family: Not on file    Frequency of Social Gatherings with Friends and Family: Not on file    Attends Mandaen Services: Not on file    Active Member of Clubs or Organizations: Not on file    Attends Club or Organization Meetings: Not on file   Lucien Marital Status: Not on file   Intimate Partner Violence:     Fear of Current or Ex-Partner: Not on file    Emotionally Abused: Not on file    Physically Abused: Not on file    Sexually Abused: Not on file   Housing Stability:     Unable to Pay for Housing in the Last Year: Not on file    Number of Places Lived in the Last Year: Not on file    Unstable Housing in the Last Year: Not on file       Vitals:    02/25/22 1124   BP: 114/64   Pulse: 58   SpO2: 99%         Wt Readings from Last 3 Encounters:   02/25/22 162 lb 12.8 oz (73.8 kg)   01/30/22 174 lb 6 oz (79.1 kg)   01/25/22 177 lb 6.4 oz (80.5 kg)         BP Readings from Last 3 Encounters:   02/25/22 114/64   01/30/22 (!) 122/56   01/26/22 (!) 108/52           Hematology and Coagulation    Lab Results   Component Value Date    WBC 7.6 01/29/2022    RBC 3.65 01/29/2022    HGB 11.4 01/29/2022    HCT 36.7 01/29/2022    .5 01/29/2022    MCH 31.2 01/29/2022    MCHC 31.1 01/29/2022    RDW 18.3 01/29/2022     01/29/2022    MPV 10.4 01/29/2022       Chemistries    Lab Results   Component Value Date     01/30/2022    K 4.1 01/30/2022    CL 99 01/30/2022    CO2 22 01/30/2022    BUN 20 01/30/2022    CREATININE 2.90 01/30/2022    CALCIUM 9.0 01/30/2022    PROT 7.2 01/30/2022    LABALBU 3.1 01/30/2022    BILITOT 0.64 01/30/2022    ALKPHOS 371 01/30/2022    AST 76 01/30/2022    ALT 29 01/30/2022     Lab Results   Component Value Date    ALKPHOS 371 01/30/2022    ALT 29 01/30/2022    AST 76 01/30/2022    PROT 7.2 01/30/2022    BILITOT 0.64 01/30/2022    BILIDIR 0.26 01/25/2022    LABALBU 3.1 01/30/2022     Lab Results   Component Value Date    BUN 20 01/30/2022    CREATININE 2.90 01/30/2022     Lab Results   Component Value Date    CALCIUM 9.0 01/30/2022    MG 2.1 11/14/2021     Lab Results   Component Value Date    AST 76 01/30/2022    ALT 29 01/30/2022       Lab Results   Component Value Date    URICACID 3.0 09/26/2019     Lab Results   Component Value Date    CKTOTAL 27 08/19/2020     Lab Results   Component Value Date    HZHKXPUF21 489 02/27/2014             Review of Systems   Constitutional: Negative for appetite change, chills, diaphoresis, fatigue, fever and unexpected weight change. HENT: Negative for congestion, dental problem, drooling, ear discharge, ear pain, facial swelling, hearing loss, mouth sores, nosebleeds, postnasal drip, sinus pressure, sore throat, tinnitus, trouble swallowing and voice change. Eyes: Negative for photophobia, pain, discharge, redness, itching and visual disturbance. Respiratory: Negative for apnea, cough, choking, chest tightness, shortness of breath and wheezing. Cardiovascular: Negative for chest pain, palpitations, leg swelling and near-syncope. Gastrointestinal: Negative for abdominal distention, abdominal pain, anorexia, blood in stool, bowel incontinence, change in bowel habit, constipation, diarrhea, nausea and vomiting. Endocrine: Negative for cold intolerance, heat intolerance, polydipsia, polyphagia and polyuria. Genitourinary: Negative for bladder incontinence. Musculoskeletal: Positive for arthralgias and back pain. Negative for gait problem, joint swelling, myalgias, neck pain and neck stiffness. Skin: Negative for color change, pallor, rash and wound. Allergic/Immunologic: Negative for environmental allergies, food allergies and immunocompromised state. Neurological: Positive for dizziness, focal weakness, weakness, numbness, headaches and loss of balance. Negative for vertigo, tremors, seizures, syncope, facial asymmetry, speech difficulty and light-headedness. Hematological: Negative for adenopathy. Does not bruise/bleed easily. Psychiatric/Behavioral: Positive for confusion, decreased concentration and memory loss. Negative for agitation, behavioral problems, dysphoric mood, hallucinations, self-injury, sleep disturbance and suicidal ideas. The patient is nervous/anxious.  The patient is not hyperactive. OBJECTIVE:      Physical Exam  Constitutional:       Appearance: She is well-developed. HENT:      Head: Normocephalic and atraumatic. No raccoon eyes or Lockhart's sign. Right Ear: External ear normal.      Left Ear: External ear normal.      Nose: Nose normal.   Eyes:      Conjunctiva/sclera: Conjunctivae normal.      Pupils: Pupils are equal, round, and reactive to light. Neck:      Thyroid: No thyroid mass or thyromegaly. Vascular: No carotid bruit. Trachea: No tracheal deviation. Meningeal: Brudzinski's sign and Kernig's sign absent. Cardiovascular:      Rate and Rhythm: Normal rate and regular rhythm. Pulmonary:      Effort: Pulmonary effort is normal.   Musculoskeletal:         General: No tenderness. Cervical back: Normal range of motion and neck supple. No rigidity. No muscular tenderness. Normal range of motion. Skin:     General: Skin is warm. Coloration: Skin is not pale. Findings: No erythema or rash. Nails: There is no clubbing. Psychiatric:         Attention and Perception: She is inattentive. Mood and Affect: Mood is depressed. Mood is not anxious. Affect is blunt. Affect is not labile or inappropriate. Speech: She is communicative. Speech is delayed. Speech is not rapid and pressured, slurred or tangential.         Behavior: Behavior is slowed and withdrawn. Behavior is not agitated, aggressive, hyperactive or combative. Cognition and Memory: Cognition is impaired. Memory is impaired. She exhibits impaired recent memory and impaired remote memory. Judgment: Judgment is not impulsive or inappropriate. NEUROLOGICALEXAMINATION :       A) MENTAL STATUS:                   Alert and  oriented  To  Person. No Aphasia. No  Dysarthria. Able   To  Follow     SIMPLE    commands                     No right  To left confusion. SLOW     AND  LOW   Speech  And language function. Insight and  Judgment ,Fund  Of  Knowledge   IMPAIRED                  Recent  And  Remote memory    IMPAIRED                  Attention &  Concentration are        IMPAIRED                                               B) CRANIAL NERVES :        CN : Visual  Acuity  And  Visual fields    IMPAIRED                 Fundi  Could  Not  Be  Could  Not  Be  Evaluated. 3,4,6 CN : Both  Pupils are   Reactive and  Equal.  Movements  Are  Intact. No  Nystagmus. No  MAIRA. No  Afferent  Pupillary  Defect noted. 5 CN :  Normal  Facial sensations and Corneal  Reflexes           7 CN:  Normal  Facial  Symmetry  And  Strength. No facial  Weakness. 8 CN :  Hearing  Appears   DECREASED            9, 10 CN: Normal   spontaneous, reflex   palate   movements         11 CN:   Normal  Shoulder  shrug and  strength         12 CN :   Normal  Tongue movements and  Tongue  In midline                        No tongue   Fasciculations or atrophy       C) MOTOR  EXAM:                 Strength  In upper  And  Lower   extremities   NO  FOCAL   WEAKNESS                         DETAILED   STRENGTH   EVALUATIONS  IS  DIFFICULT                           Muscle  Tone  In upper  And  Lower  Extremities  normal                No rigidity. No  Spasticity. Bradykinesia   absent                 No  Asterixis.               Sustention  Tremor , Resting   Tremor   absent                    No   other  Abnormal  Movements noted           D) SENSORY :               Light   touch, pinprick,    UN RELIABLE        E) REFLEXES:                   Deep  Tendon  Reflexes  DECRESED                                                 F) COORDINATION  AND  GAIT :                                Station and  Gait   PATIENT IN   THE  WHEEL  CHAIR                             Romberg 's test   CANNOT  BE PERFORMED Ataxia negative          ASSESSMENT:        Patient Active Problem List   Diagnosis    Dementia ([facility]    Hypertension    Hyperlipidemia    Gout    Peripheral polyneuropathy    Anxiety and depression    CAD (coronary artery disease)    CVA (cerebral vascular accident) ([facility]    Controlled type 2 diabetes mellitus with chronic 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 dialysis, with long-term current use of insulin (HCC)    Lymphedema of both lower extremities    PAD (peripheral artery disease) (HCC)    Bradycardia    AV block, 1st degree    Chronic diastolic heart failure (HCC)    End stage renal disease on dialysis ([facility]    Hemodialysis patient ([facility]    Hyponatremia    Constipation    Pulmonary HTN (HCC)    Thrombocytopenia (HCC)    Severe tricuspid regurgitation    Chronic respiratory failure with hypoxia ([facility])    Hypervolemia    Overweight    Frontal headache    Chronic ischemic left PCA stroke    Encephalomalacia    Acquired cerebral atrophy ([facility]       CT OF THE HEAD WITHOUT CONTRAST  1/25/2022 9:45 am       TECHNIQUE:   CT of the head was performed without the administration of intravenous   contrast. Dose modulation, iterative reconstruction, and/or weight based   adjustment of the mA/kV was utilized to reduce the radiation dose to as low   as reasonably achievable.       COMPARISON:   13 November 2021       HISTORY:   ORDERING SYSTEM PROVIDED HISTORY: headache   TECHNOLOGIST PROVIDED HISTORY:       headache   Decision Support Exception - unselect if not a suspected or confirmed   emergency medical condition->Emergency Medical Condition (MA)   Reason for Exam: headache       FINDINGS:   BRAIN/VENTRICLES: There is no acute intracranial hemorrhage, mass effect or   midline shift.  No abnormal extra-axial fluid collection.  The gray-white   differentiation is maintained without evidence of an acute infarct.  There is   no evidence of hydrocephalus.  Area of encephalomalacia is present in the left occipital lobe, unchanged from prior exam.  Calcified vertebral and cavernous   carotid arteries are noted.  Scattered hypodensity in the white matter is   present consistent with chronic microvascular change.       ORBITS: The visualized portion of the orbits demonstrate no acute abnormality.       SINUSES: The visualized paranasal sinuses and mastoid air cells demonstrate   no acute abnormality.       SOFT TISSUES/SKULL:  No acute abnormality of the visualized skull or soft   tissues.           Impression   No acute intracranial abnormality.  Senescent changes including chronic   microvascular change.  Old left PCA distribution infarct.         CT OF THE HEAD WITHOUT CONTRAST  11/13/2021 8:04 am       TECHNIQUE:   CT of the head was performed without the administration of intravenous   contrast. Dose modulation, iterative reconstruction, and/or weight based   adjustment of the mA/kV was utilized to reduce the radiation dose to as low   as reasonably achievable.       COMPARISON:   12/13/2020       HISTORY:   ORDERING SYSTEM PROVIDED HISTORY: headache   TECHNOLOGIST PROVIDED HISTORY:       headache   Decision Support Exception - unselect if not a suspected or confirmed   emergency medical condition->Emergency Medical Condition (MA)   Reason for Exam: Headache, per order. Acuity: Acute   Type of Exam: Initial       FINDINGS:   BRAIN/VENTRICLES: There is no acute intracranial hemorrhage, mass effect or   midline shift.  No abnormal extra-axial fluid collection.  No evidence of   acute infarct.  Old infarct with encephalomalacia is present in the left PCA   distribution.  There is no evidence of hydrocephalus.  There is mild diffuse   cerebral atrophy.       ORBITS: The visualized portion of the orbits demonstrate no acute abnormality.       SINUSES: The visualized paranasal sinuses and mastoid air cells demonstrate   no acute abnormality.       SOFT TISSUES/SKULL:  No acute abnormality of the visualized skull or soft   tissues.           Impression   No acute intracranial abnormality.       Old left PCA distribution infarct with associated encephalomalacia unchanged   from previous.       Mild diffuse cerebral atrophy.             VISITING DIAGNOSIS:      ICD-10-CM    1. Frontal headache  R51.9    2. Chronic respiratory failure with hypoxia ([facility])  J96.11    3. Peripheral polyneuropathy  G62.9    4. Pulmonary HTN ([facility])  I27.20    5. AV block, 1st degree  I44.0    6. Coronary artery disease involving native coronary artery of native heart without angina pectoris  I25.10    7. Anxiety and depression  F41.9     F32. A    8. Controlled type 2 diabetes mellitus with chronic 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 dialysis, with long-term current use of insulin ([facility])  E11.22     N18.6     Z79.4     Z99.2    9. End stage renal disease on dialysis ([facility]  N18.6     Z99.2    10. Hemodialysis patient ([facility]  Z99.2    11. PAD (peripheral artery disease) ([facility])  I73.9    12. Severe tricuspid regurgitation  I07.1    13. Chronic ischemic left PCA stroke  I69.30    14. Encephalomalacia  G93.89    15. Acquired cerebral atrophy ([facility]  G31.9                 CONCERNS   &   INCREASED   RISK   FOR        * TIA,  CEREBRO  VASCULAR  ISCHEMIA      *   ORTHOSTATIC  INTOLERANCE,    AUTONOMIC  NEUROPATHY      *  SEIZURE  ACTIVITY,      *   CHRONIC  TENSION  HEADACHES      *   COGNITIVE  &   MEMORY PROBLEMS  AND  DEMENTIAS         *  GAIT  DIFFICULTIES  &   BALANCE PROBLMES   AND  FALL                VARIOUS  RISK   FACTORS   WERE  REVIEWED   AND   DISCUSSED. *  PATIENT   HAS  MULTIPLE   MEDICAL, NEUROLOGICAL                        AND   MENTAL HEALTH   PROBLEMS           PATIENT'S   MANAGEMENT  IS  CHALLENGING. PLAN:                         Shawna Point  Of  The  Diagnoses,  The  Management & Treatment  Options            AND    Care  plan  Were          Reviewed and   Discussed   With  patient.            * Goals  And  Expectations  Of  The  Therapy Discussed   And  Reviewed. *   Benefits   And   Side  Effect  Profile  Of  Medication/s   Were   Discussed                * Need   For  Further   Follow up For  The  Various  Problems Were  discussed. * Results  Of  The  Previous  Diagnostic tests were reviewed and  discussed                   Medical  Decision  Making  Was  Made  Based on the   Complexity  Of  Above  Mentioned  Diagnoses,    Data reviewed             And    Risk  Of  Significant   Co morbidities and   complicating   Factors. Medical  Decision  Was      High   Complexity   Due   To  The  Patient's  Multiple  Symptoms  &  Disease,            Complex  Treatment  Regimen,  Multiple medications           and   Risk  Of   Side  Effects,  Difficulty  In  Medication  Management  And  Diagnostic  Challenges           In  View  Of  The  Associated   Co  Morbid  Conditions   And  Problems. * FALL   PRECAUTIONS. THESE  REVIEWED   AND  DISCUSSED      *  USE   WALKING  ASSISTANCE  DEVICES    /   Voiceiterin [employer]  /   Virlinda Phenes            *    SUPERVISED   CARE        *   BE  CAREFUL  WITH  ACTIVITIES              *   ADEQUATE   FLUID  INTAKE   AND  ELECTROLYTE  BALANCE           * KEEP  DAIRY  OF   THE  NEUROLOGICAL  SYMPTOMS        RECORDING THE    DURATION  AND  FREQUENCY. *  AVOID    CONDITIONS  AND  FACTORS   THAT  MAKE                  NEUROLOGICAL  SYMPTOMS  WORSE.                  *TO  MAINTAIN  REGULAR  SLEEP  WAKE  CYCLES.      *   TO  HAVE  ADEQUATE  REST  AND   SLEEP    HOURS.            *    AVOID  ANY USAGE OF    TOBACCO,          AVOID  EXCESSIVE  ALCOHOL  AND   ILLEGAL   SUBSTANCES          *  CONTINUE   MEDICATIONS    PRESCRIBED       AS    RECOMMENDED       *   Compliance   With  Medications   And  Instructions            *  May   Use  Pill  Box,    If  Needed          *    Antiplatelet  therapy    As   Recommended  Was   Discussed      *    Prophylactic  Use   Of     Vitamin   B Complex,  Folic  Acid,    Vitamin  B12    Multivitamin,       Calcium  With  magnesium  And  Vit D    Supplementations   Over  The  Counter  Discussed             *FOOT  CARE, DAILY  INSPECTION  OF  FEET   AND         PERIODIC  PODIATRY EVALUATIONS . *  PATIENT  IS  ALSO   COUNSELED   TO  KEEP    ACTIVITIES:         A)   SIMPLE      B)  ORGANIZED      C)  WRITEDOWN                     *  EVALUATIONS  AND  FOLLOW UP:                   * PHYSICAL  THERAPY                                 *CARDIOLOGY              *   PULMONARY                *    RENAL                                                     *   IN  VIEW  OF  THE  PATIENT'S    MULTIPLE   NEUROLOGICAL                           SYMPTOMS  AND  CONCERNS    FOR  PROLONGED   DURATION,                           AND    MULTIPLE   CO MORBID  MEDICAL  CONDITIONS,                           PATIENT    NEEDS  NEURO  DIAGNOSTIC  EVALUATIONS                      FOR   ANY   NEUROLOGICAL  PATHOLOGIES    AND  OTHER                        CORRECTABLE   ETIOLOGIES;     AND                              PATIENT  REQUESTS   THE  SAME. Orders Placed This Encounter   Procedures    CT CERVICAL SPINE WO CONTRAST    VL DUP CAROTID BILATERAL    Sedimentation Rate    EEG                             *  PATIENT  TO  RETURN  THE  CLINIC  AFTER   ABOVE,                       FOR   FURTHER    RE EVALUATIONS                               AND  ADDITIONAL  RECOMMENDATIONS               *PATIENT   TO  FOLLOW  UP  WITH   PRIMARY  CARE         OTHER  CONSULTANTS  AS  BEFORE.               *TO  FOLLOW  WITH   MENTAL  HEALTH  PROFESSIONALS ,  INCLUDING            PSYCHOLOGICAL  COUNSELING   AND  PSYCHIATRIC  EVALUTIONS,                     *  Maintain   Healthy  Life Style    With   Periodic  Monitoring  Of          Any  Medical  Conditions  Including   Elevated  Blood  Pressure,  Lipid  Profile,        Blood  Sugar levels  AndHeart  Disease.                 * Period   Screening  For  Cancers  Involving  Breast,  Colon,         Lungs  And  Other  Organs  As  Applicable,  In consultation   With  Your  Primary Care Providers. *Second  Neurological  Opinion  And  Evaluations  In  [facility]  Setting  If  Patient  Is  Interested. * Please   Contact   Neurology  Clinic   Early   If   Are  Any  New  Neurological   And  Any neurological  Concerns. *  If  The  Patient remains  Neurologically  Stable   Return   To  [facility] Neurology Department   IN      1-2      MONTHS  TIME   FOR  FURTHER              FOLLOW UP.                       *   The  Neurological   Findings,  Possible  Diagnosis,  Differential diagnoses                    And  Options  For    Further   Investigations                   And  management   Are  Discussed  Comprehensively. Medications   And  Prescription   Risks  And  Side effects  Are   Also  Discussed. *  If   There is  Any  Significant  Worsening   Of  Current  Symptoms  And  Or                  If patient  Develops   Any additional  New  NeurologicalSymptoms                  Or  Significant  Concerns   Should  Call  911 or  Go  To  Emergency  Department                  For  Further  Immediate  Evaluation and  management . The   Above  Were  Reviewed  With  PATIENT   and                          questions  Answered  In  Detail. TOTAL   TIME     SPENT :               On this date 2/25/2022 I have spent  65  minutes     reviewing previous notes, test results               and face to face time with the patient including     discussing the diagnosis and importance of compliance               with the treatment plan  as well as documenting on the day of the visit.                                                             Electronically signed by   Espinoza Garcia Alvin Azul M.D., Alex Ridley. Board Certified in  Neurology &  In  [facility] of Psychiatry and Neu[facility] ([facility])      DISCLAIMER:   Although every effort was made to ensure the accuracy of this  electronictranscription, some errors in transcription may have occurred. GENERAL PATIENT INSTRUCTIONS:      A Healthy Lifestyle: Care Instructions   Your Care Instructions   A healthy lifestyle can help you feel good, stay at ahealthy weight, and have plenty of energy for both work and play. A healthy lifestyle is something you can share with your whole family.  A healthy lifestyle also can lower your risk for serious health problems, such ashigh blood pressure, heart disease, and diabetes.  You can follow a few steps listed below to improve your health and the health of your family.  Follow-up careis a key part of your treatment and safety. Be sure to make and go to all appointments, and call your doctor if you are having problems. Its also a good idea to know your test results and keep a list of the medicines you take.  How can you care for yourself at home?  Do not eat too much sugar, fat, or fast foods. You can still have dessert and treats nowand then. The goal is moderation.  Start small to improve your eating habits. Pay attention to portion sizes, drink less juice and soda pop, and eat more fruits and vegetables.  Eat a healthy amount of food. A 3-ounce serving of meat, for example, is about the size of a deck of cards. Fill the rest of your plate with vegetables and whole grains.  Limit theamount of soda and sports drinks you have every day. Drink more water when you are thirsty.  Eat at least 5 servings of fruits and vegetables every day. It may seem like a lot, but it is not hard to reach this goal. Aserving or helping is 1 piece of fruit, 1 cup of vegetables, or 2 cups of leafy, raw vegetables.  Have an apple or some carrot sticks as an afternoon snack instead are sometips:   Walk to do errands or to take your child to school or the bus.  Go for a family bike ride after dinner instead of watching TV.  Where can you learn more?  Go to[email or web address] org and sign in to your [employer] account. Enter A345 in the Search HealthInformation box to learn more about \"A Healthy Lifestyle: Care Instructions. \"     If you do not have anaccount, please click on the \"Sign Up Now\" link.  Current as of: July 26, 2016   Content Version: 11.2   © 0482-6501 [employer]. Care instructions adapted under license by [facility] ([facility]). If you have questions about a medical condition or this instruction, always ask your healthcare professional. [employer] disclaims any warranty or liability for your use of this information.

## 2022-03-15 NOTE — PROCEDURES
[address]                 [address] [place] [id number]                         ELECTROENCEPHALOGRAM (EEG) REPORT      PATIENT NAME: Kee Davis                     :        1950  MED REC NO:   [id number]                             ROOM:  ACCOUNT NO:   [de-identified]                           ADMIT DATE: 2022      PROVIDER:     Adriana Pruitt MD        DATE OF STUDY:  2022    TECHNIQUE:  23 channels of EEG, 2 channels of EOG, 2 channel of EKG and  1 channel ground and 1 channel reference were recorded with [employer]  Software 32 Channel System utilizing the International 10/20 System  Protocol. Catarino detection and seizure analysis protocols were utilized and the  study was reviewed using the comprehensive EEG monitoring. This is an extended EEG recorded for 1 hour and 2 minutes. CLINICAL DATA:        The patient is 80years old with a history of dementia,  previous history of stroke, end-stage renal disease, on dialysis,  encephalomalacia, dizziness. The purpose of the study is to evaluate for associated seizure activity. MEDICATIONS:  Exelon patch. BACKGROUND ACTIVITY:      While the patient was awake, the background  activity consisted of poorly-regulated 6 to 7 Hz waveform seen over both  cerebral hemispheres. There are intermittent generalized slow waves noted. ACTIVATION PROCEDURES:    HYPERVENTILATION:  Could not be performed due to the patient's clinical  condition. PHOTIC STIMULATION:  Photic stimulation was performed at the following  frequencies: 1 Hz, 3 Hz, 6 Hz, 9 Hz, 12 Hz, 15 Hz, 18 Hz, 21 Hz, 25 Hz,  30 Hz and patient tolerated well. Photic stimulation:  Unremarkable. SLEEP:  Stage I and stage II sleep were noted. EKG:  EKG showed normal sinus rhythm without any significant  abnormality. IMPRESSION:        There is moderate diffuse cerebral dysfunction noted.        Further clinical correlation and followup recommended. Elsi Fox MD, [address] [place] [address]     Board Certified in Neurology  & in  [address] [address] [place] [address] W of Psychiatry and Neurology ([facility]).            D: 03/15/2022 14:50:23       T: 03/15/2022 15:42:08     [email or web address]  Job#: [id number]     Doc#: [id number]    CC:    Radha Lujan MD

## 2022-03-18 NOTE — ED PROVIDER NOTES
[facility] ED  [address]  [place] [address] [place]  Phone: [address]      Pt Name: Hallie Patel  MRN: [id number]  Ifeoma 1950  Date of evaluation: 3/18/2022    CHIEF COMPLAINT       Chief Complaint   Patient presents with    Fatigue     gen. weakness, with \"abnormal breathing. \"       HISTORY OF PRESENT ILLNESS    Hallie Patel is a 80 y.o. female who presents the nursing home for abnormal breathing and generalized weakness. Patient admits to cough denies chest pain. No fevers or chills. She does admit to some dysuria no abdominal pain. History of dialysis she had full dialysis yesterday without any complications. She denies any other symptoms besides being generally weak.     REVIEW OF SYSTEMS       Constitutional: No fevers or chills positive generalized weakness  HEENT: No sore throat, rhinorrhea, or earache   Eyes: No blurry vision or double vision no drainage   Cardiovascular: No chest pain or tachycardia   Respiratory: No wheezing positive shortness of breath and cough  Gastrointestinal: No nausea, vomiting, diarrhea, constipation, or abdominal pain   : No hematuria positive dysuria  Musculoskeletal: No swelling or pain   Skin: No rash   Neurological: No focal neurologic complaints, paresthesias, weakness, or headache     PAST MEDICAL HISTORY    has a past medical history of Acute anemia, Acute cystitis, Acute kidney injury ([address] Acute kidney injury superimposed on CKD ([address] Acute on chronic combined systolic (congestive) and diastolic (congestive) heart failure (HCC), Acute on chronic diastolic heart failure ([address] Acute renal failure (ARF) ([address] Anasarca, Anxiety and depression, AV block, 1st degree, Background diabetic retinopathy(362.01), Balance problem, Bilateral pleural effusion, BMI 45.0-49.9, adult (HCC), Bradycardia, Buttock wound, CAD (coronary artery disease), Cancer of uterus ([address] Chronic diastolic heart failure ([address] Chronic kidney disease, Chronic low back pain, Controlled type 2 diabetes mellitus with chronic 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 dialysis, with long-term current use of insulin ([address] CVA (cerebral vascular accident) ([address] Decompensated heart failure ([address] Decubitus ulcer of trochanteric region of right hip, stage 2 ([address] Dementia ([address] Dry eye syndrome, Elevated troponin, End stage renal disease on dialysis ([address] GERD (gastroesophageal reflux disease), Glaucoma suspect, Gout, Hemodialysis patient ([address] Homonymous hemianopsia, Hyperkalemia, Hyperlipidemia, Hypertension, Infestation by bed bug, Infestation by maggots, Keratitis, Lymphedema of both lower extremities, Morbid obesity ([address] MRSA bacteremia, Obesity, PAD (peripheral artery disease) ([address] Peripheral polyneuropathy, Pressure injury of right heel, unstageable ([address] Pseudophakos, Stroke ([address] Trichiasis, Type 2 diabetes mellitus ([address] and Wounds, multiple. SURGICAL HISTORY      has a past surgical history that includes Gastric bypass surgery; Cataract removal with implant (2012); Cataract removal with implant (2012); Coronary artery bypass graft (12-);  section; Hysterectomy; Cholecystectomy; Tonsillectomy and adenoidectomy; Eye surgery (Left); Upper gastrointestinal endoscopy (2020); and IR TUNNELED CVC PLACE WO SQ PORT/PUMP > 5 YEARS (2021).     CURRENT MEDICATIONS       Previous Medications    ACETAMINOPHEN (TYLENOL) 325 MG TABLET    Take 650 mg by mouth every 4 hours as needed for Pain or Fever    ALCOHOL SWABS (ALCOHOL PREP) PADS    Checks blood sugar 3 times a day    AMLODIPINE (NORVASC) 10 MG TABLET    Take 1 tablet by mouth daily    ASPIRIN 81 MG TABLET    Take 1 tablet by mouth daily    ATORVASTATIN (LIPITOR) 40 MG TABLET    TAKE 1 TABLET BY MOUTH EVERYDAY    CARVEDILOL (COREG) 6.25 MG TABLET    Take 1 tablet by mouth 2 times daily (with meals) Hold for heart rate less than 60  Hold for BP less than 115 CHOLECALCIFEROL 125 MCG (5000 UT) CHEW    Take 1 tablet by mouth daily    CITALOPRAM (CELEXA) 20 MG TABLET    Take 1 tablet by mouth daily    HYDRALAZINE (APRESOLINE) 100 MG TABLET    Take 1 tablet by mouth 3 times daily    INSULIN LISPRO (HUMALOG) 100 UNIT/ML INJECTION VIAL    Inject 0-6 Units into the skin 3 times daily (with meals) Glucose: Dose:               No Insulin  140-199 1 Unit  200-249 2 Units  250-299 3 Units  300-349 4 Units  350-399 5 Units  Over 399 6 Units    LIDOCAINE-PRILOCAINE (EMLA) 2.5-2.5 % CREAM    Apply topically three times a week On dialysis days     LORATADINE (CLARITIN) 10 MG TABLET    Take 10 mg by mouth every other day    MIDODRINE (PROAMATINE) 10 MG TABLET    Take 10 mg by mouth three times a week Indications: on dialysis days Tu/Thur/Sat     MIDODRINE (PROAMATINE) 10 MG TABLET    Take 10 mg by mouth as needed (hypotension mid-dialysis)    NITROGLYCERIN (NITROSTAT) 0.4 MG SL TABLET    up to max of 3 total doses. If no relief after 1 dose, call 911. NYSTATIN (MYCOSTATIN) 489012 UNIT/GM CREAM    Apply topically 2 times daily as needed (redness)    OXYBUTYNIN (DITROPAN) 5 MG TABLET    TAKE 1 TABLET BY MOUTH TWICE DAILY    PANTOPRAZOLE (PROTONIX) 40 MG TABLET    Take 1 tablet by mouth 2 times daily (before meals)    POLLEN EXTRACTS (PROSTAT PO)    Take 30 mLs by mouth three times daily    RIVASTIGMINE (EXELON) 9.5 MG/24HR    APPLY 1 NEW PATCH EVERY 24 HOURS    SENNA (SENOKOT) 8.6 MG TABLET    Take 1 tablet by mouth 2 times daily    SODIUM CHLORIDE (OCEAN) 0.65 % NASAL SPRAY    2 sprays by Nasal route 2 times daily       ALLERGIES     is allergic to amoxicillin-pot clavulanate, sulfamethoxazole-trimethoprim, amoxicillin, clavulanic acid, clonidine derivatives, sulfamethoxazole, trimethoprim, and adhesive tape. FAMILY HISTORY     She indicated that her mother is . She indicated that her father is .      family history includes Cancer in her mother; Coronary Art Dis in an other family member; Diabetes in her father and mother; Emphysema in her father; Glaucoma in her father; Heart Disease in her father; High Blood Pressure in her mother; Kidney Disease in her mother; Tee Meza in an other family member; Mental Retardation in an other family member; Stroke in her mother and another family member; Uterine Cancer in her mother. SOCIAL HISTORY      reports that she has never smoked. She has never used smokeless tobacco. She reports that she does not drink alcohol and does not use drugs. PHYSICAL EXAM       ED Triage Vitals [03/18/22 1826]   BP Temp Temp src Pulse Resp SpO2 Height Weight   118/80 -- -- 68 16 96 % 5' 6\" (1.676 m) 150 lb (68 kg)       Constitutional: Alert, oriented x3, nontoxic, answering questions appropriately, acting properly for age, in no acute distress   HEENT: Extraocular muscles intact, mucous membranes dry  Neck: Trachea midline   Cardiovascular: Regular rhythm and rate no murmurs   Respiratory: Deep harsh cough. Diminished bilaterally with rhonchi no wheezing. No tachypnea or retractions no hypoxia  Gastrointestinal: Soft, nontender, nondistended, diminished bowel sounds. No rebound, rigidity, or guarding no abdominal bruit no pulsatile mass. Musculoskeletal: No extremity pain mild bilateral lower extremity edema no calf tenderness  Neurologic: Moving upper extremities   Skin: Warm and dry     DIFFERENTIAL DIAGNOSIS/ MDM:     IV and labs. EKG. Chest imaging. CHF versus pneumonia versus other. DIAGNOSTIC RESULTS     EKG: All EKG's are interpreted by the Emergency Department Physician who either signs or Co-signs this chart in the absence of a cardiologist.    1843 sinus rhythm rate 66 MN reads 300 suspect approximately 200.   no ST elevations. Compared to previous EKG from 1/25/2022 essentially unchanged.     Not indicated unless otherwise documented above    LABS:  Results for orders placed or performed during the hospital encounter of 03/18/22   EKG 12 Lead   Result Value Ref Range    Ventricular Rate 66 BPM    Atrial Rate 75 BPM    P-R Interval 300 ms    QRS Duration 108 ms    Q-T Interval 476 ms    QTc Calculation (Bazett) 499 ms    P Axis 75 degrees    R Axis -78 degrees    T Axis 141 degrees       Not indicated unless otherwise documented above    RADIOLOGY:   I reviewed the radiologist interpretations:    XR CHEST PORTABLE   Final Result   Mild CHF and small bilateral pleural effusions. Not indicated unless otherwise documented above    EMERGENCY DEPARTMENT COURSE:     The patient was given the following medications:  No orders of the defined types were placed in this encounter.        Vitals:   -------------------------  /80   Pulse 68   Temp 96.3 °F (35.7 °C) (Tympanic)   Resp 16   Ht 5' 6\" (1.676 m)   Wt 150 lb (68 kg)   SpO2 96%   BMI 24.21 kg/m²     7:30 PM care transferred to Dr. Jessica Almaraz        (Please note that portions of this note were completed with a voice recognition program.  Efforts were made to edit the dictations but occasionally words are mis-transcribed.)    Anjelica Tate DO   Attending Emergency Physician      Anjelica Tate DO  03/18/22 1913

## 2022-03-18 NOTE — ED TRIAGE NOTES
Pt presents d/t generalized weakness. Pt has been \"mouth breathing\" per report, which is \"abnormal\" for her. Pt completed dialysis yesterday. Per report wheezing. Upon arrival pt has good color, is taking large breaths in thru her mouth and is belly breathing. Pt wearing 2L NC. Sating 100%. Pt A&O. Reports no pain, but does verbalizes problems urinating.

## 2022-03-19 PROBLEM — K92.2 GI BLEED: Status: ACTIVE | Noted: 2022-01-01

## 2022-03-19 NOTE — PLAN OF CARE
Problem: Breathing Pattern - Ineffective:  Goal: Ability to achieve and maintain a regular respiratory rate will improve  Description: Ability to achieve and maintain a regular respiratory rate will improve  Outcome: Ongoing     Problem: Skin Integrity:  Goal: Will show no infection signs and symptoms  Description: Will show no infection signs and symptoms  Outcome: Ongoing  Goal: Absence of new skin breakdown  Description: Absence of new skin breakdown  Outcome: Ongoing     Problem: Falls - Risk of:  Goal: Will remain free from falls  Description: Will remain free from falls  Outcome: Ongoing  Goal: Absence of physical injury  Description: Absence of physical injury  Outcome: Ongoing    New admission from outside facility. Afebrile. Pt aaox2-3. Need asst with needs. Gen bruising and scabbing noted. No apparent pain/distress noted. No BM on shift. Throughout the shift pt turned/reposition, bed in the lowest and call light within reach.

## 2022-03-19 NOTE — CONSULTS
[facility]   Consult Note         Today's Date: 3/19/2022  Patient Name: Angely Ash  Date of admission: 3/19/2022  2:23 AM  Patient's age: 80 y. o., 1950  Admission Dx: GI bleed [K92.2]  Requesting Physician: Jack Darnell DO  History Obtained From:  Patient, Electronic Medical Records    Chief Complaint: Elevated tropes    History Of Present Illness:  Angely Ash is a 80 y.o. female who presented initially to [facility] with abnormal breathing and generalized weakness. Patient reports cough but no chest pain, no fevers, no chills, reports some dysuria but no abdominal pain. Patient has past medical history of ESRD on hemodialysis, did not miss any dialysis. Dialysis days TTS. Is not a good historian.  patient states that she takes Plavix at home. Reason for Consult: Elevated tropes    On my evaluation, patient afebrile, hemodynamically stable, pulse 60, on 2 L/min of oxygen via nasal cannula and saturating well. Labs reviewed  Potassium 4.1, magnesium not done, no acidosis or anion gap  proBNP 32,000 likely patient's baseline. Ttropes 154-159-150(previous strokes in January 1, 2006-106)  Hemoglobin stable 8.7. No leukocytosis. X-ray consistent with mild CHF and bilateral mild pleural effusions. FOBT positive.  TSH 3.2 0-1 2020   Hemoglobin A1C 5.6 in 2020   Prior Echo 2021 November-EF 45%, evidence of diastolic dysfunction, moderate to severe TR, RVSP 48, trace to mild MR. Past Medical History:     CAD s/p CABG-LIMA-LAD, SVG-OM, SVG-D1, SVG-PDA patient 2005-on Coreg 6.25 twice daily, and Lipitor 40.      Junctional bradycardia s/p PPM [employer]-December 2021   ESRD on hemodialysis -TTS   Diabetes   Hypertension-Coreg 6.25, Norvasc 10, hydralazine 100 3 times daily   Gastritis noted on EGD in 2020    TTE 11/13/21  Summary  Left ventricle is mildly enlarged, global left ventricular systolic function  is low normal versus mildly reduced. Calculated ejection fraction is 45% (by Driver's Metod.) Calculated EF by 3D Heart Model is 56%. Evidence of diastolic dysfunction. Left atrium is moderately dilated. Bowing interatrial septal motion. No shunt seen by color Doppler. Right atrial dilatation. Small bright echo density noted originating from the area of the superior vena cava, likely pacing lead. Right ventricular dilatation with normal systolic function. Thickened mitral valve leaflets. Mitral annular calcification is seen. Mean gradient is 4mmHg . Trace to mild mitral regurgitation. Tricuspid valve was not well visualized. Moderate to severe tricuspid regurgitation. Estimated right ventricular systolic pressure is 48 mmHg. No significant pericardial effusion is seen.    has a past medical history of Acute anemia, Acute cystitis, Acute kidney injury ([address] Acute kidney injury superimposed on CKD ([address] Acute on chronic combined systolic (congestive) and diastolic (congestive) heart failure (HCC), Acute on chronic diastolic heart failure ([address] Acute renal failure (ARF) ([address] Anasarca, Anxiety and depression, AV block, 1st degree, Background diabetic retinopathy(362.01), Balance problem, Bilateral pleural effusion, BMI 45.0-49.9, adult ([address] Bradycardia, Buttock wound, CAD (coronary artery disease), Cancer of uterus ([address] Chronic diastolic heart failure ([address] Chronic kidney disease, Chronic low back pain, Controlled type 2 diabetes mellitus with chronic 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 dialysis, with long-term current use of insulin ([address] CVA (cerebral vascular accident) ([address] Decompensated heart failure ([address] Decubitus ulcer of trochanteric region of right hip, stage 2 ([address] Dementia ([address] Dry eye syndrome, Elevated troponin, End stage renal disease on dialysis ([address] GERD (gastroesophageal reflux disease), Glaucoma suspect, Gout, Hemodialysis patient ([address] Homonymous hemianopsia, Hyperkalemia, Hyperlipidemia, Hypertension, Infestation by bed bug, Infestation by maggots, Keratitis, Lymphedema of both lower extremities, Morbid obesity ([facility] MRSA bacteremia, Obesity, PAD (peripheral artery disease) ([facility] Peripheral polyneuropathy, Pressure injury of right heel, unstageable ([facility] Pseudophakos, Stroke ([facility] Trichiasis, Type 2 diabetes mellitus ([facility] and Wounds, multiple. Past Surgical History:   has a past surgical history that includes Gastric bypass surgery; Cataract removal with implant (2012); Cataract removal with implant (2012); Coronary artery bypass graft (12-);  section; Hysterectomy; Cholecystectomy; Tonsillectomy and adenoidectomy; Eye surgery (Left); Upper gastrointestinal endoscopy (2020); and IR TUNNELED CVC PLACE WO SQ PORT/PUMP > 5 YEARS (2021). Home Medications:    Prior to Admission medications    Medication Sig Start Date End Date Taking? Authorizing Provider   Pollen Extracts (PROSTAT PO) Take 30 mLs by mouth three times daily    Historical Provider, MD   carvedilol (COREG) 6.25 MG tablet Take 1 tablet by mouth 2 times daily (with meals) Hold for heart rate less than 60  Hold for BP less than 115 22   Mireya Santamaria DO   acetaminophen (TYLENOL) 325 MG tablet Take 650 mg by mouth every 4 hours as needed for Pain or Fever    Historical Provider, MD   loratadine (CLARITIN) 10 MG tablet Take 10 mg by mouth every other day    Historical Provider, MD   midodrine (PROAMATINE) 10 MG tablet Take 10 mg by mouth as needed (hypotension mid-dialysis)    Historical Provider, MD   nystatin (MYCOSTATIN) 762725 UNIT/GM cream Apply topically 2 times daily as needed (redness)    Historical Provider, MD   senna (SENOKOT) 8.6 MG tablet Take 1 tablet by mouth 2 times daily    Historical Provider, MD   nitroGLYCERIN (NITROSTAT) 0.4 MG SL tablet up to max of 3 total doses.  If no relief after 1 dose, call 911. 21   SHIRA Ellison NP   insulin lispro (HUMALOG) 100 UNIT/ML injection vial Inject 0-6 Units into the skin 3 times daily (with meals) Glucose: Dose:               No Insulin  140-199 1 Unit  200-249 2 Units  250-299 3 Units  300-349 4 Units  350-399 5 Units  Over 399 6 Units 11/18/21   SHIRA Mock - NP   midodrine (PROAMATINE) 10 MG tablet Take 10 mg by mouth three times a week Indications: on dialysis days Tu/Thur/Sat     Historical Provider, MD   sodium chloride (OCEAN) 0.65 % nasal spray 2 sprays by Nasal route 2 times daily    Historical Provider, MD   lidocaine-prilocaine (EMLA) 2.5-2.5 % cream Apply topically three times a week On dialysis days Tu-Th-Sa    Historical Provider, MD   amLODIPine (NORVASC) 10 MG tablet Take 1 tablet by mouth daily 3/10/21   Stevie Mckinney DO   atorvastatin (LIPITOR) 40 MG tablet TAKE 1 TABLET BY MOUTH EVERYDAY 3/10/21   Ricco Michaela,    Cholecalciferol 125 MCG (5000 UT) CHEW Take 1 tablet by mouth daily 3/10/21   Estela Mckinney DO   citalopram (CELEXA) 20 MG tablet Take 1 tablet by mouth daily 3/10/21   Estela Mckinney DO   hydrALAZINE (APRESOLINE) 100 MG tablet Take 1 tablet by mouth 3 times daily 3/10/21   Estela Mckinney DO   oxybutynin (DITROPAN) 5 MG tablet TAKE 1 TABLET BY MOUTH TWICE DAILY 3/10/21   Stevie Mckinney DO   pantoprazole (PROTONIX) 40 MG tablet Take 1 tablet by mouth 2 times daily (before meals) 3/10/21   Stevie Mckinney DO   rivastigmine (EXELON) 9.5 MG/24HR APPLY 1 NEW PATCH EVERY 24 HOURS 3/10/21   Estela Mckinney DO   Alcohol Swabs (ALCOHOL PREP) PADS Checks blood sugar 3 times a day 11/4/20   Elena Spatz, [place]   aspirin 81 MG tablet Take 1 tablet by mouth daily 1/3/20   Elena Spatz, [place]       pantoprazole (PROTONIX) 40 mg injection, 40 mg, IntraVENous, Daily    sodium chloride flush, 5-40 mL, IntraVENous, 2 times per day    insulin lispro, 0-6 Units, SubCUTAneous, TID WC    insulin lispro, 0-3 Units, SubCUTAneous, noted.  Abdomen: Soft, non-tender, no masses or organomegaly, BS present. :   No suprapubic or flank tenderness. Neuro:  No focal neurological deficit  SKIN:  No rashes, good skin turgor. Extremities:  No edema. Cardiac Investigations during this admission:  EKG: As above    Echo: As above    Labs:   CBC:   Recent Labs     03/18/22 1907 03/19/22  0331   WBC 4.6  --    HGB 8.7* 8.7*   HCT 28.8* 30.0*   PLT See Reflexed IPF Result  --      BMP:   Recent Labs     03/18/22 1907      K 4.1   CO2 25   BUN 22   CREATININE 1.82*   LABGLOM 27*   GLUCOSE 128*     FASTING LIPID PANEL:  Lab Results   Component Value Date    HDL 48 11/14/2021    TRIG 49 11/14/2021     LIVER PROFILE:No results for input(s): AST, ALT, LABALBU in the last 72 hours. Other Current Problems  Patient Active Problem List   Diagnosis    D[facility]    Hypertension    Hyperlipidemia    Gout    Peripheral polyneuropathy    Anxiety and depression    CAD (coronary artery disease)    [facility]cerebral vascular [facility]) [facility]    Controlled type 2 diabetes mellitus with chronic 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 dialysis, with long-term current use of insulin (HCC)    Lymphedema of both lower extremities    [facility]    Bradycardia    AV block, 1st degree    Chronic diastolic heart failure ([facility])    End stage renal disease on dialysis ([facility]    Hemodialysis patient ([facility]    Hyponatremia    Constipation    Pulmonary HTN (HCC)    Thrombocytopenia (HCC)    Severe tricuspid regurgitation    Chronic respiratory failure with hypoxia (HCC)    Hypervolemia    Overweight    Frontal headache    Chronic ischemic left PCA stroke    Encephalomalacia    Acquired cerebral atrophy (HCC)    Dizziness and giddiness     GI bleed       Impression:  · Elevated troponins-flat  · CHF exacerbation  · CAD s/p CABG  · Moderate to severe TR.       Recommendations:    · Dialysis per nephrology  · Continue home meds  · Get updated medication list    Will discuss with attending physician. Recommendations above are pending approval by attending physician. Alexander Stein [address]  Internal Medicine/ Cardiology Resident  [phone]  3/19/2022 8:05 AM          Attestation signed by      Attending Physician Statement:    I have discussed the care of  Noe Dwyer , including pertinent history and exam findings, with the Cardiology fellow/resident. I have seen and examined the patient and the key elements of all parts of the encounter have been performed by me. I agree with the assessment, plan and orders as documented by the fellow/resident, after I modified exam findings and plan of treatments, and the final version is my approved version of the assessment. Additional Comments: The patient seen and examined agree with above. S/p CABG. PM for SSS. No chest pain. Elevated troponin type 2 from renal failure and anemia. Anemia work up per GI. Volume control via dialysis. Continue current medications. No further cardiac work up needed.

## 2022-03-19 NOTE — H&P
[email or web address]@    [address] [place] [address]    HISTORY AND PHYSICAL EXAMINATION            Date:   3/19/2022  Patient name:  Amena Walker  Date of admission:  3/19/2022  2:23 AM  MRN:   [id number]  Account:  [de-identified]  YOB: 1950  PCP:    Silva Ferguson DO  Room:   [facility]  Code Status:    Full Code    Chief Complaint:     Patient admitted for reported GI bleed, patient states \" I am tired\"    History Obtained From:     patient, electronic medical record    History of Present Illness:     Amena Walker is a 80 y.o. Non- / non  female who presents with No chief complaint on file. and is admitted to the hospital for the management of GI bleed. Chart reviewed, patient admitted due to reported GI bleed. Concerns for possible anemia however after discussing with nephrology it has been revealed that her hemoglobin has been relatively stable. The patient herself is a very poor historian and is unable to speak due to her fatigue. She appears to be very frail and suffering from cachexia. She has some chronic wounds noted on exam.  This point in time the patient has been seen by multiple services including cardiology, nephrology, gastroenterology. She has undergone extensive work-up in the past.  She previously has had an EGD but refused colonoscopy due to inability to complete her bowel prep. She has known coronary artery disease and has undergone coronary artery bypass grafting. In the absence of chest pain no further cardiac work-up is recommended. Her minimally elevated troponin is secondary to her renal failure. Maintenance hemodialysis per nephrology. At this point in time given the fact that her hemoglobin is relatively stable GI is not recommending any further work-up regarding her anemia at this point in time. Her anemia is likely secondary to chronic kidney disease.   Regarding her complaints of fatigue I suspect that the patient just has progressive weakness and debility secondary to her multiple comorbid conditions. Outside of chronic pain she denies any particular complaints. She has a left lower extremity wound that is bothersome and painful. She has some chronic back pain. She denies any chest pain. She has chronic shortness of breath but is at her baseline. She denies any nausea, vomiting, diarrhea. She is completely unaware of any blood loss anemia at this point in time. She reports no right red blood per rectum nor melanotic stools. Past Medical History:     Past Medical History:   Diagnosis Date    Acute anemia     Acute cystitis 8/8/2020    Acute kidney injury ([address] 8/19/2020    Acute kidney injury superimposed on CKD ([address]     Acute on chronic combined systolic (congestive) and diastolic (congestive) heart failure ([address] 1/27/2022    Acute on chronic diastolic heart failure ([address] 8/7/2020    Acute renal failure (ARF) ([address] 12/11/2020    Anasarca 12/12/2020    Anxiety and depression     AV block, 1st degree 8/19/2020    Background diabetic retinopathy(362.01) 2008    (Mild)    Balance problem     Bilateral pleural effusion 1/26/2022    BMI 45.0-49.9, adult ([address] 3/1/2021    Bradycardia 8/19/2020    Buttock wound 8/20/2020    CAD (coronary artery disease)     (with coronary artery bypass graft x4).     Cancer of uterus [facility])     history of, (probable cure)    Chronic diastolic heart failure ([address] 3/1/2021    Chronic kidney disease     Chronic low back pain     Controlled type 2 diabetes mellitus with chronic 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 dialysis, with long-term current use of insulin (HCC)     CVA (cerebral vascular accident) ([address]     Decompensated heart failure ([address] 12/4/2020    Decubitus ulcer of trochanteric region of right hip, stage 2 ([address] 8/23/2020    Dementia (HCC)     (moderate)    Dry eye syndrome     Elevated troponin 8/8/2020    End stage renal disease on dialysis ([facility] 3/1/2021    GERD (gastroesophageal reflux disease)     Glaucoma suspect     Gout     Hemodialysis patient ([facility] 3/1/2021    Homonymous hemianopsia     (Right)    Hyperkalemia 2020    Hyperlipidemia     Hypertension     Infestation by bed bug 2020    Infestation by maggots 2020    Keratitis     (secondary to dry eye syndrome).  Lymphedema of both lower extremities 2020    Morbid obesity ([address] 3/1/2021    MRSA bacteremia 2020    Obesity     PAD (peripheral artery disease) (HCC)     Peripheral polyneuropathy     (diabetic)    Pressure injury of right heel, unstageable ([address] 2020    Pseudophakos     (Right Eye: 2012; Left Eye: 2012) - Dr. Vitor Pierce.  Stroke [facility])     (Multiple) MRI of brain 10/2008 shows multiple infarcts involving the temporal and parietal areas.  Trichiasis     (left eye)    Type 2 diabetes mellitus ([facility]     Wounds, multiple 2020        Past Surgical History:     Past Surgical History:   Procedure Laterality Date    CATARACT REMOVAL WITH IMPLANT  2012    (Right eye) - Dr. Vitor Pierce.  CATARACT REMOVAL WITH IMPLANT  2012    (Left eye) - Dr. Vitor Pierce.   SECTION      CHOLECYSTECTOMY      CORONARY ARTERY BYPASS GRAFT  12-    (x4) - LIMA-LAD, SVG-diagnoal 1, SVG-OM1, and SVG-PDA. Exploration of left radial. (Dr. Torsten Galan).  EYE SURGERY Left     as a child     GASTRIC BYPASS SURGERY      HYSTERECTOMY      (abdominal)  with left oophorectomy. Right ovary retained.     IR TUNNELED CATHETER PLACEMENT GREATER THAN 5 YEARS  2021    IR TUNNELED CATHETER PLACEMENT GREATER THAN 5 YEARS 2021 Mili Martinez MD [facility] SPECIAL PROCEDURES    TONSILLECTOMY AND ADENOIDECTOMY      UPPER GASTROINTESTINAL ENDOSCOPY  2020    EGD BIOPSY performed by Atul Mcleod MD at [facility]        Medications Prior to Admission:     Prior to Admission medications Medication Sig Start Date End Date Taking? Authorizing Provider   Pollen Extracts (PROSTAT PO) Take 30 mLs by mouth three times daily    Historical Provider, MD   carvedilol (COREG) 6.25 MG tablet Take 1 tablet by mouth 2 times daily (with meals) Hold for heart rate less than 60  Hold for BP less than 115 1/30/22   Pradip Sahu DO   acetaminophen (TYLENOL) 325 MG tablet Take 650 mg by mouth every 4 hours as needed for Pain or Fever    Historical Provider, MD   loratadine (CLARITIN) 10 MG tablet Take 10 mg by mouth every other day    Historical Provider, MD   midodrine (PROAMATINE) 10 MG tablet Take 10 mg by mouth as needed (hypotension mid-dialysis)    Historical Provider, MD   nystatin (MYCOSTATIN) 548341 UNIT/GM cream Apply topically 2 times daily as needed (redness)    Historical Provider, MD   senna (SENOKOT) 8.6 MG tablet Take 1 tablet by mouth 2 times daily    Historical Provider, MD   nitroGLYCERIN (NITROSTAT) 0.4 MG SL tablet up to max of 3 total doses.  If no relief after 1 dose, call 911. 11/18/21   SHIRA Taylor NP   insulin lispro (HUMALOG) 100 UNIT/ML injection vial Inject 0-6 Units into the skin 3 times daily (with meals) Glucose: Dose:               No Insulin  140-199 1 Unit  200-249 2 Units  250-299 3 Units  300-349 4 Units  350-399 5 Units  Over 399 6 Units 11/18/21   SHIRA Taylor NP   midodrine (PROAMATINE) 10 MG tablet Take 10 mg by mouth three times a week Indications: on dialysis days Tu/Thur/Sat     Historical Provider, MD   sodium chloride (OCEAN) 0.65 % nasal spray 2 sprays by Nasal route 2 times daily    Historical Provider, MD   lidocaine-prilocaine (EMLA) 2.5-2.5 % cream Apply topically three times a week On dialysis days Tu-Th-Sa    Historical Provider, MD   amLODIPine (NORVASC) 10 MG tablet Take 1 tablet by mouth daily 3/10/21   Stevie Mckinney DO   atorvastatin (LIPITOR) 40 MG tablet TAKE 1 TABLET BY MOUTH EVERYDAY 3/10/21   Nai Batista DO Cholecalciferol 125 MCG (5000 UT) CHEW Take 1 tablet by mouth daily 3/10/21   Heaven Mckinney, DO   citalopram (CELEXA) 20 MG tablet Take 1 tablet by mouth daily 3/10/21   Heaven Mckinney, DO   hydrALAZINE (APRESOLINE) 100 MG tablet Take 1 tablet by mouth 3 times daily 3/10/21   Heaven Mckinney, DO   oxybutynin (DITROPAN) 5 MG tablet TAKE 1 TABLET BY MOUTH TWICE DAILY 3/10/21   Stevie Mckinney, DO   pantoprazole (PROTONIX) 40 MG tablet Take 1 tablet by mouth 2 times daily (before meals) 3/10/21   Stevie Mckinney, DO   rivastigmine (EXELON) 9.5 MG/24HR APPLY 1 NEW PATCH EVERY 24 HOURS 3/10/21   Heaven Mckinney, DO   Alcohol Swabs (ALCOHOL PREP) PADS Checks blood sugar 3 times a day 11/4/20   [place], [place]   aspirin 81 MG tablet Take 1 tablet by mouth daily 1/3/20   [place]        Allergies:     Amoxicillin-pot clavulanate, Sulfamethoxazole-trimethoprim, Amoxicillin, Clavulanic acid, Clonidine derivatives, Sulfamethoxazole, Trimethoprim, and Adhesive tape    Social History:     Tobacco:    reports that she has never smoked. She has never used smokeless tobacco.  Alcohol:      reports no history of alcohol use. Drug Use:  reports no history of drug use. Family History:     Family History   Problem Relation Age of Onset    Diabetes Mother     Cancer Mother     High Blood Pressure Mother    Katty Berry Stroke Mother     Kidney Disease Mother     Uterine Cancer Mother     Diabetes Father     Heart Disease Father     Glaucoma Father     Emphysema Father     Coronary Art Dis Other         All 4 siblings.  Stroke Other         1 sibling.  Lung Cancer Other         1 sibling - cause of death lung cancer.  Mental Retardation Other        Review of Systems:     Positive and Negative as described in HPI.     CONSTITUTIONAL: Positive for weakness, fatigue  HEENT:  negative for vision, hearing changes, runny nose, throat pain  RESPIRATORY: Positive for chronic shortness of breath, at baseline. Patient denies any cough or wheezing. Patient denies any sputum production.   CARDIOVASCULAR:  negative for chest pain, palpitations  GASTROINTESTINAL:  negative for nausea, vomiting, diarrhea, constipation, change in bowel habits, abdominal pain   GENITOURINARY:  negative for difficulty of urination, burning with urination, frequency   INTEGUMENT: Positive for left upper extremity wound, left lower extremity wound  HEMATOLOGIC/LYMPHATIC:  negative for swelling/edema   ALLERGIC/IMMUNOLOGIC:  negative for urticaria , itching  ENDOCRINE:  negative increase in drinking, increase in urination, hot or cold intolerance  MUSCULOSKELETAL:   positive for chronic back pain  NEUROLOGICAL:  negative for headaches, dizziness, lightheadedness, numbness, pain, tingling extremities  BEHAVIOR/PSYCH:  negative for depression, anxiety    Physical Exam:   BP (!) 157/54   Pulse 75   Temp 98.5 °F (36.9 °C)   Resp 15   Ht 5' 6\" (1.676 m)   Wt 155 lb 3.3 oz (70.4 kg)   SpO2 100%   BMI 25.05 kg/m²   Temp (24hrs), Av.3 °F (36.3 °C), Min:96.3 °F (35.7 °C), Max:98.5 °F (36.9 °C)    General Appearance: alert, well appearing, and in no acute distress  Mental status: oriented to person, place, and time  Head: normocephalic, atraumatic  Eye: no icterus, redness, pupils equal and reactive, extraocular eye movements intact, conjunctiva clear  Ear: normal external ear, no discharge, hearing intact  Nose: no drainage noted  Mouth: mucous membranes moist  Neck: supple, no carotid bruits, thyroid not palpable  Lungs: End expiratory wheezing noted bilaterally t  Cardiovascular: normal rate, regular rhythm, no murmur, gallop, rub  Abdomen: Soft, nontender, nondistended, normal bowel sounds, no hepatomegaly or splenomegaly  Neurologic: There are no new focal motor or sensory deficits, normal muscle tone and bulk, no abnormal sensation, normal speech, cranial nerves II through XII grossly intact  Skin: Patient has multiple areas of ecchymosis noted, left upper and lower extremity wounds presently bandaged.   Extremities: peripheral pulses palpable, no pedal edema or calf pain with palpation  Psych: normal affect    Investigations:      Laboratory Testing:  Recent Results (from the past 24 hour(s))   EKG 12 Lead    Collection Time: 03/18/22  6:43 PM   Result Value Ref Range    Ventricular Rate 66 BPM    Atrial Rate 75 BPM    P-R Interval 300 ms    QRS Duration 108 ms    Q-T Interval 476 ms    QTc Calculation (Bazett) 499 ms    P Axis 75 degrees    R Axis -78 degrees    T Axis 141 degrees   CBC with Auto Differential    Collection Time: 03/18/22  7:07 PM   Result Value Ref Range    WBC 4.6 3.5 - 11.3 k/uL    RBC 2.80 (L) 3.95 - 5.11 m/uL    Hemoglobin 8.7 (L) 11.9 - 15.1 g/dL    Hematocrit 28.8 (L) 36.3 - 47.1 %    .9 82.6 - 102.9 fL    MCH 31.1 25.2 - 33.5 pg    MCHC 30.2 25.2 - 33.5 g/dL    RDW 18.0 (H) 11.8 - 14.4 %    Platelets See Reflexed IPF Result 138 - 453 k/uL    NRBC Automated 0.0 0.0 per 100 WBC    Seg Neutrophils 75 (H) 36 - 65 %    Lymphocytes 16 (L) 24 - 43 %    Monocytes 8 3 - 12 %    Eosinophils % 1 1 - 4 %    Basophils 0 0 - 2 %    Immature Granulocytes 0 0 %    Segs Absolute 3.42 1.50 - 8.10 k/uL    Absolute Lymph # 0.73 (L) 1.10 - 3.70 k/uL    Absolute Mono # 0.36 0.10 - 1.20 k/uL    Absolute Eos # 0.03 0.00 - 0.44 k/uL    Basophils Absolute <0.03 0.00 - 0.20 k/uL    Absolute Immature Granulocyte <0.03 0.00 - 0.30 k/uL   Basic Metabolic Panel    Collection Time: 03/18/22  7:07 PM   Result Value Ref Range    Glucose 128 (H) 70 - 99 mg/dL    BUN 22 8 - 23 mg/dL    CREATININE 1.82 (H) 0.50 - 0.90 mg/dL    Bun/Cre Ratio 12 9 - 20    Calcium 8.9 8.6 - 10.4 mg/dL    Sodium 135 135 - 144 mmol/L    Potassium 4.1 3.7 - 5.3 mmol/L    Chloride 102 98 - 107 mmol/L    CO2 25 20 - 31 mmol/L    Anion Gap 8 (L) 9 - 17 mmol/L    GFR Non-African American 27 (L) >60 mL/min    GFR  33 (L) >60 mL/min GFR Comment         Troponin    Collection Time: 03/18/22  7:07 PM   Result Value Ref Range    Troponin, High Sensitivity 154 (HH) 0 - 14 ng/L   Lactate, Sepsis    Collection Time: 03/18/22  7:07 PM   Result Value Ref Range    Lactic Acid, Sepsis 0.5 0.5 - 1.9 mmol/L   Brain Natriuretic Peptide    Collection Time: 03/18/22  7:07 PM   Result Value Ref Range    Pro-BNP 32,187 (H) <300 pg/mL   Immature Platelet Fraction    Collection Time: 03/18/22  7:07 PM   Result Value Ref Range    Platelet, Immature Fraction 5.7 1.1 - 10.3 %    Platelet, Fluorescence 97 (L) 138 - 453 k/uL   Rapid influenza A/B antigens    Collection Time: 03/18/22  7:10 PM    Specimen: Nares   Result Value Ref Range    Flu A Antigen NEGATIVE NEGATIVE    Flu B Antigen NEGATIVE NEGATIVE   COVID-19, Rapid    Collection Time: 03/18/22  7:46 PM    Specimen: Nasopharyngeal Swab   Result Value Ref Range    Specimen Description . NASOPHARYNGEAL SWAB     SARS-CoV-2, Rapid Not Detected Not Detected   POC Fecal Occult Blood    Collection Time: 03/18/22  9:18 PM   Result Value Ref Range    POC Occult Blood, Fecal POSITIVE (A) NEGATIVE   Troponin    Collection Time: 03/18/22  9:37 PM   Result Value Ref Range    Troponin, High Sensitivity 159 (HH) 0 - 14 ng/L   Hemoglobin and Hematocrit    Collection Time: 03/19/22  3:31 AM   Result Value Ref Range    Hemoglobin 8.7 (L) 11.9 - 15.1 g/dL    Hematocrit 30.0 (L) 36.3 - 47.1 %   TROP/MYOGLOBIN    Collection Time: 03/19/22  3:34 AM   Result Value Ref Range    Troponin, High Sensitivity 150 (HH) 0 - 14 ng/L    Myoglobin 132 (H) 25 - 58 ng/mL   Basic Metabolic Panel    Collection Time: 03/19/22  9:36 AM   Result Value Ref Range    Glucose 74 70 - 99 mg/dL    BUN 23 8 - 23 mg/dL    CREATININE 1.87 (H) 0.50 - 0.90 mg/dL    Calcium 8.8 8.6 - 10.4 mg/dL    Sodium 136 135 - 144 mmol/L    Potassium 4.3 3.7 - 5.3 mmol/L    Chloride 103 98 - 107 mmol/L    CO2 22 20 - 31 mmol/L    Anion Gap 11 9 - 17 mmol/L    GFR Non-African American 27 (L) >60 mL/min    GFR  32 (L) >60 mL/min    GFR Comment         TROP/MYOGLOBIN    Collection Time: 03/19/22  9:36 AM   Result Value Ref Range    Troponin, High Sensitivity 149 (HH) 0 - 14 ng/L    Myoglobin 105 (H) 25 - 58 ng/mL   Protime-INR    Collection Time: 03/19/22 11:42 AM   Result Value Ref Range    Protime 11.9 9.1 - 12.3 sec    INR 1.1        Imaging/Diagnostics:  XR CHEST PORTABLE    Result Date: 3/18/2022  Mild CHF and small bilateral pleural effusions.        Assessment :      Hospital Problems           Last Modified POA    * (Principal) GI bleed 3/19/2022 Yes    Cirrhosis of liver with ascites ([facility] 3/19/2022 Yes    Generalized abdominal pain 3/19/2022 Yes          Plan:     Patient status inpatient in the Med/Surge    Weakness/fatigue  Suspect secondary to generalized debility/inactivity  PT/OT  Anemia of chronic kidney disease  GI input noted and appreciated  No plans to work-up right upper quadrant discomfort  Patient did not report right upper quadrant discomfort at the time of my examination  Elevated troponin  Secondary to chronic kidney disease  No further work-up per cardiology  End-stage renal disease  Maintenance hemodialysis  Moderate protein calorie malnutrition  Add dietary supplementation 3 times daily with meals  Essential hypertension  Resume home medications, monitor and titrate accordingly  Diabetes mellitus  Insulin sliding scale  Check A1c  All medications reviewed, presently maintained on sliding scale  Hyperlipidemia  Continue statin  Gastroesophageal reflux disease  DC IV Protonix  Resume home Protonix with twice daily dosing  Dementia  Exelon patch    Consultations:   IP CONSULT TO CARDIOLOGY  IP CONSULT TO NEPHROLOGY  IP CONSULT TO GI    Patient is admitted as inpatient status because of co-morbidities listed above, severity of signs and symptoms as outlined, requirement for current medical therapies and most importantly because of direct risk to patient if care not provided in a hospital setting. Expected length of stay > 48 hours.     Malone Cogan, DO  3/19/2022  12:14 PM    Copy sent to Dr. Cristina Chung DO

## 2022-03-19 NOTE — CONSULTS
[facility]    GASTROENTEROLOGY CONSULT    Patient:   Jose Khalil   :    1950   Facility:   [facility]   Date:    3/19/2022  Admission Dx:  GI bleed [K92.2]  Requesting physician: Vadim Dean DO  Reason for consult:  GI bleed  -acute anemia   CC : Shortness    SUBJECTIVE     HISTORY OF PRESENT ILLNESS  This is a 80 y.o.   female who was admitted 3/19/2022 with GI bleed [K92.2]. We have been asked to see the patient in consultation by Vadim Dean DO for acute anemia, GI bleed. 25-year-old female with a history of EDOUARD, CKD on HD, T2DM, CAD s/p CABG, PPM for SSS, CHF, GERD and gastric bypass surgery who presented from [facility].  Patient presented with fatigue, weakness, and shortness of breath. Work-up in the ED revealed macrocytic anemia with hemoglobin 8.7 g/dL (down from 11.4 g/dL ). Thrombocytopenia with platelets 97. Point-of-care stool was fecal occult positive. COVID-19 test was negative. Chest x-ray showed mild CHF and small bilateral pleural effusions proBNP 32,187. High-sensitivity troponin I 54. Patient transferred to [place] for further care. Patient seen and examined. Patient is very frail with conversational dyspnea. Patient reports she cannot talk though it appears it is due to fatigue. Patient nodding head to answer question. Patient reports abdominal discomfort which she attributes to constipation. She denies any dysphagia or odynophagia. No nausea or vomiting. She is unaware of the color of her stool. Patient was seen by GI 2020 for EDOUARD. Patient underwent EGD that showed moderate gastritis with gastric erythema. No active bleeding. Plan was for colonoscopy however, patient refused prep x2 days and eventually refused colonoscopy.     Review of chart shows patient had CT abdomen and pelvis completed 2021 showing cardiomegaly with pleural effusion as well as cirrhosis of the liver with large amount of ascites. Previous GI history:   EGD 12/14/2020    Findings[de-identified]   Esophagus: The esophagus was normal  Stomach: The stomach had small sliding hiatal hernia. The stomach also had moderate gastritis throughout the stomach that was by gastric erythema which was patchy and chronic appearing without any active bleeding. The rest of the stomach was otherwise normal.  The antrum and body of the stomach was biopsied with cold biopsy forceps to rule out H. pylori infection. Duodenum: normal     Recommendations: -Continue acid suppression with PPI once daily for 8 weeks. , -Await pathology. Schedule for colonoscopy tomorrow for further work-up of weight loss and anemia.     Electronically signed by Atul Mcleod MD, FACG  on 12/14/2020 at 9:53 AM        OBJECTIVE:     PAST MEDICAL/SURGICAL HISTORY  Past Medical History:   Diagnosis Date    Acute anemia     Acute cystitis 8/8/2020    Acute kidney injury ([address] 8/19/2020    Acute kidney injury superimposed on CKD ([address]     Acute on chronic combined systolic (congestive) and diastolic (congestive) heart failure ([address] 1/27/2022    Acute on chronic diastolic heart failure ([address] 8/7/2020    Acute renal failure (ARF) ([address] 12/11/2020    Anasarca 12/12/2020    Anxiety and depression     AV block, 1st degree 8/19/2020    Background diabetic retinopathy(362.01) 2008    (Mild)    Balance problem     Bilateral pleural effusion 1/26/2022    BMI 45.0-49.9, adult ([address] 3/1/2021    Bradycardia 8/19/2020    Buttock wound 8/20/2020    CAD (coronary artery disease)     (with coronary artery bypass graft x4).     Cancer of uterus [facility])     history of, (probable cure)    Chronic diastolic heart failure ([address] 3/1/2021    Chronic kidney disease     Chronic low back pain     Controlled type 2 diabetes mellitus with chronic 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 dialysis, with long-term current use of insulin ([address]     CVA (cerebral vascular accident) ([address]     Decompensated heart failure ([address] 2020    Decubitus ulcer of trochanteric region of right hip, stage 2 ([address] 2020    Dementia ([address]     (moderate)    Dry eye syndrome     Elevated troponin 2020    End stage renal disease on dialysis ([address] 3/1/2021    GERD (gastroesophageal reflux disease)     Glaucoma suspect     Gout     Hemodialysis patient ([address] 3/1/2021    Homonymous hemianopsia     (Right)    Hyperkalemia 2020    Hyperlipidemia     Hypertension     Infestation by bed bug 2020    Infestation by maggots 2020    Keratitis     (secondary to dry eye syndrome).  Lymphedema of both lower extremities 2020    Morbid obesity ([address] 3/1/2021    MRSA bacteremia 2020    Obesity     PAD (peripheral artery disease) (HCC)     Peripheral polyneuropathy     (diabetic)    Pressure injury of right heel, unstageable ([address] 2020    Pseudophakos     (Right Eye: 2012; Left Eye: 2012) - Dr. Angely Vergara.  Stroke [facility])     (Multiple) MRI of brain 10/2008 shows multiple infarcts involving the temporal and parietal areas.  Trichiasis     (left eye)    Type 2 diabetes mellitus ([address]     Wounds, multiple 2020     Past Surgical History:   Procedure Laterality Date    CATARACT REMOVAL WITH IMPLANT  2012    (Right eye) - Dr. Angely Vergara.  CATARACT REMOVAL WITH IMPLANT  2012    (Left eye) - Dr. Angely Vergara.   SECTION      CHOLECYSTECTOMY      CORONARY ARTERY BYPASS GRAFT  12-    (x4) - LIMA-LAD, SVG-diagnoal 1, SVG-OM1, and SVG-PDA. Exploration of left radial. (Dr. Mathew Sargent).  EYE SURGERY Left     as a child     GASTRIC BYPASS SURGERY      HYSTERECTOMY      (abdominal)  with left oophorectomy. Right ovary retained.     IR TUNNELED CATHETER PLACEMENT GREATER THAN 5 YEARS  2021    IR TUNNELED CATHETER PLACEMENT GREATER THAN 5 YEARS 2021 Carlitos Renteria MD [facility] SPECIAL PROCEDURES    TONSILLECTOMY AND ADENOIDECTOMY      UPPER GASTROINTESTINAL ENDOSCOPY  12/14/2020    EGD BIOPSY performed by Sandi Rios MD at [facility]       ALLERGIES:  Allergies   Allergen Reactions    Amoxicillin-Pot Clavulanate Diarrhea, Nausea Only and Nausea And Vomiting     Other reaction(s): Vomiting    Sulfamethoxazole-Trimethoprim Hives    Amoxicillin     Clavulanic Acid     Clonidine Derivatives Swelling    Sulfamethoxazole     Trimethoprim     Adhesive Tape Itching       HOME MEDICATIONS:  Prior to Admission medications    Medication Sig Start Date End Date Taking? Authorizing Provider   Pollen Extracts (PROSTAT PO) Take 30 mLs by mouth three times daily    Historical Provider, MD   carvedilol (COREG) 6.25 MG tablet Take 1 tablet by mouth 2 times daily (with meals) Hold for heart rate less than 60  Hold for BP less than 115 1/30/22   Susan Teresa DO   acetaminophen (TYLENOL) 325 MG tablet Take 650 mg by mouth every 4 hours as needed for Pain or Fever    Historical Provider, MD   loratadine (CLARITIN) 10 MG tablet Take 10 mg by mouth every other day    Historical Provider, MD   midodrine (PROAMATINE) 10 MG tablet Take 10 mg by mouth as needed (hypotension mid-dialysis)    Historical Provider, MD   nystatin (MYCOSTATIN) 921301 UNIT/GM cream Apply topically 2 times daily as needed (redness)    Historical Provider, MD   senna (SENOKOT) 8.6 MG tablet Take 1 tablet by mouth 2 times daily    Historical Provider, MD   nitroGLYCERIN (NITROSTAT) 0.4 MG SL tablet up to max of 3 total doses.  If no relief after 1 dose, call 911. 11/18/21   SHIRA Avina NP   insulin lispro (HUMALOG) 100 UNIT/ML injection vial Inject 0-6 Units into the skin 3 times daily (with meals) Glucose: Dose:               No Insulin  140-199 1 Unit  200-249 2 Units  250-299 3 Units  300-349 4 Units  350-399 5 Units  Over 399 6 Units 11/18/21   SHIRA Avina NP   midodrine (PROAMATINE) 10 MG tablet Take 10 mg by mouth three times a week Indications: on dialysis days Tu/Thur/Sat     Historical Provider, MD   sodium chloride (OCEAN) 0.65 % nasal spray 2 sprays by Nasal route 2 times daily    Historical Provider, MD   lidocaine-prilocaine (EMLA) 2.5-2.5 % cream Apply topically three times a week On dialysis days Tu-Th-Sa    Historical Provider, MD   amLODIPine (NORVASC) 10 MG tablet Take 1 tablet by mouth daily 3/10/21   Stevie Mckinney,    atorvastatin (LIPITOR) 40 MG tablet TAKE 1 TABLET BY MOUTH EVERYDAY 3/10/21   Treva Mejia, DO   Cholecalciferol 125 MCG (5000 UT) CHEW Take 1 tablet by mouth daily 3/10/21   Thony Mckinney,    citalopram (CELEXA) 20 MG tablet Take 1 tablet by mouth daily 3/10/21   Thony Mckinney,    hydrALAZINE (APRESOLINE) 100 MG tablet Take 1 tablet by mouth 3 times daily 3/10/21   Thony Mckinney, DO   oxybutynin (DITROPAN) 5 MG tablet TAKE 1 TABLET BY MOUTH TWICE DAILY 3/10/21   Stevie Mckinney,    pantoprazole (PROTONIX) 40 MG tablet Take 1 tablet by mouth 2 times daily (before meals) 3/10/21   Stevie Mckinney DO   rivastigmine (EXELON) 9.5 MG/24HR APPLY 1 NEW PATCH EVERY 24 HOURS 3/10/21   Thony Mckinney, DO   Alcohol Swabs (ALCOHOL PREP) PADS Checks blood sugar 3 times a day 11/4/20   [place]   aspirin 81 MG tablet Take 1 tablet by mouth daily 1/3/20   [place]       CURRENT MEDICATIONS:  Scheduled Meds:   pantoprazole (PROTONIX) 40 mg injection  40 mg IntraVENous Daily    sodium chloride flush  5-40 mL IntraVENous 2 times per day    insulin lispro  0-6 Units SubCUTAneous TID WC    insulin lispro  0-3 Units SubCUTAneous Nightly     Continuous Infusions:   sodium chloride      dextrose       PRN Meds:sodium chloride, acetaminophen **OR** acetaminophen, ondansetron **OR** ondansetron, sodium chloride flush, dextrose, dextrose, glucagon (rDNA), glucose    SOCIAL HISTORY:     Tobacco: reports that she has never smoked. She has never used smokeless tobacco.  Alcohol:   reports no history of alcohol use. Illicit drugs:  reports no history of drug use. FAMILY HISTORY:     Family History   Problem Relation Age of Onset    Diabetes Mother     Cancer Mother     High Blood Pressure Mother    Jairo Outhouse Stroke Mother     Kidney Disease Mother     Uterine Cancer Mother     Diabetes Father     Heart Disease Father     Glaucoma Father     Emphysema Father     Coronary Art Dis Other         All 4 siblings.  Stroke Other         1 sibling.  Lung Cancer Other         1 sibling - cause of death lung cancer.  Mental Retardation Other        REVIEW OF SYSTEMS:    Constitutional: No fever, no chills, no lethargy, + weakness. HEENT:  No headache, otalgia, itchy eyes, nasal discharge or sore throat. Cardiac:  No chest pain, + dyspnea   Chest:   No cough, phlegm or wheezing. Abdomen:  + abdominal pain, + constipation, no nausea or vomiting. Neuro:  No focal weakness, abnormal movements or seizure like activity. Skin:   No rashes, no itching. :   No hematuria, no pyuria, no dysuria, no flank pain. Extremities:  No swelling or joint pains. ROS was otherwise negative except as mentioned in the [address] PHYSICAL EXAM:    /62   Pulse 60   Temp 97.4 °F (36.3 °C) (Axillary)   Resp 16   Ht 5' 6\" (1.676 m)   Wt 148 lb 9.4 oz (67.4 kg)   SpO2 100%   BMI 23.98 kg/m²     GENERAL: Chronically ill, frail, somewhat older than her stated age   HEAD:   Normocephalic, Atraumatic  EENT:   EOMI, Sclera not icteric, Oropharynx moist   NECK:   Supple, Trachea midline  LUNGS: Decreased aeration bilaterally, on nasal cannula  HEART:  RRR, No murmur  ABDOMEN:   Soft, diffusely tender, +distended,   EXT:   No clubbing. No cyanosis. + edema. SKIN:   No rashes. No jaundice. No stigmata of liver disease.     MUSC/SKEL:   Adequate muscle bulk for patient's age, No significant synovitis, No deformities  NEURO:  Alert, oriented to self. LABS AND IMAGING:     CBC  Recent Labs     03/18/22  1907 03/19/22  0331   WBC 4.6  --    HGB 8.7* 8.7*   HCT 28.8* 30.0*   .9  --    MCH 31.1  --    MCHC 30.2  --    PLT See Reflexed IPF Result  --        IMMATURE PLTs  Lab Results   Component Value Date    PLTFLUORE 97 03/18/2022    PLTFLUORE 92 01/25/2022    PLTFLUORE 114 11/18/2021       BMP  Recent Labs     03/18/22 1907      K 4.1      CO2 25   BUN 22   CREATININE 1.82*   GLUCOSE 128*   CALCIUM 8.9       LFTS  No results for input(s): ALKPHOS, ALT, AST, PROT, BILITOT, BILIDIR, LABALBU in the last 72 hours. AMYLASE/LIPASE/AMMONIA  No results for input(s): AMYLASE, LIPASE, AMMONIA in the last 72 hours. PT/INR  No results for input(s): PROTIME, INR in the last 72 hours. ANEMIA STUDIES  No results for input(s): IRON, LABIRON, TIBC, UIBC, FERRITIN, [id number], FOLATE, OCCULTBLD in the last 72 hours. LIVER WORK UP:    Acute Hepatitis Panel   Lab Results   Component Value Date    HEPBSAG NONREACTIVE 01/27/2022    HEPCAB NONREACTIVE 11/18/2021    HEPBIGM NONREACTIVE 11/18/2021    HEPAIGM NONREACTIVE 11/18/2021           IMAGING  XR CHEST PORTABLE    Result Date: 3/18/2022  EXAMINATION: ONE XRAY VIEW OF THE CHEST 3/18/2022 6:43 pm COMPARISON: January 29, 2022 HISTORY: ORDERING SYSTEM PROVIDED HISTORY: Shortness of breath and cough TECHNOLOGIST PROVIDED HISTORY: Shortness of breath and cough Reason for Exam: fatigue; dyspnea FINDINGS: Dual-lumen right IJ catheter and sternotomy wires again noted. Loop recorder projects over the left chest.  Radiodensities project over the lower chest of uncertain etiology. Mild congestion. Small bilateral pleural effusions. Cardiomegaly. Mediastinum normal.  Bony thorax intact. Mild CHF and small bilateral pleural effusions. IMPRESSION:     1.  GI bleed - Acute on chronic anemia with fecal occult positive stool.   Etiologies are numerous include upper GI source such as esophagitis/gastritis/duodenitis, peptic ulcer disease. EV and portal hypertensive gastropathy secondary to history of cirrhosis. AVMs given underlying history of renal disease. Lower sources include colorectal cancer and AVMs. No prior colonoscopy complete  2. Cirrhosis with ascites -possibly cardiac versus metabolic. Patient will need comprehensive serology evaluation to rule out other causes such as autoimmune and malignancy. Will order paracentesis as well which will guide in diagnosis. 3.  Abdominal pain - ascites versus constipation versus other    4. Thrombocytopenia-suspect secondary to underlying liver disease  5. Constipation       Old records, labs and imaging reviewed. PLAN   1. No immediate plan for EGD/colonoscopy at this time. Patient will need to be medically optimized. Patient would benefit from EGD for variceal screening as well look for source of GI blood loss. She would need as colonoscopy to rule out malignancy. Refused colonoscopy in past.  2.  Right upper quadrant ultrasound. Anticipate patient will need IR guided paracentesis with fluid analysis on Monday  3. AFP, INR, LFTs  4. Comprehensive serology evaluation for abnormal LFTs  5. Diet as tolerated from GI perspective  6. Start Mirilax 17 gm daily       This plan was formulated in collaboration with Dr. Alisia Pringle  Thank you for allowing us to participate in the care of your patient. Electronically signed by: SHIRA Simpson CNP on 3/19/2022 at 8:28 AM     Please note that this note was generated using a voice recognition dictation software. Although every effort was made to ensure the accuracy of this automated transcription, some errors in transcription may have occurred.

## 2022-03-19 NOTE — ED PROVIDER NOTES
ADDENDUM:      Care of this patient was assumed from Ivonne Aguilar. The patient was seen for Fatigue (gen. weakness, with \"abnormal breathing. \")  . The patient's initial evaluation and plan have been discussed with the prior provider who initially evaluated the patient. Nursing Notes, Past Medical Hx, Past Surgical Hx, Social Hx, Family Hx, Medications and Allergies, and  were all reviewed. Diagnostic Results     EKG   Twelve-lead EKG reviewed. Please see Dr. Mary Muniz interpretation. RADIOLOGY:    XR CHEST PORTABLE    Result Date: 3/18/2022  EXAMINATION: ONE XRAY VIEW OF THE CHEST 3/18/2022 6:43 pm COMPARISON: January 29, 2022 HISTORY: ORDERING SYSTEM PROVIDED HISTORY: Shortness of breath and cough TECHNOLOGIST PROVIDED HISTORY: Shortness of breath and cough Reason for Exam: fatigue; dyspnea FINDINGS: Dual-lumen right IJ catheter and sternotomy wires again noted. Loop recorder projects over the left chest.  Radiodensities project over the lower chest of uncertain etiology. Mild congestion. Small bilateral pleural effusions. Cardiomegaly. Mediastinum normal.  Bony thorax intact. Mild CHF and small bilateral pleural effusions. LABS:   Results for orders placed or performed during the hospital encounter of 03/18/22   COVID-19, Rapid    Specimen: Nasopharyngeal Swab   Result Value Ref Range    Specimen Description . NASOPHARYNGEAL SWAB     SARS-CoV-2, Rapid Not Detected Not Detected   Rapid influenza A/B antigens    Specimen: Nares   Result Value Ref Range    Flu A Antigen NEGATIVE NEGATIVE    Flu B Antigen NEGATIVE NEGATIVE   CBC with Auto Differential   Result Value Ref Range    WBC 4.6 3.5 - 11.3 k/uL    RBC 2.80 (L) 3.95 - 5.11 m/uL    Hemoglobin 8.7 (L) 11.9 - 15.1 g/dL    Hematocrit 28.8 (L) 36.3 - 47.1 %    .9 82.6 - 102.9 fL    MCH 31.1 25.2 - 33.5 pg    MCHC 30.2 25.2 - 33.5 g/dL    RDW 18.0 (H) 11.8 - 14.4 %    Platelets See Reflexed IPF Result 138 - 453 k/uL    NRBC Automated 0.0 0.0 per 100 WBC    Seg Neutrophils 75 (H) 36 - 65 %    Lymphocytes 16 (L) 24 - 43 %    Monocytes 8 3 - 12 %    Eosinophils % 1 1 - 4 %    Basophils 0 0 - 2 %    Immature Granulocytes 0 0 %    Segs Absolute 3.42 1.50 - 8.10 k/uL    Absolute Lymph # 0.73 (L) 1.10 - 3.70 k/uL    Absolute Mono # 0.36 0.10 - 1.20 k/uL    Absolute Eos # 0.03 0.00 - 0.44 k/uL    Basophils Absolute <0.03 0.00 - 0.20 k/uL    Absolute Immature Granulocyte <0.03 0.00 - 0.30 k/uL   Basic Metabolic Panel   Result Value Ref Range    Glucose 128 (H) 70 - 99 mg/dL    BUN 22 8 - 23 mg/dL    CREATININE 1.82 (H) 0.50 - 0.90 mg/dL    Bun/Cre Ratio 12 9 - 20    Calcium 8.9 8.6 - 10.4 mg/dL    Sodium 135 135 - 144 mmol/L    Potassium 4.1 3.7 - 5.3 mmol/L    Chloride 102 98 - 107 mmol/L    CO2 25 20 - 31 mmol/L    Anion Gap 8 (L) 9 - 17 mmol/L    GFR Non-African American 27 (L) >60 mL/min    GFR  33 (L) >60 mL/min    GFR Comment         Troponin   Result Value Ref Range    Troponin, High Sensitivity 154 (HH) 0 - 14 ng/L   Lactate, Sepsis   Result Value Ref Range    Lactic Acid, Sepsis 0.5 0.5 - 1.9 mmol/L   Brain Natriuretic Peptide   Result Value Ref Range    Pro-BNP 32,187 (H) <300 pg/mL   Immature Platelet Fraction   Result Value Ref Range    Platelet, Immature Fraction 5.7 1.1 - 10.3 %    Platelet, Fluorescence 97 (L) 138 - 453 k/uL   Troponin   Result Value Ref Range    Troponin, High Sensitivity 159 (HH) 0 - 14 ng/L   POC Fecal Occult Blood   Result Value Ref Range    POC Occult Blood, Fecal POSITIVE (A) NEGATIVE   EKG 12 Lead   Result Value Ref Range    Ventricular Rate 66 BPM    Atrial Rate 75 BPM    P-R Interval 300 ms    QRS Duration 108 ms    Q-T Interval 476 ms    QTc Calculation (Bazett) 499 ms    P Axis 75 degrees    R Axis -78 degrees    T Axis 141 degrees       RECENT VITALS:  BP: (!) 118/55, Temp: 96.3 °F (35.7 °C), Pulse: 60, Resp: 15     ED Course     The patient was given the following medications:  No orders of the defined types were placed in this encounter. Medical Decision Making      Case signed out to me by Dr. Mary Anne Haskins. Patient presented with symptoms of fatigue and generalized weakness. Has had a change in her baseline at the [facility]. Patient with end-stage renal disease. Gets hemodialysis 3 days a week. Had full treatment yesterday. To me the patient denies any history of chest pain or pressure or shortness of breath. No known history of GI bleed. Denies abdominal pain. Patient is on oxygen nasal cannula at baseline. Twelve-lead EKG with first-degree AV block. Patient with a drop in hemoglobin approximately 3 points. Patient agreeable to rectal exam which was both grossly and occult positive. Patient is noted to have an elevated troponin at 159. Patient had elevated troponins approximately 100 in January she was transferred to [facility] and diagnosed with NSTEMI at that time. She has not actively having any chest pain or pressure. Rapid Covid and influenza test negative. proBNP markedly elevated at 32,000. Platelet function is low at 97. Chest x-ray with mild CHF and bilateral pleural effusions. Patient without tachycardia or hypoxia index of suspicion for pulmonary embolism is low. Given the patient is a hemodialysis patient she will need transfer to a tertiary care facility. Case was discussed with cardiology Dr. Adina Gray, who had no further recommendations at this time. We will hold off on anticoagulation due to lower hemoglobin and positive GI bleed. Case was discussed with Perry Patel. Krishna Jarrell CNP on the hospitalist service who accepted the patient on behalf of Dr. Bibi Xiao. CRITICAL CARE: There was a high probability of clinically significant/life threatening deterioration in this patient's condition which required my urgent intervention. Total critical care time was 30 minutes. This excludes any time for separately reportable procedures.        Disposition     FINAL IMPRESSION 1. Acute anemia    2. Gastrointestinal hemorrhage, unspecified gastrointestinal hemorrhage type    3. Elevated troponin          DISPOSITION/PLAN   DISPOSITION Decision To Transfer 03/18/2022 08:20:46 PM      PATIENT REFERRED TO:  No follow-up provider specified.     DISCHARGE MEDICATIONS:  Discharge Medication List as of 3/19/2022  1:10 AM                (Please note that portions of this note were completed with a voice recognition program.  Efforts were made to edit the dictations but occasionally words are mis-transcribed.)    Cedric Moore MD, [facility]  Attending Emergency Medicine Physician                  Cedric Moore MD  03/19/22 [address] [place] [address] [place], [place]  03/19/22 [id number]

## 2022-03-19 NOTE — ED NOTES
Report given to Beka Manuel at [facility] [place]. Patients manasa Fox called, no answer, voicemail left.      Germain Brandon RN  03/19/22 2071

## 2022-03-19 NOTE — CONSULTS
Renal Consult Note    Patient :  Ava Choi; 80 y.o. MRN# [id number]  Location:  2596/1473-77  Attending:  Maddie Crook DO  Admit Date:  3/19/2022   Hospital Day: 0    Reason for Consult:     Asked by Dr Maddie Crook DO to see for AMY/Elevated Creatinine. History Obtained From:     patient, electronic medical record, Dialysis nurse    HD Access:     previous permacat    HD Unit:     [address] [facility]    Nephrologist:     Dr. Landon Manning Weight:     75 kg although most recent post weight was 72.5 kg    Admission Weight:     67.4 kg    History of Present Illness:     Ava Choi; 80 y.o. female with past medical history as mentioned below. She also has known history of ESRD on hemodialysis Tuesday Thursday Saturday at the [address] [facility] renal care [place] unit under Dr. Josep García. She has a left radiocephalic AV fistula and a tunneled catheter for access. In addition she has known history of coronary artery disease, history of coronary bypass graft, permanent pacemaker insertion for sick sinus syndrome, preserved ejection fraction on echocardiogram, tricuspid regurgitation and mild pulmonary hypertension. Additionally recently diagnosed cirrhosis with portal hypertension. She is been worked up for iron deficiency anemia in December underwent EGD showed gastric erythema. Refused colonoscopy at that time. She is been progressively losing weight in the last 3 to 4 months, weight used to be down 93 kg now down to 70 kg. Presented to the hospital with the chief complaint of abnormal breathing and generalized weakness from the nursing home. [facility] then transferred her to [place] [address]. She is currently using her dialysis catheter as an access. She has a functioning lower left aVF that was placed 3/8/2021 but was not achieving adequate clearance and the catheter was placed. At dialysis her hemoglobin has been in the 8.9-9.2 over the last 3 weeks.   She has been receiving Mircera 75mcg every 2 weeks, no venofer. She recently had a positive occult stool. Her hemoglobin today is 8.7. GI is following  Chest x-ray shows mild CHF and bilateral pleural effusions more so on the right side. Slight bump in troponin although flat cardiology consulted. Past History/Allergies? Social History:     Past Medical History:   Diagnosis Date    Acute anemia     Acute cystitis 8/8/2020    Acute kidney injury ([address] 8/19/2020    Acute kidney injury superimposed on CKD ([address]     Acute on chronic combined systolic (congestive) and diastolic (congestive) heart failure ([address] 1/27/2022    Acute on chronic diastolic heart failure ([address] 8/7/2020    Acute renal failure (ARF) ([address] 12/11/2020    Anasarca 12/12/2020    Anxiety and depression     AV block, 1st degree 8/19/2020    Background diabetic retinopathy(362.01) 2008    (Mild)    Balance problem     Bilateral pleural effusion 1/26/2022    BMI 45.0-49.9, adult ([address] 3/1/2021    Bradycardia 8/19/2020    Buttock wound 8/20/2020    CAD (coronary artery disease)     (with coronary artery bypass graft x4).     Cancer of uterus [facility])     history of, (probable cure)    Chronic diastolic heart failure ([address] 3/1/2021    Chronic kidney disease     Chronic low back pain     Controlled type 2 diabetes mellitus with chronic 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 dialysis, with long-term current use of insulin ([address]     CVA (cerebral vascular accident) ([address]     Decompensated heart failure ([address] 12/4/2020    Decubitus ulcer of trochanteric region of right hip, stage 2 ([address] 8/23/2020    Dementia ([address]     (moderate)    Dry eye syndrome     Elevated troponin 8/8/2020    End stage renal disease on dialysis ([address] 3/1/2021    GERD (gastroesophageal reflux disease)     Glaucoma suspect 2005    Gout     Hemodialysis patient ([address] 3/1/2021    Homonymous hemianopsia 2008    (Right)    Hyperkalemia 8/19/2020    Hyperlipidemia     Hypertension     Infestation by bed bug 8/8/2020  Infestation by maggots 8/8/2020    Keratitis     (secondary to dry eye syndrome).  Lymphedema of both lower extremities 8/8/2020    Morbid obesity ([address] 3/1/2021    MRSA bacteremia 12/18/2020    Obesity     PAD (peripheral artery disease) (HCC)     Peripheral polyneuropathy     (diabetic)    Pressure injury of right heel, unstageable ([address] 8/18/2020    Pseudophakos     (Right Eye: 05-; Left Eye: 04-) - Dr. Maya Bourne.  Stroke [facility])     (Multiple) MRI of brain 10/2008 shows multiple infarcts involving the temporal and parietal areas.  Trichiasis 2010    (left eye)    Type 2 diabetes mellitus (HCC)     Wounds, multiple 8/20/2020       Allergies   Allergen Reactions    Amoxicillin-Pot Clavulanate Diarrhea, Nausea Only and Nausea And Vomiting     Other reaction(s): Vomiting    Sulfamethoxazole-Trimethoprim Hives    Amoxicillin     Clavulanic Acid     Clonidine Derivatives Swelling    Sulfamethoxazole     Trimethoprim     Adhesive Tape Itching       Social History     Socioeconomic History    Marital status:      Spouse name: Not on file    Number of children: Not on file    Years of education: Not on file    Highest education level: Not on file   Occupational History    Not on file   Tobacco Use    Smoking status: Never Smoker    Smokeless tobacco: Never Used    Tobacco comment: never smoker eclamb rrt 7/20/17   Vaping Use    Vaping Use: Unknown   Substance and Sexual Activity    Alcohol use: No    Drug use: No    Sexual activity: Not on file   Other Topics Concern    Not on file   Social History Narrative    Not on file     Social Determinants of Health     Financial Resource Strain:     Difficulty of Paying Living Expenses: Not on file   Food Insecurity:     Worried About Running Out of Food in the Last Year: Not on file    Rosnane of Food in the Last Year: Not on file   Transportation Needs:     Lack of Transportation (Medical):  Not on file    Lack of Transportation (Non-Medical): Not on file   Physical Activity:     Days of Exercise per Week: Not on file    Minutes of Exercise per Session: Not on file   Stress:     Feeling of Stress : Not on file   Social Connections:     Frequency of Communication with Friends and Family: Not on file    Frequency of Social Gatherings with Friends and Family: Not on file    Attends Restorationist Services: Not on file    Active Member of Clubs or Organizations: Not on file    Attends Club or Organization Meetings: Not on file    Marital Status: Not on file   Intimate Partner Violence:     Fear of Current or Ex-Partner: Not on file    Emotionally Abused: Not on file    Physically Abused: Not on file    Sexually Abused: Not on file   Housing Stability:     Unable to Pay for Housing in the Last Year: Not on file    Number of [place] in the Last Year: Not on file    Unstable Housing in the Last Year: Not on file       Family History:        Family History   Problem Relation Age of Onset    Diabetes Mother     Cancer Mother     High Blood Pressure Mother     Stroke Mother     Kidney Disease Mother     Uterine Cancer Mother     Diabetes Father     Heart Disease Father     Glaucoma Father     Emphysema Father     Coronary Art Dis Other         All 4 siblings.  Stroke Other         1 sibling.  Lung Cancer Other         1 sibling - cause of death lung cancer.     Mental Retardation Other        Outpatient Medications:     Medications Prior to Admission: Pollen Extracts (PROSTAT PO), Take 30 mLs by mouth three times daily  carvedilol (COREG) 6.25 MG tablet, Take 1 tablet by mouth 2 times daily (with meals) Hold for heart rate less than 60 Hold for BP less than 115  acetaminophen (TYLENOL) 325 MG tablet, Take 650 mg by mouth every 4 hours as needed for Pain or Fever  loratadine (CLARITIN) 10 MG tablet, Take 10 mg by mouth every other day  midodrine (PROAMATINE) 10 MG tablet, Take 10 mg by mouth as needed (hypotension mid-dialysis)  nystatin (MYCOSTATIN) 633013 UNIT/GM cream, Apply topically 2 times daily as needed (redness)  senna (SENOKOT) 8.6 MG tablet, Take 1 tablet by mouth 2 times daily  nitroGLYCERIN (NITROSTAT) 0.4 MG SL tablet, up to max of 3 total doses. If no relief after 1 dose, call 911.   insulin lispro (HUMALOG) 100 UNIT/ML injection vial, Inject 0-6 Units into the skin 3 times daily (with meals) Glucose: Dose:              No Insulin 140-199 1 Unit 200-249 2 Units 250-299 3 Units 300-349 4 Units 350-399 5 Units Over 399 6 Units  midodrine (PROAMATINE) 10 MG tablet, Take 10 mg by mouth three times a week Indications: on dialysis days Tu/Thur/Sat   sodium chloride (OCEAN) 0.65 % nasal spray, 2 sprays by Nasal route 2 times daily  lidocaine-prilocaine (EMLA) 2.5-2.5 % cream, Apply topically three times a week On dialysis days Tu-Th-Sa  amLODIPine (NORVASC) 10 MG tablet, Take 1 tablet by mouth daily  atorvastatin (LIPITOR) 40 MG tablet, TAKE 1 TABLET BY MOUTH EVERYDAY  Cholecalciferol 125 MCG (5000 UT) CHEW, Take 1 tablet by mouth daily  citalopram (CELEXA) 20 MG tablet, Take 1 tablet by mouth daily  hydrALAZINE (APRESOLINE) 100 MG tablet, Take 1 tablet by mouth 3 times daily  oxybutynin (DITROPAN) 5 MG tablet, TAKE 1 TABLET BY MOUTH TWICE DAILY  pantoprazole (PROTONIX) 40 MG tablet, Take 1 tablet by mouth 2 times daily (before meals)  rivastigmine (EXELON) 9.5 MG/24HR, APPLY 1 NEW PATCH EVERY 24 HOURS  Alcohol Swabs (ALCOHOL PREP) PADS, Checks blood sugar 3 times a day  aspirin 81 MG tablet, Take 1 tablet by mouth daily    Current Medications:     Scheduled Meds:    pantoprazole (PROTONIX) 40 mg injection  40 mg IntraVENous Daily    sodium chloride flush  5-40 mL IntraVENous 2 times per day    insulin lispro  0-6 Units SubCUTAneous TID WC    insulin lispro  0-3 Units SubCUTAneous Nightly     Continuous Infusions:    sodium chloride      dextrose       PRN Meds:  sodium chloride, acetaminophen **OR** acetaminophen, ondansetron **OR** ondansetron, sodium chloride flush, dextrose, dextrose, glucagon (rDNA), glucose    Review of Systems:     Constitutional: No fever, no chills, no lethargy, + weakness, + shortness of breath. HEENT:  No headache, otalgia, itchy eyes, nasal discharge or sore throat. Cardiac:  No chest pain, dyspnea, orthopnea or PND. Chest:   No cough, phlegm or wheezing. Abdomen:  Intermittent abdominal pain, no nausea or vomiting. Neuro:  No focal weakness, abnormal movements orseizure like activity. Skin:   No rashes, no itching. :   No hematuria, no pyuria, no dysuria, no flank pain. Extremities:  No swelling or joint pains. ROS was otherwise negative except as mentioned in the [address] Input/Output:     No intake/output data recorded. Patient Vitals for the past 96 hrs (Last 3 readings):   Weight   22 0900 155 lb 3.3 oz (70.4 kg)   22 0430 148 lb 9.4 oz (67.4 kg)   22 0200 148 lb 9.4 oz (67.4 kg)     Vital Signs:   Temperature:  Temp: 96.8 °F (36 °C)  TMax:   Temp (24hrs), Av.6 °F (35.9 °C), Min:96.3 °F (35.7 °C), Max:96.8 °F (36 °C)    Respirations:  Resp: 16  Pulse:   Pulse: 60  BP:    BP: (!) 145/67  BP Range: Systolic (77DQR), YUX:068 , Min:100 , DUI:441       Diastolic (38JWV), NMY:49, Min:51, Max:80      Physical Examination:     General:  Lethargic, arousable,, sleepy, responding in short phrases, significant cachexia wasting of neck muscles temporal muscles and thenar and hypothenar muscles. HEENT: Atraumatic, normocephalic, no throat congestion, moist mucosa. Neck:   No JVD. Chest:   Bilateral vesicular breath sounds, no rales or wheezes. Right dialysis catheter, no signs symptoms of infection  Cardiac:  S1 S2 RR, systolic murmur at the apex, no gallops or rubs, JVP not raised. Abdomen: Soft, non-tender, no masses or organomegaly, BS audible. Positive fluid thrill  :   No suprapubic or flank tenderness.   Neuro:  Lethargic, arousable no deficits. SKIN:  No rashes, good skin turgor. Extremities:  ++ edema, positive bruit thrill over left aVF. Labs:       Recent Labs     03/18/22 1907 03/19/22  0331   WBC 4.6  --    RBC 2.80*  --    HGB 8.7* 8.7*   HCT 28.8* 30.0*   .9  --    MCH 31.1  --    MCHC 30.2  --    RDW 18.0*  --    PLT See Reflexed IPF Result  --       BMP:   Recent Labs     03/18/22 1907      K 4.1      CO2 25   BUN 22   CREATININE 1.82*   GLUCOSE 128*   CALCIUM 8.9      Phosphorus:   No results for input(s): PHOS in the last 72 hours. Magnesium:  No results for input(s): MG in the last 72 hours. Albumin:  No results for input(s): LABALBU in the last 72 hours. BNP:    No results found for: BNP  PTH:   No results found for: IPTH  Blood cultures:  No results found for: [facility]    Urinalysis/Chemistries:      Lab Results   Component Value Date    NITRU NEGATIVE 01/07/2021    COLORU YELLOW 01/07/2021    PHUR 5.0 01/07/2021    WBCUA 2 TO 5 01/07/2021    RBCUA 10 TO 20 01/07/2021    MUCUS NOT REPORTED 01/07/2021    TRICHOMONAS NOT REPORTED 01/07/2021    YEAST NOT REPORTED 01/07/2021    BACTERIA NOT REPORTED 01/07/2021    SPECGRAV 1.015 01/07/2021    LEUKOCYTESUR TRACE 01/07/2021    UROBILINOGEN Normal 01/07/2021    BILIRUBINUR NEGATIVE 01/07/2021    GLUCOSEU NEGATIVE 01/07/2021    [address] NEGATIVE 01/07/2021    AMORPHOUS NOT REPORTED 01/07/2021       Radiology:     3/19/22 CXR:   Impression   Mild CHF and small bilateral pleural effusions. Assessment:     1. ESRD on Hemodialysis. Her regular HD days are Tuesday Thursday Saturday at [facility] hemodialysis facility using CVC although she has a left aVF under Dr. Juliet Allison. Her dry weight is 75. Admitted with 67.4kg, significant weight loss in the last 3 to 4 months was 93 kg now down to 70 kg  2. Anemia of chronic disease, plus minus blood loss, high suspicion for portal hypertension leading to GI bleed as well. GI work-up in progress.   Hemoglobin down to 8.7, has been fluctuating in the 9.3-8.7 range. 3. Secondary hyperparathyroidism  4. Hypertension blood pressure stable  5. Suspected GI bleed based on low hemoglobin, iron deficiency anemia and heme positive stools  6. Ascites on clinical exam    Plan:   1. HD today as per Tuesday Thursday Saturday schedule. Challenge dry weight  2. Strict Input and Output, Daily weigh and document in the chart. 3. Low Potassium, Low phosphorus and low salt diet. Fluids to be restricted to 1500ml/day. 4. IV Aranesp/Epogen for anemia of chronic disease with HD weekly. 5. IV Zemplar per protocol for secondary hyperparathyroidism with HD thrice a week. 6. Will follow  7. Consider thoracentesis  8. Consider paracentesis  9. Fluid restriction 1.5 L a day  10. May need nutritional support and appetite stimulants     Nutrition   Please ensure that patient is on a renal diet/TF. Thank you for the consultation. Please do not hesitate to contact us for any further questions/concerns. We will continue to follow along with you. Electronically signed by Freeda Mcardle, APRN - CNP on 3/19/2022 at 8:13 AM    Patient seen and examined. Presented with generalized weakness. Known ESRD, on hemodialysis. Noticed to have significant weight loss over the last 3 months. Also suspected to have GI bleed based on iron deficiency anemia positive Hemoccult stools drop in hemoglobin. Recently diagnosed cirrhosis with portal hypertension has significant ascites on clinical exam  Plan  1. Hemodialysis today  2. Challenge dry weight  3. Fluid restriction 1.5 L a day  4. Appetite stimulant  5. Await GI work-up  6. Prognosis guarded, consider thoracentesis and paracentesis especially in the right side  7. We will follow    Attending Physician Statement  I have discussed the care of Tova Montaño, including pertinent history and exam findings with the resident/fellow.  I have reviewed the key elements of all parts of the encounter with the resident/fellow. I have seen and examined the patient with the resident/fellow. I agree with the assessment and plan and status of the problem list as documented.       .  Electronically signed by Johann Singh MD on 3/19/2022 at 10:17 AM

## 2022-03-19 NOTE — PROGRESS NOTES
Dialysis Time Out  To be done by RN and tech or 2 RNs  Staff Names Vanessa Garcia RN, Renate Pino RN    [x]  Identity of the patient using 2 patient identifiers  [x]  Consent for treatment  [x]  Equipment-proper machine and dialyzer  [x]  B-Hep B status  [x]  Orders- to include bath, blood flow, dialyzer, time and fluid removal  [x]  Access-Correct site and in working order  [x]  Time for patient to ask questions.

## 2022-03-19 NOTE — PROGRESS NOTES
Dialysis Post Treatment Note  Vitals:    03/19/22 1300   BP: (!) 135/50   Pulse: (!) 48   Resp: 15   Temp: 97.7 °F (36.5 °C)   SpO2:      Pre-Weight = 70.4 kg  Post-weight = Weight: 147 lb 14.9 oz (67.1 kg)  Total Liters Processed = Total Liters Processed (l/min): 85.4 l/min  Rinseback Volume (mL) = Rinseback Volume (ml): 300 ml  Net Removal (mL) = NET Removed (ml): 3000 ml  Patient's dry weight= 75 kg  Type of access used= cvc  Length of treatment=210 minutes      Pt tolerated tx well resting entire time with stable vitals. Pt heart rate low at end of tx. 3L removed w/o issue.

## 2022-03-20 NOTE — PROGRESS NOTES
Pt's blood sugar 67, pt refusing to drink anything. Patient then given IM Glucagon per hypoglycemia protocol. Blood sugar recheck 93. Will continue to monitor.

## 2022-03-20 NOTE — PROGRESS NOTES
[unfilled]    [facility]    Progress Note    3/20/2022    12:48 PM    Name:   Krysta Painter  MRN:     [id number]     Jeevan:      [de-identified]   Room:   [address] [place] [address] Day:  1  Admit Date:  3/19/2022  2:23 AM    PCP:   Jocelyn Baum DO  Code Status:  Full Code    Subjective:     C/C: \"I am tired\"    Patient seen in follow-up for generalized weakness, debility, failure to thrive. Patient states \"I am tired\"    Interval History Status: not changed. Discussed with nursing staff. Patient had some coughing while eating. Swallow study has been obtained and recommendations are for dysphagia 2 with nectar thick liquids. No straws are to be provided to the patient. Patient underwent right upper quadrant ultrasound which demonstrates cirrhosis with some ascites. Gallbladder is absent. The patient is weak, frail and I suspect has essentially a case of failure to thrive. I am going to consult palliative care to discuss goals of care. She is presently a full code however she is thin, cachectic. She has upper and lower extremity wounds and I suspect these wounds will have a difficult time healing secondary to her poor oral intake. The patient is more conversive today but still has significant fatigue. Entry is noted, appreciate all services. Regarding the admitting diagnosis of GI bleed GI has evaluated the patient and no immediate needs are identified. Although she would benefit from EGD for variceal screening as well as evaluation for potential blood loss she needs to be maximized medically. That said I am not sure that the patient will however be medically maximized due to her multiple comorbid conditions. Regarding colonoscopy she is refused this in the past.  The patient herself denies any questions or concerns at this point in time. Her affect is quite flat she is not particularly engaged. Brief History:      This is a unfortunate 28-year-old female with multiple medical issues who presented to the hospital with reported GI bleed. The patient does have anemia of chronic kidney disease. Hemoglobin is relatively stable. GI has evaluated the patient and recommendations are for potential EGD/colonoscopy should the patient allow. She is refused colonoscopy in the past.  The patient does have known cirrhosis and ascites. Tentative plan is for possible paracentesis per GI. At this point in time palliative care will be consulted to discuss goals of care. Review of Systems:     Constitutional:  negative for chills, fevers, sweats. Patient does complain of generalized weakness and fatigue  Respiratory:  negative for cough, dyspnea on exertion, shortness of breath, wheezing  Cardiovascular:  negative for chest pain, chest pressure/discomfort, lower extremity edema, palpitations  Gastrointestinal:  negative for abdominal pain, constipation, diarrhea, nausea, vomiting  Neurological:  negative for dizziness, headache    Medications: Allergies:     Allergies   Allergen Reactions    Amoxicillin-Pot Clavulanate Diarrhea, Nausea Only and Nausea And Vomiting     Other reaction(s): Vomiting    Sulfamethoxazole-Trimethoprim Hives    Amoxicillin     Clavulanic Acid     Clonidine Derivatives Swelling    Sulfamethoxazole     Trimethoprim     Adhesive Tape Itching       Current Meds:   Scheduled Meds:    sterile water        sodium chloride flush  5-40 mL IntraVENous 2 times per day    insulin lispro  0-6 Units SubCUTAneous TID     insulin lispro  0-3 Units SubCUTAneous Nightly    megestrol  200 mg Oral Daily    amLODIPine  10 mg Oral Daily    aspirin  81 mg Oral Daily    atorvastatin  40 mg Oral Nightly    carvedilol  6.25 mg Oral BID     Vitamin D  5,000 Units Oral Daily    citalopram  20 mg Oral Daily    hydrALAZINE  100 mg Oral TID    [START ON 3/21/2022] lidocaine   Topical Once per day on Mon Wed Fri    cetirizine  10 mg Oral Daily    [START ON 3/22/2022] midodrine  10 mg Oral Once per day on Tue Thu Sat    oxybutynin  5 mg Oral BID    pantoprazole  40 mg Oral BID AC    rivastigmine  1 patch TransDERmal Daily    senna  1 tablet Oral BID    sodium chloride  2 spray Nasal BID     Continuous Infusions:    sodium chloride      dextrose       PRN Meds: sodium chloride, acetaminophen **OR** acetaminophen, ondansetron **OR** ondansetron, sodium chloride flush, dextrose, dextrose, glucagon (rDNA), glucose, acetaminophen, midodrine, nystatin, sodium chloride, sodium chloride, heparin (porcine), heparin (porcine)    Data:     Past Medical History:   has a past medical history of Acute anemia, Acute cystitis, Acute kidney injury ([facility] Acute kidney injury superimposed on CKD ([facility] Acute on chronic combined systolic (congestive) and diastolic (congestive) heart failure (HCC), Acute on chronic diastolic heart failure ([address] Acute renal failure (ARF) ([facility] Anasarca, Anxiety and depression, AV block, 1st degree, Background diabetic retinopathy(362.01), Balance problem, Bilateral pleural effusion, BMI 45.0-49.9, adult ([facility] Bradycardia, Buttock wound, CAD (coronary artery disease), Cancer of uterus ([facility] Chronic diastolic heart failure ([facility] Chronic kidney disease, Chronic low back pain, Controlled type 2 diabetes mellitus with chronic 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 dialysis, with long-term current use of insulin ([facility] CVA (cerebral vascular accident) ([facility] Decompensated heart failure ([facility] Decubitus ulcer of trochanteric region of right hip, stage 2 ([address] Dementia ([address] Dry eye syndrome, Elevated troponin, End stage renal disease on dialysis ([address] GERD (gastroesophageal reflux disease), Glaucoma suspect, Gout, Hemodialysis patient ([facility] Homonymous hemianopsia, Hyperkalemia, Hyperlipidemia, Hypertension, Infestation by bed bug, Infestation by maggots, Keratitis, Lymphedema of both lower extremities, Morbid obesity ([facility] MRSA bacteremia, Obesity, PAD (peripheral artery disease) ([facility] Peripheral polyneuropathy, Pressure injury of right heel, unstageable ([facility] Pseudophakos, Stroke ([facility] Trichiasis, Type 2 diabetes mellitus ([facility] and Wounds, multiple. Social History:   reports that she has never smoked. She has never used smokeless tobacco. She reports that she does not drink alcohol and does not use drugs. Family History:   Family History   Problem Relation Age of Onset    Diabetes Mother     Cancer Mother     High Blood Pressure Mother    Jaxon Layer Stroke Mother     Kidney Disease Mother     Uterine Cancer Mother     Diabetes Father     Heart Disease Father     Glaucoma Father     Emphysema Father     Coronary Art Dis Other         All 4 siblings.  Stroke Other         1 sibling.  Lung Cancer Other         1 sibling - cause of death lung cancer.  Mental Retardation Other        Vitals:  BP (!) 109/56   Pulse 68   Temp 97.3 °F (36.3 °C) (Tympanic)   Resp 15   Ht 5' 6\" (1.676 m)   Wt 147 lb 14.9 oz (67.1 kg)   SpO2 98%   BMI 23.88 kg/m²   Temp (24hrs), Av.9 °F (36.1 °C), Min:94.8 °F (34.9 °C), Max:98 °F (36.7 °C)    Recent Labs     22  0644 22  0710 22  0930 22  1106   POCGLU 74 129* 85 87       I/O (24Hr):     Intake/Output Summary (Last 24 hours) at 3/20/2022 1248  Last data filed at 3/20/2022 0640  Gross per 24 hour   Intake 1200 ml   Output 3300 ml   Net -2100 ml       Labs:  Hematology:  Recent Labs     22  1907 22  0331 22  1142 22  1618 22  2152 22  0611   WBC 4.6  --   --   --   --   --    RBC 2.80*  --   --   --   --   --    HGB 8.7*   < >  --  9.9* 8.6* 8.6*   HCT 28.8*   < >  --  33.8* 28.8* 28.5*   .9  --   --   --   --   --    MCH 31.1  --   --   --   --   --    MCHC 30.2  --   --   --   --   --    RDW 18.0*  --   --   --   --   --    PLT See Reflexed IPF Result  --   --   --   --   --    INR  --   --  1.1  --   -- --     < > = values in this interval not displayed. Chemistry:  Recent Labs     03/18/22  1907 03/18/22  2137 03/19/22  0936 03/19/22  1144 03/19/22  1618 03/20/22  0611      < > 136 136  --  135   K 4.1   < > 4.3 3.5*  --  3.9      < > 103 99  --  99   CO2 25   < > 22 28  --  29   GLUCOSE 128*   < > 74 112*  --  71   BUN 22   < > 23 12  --  16   CREATININE 1.82*   < > 1.87* 1.09*  --  1.55*   ANIONGAP 8*   < > 11 9  --  7*   LABGLOM 27*   < > 27* 50*  --  33*   GFRAA 33*   < > 32* >60  --  40*   CALCIUM 8.9   < > 8.8 7.9*  --  8.2*   PROBNP 32,187*  --   --   --   --   --    TROPHS 154*   < > 149* 144* 159*  --    MYOGLOBIN  --    < > 105* 94* 115*  --     < > = values in this interval not displayed. Recent Labs     03/19/22  1144 03/19/22  1625 03/20/22  0409 03/20/22  0540 03/20/22  0644 03/20/22  0710 03/20/22  0930 03/20/22  1106   PROT 6.9  --   --   --   --   --   --   --    LABALBU 2.8*  --   --   --   --   --   --   --    AST 23  --   --   --   --   --   --   --    ALT 14  --   --   --   --   --   --   --    ALKPHOS 315*  --   --   --   --   --   --   --    BILITOT 0.51  --   --   --   --   --   --   --    BILIDIR 0.29  --   --   --   --   --   --   --    POCGLU  --    < > 93 68 74 129* 85 87    < > = values in this interval not displayed. ABG:  Lab Results   Component Value Date    POCPH 7.284 12/13/2020    POCPCO2 54.5 12/13/2020    POCPO2 71.6 12/13/2020    POCHCO3 25.8 12/13/2020    NBEA 1 12/13/2020    PBEA NOT REPORTED 12/13/2020    TEV5TFM 28 12/13/2020    NOAX2YQM 92 12/13/2020    FIO2 2.0 12/13/2020     Lab Results   Component Value Date/Time    SPECIAL RT AC 12 ML 11/16/2021 06:11 AM     Lab Results   Component Value Date/Time    CULTURE NO GROWTH 6 DAYS 11/16/2021 06:11 AM       Radiology:  US LIVER    Result Date: 3/20/2022  Liver cirrhosis. Moderate ascites. Status post cholecystectomy. Normal common bile duct.      XR CHEST PORTABLE    Result Date: 3/18/2022  Mild CHF and small bilateral pleural effusions. Fluoroscopy modified barium swallow with video    Result Date: 3/20/2022  1. Deep penetration to the cords without aspiration with the thick liquid substance by straw. With the thick liquid substance by cup, there was very shallow flash penetration without aspiration. 2. No penetration or aspiration with the remainder of the administrations/substances. Please see separate speech pathology report for full discussion of findings and recommendations.        Physical Examination:        General appearance:  alert, cooperative and no distress  Mental Status:  oriented to person, place and time and normal affect  Lungs:  clear to auscultation bilaterally, normal effort  Heart:  regular rate and rhythm, no murmur  Abdomen:  soft, nontender, nondistended, normal bowel sounds, no masses, hepatomegaly, splenomegaly  Extremities:  no edema, redness, tenderness in the calves  Skin: Patient has wounds of the left upper and lower extremity presently bandaged    Assessment:        Hospital Problems           Last Modified POA    * (Principal) GI bleed 3/19/2022 Yes    Cirrhosis of liver with ascites ([address] 3/19/2022 Yes    Generalized abdominal pain 3/19/2022 Yes          Plan:        Weakness/fatigue  Clinically the patient appears to have failure to thrive  Consult pelvic care to discuss goals of care  Continue PT/OT  Anemia of chronic kidney disease  GI input noted and appreciated  Possible EGD/colonoscopy if patient can be maximized medically  Unfortunately I am not sure the patient will ever be medically maximized  Right upper quadrant ultrasound noted  Cirrhosis noted with ascites  Will defer paracentesis to GI  Elevated troponin  Secondary to chronic kidney disease  No further work-up per cardiology  End-stage renal disease  Maintenance hemodialysis  Moderate protein calorie malnutrition  Continue dietary supplementation 3 times daily with meals  Long-term prognosis poor  Essential hypertension  Blood pressure presently stable, no changes  Diabetes mellitus  Discontinue insulin sliding scale due to relative hypoglycemia  Previous A1c 5.6  I suspect the patient has had weight loss and does not require insulin at this point in time  Hyperlipidemia  Continue statin  Gastroesophageal reflux disease  DC IV Protonix  Continue Protonix with twice daily dosing  Dementia  Exelon patch    Await GI input regarding paracentesis, Elder care consult to discuss goals of care. Possible discharge back to skilled nursing facility in the next 24 hours if okay with all services.     Clyde Rodríguez DO  3/20/2022  12:48 PM

## 2022-03-20 NOTE — PROGRESS NOTES
Renal Progress Note    Patient :  Jason Morley; 80 y.o. MRN# [id number]  Location:  [facility]/8705[facility]  Attending:  Clyde Rodríguez DO  Admit Date:  3/19/2022   Hospital Day: 1      Subjective:     Patient was seen and examined at bedside, being fed. Denies complaints    Swallow study completed today recommended nectar thickened liquids per nursing  Received dialysis yesterday via CVC, 3 L removed with bradycardia at the end of treatment  Blood pressures in the low 100s  Satting well on oxygen via nasal cannula at 3 L/m  Plan for paracentesis tomorrow. At some point EGD and colonoscopy may be necessary as well although doubt of clinical status will improve any further given multiple comorbid conditions. 3/20/22 Liver US  Impression   Liver cirrhosis.  Moderate ascites.  Status post cholecystectomy.  Normal   common bile duct. Labs reviewed: Sodium 135, potassium 3.9, chloride 99, CO2 29 creatinine 1.55, hemoglobin 8.6, iron saturation 53    Outpatient Medications:     Medications Prior to Admission: Pollen Extracts (PROSTAT PO), Take 30 mLs by mouth three times daily  carvedilol (COREG) 6.25 MG tablet, Take 1 tablet by mouth 2 times daily (with meals) Hold for heart rate less than 60 Hold for BP less than 115  acetaminophen (TYLENOL) 325 MG tablet, Take 650 mg by mouth every 4 hours as needed for Pain or Fever  loratadine (CLARITIN) 10 MG tablet, Take 10 mg by mouth every other day  midodrine (PROAMATINE) 10 MG tablet, Take 10 mg by mouth as needed (hypotension mid-dialysis)  nystatin (MYCOSTATIN) 997825 UNIT/GM cream, Apply topically 2 times daily as needed (redness)  senna (SENOKOT) 8.6 MG tablet, Take 1 tablet by mouth 2 times daily  nitroGLYCERIN (NITROSTAT) 0.4 MG SL tablet, up to max of 3 total doses. If no relief after 1 dose, call 911.   insulin lispro (HUMALOG) 100 UNIT/ML injection vial, Inject 0-6 Units into the skin 3 times daily (with meals) Glucose: Dose:              No Insulin 140-199 1 Unit 200-249 2 Units 250-299 3 Units 300-349 4 Units 350-399 5 Units Over 399 6 Units  midodrine (PROAMATINE) 10 MG tablet, Take 10 mg by mouth three times a week Indications: on dialysis days Tu/Thur/Sat   sodium chloride (OCEAN) 0.65 % nasal spray, 2 sprays by Nasal route 2 times daily  lidocaine-prilocaine (EMLA) 2.5-2.5 % cream, Apply topically three times a week On dialysis days Tu-Th-Sa  amLODIPine (NORVASC) 10 MG tablet, Take 1 tablet by mouth daily  atorvastatin (LIPITOR) 40 MG tablet, TAKE 1 TABLET BY MOUTH EVERYDAY  Cholecalciferol 125 MCG (5000 UT) CHEW, Take 1 tablet by mouth daily  citalopram (CELEXA) 20 MG tablet, Take 1 tablet by mouth daily  hydrALAZINE (APRESOLINE) 100 MG tablet, Take 1 tablet by mouth 3 times daily  oxybutynin (DITROPAN) 5 MG tablet, TAKE 1 TABLET BY MOUTH TWICE DAILY  pantoprazole (PROTONIX) 40 MG tablet, Take 1 tablet by mouth 2 times daily (before meals)  rivastigmine (EXELON) 9.5 MG/24HR, APPLY 1 NEW PATCH EVERY 24 HOURS  Alcohol Swabs (ALCOHOL PREP) PADS, Checks blood sugar 3 times a day  aspirin 81 MG tablet, Take 1 tablet by mouth daily    Current Medications:     Scheduled Meds:    sterile water        sodium chloride flush  5-40 mL IntraVENous 2 times per day    megestrol  200 mg Oral Daily    amLODIPine  10 mg Oral Daily    aspirin  81 mg Oral Daily    atorvastatin  40 mg Oral Nightly    carvedilol  6.25 mg Oral BID WC    Vitamin D  5,000 Units Oral Daily    citalopram  20 mg Oral Daily    hydrALAZINE  100 mg Oral TID    [START ON 3/21/2022] lidocaine   Topical Once per day on Mon Wed Fri    cetirizine  10 mg Oral Daily    [START ON 3/22/2022] midodrine  10 mg Oral Once per day on Tue Thu Sat    oxybutynin  5 mg Oral BID    pantoprazole  40 mg Oral BID AC    rivastigmine  1 patch TransDERmal Daily    senna  1 tablet Oral BID    sodium chloride  2 spray Nasal BID     Continuous Infusions:    sodium chloride      dextrose       PRN Meds:  sodium chloride, acetaminophen **OR** acetaminophen, ondansetron **OR** ondansetron, sodium chloride flush, dextrose, dextrose, glucagon (rDNA), glucose, acetaminophen, midodrine, nystatin, sodium chloride, sodium chloride, heparin (porcine), heparin (porcine)    Input/Output:       I/O last 3 completed shifts: In: 1200 [I.V.:900]  Out: 3300 . Patient Vitals for the past 96 hrs (Last 3 readings):   Weight   22 1300 147 lb 14.9 oz (67.1 kg)   22 0900 155 lb 3.3 oz (70.4 kg)   22 0430 148 lb 9.4 oz (67.4 kg)       Vital Signs:   Temperature:  Temp: 96.4 °F (35.8 °C)  TMax:   Temp (24hrs), Av.7 °F (35.9 °C), Min:94.8 °F (34.9 °C), Max:98 °F (36.7 °C)    Respirations:  Resp: 15  Pulse:   Pulse: 68  BP:    BP: (!) 109/56  BP Range: Systolic (48ESB), MFR:596 , Min:95 , TBR:641       Diastolic (53VPX), GEI:13, Min:47, Max:56      Physical Examination:     General:  AAO x 3, speaking in full sentences, no accessory muscle use. HEENT: Atraumatic, normocephalic, no throat congestion, moist mucosa. Neck:   No JVD. Chest:   Bilateral vesicular breath sounds, no rales or wheezes. Cardiac:  S1 S2 RR, no murmurs, gallops or rubs, JVP not raised. Abdomen: Soft, Positive fluid thrill, BS audible. :   No suprapubic or flank tenderness. Neuro:  AAO x 3, No FND. SKIN:  No rashes, good skin turgor. Extremities:  +edema, positive bruit thrill over left aVF    Labs:       Recent Labs     22  1907 22  0331 22  1618 22  2152 22  0611   WBC 4.6  --   --   --   --    RBC 2.80*  --   --   --   --    HGB 8.7*   < > 9.9* 8.6* 8.6*   HCT 28.8*   < > 33.8* 28.8* 28.5*   .9  --   --   --   --    MCH 31.1  --   --   --   --    MCHC 30.2  --   --   --   --    RDW 18.0*  --   --   --   --    PLT See Reflexed IPF Result  --   --   --   --     < > = values in this interval not displayed.       BMP:   Recent Labs     22  0936 22  1144 22  0611    136 135   K 4.3 3. 5* 3.9    99 99   CO2 22 28 29   BUN 23 12 16   CREATININE 1.87* 1.09* 1.55*   GLUCOSE 74 112* 71   CALCIUM 8.8 7.9* 8.2*      Phosphorus:   No results for input(s): PHOS in the last 72 hours. Magnesium:  No results for input(s): MG in the last 72 hours. Albumin:    Recent Labs     03/19/22  1144   LABALBU 2.8*     BNP:    No results found for: BNP  JIMBO:      Lab Results   Component Value Date    JIMBO NEGATIVE 03/19/2022     SPEP:  Lab Results   Component Value Date    PROT 6.9 03/19/2022    ALBCAL 1.8 08/13/2020    ALBPCT 35 08/13/2020    LABALPH 0.2 08/13/2020    LABALPH 0.7 08/13/2020    A1PCT 5 08/13/2020    A2PCT 15 08/13/2020    LABBETA 0.9 08/13/2020    BETAPCT 17 08/13/2020    GAMGLOB 1.4 08/13/2020    GGPCT 28 08/13/2020    PATH ELECTRONICALLY SIGNED. Migel Cobb M.D. 08/13/2020    PATH ELECTRONICALLY SIGNED.  Migel Cobb M.D. 08/13/2020     UPEP:   No results found for: LABPE  C3:     Lab Results   Component Value Date    C3 114 08/13/2020     C4:     Lab Results   Component Value Date    C4 39 08/13/2020     MPO ANCA:     Lab Results   Component Value Date    MPO 14 08/13/2020     PR3 ANCA:     Lab Results   Component Value Date    PR3 8 08/13/2020     Anti-GBM:   No results found for: GBMABIGG  Hep BsAg:         Lab Results   Component Value Date    HEPBSAG NONREACTIVE 03/19/2022     Hep C AB:          Lab Results   Component Value Date    HEPCAB NONREACTIVE 03/19/2022       Urinalysis/Chemistries:      Lab Results   Component Value Date    NITRU NEGATIVE 01/07/2021    COLORU YELLOW 01/07/2021    PHUR 5.0 01/07/2021    WBCUA 2 TO 5 01/07/2021    RBCUA 10 TO 20 01/07/2021    MUCUS NOT REPORTED 01/07/2021    TRICHOMONAS NOT REPORTED 01/07/2021    YEAST NOT REPORTED 01/07/2021    BACTERIA NOT REPORTED 01/07/2021    SPECGRAV 1.015 01/07/2021    LEUKOCYTESUR TRACE 01/07/2021    UROBILINOGEN Normal 01/07/2021    BILIRUBINUR NEGATIVE 01/07/2021    GLUCOSEU NEGATIVE 01/07/2021    1100 Ximena Chamorro symptoms of generalized fatigue failure to thrive significant malnutrition and anemia. Hemoglobin had dropped down to 8.7. Clinically had significant ascites. Swallow study completed risk of aspiration noted advised nectar thick liquids. GI not planning any further intervention at this stage. Plan for paracentesis tomorrow  Plan #1 hemodialysis Tuesday  2. Await GI recommendation  3. Agree with appetite stimulants  4. May need thoracentesis as well  5. Prognosis poor  6. We will follow  Attending Physician Statement  I have discussed the care of Mehnaz Clement, including pertinent history and exam findings with the resident/fellow. I have reviewed the key elements of all parts of the encounter with the resident/fellow. I have seen and examined the patient with the resident/fellow. I agree with the assessment and plan and status of the problem list as documented.       .  Electronically signed by Luann Mathew MD on 3/20/2022 at 2:47 PM

## 2022-03-20 NOTE — PLAN OF CARE
Problem: Breathing Pattern - Ineffective:  Goal: Ability to achieve and maintain a regular respiratory rate will improve  Description: Ability to achieve and maintain a regular respiratory rate will improve  Outcome: Ongoing     Problem: Skin Integrity:  Goal: Will show no infection signs and symptoms  Description: Will show no infection signs and symptoms  Outcome: Ongoing  Goal: Absence of new skin breakdown  Description: Absence of new skin breakdown  Outcome: Ongoing     Problem: Falls - Risk of:  Goal: Will remain free from falls  Description: Will remain free from falls  Outcome: Ongoing  Goal: Absence of physical injury  Description: Absence of physical injury  Outcome: Ongoing

## 2022-03-20 NOTE — PROCEDURES
INSTRUMENTAL SWALLOW REPORT  MODIFIED BARIUM SWALLOW    NAME: Cuauhtemoc Graff   : 1950  MRN: [id number]       Date of Eval: 3/20/2022        Radiologist: Dr. Valentino Pattee     Referring Diagnosis(es):      Past Medical History:  has a past medical history of Acute anemia, Acute cystitis, Acute kidney injury ([address] Acute kidney injury superimposed on CKD ([address] Acute on chronic combined systolic (congestive) and diastolic (congestive) heart failure ([address] Acute on chronic diastolic heart failure ([address] Acute renal failure (ARF) ([address] Anasarca, Anxiety and depression, AV block, 1st degree, Background diabetic retinopathy(362.01), Balance problem, Bilateral pleural effusion, BMI 45.0-49.9, adult ([address] Bradycardia, Buttock wound, CAD (coronary artery disease), Cancer of uterus ([address] Chronic diastolic heart failure ([address] Chronic kidney disease, Chronic low back pain, Controlled type 2 diabetes mellitus with chronic 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 dialysis, with long-term current use of insulin ([address] CVA (cerebral vascular accident) ([address] Decompensated heart failure ([address] Decubitus ulcer of trochanteric region of right hip, stage 2 ([address] Dementia ([address] Dry eye syndrome, Elevated troponin, End stage renal disease on dialysis ([address] GERD (gastroesophageal reflux disease), Glaucoma suspect, Gout, Hemodialysis patient ([address] Homonymous hemianopsia, Hyperkalemia, Hyperlipidemia, Hypertension, Infestation by bed bug, Infestation by maggots, Keratitis, Lymphedema of both lower extremities, Morbid obesity ([address] MRSA bacteremia, Obesity, PAD (peripheral artery disease) ([address] Peripheral polyneuropathy, Pressure injury of right heel, unstageable ([address] Pseudophakos, Stroke ([address] Trichiasis, Type 2 diabetes mellitus ([address] and Wounds, multiple. Past Surgical History:  has a past surgical history that includes Gastric bypass surgery; Cataract removal with implant (2012); Cataract removal with implant (2012);  Coronary artery bypass graft (12-);  section; Hysterectomy; Cholecystectomy; Tonsillectomy and adenoidectomy; Eye surgery (Left); Upper gastrointestinal endoscopy (2020); and IR TUNNELED CVC PLACE WO SQ PORT/PUMP > 5 YEARS (2021). Current Diet Solid Consistency: NPO  Current Diet Liquid Consistency: NPO    Type of Study: Initial MBS    Recent CXR/CT of Chest: 3/18/2022    Impression   Mild CHF and small bilateral pleural effusions. Patient Complaints/Reason for Referral:  Estella Wood was referred for a MBS to assess the efficiency of his/her swallow function, assess for aspiration, and to make recommendations regarding safe dietary consistencies, effective compensatory strategies, and safe eating environment. Onset of problem:   Estella Wood is a 80 y.o. Non- / non  female who presents with No chief complaint on file. and is admitted to the hospital for the management of GI bleed.     Chart reviewed, patient admitted due to reported GI bleed. Concerns for possible anemia however after discussing with nephrology it has been revealed that her hemoglobin has been relatively stable. The patient herself is a very poor historian and is unable to speak due to her fatigue. She appears to be very frail and suffering from cachexia. She has some chronic wounds noted on exam.  This point in time the patient has been seen by multiple services including cardiology, nephrology, gastroenterology. She has undergone extensive work-up in the past.  She previously has had an EGD but refused colonoscopy due to inability to complete her bowel prep. She has known coronary artery disease and has undergone coronary artery bypass grafting. In the absence of chest pain no further cardiac work-up is recommended. Her minimally elevated troponin is secondary to her renal failure. Maintenance hemodialysis per nephrology.   At this point in time given the fact that her hemoglobin is relatively stable GI is not recommending any further work-up regarding her anemia at this point in time. Her anemia is likely secondary to chronic kidney disease. Regarding her complaints of fatigue I suspect that the patient just has progressive weakness and debility secondary to her multiple comorbid conditions. Outside of chronic pain she denies any particular complaints. She has a left lower extremity wound that is bothersome and painful. She has some chronic back pain. She denies any chest pain. She has chronic shortness of breath but is at her baseline. She denies any nausea, vomiting, diarrhea. She is completely unaware of any blood loss anemia at this point in time. She reports no right red blood per rectum nor melanotic stools. Behavior/Cognition/Vision/Hearing:  Behavior/Cognition: Cooperative; Alert  Vision: Within Functional Limits  Hearing: Within functional limits    Impressions:  Patient presents with safe swallow for Dysphagia II: Minced and Moist diet with Mildly Thick (Nectar) liquids by cup (NO STRAWS) as evidenced by no aspiration noted with consistencies tested. +deep penetration to the cords, no aspiration w/ mildly thick liquids via straw. Pt w/ inconsistent delayed epiglottic inversion, and decreased hyolaryngeal elevation and BOT retraction t/o. +premature spill to pyriforms w/ all consistencies. Pt edentulous, unclear if pt has dentures at this time. +mod decreased and extended mastication of soft solid; swallowed partial bolus whole. Recommend small sips and bites, only feed when alert and awake and upright at 90 degrees for all PO intake. Recommend close monitoring for overt/clinical s/s of aspiration and D/C PO intake and complete Modified Barium Swallow Study should they occur. Results and recommendations reported to RN. Patient Position: Lateral and Patient Degrees: 90    Consistencies Administered: Dysphagia Soft and Bite-Sized (Dysphagia III); Dysphagia Pureed (Dysphagia I); Nectar straw;Nectar  teaspoon;Nectar cup;Honey teaspoon    Dysphagia Outcome Severity Scale: Level 4: Mild moderate dysphagia- Intermittent supervision/cueing. One - two diet consistencies restricted  Penetration-Aspiration Scale (PAS): 5 - Material enters the airway, contacts the vocal folds, and is not ejected into the airway    Recommended Diet:  Solid consistency: Dysphagia Minced and Moist (Dysphagia II)  Liquid consistency: Mildly Thick (Nectar)  Liquid administration via: Cup  Medication administration: Meds in puree     Safe Swallow Protocol:  Compensatory Swallowing Strategies: Upright as possible for all oral intake;Small bites/sips; No straws    Recommendations/Treatment  Requires SLP Intervention: Yes  D/C Recommendations: Further therapy recommended at discharge. Recommended Exercises:    Therapeutic Interventions: Diet tolerance monitoring;Patient/Family education;Oral motor exercises    Education: Images and recommendations were reviewed with pt, RN, and radiologist following this exam.   Patient Education: yes  Patient Education Response: Verbalizes understanding    Prognosis  Prognosis for safe diet advancement: fair  Duration/Frequency of Treatment  Duration/Frequency of Treatment: 3-5x per week      Goals:    Long Term:   To Maximize safety with intake, optimize nutrition/hydration and minimize risk for aspiration. Repeat MBSS Recommended in 5-7 Days. Short Term:  Dysphagia Goals: The patient will tolerate recommended diet without observed clinical signs of aspiration; pt will complete OM/EX 10-20x per session    Oral Preparation / Oral Phase  Oral Phase: WFL  Oral Phase: +decreased bolus cohesion, +premature spill to vallecula w/ puree solids. +premature spill to pyriforms x1/2 trials of puree and w/ soft solid, moderately and mildly-thick liquids. +decreased and extended mastication of soft solid, pt swallowed partial bolus whole.  Otherwise, oral transit and bolus manipulation WFL for consistencies tested    Pharyngeal Phase  Pharyngeal Phase: [facility]  Pharyngeal:   Puree: No penetration, no aspiration x2. +min residual in vallecula. Soft solid: No penetration, no aspiration. +mod residual in pyriforms, +min residual in vallecula. Moderately-thick tsp: No penetration, no aspiration. +min residual in vallecula. Mildly-thick tsp: No penetration, no aspiration. +min residual in vallecula. Mildly-thick straw: +deep penetation to the cords w/ residual, no aspiration. Delayed epiglottic inversion noted. Mildly-thick cup: +very shallow flash penetration, no aspiration. +min residual in vallecula. +delayed epiglottic inversion. Decreased hyolaryngeal elevation and BOT retraction w/ all consistencies tested. Inconsistent delayed epiglottic inversion, most prnounced w/ liquid consistencies.     Esophageal Phase  Esophageal Screen: [facility]        Pain   Patient Currently in Pain: No  Pain Level: 0      Therapy Time:   Individual Concurrent Group Co-treatment   Time In 1032         Time Out 1046         Minutes 14                   Randa Pinto, SLP, 3/20/2022, 11:29 AM

## 2022-03-20 NOTE — PLAN OF CARE
Problem: Breathing Pattern - Ineffective:  Goal: Ability to achieve and maintain a regular respiratory rate will improve  Description: Ability to achieve and maintain a regular respiratory rate will improve  3/20/2022 1803 by Aminah Ashley RN  Outcome: Ongoing  3/20/2022 0439 by Charissa Marquez RN  Outcome: Ongoing     Problem: Skin Integrity:  Goal: Will show no infection signs and symptoms  Description: Will show no infection signs and symptoms  3/20/2022 1803 by Aminah Ashley RN  Outcome: Ongoing  3/20/2022 0439 by Charissa Marquez RN  Outcome: Ongoing  Goal: Absence of new skin breakdown  Description: Absence of new skin breakdown  3/20/2022 1803 by Aminah Ashley RN  Outcome: Ongoing  3/20/2022 0439 by Charissa Marquez RN  Outcome: Ongoing     Problem: Falls - Risk of:  Goal: Will remain free from falls  Description: Will remain free from falls  3/20/2022 1803 by Aminah Ashley RN  Outcome: Ongoing  3/20/2022 0439 by Charissa Marquez RN  Outcome: Ongoing  Goal: Absence of physical injury  Description: Absence of physical injury  3/20/2022 1803 by Aminah Ashley RN  Outcome: Ongoing  3/20/2022 0439 by Charissa Marquez RN  Outcome: Ongoing

## 2022-03-20 NOTE — FLOWSHEET NOTE
Assessment: Patient is a 80 y.o. female who was admitted to the hospital due to a \"GI Bleed. \" Patient was resting when  visited and woke upon  calling her name. Intervention:  visited patient per initial rounding visits.  introduced herself and patient did not speak, but communicated through hand gestures. Patient indicated no needs and was coping well.  learned later that patient has a \"dementia\" diagnosis. Patient's daughter is listed as her emergency contact, Yanick Fajardo ([phone]). Outcome: Patient was receptive of 's visit and support. Plan: Chaplains can make follow-up visit, per request. Keith Amaral can be reached 24/7 via [facility].     David Barrett     03/19/22 2057   Encounter Summary   Services provided to: Patient   Referral/Consult From: Rounding   Support System Children   Continue Visiting   (3/19/2022)   Complexity of Encounter Low   Length of Encounter 15 minutes   Spiritual Assessment Completed Yes   Routine   Type Initial   Spiritual/Restorationist   Type Spiritual support   Assessment Approachable;Coping   Intervention Sustaining presence/ Ministry of presence   Outcome Receptive

## 2022-03-20 NOTE — PLAN OF CARE
To see patient. Patient was off the floor for testing. Will see patient tomorrow.      Jet Simpson CNP

## 2022-03-21 NOTE — CONSULTS
Palliative Care Inpatient Consult    NAME:  Milana LIN [facility] RECORD NUMBER:  [id number]  AGE: 80 y.o.    GENDER: female  : 1950  TODAY'S DATE:  3/21/2022    Reasons for Consultation:    Symptom and/or pain management  Provision of information regarding PC and/or hospice philosophies  Complex, time-intensive communication and interdisciplinary psychosocial support  Clarification of goals of care and/or assistance with difficult decision-making  Guidance in regards to resources and transition(s)    Members of PC team contributing to this consultation are :  Dr. Manuela Moe care attending  Plan      Palliative Interaction:  The patient was seen today, was ill-appearing, in distress and not responding to any of the verbal command  No family member was present in patient's room    Patient's RN Jessica Simpson was in patient's room and told that patient had just come back from the IR after getting ultrasound guided paracentesis done and 6 L of fluid was taken out   CORNEL Simpson told that patient has not been feeling well after getting paracentesis  CORNEL Simpson said that patient got hemodialysis done yesterday    I along with CORNEL Simpson called patient's daughter Erick Wilde [phone]  I introduced myself to Erick Wilde and explained her the role of palliative care  Erick Wilde told that patient is  and she is the only child of her mother  I explained to Erick Wilde that since she is the only child of her mother does she is patient's legal spokesperson as per [place]    I discussed patient's current medical conditions with Erick Wilde and she stated that she does not have clear idea about patient's current medical issues  I explained to Erick Wilde that patient has ESRD and is on HD and told her that patient has liver cirrhosis with ascites  I told Erick Wilde that patient got paracentesis done and 6000 mL of fluid was taken out from her abdomen and patient currently is not feeling well after paracentesis    I requested Erick Wilde to come and visit the patient so that she can have clear idea regarding patient's current medical situation  I also told Juan M Jaramillo that family meeting can be done with her along with the primary care team doctor and myself and she can be updated regarding patient's medical conditions    Juan M Jaramillo told that she cannot come as her boyfriend is going to work but she will come later when her boyfriend comes back from work around 7:30 [address] said that if she would be allowed to stay for the night with the patient and CORNEL Jefferson told that she may be allowed    I explained the different types of codes to Juan M Jaramillo and she told that patient is very weak and frail and thus she would not want the patient to have CPR/chest compressions done if her heart stopped and also would not want her to be intubated if her bleeding stopped    I changed patient's CODE STATUS to DNR CCA with no intubation as per Nallely's wishes and this was witnessed by myself and CORNEL Jefferson    I offered comfort and emotional support to the patient and family    Education/support to staff  Education/support to family  Education/support to patient  Discharge planning/helping to coordinate care  Communications with primary service  Caregiver support/education  Code status clarified: Full Code  Code status clarified: [address]  Code status clarified: [facility]  Other major issues      History of Present Illness     The patient is a 80 y.o. Non- / non  female who presents with No chief complaint on file. Referred to Palliative Care by   [x] Physician   [] Nursing  [] Family Request   [] Other:       She was admitted to the internal medicine service for GI bleed [K92.2]. Her hospital course has been associated with GI bleed.  The patient has a complicated medical history and has been hospitalized since 3/19/2022  2:23 AM.  The patient was transferred from [facility] where she presented with fatigue weakness shortness of breath and lab work-up relieved fecal occult blood positive and hemoglobin of 8.7 g/dL. Patient's chest x-ray showed mild CHF and small bilateral pleural effusions, proBNP 32, 187, elevated  troponin troponin I 54. Patient was transferred to [place] for further management. GI was consulted and patient underwent EGD which showed moderate gastritis with gastric erythema, no active bleeding. CT abdomen and chest showed cardiomegaly with pleural effusion as well as liver cirrhosis with large amount of ascites. Patient has ESRD and on hemodialysis, nephrology has been consulted. Cardiology also has been consulted. Palliative care consulted for goals of care and CODE STATUS discussion. Active Hospital Problems    Diagnosis Date Noted    GI bleed [K92.2] 03/19/2022    Cirrhosis of liver with ascites ([address] [K74.60, R18.8]     Generalized abdominal pain [R10.84]        PAST MEDICAL HISTORY      Diagnosis Date    Acute anemia     Acute cystitis 8/8/2020    Acute kidney injury ([address] 8/19/2020    Acute kidney injury superimposed on CKD ([address]     Acute on chronic combined systolic (congestive) and diastolic (congestive) heart failure ([address] 1/27/2022    Acute on chronic diastolic heart failure ([address] 8/7/2020    Acute renal failure (ARF) ([address] 12/11/2020    Anasarca 12/12/2020    Anxiety and depression     AV block, 1st degree 8/19/2020    Background diabetic retinopathy(362.01) 2008    (Mild)    Balance problem     Bilateral pleural effusion 1/26/2022    BMI 45.0-49.9, adult ([address] 3/1/2021    Bradycardia 8/19/2020    Buttock wound 8/20/2020    CAD (coronary artery disease)     (with coronary artery bypass graft x4).     Cancer of uterus [facility])     history of, (probable cure)    Chronic diastolic heart failure ([address] 3/1/2021    Chronic kidney disease     Chronic low back pain     Controlled type 2 diabetes mellitus with chronic 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 dialysis, with long-term current use of insulin ([address]  CVA (cerebral vascular accident) ([address]     Decompensated heart failure ([address] 2020    Decubitus ulcer of trochanteric region of right hip, stage 2 ([address] 2020    Dementia ([address]     (moderate)    Dry eye syndrome     Elevated troponin 2020    End stage renal disease on dialysis ([address] 3/1/2021    GERD (gastroesophageal reflux disease)     Glaucoma suspect     Gout     Hemodialysis patient ([address] 3/1/2021    Homonymous hemianopsia     (Right)    Hyperkalemia 2020    Hyperlipidemia     Hypertension     Infestation by bed bug 2020    Infestation by maggots 2020    Keratitis     (secondary to dry eye syndrome).  Lymphedema of both lower extremities 2020    Morbid obesity ([address] 3/1/2021    MRSA bacteremia 2020    Obesity     PAD (peripheral artery disease) (HCC)     Peripheral polyneuropathy     (diabetic)    Pressure injury of right heel, unstageable ([address] 2020    Pseudophakos     (Right Eye: 2012; Left Eye: 2012) - Dr. Bria Mendes.  Stroke [facility])     (Multiple) MRI of brain 10/2008 shows multiple infarcts involving the temporal and parietal areas.  Trichiasis     (left eye)    Type 2 diabetes mellitus ([address]     Wounds, multiple 2020       PAST SURGICAL HISTORY  Past Surgical History:   Procedure Laterality Date    CATARACT REMOVAL WITH IMPLANT  2012    (Right eye) - Dr. Bria Mendes.  CATARACT REMOVAL WITH IMPLANT  2012    (Left eye) - Dr. Bria Mendes.   SECTION      CHOLECYSTECTOMY      CORONARY ARTERY BYPASS GRAFT  12-    (x4) - LIMA-LAD, SVG-diagnoal 1, SVG-OM1, and SVG-PDA. Exploration of left radial. (Dr. Vicenta Hernandez).  EYE SURGERY Left     as a child     GASTRIC BYPASS SURGERY      HYSTERECTOMY      (abdominal)  with left oophorectomy. Right ovary retained.     IR TUNNELED CATHETER PLACEMENT GREATER THAN 5 YEARS  2021    IR TUNNELED CATHETER PLACEMENT GREATER THAN 5 YEARS 1/8/2021 Connor Juares MD [facility] SPECIAL PROCEDURES    TONSILLECTOMY AND ADENOIDECTOMY      UPPER GASTROINTESTINAL ENDOSCOPY  12/14/2020    EGD BIOPSY performed by Noemi Mancuso MD at [facility] Endoscopy       SOCIAL HISTORY  Social History     Tobacco Use    Smoking status: Never Smoker    Smokeless tobacco: Never Used    Tobacco comment: never smoker eclamb rrt 7/20/17   Vaping Use    Vaping Use: Unknown   Substance Use Topics    Alcohol use: No    Drug use: No       ALLERGIES  Allergies   Allergen Reactions    Amoxicillin-Pot Clavulanate Diarrhea, Nausea Only and Nausea And Vomiting     Other reaction(s): Vomiting    Sulfamethoxazole-Trimethoprim Hives    Amoxicillin     Clavulanic Acid     Clonidine Derivatives Swelling    Sulfamethoxazole     Trimethoprim     Adhesive Tape Itching         MEDICATIONS  Current Medications    sodium chloride flush  5-40 mL IntraVENous 2 times per day    megestrol  200 mg Oral Daily    amLODIPine  10 mg Oral Daily    aspirin  81 mg Oral Daily    atorvastatin  40 mg Oral Nightly    carvedilol  6.25 mg Oral BID WC    Vitamin D  5,000 Units Oral Daily    citalopram  20 mg Oral Daily    hydrALAZINE  100 mg Oral TID    lidocaine   Topical Once per day on Mon Wed Fri    cetirizine  10 mg Oral Daily    [START ON 3/22/2022] midodrine  10 mg Oral Once per day on Tue Thu Sat    oxybutynin  5 mg Oral BID    pantoprazole  40 mg Oral BID AC    rivastigmine  1 patch TransDERmal Daily    senna  1 tablet Oral BID    sodium chloride  2 spray Nasal BID     sodium chloride, acetaminophen **OR** acetaminophen, ondansetron **OR** ondansetron, sodium chloride flush, dextrose, dextrose, glucagon (rDNA), glucose, acetaminophen, midodrine, nystatin, sodium chloride, sodium chloride, heparin (porcine), heparin (porcine)  IV Drips/Infusions   dextrose 5 % and 0.9 % NaCl 50 mL/hr at 03/21/22 0831    sodium chloride      dextrose       Home Medications  No current facility-administered medications on file prior to encounter. Current Outpatient Medications on File Prior to Encounter   Medication Sig Dispense Refill    Pollen Extracts (PROSTAT PO) Take 30 mLs by mouth three times daily      carvedilol (COREG) 6.25 MG tablet Take 1 tablet by mouth 2 times daily (with meals) Hold for heart rate less than 60  Hold for BP less than 115 60 tablet 3    acetaminophen (TYLENOL) 325 MG tablet Take 650 mg by mouth every 4 hours as needed for Pain or Fever      loratadine (CLARITIN) 10 MG tablet Take 10 mg by mouth every other day      midodrine (PROAMATINE) 10 MG tablet Take 10 mg by mouth as needed (hypotension mid-dialysis)      nystatin (MYCOSTATIN) 092508 UNIT/GM cream Apply topically 2 times daily as needed (redness)      senna (SENOKOT) 8.6 MG tablet Take 1 tablet by mouth 2 times daily      nitroGLYCERIN (NITROSTAT) 0.4 MG SL tablet up to max of 3 total doses.  If no relief after 1 dose, call 911. 25 tablet 3    insulin lispro (HUMALOG) 100 UNIT/ML injection vial Inject 0-6 Units into the skin 3 times daily (with meals) Glucose: Dose:               No Insulin  140-199 1 Unit  200-249 2 Units  250-299 3 Units  300-349 4 Units  350-399 5 Units  Over 399 6 Units 10 mL 3    midodrine (PROAMATINE) 10 MG tablet Take 10 mg by mouth three times a week Indications: on dialysis days Tu/Thur/Sat       sodium chloride (OCEAN) 0.65 % nasal spray 2 sprays by Nasal route 2 times daily      lidocaine-prilocaine (EMLA) 2.5-2.5 % cream Apply topically three times a week On dialysis days Tu-Th-Sa      amLODIPine (NORVASC) 10 MG tablet Take 1 tablet by mouth daily 30 tablet 0    atorvastatin (LIPITOR) 40 MG tablet TAKE 1 TABLET BY MOUTH EVERYDAY 30 tablet 0    Cholecalciferol 125 MCG (5000 UT) CHEW Take 1 tablet by mouth daily 30 tablet 0    citalopram (CELEXA) 20 MG tablet Take 1 tablet by mouth daily 30 tablet 0    hydrALAZINE (APRESOLINE) 100 MG tablet Take 1 tablet by mouth 3 times daily 90 tablet 0    oxybutynin (DITROPAN) 5 MG tablet TAKE 1 TABLET BY MOUTH TWICE DAILY 60 tablet 0    pantoprazole (PROTONIX) 40 MG tablet Take 1 tablet by mouth 2 times daily (before meals) 60 tablet 0    rivastigmine (EXELON) 9.5 MG/24HR APPLY 1 NEW PATCH EVERY 24 HOURS 30 patch 0    Alcohol Swabs (ALCOHOL PREP) PADS Checks blood sugar 3 times a day 300 each 0    aspirin 81 MG tablet Take 1 tablet by mouth daily 90 tablet 3       Data         BP (!) 116/51   Pulse 63   Temp 97.2 °F (36.2 °C) (Oral)   Resp 14   Ht 5' 6\" (1.676 m)   Wt 147 lb 14.9 oz (67.1 kg)   SpO2 97%   BMI 23.88 kg/m²     Wt Readings from Last 3 Encounters:   03/19/22 147 lb 14.9 oz (67.1 kg)   03/18/22 150 lb (68 kg)   02/25/22 162 lb 12.8 oz (73.8 kg)        Code Status: Full Code     ADVANCED CARE PLANNING:  Patient has capacity for medical decisions: no  Health Care Power of : no  Living Will: no     Personal, Social, and Family History  Marital Status:   Living situation: nursing home  Importance of abbi/Buddhist/spiritual beliefs: [] Very [x] Somewhat [] Not   Psychological Distress: moderate  Does patient understand diagnosis/treatment? no  Does caregiver understand diagnosis/treatment?  yes    Past Medical History:   Diagnosis Date    Acute anemia     Acute cystitis 8/8/2020    Acute kidney [facility] 8/19/2020    Acute kidney injury superimposed on CKD ([address]     Acute on chronic combined systolic (congestive) and diastolic (congestive) heart failure ([address] 1/27/2022    Acute on chronic diastolic heart failure ([address] 8/7/2020    Acute renal failure (ARF) ([address] 12/11/2020    Anasarca 12/12/2020    Anxiety and depression     AV block, 1st degree 8/19/2020    Background diabetic retinopathy(362.01) 2008    (Mild)    Balance problem     Bilateral pleural effusion 1/26/2022    BMI 45.0-49.9, adult ([address] 3/1/2021    Bradycardia 8/19/2020    Buttock wound 8/20/2020    CAD (coronary artery disease)     (with coronary artery bypass graft x4).  Cancer of uterus [facility])     history of, (probable cure)    Chronic diastolic heart failure ([address] 3/1/2021    Chronic kidney disease     Chronic low back pain     Controlled type 2 diabetes mellitus with chronic 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 dialysis, with long-term current use of insulin ([address]     CVA (cerebral vascular accident) ([address]     Decompensated heart failure ([address] 12/4/2020    Decubitus ulcer of trochanteric region of right hip, stage 2 ([address] 8/23/2020    Dementia ([address]     (moderate)    Dry eye syndrome     Elevated troponin 8/8/2020    End stage renal disease on dialysis ([address] 3/1/2021    GERD (gastroesophageal reflux disease)     Glaucoma suspect 2005    Gout     Hemodialysis patient ([address] 3/1/2021    Homonymous hemianopsia 2008    (Right)    Hyperkalemia 8/19/2020    Hyperlipidemia     Hypertension     Infestation by bed bug 8/8/2020    Infestation by maggots 8/8/2020    Keratitis     (secondary to dry eye syndrome).  Lymphedema of both lower extremities 8/8/2020    Morbid obesity ([address] 3/1/2021    MRSA bacteremia 12/18/2020    Obesity     PAD (peripheral artery disease) (HCC)     Peripheral polyneuropathy     (diabetic)    Pressure injury of right heel, unstageable ([address] 8/18/2020    Pseudophakos     (Right Eye: 05-; Left Eye: 04-) - Dr. Kiesha Dominguez.  Stroke [facility])     (Multiple) MRI of brain 10/2008 shows multiple infarcts involving the temporal and parietal areas.  Trichiasis 2010    (left eye)    Type 2 diabetes mellitus ([address]     Wounds, multiple 8/20/2020         Family History   Problem Relation Age of Onset    Diabetes Mother     Cancer Mother     High Blood Pressure Mother    [place] Stroke Mother     Kidney Disease Mother     Uterine Cancer Mother     Diabetes Father     Heart Disease Father     Glaucoma Father     Emphysema Father     Coronary Art Dis Other         All 4 siblings.  Stroke Other         1 sibling.  Lung Cancer Other         1 sibling - cause of death lung cancer.  Mental Retardation Other        Social History     Tobacco Use    Smoking status: Never Smoker    Smokeless tobacco: Never Used    Tobacco comment: never smoker eclamb rrt 7/20/17   Vaping Use    Vaping Use: Unknown   Substance Use Topics    Alcohol use: No    Drug use: No           Assessment        REVIEW OF SYSTEMS  Constitutional: no fever, no chills or weight loss  Eyes: no eye pain or blurred vision  ENT: no hearing loss, congestion, or difficulty swallowing   Respiratory: no wheezing, chest tightness, + shortness of breath   Cardiovascular: no chest pain or pressure, no palpitations, no diaphoresis   Gastrointestinal: no nausea, vomiting,abdominal pain, diarrhea or constipation, no melena   Genitourinary: no dysuria, frequency, hematuria, or nocturia   Musculoskeletal: no myalgias or arthralgias, no back pain   Skin: no rashes or sores   Neurological: no focal weakness, numbness, tingling or headache, no seizures    PHYSICAL ASSESSMENT:  Constitutional: Drowsy, ill-appearing, too weak to follow commands  Head: Normocephalic and atraumatic   Eyes: EOM are normal  Neck: Normal range of motion. Neck supple   Cardiovascular: Normal rate and regular rhythm  Pulmonary/Chest: Mild respiratory distress with some gasping  Abdomen:  + distended,  ascites present  Musculoskeletal: Normal range of motion. No edema lower ext. Neurological: Drowsy, ill-appearing, too weak to follow any commands  Skin: Normal turgor, no bleeding, no bruising. Palliative Performance Scale:  ___60%  Ambulation reduced; Significant disease; Can't do hobbies/housework; intake normal or reduced; occasional assist; LOC full/confusion  ___50%  Mainly sit/lie;  Extensive disease; Can't do any work; Considerable assist; intake normal or reduced; LOC full/confusion  ___40%  Mainly in bed; Extensive disease; Mainly assist; intake needed. Thank you for allowing Palliative Care to participate in the care of Ms. Chad Simon . This note has been dictated by dragon, typing errors may be a possibility.     The total time I spent in seeing the patient, discussing goals of care, advanced directives, code status, greater than 50% time in counseling and other major issues was more than 70 minutes      Electronically signed by   Donnell Collado MD  Palliative Care Team  on 3/21/2022 at 10:07 AM    Palliative care office: [phone]

## 2022-03-21 NOTE — ACP (ADVANCE CARE PLANNING)
Advance Care Planning     Advance Care Planning (ACP) Physician/NP/PA (Provider) Conversation      Date of ACP Conversation: 3/18/2022    Conversation Conducted with: Patient currently does not have decision-making [place]:    Patient's legal spokesperson is patient's only daughter Stephanie Agrawal ([phone]) as per [facility] laws    Care Preferences:    Hospitalization: \"If your health worsens and it becomes clear that your chance of recovery is unlikely, what would your preference be regarding hospitalization? \"  Currently hospitalized    Ventilation: \"If you were in your present state of health and suddenly became very ill and were unable to breathe on your own, what would your preference be about the use of a ventilator (breathing machine) if it was available to you? \"    DNR CCA with no intubation    \"If your health worsens and it becomes clear that your chance of recovery is unlikely, what would your preference be about the use of a ventilator (breathing machine) if it was available to you? \"   DNR CCA with no intubation    Resuscitation:  \"CPR works best to restart the heart when there is a sudden event, like a heart attack, in someone who is otherwise healthy. Unfortunately, CPR does not typically restart the heart for people who have serious health conditions or who are very sick. \"    \"In the event your heart stopped as a result of an underlying serious health condition, would you want attempts to be made to restart your heart (answer \"yes\" for attempt to resuscitate) or would you prefer a natural death (answer \"no\" for do not attempt to resuscitate)? \"   DNR CCA with no intubation    Conversation Outcomes / Follow-Up Plan:   Patient is , had only one daughter Stephanie Agrawal ([phone]) hence [facility] is patient's legal spokesperson as per [facility] laws    DNR CCA with no intubation      Length of Voluntary ACP Conversation in minutes:  16 minutes       Shaggy Lr MD

## 2022-03-21 NOTE — PROGRESS NOTES
[facility] [place] [facility]   Progress Note    Isadora Hashimoto is a 80 y.o. female patient. Hospitalization Day:2      Chief consult reason:   GI bleed- acute anemia     Subjective:    Patient was seen s/p paracentesis. 6L of clear yellow fluid was removed by IR. Patient was short of breath and had crackles bilaterally on exam.   Patient was oriented to self and has baseline dementia. She denies any other complains. Denies any dark stool, bright red blood per rectum or melena. Hgb 8.7<--8.8;    VITALS:  BP (!) 116/51   Pulse 63   Temp 97.2 °F (36.2 °C) (Oral)   Resp 14   Ht 5' 6\" (1.676 m)   Wt 147 lb 14.9 oz (67.1 kg)   SpO2 97%   BMI 23.88 kg/m²   TEMPERATURE:  Current - Temp: 97.2 °F (36.2 °C); Max - Temp  Av.8 °F (36 °C)  Min: 94.8 °F (34.9 °C)  Max: 98.1 °F (36.7 °C)    Physical Assessment:  General appearance:  Chronically ill, frail appearing   Mental Status:  oriented to self  Lungs:  Crackles heard bilaterally   Heart:  regular rate and rhythm, no murmur  Abdomen:  soft, nontender, nondistended, normal bowel sounds, no masses, hepatomegaly, splenomegaly  Extremities:  no edema, redness, tenderness in the calves  Skin:  no gross lesions, rashes, induration    Data Review:    Labs and Imaging:     CBC:  Recent Labs     22  1907 22  0331 22  1618 22  2152 22  0611 22  1644 22  0459   WBC 4.6  --   --   --   --   --   --    HGB 8.7*   < > 9.9* 8.6* 8.6* 8.8* 8.7*   .9  --   --   --   --   --   --    RDW 18.0*  --   --   --   --   --   --    PLT See Reflexed IPF Result  --   --   --   --   --   --     < > = values in this interval not displayed.        ANEMIA STUDIES:  Recent Labs     22  1619   LABIRON 53   TIBC 91*   FERRITIN 1,129*       BMP:  Recent Labs     22  0936 22  1144 22  0611    136 135   K 4.3 3.5* 3.9    99 99   CO2 22 28 29   BUN 23 12 16   CREATININE 1.87* 1.09* 1.55*   GLUCOSE 74 112* 71 CALCIUM 8.8 7.9* 8.2*       LFTS:  Recent Labs     03/19/22  1144   ALKPHOS 315*   ALT 14   AST 23   BILITOT 0.51   BILIDIR 0.29   LABALBU 2.8*       Amylase/Lipase and Ammonia:  No results for input(s): AMYLASE, LIPASE, AMMONIA in the last 72 hours. Acute Hepatitis Panel:  Lab Results   Component Value Date    HEPBSAG NONREACTIVE 03/19/2022    HEPCAB NONREACTIVE 03/19/2022    HEPBIGM NONREACTIVE 03/19/2022    HEPAIGM NONREACTIVE 03/19/2022       HCV Genotype:  No results found for: HEPATITISCGENOTYPE    HCV Quantitative:  No results found for: HCVQNT    LIVER WORK UP:    AFP  Lab Results   Component Value Date    AFP 1.7 03/19/2022       Alpha 1 antitrypsin   Lab Results   Component Value Date    A1A 190 03/19/2022       Anti - Liver/Kidney Ab  No results found for: LIVER-KIDNEYMICROSOMALAB    JIMBO  Lab Results   Component Value Date    JIMBO NEGATIVE 03/19/2022       AMA  Lab Results   Component Value Date    MITOAB 1.5 03/19/2022       ASMA  No results found for: SMOOTHMUSCAB    Ceruloplasmin  No results found for: CERULOPLSM    Celiac panel  No results found for: TISSTRNTIIGG, TTGIGA, IGA    PT/INR  Recent Labs     03/19/22  1142   PROTIME 11.9   INR 1.1       Cancer Markers:  CEA:  No results for input(s): CEA in the last 72 hours. Ca 125:  No results for input(s):  in the last 72 hours. Ca 19-9:   Invalid input(s):   AFP:   Recent Labs     03/19/22  1144   AFP 1.7     Lactic acid:Invalid input(s): LACTIC ACID    Radiology Review:    US LIVER    Result Date: 3/20/2022  EXAMINATION: RIGHT UPPER QUADRANT ULTRASOUND 3/20/2022 6:54 am COMPARISON: CT abdomen from November 13, 2021 HISTORY: ORDERING SYSTEM PROVIDED HISTORY:  Cirrhosis, assess for [address] TECHNOLOGIST PROVIDED HISTORY: Cirrhosis, assess for [address] FINDINGS: LIVER:  There is evidence of liver cirrhosis with abnormal echotexture. There is normal flow in the portal vein. BILIARY SYSTEM:  Status post cholecystectomy.  Common bile duct is within normal limits measuring 0.34 cm. RIGHT KIDNEY: Renal atrophy noted but no other significant abnormality. PANCREAS:  Visualized portions of the pancreas are unremarkable. OTHER: There is evidence of moderate ascites in the abdomen. Liver cirrhosis. Moderate ascites. Status post cholecystectomy. Normal common bile duct. Principal Problem:    GI bleed  Active Problems:    Cirrhosis of liver with ascites (HCC)    Generalized abdominal pain  Resolved Problems:    * No resolved hospital problems. *       GI Assessment:    81 yo female with GI bleed with acute on chronic anemia with fecal occult positive stool. LFTs wnl: , ALT 14, AST 23, Hgb 8.7; Plt 97  Albumin 2.8; A-1 antitrypsin 190- normal;     1. GI bleed: Acute on chronic anemia with fecal occult positive stool. Etiologies are numerous include upper GI source such as esophagitis/gastritis/duodenitis, peptic ulcer disease. EV and portal hypertensive gastropathy secondary to history of cirrhosis. AVMs given underlying history of renal disease. Lower sources include colorectal cancer and AVMs. No prior colonoscopy complete  2. Cirrhosis with ascites -possibly cardiac versus metabolic. Patient will need comprehensive serology evaluation to rule out other causes such as autoimmune and malignancy. Will order paracentesis as well which will guide in diagnosis. 3.  Abdominal pain - ascites versus constipation versus other    4. Thrombocytopenia-suspect secondary to underlying liver disease  5. Constipation    Plan and Recommendations:    1. S/p paracentesis today- 6L clear yellow fluid removed. Ordered Albumin   2. No immediate plan for EGD/colonoscopy at this time. Patient will need to be medically optimized. Patient would benefit from EGD for variceal screening as well look for source of GI blood loss. She would need as colonoscopy to rule out malignancy. Refused colonoscopy in past.  3. Continue diet as tolerated.    4. Continue Mirilax daily      Thank you for allowing me to participate in the care of your patient. Please feel free to contact me with any questions or concerns.      Alida Cai MD  PGY-1, [facility],  [address]

## 2022-03-21 NOTE — DISCHARGE SUMMARY
[facility]  Office: [address], DO, Cynthia Harper, DO, Bradley Connelly, DO, Moses Gomez Blood, DO, Gerardo Bunch MD, Sharon Nelson MD, Gabino Vergara MD, Lexii Ly MD, Markell Pacheco MD, Landon Oakes MD, Vera Hoang MD, Barrie Olmstead, DO, Cade Reyna, DO, Alannah Amaya MD,  Johann Galeazzi, DO, Alfred Torres MD, Demi Hansen MD, Yakov Narayan MD, Bisi Salamanca DO, Sharri Skelton MD, Agustín Rollins MD, Vikram [facility], [facility], CNP, Simeon Odom, CNP, Toni Nieves, CNS, Patrick Grayson, CNP, Marika Ross, CNP, Binh Amaya, CNP, Estela Loomis, CNP, Trudy Arce, CNP, Rich Keen PA-C, Oh Zepeda, [facility], Arnold Boucher, [facility], Norma Rosado, CNP, Genet Fiore, CNP, Fritz Leon, CNP, Lexus Malloy, CNP, Jesse Baker, CNP, Gabo RodríguezList of hospitals in the United Statesmaribel,  [facility]    Discharge Summary     Patient ID: Juanpablo Bearden  :  1950   MRN: [id number]     ACCOUNT:  [de-identified]   Patient's PCP: Irene Jackson DO  Admit Date: 3/19/2022   Discharge Date: 3/21/2022     Length of Stay: 2  Code Status:  DNR-CCA  Admitting Physician: Will Hill DO  Discharge Physician: Will Hill DO     Active Discharge Diagnoses:     Hospital Problem Lists:  Principal Problem:    GI bleed  Active Problems:    Cirrhosis of liver with ascites ([address]    Generalized abdominal pain  Resolved Problems:    * No resolved hospital problems. *      Admission Condition:  poor     Discharged Condition: [address] Stay:     Hospital Course:  Juanpablo Bearden is a 80 y.o. female who was admitted for the management of reported GI bleed. She was at her skilled nursing facility and there were concerns for possible GI bleed however her hemoglobin was stable throughout her admission. She presented to hospital with profound weakness, debility and evidence of failure to thrive.   The patient was fluid overloaded and required hemodialysis for decompensated heart failure. The patient also had cirrhosis on ultrasound with ascites. During the course of the admission she was seen by nephrology, gastroenterology as well as pelvic care. The patient deteriorated following a paracentesis. Palliative care contacted family and she ultimately was transitioned over to a DNR CCA. He was given albumin following paracentesis however the patient continued to decompensate. Family did not want CPR, cardioversion, intubation. Ultimately the patient did  at 4:26 PM on 2022. Significant therapeutic interventions: As above (Hemodialysis/Paracentesis)    Significant Diagnostic Studies:    Radiology:  US LIVER    Result Date: 3/20/2022  Liver cirrhosis. Moderate ascites. Status post cholecystectomy. Normal common bile duct. XR CHEST PORTABLE    Result Date: 3/18/2022  Mild CHF and small bilateral pleural effusions. US ABDOMEN LIMITED    Result Date: 3/21/2022  Moderate amount of ascites     US GUIDED PARACENTESIS    Result Date: 3/21/2022  Successful ultrasound-guided paracentesis. Fluoroscopy modified barium swallow with video    Result Date: 3/20/2022  1. Deep penetration to the cords without aspiration with the thick liquid substance by straw. With the thick liquid substance by cup, there was very shallow flash penetration without aspiration. 2. No penetration or aspiration with the remainder of the administrations/substances. Please see separate speech pathology report for full discussion of findings and recommendations. Consultations:    Consults:     Final Specialist Recommendations/Findings:   IP CONSULT TO CARDIOLOGY  IP CONSULT TO NEPHROLOGY  IP CONSULT TO GI  IP CONSULT TO PALLIATIVE CARE      The patient was seen and examined on day of discharge and this discharge summary is in conjunction with any daily progress note from day of discharge.     Discharge plan:     Disposition:

## 2022-03-21 NOTE — PLAN OF CARE
Problem: Skin Integrity:  Goal: Absence of new skin breakdown  Description: Absence of new skin breakdown  3/21/2022 0514 by Hamida Nguyen RN  Outcome: Met This Shift  3/20/2022 1803 by Renato Riley RN  Outcome: Ongoing     Problem: Falls - Risk of:  Goal: Will remain free from falls  Description: Will remain free from falls  3/21/2022 0514 by Hamida Nguyen RN  Outcome: Met This Shift  3/20/2022 1803 by Renato Riley RN  Outcome: Ongoing  Goal: Absence of physical injury  Description: Absence of physical injury  3/21/2022 0514 by Hamida Nguyen RN  Outcome: Met This Shift  3/20/2022 1803 by Renato Riley RN  Outcome: Ongoing     Problem: Breathing Pattern - Ineffective:  Goal: Ability to achieve and maintain a regular respiratory rate will improve  Description: Ability to achieve and maintain a regular respiratory rate will improve  3/21/2022 0514 by Hamida Nguyen RN  Outcome: Ongoing  3/20/2022 1803 by Renato Riley RN  Outcome: Ongoing     Problem: Skin Integrity:  Goal: Will show no infection signs and symptoms  Description: Will show no infection signs and symptoms  3/21/2022 0514 by Hamida Nguyen RN  Outcome: Ongoing  3/20/2022 1803 by Renato Riley RN  Outcome: Ongoing

## 2022-03-21 NOTE — PROGRESS NOTES
[facility]  Office: [address], DO, Keith Bowieress, DO, Issa Hannaon, DO, Marco Late Blood, DO, Nargis Gunn MD, Nora Perdomo MD, Fatoumata Byrne MD, Harrison Landa MD, Shima Davies MD, Camleia Stokes MD, Daniel Noe MD, Delfin Barker, DO, Hawk Kaufman, DO, Pamela Caro MD,  Anaya Muse, DO, Pb Lucero MD, Radha Goodrich MD, Jessica Almaraz MD, Dana Chi, DO, Chrissy Franklin MD, Michelle Lainez MD, Lionel Chavez, [facility], [facility], CNP, Li Acuna, CNP, Rohith Faustin, CNS, Alfredo Osgood, CNP, Lana Hyde, CNP, Pebbles Todd, CNP, Tylor Collado, CNP, Jamey Jose, CNP, DIANE LionC, Michelle Bob, DNP, Mil Pedro, [facility], Rhett John, CNP, Analilia Andrew, CNP, Sameer Newell, CNP, Brigid Lares, CNP, Kirt Soto, CNP, Jennifer Marcus, [address] [place] [address]    Progress Note    3/21/2022    3:06 PM    Name:   True Barlow  MRN:     [id number]     [place]:      [de-identified]   Room:   [address] [place] [address] Day:  2  Admit Date:  3/19/2022  2:23 AM    PCP:   Teo Alexanedr DO  Code Status:  DNR-CCA    Subjective:     C/C: Patient admitted to the hospital for reported GI bleed, failure to thrive, weakness/debility. Chief complaint from the patient unobtainable as she is presently obtunded/lethargic. Interval History Status: worsened. Patient has returned from paracentesis. She is presently obtunded and difficult to arouse. Her CODE STATUS has been transitioned to DNR CCA. Palliative care evaluated the patient today and discussed case with daughter Josh Jiménez. The patient unfortunately is showing significant deterioration. Albumin has been ordered to help with the patient's hypotension. Discussed with nursing staff. At this point in time we will monitor her clinically however I have significant concerns that she may decompensate at any given time.   The patient's daughter has stated that she would not want her mother to undergo CPR or intubation. At this point in time we will continue with albumin and gentle fluids. Very difficult situation as the patient has a very tenuous respiratory status. She presented to the hospital with an element of fluid overload and heart failure. Unfortunately following paracentesis she does need some gentle fluids due to fluid shifts. I do agree with albumin however the patient's long-term prognosis is extremely grim. As mentioned previously she has moderate protein calorie malnutrition and failure to thrive. By definition she has multisystem organ failure with cirrhosis and end-stage renal disease. We will continue to treat her relatively aggressively however we will honor her wishes regarding CODE STATUS. I do not feel that initiating pressors would be beneficial at this point in time. The patient will require repeat paracentesis in the future secondary to her cirrhosis and unfortunately due to her difficult fluid status. Due to her frail state I am not sure she is going to be able to tolerate many more interventions. Once the patient's daughter has had a chance to evaluate her mother it may be reasonable to consider hospice. Brief History: This is a unfortunate 76-year-old female with multiple medical issues who presented to the hospital with reported GI bleed. The patient does have anemia of chronic kidney disease. Hemoglobin has been relatively stable. Clinically there is no evidence of an acute blood loss anemia. In the past GI has evaluated the patient and recommendations are for potential EGD/colonoscopy should the patient allow. Unfortunately the patient has refused these interventions in the past. The patient does have known cirrhosis and ascites. Ultrasound was completed which did show ascites. Patient underwent paracentesis earlier today with 6 L of fluid removed. Post paracentesis the patient has been obtunded, tachypneic. She is presently receiving gentle fluids and albumin. Review of Systems:     Unobtainable, patient presently obtunded    Medications: Allergies:     Allergies   Allergen Reactions    Amoxicillin-Pot Clavulanate Diarrhea, Nausea Only and Nausea And Vomiting     Other reaction(s): Vomiting    Sulfamethoxazole-Trimethoprim Hives    Amoxicillin     Clavulanic Acid     Clonidine Derivatives Swelling    Sulfamethoxazole     Trimethoprim     Adhesive Tape Itching       Current Meds:   Scheduled Meds:    hydrALAZINE  50 mg Oral TID    sodium chloride flush  5-40 mL IntraVENous 2 times per day    megestrol  200 mg Oral Daily    aspirin  81 mg Oral Daily    atorvastatin  40 mg Oral Nightly    carvedilol  6.25 mg Oral BID WC    Vitamin D  5,000 Units Oral Daily    citalopram  20 mg Oral Daily    lidocaine   Topical Once per day on Mon Wed Fri    cetirizine  10 mg Oral Daily    [START ON 3/22/2022] midodrine  10 mg Oral Once per day on Tue Thu Sat    oxybutynin  5 mg Oral BID    pantoprazole  40 mg Oral BID AC    rivastigmine  1 patch TransDERmal Daily    senna  1 tablet Oral BID    sodium chloride  2 spray Nasal BID     Continuous Infusions:    dextrose 5 % and 0.9 % NaCl 50 mL/hr at 03/21/22 0831    sodium chloride      dextrose       PRN Meds: sodium chloride, acetaminophen **OR** acetaminophen, ondansetron **OR** ondansetron, sodium chloride flush, dextrose, dextrose, glucagon (rDNA), glucose, acetaminophen, midodrine, nystatin, sodium chloride, sodium chloride, heparin (porcine), heparin (porcine)    Data:     Past Medical History:   has a past medical history of Acute anemia, Acute cystitis, Acute kidney injury ([address] Acute kidney injury superimposed on CKD ([address] Acute on chronic combined systolic (congestive) and diastolic (congestive) heart failure (HCC), Acute on chronic diastolic heart failure (HCC), Acute renal failure (ARF) ([address] Anasarca, Anxiety and depression, AV block, 1st degree, Background diabetic retinopathy(362.01), Balance problem, Bilateral pleural effusion, BMI 45.0-49.9, adult ([facility] Bradycardia, Buttock wound, CAD (coronary artery disease), Cancer of uterus ([facility] Chronic diastolic heart failure ([facility] Chronic kidney disease, Chronic low back pain, Controlled type 2 diabetes mellitus with chronic 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 dialysis, with long-term current use of insulin ([facility] CVA (cerebral vascular accident) ([address] Decompensated heart failure ([address] Decubitus ulcer of trochanteric region of right hip, stage 2 ([address] Dementia ([address] Dry eye syndrome, Elevated troponin, End stage renal disease on dialysis ([address] GERD (gastroesophageal reflux disease), Glaucoma suspect, Gout, Hemodialysis patient ([facility] Homonymous hemianopsia, Hyperkalemia, Hyperlipidemia, Hypertension, Infestation by bed bug, Infestation by maggots, Keratitis, Lymphedema of both lower extremities, Morbid obesity ([address] MRSA bacteremia, Obesity, PAD (peripheral artery disease) ([address] Peripheral polyneuropathy, Pressure injury of right heel, unstageable ([address] Pseudophakos, Stroke ([facility] Trichiasis, Type 2 diabetes mellitus ([facility] and Wounds, multiple. Social History:   reports that she has never smoked. She has never used smokeless tobacco. She reports that she does not drink alcohol and does not use drugs. Family History:   Family History   Problem Relation Age of Onset    Diabetes Mother     Cancer Mother     High Blood Pressure Mother    [facility] Stroke Mother     Kidney Disease Mother     Uterine Cancer Mother     Diabetes Father     Heart Disease Father     Glaucoma Father     Emphysema Father     Coronary Art Dis Other         All 4 siblings.  Stroke Other         1 sibling.  Lung Cancer Other         1 sibling - cause of death lung cancer.     Mental Retardation Other        Vitals:  BP (!) 112/49   Pulse 58   Temp 98.4 °F (36.9 °C) (Oral)   Resp 26   Ht 5' 6\" (1.676 m)   Wt 147 lb 14.9 oz (67.1 kg)   SpO2 100%   BMI 23.88 kg/m²   Temp (24hrs), Av.9 °F (36.6 °C), Min:97.2 °F (36.2 °C), Max:98.4 °F (36.9 °C)    Recent Labs     22  2108 22  0739 22  0850 22  1205   POCGLU 111* 66 121* 93       I/O (24Hr): Intake/Output Summary (Last 24 hours) at 3/21/2022 1506  Last data filed at 3/21/2022 0823  Gross per 24 hour   Intake 10 ml   Output --   Net 10 ml       Labs:  Hematology:  Recent Labs     22  0331 22  1142 22  1618 22  0611 22  1644 22  0459   WBC 4.6  --   --   --   --   --   --    RBC 2.80*  --   --   --   --   --   --    HGB 8.7*   < >  --    < > 8.6* 8.8* 8.7*   HCT 28.8*   < >  --    < > 28.5* 30.2* 29.4*   .9  --   --   --   --   --   --    MCH 31.1  --   --   --   --   --   --    MCHC 30.2  --   --   --   --   --   --    RDW 18.0*  --   --   --   --   --   --    PLT See Reflexed IPF Result  --   --   --   --   --   --    INR  --   --  1.1  --   --   --   --     < > = values in this interval not displayed. Chemistry:  Recent Labs     227 22  0936 22  1144 22  1618 22  0611      < > 136 136  --  135   K 4.1   < > 4.3 3.5*  --  3.9      < > 103 99  --  99   CO2 25   < > 22 28  --  29   GLUCOSE 128*   < > 74 112*  --  71   BUN 22   < > 23 12  --  16   CREATININE 1.82*   < > 1.87* 1.09*  --  1.55*   ANIONGAP 8*   < > 11 9  --  7*   LABGLOM 27*   < > 27* 50*  --  33*   GFRAA 33*   < > 32* >60  --  40*   CALCIUM 8.9   < > 8.8 7.9*  --  8.2*   PROBNP 32,187*  --   --   --   --   --    TROPHS 154*   < > 149* 144* 159*  --    MYOGLOBIN  --    < > 105* 94* 115*  --     < > = values in this interval not displayed.      Recent Labs     22  0334 22  0839 22  1144 22  1625 22  1106 22  1724 22  2108 22  0739 22  0850 22  1205   PROT  --   --  6.9  --   --   --   --   --   --   -- LABALBU  --   --  2.8*  --   --   --   --   --   --   --    LABA1C 5.0  --   --   --   --   --   --   --   --   --    AST  --   --  23  --   --   --   --   --   --   --    ALT  --   --  14  --   --   --   --   --   --   --    ALKPHOS  --   --  315*  --   --   --   --   --   --   --    BILITOT  --   --  0.51  --   --   --   --   --   --   --    BILIDIR  --   --  0.29  --   --   --   --   --   --   --    POCGLU  --    < >  --    < > 87 141* 111* 66 121* 93    < > = values in this interval not displayed. ABG:  Lab Results   Component Value Date    POCPH 7.284 12/13/2020    POCPCO2 54.5 12/13/2020    POCPO2 71.6 12/13/2020    POCHCO3 25.8 12/13/2020    NBEA 1 12/13/2020    PBEA NOT REPORTED 12/13/2020    EBC9WFQ 28 12/13/2020    ZSEJ6FWG 92 12/13/2020    FIO2 2.0 12/13/2020     Lab Results   Component Value Date/Time    SPECIAL RT AC 12 ML 11/16/2021 06:11 AM     Lab Results   Component Value Date/Time    CULTURE NO GROWTH 6 DAYS 11/16/2021 06:11 AM       Radiology:  US LIVER    Result Date: 3/20/2022  Liver cirrhosis. Moderate ascites. Status post cholecystectomy. Normal common bile duct. XR CHEST PORTABLE    Result Date: 3/18/2022  Mild CHF and small bilateral pleural effusions. US ABDOMEN LIMITED    Result Date: 3/21/2022  Moderate amount of ascites     Fluoroscopy modified barium swallow with video    Result Date: 3/20/2022  1. Deep penetration to the cords without aspiration with the thick liquid substance by straw. With the thick liquid substance by cup, there was very shallow flash penetration without aspiration. 2. No penetration or aspiration with the remainder of the administrations/substances. Please see separate speech pathology report for full discussion of findings and recommendations.        Physical Examination:        General appearance: Lethargic, responds to noxious stimuli, thin and cachectic  Mental Status: Obtunded, no meaningful interaction  Lungs: Coarse rhonchi, tachypneic, poor excursion on the patient's part  Heart:  regular rate and rhythm, no murmur  Abdomen:  soft, nontender, nondistended, normal bowel sounds, no masses, hepatomegaly, splenomegaly  Extremities: Patient has left upper and lower extremity wounds which are bandaged, no significant edema noted  Skin: Wounds bandaged    Assessment:        Hospital Problems           Last Modified POA    * (Principal) GI bleed 3/19/2022 Yes    Cirrhosis of liver with ascites ([address] 3/19/2022 Yes    Generalized abdominal pain 3/19/2022 Yes          Plan:        Weakness/fatigue  Patient has failure to thrive with multisystem organ failure  Palliative care input/help appreciated  Long-term prognosis poor  Daughter to come and evaluate the patient  May benefit from hospice evaluation  Anemia of chronic kidney disease  Hemoglobin has remained stable, no evidence of GI blood loss anemia  GI input noted and appreciated  Patient has refused colonoscopy in the past  At this point in time patient is too unstable to even consider any type of intervention  Right upper quadrant ultrasound noted  Cirrhosis noted with ascites  Status post paracentesis  Continue albumin as ordered  Elevated troponin  Secondary to chronic kidney disease  No further work-up per cardiology  End-stage renal disease  Maintenance hemodialysis as patient tolerates  Difficult scenario, patient has issues with third spacing due to cirrhosis  Moderate protein calorie malnutrition  Continue dietary supplementation 3 times daily with meals  Long-term prognosis poor  Essential hypertension  Adjust antihypertensive medication secondary to hypotension  Diabetes mellitus  Discontinue insulin sliding scale due to relative hypoglycemia  Previous A1c 5.6  Dextrose infusion initiated due to patient poor oral intake and relative hypoglycemia  Hyperlipidemia  Continue statin  Gastroesophageal reflux disease  Patient is presently on oral Protonix, given stable hemoglobin no need to transition to IV at this point in time. I am not sure patient can take oral medications at this point in time  Dementia  Exelon patch     Patient's prognosis guarded, significant worsening over the last 24 hours. Palliative care input/help appreciated. Daughter to come in to evaluate the patient. May ultimately benefit from hospice care.     Cordell Zhang,   3/21/2022  3:06 PM

## 2022-03-21 NOTE — BRIEF OP NOTE
Brief Postoperative Note for Paracentesis    Noe Dwyer  YOB: 1950  [id number]    Pre-operative Diagnosis:  Ascites     Post-operative Diagnosis: Same    Procedure: Ultrasound guided Paracentesis     Anesthesia: 1% Lidocaine     Surgeons/Assistants: Tomas Mejía PA-C    Complications: none    EBL: Minimal    Specimens: Were obtained    Ultrasound guided paracentesis performed. 6000 ml clear yellow fluid obtained. Dressing applied.      Electronically signed by ERINN Thrasher on 3/21/2022 at 11:13 AM

## 2022-03-21 NOTE — PROGRESS NOTES
Speech Language Pathology  Speech Language Pathology  [address] [facility]    Dysphagia Treatment Note    Date: 3/21/2022  Patients Name: Cuauhtemoc Graff  MRN: [id number]  Diagnosis:   Patient Active Problem List   Diagnosis Code    Dementia ([facility] F03.90    Hypertension I10    Hyperlipidemia E78.5    Gout M10.9    Peripheral polyneuropathy G62.9    Anxiety and depression F41.9, F32. A    CAD (coronary artery disease) I25.10    CVA (cerebral vascular accident) ([facility] I63.9    Controlled type 2 diabetes mellitus with chronic 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 dialysis, with long-term current use of insulin ([facility]) E11.22, N18.6, Z79.4, Z99.2    Lymphedema of both lower extremities I89.0    PAD (peripheral artery disease) ([facility]) I73.9    Bradycardia R00.1    AV block, 1st degree I44.0    Chronic diastolic heart failure ([facility]) I50.32    End stage renal disease on dialysis ([facility]) N18.6, Z99.2    Hemodialysis patient ([facility] Z99.2    Hyponatremia E87.1    Constipation K59.00    Pulmonary HTN ([facility]) I27.20    Thrombocytopenia ([facility]) D69.6    Severe tricuspid regurgitation I07.1    Chronic respiratory failure with hypoxia ([facility]) J96.11    Hypervolemia E87.70    Overweight E66.3    Frontal headache R51.9    Chronic ischemic left PCA stroke I69.30    Encephalomalacia G93.89    Acquired cerebral atrophy ([facility]) G31.9    Dizziness and giddiness  R42    GI bleed K92.2    Cirrhosis of liver with ascites ([facility] K74.60, R18.8    Generalized abdominal pain R10.84       Pain: 0/10    Dysphagia Treatment  Treatment time: 1168-0444    Subjective: [x] Alert [x] Cooperative     [] Confused     [] Agitated    [] Lethargic    Objective/Assessment:    Pt. Seen for O/M treatment program.  Pt. Completed O/M exercises X 2-3 X 1 sets with max cues and frequent breaks. Pt. Completed fina maneuver X 0 reps, effortful swallow x0 with max cues. Education provided and exercises left at bedside.     Pt inconsistently following simple commands, fatigues rapidly. Unable to produce voicing throughout session. Note effortful breathing. ST recommended Dysphagia Minced and Moist (Dysphagia II)   with Mildly Thick ([place]) following MBSS yesterday, however this date recommend pt remain NPO due to general weakness/inability to maintain consistent alertness. Speech to reassess at bedside 3/22/22 as appropriate. Plan:  [x] Continue ST services    [] Discharge from ST:        Discharge recommendations: []  Further therapy recommended at discharge. The patient should be able to tolerate at least 3 hours of therapy per day over 5 days or 15 hours over 7 days. [] Further therapy recommended at discharge. [] No therapy recommended at discharge.          Treatment completed by: Carlos Sandhu, CONRADO, M.S. [address] [facility]

## 2022-03-21 NOTE — FLOWSHEET NOTE
[facility]   Patient Death Note  DEATH   Shift date: 3/21/22    Shift day: Monday  Shift #: 2                 Room # 1174/7808-99   Name: Cleve Solitario            Age: 80 y.o. Gender: female          Restorationism: Other      Place of Religion:   Admit Date & Time: 3/19/2022  2:23 AM         Actual date of death: 3/21/22   TOD: 5       SITUATION AT DEATH:   was paged via [employer] to provide support to patient of family at bedside following patient's death. IS THIS A 'S CASE? No    SPIRITUAL/EMOTIONAL INTERVENTION:   encountered patient's daughter, grandsons, and grandson's wife at bedside. Family was approachable, receptive of 's presence and expressed feelings of sadness over patient's passing. Patient's daughter, Marietta Hoffman, and patient's grandchildren shared memories of patient with . Family was quiet, but appeared to be coping. Patient's grandson requested prayer.  provided family with prayer. Family will be using [facility] in [facility].  called  home at [phone] on 3/21. Family Received Grief Packet? Yes       NAME AND PHONE NUMBER OF DOCTOR SIGNING DEATH CERTIFICATE:  Génesis Yee DO.  are now contacting the  home after a patient death.  spoke to [facility] in [facility]. Copy of COMPLETED Release of Body Form Received? Yes    Patient's belongings: With [facility]:  Name: [facility]: [facility]  Phone Number: [phone]    NEXT OF KIN:  Name: Charbel Marcelo  Relationship: Daughter  Street Address: [address] [place] [address] Drive: [facility]: [place]  Zip code: [address]   Phone Number: [address]?   No      Electronically signed by Rosalba Howell, on 3/21/2022 at 5:44 PM.  [facility] [facility]  [phone]

## 2022-03-22 ENCOUNTER — OUTSIDE SERVICES (OUTPATIENT)
Dept: INTERNAL MEDICINE | Age: 72
End: 2022-03-22
Payer: MEDICARE

## 2022-03-22 DIAGNOSIS — F41.9 ANXIETY AND DEPRESSION: ICD-10-CM

## 2022-03-22 DIAGNOSIS — F32.A ANXIETY AND DEPRESSION: ICD-10-CM

## 2022-03-22 DIAGNOSIS — N18.6 END STAGE RENAL DISEASE ON DIALYSIS (HCC): ICD-10-CM

## 2022-03-22 DIAGNOSIS — I10 ESSENTIAL HYPERTENSION: ICD-10-CM

## 2022-03-22 DIAGNOSIS — Z99.2 END STAGE RENAL DISEASE ON DIALYSIS (HCC): ICD-10-CM

## 2022-03-22 DIAGNOSIS — I50.32 CHRONIC DIASTOLIC HEART FAILURE (HCC): ICD-10-CM

## 2022-03-22 DIAGNOSIS — Z99.2 HEMODIALYSIS PATIENT (HCC): ICD-10-CM

## 2022-03-22 DIAGNOSIS — F03.90 DEMENTIA WITHOUT BEHAVIORAL DISTURBANCE, UNSPECIFIED DEMENTIA TYPE: ICD-10-CM

## 2022-03-22 DIAGNOSIS — Z79.4 CONTROLLED TYPE 2 DIABETES MELLITUS WITH CHRONIC KIDNEY DISEASE ON CHRONIC DIALYSIS, WITH LONG-TERM CURRENT USE OF INSULIN (HCC): Primary | ICD-10-CM

## 2022-03-22 DIAGNOSIS — N18.6 CONTROLLED TYPE 2 DIABETES MELLITUS WITH CHRONIC KIDNEY DISEASE ON CHRONIC DIALYSIS, WITH LONG-TERM CURRENT USE OF INSULIN (HCC): Primary | ICD-10-CM

## 2022-03-22 DIAGNOSIS — E11.22 CONTROLLED TYPE 2 DIABETES MELLITUS WITH CHRONIC KIDNEY DISEASE ON CHRONIC DIALYSIS, WITH LONG-TERM CURRENT USE OF INSULIN (HCC): Primary | ICD-10-CM

## 2022-03-22 DIAGNOSIS — Z79.4 LONG-TERM INSULIN USE (HCC): ICD-10-CM

## 2022-03-22 DIAGNOSIS — E78.2 MIXED HYPERLIPIDEMIA: ICD-10-CM

## 2022-03-22 DIAGNOSIS — E66.3 OVERWEIGHT: ICD-10-CM

## 2022-03-22 DIAGNOSIS — Z99.2 CONTROLLED TYPE 2 DIABETES MELLITUS WITH CHRONIC KIDNEY DISEASE ON CHRONIC DIALYSIS, WITH LONG-TERM CURRENT USE OF INSULIN (HCC): Primary | ICD-10-CM

## 2022-03-22 LAB — SMOOTH MUSCLE ANTIBODY: 16 UNITS (ref 0–19)

## 2022-03-22 PROCEDURE — 3044F HG A1C LEVEL LT 7.0%: CPT | Performed by: INTERNAL MEDICINE

## 2022-03-22 NOTE — PROGRESS NOTES
(with coronary artery bypass graft x4).  Cancer of uterus [facility])     history of, (probable cure)    Chronic diastolic heart failure ([address] 3/1/2021    Chronic kidney disease     Chronic low back pain     Controlled type 2 diabetes mellitus with chronic 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 dialysis, with long-term current use of insulin ([address]     CVA (cerebral vascular accident) ([address]     Decompensated heart failure ([address] 2020    Decubitus ulcer of trochanteric region of right hip, stage 2 ([address] 2020    Dementia ([address]     (moderate)    Dry eye syndrome     Elevated troponin 2020    End stage renal disease on dialysis ([address] 3/1/2021    GERD (gastroesophageal reflux disease)     Glaucoma suspect     Gout     Hemodialysis patient ([address] 3/1/2021    Homonymous hemianopsia     (Right)    Hyperkalemia 2020    Hyperlipidemia     Hypertension     Infestation by bed bug 2020    Infestation by maggots 2020    Keratitis     (secondary to dry eye syndrome).  Lymphedema of both lower extremities 2020    Morbid obesity ([address] 3/1/2021    MRSA bacteremia 2020    Obesity     PAD (peripheral artery disease) (HCC)     Peripheral polyneuropathy     (diabetic)    Pressure injury of right heel, unstageable ([address] 2020    Pseudophakos     (Right Eye: 2012; Left Eye: 2012) - Dr. Cory Cevallos.  Stroke [facility])     (Multiple) MRI of brain 10/2008 shows multiple infarcts involving the temporal and parietal areas.  Trichiasis     (left eye)    Type 2 diabetes mellitus ([address]     Wounds, multiple 2020       PAST SURGICAL HISTORY:   Past Surgical History:   Procedure Laterality Date    CATARACT REMOVAL WITH IMPLANT  2012    (Right eye) - Dr. Cory Cevallos.  CATARACT REMOVAL WITH IMPLANT  2012    (Left eye) - Dr. Cory Cevallos.      SECTION      CHOLECYSTECTOMY      CORONARY ARTERY BYPASS GRAFT  12-    (x4) - LIMA-LAD, SVG-diagnoal 1, SVG-OM1, and SVG-PDA. Exploration of left radial. (Dr. Tony Covington).  EYE SURGERY Left     as a child     GASTRIC BYPASS SURGERY      HYSTERECTOMY      (abdominal)  with left oophorectomy. Right ovary retained.  IR TUNNELED CATHETER PLACEMENT GREATER THAN 5 YEARS  1/8/2021    IR TUNNELED CATHETER PLACEMENT GREATER THAN 5 YEARS 1/8/2021 Kalyn Iqbal MD [facility] SPECIAL PROCEDURES    TONSILLECTOMY AND ADENOIDECTOMY      UPPER GASTROINTESTINAL ENDOSCOPY  12/14/2020    EGD BIOPSY performed by Romana Fujisawa, MD at [facility]       SOCIAL HISTORY:     Tobacco:   Social History     Tobacco Use   Smoking Status Never Smoker   Smokeless Tobacco Never Used   Tobacco Comment    never smoker eclamb rrt 7/20/17     Alcohol:   Social History     Substance and Sexual Activity   Alcohol Use No     Drugs:   Social History     Substance and Sexual Activity   Drug Use No       FAMILY HISTORY: family history includes Cancer in her mother; Coronary Art Dis in an other family member; Diabetes in her father and mother; Emphysema in her father; Glaucoma in her father; Heart Disease in her father; High Blood Pressure in her mother; Kidney Disease in her mother; [place]ms in an other family member; Mental Retardation in an other family member; Stroke in her mother and another family member; Uterine Cancer in her mother. REVIEW OF SYSTEMS:     Please see history of chief complaint above; otherwise no new problems with respect to General, HEENT, Cardiovascular, Respiratory, Gastrointestinal, Genitourinary, Endocrinologic, Musculoskeletal, or Neuropsychiatric complaints. PHYSICAL EXAMINATION:    Vitals: Temp: 97.7 deg F. Pulse: 70. Resp: 16. BP: 113/59. General: A 80 y.o.  female. Alert and oriented to person, place and time. She does not appear to be in any acute distress. Skin: Skin color, texture, turgor normal. No rashes or lesions.   HEENT/Neck: Essentially unremarkable  Lungs: Normal - CTA without rales, rhonchi, or wheezing. Heart: regular rate and rhythm, S1, S2 normal, with a grade 2/6 systolic murmur. No click, rub or gallop No S3 or S4. Abdomen: Obese soft, non-distended, non-tender, normal active bowel sounds, no masses palpated and no hepatosplenomegaly  Extremities: No clubbing, cyanosis, or edema in any of the extremities. Neurologic: cranial nerves II-XII are grossly intact    ASSESSMENT:     Diagnosis Orders   1. Controlled type 2 diabetes mellitus with chronic 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 dialysis, with long-term current use of insulin ([address]     2. Long-term insulin use ([address]     3. Essential hypertension     4. Mixed hyperlipidemia     5. Chronic diastolic heart failure ([address]     6. End stage renal disease on dialysis ([address]     7. Hemodialysis patient ([address]     8. Dementia without behavioral disturbance, unspecified dementia type ([address]     9. Anxiety and depression     10. Overweight           PLAN:    1. Continue current treatment  2. Nursing home record reviewed and updates summarized and entered into electronic record  3. Nursing home blood sugar logs and diabetic medication administration reviewed. No changes  4. Hospitalization records reviewed from January. 5. See nursing home orders and MAR.         Electronically signed by Alberta Diaz DO on 3/22/2022 at 3:43 AM  Internal Medicine

## 2022-12-26 NOTE — TELEPHONE ENCOUNTER
Spoke with Rosie Power nurse at [id number] [facility], she thought she already faxed it but will refax today. Yes

## 2023-03-16 NOTE — TELEPHONE ENCOUNTER
Jonn Patel was seen by Dr. Duc Jacobo in [address] today. Pt complaining of pain on urination. Dr. Duc Jacobo is asking if you would address this with Ramiro [phone]. Thank you. [facility]:  [facility]

## 2023-07-25 NOTE — H&P
[facility]    HISTORY AND PHYSICAL EXAMINATION            Date:   8/8/2020  Patient name:  Leanne Lopez  Date of admission:  8/7/2020 10:29 PM  MRN:   [id number]  Account:  [de-identified]  YOB: 1950  PCP:    ERINN Mike  Room:   2025/2025-01  Code Status:    Full Code    Chief Complaint:     Hyperglycemia, Fall    History Obtained From:     patient, electronic medical record    History of Present Illness:     Leanne Lopez is a 77 y.o. Non-/non  female who presents with No chief complaint on file. and is admitted to the hospital for the management of CHF (congestive heart failure), NYHA class I, acute on chronic, combined ([facility] [address] This is a 77 yr old female with history of CAD s/p CABG, CKD, CVA, glaucoma, HTN, HLD, DMII, and poly neuropathy who presented to [facility] ER today via EMS. Patient lives with her daughter and grandson, reportedly fell in the early evening on 8/6 and refused to let her daughter call 911. EMS was finally called yesterday morning after patient was found by family and had laid on the ground overnight, unable to get up. On arrival to ER, patient covered in urine and feces, multiple wounds of various stages with maggot and bed bug infestation. Patient noted to be hyperglycemic with bs of 400, AMY with elevated CK and Myoglobin, pro-bnp> 31,000, elevated troponin, and UTI. CT Head and neck without any acute findings, there is evidence of remote left PCA infarct. Patient denies any LOC or injury/hitting head on falling. Reports she slipped on the floor when she is used to walking on carpet. She denies any previous or associated dizziness or light headedness. Given the array of findings listed above, patient is transferred to our facility for further care and management.      Labs/Diagnostics: Na: 133, CRT: 1.28 (baseline: 1.0), glucose: 400, CK: 413, Myoglobin: 747, Pro-bnp: 31,859, FOOT AND ANKLE SURGERY/PODIATRY Progress Note      ASSESSMENT:   Cellulitis right foot  Diabetic Ulceration right hallux       TREATMENT:  -I discussed with the patient that there is a significant infection in the right hallux and the ulceration does probe to bone at this time.    -I am concerned about underlying osteomyelitis and referred him for an MRI for further evaluation.    -Wound culture obtained today.  I will start him on doxycycline.    -Reviewed that at minimum he will require surgical debridement of the ulceration with likely use of wound VAC postoperatively.  However, if osteomyelitis is present we discussed that amputation may be necessary with hospitalization and discharge to TCU.  All questions invited and answered.    -Most recent ABIs indicate adequate TBI for wound healing.    -After discussion of risk factors, nursing staff removed dressing, cleansed wound and consent obtained 2% Lidocaine HCL jelly was applied, under clean conditions, the right hallux ulceration(s) were debrided using currette.  Devitalized and nonviable tissue, along with any fibrin and slough, was removed to improve granulation tissue formation, stimulate wound healing, decrease overall bacteria load, disrupt biofilm formation and decrease edge senescence. Wound drainage was small Purulent. Total excisional debridement was 0.6 sq cm into the bone with a depth of 0.8 cm.   Ulcers were improved afterwards and .  Measures were as noted on the flow sheet. A gauze dressing was applied. He will continue to apply a gauze dressing qday.    -I will contact the patient with the MRI report when available and we will be guided by the results.     Nakul Salazar DPM  [facility] [place]      HPI: Lance Ibrahim was seen again today for a right hallux ulceration.  Patient denies any new concerns and states that he continues to ambulate on the right foot in a cam boot.  He denies any nausea vomiting fever chills.    Past  "Medical History:   Diagnosis Date    Arthritis     osteoarthritis knees    BPH     CAD (coronary artery disease)     subtotal occlusion in the small distal LAD     Cardiomyopathy     Cerebral artery occlusion with cerebral infarction     TIA 1993, no residual    Chronic kidney disease     Chronic rhinitis     HTN (hypertension)     Hyperlipidemia     Kidney stone     PVD (peripheral vascular disease)     Sleep apnea     doesn't use cpap    TIA (transient ischaemic attack) 1993    Type 2 diabetes mellitus - 11/08/2018    Ureteral stone        Past Surgical History:   Procedure Laterality Date    AMPUTATE TOE(S) Left 10/15/2018    Procedure: AMPUTATE TOE(S);  Left third toe amputation;  Surgeon: Ayaka Azevedo DPM, Podiatry/Foot and Ankle Surgery;  Location:  OR    ARTHROPLASTY KNEE Right 12/07/2018    Procedure: Right total knee arthroplasty;  Surgeon: Issa Cunha MD;  Location:  OR    COLONOSCOPY  03/09/2013    Procedure: COLONOSCOPY;  COLONOSCOPY;  Surgeon: Chau Hogan MD;  Location:  GI    CV HEART CATHETERIZATION WITH POSSIBLE INTERVENTION Left 03/05/2019    Procedure: Coronary Angiogram;  Surgeon: Nas Linda MD;  Location:  HEART CARDIAC CATH LAB    Diastasis recti repair  1985    EXTRACAPSULAR CATARACT EXTRATION WITH INTRAOCULAR LENS IMPLANT Left 03/13/2017    EXTRACAPSULAR CATARACT EXTRATION WITH INTRAOCULAR LENS IMPLANT Right     FOOT SURGERY  04/2013    cyst removal     HERNIA REPAIR  07/13/2004    ventral     ORIF Shoulder Left     Ventral hernia repair NOS  1987       Allergies   Allergen Reactions    Penicillins Anaphylaxis     8/25/22 - tolerated cefepime     Sulfa Antibiotics      \"itchy rash, swelling of face and hands\"    Ancef [Cefazolin] Rash         Current Outpatient Medications:     acetic acid 0.25 % irrigation, Irrigate with as directed daily Apply to gauze and let soak on wound for 5 minutes prior to dressing changes., Disp: 500 mL, Rfl: 3    amLODIPine (NORVASC) " Trop: 68--67 (baseline: 20s), HGB: 11.5    CXR: central venous congestions  CT Head/Neck: no acute injuries, remote left PCA infarct    On my evaluation, patient is in bed, appears comfortable, is oriented to self and place only, not time. Is generally a poor historian and unable to give specifics on medical history/medication regimen. Extensive wounds scattered across body including ABD, coccyx, and BLE. Fungal rash to bilateral groins, peritoneum, and under breast folds. BLE also noted 1-2+. Denies any CP, SOB, HA/dizziness, ABD pain, n/v/d, myalgias or cough, patient is afebrile with VSS. Past Medical History:     Past Medical History:   Diagnosis Date    Anxiety and depression     Background diabetic retinopathy(362.01)     (Mild)    Balance problem     CAD (coronary artery disease)     (with coronary artery bypass graft x4).  Cancer of uterus [facility])     history of, (probable cure)    Chronic kidney disease     Chronic low back pain     CVA (cerebral vascular accident) ([address]     Dementia ([address]     (moderate)    Dry eye syndrome     GERD (gastroesophageal reflux disease)     Glaucoma suspect     Gout     Homonymous hemianopsia     (Right)    Hyperlipidemia     Hypertension     Keratitis     (secondary to dry eye syndrome).  Obesity     Peripheral polyneuropathy     (diabetic)    Pseudophakos     (Right Eye: 2012; Left Eye: 2012) - Dr. Estelita Moore.  Stroke [facility])     (Multiple) MRI of brain 10/2008 shows multiple infarcts involving the temporal and parietal areas.  Trichiasis     (left eye)    Type 2 diabetes mellitus ([address]         Past Surgical History:     Past Surgical History:   Procedure Laterality Date    CATARACT REMOVAL WITH IMPLANT  2012    (Right eye) - Dr. Estelita Moore.  CATARACT REMOVAL WITH IMPLANT  2012    (Left eye) - Dr. Estelita Moore.      SECTION      CHOLECYSTECTOMY      CORONARY ARTERY BYPASS GRAFT  12-    (x4) - LIMA-LAD, SVG-diagnoal 1, SVG-OM1, and SVG-PDA. Exploration of left radial. (Dr. Hurt Left).  EYE SURGERY Left     as a child     GASTRIC BYPASS SURGERY      HYSTERECTOMY      (abdominal)  with left oophorectomy. Right ovary retained.  TONSILLECTOMY AND ADENOIDECTOMY          Medications Prior to Admission:     Prior to Admission medications    Medication Sig Start Date End Date Taking?  Authorizing Provider   atorvastatin (LIPITOR) 40 MG tablet TAKE 1 TABLET BY MOUTH EVERYDAY 7/29/20   [facility] [place]   docusate sodium (COLACE) 100 MG capsule Take 1 capsule by mouth 2 times daily 7/16/20   Erica Labor, PA   citalopram (CELEXA) 20 MG tablet Take 1 tablet by mouth daily 7/16/20   [facility], [place]   insulin lispro (HUMALOG) 100 UNIT/ML injection vial Inject 0-3 Units into the skin nightly 7/16/20   [facility] [place]   gabapentin (NEURONTIN) 300 MG capsule TAKE ONE CAPSULE BY MOUTH THREE TIMES DAILY 7/16/20 10/15/20  Erica Labor, PA   furosemide (LASIX) 20 MG tablet Take 1 tablet by mouth daily 7/16/20   [facility] [place]   memantine (NAMENDA) 10 MG tablet TAKE 1 TABLET BY MOUTH TWICE DAILY 7/16/20   Erica Labor, PA   clopidogrel (PLAVIX) 75 MG tablet Take 1 tablet by mouth daily 7/16/20   Erica Labor, PA   oxybutynin (DITROPAN) 5 MG tablet TAKE 1 TABLET BY MOUTH TWICE DAILY 7/16/20   [facility] [place]   carvedilol (COREG) 25 MG tablet Take 1 tablet by mouth 2 times daily (with meals) 7/16/20   Erica Labor, PA   loratadine (CLARITIN) 10 MG tablet TAKE 1 TABLET(10 MG) BY MOUTH DAILY 7/16/20   [facility] [place]   aspirin EC 81 MG EC tablet Take 1 tablet by mouth daily 7/16/20   Erica Labor, [place]   blood glucose monitor strips Check bid 5/8/20   Erica Labor, PA   allopurinol (ZYLOPRIM) 300 MG tablet Take 1 tablet by mouth daily 4/15/20   [facility]   losartan (COZAAR) 100 MG tablet Take 1 tablet by mouth daily 4/15/20   Erica Labor, PA   omeprazole (PRILOSEC) 20 MG delayed 5 MG tablet, Take 1 tablet (5 mg) by mouth daily, Disp: 90 tablet, Rfl: 3    aspirin (ASA) 81 MG EC tablet, Take by mouth every 24 hours, Disp: , Rfl:     atorvastatin (LIPITOR) 40 MG tablet, Take 1 tablet (40 mg) by mouth every 24 hours, Disp: 90 tablet, Rfl: 3    blood glucose monitoring (NO BRAND SPECIFIED) meter device kit, Use to test blood sugar 2 times daily or as directed., Disp: 1 kit, Rfl: 0    clotrimazole-betamethasone (LOTRISONE) 1-0.05 % external cream, Apply topically 2 times daily, Disp: 50 g, Rfl: 1    colchicine (COLCYRS) 0.6 MG tablet, TAKE 1 TABLET DAILY, Disp: 90 tablet, Rfl: 3    CONTOUR NEXT TEST test strip, USE TO TEST BLOOD SUGAR 2 TIMES DAILY OR AS DIRECTED., Disp: 100 strip, Rfl: 0    doxycycline monohydrate (MONODOX) 100 MG capsule, Take 1 capsule (100 mg) by mouth 2 times daily, Disp: 20 capsule, Rfl: 0    Ferrous Gluconate 240 (27 Fe) MG TABS, Take 1 tablet by mouth daily , Disp: , Rfl:     finasteride (PROSCAR) 5 MG tablet, Take by mouth every 24 hours, Disp: , Rfl:     fluticasone (FLONASE) 50 MCG/ACT nasal spray, Spray 1-2 sprays in nostril every 24 hours, Disp: , Rfl:     gabapentin (NEURONTIN) 300 MG capsule, Take 1 capsule (300 mg) by mouth 3 times daily, Disp: 270 capsule, Rfl: 0    isosorbide mononitrate (IMDUR) 30 MG 24 hr tablet, Take 1 tablet (30 mg) by mouth daily, Disp: 90 tablet, Rfl: 3    labetalol (NORMODYNE) 200 MG tablet, TAKE 1 TABLET BY MOUTH TWICE A DAY, Disp: 180 tablet, Rfl: 2    Lidocaine (LIDOCARE) 4 % Patch, Place 1 patch onto the skin every 24 hours To prevent lidocaine toxicity, patient should be patch free for 12 hrs daily. (Patient taking differently: Place 1 patch onto the skin every 24 hours To prevent lidocaine toxicity, patient should be patch free for 12 hrs daily.  To back every am), Disp: , Rfl:     lisinopril (ZESTRIL) 10 MG tablet, Take 1 tablet (10 mg) by mouth every 24 hours, Disp: 90 tablet, Rfl: 3    loratadine (CLARITIN) 10 MG tablet, Take by  mouth every 24 hours, Disp: , Rfl:     metFORMIN (GLUCOPHAGE) 1000 MG tablet, TAKE 1 TABLET TWICE A DAY WITH MEALS, Disp: 180 tablet, Rfl: 3    Microlet Lancets MISC, USE TO TEST BLOOD SUGAR 2 TIMES DAILY OR AS DIRECTED., Disp: 100 each, Rfl: 1    multivitamin w/minerals (THERA-VIT-M) tablet, Take 1 tablet by mouth daily, Disp: , Rfl:     Probiotic Product (FORTIFY 30 BILLION PROBIOT 50+ PO), , Disp: , Rfl:     Semaglutide (RYBELSUS) 7 MG TABS, Take 7 mg by mouth daily, Disp: 90 tablet, Rfl: 3    tamsulosin (FLOMAX) 0.4 MG 24 hr capsule, Take 1 capsule by mouth daily., Disp: , Rfl:     torsemide (DEMADEX) 20 MG tablet, Take 1 tablet (20 mg) by mouth daily, Disp: 90 tablet, Rfl: 2    acetic acid 0.25 % irrigation, Irrigate with as directed daily, Disp: 1000 mL, Rfl: 1    doxycycline monohydrate (MONODOX) 100 MG capsule, Take 1 capsule (100 mg) by mouth 2 times daily, Disp: 20 capsule, Rfl: 0    oxyCODONE (ROXICODONE) 5 MG tablet, TAKE 1 TABLET BY ORAL ROUTE EVERY 4 - 6 HOURS AS NEEDED FOR ACUTE PAIN., Disp: , Rfl:     Review of Systems - 10 point Review of Systems is negative except for right hallux ulceration with infection which is noted in HPI.      OBJECTIVE:  /66   Pulse 75   SpO2 98%   General appearance: Patient is alert and fully cooperative with history & exam.  No sign of distress is noted during the visit.    Vascular: Dorsalis pedis palpableRight.  Diffuse edema right hallux  Dermatologic:    Wound 10/14/18 Foot Abrasion(s) GRAYISH DISCOLORATION (Active)       Wound 12/07/18 Left;Lower Foot Amputation blackened area third left toe amputation incision site (Active)       Wound 07/27/20 Left Toe (Comment  which one) Ulceration Left foot pinky toe (Active)       Wound 07/27/20 Right Toe (Comment  which one) Ulceration Right great toe wound  (Active)       Wound Toe (Comment  which one) Ulceration (Active)       Wound Foot Ulceration (Active)       Wound Foot Ulceration (Active)       Rash 10/21/18  release capsule Take 1 capsule by mouth every morning (before breakfast) 3/5/20   ERINN Robertson   insulin glargine (LANTUS) 100 UNIT/ML injection vial Inject 50 Units into the skin nightly 1/3/20   Fabián Robertson   aspirin 81 MG tablet Take 1 tablet by mouth daily 1/3/20   ERINN Robertson   Diabetic Shoe MISC by Does not apply route 9/26/19   ERINN Robertson   Compression Stockings MISC by Does not apply route 20-30 mm Hg bilateral knee high 9/26/19   ERINN Robertson   gabapentin (NEURONTIN) 300 MG capsule Take 300 mg by mouth 3 times daily. Mejia Poe Historical Provider, MD   rivastigmine (EXELON) 9.5 MG/24HR APPLY 1 NEW PATCH EVERY 24 HOURS  Patient not taking: Reported on 4/15/2020 1/31/18   ERINN Robertson   nystatin (MYCOSTATIN) 519738 UNIT/GM cream Apply topically 2 times daily. 1/31/18   ERINN Robertson   Glucose Blood (BLOOD GLUCOSE TEST STRIPS) STRP truemetrix test strips check ac and HS 1/31/18   ERINN Robertson        Allergies:     Bactrim [sulfamethoxazole-trimethoprim] and Amoxicillin-pot clavulanate    Social History:     Tobacco:    reports that she has never smoked. She has never used smokeless tobacco.  Alcohol:      reports no history of alcohol use. Drug Use:  reports no history of drug use. Family History:     Family History   Problem Relation Age of Onset    Diabetes Mother     Cancer Mother     High Blood Pressure Mother    [facility] Stroke Mother     Kidney Disease Mother     Uterine Cancer Mother     Diabetes Father     Heart Disease Father     Glaucoma Father     Emphysema Father     Coronary Art Dis Other         All 4 siblings.  Stroke Other         1 sibling.  Lung Cancer Other         1 sibling - cause of death lung cancer.  Mental Retardation Other        Review of Systems:     Positive and Negative as described in HPI. Review of Systems   Unable to perform ROS: Dementia (Limited ROS)   Constitutional: Negative for chills, diaphoresis and fever. 1543 Bilateral other (see comments) (Active)       Rash 07/27/20 2015 Left lower leg other (see comments) (Active)       VASC Wound right hallux (Active)   Pre Size Length 1 07/25/23 1300   Pre Size Width 0.6 07/25/23 1300   Pre Size Depth 0.8 07/25/23 1300   Pre Total Sq cm 0.6 07/25/23 1300   Post Size Length 1.5 06/14/23 0800   Post Size Width 1 06/14/23 0800   Post Size Depth 0.2 06/14/23 0800   Post Total Sq cm 1.5 06/14/23 0800   Undermined 0.4 @ 8o'clock 10/06/22 1300   Description callous 05/02/23 1300       Incision/Surgical Site 10/15/18 Left Foot (Active)       Incision/Surgical Site 12/07/18 Right Knee (Active)   Ulceration along the plantar medial right hallux probes to bone with purulent drainage and localized erythema to the right hallux, no ascending cellulitis noted.  Neurologic: Diminished to light touch Right.  Musculoskeletal: Contracted digits noted Right.    Imaging:     No results found.            HENT: Positive for facial swelling. Negative for rhinorrhea, sore throat and trouble swallowing. Eyes: Negative for photophobia and visual disturbance. Respiratory: Negative for cough, chest tightness, shortness of breath and wheezing. Cardiovascular: Positive for leg swelling. Negative for chest pain and palpitations. Gastrointestinal: Negative for abdominal pain, diarrhea, nausea and vomiting. Genitourinary: Negative for difficulty urinating. Musculoskeletal: Negative for arthralgias, myalgias, neck pain and neck stiffness. Skin: Positive for pallor and wound. Extensive wounds to coccyx, ABD, and BLE   Neurological: Negative for dizziness, speech difficulty, weakness, light-headedness and headaches. Psychiatric/Behavioral: Positive for confusion. Negative for agitation and behavioral problems. The patient is not nervous/anxious. Physical Exam:   BP (!) 151/51   Pulse 71   Temp 97.6 °F (36.4 °C) (Oral)   Resp 12   SpO2 96%   Temp (24hrs), Av °F (36.1 °C), Min:96.3 °F (35.7 °C), Max:97.6 °F (36.4 °C)    Recent Labs     20  1716 20  2346   POCGLU 366* 270*     No intake or output data in the 24 hours ending 20 0106    Physical Exam  Vitals signs and nursing note reviewed. Constitutional:       General: She is not in acute distress. Appearance: She is obese. She is ill-appearing. She is not toxic-appearing. Comments: Unkempt     HENT:      Head: Normocephalic and atraumatic. Right Ear: External ear normal.      Left Ear: External ear normal.      Nose: Nose normal.      Mouth/Throat:      Mouth: Mucous membranes are dry. Pharynx: Oropharynx is clear. Eyes:      Extraocular Movements: Extraocular movements intact. Conjunctiva/sclera: Conjunctivae normal.      Pupils: Pupils are equal, round, and reactive to light. Comments: Periorbital ecchymosis/ and mild swelling   Neck:      Musculoskeletal: Normal range of motion.  No neck rigidity or muscular tenderness. Cardiovascular:      Rate and Rhythm: Normal rate and regular rhythm. Pulses: Normal pulses. Heart sounds: Murmur present. Pulmonary:      Effort: Pulmonary effort is normal. No respiratory distress. Breath sounds: Normal breath sounds. No wheezing, rhonchi or rales. Abdominal:      General: Bowel sounds are normal. There is no distension. Palpations: Abdomen is soft. There is no mass. Tenderness: There is no abdominal tenderness. Comments: ABD wounds with presence of maggots   Genitourinary:     Vagina: No vaginal discharge. Comments: Sedementous urine with peritoneal erythema  Musculoskeletal:         General: Swelling and tenderness present. Right lower leg: Edema present. Left lower leg: Edema present. Comments: Scattered wounds to BLE, 1-2+ edema, with maggot and bed bugs   Skin:     General: Skin is dry. Capillary Refill: Capillary refill takes less than 2 seconds. Coloration: Skin is pale. Skin is not jaundiced. Findings: Erythema and rash present. No lesion. Neurological:      General: No focal deficit present. Mental Status: She is alert. Mental status is at baseline. She is disoriented. Cranial Nerves: No cranial nerve deficit. Sensory: No sensory deficit. Motor: No weakness. Psychiatric:         Mood and Affect: Mood normal.         Behavior: Behavior normal.         Thought Content:  Thought content normal.         Investigations:      Laboratory Testing:  Recent Results (from the past 24 hour(s))   CBC Auto Differential    Collection Time: 08/07/20  3:52 PM   Result Value Ref Range    WBC 10.1 3.5 - 11.3 k/uL    RBC 3.96 3.95 - 5.11 m/uL    Hemoglobin 11.5 (L) 11.9 - 15.1 g/dL    Hematocrit 35.3 (L) 36.3 - 47.1 %    MCV 89.1 82.6 - 102.9 fL    MCH 29.0 25.2 - 33.5 pg    MCHC 32.6 25.2 - 33.5 g/dL    RDW 15.8 (H) 11.8 - 14.4 %    Platelets 783 406 - 001 k/uL    MPV 10.5 8.1 - 13.5 fL    NRBC Automated 0.0 0.0 per 100 WBC    Differential Type NOT REPORTED     Seg Neutrophils 79 (H) 36 - 65 %    Lymphocytes 13 (L) 24 - 43 %    Monocytes 7 3 - 12 %    Eosinophils % 1 1 - 4 %    Basophils 0 0 - 2 %    Immature Granulocytes 0 0 %    Segs Absolute 7.82 1.50 - 8.10 k/uL    Absolute Lymph # 1.35 1.10 - 3.70 k/uL    Absolute Mono # 0.74 0.10 - 1.20 k/uL    Absolute Eos # 0.08 0.00 - 0.44 k/uL    Basophils Absolute 0.03 0.00 - 0.20 k/uL    Absolute Immature Granulocyte 0.03 0.00 - 0.30 k/uL    WBC Morphology NOT REPORTED     RBC Morphology ANISOCYTOSIS PRESENT     Platelet Estimate NOT REPORTED    Basic Metabolic Panel    Collection Time: 08/07/20  3:52 PM   Result Value Ref Range    Glucose 400 (H) 70 - 99 mg/dL    BUN 20 8 - 23 mg/dL    CREATININE 1.28 (H) 0.50 - 0.90 mg/dL    Bun/Cre Ratio 16 9 - 20    Calcium 8.4 (L) 8.6 - 10.4 mg/dL    Sodium 133 (L) 135 - 144 mmol/L    Potassium 3.8 3.7 - 5.3 mmol/L    Chloride 96 (L) 98 - 107 mmol/L    CO2 27 20 - 31 mmol/L    Anion Gap 10 9 - 17 mmol/L    GFR Non-African American 41 (L) >60 mL/min    GFR African American 50 (L) >60 mL/min    GFR Comment          GFR Staging NOT REPORTED    Troponin    Collection Time: 08/07/20  3:52 PM   Result Value Ref Range    Troponin, High Sensitivity 68 (HH) 0 - 14 ng/L    Troponin T NOT REPORTED <0.03 ng/mL    Troponin Interp NOT REPORTED    CK    Collection Time: 08/07/20  3:52 PM   Result Value Ref Range    Total  (H) 26 - 192 U/L   Myoglobin, Serum    Collection Time: 08/07/20  3:52 PM   Result Value Ref Range    Myoglobin 747 (H) 25 - 58 ng/mL   Lactic Acid    Collection Time: 08/07/20  3:52 PM   Result Value Ref Range    Lactic Acid 1.7 0.5 - 2.2 mmol/L   Brain Natriuretic Peptide    Collection Time: 08/07/20  3:52 PM   Result Value Ref Range    Pro-BNP 31,859 (H) <300 pg/mL    BNP Interpretation Pro-BNP Reference Range:    Amylase    Collection Time: 08/07/20  3:52 PM   Result Value Ref Range    Amylase 11 (L) 28 - 100 U/L   Lipase    Collection Time: 08/07/20  3:52 PM   Result Value Ref Range    Lipase 19 13 - 60 U/L   Hepatic Function Panel    Collection Time: 08/07/20  3:52 PM   Result Value Ref Range    Alb 2.2 (L) 3.5 - 5.2 g/dL    Alkaline Phosphatase 141 (H) 35 - 104 U/L    ALT 7 5 - 33 U/L    AST 21 <32 U/L    Total Bilirubin 0.98 0.3 - 1.2 mg/dL    Bilirubin, Direct 0.43 (H) <0.31 mg/dL    Bilirubin, Indirect 0.55 0.00 - 1.00 mg/dL    Total Protein 6.5 6.4 - 8.3 g/dL    Globulin 4.3 (H) 1.5 - 3.8 g/dL    Albumin/Globulin Ratio 0.5 (L) 1.0 - 2.5   EKG 12 Lead    Collection Time: 08/07/20  3:55 PM   Result Value Ref Range    Ventricular Rate 80 BPM    Atrial Rate 80 BPM    P-R Interval 206 ms    QRS Duration 114 ms    Q-T Interval 450 ms    QTc Calculation (Bazett) 519 ms    P Axis 79 degrees    R Axis -78 degrees    T Axis 101 degrees   Urinalysis Reflex to Culture    Collection Time: 08/07/20  4:04 PM    Specimen: Urine, straight catheter   Result Value Ref Range    Color, UA NOT REPORTED YELLOW    Turbidity UA NOT REPORTED CLEAR    Glucose, Ur 3+ (A) NEGATIVE    Bilirubin Urine NEGATIVE NEGATIVE    Ketones, Urine NEGATIVE NEGATIVE    Specific Gravity, UA 1.025 1.010 - 1.025    Urine Hgb 3+ (A) NEGATIVE    pH, UA 6.5 (H) 5.0 - 6.0    Protein, UA 2+ (A) NEGATIVE    Urobilinogen, Urine Normal Normal    Nitrite, Urine NEGATIVE NEGATIVE    Leukocyte Esterase, Urine NEGATIVE NEGATIVE    Urinalysis Comments NOT REPORTED    Microscopic Urinalysis    Collection Time: 08/07/20  4:04 PM   Result Value Ref Range    -          WBC, UA >50 0 - 4 /HPF    RBC, UA 25 TO 50 0 - 4 /HPF    Casts UA NOT REPORTED 0 - 2 /LPF    Crystals, UA NOT REPORTED None /HPF    Epithelial Cells UA 0 TO 4 0 - 5 /HPF    Renal Epithelial, UA NOT REPORTED 0 /HPF    Bacteria, UA 4+ (A) None    Mucus, UA NOT REPORTED None    Trichomonas, UA NOT REPORTED None    Amorphous, UA NOT REPORTED None    Other Observations UA Specimen Cultured (A) NOT REQ.    Yeast, UA NOT REPORTED None   POC Glucose Fingerstick    Collection Time: 08/07/20  5:16 PM   Result Value Ref Range    POC Glucose 366 (H) 65 - 105 mg/dL   POCT Glucose    Collection Time: 08/07/20  5:47 PM   Result Value Ref Range    QC OK? ok    Troponin    Collection Time: 08/07/20  5:59 PM   Result Value Ref Range    Troponin, High Sensitivity 67 (HH) 0 - 14 ng/L    Troponin T NOT REPORTED <0.03 ng/mL    Troponin Interp NOT REPORTED    POC Glucose Fingerstick    Collection Time: 08/07/20 11:46 PM   Result Value Ref Range    POC Glucose 270 (H) 65 - 105 mg/dL       Imaging/Diagnostics:  Ct Head Wo Contrast    Result Date: 8/7/2020  No acute intracranial abnormality. Left sided scalp edema/hematoma without underlying calvarial fracture. Peripheral mucosal thickening of the paranasal sinuses. Remote left PCA territory infarction. Ct Cervical Spine Wo Contrast    Result Date: 8/7/2020  No acute abnormality of the cervical spine. Mild multilevel degenerative changes. Interlobular septal thickening apices; correlate for edema or other disease. 4 cm left thyroid nodule. If not already assessed previously, nonemergent follow-up ultrasound suggested. Xr Chest Portable    Result Date: 8/7/2020  Mild cardiomegaly and central vascular congestion. Basilar nodules are present, denser than rib favoring granulomas or hamartomas. Assessment :      Hospital Problems           Last Modified POA    * (Principal) CHF (congestive heart failure), NYHA class I, acute on chronic, combined ([address] 8/8/2020 Yes    Hypertension 8/8/2020 Yes    Hyperlipidemia 8/8/2020 Yes    Obesity 8/8/2020 Yes    Acute kidney injury superimposed on CKD ([address] 8/8/2020 Yes    CAD (coronary artery disease) 8/8/2020 Yes    Overview Signed 2/27/2014 10:22 AM by Favio Beck MD     (with coronary artery bypass graft x4).          Type 2 diabetes mellitus with hyperglycemia, with long-term current use of insulin ([address] 8/8/2020 Yes Infestation by bed bug 8/8/2020 Yes    Infestation by maggots 8/8/2020 Yes    Fall 8/8/2020 Yes          Plan:     Patient status inpatient in the Progressive Unit/Step down    1. CHF: check ECHO, repeat CXR in AM and trend pro-bnp, monitor closely given need for fluids with elevated CK/myoglobin  2. AMY on CKD: with elevated CK and Myoglobin, baseline CRT: 1.0, continue IV fluids and trend CK/Myoglobin  3. Fall: multifactorial with apparent deconditioning, extensive comorbidites and metabolic findings, PT/OT and social work for safety on return to home  4. DMII with hyperglycemia: sliding scale insulin for glycemic control, check A1C (most recent 9.6)  5. Maggot and bed bug infestation: strict isolation, wound care to evaluate skin and wound care  6. HTN  7. HLD  8. Obesity  9. Demential  10. Chronic Gout  11. H/O CAD s/p CABG  12. H/O CVA: unknown residuals  13. Daily labs, bedside swallow via RN prior to advancing to diabetic diet, continue selected home medications    Consultations:   IP CONSULT TO DIETITIAN    Patient is admitted as inpatient status because of co-morbidities listed above, severity of signs and symptoms as outlined, requirement for current medical therapies and most importantly because of direct risk to patient if care not provided in a hospital setting. Expected length of stay > 48 hours.     SHIRA Moore NP  8/8/2020  1:06 AM    Copy sent to ERINN Parson
